# Patient Record
Sex: FEMALE | Race: WHITE | NOT HISPANIC OR LATINO
[De-identification: names, ages, dates, MRNs, and addresses within clinical notes are randomized per-mention and may not be internally consistent; named-entity substitution may affect disease eponyms.]

---

## 2017-03-12 ENCOUNTER — LABORATORY RESULT (OUTPATIENT)
Age: 42
End: 2017-03-12

## 2017-03-12 ENCOUNTER — TRANSCRIPTION ENCOUNTER (OUTPATIENT)
Age: 42
End: 2017-03-12

## 2017-03-13 ENCOUNTER — OTHER (OUTPATIENT)
Age: 42
End: 2017-03-13

## 2017-03-13 ENCOUNTER — APPOINTMENT (OUTPATIENT)
Dept: INTERNAL MEDICINE | Facility: CLINIC | Age: 42
End: 2017-03-13

## 2017-03-13 VITALS
WEIGHT: 128 LBS | BODY MASS INDEX: 23.55 KG/M2 | TEMPERATURE: 98.3 F | HEIGHT: 62 IN | DIASTOLIC BLOOD PRESSURE: 90 MMHG | SYSTOLIC BLOOD PRESSURE: 154 MMHG

## 2017-03-13 DIAGNOSIS — R82.90 UNSPECIFIED ABNORMAL FINDINGS IN URINE: ICD-10-CM

## 2017-03-15 ENCOUNTER — LABORATORY RESULT (OUTPATIENT)
Age: 42
End: 2017-03-15

## 2017-03-20 LAB
ALBUMIN SERPL ELPH-MCNC: 4.5 G/DL
ALP BLD-CCNC: 62 U/L
ALT SERPL-CCNC: 15 U/L
ANION GAP SERPL CALC-SCNC: 16 MMOL/L
APPEARANCE: ABNORMAL
AST SERPL-CCNC: 28 U/L
BAKER'S YEAST AB QL: 23.3 UNITS
BAKER'S YEAST IGA QL IA: 10.4 UNITS
BAKER'S YEAST IGA QN IA: NEGATIVE
BAKER'S YEAST IGG QN IA: ABNORMAL
BASOPHILS # BLD AUTO: 0.01 K/UL
BASOPHILS NFR BLD AUTO: 0.1 %
BILIRUB SERPL-MCNC: 0.4 MG/DL
BILIRUBIN URINE: NEGATIVE
BLOOD URINE: ABNORMAL
BUN SERPL-MCNC: 10 MG/DL
CALCIUM SERPL-MCNC: 9.8 MG/DL
CHLORIDE SERPL-SCNC: 101 MMOL/L
CHOLEST SERPL-MCNC: 235 MG/DL
CHOLEST/HDLC SERPL: 3.9 RATIO
CO2 SERPL-SCNC: 26 MMOL/L
COLOR: ABNORMAL
CREAT SERPL-MCNC: 0.86 MG/DL
ENDOMYSIUM IGA SER QL: NORMAL
ENDOMYSIUM IGA TITR SER: NORMAL
EOSINOPHIL # BLD AUTO: 0.13 K/UL
EOSINOPHIL NFR BLD AUTO: 1.9 %
GLIADIN IGA SER QL: <5 UNITS
GLIADIN IGG SER QL: <5 UNITS
GLIADIN PEPTIDE IGA SER-ACNC: NEGATIVE
GLIADIN PEPTIDE IGG SER-ACNC: NEGATIVE
GLUCOSE QUALITATIVE U: NORMAL MG/DL
GLUCOSE SERPL-MCNC: 97 MG/DL
HCT VFR BLD CALC: 42.1 %
HDLC SERPL-MCNC: 60 MG/DL
HGB BLD-MCNC: 13.8 G/DL
IGA SER QL IEP: 61 MG/DL
IMM GRANULOCYTES NFR BLD AUTO: 0.3 %
KETONES URINE: ABNORMAL
LDLC SERPL CALC-MCNC: 119 MG/DL
LDLC SERPL DIRECT ASSAY-MCNC: 133 MG/DL
LEUKOCYTE ESTERASE URINE: ABNORMAL
LYMPHOCYTES # BLD AUTO: 2.32 K/UL
LYMPHOCYTES NFR BLD AUTO: 33.4 %
MAN DIFF?: NORMAL
MCHC RBC-ENTMCNC: 29.4 PG
MCHC RBC-ENTMCNC: 32.8 GM/DL
MCV RBC AUTO: 89.6 FL
MONOCYTES # BLD AUTO: 0.41 K/UL
MONOCYTES NFR BLD AUTO: 5.9 %
MPO AB + PR3 PNL SER: NORMAL
NEUTROPHILS # BLD AUTO: 4.06 K/UL
NEUTROPHILS NFR BLD AUTO: 58.4 %
NITRITE URINE: NEGATIVE
PH URINE: 6
PLATELET # BLD AUTO: 69 K/UL
POTASSIUM SERPL-SCNC: 4.3 MMOL/L
PROT SERPL-MCNC: 7.6 G/DL
PROTEIN URINE: ABNORMAL MG/DL
RBC # BLD: 4.7 M/UL
RBC # FLD: 13.6 %
SODIUM SERPL-SCNC: 143 MMOL/L
SPECIFIC GRAVITY URINE: 1.03
T4 FREE SERPL-MCNC: 1.5 NG/DL
TRIGL SERPL-MCNC: 280 MG/DL
TSH SERPL-ACNC: 2.95 UIU/ML
TTG IGA SER IA-ACNC: <5 UNITS
TTG IGA SER-ACNC: NEGATIVE
TTG IGG SER IA-ACNC: <5 UNITS
TTG IGG SER IA-ACNC: NEGATIVE
UROBILINOGEN URINE: NORMAL MG/DL
WBC # FLD AUTO: 6.95 K/UL

## 2017-03-23 ENCOUNTER — APPOINTMENT (OUTPATIENT)
Dept: INTERNAL MEDICINE | Facility: CLINIC | Age: 42
End: 2017-03-23

## 2017-03-23 VITALS
WEIGHT: 127 LBS | BODY MASS INDEX: 23.23 KG/M2 | TEMPERATURE: 98 F | SYSTOLIC BLOOD PRESSURE: 130 MMHG | DIASTOLIC BLOOD PRESSURE: 90 MMHG

## 2017-03-27 ENCOUNTER — MESSAGE (OUTPATIENT)
Age: 42
End: 2017-03-27

## 2017-03-27 ENCOUNTER — OUTPATIENT (OUTPATIENT)
Dept: OUTPATIENT SERVICES | Facility: HOSPITAL | Age: 42
LOS: 1 days | End: 2017-03-27
Payer: COMMERCIAL

## 2017-03-27 ENCOUNTER — APPOINTMENT (OUTPATIENT)
Dept: MRI IMAGING | Facility: CLINIC | Age: 42
End: 2017-03-27

## 2017-03-27 DIAGNOSIS — R10.9 UNSPECIFIED ABDOMINAL PAIN: ICD-10-CM

## 2017-03-27 PROCEDURE — 74183 MRI ABD W/O CNTR FLWD CNTR: CPT

## 2017-03-27 PROCEDURE — 72197 MRI PELVIS W/O & W/DYE: CPT

## 2017-03-27 PROCEDURE — A9585: CPT

## 2017-03-31 ENCOUNTER — APPOINTMENT (OUTPATIENT)
Dept: INTERNAL MEDICINE | Facility: CLINIC | Age: 42
End: 2017-03-31

## 2017-04-03 ENCOUNTER — MESSAGE (OUTPATIENT)
Age: 42
End: 2017-04-03

## 2017-04-05 ENCOUNTER — APPOINTMENT (OUTPATIENT)
Dept: INTERNAL MEDICINE | Facility: CLINIC | Age: 42
End: 2017-04-05

## 2017-04-05 VITALS
WEIGHT: 127 LBS | HEART RATE: 86 BPM | SYSTOLIC BLOOD PRESSURE: 120 MMHG | BODY MASS INDEX: 23.23 KG/M2 | DIASTOLIC BLOOD PRESSURE: 82 MMHG

## 2017-04-21 ENCOUNTER — MESSAGE (OUTPATIENT)
Age: 42
End: 2017-04-21

## 2017-05-01 ENCOUNTER — NON-APPOINTMENT (OUTPATIENT)
Age: 42
End: 2017-05-01

## 2017-05-01 ENCOUNTER — APPOINTMENT (OUTPATIENT)
Dept: ELECTROPHYSIOLOGY | Facility: CLINIC | Age: 42
End: 2017-05-01

## 2017-05-01 VITALS
SYSTOLIC BLOOD PRESSURE: 136 MMHG | HEART RATE: 85 BPM | OXYGEN SATURATION: 100 % | WEIGHT: 122 LBS | HEIGHT: 62 IN | DIASTOLIC BLOOD PRESSURE: 94 MMHG | BODY MASS INDEX: 22.45 KG/M2

## 2017-05-04 ENCOUNTER — EMERGENCY (EMERGENCY)
Facility: HOSPITAL | Age: 42
LOS: 1 days | Discharge: HOME | End: 2017-05-04
Attending: EMERGENCY MEDICINE | Admitting: EMERGENCY MEDICINE
Payer: COMMERCIAL

## 2017-05-04 VITALS
HEART RATE: 90 BPM | DIASTOLIC BLOOD PRESSURE: 100 MMHG | OXYGEN SATURATION: 98 % | TEMPERATURE: 98 F | SYSTOLIC BLOOD PRESSURE: 138 MMHG | RESPIRATION RATE: 18 BRPM

## 2017-05-04 VITALS
HEART RATE: 80 BPM | DIASTOLIC BLOOD PRESSURE: 91 MMHG | OXYGEN SATURATION: 100 % | SYSTOLIC BLOOD PRESSURE: 148 MMHG | RESPIRATION RATE: 18 BRPM

## 2017-05-04 DIAGNOSIS — Z87.19 PERSONAL HISTORY OF OTHER DISEASES OF THE DIGESTIVE SYSTEM: ICD-10-CM

## 2017-05-04 DIAGNOSIS — R00.2 PALPITATIONS: ICD-10-CM

## 2017-05-04 DIAGNOSIS — I10 ESSENTIAL (PRIMARY) HYPERTENSION: ICD-10-CM

## 2017-05-04 DIAGNOSIS — R53.1 WEAKNESS: ICD-10-CM

## 2017-05-04 DIAGNOSIS — R19.7 DIARRHEA, UNSPECIFIED: ICD-10-CM

## 2017-05-04 DIAGNOSIS — F41.9 ANXIETY DISORDER, UNSPECIFIED: ICD-10-CM

## 2017-05-04 DIAGNOSIS — R63.4 ABNORMAL WEIGHT LOSS: ICD-10-CM

## 2017-05-04 DIAGNOSIS — R68.83 CHILLS (WITHOUT FEVER): ICD-10-CM

## 2017-05-04 LAB
ALBUMIN SERPL ELPH-MCNC: 4.6 G/DL — SIGNIFICANT CHANGE UP (ref 3.3–5)
ALP SERPL-CCNC: 59 U/L — SIGNIFICANT CHANGE UP (ref 40–120)
ALT FLD-CCNC: 14 U/L RC — SIGNIFICANT CHANGE UP (ref 10–45)
ANION GAP SERPL CALC-SCNC: 17 MMOL/L — SIGNIFICANT CHANGE UP (ref 5–17)
APPEARANCE UR: CLEAR — SIGNIFICANT CHANGE UP
APTT BLD: 22.4 SEC — LOW (ref 27.5–37.4)
AST SERPL-CCNC: 22 U/L — SIGNIFICANT CHANGE UP (ref 10–40)
BACTERIA # UR AUTO: ABNORMAL /HPF
BASOPHILS # BLD AUTO: 0 K/UL — SIGNIFICANT CHANGE UP (ref 0–0.2)
BASOPHILS NFR BLD AUTO: 0.2 % — SIGNIFICANT CHANGE UP (ref 0–2)
BILIRUB SERPL-MCNC: 0.4 MG/DL — SIGNIFICANT CHANGE UP (ref 0.2–1.2)
BILIRUB UR-MCNC: NEGATIVE — SIGNIFICANT CHANGE UP
BUN SERPL-MCNC: 4 MG/DL — LOW (ref 7–23)
CALCIUM SERPL-MCNC: 9.9 MG/DL — SIGNIFICANT CHANGE UP (ref 8.4–10.5)
CHLORIDE SERPL-SCNC: 99 MMOL/L — SIGNIFICANT CHANGE UP (ref 96–108)
CK SERPL-CCNC: 42 U/L — SIGNIFICANT CHANGE UP (ref 25–170)
CO2 SERPL-SCNC: 23 MMOL/L — SIGNIFICANT CHANGE UP (ref 22–31)
COLOR SPEC: SIGNIFICANT CHANGE UP
CREAT SERPL-MCNC: 0.83 MG/DL — SIGNIFICANT CHANGE UP (ref 0.5–1.3)
D DIMER BLD IA.RAPID-MCNC: <150 NG/ML DDU — SIGNIFICANT CHANGE UP
DIFF PNL FLD: ABNORMAL
EOSINOPHIL # BLD AUTO: 0 K/UL — SIGNIFICANT CHANGE UP (ref 0–0.5)
EOSINOPHIL NFR BLD AUTO: 0.3 % — SIGNIFICANT CHANGE UP (ref 0–6)
EPI CELLS # UR: SIGNIFICANT CHANGE UP /HPF
GAS PNL BLDV: SIGNIFICANT CHANGE UP
GLUCOSE SERPL-MCNC: 95 MG/DL — SIGNIFICANT CHANGE UP (ref 70–99)
GLUCOSE UR QL: NEGATIVE — SIGNIFICANT CHANGE UP
HCG SERPL-ACNC: <2 MIU/ML — SIGNIFICANT CHANGE UP
HCT VFR BLD CALC: 41.7 % — SIGNIFICANT CHANGE UP (ref 34.5–45)
HGB BLD-MCNC: 15.1 G/DL — SIGNIFICANT CHANGE UP (ref 11.5–15.5)
INR BLD: 0.99 RATIO — SIGNIFICANT CHANGE UP (ref 0.88–1.16)
KETONES UR-MCNC: NEGATIVE — SIGNIFICANT CHANGE UP
LEUKOCYTE ESTERASE UR-ACNC: NEGATIVE — SIGNIFICANT CHANGE UP
LYMPHOCYTES # BLD AUTO: 16.2 % — SIGNIFICANT CHANGE UP (ref 13–44)
LYMPHOCYTES # BLD AUTO: 2.2 K/UL — SIGNIFICANT CHANGE UP (ref 1–3.3)
MAGNESIUM SERPL-MCNC: 2.5 MG/DL — SIGNIFICANT CHANGE UP (ref 1.6–2.6)
MCHC RBC-ENTMCNC: 31 PG — SIGNIFICANT CHANGE UP (ref 27–34)
MCHC RBC-ENTMCNC: 36.3 GM/DL — HIGH (ref 32–36)
MCV RBC AUTO: 85.6 FL — SIGNIFICANT CHANGE UP (ref 80–100)
MONOCYTES # BLD AUTO: 1 K/UL — HIGH (ref 0–0.9)
MONOCYTES NFR BLD AUTO: 7.7 % — SIGNIFICANT CHANGE UP (ref 2–14)
NEUTROPHILS # BLD AUTO: 10.1 K/UL — HIGH (ref 1.8–7.4)
NEUTROPHILS NFR BLD AUTO: 75.6 % — SIGNIFICANT CHANGE UP (ref 43–77)
NITRITE UR-MCNC: NEGATIVE — SIGNIFICANT CHANGE UP
PH UR: 7 — SIGNIFICANT CHANGE UP (ref 5–8)
PHOSPHATE SERPL-MCNC: 4.1 MG/DL — SIGNIFICANT CHANGE UP (ref 2.5–4.5)
PLATELET # BLD AUTO: 68 K/UL — LOW (ref 150–400)
POTASSIUM SERPL-MCNC: 3.6 MMOL/L — SIGNIFICANT CHANGE UP (ref 3.5–5.3)
POTASSIUM SERPL-SCNC: 3.6 MMOL/L — SIGNIFICANT CHANGE UP (ref 3.5–5.3)
PROT SERPL-MCNC: 7.5 G/DL — SIGNIFICANT CHANGE UP (ref 6–8.3)
PROT UR-MCNC: NEGATIVE — SIGNIFICANT CHANGE UP
PROTHROM AB SERPL-ACNC: 10.8 SEC — SIGNIFICANT CHANGE UP (ref 9.8–12.7)
RBC # BLD: 4.87 M/UL — SIGNIFICANT CHANGE UP (ref 3.8–5.2)
RBC # FLD: 11.7 % — SIGNIFICANT CHANGE UP (ref 10.3–14.5)
RBC CASTS # UR COMP ASSIST: ABNORMAL /HPF (ref 0–2)
SODIUM SERPL-SCNC: 139 MMOL/L — SIGNIFICANT CHANGE UP (ref 135–145)
SP GR SPEC: <1.005 — LOW (ref 1.01–1.02)
TROPONIN T SERPL-MCNC: <0.01 NG/ML — SIGNIFICANT CHANGE UP (ref 0–0.06)
UROBILINOGEN FLD QL: NEGATIVE — SIGNIFICANT CHANGE UP
WBC # BLD: 13.3 K/UL — HIGH (ref 3.8–10.5)
WBC # FLD AUTO: 13.3 K/UL — HIGH (ref 3.8–10.5)
WBC UR QL: SIGNIFICANT CHANGE UP /HPF (ref 0–5)

## 2017-05-04 PROCEDURE — 83605 ASSAY OF LACTIC ACID: CPT

## 2017-05-04 PROCEDURE — 84100 ASSAY OF PHOSPHORUS: CPT

## 2017-05-04 PROCEDURE — 82803 BLOOD GASES ANY COMBINATION: CPT

## 2017-05-04 PROCEDURE — 82435 ASSAY OF BLOOD CHLORIDE: CPT

## 2017-05-04 PROCEDURE — 82947 ASSAY GLUCOSE BLOOD QUANT: CPT

## 2017-05-04 PROCEDURE — 82330 ASSAY OF CALCIUM: CPT

## 2017-05-04 PROCEDURE — 84295 ASSAY OF SERUM SODIUM: CPT

## 2017-05-04 PROCEDURE — 81001 URINALYSIS AUTO W/SCOPE: CPT

## 2017-05-04 PROCEDURE — 84132 ASSAY OF SERUM POTASSIUM: CPT

## 2017-05-04 PROCEDURE — 85027 COMPLETE CBC AUTOMATED: CPT

## 2017-05-04 PROCEDURE — 93005 ELECTROCARDIOGRAM TRACING: CPT

## 2017-05-04 PROCEDURE — 82550 ASSAY OF CK (CPK): CPT

## 2017-05-04 PROCEDURE — 85014 HEMATOCRIT: CPT

## 2017-05-04 PROCEDURE — 85610 PROTHROMBIN TIME: CPT

## 2017-05-04 PROCEDURE — 93010 ELECTROCARDIOGRAM REPORT: CPT

## 2017-05-04 PROCEDURE — 84702 CHORIONIC GONADOTROPIN TEST: CPT

## 2017-05-04 PROCEDURE — 80053 COMPREHEN METABOLIC PANEL: CPT

## 2017-05-04 PROCEDURE — 99285 EMERGENCY DEPT VISIT HI MDM: CPT | Mod: 25

## 2017-05-04 PROCEDURE — 83735 ASSAY OF MAGNESIUM: CPT

## 2017-05-04 PROCEDURE — 84443 ASSAY THYROID STIM HORMONE: CPT

## 2017-05-04 PROCEDURE — 99284 EMERGENCY DEPT VISIT MOD MDM: CPT | Mod: 25

## 2017-05-04 PROCEDURE — 84484 ASSAY OF TROPONIN QUANT: CPT

## 2017-05-04 PROCEDURE — 85379 FIBRIN DEGRADATION QUANT: CPT

## 2017-05-04 PROCEDURE — 85730 THROMBOPLASTIN TIME PARTIAL: CPT

## 2017-05-04 RX ORDER — SODIUM CHLORIDE 9 MG/ML
2000 INJECTION INTRAMUSCULAR; INTRAVENOUS; SUBCUTANEOUS ONCE
Qty: 0 | Refills: 0 | Status: COMPLETED | OUTPATIENT
Start: 2017-05-04 | End: 2017-05-04

## 2017-05-04 RX ORDER — SODIUM CHLORIDE 9 MG/ML
1000 INJECTION, SOLUTION INTRAVENOUS
Qty: 0 | Refills: 0 | Status: DISCONTINUED | OUTPATIENT
Start: 2017-05-04 | End: 2017-05-08

## 2017-05-04 RX ADMIN — SODIUM CHLORIDE 2000 MILLILITER(S): 9 INJECTION INTRAMUSCULAR; INTRAVENOUS; SUBCUTANEOUS at 20:30

## 2017-05-04 RX ADMIN — SODIUM CHLORIDE 500 MILLILITER(S): 9 INJECTION, SOLUTION INTRAVENOUS at 22:00

## 2017-05-04 NOTE — ED PROVIDER NOTE - PLAN OF CARE
1. return for worsening symptoms or anything concerning to you  2. take all home meds as prescribed  3. follow up with your pmd call to make an appointment  4. follow up with Dr. Arango call to make an appointment

## 2017-05-04 NOTE — ED PROVIDER NOTE - PHYSICAL EXAMINATION
Constitutional: mild distress + anxiety  Eyes: PERRLA EOMI  Head: Normocephalic atraumatic  Cardiac: tachycardic   Resp: Lungs CTAB  GI: Abd s/nt/nd  Neuro: CN2-12 intact normal strength sensation and coordination normal reflexes  Skin: scattered petichae

## 2017-05-04 NOTE — ED PROVIDER NOTE - NS ED ROS FT
Constitutional: No fever or chills + weight loss + weakness  Eyes: No visual changes  HEENT: No throat pain  CV: No chest pain + palpitations  Resp: No SOB no cough  GI: No abd pain, nausea or vomiting  : No dysuria  MSK: No musculoskeletal pain  Skin: No rash  Neuro: No headache  Psych: anxiety

## 2017-05-04 NOTE — ED ADULT NURSE NOTE - OBJECTIVE STATEMENT
41 y.o. Female presents to the ED c/o feeling tachycardic when urinating twice today. Hx IBS and anxiety. Denies CP, SOB, N/V/D, urinary/bowel complications, fever/chills. Pt is in no current distress. Comfort and safety provided. 41 y.o. Female presents to the ED c/o feeling tachycardic when urinating twice today. Hx IBS and anxiety. Pt is scheduled for a stress test tomorrow. Denies CP, SOB, N/V/D, urinary/bowel complications, fever/chills. Pt is in no current distress. Comfort and safety provided. 41 y.o. Female presents to the ED c/o feeling tachycardic when urinating twice today. Hx IBS and anxiety. Pt reports feeling dehydrated, "cotton mouth" and feels weak on legs b/l. Ambulatory. +ROM with equal strength in all four extremities. Neuro intact. Pt states she was scheduled for a stress test tomorrow. Denies CP, SOB, N/V/D, urinary/bowel complications, fever/chills. Normal speech. Pt is in no current distress. Comfort and safety provided.

## 2017-05-04 NOTE — ED PROVIDER NOTE - INTERPRETATION
normal sinus rhythm, Normal axis, Normal ME interval and QRS complex. There are no acute ischemic ST or T-wave changes.

## 2017-05-04 NOTE — ED PROVIDER NOTE - ATTENDING CONTRIBUTION TO CARE
intermittent episodes of weakness and palpitations, on my exam:  pleasant female in no active disease, clear lungs, normal heart exam Rolando Acevedo MD (attending)

## 2017-05-04 NOTE — ED PROVIDER NOTE - PROGRESS NOTE DETAILS
called Dr. Arango spoke with Dr. Ding from Dr. Arango service - said that if labs are normal and patient feeling better can follow up outpatient. Rolando Atkinson M.D. patient feeling better. Pts father Dr. Presley at bedside. All agree that best plan is to go home and follow up in office. Rolando Atkinson M.D.

## 2017-05-04 NOTE — ED PROVIDER NOTE - OBJECTIVE STATEMENT
41F hx htn anxiety ibs presents to the ED with palpitations weakness and jitteriness. Symptoms all started about 6 months ago after a significant life stressor. Pt has been having these feelings of palpitations for some time now but worse over the past few days. Pt feels weak worse in lower extremity, jittery, weight loss (7 lbs unintentional over the past week). + diarrhea. No f/c/cp/sob/ha/dizziness/abd pain/dysuria.

## 2017-05-04 NOTE — ED PROVIDER NOTE - CARE PLAN
Principal Discharge DX:	Weakness  Instructions for follow-up, activity and diet:	1. return for worsening symptoms or anything concerning to you  2. take all home meds as prescribed  3. follow up with your pmd call to make an appointment  4. follow up with Dr. Arango call to make an appointment

## 2017-05-04 NOTE — ED PROVIDER NOTE - MEDICAL DECISION MAKING DETAILS
41F hx htn anxiety ibs presents with palpitations anxiety weakness jittery and weight loss. Will check labs for electrolyte abnormalities, tsh, trop, d-dimer, ekg cardiac monitor and reassess

## 2017-05-05 ENCOUNTER — MESSAGE (OUTPATIENT)
Age: 42
End: 2017-05-05

## 2017-05-05 LAB — TSH SERPL-MCNC: 3.31 UIU/ML — SIGNIFICANT CHANGE UP (ref 0.27–4.2)

## 2017-05-06 ENCOUNTER — MESSAGE (OUTPATIENT)
Age: 42
End: 2017-05-06

## 2017-05-11 ENCOUNTER — LABORATORY RESULT (OUTPATIENT)
Age: 42
End: 2017-05-11

## 2017-05-11 ENCOUNTER — APPOINTMENT (OUTPATIENT)
Dept: INTERNAL MEDICINE | Facility: CLINIC | Age: 42
End: 2017-05-11

## 2017-05-11 VITALS
DIASTOLIC BLOOD PRESSURE: 70 MMHG | HEIGHT: 62 IN | WEIGHT: 118 LBS | BODY MASS INDEX: 21.71 KG/M2 | SYSTOLIC BLOOD PRESSURE: 110 MMHG | HEART RATE: 80 BPM | TEMPERATURE: 98.3 F

## 2017-05-11 LAB
ALBUMIN SERPL ELPH-MCNC: 4.6 G/DL
ALP BLD-CCNC: 69 U/L
ALT SERPL-CCNC: 12 U/L
ANION GAP SERPL CALC-SCNC: 20 MMOL/L
AST SERPL-CCNC: 25 U/L
BASOPHILS # BLD AUTO: 0.01 K/UL
BASOPHILS NFR BLD AUTO: 0.1 %
BILIRUB SERPL-MCNC: 0.2 MG/DL
BUN SERPL-MCNC: 5 MG/DL
CALCIUM SERPL-MCNC: 10.1 MG/DL
CHLORIDE SERPL-SCNC: 102 MMOL/L
CO2 SERPL-SCNC: 22 MMOL/L
CREAT SERPL-MCNC: 0.99 MG/DL
EOSINOPHIL # BLD AUTO: 0.08 K/UL
EOSINOPHIL NFR BLD AUTO: 0.9 %
ERYTHROCYTE [SEDIMENTATION RATE] IN BLOOD BY WESTERGREN METHOD: 16 MM/HR
GLUCOSE SERPL-MCNC: 69 MG/DL
HCT VFR BLD CALC: 41.1 %
HGB BLD-MCNC: 14 G/DL
IMM GRANULOCYTES NFR BLD AUTO: 0.2 %
LYMPHOCYTES # BLD AUTO: 1.73 K/UL
LYMPHOCYTES NFR BLD AUTO: 19.9 %
MAN DIFF?: NORMAL
MCHC RBC-ENTMCNC: 29.7 PG
MCHC RBC-ENTMCNC: 34.1 GM/DL
MCV RBC AUTO: 87.1 FL
MONOCYTES # BLD AUTO: 0.68 K/UL
MONOCYTES NFR BLD AUTO: 7.8 %
NEUTROPHILS # BLD AUTO: 6.19 K/UL
NEUTROPHILS NFR BLD AUTO: 71.1 %
PLATELET # BLD AUTO: 71 K/UL
POTASSIUM SERPL-SCNC: 3.8 MMOL/L
PROT SERPL-MCNC: 7.6 G/DL
RBC # BLD: 4.72 M/UL
RBC # FLD: 12.7 %
SAVE SPECIMEN: NORMAL
SODIUM SERPL-SCNC: 144 MMOL/L
WBC # FLD AUTO: 8.71 K/UL

## 2017-05-11 RX ORDER — CIPROFLOXACIN HYDROCHLORIDE 500 MG/1
500 TABLET, FILM COATED ORAL
Qty: 14 | Refills: 0 | Status: COMPLETED | COMMUNITY
Start: 2017-05-05

## 2017-05-11 RX ORDER — SULFAMETHOXAZOLE AND TRIMETHOPRIM 800; 160 MG/1; MG/1
800-160 TABLET ORAL
Qty: 28 | Refills: 0 | Status: COMPLETED | COMMUNITY
Start: 2017-03-19

## 2017-05-11 RX ORDER — ADAPALENE 1 MG/G
0.1 CREAM TOPICAL
Qty: 90 | Refills: 0 | Status: COMPLETED | COMMUNITY
Start: 2016-12-21

## 2017-05-11 RX ORDER — CLINDAMYCIN PHOSPHATE AND BENZOYL PEROXIDE 10; 50 MG/G; MG/G
1.2-5 GEL TOPICAL
Qty: 90 | Refills: 0 | Status: COMPLETED | COMMUNITY
Start: 2016-12-21

## 2017-05-17 ENCOUNTER — MEDICATION RENEWAL (OUTPATIENT)
Age: 42
End: 2017-05-17

## 2017-05-17 DIAGNOSIS — H83.09 LABYRINTHITIS, UNSPECIFIED EAR: ICD-10-CM

## 2017-05-19 ENCOUNTER — OUTPATIENT (OUTPATIENT)
Dept: OUTPATIENT SERVICES | Facility: HOSPITAL | Age: 42
LOS: 1 days | Discharge: HOME | End: 2017-05-19

## 2017-05-19 DIAGNOSIS — D47.3 ESSENTIAL (HEMORRHAGIC) THROMBOCYTHEMIA: ICD-10-CM

## 2017-05-22 ENCOUNTER — LABORATORY RESULT (OUTPATIENT)
Age: 42
End: 2017-05-22

## 2017-05-22 ENCOUNTER — APPOINTMENT (OUTPATIENT)
Dept: HEMATOLOGY ONCOLOGY | Facility: CLINIC | Age: 42
End: 2017-05-22

## 2017-05-22 ENCOUNTER — RESULT REVIEW (OUTPATIENT)
Age: 42
End: 2017-05-22

## 2017-05-22 ENCOUNTER — APPOINTMENT (OUTPATIENT)
Dept: CV DIAGNOSTICS | Facility: HOSPITAL | Age: 42
End: 2017-05-22

## 2017-05-22 DIAGNOSIS — Z80.9 FAMILY HISTORY OF MALIGNANT NEOPLASM, UNSPECIFIED: ICD-10-CM

## 2017-05-22 DIAGNOSIS — Z80.42 FAMILY HISTORY OF MALIGNANT NEOPLASM OF PROSTATE: ICD-10-CM

## 2017-05-22 DIAGNOSIS — Z80.0 FAMILY HISTORY OF MALIGNANT NEOPLASM OF DIGESTIVE ORGANS: ICD-10-CM

## 2017-05-22 DIAGNOSIS — Z80.1 FAMILY HISTORY OF MALIGNANT NEOPLASM OF TRACHEA, BRONCHUS AND LUNG: ICD-10-CM

## 2017-05-22 DIAGNOSIS — Z80.8 FAMILY HISTORY OF MALIGNANT NEOPLASM OF OTHER ORGANS OR SYSTEMS: ICD-10-CM

## 2017-05-23 ENCOUNTER — EMERGENCY (EMERGENCY)
Facility: HOSPITAL | Age: 42
LOS: 1 days | Discharge: HOME | End: 2017-05-23
Attending: INTERNAL MEDICINE | Admitting: INTERNAL MEDICINE
Payer: COMMERCIAL

## 2017-05-23 VITALS
TEMPERATURE: 100 F | OXYGEN SATURATION: 99 % | HEART RATE: 87 BPM | DIASTOLIC BLOOD PRESSURE: 103 MMHG | RESPIRATION RATE: 14 BRPM | HEIGHT: 62 IN | WEIGHT: 125 LBS | SYSTOLIC BLOOD PRESSURE: 137 MMHG

## 2017-05-23 VITALS
RESPIRATION RATE: 15 BRPM | HEART RATE: 93 BPM | SYSTOLIC BLOOD PRESSURE: 127 MMHG | OXYGEN SATURATION: 100 % | TEMPERATURE: 98 F | DIASTOLIC BLOOD PRESSURE: 96 MMHG

## 2017-05-23 LAB
ALBUMIN SERPL ELPH-MCNC: 4.2 G/DL — SIGNIFICANT CHANGE UP (ref 3.3–5)
ALP SERPL-CCNC: 75 U/L — SIGNIFICANT CHANGE UP (ref 30–120)
ALT FLD-CCNC: 26 U/L DA — SIGNIFICANT CHANGE UP (ref 10–60)
ANION GAP SERPL CALC-SCNC: 8 MMOL/L — SIGNIFICANT CHANGE UP (ref 5–17)
AST SERPL-CCNC: 20 U/L — SIGNIFICANT CHANGE UP (ref 10–40)
BILIRUB SERPL-MCNC: 0.3 MG/DL — SIGNIFICANT CHANGE UP (ref 0.2–1.2)
BUN SERPL-MCNC: 6 MG/DL — LOW (ref 7–23)
CALCIUM SERPL-MCNC: 9.2 MG/DL — SIGNIFICANT CHANGE UP (ref 8.4–10.5)
CHLORIDE SERPL-SCNC: 99 MMOL/L — SIGNIFICANT CHANGE UP (ref 96–108)
CO2 SERPL-SCNC: 32 MMOL/L — HIGH (ref 22–31)
CREAT SERPL-MCNC: 0.86 MG/DL — SIGNIFICANT CHANGE UP (ref 0.5–1.3)
GLUCOSE SERPL-MCNC: 84 MG/DL — SIGNIFICANT CHANGE UP (ref 70–99)
HCT VFR BLD CALC: 46.6 % — HIGH (ref 34.5–45)
HGB BLD-MCNC: 15.4 G/DL — SIGNIFICANT CHANGE UP (ref 11.5–15.5)
LYMPHOCYTES # BLD AUTO: 38 % — SIGNIFICANT CHANGE UP (ref 13–44)
MCHC RBC-ENTMCNC: 29.7 PG — SIGNIFICANT CHANGE UP (ref 27–34)
MCHC RBC-ENTMCNC: 33.1 GM/DL — SIGNIFICANT CHANGE UP (ref 32–36)
MCV RBC AUTO: 89.7 FL — SIGNIFICANT CHANGE UP (ref 80–100)
MONOCYTES NFR BLD AUTO: 14 % — SIGNIFICANT CHANGE UP (ref 2–14)
NEUTROPHILS NFR BLD AUTO: 44 % — SIGNIFICANT CHANGE UP (ref 43–77)
PLATELET # BLD AUTO: 132 K/UL — LOW (ref 150–400)
POTASSIUM SERPL-MCNC: 2.9 MMOL/L — CRITICAL LOW (ref 3.5–5.3)
POTASSIUM SERPL-SCNC: 2.9 MMOL/L — CRITICAL LOW (ref 3.5–5.3)
PROT SERPL-MCNC: 7.5 G/DL — SIGNIFICANT CHANGE UP (ref 6–8.3)
RBC # BLD: 5.19 M/UL — SIGNIFICANT CHANGE UP (ref 3.8–5.2)
RBC # FLD: 11.6 % — SIGNIFICANT CHANGE UP (ref 10.3–14.5)
SODIUM SERPL-SCNC: 139 MMOL/L — SIGNIFICANT CHANGE UP (ref 135–145)
WBC # BLD: 15.3 K/UL — HIGH (ref 3.8–10.5)
WBC # FLD AUTO: 15.3 K/UL — HIGH (ref 3.8–10.5)

## 2017-05-23 PROCEDURE — 80053 COMPREHEN METABOLIC PANEL: CPT

## 2017-05-23 PROCEDURE — 99284 EMERGENCY DEPT VISIT MOD MDM: CPT | Mod: 25

## 2017-05-23 PROCEDURE — 85027 COMPLETE CBC AUTOMATED: CPT

## 2017-05-23 PROCEDURE — 70450 CT HEAD/BRAIN W/O DYE: CPT | Mod: 26

## 2017-05-23 PROCEDURE — 70450 CT HEAD/BRAIN W/O DYE: CPT

## 2017-05-23 PROCEDURE — 99285 EMERGENCY DEPT VISIT HI MDM: CPT

## 2017-05-23 NOTE — ED ADULT NURSE NOTE - PMH
Anxiety    Asthma    Headache    HTN (hypertension)    IBS (irritable bowel syndrome)    Labyrinthitis

## 2017-05-23 NOTE — ED ADULT NURSE REASSESSMENT NOTE - NS ED NURSE REASSESS COMMENT FT1
PT alert oriented x 3 SHERIF calm Denies pain. CM RSR with no ectopics noted.Dizziness persists.  Pt declined to provide urine for UCG prior to CT scan. stating she is not pregnant. Advised to have ucg done but Pt declined PT alert oriented x 3 SHERIF calm Denies pain. CM RSR with no ectopics noted.Dizziness persists.  Pt declined to provide urine for UCG prior to CT scan. stating she is not pregnant. Advised to have ucg done but Pt declined. Refusal for ucg signed by Pt in xray

## 2017-05-23 NOTE — ED ADULT NURSE NOTE - OBJECTIVE STATEMENT
Pt presents via [facility] ambulance Pt states she felt a pop in head prior ro arrival and then felt dizzy, noted BP was high and felt heart palpitations for one minute.  Denies pain in head now but states some pressure. Continues to c/o dizziness. Alert Oriented x 3 SHERIF Moving all extremities well Positive  Pt presents via [facility] ambulance Pt states she felt a pop in head prior ro arrival and then felt dizzy, noted BP was high and felt heart palpitations for one minute.  Denies pain in head now but states some pressure. Continues to c/o dizziness. Alert Oriented x 3 SHERIF Moving all extremities well Positive  both hands CM RSR with no ectopics noted

## 2017-05-23 NOTE — ED PROVIDER NOTE - NS ED MD SCRIBE ATTENDING SCRIBE SECTIONS
PAST MEDICAL/SURGICAL/SOCIAL HISTORY/VITAL SIGNS( Pullset)/DISPOSITION/PHYSICAL EXAM/HISTORY OF PRESENT ILLNESS/REVIEW OF SYSTEMS/HIV

## 2017-05-23 NOTE — ED PROVIDER NOTE - OBJECTIVE STATEMENT
42 y/o F pt with history of anxiety presents to the ED c/o sitting in bed and she "felt a pop" in her left ear and began experiencing dizziness, noticed her blood pressure was high, and then started having palpitations. Pt states she was diagnosed with labyrinthitis 2 weeks ago. Pt has gone to a cardiologist and had a Holter test which was negative, and was supposed to get stress test yesterday, was not feeling well enough for it. Denies headache, weakness/numbness in extremities, ear pain. No further complaints at this time. 40 y/o F pt with history of anxiety presents to the ED c/o sitting in bed today and she "felt a pop" in her left ear and began experiencing dizziness, noticed her blood pressure was high, and then started having palpitations. Pt states she was diagnosed with labyrinthitis 2 weeks ago. Pt has gone to a cardiologist and had a Holter test which was negative, and was supposed to get stress test yesterday, but was not feeling well enough for it. Denies headache, weakness/numbness in extremities, ear pain. No further complaints at this time. 42 y/o F pt with history of anxiety presents to the ED c/o sitting in bed today and  "felt a pop" in her left ear/head and began experiencing dizziness again and noticed her blood pressure was very high, and then started having palpitations. Pt states she was diagnosed with labyrinthitis 2 weeks ago. Pt has gone to a cardiologist and had a Holter test which was negative, and was supposed to get stress test yesterday, but was not feeling well enough for it. Denies headache, weakness/numbness in extremities, ear pain. No further complaints at this time. 42 y/o F pt with history of anxiety presents to the ED c/o sitting in bed today and  "felt a pop" in her left ear/head and began experiencing dizziness again and noticed her blood pressure was very high, and then started having palpitations. Pt states she was diagnosed with labyrinthitis 2 weeks ago. Pt has gone to a cardiologist and had a Holter test which was negative, and was supposed to get stress test yesterday, but was not feeling well enough for it. Denies headache, weakness/numbness in extremities, ear pain. No further complaints at this time.pt oin prednisone for the labyrinthitis.

## 2017-05-23 NOTE — ED PROVIDER NOTE - CHPI ED SYMPTOMS NEG
ear pain, headache/no numbness/no weakness ear pain, headache/no numbness/no change in level of consciousness/no weakness/no loss of consciousness/no blurred vision/no vomiting/no confusion/no fever/no nausea

## 2017-05-23 NOTE — PROVIDER CONTACT NOTE (CRITICAL VALUE NOTIFICATION) - SITUATION
Potassium 2.9 reported by lab. Pt was discharged by Dr Lee prior to results.  Dr Lee stated he will call Pt at home and inform her

## 2017-05-23 NOTE — ED PROVIDER NOTE - DETAILS:
Thomas Lee MD - The scribe's documentation has been prepared under my direction and personally reviewed by me in its entirety. I confirm that the note above accurately reflects all work, treatment, procedures, and medical decision making performed by me.

## 2017-05-23 NOTE — ED ADULT NURSE NOTE - CHPI ED SYMPTOMS NEG
no weakness/no vomiting/no chills/no decreased eating/drinking/no pain/no numbness/no tingling/no nausea

## 2017-05-24 ENCOUNTER — APPOINTMENT (OUTPATIENT)
Dept: INTERNAL MEDICINE | Facility: CLINIC | Age: 42
End: 2017-05-24

## 2017-05-24 ENCOUNTER — RECORD ABSTRACTING (OUTPATIENT)
Age: 42
End: 2017-05-24

## 2017-05-24 ENCOUNTER — MEDICATION RENEWAL (OUTPATIENT)
Age: 42
End: 2017-05-24

## 2017-05-24 DIAGNOSIS — E87.6 HYPOKALEMIA: ICD-10-CM

## 2017-05-24 LAB
ALBUMIN SERPL ELPH-MCNC: 4.4 G/DL
ALP BLD-CCNC: 77 U/L
ALT SERPL-CCNC: 19 U/L
ANION GAP SERPL CALC-SCNC: 17 MMOL/L
AST SERPL-CCNC: 20 U/L
BILIRUB SERPL-MCNC: 0.2 MG/DL
BUN SERPL-MCNC: 6 MG/DL
CALCIUM SERPL-MCNC: 9.8 MG/DL
CHLORIDE SERPL-SCNC: 98 MMOL/L
CO2 SERPL-SCNC: 25 MMOL/L
CREAT SERPL-MCNC: 0.9 MG/DL
GLUCOSE SERPL-MCNC: 77 MG/DL
POTASSIUM SERPL-SCNC: 3.9 MMOL/L
PROT SERPL-MCNC: 7.1 G/DL
SAVE SPECIMEN: NORMAL
SODIUM SERPL-SCNC: 140 MMOL/L

## 2017-05-30 ENCOUNTER — LABORATORY RESULT (OUTPATIENT)
Age: 42
End: 2017-05-30

## 2017-05-30 LAB
BASOPHILS # BLD AUTO: 0 K/UL — SIGNIFICANT CHANGE UP (ref 0–0.2)
BASOPHILS NFR BLD AUTO: 0.3 % — SIGNIFICANT CHANGE UP (ref 0–2)
EOSINOPHIL # BLD AUTO: 0.1 K/UL — SIGNIFICANT CHANGE UP (ref 0–0.5)
EOSINOPHIL NFR BLD AUTO: 0.3 % — SIGNIFICANT CHANGE UP (ref 0–6)
HCT VFR BLD CALC: 44 % — SIGNIFICANT CHANGE UP (ref 34.5–45)
HGB BLD-MCNC: 15.2 G/DL — SIGNIFICANT CHANGE UP (ref 11.5–15.5)
LYMPHOCYTES # BLD AUTO: 22.8 % — SIGNIFICANT CHANGE UP (ref 13–44)
LYMPHOCYTES # BLD AUTO: 4.4 K/UL — HIGH (ref 1–3.3)
MCHC RBC-ENTMCNC: 30.3 PG — SIGNIFICANT CHANGE UP (ref 27–34)
MCHC RBC-ENTMCNC: 34.5 G/DL — SIGNIFICANT CHANGE UP (ref 32–36)
MCV RBC AUTO: 87.8 FL — SIGNIFICANT CHANGE UP (ref 80–100)
MONOCYTES # BLD AUTO: 1.1 K/UL — HIGH (ref 0–0.9)
MONOCYTES NFR BLD AUTO: 5.7 % — SIGNIFICANT CHANGE UP (ref 2–14)
NEUTROPHILS # BLD AUTO: 13.7 K/UL — HIGH (ref 1.8–7.4)
NEUTROPHILS NFR BLD AUTO: 70.9 % — SIGNIFICANT CHANGE UP (ref 43–77)
PLATELET # BLD AUTO: 115 K/UL — LOW (ref 150–400)
RBC # BLD: 5.01 M/UL — SIGNIFICANT CHANGE UP (ref 3.8–5.2)
RBC # FLD: 11.6 % — SIGNIFICANT CHANGE UP (ref 10.3–14.5)
WBC # BLD: 19.3 K/UL — HIGH (ref 3.8–10.5)
WBC # FLD AUTO: 19.3 K/UL — HIGH (ref 3.8–10.5)

## 2017-05-31 ENCOUNTER — APPOINTMENT (OUTPATIENT)
Dept: INTERNAL MEDICINE | Facility: CLINIC | Age: 42
End: 2017-05-31

## 2017-05-31 VITALS — DIASTOLIC BLOOD PRESSURE: 84 MMHG | SYSTOLIC BLOOD PRESSURE: 122 MMHG | OXYGEN SATURATION: 94 % | HEART RATE: 92 BPM

## 2017-05-31 VITALS
WEIGHT: 122 LBS | BODY MASS INDEX: 22.45 KG/M2 | SYSTOLIC BLOOD PRESSURE: 130 MMHG | TEMPERATURE: 97.7 F | DIASTOLIC BLOOD PRESSURE: 86 MMHG | HEIGHT: 62 IN

## 2017-05-31 VITALS
DIASTOLIC BLOOD PRESSURE: 84 MMHG | SYSTOLIC BLOOD PRESSURE: 136 MMHG | WEIGHT: 122 LBS | TEMPERATURE: 99 F | BODY MASS INDEX: 22.45 KG/M2 | HEIGHT: 62 IN

## 2017-05-31 DIAGNOSIS — Z78.9 OTHER SPECIFIED HEALTH STATUS: ICD-10-CM

## 2017-05-31 RX ORDER — POTASSIUM CHLORIDE 750 MG/1
10 TABLET, FILM COATED, EXTENDED RELEASE ORAL DAILY
Qty: 20 | Refills: 0 | Status: COMPLETED | COMMUNITY
Start: 2017-05-24 | End: 2017-05-31

## 2017-05-31 RX ORDER — PREDNISONE 10 MG/1
10 TABLET ORAL
Qty: 40 | Refills: 0 | Status: COMPLETED | COMMUNITY
Start: 2017-05-17 | End: 2017-05-31

## 2017-05-31 RX ORDER — CLOTRIMAZOLE 10 MG/1
10 LOZENGE ORAL
Qty: 90 | Refills: 0 | Status: COMPLETED | COMMUNITY
Start: 2017-05-25

## 2017-05-31 RX ORDER — AZITHROMYCIN 250 MG/1
250 TABLET, FILM COATED ORAL
Qty: 6 | Refills: 0 | Status: COMPLETED | COMMUNITY
Start: 2017-05-15

## 2017-06-01 LAB
METANEPHRINE, PL: 46 PG/ML
NORMETANEPHRINE, PL: 191 PG/ML
SAVE SPECIMEN: NORMAL

## 2017-06-04 ENCOUNTER — MOBILE ON CALL (OUTPATIENT)
Age: 42
End: 2017-06-04

## 2017-06-04 ENCOUNTER — EMERGENCY (EMERGENCY)
Facility: HOSPITAL | Age: 42
LOS: 1 days | Discharge: HOME | End: 2017-06-04
Attending: EMERGENCY MEDICINE | Admitting: EMERGENCY MEDICINE
Payer: COMMERCIAL

## 2017-06-04 ENCOUNTER — CLINICAL ADVICE (OUTPATIENT)
Age: 42
End: 2017-06-04

## 2017-06-04 VITALS
DIASTOLIC BLOOD PRESSURE: 104 MMHG | OXYGEN SATURATION: 98 % | TEMPERATURE: 98 F | RESPIRATION RATE: 16 BRPM | HEART RATE: 96 BPM | SYSTOLIC BLOOD PRESSURE: 156 MMHG

## 2017-06-04 VITALS — SYSTOLIC BLOOD PRESSURE: 149 MMHG | DIASTOLIC BLOOD PRESSURE: 106 MMHG

## 2017-06-04 DIAGNOSIS — R00.2 PALPITATIONS: ICD-10-CM

## 2017-06-04 LAB
ALBUMIN SERPL ELPH-MCNC: 4.8 G/DL — SIGNIFICANT CHANGE UP (ref 3.3–5)
ALP SERPL-CCNC: 80 U/L — SIGNIFICANT CHANGE UP (ref 40–120)
ALT FLD-CCNC: 22 U/L RC — SIGNIFICANT CHANGE UP (ref 10–45)
ANION GAP SERPL CALC-SCNC: 14 MMOL/L — SIGNIFICANT CHANGE UP (ref 5–17)
APPEARANCE UR: CLEAR — SIGNIFICANT CHANGE UP
AST SERPL-CCNC: 29 U/L — SIGNIFICANT CHANGE UP (ref 10–40)
BACTERIA # UR AUTO: ABNORMAL /HPF
BASOPHILS # BLD AUTO: 0 K/UL — SIGNIFICANT CHANGE UP (ref 0–0.2)
BASOPHILS NFR BLD AUTO: 0.3 % — SIGNIFICANT CHANGE UP (ref 0–2)
BILIRUB SERPL-MCNC: 0.4 MG/DL — SIGNIFICANT CHANGE UP (ref 0.2–1.2)
BILIRUB UR-MCNC: NEGATIVE — SIGNIFICANT CHANGE UP
BUN SERPL-MCNC: 3 MG/DL — LOW (ref 7–23)
CALCIUM SERPL-MCNC: 10 MG/DL — SIGNIFICANT CHANGE UP (ref 8.4–10.5)
CHLORIDE SERPL-SCNC: 104 MMOL/L — SIGNIFICANT CHANGE UP (ref 96–108)
CK MB BLD-MCNC: 2 % — SIGNIFICANT CHANGE UP (ref 0–3.5)
CK MB CFR SERPL CALC: 1 NG/ML — SIGNIFICANT CHANGE UP (ref 0–3.8)
CK SERPL-CCNC: 50 U/L — SIGNIFICANT CHANGE UP (ref 25–170)
CO2 SERPL-SCNC: 25 MMOL/L — SIGNIFICANT CHANGE UP (ref 22–31)
COLOR SPEC: SIGNIFICANT CHANGE UP
CREAT SERPL-MCNC: 0.81 MG/DL — SIGNIFICANT CHANGE UP (ref 0.5–1.3)
DIFF PNL FLD: ABNORMAL
EOSINOPHIL # BLD AUTO: 0 K/UL — SIGNIFICANT CHANGE UP (ref 0–0.5)
EOSINOPHIL NFR BLD AUTO: 0.3 % — SIGNIFICANT CHANGE UP (ref 0–6)
EPI CELLS # UR: SIGNIFICANT CHANGE UP /HPF
GAS PNL BLDV: SIGNIFICANT CHANGE UP
GLUCOSE SERPL-MCNC: 99 MG/DL — SIGNIFICANT CHANGE UP (ref 70–99)
GLUCOSE UR QL: NEGATIVE — SIGNIFICANT CHANGE UP
HCG SERPL-ACNC: <2 MIU/ML — SIGNIFICANT CHANGE UP
HCT VFR BLD CALC: 42.5 % — SIGNIFICANT CHANGE UP (ref 34.5–45)
HGB BLD-MCNC: 14.5 G/DL — SIGNIFICANT CHANGE UP (ref 11.5–15.5)
KETONES UR-MCNC: NEGATIVE — SIGNIFICANT CHANGE UP
LEUKOCYTE ESTERASE UR-ACNC: ABNORMAL
LYMPHOCYTES # BLD AUTO: 2 K/UL — SIGNIFICANT CHANGE UP (ref 1–3.3)
LYMPHOCYTES # BLD AUTO: 32 % — SIGNIFICANT CHANGE UP (ref 13–44)
MCHC RBC-ENTMCNC: 30.4 PG — SIGNIFICANT CHANGE UP (ref 27–34)
MCHC RBC-ENTMCNC: 34.1 GM/DL — SIGNIFICANT CHANGE UP (ref 32–36)
MCV RBC AUTO: 89 FL — SIGNIFICANT CHANGE UP (ref 80–100)
MONOCYTES # BLD AUTO: 0.5 K/UL — SIGNIFICANT CHANGE UP (ref 0–0.9)
MONOCYTES NFR BLD AUTO: 9 % — SIGNIFICANT CHANGE UP (ref 2–14)
NEUTROPHILS # BLD AUTO: 3.9 K/UL — SIGNIFICANT CHANGE UP (ref 1.8–7.4)
NEUTROPHILS NFR BLD AUTO: 59 % — SIGNIFICANT CHANGE UP (ref 43–77)
NITRITE UR-MCNC: NEGATIVE — SIGNIFICANT CHANGE UP
PH UR: 7 — SIGNIFICANT CHANGE UP (ref 5–8)
PLATELET # BLD AUTO: 63 K/UL — LOW (ref 150–400)
POTASSIUM SERPL-MCNC: 3.7 MMOL/L — SIGNIFICANT CHANGE UP (ref 3.5–5.3)
POTASSIUM SERPL-SCNC: 3.7 MMOL/L — SIGNIFICANT CHANGE UP (ref 3.5–5.3)
PROT SERPL-MCNC: 7.4 G/DL — SIGNIFICANT CHANGE UP (ref 6–8.3)
PROT UR-MCNC: NEGATIVE — SIGNIFICANT CHANGE UP
RBC # BLD: 4.78 M/UL — SIGNIFICANT CHANGE UP (ref 3.8–5.2)
RBC # FLD: 12.1 % — SIGNIFICANT CHANGE UP (ref 10.3–14.5)
SODIUM SERPL-SCNC: 143 MMOL/L — SIGNIFICANT CHANGE UP (ref 135–145)
SP GR SPEC: 1.01 — LOW (ref 1.01–1.02)
TROPONIN T SERPL-MCNC: <0.01 NG/ML — SIGNIFICANT CHANGE UP (ref 0–0.06)
UROBILINOGEN FLD QL: NEGATIVE — SIGNIFICANT CHANGE UP
WBC # BLD: 6.5 K/UL — SIGNIFICANT CHANGE UP (ref 3.8–10.5)
WBC # FLD AUTO: 6.5 K/UL — SIGNIFICANT CHANGE UP (ref 3.8–10.5)
WBC UR QL: SIGNIFICANT CHANGE UP /HPF (ref 0–5)

## 2017-06-04 PROCEDURE — 85014 HEMATOCRIT: CPT

## 2017-06-04 PROCEDURE — 82435 ASSAY OF BLOOD CHLORIDE: CPT

## 2017-06-04 PROCEDURE — 99285 EMERGENCY DEPT VISIT HI MDM: CPT | Mod: 25

## 2017-06-04 PROCEDURE — 82330 ASSAY OF CALCIUM: CPT

## 2017-06-04 PROCEDURE — 85027 COMPLETE CBC AUTOMATED: CPT

## 2017-06-04 PROCEDURE — 84132 ASSAY OF SERUM POTASSIUM: CPT

## 2017-06-04 PROCEDURE — 84295 ASSAY OF SERUM SODIUM: CPT

## 2017-06-04 PROCEDURE — 82550 ASSAY OF CK (CPK): CPT

## 2017-06-04 PROCEDURE — 82803 BLOOD GASES ANY COMBINATION: CPT

## 2017-06-04 PROCEDURE — 82553 CREATINE MB FRACTION: CPT

## 2017-06-04 PROCEDURE — 99283 EMERGENCY DEPT VISIT LOW MDM: CPT | Mod: 25

## 2017-06-04 PROCEDURE — 84702 CHORIONIC GONADOTROPIN TEST: CPT

## 2017-06-04 PROCEDURE — 71020: CPT | Mod: 26

## 2017-06-04 PROCEDURE — 82947 ASSAY GLUCOSE BLOOD QUANT: CPT

## 2017-06-04 PROCEDURE — 93005 ELECTROCARDIOGRAM TRACING: CPT

## 2017-06-04 PROCEDURE — 84484 ASSAY OF TROPONIN QUANT: CPT

## 2017-06-04 PROCEDURE — 81001 URINALYSIS AUTO W/SCOPE: CPT

## 2017-06-04 PROCEDURE — 83605 ASSAY OF LACTIC ACID: CPT

## 2017-06-04 PROCEDURE — 71046 X-RAY EXAM CHEST 2 VIEWS: CPT

## 2017-06-04 PROCEDURE — 80053 COMPREHEN METABOLIC PANEL: CPT

## 2017-06-04 RX ORDER — NITROFURANTOIN MACROCRYSTAL 50 MG
100 CAPSULE ORAL ONCE
Qty: 0 | Refills: 0 | Status: COMPLETED | OUTPATIENT
Start: 2017-06-04 | End: 2017-06-04

## 2017-06-04 RX ORDER — NITROFURANTOIN MACROCRYSTAL 50 MG
1 CAPSULE ORAL
Qty: 20 | Refills: 0 | OUTPATIENT
Start: 2017-06-04 | End: 2017-06-14

## 2017-06-04 RX ADMIN — Medication 100 MILLIGRAM(S): at 11:18

## 2017-06-04 NOTE — ED ADULT NURSE NOTE - OBJECTIVE STATEMENT
42yo f a&ox4 ambulated into ED c/o high bp at home, palpitations and restlessness. pt states she has h/o anxiety and has had uneasy feeling about diastolic bp reading high. pt denies numbness/tingling, denies dizziness. pt states she has had these symptoms over the last 6 months s/p anxiety worsening with stress, but these past few dyas symptoms have worsened. pt states she went to pmd and had regular blood work and urine results. pt denies cp, denies sob, denies pain, skin w/d/i, lungs clear b/l, denies burning with urination, denies any recent alcohol or drug use.

## 2017-06-04 NOTE — ED PROVIDER NOTE - CARE PLAN
Principal Discharge DX:	UTI (urinary tract infection)  Instructions for follow-up, activity and diet:	1. return for worsening symptoms or anything concerning to you  2. take all home meds as prescribed  3. follow up with your pmd call to make an appointment  4. take macrobid as directed  5. follow up with your cardiologist call to make an appointment

## 2017-06-04 NOTE — ED PROVIDER NOTE - PROGRESS NOTE DETAILS
The patient is low risk for a diagnosis of pulmonary embolism as demonstrated by being negative by the PERC rule: they are younger than 50, the pulse is <100, the oxygen saturation on room air is >95%, they have no history of prior venous thromboembolic disease, they have not had any recent surgery or significant trauma within the last 4 weeks, they deny hemoptysis, they do not take exogenous estrogen, and they do not have unilateral leg swelling. Rolando Atkinson M.D.

## 2017-06-04 NOTE — ED PROVIDER NOTE - MEDICAL DECISION MAKING DETAILS
41F hx htn anxiety ibs labrynthitis presents to the ED with weakness palpitations chest discomfort persistent over the past few weeks. Pt last cp was yesterday. Will obtain labs xray ekg CE and reassess 41F hx htn anxiety ibs labrynthitis presents to the ED with weakness palpitations chest discomfort persistent over the past few weeks. Pt last cp was yesterday. Will obtain labs xray ekg CE and reassess  Attending Timur: 42 y/o female presenting with palpitations and reports of elevated blood pressure. upon arrival pt well appearing. no focal neurologic deficit. ekg shows no evidence of acute ischemia. pt has been evaluated for similar by pcp with multiple testing without etiology. pt alsor erpots chestpain frm yesterday. cardiac enzyme negative. unlikely acs. pt well appearing. has o/p follow up. unlikely acute pe. no evidence of hypertensive urgency. plan to d/c

## 2017-06-04 NOTE — ED PROVIDER NOTE - NS ED ROS FT
Constitutional: No fever or chills + anxiety, jitteriness  Eyes: No visual changes  HEENT: No throat pain  CV: + chest pain no palpitations  Resp: No SOB no cough  GI: Mild abd discomfort, + nausea no vomiting  : No dysuria  MSK: No musculoskeletal pain, no leg swelling  Skin: No rash  Neuro: No headache

## 2017-06-04 NOTE — ED PROVIDER NOTE - PLAN OF CARE
1. return for worsening symptoms or anything concerning to you  2. take all home meds as prescribed  3. follow up with your pmd call to make an appointment  4. take macrobid as directed  5. follow up with your cardiologist call to make an appointment

## 2017-06-05 ENCOUNTER — INPATIENT (INPATIENT)
Facility: HOSPITAL | Age: 42
LOS: 8 days | Discharge: HOME | DRG: 315 | End: 2017-06-14
Attending: HOSPITALIST | Admitting: HOSPITALIST
Payer: COMMERCIAL

## 2017-06-05 VITALS
OXYGEN SATURATION: 98 % | SYSTOLIC BLOOD PRESSURE: 145 MMHG | TEMPERATURE: 98 F | RESPIRATION RATE: 16 BRPM | HEART RATE: 107 BPM | DIASTOLIC BLOOD PRESSURE: 101 MMHG

## 2017-06-05 DIAGNOSIS — R82.90 UNSPECIFIED ABNORMAL FINDINGS IN URINE: ICD-10-CM

## 2017-06-05 DIAGNOSIS — I10 ESSENTIAL (PRIMARY) HYPERTENSION: ICD-10-CM

## 2017-06-05 DIAGNOSIS — Z86.2 PERSONAL HISTORY OF DISEASES OF THE BLOOD AND BLOOD-FORMING ORGANS AND CERTAIN DISORDERS INVOLVING THE IMMUNE MECHANISM: ICD-10-CM

## 2017-06-05 DIAGNOSIS — F41.9 ANXIETY DISORDER, UNSPECIFIED: ICD-10-CM

## 2017-06-05 DIAGNOSIS — Z29.9 ENCOUNTER FOR PROPHYLACTIC MEASURES, UNSPECIFIED: ICD-10-CM

## 2017-06-05 DIAGNOSIS — J45.909 UNSPECIFIED ASTHMA, UNCOMPLICATED: ICD-10-CM

## 2017-06-05 DIAGNOSIS — R07.89 OTHER CHEST PAIN: ICD-10-CM

## 2017-06-05 LAB
ALBUMIN SERPL ELPH-MCNC: 4.1 G/DL — SIGNIFICANT CHANGE UP (ref 3.3–5)
ALP SERPL-CCNC: 76 U/L — SIGNIFICANT CHANGE UP (ref 40–120)
ALT FLD-CCNC: 22 U/L RC — SIGNIFICANT CHANGE UP (ref 10–45)
ANION GAP SERPL CALC-SCNC: 17 MMOL/L — SIGNIFICANT CHANGE UP (ref 5–17)
APPEARANCE UR: CLEAR — SIGNIFICANT CHANGE UP
AST SERPL-CCNC: 29 U/L — SIGNIFICANT CHANGE UP (ref 10–40)
BACTERIA # UR AUTO: ABNORMAL /HPF
BASOPHILS # BLD AUTO: 0 K/UL — SIGNIFICANT CHANGE UP (ref 0–0.2)
BASOPHILS NFR BLD AUTO: 0.2 % — SIGNIFICANT CHANGE UP (ref 0–2)
BILIRUB SERPL-MCNC: 0.3 MG/DL — SIGNIFICANT CHANGE UP (ref 0.2–1.2)
BILIRUB UR-MCNC: NEGATIVE — SIGNIFICANT CHANGE UP
BUN SERPL-MCNC: 3 MG/DL — LOW (ref 7–23)
CALCIUM SERPL-MCNC: 9.9 MG/DL — SIGNIFICANT CHANGE UP (ref 8.4–10.5)
CHLORIDE SERPL-SCNC: 104 MMOL/L — SIGNIFICANT CHANGE UP (ref 96–108)
CK MB CFR SERPL CALC: 1 NG/ML — SIGNIFICANT CHANGE UP (ref 0–3.8)
CO2 SERPL-SCNC: 24 MMOL/L — SIGNIFICANT CHANGE UP (ref 22–31)
COLOR SPEC: SIGNIFICANT CHANGE UP
CREAT SERPL-MCNC: 0.9 MG/DL — SIGNIFICANT CHANGE UP (ref 0.5–1.3)
DIFF PNL FLD: ABNORMAL
EOSINOPHIL # BLD AUTO: 0.1 K/UL — SIGNIFICANT CHANGE UP (ref 0–0.5)
EOSINOPHIL NFR BLD AUTO: 1.5 % — SIGNIFICANT CHANGE UP (ref 0–6)
EPI CELLS # UR: SIGNIFICANT CHANGE UP /HPF
GLUCOSE SERPL-MCNC: 106 MG/DL — HIGH (ref 70–99)
GLUCOSE UR QL: NEGATIVE — SIGNIFICANT CHANGE UP
HCG SERPL-ACNC: <2 MIU/ML — SIGNIFICANT CHANGE UP
HCT VFR BLD CALC: 41.1 % — SIGNIFICANT CHANGE UP (ref 34.5–45)
HGB BLD-MCNC: 13.9 G/DL — SIGNIFICANT CHANGE UP (ref 11.5–15.5)
KETONES UR-MCNC: NEGATIVE — SIGNIFICANT CHANGE UP
LEUKOCYTE ESTERASE UR-ACNC: NEGATIVE — SIGNIFICANT CHANGE UP
LYMPHOCYTES # BLD AUTO: 1.3 K/UL — SIGNIFICANT CHANGE UP (ref 1–3.3)
LYMPHOCYTES # BLD AUTO: 24 % — SIGNIFICANT CHANGE UP (ref 13–44)
MCHC RBC-ENTMCNC: 30.3 PG — SIGNIFICANT CHANGE UP (ref 27–34)
MCHC RBC-ENTMCNC: 33.9 GM/DL — SIGNIFICANT CHANGE UP (ref 32–36)
MCV RBC AUTO: 89.4 FL — SIGNIFICANT CHANGE UP (ref 80–100)
MONOCYTES # BLD AUTO: 0.6 K/UL — SIGNIFICANT CHANGE UP (ref 0–0.9)
MONOCYTES NFR BLD AUTO: 10.7 % — SIGNIFICANT CHANGE UP (ref 2–14)
NEUTROPHILS # BLD AUTO: 3.5 K/UL — SIGNIFICANT CHANGE UP (ref 1.8–7.4)
NEUTROPHILS NFR BLD AUTO: 63.5 % — SIGNIFICANT CHANGE UP (ref 43–77)
NITRITE UR-MCNC: NEGATIVE — SIGNIFICANT CHANGE UP
PH UR: 7.5 — SIGNIFICANT CHANGE UP (ref 5–8)
PLATELET # BLD AUTO: 62 K/UL — LOW (ref 150–400)
POTASSIUM SERPL-MCNC: 4.3 MMOL/L — SIGNIFICANT CHANGE UP (ref 3.5–5.3)
POTASSIUM SERPL-SCNC: 4.3 MMOL/L — SIGNIFICANT CHANGE UP (ref 3.5–5.3)
PROT SERPL-MCNC: 6.7 G/DL — SIGNIFICANT CHANGE UP (ref 6–8.3)
PROT UR-MCNC: NEGATIVE — SIGNIFICANT CHANGE UP
RBC # BLD: 4.6 M/UL — SIGNIFICANT CHANGE UP (ref 3.8–5.2)
RBC # FLD: 12.2 % — SIGNIFICANT CHANGE UP (ref 10.3–14.5)
RBC CASTS # UR COMP ASSIST: SIGNIFICANT CHANGE UP /HPF (ref 0–2)
SODIUM SERPL-SCNC: 145 MMOL/L — SIGNIFICANT CHANGE UP (ref 135–145)
SP GR SPEC: 1 — LOW (ref 1.01–1.02)
TROPONIN T SERPL-MCNC: <0.01 NG/ML — SIGNIFICANT CHANGE UP (ref 0–0.06)
UROBILINOGEN FLD QL: NEGATIVE — SIGNIFICANT CHANGE UP
WBC # BLD: 5.5 K/UL — SIGNIFICANT CHANGE UP (ref 3.8–10.5)
WBC # FLD AUTO: 5.5 K/UL — SIGNIFICANT CHANGE UP (ref 3.8–10.5)
WBC UR QL: SIGNIFICANT CHANGE UP /HPF (ref 0–5)

## 2017-06-05 PROCEDURE — 99285 EMERGENCY DEPT VISIT HI MDM: CPT | Mod: 25

## 2017-06-05 PROCEDURE — 99223 1ST HOSP IP/OBS HIGH 75: CPT | Mod: AI,GC

## 2017-06-05 PROCEDURE — 93010 ELECTROCARDIOGRAM REPORT: CPT

## 2017-06-05 PROCEDURE — 99223 1ST HOSP IP/OBS HIGH 75: CPT | Mod: GC

## 2017-06-05 RX ORDER — NITROFURANTOIN MACROCRYSTAL 50 MG
100 CAPSULE ORAL
Qty: 0 | Refills: 0 | Status: DISCONTINUED | OUTPATIENT
Start: 2017-06-05 | End: 2017-06-06

## 2017-06-05 RX ORDER — SODIUM CHLORIDE 9 MG/ML
1000 INJECTION, SOLUTION INTRAVENOUS
Qty: 0 | Refills: 0 | Status: DISCONTINUED | OUTPATIENT
Start: 2017-06-05 | End: 2017-06-06

## 2017-06-05 RX ORDER — MONTELUKAST 4 MG/1
10 TABLET, CHEWABLE ORAL DAILY
Qty: 0 | Refills: 0 | Status: DISCONTINUED | OUTPATIENT
Start: 2017-06-05 | End: 2017-06-14

## 2017-06-05 RX ORDER — ALPRAZOLAM 0.25 MG
0.25 TABLET ORAL THREE TIMES A DAY
Qty: 0 | Refills: 0 | Status: DISCONTINUED | OUTPATIENT
Start: 2017-06-05 | End: 2017-06-06

## 2017-06-05 RX ORDER — FLUTICASONE PROPIONATE 50 MCG
1 SPRAY, SUSPENSION NASAL
Qty: 0 | Refills: 0 | Status: DISCONTINUED | OUTPATIENT
Start: 2017-06-05 | End: 2017-06-14

## 2017-06-05 RX ORDER — SODIUM CHLORIDE 9 MG/ML
1000 INJECTION, SOLUTION INTRAVENOUS
Qty: 0 | Refills: 0 | Status: DISCONTINUED | OUTPATIENT
Start: 2017-06-05 | End: 2017-06-05

## 2017-06-05 RX ORDER — DILTIAZEM HCL 120 MG
120 CAPSULE, EXT RELEASE 24 HR ORAL DAILY
Qty: 0 | Refills: 0 | Status: DISCONTINUED | OUTPATIENT
Start: 2017-06-05 | End: 2017-06-09

## 2017-06-05 RX ADMIN — Medication 0.25 MILLIGRAM(S): at 19:19

## 2017-06-05 RX ADMIN — Medication 0.5 MILLIGRAM(S): at 21:56

## 2017-06-05 RX ADMIN — Medication 120 MILLIGRAM(S): at 15:27

## 2017-06-05 RX ADMIN — SODIUM CHLORIDE 75 MILLILITER(S): 9 INJECTION, SOLUTION INTRAVENOUS at 19:57

## 2017-06-05 NOTE — H&P ADULT. - ASSESSMENT
40 yo F w/PMH of asthma, anxiety, ITP (stable, not on treatment currently) who p/w labile blood pressures with HTN to 190s/120s at times, palpitations and non-exertional left-sided chest pain 42 yo F w/PMH of asthma, anxiety, ITP (stable, not on treatment currently) who p/w labile blood pressures with HTN to 190s/120s at times, palpitations and non-exertional left-sided chest pain a/w hypertension and chest pain

## 2017-06-05 NOTE — ED PROVIDER NOTE - OBJECTIVE STATEMENT
Patient is 41 y F with PMH htn, anxiety, IBS, asthma presenting with hypertension in the 150s/120s, vague nonexertional chest pain not worse with inspiration. Was at [facility] ED yesterday for same and dc home. Was started cardizem for htn and htn has been well controlled until 2 days ago, was told by on call internist to take an extra dose, also took a dose this morning. Has some bilateral frontal HA, and blurry vision coinciding with elevated pressures, no focal weakness. Had negative holter monitor and cardiac echo, was scheduled for nuc stress tomorrow. Fam hx of heart disease, no personal cardiac history.    PMD: Lorena Ford  Cardio: KSENIA Arango  ROS: Denies fever, palpitations, chills, recent sickness, vision changes, cough, SOB, abdominal pain, n/v/d/c, dysuria, hematuria, rash, new joint aches, sick contacts, and recent travel. Patient is 41 y F with PMH htn, anxiety, IBS, asthma presenting with hypertension in the 150s/120s, vague nonexertional chest pain not worse with inspiration. Was at [facility] ED yesterday for same and dc home. Was started cardizem for htn and htn has been well controlled until 2 days ago, was told by on call internist to take an extra dose, also took a dose this morning. Has some bilateral frontal HA, and blurry vision coinciding with elevated pressures, no focal weakness. Had negative holter monitor and cardiac echo, was scheduled for nuc stress tomorrow. Fam hx of heart disease, no personal cardiac history. dr. misti veronica (director obgyn [facility] [phone]/cell [phone].)    PMD: Lorena Ford  Cardio: KSENIA Arango  ROS: Denies fever, palpitations, chills, recent sickness, vision changes, cough, SOB, abdominal pain, n/v/d/c, dysuria, hematuria, rash, new joint aches, sick contacts, and recent travel. Patient is 41 y F with PMH htn, anxiety, IBS, asthma presenting with hypertension in the 150s/120s, vague nonexertional chest pain not worse with inspiration. Was at [facility] ED yesterday for same and dc home. Was started cardizem for htn and htn has been well controlled until 2 days ago, was told by on call internist to take an extra dose, also took a dose this morning. Has some bilateral frontal HA, and blurry vision coinciding with elevated pressures, no focal weakness. Had negative holter monitor and cardiac echo, was scheduled for nuc stress tomorrow. Fam hx of heart disease, no personal cardiac history. dr. misti gutierrez (director [facility] [phone]/cell [phone].)    PMD: Lorena Ford  Cardio: KSENIA Arango  ROS: Denies fever, palpitations, chills, recent sickness, vision changes, cough, SOB, abdominal pain, n/v/d/c, dysuria, hematuria, rash, new joint aches, sick contacts, and recent travel.

## 2017-06-05 NOTE — H&P ADULT. - LAB RESULTS AND INTERPRETATION
CBC showed stable thrombocytopenia of 62, otherwise wnl. CMP wnl. Aden negative. Serum HCG negative. UA from yesterday demonstrated few bacteria, moderate LE and trace blood. Repeat UA negative for LE, few bacteria.

## 2017-06-05 NOTE — H&P ADULT. - RS GEN PE MLT RESP DETAILS PC
respirations non-labored/clear to auscultation bilaterally/normal/airway patent/breath sounds equal/good air movement

## 2017-06-05 NOTE — H&P ADULT. - PROBLEM SELECTOR PLAN 3
- patient takes Xanax 0.25 TID for anxiety, will continue for now  - no signs/symptoms of withdrawal at this time

## 2017-06-05 NOTE — ED PROVIDER NOTE - ATTENDING CONTRIBUTION TO CARE
41f pmhx ITP, labyrinthitis, HTN, anxiety, IBS, asthma p/w palpitation and HTN. pt undergoing w/up for constellation of sx, was seen by GI in jan for abdo pain and diarrhea, w/up lead to PMD ordering plasma metanephrine, currently pt recently finished 24h metanephrines (has not been submitted to lab). per father (dr. gutierrez), pt was seen by multiple specialists incl. neuro, GI, cards without clear etiology for abdo pain, diarrhea, HAs, palpitations, and HTN prior to metaneprhine result. has recent exercise stress test, currently scheduled for nuc stress tomorrow. per father, pt had MRI a/p which was wnl 2-3 months ago. on PE, hypertensive, in mild distress, RRR, CTA b/l, abdo soft, non-TTP. will admit for endo w/up, will order basics, r/o pregnancy, contact dr. emerson funes

## 2017-06-05 NOTE — H&P ADULT. - PROBLEM SELECTOR PLAN 5
- patient with stable ITP for many years follow up by Dr. Nakul Zamora  - trend CBC - mild, intermittent asthma, well-controlled with Singulair at home, no history of prior intubations, no wheezes on exam  - will continue with Singular 10 daily

## 2017-06-05 NOTE — H&P ADULT. - FAMILY HISTORY
Father  Still living? Unknown  Family history of prostate cancer in father, Age at diagnosis: Age Unknown  Family history of atrial fibrillation, Age at diagnosis: Age Unknown  Family history of hypertension, Age at diagnosis: Age Unknown     Grandparent  Still living? Unknown  Family history of hypertension, Age at diagnosis: Age Unknown

## 2017-06-05 NOTE — H&P ADULT. - PROBLEM SELECTOR PLAN 1
- patient with left-sided chest pain, mild, non-radiating, non-TTP, not pleuritic in nature  - Aden negative x 2 (since yesterday)  - EKG NSR  - patient with history of only PVCs on holter monitor, however did not have significant episodes of tachycardia while on monitor  - will c/w telemetry monitoring  - cardiology consult

## 2017-06-05 NOTE — ED PROVIDER NOTE - PHYSICAL EXAMINATION
Gen: NAD, AOx3  Head: NCAT  HEENT: PERRL, oral mucosa moist, normal conjunctiva  Lung: CTAB, no respiratory distress  CV: rrr, no murmurs, Normal perfusion  Abd: soft, NTND, no CVA tenderness  MSK: No edema, no visible deformities  Neuro: No focal neurologic deficits, CN intact, motor and sensation intact, no cerebellar signs   Skin: No rash   Psych: normal affect

## 2017-06-05 NOTE — H&P ADULT. - HISTORY OF PRESENT ILLNESS
Patient is a 40 yo F w/PMH of asthma, anxiety, ITP (stable, not on treatment currently) who p/w labile blood pressures with HTN to 190s/120s at times, palpitations and non-exertional left-sided chest pain. Patient was seen at the [facility] ED yesterday with similar complaints, found to have positive UA (moderate LE, trace blood, few bacteria), given Macrobid and discharged home. EKG at that time NSR at 85 bpm. Patient then went home, noted elevated BP despite having taken her home dose of Cardizem 120, called the on-call doctor, who instructed her take an additional dose. At that time, patient noted her BP did not decrease, so she presented to the ED again. Of note, patient reports her symptoms began around March of this year at which time she began experiencing abdominal bloating, discomfort and diarrhea. She was seen by GI at that time. She did not undergo upper or lower endoscopy as part of her work up, which she reports is secondary to her heart problems/high blood pressure. Patient reports that shortly after the GI symptoms began, she started having intermittent palpitations, some episodes lasting a second, some lasting up to 3 minutes, also associated with episodes of HTN. For this, she saw multiple cardiologists. She was seen by Dr. Arango who placed her on a holter monitor for 1 week. She reports the holter showed only intermittent PVCs. She then underwent a stress test at [facility] with Dr. Lopez and reports that it was "abnormal" but does not know in what way. For this, she was scheduled for a nuclear stress test this week. Of note, she was in the process of a work up for pheochromocytoma by her PCP. Also recently diangosed with labyrinthitis which has caused her vertigo, somewhat relieved by Xanax.    Patient reports recent significant life stressors within the past year, including loss of her job which required her to move back in with her parents and loss of her pet cat of 16 years. Given concern for anxiety as a possible etiology for her symptoms, she was started on Xanax 0.25 PRN.    She denies smoking, drinks only socially and denies recreational drug use.    Patient reports significant fatigue for the past month as well as waxing and waning appetite since the onset of her symptoms. Denies fevers, chills, nausea or vomiting currently. Reports frequent soft stools, but denies watery diarrhea, melena or BRBPR. Reports 8 pound unintentional weight loss in past month. Denies dysuria, reports increased frequency in the setting of drinking a lot of fluid.    In the ED, VS: T 97.9    /101  RR 16  SpO2 98% on room air  Labs: CBC showed stable thrombocytopenia of 62, otherwise wnl. CMP wnl. Aden negative. Serum HCG negative. UA from yesterday demonstrated few bacteria, moderate LE and trace blood. Repeat UA negative for LE, few bacteria. EKG NSR at 87 bpm, no ST or T wave changes, normal p-wave morphology.

## 2017-06-05 NOTE — H&P ADULT. - PROBLEM SELECTOR PLAN 6
- SCDs given thrombocytopenia - patient with stable ITP for many years follow up by Dr. Nakul Zamora  - trend CBC

## 2017-06-05 NOTE — ED ADULT NURSE NOTE - OBJECTIVE STATEMENT
41 y. female presents to the ED ambulatory with steady gait. A&Ox3. PERRL. C/O "my bloodpressure is all over the place, I was here yesterday" +L sided chest pain that does not radiate, headache, and dizziness that started one weeks ago after readjusting her BP medication. Pt was seen yesterday for L sided chest pain and was d/c with UTI. +clear lung sounds, easy work of breathing. bowel sounds present, tender to touch, round, and soft. No back pain, neck pain. peripheral pulse present, cap refill<2 secs. +motor and sensory intact upper and lower extremities. Full range of motion. Pt denies SOB, cough, chills, fever, blurry vision, no blood in urine and stool,  N/V/D, numbness and tingling. Safety maintained. Will continue to monitor.

## 2017-06-05 NOTE — H&P ADULT. - PROBLEM SELECTOR PLAN 2
- patient with reported episodes of labile BPs at home as high as 190s/120s and as low as 110s/80s  - will c/w home Cardizem for now, f/u cardiology recs - patient with reported episodes of labile BPs at home as high as 190s/120s and as low as 110s/80s  - will c/w home Cardizem for now, f/u cardiology recs  - patient's father to bring 24 hour urine collection (was being done as an outpatient) to r/o pheochromocytoma.   - check renin, aldosterone levels  - doppler to r/o DYLAN

## 2017-06-05 NOTE — H&P ADULT. - NEGATIVE OPHTHALMOLOGIC SYMPTOMS
no discharge R/no diplopia/no photophobia/no lacrimation L/no discharge L/no lacrimation R/no blurred vision L/no blurred vision R

## 2017-06-05 NOTE — H&P ADULT. - PROBLEM SELECTOR PLAN 4
- mild, intermittent asthma, well-controlled with Singulair at home, no history of prior intubations, no wheezes on exam  - will continue with Singular 10 daily - initially positive UA with moderate LE, patient started on Macrobid  - repeat UA negative, however given vague symptomatology and in the setting of antibiotics, will continue with Macrobid

## 2017-06-05 NOTE — H&P ADULT. - PMH
Anxiety    Asthma    Headache    History of ITP    HTN (hypertension)    IBS (irritable bowel syndrome)    Labyrinthitis

## 2017-06-06 LAB
ALDOST SERPL-MCNC: 8.9 NG/DL — SIGNIFICANT CHANGE UP
ANION GAP SERPL CALC-SCNC: 15 MMOL/L — SIGNIFICANT CHANGE UP (ref 5–17)
BASOPHILS # BLD AUTO: 0 K/UL — SIGNIFICANT CHANGE UP (ref 0–0.2)
BASOPHILS NFR BLD AUTO: 0.1 % — SIGNIFICANT CHANGE UP (ref 0–2)
BUN SERPL-MCNC: 3 MG/DL — LOW (ref 7–23)
CALCIUM SERPL-MCNC: 9.8 MG/DL — SIGNIFICANT CHANGE UP (ref 8.4–10.5)
CHLORIDE SERPL-SCNC: 104 MMOL/L — SIGNIFICANT CHANGE UP (ref 96–108)
CO2 SERPL-SCNC: 25 MMOL/L — SIGNIFICANT CHANGE UP (ref 22–31)
CORTIS AM PEAK SERPL-MCNC: 25.8 UG/DL — HIGH (ref 6–18.4)
CREAT SERPL-MCNC: 0.85 MG/DL — SIGNIFICANT CHANGE UP (ref 0.5–1.3)
CULTURE RESULTS: NO GROWTH — SIGNIFICANT CHANGE UP
EOSINOPHIL # BLD AUTO: 0.1 K/UL — SIGNIFICANT CHANGE UP (ref 0–0.5)
EOSINOPHIL NFR BLD AUTO: 0.9 % — SIGNIFICANT CHANGE UP (ref 0–6)
GLUCOSE SERPL-MCNC: 110 MG/DL — HIGH (ref 70–99)
HCT VFR BLD CALC: 41.6 % — SIGNIFICANT CHANGE UP (ref 34.5–45)
HGB BLD-MCNC: 14.1 G/DL — SIGNIFICANT CHANGE UP (ref 11.5–15.5)
LYMPHOCYTES # BLD AUTO: 2.3 K/UL — SIGNIFICANT CHANGE UP (ref 1–3.3)
LYMPHOCYTES # BLD AUTO: 31.9 % — SIGNIFICANT CHANGE UP (ref 13–44)
MAGNESIUM SERPL-MCNC: 2 MG/DL — SIGNIFICANT CHANGE UP (ref 1.6–2.6)
MCHC RBC-ENTMCNC: 30.2 PG — SIGNIFICANT CHANGE UP (ref 27–34)
MCHC RBC-ENTMCNC: 34 GM/DL — SIGNIFICANT CHANGE UP (ref 32–36)
MCV RBC AUTO: 88.8 FL — SIGNIFICANT CHANGE UP (ref 80–100)
MONOCYTES # BLD AUTO: 0.8 K/UL — SIGNIFICANT CHANGE UP (ref 0–0.9)
MONOCYTES NFR BLD AUTO: 10.4 % — SIGNIFICANT CHANGE UP (ref 2–14)
NEUTROPHILS # BLD AUTO: 4.1 K/UL — SIGNIFICANT CHANGE UP (ref 1.8–7.4)
NEUTROPHILS NFR BLD AUTO: 56.7 % — SIGNIFICANT CHANGE UP (ref 43–77)
PHOSPHATE SERPL-MCNC: 4.2 MG/DL — SIGNIFICANT CHANGE UP (ref 2.5–4.5)
PLATELET # BLD AUTO: 73 K/UL — LOW (ref 150–400)
POTASSIUM SERPL-MCNC: 3.8 MMOL/L — SIGNIFICANT CHANGE UP (ref 3.5–5.3)
POTASSIUM SERPL-SCNC: 3.8 MMOL/L — SIGNIFICANT CHANGE UP (ref 3.5–5.3)
RBC # BLD: 4.68 M/UL — SIGNIFICANT CHANGE UP (ref 3.8–5.2)
RBC # FLD: 12.2 % — SIGNIFICANT CHANGE UP (ref 10.3–14.5)
RENIN DIRECT, PLASMA: 12.5 PG/ML — SIGNIFICANT CHANGE UP
SODIUM SERPL-SCNC: 144 MMOL/L — SIGNIFICANT CHANGE UP (ref 135–145)
SPECIMEN SOURCE: SIGNIFICANT CHANGE UP
WBC # BLD: 7.3 K/UL — SIGNIFICANT CHANGE UP (ref 3.8–10.5)
WBC # FLD AUTO: 7.3 K/UL — SIGNIFICANT CHANGE UP (ref 3.8–10.5)

## 2017-06-06 PROCEDURE — 93018 CV STRESS TEST I&R ONLY: CPT

## 2017-06-06 PROCEDURE — 99233 SBSQ HOSP IP/OBS HIGH 50: CPT | Mod: GC

## 2017-06-06 PROCEDURE — 78452 HT MUSCLE IMAGE SPECT MULT: CPT | Mod: 26

## 2017-06-06 PROCEDURE — 99223 1ST HOSP IP/OBS HIGH 75: CPT

## 2017-06-06 PROCEDURE — 99232 SBSQ HOSP IP/OBS MODERATE 35: CPT | Mod: 25

## 2017-06-06 PROCEDURE — 99232 SBSQ HOSP IP/OBS MODERATE 35: CPT | Mod: GC

## 2017-06-06 PROCEDURE — 99222 1ST HOSP IP/OBS MODERATE 55: CPT | Mod: GC

## 2017-06-06 PROCEDURE — 93016 CV STRESS TEST SUPVJ ONLY: CPT

## 2017-06-06 PROCEDURE — 93975 VASCULAR STUDY: CPT | Mod: 26

## 2017-06-06 RX ORDER — DOCUSATE SODIUM 100 MG
100 CAPSULE ORAL THREE TIMES A DAY
Qty: 0 | Refills: 0 | Status: DISCONTINUED | OUTPATIENT
Start: 2017-06-06 | End: 2017-06-08

## 2017-06-06 RX ORDER — LABETALOL HCL 100 MG
100 TABLET ORAL
Qty: 0 | Refills: 0 | Status: DISCONTINUED | OUTPATIENT
Start: 2017-06-06 | End: 2017-06-06

## 2017-06-06 RX ORDER — SODIUM CHLORIDE 9 MG/ML
1000 INJECTION, SOLUTION INTRAVENOUS
Qty: 0 | Refills: 0 | Status: DISCONTINUED | OUTPATIENT
Start: 2017-06-06 | End: 2017-06-12

## 2017-06-06 RX ORDER — LABETALOL HCL 100 MG
100 TABLET ORAL
Qty: 0 | Refills: 0 | Status: DISCONTINUED | OUTPATIENT
Start: 2017-06-06 | End: 2017-06-07

## 2017-06-06 RX ORDER — HYDROMORPHONE HYDROCHLORIDE 2 MG/ML
1 INJECTION INTRAMUSCULAR; INTRAVENOUS; SUBCUTANEOUS ONCE
Qty: 0 | Refills: 0 | Status: DISCONTINUED | OUTPATIENT
Start: 2017-06-06 | End: 2017-06-06

## 2017-06-06 RX ORDER — ESCITALOPRAM OXALATE 10 MG/1
5 TABLET, FILM COATED ORAL DAILY
Qty: 0 | Refills: 0 | Status: DISCONTINUED | OUTPATIENT
Start: 2017-06-06 | End: 2017-06-07

## 2017-06-06 RX ORDER — SIMETHICONE 80 MG/1
80 TABLET, CHEWABLE ORAL ONCE
Qty: 0 | Refills: 0 | Status: COMPLETED | OUTPATIENT
Start: 2017-06-06 | End: 2017-06-06

## 2017-06-06 RX ORDER — ONDANSETRON 8 MG/1
4 TABLET, FILM COATED ORAL ONCE
Qty: 0 | Refills: 0 | Status: COMPLETED | OUTPATIENT
Start: 2017-06-06 | End: 2017-06-06

## 2017-06-06 RX ADMIN — Medication 0.5 MILLIGRAM(S): at 07:33

## 2017-06-06 RX ADMIN — Medication 1 MILLIGRAM(S): at 18:00

## 2017-06-06 RX ADMIN — ONDANSETRON 4 MILLIGRAM(S): 8 TABLET, FILM COATED ORAL at 18:28

## 2017-06-06 RX ADMIN — Medication 120 MILLIGRAM(S): at 06:08

## 2017-06-06 RX ADMIN — MONTELUKAST 10 MILLIGRAM(S): 4 TABLET, CHEWABLE ORAL at 21:08

## 2017-06-06 RX ADMIN — Medication 0.25 MILLIGRAM(S): at 05:25

## 2017-06-06 RX ADMIN — Medication 1 SPRAY(S): at 21:09

## 2017-06-06 RX ADMIN — SIMETHICONE 80 MILLIGRAM(S): 80 TABLET, CHEWABLE ORAL at 18:01

## 2017-06-06 RX ADMIN — Medication 100 MILLIGRAM(S): at 18:00

## 2017-06-06 RX ADMIN — SODIUM CHLORIDE 75 MILLILITER(S): 9 INJECTION, SOLUTION INTRAVENOUS at 07:52

## 2017-06-07 ENCOUNTER — APPOINTMENT (OUTPATIENT)
Dept: SPEECH THERAPY | Facility: CLINIC | Age: 42
End: 2017-06-07

## 2017-06-07 DIAGNOSIS — R10.84 GENERALIZED ABDOMINAL PAIN: ICD-10-CM

## 2017-06-07 DIAGNOSIS — R00.0 TACHYCARDIA, UNSPECIFIED: ICD-10-CM

## 2017-06-07 DIAGNOSIS — R19.7 DIARRHEA, UNSPECIFIED: ICD-10-CM

## 2017-06-07 DIAGNOSIS — R09.89 OTHER SPECIFIED SYMPTOMS AND SIGNS INVOLVING THE CIRCULATORY AND RESPIRATORY SYSTEMS: ICD-10-CM

## 2017-06-07 DIAGNOSIS — R76.8 OTHER SPECIFIED ABNORMAL IMMUNOLOGICAL FINDINGS IN SERUM: ICD-10-CM

## 2017-06-07 LAB
ANTI-RIBONUCLEAR PROTEIN: 0.3 AI — SIGNIFICANT CHANGE UP
C3 SERPL-MCNC: 127 MG/DL — SIGNIFICANT CHANGE UP (ref 80–180)
C4 SERPL-MCNC: 33 MG/DL — SIGNIFICANT CHANGE UP (ref 10–45)
DAT POLY-SP REAG RBC QL: NEGATIVE — SIGNIFICANT CHANGE UP
DRVVT SCREEN TO CONFIRM RATIO: SIGNIFICANT CHANGE UP
DSDNA AB FLD-ACNC: <0.2 AI — SIGNIFICANT CHANGE UP
ENA SM AB FLD QL: <0.2 AI — SIGNIFICANT CHANGE UP
ENA SS-A AB FLD IA-ACNC: <0.2 AI — SIGNIFICANT CHANGE UP
HAPTOGLOB SERPL-MCNC: 147 MG/DL — SIGNIFICANT CHANGE UP (ref 34–200)
HCG UR QL: NEGATIVE — SIGNIFICANT CHANGE UP
HCT VFR BLD CALC: 39.3 % — SIGNIFICANT CHANGE UP (ref 34.5–45)
HGB BLD-MCNC: 13.4 G/DL — SIGNIFICANT CHANGE UP (ref 11.5–15.5)
LA NT DPL PPP QL: 30.2 SEC — SIGNIFICANT CHANGE UP
LDH SERPL L TO P-CCNC: 217 U/L — SIGNIFICANT CHANGE UP (ref 50–242)
MCHC RBC-ENTMCNC: 30.7 PG — SIGNIFICANT CHANGE UP (ref 27–34)
MCHC RBC-ENTMCNC: 34.1 GM/DL — SIGNIFICANT CHANGE UP (ref 32–36)
MCV RBC AUTO: 90 FL — SIGNIFICANT CHANGE UP (ref 80–100)
PLATELET # BLD AUTO: 76 K/UL — LOW (ref 150–400)
RBC # BLD: 4.36 M/UL — SIGNIFICANT CHANGE UP (ref 3.8–5.2)
RBC # BLD: 4.36 M/UL — SIGNIFICANT CHANGE UP (ref 3.8–5.2)
RBC # FLD: 12.4 % — SIGNIFICANT CHANGE UP (ref 10.3–14.5)
RETICS #: 88.9 K/UL — SIGNIFICANT CHANGE UP (ref 25–125)
RETICS/RBC NFR: 2 % — SIGNIFICANT CHANGE UP (ref 0.5–2.5)
WBC # BLD: 5.7 K/UL — SIGNIFICANT CHANGE UP (ref 3.8–10.5)
WBC # FLD AUTO: 5.7 K/UL — SIGNIFICANT CHANGE UP (ref 3.8–10.5)

## 2017-06-07 PROCEDURE — 93010 ELECTROCARDIOGRAM REPORT: CPT

## 2017-06-07 PROCEDURE — 99233 SBSQ HOSP IP/OBS HIGH 50: CPT | Mod: GC

## 2017-06-07 PROCEDURE — 99233 SBSQ HOSP IP/OBS HIGH 50: CPT

## 2017-06-07 RX ORDER — POLYETHYLENE GLYCOL 3350 17 G/17G
17 POWDER, FOR SOLUTION ORAL DAILY
Qty: 0 | Refills: 0 | Status: DISCONTINUED | OUTPATIENT
Start: 2017-06-07 | End: 2017-06-11

## 2017-06-07 RX ORDER — ONDANSETRON 8 MG/1
4 TABLET, FILM COATED ORAL ONCE
Qty: 0 | Refills: 0 | Status: COMPLETED | OUTPATIENT
Start: 2017-06-07 | End: 2017-06-07

## 2017-06-07 RX ORDER — DIPHENHYDRAMINE HCL 50 MG
25 CAPSULE ORAL EVERY 6 HOURS
Qty: 0 | Refills: 0 | Status: DISCONTINUED | OUTPATIENT
Start: 2017-06-07 | End: 2017-06-14

## 2017-06-07 RX ORDER — FLUTICASONE PROPIONATE 50 MCG
1 SPRAY, SUSPENSION NASAL
Qty: 0 | Refills: 0 | COMMUNITY
Start: 2017-06-07

## 2017-06-07 RX ORDER — ONDANSETRON 8 MG/1
4 TABLET, FILM COATED ORAL EVERY 8 HOURS
Qty: 0 | Refills: 0 | Status: DISCONTINUED | OUTPATIENT
Start: 2017-06-07 | End: 2017-06-14

## 2017-06-07 RX ORDER — SIMETHICONE 80 MG/1
80 TABLET, CHEWABLE ORAL THREE TIMES A DAY
Qty: 0 | Refills: 0 | Status: DISCONTINUED | OUTPATIENT
Start: 2017-06-07 | End: 2017-06-09

## 2017-06-07 RX ADMIN — Medication 100 MILLIGRAM(S): at 05:52

## 2017-06-07 RX ADMIN — ONDANSETRON 4 MILLIGRAM(S): 8 TABLET, FILM COATED ORAL at 06:32

## 2017-06-07 RX ADMIN — SODIUM CHLORIDE 75 MILLILITER(S): 9 INJECTION, SOLUTION INTRAVENOUS at 05:52

## 2017-06-07 RX ADMIN — SIMETHICONE 80 MILLIGRAM(S): 80 TABLET, CHEWABLE ORAL at 21:16

## 2017-06-07 RX ADMIN — SODIUM CHLORIDE 75 MILLILITER(S): 9 INJECTION, SOLUTION INTRAVENOUS at 09:30

## 2017-06-07 RX ADMIN — Medication 1 MILLIGRAM(S): at 16:23

## 2017-06-07 RX ADMIN — Medication 100 MILLIGRAM(S): at 16:23

## 2017-06-07 RX ADMIN — SIMETHICONE 80 MILLIGRAM(S): 80 TABLET, CHEWABLE ORAL at 09:30

## 2017-06-07 RX ADMIN — Medication 120 MILLIGRAM(S): at 05:52

## 2017-06-07 RX ADMIN — MONTELUKAST 10 MILLIGRAM(S): 4 TABLET, CHEWABLE ORAL at 21:16

## 2017-06-07 RX ADMIN — Medication 100 MILLIGRAM(S): at 09:30

## 2017-06-07 RX ADMIN — Medication 1 MILLIGRAM(S): at 05:52

## 2017-06-07 NOTE — PROGRESS NOTE ADULT - ASSESSMENT
40 yo F w/PMH of asthma, anxiety, ITP (stable, not on treatment currently) who p/w labile blood pressures, palpitations and non-exertional left-sided chest pain a/w hypertension and atypical chest pain

## 2017-06-07 NOTE — DISCHARGE NOTE ADULT - CARE PROVIDER_API CALL
Richard Gilbert), Gastroenterology  [address] [facility] [address]  [place], [place] [address]  Phone: [phone]  Fax: [phone]    Lorena Ford), Internal Medicine  [address] [place] [address]  Phone: [phone]  Fax: [phone]    Chris Barahona), Cardiac Electrophysiology; Cardiology  [address] [place] [address]  Phone: [phone]  Fax: [phone]

## 2017-06-07 NOTE — DISCHARGE NOTE ADULT - CARE PLAN
Principal Discharge DX:	Labile blood pressure  Goal:	Management  Instructions for follow-up, activity and diet:	You were admitted to the hospital because you were noted to have labile blood pressures and heart palpitations associated with sinus tachycardia.  Secondary Diagnosis:	Anxiety  Instructions for follow-up, activity and diet:	You were seen by the psychiatrist while you were in the hospital for your anxiety. For this, per their recommendations, you were started on LEXAPRO 5mg ONCE A DAY which you should continue to take after you leave. In addition, you were started on ATIVAN 1mg TWO TIMES PER DAY. You should follow up with the psychiatrist in the office after you are discharged.  Secondary Diagnosis:	MARY BETH positive  Instructions for follow-up, activity and diet:	You were seen by the rheumatologist while you were in the hospital given your symptoms and your history of an elevated MARY BETH blood test. For this, additional blood tests were sent which were _________. You should follow up with the rheumatologist in the office after you are discharged.  Secondary Diagnosis:	Abdominal bloating  Instructions for follow-up, activity and diet:	You were seen by the gastroenterologist while you were in the hospital because you were note to have abdominal bloating and gas. For this, you were recommended to undergo an EGD and colonoscopy, which you refused. You should follow up with the gastroenterologist in the office after you are discharged regarding these tests. Principal Discharge DX:	Labile blood pressure  Goal:	Management  Instructions for follow-up, activity and diet:	You were admitted to the hospital because you were noted to have labile blood pressures and heart palpitations associated with sinus tachycardia.  Secondary Diagnosis:	Anxiety  Instructions for follow-up, activity and diet:	You were seen by the psychiatrist while you were in the hospital for your anxiety. For this, per their recommendations, you were started on LEXAPRO 5mg ONCE A DAY which you should continue to take after you leave. In addition, you were started on ATIVAN 1mg TWO TIMES PER DAY. You should follow up with the psychiatrist in the office after you are discharged.  Secondary Diagnosis:	MARY BETH positive  Instructions for follow-up, activity and diet:	You were seen by the rheumatologist while you were in the hospital given your symptoms and your history of an elevated MARY BETH blood test.  You should follow up with the rheumatologist in the office after you are discharged.  Secondary Diagnosis:	Abdominal bloating  Instructions for follow-up, activity and diet:	You were seen by the gastroenterologist while you were in the hospital because you were note to have abdominal bloating and gas. For this, you were recommended to undergo an EGD and colonoscopy, which you refused. You should follow up with the gastroenterologist in the office after you are discharged regarding these tests. Principal Discharge DX:	Labile blood pressure  Goal:	Management  Instructions for follow-up, activity and diet:	You were admitted to the hospital because you were noted to have labile blood pressures and heart palpitations associated with sinus tachycardia.  Secondary Diagnosis:	Anxiety  Instructions for follow-up, activity and diet:	You were seen by the psychiatrist while you were in the hospital for your anxiety. For this, per their recommendations, you were started on LEXAPRO 5mg ONCE A DAY which you should continue to take after you leave. In addition, you were started on ATIVAN 1mg TWO TIMES PER DAY. You should follow up with the psychiatrist in the office after you are discharged.  Secondary Diagnosis:	MARY BETH positive  Instructions for follow-up, activity and diet:	You were seen by the rheumatologist while you were in the hospital given your symptoms and your history of an elevated MARY BETH blood test.  You should follow up with the rheumatologist in the office after you are discharged.  Secondary Diagnosis:	Abdominal bloating  Instructions for follow-up, activity and diet:	You were seen by the gastroenterologist while you were in the hospital because you were note to have abdominal bloating and gas. For this, you were recommended to undergo an EGD and colonoscopy, which you refused. You should follow up with the gastroenterologist in the office after you are discharged regarding these tests.  Instructions for follow-up, activity and diet:	Treatment of Anxiety- as discussed with Dr. Sparks and Social work:  Adult Partial Hospital Program at [facility] [phone]—phone number   Fax [phone] [address] [place] [address]  2 tracks---higher functioning and lower functioning     9-3pm Monday to Friday   Appointment for evaluation on: Tuesday 6/20 at 9am [facility] building –door 1 on 2nd floor, [address]   Will meet with Jamaica Sahni [phone] Principal Discharge DX:	Labile blood pressure  Goal:	Management  Instructions for follow-up, activity and diet:	You were admitted to the hospital because you were noted to have labile blood pressures and heart palpitations associated with sinus tachycardia.  Secondary Diagnosis:	Anxiety  Instructions for follow-up, activity and diet:	You were seen by the psychiatrist while you were in the hospital for your anxiety. For this, per their recommendations, you were started on LEXAPRO 5mg ONCE A DAY which you should continue to take after you leave. In addition, you were started on ATIVAN 1mg TWO TIMES PER DAY. You should follow up with the psychiatrist in the office after you are discharged.  Secondary Diagnosis:	MARY BETH positive  Instructions for follow-up, activity and diet:	You were seen by the rheumatologist while you were in the hospital given your symptoms and your history of an elevated MARY BETH blood test.  You should follow up with the rheumatologist in the office after you are discharged.  Secondary Diagnosis:	Abdominal bloating  Instructions for follow-up, activity and diet:	You were seen by the gastroenterologist while you were in the hospital because you were note to have abdominal bloating and gas. For this, you were recommended to undergo an EGD and colonoscopy, which you refused. You should follow up with the gastroenterologist in the office after you are discharged regarding these tests.  Instructions for follow-up, activity and diet:	Treatment of Anxiety- as discussed with Dr. Sparks and Social work:  Adult Partial Hospital Program at [facility] [phone]—phone number   Fax [phone] [address] [place] [address]  2 tracks---higher functioning and lower functioning     9-3pm Monday to Friday   Appointment for evaluation on: Tuesday 6/20 at 9am [facility] building –door 1 on 2nd floor, [address]   Will meet with Jamaica Sahni [phone] Principal Discharge DX:	Labile blood pressure  Goal:	Management  Instructions for follow-up, activity and diet:	You were admitted to the hospital because you were noted to have labile blood pressures and heart palpitations associated with sinus tachycardia.  Secondary Diagnosis:	Anxiety  Instructions for follow-up, activity and diet:	You were seen by the psychiatrist while you were in the hospital for your anxiety. For this, per their recommendations, you were started on LEXAPRO 5mg ONCE A DAY which you should continue to take after you leave. In addition, you were started on ATIVAN 1mg TWO TIMES PER DAY. You should follow up with the psychiatrist in the office after you are discharged.  Secondary Diagnosis:	MARY BETH positive  Instructions for follow-up, activity and diet:	You were seen by the rheumatologist while you were in the hospital given your symptoms and your history of an elevated MARY BETH blood test.  You should follow up with the rheumatologist in the office after you are discharged.  Secondary Diagnosis:	Abdominal bloating  Instructions for follow-up, activity and diet:	You were seen by the gastroenterologist while you were in the hospital because you were note to have abdominal bloating and gas. For this, you were recommended to undergo an EGD and colonoscopy, which you refused. You should follow up with the gastroenterologist in the office after you are discharged regarding these tests.  Instructions for follow-up, activity and diet:	Treatment of Anxiety- as discussed with Dr. Sparks and Social work:  Adult Partial Hospital Program at [facility] [phone]—phone number   Fax [phone] [address] [place] [address]  2 tracks---higher functioning and lower functioning     9-3pm Monday to Friday   Appointment for evaluation on: Tuesday 6/20 at 9am [facility] building –door 1 on 2nd floor, [address]   Will meet with Jamaica Sahni [phone] Principal Discharge DX:	Labile blood pressure  Goal:	Management  Instructions for follow-up, activity and diet:	You were admitted to the hospital because you were noted to have labile blood pressures and heart palpitations associated with sinus tachycardia.  Secondary Diagnosis:	Anxiety  Instructions for follow-up, activity and diet:	You were seen by the psychiatrist while you were in the hospital for your anxiety. For this, per their recommendations, you were started on LEXAPRO 5mg ONCE A DAY which you should continue to take after you leave. In addition, you were started on ATIVAN 1mg TWO TIMES PER DAY. You should follow up with the psychiatrist in the office after you are discharged.  Secondary Diagnosis:	MARY BETH positive  Instructions for follow-up, activity and diet:	You were seen by the rheumatologist while you were in the hospital given your symptoms and your history of an elevated MARY BETH blood test.  You should follow up with the rheumatologist in the office after you are discharged.  Secondary Diagnosis:	Abdominal bloating  Instructions for follow-up, activity and diet:	You were seen by the gastroenterologist while you were in the hospital because you were note to have abdominal bloating and gas. For this, you were recommended to undergo an EGD and colonoscopy, which you refused. You should follow up with the gastroenterologist in the office after you are discharged regarding these tests.  Instructions for follow-up, activity and diet:	Treatment of Anxiety- as discussed with Dr. Sparks and Social work:  Adult Partial Hospital Program at [facility] [phone]—phone number   Fax [phone] [address] [place] [address]  2 tracks---higher functioning and lower functioning     9-3pm Monday to Friday   Appointment for evaluation on: Tuesday 6/20 at 9am [facility] building –door 1 on 2nd floor, [address]   Will meet with Jamaica Sahni [phone] Principal Discharge DX:	Labile blood pressure  Goal:	Management  Instructions for follow-up, activity and diet:	You were admitted to the hospital because you were noted to have labile blood pressures and heart palpitations associated with sinus tachycardia.  Secondary Diagnosis:	Anxiety  Instructions for follow-up, activity and diet:	You were seen by the psychiatrist while you were in the hospital for your anxiety. For this, per their recommendations, you were started on LEXAPRO 5mg ONCE A DAY which you should continue to take after you leave. In addition, you were started on ATIVAN 1mg TWO TIMES PER DAY. You should follow up with the psychiatrist in the office after you are discharged.  Secondary Diagnosis:	MARY BETH positive  Instructions for follow-up, activity and diet:	You were seen by the rheumatologist while you were in the hospital given your symptoms and your history of an elevated MARY BETH blood test.  You should follow up with the rheumatologist in the office after you are discharged.  Secondary Diagnosis:	Abdominal bloating  Instructions for follow-up, activity and diet:	You were seen by the gastroenterologist while you were in the hospital because you were note to have abdominal bloating and gas. For this, you were recommended to undergo an EGD and colonoscopy, which you refused. You should follow up with the gastroenterologist in the office after you are discharged regarding these tests.  Instructions for follow-up, activity and diet:	Treatment of Anxiety- as discussed with Dr. Sparks and Social work:  Adult Partial Hospital Program at [facility] [phone]—phone number   Fax [phone] [address] [place] [address]  2 tracks---higher functioning and lower functioning     9-3pm Monday to Friday   Appointment for evaluation on: Tuesday 6/20 at 9am [facility] building –door 1 on 2nd floor, [address]   Will meet with Jamaica Sahni [phone] Principal Discharge DX:	Labile blood pressure  Goal:	Management  Instructions for follow-up, activity and diet:	You were admitted to the hospital because you were noted to have labile blood pressures and heart palpitations associated with sinus tachycardia.  Secondary Diagnosis:	Anxiety  Instructions for follow-up, activity and diet:	You were seen by the psychiatrist while you were in the hospital for your anxiety. For this, per their recommendations, you were started on LEXAPRO 5mg ONCE A DAY which you should continue to take after you leave. In addition, you were started on ATIVAN 1mg TWO TIMES PER DAY. You should follow up with the psychiatrist in the office after you are discharged.  Secondary Diagnosis:	MARY BETH positive  Instructions for follow-up, activity and diet:	You were seen by the rheumatologist while you were in the hospital given your symptoms and your history of an elevated MARY BETH blood test.  You should follow up with the rheumatologist in the office after you are discharged.  Secondary Diagnosis:	Abdominal bloating  Instructions for follow-up, activity and diet:	You were seen by the gastroenterologist while you were in the hospital because you were note to have abdominal bloating and gas. For this, you were recommended to undergo an EGD and colonoscopy, which you refused. You should follow up with the gastroenterologist in the office after you are discharged regarding these tests.  Instructions for follow-up, activity and diet:	Treatment of Anxiety- as discussed with Dr. Sparks and Social work:  Adult Partial Hospital Program at [facility] [phone]—phone number   Fax [phone] [address] [place] [address]  2 tracks---higher functioning and lower functioning     9-3pm Monday to Friday   Appointment for evaluation on: Tuesday 6/20 at 9am [facility] building –door 1 on 2nd floor, [address]   Will meet with [place] Sahni [phone]

## 2017-06-07 NOTE — DISCHARGE NOTE ADULT - MEDICATION SUMMARY - MEDICATIONS TO STOP TAKING
I will STOP taking the medications listed below when I get home from the hospital:    nitrofurantoin macrocrystals-monohydrate 100 mg oral capsule  -- 1 cap(s) by mouth 2 times a day  -- Finish all this medication unless otherwise directed by prescriber.  May discolor urine or feces.  Take with food or milk.

## 2017-06-07 NOTE — DISCHARGE NOTE ADULT - ADDITIONAL INSTRUCTIONS
Follow up with Doctor Gilbert, Dr. Ford and Dr. Barahona within 1 week after yuir discharge from hospital.  Call to schedule appointments. Treatment of Anxiety- as discussed with Dr. Sparks and Social work:  Adult [facility] Program at [facility] [phone]—phone number   Fax [phone] [address] [place] [address]  2 tracks---higher functioning and lower functioning     9-3pm Monday to Friday   Appointment for evaluation on: Tuesday 6/20 at 9am [facility] building –door 1 on 2nd floor, [address] [facility] [address]     - Follow up with Doctor Dr. Liliana Gilbert and Dr. Barahona within 1 week after your  discharge from hospital.    Call to schedule appointments.

## 2017-06-07 NOTE — PROGRESS NOTE ADULT - PROBLEM SELECTOR PLAN 2
- patient with reported episodes of labile BPs at home as high as 190s/120s and as low as 110s/80s  - BPs here with mildly elevated SBPs to 110s-150s and DBPs to 80s-110s  - will c/w home Cardizem, Labetalol 100 BID added per cardiology  - endocrine following - unlikely an endocrinologic process, renin and aldosterone wnl, AM cortisol mildly elevated in setting of stress (will need repeat as an outpatient)  - will f/u urine metanephrines, plasma metanephrines to r/o pheochromocytoma  - f/u cardiology/EP recs

## 2017-06-07 NOTE — PROGRESS NOTE ADULT - SUBJECTIVE AND OBJECTIVE BOX
BRIEF SUMMARY:  41 y.o. F - with anxiety/IBS/Asthma/hypertensive urgency and tachycardia with unrevealing systemic workup      CHIEF COMPLAINT/REASON FOR CONSULT: HTN/Tachycardia      24 HR EVENTS:  Labetalol caused pruritis and does not want to take another pill  Discussed that CTA to r/o Coarctation is not necessary at this time as it would not explain tachycardia      Allergies    No Known Allergies    Intolerances    	    MEDICATIONS:  diltiazem   CD 120milliGRAM(s) Oral daily      montelukast 10milliGRAM(s) Oral daily    LORazepam     Tablet 1milliGRAM(s) Oral two times a day  LORazepam     Tablet 1milliGRAM(s) Oral every 6 hours PRN  ondansetron Injectable 4milliGRAM(s) IV Push every 8 hours PRN  diphenhydrAMINE   Capsule 25milliGRAM(s) Oral every 6 hours PRN    docusate sodium 100milliGRAM(s) Oral three times a day  simethicone 80milliGRAM(s) Chew three times a day PRN  polyethylene glycol 3350 17Gram(s) Oral daily PRN      fluticasone propionate 50 MICROgram(s)/spray Nasal Spray 1Spray(s) Both Nostrils two times a day  lactated ringers. 1000milliLiter(s) IV Continuous <Continuous>      PAST MEDICAL & SURGICAL HISTORY:  History of ITP  Labyrinthitis  Asthma  Headache  HTN (hypertension)  Anxiety  IBS (irritable bowel syndrome)      FAMILY HISTORY:  Family history of hypertension (Father, Grandparent)  Family history of atrial fibrillation (Father)  Family history of prostate cancer in father (Father)      REVIEW OF SYSTEMS:  See HPI. Otherwise, 10 point ROS done and otherwise negative.    PHYSICAL EXAM:  T(C): 36.8, Max: 37 (06-07 @ 14:13)  HR: 72 (72 - 100)  BP: 126/81 (123/90 - 139/95)  RR: 16 (16 - 19)  SpO2: 97% (96% - 100%)  Wt(kg): --  I&O's Summary  I & Os for 24h ending 07 Jun 2017 07:00  =============================================  IN: 2520 ml / OUT: 0 ml / NET: 2520 ml    I & Os for current day (as of 07 Jun 2017 23:17)  =============================================  IN: 1590 ml / OUT: 2300 ml / NET: -710 ml      Appearance: Anxious/hyperverbal  HEENT:   Normal oral mucosa, PERRL, EOMI	  Lymphatic: No lymphadenopathy  Cardiovascular: Normal S1 S2, No JVD, No murmurs, No edema  Respiratory: Lungs clear to auscultation	  Psychiatry: anxious  Gastrointestinal:  Soft, Non-tender, + BS	  Skin: No rashes, No ecchymoses, No cyanosis	  Neurologic: Non-focal  Extremities: Normal range of motion, No clubbing, cyanosis or edema  Vascular: Peripheral pulses palpable 2+ bilaterally        LABS:	 	    CBC Full  -  ( 07 Jun 2017 06:12 )  WBC Count : 5.7 K/uL  Hemoglobin : 13.4 g/dL  Hematocrit : 39.3 %  Platelet Count - Automated : 76 K/uL  Mean Cell Volume : 90.0 fl  Mean Cell Hemoglobin : 30.7 pg  Mean Cell Hemoglobin Concentration : 34.1 gm/dL  Auto Neutrophil # : x  Auto Lymphocyte # : x  Auto Monocyte # : x  Auto Eosinophil # : x  Auto Basophil # : x  Auto Neutrophil % : x  Auto Lymphocyte % : x  Auto Monocyte % : x  Auto Eosinophil % : x  Auto Basophil % : x    06-06    144  |  104  |  3<L>  ----------------------------<  110<H>  3.8   |  25  |  0.85    Ca    9.8      06 Jun 2017 05:39  Phos  4.2     06-06  Mg     2.0     06-06                TELEMETRY: 	    Sinus Tachycardia

## 2017-06-07 NOTE — DISCHARGE NOTE ADULT - PLAN OF CARE
Management You were admitted to the hospital because you were noted to have labile blood pressures and heart palpitations associated with sinus tachycardia. You were seen by the psychiatrist while you were in the hospital for your anxiety. For this, per their recommendations, you were started on LEXAPRO 5mg ONCE A DAY which you should continue to take after you leave. In addition, you were started on ATIVAN 1mg TWO TIMES PER DAY. You should follow up with the psychiatrist in the office after you are discharged. You were seen by the rheumatologist while you were in the hospital given your symptoms and your history of an elevated MARY BETH blood test. For this, additional blood tests were sent which were _________. You should follow up with the rheumatologist in the office after you are discharged. You were seen by the gastroenterologist while you were in the hospital because you were note to have abdominal bloating and gas. For this, you were recommended to undergo an EGD and colonoscopy, which you refused. You should follow up with the gastroenterologist in the office after you are discharged regarding these tests. You were seen by the rheumatologist while you were in the hospital given your symptoms and your history of an elevated MARY BETH blood test.  You should follow up with the rheumatologist in the office after you are discharged. Treatment of Anxiety- as discussed with Dr. Sparks and Social work:  Adult [facility] Hospital Program at [facility] [phone]—phone number   Fax [phone] [address] [place] [address]  2 tracks---higher functioning and lower functioning     9-3pm Monday to Friday   Appointment for evaluation on: Tuesday 6/20 at 9am [facility] building –door 1 on 2nd floor, [address]   Will meet with [place] Sahni [phone]

## 2017-06-07 NOTE — DISCHARGE NOTE ADULT - MEDICATION SUMMARY - MEDICATIONS TO TAKE
I will START or STAY ON the medications listed below when I get home from the hospital:    Cardizem 120 mg oral tablet  -- 1 tab(s) by mouth once a day  -- Indication: For HTN (hypertension)    LORazepam 1 mg oral tablet  -- 1 tab(s) by mouth 2 times a day MDD:2  -- Indication: For Anxiety    Ativan 0.5 mg oral tablet  -- 1 tab(s) by mouth 3 times a day MDD:3  -- Caution federal law prohibits the transfer of this drug to any person other  than the person for whom it was prescribed.  Do not take this drug if you are pregnant.  May cause drowsiness.  Alcohol may intensify this effect.  Use care when operating dangerous machinery.    -- Indication: For Anxiety    escitalopram 5 mg oral tablet  -- 1 tab(s) by mouth once a day  -- Indication: For Mood    chlorhexidine 0.12% mucous membrane liquid  -- 15 milliliter(s) mucous membrane 2 times a day  -- Indication: For Mouthwash    famotidine 20 mg oral tablet  -- 1 tab(s) by mouth 2 times a day  -- Indication: For Stomach acid    Singulair 10 mg oral tablet  -- 1 tab(s) by mouth once a day  -- Indication: For Asthma    simethicone 80 mg oral tablet, chewable  -- 1 tab(s) by mouth every 8 hours, As Needed for gas.   -- Indication: For Gas    fluticasone 50 mcg/inh nasal spray  -- 1 spray(s) into nose 2 times a day  -- Indication: For Asthma I will START or STAY ON the medications listed below when I get home from the hospital:    dilTIAZem 180 mg/24 hours oral capsule, extended release  -- 1 cap(s) by mouth once a day  -- Indication: For Essential hypertension    LORazepam 1 mg oral tablet  -- 1 tab(s) by mouth 2 times a day MDD:2  -- Indication: For Anxiety    Ativan 0.5 mg oral tablet  -- 1 tab(s) by mouth 3 times a day MDD:3  -- Caution federal law prohibits the transfer of this drug to any person other  than the person for whom it was prescribed.  Do not take this drug if you are pregnant.  May cause drowsiness.  Alcohol may intensify this effect.  Use care when operating dangerous machinery.    -- Indication: For Anxiety    escitalopram 5 mg oral tablet  -- 1 tab(s) by mouth once a day  -- Indication: For Mood    Zofran ODT 4 mg oral tablet, disintegrating  -- 1 tab(s) by mouth 3 times a day, As Needed for nausea or vomiting  -- Indication: For Nausea or vomiting    chlorhexidine 0.12% mucous membrane liquid  -- 15 milliliter(s) mucous membrane 2 times a day  -- Indication: For Mouthwash    famotidine 20 mg oral tablet  -- 1 tab(s) by mouth 2 times a day  -- Indication: For Stomach acid    Singulair 10 mg oral tablet  -- 1 tab(s) by mouth once a day  -- Indication: For Asthma    simethicone 80 mg oral tablet, chewable  -- 1 tab(s) by mouth every 8 hours, As Needed for gas.   -- Indication: For Gas    fluticasone 50 mcg/inh nasal spray  -- 1 spray(s) into nose 2 times a day  -- Indication: For Asthma

## 2017-06-07 NOTE — PROGRESS NOTE ADULT - SUBJECTIVE AND OBJECTIVE BOX
Patient is a 41y old  Female who presents with a chief complaint of hypertension (2017 13:45)      SUBJECTIVE / OVERNIGHT EVENTS: No acute events overnight. Patient feels less anxious this morning. Reports having spoken with the cardiologist yesterday - stress test wnl, starting Labetalol. Patient nervous to take Labetalol without first speaking with the cardiologist again. In addition, was initially ordered for a CTA which was later cancelled and would like to speak with the cardiologist regarding the need or not for this test. Denies chest pain or SOB this am. Reports some PO intake, minimal. Denies vomiting, diarrhea, dysuria, fever or chills.    MEDICATIONS  (STANDING):  montelukast 10milliGRAM(s) Oral daily  diltiazem   CD 120milliGRAM(s) Oral daily  fluticasone propionate 50 MICROgram(s)/spray Nasal Spray 1Spray(s) Both Nostrils two times a day  lactated ringers. 1000milliLiter(s) IV Continuous <Continuous>  docusate sodium 100milliGRAM(s) Oral three times a day  escitalopram 5milliGRAM(s) Oral daily  LORazepam     Tablet 1milliGRAM(s) Oral two times a day  labetalol 100milliGRAM(s) Oral two times a day    MEDICATIONS  (PRN):  LORazepam     Tablet 1milliGRAM(s) Oral every 6 hours PRN Anxiety      Vital Signs Last 24 Hrs  T(C): 36.4, Max: 36.8 (06-06 @ 21:08)  HR: 88 (86 - 101)  BP: 136/88 (123/98 - 153/110)  RR: 18 (16 - 18)  SpO2: 98% (98% - 99%)  Wt(kg): --  CAPILLARY BLOOD GLUCOSE    I&O's Summary  I & Os for 24h ending 2017 07:00  =============================================  IN: 2520 ml / OUT: 0 ml / NET: 2520 ml    I & Os for current day (as of 2017 09:02)  =============================================  IN: 270 ml / OUT: 0 ml / NET: 270 ml      PHYSICAL EXAM:  GENERAL: NAD, well-developed  HEAD:  Atraumatic, Normocephalic  EYES: EOMI, PERRLA, conjunctiva and sclera clear  NECK: Supple, No JVD  CHEST/LUNG: Clear to auscultation bilaterally; No wheeze  HEART: Regular rate (80s-90s) and rhythm; No murmurs, rubs, or gallops  ABDOMEN: Soft, Nontender, Nondistended; Bowel sounds present  EXTREMITIES: no clubbing, cyanosis, or edema  PSYCH: AAOx3  NEUROLOGY: non-focal  SKIN: No rashes or lesions    LABS:                        13.4   5.7   )-----------( 76       ( 2017 06:12 )             39.3     06-06    144  |  104  |  3<L>  ----------------------------<  110<H>  3.8   |  25  |  0.85    Ca    9.8      2017 05:39  Phos  4.2     06-06  Mg     2.0     06-06    TPro  6.7  /  Alb  4.1  /  TBili  0.3  /  DBili  x   /  AST  29  /  ALT  22  /  AlkPhos  76  06-05      CARDIAC MARKERS ( 2017 09:22 )  x     / <0.01 ng/mL / x     / x     / 1.0 ng/mL      Urinalysis Basic - ( 2017 12:21 )    Color: PL Yellow / Appearance: Clear / S.005 / pH: x  Gluc: x / Ketone: Negative  / Bili: Negative / Urobili: Negative   Blood: x / Protein: Negative / Nitrite: Negative   Leuk Esterase: Negative / RBC: 3-5 /HPF / WBC 0-2 /HPF   Sq Epi: x / Non Sq Epi: OCC /HPF / Bacteria: Few /HPF        RADIOLOGY & ADDITIONAL TESTS:    Imaging Personally Reviewed:    Consultant(s) Notes Reviewed:      Care Discussed with Consultants/Other Providers: Patient is a 41y old  Female who presents with a chief complaint of hypertension (2017 13:45)      SUBJECTIVE / OVERNIGHT EVENTS: No acute events overnight. Patient feels less anxious this morning. Reports having spoken with the cardiologist yesterday - stress test wnl, starting Labetalol. Patient nervous to take Labetalol without first speaking with the cardiologist again. In addition, was initially ordered for a CTA which was later cancelled and would like to speak with the cardiologist regarding the need or not for this test. Denies chest pain or SOB this am. Reports some PO intake, minimal. Denies vomiting, diarrhea, dysuria, fever or chills.    MEDICATIONS  (STANDING):  montelukast 10milliGRAM(s) Oral daily  diltiazem   CD 120milliGRAM(s) Oral daily  fluticasone propionate 50 MICROgram(s)/spray Nasal Spray 1Spray(s) Both Nostrils two times a day  lactated ringers. 1000milliLiter(s) IV Continuous <Continuous>  docusate sodium 100milliGRAM(s) Oral three times a day  escitalopram 5milliGRAM(s) Oral daily  LORazepam     Tablet 1milliGRAM(s) Oral two times a day  labetalol 100milliGRAM(s) Oral two times a day    MEDICATIONS  (PRN):  LORazepam     Tablet 1milliGRAM(s) Oral every 6 hours PRN Anxiety      Vital Signs Last 24 Hrs  T(C): 36.4, Max: 36.8 (06-06 @ 21:08)  HR: 88 (86 - 101)  BP: 136/88 (123/98 - 153/110)  RR: 18 (16 - 18)  SpO2: 98% (98% - 99%)  Wt(kg): --  CAPILLARY BLOOD GLUCOSE    I&O's Summary  I & Os for 24h ending 2017 07:00  =============================================  IN: 2520 ml / OUT: 0 ml / NET: 2520 ml    I & Os for current day (as of 2017 09:02)  =============================================  IN: 270 ml / OUT: 0 ml / NET: 270 ml      PHYSICAL EXAM:  GENERAL: NAD, well-developed  HEAD:  Atraumatic, Normocephalic  EYES: EOMI, PERRLA, conjunctiva and sclera clear  NECK: Supple, No JVD  CHEST/LUNG: Clear to auscultation bilaterally; No wheeze  HEART: Regular rate (80s-90s) and rhythm; No murmurs, rubs, or gallops  ABDOMEN: Soft, Nontender, Nondistended; Bowel sounds present  EXTREMITIES: no clubbing, cyanosis, or edema  PSYCH: AAOx3  NEUROLOGY: non-focal  SKIN: No rashes or lesions    LABS:                        13.4   5.7   )-----------( 76       ( 2017 06:12 )             39.3     06-06    144  |  104  |  3<L>  ----------------------------<  110<H>  3.8   |  25  |  0.85    Ca    9.8      2017 05:39  Phos  4.2     06-06  Mg     2.0     06-06    TPro  6.7  /  Alb  4.1  /  TBili  0.3  /  DBili  x   /  AST  29  /  ALT  22  /  AlkPhos  76  06-05      CARDIAC MARKERS ( 2017 09:22 )  x     / <0.01 ng/mL / x     / x     / 1.0 ng/mL      Urinalysis Basic - ( 2017 12:21 )    Color: PL Yellow / Appearance: Clear / S.005 / pH: x  Gluc: x / Ketone: Negative  / Bili: Negative / Urobili: Negative   Blood: x / Protein: Negative / Nitrite: Negative   Leuk Esterase: Negative / RBC: 3-5 /HPF / WBC 0-2 /HPF   Sq Epi: x / Non Sq Epi: OCC /HPF / Bacteria: Few /HPF        RADIOLOGY & ADDITIONAL TESTS:    Imaging Personally Reviewed:    Consultant(s) Notes Reviewed:  cardiology, EP, endocrinology    Care Discussed with Consultants/Other Providers: Patient is a 41y old  Female who presents with a chief complaint of hypertension (2017 13:45)      SUBJECTIVE / OVERNIGHT EVENTS: No acute events overnight. Patient feels less anxious this morning. Reports having spoken with the cardiologist yesterday - stress test wnl, starting Labetalol. Patient nervous to take Labetalol without first speaking with the cardiologist again. In addition, was initially ordered for a CTA which was later cancelled and would like to speak with the cardiologist regarding the need or not for this test. Denies chest pain or SOB this am. Reports some PO intake, minimal. Denies vomiting, diarrhea, dysuria, fever or chills.    MEDICATIONS  (STANDING):  montelukast 10milliGRAM(s) Oral daily  diltiazem   CD 120milliGRAM(s) Oral daily  fluticasone propionate 50 MICROgram(s)/spray Nasal Spray 1Spray(s) Both Nostrils two times a day  lactated ringers. 1000milliLiter(s) IV Continuous <Continuous>  docusate sodium 100milliGRAM(s) Oral three times a day  escitalopram 5milliGRAM(s) Oral daily  LORazepam     Tablet 1milliGRAM(s) Oral two times a day  labetalol 100milliGRAM(s) Oral two times a day    MEDICATIONS  (PRN):  LORazepam     Tablet 1milliGRAM(s) Oral every 6 hours PRN Anxiety      Vital Signs Last 24 Hrs  T(C): 36.4, Max: 36.8 (06-06 @ 21:08)  HR: 88 (86 - 101)  BP: 136/88 (123/98 - 153/110)  RR: 18 (16 - 18)  SpO2: 98% (98% - 99%)  Wt(kg): --  CAPILLARY BLOOD GLUCOSE    I&O's Summary  I & Os for 24h ending 2017 07:00  =============================================  IN: 2520 ml / OUT: 0 ml / NET: 2520 ml    I & Os for current day (as of 2017 09:02)  =============================================  IN: 270 ml / OUT: 0 ml / NET: 270 ml      PHYSICAL EXAM:  GENERAL: NAD, well-developed  HEAD:  Atraumatic, Normocephalic  EYES: EOMI, PERRLA, conjunctiva and sclera clear  NECK: Supple, No JVD  CHEST/LUNG: Clear to auscultation bilaterally; No wheeze  HEART: Regular rate (80s-90s) and rhythm; No murmurs, rubs, or gallops  ABDOMEN: Soft, Nontender, Nondistended; Bowel sounds present  EXTREMITIES: no clubbing, cyanosis, or edema  PSYCH: AAOx3  NEUROLOGY: non-focal  SKIN: No rashes or lesions    LABS:                        13.4   5.7   )-----------( 76       ( 2017 06:12 )             39.3     06-06    144  |  104  |  3<L>  ----------------------------<  110<H>  3.8   |  25  |  0.85    Ca    9.8      2017 05:39  Phos  4.2     06-06  Mg     2.0     06-06    TPro  6.7  /  Alb  4.1  /  TBili  0.3  /  DBili  x   /  AST  29  /  ALT  22  /  AlkPhos  76  06-05      CARDIAC MARKERS ( 2017 09:22 )  x     / <0.01 ng/mL / x     / x     / 1.0 ng/mL      Urinalysis Basic - ( 2017 12:21 )    Color: PL Yellow / Appearance: Clear / S.005 / pH: x  Gluc: x / Ketone: Negative  / Bili: Negative / Urobili: Negative   Blood: x / Protein: Negative / Nitrite: Negative   Leuk Esterase: Negative / RBC: 3-5 /HPF / WBC 0-2 /HPF   Sq Epi: x / Non Sq Epi: OCC /HPF / Bacteria: Few /HPF        RADIOLOGY & ADDITIONAL TESTS:      PATIENT: DORIS MCBRIDE  : 1975   AGE: 41 (F)   MR#: [id number]  STUDY DATE: 2017  LOCATION: [facility]  REF. PHYSICIAN(S): Norman Crain MD  FELLOW: Hodan MERCADO, NP  ------------------------------------------------------------------------  TYPE OF TEST: Rest/Stress Pharmacologic  INDICATION: Abnormal electrocardiogram (ECG) (EKG)  (R94.31)  ------------------------------------------------------------------------  HISTORY:  CARDIAC HISTORY: 41 year old female with complaint of  palpitations, labile BP, atypical chest pain and reported  prior abnormal treadmill stress test.  Referred for  ischemic evaluation  OTHER HISTORY: ITP, asthma, anxiety, labyrinthitis,  vertigo  RISK FACTORS: Hypertension  MEDICATIONS: Macrobid, Colace, Lexapro, Ativan, Diltiazem,  Flonase  ------------------------------------------------------------------------  BASELINE ELECTROCARDIOGRAM:  Rhythm: Sinus Tachycardia - 104 BPM  ST: Nonspecific ST-T wave abnormality.  ------------------------------------------------------------------------  HEMODYNAMIC PARAMETERS:                                      HR      BP  Baseline  Pre-Injection            104 144/102  00:00     Inject Regadenoson         0  00:30     Post Injection             0  01:00     Post Injection           142 165/101  02:00     Post Injection           137 165/113  03:00     Post Injection           130 148/104  04:00     Post Injection           131  142/97  05:00     Recovery                 123  149/99  06:00 Recovery                 133  150/99  07:00     Recovery                 123  142/94  08:00     Recovery                 123  135/91  ------------------------------------------------------------------------  Agent: Regadenoson 0.4 mg/5 ml NS. injected over 10 sec.  Aminophylline: 75 mg  HR: Baseline HR: 104 bpm   Peak HR: 142 bpm (79% of MPHR)   MPHR: 180 bpm   85% of MPHR: 153 bpm  BP: Baseline BP: 144/102 mmHg   Peak BP: 165/101 mmHg  Peak RPP: 51201 (Rate Pressure Product)  Last Caffeine intake: 12 hrs  Terminated: Completion of protocol  ------------------------------------------------------------------------  SYMPTOMS/FINDINGS:  Symptoms: palpitations, chest discomfort  Chest pain: Patient developed 4/10 chest tightness at  15:00 min following regadenoson infusion which resolved by  30:00 min following infusion after administration of  aminophylline 75mg.  Treatment: palpitations, HA, chest tightness  ------------------------------------------------------------------------  ECG ABNORMALITIES DURING/AFTER STRESS:   ST Changes:No significant ischemic ST segment changes  beyond baseline abnormalities.  ------------------------------------------------------------------------  NEW ARRHYTHMIAS DEVELOPED DURING/AFTER STRESS:  None  ------------------------------------------------------------------------  STRESS TEST IMPRESSIONS:  Chest Pain: Patient developed 4/10 chest tightness at  15:00 min following regadenoson infusion which resolved by  30:00 min following infusion after administration of  aminophylline 75mg.  Symptom: palpitations, chest discomfort.  HR Response: Appropriate.  BP Response: Appropriate.  Heart Rhythm: Sinus Tachycardia - 104 BPM.  Baseline ECG: Nonspecific ST-T wave abnormality.  ECG Changes: No significant ischemic ST segment changes  beyond baseline abnormalities.  Arrhythmia: None.  ------------------------------------------------------------------------  PROCEDURE:  7.7 mCi of Tc99m Sestamibi were injected intravenously at  rest. Approximately 80 minutes later, tomographic images  were obtained in a 180 degree arc from right anterior  oblique to left anterior oblique with 64 stops. At a  separate time, 21 mCi of Tc99m Sestamibi were injected  intravenously during stress protocol. [id number]  minute(s) later, tomographic images were obtained in a 180  degree arc from right anterior oblique to left anterior  oblique with 64 stops. The tomographic slices were  reconstructed in 3 orthogonal planes (short axis,  horizontal long axis and vertical long axis).  Interpretation was performed both by visual and  quantitative analysis.  Rest and stress images were acquired using CZT-based  system with pinhole collimation ([employer] 530 c, [employer]), and reconstructed using MLEM algorithm.  Images were re-acquired with the patient in a prone  position.  ------------------------------------------------------------------------  NUCLEAR FINDINGS:  Review of raw data shows: Extensive splanchnic uptake  The left ventricle was small in size. There are mild to  moderate defects in the inferior, inferoapical, apical  lateral, and basal inferoseptal walls that are mostly  fixed, predominantly correct with prone imaging, and  demonstrate preserved wall motion suggestive of  attenuation artifacts.  As the inferoapical defect did not  completely correct with prone imaging, a small area of  scar in this territory cannot be definitively excluded.  While the left ventricular cavity appears to dilate  slightly with stress, the quantified volume ratio of 1.17  does not meet most accepted criteria for TID in a  regadenoson sestamibi study.  ------------------------------------------------------------------------  GATED ANALYSIS:  Post-stress gated wall motion analysis was performed (LVEF  > 70%;LVEDV = 38 ml.) revealing normal overall left  ventricular ejection fraction.  ------------------------------------------------------------------------  IMPRESSIONS:Probably Normal Study  * Technically difficult study due to extensive adjacent  extracardiac tracer uptake.  * Chest Pain: Patient developed 4/10 chest tightness at  15:00 min following regadenoson infusion which resolved by  30:00 min following infusion after administration of  aminophylline 75mg.  * Symptom: palpitations, chest discomfort.  * HR Response: Appropriate.  * BP Response: Appropriate.  * Heart Rhythm: Sinus Tachycardia - 104 BPM.  * Baseline ECG: Nonspecific ST-T wave abnormality.  * ECG Changes: No significant ischemic ST segment changes  beyond baseline abnormalities.  * Arrhythmia: None.  * Review of raw data shows: Extensive splanchnic uptake  * The left ventricle was small in size. There are mild to  moderate defects in the inferior, inferoapical, apical  lateral, and basal inferoseptal walls that are mostly  fixed, predominantly correct with prone imaging, and  demonstrate preserved wall motion suggestive of  attenuation artifacts.  As the inferoapical defect did not  completely correct with prone imaging, a small area of  scar in this territory cannot be definitively excluded.  * While the left ventricular cavity appears to dilate  slightly with stress, the quantified volume ratio of 1.17  does not meet most accepted criteria for TID in a  regadenoson sestamibi study.  * Post-stress gatedwall motion analysis was performed  (LVEF > 70%;LVEDV = 38 ml.) revealing normal overall left  ventricular ejection fraction.  *** No previous Nuclear/Stress exam.  ------------------------------------------------------------------------  Confirmed on  2017 - 16:50:49 by Kelvin Dailey M.D.  ------------------------------------------------------------------------      Imaging Personally Reviewed:    Consultant(s) Notes Reviewed:  cardiology, EP, endocrinology    Care Discussed with Consultants/Other Providers:

## 2017-06-07 NOTE — DISCHARGE NOTE ADULT - HOSPITAL COURSE
Hospital Admission  Patient is a 40 yo F w/PMH of asthma, anxiety, ITP (stable, not on treatment currently) who p/w labile blood pressures with HTN to 190s/120s at times, palpitations and non-exertional left-sided chest pain. Patient was seen at the [facility] ED yesterday with similar complaints, found to have positive UA (moderate LE, trace blood, few bacteria), given Macrobid and discharged home. EKG at that time NSR at 85 bpm. Patient then went home, noted elevated BP despite having taken her home dose of Cardizem 120, called the on-call doctor, who instructed her take an additional dose. At that time, patient noted her BP did not decrease, so she presented to the ED again. Of note, patient reports her symptoms began around March of this year at which time she began experiencing abdominal bloating, discomfort and diarrhea. She was seen by GI at that time. She did not undergo upper or lower endoscopy as part of her work up, which she reports is secondary to her heart problems/high blood pressure. Patient reports that shortly after the GI symptoms began, she started having intermittent palpitations, some episodes lasting a second, some lasting up to 3 minutes, also associated with episodes of HTN. For this, she saw multiple cardiologists. She was seen by Dr. Arango who placed her on a holter monitor for 1 week. She reports the holter showed only intermittent PVCs. She then underwent a stress test at [facility] with Dr. Lopez and reports that it was "abnormal" but does not know in what way. For this, she was scheduled for a nuclear stress test this week. Of note, she was in the process of a work up for pheochromocytoma by her PCP. Also recently diangosed with labyrinthitis which has caused her vertigo, somewhat relieved by Xanax.    Patient reports recent significant life stressors within the past year, including loss of her job which required her to move back in with her parents and loss of her pet cat of 16 years. Given concern for anxiety as a possible etiology for her symptoms, she was started on Xanax 0.25 PRN.    She denies smoking, drinks only socially and denies recreational drug use.    Patient reports significant fatigue for the past month as well as waxing and waning appetite since the onset of her symptoms. Denies fevers, chills, nausea or vomiting currently. Reports frequent soft stools, but denies watery diarrhea, melena or BRBPR. Reports 8 pound unintentional weight loss in past month. Denies dysuria, reports increased frequency in the setting of drinking a lot of fluid.    ED Course  In the ED, VS: T 97.9    /101  RR 16  SpO2 98% on room air  Labs: CBC showed stable thrombocytopenia of 62, otherwise wnl. CMP wnl. Aden negative. Serum HCG negative. UA from yesterday demonstrated few bacteria, moderate LE and trace blood. Repeat UA negative for LE, few bacteria. EKG NSR at 87 bpm, no ST or T wave changes, normal p-wave morphology.    Hospital Course  Patient admitted to the hospital on the medical service for further work up of multiple issues. Hospital Admission  Patient is a 40 yo F w/PMH of asthma, anxiety, ITP (stable, not on treatment currently) who p/w labile blood pressures with HTN to 190s/120s at times, palpitations and non-exertional left-sided chest pain. Patient was seen at the [facility] ED yesterday with similar complaints, found to have positive UA (moderate LE, trace blood, few bacteria), given Macrobid and discharged home. EKG at that time NSR at 85 bpm. Patient then went home, noted elevated BP despite having taken her home dose of Cardizem 120, called the on-call doctor, who instructed her take an additional dose. At that time, patient noted her BP did not decrease, so she presented to the ED again. Of note, patient reports her symptoms began around March of this year at which time she began experiencing abdominal bloating, discomfort and diarrhea. She was seen by GI at that time. She did not undergo upper or lower endoscopy as part of her work up, which she reports is secondary to her heart problems/high blood pressure. Patient reports that shortly after the GI symptoms began, she started having intermittent palpitations, some episodes lasting a second, some lasting up to 3 minutes, also associated with episodes of HTN. For this, she saw multiple cardiologists. She was seen by Dr. Arango who placed her on a holter monitor for 1 week. She reports the holter showed only intermittent PVCs. She then underwent a stress test at [facility] with Dr. Lopez and reports that it was "abnormal" but does not know in what way. For this, she was scheduled for a nuclear stress test this week. Of note, she was in the process of a work up for pheochromocytoma by her PCP. Also recently diangosed with labyrinthitis which has caused her vertigo, somewhat relieved by Xanax.    Patient reports recent significant life stressors within the past year, including loss of her job which required her to move back in with her parents and loss of her pet cat of 16 years. Given concern for anxiety as a possible etiology for her symptoms, she was started on Xanax 0.25 PRN.    She denies smoking, drinks only socially and denies recreational drug use.    Patient reports significant fatigue for the past month as well as waxing and waning appetite since the onset of her symptoms. Denies fevers, chills, nausea or vomiting currently. Reports frequent soft stools, but denies watery diarrhea, melena or BRBPR. Reports 8 pound unintentional weight loss in past month. Denies dysuria, reports increased frequency in the setting of drinking a lot of fluid.    ED Course  In the ED, VS: T 97.9    /101  RR 16  SpO2 98% on room air  Labs: CBC showed stable thrombocytopenia of 62, otherwise wnl. CMP wnl. Aden negative. Serum HCG negative. UA from yesterday demonstrated few bacteria, moderate LE and trace blood. Repeat UA negative for LE, few bacteria. EKG NSR at 87 bpm, no ST or T wave changes, normal p-wave morphology.    Hospital Course  Patient admitted to the hospital on the medical service for further work up of multiple issues.    1. Atypical chest pain  Patient presented with intermittent left-sided chest pain, mild, non-radiating, non-TTP, not pleuritic in nature. Initial evaluation for cardiac cause negative as Aden were negative x 2. Multiple EKGs demonstrated NSR.      2. Tachycardia  - patient with history of only PVCs on holter monitor, however did not have significant episodes of tachycardia while on monitor  - will c/w telemetry monitoring  - nuclear stress test (6/6) normal study (technically difficult study due to extensive adjacent extracardiac tracer uptake)  - patient reports no further chest pain at this time Hospital Admission  Patient is a 40 yo F w/PMH of asthma, anxiety, ITP (stable, not on treatment currently) who p/w labile blood pressures with HTN to 190s/120s at times, palpitations and non-exertional left-sided chest pain. Patient was seen at the [facility] ED yesterday with similar complaints, found to have positive UA (moderate LE, trace blood, few bacteria), given Macrobid and discharged home. EKG at that time NSR at 85 bpm. Patient then went home, noted elevated BP despite having taken her home dose of Cardizem 120, called the on-call doctor, who instructed her take an additional dose. At that time, patient noted her BP did not decrease, so she presented to the ED again. Of note, patient reports her symptoms began around March of this year at which time she began experiencing abdominal bloating, discomfort and diarrhea. She was seen by GI at that time. She did not undergo upper or lower endoscopy as part of her work up, which she reports is secondary to her heart problems/high blood pressure. Patient reports that shortly after the GI symptoms began, she started having intermittent palpitations, some episodes lasting a second, some lasting up to 3 minutes, also associated with episodes of HTN. For this, she saw multiple cardiologists. She was seen by Dr. Arango who placed her on a holter monitor for 1 week. She reports the holter showed only intermittent PVCs. She then underwent a stress test at [facility] with Dr. Lopez and reports that it was "abnormal" but does not know in what way. For this, she was scheduled for a nuclear stress test this week. Of note, she was in the process of a work up for pheochromocytoma by her PCP. Also recently diangosed with labyrinthitis which has caused her vertigo, somewhat relieved by Xanax.    Patient reports recent significant life stressors within the past year, including loss of her job which required her to move back in with her parents and loss of her pet cat of 16 years. Given concern for anxiety as a possible etiology for her symptoms, she was started on Xanax 0.25 PRN.    She denies smoking, drinks only socially and denies recreational drug use.    Patient reports significant fatigue for the past month as well as waxing and waning appetite since the onset of her symptoms. Denies fevers, chills, nausea or vomiting currently. Reports frequent soft stools, but denies watery diarrhea, melena or BRBPR. Reports 8 pound unintentional weight loss in past month. Denies dysuria, reports increased frequency in the setting of drinking a lot of fluid.    ED Course  In the ED, VS: T 97.9    /101  RR 16  SpO2 98% on room air  Labs: CBC showed stable thrombocytopenia of 62, otherwise wnl. CMP wnl. Aden negative. Serum HCG negative. UA from yesterday demonstrated few bacteria, moderate LE and trace blood. Repeat UA negative for LE, few bacteria. EKG NSR at 87 bpm, no ST or T wave changes, normal p-wave morphology.    Hospital Course  Patient admitted to the hospital on the medical service for further work up of multiple issues.    1. Atypical chest pain  Patient presented with intermittent left-sided chest pain, mild, non-radiating, non-TTP, not pleuritic in nature. Initial evaluation for cardiac cause negative as Aden were negative x 2. Multiple EKGs demonstrated NSR. Nuclear stress test performed on 6/6/2017 was within normal limits.    2. Tachycardia  Patient presented with intermittent tachycardia to the 140s at times. Patient monitored on telemetry, episodes of tachycardia all sinus tachycardia. Patient with history of only PVCs on holter monitor on previous work up. She was seen by both cardiology and electrophysiology during her stay. Initially recommended Labetalol. After initial dose, patient experienced scalp itching, but showed no signs of allergic reaction (no rash, shortness of breath, wheezing, swelling). Secondary to her concerns, Labetalol was not continued.    3. Labile BPs  On admission, patient reported episodes of labile BPs at home as high as 190s/120s and as low as 110s/80s. BPs were monitored while inpatient and ranged from SBPs to 110s-150s and DBPs to 80s-110s. She was continued on her home medication of Cardizem 120 QD. She was evaluated by both cardiology and endocrinology to work up secondary causes of hypertension. Renal US (6/6) demonstrated no evidence of a significant renal artery stenosis. Renin and aldosterone were within normal limits. AM cortisol mildly elevated at 25.8 in the setting of stress. Recommended repeat as an outpatient. Urine and plasma metanephrines sent to r/o pheochromocytoma, however recent MRI of the abdomen (3/27/2017) demonstrated normal adrenals.    4. Anxiety Hospital Admission  Patient is a 42 yo F w/PMH of asthma, anxiety, ITP (stable, not on treatment currently) who p/w labile blood pressures with HTN to 190s/120s at times, palpitations and non-exertional left-sided chest pain. Patient was seen at the [facility] ED yesterday with similar complaints, found to have positive UA (moderate LE, trace blood, few bacteria), given Macrobid and discharged home. EKG at that time NSR at 85 bpm. Patient then went home, noted elevated BP despite having taken her home dose of Cardizem 120, called the on-call doctor, who instructed her take an additional dose. At that time, patient noted her BP did not decrease, so she presented to the ED again. Of note, patient reports her symptoms began around March of this year at which time she began experiencing abdominal bloating, discomfort and diarrhea. She was seen by GI at that time. She did not undergo upper or lower endoscopy as part of her work up, which she reports is secondary to her heart problems/high blood pressure. Patient reports that shortly after the GI symptoms began, she started having intermittent palpitations, some episodes lasting a second, some lasting up to 3 minutes, also associated with episodes of HTN. For this, she saw multiple cardiologists. She was seen by Dr. Arango who placed her on a holter monitor for 1 week. She reports the holter showed only intermittent PVCs. She then underwent a stress test at [facility] with Dr. Lopez and reports that it was "abnormal" but does not know in what way. For this, she was scheduled for a nuclear stress test this week. Of note, she was in the process of a work up for pheochromocytoma by her PCP. Also recently diangosed with labyrinthitis which has caused her vertigo, somewhat relieved by Xanax.    Patient reports recent significant life stressors within the past year, including loss of her job which required her to move back in with her parents and loss of her pet cat of 16 years. Given concern for anxiety as a possible etiology for her symptoms, she was started on Xanax 0.25 PRN.    She denies smoking, drinks only socially and denies recreational drug use.    Patient reports significant fatigue for the past month as well as waxing and waning appetite since the onset of her symptoms. Denies fevers, chills, nausea or vomiting currently. Reports frequent soft stools, but denies watery diarrhea, melena or BRBPR. Reports 8 pound unintentional weight loss in past month. Denies dysuria, reports increased frequency in the setting of drinking a lot of fluid.    ED Course  In the ED, VS: T 97.9    /101  RR 16  SpO2 98% on room air  Labs: CBC showed stable thrombocytopenia of 62, otherwise wnl. CMP wnl. Aden negative. Serum HCG negative. UA from yesterday demonstrated few bacteria, moderate LE and trace blood. Repeat UA negative for LE, few bacteria. EKG NSR at 87 bpm, no ST or T wave changes, normal p-wave morphology.    Hospital Course  Patient admitted to the hospital on the medical service for further work up of multiple issues.    1. Atypical chest pain  Patient presented with intermittent left-sided chest pain, mild, non-radiating, non-TTP, not pleuritic in nature. Initial evaluation for cardiac cause negative as Aden were negative x 2. Multiple EKGs demonstrated NSR. Nuclear stress test performed on 6/6/2017 was within normal limits.    2. Tachycardia  Patient presented with intermittent tachycardia to the 140s at times. Patient monitored on telemetry, episodes of tachycardia all sinus tachycardia. Patient with history of only PVCs on holter monitor on previous work up. She was seen by both cardiology and electrophysiology during her stay. Initially recommended Labetalol. After initial dose, patient experienced scalp itching, but showed no signs of allergic reaction (no rash, shortness of breath, wheezing, swelling). Secondary to her concerns, Labetalol was not continued.    3. Labile BPs  On admission, patient reported episodes of labile BPs at home as high as 190s/120s and as low as 110s/80s. BPs were monitored while inpatient and ranged from SBPs to 110s-150s and DBPs to 80s-110s. She was continued on her home medication of Cardizem 120 QD. She was evaluated by both cardiology and endocrinology to work up secondary causes of hypertension. Renal US (6/6) demonstrated no evidence of a significant renal artery stenosis. Renin and aldosterone were within normal limits. AM cortisol mildly elevated at 25.8 in the setting of stress. Recommended repeat as an outpatient. Urine and plasma metanephrines sent to r/o pheochromocytoma, however recent MRI of the abdomen (3/27/2017) demonstrated normal adrenals.    4. Anxiety  Initially patient was continued on her home dose of Xanax 0.25 TID PRN for her anxiety. This was not adequate to control her anxiety, so additional IVP of Ativan had to be given. Patient requested psychiatry consult and was seen by psychiatry. She was recommended Ativan 1mg BID with additional 1mg Ativan Q6hr PRN. She was also recommended to initiate Lexapro 5mg. Despite multiple clinicians urging, the patient refused the medication secondary to concern it may increase her heart rate.    5. Elevated MARY BETH  Patient had an MARY BETH done outpatient which was elevated to 1:320. Given her concerns over autoimmune causes of her symptoms, she requested Rheumatology consult. Rheumatology consulted, MARY BETH elevated likely in the setting of her history of ITP. Rheumatologic work up including dsDNA, anti-SSA, anti-SSB, C3, C4, anti-smith ab, anti-ribonuclear protien, DRVVT were sent which all returned negative.    6. Abdominal bloating and intermittent diarrhea  Patient complaining of abdominal bloating and intermittent diarrhea/decreased PO intake for several months prior to admission for which she was seen as an outpatient by her gastroenterologist, Dr. Gilbert. MRE was done that was normal at that time. Per request, she was seen by Dr. Gilbert's colleague while admitted for further work up. She was recommended to undergo an EGD/colonoscopy which she refused.    7. ITP  Patient with longstanding history of ITP for 20 years for which she has not received treatment. CBC demonstrated stably low platelets around 60s-70s. She was seen by hematology with recommendation for outpatient follow up. Hospital Admission  Patient is a 42 yo F w/PMH of asthma, anxiety, ITP (stable, not on treatment currently) who p/w labile blood pressures with HTN to 190s/120s at times, palpitations and non-exertional left-sided chest pain. Patient was seen at the [facility] ED yesterday with similar complaints, found to have positive UA (moderate LE, trace blood, few bacteria), given Macrobid and discharged home. EKG at that time NSR at 85 bpm. Patient then went home, noted elevated BP despite having taken her home dose of Cardizem 120, called the on-call doctor, who instructed her take an additional dose. At that time, patient noted her BP did not decrease, so she presented to the ED again. Of note, patient reports her symptoms began around March of this year at which time she began experiencing abdominal bloating, discomfort and diarrhea. She was seen by GI at that time. She did not undergo upper or lower endoscopy as part of her work up, which she reports is secondary to her heart problems/high blood pressure. Patient reports that shortly after the GI symptoms began, she started having intermittent palpitations, some episodes lasting a second, some lasting up to 3 minutes, also associated with episodes of HTN. For this, she saw multiple cardiologists. She was seen by Dr. Arango who placed her on a holter monitor for 1 week. She reports the holter showed only intermittent PVCs. She then underwent a stress test at [facility] with Dr. Lopez and reports that it was "abnormal" but does not know in what way. For this, she was scheduled for a nuclear stress test this week. Of note, she was in the process of a work up for pheochromocytoma by her PCP. Also recently diangosed with labyrinthitis which has caused her vertigo, somewhat relieved by Xanax.    Patient reports recent significant life stressors within the past year, including loss of her job which required her to move back in with her parents and loss of her pet cat of 16 years. Given concern for anxiety as a possible etiology for her symptoms, she was started on Xanax 0.25 PRN.    She denies smoking, drinks only socially and denies recreational drug use.    Patient reports significant fatigue for the past month as well as waxing and waning appetite since the onset of her symptoms. Denies fevers, chills, nausea or vomiting currently. Reports frequent soft stools, but denies watery diarrhea, melena or BRBPR. Reports 8 pound unintentional weight loss in past month. Denies dysuria, reports increased frequency in the setting of drinking a lot of fluid.    ED Course  In the ED, VS: T 97.9    /101  RR 16  SpO2 98% on room air  Labs: CBC showed stable thrombocytopenia of 62, otherwise wnl. CMP wnl. Aden negative. Serum HCG negative. UA from yesterday demonstrated few bacteria, moderate LE and trace blood. Repeat UA negative for LE, few bacteria. EKG NSR at 87 bpm, no ST or T wave changes, normal p-wave morphology.    Hospital Course  Patient admitted to the hospital on the medical service for further work up of multiple issues.    1. Atypical chest pain  Patient presented with intermittent left-sided chest pain, mild, non-radiating, non-TTP, not pleuritic in nature. Initial evaluation for cardiac cause negative as Aden were negative x 2. Multiple EKGs demonstrated NSR. Nuclear stress test performed on 6/6/2017 was within normal limits.    2. Tachycardia  Patient presented with intermittent tachycardia to the 140s at times. Patient monitored on telemetry, episodes of tachycardia all sinus tachycardia. Patient with history of only PVCs on holter monitor on previous work up. She was seen by both cardiology and electrophysiology during her stay. Initially recommended Labetalol. After initial dose, patient experienced scalp itching, but showed no signs of allergic reaction (no rash, shortness of breath, wheezing, swelling). Secondary to her concerns, Labetalol was not continued.    3. Labile BPs  On admission, patient reported episodes of labile BPs at home as high as 190s/120s and as low as 110s/80s. BPs were monitored while inpatient and ranged from SBPs to 110s-150s and DBPs to 80s-110s. She was continued on her home medication of Cardizem 120 QD. She was evaluated by both cardiology and endocrinology to work up secondary causes of hypertension. Renal US (6/6) demonstrated no evidence of a significant renal artery stenosis. Renin and aldosterone were within normal limits. AM cortisol mildly elevated at 25.8 in the setting of stress. Recommended repeat as an outpatient. Urine and plasma metanephrines sent to r/o pheochromocytoma, however recent MRI of the abdomen (3/27/2017) demonstrated normal adrenals.    4. Anxiety  Initially patient was continued on her home dose of Xanax 0.25 TID PRN for her anxiety. This was not adequate to control her anxiety, so additional IVP of Ativan had to be given. Patient requested psychiatry consult and was seen by psychiatry. She was recommended Ativan 1mg BID with additional 1mg Ativan Q6hr PRN. She was also recommended to initiate Lexapro 5mg. Despite multiple clinicians urging, the patient refused the medication secondary to concern it may increase her heart rate.    5. Elevated MARY BETH  Patient had an MARY BETH done outpatient which was elevated to 1:320. Given her concerns over autoimmune causes of her symptoms, she requested Rheumatology consult. Rheumatology consulted, MARY BETH elevated likely in the setting of her history of ITP. Rheumatologic work up including dsDNA, anti-SSA, anti-SSB, C3, C4, anti-smith ab, anti-ribonuclear protien, DRVVT, beta-2 glycoprotein were sent which all returned negative.    6. Abdominal bloating and intermittent diarrhea  Patient complaining of abdominal bloating and intermittent diarrhea/decreased PO intake for several months prior to admission for which she was seen as an outpatient by her gastroenterologist, Dr. Gilbert. MRE was done that was normal at that time. Per request, she was seen by Dr. Gilbert's colleague while admitted for further work up. She was recommended to undergo an EGD/colonoscopy which she refused.    7. ITP  Patient with longstanding history of ITP for 20 years for which she has not received treatment. CBC demonstrated stably low platelets around 60s-70s. She was seen by hematology with recommendation for outpatient follow up. Hospital Admission  Patient is a 42 yo F w/PMH of asthma, anxiety, ITP (stable, not on treatment currently) who p/w labile blood pressures with HTN to 190s/120s at times, palpitations and non-exertional left-sided chest pain. Patient was seen at the [facility] ED yesterday with similar complaints, found to have positive UA (moderate LE, trace blood, few bacteria), given Macrobid and discharged home. EKG at that time NSR at 85 bpm. Patient then went home, noted elevated BP despite having taken her home dose of Cardizem 120, called the on-call doctor, who instructed her take an additional dose. At that time, patient noted her BP did not decrease, so she presented to the ED again. Of note, patient reports her symptoms began around March of this year at which time she began experiencing abdominal bloating, discomfort and diarrhea. She was seen by GI at that time. She did not undergo upper or lower endoscopy as part of her work up, which she reports is secondary to her heart problems/high blood pressure. Patient reports that shortly after the GI symptoms began, she started having intermittent palpitations, some episodes lasting a second, some lasting up to 3 minutes, also associated with episodes of HTN. For this, she saw multiple cardiologists. She was seen by Dr. Arango who placed her on a holter monitor for 1 week. She reports the holter showed only intermittent PVCs. She then underwent a stress test at [facility] with Dr. Lopez and reports that it was "abnormal" but does not know in what way. For this, she was scheduled for a nuclear stress test this week. Of note, she was in the process of a work up for pheochromocytoma by her PCP. Also recently diangosed with labyrinthitis which has caused her vertigo, somewhat relieved by Xanax.    Patient reports recent significant life stressors within the past year, including loss of her job which required her to move back in with her parents and loss of her pet cat of 16 years. Given concern for anxiety as a possible etiology for her symptoms, she was started on Xanax 0.25 PRN.    She denies smoking, drinks only socially and denies recreational drug use.    Patient reports significant fatigue for the past month as well as waxing and waning appetite since the onset of her symptoms. Denies fevers, chills, nausea or vomiting currently. Reports frequent soft stools, but denies watery diarrhea, melena or BRBPR. Reports 8 pound unintentional weight loss in past month. Denies dysuria, reports increased frequency in the setting of drinking a lot of fluid.    ED Course  In the ED, VS: T 97.9    /101  RR 16  SpO2 98% on room air  Labs: CBC showed stable thrombocytopenia of 62, otherwise wnl. CMP wnl. Aden negative. Serum HCG negative. UA from yesterday demonstrated few bacteria, moderate LE and trace blood. Repeat UA negative for LE, few bacteria. EKG NSR at 87 bpm, no ST or T wave changes, normal p-wave morphology.    Hospital Course  Patient admitted to the hospital on the medical service for further work up of multiple issues.    1. Atypical chest pain  Patient presented with intermittent left-sided chest pain, mild, non-radiating, non-TTP, not pleuritic in nature. Initial evaluation for cardiac cause negative as Aden were negative x 2. Multiple EKGs demonstrated NSR. Nuclear stress test performed on 6/6/2017 was within normal limits.    2. Tachycardia  Patient presented with intermittent tachycardia to the 140s at times. Patient monitored on telemetry, episodes of tachycardia all sinus tachycardia. Patient with history of only PVCs on holter monitor on previous work up. She was seen by both cardiology and electrophysiology during her stay. Initially recommended Labetalol. After initial dose, patient experienced scalp itching, but showed no signs of allergic reaction (no rash, shortness of breath, wheezing, swelling). Secondary to her concerns, Labetalol was not continued. Per Allergy and Immunology consultation, beta blocker reactions extremely rare, doubt allergic reaction.    3. Labile BPs  On admission, patient reported episodes of labile BPs at home as high as 190s/120s and as low as 110s/80s. BPs were monitored while inpatient and ranged from SBPs to 110s-150s and DBPs to 80s-110s. She was continued on her home medication of Cardizem 120 QD. She was evaluated by both cardiology and endocrinology to work up secondary causes of hypertension. Renal US (6/6) demonstrated no evidence of a significant renal artery stenosis. Renin and aldosterone were within normal limits. AM cortisol mildly elevated at 25.8 in the setting of stress. Recommended repeat as an outpatient. Urine and plasma metanephrines sent to r/o pheochromocytoma, however recent MRI of the abdomen (3/27/2017) demonstrated normal adrenals. Neurology consulted to r/o dysautonomia. MRI brain/C-spine performed on 6/9 which demonstrated ______. Allergy and immunology consulted to r/o mastocytosis.    4. Anxiety  Initially patient was continued on her home dose of Xanax 0.25 TID PRN for her anxiety. This was not adequate to control her anxiety, so additional IVP of Ativan had to be given. Patient requested psychiatry consult and was seen by psychiatry. She was recommended Ativan 1mg BID with additional 1mg Ativan Q6hr PRN. She was also recommended to initiate Lexapro 5mg. Despite multiple clinicians urging, the patient refused the medication secondary to concern it may increase her heart rate.    5. Elevated MARY BETH  Patient had an MARY BETH done outpatient which was elevated to 1:320. Given her concerns over autoimmune causes of her symptoms, she requested Rheumatology consult. Rheumatology consulted, MARY BETH elevated likely in the setting of her history of ITP. Rheumatologic work up including dsDNA, anti-SSA, anti-SSB, C3, C4, anti-smith ab, anti-ribonuclear protien, DRVVT, beta-2 glycoprotein were sent which all returned negative.    6. Abdominal bloating and intermittent diarrhea  Patient complaining of abdominal bloating and intermittent diarrhea/decreased PO intake for several months prior to admission for which she was seen as an outpatient by her gastroenterologist, Dr. Gilbert. MRE was done that was normal at that time. Per request, she was seen by Dr. Gilbert's colleague while admitted for further work up. She was recommended to undergo an EGD/colonoscopy which she refused.    7. ITP  Patient with longstanding history of ITP for 20 years for which she has not received treatment. CBC demonstrated stably low platelets around 60s-70s. She was seen by hematology with recommendation for outpatient follow up. Hospital Admission  Patient is a 42 yo F w/PMH of asthma, anxiety, ITP (stable, not on treatment currently) who p/w labile blood pressures with HTN to 190s/120s at times, palpitations and non-exertional left-sided chest pain. Patient was seen at the [facility] ED yesterday with similar complaints, found to have positive UA (moderate LE, trace blood, few bacteria), given Macrobid and discharged home. EKG at that time NSR at 85 bpm. Patient then went home, noted elevated BP despite having taken her home dose of Cardizem 120, called the on-call doctor, who instructed her take an additional dose. At that time, patient noted her BP did not decrease, so she presented to the ED again. Of note, patient reports her symptoms began around March of this year at which time she began experiencing abdominal bloating, discomfort and diarrhea. She was seen by GI at that time. She did not undergo upper or lower endoscopy as part of her work up, which she reports is secondary to her heart problems/high blood pressure. Patient reports that shortly after the GI symptoms began, she started having intermittent palpitations, some episodes lasting a second, some lasting up to 3 minutes, also associated with episodes of HTN. For this, she saw multiple cardiologists. She was seen by Dr. Arango who placed her on a holter monitor for 1 week. She reports the holter showed only intermittent PVCs. She then underwent a stress test at [facility] with Dr. Lopez and reports that it was "abnormal" but does not know in what way. For this, she was scheduled for a nuclear stress test this week. Of note, she was in the process of a work up for pheochromocytoma by her PCP. Also recently diagnosed with labyrinthitis which has caused her vertigo, somewhat relieved by Xanax.    Patient reports recent significant life stressors within the past year, including loss of her job which required her to move back in with her parents and loss of her pet cat of 16 years. Given concern for anxiety as a possible etiology for her symptoms, she was started on Xanax 0.25 PRN.    She denies smoking, drinks only socially and denies recreational drug use.    Patient reports significant fatigue for the past month as well as waxing and waning appetite since the onset of her symptoms. Denies fevers, chills, nausea or vomiting currently. Reports frequent soft stools, but denies watery diarrhea, melena or BRBPR. Reports 8 pound unintentional weight loss in past month. Denies dysuria, reports increased frequency in the setting of drinking a lot of fluid.    ED Course  In the ED, VS: T 97.9    /101  RR 16  SpO2 98% on room air  Labs: CBC showed stable thrombocytopenia of 62, otherwise wnl. CMP wnl. Aden negative. Serum HCG negative. UA from yesterday demonstrated few bacteria, moderate LE and trace blood. Repeat UA negative for LE, few bacteria. EKG NSR at 87 bpm, no ST or T wave changes, normal p-wave morphology.    Hospital Course  Patient admitted to the hospital on the medical service for further work up of multiple issues.    1. Atypical chest pain  Patient presented with intermittent left-sided chest pain, mild, non-radiating, non-TTP, not pleuritic in nature. Initial evaluation for cardiac cause negative as Aden were negative x 2. Multiple EKGs demonstrated NSR. Nuclear stress test performed on 6/6/2017 was within normal limits.    2. Tachycardia  Patient presented with intermittent tachycardia to the 140s at times. Patient monitored on telemetry, episodes of tachycardia all sinus tachycardia. Patient with history of only PVCs on holter monitor on previous work up. She was seen by both cardiology and electrophysiology during her stay. Initially recommended Labetalol. After initial dose, patient experienced scalp itching, but showed no signs of allergic reaction (no rash, shortness of breath, wheezing, swelling). Secondary to her concerns, Labetalol was not continued. Per Allergy and Immunology consultation, beta blocker reactions extremely rare, doubt allergic reaction.  Her heart rate was subsequently controlled on her diltiazem with no sustained tachycardia on telemetry.    3. Labile BPs  On admission, patient reported episodes of labile BPs at home as high as 190s/120s and as low as 110s/80s. BPs were monitored while inpatient and ranged from SBPs to 110s-150s and DBPs to 80s-110s. She was continued on her home medication of Cardizem 120 QD. She was evaluated by both cardiology and endocrinology to work up secondary causes of hypertension. Renal US (6/6) demonstrated no evidence of a significant renal artery stenosis. Renin and aldosterone were within normal limits. AM cortisol mildly elevated at 25.8 in the setting of stress. Recommended repeat as an outpatient. Recent MRI of the abdomen (3/27/2017) demonstrated normal adrenals. She was seen by endocrine and found that since her 24 hour urine metanephrines are negative, urine 5-HIAA test is negative so pheochromocytoma or carcinoid syndrome is extremely unlikely in the setting.  Neurology consulted to r/o dysautonomia. MRI brain/C-spine performed on 6/9 had no significant findings.     4. Anxiety  Initially patient was continued on her home dose of Xanax 0.25 TID PRN for her anxiety. This was not adequate to control her anxiety, so additional IVP of Ativan had to be given. Patient requested psychiatry consult and was seen by psychiatry. She was recommended Ativan 1mg BID with additional 1mg Ativan Q6hr PRN. She was also recommended to initiate Lexapro 5mg.     5. Elevated MAR YBETH  Patient had an MARY BETH done outpatient which was elevated to 1:320. Given her concerns over autoimmune causes of her symptoms, she requested Rheumatology consult. Rheumatology consulted, MARY BETH elevated likely in the setting of her history of ITP. Rheumatologic work up including dsDNA, anti-SSA, anti-SSB, C3, C4, anti-smith ab, anti-ribonuclear protien, DRVVT, beta-2 glycoprotein were sent which all returned negative.    6. Abdominal bloating and intermittent diarrhea  Patient complaining of abdominal bloating and intermittent diarrhea/decreased PO intake for several months prior to admission for which she was seen as an outpatient by her gastroenterologist, Dr. Gilbert. MRE was done that was normal at that time. Per request, she was seen by Dr. Gilbert's colleague while admitted for further work up. She was recommended to undergo an EGD/colonoscopy which she is deferring until outpatient follow up when she feels her tachycardia is improved. Per GI, her symptoms may have microscopic colitis, PUD/gastritis.    7. ITP  Patient with longstanding history of ITP for 20 years for which she has not received treatment. CBC demonstrated stably low platelets around 60s-70s. She was seen by hematology with recommendation for outpatient follow up.

## 2017-06-07 NOTE — PROGRESS NOTE ADULT - PROBLEM SELECTOR PLAN 4
- patient with MARY BETH done outpatient of 1:320  - was told concern for possible autoimmune dysautonomia as source of her symptoms  - f/u rehumatology recs

## 2017-06-07 NOTE — PROGRESS NOTE ADULT - PROBLEM SELECTOR PLAN 5
- patient with UA initially with moderate LE, now negative  - given asymptomatic and repeat UA negative, will monitor off antibiotics

## 2017-06-07 NOTE — DISCHARGE NOTE ADULT - PATIENT PORTAL LINK FT
“You can access the Follow[facility] Patient Portal, offered by [facility], by registering with the following website: [email or web address]”

## 2017-06-07 NOTE — DISCHARGE NOTE ADULT - SOCIAL WORKER'S NAME
Roz Dickson, [place] [facility] [place] [phone] [email or web address]

## 2017-06-07 NOTE — PROGRESS NOTE ADULT - PROBLEM SELECTOR PLAN 1
- As labetalol causes sxs, patient reluctant to take  - suspect autonomic dysfunction and anxiety is contributing significantly  - would re-start labetalol only if necessary   - otherwise monitor on Ca blocker and consider Psychiatry/SSRI  - continue systemic workup as necessary  - CTA to r/o coarctation would not explain tachycardia

## 2017-06-07 NOTE — DISCHARGE NOTE ADULT - CARE PROVIDERS DIRECT ADDRESSES
[email or web address]

## 2017-06-07 NOTE — PROGRESS NOTE ADULT - PROBLEM SELECTOR PLAN 1
- patient with left-sided chest pain, mild, non-radiating, non-TTP, not pleuritic in nature  - Aden negative x 2, EKG sinus tachycardia  - patient with history of only PVCs on holter monitor, however did not have significant episodes of tachycardia while on monitor  - will c/w telemetry monitoring  - nuclear stress test (6/6) probably normal study (technically difficult study due to extensive adjacent extracardiac tracer uptake)  - patient reports no further chest pain at this time - patient with left-sided chest pain, mild, non-radiating, non-TTP, not pleuritic in nature  - Aden negative x 2, EKG sinus tachycardia  - patient with history of only PVCs on holter monitor, however did not have significant episodes of tachycardia while on monitor  - will c/w telemetry monitoring  - nuclear stress test (6/6) normal study (technically difficult study due to extensive adjacent extracardiac tracer uptake)  - patient reports no further chest pain at this time

## 2017-06-07 NOTE — PROGRESS NOTE ADULT - PROBLEM SELECTOR PLAN 3
- patient with significant anxiety over recent symptoms with recent significant life stressors within the past year  - seen by psychiatry - recommended Lexapro 5 daily, Ativan 1mg BID and PRN Ativan 1mg Q6hrs  - anxiety much better controlled this morning

## 2017-06-08 DIAGNOSIS — F41.0 PANIC DISORDER [EPISODIC PAROXYSMAL ANXIETY]: ICD-10-CM

## 2017-06-08 DIAGNOSIS — R14.0 ABDOMINAL DISTENSION (GASEOUS): ICD-10-CM

## 2017-06-08 DIAGNOSIS — R00.0 TACHYCARDIA, UNSPECIFIED: ICD-10-CM

## 2017-06-08 DIAGNOSIS — D69.3 IMMUNE THROMBOCYTOPENIC PURPURA: ICD-10-CM

## 2017-06-08 DIAGNOSIS — R53.83 OTHER FATIGUE: ICD-10-CM

## 2017-06-08 LAB
ANION GAP SERPL CALC-SCNC: 14 MMOL/L — SIGNIFICANT CHANGE UP (ref 5–17)
B2 GLYCOPROT1 AB SER QL: NEGATIVE — SIGNIFICANT CHANGE UP
BUN SERPL-MCNC: 4 MG/DL — LOW (ref 7–23)
CALCIUM SERPL-MCNC: 8.8 MG/DL — SIGNIFICANT CHANGE UP (ref 8.4–10.5)
CARDIOLIPIN AB SER-ACNC: NEGATIVE — SIGNIFICANT CHANGE UP
CHLORIDE SERPL-SCNC: 106 MMOL/L — SIGNIFICANT CHANGE UP (ref 96–108)
CO2 SERPL-SCNC: 24 MMOL/L — SIGNIFICANT CHANGE UP (ref 22–31)
CREAT SERPL-MCNC: 0.89 MG/DL — SIGNIFICANT CHANGE UP (ref 0.5–1.3)
DSDNA AB SER-ACNC: <12 IU/ML — SIGNIFICANT CHANGE UP
GLUCOSE SERPL-MCNC: 103 MG/DL — HIGH (ref 70–99)
HCT VFR BLD CALC: 35.7 % — SIGNIFICANT CHANGE UP (ref 34.5–45)
HGB BLD-MCNC: 12.1 G/DL — SIGNIFICANT CHANGE UP (ref 11.5–15.5)
MCHC RBC-ENTMCNC: 30.7 PG — SIGNIFICANT CHANGE UP (ref 27–34)
MCHC RBC-ENTMCNC: 34 GM/DL — SIGNIFICANT CHANGE UP (ref 32–36)
MCV RBC AUTO: 90.3 FL — SIGNIFICANT CHANGE UP (ref 80–100)
PLATELET # BLD AUTO: 71 K/UL — LOW (ref 150–400)
POTASSIUM SERPL-MCNC: 3.7 MMOL/L — SIGNIFICANT CHANGE UP (ref 3.5–5.3)
POTASSIUM SERPL-SCNC: 3.7 MMOL/L — SIGNIFICANT CHANGE UP (ref 3.5–5.3)
RBC # BLD: 3.96 M/UL — SIGNIFICANT CHANGE UP (ref 3.8–5.2)
RBC # FLD: 12.2 % — SIGNIFICANT CHANGE UP (ref 10.3–14.5)
SODIUM SERPL-SCNC: 144 MMOL/L — SIGNIFICANT CHANGE UP (ref 135–145)
WBC # BLD: 4.6 K/UL — SIGNIFICANT CHANGE UP (ref 3.8–10.5)
WBC # FLD AUTO: 4.6 K/UL — SIGNIFICANT CHANGE UP (ref 3.8–10.5)

## 2017-06-08 PROCEDURE — 99233 SBSQ HOSP IP/OBS HIGH 50: CPT | Mod: GC

## 2017-06-08 PROCEDURE — 99231 SBSQ HOSP IP/OBS SF/LOW 25: CPT | Mod: GC

## 2017-06-08 PROCEDURE — 99233 SBSQ HOSP IP/OBS HIGH 50: CPT

## 2017-06-08 PROCEDURE — 93010 ELECTROCARDIOGRAM REPORT: CPT

## 2017-06-08 PROCEDURE — 99222 1ST HOSP IP/OBS MODERATE 55: CPT | Mod: GC

## 2017-06-08 PROCEDURE — 99232 SBSQ HOSP IP/OBS MODERATE 35: CPT

## 2017-06-08 RX ORDER — SENNA PLUS 8.6 MG/1
2 TABLET ORAL AT BEDTIME
Qty: 0 | Refills: 0 | Status: DISCONTINUED | OUTPATIENT
Start: 2017-06-08 | End: 2017-06-09

## 2017-06-08 RX ORDER — FAMOTIDINE 10 MG/ML
20 INJECTION INTRAVENOUS
Qty: 0 | Refills: 0 | Status: DISCONTINUED | OUTPATIENT
Start: 2017-06-08 | End: 2017-06-14

## 2017-06-08 RX ADMIN — MONTELUKAST 10 MILLIGRAM(S): 4 TABLET, CHEWABLE ORAL at 21:22

## 2017-06-08 RX ADMIN — Medication 120 MILLIGRAM(S): at 05:48

## 2017-06-08 RX ADMIN — SODIUM CHLORIDE 75 MILLILITER(S): 9 INJECTION, SOLUTION INTRAVENOUS at 20:10

## 2017-06-08 RX ADMIN — SIMETHICONE 80 MILLIGRAM(S): 80 TABLET, CHEWABLE ORAL at 17:13

## 2017-06-08 RX ADMIN — FAMOTIDINE 20 MILLIGRAM(S): 10 INJECTION INTRAVENOUS at 17:12

## 2017-06-08 RX ADMIN — Medication 1 MILLIGRAM(S): at 17:12

## 2017-06-08 RX ADMIN — SENNA PLUS 2 TABLET(S): 8.6 TABLET ORAL at 21:21

## 2017-06-08 RX ADMIN — SODIUM CHLORIDE 75 MILLILITER(S): 9 INJECTION, SOLUTION INTRAVENOUS at 05:48

## 2017-06-08 RX ADMIN — Medication 1 SPRAY(S): at 17:29

## 2017-06-08 RX ADMIN — Medication 1 MILLIGRAM(S): at 05:48

## 2017-06-08 NOTE — PROGRESS NOTE BEHAVIORAL HEALTH - NSBHCHARTREVIEWIMAGING_PSY_A_CORE FT
STRESS TEST IMPRESSIONS:  Chest Pain: Patient developed 4/10 chest tightness at  15:00 min following regadenoson infusion which resolved by  30:00 min following infusion after administration of  aminophylline 75mg.  Symptom: palpitations, chest discomfort.  HR Response: Appropriate.  BP Response: Appropriate.  Heart Rhythm: Sinus Tachycardia - 104 BPM.  Baseline ECG: Nonspecific ST-T wave abnormality.  ECG Changes: No significant ischemic ST segment changes  beyond baseline abnormalities.  Arrhythmia: None.

## 2017-06-08 NOTE — PROGRESS NOTE ADULT - PROBLEM SELECTOR PLAN 5
- patient with UA initially with moderate LE, now negative  - given asymptomatic and repeat UA negative, will monitor off antibiotics - patient c/o abdominal bloating and intermittent diarrhea/decreased PO intake for several months  - seen by GI as outpatient, Dr. Gilbert, MR enterography performed at that time which was negative  - GI consulted and recommended inpatient EGD/colonoscopy yesterday which patient refused - patient with MARY BETH done outpatient of 1:320  - was told concern for possible autoimmune dysautonomia as source of her symptoms  - f/u rheumatology recs  - patient requesting neurology consult

## 2017-06-08 NOTE — PROGRESS NOTE ADULT - PROBLEM SELECTOR PLAN 1
- patient with left-sided chest pain, mild, non-radiating, non-TTP, not pleuritic in nature  - Aden negative x 2, EKG sinus tachycardia  - patient with history of only PVCs on holter monitor, however did not have significant episodes of tachycardia while on monitor  - will c/w telemetry monitoring  - nuclear stress test (6/6) normal study (technically difficult study due to extensive adjacent extracardiac tracer uptake)  - patient reports no further chest pain at this time

## 2017-06-08 NOTE — PROGRESS NOTE ADULT - SUBJECTIVE AND OBJECTIVE BOX
DORIS MCBRIDE  41y  [id number]  Patient is a 41y old  Female who presents with a chief complaint of hypertension (07 Jun 2017 14:04)    HPI:  Patient is a 42 yo F with history of asthma, anxiety, ITP (stable, not on treatment currently) who p/w labile blood pressures with HTN to 190s/120s at times, palpitations and non-exertional left-sided chest pain. Patient also with complaints of excessive fatigue and diffuse paresthesias and recurrent diarrhea.  Etiology of her symptoms remain unclear and she continues to feel poorly.  She developed headache going to use restroom today.    Patient offers no other complaints.   Patient's father, Dr. Iniguez is by the bedside.     T(C): 36.9, Max: 36.9 (06-08 @ 16:48)  HR: 87 (72 - 98)  BP: 130/86 (126/81 - 143/97)  RR: 18 (16 - 19)  SpO2: 98% (97% - 100%)  Wt(kg): --    PHYSICAL EXAM:      Constitutional: NAD  Psychiatric: alert and oriented                            12.1   4.6   )-----------( 71       ( 08 Jun 2017 06:49 )             35.7     06-08    144  |  106  |  4<L>  ----------------------------<  103<H>  3.7   |  24  |  0.89    Ca    8.8      08 Jun 2017 06:48    MARY BETH/SSB/SSA/dsDNA/complement  levels WNL  beta 2 glycoprotein and anticardiolipin negative  lupus anticoagulant negative

## 2017-06-08 NOTE — PROGRESS NOTE ADULT - PROBLEM SELECTOR PLAN 2
- patient with reported episodes of labile BPs at home as high as 190s/120s and as low as 110s/80s  - BPs here with mildly elevated SBPs to 110s-150s and DBPs to 80s-110s  - will c/w home Cardizem, Labetalol 100 BID added per cardiology  - endocrine following - unlikely an endocrinologic process, renin and aldosterone wnl, AM cortisol mildly elevated in setting of stress (will need repeat as an outpatient)  - will f/u urine metanephrines, plasma metanephrines to r/o pheochromocytoma  - f/u cardiology/EP recs  - refused Labetalol yesterday secondary to "itchy scalp" after one dose, BP well controlled this morning

## 2017-06-08 NOTE — PROGRESS NOTE ADULT - SUBJECTIVE AND OBJECTIVE BOX
BRIEF SUMMARY:  41 y.o. F - with anxiety/IBS/Asthma/hypertensive urgency and tachycardia with unrevealing systemic workup      CHIEF COMPLAINT/REASON FOR CONSULT: HTN/Tachycardia      24 HR EVENTS:  Labetalol caused pruritis and does not want to take another pill  Discussed that CTA to r/o Coarctation is not necessary at this time as it would not explain tachycardia  Discussed that use of SSRI will not cause issues with HR      Allergies    No Known Allergies    Intolerances    	    MEDICATIONS:  diltiazem   CD 120milliGRAM(s) Oral daily      montelukast 10milliGRAM(s) Oral daily    LORazepam     Tablet 1milliGRAM(s) Oral two times a day  LORazepam     Tablet 1milliGRAM(s) Oral every 6 hours PRN  ondansetron Injectable 4milliGRAM(s) IV Push every 8 hours PRN  diphenhydrAMINE   Capsule 25milliGRAM(s) Oral every 6 hours PRN    docusate sodium 100milliGRAM(s) Oral three times a day  simethicone 80milliGRAM(s) Chew three times a day PRN  polyethylene glycol 3350 17Gram(s) Oral daily PRN      fluticasone propionate 50 MICROgram(s)/spray Nasal Spray 1Spray(s) Both Nostrils two times a day  lactated ringers. 1000milliLiter(s) IV Continuous <Continuous>      PAST MEDICAL & SURGICAL HISTORY:  History of ITP  Labyrinthitis  Asthma  Headache  HTN (hypertension)  Anxiety  IBS (irritable bowel syndrome)      FAMILY HISTORY:  Family history of hypertension (Father, Grandparent)  Family history of atrial fibrillation (Father)  Family history of prostate cancer in father (Father)      REVIEW OF SYSTEMS:  See HPI. Otherwise, 10 point ROS done and otherwise negative.    PHYSICAL EXAM:  T(C): 36.8, Max: 37 (06-07 @ 14:13)  HR: 72 (72 - 100)  BP: 126/81 (123/90 - 139/95)  RR: 16 (16 - 19)  SpO2: 97% (96% - 100%)  Wt(kg): --  I&O's Summary  I & Os for 24h ending 07 Jun 2017 07:00  =============================================  IN: 2520 ml / OUT: 0 ml / NET: 2520 ml    I & Os for current day (as of 07 Jun 2017 23:17)  =============================================  IN: 1590 ml / OUT: 2300 ml / NET: -710 ml      Appearance: Anxious/hyperverbal  HEENT:   Normal oral mucosa, PERRL, EOMI	  Lymphatic: No lymphadenopathy  Cardiovascular: Normal S1 S2, No JVD, No murmurs, No edema  Respiratory: Lungs clear to auscultation	  Psychiatry: anxious  Gastrointestinal:  Soft, Non-tender, + BS	  Skin: No rashes, No ecchymoses, No cyanosis	  Neurologic: Non-focal  Extremities: Normal range of motion, No clubbing, cyanosis or edema  Vascular: Peripheral pulses palpable 2+ bilaterally        LABS:	 	    CBC Full  -  ( 07 Jun 2017 06:12 )  WBC Count : 5.7 K/uL  Hemoglobin : 13.4 g/dL  Hematocrit : 39.3 %  Platelet Count - Automated : 76 K/uL  Mean Cell Volume : 90.0 fl  Mean Cell Hemoglobin : 30.7 pg  Mean Cell Hemoglobin Concentration : 34.1 gm/dL  Auto Neutrophil # : x  Auto Lymphocyte # : x  Auto Monocyte # : x  Auto Eosinophil # : x  Auto Basophil # : x  Auto Neutrophil % : x  Auto Lymphocyte % : x  Auto Monocyte % : x  Auto Eosinophil % : x  Auto Basophil % : x    06-06    144  |  104  |  3<L>  ----------------------------<  110<H>  3.8   |  25  |  0.85    Ca    9.8      06 Jun 2017 05:39  Phos  4.2     06-06  Mg     2.0     06-06                TELEMETRY: 	    Sinus Rhythm

## 2017-06-08 NOTE — PROGRESS NOTE ADULT - SUBJECTIVE AND OBJECTIVE BOX
Patient is a 41y old  Female who presents with a chief complaint of hypertension (07 Jun 2017 14:04)      SUBJECTIVE / OVERNIGHT EVENTS: Patient reports having slept last night, however woke up with anxiety and tachycardia. Requesting multiple consultants to return to go over additional options with her. In addition, requesting neurology consult as well as additional testing (unclear what testing she is seeking). Refused Labetalol yesterday secondary to "scalp itching" after one dose, refused Lexapro as she read a study that it can increase her heart rate. Denies fevers, chills, nausea, vomiting, abdominal pain, chest pain or SOB at this time. NSR on telemetry and multiple EKGs.    MEDICATIONS  (STANDING):  montelukast 10milliGRAM(s) Oral daily  diltiazem   CD 120milliGRAM(s) Oral daily  fluticasone propionate 50 MICROgram(s)/spray Nasal Spray 1Spray(s) Both Nostrils two times a day  lactated ringers. 1000milliLiter(s) IV Continuous <Continuous>  LORazepam     Tablet 1milliGRAM(s) Oral two times a day  senna 2Tablet(s) Oral at bedtime    MEDICATIONS  (PRN):  LORazepam     Tablet 1milliGRAM(s) Oral every 6 hours PRN Anxiety  simethicone 80milliGRAM(s) Chew three times a day PRN Gas  ondansetron Injectable 4milliGRAM(s) IV Push every 8 hours PRN Nausea and/or Vomiting  diphenhydrAMINE   Capsule 25milliGRAM(s) Oral every 6 hours PRN Rash and/or Itching  polyethylene glycol 3350 17Gram(s) Oral daily PRN Constipation      Vital Signs Last 24 Hrs  T(C): 36.8, Max: 37 (06-07 @ 14:13)  HR: 98 (72 - 100)  BP: 140/90 (123/90 - 140/90)  RR: 19 (16 - 19)  SpO2: 100% (96% - 100%)  Wt(kg): --  CAPILLARY BLOOD GLUCOSE    I&O's Summary  I & Os for 24h ending 08 Jun 2017 07:00  =============================================  IN: 2730 ml / OUT: 2300 ml / NET: 430 ml    I & Os for current day (as of 08 Jun 2017 08:27)  =============================================  IN: 120 ml / OUT: 800 ml / NET: -680 ml      PHYSICAL EXAM:  GENERAL: NAD, well-developed  HEAD:  Atraumatic, Normocephalic  EYES: EOMI, PERRLA, conjunctiva and sclera clear  NECK: Supple, No JVD  CHEST/LUNG: Clear to auscultation bilaterally; No wheeze  HEART: Regular rate and rhythm; No murmurs, rubs, or gallops  ABDOMEN: Soft, Nontender, Nondistended; Bowel sounds present  EXTREMITIES:  2+ Peripheral Pulses, No clubbing, cyanosis, or edema  PSYCH: AAOx3  NEUROLOGY: non-focal  SKIN: No rashes or lesions    LABS:                        12.1   4.6   )-----------( 71       ( 08 Jun 2017 06:49 )             35.7     06-08    144  |  106  |  4<L>  ----------------------------<  103<H>  3.7   |  24  |  0.89    Ca    8.8      08 Jun 2017 06:48                RADIOLOGY & ADDITIONAL TESTS:    Imaging Personally Reviewed:    Consultant(s) Notes Reviewed:      Care Discussed with Consultants/Other Providers: Patient is a 41y old  Female who presents with a chief complaint of hypertension (07 Jun 2017 14:04)      SUBJECTIVE / OVERNIGHT EVENTS: Patient reports having slept last night, however woke up with anxiety and tachycardia. Requesting multiple consultants to return to go over additional options with her. In addition, requesting neurology consult as well as additional testing (unclear what testing she is seeking). Refused Labetalol yesterday secondary to "scalp itching" after one dose, refused Lexapro as she read a study that it can increase her heart rate. Denies fevers, chills, nausea, vomiting, abdominal pain, chest pain or SOB at this time. NSR on telemetry and multiple EKGs.    MEDICATIONS  (STANDING):  montelukast 10milliGRAM(s) Oral daily  diltiazem   CD 120milliGRAM(s) Oral daily  fluticasone propionate 50 MICROgram(s)/spray Nasal Spray 1Spray(s) Both Nostrils two times a day  lactated ringers. 1000milliLiter(s) IV Continuous <Continuous>  LORazepam     Tablet 1milliGRAM(s) Oral two times a day  senna 2Tablet(s) Oral at bedtime    MEDICATIONS  (PRN):  LORazepam     Tablet 1milliGRAM(s) Oral every 6 hours PRN Anxiety  simethicone 80milliGRAM(s) Chew three times a day PRN Gas  ondansetron Injectable 4milliGRAM(s) IV Push every 8 hours PRN Nausea and/or Vomiting  diphenhydrAMINE   Capsule 25milliGRAM(s) Oral every 6 hours PRN Rash and/or Itching  polyethylene glycol 3350 17Gram(s) Oral daily PRN Constipation      Vital Signs Last 24 Hrs  T(C): 36.8, Max: 37 (06-07 @ 14:13)  HR: 98 (72 - 100)  BP: 140/90 (123/90 - 140/90)  RR: 19 (16 - 19)  SpO2: 100% (96% - 100%)  Wt(kg): --  CAPILLARY BLOOD GLUCOSE    I&O's Summary  I & Os for 24h ending 08 Jun 2017 07:00  =============================================  IN: 2730 ml / OUT: 2300 ml / NET: 430 ml    I & Os for current day (as of 08 Jun 2017 08:27)  =============================================  IN: 120 ml / OUT: 800 ml / NET: -680 ml      PHYSICAL EXAM:  GENERAL: NAD, well-developed  HEAD:  Atraumatic, Normocephalic  EYES: EOMI, PERRLA, conjunctiva and sclera clear  NECK: Supple, No JVD  CHEST/LUNG: Clear to auscultation bilaterally; No wheeze  HEART: Regular rate and rhythm; No murmurs, rubs, or gallops  ABDOMEN: Soft, Nontender, Nondistended; Bowel sounds present  EXTREMITIES:  2+ Peripheral Pulses, No clubbing, cyanosis, or edema  PSYCH: AAOx3  NEUROLOGY: non-focal  SKIN: No rashes or lesions    LABS:                        12.1   4.6   )-----------( 71       ( 08 Jun 2017 06:49 )             35.7     06-08    144  |  106  |  4<L>  ----------------------------<  103<H>  3.7   |  24  |  0.89    Ca    8.8      08 Jun 2017 06:48    Anticardiolipin Antibody Level, Total: Negative: Method: EIA (06.07.17 @ 07:24)    Anti SS-A Antibody: <0.2 AI (06.07.17 @ 07:24)    Anti SS-B Antibody: <0.2: Fluorescent Bead Immunoassay   Reference Ranges for SS-A AND SS-B:   <1.0 AI (negative)   > or =1.0 AI (positive)   Reference values apply  to all ages. AI (06.07.17 @ 07:24)    Double Stranded DNA Antibody: <12: Method: EIA            Reference Ranges            Interpretation            < 30      IU/mL     Negative            30 - 75  IU/mL     Borderline            > 75      IU/mL     Positive IU/mL (06.07.17 @ 07:24)      RADIOLOGY & ADDITIONAL TESTS:    Imaging Personally Reviewed:    Anti-Ribonuclear Protein: 0.3: Fluorescent Bead Immunoassy                      Reference Ranges for RNP and SM:                      <1.0 AI (negative)                      > or =1.0 AI (positive)                      Reference values apply to all ages AI (06.07.17 @ 07:24)    SM (Levi) Ab FBIA: <0.2 AI (06.07.17 @ 07:24)    Consultant(s) Notes Reviewed:  GI    Care Discussed with Consultants/Other Providers:

## 2017-06-08 NOTE — PROGRESS NOTE ADULT - ATTENDING COMMENTS
seen and examined, agree with above.  case d/w dad, who is OB/GYN  will hold off on EGD/COL until cleared, can be performed as outpt  she is undergoing extensive w/u, including r/o of autonomic dysfunction and atypical pheo

## 2017-06-08 NOTE — CONSULT NOTE ADULT - PROBLEM SELECTOR RECOMMENDATION 9
Will need to r/o PUD. Celiac disease.  Pt will need EGD/Colonoscopy.  Will need to arrange once patient is agreeable.
Patient with history of ITP, platelets ranging 62-70's. She hasn't received treatment for ITP. Denies any bleeding, hematuria or epistaxis.   -Monitor CBC daily, if platelets <30 we will reassess for the need of treatment.

## 2017-06-08 NOTE — PROGRESS NOTE ADULT - PROBLEM SELECTOR PLAN 3
- patient with significant anxiety over recent symptoms with recent significant life stressors within the past year  - seen by psychiatry - recommended Lexapro 5 daily, Ativan 1mg BID and PRN Ativan 1mg Q6hrs  - anxiety much better controlled this morning - patient with significant anxiety over recent symptoms with recent significant life stressors within the past year  - seen by psychiatry - recommended Lexapro 5 daily, Ativan 1mg BID and PRN Ativan 1mg Q6hrs  - patient refused Lexapro as she read a study that it can potentially increase heart rate, requesting to speak with an attending psychiatrist  - c/w BID Ativan + PRN - patient with episodes of sinus tachycardia, particularly with ambulation  - this is very concerning to the patient, encouraged PO intake, patient receiving IVF at 75cc/hr maintenance  - patient requesting further testing, however is unclear what that testing may entail  - patient requesting to speak with EP Dr. Barahona

## 2017-06-08 NOTE — PROGRESS NOTE ADULT - ASSESSMENT
42 yo F w/PMH of asthma, anxiety, ITP (stable, not on treatment currently) who p/w labile blood pressures, palpitations and non-exertional left-sided chest pain a/w hypertension and atypical chest pain

## 2017-06-08 NOTE — PROGRESS NOTE ADULT - PROBLEM SELECTOR PLAN 4
- patient with MARY BETH done outpatient of 1:320  - was told concern for possible autoimmune dysautonomia as source of her symptoms  - f/u rehumatology recs - patient with MARY BETH done outpatient of 1:320  - was told concern for possible autoimmune dysautonomia as source of her symptoms  - f/u rheumatology recs  - patient requesting neurology consult - patient with significant anxiety over recent symptoms with recent significant life stressors within the past year  - seen by psychiatry - recommended Lexapro 5 daily, Ativan 1mg BID and PRN Ativan 1mg Q6hrs  - patient refused Lexapro as she read a study that it can potentially increase heart rate, requesting to speak with an attending psychiatrist  - c/w BID Ativan + PRN

## 2017-06-08 NOTE — PROGRESS NOTE ADULT - ASSESSMENT
42 yo female with multiple medical problems noted to have labile BP issues that started after having abdominal pain, diarrhea and weight loss.  Noted to have borderline elevated IBD serologies.  Although MRE was negative, pt may have microscopic colitis, PUD/gastritis/ celiac disease.      She is currently refusing GI w/u but agreeable to pursuing as outpatient once BP more controlled. There is no further diarrhea.    Further care per primary team.

## 2017-06-08 NOTE — CONSULT NOTE ADULT - ATTENDING COMMENTS
42yo F here for HTN workup, with a long standing history of ITP here for HTN.   -platelets stable   -no lad on imaging or exam  -will follow platelet count while here

## 2017-06-08 NOTE — PROGRESS NOTE ADULT - ASSESSMENT
41 y.o. F - with anxiety/IBS/Asthma/hypertensive urgency and tachycardia with unrevealing, exhaustive systemic workup.  Believe this is likely 2/2 to autonomic dysfunction.     1. HTN  Plan: - As labetalol causes sxs, patient reluctant to take  - would re-start labetalol only if necessary   - otherwise monitor on Ca blocker and consider Psychiatry/SSRI; increase ca blocker as a bridge in the meantime  - continue systemic workup as necessary  - CTA to r/o coarctation would not explain tachycardia.     Problem/Plan - 2:  ·  Problem: Anxiety.  Plan: - as above.     Problem/Plan - 3:  ·  Problem: Tachycardia.  Plan: - as above  - monitor on tele.     Will be away Friday June 9th; Please call Dr. Barahona or [phone]

## 2017-06-08 NOTE — PROGRESS NOTE ADULT - PROBLEM SELECTOR PLAN 4
Etiology of patient's symptoms remains unclear  With the exception of positive MARY BETH all other second tier serology is negative. Have low suspicion for autoimmune disease at present  ?autonomic dysfunction, work up in progress  Please call with any questions

## 2017-06-08 NOTE — CONSULT NOTE ADULT - SUBJECTIVE AND OBJECTIVE BOX
Patient is a 41y old  Female who is consulted for abdominal pain and diarrhea, weight loss.  (05 Jun 2017 13:45)      HPI:  Patient is a 40 yo F w/PMH of asthma, anxiety, ITP (stable, not on treatment currently) who p/w labile blood pressures with HTN to 190s/120s at times, palpitations and non-exertional left-sided chest pain. Patient was seen at the [facility] ED yesterday with similar complaints, found to have positive UA (moderate LE, trace blood, few bacteria), given Macrobid and discharged home. EKG at that time NSR at 85 bpm. Patient then went home, noted elevated BP despite having taken her home dose of Cardizem 120, called the on-call doctor, who instructed her take an additional dose. At that time, patient noted her BP did not decrease, so she presented to the ED again. Of note, patient reports her symptoms began around March of this year at which time she began experiencing abdominal bloating, discomfort and diarrhea. She was seen by Dr Gilbert at that time. She did not undergo upper or lower endoscopy as part of her work up, which she reports is secondary to her heart problems/high blood pressure.  MRE was done that was normal.  Patient reports that shortly after the GI symptoms began, she started having intermittent palpitations, some episodes lasting a second, some lasting up to 3 minutes, also associated with episodes of HTN.     She saw multiple cardiologists.  Patient reports recent significant life stressors within the past year, including loss of her job which required her to move back in with her parents and loss of her pet cat of 16 years. Given concern for anxiety as a possible etiology for her symptoms, she was started on Xanax 0.25 PRN.    Patient reports significant fatigue for the past month as well as waxing and waning appetite since the onset of her symptoms. Denies fevers, chills, nausea or vomiting currently. Reports frequent soft stools, but denies watery diarrhea, melena or BRBPR. Reports 8 pound unintentional weight loss in past month. Denies dysuria, reports increased frequency in the setting of drinking a lot of fluid.      PAST MEDICAL & SURGICAL HISTORY:  History of ITP  Labyrinthitis  Asthma  Headache  HTN (hypertension)  Anxiety  IBS (irritable bowel syndrome)      Allergies    No Known Allergies    Intolerances        MEDICATIONS  (STANDING):  montelukast 10milliGRAM(s) Oral daily  diltiazem   CD 120milliGRAM(s) Oral daily  fluticasone propionate 50 MICROgram(s)/spray Nasal Spray 1Spray(s) Both Nostrils two times a day  lactated ringers. 1000milliLiter(s) IV Continuous <Continuous>  docusate sodium 100milliGRAM(s) Oral three times a day  escitalopram 5milliGRAM(s) Oral daily  LORazepam     Tablet 1milliGRAM(s) Oral two times a day  labetalol 100milliGRAM(s) Oral two times a day    MEDICATIONS  (PRN):  LORazepam     Tablet 1milliGRAM(s) Oral every 6 hours PRN Anxiety  simethicone 80milliGRAM(s) Chew three times a day PRN Gas  ondansetron Injectable 4milliGRAM(s) IV Push every 8 hours PRN Nausea and/or Vomiting  diphenhydrAMINE   Capsule 25milliGRAM(s) Oral every 6 hours PRN Rash and/or Itching      Social History:    Marital Status:  (   )    (   ) Single    (   )    (  )   Occupation:   Lives with: (  ) alone  (  ) children   (  ) spouse   (  ) parents  (  ) other    Substance Use (street drugs): (  ) never used  (  ) other:  Tobacco Usage:  (   ) never smoked   (   ) former smoker   (   ) current smoker  (     ) pack year  (        ) last cigarette date  Alcohol Usage:  Sexual History:     Family History   IBD (  ) Yes   (  ) No  GI Malignancy (  )  Yes    (  ) No    Health Management     Last Colonoscopy -      (     ) Advanced Directives: (     ) None    (      ) DNR    (     ) DNI    (     ) Health Care Proxy:     Review of Systems:    General:  No wt loss, fevers, chills, night sweats,fatigue,   CV:  No pain, palpitatioins, hypo/hypertension  Resp:  No dyspnea, cough, tachypnea, wheezing  GI:  No pain, No nausea, No vomiting, No diarrhea, No constipatiion, No weight loss, No fever, No pruritis, No rectal bleeding, No tarry stools, No dysphagia,  :  No pain, bleeding, incontinence, nocturia  Muscle:  No pain, weakness  Neuro:  No weakness, tingling, memory problems  Psych:  No fatigue, insomnia, mood problems, depression  Endocrine:  No polyuria, polydypsia, cold/heat intolerance  Heme:  No petechiae, ecchymosis, easy bruisability  Skin:  No rash, tattoos, scars, edema      Vital Signs Last 24 Hrs  T(C): 36.8, Max: 36.8 (06-06 @ 21:08)  T(F): 98.3, Max: 98.3 (06-07 @ 09:19)  HR: 88 (86 - 101)  BP: 132/86 (123/98 - 153/110)  BP(mean): --  RR: 19 (16 - 19)  SpO2: 98% (96% - 99%)    PHYSICAL EXAM:    Constitutional: NAD, well-developed  Neck: No LAD, supple  Respiratory: CTA and P  Cardiovascular: S1 and S2, RRR, no M  Gastrointestinal: BS+, soft, NT/ND, neg HSM,  Extremities: No peripheral edema, neg clubing, cyanosis  Vascular: 2+ peripheral pulses  Neurological: A/O x 3, no focal deficits  Psychiatric: Normal mood, normal affect  Skin: No rashes        LABS:                        13.4   5.7   )-----------( 76       ( 07 Jun 2017 06:12 )             39.3     06-06    144  |  104  |  3<L>  ----------------------------<  110<H>  3.8   |  25  |  0.85    Ca    9.8      06 Jun 2017 05:39  Phos  4.2     06-06  Mg     2.0     06-06          LIVER FUNCTIONS - ( 05 Jun 2017 09:22 )  Alb: 4.1 g/dL / Pro: 6.7 g/dL / ALK PHOS: 76 U/L / ALT: 22 U/L RC / AST: 29 U/L / GGT: x             RADIOLOGY & ADDITIONAL TESTS:
HPI:  This is a 41y year old Female with pmhx asthma, ITP, anxiety presenting with multiple complaints since March. She reports 'all my systems are shutting down.' Symptoms began in March initially with gastrointestinal changes, loose stools, abdominal bloating and epigastric discomfort. She was seen by GI at the time but did not pursue endoscopies. She then reports she began to experience palpitations and elevated blood pressures into the 190s and her HR was spike to 150s with activity in addition to left sided chest pain. She has reportedly seen several cardiologies, a holter demonstrated intermittent PVCs. She reports a stress test with Dr. Lopez was abnormal. She has been seen by EP. She was scheduled for a nuclear stress test this week. Labs for pheochromcytoma are pending. She was also seen by ENT for vertiginous dizziness, Dr. Kee and treated with prednisone for labyrinthitis recently.     Pt asked to see neurology to evaluate for an autonomic nervous system disorder. She complains of intermittent paresthesias in her hands and feet and sometimes posterior neck. She denies any focal weakness. There are no vision complaints. She does endorse dry mouth but not dry eyes and sjogren's serologies have been negative. She has lost 8 lbs in the past month. She denies any bladder dysfunction. There is no hyperhydrosis. She denies any rashes or tick bites. There is no family history of autoimmune disease. She has been under recent stress, losing her job, moving back in with her parents and the loss of her pet cat. She has been on xanax for anxiety.    PAST MEDICAL & SURGICAL HISTORY:  History of ITP  Labyrinthitis  Asthma  Headache  HTN (hypertension)  Anxiety  IBS (irritable bowel syndrome)    Social hx: No tobacco/Etoh.  FAMILY HISTORY:  Family history of hypertension (Father, Grandparent)  Family history of atrial fibrillation (Father)  Family history of prostate cancer in father (Father)    ROS: As per HPI.    Medications:  montelukast 10milliGRAM(s) Oral daily  diltiazem   CD 120milliGRAM(s) Oral daily  fluticasone propionate 50 MICROgram(s)/spray Nasal Spray 1Spray(s) Both Nostrils two times a day  lactated ringers. 1000milliLiter(s) IV Continuous <Continuous>  LORazepam     Tablet 1milliGRAM(s) Oral two times a day  LORazepam     Tablet 1milliGRAM(s) Oral every 6 hours PRN  simethicone 80milliGRAM(s) Chew three times a day PRN  ondansetron Injectable 4milliGRAM(s) IV Push every 8 hours PRN  diphenhydrAMINE   Capsule 25milliGRAM(s) Oral every 6 hours PRN  polyethylene glycol 3350 17Gram(s) Oral daily PRN  senna 2Tablet(s) Oral at bedtime  famotidine    Tablet 20milliGRAM(s) Oral two times a day      Labs:  CBC Full  -  ( 08 Jun 2017 06:49 )  WBC Count : 4.6 K/uL  Hemoglobin : 12.1 g/dL  Hematocrit : 35.7 %  Platelet Count - Automated : 71 K/uL  Mean Cell Volume : 90.3 fl  Mean Cell Hemoglobin : 30.7 pg  Mean Cell Hemoglobin Concentration : 34.0 gm/dL  Auto Neutrophil # : x  Auto Lymphocyte # : x  Auto Monocyte # : x  Auto Eosinophil # : x  Auto Basophil # : x  Auto Neutrophil % : x  Auto Lymphocyte % : x  Auto Monocyte % : x  Auto Eosinophil % : x  Auto Basophil % : x    06-08    144  |  106  |  4<L>  ----------------------------<  103<H>  3.7   |  24  |  0.89    Ca    8.8      08 Jun 2017 06:48      CAPILLARY BLOOD GLUCOSE    Vitals:  Vital Signs Last 24 Hrs  T(C): 36.8, Max: 36.8 (06-07 @ 22:08)  T(F): 98.3, Max: 98.3 (06-08 @ 13:37)  HR: 98 (72 - 98)  BP: 143/97 (126/81 - 143/97)  BP(mean): --  RR: 18 (16 - 19)  SpO2: 100% (97% - 100%)    EXAM:  CT BRAIN                                  PROCEDURE DATE:  05/23/2017        INTERPRETATION:  No prior studies present for comparison    Clinical information: Dizziness        Axial images obtained, coronal and sagittal images computer-generated.    Images demonstrate a normal appearance of the ventricles basal cisterns   and subarachnoid sulci. There is no sign of mass-effect midline shift   epidural or subdural collection. No unusual calcifications are noted.   There is no sign of acute or recent hemorrhage.    The calvarium appears intact. Mucosal thickening, sphenoid sinuses,   ethmoid sinuses, no fluid levels evident in the visualized paranasal   sinuses. Hypoplastic frontal sinuses.    IMPRESSION: No acute intracranial pathology.    CHELSEA KEITA M.D.,ATTENDING RADIOLOGIST  This document has been electronically signed. May 23 2017 12:56PM     Renal Sono: IMPRESSION:     No evidence of a significant renal artery stenosis.    EXAM:  MRI PELVIS WOW CONT      EXAM:  MRI ABDOMEN WOW CON        PROCEDURE DATE:  03/27/2017           INTERPRETATION:  CLINICAL INFORMATION: Abdominal pain, diarrhea,   distention. Concern for inflammatory bowel disease.    COMPARISON: None available.    PROCEDURE:   MR enterography was performed utilizing the following pulse sequences: T2   axial and coronal haste, axial true fist, axial and coronal vibe pre and   post IV contrast administration. 5.5 cc of Gadavist was administered   intravenously. 2 cc were discarded. The lumen was given orally. Dynamic   and delayed postcontrast imaging was performed. Subtraction images are   reviewed.    FINDINGS:    LIVER: Within normal limits.  BILE DUCTS: Normal caliber.  GALLBLADDER: Within normallimits.  SPLEEN: Within normal limits.  PANCREAS: Within normal limits.  ADRENALS: Within normal limits.  KIDNEYS/URETERS: Within normal limits.  Bowel: Small and large bowel are moderately well distended. There is no   bowel obstruction. No mural thickening, distended bowel loop, mass   lesion, or luminal stricture is evident. There is no mesenteric   hyperemia. No fistula or abscess is seen. The appendix appears normal.  BLADDER:Within normal limits.  REPRODUCTIVE ORGANS:Uterus and adnexa appear within normal limits.  LYMPH NODES: No lymphadenopathy.  ASCITES: None.  VESSELS: Within normal limits.    IMPRESSION: Normal MR enterography. No evidence of inflammatory bowel   disease.    NEUROLOGICAL EXAM:    Mental status: Awake, alert, and in no apparent distress. Oriented to person, place and time. Language function is normal. Recent memory, digit span and concentration were normal.     Cranial Nerves: Pupils were equal, round, reactive to light. Extraocular movements were intact. Visual field were full. Fundoscopic exam was deferred. Facial sensation was intact to light touch. There was no facial asymmetry. The palate was upgoing symmetrically and tongue was midline. Hearing acuity was intact to finger rub AU. Shoulder shrug was full bilaterally    Motor exam: Bulk and tone were normal. Strength was 5/5 in all four extremities. Fine finger movements were symmetric and normal. There was no pronator drift    Reflexes: 2 in the bilateral upper extremities. 2+ in the bilateral lower extremities. Toes were downgoing bilaterally.     Sensation: Intact to light touch, temperature.     Coordination: Finger-nose-finger without dysmetria.     Gait: Narrow base and steady.
Hematology Consult Note    HPI:  Patient is a 42 yo F w/PMH of asthma, anxiety, ITP dx 20 years ago (stable, not on treatment currently) when she had Mononucleosis infection.   Currently she presented with elevated high BP,190s/120s at times, palpitations and non-exertional left-sided chest pain. Patient was seen at the [facility] ED yesterday with similar complaints, found to have positive UA (moderate LE, trace blood, few bacteria), given Macrobid and discharged home. EKG at that time NSR at 85 bpm. Patient then went home, noted elevated BP despite having taken her home dose of Cardizem 120, called the on-call doctor, who instructed her take an additional dose. At that time, patient noted her BP did not decrease, so she presented to the ED again. Of note, patient reports her symptoms began around March of this year at which time she began experiencing abdominal bloating, discomfort and diarrhea. She was seen by GI at that time. She did not undergo upper or lower endoscopy as part of her work up, which she reports is secondary to her heart problems/high blood pressure. Patient reports that shortly after the GI symptoms began, she started having intermittent palpitations, some episodes lasting a second, some lasting up to 3 minutes, also associated with episodes of HTN. For this, she saw multiple cardiologists. She was seen by Dr. Arango who placed her on a holter monitor for 1 week. She reports the holter showed only intermittent PVCs. She then underwent a stress test at [facility] with Dr. Lopez and reports that it was "abnormal" but does not know in what way. For this, she was scheduled for a nuclear stress test this week. Of note, she was in the process of a work up for pheochromocytoma by her PCP. Also recently diangosed with labyrinthitis which has caused her vertigo, somewhat relieved by Xanax.    Patient reports recent significant life stressors within the past year, including loss of her job which required her to move back in with her parents and loss of her pet cat of 16 years. Given concern for anxiety as a possible etiology for her symptoms, she was started on Xanax 0.25 PRN.    She denies smoking, drinks only socially and denies recreational drug use.    Patient reports significant fatigue for the past month as well as waxing and waning appetite since the onset of her symptoms. Denies fevers, chills, nausea or vomiting currently. Reports frequent soft stools, but denies watery diarrhea, melena or BRBPR. Reports 8 pound unintentional weight loss in past month. Denies dysuria, reports increased frequency in the setting of drinking a lot of fluid.    In the ED, VS: T 97.9    /101  RR 16  SpO2 98% on room air  Labs: CBC showed stable thrombocytopenia of 62, otherwise wnl. CMP wnl. Aden negative. Serum HCG negative. UA from yesterday demonstrated few bacteria, moderate LE and trace blood. Repeat UA negative for LE, few bacteria. EKG NSR at 87 bpm, no ST or T wave changes, normal p-wave morphology. (05 Jun 2017 13:45)      Allergies    No Known Allergies    Intolerances        MEDICATIONS  (STANDING):  montelukast 10milliGRAM(s) Oral daily  diltiazem   CD 120milliGRAM(s) Oral daily  fluticasone propionate 50 MICROgram(s)/spray Nasal Spray 1Spray(s) Both Nostrils two times a day  lactated ringers. 1000milliLiter(s) IV Continuous <Continuous>  LORazepam     Tablet 1milliGRAM(s) Oral two times a day  senna 2Tablet(s) Oral at bedtime    MEDICATIONS  (PRN):  LORazepam     Tablet 1milliGRAM(s) Oral every 6 hours PRN Anxiety  simethicone 80milliGRAM(s) Chew three times a day PRN Gas  ondansetron Injectable 4milliGRAM(s) IV Push every 8 hours PRN Nausea and/or Vomiting  diphenhydrAMINE   Capsule 25milliGRAM(s) Oral every 6 hours PRN Rash and/or Itching  polyethylene glycol 3350 17Gram(s) Oral daily PRN Constipation      PAST MEDICAL & SURGICAL HISTORY:  History of ITP  Labyrinthitis  Asthma  Headache  HTN (hypertension)  Anxiety  IBS (irritable bowel syndrome)      FAMILY HISTORY:  Family history of hypertension (Father, Grandparent)  Family history of atrial fibrillation (Father)  Family history of prostate cancer in father (Father)      SOCIAL HISTORY: No EtOH, no tobacco    REVIEW OF SYSTEMS:    CONSTITUTIONAL: No weakness, fevers or chills  EYES/ENT: No visual changes;  No vertigo or throat pain   NECK: No pain or stiffness  RESPIRATORY: No cough, wheezing, hemoptysis; No shortness of breath  CARDIOVASCULAR: No chest pain or palpitations  GASTROINTESTINAL: No abdominal or epigastric pain. No nausea, vomiting, or hematemesis; No diarrhea or constipation. No melena or hematochezia.  GENITOURINARY: No dysuria, frequency or hematuria  NEUROLOGICAL: No numbness or weakness  SKIN: No itching, burning, rashes, or lesions   All other review of systems is negative unless indicated above.        T(F): 98.2, Max: 98.6 (06-07 @ 14:13)  HR: 95  BP: 134/90  RR: 18  SpO2: 100%  Wt(kg): --    GENERAL: NAD, well-developed  HEAD:  Atraumatic, Normocephalic  EYES: EOMI, PERRLA, conjunctiva and sclera clear  NECK: Supple, No JVD  CHEST/LUNG: Clear to auscultation bilaterally; No wheeze  HEART: Regular rate and rhythm; No murmurs, rubs, or gallops  ABDOMEN: Soft, Nontender, Nondistended; Bowel sounds present  EXTREMITIES:  2+ Peripheral Pulses, No clubbing, cyanosis, or edema  NEUROLOGY: non-focal  SKIN: No rashes or lesions                          12.1   4.6   )-----------( 71       ( 08 Jun 2017 06:49 )             35.7       06-08    144  |  106  |  4<L>  ---------------------------.-<  103<H>  3.7   |  24  |  0.89    Ca    8.8      08 Jun 2017 06:48    Chest X-ray:   Impression:    The heart is normal in size. The lungs are clear. Mild right thoracic   scoliosis.    US of renal: IMPRESSION:     No evidence of a significant renal artery stenosis

## 2017-06-08 NOTE — PROGRESS NOTE ADULT - SUBJECTIVE AND OBJECTIVE BOX
INTERVAL HPI/OVERNIGHT EVENTS:  no GI events  +BM - formed green/brown stool  no nausea or dyspepsia, but requesting to resume her Pepcid Rx (has not taken for 3 days)    MEDICATIONS  (STANDING):  montelukast 10milliGRAM(s) Oral daily  diltiazem   CD 120milliGRAM(s) Oral daily  fluticasone propionate 50 MICROgram(s)/spray Nasal Spray 1Spray(s) Both Nostrils two times a day  lactated ringers. 1000milliLiter(s) IV Continuous <Continuous>  LORazepam     Tablet 1milliGRAM(s) Oral two times a day  senna 2Tablet(s) Oral at bedtime    MEDICATIONS  (PRN):  LORazepam     Tablet 1milliGRAM(s) Oral every 6 hours PRN Anxiety  simethicone 80milliGRAM(s) Chew three times a day PRN Gas  ondansetron Injectable 4milliGRAM(s) IV Push every 8 hours PRN Nausea and/or Vomiting  diphenhydrAMINE   Capsule 25milliGRAM(s) Oral every 6 hours PRN Rash and/or Itching  polyethylene glycol 3350 17Gram(s) Oral daily PRN Constipation      Allergies    No Known Allergies    Review of Systems:    General:  No wt loss, fevers, chills, night sweats, fatigue   ENT:  No sore throat, pain, runny nose, dysphagia  CV:  No chest pain  Resp:  No dyspnea, cough, tachypnea, wheezing  Neuro:  No focal weakness, tingling, memory problems  Heme:  No petechiae, ecchymosis, easy bruisability        Vital Signs Last 24 Hrs  T(C): 36.8, Max: 37 (06-07 @ 14:13)  T(F): 98.3, Max: 98.6 (06-07 @ 14:13)  HR: 98 (72 - 98)  BP: 143/97 (123/90 - 143/97)  BP(mean): --  RR: 18 (16 - 19)  SpO2: 100% (97% - 100%)    PHYSICAL EXAM:    Constitutional: NAD, well-developed, non-toxic  Neck: No LAD, supple  Respiratory: clear to auscultation b/l no rales, rhonchi, wheezing  Cardiovascular: S1 and S2, tachy  Gastrointestinal: +BS x4, soft, NT/ND, neg HSM  Extremities: No peripheral edema, neg clubbing, cyanosis  Vascular: 2+ peripheral pulses  Neurological: A/O x 3, no focal deficits  Psychiatric: Normal mood, normal affect  Skin: No rashes      LABS:                        12.1   4.6   )-----------( 71       ( 08 Jun 2017 06:49 )             35.7     06-08    144  |  106  |  4<L>  ----------------------------<  103<H>  3.7   |  24  |  0.89    Ca    8.8      08 Jun 2017 06:48          LIVER FUNCTIONS - ( 05 Jun 2017 09:22 )  Alb: 4.1 g/dL / Pro: 6.7 g/dL / ALK PHOS: 76 U/L / ALT: 22 U/L RC / AST: 29 U/L / GGT: x             RADIOLOGY & ADDITIONAL TESTS:

## 2017-06-08 NOTE — PROGRESS NOTE ADULT - PROBLEM SELECTOR PLAN 6
- stable thrombocytopenia, not currently on treatment - patient c/o abdominal bloating and intermittent diarrhea/decreased PO intake for several months  - seen by GI as outpatient, Dr. Gilbert, MR enterography performed at that time which was negative  - GI consulted and recommended inpatient EGD/colonoscopy yesterday which patient refused

## 2017-06-09 ENCOUNTER — APPOINTMENT (OUTPATIENT)
Dept: RHEUMATOLOGY | Facility: CLINIC | Age: 42
End: 2017-06-09

## 2017-06-09 LAB
B BURGDOR C6 AB SER-ACNC: NEGATIVE — SIGNIFICANT CHANGE UP
B BURGDOR IGG+IGM SER-ACNC: 0.03 INDEX — SIGNIFICANT CHANGE UP (ref 0.01–0.89)
METANEPH 24H UR-MRATE: SIGNIFICANT CHANGE UP
METANEPH UR-MCNC: SIGNIFICANT CHANGE UP
VIT B12 SERPL-MCNC: 344 PG/ML — SIGNIFICANT CHANGE UP (ref 243–894)

## 2017-06-09 PROCEDURE — 70551 MRI BRAIN STEM W/O DYE: CPT | Mod: 26

## 2017-06-09 PROCEDURE — 99233 SBSQ HOSP IP/OBS HIGH 50: CPT | Mod: GC

## 2017-06-09 PROCEDURE — 93010 ELECTROCARDIOGRAM REPORT: CPT

## 2017-06-09 PROCEDURE — 72141 MRI NECK SPINE W/O DYE: CPT | Mod: 26

## 2017-06-09 RX ORDER — CHLORHEXIDINE GLUCONATE 213 G/1000ML
15 SOLUTION TOPICAL
Qty: 0 | Refills: 0 | Status: DISCONTINUED | OUTPATIENT
Start: 2017-06-09 | End: 2017-06-14

## 2017-06-09 RX ORDER — DOCUSATE SODIUM 100 MG
100 CAPSULE ORAL
Qty: 0 | Refills: 0 | Status: DISCONTINUED | OUTPATIENT
Start: 2017-06-09 | End: 2017-06-11

## 2017-06-09 RX ORDER — DILTIAZEM HCL 120 MG
180 CAPSULE, EXT RELEASE 24 HR ORAL DAILY
Qty: 0 | Refills: 0 | Status: DISCONTINUED | OUTPATIENT
Start: 2017-06-09 | End: 2017-06-14

## 2017-06-09 RX ORDER — ESCITALOPRAM OXALATE 10 MG/1
5 TABLET, FILM COATED ORAL DAILY
Qty: 0 | Refills: 0 | Status: DISCONTINUED | OUTPATIENT
Start: 2017-06-10 | End: 2017-06-14

## 2017-06-09 RX ORDER — SIMETHICONE 80 MG/1
80 TABLET, CHEWABLE ORAL EVERY 6 HOURS
Qty: 0 | Refills: 0 | Status: DISCONTINUED | OUTPATIENT
Start: 2017-06-09 | End: 2017-06-14

## 2017-06-09 RX ADMIN — SIMETHICONE 80 MILLIGRAM(S): 80 TABLET, CHEWABLE ORAL at 18:05

## 2017-06-09 RX ADMIN — FAMOTIDINE 20 MILLIGRAM(S): 10 INJECTION INTRAVENOUS at 18:05

## 2017-06-09 RX ADMIN — Medication 1 MILLIGRAM(S): at 20:20

## 2017-06-09 RX ADMIN — CHLORHEXIDINE GLUCONATE 15 MILLILITER(S): 213 SOLUTION TOPICAL at 21:17

## 2017-06-09 RX ADMIN — Medication 1 SPRAY(S): at 18:05

## 2017-06-09 RX ADMIN — ONDANSETRON 4 MILLIGRAM(S): 8 TABLET, FILM COATED ORAL at 06:45

## 2017-06-09 RX ADMIN — FAMOTIDINE 20 MILLIGRAM(S): 10 INJECTION INTRAVENOUS at 06:05

## 2017-06-09 RX ADMIN — Medication 1 MILLIGRAM(S): at 13:52

## 2017-06-09 RX ADMIN — SODIUM CHLORIDE 75 MILLILITER(S): 9 INJECTION, SOLUTION INTRAVENOUS at 21:19

## 2017-06-09 RX ADMIN — Medication 100 MILLIGRAM(S): at 21:16

## 2017-06-09 RX ADMIN — SIMETHICONE 80 MILLIGRAM(S): 80 TABLET, CHEWABLE ORAL at 23:20

## 2017-06-09 RX ADMIN — MONTELUKAST 10 MILLIGRAM(S): 4 TABLET, CHEWABLE ORAL at 21:17

## 2017-06-09 RX ADMIN — SODIUM CHLORIDE 75 MILLILITER(S): 9 INJECTION, SOLUTION INTRAVENOUS at 10:36

## 2017-06-09 RX ADMIN — Medication 1 MILLIGRAM(S): at 06:05

## 2017-06-09 RX ADMIN — Medication 120 MILLIGRAM(S): at 06:05

## 2017-06-09 NOTE — PROGRESS NOTE ADULT - PROBLEM SELECTOR PLAN 4
- patient with significant anxiety over recent symptoms with recent significant life stressors within the past year  - seen by psychiatry - recommended Lexapro 5 daily, Ativan 1mg BID and PRN Ativan 1mg Q6hrs  - patient now agreeable to starting Lexapro tomorrow morning  - c/w BID Ativan + PRN - patient with significant anxiety over recent symptoms with recent significant life stressors within the past year  - seen by psychiatry -  start Lexapro 5 daily, continue Ativan 1mg BID and PRN Ativan 1mg Q6hrs  - patient now agreeable to starting Lexapro tomorrow morning  - c/w BID Ativan + PRN

## 2017-06-09 NOTE — PROGRESS NOTE ADULT - PROBLEM SELECTOR PLAN 1
- patient with left-sided chest pain, mild, non-radiating, non-TTP, not pleuritic in nature  - Aden negative x 2, EKG sinus tachycardia  - patient with history of only PVCs on holter monitor, however did not have significant episodes of tachycardia while on monitor  - will c/w telemetry monitoring  - nuclear stress test (6/6) normal study (technically difficult study due to extensive adjacent extracardiac tracer uptake)  - continued intermittent 4/10 episodes of chest "tightness"

## 2017-06-09 NOTE — PROGRESS NOTE ADULT - PROBLEM SELECTOR PLAN 3
- patient with episodes of sinus tachycardia, particularly with ambulation  - this is very concerning to the patient, encouraged PO intake, patient receiving IVF at 75cc/hr maintenance  - patient requesting further testing, however is unclear what that testing may entail  - patient again requesting to speak with EP Dr. Barahona

## 2017-06-09 NOTE — PROGRESS NOTE ADULT - PROBLEM SELECTOR PLAN 2
- patient with reported episodes of labile BPs at home as high as 190s/120s and as low as 110s/80s  - BPs here with mildly elevated SBPs to 110s-150s and DBPs to 80s-110s  - will c/w home Cardizem, Labetalol 100 BID added per cardiology  - endocrine following - unlikely an endocrinologic process, renin and aldosterone wnl, AM cortisol mildly elevated in setting of stress (will need repeat as an outpatient)  - will f/u urine metanephrines, plasma metanephrines to r/o pheochromocytoma  - f/u cardiology/EP recs  - refused Labetalol yesterday secondary to "itchy scalp" after one dose, BP well controlled this morning  - AI consult - unlikely allergic reaction to beta blocker, work up for mastocytosis  - neurology consult - f/u MRI brain/C-spine, work up for dysautonomia - patient with reported episodes of labile BPs at home as high as 190s/120s and as low as 110s/80s  - BPs here with mildly elevated SBPs to 110s-150s and DBPs to 80s-110s  - increase Cardizem to 180 MG, No Labetalol given reaction  - endocrine following - unlikely an endocrinologic process, renin and aldosterone wnl, AM cortisol mildly elevated in setting of stress (will need repeat as an outpatient)  - will f/u urine metanephrines, plasma metanephrines to r/o pheochromocytoma  - f/u cardiology/EP recs  - refused Labetalol yesterday secondary to "itchy scalp" after one dose, BP well controlled this morning  - AI consult - unlikely allergic reaction to beta blocker, work up for mastocytosis  - neurology consult - f/u MRI brain/C-spine scheduled for 3 pm today, work up for dysautonomia

## 2017-06-09 NOTE — PROGRESS NOTE ADULT - SUBJECTIVE AND OBJECTIVE BOX
SUBJECTIVE: No new neurologic events overnight.  No new neurologic complaints.     MEDICATIONS  (STANDING):  montelukast 10milliGRAM(s) Oral daily  diltiazem   CD 120milliGRAM(s) Oral daily  fluticasone propionate 50 MICROgram(s)/spray Nasal Spray 1Spray(s) Both Nostrils two times a day  lactated ringers. 1000milliLiter(s) IV Continuous <Continuous>  LORazepam     Tablet 1milliGRAM(s) Oral two times a day  famotidine    Tablet 20milliGRAM(s) Oral two times a day    MEDICATIONS  (PRN):  LORazepam     Tablet 1milliGRAM(s) Oral every 6 hours PRN Anxiety  simethicone 80milliGRAM(s) Chew three times a day PRN Gas  ondansetron Injectable 4milliGRAM(s) IV Push every 8 hours PRN Nausea and/or Vomiting  diphenhydrAMINE   Capsule 25milliGRAM(s) Oral every 6 hours PRN Rash and/or Itching  polyethylene glycol 3350 17Gram(s) Oral daily PRN Constipation    Vital Signs Last 24 Hrs  T(C): 37.2, Max: 37.2 (06-09 @ 08:00)  T(F): 98.9, Max: 98.9 (06-09 @ 08:00)  HR: 80 (80 - 98)  BP: 125/89 (114/80 - 143/97)  BP(mean): --  RR: 18 (18 - 18)  SpO2: 99% (98% - 100%)       NEUROLOGICAL EXAM:    Mental status: Awake, alert, and in no apparent distress. Oriented to person, place and time. Language function is normal. Recent memory, digit span and concentration were normal.     Cranial Nerves: Pupils were equal, round, reactive to light. Extraocular movements were intact. Visual field were full. Fundoscopic exam was deferred. Facial sensation was intact to light touch. There was no facial asymmetry. The palate was upgoing symmetrically and tongue was midline. Hearing acuity was intact to finger rub AU. Shoulder shrug was full bilaterally    Motor exam: Bulk and tone were normal. Strength was 5/5 in all four extremities. Fine finger movements were symmetric and normal. There was no pronator drift    Reflexes: 2+ in the bilateral upper extremities. 2+ in the bilateral lower extremities. Toes were downgoing bilaterally.     Sensation: Intact to light touch, temperature.     Coordination: Finger-nose-finger without dysmetria.     Gait: deferred.

## 2017-06-09 NOTE — PROGRESS NOTE ADULT - SUBJECTIVE AND OBJECTIVE BOX
INTERVAL HPI / OVERNIGHT EVENTS:    No overnight GI events  1 BM 6/8 - formed    MEDICATIONS  (STANDING):  montelukast 10milliGRAM(s) Oral daily  diltiazem   CD 120milliGRAM(s) Oral daily  fluticasone propionate 50 MICROgram(s)/spray Nasal Spray 1Spray(s) Both Nostrils two times a day  lactated ringers. 1000milliLiter(s) IV Continuous <Continuous>  LORazepam     Tablet 1milliGRAM(s) Oral two times a day  famotidine    Tablet 20milliGRAM(s) Oral two times a day    MEDICATIONS  (PRN):  LORazepam     Tablet 1milliGRAM(s) Oral every 6 hours PRN Anxiety  simethicone 80milliGRAM(s) Chew three times a day PRN Gas  ondansetron Injectable 4milliGRAM(s) IV Push every 8 hours PRN Nausea and/or Vomiting  diphenhydrAMINE   Capsule 25milliGRAM(s) Oral every 6 hours PRN Rash and/or Itching  polyethylene glycol 3350 17Gram(s) Oral daily PRN Constipation      Allergies    No Known Allergies    Intolerances      Review of Systems:    General:  No wt loss, fevers, chills, night sweats, fatigue  ENT:  No sore throat, pain, runny nose, dysphagia  CV:  No chest pain, palpitations, ALCANTAR  Resp:  No dyspnea, cough, tachypnea, wheezing  Neuro:  No focal weakness, headache, vision changes  Heme:  No petechiae, ecchymosis, easy bruisability      Vital Signs Last 24 Hrs  T(C): 37.2, Max: 37.2 (06-09 @ 08:00)  T(F): 98.9, Max: 98.9 (06-09 @ 08:00)  HR: 80 (80 - 98)  BP: 125/89 (114/80 - 143/97)  BP(mean): --  RR: 18 (18 - 18)  SpO2: 99% (98% - 100%)    PHYSICAL EXAM:    Constitutional: NAD, well-developed  resting comfortably  Neck: No JVD  Respiratory: grossly clear  Cardiovascular: S1 and S2  Gastrointestinal: +BS x4, soft, NT/ND  Extremities: No peripheral edema, neg clubbing, cyanosis  Vascular: 2+ peripheral pulses  Neurological: no focal asymmetry  Skin: No rashes, anicteric      LABS:                        12.1   4.6   )-----------( 71       ( 08 Jun 2017 06:49 )             35.7     06-08    144  |  106  |  4<L>  ----------------------------<  103<H>  3.7   |  24  |  0.89    Ca    8.8      08 Jun 2017 06:48          LIVER FUNCTIONS - ( 05 Jun 2017 09:22 )  Alb: 4.1 g/dL / Pro: 6.7 g/dL / ALK PHOS: 76 U/L / ALT: 22 U/L RC / AST: 29 U/L / GGT: x             RADIOLOGY & ADDITIONAL TESTS:

## 2017-06-09 NOTE — PROGRESS NOTE ADULT - PROBLEM SELECTOR PLAN 7
- stable thrombocytopenia, not currently on treatment  - per Heme, nothing to do at this time, will f/u if <30

## 2017-06-09 NOTE — PROGRESS NOTE ADULT - SUBJECTIVE AND OBJECTIVE BOX
Patient is a 41y old  Female who presents with a chief complaint of hypertension (07 Jun 2017 14:04)      SUBJECTIVE / OVERNIGHT EVENTS: No acute events overnight. No events on telemetry, NSR in 60s overnight. Reports nausea this morning and one episode of loose stool. Requesting senna discontinued. Requesting again to speak with cardiology regarding starting SSRI. Explained to patient that Dr. Reed has been recommending SSRI. After some convincing, she was agreeable to start Lexapro 5 tomorrow morning. Denies SOB, fevers or chills.    MEDICATIONS  (STANDING):  montelukast 10milliGRAM(s) Oral daily  diltiazem   CD 120milliGRAM(s) Oral daily  fluticasone propionate 50 MICROgram(s)/spray Nasal Spray 1Spray(s) Both Nostrils two times a day  lactated ringers. 1000milliLiter(s) IV Continuous <Continuous>  LORazepam     Tablet 1milliGRAM(s) Oral two times a day  senna 2Tablet(s) Oral at bedtime  famotidine    Tablet 20milliGRAM(s) Oral two times a day    MEDICATIONS  (PRN):  LORazepam     Tablet 1milliGRAM(s) Oral every 6 hours PRN Anxiety  simethicone 80milliGRAM(s) Chew three times a day PRN Gas  ondansetron Injectable 4milliGRAM(s) IV Push every 8 hours PRN Nausea and/or Vomiting  diphenhydrAMINE   Capsule 25milliGRAM(s) Oral every 6 hours PRN Rash and/or Itching  polyethylene glycol 3350 17Gram(s) Oral daily PRN Constipation      Vital Signs Last 24 Hrs  T(C): 36.8, Max: 36.9 (06-08 @ 16:48)  HR: 90 (81 - 98)  BP: 131/87 (114/80 - 143/97)  RR: 18 (18 - 18)  SpO2: 99% (98% - 100%)  Wt(kg): --  CAPILLARY BLOOD GLUCOSE    I&O's Summary    I & Os for current day (as of 09 Jun 2017 07:57)  =============================================  IN: 1380 ml / OUT: 800 ml / NET: 580 ml      PHYSICAL EXAM:  GENERAL: NAD, well-developed  HEAD:  Atraumatic, Normocephalic  EYES: EOMI, PERRLA, conjunctiva and sclera clear  NECK: Supple, No JVD  CHEST/LUNG: Clear to auscultation bilaterally; No wheeze  HEART: Regular rate and rhythm; No murmurs, rubs, or gallops  ABDOMEN: Soft, Nontender, Nondistended; Bowel sounds present  EXTREMITIES: No clubbing, cyanosis, or edema  PSYCH: AAOx3  NEUROLOGY: non-focal  SKIN: No rashes or lesions    LABS:                        12.1   4.6   )-----------( 71       ( 08 Jun 2017 06:49 )             35.7     06-08    144  |  106  |  4<L>  ----------------------------<  103<H>  3.7   |  24  |  0.89    Ca    8.8      08 Jun 2017 06:48                RADIOLOGY & ADDITIONAL TESTS:    Imaging Personally Reviewed:    Consultant(s) Notes Reviewed: cardiology, rheumatology, allery & immunology, GI, neurology    Care Discussed with Consultants/Other Providers:

## 2017-06-09 NOTE — PROGRESS NOTE ADULT - ASSESSMENT
A/P: 41 year old woman with hx of asthma, ITP, anxiety presenting with 3 months of multiple complaints including gastrointestinal disarray, loose stools, bloating, discomfort, palpitations, hypertension and tachycardia, recent vertiginous dizziness treated for labyrinthitis as well as paresthesias in her limbs. Pt is concerned about a possible dysautonomia. Examination is normal, nonlateralizing.    Uncertain of the etiology of her multiple complaints, dysautonomia is in the differential but requires dedicated autonomic testing, not available as inpatient or even locally.    Limb paresthesias   Will obtain an MRI brain and cervical spine for completeness to evaluate for demyelinating disease but clinically the suspicion is low.    Autonomic dysfunction  - Referred to [place] for autonomic testing, gave several specialists at various institutions, [facility], [place], [place].  - Check B12, paraneoplastic panel for ganglionic AchR ab, Lyme Ab  Cardiology following- consider tilt table testing  Check orthostatics  Secondary causes of HTN evaluation underway- no DYLAN on doppler, pheo labs pending.  GI following, check celiac Ab  Consider outpatient EMG  Continue management of anxiety.    Plan reviewed with patient and mother at bedside.

## 2017-06-09 NOTE — PROGRESS NOTE ADULT - PROBLEM SELECTOR PLAN 6
- patient c/o abdominal bloating and intermittent diarrhea/decreased PO intake for several months  - seen by GI as outpatient, Dr. [place], MR enterography performed at that time which was negative  - GI consulted and recommended inpatient EGD/colonoscopy yesterday which patient refused - patient c/o abdominal bloating and intermittent diarrhea/decreased PO intake for several months  - seen by GI as outpatient, Dr. [place], MR enterography performed at that time which was negative  - GI consulted and recommended inpatient EGD/colonoscopy which patient refused

## 2017-06-09 NOTE — PROGRESS NOTE ADULT - PROBLEM SELECTOR PLAN 5
- patient with MARY BETH done outpatient of 1:320  - was told concern for possible autoimmune dysautonomia as source of her symptoms  - f/u rheumatology recs  - patient requesting neurology consult

## 2017-06-09 NOTE — PROGRESS NOTE ADULT - ASSESSMENT
42 yo female with multiple medical problems noted to have labile BP issues that started after having abdominal pain, diarrhea and weight loss.  Noted to have borderline elevated IBD serologies.  Although MRE was negative, pt may have microscopic colitis, PUD/gastritis/ celiac disease.      She is currently refusing GI w/u but agreeable to pursuing as outpatient once BP more controlled. There is no further diarrhea.    Continue Pepcid for dyspepsia and prn laxatives & simethicone    Further care per primary team.    Please call over weekend prn with GI concerns   Jermaine Beebe PA-C    [facility]  [phone] 40 yo female with multiple medical problems noted to have labile BP issues that started after having abdominal pain, diarrhea and weight loss.  Noted to have borderline elevated IBD serologies.  Although MRE was negative, pt may have microscopic colitis, PUD/gastritis/ celiac disease.      She is currently refusing GI w/u but agreeable to pursuing as outpatient once BP more controlled. There is no further diarrhea.    Continue Pepcid for dyspepsia and prn laxatives & simethicone    Further care per primary team.    Please call over weekend prn with GI concerns     Jermaine Beebe PA-C    [facility]  [phone]

## 2017-06-10 LAB — IGE SERPL-ACNC: 70 IU/ML — SIGNIFICANT CHANGE UP (ref 0–100)

## 2017-06-10 PROCEDURE — 99233 SBSQ HOSP IP/OBS HIGH 50: CPT

## 2017-06-10 RX ADMIN — Medication 180 MILLIGRAM(S): at 05:03

## 2017-06-10 RX ADMIN — FAMOTIDINE 20 MILLIGRAM(S): 10 INJECTION INTRAVENOUS at 17:11

## 2017-06-10 RX ADMIN — SODIUM CHLORIDE 75 MILLILITER(S): 9 INJECTION, SOLUTION INTRAVENOUS at 20:08

## 2017-06-10 RX ADMIN — SIMETHICONE 80 MILLIGRAM(S): 80 TABLET, CHEWABLE ORAL at 17:11

## 2017-06-10 RX ADMIN — Medication 100 MILLIGRAM(S): at 11:00

## 2017-06-10 RX ADMIN — Medication 1 MILLIGRAM(S): at 17:09

## 2017-06-10 RX ADMIN — Medication 100 MILLIGRAM(S): at 17:10

## 2017-06-10 RX ADMIN — SIMETHICONE 80 MILLIGRAM(S): 80 TABLET, CHEWABLE ORAL at 05:03

## 2017-06-10 RX ADMIN — SIMETHICONE 80 MILLIGRAM(S): 80 TABLET, CHEWABLE ORAL at 11:00

## 2017-06-10 RX ADMIN — Medication 1 MILLIGRAM(S): at 11:00

## 2017-06-10 RX ADMIN — CHLORHEXIDINE GLUCONATE 15 MILLILITER(S): 213 SOLUTION TOPICAL at 08:32

## 2017-06-10 RX ADMIN — ESCITALOPRAM OXALATE 5 MILLIGRAM(S): 10 TABLET, FILM COATED ORAL at 11:00

## 2017-06-10 RX ADMIN — SODIUM CHLORIDE 75 MILLILITER(S): 9 INJECTION, SOLUTION INTRAVENOUS at 01:36

## 2017-06-10 RX ADMIN — Medication 1 SPRAY(S): at 17:11

## 2017-06-10 RX ADMIN — FAMOTIDINE 20 MILLIGRAM(S): 10 INJECTION INTRAVENOUS at 05:03

## 2017-06-10 RX ADMIN — MONTELUKAST 10 MILLIGRAM(S): 4 TABLET, CHEWABLE ORAL at 22:17

## 2017-06-10 RX ADMIN — SIMETHICONE 80 MILLIGRAM(S): 80 TABLET, CHEWABLE ORAL at 22:17

## 2017-06-10 RX ADMIN — Medication 1 MILLIGRAM(S): at 05:03

## 2017-06-10 RX ADMIN — SODIUM CHLORIDE 75 MILLILITER(S): 9 INJECTION, SOLUTION INTRAVENOUS at 08:32

## 2017-06-10 RX ADMIN — ONDANSETRON 4 MILLIGRAM(S): 8 TABLET, FILM COATED ORAL at 11:14

## 2017-06-10 NOTE — PROGRESS NOTE ADULT - PROBLEM SELECTOR PLAN 3
monitor on tele  [facility]

## 2017-06-10 NOTE — PROGRESS NOTE ADULT - SUBJECTIVE AND OBJECTIVE BOX
Patient is a 41y old  Female who presents with a chief complaint of hypertension       INTERVAL HPI/OVERNIGHT EVENTS: Pt w/o new complains over night, states hr goes up when walks, no events on tele overnight sinus 70-80's    MEDICATIONS  (STANDING):  montelukast 10milliGRAM(s) Oral daily  fluticasone propionate 50 MICROgram(s)/spray Nasal Spray 1Spray(s) Both Nostrils two times a day  lactated ringers. 1000milliLiter(s) IV Continuous <Continuous>  LORazepam     Tablet 1milliGRAM(s) Oral two times a day  famotidine    Tablet 20milliGRAM(s) Oral two times a day  escitalopram 5milliGRAM(s) Oral daily  diltiazem   CD 180milliGRAM(s) Oral daily  chlorhexidine 0.12% Liquid 15milliLiter(s) Swish and Spit two times a day  simethicone 80milliGRAM(s) Chew every 6 hours    MEDICATIONS  (PRN):  LORazepam     Tablet 1milliGRAM(s) Oral every 6 hours PRN Anxiety  ondansetron Injectable 4milliGRAM(s) IV Push every 8 hours PRN Nausea and/or Vomiting  diphenhydrAMINE   Capsule 25milliGRAM(s) Oral every 6 hours PRN Rash and/or Itching  polyethylene glycol 3350 17Gram(s) Oral daily PRN Constipation  docusate sodium 100milliGRAM(s) Oral two times a day PRN Constipation      Allergies    No Known Allergies    Intolerances        ROS - no change    Vital Signs Last 24 Hrs  T(C): 36.7, Max: 36.8 (06-09 @ 11:45)  T(F): 98, Max: 98.3 (06-09 @ 20:42)  HR: 101 (70 - 101)  BP: 137/90 (128/93 - 137/90)  BP(mean): --  RR: 18 (18 - 19)  SpO2: 98% (95% - 98%)    PHYSICAL EXAM:  GENERAL: NAD, well-groomed, well-developed  HEAD:  Atraumatic, Normocephalic  EYES: EOMI, PERRLA, conjunctiva and sclera clear  NECK: Supple, No JVD, Normal thyroid  NERVOUS SYSTEM:  Alert & Oriented X3, Good concentration; Motor Strength 5/5 B/L upper and lower extremities;non-focal  CHEST/LUNG:  No rales, rhonchi, wheezing, or rubs good air movement  HEART: Regular rate and rhythm; rubs, or gallops, +S1,S2  ABDOMEN: Soft, Nontender, Nondistended; Bowel sounds present  EXTREMITIES:  2+ Peripheral Pulses, No clubbing, cyanosis, or edema  LYMPH: No lymphadenopathy noted  SKIN: No rashes or lesions    LABS:              CAPILLARY BLOOD GLUCOSE      RADIOLOGY & ADDITIONAL TESTS:    Imaging Personally Reviewed:  [X ] YES  [ ] NO    Consultant(s) Notes Reviewed:  [ X] YES  [ ] NO    Care Discussed with Consultants/Other Providers [ ] YES  [ ] NO

## 2017-06-10 NOTE — PROGRESS NOTE ADULT - ASSESSMENT
A/P: 41 year old woman with hx of asthma, ITP, anxiety presenting with 3 months of multiple complaints including gastrointestinal disarray, loose stools, bloating, discomfort, palpitations, hypertension and tachycardia, recent vertiginous dizziness treated for labyrinthitis as well as paresthesias in her limbs. Pt is concerned about a possible dysautonomia. Examination is normal, nonlateralizing.    Uncertain of the etiology of her multiple complaints, dysautonomia is in the differential but requires dedicated autonomic testing, not available as inpatient or even locally.    Limb paresthesias   MRI brain and cervical negative    Autonomic dysfunction  - Referred to [place] for autonomic testing, gave several specialists at various institutions, [facility], [place], [place].  - Check B12, paraneoplastic panel for ganglionic AchR ab, Lyme Ab  Cardiology following- consider tilt table testing  Check orthostatics  Secondary causes of HTN evaluation underway- no DYLAN on doppler, pheo labs pending.  GI following, check celiac Ab  Consider outpatient EMG  Continue management of anxiety.    Otherwise no further inpt neuro w/u  No neuro objection to d/c home    Plan reviewed with patient .

## 2017-06-10 NOTE — PROGRESS NOTE ADULT - SUBJECTIVE AND OBJECTIVE BOX
SUBJECTIVE: No new neurologic events overnight.  No new neurologic complaints. States BP goes high quickly when up    MEDICATIONS  (STANDING):  montelukast 10milliGRAM(s) Oral daily  diltiazem   CD 120milliGRAM(s) Oral daily  fluticasone propionate 50 MICROgram(s)/spray Nasal Spray 1Spray(s) Both Nostrils two times a day  lactated ringers. 1000milliLiter(s) IV Continuous <Continuous>  LORazepam     Tablet 1milliGRAM(s) Oral two times a day  famotidine    Tablet 20milliGRAM(s) Oral two times a day    MEDICATIONS  (PRN):  LORazepam     Tablet 1milliGRAM(s) Oral every 6 hours PRN Anxiety  simethicone 80milliGRAM(s) Chew three times a day PRN Gas  ondansetron Injectable 4milliGRAM(s) IV Push every 8 hours PRN Nausea and/or Vomiting  diphenhydrAMINE   Capsule 25milliGRAM(s) Oral every 6 hours PRN Rash and/or Itching  polyethylene glycol 3350 17Gram(s) Oral daily PRN Constipation    Vital Signs Last 24 Hrs  T(C): 37.2, Max: 37.2 (06-09 @ 08:00)  T(F): 98.9, Max: 98.9 (06-09 @ 08:00)  HR: 80 (80 - 98)  BP: 125/89 (114/80 - 143/97)  BP(mean): --  RR: 18 (18 - 18)  SpO2: 99% (98% - 100%)       NEUROLOGICAL EXAM:    Mental status: Awake, alert, and in no apparent distress. Oriented to person, place and time. Language function is normal. Recent memory, digit span and concentration were normal.     Cranial Nerves: Pupils were equal, round, reactive to light. Extraocular movements were intact. Visual field were full. Fundoscopic exam was deferred. Facial sensation was intact to light touch. There was no facial asymmetry. The palate was upgoing symmetrically and tongue was midline. Hearing acuity was intact to finger rub AU. Shoulder shrug was full bilaterally    Motor exam: Bulk and tone were normal. Strength was 5/5 in all four extremities. Fine finger movements were symmetric and normal. There was no pronator drift    Reflexes: 2+ in the bilateral upper extremities. 2+ in the bilateral lower extremities. Toes were downgoing bilaterally.     Sensation: Intact to light touch, temperature.     Coordination: Finger-nose-finger without dysmetria.     Gait: deferred.      Imaging:      EXAM:  BRAIN MRI                            PROCEDURE DATE:  06/09/2017            INTERPRETATION:  INDICATIONS:  limb paresthesias    TECHNIQUE:  Multiplanar imaging was performed using T1 weighted, T2   weighted and FLAIR sequences.  Diffusion weighted and SWI images were   obtained.        COMPARISON EXAMINATION:  Brain CT 5/23/2017    FINDINGS:    Ventricles and sulci:  Normal.  Intra-axial:  No mass, abnormal signal or evidence of acute cerebral   ischemia.  Extra-axial: Slightly conspicuous perivascular space in the subcutaneous   insular cortical region that follows CSF on all imaging sequences. .  Visualized sinuses: Ethmoid mucosal thickening  Visualized mastoids:  Normal.  Calvarium:  Normal.  Carotid flow voids:  Present.    Miscellaneous:  None.    IMPRESSION:  Normal non-contrast MRI of the brain.      EXAM:  C-SPINE MRI W O CON                            PROCEDURE DATE:  06/09/2017            INTERPRETATION:  INDICATION:  Limb numbness    TECHNIQUE:  Sagittal T1 weighted and T2 weighted images of the cervical   spine as well as axial T1 weightedand T2 weighted images were obtained.      COMPARISON EXAMINATION: None.    FINDINGS:  Vertebral bodies and Discs: Normal.  Alignment:  No subluxations.  C2-C3 level:  Normal.  C3-C4 level:  Normal.  C4-C5 level: Normal .  C5-C6 level: Broad-based bulge with mass impression on the ventral thecal   sac right slightly greater than left neural foraminal narrowing on the   basis of uncovertebral joint degenerative change at this level  C6-C7 level:  Normal.  C7-T1 level:  Normal.  Spinal cord:  Normal.  Spinal canal:  No other intradural or extradural defects are seen.  Miscellaneous:  None.      IMPRESSION: Mild degenerative changes C5-6. The remainder of the study is   unremarkable                    SARAH DAVIS M.D., ATTENDING RADIOLOGIST  This document has been electronically signed. Jun 9 2017  7:03PM                                  SARAH DAVIS M.D., ATTENDING RADIOLOGIST  This document has been electronically signed. Jun 9 2017  6:58PM

## 2017-06-11 DIAGNOSIS — D64.9 ANEMIA, UNSPECIFIED: ICD-10-CM

## 2017-06-11 LAB
ANION GAP SERPL CALC-SCNC: 16 MMOL/L — SIGNIFICANT CHANGE UP (ref 5–17)
BUN SERPL-MCNC: 4 MG/DL — LOW (ref 7–23)
CALCIUM SERPL-MCNC: 8.8 MG/DL — SIGNIFICANT CHANGE UP (ref 8.4–10.5)
CHLORIDE SERPL-SCNC: 104 MMOL/L — SIGNIFICANT CHANGE UP (ref 96–108)
CO2 SERPL-SCNC: 18 MMOL/L — LOW (ref 22–31)
CREAT SERPL-MCNC: 0.73 MG/DL — SIGNIFICANT CHANGE UP (ref 0.5–1.3)
GLUCOSE SERPL-MCNC: 63 MG/DL — LOW (ref 70–99)
HCT VFR BLD CALC: 27.6 % — LOW (ref 34.5–45)
HGB BLD-MCNC: 9.5 G/DL — LOW (ref 11.5–15.5)
MCHC RBC-ENTMCNC: 29.7 PG — SIGNIFICANT CHANGE UP (ref 27–34)
MCHC RBC-ENTMCNC: 34.4 GM/DL — SIGNIFICANT CHANGE UP (ref 32–36)
MCV RBC AUTO: 86.3 FL — SIGNIFICANT CHANGE UP (ref 80–100)
PLATELET # BLD AUTO: 82 K/UL — LOW (ref 150–400)
POTASSIUM SERPL-MCNC: 4 MMOL/L — SIGNIFICANT CHANGE UP (ref 3.5–5.3)
POTASSIUM SERPL-SCNC: 4 MMOL/L — SIGNIFICANT CHANGE UP (ref 3.5–5.3)
RBC # BLD: 3.2 M/UL — LOW (ref 3.8–5.2)
RBC # FLD: 13 % — SIGNIFICANT CHANGE UP (ref 10.3–14.5)
SODIUM SERPL-SCNC: 138 MMOL/L — SIGNIFICANT CHANGE UP (ref 135–145)
WBC # BLD: 4.18 K/UL — SIGNIFICANT CHANGE UP (ref 3.8–10.5)
WBC # FLD AUTO: 4.18 K/UL — SIGNIFICANT CHANGE UP (ref 3.8–10.5)

## 2017-06-11 PROCEDURE — 99221 1ST HOSP IP/OBS SF/LOW 40: CPT

## 2017-06-11 PROCEDURE — 99233 SBSQ HOSP IP/OBS HIGH 50: CPT | Mod: GC

## 2017-06-11 RX ORDER — ONDANSETRON 8 MG/1
4 TABLET, FILM COATED ORAL ONCE
Qty: 0 | Refills: 0 | Status: COMPLETED | OUTPATIENT
Start: 2017-06-11 | End: 2017-06-11

## 2017-06-11 RX ADMIN — SIMETHICONE 80 MILLIGRAM(S): 80 TABLET, CHEWABLE ORAL at 11:51

## 2017-06-11 RX ADMIN — SODIUM CHLORIDE 75 MILLILITER(S): 9 INJECTION, SOLUTION INTRAVENOUS at 16:50

## 2017-06-11 RX ADMIN — SIMETHICONE 80 MILLIGRAM(S): 80 TABLET, CHEWABLE ORAL at 06:38

## 2017-06-11 RX ADMIN — Medication 1 MILLIGRAM(S): at 20:09

## 2017-06-11 RX ADMIN — Medication 1 SPRAY(S): at 08:39

## 2017-06-11 RX ADMIN — MONTELUKAST 10 MILLIGRAM(S): 4 TABLET, CHEWABLE ORAL at 22:23

## 2017-06-11 RX ADMIN — SIMETHICONE 80 MILLIGRAM(S): 80 TABLET, CHEWABLE ORAL at 17:04

## 2017-06-11 RX ADMIN — ONDANSETRON 4 MILLIGRAM(S): 8 TABLET, FILM COATED ORAL at 06:54

## 2017-06-11 RX ADMIN — FAMOTIDINE 20 MILLIGRAM(S): 10 INJECTION INTRAVENOUS at 17:04

## 2017-06-11 RX ADMIN — Medication 1 SPRAY(S): at 17:04

## 2017-06-11 RX ADMIN — Medication 100 MILLIGRAM(S): at 06:38

## 2017-06-11 RX ADMIN — SIMETHICONE 80 MILLIGRAM(S): 80 TABLET, CHEWABLE ORAL at 22:24

## 2017-06-11 RX ADMIN — Medication 1 MILLIGRAM(S): at 06:38

## 2017-06-11 RX ADMIN — SODIUM CHLORIDE 75 MILLILITER(S): 9 INJECTION, SOLUTION INTRAVENOUS at 03:03

## 2017-06-11 RX ADMIN — SODIUM CHLORIDE 75 MILLILITER(S): 9 INJECTION, SOLUTION INTRAVENOUS at 20:09

## 2017-06-11 RX ADMIN — ONDANSETRON 4 MILLIGRAM(S): 8 TABLET, FILM COATED ORAL at 20:55

## 2017-06-11 RX ADMIN — CHLORHEXIDINE GLUCONATE 15 MILLILITER(S): 213 SOLUTION TOPICAL at 08:39

## 2017-06-11 RX ADMIN — Medication 180 MILLIGRAM(S): at 06:39

## 2017-06-11 RX ADMIN — FAMOTIDINE 20 MILLIGRAM(S): 10 INJECTION INTRAVENOUS at 06:38

## 2017-06-11 RX ADMIN — ONDANSETRON 4 MILLIGRAM(S): 8 TABLET, FILM COATED ORAL at 09:08

## 2017-06-11 RX ADMIN — ESCITALOPRAM OXALATE 5 MILLIGRAM(S): 10 TABLET, FILM COATED ORAL at 11:51

## 2017-06-11 RX ADMIN — Medication 1 MILLIGRAM(S): at 13:18

## 2017-06-11 NOTE — PROVIDER CONTACT NOTE (OTHER) - ACTION/TREATMENT ORDERED:
EKG done. Continue monitor
MD aware. Will attempt to reschedule test tomorrow and educate patient.
MD aware. Will come up to assess pt. No interventions at this time.
MD aware. Will order simethicone for gas and EKG. Will continue to monitor.
MD aware. Ativan for anxiety. EKG. Will continue to monitor.
No new orders noted at this time.  Will continue to closely monitor.
PRN orders for Colace and Miralax discontinued.  New order noted for x1 dose of 4mg IVP Zofran to be administered.  Will administer medication, provide education regarding MAR alteration, and monitor.

## 2017-06-11 NOTE — PROVIDER CONTACT NOTE (OTHER) - REASON
Concern: Depression
Pt c/o of chest pain 4/10
c/o Nausea & Diarrhea
Pt refused nuclear stress test
pt c/o chest tightness on left side only
Pt c/o itching behind head, face tingling, and right upper arm soreness
Pt c/o pressure under left breast

## 2017-06-11 NOTE — PROVIDER CONTACT NOTE (OTHER) - RECOMMENDATIONS
pt offered ativan PO  EKG
Notify NP
Simethicone  EKG
Maile Alvarez NP made aware.
Maria Eugenia Yousif NP made aware.

## 2017-06-11 NOTE — PROGRESS NOTE ADULT - PROBLEM SELECTOR PLAN 6
appreciated neuro consult, awaiting testing for dysautonomia  needs to f/u as outpt with specialists in the city

## 2017-06-11 NOTE — PROVIDER CONTACT NOTE (OTHER) - BACKGROUND
6/5 chest pain
Patient admitted for essential HTN.  PMH of ITP, Labyrinthitis, Asthma, Headache, HTN, Anxiety, IBS.
Patient admitted for essential HTN.  PMH of ITP, Labyrinthitis, asthma, headache, HTN, anxiety, IBS.
6/5 chest pain
Pt is admitted for esential hypertension, PMH of labyrinthitis, HTN, IBS, anxiety

## 2017-06-11 NOTE — PROVIDER CONTACT NOTE (OTHER) - SITUATION
Patient c/o nausea and "uncontrollable" diarrhea.
Patient observed crying, tearful, stating that she "does not know if [she] has the will to live."  Concern expressed for depression.
Pt went down for nuclear stress test. Guy PEGUERO called to report pt refused procedure and wants time to consider the test.
Pt.s c/o of chest pain 4/10 sharp. Non radiating
Pt c/o itching behind head, face tingling, and right upper arm soreness. Pt concerned it is a side effect of labatelol. Pt has no weakness,facial drooping, is axox4. neuro intact. Pt denies chestpain.
After ambulating with RN from bathroom to bed pt c/o left sided chest tightness. pt denies SOB. pt c/o dizziness after walking.
pt c/o pressure under left breast. pt states she feels the need to pass gas.

## 2017-06-11 NOTE — PROVIDER CONTACT NOTE (OTHER) - DATE AND TIME:
06-Jun-2017 12:34
06-Jun-2017 17:08
07-Jun-2017 10:30
07-Jun-2017 16:25
09-Jun-2017 08:15
10-Victorino-2017 10:00
11-Jun-2017 08:45

## 2017-06-11 NOTE — PROGRESS NOTE ADULT - SUBJECTIVE AND OBJECTIVE BOX
Patient is a 41y old  Female who presents with a chief complaint of hypertension (07 Jun 2017 14:04)      SUBJECTIVE / OVERNIGHT EVENTS: no aeon. pt feels ok, still nauseous, denies vomiting. had colace and then had very soft stools afterwards. still w/ hr spiking up w/ activity, no cp/sob    MEDICATIONS  (STANDING):  montelukast 10milliGRAM(s) Oral daily  fluticasone propionate 50 MICROgram(s)/spray Nasal Spray 1Spray(s) Both Nostrils two times a day  lactated ringers. 1000milliLiter(s) IV Continuous <Continuous>  LORazepam     Tablet 1milliGRAM(s) Oral two times a day  famotidine    Tablet 20milliGRAM(s) Oral two times a day  escitalopram 5milliGRAM(s) Oral daily  diltiazem   CD 180milliGRAM(s) Oral daily  chlorhexidine 0.12% Liquid 15milliLiter(s) Swish and Spit two times a day  simethicone 80milliGRAM(s) Chew every 6 hours    MEDICATIONS  (PRN):  LORazepam     Tablet 1milliGRAM(s) Oral every 6 hours PRN Anxiety  ondansetron Injectable 4milliGRAM(s) IV Push every 8 hours PRN Nausea and/or Vomiting  diphenhydrAMINE   Capsule 25milliGRAM(s) Oral every 6 hours PRN Rash and/or Itching      Vital Signs Last 24 Hrs  T(C): 37, Max: 37.1 (06-11 @ 08:33)  T(F): 98.6, Max: 98.7 (06-11 @ 08:33)  HR: 85 (79 - 85)  BP: 125/84 (125/84 - 134/84)  BP(mean): --  RR: 18 (18 - 18)  SpO2: 96% (96% - 99%)  CAPILLARY BLOOD GLUCOSE    I&O's Summary  I & Os for 24h ending 11 Jun 2017 07:00  =============================================  IN: 3000 ml / OUT: 0 ml / NET: 3000 ml    I & Os for current day (as of 11 Jun 2017 12:48)  =============================================  IN: 0 ml / OUT: 300 ml / NET: -300 ml      PHYSICAL EXAM:  CONSTIPATION: NAD, well-developed  HEAD:  Atraumatic, Normocephalic  EYES: EOMI, PERRLA, conjunctiva and sclera clear  ENMT: Supple, No JVD  RESPIRATORY: Clear to auscultation bilaterally; No wheeze  CARDIOVASCULAR: Regular rate and rhythm; No murmurs, rubs, or gallops  GI: Soft, non tender no rebound/guarding, Nondistended; Bowel sounds present  EXTREMITIES:  2+ Peripheral Pulses, No clubbing, cyanosis, or edema  PSYCH: AAOx3  NEUROLOGY: non-focal  SKIN: No rashes or lesions    LABS:                        9.5    4.18  )-----------( 82       ( 11 Jun 2017 08:26 )             27.6                     RADIOLOGY & ADDITIONAL TESTS:    Imaging Personally Reviewed:    Consultant(s) Notes Reviewed:      Care Discussed with Consultants/Other Providers:

## 2017-06-11 NOTE — PROVIDER CONTACT NOTE (OTHER) - ASSESSMENT
axox4. vss. diastolic elevated at this time, pt just came back from nuclear test. pt c/o pressure under left breast that "isn't painful but uncomfortable." pt states she feels the need to pass gas.
axox4. vss. see flowsheet. tele maintained NSR. pt just walked from bathroom to bed w/ RN. pt denies SOB. lungs clear b/l w/ no wheezing noted.
Pt A&O&3. c/o of chest pain 4/10 in scale 1-10. sharp, non radiating. Denies sob , headache. VSS at present
Pt c/o itching behind head, face tingling, and right upper arm soreness. Pt able to SILVA, hold out both arms without dropping. Pt has no weakness, no facial drooping, symmetrical expressions. Axox4. No rash or hives noted. Pt offered benadryl and refused. VSS.
Upon assessment, patient is resting in bed after having had an episode of diarrhea in the bathroom, currently c/o nausea, denies vomiting.  Upon MAR review, patient received 3 doses of Colace within the past 24hrs.
Upon assessment, patient was observed crying, tearful in bed, stating that she "does not know if [she] has the will to live" and that the "doctors do not know what is wrong with [her]".  Patient denies suicidal ideation at this time. When asked, patient amenable to speaking with psychiatry regarding her symptoms.  Patient noted to already have Escitalopram (SSRI) on her medication list.
axox4. pt refused stress test d/t anxiety and wants to "think more about it."

## 2017-06-12 DIAGNOSIS — R00.2 PALPITATIONS: ICD-10-CM

## 2017-06-12 LAB
5OH-INDOLEACETATE UR-MCNC: 1.3 G/24 H — SIGNIFICANT CHANGE UP (ref 0.63–2.5)
5OH-INDOLEACETATE UR-MCNC: 3.3 MG/24 H — SIGNIFICANT CHANGE UP
5OH-INDOLEACETATE UR-MCNC: 3700 ML — SIGNIFICANT CHANGE UP
ANION GAP SERPL CALC-SCNC: 17 MMOL/L — SIGNIFICANT CHANGE UP (ref 5–17)
BASOPHILS # BLD AUTO: 0.01 K/UL — SIGNIFICANT CHANGE UP (ref 0–0.2)
BASOPHILS NFR BLD AUTO: 0.2 % — SIGNIFICANT CHANGE UP (ref 0–2)
BUN SERPL-MCNC: 4 MG/DL — LOW (ref 7–23)
CALCIUM SERPL-MCNC: 9.7 MG/DL — SIGNIFICANT CHANGE UP (ref 8.4–10.5)
CHLORIDE SERPL-SCNC: 103 MMOL/L — SIGNIFICANT CHANGE UP (ref 96–108)
CO2 SERPL-SCNC: 21 MMOL/L — LOW (ref 22–31)
CREAT SERPL-MCNC: 1.06 MG/DL — SIGNIFICANT CHANGE UP (ref 0.5–1.3)
EOSINOPHIL # BLD AUTO: 0.09 K/UL — SIGNIFICANT CHANGE UP (ref 0–0.5)
EOSINOPHIL NFR BLD AUTO: 1.7 % — SIGNIFICANT CHANGE UP (ref 0–6)
FERRITIN SERPL-MCNC: 26 NG/ML — SIGNIFICANT CHANGE UP (ref 15–150)
GLUCOSE SERPL-MCNC: 96 MG/DL — SIGNIFICANT CHANGE UP (ref 70–99)
HCT VFR BLD CALC: 37.4 % — SIGNIFICANT CHANGE UP (ref 34.5–45)
HGB BLD-MCNC: 12.5 G/DL — SIGNIFICANT CHANGE UP (ref 11.5–15.5)
IMM GRANULOCYTES NFR BLD AUTO: 0.4 % — SIGNIFICANT CHANGE UP (ref 0–1.5)
IRON SATN MFR SERPL: 15 % — SIGNIFICANT CHANGE UP (ref 14–50)
IRON SATN MFR SERPL: 50 UG/DL — SIGNIFICANT CHANGE UP (ref 30–160)
LYMPHOCYTES # BLD AUTO: 1.91 K/UL — SIGNIFICANT CHANGE UP (ref 1–3.3)
LYMPHOCYTES # BLD AUTO: 35.6 % — SIGNIFICANT CHANGE UP (ref 13–44)
MCHC RBC-ENTMCNC: 29 PG — SIGNIFICANT CHANGE UP (ref 27–34)
MCHC RBC-ENTMCNC: 33.4 GM/DL — SIGNIFICANT CHANGE UP (ref 32–36)
MCV RBC AUTO: 86.8 FL — SIGNIFICANT CHANGE UP (ref 80–100)
METANEPHRINE, PL: 45 PG/ML — SIGNIFICANT CHANGE UP (ref 0–62)
MONOCYTES # BLD AUTO: 0.45 K/UL — SIGNIFICANT CHANGE UP (ref 0–0.9)
MONOCYTES NFR BLD AUTO: 8.4 % — SIGNIFICANT CHANGE UP (ref 2–14)
NEUTROPHILS # BLD AUTO: 2.88 K/UL — SIGNIFICANT CHANGE UP (ref 1.8–7.4)
NEUTROPHILS NFR BLD AUTO: 53.7 % — SIGNIFICANT CHANGE UP (ref 43–77)
NORMETANEPHRINE, PL: 78 PG/ML — SIGNIFICANT CHANGE UP (ref 0–145)
PLATELET # BLD AUTO: 104 K/UL — LOW (ref 150–400)
POTASSIUM SERPL-MCNC: 4.2 MMOL/L — SIGNIFICANT CHANGE UP (ref 3.5–5.3)
POTASSIUM SERPL-SCNC: 4.2 MMOL/L — SIGNIFICANT CHANGE UP (ref 3.5–5.3)
RBC # BLD: 4.31 M/UL — SIGNIFICANT CHANGE UP (ref 3.8–5.2)
RBC # FLD: 12.9 % — SIGNIFICANT CHANGE UP (ref 10.3–14.5)
SODIUM SERPL-SCNC: 141 MMOL/L — SIGNIFICANT CHANGE UP (ref 135–145)
TIBC SERPL-MCNC: 342 UG/DL — SIGNIFICANT CHANGE UP (ref 220–430)
TRYPTASE SERPL-MCNC: 4.3 NG/ML — SIGNIFICANT CHANGE UP
UIBC SERPL-MCNC: 292 UG/DL — SIGNIFICANT CHANGE UP (ref 110–370)
WBC # BLD: 5.36 K/UL — SIGNIFICANT CHANGE UP (ref 3.8–10.5)
WBC # FLD AUTO: 5.36 K/UL — SIGNIFICANT CHANGE UP (ref 3.8–10.5)

## 2017-06-12 PROCEDURE — 99232 SBSQ HOSP IP/OBS MODERATE 35: CPT

## 2017-06-12 PROCEDURE — 99232 SBSQ HOSP IP/OBS MODERATE 35: CPT | Mod: GC

## 2017-06-12 PROCEDURE — 99233 SBSQ HOSP IP/OBS HIGH 50: CPT

## 2017-06-12 RX ADMIN — Medication 1 MILLIGRAM(S): at 18:29

## 2017-06-12 RX ADMIN — FAMOTIDINE 20 MILLIGRAM(S): 10 INJECTION INTRAVENOUS at 17:27

## 2017-06-12 RX ADMIN — SIMETHICONE 80 MILLIGRAM(S): 80 TABLET, CHEWABLE ORAL at 12:04

## 2017-06-12 RX ADMIN — Medication 1 SPRAY(S): at 17:27

## 2017-06-12 RX ADMIN — FAMOTIDINE 20 MILLIGRAM(S): 10 INJECTION INTRAVENOUS at 06:30

## 2017-06-12 RX ADMIN — ESCITALOPRAM OXALATE 5 MILLIGRAM(S): 10 TABLET, FILM COATED ORAL at 12:04

## 2017-06-12 RX ADMIN — SIMETHICONE 80 MILLIGRAM(S): 80 TABLET, CHEWABLE ORAL at 17:27

## 2017-06-12 RX ADMIN — MONTELUKAST 10 MILLIGRAM(S): 4 TABLET, CHEWABLE ORAL at 21:40

## 2017-06-12 RX ADMIN — Medication 180 MILLIGRAM(S): at 06:30

## 2017-06-12 RX ADMIN — Medication 1 MILLIGRAM(S): at 12:31

## 2017-06-12 RX ADMIN — CHLORHEXIDINE GLUCONATE 15 MILLILITER(S): 213 SOLUTION TOPICAL at 08:19

## 2017-06-12 RX ADMIN — ONDANSETRON 4 MILLIGRAM(S): 8 TABLET, FILM COATED ORAL at 06:30

## 2017-06-12 RX ADMIN — Medication 1 MILLIGRAM(S): at 06:30

## 2017-06-12 RX ADMIN — SIMETHICONE 80 MILLIGRAM(S): 80 TABLET, CHEWABLE ORAL at 06:30

## 2017-06-12 RX ADMIN — SIMETHICONE 80 MILLIGRAM(S): 80 TABLET, CHEWABLE ORAL at 23:05

## 2017-06-12 RX ADMIN — SODIUM CHLORIDE 75 MILLILITER(S): 9 INJECTION, SOLUTION INTRAVENOUS at 06:47

## 2017-06-12 NOTE — PROGRESS NOTE ADULT - ASSESSMENT
40 y/o female with mildly abnormal outpatient plasma metanephrines in the setting of intermittent palpitations.

## 2017-06-12 NOTE — PROGRESS NOTE ADULT - ASSESSMENT
40 yo F w/PMH of asthma, anxiety, ITP (stable, not on treatment currently) who p/w labile blood pressures, palpitations and non-exertional left-sided chest pain a/w hypertension and atypical chest pain, undergoing autonomic dysfunction work up.

## 2017-06-12 NOTE — PROGRESS NOTE ADULT - SUBJECTIVE AND OBJECTIVE BOX
Patient is a 41y old  Female who presents with a chief complaint of hypertension (07 Jun 2017 14:04)      INTERVAL HPI/OVERNIGHT EVENTS:  no rectal bleeding or melena  +BMs (about 1 per day)    MEDICATIONS  (STANDING):  montelukast 10milliGRAM(s) Oral daily  fluticasone propionate 50 MICROgram(s)/spray Nasal Spray 1Spray(s) Both Nostrils two times a day  lactated ringers. 1000milliLiter(s) IV Continuous <Continuous>  LORazepam     Tablet 1milliGRAM(s) Oral two times a day  famotidine    Tablet 20milliGRAM(s) Oral two times a day  escitalopram 5milliGRAM(s) Oral daily  diltiazem   CD 180milliGRAM(s) Oral daily  chlorhexidine 0.12% Liquid 15milliLiter(s) Swish and Spit two times a day  simethicone 80milliGRAM(s) Chew every 6 hours    MEDICATIONS  (PRN):  LORazepam     Tablet 1milliGRAM(s) Oral every 6 hours PRN Anxiety  ondansetron Injectable 4milliGRAM(s) IV Push every 8 hours PRN Nausea and/or Vomiting  diphenhydrAMINE   Capsule 25milliGRAM(s) Oral every 6 hours PRN Rash and/or Itching      Allergies    No Known Allergies      Review of Systems:  General:  No wt loss, fevers, chills, night sweats, fatigue  ENT:  No sore throat, pain, runny nose, dysphagia  CV:  No chest pain, palpitations, ALCANTAR  Resp:  No dyspnea, cough, tachypnea, wheezing  Neuro:  No focal weakness, headache, vision changes  Heme:  No petechiae, ecchymosis, easy bruisability    Vital Signs Last 24 Hrs  T(C): 36.6, Max: 37.1 (06-11 @ 08:33)  T(F): 97.8, Max: 98.7 (06-11 @ 08:33)  HR: 84 (78 - 85)  BP: 132/86 (122/83 - 134/84)  BP(mean): --  RR: 18 (18 - 18)  SpO2: 97% (96% - 99%)    PHYSICAL EXAM:  Constitutional: NAD, well-developed  resting comfortably  Neck: No JVD  Respiratory: grossly clear  Cardiovascular: S1 and S2  Gastrointestinal: +BS x4, soft, NT/ND  Extremities: No peripheral edema, neg clubbing, cyanosis  Vascular: 2+ peripheral pulses  Neurological: no focal asymmetry  Skin: No rashes, anicteric    LABS:                        9.5    4.18  )-----------( 82       ( 11 Jun 2017 08:26 )             27.6     06-11    138  |  104  |  4<L>  ----------------------------<  63<L>  4.0   |  18<L>  |  0.73    Ca    8.8      11 Jun 2017 08:27              RADIOLOGY & ADDITIONAL TESTS:

## 2017-06-12 NOTE — PROGRESS NOTE ADULT - ATTENDING COMMENTS
Pt seen and examined.   Agree with note above with addendum: plasma metanephrines should be 2-3xULN in order for them to be worrisome for a pheo. Pt with normal 24hour urine. If repeat plasma done inpatient is 2-3xULN, please consult us. Outpatient labs did not show this. Mars sign off at this time. Please reconsult as needed.

## 2017-06-12 NOTE — PROGRESS NOTE ADULT - SUBJECTIVE AND OBJECTIVE BOX
Patient is a 41y old  Female who presents with a chief complaint of hypertension (07 Jun 2017 14:04)      SUBJECTIVE / OVERNIGHT EVENTS:  anxious, still with intermittent abdominal 'gas' pain. Not eating much.  No CP/sob.  Concerned about intermittent fast heart rate.  Not ambulating because of concern of her heart.  Tele reviewed and has been sinus .    MEDICATIONS  (STANDING):  montelukast 10milliGRAM(s) Oral daily  fluticasone propionate 50 MICROgram(s)/spray Nasal Spray 1Spray(s) Both Nostrils two times a day  LORazepam     Tablet 1milliGRAM(s) Oral two times a day  famotidine    Tablet 20milliGRAM(s) Oral two times a day  escitalopram 5milliGRAM(s) Oral daily  diltiazem   CD 180milliGRAM(s) Oral daily  chlorhexidine 0.12% Liquid 15milliLiter(s) Swish and Spit two times a day  simethicone 80milliGRAM(s) Chew every 6 hours    MEDICATIONS  (PRN):  LORazepam     Tablet 1milliGRAM(s) Oral every 6 hours PRN Anxiety  ondansetron Injectable 4milliGRAM(s) IV Push every 8 hours PRN Nausea and/or Vomiting  diphenhydrAMINE   Capsule 25milliGRAM(s) Oral every 6 hours PRN Rash and/or Itching      Vital Signs Last 24 Hrs  T(C): 36.8, Max: 36.9 (06-11 @ 20:15)  HR: 86 (78 - 86)  BP: 124/85 (122/83 - 132/86)  RR: 19 (18 - 19)  SpO2: 98% (97% - 98%)  Wt(kg): --  CAPILLARY BLOOD GLUCOSE  100 (12 Jun 2017 04:57)    I&O's Summary    I & Os for current day (as of 12 Jun 2017 14:14)  =============================================  IN: 1980 ml / OUT: 2260 ml / NET: -280 ml      PHYSICAL EXAM:  GENERAL: NAD, well-developed  HEAD:  Atraumatic, Normocephalic  EYES: EOMI, conjunctiva and sclera clear  NECK: Supple, No JVD  CHEST/LUNG: Clear to auscultation bilaterally; No wheeze  HEART: Regular rate and rhythm; No murmurs, rubs, or gallops  ABDOMEN: Soft, Nontender, Nondistended; Bowel sounds present  EXTREMITIES:  2+ Peripheral Pulses, No clubbing, cyanosis, or edema  PSYCH: AAOx3, anxious    LABS:                        12.5   5.36  )-----------( 104      ( 12 Jun 2017 08:36 )             37.4     06-12    141  |  103  |  4<L>  ----------------------------<  96  4.2   |  21<L>  |  1.06    Ca    9.7      12 Jun 2017 08:36                RADIOLOGY & ADDITIONAL TESTS:    Consultant(s) Notes Reviewed:  gastro, neurology, cardiology    Care Discussed with Consultants/Other Providers: Dr Arias (received signout on case)

## 2017-06-12 NOTE — PROGRESS NOTE ADULT - PROBLEM SELECTOR PLAN 1
- Aden negative x 2, EKG sinus tachycardia  - no events on telemetry overnight.    - nuclear stress test (6/6) normal study (technically difficult study due to extensive adjacent extracardiac tracer uptake)  -patient agrees to walk around unit over the next 24 hours.  Will assess telemetry with increased ambulation and if no sustained tachycardia plan for d/c in 24 hours.

## 2017-06-12 NOTE — PROGRESS NOTE ADULT - PROBLEM SELECTOR PLAN 1
Likely due to anxiety. Plasma metanephrines done outpatient were mildly abnormal in the setting of acute recent illness of labrynthitis. Repeat plasma metanephrines ordered but more accurate 24 hour urine metanephrines are negative, urine 5-HIAA test is negative so pheochromocytoma or carcinoid syndrome is extremely unlikely in the setting. 24 hour urine metanephrine test is more accurate than screening plasma metanephrines and this rules out pheochromocytoma.   Our service will sign off. Please call us with any questions.    D/w attending    Mila Rojas DO  Endocrine Fellow   Pager: [phone]. Likely due to anxiety. Plasma metanephrines done outpatient were mildly abnormal in the setting of acute recent illness of labrynthitis. Repeat plasma metanephrines ordered and pending. More accurate 24 hour urine metanephrines are negative, urine 5-HIAA test is negative so pheochromocytoma or carcinoid syndrome is extremely unlikely in the setting. 24 hour urine metanephrine test is more accurate than screening plasma metanephrines and this rules out pheochromocytoma.   Our service will sign off. Please call us with any questions or if plasma metanephrines results >300 in value.    D/w attending    Mila Rojas DO  Endocrine Fellow   Pager: [phone].

## 2017-06-12 NOTE — PROGRESS NOTE ADULT - PROBLEM SELECTOR PLAN 2
- stable BP in past 24 hours  - continue Cardizem to 180 MG,  - f/u cardiology/EP recs  - plan for outpatient autonomic dysfunction work up  - check MMA as B12 344.  - f/u paraneoplastic w/u and celiac ab  - lyme negative

## 2017-06-12 NOTE — PROGRESS NOTE ADULT - ASSESSMENT
42 yo female with multiple medical problems noted to have labile BP issues that started after having abdominal pain, diarrhea and weight loss.  Noted to have borderline elevated IBD serologies.  Although MRE was negative, pt may have microscopic colitis, PUD/gastritis/ celiac disease.      She is currently refusing GI w/u but agreeable to pursuing as outpatient once BP more controlled. There is no further diarrhea.    Continue Pepcid for dyspepsia and prn laxatives & simethicone    Further care per primary team.    Jermaine Beebe PA-C    [facility]  [phone]

## 2017-06-12 NOTE — PROGRESS NOTE ADULT - PROBLEM SELECTOR PLAN 4
- patient with significant anxiety over recent symptoms with recent significant life stressors within the past year  - seen by psychiatry -  continue lexapro, ativan

## 2017-06-12 NOTE — PROGRESS NOTE ADULT - SUBJECTIVE AND OBJECTIVE BOX
Chief Complaint: F/u abnormal outpatient plasma metanephrines    History: 40 y/o female with mildly abnormal plasma metanephrines in the outpatient setting here with c/o intermittent palpitations. HR persistently between  on telemetry. Patient had an extensive w/u involving many specialty care providers.     MEDICATIONS  (STANDING):  montelukast 10milliGRAM(s) Oral daily  fluticasone propionate 50 MICROgram(s)/spray Nasal Spray 1Spray(s) Both Nostrils two times a day  LORazepam     Tablet 1milliGRAM(s) Oral two times a day  famotidine    Tablet 20milliGRAM(s) Oral two times a day  escitalopram 5milliGRAM(s) Oral daily  diltiazem   CD 180milliGRAM(s) Oral daily  chlorhexidine 0.12% Liquid 15milliLiter(s) Swish and Spit two times a day  simethicone 80milliGRAM(s) Chew every 6 hours    MEDICATIONS  (PRN):  LORazepam     Tablet 1milliGRAM(s) Oral every 6 hours PRN Anxiety  ondansetron Injectable 4milliGRAM(s) IV Push every 8 hours PRN Nausea and/or Vomiting  diphenhydrAMINE   Capsule 25milliGRAM(s) Oral every 6 hours PRN Rash and/or Itching      Allergies    No Known Allergies      Review of Systems:  Constitutional: No fever  Eyes: No blurry vision  Neuro: No tremors  HEENT: No pain  Cardiovascular: C/o palpitations  Respiratory: No SOB, no cough  GI: No nausea, vomiting, abdominal pain  : No dysuria  Skin: no rash  Psych: no depression  Endocrine: no polyuria, polydipsia  Hem/lymph: no swelling  Osteoporosis: no fractures    ALL OTHER SYSTEMS REVIEWED AND NEGATIVE      PHYSICAL EXAM:  VITALS: T(C): 36.8  T(F): 98.3, Max: 98.5 (06-11 @ 20:15)  HR: 86 (78 - 86)  BP: 124/85 (122/83 - 132/86)  RR:  (18 - 19)  SpO2:  (97% - 98%)  GENERAL: NAD, well-groomed, well-developed  EYES: No proptosis, no lid lag, anicteric  HEENT:  Atraumatic, Normocephalic, moist mucous membranes  THYROID: Normal size, no palpable nodules  RESPIRATORY: Clear to auscultation bilaterally; No rales, rhonchi, wheezing, or rubs  CARDIOVASCULAR: Regular rate and rhythm; No murmurs; no peripheral edema  GI: Soft, nontender, non distended, normal bowel sounds  SKIN: Dry, intact, No rashes or lesions  MUSCULOSKELETAL: Full range of motion, normal strength  NEURO: sensation intact, extraocular movements intact, no tremor, normal reflexes  PSYCH: Alert and oriented x 3, normal affect, normal mood  CUSHING'S SIGNS: no striae    CAPILLARY BLOOD GLUCOSE  100 (06-12 @ 04:57)      06-12    141  |  103  |  4<L>  ----------------------------<  96  4.2   |  21<L>  |  1.06    EGFR if : 76  EGFR if non : 66    Ca    9.7      06-12    5-HIAA, 24-Hour Urine: 3.3: This test was developed and its analytical performance  characteristics have been determined by [facility] [place], [place]. It has  not been cleared or approved by the U.S. Food and Drug  Administration. This assay has been val3.3: idated pursuant  to the CLIA regulations and is used for clinical  purposes. mg/24 h (06.08.17 @ 19:41)    Metanephrine - Urine: See Note: Test                              Result      Flag  Unit      RefValue  ----------------------------------------------------------------------  Metanephrines, Fractionated, 24h, U    Metanephrine, U                 94                mcg/24 h  --------See Note: -----------REFERENCE VALUE--------------------------   (Normotensive)  <400 (Hypertensive)    Normetanephrine, U              231               mcg/24 h  -------------------REFERENCE VALUE--------------------------  119-451 (Normotensive)  <900See Note: (Hypertensive)    Total Metanephrines, U          325               mcg/24 h  -------------------REFERENCE VALUE--------------------------  156-561 (Normotensive)  <1300 (Hypertensive)    Collection Duration             24                h    Urine VoSee Note: lume                    2925              mL  -------------------ADDITIONAL INFORMATION-------------------  This test was developed and its performance characteristics  determined by [facility] in a manner consistent with CLIA  requirements. This tesSee Note: t has not been cleared or approved by  the U.S. Food and Drug Administration.  Test Performed by:  [facility] - [address] [address] (06.07.17 @ 01:10) Chief Complaint: F/u abnormal outpatient plasma metanephrines    History: 40 y/o female with mildly abnormal plasma metanephrines in the outpatient setting here with c/o intermittent palpitations. HR persistently between  on telemetry. Patient had an extensive w/u involving many specialty care providers.     MEDICATIONS  (STANDING):  montelukast 10milliGRAM(s) Oral daily  fluticasone propionate 50 MICROgram(s)/spray Nasal Spray 1Spray(s) Both Nostrils two times a day  LORazepam     Tablet 1milliGRAM(s) Oral two times a day  famotidine    Tablet 20milliGRAM(s) Oral two times a day  escitalopram 5milliGRAM(s) Oral daily  diltiazem   CD 180milliGRAM(s) Oral daily  chlorhexidine 0.12% Liquid 15milliLiter(s) Swish and Spit two times a day  simethicone 80milliGRAM(s) Chew every 6 hours    MEDICATIONS  (PRN):  LORazepam     Tablet 1milliGRAM(s) Oral every 6 hours PRN Anxiety  ondansetron Injectable 4milliGRAM(s) IV Push every 8 hours PRN Nausea and/or Vomiting  diphenhydrAMINE   Capsule 25milliGRAM(s) Oral every 6 hours PRN Rash and/or Itching      Allergies    No Known Allergies      Review of Systems:  Constitutional: No fever  Eyes: No blurry vision  Neuro: No tremors  HEENT: No pain  Cardiovascular: C/o palpitations  Respiratory: No SOB, no cough  GI: No nausea, vomiting, abdominal pain  : No dysuria  Skin: no rash  Psych: no depression  Endocrine: no polyuria, polydipsia  Hem/lymph: no swelling  Osteoporosis: no fractures    ALL OTHER SYSTEMS REVIEWED AND NEGATIVE      PHYSICAL EXAM:  VITALS: T(C): 36.8  T(F): 98.3, Max: 98.5 (06-11 @ 20:15)  HR: 86 (78 - 86)  BP: 124/85 (122/83 - 132/86)  RR:  (18 - 19)  SpO2:  (97% - 98%)  GENERAL: NAD, well-groomed, well-developed  EYES: No proptosis, no lid lag, anicteric  HEENT:  Atraumatic, Normocephalic, moist mucous membranes  THYROID: Normal size, no palpable nodules  RESPIRATORY: Clear to auscultation bilaterally; No rales, rhonchi, wheezing, or rubs  CARDIOVASCULAR: Regular rate and rhythm; No murmurs; no peripheral edema  GI: Soft, nontender, non distended, normal bowel sounds  SKIN: Dry, intact, No rashes or lesions  MUSCULOSKELETAL: Full range of motion, normal strength  NEURO: sensation intact, extraocular movements intact, no tremor, normal reflexes  PSYCH: Alert and oriented x 3, normal affect, normal mood  CUSHING'S SIGNS: no striae    CAPILLARY BLOOD GLUCOSE  100 (06-12 @ 04:57)      06-12    141  |  103  |  4<L>  ----------------------------<  96  4.2   |  21<L>  |  1.06    EGFR if : 76  EGFR if non : 66    Ca    9.7      06-12 05/25/17: Outpatient Labs:  Plasma Normetanephrines =191 (0-145)  Metanephrines 46 (0-62)        5-HIAA, 24-Hour Urine: 3.3: This test was developed and its analytical performance  characteristics have been determined by [place]. It has  not been cleared or approved by the U.S. Food and Drug  Administration. This assay has been val3.3: idated pursuant  to the CLIA regulations and is used for clinical  purposes. mg/24 h (06.08.17 @ 19:41)    Metanephrine - Urine: See Note: Test                              Result      Flag  Unit      RefValue  ----------------------------------------------------------------------  Metanephrines, Fractionated, 24h, U    Metanephrine, U                 94                mcg/24 h  --------See Note: -----------REFERENCE VALUE--------------------------   (Normotensive)  <400 (Hypertensive)    Normetanephrine, U              231               mcg/24 h  -------------------REFERENCE VALUE--------------------------  119-451 (Normotensive)  <900See Note: (Hypertensive)    Total Metanephrines, U          325               mcg/24 h  -------------------REFERENCE VALUE--------------------------  156-561 (Normotensive)  <1300 (Hypertensive)    Collection Duration             24                h    Urine VoSee Note: lume                    2925              mL  -------------------ADDITIONAL INFORMATION-------------------  This test was developed and its performance characteristics  determined by [facility] in a manner consistent with CLIA  requirements. This tesSee Note: t has not been cleared or approved by  the U.S. Food and Drug Administration.  Test Performed by:  [facility] [employer] [address] [address] (06.07.17 @ 01:10)

## 2017-06-13 LAB
ANION GAP SERPL CALC-SCNC: 13 MMOL/L — SIGNIFICANT CHANGE UP (ref 5–17)
BUN SERPL-MCNC: 3 MG/DL — LOW (ref 7–23)
CALCIUM SERPL-MCNC: 9.5 MG/DL — SIGNIFICANT CHANGE UP (ref 8.4–10.5)
CHLORIDE SERPL-SCNC: 101 MMOL/L — SIGNIFICANT CHANGE UP (ref 96–108)
CO2 SERPL-SCNC: 26 MMOL/L — SIGNIFICANT CHANGE UP (ref 22–31)
CREAT SERPL-MCNC: 1.03 MG/DL — SIGNIFICANT CHANGE UP (ref 0.5–1.3)
GLUCOSE SERPL-MCNC: 98 MG/DL — SIGNIFICANT CHANGE UP (ref 70–99)
HCT VFR BLD CALC: 38.1 % — SIGNIFICANT CHANGE UP (ref 34.5–45)
HGB BLD-MCNC: 13 G/DL — SIGNIFICANT CHANGE UP (ref 11.5–15.5)
HLA FOR CELIAC DISEASE PNL BLD/T: SIGNIFICANT CHANGE UP
MCHC RBC-ENTMCNC: 29.3 PG — SIGNIFICANT CHANGE UP (ref 27–34)
MCHC RBC-ENTMCNC: 34.1 GM/DL — SIGNIFICANT CHANGE UP (ref 32–36)
MCV RBC AUTO: 85.8 FL — SIGNIFICANT CHANGE UP (ref 80–100)
OB PNL STL: NEGATIVE — SIGNIFICANT CHANGE UP
PLATELET # BLD AUTO: 115 K/UL — LOW (ref 150–400)
POTASSIUM SERPL-MCNC: 4.4 MMOL/L — SIGNIFICANT CHANGE UP (ref 3.5–5.3)
POTASSIUM SERPL-SCNC: 4.4 MMOL/L — SIGNIFICANT CHANGE UP (ref 3.5–5.3)
RBC # BLD: 4.44 M/UL — SIGNIFICANT CHANGE UP (ref 3.8–5.2)
RBC # FLD: 12.8 % — SIGNIFICANT CHANGE UP (ref 10.3–14.5)
SODIUM SERPL-SCNC: 140 MMOL/L — SIGNIFICANT CHANGE UP (ref 135–145)
WBC # BLD: 5.7 K/UL — SIGNIFICANT CHANGE UP (ref 3.8–10.5)
WBC # FLD AUTO: 5.7 K/UL — SIGNIFICANT CHANGE UP (ref 3.8–10.5)

## 2017-06-13 PROCEDURE — 99233 SBSQ HOSP IP/OBS HIGH 50: CPT

## 2017-06-13 RX ADMIN — Medication 1 MILLIGRAM(S): at 21:08

## 2017-06-13 RX ADMIN — Medication 1 MILLIGRAM(S): at 05:43

## 2017-06-13 RX ADMIN — ONDANSETRON 4 MILLIGRAM(S): 8 TABLET, FILM COATED ORAL at 06:12

## 2017-06-13 RX ADMIN — SIMETHICONE 80 MILLIGRAM(S): 80 TABLET, CHEWABLE ORAL at 11:28

## 2017-06-13 RX ADMIN — MONTELUKAST 10 MILLIGRAM(S): 4 TABLET, CHEWABLE ORAL at 21:09

## 2017-06-13 RX ADMIN — Medication 1 SPRAY(S): at 17:41

## 2017-06-13 RX ADMIN — FAMOTIDINE 20 MILLIGRAM(S): 10 INJECTION INTRAVENOUS at 17:41

## 2017-06-13 RX ADMIN — Medication 1 MILLIGRAM(S): at 13:08

## 2017-06-13 RX ADMIN — ESCITALOPRAM OXALATE 5 MILLIGRAM(S): 10 TABLET, FILM COATED ORAL at 11:28

## 2017-06-13 RX ADMIN — SIMETHICONE 80 MILLIGRAM(S): 80 TABLET, CHEWABLE ORAL at 05:43

## 2017-06-13 RX ADMIN — SIMETHICONE 80 MILLIGRAM(S): 80 TABLET, CHEWABLE ORAL at 17:41

## 2017-06-13 RX ADMIN — Medication 180 MILLIGRAM(S): at 05:44

## 2017-06-13 RX ADMIN — FAMOTIDINE 20 MILLIGRAM(S): 10 INJECTION INTRAVENOUS at 05:44

## 2017-06-13 NOTE — PROGRESS NOTE ADULT - PROBLEM SELECTOR PLAN 1
- Aden negative x 2, EKG sinus tachycardia  - no events on telemetry overnight.    - nuclear stress test (6/6) normal study (technically difficult study due to extensive adjacent extracardiac tracer uptake)  -patient agrees to walk around unit over the next 24 hours.    -discussed with patient and patient's father at bedside.  I spoke with Dr Hussein.  He will speak with Dr Barahona.  Likely to continue current dilt and both now agree if telemetry no acute events will d/c home tomorrow as continues to not have sustained tachycardia.

## 2017-06-13 NOTE — PROGRESS NOTE ADULT - ASSESSMENT
41 y.o. F - with anxiety/IBS/Asthma/hypertensive urgency and tachycardia with unrevealing, exhaustive systemic workup.  Believe this is likely 2/2 to autonomic dysfunction.     1. HTN  Plan:   - Continue Diltiazem 180 XL; Ok to increase to 240XL if necessary  - continue lexapro  - OK to DC  - continue systemic workup as necessary        Imaging/Telemetry reviewed personally and case and plan discussed with patient, team and family member    Will be away Friday June 9th; Please call Dr. Barahona or [phone]

## 2017-06-13 NOTE — PROGRESS NOTE ADULT - ATTENDING COMMENTS
Beeper: [phone]    dispo: d/c home tomorrow after urine collection complete if telemetry remains stable Beeper: [phone]    dispo: d/c home tomorrow if telemetry remains stable

## 2017-06-13 NOTE — PROGRESS NOTE ADULT - PROBLEM SELECTOR PLAN 2
resolved. Monitor BMs      Jermaine Beebe PA-C    [facility]  [phone]

## 2017-06-13 NOTE — PROGRESS NOTE ADULT - SUBJECTIVE AND OBJECTIVE BOX
Patient is a 41y old  Female who presents with a chief complaint of hypertension (07 Jun 2017 14:04)      SUBJECTIVE / OVERNIGHT EVENTS:  very anxious, concerned about heart rate and being monitored at home    MEDICATIONS  (STANDING):  montelukast 10milliGRAM(s) Oral daily  fluticasone propionate 50 MICROgram(s)/spray Nasal Spray 1Spray(s) Both Nostrils two times a day  LORazepam     Tablet 1milliGRAM(s) Oral two times a day  famotidine    Tablet 20milliGRAM(s) Oral two times a day  escitalopram 5milliGRAM(s) Oral daily  diltiazem   CD 180milliGRAM(s) Oral daily  chlorhexidine 0.12% Liquid 15milliLiter(s) Swish and Spit two times a day  simethicone 80milliGRAM(s) Chew every 6 hours    MEDICATIONS  (PRN):  LORazepam     Tablet 1milliGRAM(s) Oral every 6 hours PRN Anxiety  ondansetron Injectable 4milliGRAM(s) IV Push every 8 hours PRN Nausea and/or Vomiting  diphenhydrAMINE   Capsule 25milliGRAM(s) Oral every 6 hours PRN Rash and/or Itching      Vital Signs Last 24 Hrs  T(C): 36.9, Max: 36.9 (06-12 @ 17:32)  HR: 96 (80 - 96)  BP: 124/91 (122/82 - 129/89)  RR: 18 (18 - 18)  SpO2: 97% (96% - 97%)  Wt(kg): --  CAPILLARY BLOOD GLUCOSE    I&O's Summary  I & Os for 24h ending 13 Jun 2017 07:00  =============================================  IN: 1130 ml / OUT: 0 ml / NET: 1130 ml    I & Os for current day (as of 13 Jun 2017 15:11)  =============================================  IN: 560 ml / OUT: 1700 ml / NET: -1140 ml      PHYSICAL EXAM:  GENERAL: NAD, well-developed  HEAD:  Atraumatic, Normocephalic  EYES: EOMI, conjunctiva and sclera clear  NECK: Supple, No JVD  CHEST/LUNG: Clear to auscultation bilaterally; No wheeze  HEART: Regular rate and rhythm; No murmurs, rubs, or gallops  ABDOMEN: Soft, Nontender, Nondistended; Bowel sounds present  EXTREMITIES:  2+ Peripheral Pulses, No clubbing, cyanosis, or edema  PSYCH: AAOx3, anxious    LABS:                        13.0   5.70  )-----------( 115      ( 13 Jun 2017 10:39 )             38.1     06-13    140  |  101  |  3<L>  ----------------------------<  98  4.4   |  26  |  1.03    Ca    9.5      13 Jun 2017 10:39                RADIOLOGY & ADDITIONAL TESTS:    Imaging Personally Reviewed:     Consultant(s) Notes Reviewed:  GI    Care Discussed with Consultants/Other Providers: Dr Hussein Patient is a 41y old  Female who presents with a chief complaint of hypertension (07 Jun 2017 14:04)      SUBJECTIVE / OVERNIGHT EVENTS:  very anxious, concerned about heart rate and being monitored at home    MEDICATIONS  (STANDING):  montelukast 10milliGRAM(s) Oral daily  fluticasone propionate 50 MICROgram(s)/spray Nasal Spray 1Spray(s) Both Nostrils two times a day  LORazepam     Tablet 1milliGRAM(s) Oral two times a day  famotidine    Tablet 20milliGRAM(s) Oral two times a day  escitalopram 5milliGRAM(s) Oral daily  diltiazem   CD 180milliGRAM(s) Oral daily  chlorhexidine 0.12% Liquid 15milliLiter(s) Swish and Spit two times a day  simethicone 80milliGRAM(s) Chew every 6 hours    MEDICATIONS  (PRN):  LORazepam     Tablet 1milliGRAM(s) Oral every 6 hours PRN Anxiety  ondansetron Injectable 4milliGRAM(s) IV Push every 8 hours PRN Nausea and/or Vomiting  diphenhydrAMINE   Capsule 25milliGRAM(s) Oral every 6 hours PRN Rash and/or Itching      Vital Signs Last 24 Hrs  T(C): 36.9, Max: 36.9 (06-12 @ 17:32)  HR: 96 (80 - 96)  BP: 124/91 (122/82 - 129/89)  RR: 18 (18 - 18)  SpO2: 97% (96% - 97%)  Wt(kg): --  CAPILLARY BLOOD GLUCOSE    I&O's Summary  I & Os for 24h ending 13 Jun 2017 07:00  =============================================  IN: 1130 ml / OUT: 0 ml / NET: 1130 ml    I & Os for current day (as of 13 Jun 2017 15:11)  =============================================  IN: 560 ml / OUT: 1700 ml / NET: -1140 ml      PHYSICAL EXAM:  GENERAL: NAD, well-developed  HEAD:  Atraumatic, Normocephalic  EYES: EOMI, conjunctiva and sclera clear  NECK: Supple, No JVD  CHEST/LUNG: Clear to auscultation bilaterally; No wheeze  HEART: Regular rate and rhythm; No murmurs, rubs, or gallops  ABDOMEN: Soft, Nontender, Nondistended; Bowel sounds present  EXTREMITIES:  2+ Peripheral Pulses, No clubbing, cyanosis, or edema  PSYCH: AAOx3, anxious    LABS:                        13.0   5.70  )-----------( 115      ( 13 Jun 2017 10:39 )             38.1     06-13    140  |  101  |  3<L>  ----------------------------<  98  4.4   |  26  |  1.03    Ca    9.5      13 Jun 2017 10:39                RADIOLOGY & ADDITIONAL TESTS:    Imaging Personally Reviewed:     Consultant(s) Notes Reviewed:  GI, endocrine    Care Discussed with Consultants/Other Providers: Dr Hussein

## 2017-06-13 NOTE — PROGRESS NOTE ADULT - PROBLEM SELECTOR PLAN 2
- stable BP in past 24 hours  - continue Cardizem to 180 MG,  - f/u cardiology/EP recs  - plan for outpatient autonomic dysfunction work up  - check MMA as B12 344.  - f/u paraneoplastic w/u and celiac ab  - lyme negative  - patient requesting lead level be checked

## 2017-06-13 NOTE — PROGRESS NOTE ADULT - SUBJECTIVE AND OBJECTIVE BOX
BRIEF SUMMARY:  41 y.o. F - with anxiety/IBS/Asthma/hypertensive urgency and tachycardia with unrevealing systemic workup      CHIEF COMPLAINT/REASON FOR CONSULT: HTN/Tachycardia      24 HR EVENTS:  Doing well on 180 Diltiazem XL despite brief elevation in HR   Tolerating SSRI     Allergies    No Known Allergies    Intolerances    	    MEDICATIONS:  MEDICATIONS  (STANDING):  montelukast 10milliGRAM(s) Oral daily  fluticasone propionate 50 MICROgram(s)/spray Nasal Spray 1Spray(s) Both Nostrils two times a day  LORazepam     Tablet 1milliGRAM(s) Oral two times a day  famotidine    Tablet 20milliGRAM(s) Oral two times a day  escitalopram 5milliGRAM(s) Oral daily  diltiazem   CD 180milliGRAM(s) Oral daily  chlorhexidine 0.12% Liquid 15milliLiter(s) Swish and Spit two times a day  simethicone 80milliGRAM(s) Chew every 6 hours        PAST MEDICAL & SURGICAL HISTORY:  History of ITP  Labyrinthitis  Asthma  Headache  HTN (hypertension)  Anxiety  IBS (irritable bowel syndrome)      FAMILY HISTORY:  Family history of hypertension (Father, Grandparent)  Family history of atrial fibrillation (Father)  Family history of prostate cancer in father (Father)      REVIEW OF SYSTEMS:  See HPI. Otherwise, 10 point ROS done and otherwise negative.    PHYSICAL EXAM:  T(C): 36.8, Max: 37 (06-07 @ 14:13)  HR: 72 (72 - 100)  BP: 126/81 (123/90 - 139/95)  RR: 16 (16 - 19)  SpO2: 97% (96% - 100%)  Wt(kg): --  I&O's Summary  I & Os for 24h ending 07 Jun 2017 07:00  =============================================  IN: 2520 ml / OUT: 0 ml / NET: 2520 ml    I & Os for current day (as of 07 Jun 2017 23:17)  =============================================  IN: 1590 ml / OUT: 2300 ml / NET: -710 ml      Appearance: Anxious/hyperverbal  HEENT:   Normal oral mucosa, PERRL, EOMI	  Lymphatic: No lymphadenopathy  Cardiovascular: Normal S1 S2, No JVD, No murmurs, No edema  Respiratory: Lungs clear to auscultation	  Psychiatry: anxious  Gastrointestinal:  Soft, Non-tender, + BS	  Skin: No rashes, No ecchymoses, No cyanosis	  Neurologic: Non-focal  Extremities: Normal range of motion, No clubbing, cyanosis or edema  Vascular: Peripheral pulses palpable 2+ bilaterally        LABS:	     06-13 @ 10:39  85.8  115  5.70  --  --  --  --  --  3  9.5  26    	    CBC Full  -  ( 07 Jun 2017 06:12 )  WBC Count : 5.7 K/uL  Hemoglobin : 13.4 g/dL  Hematocrit : 39.3 %  Platelet Count - Automated : 76 K/uL  Mean Cell Volume : 90.0 fl  Mean Cell Hemoglobin : 30.7 pg  Mean Cell Hemoglobin Concentration : 34.1 gm/dL  Auto Neutrophil # : x  Auto Lymphocyte # : x  Auto Monocyte # : x  Auto Eosinophil # : x  Auto Basophil # : x  Auto Neutrophil % : x  Auto Lymphocyte % : x  Auto Monocyte % : x  Auto Eosinophil % : x  Auto Basophil % : x    06-06    144  |  104  |  3<L>  ----------------------------<  110<H>  3.8   |  25  |  0.85    Ca    9.8      06 Jun 2017 05:39  Phos  4.2     06-06  Mg     2.0     06-06                TELEMETRY: 	    Sinus Rhythm/Sinus Tachycardia

## 2017-06-13 NOTE — PROGRESS NOTE ADULT - SUBJECTIVE AND OBJECTIVE BOX
Patient is a 41y old  Female who presents with a chief complaint of hypertension (07 Jun 2017 14:04)      INTERVAL HPI/OVERNIGHT EVENTS:  having BMs, 1 per day  No GI events per RN report    MEDICATIONS  (STANDING):  montelukast 10milliGRAM(s) Oral daily  fluticasone propionate 50 MICROgram(s)/spray Nasal Spray 1Spray(s) Both Nostrils two times a day  LORazepam     Tablet 1milliGRAM(s) Oral two times a day  famotidine    Tablet 20milliGRAM(s) Oral two times a day  escitalopram 5milliGRAM(s) Oral daily  diltiazem   CD 180milliGRAM(s) Oral daily  chlorhexidine 0.12% Liquid 15milliLiter(s) Swish and Spit two times a day  simethicone 80milliGRAM(s) Chew every 6 hours    MEDICATIONS  (PRN):  LORazepam     Tablet 1milliGRAM(s) Oral every 6 hours PRN Anxiety  ondansetron Injectable 4milliGRAM(s) IV Push every 8 hours PRN Nausea and/or Vomiting  diphenhydrAMINE   Capsule 25milliGRAM(s) Oral every 6 hours PRN Rash and/or Itching      Allergies    No Known Allergies        Review of Systems:  General:  No wt loss, fevers, chills, night sweats, fatigue  ENT:  No sore throat, pain, runny nose, dysphagia  CV:  No chest pain, palpitations, ALCANTAR  Resp:  No dyspnea, cough, tachypnea, wheezing  Neuro:  No focal weakness, headache, vision changes  Heme:  No petechiae, ecchymosis, easy bruisability    Vital Signs Last 24 Hrs  T(C): 36.7, Max: 36.9 (06-12 @ 17:32)  T(F): 98.1, Max: 98.5 (06-12 @ 17:32)  HR: 88 (80 - 88)  BP: 122/82 (122/82 - 129/89)  BP(mean): --  RR: 18 (18 - 19)  SpO2: 96% (96% - 98%)    PHYSICAL EXAM:    Constitutional: NAD, well-developed  resting comfortably  Neck: No JVD  Respiratory: grossly clear  Cardiovascular: S1 and S2  Gastrointestinal: +BS x4, soft, NT/ND  Extremities: No peripheral edema, neg clubbing, cyanosis  Vascular: 2+ peripheral pulses  Neurological: no focal asymmetry  Skin: No rashes, anicteric    LABS:                        12.5   5.36  )-----------( 104      ( 12 Jun 2017 08:36 )             37.4     06-12    141  |  103  |  4<L>  ----------------------------<  96  4.2   |  21<L>  |  1.06    Ca    9.7      12 Jun 2017 08:36              RADIOLOGY & ADDITIONAL TESTS:

## 2017-06-13 NOTE — PROGRESS NOTE ADULT - ASSESSMENT
42 yo female with multiple medical problems noted to have labile BP issues that started after having abdominal pain, diarrhea and weight loss.  Noted to have borderline elevated IBD serologies.  Although MRE was negative, pt may have microscopic colitis, PUD/gastritis    Case discussed with Primary GI Dr Gilbert - outpatient celiac serologies NEGATIVE (2016)    She is currently refusing GI w/u but agreeable to pursuing as outpatient once BP more controlled. There is no further diarrhea.    Continue Pepcid for dyspepsia and prn laxatives & simethicone    Further care per primary team.

## 2017-06-13 NOTE — PROGRESS NOTE ADULT - PROBLEM SELECTOR PLAN 3
-plan per problem 1.  -encourage po hydration  -undergoing 24 hour urine collection by endo -plan per problem 1.  -encourage po hydration

## 2017-06-14 VITALS
DIASTOLIC BLOOD PRESSURE: 91 MMHG | RESPIRATION RATE: 17 BRPM | OXYGEN SATURATION: 97 % | TEMPERATURE: 98 F | SYSTOLIC BLOOD PRESSURE: 131 MMHG | HEART RATE: 97 BPM

## 2017-06-14 PROCEDURE — 83090 ASSAY OF HOMOCYSTEINE: CPT

## 2017-06-14 PROCEDURE — 94640 AIRWAY INHALATION TREATMENT: CPT

## 2017-06-14 PROCEDURE — 84244 ASSAY OF RENIN: CPT

## 2017-06-14 PROCEDURE — 81025 URINE PREGNANCY TEST: CPT

## 2017-06-14 PROCEDURE — 81001 URINALYSIS AUTO W/SCOPE: CPT

## 2017-06-14 PROCEDURE — 86225 DNA ANTIBODY NATIVE: CPT

## 2017-06-14 PROCEDURE — 83835 ASSAY OF METANEPHRINES: CPT

## 2017-06-14 PROCEDURE — 83615 LACTATE (LD) (LDH) ENZYME: CPT

## 2017-06-14 PROCEDURE — 84484 ASSAY OF TROPONIN QUANT: CPT

## 2017-06-14 PROCEDURE — 85613 RUSSELL VIPER VENOM DILUTED: CPT

## 2017-06-14 PROCEDURE — 86364 TISS TRNSGLTMNASE EA IG CLAS: CPT

## 2017-06-14 PROCEDURE — 84100 ASSAY OF PHOSPHORUS: CPT

## 2017-06-14 PROCEDURE — 93017 CV STRESS TEST TRACING ONLY: CPT

## 2017-06-14 PROCEDURE — 86901 BLOOD TYPING SEROLOGIC RH(D): CPT

## 2017-06-14 PROCEDURE — 82607 VITAMIN B-12: CPT

## 2017-06-14 PROCEDURE — 93975 VASCULAR STUDY: CPT

## 2017-06-14 PROCEDURE — 72141 MRI NECK SPINE W/O DYE: CPT

## 2017-06-14 PROCEDURE — 86618 LYME DISEASE ANTIBODY: CPT

## 2017-06-14 PROCEDURE — 82553 CREATINE MB FRACTION: CPT

## 2017-06-14 PROCEDURE — 84702 CHORIONIC GONADOTROPIN TEST: CPT

## 2017-06-14 PROCEDURE — 83921 ORGANIC ACID SINGLE QUANT: CPT

## 2017-06-14 PROCEDURE — 70551 MRI BRAIN STEM W/O DYE: CPT

## 2017-06-14 PROCEDURE — 83550 IRON BINDING TEST: CPT

## 2017-06-14 PROCEDURE — 80053 COMPREHEN METABOLIC PANEL: CPT

## 2017-06-14 PROCEDURE — 86235 NUCLEAR ANTIGEN ANTIBODY: CPT

## 2017-06-14 PROCEDURE — 85027 COMPLETE CBC AUTOMATED: CPT

## 2017-06-14 PROCEDURE — 86146 BETA-2 GLYCOPROTEIN ANTIBODY: CPT

## 2017-06-14 PROCEDURE — 82785 ASSAY OF IGE: CPT

## 2017-06-14 PROCEDURE — 83520 IMMUNOASSAY QUANT NOS NONAB: CPT

## 2017-06-14 PROCEDURE — 86880 COOMBS TEST DIRECT: CPT

## 2017-06-14 PROCEDURE — 87086 URINE CULTURE/COLONY COUNT: CPT

## 2017-06-14 PROCEDURE — 83735 ASSAY OF MAGNESIUM: CPT

## 2017-06-14 PROCEDURE — 86147 CARDIOLIPIN ANTIBODY EA IG: CPT

## 2017-06-14 PROCEDURE — 83655 ASSAY OF LEAD: CPT

## 2017-06-14 PROCEDURE — 82272 OCCULT BLD FECES 1-3 TESTS: CPT

## 2017-06-14 PROCEDURE — 86255 FLUORESCENT ANTIBODY SCREEN: CPT

## 2017-06-14 PROCEDURE — 86900 BLOOD TYPING SEROLOGIC ABO: CPT

## 2017-06-14 PROCEDURE — 82728 ASSAY OF FERRITIN: CPT

## 2017-06-14 PROCEDURE — 85045 AUTOMATED RETICULOCYTE COUNT: CPT

## 2017-06-14 PROCEDURE — 93005 ELECTROCARDIOGRAM TRACING: CPT

## 2017-06-14 PROCEDURE — 99233 SBSQ HOSP IP/OBS HIGH 50: CPT

## 2017-06-14 PROCEDURE — 86850 RBC ANTIBODY SCREEN: CPT

## 2017-06-14 PROCEDURE — A9500: CPT

## 2017-06-14 PROCEDURE — 82533 TOTAL CORTISOL: CPT

## 2017-06-14 PROCEDURE — 83010 ASSAY OF HAPTOGLOBIN QUANT: CPT

## 2017-06-14 PROCEDURE — 82570 ASSAY OF URINE CREATININE: CPT

## 2017-06-14 PROCEDURE — 99285 EMERGENCY DEPT VISIT HI MDM: CPT | Mod: 25

## 2017-06-14 PROCEDURE — 80048 BASIC METABOLIC PNL TOTAL CA: CPT

## 2017-06-14 PROCEDURE — 82088 ASSAY OF ALDOSTERONE: CPT

## 2017-06-14 PROCEDURE — 86160 COMPLEMENT ANTIGEN: CPT

## 2017-06-14 PROCEDURE — 99239 HOSP IP/OBS DSCHRG MGMT >30: CPT

## 2017-06-14 PROCEDURE — 83497 ASSAY OF 5-HIAA: CPT

## 2017-06-14 PROCEDURE — 78452 HT MUSCLE IMAGE SPECT MULT: CPT

## 2017-06-14 RX ORDER — ONDANSETRON 8 MG/1
1 TABLET, FILM COATED ORAL
Qty: 45 | Refills: 0 | OUTPATIENT
Start: 2017-06-14 | End: 2017-06-29

## 2017-06-14 RX ORDER — DILTIAZEM HCL 120 MG
1 CAPSULE, EXT RELEASE 24 HR ORAL
Qty: 0 | Refills: 0 | COMMUNITY

## 2017-06-14 RX ORDER — ALPRAZOLAM 0.25 MG
0 TABLET ORAL
Qty: 0 | Refills: 0 | COMMUNITY

## 2017-06-14 RX ORDER — DILTIAZEM HCL 120 MG
1 CAPSULE, EXT RELEASE 24 HR ORAL
Qty: 30 | Refills: 0 | OUTPATIENT
Start: 2017-06-14 | End: 2017-07-14

## 2017-06-14 RX ORDER — FAMOTIDINE 10 MG/ML
1 INJECTION INTRAVENOUS
Qty: 60 | Refills: 0 | OUTPATIENT
Start: 2017-06-14 | End: 2017-07-14

## 2017-06-14 RX ORDER — ESCITALOPRAM OXALATE 10 MG/1
1 TABLET, FILM COATED ORAL
Qty: 15 | Refills: 0 | OUTPATIENT
Start: 2017-06-14 | End: 2017-06-29

## 2017-06-14 RX ORDER — CHLORHEXIDINE GLUCONATE 213 G/1000ML
15 SOLUTION TOPICAL
Qty: 1 | Refills: 0 | OUTPATIENT
Start: 2017-06-14 | End: 2017-07-14

## 2017-06-14 RX ORDER — SIMETHICONE 80 MG/1
1 TABLET, CHEWABLE ORAL
Qty: 30 | Refills: 0 | OUTPATIENT
Start: 2017-06-14 | End: 2017-06-24

## 2017-06-14 RX ADMIN — FAMOTIDINE 20 MILLIGRAM(S): 10 INJECTION INTRAVENOUS at 17:24

## 2017-06-14 RX ADMIN — CHLORHEXIDINE GLUCONATE 15 MILLILITER(S): 213 SOLUTION TOPICAL at 11:57

## 2017-06-14 RX ADMIN — ESCITALOPRAM OXALATE 5 MILLIGRAM(S): 10 TABLET, FILM COATED ORAL at 11:02

## 2017-06-14 RX ADMIN — SIMETHICONE 80 MILLIGRAM(S): 80 TABLET, CHEWABLE ORAL at 06:00

## 2017-06-14 RX ADMIN — Medication 180 MILLIGRAM(S): at 06:07

## 2017-06-14 RX ADMIN — SIMETHICONE 80 MILLIGRAM(S): 80 TABLET, CHEWABLE ORAL at 17:24

## 2017-06-14 RX ADMIN — Medication 0.5 MILLIGRAM(S): at 15:51

## 2017-06-14 RX ADMIN — FAMOTIDINE 20 MILLIGRAM(S): 10 INJECTION INTRAVENOUS at 06:07

## 2017-06-14 RX ADMIN — SIMETHICONE 80 MILLIGRAM(S): 80 TABLET, CHEWABLE ORAL at 00:18

## 2017-06-14 RX ADMIN — SIMETHICONE 80 MILLIGRAM(S): 80 TABLET, CHEWABLE ORAL at 11:02

## 2017-06-14 RX ADMIN — Medication 1 MILLIGRAM(S): at 06:07

## 2017-06-14 NOTE — PROGRESS NOTE BEHAVIORAL HEALTH - NSBHCHARTREVIEWLAB_PSY_A_CORE FT
12.1   4.6   )-----------( 71       ( 08 Jun 2017 06:49 )             35.7       06-08    144  |  106  |  4<L>  ----------------------------<  103<H>  3.7   |  24  |  0.89    Ca    8.8      08 Jun 2017 06:48
12.5   5.36  )-----------( 104      ( 12 Jun 2017 08:36 )             37.4       06-12    141  |  103  |  4<L>  ----------------------------<  96  4.2   |  21<L>  |  1.06    Ca    9.7      12 Jun 2017 08:36
13.0   5.70  )-----------( 115      ( 13 Jun 2017 10:39 )             38.1       06-13    140  |  101  |  3<L>  ----------------------------<  98  4.4   |  26  |  1.03    Ca    9.5      13 Jun 2017 10:39

## 2017-06-14 NOTE — PROGRESS NOTE BEHAVIORAL HEALTH - SUMMARY
41y.o Female with panic disorder/anxiety, in therapy with HTN, tachycardia, and resultant inc anxiety levels. pt was started on Lexapro and Ativan, tolerating it well. denies any si/hi/i/p, sleeping better, no a/v/h.spoke to pt in detail about her different options including psychiatric Day hospital and partial hospital program. Explained that there are no internal medicine doctors on staff there. Also discussed the possibility of VNS or psychiatric home care.   spoke with parents at length, who feel that pt is afraid of being home alone and she has anxiety, provided education about what psychiatric services are available and criteria for psychiatric admission.  pt does not meet criteria for psychiatric admission and can be discharged with OP follow up--will see her OP therapists and her PMD for medication management. would also benefit from family therapy
41y.o Female with panic disorder/anxiety, in therapy with HTN, tachycardia, and resultant inc anxiety levels. pt was started on Lexapro and Ativan, tolerating it well. denies any si/hi/i/p, sleeping better, no a/v/h.
41y.o F with anxiety and multiple medical problems with worsening anxiety and panic attacks in context of ongoing medical issues. Pt. denies si/hi/i/p, no a/v/h. pt is doing better on Ativan but reluctant to start Lexapro until cardiology approves of it.  would c/w Ativan 1mg BID, if ok with cardiology, initiate Lexapro 5mg daily   pt can f/u with therapist and psychoanalyst

## 2017-06-14 NOTE — PROGRESS NOTE ADULT - PROBLEM SELECTOR PLAN 4
- patient with significant anxiety over recent symptoms with recent significant life stressors within the past year  - seen by psychiatry -  continue lexapro, ativan  -I spoke with Dr Sparks.  Patient does not qualify for inpatient treatment as she requests.  Should continue outpatient psychiatry follow up for ongoing adjustment of medications.

## 2017-06-14 NOTE — PROGRESS NOTE BEHAVIORAL HEALTH - NSBHFUPINTERVALHXFT_PSY_A_CORE
pt was tearful at first due to just having had a fight with her mom on the phone. Pt. stated that Ativan definitely helped with anxiety but she is still hesitant about starting Lexapro and wanted writer to contact the Cardiologist. Pt. denies any si/h/i/p, sleeping better.
pt seen, chart reviewed. pt is reporting intermittent anxiety usually in context of her tachycardia. pt. denies any si/hi/i/p. has been getting an additional PRN of Ativan which she reports makes her loopy. pt was provided with psychoeducation about anxiety, SSRIs and Benzos (addictive potential, withdrawal).
pt seen, chart reviewed. pt is reporting intermittent anxiety usually in context of her tachycardia. pt. denies any si/hi/i/p. Pt. hasn't been getting the PRNs of Ativan, as it makes her loopy. pt was provided with psychoeducation about anxiety, SSRIs and Benzos (addictive potential, withdrawal). spoke to pt in detail about her different options including psychiatric Day hospital and partial hospital program. Explained that there are no internal medicine doctors on staff there. Also discussed the possibility of VNS or psychiatric home care.   spoke with parents at length, who feel that pt is afraid of being home alone and she has anxiety, provided education about what psychiatric services are available and criteria for psychiatric admission.

## 2017-06-14 NOTE — PROGRESS NOTE BEHAVIORAL HEALTH - NSBHCHARTREVIEWVS_PSY_A_CORE FT
T(C): 36.8, Max: 37 (06-07 @ 14:13)  HR: 95 (72 - 98)  BP: 134/90 (123/90 - 140/90)  RR: 18 (16 - 19)  SpO2: 100% (97% - 100%)  Wt(kg): --
T(C): 36.8, Max: 36.9 (06-11 @ 20:15)  HR: 86 (78 - 86)  BP: 124/85 (122/83 - 132/86)  RR: 19 (18 - 19)  SpO2: 98% (97% - 98%)  Wt(kg): --
T(C): 36.7, Max: 37 (06-13 @ 20:05)  HR: 92 (86 - 96)  BP: 122/82 (122/82 - 133/89)  RR: 18 (18 - 18)  SpO2: 96% (94% - 97%)  Wt(kg): --

## 2017-06-14 NOTE — PROGRESS NOTE BEHAVIORAL HEALTH - NSBHATTESTSEENBY_PSY_A_CORE
attending Psychiatrist without NP/Trainee

## 2017-06-14 NOTE — PROGRESS NOTE BEHAVIORAL HEALTH - NSBHCONSULTFOLLOWAFTERCARE_PSY_A_CORE FT
Pt. will f/u with her 2 therapists and will see her PMD for above meds
Pt. will f/u with her 2 therapists and will see her PMD for above meds
would c/w Ativan 1mg BID, if ok with cardiology, initiate Lexapro 5mg daily   pt can f/u with therapist and psychoanalyst

## 2017-06-14 NOTE — PROGRESS NOTE ADULT - PROBLEM SELECTOR PROBLEM 2
Diarrhea in adult patient
Labile blood pressure
History of ITP
Anxiety
Labile blood pressure

## 2017-06-14 NOTE — PROGRESS NOTE BEHAVIORAL HEALTH - NSBHCONSFOLLOWNEEDS_PSY_A_CORE
no psychiatric contraindications to discharge

## 2017-06-14 NOTE — PROGRESS NOTE ADULT - PROBLEM SELECTOR PROBLEM 9
Asthma
Need for prophylactic measure

## 2017-06-14 NOTE — PROGRESS NOTE ADULT - PROBLEM SELECTOR PLAN 2
- stable BP for several days  - continue Cardizem   - plan for outpatient autonomic dysfunction work up

## 2017-06-14 NOTE — PROGRESS NOTE BEHAVIORAL HEALTH - NSBHCONSULTMEDS_PSY_A_CORE FT
c/w Lexapro 5mg daily and titrate up as needed, c/w Ativan 1mg bid and PRN,  after taking SSRI for 1 month, pt advised to lower Ativan to 1mg/day, after another week to 0.5mg/day then d/c. Pt understood and agreed with plan.
c/w Lexapro 5mg daily and titrate up as needed, c/w Ativan 1mg bid and PRN, if pt is consistently using a PRN of Ativan, after taking SSRI for 1 month, pt advised to lower Ativan to 2mg/day, after another week, to 1mg/day, after another week to 0.5mg/day then d/c. Pt understood and agreed with plan.
would c/w Ativan 1mg BID, if ok with cardiology, initiate Lexapro 5mg daily   pt can f/u with therapist and psychoanalyst

## 2017-06-14 NOTE — PROGRESS NOTE ADULT - PROBLEM SELECTOR PROBLEM 1
Generalized abdominal pain
Atypical chest pain
Generalized abdominal pain
MARY BETH positive
Atypical chest pain
Labile blood pressure
Palpitations

## 2017-06-14 NOTE — PROGRESS NOTE BEHAVIORAL HEALTH - RISK ASSESSMENT
low risk, pt has no si/hi/i/p, no psychosis or etoh abuse.
low risk, pt has no si/hi/i/p, no psychosis or etoh abuse.
no prior suicide attempts, good insight into need for treatment. no psychosis or jessica, no drug abuse.

## 2017-06-14 NOTE — PROGRESS NOTE ADULT - SUBJECTIVE AND OBJECTIVE BOX
Patient is a 41y old  Female who presents with a chief complaint of hypertension (07 Jun 2017 14:04)      INTERVAL HPI/OVERNIGHT EVENTS:  appetite overall fair  +BMs, no diarrhea  no rectal bleed or melena  continued bloating  no vomiting    MEDICATIONS  (STANDING):  montelukast 10milliGRAM(s) Oral daily  fluticasone propionate 50 MICROgram(s)/spray Nasal Spray 1Spray(s) Both Nostrils two times a day  famotidine    Tablet 20milliGRAM(s) Oral two times a day  escitalopram 5milliGRAM(s) Oral daily  diltiazem   CD 180milliGRAM(s) Oral daily  chlorhexidine 0.12% Liquid 15milliLiter(s) Swish and Spit two times a day  simethicone 80milliGRAM(s) Chew every 6 hours  LORazepam     Tablet 1milliGRAM(s) Oral two times a day    MEDICATIONS  (PRN):  ondansetron Injectable 4milliGRAM(s) IV Push every 8 hours PRN Nausea and/or Vomiting  diphenhydrAMINE   Capsule 25milliGRAM(s) Oral every 6 hours PRN Rash and/or Itching  LORazepam     Tablet 1milliGRAM(s) Oral every 6 hours PRN Anxiety      Allergies    No Known Allergies      Review of Systems:  General:  No wt loss, fevers, chills, night sweats, fatigue  ENT:  No sore throat, pain, runny nose, dysphagia  CV:  No chest pain, palpitations, ALCANTAR  Resp:  No dyspnea, cough, tachypnea, wheezing  Neuro:  No focal weakness, headache, vision changes  Heme:  No petechiae, ecchymosis, easy bruisability    Vital Signs Last 24 Hrs  T(C): 36.7, Max: 37 (06-13 @ 20:05)  T(F): 98, Max: 98.6 (06-13 @ 20:05)  HR: 92 (86 - 96)  BP: 122/82 (122/82 - 133/89)  BP(mean): --  RR: 18 (18 - 18)  SpO2: 96% (94% - 97%)    PHYSICAL EXAM:  Constitutional: NAD, well-developed , appears comfortable  Neck: No JVD  Respiratory: grossly clear  Cardiovascular: S1 and S2  Gastrointestinal: +BS x4, soft, NT/ND  Extremities: No peripheral edema, neg clubbing, cyanosis  Vascular: 2+ peripheral pulses  Neurological: no focal asymmetry  Skin: No rashes, anicteric    LABS:                        13.0   5.70  )-----------( 115      ( 13 Jun 2017 10:39 )             38.1     06-13    140  |  101  |  3<L>  ----------------------------<  98  4.4   |  26  |  1.03    Ca    9.5      13 Jun 2017 10:39    Occult Blood, Feces (06.13.17 @ 07:43)    Occult Blood, Feces: Negative        RADIOLOGY & ADDITIONAL TESTS:

## 2017-06-14 NOTE — PROGRESS NOTE ADULT - PROBLEM SELECTOR PLAN 9
- c/w Singulair
- patient low risk, SCDs in setting of thrombocytopenia
low risk, no ac as thrombocytopenia
- patient low risk, SCDs in setting of thrombocytopenia

## 2017-06-14 NOTE — PROGRESS NOTE ADULT - SUBJECTIVE AND OBJECTIVE BOX
Patient is a 41y old  Female who presents with a chief complaint of hypertension (07 Jun 2017 14:04)      SUBJECTIVE / OVERNIGHT EVENTS:  feels less anxious today but concerned about not being monitored at home  no cp/sob  eating better    telemetry reviewed: .  Episodes of sinus tach to 130 are brief and self sustained to <1 min    MEDICATIONS  (STANDING):  montelukast 10milliGRAM(s) Oral daily  fluticasone propionate 50 MICROgram(s)/spray Nasal Spray 1Spray(s) Both Nostrils two times a day  famotidine    Tablet 20milliGRAM(s) Oral two times a day  escitalopram 5milliGRAM(s) Oral daily  diltiazem   CD 180milliGRAM(s) Oral daily  chlorhexidine 0.12% Liquid 15milliLiter(s) Swish and Spit two times a day  simethicone 80milliGRAM(s) Chew every 6 hours  LORazepam     Tablet 1milliGRAM(s) Oral two times a day    MEDICATIONS  (PRN):  ondansetron Injectable 4milliGRAM(s) IV Push every 8 hours PRN Nausea and/or Vomiting  diphenhydrAMINE   Capsule 25milliGRAM(s) Oral every 6 hours PRN Rash and/or Itching  LORazepam     Tablet 1milliGRAM(s) Oral every 6 hours PRN Anxiety      Vital Signs Last 24 Hrs  T(C): 36.7, Max: 37 (06-13 @ 20:05)  HR: 92 (86 - 96)  BP: 122/82 (122/82 - 133/89)  RR: 18 (18 - 18)  SpO2: 96% (94% - 97%)  Wt(kg): --  CAPILLARY BLOOD GLUCOSE    I&O's Summary  I & Os for 24h ending 14 Jun 2017 07:00  =============================================  IN: 800 ml / OUT: 2300 ml / NET: -1500 ml    I & Os for current day (as of 14 Jun 2017 11:44)  =============================================  IN: 0 ml / OUT: 600 ml / NET: -600 ml      PHYSICAL EXAM:  GENERAL: NAD, well-developed  HEAD:  Atraumatic, Normocephalic  EYES: EOMI, conjunctiva and sclera clear  NECK: Supple, No JVD  CHEST/LUNG: Clear to auscultation bilaterally; No wheeze  HEART: Regular rate and rhythm; No murmurs, rubs, or gallops  ABDOMEN: Soft, Nontender, Nondistended; Bowel sounds present  EXTREMITIES:  2+ Peripheral Pulses, No clubbing, cyanosis, or edema  PSYCH: AAOx3, anxious  NEUROLOGY: non-focal  SKIN: No rashes or lesions    LABS:                        13.0   5.70  )-----------( 115      ( 13 Jun 2017 10:39 )             38.1     06-13    140  |  101  |  3<L>  ----------------------------<  98  4.4   |  26  |  1.03    Ca    9.5      13 Jun 2017 10:39                RADIOLOGY & ADDITIONAL TESTS:    Imaging Personally Reviewed:    Consultant(s) Notes Reviewed:  cardiology, GI    Care Discussed with Consultants/Other Providers: Dr Sparks, psychiatry

## 2017-06-14 NOTE — PROGRESS NOTE ADULT - ASSESSMENT
40 yo F w/PMH of asthma, anxiety, ITP (stable, not on treatment currently) who p/w labile blood pressures, palpitations and non-exertional left-sided chest pain a/w hypertension and atypical chest pain, undergoing autonomic dysfunction work up for discharge today with outpatient follow up.

## 2017-06-14 NOTE — PROGRESS NOTE ADULT - ASSESSMENT
40 yo female with multiple medical problems noted to have labile BP issues that started after having abdominal pain, diarrhea and weight loss.  Noted to have borderline elevated IBD serologies.  Although MRE was negative, pt may have microscopic colitis, PUD/gastritis    Case discussed with Primary GI Dr Gilbert - outpatient celiac serologies NEGATIVE (2016)    She is currently refusing GI w/u but agreeable to pursuing as outpatient once BP more controlled. There is no further diarrhea.    Continue Pepcid for dyspepsia & Simethicone and prn laxatives     Further care per primary team.  No GI objection to discharge planning - follow-up with Dr. Gilbert in office within 2 weeks ([phone])    Jermaine Beebe PA-C    [facility]  [phone]

## 2017-06-14 NOTE — PROGRESS NOTE BEHAVIORAL HEALTH - NSBHCONSULTRECOMMENDOTHER_PSY_A_CORE FT
spoke to SW and primary team about different options for pt including Day hospital and PHP at [facility], as there are no internal medicine physicians on staff there, pt was reluctant to entertain those possibilities, also advised to had VNS or HHA come see pt at home.

## 2017-06-14 NOTE — PROGRESS NOTE BEHAVIORAL HEALTH - NSBHCHARTREVIEWINVESTIGATE_PSY_A_CORE FT
Ventricular Rate 80 BPM    Atrial Rate 80 BPM    P-R Interval 124 ms    QRS Duration 78 ms     ms    QTc 429 ms    P Axis 50 degrees    R Axis 64 degrees    T Axis 31 degrees    Diagnosis Line NORMAL SINUS RHYTHM  LOW VOLTAGE QRS  BORDERLINE ECG
Ventricular Rate 87 BPM    Atrial Rate 87 BPM    P-R Interval 120 ms    QRS Duration 88 ms     ms    QTc 421 ms    P Axis 48 degrees    R Axis 19 degrees    T Axis 35 degrees    Diagnosis Line NORMAL SINUS RHYTHM WITH SINUS ARRHYTHMIA  NORMALECG
Ventricular Rate 80 BPM    Atrial Rate 80 BPM    P-R Interval 124 ms    QRS Duration 78 ms     ms    QTc 429 ms    P Axis 50 degrees    R Axis 64 degrees    T Axis 31 degrees    Diagnosis Line NORMAL SINUS RHYTHM  LOW VOLTAGE QRS  BORDERLINE ECG

## 2017-06-14 NOTE — PROGRESS NOTE BEHAVIORAL HEALTH - NSBHFUPINTERVALCCFT_PSY_A_CORE
"I have been less anxious"
"I am still getting anxious"
"I don't know if you can tell my parents that I am alright but want someone to be checking in on me at home"

## 2017-06-14 NOTE — PROGRESS NOTE ADULT - PROVIDER SPECIALTY LIST ADULT
Anesthesia
Cardiology
Endocrinology
Gastroenterology
Hospitalist
Internal Medicine
Neurology
Neurology
Rheumatology
Gastroenterology

## 2017-06-14 NOTE — PROGRESS NOTE ADULT - PROBLEM SELECTOR PLAN 1
resolved, s/p normal stress test.  Likely anxiety driven.  Discussed with patient, father, and mother at bedside.    Continue current diltiazem.  Tachycardia is not sustained and does not require ongoing monitoring.  Cleared by cardiology for discharge home with outpatient f/u.

## 2017-06-15 LAB — LEAD BLD-MCNC: 1 UG/DL — SIGNIFICANT CHANGE UP (ref 0–19)

## 2017-06-16 LAB
METANEPH 24H UR-MRATE: SIGNIFICANT CHANGE UP
METANEPH UR-MCNC: SIGNIFICANT CHANGE UP

## 2017-06-19 LAB
HOMOCYSTEINE LEVEL: 10 UMOL/L — SIGNIFICANT CHANGE UP
METHYLMALONIC ACID LEVEL: 146 NMOL/L — SIGNIFICANT CHANGE UP (ref 87–318)

## 2017-06-20 LAB
MISCELLANEOUS TEST NAME: SIGNIFICANT CHANGE UP
MISCELLANEOUS, NORMALS: SIGNIFICANT CHANGE UP
MISCELLANEOUS, RESULT: SIGNIFICANT CHANGE UP

## 2017-06-21 RX ORDER — ALPRAZOLAM 0.5 MG/1
0.5 TABLET ORAL
Qty: 60 | Refills: 0 | Status: COMPLETED | COMMUNITY
Start: 2017-05-06 | End: 2017-06-21

## 2017-06-21 RX ORDER — DILTIAZEM HYDROCHLORIDE 120 MG/1
120 CAPSULE, EXTENDED RELEASE ORAL
Qty: 30 | Refills: 1 | Status: COMPLETED | COMMUNITY
Start: 2017-05-01 | End: 2017-06-21

## 2017-06-21 RX ORDER — CLONAZEPAM 0.5 MG/1
0.5 TABLET ORAL
Qty: 120 | Refills: 0 | Status: COMPLETED | COMMUNITY
Start: 2017-05-31 | End: 2017-06-21

## 2017-06-22 ENCOUNTER — OUTPATIENT (OUTPATIENT)
Dept: OUTPATIENT SERVICES | Facility: HOSPITAL | Age: 42
LOS: 1 days | Discharge: PSYCHIATRIC OR SUBSTANCE USE FACILITY | End: 2017-06-22

## 2017-06-22 LAB — PARANEOPLASTIC AB PNL SER: SIGNIFICANT CHANGE UP

## 2017-06-23 DIAGNOSIS — F41.9 ANXIETY DISORDER, UNSPECIFIED: ICD-10-CM

## 2017-06-27 LAB
ALBUMIN SERPL ELPH-MCNC: 4.8 G/DL — SIGNIFICANT CHANGE UP (ref 3.3–5)
ALP SERPL-CCNC: 72 U/L — SIGNIFICANT CHANGE UP (ref 40–120)
ALT FLD-CCNC: 19 U/L — SIGNIFICANT CHANGE UP (ref 4–33)
ANISOCYTOSIS BLD QL: SLIGHT — SIGNIFICANT CHANGE UP
APPEARANCE UR: CLEAR — SIGNIFICANT CHANGE UP
AST SERPL-CCNC: 24 U/L — SIGNIFICANT CHANGE UP (ref 4–32)
BACTERIA # UR AUTO: SIGNIFICANT CHANGE UP
BASOPHILS # BLD AUTO: 0.01 K/UL — SIGNIFICANT CHANGE UP (ref 0–0.2)
BASOPHILS NFR BLD AUTO: 0.1 % — SIGNIFICANT CHANGE UP (ref 0–2)
BILIRUB SERPL-MCNC: 0.3 MG/DL — SIGNIFICANT CHANGE UP (ref 0.2–1.2)
BILIRUB UR-MCNC: NEGATIVE — SIGNIFICANT CHANGE UP
BLOOD UR QL VISUAL: NEGATIVE — SIGNIFICANT CHANGE UP
BUN SERPL-MCNC: 7 MG/DL — SIGNIFICANT CHANGE UP (ref 7–23)
CALCIUM SERPL-MCNC: 9.5 MG/DL — SIGNIFICANT CHANGE UP (ref 8.4–10.5)
CHLORIDE SERPL-SCNC: 100 MMOL/L — SIGNIFICANT CHANGE UP (ref 98–107)
CO2 SERPL-SCNC: 24 MMOL/L — SIGNIFICANT CHANGE UP (ref 22–31)
COLOR SPEC: SIGNIFICANT CHANGE UP
CORTIS SERPL-MCNC: 16.7 UG/DL — SIGNIFICANT CHANGE UP (ref 2.7–18.4)
CREAT SERPL-MCNC: 0.92 MG/DL — SIGNIFICANT CHANGE UP (ref 0.5–1.3)
EOSINOPHIL # BLD AUTO: 0.06 K/UL — SIGNIFICANT CHANGE UP (ref 0–0.5)
EOSINOPHIL NFR BLD AUTO: 0.7 % — SIGNIFICANT CHANGE UP (ref 0–6)
GLUCOSE SERPL-MCNC: 114 MG/DL — HIGH (ref 70–99)
GLUCOSE UR-MCNC: NEGATIVE — SIGNIFICANT CHANGE UP
HCT VFR BLD CALC: 40.4 % — SIGNIFICANT CHANGE UP (ref 34.5–45)
HGB BLD-MCNC: 13.9 G/DL — SIGNIFICANT CHANGE UP (ref 11.5–15.5)
IMM GRANULOCYTES NFR BLD AUTO: 0.5 % — SIGNIFICANT CHANGE UP (ref 0–1.5)
INR BLD: 0.96 — SIGNIFICANT CHANGE UP (ref 0.88–1.17)
IRON SATN MFR SERPL: 47 UG/DL — SIGNIFICANT CHANGE UP (ref 30–160)
KETONES UR-MCNC: NEGATIVE — SIGNIFICANT CHANGE UP
LEUKOCYTE ESTERASE UR-ACNC: NEGATIVE — SIGNIFICANT CHANGE UP
LG PLATELETS BLD QL AUTO: SLIGHT — SIGNIFICANT CHANGE UP
LYMPHOCYTES # BLD AUTO: 2.48 K/UL — SIGNIFICANT CHANGE UP (ref 1–3.3)
LYMPHOCYTES # BLD AUTO: 28.5 % — SIGNIFICANT CHANGE UP (ref 13–44)
MANUAL SMEAR VERIFICATION: SIGNIFICANT CHANGE UP
MCHC RBC-ENTMCNC: 30 PG — SIGNIFICANT CHANGE UP (ref 27–34)
MCHC RBC-ENTMCNC: 34.4 % — SIGNIFICANT CHANGE UP (ref 32–36)
MCV RBC AUTO: 87.1 FL — SIGNIFICANT CHANGE UP (ref 80–100)
MONOCYTES # BLD AUTO: 0.67 K/UL — SIGNIFICANT CHANGE UP (ref 0–0.9)
MONOCYTES NFR BLD AUTO: 7.7 % — SIGNIFICANT CHANGE UP (ref 2–14)
NEUTROPHILS # BLD AUTO: 5.44 K/UL — SIGNIFICANT CHANGE UP (ref 1.8–7.4)
NEUTROPHILS NFR BLD AUTO: 62.5 % — SIGNIFICANT CHANGE UP (ref 43–77)
NITRITE UR-MCNC: NEGATIVE — SIGNIFICANT CHANGE UP
PH UR: 7 — SIGNIFICANT CHANGE UP (ref 4.6–8)
PLATELET # BLD AUTO: 64 K/UL — LOW (ref 150–400)
PLATELET COUNT - ESTIMATE: SIGNIFICANT CHANGE UP
PMV BLD: SIGNIFICANT CHANGE UP FL (ref 7–13)
POTASSIUM SERPL-MCNC: 3.6 MMOL/L — SIGNIFICANT CHANGE UP (ref 3.5–5.3)
POTASSIUM SERPL-SCNC: 3.6 MMOL/L — SIGNIFICANT CHANGE UP (ref 3.5–5.3)
PROT SERPL-MCNC: 7.5 G/DL — SIGNIFICANT CHANGE UP (ref 6–8.3)
PROT UR-MCNC: NEGATIVE — SIGNIFICANT CHANGE UP
PROTHROM AB SERPL-ACNC: 10.8 SEC — SIGNIFICANT CHANGE UP (ref 9.8–13.1)
RBC # BLD: 4.64 M/UL — SIGNIFICANT CHANGE UP (ref 3.8–5.2)
RBC # FLD: 12.9 % — SIGNIFICANT CHANGE UP (ref 10.3–14.5)
RBC CASTS # UR COMP ASSIST: SIGNIFICANT CHANGE UP (ref 0–?)
SODIUM SERPL-SCNC: 140 MMOL/L — SIGNIFICANT CHANGE UP (ref 135–145)
SP GR SPEC: 1 — LOW (ref 1–1.03)
SQUAMOUS # UR AUTO: SIGNIFICANT CHANGE UP
T4 FREE SERPL-MCNC: 1.03 NG/DL — SIGNIFICANT CHANGE UP (ref 0.9–1.8)
TSH SERPL-MCNC: 1.31 UIU/ML — SIGNIFICANT CHANGE UP (ref 0.27–4.2)
UROBILINOGEN FLD QL: NORMAL E.U. — SIGNIFICANT CHANGE UP (ref 0.1–0.2)
WBC # BLD: 8.7 K/UL — SIGNIFICANT CHANGE UP (ref 3.8–10.5)
WBC # FLD AUTO: 8.7 K/UL — SIGNIFICANT CHANGE UP (ref 3.8–10.5)

## 2017-06-28 LAB
MISCELLANEOUS TEST NAME: SIGNIFICANT CHANGE UP
MISCELLANEOUS TEST NAME: SIGNIFICANT CHANGE UP
MISCELLANEOUS, NORMALS: SIGNIFICANT CHANGE UP
MISCELLANEOUS, RESULT: SIGNIFICANT CHANGE UP

## 2017-06-29 ENCOUNTER — APPOINTMENT (OUTPATIENT)
Dept: INTERNAL MEDICINE | Facility: CLINIC | Age: 42
End: 2017-06-29

## 2017-06-29 VITALS
DIASTOLIC BLOOD PRESSURE: 80 MMHG | HEIGHT: 62 IN | HEART RATE: 70 BPM | BODY MASS INDEX: 21.71 KG/M2 | SYSTOLIC BLOOD PRESSURE: 118 MMHG | TEMPERATURE: 98.3 F | OXYGEN SATURATION: 98 % | WEIGHT: 118 LBS

## 2017-06-29 DIAGNOSIS — Z23 ENCOUNTER FOR IMMUNIZATION: ICD-10-CM

## 2017-06-29 RX ORDER — RIFAXIMIN 550 MG/1
550 TABLET ORAL
Refills: 0 | Status: DISCONTINUED | COMMUNITY
End: 2017-06-29

## 2017-06-29 RX ORDER — LORAZEPAM 1 MG/1
1 TABLET ORAL
Qty: 180 | Refills: 0 | Status: DISCONTINUED | COMMUNITY
Start: 2017-06-21 | End: 2017-06-29

## 2017-07-04 ENCOUNTER — RESULT CHARGE (OUTPATIENT)
Age: 42
End: 2017-07-04

## 2017-07-05 ENCOUNTER — NON-APPOINTMENT (OUTPATIENT)
Age: 42
End: 2017-07-05

## 2017-07-05 ENCOUNTER — APPOINTMENT (OUTPATIENT)
Dept: CARDIOLOGY | Facility: CLINIC | Age: 42
End: 2017-07-05

## 2017-07-05 VITALS
WEIGHT: 118 LBS | OXYGEN SATURATION: 100 % | HEIGHT: 62 IN | SYSTOLIC BLOOD PRESSURE: 123 MMHG | BODY MASS INDEX: 21.71 KG/M2 | DIASTOLIC BLOOD PRESSURE: 84 MMHG | HEART RATE: 73 BPM

## 2017-07-06 LAB
BASOPHILS # BLD AUTO: 0.01 K/UL
BASOPHILS # BLD AUTO: 0.02 K/UL — SIGNIFICANT CHANGE UP (ref 0–0.2)
BASOPHILS NFR BLD AUTO: 0.2 %
BASOPHILS NFR BLD AUTO: 0.3 % — SIGNIFICANT CHANGE UP (ref 0–2)
EOSINOPHIL # BLD AUTO: 0.09 K/UL
EOSINOPHIL # BLD AUTO: 0.09 K/UL — SIGNIFICANT CHANGE UP (ref 0–0.5)
EOSINOPHIL NFR BLD AUTO: 1.5 % — SIGNIFICANT CHANGE UP (ref 0–6)
EOSINOPHIL NFR BLD AUTO: 2 %
HCT VFR BLD CALC: 36.9 %
HCT VFR BLD CALC: 39.7 % — SIGNIFICANT CHANGE UP (ref 34.5–45)
HGB BLD-MCNC: 12.7 G/DL
HGB BLD-MCNC: 13.1 G/DL — SIGNIFICANT CHANGE UP (ref 11.5–15.5)
IMM GRANULOCYTES # BLD AUTO: 0.01 # — SIGNIFICANT CHANGE UP
IMM GRANULOCYTES NFR BLD AUTO: 0.2 %
IMM GRANULOCYTES NFR BLD AUTO: 0.2 % — SIGNIFICANT CHANGE UP (ref 0–1.5)
INR BLD: 0.98 — SIGNIFICANT CHANGE UP (ref 0.88–1.17)
LYMPHOCYTES # BLD AUTO: 1.59 K/UL
LYMPHOCYTES # BLD AUTO: 1.89 K/UL — SIGNIFICANT CHANGE UP (ref 1–3.3)
LYMPHOCYTES # BLD AUTO: 32 % — SIGNIFICANT CHANGE UP (ref 13–44)
LYMPHOCYTES NFR BLD AUTO: 35.7 %
MAN DIFF?: NORMAL
MCHC RBC-ENTMCNC: 29 PG — SIGNIFICANT CHANGE UP (ref 27–34)
MCHC RBC-ENTMCNC: 30 PG
MCHC RBC-ENTMCNC: 33 % — SIGNIFICANT CHANGE UP (ref 32–36)
MCHC RBC-ENTMCNC: 34.4 GM/DL
MCV RBC AUTO: 87 FL
MCV RBC AUTO: 87.8 FL — SIGNIFICANT CHANGE UP (ref 80–100)
MONOCYTES # BLD AUTO: 0.44 K/UL
MONOCYTES # BLD AUTO: 0.46 K/UL — SIGNIFICANT CHANGE UP (ref 0–0.9)
MONOCYTES NFR BLD AUTO: 7.8 % — SIGNIFICANT CHANGE UP (ref 2–14)
MONOCYTES NFR BLD AUTO: 9.9 %
NEUTROPHILS # BLD AUTO: 2.32 K/UL
NEUTROPHILS # BLD AUTO: 3.43 K/UL — SIGNIFICANT CHANGE UP (ref 1.8–7.4)
NEUTROPHILS NFR BLD AUTO: 52 %
NEUTROPHILS NFR BLD AUTO: 58.2 % — SIGNIFICANT CHANGE UP (ref 43–77)
NRBC # FLD: 0 — SIGNIFICANT CHANGE UP
PLATELET # BLD AUTO: 79 K/UL
PLATELET # BLD AUTO: 81 K/UL — LOW (ref 150–400)
PMV BLD: 15.8 FL — HIGH (ref 7–13)
PROTHROM AB SERPL-ACNC: 11 SEC — SIGNIFICANT CHANGE UP (ref 9.8–13.1)
RBC # BLD: 4.24 M/UL
RBC # BLD: 4.52 M/UL — SIGNIFICANT CHANGE UP (ref 3.8–5.2)
RBC # FLD: 13 % — SIGNIFICANT CHANGE UP (ref 10.3–14.5)
RBC # FLD: 13.1 %
WBC # BLD: 5.9 K/UL — SIGNIFICANT CHANGE UP (ref 3.8–10.5)
WBC # FLD AUTO: 4.46 K/UL
WBC # FLD AUTO: 5.9 K/UL — SIGNIFICANT CHANGE UP (ref 3.8–10.5)

## 2017-07-07 ENCOUNTER — OUTPATIENT (OUTPATIENT)
Dept: OUTPATIENT SERVICES | Facility: HOSPITAL | Age: 42
LOS: 1 days | Discharge: HOME | End: 2017-07-07

## 2017-07-07 DIAGNOSIS — D47.3 ESSENTIAL (HEMORRHAGIC) THROMBOCYTHEMIA: ICD-10-CM

## 2017-07-10 ENCOUNTER — APPOINTMENT (OUTPATIENT)
Dept: HEMATOLOGY ONCOLOGY | Facility: CLINIC | Age: 42
End: 2017-07-10

## 2017-07-10 ENCOUNTER — RESULT REVIEW (OUTPATIENT)
Age: 42
End: 2017-07-10

## 2017-07-10 VITALS
TEMPERATURE: 98 F | BODY MASS INDEX: 21.55 KG/M2 | HEIGHT: 62.01 IN | WEIGHT: 118.61 LBS | RESPIRATION RATE: 16 BRPM | SYSTOLIC BLOOD PRESSURE: 128 MMHG | OXYGEN SATURATION: 100 % | HEART RATE: 81 BPM | DIASTOLIC BLOOD PRESSURE: 90 MMHG

## 2017-07-10 LAB
BASOPHILS # BLD AUTO: 0 K/UL — SIGNIFICANT CHANGE UP (ref 0–0.2)
BASOPHILS NFR BLD AUTO: 0.3 % — SIGNIFICANT CHANGE UP (ref 0–2)
EOSINOPHIL # BLD AUTO: 0.1 K/UL — SIGNIFICANT CHANGE UP (ref 0–0.5)
EOSINOPHIL NFR BLD AUTO: 2.2 % — SIGNIFICANT CHANGE UP (ref 0–6)
HCT VFR BLD CALC: 37.7 % — SIGNIFICANT CHANGE UP (ref 34.5–45)
HGB BLD-MCNC: 13.5 G/DL — SIGNIFICANT CHANGE UP (ref 11.5–15.5)
LYMPHOCYTES # BLD AUTO: 1.8 K/UL — SIGNIFICANT CHANGE UP (ref 1–3.3)
LYMPHOCYTES # BLD AUTO: 39.8 % — SIGNIFICANT CHANGE UP (ref 13–44)
MCHC RBC-ENTMCNC: 31.2 PG — SIGNIFICANT CHANGE UP (ref 27–34)
MCHC RBC-ENTMCNC: 35.8 G/DL — SIGNIFICANT CHANGE UP (ref 32–36)
MCV RBC AUTO: 87.1 FL — SIGNIFICANT CHANGE UP (ref 80–100)
MONOCYTES # BLD AUTO: 0.4 K/UL — SIGNIFICANT CHANGE UP (ref 0–0.9)
MONOCYTES NFR BLD AUTO: 7.9 % — SIGNIFICANT CHANGE UP (ref 2–14)
NEUTROPHILS # BLD AUTO: 2.3 K/UL — SIGNIFICANT CHANGE UP (ref 1.8–7.4)
NEUTROPHILS NFR BLD AUTO: 49.9 % — SIGNIFICANT CHANGE UP (ref 43–77)
PLATELET # BLD AUTO: 53 K/UL — LOW (ref 150–400)
RBC # BLD: 4.33 M/UL — SIGNIFICANT CHANGE UP (ref 3.8–5.2)
RBC # FLD: 11.4 % — SIGNIFICANT CHANGE UP (ref 10.3–14.5)
WBC # BLD: 4.6 K/UL — SIGNIFICANT CHANGE UP (ref 3.8–10.5)
WBC # FLD AUTO: 4.6 K/UL — SIGNIFICANT CHANGE UP (ref 3.8–10.5)

## 2017-07-13 ENCOUNTER — APPOINTMENT (OUTPATIENT)
Dept: INTERNAL MEDICINE | Facility: CLINIC | Age: 42
End: 2017-07-13

## 2017-07-13 ENCOUNTER — LABORATORY RESULT (OUTPATIENT)
Age: 42
End: 2017-07-13

## 2017-07-13 VITALS
WEIGHT: 116 LBS | TEMPERATURE: 98.3 F | HEIGHT: 62 IN | SYSTOLIC BLOOD PRESSURE: 116 MMHG | BODY MASS INDEX: 21.35 KG/M2 | DIASTOLIC BLOOD PRESSURE: 72 MMHG

## 2017-07-13 RX ORDER — NITROFURANTOIN (MONOHYDRATE/MACROCRYSTALS) 25; 75 MG/1; MG/1
100 CAPSULE ORAL
Qty: 20 | Refills: 0 | Status: COMPLETED | COMMUNITY
Start: 2017-06-04 | End: 2017-07-13

## 2017-07-13 RX ORDER — CHLORHEXIDINE GLUCONATE, 0.12% ORAL RINSE 1.2 MG/ML
0.12 SOLUTION DENTAL
Qty: 473 | Refills: 0 | Status: COMPLETED | COMMUNITY
Start: 2017-06-14 | End: 2017-07-13

## 2017-07-13 RX ORDER — ONDANSETRON 4 MG/1
4 TABLET, ORALLY DISINTEGRATING ORAL
Qty: 9 | Refills: 0 | Status: COMPLETED | COMMUNITY
Start: 2017-06-14 | End: 2017-07-13

## 2017-07-13 RX ORDER — ESCITALOPRAM OXALATE 5 MG/1
5 TABLET ORAL
Qty: 30 | Refills: 6 | Status: COMPLETED | COMMUNITY
End: 2017-07-13

## 2017-07-14 ENCOUNTER — RESULT REVIEW (OUTPATIENT)
Age: 42
End: 2017-07-14

## 2017-07-14 ENCOUNTER — APPOINTMENT (OUTPATIENT)
Dept: HEMATOLOGY ONCOLOGY | Facility: CLINIC | Age: 42
End: 2017-07-14

## 2017-07-14 LAB
BASOPHILS # BLD AUTO: 0 K/UL — SIGNIFICANT CHANGE UP (ref 0–0.2)
BASOPHILS NFR BLD AUTO: 0.2 % — SIGNIFICANT CHANGE UP (ref 0–2)
EOSINOPHIL # BLD AUTO: 0.1 K/UL — SIGNIFICANT CHANGE UP (ref 0–0.5)
EOSINOPHIL NFR BLD AUTO: 1.6 % — SIGNIFICANT CHANGE UP (ref 0–6)
HCT VFR BLD CALC: 37.6 % — SIGNIFICANT CHANGE UP (ref 34.5–45)
HGB BLD-MCNC: 13.1 G/DL — SIGNIFICANT CHANGE UP (ref 11.5–15.5)
LYMPHOCYTES # BLD AUTO: 2 K/UL — SIGNIFICANT CHANGE UP (ref 1–3.3)
LYMPHOCYTES # BLD AUTO: 29.5 % — SIGNIFICANT CHANGE UP (ref 13–44)
MCHC RBC-ENTMCNC: 29.8 PG — SIGNIFICANT CHANGE UP (ref 27–34)
MCHC RBC-ENTMCNC: 34.8 G/DL — SIGNIFICANT CHANGE UP (ref 32–36)
MCV RBC AUTO: 85.7 FL — SIGNIFICANT CHANGE UP (ref 80–100)
MONOCYTES # BLD AUTO: 0.5 K/UL — SIGNIFICANT CHANGE UP (ref 0–0.9)
MONOCYTES NFR BLD AUTO: 7.3 % — SIGNIFICANT CHANGE UP (ref 2–14)
NEUTROPHILS # BLD AUTO: 4.1 K/UL — SIGNIFICANT CHANGE UP (ref 1.8–7.4)
NEUTROPHILS NFR BLD AUTO: 61.4 % — SIGNIFICANT CHANGE UP (ref 43–77)
PLATELET # BLD AUTO: 55 K/UL — LOW (ref 150–400)
RBC # BLD: 4.39 M/UL — SIGNIFICANT CHANGE UP (ref 3.8–5.2)
RBC # FLD: 13 % — SIGNIFICANT CHANGE UP (ref 10.3–14.5)
WBC # BLD: 6.7 K/UL — SIGNIFICANT CHANGE UP (ref 3.8–10.5)
WBC # FLD AUTO: 6.7 K/UL — SIGNIFICANT CHANGE UP (ref 3.8–10.5)

## 2017-07-17 LAB
ALBUMIN SERPL ELPH-MCNC: 4.9 G/DL
ALP BLD-CCNC: 78 U/L
ALT SERPL-CCNC: 24 U/L
ANION GAP SERPL CALC-SCNC: 16 MMOL/L
APTT BLD: 26.4 SEC
AST SERPL-CCNC: 30 U/L
BASOPHILS # BLD AUTO: 0.01 K/UL
BASOPHILS NFR BLD AUTO: 0.2 %
BILIRUB SERPL-MCNC: 0.2 MG/DL
BUN SERPL-MCNC: 7 MG/DL
CALCIUM SERPL-MCNC: 10 MG/DL
CHLORIDE SERPL-SCNC: 105 MMOL/L
CO2 SERPL-SCNC: 23 MMOL/L
CREAT SERPL-MCNC: 0.91 MG/DL
EOSINOPHIL # BLD AUTO: 0.08 K/UL
EOSINOPHIL NFR BLD AUTO: 1.2 %
GLUCOSE SERPL-MCNC: 90 MG/DL
HCT VFR BLD CALC: 39 %
HGB BLD-MCNC: 13.1 G/DL
IMM GRANULOCYTES NFR BLD AUTO: 0.2 %
INR PPP: 0.9 RATIO
LYMPHOCYTES # BLD AUTO: 2.22 K/UL
LYMPHOCYTES NFR BLD AUTO: 33.7 %
MAN DIFF?: NORMAL
MCHC RBC-ENTMCNC: 29 PG
MCHC RBC-ENTMCNC: 33.6 GM/DL
MCV RBC AUTO: 86.3 FL
MONOCYTES # BLD AUTO: 0.43 K/UL
MONOCYTES NFR BLD AUTO: 6.5 %
NEUTROPHILS # BLD AUTO: 3.83 K/UL
NEUTROPHILS NFR BLD AUTO: 58.2 %
PLATELET # BLD AUTO: 75 K/UL
POTASSIUM SERPL-SCNC: 4.1 MMOL/L
PROT SERPL-MCNC: 7.5 G/DL
PT BLD: 10.1 SEC
RBC # BLD: 4.52 M/UL
RBC # FLD: 12.9 %
SAVE SPECIMEN: NORMAL
SODIUM SERPL-SCNC: 144 MMOL/L
WBC # FLD AUTO: 6.58 K/UL

## 2017-07-26 ENCOUNTER — APPOINTMENT (OUTPATIENT)
Dept: CARDIOLOGY | Facility: CLINIC | Age: 42
End: 2017-07-26

## 2017-07-26 ENCOUNTER — NON-APPOINTMENT (OUTPATIENT)
Age: 42
End: 2017-07-26

## 2017-07-26 VITALS
HEART RATE: 78 BPM | OXYGEN SATURATION: 100 % | BODY MASS INDEX: 21.58 KG/M2 | WEIGHT: 118 LBS | DIASTOLIC BLOOD PRESSURE: 87 MMHG | SYSTOLIC BLOOD PRESSURE: 121 MMHG

## 2017-07-26 DIAGNOSIS — R79.89 OTHER SPECIFIED ABNORMAL FINDINGS OF BLOOD CHEMISTRY: ICD-10-CM

## 2017-07-26 RX ORDER — LORAZEPAM 0.5 MG/1
0.5 TABLET ORAL
Qty: 15 | Refills: 0 | Status: DISCONTINUED | COMMUNITY
Start: 2017-06-14 | End: 2017-07-26

## 2017-07-26 RX ORDER — BUSPIRONE HYDROCHLORIDE 7.5 MG/1
7.5 TABLET ORAL
Qty: 30 | Refills: 0 | Status: DISCONTINUED | COMMUNITY
Start: 2017-07-11 | End: 2017-07-26

## 2017-07-26 RX ORDER — BUSPIRONE HYDROCHLORIDE 5 MG/1
5 TABLET ORAL
Qty: 30 | Refills: 0 | Status: DISCONTINUED | COMMUNITY
Start: 2017-07-12 | End: 2017-07-26

## 2017-07-27 LAB
BASOPHILS # BLD AUTO: 0.02 K/UL — SIGNIFICANT CHANGE UP (ref 0–0.2)
BASOPHILS NFR BLD AUTO: 0.2 % — SIGNIFICANT CHANGE UP (ref 0–2)
EOSINOPHIL # BLD AUTO: 0.09 K/UL — SIGNIFICANT CHANGE UP (ref 0–0.5)
EOSINOPHIL NFR BLD AUTO: 1.1 % — SIGNIFICANT CHANGE UP (ref 0–6)
HCT VFR BLD CALC: 37.9 % — SIGNIFICANT CHANGE UP (ref 34.5–45)
HGB BLD-MCNC: 13.1 G/DL — SIGNIFICANT CHANGE UP (ref 11.5–15.5)
IMM GRANULOCYTES # BLD AUTO: 0.02 # — SIGNIFICANT CHANGE UP
IMM GRANULOCYTES NFR BLD AUTO: 0.2 % — SIGNIFICANT CHANGE UP (ref 0–1.5)
LYMPHOCYTES # BLD AUTO: 2.47 K/UL — SIGNIFICANT CHANGE UP (ref 1–3.3)
LYMPHOCYTES # BLD AUTO: 30 % — SIGNIFICANT CHANGE UP (ref 13–44)
MANUAL SMEAR VERIFICATION: SIGNIFICANT CHANGE UP
MCHC RBC-ENTMCNC: 30.3 PG — SIGNIFICANT CHANGE UP (ref 27–34)
MCHC RBC-ENTMCNC: 34.6 % — SIGNIFICANT CHANGE UP (ref 32–36)
MCV RBC AUTO: 87.5 FL — SIGNIFICANT CHANGE UP (ref 80–100)
MONOCYTES # BLD AUTO: 0.6 K/UL — SIGNIFICANT CHANGE UP (ref 0–0.9)
MONOCYTES NFR BLD AUTO: 7.3 % — SIGNIFICANT CHANGE UP (ref 2–14)
MORPHOLOGY BLD-IMP: SIGNIFICANT CHANGE UP
NEUTROPHILS # BLD AUTO: 5.04 K/UL — SIGNIFICANT CHANGE UP (ref 1.8–7.4)
NEUTROPHILS NFR BLD AUTO: 61.2 % — SIGNIFICANT CHANGE UP (ref 43–77)
NRBC # FLD: 0 — SIGNIFICANT CHANGE UP
PLATELET # BLD AUTO: 66 K/UL — LOW (ref 150–400)
PLATELET COUNT - ESTIMATE: SIGNIFICANT CHANGE UP
PMV BLD: SIGNIFICANT CHANGE UP FL (ref 7–13)
RBC # BLD: 4.33 M/UL — SIGNIFICANT CHANGE UP (ref 3.8–5.2)
RBC # FLD: 12.5 % — SIGNIFICANT CHANGE UP (ref 10.3–14.5)
WBC # BLD: 8.24 K/UL — SIGNIFICANT CHANGE UP (ref 3.8–10.5)
WBC # FLD AUTO: 8.24 K/UL — SIGNIFICANT CHANGE UP (ref 3.8–10.5)

## 2017-08-02 ENCOUNTER — OUTPATIENT (OUTPATIENT)
Dept: OUTPATIENT SERVICES | Facility: HOSPITAL | Age: 42
LOS: 1 days | Discharge: HOME | End: 2017-08-02

## 2017-08-03 DIAGNOSIS — F41.9 ANXIETY DISORDER, UNSPECIFIED: ICD-10-CM

## 2017-08-08 ENCOUNTER — NON-APPOINTMENT (OUTPATIENT)
Age: 42
End: 2017-08-08

## 2017-08-08 ENCOUNTER — EMERGENCY (EMERGENCY)
Facility: HOSPITAL | Age: 42
LOS: 1 days | Discharge: HOME | End: 2017-08-08
Attending: EMERGENCY MEDICINE | Admitting: EMERGENCY MEDICINE
Payer: COMMERCIAL

## 2017-08-08 VITALS
DIASTOLIC BLOOD PRESSURE: 99 MMHG | RESPIRATION RATE: 20 BRPM | TEMPERATURE: 99 F | HEART RATE: 77 BPM | SYSTOLIC BLOOD PRESSURE: 135 MMHG | OXYGEN SATURATION: 100 %

## 2017-08-08 LAB
ALBUMIN SERPL ELPH-MCNC: 4.4 G/DL — SIGNIFICANT CHANGE UP (ref 3.3–5)
ALP SERPL-CCNC: 76 U/L — SIGNIFICANT CHANGE UP (ref 40–120)
ALT FLD-CCNC: 20 U/L RC — SIGNIFICANT CHANGE UP (ref 10–45)
ANION GAP SERPL CALC-SCNC: 15 MMOL/L — SIGNIFICANT CHANGE UP (ref 5–17)
APTT BLD: 26.1 SEC — LOW (ref 27.5–37.4)
AST SERPL-CCNC: 28 U/L — SIGNIFICANT CHANGE UP (ref 10–40)
BASE EXCESS BLDV CALC-SCNC: 2.9 MMOL/L — HIGH (ref -2–2)
BASOPHILS # BLD AUTO: 0 K/UL — SIGNIFICANT CHANGE UP (ref 0–0.2)
BASOPHILS NFR BLD AUTO: 0.1 % — SIGNIFICANT CHANGE UP (ref 0–2)
BILIRUB SERPL-MCNC: 0.3 MG/DL — SIGNIFICANT CHANGE UP (ref 0.2–1.2)
BUN SERPL-MCNC: 5 MG/DL — LOW (ref 7–23)
CA-I SERPL-SCNC: 1.16 MMOL/L — SIGNIFICANT CHANGE UP (ref 1.12–1.3)
CALCIUM SERPL-MCNC: 9.5 MG/DL — SIGNIFICANT CHANGE UP (ref 8.4–10.5)
CHLORIDE BLDV-SCNC: 101 MMOL/L — SIGNIFICANT CHANGE UP (ref 96–108)
CHLORIDE SERPL-SCNC: 99 MMOL/L — SIGNIFICANT CHANGE UP (ref 96–108)
CO2 BLDV-SCNC: 28 MMOL/L — SIGNIFICANT CHANGE UP (ref 22–30)
CO2 SERPL-SCNC: 23 MMOL/L — SIGNIFICANT CHANGE UP (ref 22–31)
CREAT SERPL-MCNC: 0.8 MG/DL — SIGNIFICANT CHANGE UP (ref 0.5–1.3)
EOSINOPHIL # BLD AUTO: 0 K/UL — SIGNIFICANT CHANGE UP (ref 0–0.5)
EOSINOPHIL NFR BLD AUTO: 0.5 % — SIGNIFICANT CHANGE UP (ref 0–6)
GAS PNL BLDV: 135 MMOL/L — LOW (ref 136–145)
GAS PNL BLDV: SIGNIFICANT CHANGE UP
GAS PNL BLDV: SIGNIFICANT CHANGE UP
GLUCOSE BLDV-MCNC: 105 MG/DL — HIGH (ref 70–99)
GLUCOSE SERPL-MCNC: 110 MG/DL — HIGH (ref 70–99)
HCG SERPL-ACNC: <2 MIU/ML — SIGNIFICANT CHANGE UP
HCO3 BLDV-SCNC: 27 MMOL/L — SIGNIFICANT CHANGE UP (ref 21–29)
HCT VFR BLD CALC: 40.1 % — SIGNIFICANT CHANGE UP (ref 34.5–45)
HCT VFR BLDA CALC: 43 % — SIGNIFICANT CHANGE UP (ref 39–50)
HGB BLD CALC-MCNC: 14.1 G/DL — SIGNIFICANT CHANGE UP (ref 11.5–15.5)
HGB BLD-MCNC: 14 G/DL — SIGNIFICANT CHANGE UP (ref 11.5–15.5)
HOROWITZ INDEX BLDV+IHG-RTO: SIGNIFICANT CHANGE UP
INR BLD: 1 RATIO — SIGNIFICANT CHANGE UP (ref 0.88–1.16)
LACTATE BLDV-MCNC: 1.1 MMOL/L — SIGNIFICANT CHANGE UP (ref 0.7–2)
LYMPHOCYTES # BLD AUTO: 2 K/UL — SIGNIFICANT CHANGE UP (ref 1–3.3)
LYMPHOCYTES # BLD AUTO: 21.6 % — SIGNIFICANT CHANGE UP (ref 13–44)
MCHC RBC-ENTMCNC: 30.4 PG — SIGNIFICANT CHANGE UP (ref 27–34)
MCHC RBC-ENTMCNC: 34.9 GM/DL — SIGNIFICANT CHANGE UP (ref 32–36)
MCV RBC AUTO: 87.3 FL — SIGNIFICANT CHANGE UP (ref 80–100)
MONOCYTES # BLD AUTO: 0.6 K/UL — SIGNIFICANT CHANGE UP (ref 0–0.9)
MONOCYTES NFR BLD AUTO: 6.2 % — SIGNIFICANT CHANGE UP (ref 2–14)
NEUTROPHILS # BLD AUTO: 6.5 K/UL — SIGNIFICANT CHANGE UP (ref 1.8–7.4)
NEUTROPHILS NFR BLD AUTO: 71.6 % — SIGNIFICANT CHANGE UP (ref 43–77)
OTHER CELLS CSF MANUAL: 8 ML/DL — LOW (ref 18–22)
PCO2 BLDV: 40 MMHG — SIGNIFICANT CHANGE UP (ref 35–50)
PH BLDV: 7.44 — SIGNIFICANT CHANGE UP (ref 7.35–7.45)
PLATELET # BLD AUTO: 62 K/UL — LOW (ref 150–400)
PO2 BLDV: 26 MMHG — SIGNIFICANT CHANGE UP (ref 25–45)
POTASSIUM BLDV-SCNC: 3.2 MMOL/L — LOW (ref 3.5–5)
POTASSIUM SERPL-MCNC: 3.6 MMOL/L — SIGNIFICANT CHANGE UP (ref 3.5–5.3)
POTASSIUM SERPL-SCNC: 3.6 MMOL/L — SIGNIFICANT CHANGE UP (ref 3.5–5.3)
PROT SERPL-MCNC: 6.9 G/DL — SIGNIFICANT CHANGE UP (ref 6–8.3)
PROTHROM AB SERPL-ACNC: 10.9 SEC — SIGNIFICANT CHANGE UP (ref 9.8–12.7)
RBC # BLD: 4.6 M/UL — SIGNIFICANT CHANGE UP (ref 3.8–5.2)
RBC # FLD: 11.6 % — SIGNIFICANT CHANGE UP (ref 10.3–14.5)
SAO2 % BLDV: 39 % — LOW (ref 67–88)
SODIUM SERPL-SCNC: 137 MMOL/L — SIGNIFICANT CHANGE UP (ref 135–145)
WBC # BLD: 9 K/UL — SIGNIFICANT CHANGE UP (ref 3.8–10.5)
WBC # FLD AUTO: 9 K/UL — SIGNIFICANT CHANGE UP (ref 3.8–10.5)

## 2017-08-08 PROCEDURE — 70450 CT HEAD/BRAIN W/O DYE: CPT | Mod: 26

## 2017-08-08 PROCEDURE — 99285 EMERGENCY DEPT VISIT HI MDM: CPT

## 2017-08-08 RX ORDER — SODIUM CHLORIDE 9 MG/ML
1000 INJECTION INTRAMUSCULAR; INTRAVENOUS; SUBCUTANEOUS ONCE
Qty: 0 | Refills: 0 | Status: COMPLETED | OUTPATIENT
Start: 2017-08-08 | End: 2017-08-08

## 2017-08-08 RX ORDER — ONDANSETRON 8 MG/1
4 TABLET, FILM COATED ORAL ONCE
Qty: 0 | Refills: 0 | Status: COMPLETED | OUTPATIENT
Start: 2017-08-08 | End: 2017-08-08

## 2017-08-08 RX ORDER — ACETAMINOPHEN 500 MG
1000 TABLET ORAL ONCE
Qty: 0 | Refills: 0 | Status: COMPLETED | OUTPATIENT
Start: 2017-08-08 | End: 2017-08-08

## 2017-08-08 RX ADMIN — ONDANSETRON 4 MILLIGRAM(S): 8 TABLET, FILM COATED ORAL at 23:04

## 2017-08-08 RX ADMIN — SODIUM CHLORIDE 2000 MILLILITER(S): 9 INJECTION INTRAMUSCULAR; INTRAVENOUS; SUBCUTANEOUS at 22:58

## 2017-08-08 RX ADMIN — Medication 400 MILLIGRAM(S): at 23:04

## 2017-08-08 NOTE — ED ADULT NURSE NOTE - OBJECTIVE STATEMENT
42 y/o female hx autonomic dysfunctions cause elevated BP, HR. BIBEMS c/o dizziness & nausea, took zofran at home. Pt is alert & orientedx3, complaints of headache 4/10 on pain scale. Denies CP or SOB, no fever/chills. No numbness or tingling, no diarrhea reports constipation. Safety maintained & continue monitor.

## 2017-08-08 NOTE — ED PROVIDER NOTE - CARE PLAN
Principal Discharge DX:	Headache  Instructions for follow-up, activity and diet:	You had a headache that improved with tylenol, zofran, and IV hydration. It may have been tension headache. CT head did show any acute bleeding. Your vital signs remained stable. Please follow up with your primary care doctor within 1 week of discharge.

## 2017-08-08 NOTE — ED PROVIDER NOTE - ATTENDING CONTRIBUTION TO CARE
42yo F with dizziness, ha and nausea.  No focal deficits. 40yo F with lightheadedness, ha and nausea after a stress event.  No focal deficits.  NAD, completely nontoxic, no nuchal rigidity, afebrile, NCAT, PERRL, CN grossly intact, S1S2, CTAB, abd soft nontender with no rebound/guarding/masses, no LE edema.  Labs, ekg, CT head, pain/n control, reassess.

## 2017-08-08 NOTE — ED PROVIDER NOTE - MEDICAL DECISION MAKING DETAILS
42yo F with h/o labyrinthitis, HTN, IBS, anxiety, asthma, and "autonomic dysfunction" per pt p/w HA, lightheadedness, HTN, and nausea without vomiting that began at 18:00. R/O intracranial hemorrhage. CBC, CMP, VBG, bHCG, Coags, IVF, Ofirmev, IV Zofran, non contrast CT head.

## 2017-08-08 NOTE — ED PROVIDER NOTE - OBJECTIVE STATEMENT
42yo F with h/o labyrinthitis, HTN, IBS, anxiety, asthma, and "autonomic dysfunction" per pt. She p/w HA, lightheadedness, HTN, and nausea without vomiting that began at 18:00. FIGUEROA started between her eyes and spread globally. She states it is not the same as previous episodes of labyrinthitis. She took Zofran 4mg PO and Ativan 1mg without resolution of sxs. She denies previous episodes. She reports a home BP of 152/88, which she says is high for her. She denies LOC, syncope, vision changes, weakness, recent illness, f/c, dysuria, diarrhea.

## 2017-08-08 NOTE — ED PROVIDER NOTE - PLAN OF CARE
You had a headache that improved with tylenol, zofran, and IV hydration. It may have been tension headache. CT head did show any acute bleeding. Your vital signs remained stable. Please follow up with your primary care doctor within 1 week of discharge.

## 2017-08-09 ENCOUNTER — MEDICATION RENEWAL (OUTPATIENT)
Age: 42
End: 2017-08-09

## 2017-08-09 VITALS — OXYGEN SATURATION: 100 % | RESPIRATION RATE: 18 BRPM | HEART RATE: 87 BPM

## 2017-08-09 PROCEDURE — 96374 THER/PROPH/DIAG INJ IV PUSH: CPT

## 2017-08-09 PROCEDURE — 84295 ASSAY OF SERUM SODIUM: CPT

## 2017-08-09 PROCEDURE — 82330 ASSAY OF CALCIUM: CPT

## 2017-08-09 PROCEDURE — 85027 COMPLETE CBC AUTOMATED: CPT

## 2017-08-09 PROCEDURE — 85730 THROMBOPLASTIN TIME PARTIAL: CPT

## 2017-08-09 PROCEDURE — 84702 CHORIONIC GONADOTROPIN TEST: CPT

## 2017-08-09 PROCEDURE — 84132 ASSAY OF SERUM POTASSIUM: CPT

## 2017-08-09 PROCEDURE — 82803 BLOOD GASES ANY COMBINATION: CPT

## 2017-08-09 PROCEDURE — 85610 PROTHROMBIN TIME: CPT

## 2017-08-09 PROCEDURE — 99284 EMERGENCY DEPT VISIT MOD MDM: CPT | Mod: 25

## 2017-08-09 PROCEDURE — 83605 ASSAY OF LACTIC ACID: CPT

## 2017-08-09 PROCEDURE — 70450 CT HEAD/BRAIN W/O DYE: CPT

## 2017-08-09 PROCEDURE — 85014 HEMATOCRIT: CPT

## 2017-08-09 PROCEDURE — 82947 ASSAY GLUCOSE BLOOD QUANT: CPT

## 2017-08-09 PROCEDURE — 82435 ASSAY OF BLOOD CHLORIDE: CPT

## 2017-08-09 PROCEDURE — 96375 TX/PRO/DX INJ NEW DRUG ADDON: CPT

## 2017-08-09 PROCEDURE — 80053 COMPREHEN METABOLIC PANEL: CPT

## 2017-08-09 RX ADMIN — Medication 1000 MILLIGRAM(S): at 02:58

## 2017-08-09 RX ADMIN — Medication 1 MILLIGRAM(S): at 03:05

## 2017-08-16 ENCOUNTER — TRANSCRIPTION ENCOUNTER (OUTPATIENT)
Age: 42
End: 2017-08-16

## 2017-08-23 ENCOUNTER — NON-APPOINTMENT (OUTPATIENT)
Age: 42
End: 2017-08-23

## 2017-08-23 ENCOUNTER — APPOINTMENT (OUTPATIENT)
Dept: CARDIOLOGY | Facility: CLINIC | Age: 42
End: 2017-08-23
Payer: COMMERCIAL

## 2017-08-23 VITALS
WEIGHT: 117 LBS | HEIGHT: 62 IN | DIASTOLIC BLOOD PRESSURE: 82 MMHG | OXYGEN SATURATION: 100 % | BODY MASS INDEX: 21.53 KG/M2 | HEART RATE: 74 BPM | SYSTOLIC BLOOD PRESSURE: 124 MMHG

## 2017-08-23 DIAGNOSIS — Z82.49 FAMILY HISTORY OF ISCHEMIC HEART DISEASE AND OTHER DISEASES OF THE CIRCULATORY SYSTEM: ICD-10-CM

## 2017-08-23 DIAGNOSIS — I49.3 VENTRICULAR PREMATURE DEPOLARIZATION: ICD-10-CM

## 2017-08-23 DIAGNOSIS — Z86.39 PERSONAL HISTORY OF OTHER ENDOCRINE, NUTRITIONAL AND METABOLIC DISEASE: ICD-10-CM

## 2017-08-23 PROCEDURE — 99215 OFFICE O/P EST HI 40 MIN: CPT

## 2017-08-23 PROCEDURE — 93000 ELECTROCARDIOGRAM COMPLETE: CPT

## 2017-08-25 ENCOUNTER — OTHER (OUTPATIENT)
Age: 42
End: 2017-08-25

## 2017-08-25 ENCOUNTER — MEDICATION RENEWAL (OUTPATIENT)
Age: 42
End: 2017-08-25

## 2017-08-28 ENCOUNTER — EMERGENCY (EMERGENCY)
Facility: HOSPITAL | Age: 42
LOS: 1 days | Discharge: HOME | End: 2017-08-28
Attending: EMERGENCY MEDICINE | Admitting: EMERGENCY MEDICINE
Payer: COMMERCIAL

## 2017-08-28 VITALS
SYSTOLIC BLOOD PRESSURE: 138 MMHG | RESPIRATION RATE: 16 BRPM | OXYGEN SATURATION: 100 % | HEART RATE: 82 BPM | DIASTOLIC BLOOD PRESSURE: 78 MMHG

## 2017-08-28 VITALS
TEMPERATURE: 98 F | RESPIRATION RATE: 20 BRPM | HEART RATE: 101 BPM | DIASTOLIC BLOOD PRESSURE: 96 MMHG | OXYGEN SATURATION: 98 % | SYSTOLIC BLOOD PRESSURE: 132 MMHG

## 2017-08-28 LAB
ALBUMIN SERPL ELPH-MCNC: 4.3 G/DL — SIGNIFICANT CHANGE UP (ref 3.3–5)
ALP SERPL-CCNC: 97 U/L — SIGNIFICANT CHANGE UP (ref 40–120)
ALT FLD-CCNC: 20 U/L RC — SIGNIFICANT CHANGE UP (ref 10–45)
ANION GAP SERPL CALC-SCNC: 14 MMOL/L — SIGNIFICANT CHANGE UP (ref 5–17)
AST SERPL-CCNC: 27 U/L — SIGNIFICANT CHANGE UP (ref 10–40)
BILIRUB SERPL-MCNC: 0.3 MG/DL — SIGNIFICANT CHANGE UP (ref 0.2–1.2)
BUN SERPL-MCNC: 3 MG/DL — LOW (ref 7–23)
CALCIUM SERPL-MCNC: 9.3 MG/DL — SIGNIFICANT CHANGE UP (ref 8.4–10.5)
CHLORIDE SERPL-SCNC: 104 MMOL/L — SIGNIFICANT CHANGE UP (ref 96–108)
CO2 SERPL-SCNC: 23 MMOL/L — SIGNIFICANT CHANGE UP (ref 22–31)
CREAT SERPL-MCNC: 0.77 MG/DL — SIGNIFICANT CHANGE UP (ref 0.5–1.3)
GLUCOSE SERPL-MCNC: 102 MG/DL — HIGH (ref 70–99)
HCG SERPL QL: NEGATIVE — SIGNIFICANT CHANGE UP
HCT VFR BLD CALC: 40.8 % — SIGNIFICANT CHANGE UP (ref 34.5–45)
HGB BLD-MCNC: 14.1 G/DL — SIGNIFICANT CHANGE UP (ref 11.5–15.5)
MCHC RBC-ENTMCNC: 30.1 PG — SIGNIFICANT CHANGE UP (ref 27–34)
MCHC RBC-ENTMCNC: 34.6 GM/DL — SIGNIFICANT CHANGE UP (ref 32–36)
MCV RBC AUTO: 87.2 FL — SIGNIFICANT CHANGE UP (ref 80–100)
PLATELET # BLD AUTO: 62 K/UL — LOW (ref 150–400)
POTASSIUM SERPL-MCNC: 4.4 MMOL/L — SIGNIFICANT CHANGE UP (ref 3.5–5.3)
POTASSIUM SERPL-SCNC: 4.4 MMOL/L — SIGNIFICANT CHANGE UP (ref 3.5–5.3)
PROT SERPL-MCNC: 7.1 G/DL — SIGNIFICANT CHANGE UP (ref 6–8.3)
RBC # BLD: 4.67 M/UL — SIGNIFICANT CHANGE UP (ref 3.8–5.2)
RBC # FLD: 11.9 % — SIGNIFICANT CHANGE UP (ref 10.3–14.5)
SODIUM SERPL-SCNC: 141 MMOL/L — SIGNIFICANT CHANGE UP (ref 135–145)
WBC # BLD: 5.8 K/UL — SIGNIFICANT CHANGE UP (ref 3.8–10.5)
WBC # FLD AUTO: 5.8 K/UL — SIGNIFICANT CHANGE UP (ref 3.8–10.5)

## 2017-08-28 PROCEDURE — 80053 COMPREHEN METABOLIC PANEL: CPT

## 2017-08-28 PROCEDURE — 71020: CPT | Mod: 26

## 2017-08-28 PROCEDURE — 93010 ELECTROCARDIOGRAM REPORT: CPT

## 2017-08-28 PROCEDURE — 93005 ELECTROCARDIOGRAM TRACING: CPT

## 2017-08-28 PROCEDURE — 71046 X-RAY EXAM CHEST 2 VIEWS: CPT

## 2017-08-28 PROCEDURE — 85027 COMPLETE CBC AUTOMATED: CPT

## 2017-08-28 PROCEDURE — 84703 CHORIONIC GONADOTROPIN ASSAY: CPT

## 2017-08-28 PROCEDURE — 99284 EMERGENCY DEPT VISIT MOD MDM: CPT | Mod: 25

## 2017-08-28 RX ORDER — SODIUM CHLORIDE 9 MG/ML
1000 INJECTION, SOLUTION INTRAVENOUS ONCE
Qty: 0 | Refills: 0 | Status: COMPLETED | OUTPATIENT
Start: 2017-08-28 | End: 2017-08-28

## 2017-08-28 RX ADMIN — Medication 1 MILLIGRAM(S): at 12:56

## 2017-08-28 RX ADMIN — SODIUM CHLORIDE 2000 MILLILITER(S): 9 INJECTION, SOLUTION INTRAVENOUS at 12:38

## 2017-08-28 NOTE — ED PROVIDER NOTE - CARE PLAN
Principal Discharge DX:	Diarrhea  Instructions for follow-up, activity and diet:	Please take adequate fluid intake, complete the antibiotics as scheduled. please see your PMD in 2-3 days to ensure diarrhea is improved.  Secondary Diagnosis:	Cough

## 2017-08-28 NOTE — ED PROVIDER NOTE - PROGRESS NOTE DETAILS
Incidentally noted patient to have thrombocytopenia. Patient has history of ITP chronically, off steroids for 20 years. Currently asymptomatic.

## 2017-08-28 NOTE — ED PROVIDER NOTE - ATTENDING CONTRIBUTION TO CARE
Attending MD Herrera:  I personally have seen and examined this patient.  Resident note reviewed and agree on plan of care and except where noted.  See HPI, PE, and MDM for details.     Attending MD Herrera: A & O x 3, NAD, EOMI b/l, PERRL b/l; lungs CTAB, heart with reg rhythm without murmur; abdomen soft NTND; extremities with no edema; affect appropriate. neuro exam non focal with no motor or sensory deficits.       41F with ITP presenting with several days of cough and diarrhea, ddx includes URI, bronchitis, pneumonia, viral gastroenteritis. Plan for labs, CXR, IV fluids and reassessment

## 2017-08-28 NOTE — ED PROVIDER NOTE - MEDICAL DECISION MAKING DETAILS
42 yo F presenting for fevers/chills and diarrhea, concerning for URI vs pneumonia complicated by viral gastritis versus c diff. Plan to check CXR, fluids for dehydration and r/o c diff w PCR.

## 2017-08-28 NOTE — ED PROVIDER NOTE - PHYSICAL EXAMINATION
General: NAD, alert, communicative   HEENT: EOMI, nl oropharynx without erythema or exudates, no cervical lymphadenopathy  CVS: Regular pulse, tachycardic, no murmur/gallops/regurg  Chest: Slightly decreased breath sounds in R lung base, no crackles, wheezing  Abd: Nl bs, soft, NT, ND   Ext: 2+ peripheral pulses, no edema

## 2017-08-28 NOTE — ED ADULT NURSE NOTE - CHPI ED SYMPTOMS NEG
no cough/no back pain/no vomiting/no chills/no fever/no nausea/no dizziness/no shortness of breath/no syncope/no diaphoresis

## 2017-08-28 NOTE — ED PROVIDER NOTE - PLAN OF CARE
Please take adequate fluid intake, complete the antibiotics as scheduled. please see your PMD in 2-3 days to ensure diarrhea is improved.

## 2017-08-28 NOTE — ED PROVIDER NOTE - OBJECTIVE STATEMENT
42 yo F w hx of IBS, asthma, anxiety, HTN, autonomic dysfunction (suspected) presenting with complaint of four days of fevers and chills and one day of diarrhea. Per patient, she started having fevers as high as 101F at home 4 days prior to presentation. She also subsequently developed cough productive of green sputum. Patient suspected pneumonia and acquired an azithromycin pack which she started yesterday. She developed diarrhea  soon after that is yellow liquid without any blood, having had 8 bm in last 12 hours. Patient additionally reports having had " a cardiac event" four days prior to admission after the fevers started where she felt palpitations and measured her heart rate to be over 200 bpm. Patient reports taking ativan 3mg at the time and her heart rate improved subsequently. Patient endorses 3/10 chest pain at this time that is substernal, worsened with deep breathing, non radiating in nature.    Pt denies abx use in the past month other than the z pack, denies history of c diff, nausea, vomiting, abdominal pain, headaches, changes in vision, shortness of breath, dysuria.

## 2017-08-28 NOTE — ED PROVIDER NOTE - NS ED ROS FT
General: +fevers, chills, no dizziness    HEENT: No headaches, dysphagia, no sore throat, no changes in vision, hoarseness, nasal congestion  CVS: + chest pain, + palpitations   Resp: No shortness of breath, wheezing   GI: No abdominal pain, nausea, vomiting, +DIARRHEA  Renal: No dysuria  Ext: No edema, no claudication

## 2017-09-04 ENCOUNTER — EMERGENCY (EMERGENCY)
Facility: HOSPITAL | Age: 42
LOS: 1 days | End: 2017-09-04
Attending: EMERGENCY MEDICINE | Admitting: EMERGENCY MEDICINE
Payer: COMMERCIAL

## 2017-09-04 VITALS
RESPIRATION RATE: 16 BRPM | TEMPERATURE: 98 F | HEART RATE: 83 BPM | SYSTOLIC BLOOD PRESSURE: 119 MMHG | OXYGEN SATURATION: 97 % | DIASTOLIC BLOOD PRESSURE: 84 MMHG

## 2017-09-04 VITALS
DIASTOLIC BLOOD PRESSURE: 95 MMHG | OXYGEN SATURATION: 97 % | HEART RATE: 90 BPM | RESPIRATION RATE: 18 BRPM | SYSTOLIC BLOOD PRESSURE: 134 MMHG | TEMPERATURE: 99 F

## 2017-09-04 LAB
ALBUMIN SERPL ELPH-MCNC: 4.7 G/DL — SIGNIFICANT CHANGE UP (ref 3.3–5)
ALP SERPL-CCNC: 109 U/L — SIGNIFICANT CHANGE UP (ref 40–120)
ALT FLD-CCNC: 17 U/L RC — SIGNIFICANT CHANGE UP (ref 10–45)
ANION GAP SERPL CALC-SCNC: 14 MMOL/L — SIGNIFICANT CHANGE UP (ref 5–17)
APTT BLD: 22.1 SEC — LOW (ref 27.5–37.4)
AST SERPL-CCNC: 29 U/L — SIGNIFICANT CHANGE UP (ref 10–40)
BASOPHILS # BLD AUTO: 0.1 K/UL — SIGNIFICANT CHANGE UP (ref 0–0.2)
BASOPHILS NFR BLD AUTO: 0.8 % — SIGNIFICANT CHANGE UP (ref 0–2)
BILIRUB SERPL-MCNC: 0.2 MG/DL — SIGNIFICANT CHANGE UP (ref 0.2–1.2)
BUN SERPL-MCNC: 3 MG/DL — LOW (ref 7–23)
CALCIUM SERPL-MCNC: 9.5 MG/DL — SIGNIFICANT CHANGE UP (ref 8.4–10.5)
CHLORIDE SERPL-SCNC: 100 MMOL/L — SIGNIFICANT CHANGE UP (ref 96–108)
CO2 SERPL-SCNC: 28 MMOL/L — SIGNIFICANT CHANGE UP (ref 22–31)
CREAT SERPL-MCNC: 0.83 MG/DL — SIGNIFICANT CHANGE UP (ref 0.5–1.3)
EOSINOPHIL # BLD AUTO: 0.2 K/UL — SIGNIFICANT CHANGE UP (ref 0–0.5)
EOSINOPHIL NFR BLD AUTO: 2.2 % — SIGNIFICANT CHANGE UP (ref 0–6)
GLUCOSE SERPL-MCNC: 89 MG/DL — SIGNIFICANT CHANGE UP (ref 70–99)
HCT VFR BLD CALC: 42.2 % — SIGNIFICANT CHANGE UP (ref 34.5–45)
HGB BLD-MCNC: 14.5 G/DL — SIGNIFICANT CHANGE UP (ref 11.5–15.5)
INR BLD: 0.93 RATIO — SIGNIFICANT CHANGE UP (ref 0.88–1.16)
LYMPHOCYTES # BLD AUTO: 2.8 K/UL — SIGNIFICANT CHANGE UP (ref 1–3.3)
LYMPHOCYTES # BLD AUTO: 25.3 % — SIGNIFICANT CHANGE UP (ref 13–44)
MCHC RBC-ENTMCNC: 29.9 PG — SIGNIFICANT CHANGE UP (ref 27–34)
MCHC RBC-ENTMCNC: 34.3 GM/DL — SIGNIFICANT CHANGE UP (ref 32–36)
MCV RBC AUTO: 87.3 FL — SIGNIFICANT CHANGE UP (ref 80–100)
MONOCYTES # BLD AUTO: 0.7 K/UL — SIGNIFICANT CHANGE UP (ref 0–0.9)
MONOCYTES NFR BLD AUTO: 6.6 % — SIGNIFICANT CHANGE UP (ref 2–14)
NEUTROPHILS # BLD AUTO: 7.3 K/UL — SIGNIFICANT CHANGE UP (ref 1.8–7.4)
NEUTROPHILS NFR BLD AUTO: 65.1 % — SIGNIFICANT CHANGE UP (ref 43–77)
PLATELET # BLD AUTO: 192 K/UL — SIGNIFICANT CHANGE UP (ref 150–400)
POTASSIUM SERPL-MCNC: 3.9 MMOL/L — SIGNIFICANT CHANGE UP (ref 3.5–5.3)
POTASSIUM SERPL-SCNC: 3.9 MMOL/L — SIGNIFICANT CHANGE UP (ref 3.5–5.3)
PROT SERPL-MCNC: 7.6 G/DL — SIGNIFICANT CHANGE UP (ref 6–8.3)
PROTHROM AB SERPL-ACNC: 10.1 SEC — SIGNIFICANT CHANGE UP (ref 9.8–12.7)
RBC # BLD: 4.84 M/UL — SIGNIFICANT CHANGE UP (ref 3.8–5.2)
RBC # FLD: 12.1 % — SIGNIFICANT CHANGE UP (ref 10.3–14.5)
SODIUM SERPL-SCNC: 142 MMOL/L — SIGNIFICANT CHANGE UP (ref 135–145)
TROPONIN T SERPL-MCNC: <0.01 NG/ML — SIGNIFICANT CHANGE UP (ref 0–0.06)
TROPONIN T SERPL-MCNC: <0.01 NG/ML — SIGNIFICANT CHANGE UP (ref 0–0.06)
WBC # BLD: 11.3 K/UL — HIGH (ref 3.8–10.5)
WBC # FLD AUTO: 11.3 K/UL — HIGH (ref 3.8–10.5)

## 2017-09-04 PROCEDURE — 93005 ELECTROCARDIOGRAM TRACING: CPT | Mod: 76

## 2017-09-04 PROCEDURE — 99220: CPT | Mod: 25

## 2017-09-04 PROCEDURE — 85027 COMPLETE CBC AUTOMATED: CPT

## 2017-09-04 PROCEDURE — 71020: CPT | Mod: 26

## 2017-09-04 PROCEDURE — 99284 EMERGENCY DEPT VISIT MOD MDM: CPT | Mod: 25

## 2017-09-04 PROCEDURE — 84484 ASSAY OF TROPONIN QUANT: CPT

## 2017-09-04 PROCEDURE — 85730 THROMBOPLASTIN TIME PARTIAL: CPT

## 2017-09-04 PROCEDURE — G0378: CPT

## 2017-09-04 PROCEDURE — 85610 PROTHROMBIN TIME: CPT

## 2017-09-04 PROCEDURE — 71046 X-RAY EXAM CHEST 2 VIEWS: CPT

## 2017-09-04 PROCEDURE — 93010 ELECTROCARDIOGRAM REPORT: CPT

## 2017-09-04 PROCEDURE — 80053 COMPREHEN METABOLIC PANEL: CPT

## 2017-09-04 RX ORDER — SODIUM CHLORIDE 9 MG/ML
3 INJECTION INTRAMUSCULAR; INTRAVENOUS; SUBCUTANEOUS ONCE
Qty: 0 | Refills: 0 | Status: COMPLETED | OUTPATIENT
Start: 2017-09-04 | End: 2017-09-04

## 2017-09-04 RX ORDER — SODIUM CHLORIDE 9 MG/ML
500 INJECTION INTRAMUSCULAR; INTRAVENOUS; SUBCUTANEOUS ONCE
Qty: 0 | Refills: 0 | Status: COMPLETED | OUTPATIENT
Start: 2017-09-04 | End: 2017-09-04

## 2017-09-04 RX ORDER — SODIUM CHLORIDE 9 MG/ML
3 INJECTION INTRAMUSCULAR; INTRAVENOUS; SUBCUTANEOUS EVERY 8 HOURS
Qty: 0 | Refills: 0 | Status: DISCONTINUED | OUTPATIENT
Start: 2017-09-04 | End: 2017-09-08

## 2017-09-04 RX ADMIN — SODIUM CHLORIDE 500 MILLILITER(S): 9 INJECTION INTRAMUSCULAR; INTRAVENOUS; SUBCUTANEOUS at 03:10

## 2017-09-04 RX ADMIN — SODIUM CHLORIDE 3 MILLILITER(S): 9 INJECTION INTRAMUSCULAR; INTRAVENOUS; SUBCUTANEOUS at 06:26

## 2017-09-04 RX ADMIN — SODIUM CHLORIDE 3 MILLILITER(S): 9 INJECTION INTRAMUSCULAR; INTRAVENOUS; SUBCUTANEOUS at 03:10

## 2017-09-04 NOTE — ED CDU PROVIDER NOTE - NS ED ROS FT
Constitutional: No fever or chills  Eyes: No visual changes, eye pain or redness  HEENT: No throat pain, ear pain, nasal pain. No nose bleeding.  CV: +chest pain, tachycardia and hypertension. NO lower extremity edema  Resp: No SOB no cough  GI: No abd pain. No nausea or vomiting. No diarrhea. No constipation.   : No dysuria, hematuria.   MSK: No musculoskeletal pain  Skin: No rash  Neuro: No headache. No numbness or tingling. No weakness.

## 2017-09-04 NOTE — ED CDU PROVIDER NOTE - OBJECTIVE STATEMENT
42 y/o F pmhx HTN, anxiety, 'autonomic dysfunction' POTS, recent admission for HTN and tachycardia, with subsequent ED visits for same, presenting with episodes of tachycardia and hypertension waking her from sleep for the last week with HRs of 120s and BPs 160s/110s. Reporting she had 3 episodes of this in the last week. Had a follow-up from her admission with her cardiologist Dr. Hussein last week who stated that 'she could increase her cardizem but she hasn't done that.' Reporting episodes of chest pain that feels like pressure occurring at rest, intermittently associated with episodes and with nausea. Denies any fevers or chills. Denies shortness of breath, syncope, diarrhea, dysuria.

## 2017-09-04 NOTE — ED CDU PROVIDER NOTE - PLAN OF CARE
(1) You will need to follow-up with your cardiologist Dr. Reed in 2-3 days for your chest pain. A copy of your results were given with you to bring to your appt.  (2) Be sure to review attached discharge paperwork.  (3) Drink plenty of fluids to stay hydrated.  (4) Continue your home medications as directed.  (5) Return to ER for worsening chest pain, trouble breathing, palpitations, or any other concerns. (1) Start Taking Propranolol 5mgs at bedtime.  You will need to follow-up with your cardiologist Dr. Reed in 1 week. A copy of your results were given with you to bring to your appt.  (2) Be sure to review attached discharge paperwork.  (3) Drink plenty of fluids to stay hydrated.  (4) Continue your home medications as directed.  (5) Return to ER for worsening chest pain, trouble breathing, palpitations, or any other concerns.

## 2017-09-04 NOTE — ED PROVIDER NOTE - CARE PLAN
Principal Discharge DX:	Palpitations  Secondary Diagnosis:	POTS (postural orthostatic tachycardia syndrome)

## 2017-09-04 NOTE — ED ADULT NURSE NOTE - OBJECTIVE STATEMENT
41 y.o. Female presents to the ED c/o hypertension. Hx "POT syndrome." Pt reports sleeping and was "jolted up" from bed. Pt took blood pressure saw it was 160s systolic. Denies CP, SOB, N/V/D, urinary/bowel complications, fever/chills. A&Ox3. Ambulatory. Comfortable appearing. NSR on cardiac monitor. Pt is in no current distress. Comfort and safety provided. MD at bedside.

## 2017-09-04 NOTE — ED PROVIDER NOTE - MEDICAL DECISION MAKING DETAILS
45 y F c POTS and above hx pw sob/cp/palps that awoke her from sleep today.  Has been going on for several nights, takes BP and finds it to be elevated (180s) c tachycardia.  Symptoms not present now, not a/w exertion.  No back pain, no travel, no h/o acute coronary syndrome.  Had exhaustive w/u for dysrhythmias recently, found to be wnl.  Has cardiologist outpt who is discussing increasing her cardizem ER.  Requesting admission to cardiac short stay unit as she is worried about "what is going on with {her}"  review of systems -- palps, sob, cp, otherwise negative except as per hpi  PE -- GENERAL: AAOx4, GCS 15, NAD, WDWN; HEENT: MMM, no jugular venous distension, supple neck, PERRLA, EOMI, nonicteric sclera; PULM: CTA B, no crackles/rubs/rales; CV: RRR, S1S2, no MRG; ABD: Flat abdomen, NTND, no R/G/R, no CVAT.  MSK: SILVA, +2 pulses x4;  NEURO: No obvious focal deficits; PSYCH: AAOx3, clear thought and normal sensorium.    MDM -- Pt c above hx including anxiety, pots, dysautonomia c Hypertension and tachycardia at home a/w sob/palps/CP.  Unremarkable EKG and physical exam.  Reasonable to obs in CDU for serial enzymes and cards c/s in AM.  HEART - 1 assuming normal troponin, PERC - 0.  CDU.  --BMM

## 2017-09-04 NOTE — ED CDU PROVIDER NOTE - DETAILS
40 y/o F p/w intermittent htn and tachycardia  - continuous monitoring and frequent reevaluations   - cardiac enzymes and repeat ekgs  - telemetry monitoring   - evaluation by cardiology in AM   - d/w Dr. Sierra

## 2017-09-04 NOTE — ED ADULT NURSE REASSESSMENT NOTE - NS ED NURSE REASSESS COMMENT FT1
Pt received from [facility]. Pt oriented to CDU & plan of care was discussed. Pt denies any chest pain, SOB, dizziness or palpitations at the moment. Pt states when she sleeps she gets awaken by palpitations. Safety & comfort measures maintained. Call bell in reach. Will continue to monitor.

## 2017-09-04 NOTE — ED CDU PROVIDER NOTE - PROGRESS NOTE DETAILS
Patient seen at bedside in NAD.  VSS.  Patient resting comfortably without complaints. No cardiac events noted on tele.  Patient denies CP/SOB.  Repeat trop at 0900.  -Ru Schaffer PA-C I, Kerri Hernández MD have personally performed a face to face diagnostic evaluation on this patient.  I have reviewed the ACP note and agree with the history, exam, and plan of care, except as noted.   40yo F hx of anxiety, tachycardia, htn, pots, ibs p/w htn/tachy overnight. no complaints at this time. 1st trop neg. no cp palpitations or sob. no hx of dvt/pe.  Exam:heart rrr s1s2 lungs ctab le: no swelling no calf ttp  --->pending 2nd trop; Ru MAHAN spoke to Dr. Zelalem Hussein pts cardiologist notes neg recent stress test--states ok to dc  will see pt in the office this week---plan 2nd trop--dc CDU DISCHARGE NOTE - Dr. Hernández Attending : Patient is stable for discharge.  Labs and tests reviewed with the patient.  The patient is to follow up with their doctor as discussed. Trop negative x 2.  EKG unchanged.  Discussed case with Dr. Hussein.  No further inpatient cardiac work up warranted.  Recommending Propranolol 5mgs at bedtime and to follow up as outpatient.  case d/w Dr. Hernández

## 2017-09-05 ENCOUNTER — INPATIENT (INPATIENT)
Facility: HOSPITAL | Age: 42
LOS: 4 days | Discharge: HOME | DRG: 310 | End: 2017-09-10
Attending: INTERNAL MEDICINE | Admitting: INTERNAL MEDICINE
Payer: COMMERCIAL

## 2017-09-05 ENCOUNTER — RESULT CHARGE (OUTPATIENT)
Age: 42
End: 2017-09-05

## 2017-09-05 ENCOUNTER — EMERGENCY (EMERGENCY)
Facility: HOSPITAL | Age: 42
LOS: 1 days | End: 2017-09-05
Attending: EMERGENCY MEDICINE | Admitting: INTERNAL MEDICINE
Payer: COMMERCIAL

## 2017-09-05 VITALS
RESPIRATION RATE: 15 BRPM | SYSTOLIC BLOOD PRESSURE: 137 MMHG | OXYGEN SATURATION: 100 % | HEIGHT: 62 IN | TEMPERATURE: 99 F | WEIGHT: 117.07 LBS | HEART RATE: 92 BPM | DIASTOLIC BLOOD PRESSURE: 100 MMHG

## 2017-09-05 VITALS
TEMPERATURE: 98 F | SYSTOLIC BLOOD PRESSURE: 131 MMHG | HEART RATE: 94 BPM | RESPIRATION RATE: 17 BRPM | OXYGEN SATURATION: 99 % | DIASTOLIC BLOOD PRESSURE: 89 MMHG

## 2017-09-05 VITALS
OXYGEN SATURATION: 99 % | RESPIRATION RATE: 15 BRPM | SYSTOLIC BLOOD PRESSURE: 142 MMHG | HEART RATE: 95 BPM | DIASTOLIC BLOOD PRESSURE: 98 MMHG

## 2017-09-05 DIAGNOSIS — R00.0 TACHYCARDIA, UNSPECIFIED: ICD-10-CM

## 2017-09-05 LAB
ALBUMIN SERPL ELPH-MCNC: 4.3 G/DL — SIGNIFICANT CHANGE UP (ref 3.3–5)
ALP SERPL-CCNC: 97 U/L — SIGNIFICANT CHANGE UP (ref 30–120)
ALT FLD-CCNC: 26 U/L DA — SIGNIFICANT CHANGE UP (ref 10–60)
ANION GAP SERPL CALC-SCNC: 13 MMOL/L — SIGNIFICANT CHANGE UP (ref 5–17)
APTT BLD: 25.9 SEC — LOW (ref 27.5–37.4)
AST SERPL-CCNC: 26 U/L — SIGNIFICANT CHANGE UP (ref 10–40)
BASOPHILS # BLD AUTO: 0.1 K/UL — SIGNIFICANT CHANGE UP (ref 0–0.2)
BASOPHILS NFR BLD AUTO: 0.6 % — SIGNIFICANT CHANGE UP (ref 0–2)
BILIRUB SERPL-MCNC: 0.4 MG/DL — SIGNIFICANT CHANGE UP (ref 0.2–1.2)
BUN SERPL-MCNC: 4 MG/DL — LOW (ref 7–23)
CALCIUM SERPL-MCNC: 10.1 MG/DL — SIGNIFICANT CHANGE UP (ref 8.4–10.5)
CHLORIDE SERPL-SCNC: 105 MMOL/L — SIGNIFICANT CHANGE UP (ref 96–108)
CK MB BLD-MCNC: 0.9 % — SIGNIFICANT CHANGE UP (ref 0–3.5)
CK MB CFR SERPL CALC: 0.5 NG/ML — SIGNIFICANT CHANGE UP (ref 0–3.6)
CK SERPL-CCNC: 54 U/L — SIGNIFICANT CHANGE UP (ref 26–192)
CO2 SERPL-SCNC: 24 MMOL/L — SIGNIFICANT CHANGE UP (ref 22–31)
CREAT SERPL-MCNC: 0.84 MG/DL — SIGNIFICANT CHANGE UP (ref 0.5–1.3)
EOSINOPHIL # BLD AUTO: 0.1 K/UL — SIGNIFICANT CHANGE UP (ref 0–0.5)
EOSINOPHIL NFR BLD AUTO: 1.2 % — SIGNIFICANT CHANGE UP (ref 0–6)
GLUCOSE SERPL-MCNC: 94 MG/DL — SIGNIFICANT CHANGE UP (ref 70–99)
HCT VFR BLD CALC: 42.8 % — SIGNIFICANT CHANGE UP (ref 34.5–45)
HGB BLD-MCNC: 14.8 G/DL — SIGNIFICANT CHANGE UP (ref 11.5–15.5)
INR BLD: 1 RATIO — SIGNIFICANT CHANGE UP (ref 0.88–1.16)
LIDOCAIN IGE QN: 123 U/L — SIGNIFICANT CHANGE UP (ref 73–393)
LYMPHOCYTES # BLD AUTO: 3.5 K/UL — HIGH (ref 1–3.3)
LYMPHOCYTES # BLD AUTO: 31.9 % — SIGNIFICANT CHANGE UP (ref 13–44)
MCHC RBC-ENTMCNC: 29.8 PG — SIGNIFICANT CHANGE UP (ref 27–34)
MCHC RBC-ENTMCNC: 34.4 GM/DL — SIGNIFICANT CHANGE UP (ref 32–36)
MCV RBC AUTO: 86.5 FL — SIGNIFICANT CHANGE UP (ref 80–100)
MONOCYTES # BLD AUTO: 0.8 K/UL — SIGNIFICANT CHANGE UP (ref 0–0.9)
MONOCYTES NFR BLD AUTO: 7 % — SIGNIFICANT CHANGE UP (ref 2–14)
NEUTROPHILS # BLD AUTO: 6.6 K/UL — SIGNIFICANT CHANGE UP (ref 1.8–7.4)
NEUTROPHILS NFR BLD AUTO: 59.4 % — SIGNIFICANT CHANGE UP (ref 43–77)
PLATELET # BLD AUTO: 208 K/UL — SIGNIFICANT CHANGE UP (ref 150–400)
POTASSIUM SERPL-MCNC: 3.3 MMOL/L — LOW (ref 3.5–5.3)
POTASSIUM SERPL-SCNC: 3.3 MMOL/L — LOW (ref 3.5–5.3)
PROT SERPL-MCNC: 8.1 G/DL — SIGNIFICANT CHANGE UP (ref 6–8.3)
PROTHROM AB SERPL-ACNC: 10.9 SEC — SIGNIFICANT CHANGE UP (ref 9.8–12.7)
RBC # BLD: 4.95 M/UL — SIGNIFICANT CHANGE UP (ref 3.8–5.2)
RBC # FLD: 11.5 % — SIGNIFICANT CHANGE UP (ref 10.3–14.5)
SODIUM SERPL-SCNC: 142 MMOL/L — SIGNIFICANT CHANGE UP (ref 135–145)
TROPONIN I SERPL-MCNC: 0 NG/ML — LOW (ref 0.02–0.06)
WBC # BLD: 11.1 K/UL — HIGH (ref 3.8–10.5)
WBC # FLD AUTO: 11.1 K/UL — HIGH (ref 3.8–10.5)

## 2017-09-05 PROCEDURE — 80053 COMPREHEN METABOLIC PANEL: CPT

## 2017-09-05 PROCEDURE — 99285 EMERGENCY DEPT VISIT HI MDM: CPT

## 2017-09-05 PROCEDURE — 85610 PROTHROMBIN TIME: CPT

## 2017-09-05 PROCEDURE — 99284 EMERGENCY DEPT VISIT MOD MDM: CPT

## 2017-09-05 PROCEDURE — 85027 COMPLETE CBC AUTOMATED: CPT

## 2017-09-05 PROCEDURE — 85730 THROMBOPLASTIN TIME PARTIAL: CPT

## 2017-09-05 PROCEDURE — 99284 EMERGENCY DEPT VISIT MOD MDM: CPT | Mod: 25

## 2017-09-05 PROCEDURE — 84484 ASSAY OF TROPONIN QUANT: CPT

## 2017-09-05 PROCEDURE — 82553 CREATINE MB FRACTION: CPT

## 2017-09-05 PROCEDURE — 83690 ASSAY OF LIPASE: CPT

## 2017-09-05 PROCEDURE — 82550 ASSAY OF CK (CPK): CPT

## 2017-09-05 RX ORDER — SODIUM CHLORIDE 9 MG/ML
1000 INJECTION INTRAMUSCULAR; INTRAVENOUS; SUBCUTANEOUS
Qty: 0 | Refills: 0 | Status: DISCONTINUED | OUTPATIENT
Start: 2017-09-05 | End: 2017-09-10

## 2017-09-05 RX ORDER — AZITHROMYCIN 500 MG/1
0 TABLET, FILM COATED ORAL
Qty: 0 | Refills: 0 | COMMUNITY

## 2017-09-05 RX ORDER — SODIUM CHLORIDE 9 MG/ML
1000 INJECTION INTRAMUSCULAR; INTRAVENOUS; SUBCUTANEOUS ONCE
Qty: 0 | Refills: 0 | Status: COMPLETED | OUTPATIENT
Start: 2017-09-05 | End: 2017-09-05

## 2017-09-05 RX ADMIN — SODIUM CHLORIDE 125 MILLILITER(S): 9 INJECTION INTRAMUSCULAR; INTRAVENOUS; SUBCUTANEOUS at 21:56

## 2017-09-05 RX ADMIN — SODIUM CHLORIDE 125 MILLILITER(S): 9 INJECTION INTRAMUSCULAR; INTRAVENOUS; SUBCUTANEOUS at 22:26

## 2017-09-05 RX ADMIN — SODIUM CHLORIDE 1000 MILLILITER(S): 9 INJECTION INTRAMUSCULAR; INTRAVENOUS; SUBCUTANEOUS at 15:06

## 2017-09-05 RX ADMIN — SODIUM CHLORIDE 125 MILLILITER(S): 9 INJECTION INTRAMUSCULAR; INTRAVENOUS; SUBCUTANEOUS at 23:21

## 2017-09-05 NOTE — ED PROVIDER NOTE - OBJECTIVE STATEMENT
41 F w HTN, ITP, IBS  anxiety, autonomic dysfunction POTS, presented to [facility] ED for  tachycardia 1 for hour. She was at her therapist office, she stood up when her heart rate spiked and felt dizzy.  Cardiologist Dr. Philip Reed was contacted while she was in the ED and recommended that the patient come to [facility] for further Cardiology evaluation and IV Hydration. I spoke w Dr Reed and Dr Lowry who had a conversation about the patient, both in agreement that the patient would be admitted for Cardiology eval in AM

## 2017-09-05 NOTE — CONSULT NOTE ADULT - ASSESSMENT
The patient is a 41 year old female with a history of POTS, HTN, IBS, anxiety, autonomic dysfunction who presents with tachycardia and palpitations in the setting of POTS and anxiety.    Plan:  - Possible component of dehydration exacerbating symptoms (elevated WBC and platelets from baseline). Continue fluids.  - Continue diltiazem 180 mg daily  - No further cardiac testing indicated at this time and the patient can be discharged after hydration. Patient is attempting to be transferred to [facility]. I attempted to call the patient's outside cardiologist but he was not available.

## 2017-09-05 NOTE — ED PROVIDER NOTE - MEDICAL DECISION MAKING DETAILS
hx of POTS, bloodwork, ekg, IV fluids, cards consult hx of POTS, bloodwork, ekg, IV fluids, cards consult...seen by katelin and can be dc...pt spoke with  her cardio and she will be dc from here and go to [facility] ...

## 2017-09-05 NOTE — ED PROVIDER NOTE - MEDICAL DECISION MAKING DETAILS
Attending Note (Dian): patient advised to come to [facility] by her cardiologist for her dysrythmia. well appearing.  lungs cta.  will admit as per cardiology recs.

## 2017-09-05 NOTE — ED ADULT NURSE NOTE - OBJECTIVE STATEMENT
pt reports high bloodpressure secondary to medical diagnoses. pt took own Ativan while in ED as per patient.

## 2017-09-05 NOTE — ED ADULT NURSE NOTE - OBJECTIVE STATEMENT
patient transferred from [place] for hypertension and tachycardia (hyper adrenergic postural tachycardic syndrome patient transferred from [facility] for hypertension and tachycardia (hyper adrenergic postural tachycardic syndrome). patient denies any chest pain or difficulty breathing. no nausea no vomiting. patient.   patient went to [facility] for tachycardia and elevated blood pressure

## 2017-09-05 NOTE — ED ADULT NURSE REASSESSMENT NOTE - NS ED NURSE REASSESS COMMENT FT1
received report and assumed care of pt at change of shift. pt up and about. ambulatory to bathroom with steady gait with assistance. visitor at bedside. pt d/c to visitor.

## 2017-09-05 NOTE — ED PROVIDER NOTE - OBJECTIVE STATEMENT
40 y/o F pt with hx of HTN, ITP, IBS  anxiety, autonomic dysfunction POTS, presents to ED BIBA for  tachycardia 1 hour PTA.  She was at her therapist office, she stood up when her heart rate spiked and felt dizzy. Pt has been feeling a little off today. She was seen at [facility] 2 days ago for observation for high blood pressure. Dr. Hussein placed pt on new medication which she took yesterday. She called her hematologist this morning, waiting for a phone call back. No chest pain, SOB, headache, abdominal pain. No further complaints at this time.   cardiologist Dr. Hussein  PMD: Dr. Ford

## 2017-09-05 NOTE — CONSULT NOTE ADULT - SUBJECTIVE AND OBJECTIVE BOX
History of Present Illness: The patient is a 41 year old female with a history of POTS, HTN, IBS, anxiety, autonomic dysfunction who presents with tachycardia and palpitations. She noted when standing up her heart rate went up to 150s. Her BP went up as well. She felt palpitations and dizziness. She has intermittent episodes; has been tried on multiple beta-blockers, currently on dilitazem.    Past Medical/Surgical History:  POTS, HTN, IBS, anxiety, autonomic dysfunction    Medications:  Diltiazem  Buspar  Ativan    Family History: Non-contributory family history of premature cardiovascular atherosclerotic disease    Social History: No tobacco, alcohol or drug use    Review of Systems:  General: No fevers, chills, weight loss or gain  Skin: No rashes, color changes  Cardiovascular: No chest pain, orthopnea  Respiratory: No shortness of breath, cough  Gastrointestinal: No nausea, abdominal pain  Genitourinary: No incontinence, pain with urination  Musculoskeletal: No pain, swelling, decreased range of motion  Neurological: No headache, weakness  Psychiatric: No depression, anxiety  Endocrine: No weight loss or gain, increased thirst  All other systems are comprehensively negative.    Physical Exam:  Vitals:        Vital Signs Last 24 Hrs  T(C): 37.1 (05 Sep 2017 14:03), Max: 37.1 (05 Sep 2017 14:03)  T(F): 98.7 (05 Sep 2017 14:03), Max: 98.7 (05 Sep 2017 14:03)  HR: 77 (05 Sep 2017 15:30) (77 - 92)  BP: 139/94 (05 Sep 2017 15:30) (137/100 - 139/94)  BP(mean): --  RR: 14 (05 Sep 2017 15:30) (14 - 15)  SpO2: 100% (05 Sep 2017 15:30) (100% - 100%)  General: NAD  Neck: No JVD, no carotid bruit  Lungs: CTAB  CV: RRR, nl S1/S2, no M/R/G  Abdomen: S/NT/ND, +BS  Extremities: No LE edema, no cyanosis    Labs:                        14.8   11.1  )-----------( 208      ( 05 Sep 2017 15:09 )             42.8     09-05    142  |  105  |  4<L>  ----------------------------<  94  3.3<L>   |  24  |  0.84    Ca    10.1      05 Sep 2017 15:09    TPro  8.1  /  Alb  4.3  /  TBili  0.4  /  DBili  x   /  AST  26  /  ALT  26  /  AlkPhos  97  09-05    CARDIAC MARKERS ( 05 Sep 2017 15:09 )  .000 ng/mL / x     / 54 U/L / x     / 0.5 ng/mL  CARDIAC MARKERS ( 04 Sep 2017 09:18 )  x     / <0.01 ng/mL / x     / x     / x      CARDIAC MARKERS ( 04 Sep 2017 03:09 )  x     / <0.01 ng/mL / x     / x     / x          PT/INR - ( 05 Sep 2017 15:09 )   PT: 10.9 sec;   INR: 1.00 ratio         PTT - ( 05 Sep 2017 15:09 )  PTT:25.9 sec    ECG: Sinus rhythm with arrhythmia, normal axis, no ST abnormality

## 2017-09-06 DIAGNOSIS — R00.0 TACHYCARDIA, UNSPECIFIED: ICD-10-CM

## 2017-09-06 DIAGNOSIS — F41.9 ANXIETY DISORDER, UNSPECIFIED: ICD-10-CM

## 2017-09-06 DIAGNOSIS — E87.6 HYPOKALEMIA: ICD-10-CM

## 2017-09-06 DIAGNOSIS — Z86.2 PERSONAL HISTORY OF DISEASES OF THE BLOOD AND BLOOD-FORMING ORGANS AND CERTAIN DISORDERS INVOLVING THE IMMUNE MECHANISM: ICD-10-CM

## 2017-09-06 LAB
ANION GAP SERPL CALC-SCNC: 13 MMOL/L — SIGNIFICANT CHANGE UP (ref 5–17)
BUN SERPL-MCNC: 5 MG/DL — LOW (ref 7–23)
CALCIUM SERPL-MCNC: 9 MG/DL — SIGNIFICANT CHANGE UP (ref 8.4–10.5)
CHLORIDE SERPL-SCNC: 108 MMOL/L — SIGNIFICANT CHANGE UP (ref 96–108)
CK MB BLD-MCNC: 2 % — SIGNIFICANT CHANGE UP (ref 0–3.5)
CK MB CFR SERPL CALC: 1 NG/ML — SIGNIFICANT CHANGE UP (ref 0–3.8)
CK SERPL-CCNC: 51 U/L — SIGNIFICANT CHANGE UP (ref 25–170)
CO2 SERPL-SCNC: 23 MMOL/L — SIGNIFICANT CHANGE UP (ref 22–31)
CREAT SERPL-MCNC: 0.7 MG/DL — SIGNIFICANT CHANGE UP (ref 0.5–1.3)
GLUCOSE SERPL-MCNC: 103 MG/DL — HIGH (ref 70–99)
MAGNESIUM SERPL-MCNC: 2 MG/DL — SIGNIFICANT CHANGE UP (ref 1.6–2.6)
PHOSPHATE SERPL-MCNC: 3.5 MG/DL — SIGNIFICANT CHANGE UP (ref 2.5–4.5)
POTASSIUM SERPL-MCNC: 3.7 MMOL/L — SIGNIFICANT CHANGE UP (ref 3.5–5.3)
POTASSIUM SERPL-SCNC: 3.7 MMOL/L — SIGNIFICANT CHANGE UP (ref 3.5–5.3)
SODIUM SERPL-SCNC: 144 MMOL/L — SIGNIFICANT CHANGE UP (ref 135–145)
TROPONIN T SERPL-MCNC: <0.01 NG/ML — SIGNIFICANT CHANGE UP (ref 0–0.06)

## 2017-09-06 PROCEDURE — 99232 SBSQ HOSP IP/OBS MODERATE 35: CPT | Mod: GC

## 2017-09-06 PROCEDURE — 99221 1ST HOSP IP/OBS SF/LOW 40: CPT

## 2017-09-06 PROCEDURE — 99223 1ST HOSP IP/OBS HIGH 75: CPT

## 2017-09-06 PROCEDURE — 93306 TTE W/DOPPLER COMPLETE: CPT | Mod: 26

## 2017-09-06 RX ORDER — HEPARIN SODIUM 5000 [USP'U]/ML
5000 INJECTION INTRAVENOUS; SUBCUTANEOUS EVERY 8 HOURS
Qty: 0 | Refills: 0 | Status: DISCONTINUED | OUTPATIENT
Start: 2017-09-06 | End: 2017-09-10

## 2017-09-06 RX ORDER — POTASSIUM CHLORIDE 20 MEQ
10 PACKET (EA) ORAL
Qty: 0 | Refills: 0 | Status: COMPLETED | OUTPATIENT
Start: 2017-09-06 | End: 2017-09-06

## 2017-09-06 RX ORDER — LORATADINE 10 MG/1
10 TABLET ORAL DAILY
Qty: 0 | Refills: 0 | Status: DISCONTINUED | OUTPATIENT
Start: 2017-09-06 | End: 2017-09-10

## 2017-09-06 RX ORDER — SIMETHICONE 80 MG/1
160 TABLET, CHEWABLE ORAL EVERY 8 HOURS
Qty: 0 | Refills: 0 | Status: DISCONTINUED | OUTPATIENT
Start: 2017-09-06 | End: 2017-09-10

## 2017-09-06 RX ORDER — POTASSIUM CHLORIDE 20 MEQ
40 PACKET (EA) ORAL ONCE
Qty: 0 | Refills: 0 | Status: COMPLETED | OUTPATIENT
Start: 2017-09-06 | End: 2017-09-06

## 2017-09-06 RX ORDER — DILTIAZEM HCL 120 MG
180 CAPSULE, EXT RELEASE 24 HR ORAL DAILY
Qty: 0 | Refills: 0 | Status: DISCONTINUED | OUTPATIENT
Start: 2017-09-06 | End: 2017-09-06

## 2017-09-06 RX ORDER — FAMOTIDINE 10 MG/ML
20 INJECTION INTRAVENOUS
Qty: 0 | Refills: 0 | Status: DISCONTINUED | OUTPATIENT
Start: 2017-09-06 | End: 2017-09-10

## 2017-09-06 RX ORDER — MONTELUKAST 4 MG/1
10 TABLET, CHEWABLE ORAL DAILY
Qty: 0 | Refills: 0 | Status: DISCONTINUED | OUTPATIENT
Start: 2017-09-06 | End: 2017-09-10

## 2017-09-06 RX ADMIN — SIMETHICONE 160 MILLIGRAM(S): 80 TABLET, CHEWABLE ORAL at 06:38

## 2017-09-06 RX ADMIN — Medication 100 MILLIEQUIVALENT(S): at 02:42

## 2017-09-06 RX ADMIN — Medication 100 MILLIEQUIVALENT(S): at 05:20

## 2017-09-06 RX ADMIN — Medication 2.5 MILLIGRAM(S): at 20:39

## 2017-09-06 RX ADMIN — MONTELUKAST 10 MILLIGRAM(S): 4 TABLET, CHEWABLE ORAL at 22:11

## 2017-09-06 RX ADMIN — FAMOTIDINE 20 MILLIGRAM(S): 10 INJECTION INTRAVENOUS at 06:38

## 2017-09-06 RX ADMIN — Medication 180 MILLIGRAM(S): at 06:38

## 2017-09-06 RX ADMIN — Medication 100 MILLIEQUIVALENT(S): at 03:56

## 2017-09-06 RX ADMIN — SODIUM CHLORIDE 125 MILLILITER(S): 9 INJECTION INTRAMUSCULAR; INTRAVENOUS; SUBCUTANEOUS at 01:48

## 2017-09-06 RX ADMIN — Medication 1 MILLIGRAM(S): at 06:38

## 2017-09-06 RX ADMIN — FAMOTIDINE 20 MILLIGRAM(S): 10 INJECTION INTRAVENOUS at 22:11

## 2017-09-06 RX ADMIN — SIMETHICONE 160 MILLIGRAM(S): 80 TABLET, CHEWABLE ORAL at 22:11

## 2017-09-06 RX ADMIN — Medication 2.5 MILLIGRAM(S): at 09:01

## 2017-09-06 RX ADMIN — SIMETHICONE 160 MILLIGRAM(S): 80 TABLET, CHEWABLE ORAL at 14:13

## 2017-09-06 RX ADMIN — SODIUM CHLORIDE 125 MILLILITER(S): 9 INJECTION INTRAMUSCULAR; INTRAVENOUS; SUBCUTANEOUS at 22:13

## 2017-09-06 RX ADMIN — Medication 1 MILLIGRAM(S): at 19:04

## 2017-09-06 RX ADMIN — Medication 1 MILLIGRAM(S): at 14:09

## 2017-09-06 NOTE — BEHAVIORAL HEALTH ASSESSMENT NOTE - SUMMARY
41y.o CF pt. presenting with anxiety in the context of multiple medical problems that may lead to physiological anxiety. She has no SI/HI, no prominent mood sx. at this time and is satisfied with Buspar and Ativan regimen she is currently on.

## 2017-09-06 NOTE — BEHAVIORAL HEALTH ASSESSMENT NOTE - NSBHCHARTREVIEWINVESTIGATE_PSY_A_CORE FT
< from: 12 Lead ECG (09.04.17 @ 09:04) >      Ventricular Rate 71 BPM    Atrial Rate 71 BPM    P-R Interval 136 ms    QRS Duration 86 ms     ms    QTc 428 ms    P Axis 52 degrees    R Axis 16 degrees    T Axis 22 degrees    Diagnosis Line NORMAL SINUS RHYTHM  NORMAL ECG    < end of copied text >

## 2017-09-06 NOTE — BEHAVIORAL HEALTH ASSESSMENT NOTE - RISK ASSESSMENT
no prior suicide attempts, good insight into need for treatment. no psychosis or jessica, no drug abuse.

## 2017-09-06 NOTE — H&P ADULT - NSHPLABSRESULTS_GEN_ALL_CORE
Personally reviewed labs.   Personally reviewed imaging.   Personally reviewed EKG and tele.                          14.8   11.1  )-----------( 208      ( 05 Sep 2017 15:09 )             42.8       09-06    144  |  108  |  5<L>  ----------------------------<  103<H>  3.7   |  23  |  0.70    Ca    9.0      06 Sep 2017 01:42  Phos  3.5     09-06  Mg     2.0     09-06    TPro  8.1  /  Alb  4.3  /  TBili  0.4  /  DBili  x   /  AST  26  /  ALT  26  /  AlkPhos  97  09-05      CARDIAC MARKERS ( 06 Sep 2017 01:42 )  x     / <0.01 ng/mL / 51 U/L / x     / 1.0 ng/mL  CARDIAC MARKERS ( 05 Sep 2017 15:09 )  .000 ng/mL / x     / 54 U/L / x     / 0.5 ng/mL  CARDIAC MARKERS ( 04 Sep 2017 09:18 )  x     / <0.01 ng/mL / x     / x     / x            LIVER FUNCTIONS - ( 05 Sep 2017 15:09 )  Alb: 4.3 g/dL / Pro: 8.1 g/dL / ALK PHOS: 97 U/L / ALT: 26 U/L DA / AST: 26 U/L / GGT: x             PT/INR - ( 05 Sep 2017 15:09 )   PT: 10.9 sec;   INR: 1.00 ratio         PTT - ( 05 Sep 2017 15:09 )  PTT:25.9 sec

## 2017-09-06 NOTE — CONSULT NOTE ADULT - SUBJECTIVE AND OBJECTIVE BOX
HPI:  41F c hx ITP (not currently on treatment), anxiety disorder/panic attacks, depression, "hyperadrenergic POTS", HTN, frequent ED visits/hospitalizations for tachycardia and anxiety, presented to [place] with palpitations and tachycardia, transferred to [facility] for further management.    Used to be on steroids for her chronic ITP, not taking them now.     VS: Tm 98.4, P 94, /89, R 17, 98% RA (06 Sep 2017 03:23)      PAST MEDICAL & SURGICAL HISTORY:  History of ITP  Labyrinthitis  Asthma  Headache  HTN (hypertension)  Anxiety  IBS (irritable bowel syndrome)  No significant past surgical history      Allergies    No Known Allergies    Intolerances    metoprolol (Headache)      MEDICATIONS  (STANDING):  sodium chloride 0.9%. 1000 milliLiter(s) (125 mL/Hr) IV Continuous <Continuous>  diltiazem    milliGRAM(s) Oral daily  LORazepam     Tablet 1 milliGRAM(s) Oral every 8 hours  famotidine    Tablet 20 milliGRAM(s) Oral two times a day  busPIRone 2.5 milliGRAM(s) Oral two times a day  montelukast 10 milliGRAM(s) Oral daily  loratadine 10 milliGRAM(s) Oral daily  heparin  Injectable 5000 Unit(s) SubCutaneous every 8 hours    MEDICATIONS  (PRN):  simethicone 160 milliGRAM(s) Chew every 8 hours PRN Gas      FAMILY HISTORY:  Family history of hypertension (Grandparent)  Family history of atrial fibrillation  Family history of prostate cancer in father      SOCIAL HISTORY: No EtOH, no tobacco    REVIEW OF SYSTEMS:    CONSTITUTIONAL: No weakness, fevers or chills  EYES/ENT: No visual changes;  No vertigo or throat pain   NECK: No pain or stiffness  RESPIRATORY: No cough, wheezing, hemoptysis; No shortness of breath  CARDIOVASCULAR: No chest pain or palpitations  GASTROINTESTINAL: No abdominal or epigastric pain. No nausea, vomiting, or hematemesis; No diarrhea or constipation. No melena or hematochezia.  GENITOURINARY: No dysuria, frequency or hematuria  NEUROLOGICAL: No numbness or weakness  SKIN: No itching, burning, rashes, or lesions   All other review of systems is negative unless indicated above.    Height (cm): 157.48 (09-05 @ 21:26)  Weight (kg): 53.7 (09-06 @ 01:15)  BMI (kg/m2): 21.7 (09-06 @ 01:15)  BSA (m2): 1.53 (09-06 @ 01:15)    T(F): 98.3 (09-06-17 @ 12:17), Max: 98.7 (09-05-17 @ 18:57)  HR: 99 (09-06-17 @ 12:17)  BP: 128/87 (09-06-17 @ 12:17)  RR: 18 (09-06-17 @ 12:17)  SpO2: 97% (09-06-17 @ 12:17)  Wt(kg): --    GENERAL: NAD, well-developed  HEAD:  Atraumatic, Normocephalic  EYES: EOMI, PERRLA, conjunctiva and sclera clear  NECK: Supple, No JVD  CHEST/LUNG: Clear to auscultation bilaterally; No wheeze  HEART: Regular rate and rhythm; No murmurs, rubs, or gallops  ABDOMEN: Soft, Nontender, Nondistended; Bowel sounds present  EXTREMITIES:  2+ Peripheral Pulses, No clubbing, cyanosis, or edema  NEUROLOGY: non-focal  SKIN: No rashes or lesions                          14.8   11.1  )-----------( 208      ( 05 Sep 2017 15:09 )             42.8       09-06    144  |  108  |  5<L>  ----------------------------<  103<H>  3.7   |  23  |  0.70    Ca    9.0      06 Sep 2017 01:42  Phos  3.5     09-06  Mg     2.0     09-06    TPro  8.1  /  Alb  4.3  /  TBili  0.4  /  DBili  x   /  AST  26  /  ALT  26  /  AlkPhos  97  09-05      Magnesium, Serum: 2.0 mg/dL (09-06 @ 01:42)  Phosphorus Level, Serum: 3.5 mg/dL (09-06 @ 01:42)      PT/INR - ( 05 Sep 2017 15:09 )   PT: 10.9 sec;   INR: 1.00 ratio         PTT - ( 05 Sep 2017 15:09 )  PTT:25.9 sec HPI:  41F c hx ITP (not currently on treatment), anxiety disorder/panic attacks, depression, "hyperadrenergic POTS", HTN, frequent ED visits/hospitalizations for tachycardia and anxiety, presented to [place] with palpitations and tachycardia, transferred to [facility] for further management. She says she has had ITP for 20 years and her platelets have always been low, never greater than 100. 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 She says her BP went up to 170/133    of note, she has PRESSURED speech, she talks very fast, manic like.     Used to be on steroids for her chronic ITP, not taking them now.     VS: Tm 98.4, P 94, /89, R 17, 98% RA (06 Sep 2017 03:23)      PAST MEDICAL & SURGICAL HISTORY:  History of ITP  Labyrinthitis  Asthma  Headache  HTN (hypertension)  Anxiety  IBS (irritable bowel syndrome)  No significant past surgical history      Allergies    No Known Allergies    Intolerances    metoprolol (Headache)      MEDICATIONS  (STANDING):  sodium chloride 0.9%. 1000 milliLiter(s) (125 mL/Hr) IV Continuous <Continuous>  diltiazem    milliGRAM(s) Oral daily  LORazepam     Tablet 1 milliGRAM(s) Oral every 8 hours  famotidine    Tablet 20 milliGRAM(s) Oral two times a day  busPIRone 2.5 milliGRAM(s) Oral two times a day  montelukast 10 milliGRAM(s) Oral daily  loratadine 10 milliGRAM(s) Oral daily  heparin  Injectable 5000 Unit(s) SubCutaneous every 8 hours    MEDICATIONS  (PRN):  simethicone 160 milliGRAM(s) Chew every 8 hours PRN Gas      FAMILY HISTORY:  Family history of hypertension (Grandparent)  Family history of atrial fibrillation  Family history of prostate cancer in father      SOCIAL HISTORY: No EtOH, no tobacco    REVIEW OF SYSTEMS:    otherwise negative  Height (cm): 157.48 (09-05 @ 21:26)  Weight (kg): 53.7 (09-06 @ 01:15)  BMI (kg/m2): 21.7 (09-06 @ 01:15)  BSA (m2): 1.53 (09-06 @ 01:15)    T(F): 98.3 (09-06-17 @ 12:17), Max: 98.7 (09-05-17 @ 18:57)  HR: 99 (09-06-17 @ 12:17)  BP: 128/87 (09-06-17 @ 12:17)  RR: 18 (09-06-17 @ 12:17)  SpO2: 97% (09-06-17 @ 12:17)  Wt(kg): --    GENERAL: NAD, well-developed  HEAD:  Atraumatic, Normocephalic  EYES: EOMI, PERRLA, conjunctiva and sclera clear  NECK: Supple, No JVD  CHEST/LUNG: Clear to auscultation bilaterally; No wheeze  HEART: Regular rate and rhythm; No murmurs, rubs, or gallops  ABDOMEN: Soft, Nontender, Nondistended; Bowel sounds present  EXTREMITIES:  2+ Peripheral Pulses, No clubbing, cyanosis, or edema  NEUROLOGY: non-focal  SKIN: No rashes or lesions                          14.8   11.1  )-----------( 208      ( 05 Sep 2017 15:09 )             42.8       09-06    144  |  108  |  5<L>  ----------------------------<  103<H>  3.7   |  23  |  0.70    Ca    9.0      06 Sep 2017 01:42  Phos  3.5     09-06  Mg     2.0     09-06    TPro  8.1  /  Alb  4.3  /  TBili  0.4  /  DBili  x   /  AST  26  /  ALT  26  /  AlkPhos  97  09-05      Magnesium, Serum: 2.0 mg/dL (09-06 @ 01:42)  Phosphorus Level, Serum: 3.5 mg/dL (09-06 @ 01:42)      PT/INR - ( 05 Sep 2017 15:09 )   PT: 10.9 sec;   INR: 1.00 ratio         PTT - ( 05 Sep 2017 15:09 )  PTT:25.9 sec

## 2017-09-06 NOTE — CONSULT NOTE ADULT - PROBLEM SELECTOR RECOMMENDATION 9
plt on 9/5/17 were 208, used to be on prednisone  please obtain CBC tomorrow as well   in august her platelets were in the 60s , she is much improved  - follows with Dr. Zamora, please have her follow up as an outpatient. will sign off, please call with any questions plt on 9/5/17 were 208, used to be on prednisone  please obtain CBC tomorrow as well   in august her platelets were in the 60s , she is much improved  - follows with Dr. Zamora, please have her follow up as an outpatient. plt on 9/5/17 were 208, used to be on prednisone  please obtain CBC tomorrow as well . per Dr. Zamora her outpatient hematologist, please check JAK2 and BCR-ABL to check for ET   in august her platelets were in the 60s , she is much improved  - follows with Dr. Zamora, please have her follow up as an outpatient.

## 2017-09-06 NOTE — BEHAVIORAL HEALTH ASSESSMENT NOTE - NSBHCHARTREVIEWVS_PSY_A_CORE FT
Vital Signs Last 24 Hrs  T(C): 36.8 (06 Sep 2017 12:17), Max: 37.1 (05 Sep 2017 18:57)  T(F): 98.3 (06 Sep 2017 12:17), Max: 98.7 (05 Sep 2017 18:57)  HR: 99 (06 Sep 2017 12:17) (70 - 99)  BP: 128/87 (06 Sep 2017 12:17) (112/79 - 148/91)  BP(mean): --  RR: 18 (06 Sep 2017 12:17) (14 - 20)  SpO2: 97% (06 Sep 2017 12:17) (97% - 100%)

## 2017-09-06 NOTE — BEHAVIORAL HEALTH ASSESSMENT NOTE - HPI (INCLUDE ILLNESS QUALITY, SEVERITY, DURATION, TIMING, CONTEXT, MODIFYING FACTORS, ASSOCIATED SIGNS AND SYMPTOMS)
This is a 41-year-old CF patient with hx of asthma, ITP (not on treatment), "hyperadrenergic POTS", HTN, and psychiatric hx. of associated anxiety, possibly depression, with frequent ED visits/hospitalizations for tachycardia and anxiety, transferred from [place] for  management of palpitations and tachycardia.    Prior to admission, patient reported having home HR in the 150s, with dizziness and palpitations. She presented to [facility] and, after a discussion with her outpt cardiologist, she was transferred to [facility]. Patient states that her life used to be unremarkable up until about six months ago, when she started having BP and HR problems, accompanied by anxiety. She is adamant about hyperadrenergic PoTS being the cause of her anxiety, not the other way around. She explains that she had tried Lexapro and other forms of therapy, but developed hemorrhages with escitalopram. Other forms of therapy (counseling) she found helpful, but not very effective, as "there is nothing you can do when your blood pressure shoots up and down". She was seen by our service in earlier this year (when Ativan and Lexapro were recommended), now she is satisfied with counseling and medications she is currently on - Buspar and Ativan. despite the fact that she has been struggling with employment and recently lost her cat, she denies that depression is an issue. Denies SI/HI, denies other prominent mood sx.

## 2017-09-06 NOTE — PROVIDER CONTACT NOTE (OTHER) - ASSESSMENT
Pt A+Ox4. VSS. Pt educated regarding indication for heparin as preventative measure for blood clots. Pt verbalizes understanding and states she has uses mechanical compressions on her legs during her previous hospital stay as her prophylaxis.

## 2017-09-06 NOTE — H&P ADULT - NSHPREVIEWOFSYSTEMS_GEN_ALL_CORE
REVIEW OF SYSTEMS:  CONSTITUTIONAL: No weakness. No fevers. No chills. No weight loss. Good appetite.  EYES: No visual changes. No eye pain.  ENT: No hearing difficulty. No vertigo. No dysphagia. No Sinusitis/rhinorrhea.  NECK: No pain. No stiffness/rigidity.  CARDIAC: No chest pain. +palpitations.  RESPIRATORY: No cough. No SOB. No hemoptysis.  GASTROINTESTINAL: No abdominal pain. No nausea. No vomiting. No hematemesis. +diarrhea. No constipation. No melena. No hematochezia.  GENITOURINARY: No dysuria. No frequency. No hesitancy. No hematuria.  NEUROLOGICAL: No numbness/tingling. No focal weakness. No incontinence. No headache.  BACK: No back pain.  EXTREMITIES: No lower extremity edema. Full ROM.  SKIN: No rashes. No itching. No other lesions.  PSYCHIATRIC: +depression. +anxiety. No SI/HI.  ALLERGIC: No lip swelling. No hives.  All other review of systems is negative unless indicated above.  Unless indicated above, unable to assess ROS because

## 2017-09-06 NOTE — CONSULT NOTE ADULT - ASSESSMENT
41F c hx ITP (not currently on treatment), anxiety disorder/panic attacks, depression, "hyperadrenergic POTS", HTN, frequent ED visits/hospitalizations for tachycardia and anxiety, presented to [place] with palpitations and tachycardia, transferred to [facility] for further management.

## 2017-09-06 NOTE — PROVIDER CONTACT NOTE (OTHER) - ASSESSMENT
Pt A+Ox4. VSS. K supplement ordered for K3.3; repeat K with no intervention is 3.7. Give supplement ordered per MD Flaco Rodriguez. Pt agreeable to take IV doses ordered; tolerating KCL 10meq x3 IV. Would rather hold off on PO dose at this time since K is within range.

## 2017-09-06 NOTE — BEHAVIORAL HEALTH ASSESSMENT NOTE - NSBHSUICPROTECTFACT_PSY_A_CORE
Responsibility to family and others/Supportive social network or family/Identifies reasons for living/Future oriented

## 2017-09-06 NOTE — H&P ADULT - ATTENDING COMMENTS
Pt signed out to NP service. Time spent 70 minutes on total encounter. Dr. Lowry to assume care in AM.

## 2017-09-06 NOTE — H&P ADULT - HISTORY OF PRESENT ILLNESS
41F c hx asthma, anxiety, ITP (not on treatment), anxiety disorder/panic attacks, depression, "hyperadrenergic POTS", HTN, frequent ED visits/hospitalizations for tachycardia and anxiety, presented to [place] with palpitations and tachycardia, transferred to [facility] for further management.    Pt states she has orthostatic tachycardia, and uses a home finger pulse ox to monitor herself. Earlier yesterday, she stood up and reported having home HR in the 150s, with dizziness and palpitations. She presented to [facility], where she was seen by cards. After discussion with her outpt cardiologist, pt was transferred to [facility]. Pt states all of her symptoms started 6 months ago, including her anxiety attacks and tachycardia symptoms. She has had extensive workup inpt and outpt. She had a holter monitor for 1 week several months ago, and again had a 24 hour ambulatory monitor several weeks ago, which did not elicit the above tachycardia. Pt states she was recently treated for ?pna recently as outpt. Otherwise, denies fevers, chills, N/V, chest pain, SOB.     VS: Tm 98.4, P 94, /89, R 17, 98% RA 41F c hx asthma, ITP (not on treatment), anxiety disorder/panic attacks, depression, "hyperadrenergic POTS", HTN, frequent ED visits/hospitalizations for tachycardia and anxiety, presented to [place] with palpitations and tachycardia, transferred to [facility] for further management.    Pt states she has orthostatic tachycardia, and uses a home finger pulse ox to monitor herself. Earlier yesterday, she stood up and reported having home HR in the 150s, with dizziness and palpitations. She presented to [facility], where she was seen by cards. After discussion with her outpt cardiologist, pt was transferred to [facility]. Pt states all of her symptoms started 6 months ago, including her anxiety attacks and tachycardia symptoms. She has had extensive workup inpt and outpt. She had a holter monitor for 1 week several months ago, and again had a 24 hour ambulatory monitor several weeks ago, which did not elicit the above tachycardia. Pt states she was recently treated for ?pna recently as outpt. Otherwise, denies fevers, chills, N/V, chest pain, SOB.     VS: Tm 98.4, P 94, /89, R 17, 98% RA

## 2017-09-06 NOTE — H&P ADULT - ASSESSMENT
41F c hx asthma, anxiety, ITP (not on treatment), anxiety disorder/panic attacks, depression, "hyperadrenergic POTS", HTN, frequent ED visits/hospitalizations for tachycardia and anxiety, presented to [place] with palpitations and tachycardia, transferred to [facility] for further management. 41F c hx asthma, ITP (not on treatment), anxiety disorder/panic attacks, depression, "hyperadrenergic POTS", HTN, frequent ED visits/hospitalizations for tachycardia and anxiety, presented to [place] with palpitations and tachycardia, transferred to [facility] for further management.

## 2017-09-06 NOTE — H&P ADULT - NSHPSOCIALHISTORY_GEN_ALL_CORE
Social History:    Marital Status: (  ) , ( x ) Single, (  ) , (  ) , (  )   # of Children:   Lives with: ( x ) alone, (  ) children, (  ) spouse, (  ) parents, (  ) other:   Occupation:     Substance Use/Illicit Drugs: (  ) never used, (  ) other:   Tobacco Usage: (  ) never smoked, ( x ) former smoker, (  ) current smoker, and Total Pack-Years:   Last Alcohol Usage/Frequency/Amount:

## 2017-09-06 NOTE — BEHAVIORAL HEALTH ASSESSMENT NOTE - NSBHCHARTREVIEWLAB_PSY_A_CORE FT
14.8   11.1  )-----------( 208      ( 05 Sep 2017 15:09 )             42.8     09-06    144  |  108  |  5<L>  ----------------------------<  103<H>  3.7   |  23  |  0.70    Ca    9.0      06 Sep 2017 01:42  Phos  3.5     09-06  Mg     2.0     09-06    TPro  8.1  /  Alb  4.3  /  TBili  0.4  /  DBili  x   /  AST  26  /  ALT  26  /  AlkPhos  97  09-05

## 2017-09-06 NOTE — CONSULT NOTE ADULT - SUBJECTIVE AND OBJECTIVE BOX
**********              CARDIOLOGY CONSULT NOTE              ************  ============================================================  CHIEF COMPLAINT/REASON FOR CONSULT:  Patient is a 41y old  Female who presents with a chief complaint of hypertension (06 Sep 2017 03:23)      HISTORY OF PRESENT ILLNESS:  [id number] 41 y.o. F ([employer]) - with ITP/anxiety/IBS/Asthma/Hypertensive urgency and Tachycardia with unrevealing, exhaustive systemic workup presumed 2/2 to autonomic dysfunction - presenting for f/u evaluation  --------------  We have tried her on Propanolol, Dilitiazem, Metoprolol. She seems to have episodes of sinus tachycardia that self resolve. She has sinus tachycardia 12% time on Holter but is otherwise unrevealing.  She also has episodes of hypertension, which she checks very frequently.  No CP/SOB. +Palps.          Allergies    No Known Allergies    Intolerances    metoprolol (Headache)  	    MEDICATIONS:  diltiazem    milliGRAM(s) Oral daily  heparin  Injectable 5000 Unit(s) SubCutaneous every 8 hours      montelukast 10 milliGRAM(s) Oral daily  loratadine 10 milliGRAM(s) Oral daily    LORazepam     Tablet 1 milliGRAM(s) Oral every 8 hours  busPIRone 2.5 milliGRAM(s) Oral two times a day    famotidine    Tablet 20 milliGRAM(s) Oral two times a day  simethicone 160 milliGRAM(s) Chew every 8 hours PRN      sodium chloride 0.9%. 1000 milliLiter(s) IV Continuous <Continuous>      PAST MEDICAL & SURGICAL HISTORY:  History of ITP  Labyrinthitis  Asthma  Headache  HTN (hypertension)  Anxiety  IBS (irritable bowel syndrome)  No significant past surgical history      FAMILY HISTORY:  Family history of hypertension (Grandparent)  Family history of atrial fibrillation  Family history of prostate cancer in father      SOCIAL HISTORY:    [ x] Non-smoker  [x] No Illicit Drug Use  [ x] No Excess EtOH Use      REVIEW OF SYSTEMS:  Constitutional: No Fever, Fatigue, Weight Changes  Eyes: No Recent Vision Changes, Eye Pain  ENT: No Congestion, Sore Throat  Endocrine: No Excess Sweating, Temperature Intolerance  Cardiovascular: No Chest Pain,+ Palpitations, No Shortness of Breath, Pre-syncope, Syncope, LE Edema  Respiratory: No Cough, Congestion, Wheezing  Gastrointestinal: No Abdominal Pain, Nausea, Vomiting  Genitourinary: No dysuria, hematuria  Musculoskeletal: No Joint Pain, Swelling  Neurologic: No headaches, Imbalance, Weakness  Skin: No rashes, hematoma, purprura    PHYSICAL EXAM:  T(C): 36.8 (09-06-17 @ 12:17), Max: 37.1 (09-05-17 @ 18:57)  HR: 99 (09-06-17 @ 12:17) (70 - 99)  BP: 128/87 (09-06-17 @ 12:17) (112/79 - 148/91)  RR: 18 (09-06-17 @ 12:17) (14 - 20)  SpO2: 97% (09-06-17 @ 12:17) (97% - 100%)  Wt(kg): --  I&O's Summary    05 Sep 2017 07:01  -  06 Sep 2017 07:00  --------------------------------------------------------  IN: 1300 mL / OUT: 0 mL / NET: 1300 mL    06 Sep 2017 07:01  -  06 Sep 2017 16:54  --------------------------------------------------------  IN: 240 mL / OUT: 0 mL / NET: 240 mL      Appearance: Normal	 + anxious  HEENT:   Normal oral mucosa, EOMI	  Lymphatic: No lymphadenopathy  Cardiovascular: Normal S1 S2, No JVD, No murmurs, No edema  Respiratory: Lungs clear to auscultation, no use of accessory muscles	  Psychiatry: A & O x 3, Mood & affect appropriate  Gastrointestinal:  Soft, Non-tender	  Skin: No rashes, No ecchymoses, No cyanosis	  Neurologic: Non-focal, No Focal Deficits  Extremities: Normal range of motion, No clubbing, cyanosis or edema  Vascular: Peripheral pulses palpable 2+ bilaterally, no prominent varicosities        LABS:	   Labs [id number] @ 16:54	    CBC Full  -  ( 05 Sep 2017 15:09 )  WBC Count : 11.1 K/uL  Hemoglobin : 14.8 g/dL  Hematocrit : 42.8 %  Platelet Count - Automated : 208 K/uL  Mean Cell Volume : 86.5 fl  Mean Cell Hemoglobin : 29.8 pg  Mean Cell Hemoglobin Concentration : 34.4 gm/dL  Auto Neutrophil # : 6.6 K/uL  Auto Lymphocyte # : 3.5 K/uL  Auto Monocyte # : 0.8 K/uL  Auto Eosinophil # : 0.1 K/uL  Auto Basophil # : 0.1 K/uL  Auto Neutrophil % : 59.4 %  Auto Lymphocyte % : 31.9 %  Auto Monocyte % : 07.0 %  Auto Eosinophil % : 1.2 %  Auto Basophil % : 0.6 %    09-06    144  |  108  |  5<L>  ----------------------------<  103<H>  3.7   |  23  |  0.70  09-05    142  |  105  |  4<L>  ----------------------------<  94  3.3<L>   |  24  |  0.84    Ca    9.0      06 Sep 2017 01:42  Ca    10.1      05 Sep 2017 15:09  Phos  3.5     09-06  Mg     2.0     09-06    TPro  8.1  /  Alb  4.3  /  TBili  0.4  /  DBili  x   /  AST  26  /  ALT  26  /  AlkPhos  97  09-05      proBNP:   HgA1c:   TSH:       CARDIAC MARKERS:  Troponin I, Serum: .000 ng/mL (09-05 @ 15:09)            TELEMETRY: 	  SR, episodes of sinus tachycardia   ECG:  	NSR no ischemic changes no q waves   RADIOLOGY:	    PREVIOUS DIAGNOSTIC TESTING:    [ ] Echocardiogram:  Reviewed normal       =========================================================================  ASSESSMENT:  HISTORY OF PRESENT ILLNESS:  [id number] 41 y.o. F ([employer]) - with ITP/anxiety/IBS/Asthma/Hypertensive urgency and Tachycardia with unrevealing, exhaustive systemic workup presumed 2/2 to autonomic dysfunction - presenting for f/u evaluation  --------------    Problems  1. Tachycardia  2. Labile Hypertension  3. Repeated visits    Recommendations  - At this point, we have exhausted all workup  - Recommend continue current meds  - Psychiatry involvement        Please call with questions.     Philip Reed MD, Located within [facility]  [phone]

## 2017-09-06 NOTE — H&P ADULT - NSHPPHYSICALEXAM_GEN_ALL_CORE
ICU Vital Signs Last 24 Hrs  T(C): 36.5 (06 Sep 2017 01:15), Max: 37.1 (05 Sep 2017 14:03)  T(F): 97.7 (06 Sep 2017 01:15), Max: 98.7 (05 Sep 2017 14:03)  HR: 83 (06 Sep 2017 01:15) (70 - 95)  BP: 138/91 (06 Sep 2017 01:15) (131/89 - 148/91)  BP(mean): --  ABP: --  ABP(mean): --  RR: 18 (06 Sep 2017 01:15) (14 - 20)  SpO2: 100% (06 Sep 2017 01:15) (97% - 100%)      I&O's Summary    05 Sep 2017 07:01  -  06 Sep 2017 03:37  --------------------------------------------------------  IN: 1000 mL / OUT: 0 mL / NET: 1000 mL        PHYSICAL EXAM:   GENERAL: Alert. No acute distress. Not obese. Well-developed. Not cachectic.  HEAD:  Atraumatic. Normocephalic.  EYES: EOMI. PERRLA. Normal conjunctiva/sclera.  ENT: Neck supple. No JVD. Moist oral mucosa. Not edentulous. No thrush.  LYMPH: Normal supraclavicular/cervical lymph nodes.   CARDIAC: RRR. S1. S2. No murmur. No rub. No distant heart sounds.  LUNG/CHEST: CTAB. BS equal bilaterally. No wheezes. No rales. No rhonchi.  ABDOMEN: Soft. No tenderness. No distension. No fluid wave. Normal bowel sounds.  BACK: No spinal tenderness. No flank tenderness.  VASCULAR: +2 b/l radial pulses. Palpable DP pulses.  EXTREMITIES:  No clubbing. No cyanosis. No edema. Moving all 4.  NEUROLOGY: A&Ox3. Non-focal exam. Cranial nerves intact.  PSYCH: Normal behavior. Anxious affect.  SKIN: Normal color. No rashes. No lesions.  Vascular Access:

## 2017-09-06 NOTE — H&P ADULT - PROBLEM SELECTOR PLAN 1
- consult Dr. Reed in AM  - orthostats are negative. tele shows no sign of tachycardia while hospitalized  - cont home meds  - cont IVF  - all workup including am cortisol, ur metanephrines, tsh, 5hiaa, lyme, stress test, holter monitor, mri head are documented or reportedly negative  - f/u c neuro and psych as outpt  - will check TTE for any structural abnormalities  - replete K as below  - avoid caffeine  - no obvious source of infection - consult Dr. Reed in AM  - orthostats are negative for hypotension or tachycardia. tele showing NSR in 60s to 100s.  - cont home meds  - cont IVF  - all workup including am cortisol, ur metanephrines, tsh, 5hiaa, lyme, stress test, holter monitor, mri head are documented or reportedly negative  - f/u c neuro and psych as outpt  - will check TTE for any structural abnormalities  - replete K as below  - avoid caffeine  - no obvious source of infection

## 2017-09-07 PROCEDURE — 99233 SBSQ HOSP IP/OBS HIGH 50: CPT

## 2017-09-07 RX ORDER — DILTIAZEM HCL 120 MG
240 CAPSULE, EXT RELEASE 24 HR ORAL DAILY
Qty: 0 | Refills: 0 | Status: DISCONTINUED | OUTPATIENT
Start: 2017-09-07 | End: 2017-09-10

## 2017-09-07 RX ORDER — ACETAMINOPHEN 500 MG
650 TABLET ORAL ONCE
Qty: 0 | Refills: 0 | Status: COMPLETED | OUTPATIENT
Start: 2017-09-07 | End: 2017-09-07

## 2017-09-07 RX ORDER — METOCLOPRAMIDE HCL 10 MG
10 TABLET ORAL ONCE
Qty: 0 | Refills: 0 | Status: COMPLETED | OUTPATIENT
Start: 2017-09-07 | End: 2017-09-07

## 2017-09-07 RX ADMIN — SODIUM CHLORIDE 125 MILLILITER(S): 9 INJECTION INTRAMUSCULAR; INTRAVENOUS; SUBCUTANEOUS at 23:17

## 2017-09-07 RX ADMIN — Medication 1 MILLIGRAM(S): at 06:33

## 2017-09-07 RX ADMIN — Medication 1 MILLIGRAM(S): at 07:01

## 2017-09-07 RX ADMIN — SIMETHICONE 160 MILLIGRAM(S): 80 TABLET, CHEWABLE ORAL at 06:32

## 2017-09-07 RX ADMIN — Medication 240 MILLIGRAM(S): at 16:51

## 2017-09-07 RX ADMIN — Medication 1 MILLIGRAM(S): at 00:07

## 2017-09-07 RX ADMIN — FAMOTIDINE 20 MILLIGRAM(S): 10 INJECTION INTRAVENOUS at 06:32

## 2017-09-07 RX ADMIN — Medication 10 MILLIGRAM(S): at 19:53

## 2017-09-07 RX ADMIN — Medication 1 MILLIGRAM(S): at 16:21

## 2017-09-07 RX ADMIN — SODIUM CHLORIDE 125 MILLILITER(S): 9 INJECTION INTRAMUSCULAR; INTRAVENOUS; SUBCUTANEOUS at 07:00

## 2017-09-07 RX ADMIN — Medication 1 MILLIGRAM(S): at 14:58

## 2017-09-07 RX ADMIN — Medication 2.5 MILLIGRAM(S): at 10:45

## 2017-09-07 NOTE — CHART NOTE - NSCHARTNOTEFT_GEN_A_CORE
Patient's diltiazem changed to diltiazem CD 180mg (first dose now) per discussion with Dr. Lowry.  Latest vitals vitals 140/100, HR 75, RR 16, 98% on RA. Patient's diltiazem changed to diltiazem CD 180mg (first dose now) per discussion with Dr. Lowry.  Latest vitals 140/100, HR 75, RR 16, 98% on RA.  Will request repeat vitals in 1 hour. Patient's diltiazem changed to diltiazem CD 180mg (first dose now) per discussion with Dr. Lowry.  Patient informed and agreeable.  Latest vitals 140/100, HR 75, RR 16, 98% on RA.  Will request repeat vitals in 1 hour. Patient's diltiazem changed to diltiazem CD 240mg (first dose now) per discussion with Dr. Lowry.  Patient informed and agreeable.  Latest vitals 140/100, HR 75, RR 16, 98% on RA.  Will request repeat vitals in 1 hour.

## 2017-09-07 NOTE — PROVIDER CONTACT NOTE (OTHER) - ASSESSMENT
Pt A+Ox4. VSS. Pt concerned regarding cardizem dose this AM; states MD Reed ordered "extended release" dose for her. No blood work ordered this AM; per pt heme MD wants her blood checked. Ativan 1mg dose given as ordered at 0630. Pt upset and anxious regarding cardizem. Pt requesting to take extra dose of 1mg Ativan.

## 2017-09-07 NOTE — PROGRESS NOTE ADULT - ASSESSMENT
41 y.o. F - with ITP/anxiety/IBS/Asthma/Hypertensive urgency and Tachycardia with unrevealing, exhaustive systemic workup presumed 2/2 to autonomic dysfunction a/w intermittent tachycardia  - Cardizem changed to bid dose    Anxiety -  ativan/ buspar for anxiety - [facility] eval reviewed     PLatelets- pt states her platelets were always less than 100 concerned about rise in platelets from baseline, heme evaluated pt - as per heme , no w/u for platelets at present

## 2017-09-07 NOTE — PROVIDER CONTACT NOTE (OTHER) - ASSESSMENT
Pt A+Ox4. VSS. Pt states that MD Reed told her that he would order cardizem 120mg as extended release. Cardizem 120mg BID ordered, no extended release indicated.

## 2017-09-08 LAB
ANION GAP SERPL CALC-SCNC: 11 MMOL/L — SIGNIFICANT CHANGE UP (ref 5–17)
BUN SERPL-MCNC: 3 MG/DL — LOW (ref 7–23)
CALCIUM SERPL-MCNC: 9.4 MG/DL — SIGNIFICANT CHANGE UP (ref 8.4–10.5)
CHLORIDE SERPL-SCNC: 107 MMOL/L — SIGNIFICANT CHANGE UP (ref 96–108)
CO2 SERPL-SCNC: 26 MMOL/L — SIGNIFICANT CHANGE UP (ref 22–31)
CREAT SERPL-MCNC: 0.84 MG/DL — SIGNIFICANT CHANGE UP (ref 0.5–1.3)
GLUCOSE SERPL-MCNC: 85 MG/DL — SIGNIFICANT CHANGE UP (ref 70–99)
HCT VFR BLD CALC: 36.5 % — SIGNIFICANT CHANGE UP (ref 34.5–45)
HGB BLD-MCNC: 12.7 G/DL — SIGNIFICANT CHANGE UP (ref 11.5–15.5)
MCHC RBC-ENTMCNC: 30.7 PG — SIGNIFICANT CHANGE UP (ref 27–34)
MCHC RBC-ENTMCNC: 34.9 GM/DL — SIGNIFICANT CHANGE UP (ref 32–36)
MCV RBC AUTO: 87.8 FL — SIGNIFICANT CHANGE UP (ref 80–100)
PLATELET # BLD AUTO: 105 K/UL — LOW (ref 150–400)
POTASSIUM SERPL-MCNC: 4.1 MMOL/L — SIGNIFICANT CHANGE UP (ref 3.5–5.3)
POTASSIUM SERPL-SCNC: 4.1 MMOL/L — SIGNIFICANT CHANGE UP (ref 3.5–5.3)
RBC # BLD: 4.15 M/UL — SIGNIFICANT CHANGE UP (ref 3.8–5.2)
RBC # FLD: 12 % — SIGNIFICANT CHANGE UP (ref 10.3–14.5)
SODIUM SERPL-SCNC: 144 MMOL/L — SIGNIFICANT CHANGE UP (ref 135–145)
WBC # BLD: 6.6 K/UL — SIGNIFICANT CHANGE UP (ref 3.8–10.5)
WBC # FLD AUTO: 6.6 K/UL — SIGNIFICANT CHANGE UP (ref 3.8–10.5)

## 2017-09-08 PROCEDURE — 99231 SBSQ HOSP IP/OBS SF/LOW 25: CPT | Mod: GC

## 2017-09-08 PROCEDURE — 99233 SBSQ HOSP IP/OBS HIGH 50: CPT

## 2017-09-08 PROCEDURE — G0452: CPT | Mod: 26

## 2017-09-08 RX ORDER — TIOTROPIUM BROMIDE 18 UG/1
1 CAPSULE ORAL; RESPIRATORY (INHALATION) DAILY
Qty: 0 | Refills: 0 | Status: DISCONTINUED | OUTPATIENT
Start: 2017-09-08 | End: 2017-09-10

## 2017-09-08 RX ORDER — IPRATROPIUM/ALBUTEROL SULFATE 18-103MCG
3 AEROSOL WITH ADAPTER (GRAM) INHALATION EVERY 6 HOURS
Qty: 0 | Refills: 0 | Status: DISCONTINUED | OUTPATIENT
Start: 2017-09-08 | End: 2017-09-08

## 2017-09-08 RX ORDER — ALBUTEROL 90 UG/1
1 AEROSOL, METERED ORAL EVERY 4 HOURS
Qty: 0 | Refills: 0 | Status: DISCONTINUED | OUTPATIENT
Start: 2017-09-08 | End: 2017-09-08

## 2017-09-08 RX ORDER — ACETAMINOPHEN 500 MG
650 TABLET ORAL EVERY 6 HOURS
Qty: 0 | Refills: 0 | Status: DISCONTINUED | OUTPATIENT
Start: 2017-09-08 | End: 2017-09-10

## 2017-09-08 RX ADMIN — Medication 650 MILLIGRAM(S): at 16:20

## 2017-09-08 RX ADMIN — FAMOTIDINE 20 MILLIGRAM(S): 10 INJECTION INTRAVENOUS at 21:30

## 2017-09-08 RX ADMIN — Medication 650 MILLIGRAM(S): at 00:09

## 2017-09-08 RX ADMIN — SIMETHICONE 160 MILLIGRAM(S): 80 TABLET, CHEWABLE ORAL at 14:34

## 2017-09-08 RX ADMIN — Medication 650 MILLIGRAM(S): at 01:00

## 2017-09-08 RX ADMIN — Medication 1 MILLIGRAM(S): at 05:55

## 2017-09-08 RX ADMIN — MONTELUKAST 10 MILLIGRAM(S): 4 TABLET, CHEWABLE ORAL at 21:30

## 2017-09-08 RX ADMIN — Medication 2.5 MILLIGRAM(S): at 19:48

## 2017-09-08 RX ADMIN — SIMETHICONE 160 MILLIGRAM(S): 80 TABLET, CHEWABLE ORAL at 05:56

## 2017-09-08 RX ADMIN — Medication 650 MILLIGRAM(S): at 09:30

## 2017-09-08 RX ADMIN — Medication 650 MILLIGRAM(S): at 14:34

## 2017-09-08 RX ADMIN — Medication 2.5 MILLIGRAM(S): at 09:35

## 2017-09-08 RX ADMIN — Medication 240 MILLIGRAM(S): at 16:21

## 2017-09-08 RX ADMIN — Medication 1 MILLIGRAM(S): at 18:29

## 2017-09-08 RX ADMIN — Medication 2.5 MILLIGRAM(S): at 00:19

## 2017-09-08 RX ADMIN — SODIUM CHLORIDE 125 MILLILITER(S): 9 INJECTION INTRAMUSCULAR; INTRAVENOUS; SUBCUTANEOUS at 14:40

## 2017-09-08 RX ADMIN — Medication 650 MILLIGRAM(S): at 08:24

## 2017-09-08 RX ADMIN — FAMOTIDINE 20 MILLIGRAM(S): 10 INJECTION INTRAVENOUS at 05:55

## 2017-09-08 RX ADMIN — Medication 1 MILLIGRAM(S): at 12:06

## 2017-09-08 NOTE — PROGRESS NOTE ADULT - PROBLEM SELECTOR PLAN 1
plt on 9/5/17 were 208, used to be on prednisone. plt downtrending, today 100s. might need to be place on steroids.     per Dr. Zamora her outpatient hematologist, please check JAK2 and BCR-ABL to check for ET .  in august 2017,  her platelets were in the 60s , so the platelets being high for her now, this could be ET , but unlikely.  - follows with Dr. aZmora, please have her follow up as an outpatient.  continue to monitor platelet count

## 2017-09-08 NOTE — PROVIDER CONTACT NOTE (OTHER) - ASSESSMENT
Patient A/O X 4 denies CP, SOB , denies abdominal discomforts , abdomen soft non tender +BS x4 B/P 140/90 HR 96 RR 17 POX 99% RA , Temp 98

## 2017-09-08 NOTE — PROGRESS NOTE ADULT - ASSESSMENT
41F c hx ITP (not currently on treatment), "hyperadrenergic POTS", HTN, presented to [place] with palpitations and tachycardia, transferred to [facility] for further management.

## 2017-09-08 NOTE — PROGRESS NOTE ADULT - ASSESSMENT
41 y.o. F - with ITP/anxiety/IBS/Asthma/Hypertensive urgency and Tachycardia with unrevealing, exhaustive systemic workup presumed 2/2 to autonomic dysfunction a/w intermittent tachycardia  - Cardizem dose increased     Anxiety -  ativan/ buspar for anxiety - [facility] eval reviewed     PLatelets- pt states her platelets were always less than 100 concerned about rise in platelets from baseline, heme evaluated pt , platelets found to be around 100  today , heme ordered serum markers

## 2017-09-08 NOTE — PROVIDER CONTACT NOTE (OTHER) - ACTION/TREATMENT ORDERED:
PA aware. No adjustment to dose to be made at this time; pt can f/u with MD in AM regarding order.
PA aware. Awaiting call back from MD Reed. No AM labs to be ordered. Okay to give PRN dose of ativan as ordered.
PA aware. Okay to hold PO dose at this time. Continue IV doses as ordered.
PA aware. Will order for b/l PAS. Continue to monitor.
Reglan 10 mg IVP x 1 dose ordered, Tylenol 650 po for headche
no new orders at this time

## 2017-09-09 LAB
ANION GAP SERPL CALC-SCNC: 11 MMOL/L — SIGNIFICANT CHANGE UP (ref 5–17)
BUN SERPL-MCNC: 2 MG/DL — LOW (ref 7–23)
CALCIUM SERPL-MCNC: 9.5 MG/DL — SIGNIFICANT CHANGE UP (ref 8.4–10.5)
CHLORIDE SERPL-SCNC: 107 MMOL/L — SIGNIFICANT CHANGE UP (ref 96–108)
CO2 SERPL-SCNC: 25 MMOL/L — SIGNIFICANT CHANGE UP (ref 22–31)
CREAT SERPL-MCNC: 0.78 MG/DL — SIGNIFICANT CHANGE UP (ref 0.5–1.3)
GLUCOSE SERPL-MCNC: 89 MG/DL — SIGNIFICANT CHANGE UP (ref 70–99)
HCT VFR BLD CALC: 36.9 % — SIGNIFICANT CHANGE UP (ref 34.5–45)
HGB BLD-MCNC: 12.9 G/DL — SIGNIFICANT CHANGE UP (ref 11.5–15.5)
MCHC RBC-ENTMCNC: 30.7 PG — SIGNIFICANT CHANGE UP (ref 27–34)
MCHC RBC-ENTMCNC: 34.9 GM/DL — SIGNIFICANT CHANGE UP (ref 32–36)
MCV RBC AUTO: 88 FL — SIGNIFICANT CHANGE UP (ref 80–100)
PLATELET # BLD AUTO: 81 K/UL — LOW (ref 150–400)
POTASSIUM SERPL-MCNC: 4 MMOL/L — SIGNIFICANT CHANGE UP (ref 3.5–5.3)
POTASSIUM SERPL-SCNC: 4 MMOL/L — SIGNIFICANT CHANGE UP (ref 3.5–5.3)
RBC # BLD: 4.19 M/UL — SIGNIFICANT CHANGE UP (ref 3.8–5.2)
RBC # FLD: 12 % — SIGNIFICANT CHANGE UP (ref 10.3–14.5)
SODIUM SERPL-SCNC: 143 MMOL/L — SIGNIFICANT CHANGE UP (ref 135–145)
WBC # BLD: 8 K/UL — SIGNIFICANT CHANGE UP (ref 3.8–10.5)
WBC # FLD AUTO: 8 K/UL — SIGNIFICANT CHANGE UP (ref 3.8–10.5)

## 2017-09-09 RX ADMIN — SIMETHICONE 160 MILLIGRAM(S): 80 TABLET, CHEWABLE ORAL at 07:00

## 2017-09-09 RX ADMIN — FAMOTIDINE 20 MILLIGRAM(S): 10 INJECTION INTRAVENOUS at 21:25

## 2017-09-09 RX ADMIN — Medication 1 MILLIGRAM(S): at 18:13

## 2017-09-09 RX ADMIN — Medication 2.5 MILLIGRAM(S): at 09:00

## 2017-09-09 RX ADMIN — SODIUM CHLORIDE 125 MILLILITER(S): 9 INJECTION INTRAMUSCULAR; INTRAVENOUS; SUBCUTANEOUS at 06:00

## 2017-09-09 RX ADMIN — Medication 1 MILLIGRAM(S): at 12:45

## 2017-09-09 RX ADMIN — Medication 1 MILLIGRAM(S): at 01:00

## 2017-09-09 RX ADMIN — LORATADINE 10 MILLIGRAM(S): 10 TABLET ORAL at 12:00

## 2017-09-09 RX ADMIN — FAMOTIDINE 20 MILLIGRAM(S): 10 INJECTION INTRAVENOUS at 09:00

## 2017-09-09 RX ADMIN — Medication 2.5 MILLIGRAM(S): at 19:50

## 2017-09-09 RX ADMIN — Medication 1 MILLIGRAM(S): at 06:15

## 2017-09-09 RX ADMIN — MONTELUKAST 10 MILLIGRAM(S): 4 TABLET, CHEWABLE ORAL at 21:25

## 2017-09-09 RX ADMIN — SIMETHICONE 160 MILLIGRAM(S): 80 TABLET, CHEWABLE ORAL at 14:07

## 2017-09-09 RX ADMIN — Medication 240 MILLIGRAM(S): at 16:39

## 2017-09-09 RX ADMIN — SIMETHICONE 160 MILLIGRAM(S): 80 TABLET, CHEWABLE ORAL at 21:25

## 2017-09-09 NOTE — PROGRESS NOTE ADULT - PROVIDER SPECIALTY LIST ADULT
Cardiology
Cardiology
Heme/Onc
Internal Medicine

## 2017-09-09 NOTE — PROGRESS NOTE ADULT - SUBJECTIVE AND OBJECTIVE BOX
pt was evaluated by hospitalist earlier during the day, labs, vitals, consults reviewed, discussed management plan in detail in length with pt
==============================================================  *****************CARDIOLOGY PROGRESS NOTE********************  ==============================================================    CHIEF COMPLAINT/REASON FOR CONSULT:   Patient is a 41y old  Female who presents with a chief complaint of hypertension (06 Sep 2017 03:23)      BRIEF SUMMARY:  [id number] GovsSandQmgbd4Rtmol 41 y.o. F ([employer]) - with ITP/anxiety/IBS/Asthma/Hypertensive urgency and Tachycardia with unrevealing, exhaustive systemic workup presumed 2/2 to autonomic dysfunction - presenting for f/u evaluation  --------------  We have tried her on Propanolol, Dilitiazem, Metoprolol. She seems to have episodes of sinus tachycardia that self resolve. She has sinus tachycardia 12% time on Holter but is otherwise unrevealing.  She also has episodes of hypertension, which she checks very frequently.  No CP/SOB. +Palps.  ================================================  24 HR EVENTS:  SR, brief sinus tach  Discussion over Diltiazem dosing   Normotensive  ================================================  Allergies    No Known Allergies    Intolerances    metoprolol (Headache)  	    MEDICATIONS:  MEDICATIONS  (STANDING):  sodium chloride 0.9%. 1000 milliLiter(s) (125 mL/Hr) IV Continuous <Continuous>  LORazepam     Tablet 1 milliGRAM(s) Oral every 8 hours  famotidine    Tablet 20 milliGRAM(s) Oral two times a day  busPIRone 2.5 milliGRAM(s) Oral two times a day  montelukast 10 milliGRAM(s) Oral daily  loratadine 10 milliGRAM(s) Oral daily  heparin  Injectable 5000 Unit(s) SubCutaneous every 8 hours  diltiazem    Tablet 120 milliGRAM(s) Oral two times a day        PAST MEDICAL & SURGICAL HISTORY:  History of ITP  Labyrinthitis  Asthma  Headache  HTN (hypertension)  Anxiety  IBS (irritable bowel syndrome)  No significant past surgical history      FAMILY HISTORY:  Family history of hypertension (Grandparent)  Family history of atrial fibrillation  Family history of prostate cancer in father      REVIEW OF SYSTEMS:  Constitutional: No Fever, +Fatigue, No Weight Changes, + anxiety  Eyes: No recent vision changes, eye pain  ENT: No Congestion, Sore Throat  Endocrine: No Excess Sweating, Temperature Intolerance  Cardiovascular: No Chest Pain, Palpitations, SOB, Pre-syncope, Syncope  Respiratory: No Cough, Congestion, Wheezing  GI: No dysuria, hematuria  MS: No Joint Pain, Swelling  Neurologic: No Headaches, Imbalance, Focal Deficits  Skin: No Rashes, Hematoma, Prurpura    PHYSICAL EXAM:  Vital Signs Last 24 Hrs  T(C): 37.1 (07 Sep 2017 12:32), Max: 37.1 (07 Sep 2017 12:32)  T(F): 98.8 (07 Sep 2017 12:32), Max: 98.8 (07 Sep 2017 12:32)  HR: 79 (07 Sep 2017 12:32) (74 - 93)  BP: 127/84 (07 Sep 2017 12:32) (127/84 - 146/96)  BP(mean): --  RR: 18 (07 Sep 2017 12:32) (18 - 18)  SpO2: 99% (07 Sep 2017 12:32) (99% - 100%)        Appearance: Normal	  HEENT:   NCAT, EOMI	+anxious  Cardiovascular: Normal S1 S2, No JVD, No murmurs, No edema  Respiratory: Lungs clear to auscultation, no use of accessory muscles	  Psychiatry: A & O x 3, Mood & affect appropriate  Gastrointestinal:  Soft, Non-tender	  Skin: No rashes, No ecchymoses, No cyanosis	  Neurologic: Non-focal, no speech abnormalities  Extremities: Normal range of motion, No clubbing, cyanosis or edema  Vascular: Peripheral pulses palpable 2+ bilaterally, no prominent varicosities      LABS:	  Labs Reviewed 	09-07-17 @ 12:33    CBC Full  -  ( 05 Sep 2017 15:09 )  WBC Count : 11.1 K/uL  Hemoglobin : 14.8 g/dL  Hematocrit : 42.8 %  Platelet Count - Automated : 208 K/uL  Mean Cell Volume : 86.5 fl  Mean Cell Hemoglobin : 29.8 pg  Mean Cell Hemoglobin Concentration : 34.4 gm/dL  Auto Neutrophil # : 6.6 K/uL  Auto Lymphocyte # : 3.5 K/uL  Auto Monocyte # : 0.8 K/uL  Auto Eosinophil # : 0.1 K/uL  Auto Basophil # : 0.1 K/uL  Auto Neutrophil % : 59.4 %  Auto Lymphocyte % : 31.9 %  Auto Monocyte % : 07.0 %  Auto Eosinophil % : 1.2 %  Auto Basophil % : 0.6 %    09-06    144  |  108  |  5<L>  ----------------------------<  103<H>  3.7   |  23  |  0.70  09-05    142  |  105  |  4<L>  ----------------------------<  94  3.3<L>   |  24  |  0.84    Ca    9.0      06 Sep 2017 01:42  Ca    10.1      05 Sep 2017 15:09  Phos  3.5     09-06  Mg     2.0     09-06    TPro  8.1  /  Alb  4.3  /  TBili  0.4  /  DBili  x   /  AST  26  /  ALT  26  /  AlkPhos  97  09-05        CARDIAC MARKERS:  Troponin I, Serum: .000 ng/mL (09-05 @ 15:09)            TELEMETRY: 	    	SR,ST  =======================================================================================  =======================================================================================  Assessment:  [id number] JtorDwykCrqrf0Aitlr 41 y.o. F ([employer]) - with ITP/anxiety/IBS/Asthma/Hypertensive urgency and Tachycardia with unrevealing, exhaustive systemic workup presumed 2/2 to autonomic dysfunction - presenting for f/u evaluation  --------------    Problems  1. Tachycardia  2. Labile Hypertension  3. Repeated visits    Recommendations  - At this point, we have exhausted all workup  - Try for Dilt 120 CD (12 hr dosing BID) vrs Dilt 180XL  - Recommend continue current meds  - Psychiatry involvement        Please call with questions.    Philip Reed MD [facility]  [phone]
==============================================================  *****************CARDIOLOGY PROGRESS NOTE********************  ==============================================================    CHIEF COMPLAINT/REASON FOR CONSULT:   Patient is a 41y old  Female who presents with a chief complaint of hypertension (06 Sep 2017 03:23)      BRIEF SUMMARY:  [id number] MgstBegqVfucl1Xrmst 41 y.o. F ([employer]es [employer]) - with ITP/anxiety/IBS/Asthma/Hypertensive urgency and Tachycardia with unrevealing, exhaustive systemic workup presumed 2/2 to autonomic dysfunction - presenting for f/u evaluation  --------------  We have tried her on Propanolol, Dilitiazem, Metoprolol. She seems to have episodes of sinus tachycardia that self resolve. She has sinus tachycardia 12% time on Holter but is otherwise unrevealing.  She also has episodes of hypertension, which she checks very frequently.  No CP/SOB. +Palps.  ================================================  24 HR EVENTS:  SR, brief sinus tach, PACs  Discussion over Diltiazem dosing   Normotensive  ================================================  Allergies    No Known Allergies    Intolerances    metoprolol (Headache)  	    MEDICATIONS:  MEDICATIONS  (STANDING):  sodium chloride 0.9%. 1000 milliLiter(s) (125 mL/Hr) IV Continuous <Continuous>  LORazepam     Tablet 1 milliGRAM(s) Oral every 8 hours  famotidine    Tablet 20 milliGRAM(s) Oral two times a day  busPIRone 2.5 milliGRAM(s) Oral two times a day  montelukast 10 milliGRAM(s) Oral daily  loratadine 10 milliGRAM(s) Oral daily  heparin  Injectable 5000 Unit(s) SubCutaneous every 8 hours  diltiazem    milliGRAM(s) Oral daily  tiotropium 18 MICROgram(s) Capsule 1 Capsule(s) Inhalation daily        PAST MEDICAL & SURGICAL HISTORY:  History of ITP  Labyrinthitis  Asthma  Headache  HTN (hypertension)  Anxiety  IBS (irritable bowel syndrome)  No significant past surgical history      FAMILY HISTORY:  Family history of hypertension (Grandparent)  Family history of atrial fibrillation  Family history of prostate cancer in father      REVIEW OF SYSTEMS:  Constitutional: No Fever, +Fatigue, No Weight Changes, + anxiety  Eyes: No recent vision changes, eye pain  ENT: No Congestion, Sore Throat  Endocrine: No Excess Sweating, Temperature Intolerance  Cardiovascular: No Chest Pain, Palpitations, SOB, Pre-syncope, Syncope  Respiratory: No Cough, Congestion, Wheezing  GI: No dysuria, hematuria  MS: No Joint Pain, Swelling  Neurologic: No Headaches, Imbalance, Focal Deficits  Skin: No Rashes, Hematoma, Prurpura    PHYSICAL EXAM:  Vital Signs Last 24 Hrs  T(C): 37.1 (07 Sep 2017 12:32), Max: 37.1 (07 Sep 2017 12:32)  T(F): 98.8 (07 Sep 2017 12:32), Max: 98.8 (07 Sep 2017 12:32)  HR: 79 (07 Sep 2017 12:32) (74 - 93)  BP: 127/84 (07 Sep 2017 12:32) (127/84 - 146/96)  BP(mean): --  RR: 18 (07 Sep 2017 12:32) (18 - 18)  SpO2: 99% (07 Sep 2017 12:32) (99% - 100%)        Appearance: Normal	  HEENT:   NCAT, EOMI	+anxious  Cardiovascular: Normal S1 S2, No JVD, No murmurs, No edema  Respiratory: Lungs clear to auscultation, no use of accessory muscles	  Psychiatry: A & O x 3, Mood & affect notable for anxiety  Gastrointestinal:  Soft, Non-tender	  Skin: No rashes, No ecchymoses, No cyanosis	  Neurologic: Non-focal, no speech abnormalities  Extremities: Normal range of motion, No clubbing, cyanosis or edema  Vascular: Peripheral pulses palpable 2+ bilaterally, no prominent varicosities      LABS:	  Labs Reviewed 	09-07-17 @ 12:33    CBC Full  -  ( 05 Sep 2017 15:09 )  WBC Count : 11.1 K/uL  Hemoglobin : 14.8 g/dL  Hematocrit : 42.8 %  Platelet Count - Automated : 208 K/uL  Mean Cell Volume : 86.5 fl  Mean Cell Hemoglobin : 29.8 pg  Mean Cell Hemoglobin Concentration : 34.4 gm/dL  Auto Neutrophil # : 6.6 K/uL  Auto Lymphocyte # : 3.5 K/uL  Auto Monocyte # : 0.8 K/uL  Auto Eosinophil # : 0.1 K/uL  Auto Basophil # : 0.1 K/uL  Auto Neutrophil % : 59.4 %  Auto Lymphocyte % : 31.9 %  Auto Monocyte % : 07.0 %  Auto Eosinophil % : 1.2 %  Auto Basophil % : 0.6 %    09-06    144  |  108  |  5<L>  ----------------------------<  103<H>  3.7   |  23  |  0.70  09-05    142  |  105  |  4<L>  ----------------------------<  94  3.3<L>   |  24  |  0.84    Ca    9.0      06 Sep 2017 01:42  Ca    10.1      05 Sep 2017 15:09  Phos  3.5     09-06  Mg     2.0     09-06    TPro  8.1  /  Alb  4.3  /  TBili  0.4  /  DBili  x   /  AST  26  /  ALT  26  /  AlkPhos  97  09-05        CARDIAC MARKERS:  Troponin I, Serum: .000 ng/mL (09-05 @ 15:09)            TELEMETRY: 	    	SR,ST  =======================================================================================  =======================================================================================  Assessment:  [id number] WatsNqceCcdkt8Jkyhd 41 y.o. F ([employer]) - with ITP/anxiety/IBS/Asthma/Hypertensive urgency and Tachycardia with unrevealing, exhaustive systemic workup presumed 2/2 to autonomic dysfunction - presenting for f/u evaluation  --------------    Problems  1. Tachycardia  2. Labile Hypertension  3. Repeated visits    Recommendations  - At this point, we have exhausted all workup  - Try for Dilt 120 CD (12 hr dosing BID) vrs Dilt 180XL as outpatient  - Recommend continue current meds  - Psychiatry involvement  - Outpatient CR       Please call with questions.    Philip Reed MD [facility]  [phone]
INTERVAL HPI/OVERNIGHT EVENTS:  Patient S&E at bedside. No o/n events, patient seen at bedside this morning, still has palpitations every now and then.    VITAL SIGNS:  T(F): 98.1 (09-08-17 @ 05:25)  HR: 79 (09-08-17 @ 05:25)  BP: 135/77 (09-08-17 @ 05:25)  RR: 18 (09-08-17 @ 05:25)  SpO2: 97% (09-08-17 @ 05:25)  Wt(kg): --    PHYSICAL EXAM:    Constitutional: NAD  Eyes: EOMI, sclera non-icteric  Neck: supple, no masses, no JVD  Respiratory: CTA b/l, good air entry b/l  Cardiovascular: RRR, no M/R/G  Gastrointestinal: soft, NTND, no masses palpable, + BS, no hepatosplenomegaly  Extremities: no c/c/e  Neurological: AAOx3      MEDICATIONS  (STANDING):  sodium chloride 0.9%. 1000 milliLiter(s) (125 mL/Hr) IV Continuous <Continuous>  LORazepam     Tablet 1 milliGRAM(s) Oral every 8 hours  famotidine    Tablet 20 milliGRAM(s) Oral two times a day  busPIRone 2.5 milliGRAM(s) Oral two times a day  montelukast 10 milliGRAM(s) Oral daily  loratadine 10 milliGRAM(s) Oral daily  heparin  Injectable 5000 Unit(s) SubCutaneous every 8 hours  diltiazem    milliGRAM(s) Oral daily  tiotropium 18 MICROgram(s) Capsule 1 Capsule(s) Inhalation daily    MEDICATIONS  (PRN):  simethicone 160 milliGRAM(s) Chew every 8 hours PRN Gas  LORazepam     Tablet 1 milliGRAM(s) Oral every 8 hours PRN Anxiety  acetaminophen   Tablet. 650 milliGRAM(s) Oral every 6 hours PRN Mild (1 - 3)  to Moderate (4 - 6) Pain      Allergies    No Known Allergies    Intolerances    metoprolol (Headache)      LABS:                        12.7   6.6   )-----------( 105      ( 08 Sep 2017 07:02 )             36.5     09-08    144  |  107  |  3<L>  ----------------------------<  85  4.1   |  26  |  0.84    Ca    9.4      08 Sep 2017 06:59            RADIOLOGY & ADDITIONAL TESTS:  Studies reviewed.
chief complaint : tachycardia    SUBJECTIVE / OVERNIGHT EVENTS: pt appears anxious , concerned about her  platelet count     MEDICATIONS  (STANDING):  sodium chloride 0.9%. 1000 milliLiter(s) (125 mL/Hr) IV Continuous <Continuous>  LORazepam     Tablet 1 milliGRAM(s) Oral every 8 hours  famotidine    Tablet 20 milliGRAM(s) Oral two times a day  busPIRone 2.5 milliGRAM(s) Oral two times a day  montelukast 10 milliGRAM(s) Oral daily  loratadine 10 milliGRAM(s) Oral daily  heparin  Injectable 5000 Unit(s) SubCutaneous every 8 hours  diltiazem    milliGRAM(s) Oral daily  tiotropium 18 MICROgram(s) Capsule 1 Capsule(s) Inhalation daily    MEDICATIONS  (PRN):  simethicone 160 milliGRAM(s) Chew every 8 hours PRN Gas  LORazepam     Tablet 1 milliGRAM(s) Oral every 8 hours PRN Anxiety  acetaminophen   Tablet. 650 milliGRAM(s) Oral every 6 hours PRN Mild (1 - 3)  to Moderate (4 - 6) Pain      Vital Signs Last 24 Hrs  T(C): 36.7 (09 Sep 2017 12:48), Max: 36.7 (09 Sep 2017 12:48)  T(F): 98.1 (09 Sep 2017 12:48), Max: 98.1 (09 Sep 2017 12:48)  HR: 82 (09 Sep 2017 16:30) (79 - 83)  BP: 144/99 (09 Sep 2017 16:30) (135/91 - 145/91)  BP(mean): --  RR: 18 (09 Sep 2017 16:30) (16 - 18)  SpO2: 100% (09 Sep 2017 16:30) (99% - 100%)    Constitutional: No fever, fatigue  Skin: No rash.  Eyes: No recent vision problems or eye pain.  ENT: No congestion, ear pain, or sore throat.  Cardiovascular: No chest pain or palpation.  Respiratory: No cough, shortness of breath, congestion, or wheezing.  Gastrointestinal: No abdominal pain, nausea, vomiting, or diarrhea.  Genitourinary: No dysuria.  Musculoskeletal: No joint swelling.  Neurologic: No headache.    PHYSICAL EXAM:  GENERAL: NAD  EYES: EOMI, PERRLA  NECK: Supple, No JVD  CHEST/LUNG: cta klever   HEART:  S1 , S2 +  ABDOMEN: soft, bs+  EXTREMITIES:  no edema  NEUROLOGY: aler awake calm cooperative          LABS:  09-09    143  |  107  |  2<L>  ----------------------------<  89  4.0   |  25  |  0.78    Ca    9.5      09 Sep 2017 12:16      Creatinine Trend: 0.78 <--, 0.84 <--, 0.70 <--, 0.84 <--, 0.83 <--                        12.9   8.0   )-----------( 81       ( 09 Sep 2017 12:15 )             36.9     Urine Studies:
chief complaint : tachycardia    SUBJECTIVE / OVERNIGHT EVENTS: pt appears anxious , concerned about her  platelet count     MEDICATIONS  (STANDING):  sodium chloride 0.9%. 1000 milliLiter(s) (125 mL/Hr) IV Continuous <Continuous>  LORazepam     Tablet 1 milliGRAM(s) Oral every 8 hours  famotidine    Tablet 20 milliGRAM(s) Oral two times a day  busPIRone 2.5 milliGRAM(s) Oral two times a day  montelukast 10 milliGRAM(s) Oral daily  loratadine 10 milliGRAM(s) Oral daily  heparin  Injectable 5000 Unit(s) SubCutaneous every 8 hours  diltiazem    milliGRAM(s) Oral daily  tiotropium 18 MICROgram(s) Capsule 1 Capsule(s) Inhalation daily    MEDICATIONS  (PRN):  simethicone 160 milliGRAM(s) Chew every 8 hours PRN Gas  LORazepam     Tablet 1 milliGRAM(s) Oral every 8 hours PRN Anxiety  acetaminophen   Tablet. 650 milliGRAM(s) Oral every 6 hours PRN Mild (1 - 3)  to Moderate (4 - 6) Pain    Vital Signs Last 24 Hrs  T(C): 36.6 (08 Sep 2017 20:35), Max: 37.1 (08 Sep 2017 03:25)  T(F): 97.8 (08 Sep 2017 20:35), Max: 98.8 (08 Sep 2017 03:25)  HR: 83 (08 Sep 2017 20:35) (71 - 83)  BP: 135/91 (08 Sep 2017 20:35) (121/80 - 135/91)  BP(mean): --  RR: 18 (08 Sep 2017 20:35) (17 - 18)  SpO2: 99% (08 Sep 2017 20:35) (97% - 99%)      Constitutional: No fever, fatigue  Skin: No rash.  Eyes: No recent vision problems or eye pain.  ENT: No congestion, ear pain, or sore throat.  Cardiovascular: No chest pain or palpation.  Respiratory: No cough, shortness of breath, congestion, or wheezing.  Gastrointestinal: No abdominal pain, nausea, vomiting, or diarrhea.  Genitourinary: No dysuria.  Musculoskeletal: No joint swelling.  Neurologic: No headache.    PHYSICAL EXAM:  GENERAL: NAD  EYES: EOMI, PERRLA  NECK: Supple, No JVD  CHEST/LUNG: cta klever   HEART:  S1 , S2 +  ABDOMEN: soft, bs+  EXTREMITIES:  no edema  NEUROLOGY: aler awake calm cooperative    LABS:  09-08    144  |  107  |  3<L>  ----------------------------<  85  4.1   |  26  |  0.84    Ca    9.4      08 Sep 2017 06:59      Creatinine Trend: 0.84 <--, 0.70 <--, 0.84 <--, 0.83 <--                        12.7   6.6   )-----------( 105      ( 08 Sep 2017 07:02 )             36.5     Urine Studies:
chief complaint : tachycardia    SUBJECTIVE / OVERNIGHT EVENTS: pt appears anxious , concerned about platelet count       MEDICATIONS  (STANDING):  sodium chloride 0.9%. 1000 milliLiter(s) (125 mL/Hr) IV Continuous <Continuous>  LORazepam     Tablet 1 milliGRAM(s) Oral every 8 hours  famotidine    Tablet 20 milliGRAM(s) Oral two times a day  busPIRone 2.5 milliGRAM(s) Oral two times a day  montelukast 10 milliGRAM(s) Oral daily  loratadine 10 milliGRAM(s) Oral daily  heparin  Injectable 5000 Unit(s) SubCutaneous every 8 hours  diltiazem    Tablet 120 milliGRAM(s) Oral two times a day    MEDICATIONS  (PRN):  simethicone 160 milliGRAM(s) Chew every 8 hours PRN Gas  LORazepam     Tablet 1 milliGRAM(s) Oral every 8 hours PRN Anxiety        CAPILLARY BLOOD GLUCOSE        I&O's Summary    06 Sep 2017 07:01  -  07 Sep 2017 07:00  --------------------------------------------------------  IN: 3980 mL / OUT: 0 mL / NET: 3980 mL        Constitutional: No fever, fatigue  Skin: No rash.  Eyes: No recent vision problems or eye pain.  ENT: No congestion, ear pain, or sore throat.  Cardiovascular: No chest pain or palpation.  Respiratory: No cough, shortness of breath, congestion, or wheezing.  Gastrointestinal: No abdominal pain, nausea, vomiting, or diarrhea.  Genitourinary: No dysuria.  Musculoskeletal: No joint swelling.  Neurologic: No headache.    PHYSICAL EXAM:  GENERAL: NAD  EYES: EOMI, PERRLA  NECK: Supple, No JVD  CHEST/LUNG: cta klever   HEART:  S1 , S2 +  ABDOMEN: soft, bs+  EXTREMITIES:  no edema  NEUROLOGY: aler awake calm cooperative      LABS:                        14.8   11.1  )-----------( 208      ( 05 Sep 2017 15:09 )             42.8     09-06    144  |  108  |  5<L>  ----------------------------<  103<H>  3.7   |  23  |  0.70    Ca    9.0      06 Sep 2017 01:42  Phos  3.5     09-06  Mg     2.0     09-06    TPro  8.1  /  Alb  4.3  /  TBili  0.4  /  DBili  x   /  AST  26  /  ALT  26  /  AlkPhos  97  09-05    PT/INR - ( 05 Sep 2017 15:09 )   PT: 10.9 sec;   INR: 1.00 ratio         PTT - ( 05 Sep 2017 15:09 )  PTT:25.9 sec  CARDIAC MARKERS ( 06 Sep 2017 01:42 )  x     / <0.01 ng/mL / 51 U/L / x     / 1.0 ng/mL  CARDIAC MARKERS ( 05 Sep 2017 15:09 )  .000 ng/mL / x     / 54 U/L / x     / 0.5 ng/mL          RADIOLOGY & ADDITIONAL TESTS:    Imaging Personally Reviewed:    Consultant(s) Notes Reviewed:      Care Discussed with Consultants/Other Providers:

## 2017-09-10 ENCOUNTER — TRANSCRIPTION ENCOUNTER (OUTPATIENT)
Age: 42
End: 2017-09-10

## 2017-09-10 VITALS
OXYGEN SATURATION: 98 % | TEMPERATURE: 98 F | DIASTOLIC BLOOD PRESSURE: 92 MMHG | WEIGHT: 125.66 LBS | RESPIRATION RATE: 18 BRPM | SYSTOLIC BLOOD PRESSURE: 127 MMHG | HEART RATE: 77 BPM

## 2017-09-10 PROCEDURE — 93306 TTE W/DOPPLER COMPLETE: CPT

## 2017-09-10 PROCEDURE — 83735 ASSAY OF MAGNESIUM: CPT

## 2017-09-10 PROCEDURE — 84484 ASSAY OF TROPONIN QUANT: CPT

## 2017-09-10 PROCEDURE — 81207 BCR/ABL1 GENE MINOR BP: CPT

## 2017-09-10 PROCEDURE — 99285 EMERGENCY DEPT VISIT HI MDM: CPT

## 2017-09-10 PROCEDURE — 80048 BASIC METABOLIC PNL TOTAL CA: CPT

## 2017-09-10 PROCEDURE — 85027 COMPLETE CBC AUTOMATED: CPT

## 2017-09-10 PROCEDURE — 84100 ASSAY OF PHOSPHORUS: CPT

## 2017-09-10 PROCEDURE — 82550 ASSAY OF CK (CPK): CPT

## 2017-09-10 PROCEDURE — 82553 CREATINE MB FRACTION: CPT

## 2017-09-10 PROCEDURE — 81270 JAK2 GENE: CPT

## 2017-09-10 PROCEDURE — 93005 ELECTROCARDIOGRAM TRACING: CPT

## 2017-09-10 PROCEDURE — 81206 BCR/ABL1 GENE MAJOR BP: CPT

## 2017-09-10 RX ORDER — ACETAMINOPHEN 500 MG
2 TABLET ORAL
Qty: 0 | Refills: 0 | COMMUNITY
Start: 2017-09-10

## 2017-09-10 RX ORDER — FAMOTIDINE 10 MG/ML
1 INJECTION INTRAVENOUS
Qty: 0 | Refills: 0 | COMMUNITY
Start: 2017-09-10

## 2017-09-10 RX ORDER — TIOTROPIUM BROMIDE 18 UG/1
1 CAPSULE ORAL; RESPIRATORY (INHALATION)
Qty: 0 | Refills: 0 | COMMUNITY
Start: 2017-09-10

## 2017-09-10 RX ORDER — DILTIAZEM HCL 120 MG
1 CAPSULE, EXT RELEASE 24 HR ORAL
Qty: 30 | Refills: 0 | OUTPATIENT
Start: 2017-09-10

## 2017-09-10 RX ADMIN — Medication 1 MILLIGRAM(S): at 11:42

## 2017-09-10 RX ADMIN — Medication 1 MILLIGRAM(S): at 00:02

## 2017-09-10 RX ADMIN — Medication 1 MILLIGRAM(S): at 06:39

## 2017-09-10 RX ADMIN — FAMOTIDINE 20 MILLIGRAM(S): 10 INJECTION INTRAVENOUS at 06:15

## 2017-09-10 RX ADMIN — Medication 2.5 MILLIGRAM(S): at 08:51

## 2017-09-10 RX ADMIN — SIMETHICONE 160 MILLIGRAM(S): 80 TABLET, CHEWABLE ORAL at 06:15

## 2017-09-10 NOTE — DISCHARGE NOTE ADULT - PATIENT PORTAL LINK FT
“You can access the [employer] Patient Portal, offered by [facility], by registering with the following website: [email or web address]”

## 2017-09-10 NOTE — DISCHARGE NOTE ADULT - CARE PLAN
Principal Discharge DX:	POTS (postural orthostatic tachycardia syndrome)  Goal:	Symptoms will be controlled with medication(s)  Instructions for follow-up, activity and diet:	It helps if you avoid alcohol, cut down on caffeine, get treatment for your thyroid if it is overactive, and perform moderate exercise in consultation with your Primary Care Provider.  Call your doctor if you experience chest tightness/pain, lightheadedness, loss of consciousness, shortness of breath (especially with exercise), or feel your heart racing or beating unusually.  It is important to take all your heart medications as prescribed.  Secondary Diagnosis:	Labile hypertension  Instructions for follow-up, activity and diet:	Take all medications as prescribed.  Follow up with your medical doctor for routine blood pressure monitoring at your next visit.  Notify your doctor if you have any of the following symptoms:  Dizziness, lightheadedness, blurry vision, headache, chest pain, or shortness of breath.  Secondary Diagnosis:	Anxiety  Instructions for follow-up, activity and diet:	Take all medications as prescribed.  Secondary Diagnosis:	Idiopathic thrombocytopenia  Instructions for follow-up, activity and diet:	Seek immediate medical attention if you develop unexplained bruising or bleeding or notice blood in your sputum, urine, or stool.

## 2017-09-10 NOTE — DISCHARGE NOTE ADULT - CARE PROVIDERS DIRECT ADDRESSES
[email or web address]

## 2017-09-10 NOTE — DISCHARGE NOTE ADULT - HOSPITAL COURSE
To be completed by attending 41F c hx asthma, ITP (not on treatment), anxiety disorder/panic attacks, depression, "hyperadrenergic POTS", HTN, frequent ED visits/hospitalizations for tachycardia and anxiety, presented to [place] with palpitations and tachycardia, transferred to [facility] for further management. Pt states she has orthostatic tachycardia, and uses a home finger pulse ox to monitor herself. Earlier yesterday, she stood up and reported having home HR in the 150s, with dizziness and palpitations. She presented to [facility], where she was seen by cards. After discussion with her outpt cardiologist, pt was transferred to [facility]. Pt states all of her symptoms started 6 months ago, including her anxiety attacks and tachycardia symptoms. She has had extensive workup inpt and outpt. She had a holter monitor for 1 wk several mths ago, and again had a 24 hour ambulatory monitor several wks ago, which did not elicit the above tachycardia. Pt states she was recently treated for ?pna recently as outpt. Otherwise, denies fevers, chills, N/V, chest pain, SOB.   ,  as per cards , exhaustive systemic workup presumed 2/2 to autonomic dysfunction a/w intermittent tachycardia  - Cardizem dose increased     Anxiety -  ativan/ buspar for anxiety - pscyh eval reviewed     PLatelets- pt states her platelets were always less than 100 concerned about rise in platelets from baseline, heme evaluated pt , platelets found to be around 100  today , heme ordered serum markers

## 2017-09-10 NOTE — DISCHARGE NOTE ADULT - PLAN OF CARE
Symptoms will be controlled with medication(s) It helps if you avoid alcohol, cut down on caffeine, get treatment for your thyroid if it is overactive, and perform moderate exercise in consultation with your Primary Care Provider.  Call your doctor if you experience chest tightness/pain, lightheadedness, loss of consciousness, shortness of breath (especially with exercise), or feel your heart racing or beating unusually.  It is important to take all your heart medications as prescribed. Take all medications as prescribed.  Follow up with your medical doctor for routine blood pressure monitoring at your next visit.  Notify your doctor if you have any of the following symptoms:  Dizziness, lightheadedness, blurry vision, headache, chest pain, or shortness of breath. Take all medications as prescribed. Seek immediate medical attention if you develop unexplained bruising or bleeding or notice blood in your sputum, urine, or stool.

## 2017-09-10 NOTE — DISCHARGE NOTE ADULT - ADDITIONAL INSTRUCTIONS
Follow up with your cardiologist within 1 week.  Follow up with your primary care doctor within 1 week.  Follow up with your hematologist within 1-2 weeks.

## 2017-09-10 NOTE — DISCHARGE NOTE ADULT - MEDICATION SUMMARY - MEDICATIONS TO CHANGE
I will SWITCH the dose or number of times a day I take the medications listed below when I get home from the hospital:    dilTIAZem 180 mg/24 hours oral capsule, extended release  -- 1 cap(s) by mouth once a day

## 2017-09-10 NOTE — DISCHARGE NOTE ADULT - CARE PROVIDER_API CALL
Philip Reed), Cardiovascular Disease; Internal Medicine  [address]  [address] [place] [address]  Phone: [phone]  Fax: [phone]    Lorena Ford), Internal Medicine  [address] [place] [id number]  Phone: [phone]  Fax: [phone]    Attila Zamora), Hematology; Internal Medicine; Medical Oncology  [address] [place] [address]  Phone: [phone]  Fax: [phone]

## 2017-09-10 NOTE — DISCHARGE NOTE ADULT - MEDICATION SUMMARY - MEDICATIONS TO TAKE
I will START or STAY ON the medications listed below when I get home from the hospital:    acetaminophen 325 mg oral tablet  -- 2 tab(s) by mouth every 6 hours, As needed, Mild (1 - 3)  to Moderate (4 - 6) Pain  -- Indication: For Pain    dilTIAZem 240 mg/24 hours oral capsule, extended release  -- 1 cap(s) by mouth once a day  -- Indication: For POTS (postural orthostatic tachycardia syndrome)    LORazepam 1 mg oral tablet  -- 1 tab(s) by mouth every 6 hours, As Needed for Anxiety  -- Indication: For Anxiety    Allegra 180 mg oral tablet  -- 1 tab(s) by mouth once a day  -- Indication: For Allergies    BuSpar  -- 2.5 milligram(s) by mouth 2 times a day  -- Indication: For Anxiety    tiotropium 18 mcg inhalation capsule  -- 1 cap(s) inhaled once a day  -- Indication: For Inhaler    famotidine 20 mg oral tablet  -- 1 tab(s) by mouth 2 times a day  -- Indication: For For stomach    Singulair 10 mg oral tablet  -- 1 tab(s) by mouth once a day  -- Indication: For Allergies    simethicone 80 mg oral tablet, chewable  -- 1 tab(s) by mouth every 8 hours, As Needed for Gas  -- Indication: For Gas I will START or STAY ON the medications listed below when I get home from the hospital:    acetaminophen 325 mg oral tablet  -- 2 tab(s) by mouth every 6 hours, As needed, Mild (1 - 3)  to Moderate (4 - 6) Pain  -- Indication: For Pain    dilTIAZem 240 mg/24 hours oral capsule, extended release  -- 1 cap(s) by mouth once a day  -- Indication: For POTS (postural orthostatic tachycardia syndrome)    LORazepam 1 mg oral tablet  -- 1 tab(s) by mouth every 6 hours, As Needed for Anxiety  -- Indication: For Anxiety    Allegra 180 mg oral tablet  -- 1 tab(s) by mouth once a day  -- Indication: For Allergies    BuSpar  -- 2.5 milligram(s) by mouth 2 times a day  -- Indication: For Anxiety    famotidine 20 mg oral tablet  -- 1 tab(s) by mouth 2 times a day  -- Indication: For For stomach    Singulair 10 mg oral tablet  -- 1 tab(s) by mouth once a day  -- Indication: For Allergies    simethicone 80 mg oral tablet, chewable  -- 1 tab(s) by mouth every 8 hours, As Needed for Gas  -- Indication: For Gas

## 2017-09-12 ENCOUNTER — EMERGENCY (EMERGENCY)
Facility: HOSPITAL | Age: 42
LOS: 1 days | Discharge: HOME | End: 2017-09-12
Attending: EMERGENCY MEDICINE | Admitting: EMERGENCY MEDICINE
Payer: COMMERCIAL

## 2017-09-12 ENCOUNTER — OTHER (OUTPATIENT)
Age: 42
End: 2017-09-12

## 2017-09-12 VITALS
RESPIRATION RATE: 18 BRPM | HEART RATE: 112 BPM | DIASTOLIC BLOOD PRESSURE: 99 MMHG | TEMPERATURE: 99 F | SYSTOLIC BLOOD PRESSURE: 139 MMHG | OXYGEN SATURATION: 100 %

## 2017-09-12 LAB
JAK2 P.V617F BLD/T QL: SIGNIFICANT CHANGE UP
TROPONIN T SERPL-MCNC: <0.01 NG/ML — SIGNIFICANT CHANGE UP (ref 0–0.06)

## 2017-09-12 PROCEDURE — 96375 TX/PRO/DX INJ NEW DRUG ADDON: CPT

## 2017-09-12 PROCEDURE — 84484 ASSAY OF TROPONIN QUANT: CPT

## 2017-09-12 PROCEDURE — 93005 ELECTROCARDIOGRAM TRACING: CPT

## 2017-09-12 PROCEDURE — 96374 THER/PROPH/DIAG INJ IV PUSH: CPT

## 2017-09-12 PROCEDURE — 85730 THROMBOPLASTIN TIME PARTIAL: CPT

## 2017-09-12 PROCEDURE — 82550 ASSAY OF CK (CPK): CPT

## 2017-09-12 PROCEDURE — 85027 COMPLETE CBC AUTOMATED: CPT

## 2017-09-12 PROCEDURE — 84443 ASSAY THYROID STIM HORMONE: CPT

## 2017-09-12 PROCEDURE — 99285 EMERGENCY DEPT VISIT HI MDM: CPT

## 2017-09-12 PROCEDURE — 85610 PROTHROMBIN TIME: CPT

## 2017-09-12 PROCEDURE — 82553 CREATINE MB FRACTION: CPT

## 2017-09-12 PROCEDURE — 80053 COMPREHEN METABOLIC PANEL: CPT

## 2017-09-12 PROCEDURE — 71046 X-RAY EXAM CHEST 2 VIEWS: CPT

## 2017-09-12 PROCEDURE — 99284 EMERGENCY DEPT VISIT MOD MDM: CPT | Mod: 25

## 2017-09-12 PROCEDURE — 85379 FIBRIN DEGRADATION QUANT: CPT

## 2017-09-12 PROCEDURE — 71020: CPT | Mod: 26

## 2017-09-12 NOTE — ED PROVIDER NOTE - PROGRESS NOTE DETAILS
Randolph Henderson MD (resident): discussed with Dr. Hussein on the phone, who is aware of presentation and has seen the patient multiple times and well aware of them. He does not believe the pt requires stress testing in the ED, and will f/u w/ the pt Patient refusing to provide urine for urine pregnancy. Patient 120mg diltiazem BID, requesting home dose now, also planning to start 240mg long acting. Dosing confirmed with father Dr. Presley.  - Rod Day MD

## 2017-09-13 LAB — BCR/ABL BY RT - PCR QUANTITATIVE: SIGNIFICANT CHANGE UP

## 2017-09-14 ENCOUNTER — APPOINTMENT (OUTPATIENT)
Dept: INTERNAL MEDICINE | Facility: CLINIC | Age: 42
End: 2017-09-14

## 2017-09-19 ENCOUNTER — APPOINTMENT (OUTPATIENT)
Dept: PULMONOLOGY | Facility: CLINIC | Age: 42
End: 2017-09-19
Payer: COMMERCIAL

## 2017-09-19 ENCOUNTER — LABORATORY RESULT (OUTPATIENT)
Age: 42
End: 2017-09-19

## 2017-09-19 VITALS
BODY MASS INDEX: 21.53 KG/M2 | OXYGEN SATURATION: 98 % | WEIGHT: 117 LBS | SYSTOLIC BLOOD PRESSURE: 115 MMHG | RESPIRATION RATE: 16 BRPM | HEART RATE: 77 BPM | DIASTOLIC BLOOD PRESSURE: 70 MMHG | HEIGHT: 62 IN

## 2017-09-19 DIAGNOSIS — Z72.820 SLEEP DEPRIVATION: ICD-10-CM

## 2017-09-19 DIAGNOSIS — Z87.01 PERSONAL HISTORY OF PNEUMONIA (RECURRENT): ICD-10-CM

## 2017-09-19 DIAGNOSIS — Z83.79 FAMILY HISTORY OF OTHER DISEASES OF THE DIGESTIVE SYSTEM: ICD-10-CM

## 2017-09-19 DIAGNOSIS — S82.899K OTHER FRACTURE OF UNSPECIFIED LOWER LEG, SUBSEQUENT ENCOUNTER FOR CLOSED FRACTURE WITH NONUNION: ICD-10-CM

## 2017-09-19 DIAGNOSIS — S92.909K OTHER FRACTURE OF UNSPECIFIED LOWER LEG, SUBSEQUENT ENCOUNTER FOR CLOSED FRACTURE WITH NONUNION: ICD-10-CM

## 2017-09-19 LAB
BASOPHILS # BLD AUTO: 0.01 K/UL
BASOPHILS NFR BLD AUTO: 0.1 %
DEPRECATED D DIMER PPP IA-ACNC: <150 NG/ML DDU
EOSINOPHIL # BLD AUTO: 0.2 K/UL
EOSINOPHIL NFR BLD AUTO: 2.2 %
ERYTHROCYTE [SEDIMENTATION RATE] IN BLOOD BY WESTERGREN METHOD: 5 MM/HR
HCT VFR BLD CALC: 38.9 %
HGB BLD-MCNC: 13.2 G/DL
IGE SER-MCNC: 51 IU/ML
IMM GRANULOCYTES NFR BLD AUTO: 0.1 %
LYMPHOCYTES # BLD AUTO: 2.33 K/UL
LYMPHOCYTES NFR BLD AUTO: 25.3 %
MAN DIFF?: NORMAL
MCHC RBC-ENTMCNC: 28.7 PG
MCHC RBC-ENTMCNC: 33.9 GM/DL
MCV RBC AUTO: 84.6 FL
MONOCYTES # BLD AUTO: 0.87 K/UL
MONOCYTES NFR BLD AUTO: 9.5 %
NEUTROPHILS # BLD AUTO: 5.78 K/UL
NEUTROPHILS NFR BLD AUTO: 62.8 %
PLATELET # BLD AUTO: 80 K/UL
RBC # BLD: 4.6 M/UL
RBC # FLD: 13.4 %
WBC # FLD AUTO: 9.2 K/UL

## 2017-09-19 PROCEDURE — 71020: CPT

## 2017-09-19 PROCEDURE — 94729 DIFFUSING CAPACITY: CPT

## 2017-09-19 PROCEDURE — 99205 OFFICE O/P NEW HI 60 MIN: CPT | Mod: 25

## 2017-09-19 PROCEDURE — 94620 PULMONARY STRESS TESTING SIMPLE: CPT

## 2017-09-19 PROCEDURE — 94727 GAS DIL/WSHOT DETER LNG VOL: CPT

## 2017-09-19 PROCEDURE — 94010 BREATHING CAPACITY TEST: CPT | Mod: 59

## 2017-09-19 RX ORDER — HYDROXYZINE HYDROCHLORIDE 25 MG/1
25 TABLET ORAL
Qty: 90 | Refills: 0 | Status: DISCONTINUED | COMMUNITY
Start: 2017-07-25 | End: 2017-09-19

## 2017-09-19 RX ORDER — DILTIAZEM HYDROCHLORIDE 120 MG/1
120 CAPSULE, EXTENDED RELEASE ORAL
Qty: 30 | Refills: 5 | Status: DISCONTINUED | COMMUNITY
Start: 2017-09-05 | End: 2017-09-19

## 2017-09-19 RX ORDER — PROPRANOLOL HYDROCHLORIDE 10 MG/1
10 TABLET ORAL TWICE DAILY
Qty: 10 | Refills: 0 | Status: DISCONTINUED | COMMUNITY
Start: 2017-08-25 | End: 2017-09-19

## 2017-09-19 RX ORDER — DILTIAZEM HYDROCHLORIDE 180 MG/1
180 CAPSULE, EXTENDED RELEASE ORAL
Qty: 30 | Refills: 0 | Status: DISCONTINUED | COMMUNITY
Start: 2017-06-14 | End: 2017-09-19

## 2017-09-19 RX ORDER — DILTIAZEM HYDROCHLORIDE 120 MG/1
120 CAPSULE, EXTENDED RELEASE ORAL TWICE DAILY
Refills: 0 | Status: DISCONTINUED | COMMUNITY
End: 2017-09-19

## 2017-09-26 ENCOUNTER — TRANSCRIPTION ENCOUNTER (OUTPATIENT)
Age: 42
End: 2017-09-26

## 2017-09-29 ENCOUNTER — EMERGENCY (EMERGENCY)
Facility: HOSPITAL | Age: 42
LOS: 1 days | Discharge: HOME | End: 2017-09-29
Attending: EMERGENCY MEDICINE | Admitting: EMERGENCY MEDICINE
Payer: COMMERCIAL

## 2017-09-29 VITALS
SYSTOLIC BLOOD PRESSURE: 136 MMHG | OXYGEN SATURATION: 100 % | HEART RATE: 75 BPM | RESPIRATION RATE: 18 BRPM | DIASTOLIC BLOOD PRESSURE: 91 MMHG | TEMPERATURE: 99 F

## 2017-09-29 VITALS
SYSTOLIC BLOOD PRESSURE: 137 MMHG | DIASTOLIC BLOOD PRESSURE: 100 MMHG | HEART RATE: 90 BPM | RESPIRATION RATE: 18 BRPM | OXYGEN SATURATION: 100 % | TEMPERATURE: 99 F

## 2017-09-29 LAB
ALBUMIN SERPL ELPH-MCNC: 4.7 G/DL — SIGNIFICANT CHANGE UP (ref 3.3–5)
ALP SERPL-CCNC: 86 U/L — SIGNIFICANT CHANGE UP (ref 40–120)
ALT FLD-CCNC: 14 U/L RC — SIGNIFICANT CHANGE UP (ref 10–45)
ANION GAP SERPL CALC-SCNC: 13 MMOL/L — SIGNIFICANT CHANGE UP (ref 5–17)
APPEARANCE UR: CLEAR — SIGNIFICANT CHANGE UP
AST SERPL-CCNC: 17 U/L — SIGNIFICANT CHANGE UP (ref 10–40)
BACTERIA # UR AUTO: ABNORMAL /HPF
BASOPHILS # BLD AUTO: 0 K/UL — SIGNIFICANT CHANGE UP (ref 0–0.2)
BASOPHILS NFR BLD AUTO: 0.3 % — SIGNIFICANT CHANGE UP (ref 0–2)
BILIRUB SERPL-MCNC: 0.3 MG/DL — SIGNIFICANT CHANGE UP (ref 0.2–1.2)
BILIRUB UR-MCNC: NEGATIVE — SIGNIFICANT CHANGE UP
BUN SERPL-MCNC: 3 MG/DL — LOW (ref 7–23)
CALCIUM SERPL-MCNC: 9.8 MG/DL — SIGNIFICANT CHANGE UP (ref 8.4–10.5)
CHLORIDE SERPL-SCNC: 105 MMOL/L — SIGNIFICANT CHANGE UP (ref 96–108)
CO2 SERPL-SCNC: 24 MMOL/L — SIGNIFICANT CHANGE UP (ref 22–31)
COLOR SPEC: COLORLESS — SIGNIFICANT CHANGE UP
CREAT SERPL-MCNC: 0.9 MG/DL — SIGNIFICANT CHANGE UP (ref 0.5–1.3)
DIFF PNL FLD: NEGATIVE — SIGNIFICANT CHANGE UP
EOSINOPHIL # BLD AUTO: 0.1 K/UL — SIGNIFICANT CHANGE UP (ref 0–0.5)
EOSINOPHIL NFR BLD AUTO: 1.1 % — SIGNIFICANT CHANGE UP (ref 0–6)
EPI CELLS # UR: SIGNIFICANT CHANGE UP /HPF
GLUCOSE SERPL-MCNC: 108 MG/DL — HIGH (ref 70–99)
GLUCOSE UR QL: NEGATIVE — SIGNIFICANT CHANGE UP
HCT VFR BLD CALC: 39.6 % — SIGNIFICANT CHANGE UP (ref 34.5–45)
HGB BLD-MCNC: 14.1 G/DL — SIGNIFICANT CHANGE UP (ref 11.5–15.5)
KETONES UR-MCNC: NEGATIVE — SIGNIFICANT CHANGE UP
LEUKOCYTE ESTERASE UR-ACNC: NEGATIVE — SIGNIFICANT CHANGE UP
LYMPHOCYTES # BLD AUTO: 2.3 K/UL — SIGNIFICANT CHANGE UP (ref 1–3.3)
LYMPHOCYTES # BLD AUTO: 28.4 % — SIGNIFICANT CHANGE UP (ref 13–44)
MAGNESIUM SERPL-MCNC: 2.1 MG/DL — SIGNIFICANT CHANGE UP (ref 1.6–2.6)
MCHC RBC-ENTMCNC: 30.9 PG — SIGNIFICANT CHANGE UP (ref 27–34)
MCHC RBC-ENTMCNC: 35.5 GM/DL — SIGNIFICANT CHANGE UP (ref 32–36)
MCV RBC AUTO: 86.9 FL — SIGNIFICANT CHANGE UP (ref 80–100)
MONOCYTES # BLD AUTO: 0.6 K/UL — SIGNIFICANT CHANGE UP (ref 0–0.9)
MONOCYTES NFR BLD AUTO: 7.5 % — SIGNIFICANT CHANGE UP (ref 2–14)
NEUTROPHILS # BLD AUTO: 5.1 K/UL — SIGNIFICANT CHANGE UP (ref 1.8–7.4)
NEUTROPHILS NFR BLD AUTO: 62.8 % — SIGNIFICANT CHANGE UP (ref 43–77)
NITRITE UR-MCNC: NEGATIVE — SIGNIFICANT CHANGE UP
PH UR: 7 — SIGNIFICANT CHANGE UP (ref 5–8)
PHOSPHATE SERPL-MCNC: 3.7 MG/DL — SIGNIFICANT CHANGE UP (ref 2.5–4.5)
PLATELET # BLD AUTO: 63 K/UL — LOW (ref 150–400)
POTASSIUM SERPL-MCNC: 4.1 MMOL/L — SIGNIFICANT CHANGE UP (ref 3.5–5.3)
POTASSIUM SERPL-SCNC: 4.1 MMOL/L — SIGNIFICANT CHANGE UP (ref 3.5–5.3)
PROT SERPL-MCNC: 7.4 G/DL — SIGNIFICANT CHANGE UP (ref 6–8.3)
PROT UR-MCNC: NEGATIVE — SIGNIFICANT CHANGE UP
RBC # BLD: 4.56 M/UL — SIGNIFICANT CHANGE UP (ref 3.8–5.2)
RBC # FLD: 12.2 % — SIGNIFICANT CHANGE UP (ref 10.3–14.5)
RBC CASTS # UR COMP ASSIST: SIGNIFICANT CHANGE UP /HPF (ref 0–2)
SODIUM SERPL-SCNC: 142 MMOL/L — SIGNIFICANT CHANGE UP (ref 135–145)
SP GR SPEC: <1.005 — LOW (ref 1.01–1.02)
UROBILINOGEN FLD QL: NEGATIVE — SIGNIFICANT CHANGE UP
WBC # BLD: 8.1 K/UL — SIGNIFICANT CHANGE UP (ref 3.8–10.5)
WBC # FLD AUTO: 8.1 K/UL — SIGNIFICANT CHANGE UP (ref 3.8–10.5)
WBC UR QL: SIGNIFICANT CHANGE UP /HPF (ref 0–5)

## 2017-09-29 PROCEDURE — 84100 ASSAY OF PHOSPHORUS: CPT

## 2017-09-29 PROCEDURE — 81001 URINALYSIS AUTO W/SCOPE: CPT

## 2017-09-29 PROCEDURE — 80053 COMPREHEN METABOLIC PANEL: CPT

## 2017-09-29 PROCEDURE — 99284 EMERGENCY DEPT VISIT MOD MDM: CPT | Mod: 25

## 2017-09-29 PROCEDURE — 93005 ELECTROCARDIOGRAM TRACING: CPT

## 2017-09-29 PROCEDURE — 85027 COMPLETE CBC AUTOMATED: CPT

## 2017-09-29 PROCEDURE — 93010 ELECTROCARDIOGRAM REPORT: CPT

## 2017-09-29 PROCEDURE — 83735 ASSAY OF MAGNESIUM: CPT

## 2017-09-29 RX ORDER — SODIUM CHLORIDE 9 MG/ML
1000 INJECTION INTRAMUSCULAR; INTRAVENOUS; SUBCUTANEOUS ONCE
Qty: 0 | Refills: 0 | Status: COMPLETED | OUTPATIENT
Start: 2017-09-29 | End: 2017-09-29

## 2017-09-29 RX ADMIN — SODIUM CHLORIDE 2000 MILLILITER(S): 9 INJECTION INTRAMUSCULAR; INTRAVENOUS; SUBCUTANEOUS at 10:56

## 2017-09-29 NOTE — ED PROVIDER NOTE - MEDICAL DECISION MAKING DETAILS
Dr. Rose Note: pt with already a workup with likely psychiatric-induced tachycardia and BP elevations.  Needs further outpatient psychiatric care.  Does not warrant inpatient psych or medical at this time.

## 2017-09-29 NOTE — ED ADULT NURSE NOTE - OBJECTIVE STATEMENT
Patient  is  alert  and oriented x3.  Color  is  good and  skin  warm  to  touch  .  She  is  c/o  palpitation  and  elevated B/P  CM  is  reading  NSR.  She  denies  chest  pain.

## 2017-09-29 NOTE — ED ADULT NURSE REASSESSMENT NOTE - COMFORT CARE
assisted to bathroom/plan of care explained/treatment delay explained/wait time explained/warm blanket provided

## 2017-09-29 NOTE — ED PROVIDER NOTE - OBJECTIVE STATEMENT
41 year old, history of dysautonomia (POTS disease) and she gets tachycardic on physical sit up/movements. Patient presenting with tachycardia persistent since chiropractic appointment a few days ago. patient states her pulse has ranged from  frequently and 130s this morning. Temperature has been 99.2 at home. On ros, new urine smell changes (normal on UA at urgent care a few days ago) with clear color. patient states she normally responds well to fluid bolus    Cardiac meds: cardizem 240 1x/day, 1mg ativan every 4 hours, busbar  PMD:  Lorena Ford  Cards: Philip Reed 41 year old, history of dysautonomia (POTS disease) and she gets tachycardic on physical sit up/movements. Patient presenting with tachycardia persistent since chiropractic appointment a few days ago. patient states her pulse has ranged from  frequently and 130s this morning. Temperature has been 99.2 at home. On ros, new urine smell changes (normal on UA at urgent care a few days ago) with clear color. patient states she normally responds well to fluid bolus    Cardiac meds: cardizem 240 1x/day, 1mg ativan every 4 hours, marialuisa Rose Note: pt with frequent visits to ER for similar complaints coming with labile tachycardia and high blood pressure readings.  Pt has had an "exhaustive workup" by cardiology and seen by cards and psych in past.  Pt claims to have "adrenergic GUY."  Pt has no new symptoms today, complaining of elevated BPs and HRs at time that self-resolve.  Please refer to previous cards and psych note from earlier this month. Pt states that Ativan seems to work for her symptoms and has increased its use recently to help with her symptoms.  Also considered about a "foul odor" from urine with negative urinalysis at urgent care.  Feels she has a "systemic" problem causing her symptoms.   PMD:  Lorena Ford  Cards: Philip Reed

## 2017-09-29 NOTE — ED PROVIDER NOTE - NS ED ROS FT
CONST: no fevers, no chills  EYES: no pain  ENT: no sore throat   CV: no chest pain  RESP: some shortness of breath  ABD: mild abdominal pain   : no dysuria, pos clear urine and foul smell  MSK: no back pain  NEURO: no headache or additional neurologic complaints  HEME: ITP,   SKIN:  no rash

## 2017-09-29 NOTE — ED PROVIDER NOTE - CARE PLAN
Principal Discharge DX:	Anxiety reaction Principal Discharge DX:	Anxiety reaction  Instructions for follow-up, activity and diet:	Follow up with your primary care doctor within 48-72 hours.   You must return for new, worsening or concerning symptoms; specifically including those listed on the attached sheet.

## 2017-09-29 NOTE — ED PROVIDER NOTE - PHYSICAL EXAMINATION
Alberto: A & O x 3, NAD, HEENT WNL and no facial asymmetry; lungs CTAB, heart with reg rhythm; abdomen soft NTND; extremities pale with trace edema; skin with no rashes, neuro exam non focal with no motor or sensory deficits

## 2017-09-29 NOTE — ED PROVIDER NOTE - PROGRESS NOTE DETAILS
Alberto PGY3: feeling better, explained lab findings and need for follow up. will be discharged when fluids finish

## 2017-09-30 ENCOUNTER — EMERGENCY (EMERGENCY)
Facility: HOSPITAL | Age: 42
LOS: 1 days | Discharge: HOME | End: 2017-09-30
Attending: EMERGENCY MEDICINE | Admitting: EMERGENCY MEDICINE
Payer: COMMERCIAL

## 2017-09-30 VITALS
OXYGEN SATURATION: 100 % | RESPIRATION RATE: 13 BRPM | HEART RATE: 85 BPM | SYSTOLIC BLOOD PRESSURE: 135 MMHG | DIASTOLIC BLOOD PRESSURE: 86 MMHG | TEMPERATURE: 98 F

## 2017-09-30 VITALS
OXYGEN SATURATION: 100 % | RESPIRATION RATE: 28 BRPM | HEART RATE: 108 BPM | WEIGHT: 115.96 LBS | TEMPERATURE: 98 F | DIASTOLIC BLOOD PRESSURE: 96 MMHG | HEIGHT: 62 IN | SYSTOLIC BLOOD PRESSURE: 156 MMHG

## 2017-09-30 PROCEDURE — 96374 THER/PROPH/DIAG INJ IV PUSH: CPT

## 2017-09-30 PROCEDURE — 99284 EMERGENCY DEPT VISIT MOD MDM: CPT | Mod: 25

## 2017-09-30 PROCEDURE — 93005 ELECTROCARDIOGRAM TRACING: CPT

## 2017-09-30 PROCEDURE — 99284 EMERGENCY DEPT VISIT MOD MDM: CPT

## 2017-09-30 PROCEDURE — 93010 ELECTROCARDIOGRAM REPORT: CPT

## 2017-09-30 RX ORDER — SODIUM CHLORIDE 9 MG/ML
1000 INJECTION INTRAMUSCULAR; INTRAVENOUS; SUBCUTANEOUS ONCE
Qty: 0 | Refills: 0 | Status: COMPLETED | OUTPATIENT
Start: 2017-09-30 | End: 2017-09-30

## 2017-09-30 RX ADMIN — SODIUM CHLORIDE 500 MILLILITER(S): 9 INJECTION INTRAMUSCULAR; INTRAVENOUS; SUBCUTANEOUS at 11:43

## 2017-09-30 RX ADMIN — Medication 1 MILLIGRAM(S): at 11:44

## 2017-09-30 NOTE — ED ADULT NURSE NOTE - CHPI ED SYMPTOMS NEG
no nausea/no pain/no decreased eating/drinking/no dizziness/no fever/no weakness/no chills/no vomiting

## 2017-09-30 NOTE — ED ADULT NURSE NOTE - OBJECTIVE STATEMENT
Pt to ED after being seen at [facility] yesterday for same symptoms of palpitations and mild numbness and tingling of upper extremities.  Pt breathing heavy and stating she needs oxygen; O2 100% on room air.  Pt educated regarding hyperventilation and its' effects. Pt to ED after being seen at [facility] yesterday for same symptoms of palpitations and mild numbness and tingling of upper extremities.  All testing done at [facility] were negative.  States hx of POTS Pt breathing heavy and stating she needs oxygen; O2 100% on room air.  Pt educated regarding hyperventilation and its' effects.  Pt states she took Ativan this morning at 5:30 and again 1 hour pta.  Pt educated regarding use of meditation and distraction techniques to control her breathing and HR.  Pt verbalized understanding.  Mother at bedside. Pt to ED after being seen at [facility] yesterday for same symptoms of palpitations and mild numbness and tingling of upper extremities.  All testing done at [facility] were negative.  States hx of POTS Pt breathing heavy and stating she needs oxygen; O2 100% on room air.  Pt educated regarding hyperventilation and its' effects.  Pt states she took Ativan this morning at 5:30 and again 1 hour pta.  Pt educated regarding use of meditation and distraction techniques to control her breathing and HR.  Pt verbalized understanding.  As per MD, pt may have O2 per nasal cannula.   O2 applied.  Mother at bedside.

## 2017-09-30 NOTE — ED PROVIDER NOTE - PMH
Anxiety    Headache    History of ITP    HTN (hypertension)    IBS (irritable bowel syndrome)    Labyrinthitis

## 2017-09-30 NOTE — ED ADULT NURSE REASSESSMENT NOTE - NS ED NURSE REASSESS COMMENT FT1
Pt asking when she can get the other 0.5mg of Ativan; offered at this time and pt states she will let me know when she wants it.  MD aware.
1415: Pt requesting balance of Ativan at this time; MD aware and approves.  VSS, pt appears comfortable.
Pt asking "when is the next dose of Ativan?"  Informed pt she is discharged by the MD and will not be getting any more Ativan as per MD.  Pt verbalized understanding and states she will take it when she gets home.  Pt educated on POTS, shown d/c instructions with information, and educated on therapy modalities for techniques to help her to control her heart rate, as well as following up with cardiology.
Pt told by MD that she is d/c'd and can go when she is ready.  Pt feels she needs to stay longer.  IV fluids continue to infuse, as pt has requested they infuse very slowly.  VS remain stable, no cardiac abnormalities noted.  Received call from pts mother who states she has been fasting and is dizzy so she does not feel safe to come get pt; to have her take a taxi home.  Pt verbalized understanding.
Pt states she is ready to go; mother at bedside to take pt home. VSS.

## 2017-09-30 NOTE — ED PROVIDER NOTE - MEDICAL DECISION MAKING DETAILS
evaluate the history of palpitation corollate with past medical history treat accordingly, Patient state she has POT's Disease.

## 2017-09-30 NOTE — ED PROVIDER NOTE - CONSTITUTIONAL, MLM
normal... anxious appearing female, awake, alert, oriented to person, place, time/situation and in no apparent distress.

## 2017-09-30 NOTE — ED PROVIDER NOTE - OBJECTIVE STATEMENT
40 y/o F presents to the ED c/o palpitations for two days. Pt states that she has a hx of Pot's Diseases. Hx of similar episodes but states this is the worst and longest episode she has experienced. Went to [facility] ED yesterday and had lab work performed. Took 1 mg of Ativan at 0530 this morning. No other complaints at this time.

## 2017-10-04 ENCOUNTER — NON-APPOINTMENT (OUTPATIENT)
Age: 42
End: 2017-10-04

## 2017-10-04 ENCOUNTER — APPOINTMENT (OUTPATIENT)
Dept: CARDIOLOGY | Facility: CLINIC | Age: 42
End: 2017-10-04
Payer: COMMERCIAL

## 2017-10-04 VITALS — HEART RATE: 86 BPM | OXYGEN SATURATION: 100 % | SYSTOLIC BLOOD PRESSURE: 127 MMHG | DIASTOLIC BLOOD PRESSURE: 86 MMHG

## 2017-10-04 PROCEDURE — 99215 OFFICE O/P EST HI 40 MIN: CPT

## 2017-10-04 PROCEDURE — 93000 ELECTROCARDIOGRAM COMPLETE: CPT

## 2017-10-05 ENCOUNTER — MEDICATION RENEWAL (OUTPATIENT)
Age: 42
End: 2017-10-05

## 2017-10-05 RX ORDER — FAMOTIDINE 10 MG/1
10 TABLET, FILM COATED ORAL
Refills: 0 | Status: DISCONTINUED | COMMUNITY
End: 2017-10-05

## 2017-10-15 ENCOUNTER — TRANSCRIPTION ENCOUNTER (OUTPATIENT)
Age: 42
End: 2017-10-15

## 2017-10-15 ENCOUNTER — EMERGENCY (EMERGENCY)
Facility: HOSPITAL | Age: 42
LOS: 1 days | Discharge: HOME | End: 2017-10-15
Attending: EMERGENCY MEDICINE | Admitting: EMERGENCY MEDICINE
Payer: COMMERCIAL

## 2017-10-15 VITALS
HEART RATE: 78 BPM | RESPIRATION RATE: 16 BRPM | HEIGHT: 62 IN | WEIGHT: 115.08 LBS | TEMPERATURE: 98 F | DIASTOLIC BLOOD PRESSURE: 94 MMHG | SYSTOLIC BLOOD PRESSURE: 142 MMHG | OXYGEN SATURATION: 99 %

## 2017-10-15 PROCEDURE — 93010 ELECTROCARDIOGRAM REPORT: CPT

## 2017-10-15 PROCEDURE — 99282 EMERGENCY DEPT VISIT SF MDM: CPT

## 2017-10-15 PROCEDURE — 93005 ELECTROCARDIOGRAM TRACING: CPT

## 2017-10-15 PROCEDURE — 99284 EMERGENCY DEPT VISIT MOD MDM: CPT

## 2017-10-15 RX ORDER — FEXOFENADINE HCL 30 MG
1 TABLET ORAL
Qty: 0 | Refills: 0 | COMMUNITY

## 2017-10-15 NOTE — ED PROVIDER NOTE - CONSTITUTIONAL, MLM
normal... Well appearing, well nourished, awake, alert, oriented to person, place, time/situation and in no apparent distress. anxious appearing.

## 2017-10-15 NOTE — ED PROVIDER NOTE - NS_ ATTENDINGSCRIBEDETAILS _ED_A_ED_FT
Rodríguez Leahy MD - The scribe's documentation has been prepared under my direction and personally reviewed by me in its entirety. I confirm that the note above accurately reflects all work, treatment, procedures, and medical decision making performed by me.

## 2017-10-15 NOTE — ED PROVIDER NOTE - OBJECTIVE STATEMENT
40 y/o F w/ POTS disease presents to the ED for episode of tachycardia today. States that she felt a pop in her chest and started feeling heart rate fluctuate as it often does. Developed numbness in fingers, feet, and behind ears along with wheezing. She went to [facility] and started to feel better but they advised her to come to ED. sx resolved. States that she had all these sx before w/ past episodes. Pt states that she feels ready to go home.

## 2017-10-15 NOTE — ED PROVIDER NOTE - MEDICAL DECISION MAKING DETAILS
Patient is asymptomatic with normal exam. Wants to go home. Has had these symptoms many times in the past

## 2017-10-15 NOTE — ED PROVIDER NOTE - PROGRESS NOTE DETAILS
Patient is asymptomatic and states she wants to be discharged. Expressed concern over lack of cardiac monitor while waiting (was on pulse monitor). Had no issue with discharge

## 2017-10-16 RX ORDER — LORAZEPAM 1 MG/1
1 TABLET ORAL DAILY
Refills: 0 | Status: COMPLETED | COMMUNITY
End: 2017-10-16

## 2017-10-16 RX ORDER — LORAZEPAM 1 MG/1
1 TABLET ORAL EVERY 8 HOURS
Qty: 180 | Refills: 0 | Status: COMPLETED | COMMUNITY
Start: 2017-08-25 | End: 2017-10-16

## 2017-10-16 NOTE — ED PROVIDER NOTE - RESPIRATORY, MLM
Breath sounds clear and equal bilaterally. “You can access the Follow[facility] Patient Portal, offered by [facility], by registering with the following website: [email or web address]”

## 2017-10-20 ENCOUNTER — EMERGENCY (EMERGENCY)
Facility: HOSPITAL | Age: 42
LOS: 1 days | Discharge: HOME | End: 2017-10-20
Attending: EMERGENCY MEDICINE | Admitting: EMERGENCY MEDICINE
Payer: COMMERCIAL

## 2017-10-20 VITALS
HEIGHT: 62 IN | HEART RATE: 87 BPM | RESPIRATION RATE: 18 BRPM | TEMPERATURE: 99 F | WEIGHT: 115.96 LBS | DIASTOLIC BLOOD PRESSURE: 108 MMHG | OXYGEN SATURATION: 99 % | SYSTOLIC BLOOD PRESSURE: 144 MMHG

## 2017-10-20 LAB
ANION GAP SERPL CALC-SCNC: 14 MMOL/L — SIGNIFICANT CHANGE UP (ref 5–17)
BASOPHILS # BLD AUTO: 0.1 K/UL — SIGNIFICANT CHANGE UP (ref 0–0.2)
BASOPHILS NFR BLD AUTO: 0.7 % — SIGNIFICANT CHANGE UP (ref 0–2)
BUN SERPL-MCNC: 4 MG/DL — LOW (ref 7–23)
CALCIUM SERPL-MCNC: 9.3 MG/DL — SIGNIFICANT CHANGE UP (ref 8.4–10.5)
CHLORIDE SERPL-SCNC: 105 MMOL/L — SIGNIFICANT CHANGE UP (ref 96–108)
CO2 SERPL-SCNC: 22 MMOL/L — SIGNIFICANT CHANGE UP (ref 22–31)
CREAT SERPL-MCNC: 0.78 MG/DL — SIGNIFICANT CHANGE UP (ref 0.5–1.3)
EOSINOPHIL # BLD AUTO: 0.1 K/UL — SIGNIFICANT CHANGE UP (ref 0–0.5)
EOSINOPHIL NFR BLD AUTO: 0.7 % — SIGNIFICANT CHANGE UP (ref 0–6)
GLUCOSE SERPL-MCNC: 98 MG/DL — SIGNIFICANT CHANGE UP (ref 70–99)
HCT VFR BLD CALC: 37.5 % — SIGNIFICANT CHANGE UP (ref 34.5–45)
HGB BLD-MCNC: 12.8 G/DL — SIGNIFICANT CHANGE UP (ref 11.5–15.5)
LYMPHOCYTES # BLD AUTO: 28.6 % — SIGNIFICANT CHANGE UP (ref 13–44)
LYMPHOCYTES # BLD AUTO: 3 K/UL — SIGNIFICANT CHANGE UP (ref 1–3.3)
MAGNESIUM SERPL-MCNC: 1.7 MG/DL — SIGNIFICANT CHANGE UP (ref 1.6–2.6)
MCHC RBC-ENTMCNC: 29 PG — SIGNIFICANT CHANGE UP (ref 27–34)
MCHC RBC-ENTMCNC: 34 GM/DL — SIGNIFICANT CHANGE UP (ref 32–36)
MCV RBC AUTO: 85.4 FL — SIGNIFICANT CHANGE UP (ref 80–100)
MONOCYTES # BLD AUTO: 0.8 K/UL — SIGNIFICANT CHANGE UP (ref 0–0.9)
MONOCYTES NFR BLD AUTO: 7.7 % — SIGNIFICANT CHANGE UP (ref 2–14)
NEUTROPHILS # BLD AUTO: 6.6 K/UL — SIGNIFICANT CHANGE UP (ref 1.8–7.4)
NEUTROPHILS NFR BLD AUTO: 62.3 % — SIGNIFICANT CHANGE UP (ref 43–77)
PLATELET # BLD AUTO: 60 K/UL — LOW (ref 150–400)
POTASSIUM SERPL-MCNC: 3.2 MMOL/L — LOW (ref 3.5–5.3)
POTASSIUM SERPL-SCNC: 3.2 MMOL/L — LOW (ref 3.5–5.3)
RBC # BLD: 4.4 M/UL — SIGNIFICANT CHANGE UP (ref 3.8–5.2)
RBC # FLD: 11.8 % — SIGNIFICANT CHANGE UP (ref 10.3–14.5)
SODIUM SERPL-SCNC: 141 MMOL/L — SIGNIFICANT CHANGE UP (ref 135–145)
WBC # BLD: 10.5 K/UL — SIGNIFICANT CHANGE UP (ref 3.8–10.5)
WBC # FLD AUTO: 10.5 K/UL — SIGNIFICANT CHANGE UP (ref 3.8–10.5)

## 2017-10-20 PROCEDURE — 99284 EMERGENCY DEPT VISIT MOD MDM: CPT | Mod: 25

## 2017-10-20 PROCEDURE — 83735 ASSAY OF MAGNESIUM: CPT

## 2017-10-20 PROCEDURE — 93010 ELECTROCARDIOGRAM REPORT: CPT

## 2017-10-20 PROCEDURE — 85027 COMPLETE CBC AUTOMATED: CPT

## 2017-10-20 PROCEDURE — 93005 ELECTROCARDIOGRAM TRACING: CPT

## 2017-10-20 PROCEDURE — 80048 BASIC METABOLIC PNL TOTAL CA: CPT

## 2017-10-20 PROCEDURE — 96374 THER/PROPH/DIAG INJ IV PUSH: CPT

## 2017-10-20 PROCEDURE — 99284 EMERGENCY DEPT VISIT MOD MDM: CPT

## 2017-10-20 RX ORDER — SODIUM CHLORIDE 9 MG/ML
1000 INJECTION INTRAMUSCULAR; INTRAVENOUS; SUBCUTANEOUS ONCE
Qty: 0 | Refills: 0 | Status: COMPLETED | OUTPATIENT
Start: 2017-10-20 | End: 2017-10-20

## 2017-10-20 RX ORDER — POTASSIUM CHLORIDE 20 MEQ
40 PACKET (EA) ORAL ONCE
Qty: 0 | Refills: 0 | Status: DISCONTINUED | OUTPATIENT
Start: 2017-10-20 | End: 2017-10-20

## 2017-10-20 RX ORDER — POTASSIUM CHLORIDE 20 MEQ
40 PACKET (EA) ORAL ONCE
Qty: 0 | Refills: 0 | Status: COMPLETED | OUTPATIENT
Start: 2017-10-20 | End: 2017-10-20

## 2017-10-20 RX ADMIN — SODIUM CHLORIDE 500 MILLILITER(S): 9 INJECTION INTRAMUSCULAR; INTRAVENOUS; SUBCUTANEOUS at 20:16

## 2017-10-20 RX ADMIN — SODIUM CHLORIDE 500 MILLILITER(S): 9 INJECTION INTRAMUSCULAR; INTRAVENOUS; SUBCUTANEOUS at 22:30

## 2017-10-20 RX ADMIN — Medication 0.5 MILLIGRAM(S): at 20:20

## 2017-10-20 RX ADMIN — Medication 40 MILLIEQUIVALENT(S): at 22:09

## 2017-10-20 NOTE — ED PROVIDER NOTE - MEDICAL DECISION MAKING DETAILS
41 y.o. F with POTS presenting with palpitations and anxiety - labs, ivf, antianxiety med, replete electrolytes, likely discharge home

## 2017-10-20 NOTE — ED ADULT NURSE NOTE - PMH
Anxiety    Headache    History of ITP    HTN (hypertension)    IBS (irritable bowel syndrome)    Labyrinthitis    Pott disease

## 2017-10-20 NOTE — ED ADULT NURSE NOTE - OBJECTIVE STATEMENT
Pt reports feeling tremulous, having tightness in her chest and irregular heartbeat. Pt states her heart rate has been fluctuating between 120's and 80's. Pt also reports dry mouth. Pt states this is not anxiety and has to do with her disease. Pt appears anxious, is talking fast and repeating herself. Pt reports she has shortness of breath, respirations are even and unlabored. Pt has regular heartrate 80's by bedside cardiac monitor.

## 2017-10-21 VITALS
DIASTOLIC BLOOD PRESSURE: 63 MMHG | OXYGEN SATURATION: 100 % | RESPIRATION RATE: 14 BRPM | TEMPERATURE: 98 F | HEART RATE: 79 BPM | SYSTOLIC BLOOD PRESSURE: 118 MMHG

## 2017-10-21 NOTE — ED ADULT NURSE REASSESSMENT NOTE - NS ED NURSE REASSESS COMMENT FT1
Pt reports feeling better, no palpitations or fluctuations in heartrate since in the ED on CM. Pt ambulatory with steady gait at discharge.

## 2017-10-22 ENCOUNTER — EMERGENCY (EMERGENCY)
Facility: HOSPITAL | Age: 42
LOS: 1 days | Discharge: HOME | End: 2017-10-22
Attending: EMERGENCY MEDICINE | Admitting: EMERGENCY MEDICINE
Payer: COMMERCIAL

## 2017-10-22 VITALS
HEIGHT: 62 IN | DIASTOLIC BLOOD PRESSURE: 90 MMHG | TEMPERATURE: 99 F | WEIGHT: 115.08 LBS | RESPIRATION RATE: 16 BRPM | SYSTOLIC BLOOD PRESSURE: 136 MMHG | OXYGEN SATURATION: 99 % | HEART RATE: 120 BPM

## 2017-10-22 VITALS
OXYGEN SATURATION: 100 % | SYSTOLIC BLOOD PRESSURE: 142 MMHG | TEMPERATURE: 99 F | DIASTOLIC BLOOD PRESSURE: 94 MMHG | HEART RATE: 89 BPM | RESPIRATION RATE: 16 BRPM

## 2017-10-22 LAB
ALBUMIN SERPL ELPH-MCNC: 5 G/DL — SIGNIFICANT CHANGE UP (ref 3.3–5)
ALP SERPL-CCNC: 101 U/L — SIGNIFICANT CHANGE UP (ref 40–120)
ALT FLD-CCNC: 16 U/L RC — SIGNIFICANT CHANGE UP (ref 10–45)
ANION GAP SERPL CALC-SCNC: 17 MMOL/L — SIGNIFICANT CHANGE UP (ref 5–17)
APPEARANCE UR: CLEAR — SIGNIFICANT CHANGE UP
AST SERPL-CCNC: 23 U/L — SIGNIFICANT CHANGE UP (ref 10–40)
BACTERIA # UR AUTO: ABNORMAL /HPF
BASOPHILS # BLD AUTO: 0 K/UL — SIGNIFICANT CHANGE UP (ref 0–0.2)
BASOPHILS NFR BLD AUTO: 0.1 % — SIGNIFICANT CHANGE UP (ref 0–2)
BILIRUB SERPL-MCNC: 0.3 MG/DL — SIGNIFICANT CHANGE UP (ref 0.2–1.2)
BILIRUB UR-MCNC: NEGATIVE — SIGNIFICANT CHANGE UP
BUN SERPL-MCNC: 4 MG/DL — LOW (ref 7–23)
CALCIUM SERPL-MCNC: 9.9 MG/DL — SIGNIFICANT CHANGE UP (ref 8.4–10.5)
CHLORIDE SERPL-SCNC: 102 MMOL/L — SIGNIFICANT CHANGE UP (ref 96–108)
CO2 SERPL-SCNC: 25 MMOL/L — SIGNIFICANT CHANGE UP (ref 22–31)
COLOR SPEC: COLORLESS — SIGNIFICANT CHANGE UP
CREAT SERPL-MCNC: 0.81 MG/DL — SIGNIFICANT CHANGE UP (ref 0.5–1.3)
DIFF PNL FLD: NEGATIVE — SIGNIFICANT CHANGE UP
EOSINOPHIL # BLD AUTO: 0 K/UL — SIGNIFICANT CHANGE UP (ref 0–0.5)
EOSINOPHIL NFR BLD AUTO: 0.1 % — SIGNIFICANT CHANGE UP (ref 0–6)
EPI CELLS # UR: SIGNIFICANT CHANGE UP /HPF
GLUCOSE SERPL-MCNC: 90 MG/DL — SIGNIFICANT CHANGE UP (ref 70–99)
GLUCOSE UR QL: NEGATIVE — SIGNIFICANT CHANGE UP
HCG UR QL: NEGATIVE — SIGNIFICANT CHANGE UP
HCT VFR BLD CALC: 41.3 % — SIGNIFICANT CHANGE UP (ref 34.5–45)
HGB BLD-MCNC: 14.5 G/DL — SIGNIFICANT CHANGE UP (ref 11.5–15.5)
KETONES UR-MCNC: NEGATIVE — SIGNIFICANT CHANGE UP
LEUKOCYTE ESTERASE UR-ACNC: NEGATIVE — SIGNIFICANT CHANGE UP
LYMPHOCYTES # BLD AUTO: 1.6 K/UL — SIGNIFICANT CHANGE UP (ref 1–3.3)
LYMPHOCYTES # BLD AUTO: 9.3 % — LOW (ref 13–44)
MCHC RBC-ENTMCNC: 31 PG — SIGNIFICANT CHANGE UP (ref 27–34)
MCHC RBC-ENTMCNC: 35.2 GM/DL — SIGNIFICANT CHANGE UP (ref 32–36)
MCV RBC AUTO: 88 FL — SIGNIFICANT CHANGE UP (ref 80–100)
MONOCYTES # BLD AUTO: 1.2 K/UL — HIGH (ref 0–0.9)
MONOCYTES NFR BLD AUTO: 6.7 % — SIGNIFICANT CHANGE UP (ref 2–14)
NEUTROPHILS # BLD AUTO: 14.5 K/UL — HIGH (ref 1.8–7.4)
NEUTROPHILS NFR BLD AUTO: 83.7 % — HIGH (ref 43–77)
NITRITE UR-MCNC: NEGATIVE — SIGNIFICANT CHANGE UP
PH UR: 8 — SIGNIFICANT CHANGE UP (ref 5–8)
PLATELET # BLD AUTO: 69 K/UL — LOW (ref 150–400)
POTASSIUM SERPL-MCNC: 3.6 MMOL/L — SIGNIFICANT CHANGE UP (ref 3.5–5.3)
POTASSIUM SERPL-SCNC: 3.6 MMOL/L — SIGNIFICANT CHANGE UP (ref 3.5–5.3)
PROT SERPL-MCNC: 7.7 G/DL — SIGNIFICANT CHANGE UP (ref 6–8.3)
PROT UR-MCNC: NEGATIVE — SIGNIFICANT CHANGE UP
RAPID RVP RESULT: SIGNIFICANT CHANGE UP
RBC # BLD: 4.69 M/UL — SIGNIFICANT CHANGE UP (ref 3.8–5.2)
RBC # FLD: 12 % — SIGNIFICANT CHANGE UP (ref 10.3–14.5)
RBC CASTS # UR COMP ASSIST: SIGNIFICANT CHANGE UP /HPF (ref 0–2)
S PYO AG SPEC QL IA: NEGATIVE — SIGNIFICANT CHANGE UP
SODIUM SERPL-SCNC: 144 MMOL/L — SIGNIFICANT CHANGE UP (ref 135–145)
SP GR SPEC: <1.005 — LOW (ref 1.01–1.02)
UROBILINOGEN FLD QL: NEGATIVE — SIGNIFICANT CHANGE UP
WBC # BLD: 17.4 K/UL — HIGH (ref 3.8–10.5)
WBC # FLD AUTO: 17.4 K/UL — HIGH (ref 3.8–10.5)
WBC UR QL: SIGNIFICANT CHANGE UP /HPF (ref 0–5)

## 2017-10-22 PROCEDURE — 87880 STREP A ASSAY W/OPTIC: CPT

## 2017-10-22 PROCEDURE — 93975 VASCULAR STUDY: CPT

## 2017-10-22 PROCEDURE — 81001 URINALYSIS AUTO W/SCOPE: CPT

## 2017-10-22 PROCEDURE — 87581 M.PNEUMON DNA AMP PROBE: CPT

## 2017-10-22 PROCEDURE — 96374 THER/PROPH/DIAG INJ IV PUSH: CPT | Mod: XU

## 2017-10-22 PROCEDURE — 74177 CT ABD & PELVIS W/CONTRAST: CPT | Mod: 26

## 2017-10-22 PROCEDURE — 76830 TRANSVAGINAL US NON-OB: CPT | Mod: 26

## 2017-10-22 PROCEDURE — 74177 CT ABD & PELVIS W/CONTRAST: CPT

## 2017-10-22 PROCEDURE — 99284 EMERGENCY DEPT VISIT MOD MDM: CPT | Mod: 25

## 2017-10-22 PROCEDURE — 87633 RESP VIRUS 12-25 TARGETS: CPT

## 2017-10-22 PROCEDURE — 87081 CULTURE SCREEN ONLY: CPT

## 2017-10-22 PROCEDURE — 85027 COMPLETE CBC AUTOMATED: CPT

## 2017-10-22 PROCEDURE — 93975 VASCULAR STUDY: CPT | Mod: 26

## 2017-10-22 PROCEDURE — 76830 TRANSVAGINAL US NON-OB: CPT

## 2017-10-22 PROCEDURE — 93010 ELECTROCARDIOGRAM REPORT: CPT

## 2017-10-22 PROCEDURE — 76856 US EXAM PELVIC COMPLETE: CPT | Mod: 26,59

## 2017-10-22 PROCEDURE — 81025 URINE PREGNANCY TEST: CPT

## 2017-10-22 PROCEDURE — 80053 COMPREHEN METABOLIC PANEL: CPT

## 2017-10-22 PROCEDURE — 96376 TX/PRO/DX INJ SAME DRUG ADON: CPT

## 2017-10-22 PROCEDURE — 87486 CHLMYD PNEUM DNA AMP PROBE: CPT

## 2017-10-22 PROCEDURE — 87798 DETECT AGENT NOS DNA AMP: CPT

## 2017-10-22 PROCEDURE — 93005 ELECTROCARDIOGRAM TRACING: CPT

## 2017-10-22 PROCEDURE — 99285 EMERGENCY DEPT VISIT HI MDM: CPT | Mod: 25

## 2017-10-22 PROCEDURE — 76856 US EXAM PELVIC COMPLETE: CPT

## 2017-10-22 RX ORDER — ACETAMINOPHEN 500 MG
650 TABLET ORAL ONCE
Qty: 0 | Refills: 0 | Status: COMPLETED | OUTPATIENT
Start: 2017-10-22 | End: 2017-10-22

## 2017-10-22 RX ORDER — FAMOTIDINE 10 MG/ML
20 INJECTION INTRAVENOUS ONCE
Qty: 0 | Refills: 0 | Status: DISCONTINUED | OUTPATIENT
Start: 2017-10-22 | End: 2017-10-22

## 2017-10-22 RX ORDER — DILTIAZEM HCL 120 MG
240 CAPSULE, EXT RELEASE 24 HR ORAL ONCE
Qty: 0 | Refills: 0 | Status: COMPLETED | OUTPATIENT
Start: 2017-10-22 | End: 2017-10-22

## 2017-10-22 RX ORDER — SODIUM CHLORIDE 9 MG/ML
2000 INJECTION INTRAMUSCULAR; INTRAVENOUS; SUBCUTANEOUS ONCE
Qty: 0 | Refills: 0 | Status: COMPLETED | OUTPATIENT
Start: 2017-10-22 | End: 2017-10-22

## 2017-10-22 RX ORDER — LIDOCAINE 4 G/100G
10 CREAM TOPICAL ONCE
Qty: 0 | Refills: 0 | Status: DISCONTINUED | OUTPATIENT
Start: 2017-10-22 | End: 2017-10-22

## 2017-10-22 RX ADMIN — Medication 0.5 MILLIGRAM(S): at 16:09

## 2017-10-22 RX ADMIN — SODIUM CHLORIDE 1000 MILLILITER(S): 9 INJECTION INTRAMUSCULAR; INTRAVENOUS; SUBCUTANEOUS at 13:20

## 2017-10-22 RX ADMIN — Medication 240 MILLIGRAM(S): at 18:52

## 2017-10-22 RX ADMIN — Medication 650 MILLIGRAM(S): at 17:53

## 2017-10-22 RX ADMIN — Medication 0.5 MILLIGRAM(S): at 13:19

## 2017-10-22 RX ADMIN — Medication 650 MILLIGRAM(S): at 16:09

## 2017-10-22 NOTE — ED PROVIDER NOTE - ATTENDING CONTRIBUTION TO CARE
Dr. RIA Ding:  I have independently evaluated the patient and have documented in the appropriate sections above.  I agree with the exam and plan as noted above.

## 2017-10-22 NOTE — ED ADULT NURSE REASSESSMENT NOTE - NS ED NURSE REASSESS COMMENT FT1
Report received from SUDHIR Carrillo, Patient is with family member, awaiting disposition.  Patient states that she feels better after the CT scan.

## 2017-10-22 NOTE — ED ADULT NURSE REASSESSMENT NOTE - NS ED NURSE REASSESS COMMENT FT1
Patient requesting home medication, Cardizem at this time because patient states "I take Cardizem at this time"; ED MD Jordyn BENSON made aware

## 2017-10-22 NOTE — ED PROVIDER NOTE - PROGRESS NOTE DETAILS
Jordyn Euceda MD - Attending Physician: Pt wants to leave. Will not wait for CT results. On my review no acute findings noted. Will call if any abnormal results. Return precautions discussed

## 2017-10-22 NOTE — ED ADULT NURSE REASSESSMENT NOTE - NS ED NURSE REASSESS COMMENT FT1
While patient being medicated with ativan patient states "I need more than 0.5 mg of ativan I cannot be shaking for 30 minutes"; Patient shaking arms in room at this time; ED MD Meng Ding notified; Patient father in room at this time; ED MD Meng Ding reviewing results of lab work and US with patient's father and patient; safety and comfort measures provided; a&ox3

## 2017-10-22 NOTE — ED ADULT NURSE REASSESSMENT NOTE - NS ED NURSE REASSESS COMMENT FT1
Patient appears to be resting comfortably in stretcher; Patient walked to bathroom; Steady gait noted; Patient on phone at this time; Patient denies palpitations, chest pain, dizziness, n/v/d, numbness, tingling; a&ox3; safety and comfort measures provided

## 2017-10-22 NOTE — ED ADULT TRIAGE NOTE - CHIEF COMPLAINT QUOTE
Throat/neck pain/ nausea/anxiety/lower abd pain since yesterday  Pt states she has a hx of POTS.   Pt denies dysuria

## 2017-10-22 NOTE — ED PROVIDER NOTE - MEDICAL DECISION MAKING DETAILS
pt abdominal symptoms now asymptomatic, will do pelvic sono but unlikely to be appy, no ct for now, pt getting chiropractor in neck but unlikely dissection given symptoms. Symptomatic treatment. labs. re-assess.

## 2017-10-22 NOTE — ED ADULT NURSE NOTE - CHIEF COMPLAINT QUOTE
Throat/neck pain/ nausea/anxiety/lower abd pain since yesterday  Chest pain started at 4 am  Pt states she has a hx of POTS.   Pt denies dysuria

## 2017-10-22 NOTE — ED ADULT NURSE NOTE - OBJECTIVE STATEMENT
42 y/o female presenting to the ED via walking in complaining of rapid heart rate, neck pain, and chest pain; Patient states has hx of POTS; Per patient "I started feeling throat pain 2 days ago and felt like a have a virus brewing"; Patient states "I have a pulse ox at home and it said my heart rate is 135"; Patient complaining of throat pain and left sided neck pain; Patient complaining of left sided chest pain with no radiation; Patient denies runny nose, cough, dizziness, fevers, vomiting, urinary s/s, diarrhea,; Patient complaining of nausea but no vomiting; safety and comfort measures provided; a&ox3; Patient placed on cardiac monitor; Patient handed call bell and directions for use of bell explained; bed placed in lowest position

## 2017-10-22 NOTE — ED PROVIDER NOTE - NS ED ROS FT
Constitutional: no fever  Eyes: no conjunctivitis  Ears: no ear pain   Nose: + nasal congestion, Mouth/Throat: + throat pain, Neck: no stiffness  Cardiovascular: no chest pain  Chest: no cough  Gastrointestinal: + abdominal pain, no vomiting and diarrhea  MSK: no joint pain  : no dysuria  Skin: no rash  Neuro: no LOC

## 2017-10-22 NOTE — ED PROVIDER NOTE - OBJECTIVE STATEMENT
40 yo female with headache HTN IBS Pott disease presenting with neck pain, located over right left side and the back associated with sore throat and pain with swallowing.  symptoms started yesterday afternoon.  achy burning, 3/10 in severity with no radiation.  took ativan with no relief of her symptoms.  has nausea but been taking zofran.

## 2017-10-22 NOTE — ED PROVIDER NOTE - CARE PLAN
Principal Discharge DX:	Generalized abdominal pain  Secondary Diagnosis:	Nonintractable headache, unspecified chronicity pattern, unspecified headache type

## 2017-10-22 NOTE — ED ADULT NURSE REASSESSMENT NOTE - NS ED NURSE REASSESS COMMENT FT1
1500 Patient appears to be resting comfortably in stretcher; Patient complaining of headache; ED MD Samy Albright notified; Will medicate as order; Patient denies current chest pain or palpitations; Patient denies numbness, tingling, SOB, dizziness; safety and comfort measures provided; a&ox3

## 2017-10-22 NOTE — ED PROVIDER NOTE - PHYSICAL EXAMINATION
gen: well appearing  Mentation: AAO x 3  psych: mood appropriate  ENT: airway patent  Eyes: conjunctivae clear bilaterally  Cardio: RRR, no m/r/g  Resp: normal BS b/l  GI: s/nt/nd   Neuro: AAO x 3, sensation and motor function intact, CN 2-12 intact  Skin: No evidence of rash  MSK: normal movement of all extremities gen: well appearing  Mentation: AAO x 3  psych: mood appropriate, rapid speech, +anxiety  ENT: airway patent, L sided LAD  Eyes: conjunctivae clear bilaterally  Cardio: tachy, reg, no m/r/g  Resp: normal BS b/l  GI: s/nt/nd, minimal R pelvic ttp  Neuro: AAO x 3, sensation and motor function intact, CN 2-12 intact  Skin: No evidence of rash  MSK: normal movement of all extremities

## 2017-10-23 RX ORDER — DILTIAZEM HYDROCHLORIDE 120 MG/1
120 TABLET ORAL
Qty: 30 | Refills: 0 | Status: COMPLETED | COMMUNITY
Start: 2017-09-14 | End: 2017-10-23

## 2017-10-23 RX ORDER — DILTIAZEM HYDROCHLORIDE 240 MG/1
240 CAPSULE, EXTENDED RELEASE ORAL
Qty: 30 | Refills: 0 | Status: COMPLETED | COMMUNITY
Start: 2017-09-10 | End: 2017-10-23

## 2017-10-23 RX ORDER — CLONIDINE HYDROCHLORIDE 0.1 MG/1
0.1 TABLET ORAL
Qty: 12 | Refills: 0 | Status: COMPLETED | COMMUNITY
Start: 2017-08-23 | End: 2017-10-23

## 2017-10-24 ENCOUNTER — EMERGENCY (EMERGENCY)
Facility: HOSPITAL | Age: 42
LOS: 1 days | End: 2017-10-24
Admitting: EMERGENCY MEDICINE

## 2017-10-24 VITALS
HEART RATE: 91 BPM | SYSTOLIC BLOOD PRESSURE: 147 MMHG | OXYGEN SATURATION: 99 % | RESPIRATION RATE: 18 BRPM | DIASTOLIC BLOOD PRESSURE: 88 MMHG | TEMPERATURE: 98 F

## 2017-10-27 ENCOUNTER — EMERGENCY (EMERGENCY)
Facility: HOSPITAL | Age: 42
LOS: 1 days | Discharge: HOME | End: 2017-10-27
Attending: EMERGENCY MEDICINE | Admitting: EMERGENCY MEDICINE
Payer: COMMERCIAL

## 2017-10-27 VITALS
RESPIRATION RATE: 15 BRPM | TEMPERATURE: 98 F | HEIGHT: 62 IN | SYSTOLIC BLOOD PRESSURE: 143 MMHG | DIASTOLIC BLOOD PRESSURE: 97 MMHG | HEART RATE: 84 BPM | OXYGEN SATURATION: 98 % | WEIGHT: 115.08 LBS

## 2017-10-27 LAB
ALBUMIN SERPL ELPH-MCNC: 4.1 G/DL — SIGNIFICANT CHANGE UP (ref 3.3–5)
ALP SERPL-CCNC: 86 U/L — SIGNIFICANT CHANGE UP (ref 30–120)
ALT FLD-CCNC: 20 U/L DA — SIGNIFICANT CHANGE UP (ref 10–60)
ANION GAP SERPL CALC-SCNC: 11 MMOL/L — SIGNIFICANT CHANGE UP (ref 5–17)
AST SERPL-CCNC: 18 U/L — SIGNIFICANT CHANGE UP (ref 10–40)
BASOPHILS # BLD AUTO: 0.1 K/UL — SIGNIFICANT CHANGE UP (ref 0–0.2)
BASOPHILS NFR BLD AUTO: 0.7 % — SIGNIFICANT CHANGE UP (ref 0–2)
BILIRUB SERPL-MCNC: 0.4 MG/DL — SIGNIFICANT CHANGE UP (ref 0.2–1.2)
BUN SERPL-MCNC: 5 MG/DL — LOW (ref 7–23)
CALCIUM SERPL-MCNC: 9.2 MG/DL — SIGNIFICANT CHANGE UP (ref 8.4–10.5)
CHLORIDE SERPL-SCNC: 105 MMOL/L — SIGNIFICANT CHANGE UP (ref 96–108)
CK SERPL-CCNC: 51 U/L — SIGNIFICANT CHANGE UP (ref 26–192)
CO2 SERPL-SCNC: 25 MMOL/L — SIGNIFICANT CHANGE UP (ref 22–31)
CREAT SERPL-MCNC: 0.82 MG/DL — SIGNIFICANT CHANGE UP (ref 0.5–1.3)
D DIMER BLD IA.RAPID-MCNC: <150 NG/ML DDU — SIGNIFICANT CHANGE UP
EOSINOPHIL # BLD AUTO: 0.1 K/UL — SIGNIFICANT CHANGE UP (ref 0–0.5)
EOSINOPHIL NFR BLD AUTO: 1.6 % — SIGNIFICANT CHANGE UP (ref 0–6)
ERYTHROCYTE [SEDIMENTATION RATE] IN BLOOD: 9 MM/HR — SIGNIFICANT CHANGE UP (ref 0–20)
GLUCOSE SERPL-MCNC: 84 MG/DL — SIGNIFICANT CHANGE UP (ref 70–99)
HCG SERPL-ACNC: <2 MIU/ML — SIGNIFICANT CHANGE UP
HCT VFR BLD CALC: 37.8 % — SIGNIFICANT CHANGE UP (ref 34.5–45)
HGB BLD-MCNC: 12.7 G/DL — SIGNIFICANT CHANGE UP (ref 11.5–15.5)
LYMPHOCYTES # BLD AUTO: 3 K/UL — SIGNIFICANT CHANGE UP (ref 1–3.3)
LYMPHOCYTES # BLD AUTO: 32.4 % — SIGNIFICANT CHANGE UP (ref 13–44)
MCHC RBC-ENTMCNC: 28.6 PG — SIGNIFICANT CHANGE UP (ref 27–34)
MCHC RBC-ENTMCNC: 33.6 GM/DL — SIGNIFICANT CHANGE UP (ref 32–36)
MCV RBC AUTO: 85.1 FL — SIGNIFICANT CHANGE UP (ref 80–100)
MONOCYTES # BLD AUTO: 0.8 K/UL — SIGNIFICANT CHANGE UP (ref 0–0.9)
MONOCYTES NFR BLD AUTO: 8.5 % — SIGNIFICANT CHANGE UP (ref 2–14)
NEUTROPHILS # BLD AUTO: 5.2 K/UL — SIGNIFICANT CHANGE UP (ref 1.8–7.4)
NEUTROPHILS NFR BLD AUTO: 56.8 % — SIGNIFICANT CHANGE UP (ref 43–77)
NT-PROBNP SERPL-SCNC: 30 PG/ML — SIGNIFICANT CHANGE UP (ref 0–125)
PLATELET # BLD AUTO: 86 K/UL — LOW (ref 150–400)
POTASSIUM SERPL-MCNC: 3.1 MMOL/L — LOW (ref 3.5–5.3)
POTASSIUM SERPL-SCNC: 3.1 MMOL/L — LOW (ref 3.5–5.3)
PROT SERPL-MCNC: 7.3 G/DL — SIGNIFICANT CHANGE UP (ref 6–8.3)
RBC # BLD: 4.44 M/UL — SIGNIFICANT CHANGE UP (ref 3.8–5.2)
RBC # FLD: 11.8 % — SIGNIFICANT CHANGE UP (ref 10.3–14.5)
SODIUM SERPL-SCNC: 141 MMOL/L — SIGNIFICANT CHANGE UP (ref 135–145)
TROPONIN I SERPL-MCNC: 0 NG/ML — LOW (ref 0.02–0.06)
WBC # BLD: 9.2 K/UL — SIGNIFICANT CHANGE UP (ref 3.8–10.5)
WBC # FLD AUTO: 9.2 K/UL — SIGNIFICANT CHANGE UP (ref 3.8–10.5)

## 2017-10-27 PROCEDURE — 96361 HYDRATE IV INFUSION ADD-ON: CPT

## 2017-10-27 PROCEDURE — 85652 RBC SED RATE AUTOMATED: CPT

## 2017-10-27 PROCEDURE — 84702 CHORIONIC GONADOTROPIN TEST: CPT

## 2017-10-27 PROCEDURE — 85027 COMPLETE CBC AUTOMATED: CPT

## 2017-10-27 PROCEDURE — 82550 ASSAY OF CK (CPK): CPT

## 2017-10-27 PROCEDURE — 93010 ELECTROCARDIOGRAM REPORT: CPT

## 2017-10-27 PROCEDURE — 83880 ASSAY OF NATRIURETIC PEPTIDE: CPT

## 2017-10-27 PROCEDURE — 85379 FIBRIN DEGRADATION QUANT: CPT

## 2017-10-27 PROCEDURE — 93005 ELECTROCARDIOGRAM TRACING: CPT

## 2017-10-27 PROCEDURE — 96360 HYDRATION IV INFUSION INIT: CPT

## 2017-10-27 PROCEDURE — 36415 COLL VENOUS BLD VENIPUNCTURE: CPT

## 2017-10-27 PROCEDURE — 99285 EMERGENCY DEPT VISIT HI MDM: CPT | Mod: 25

## 2017-10-27 PROCEDURE — 99284 EMERGENCY DEPT VISIT MOD MDM: CPT | Mod: 25

## 2017-10-27 PROCEDURE — 84484 ASSAY OF TROPONIN QUANT: CPT

## 2017-10-27 PROCEDURE — 71010: CPT | Mod: 26

## 2017-10-27 PROCEDURE — 71045 X-RAY EXAM CHEST 1 VIEW: CPT

## 2017-10-27 PROCEDURE — 80053 COMPREHEN METABOLIC PANEL: CPT

## 2017-10-27 RX ORDER — POTASSIUM CHLORIDE 20 MEQ
40 PACKET (EA) ORAL ONCE
Qty: 0 | Refills: 0 | Status: DISCONTINUED | OUTPATIENT
Start: 2017-10-27 | End: 2017-10-27

## 2017-10-27 RX ORDER — SODIUM CHLORIDE 9 MG/ML
1000 INJECTION INTRAMUSCULAR; INTRAVENOUS; SUBCUTANEOUS
Qty: 0 | Refills: 0 | Status: COMPLETED | OUTPATIENT
Start: 2017-10-27 | End: 2017-10-27

## 2017-10-27 RX ORDER — POTASSIUM CHLORIDE 20 MEQ
40 PACKET (EA) ORAL ONCE
Qty: 0 | Refills: 0 | Status: COMPLETED | OUTPATIENT
Start: 2017-10-27 | End: 2017-10-27

## 2017-10-27 RX ADMIN — SODIUM CHLORIDE 500 MILLILITER(S): 9 INJECTION INTRAMUSCULAR; INTRAVENOUS; SUBCUTANEOUS at 20:31

## 2017-10-27 RX ADMIN — Medication 40 MILLIEQUIVALENT(S): at 22:37

## 2017-10-27 RX ADMIN — SODIUM CHLORIDE 500 MILLILITER(S): 9 INJECTION INTRAMUSCULAR; INTRAVENOUS; SUBCUTANEOUS at 22:36

## 2017-10-27 NOTE — ED ADULT NURSE NOTE - CHPI ED SYMPTOMS NEG
no shortness of breath/no back pain/no dizziness/no fever/no syncope/no vomiting/no diaphoresis/no chills/no nausea/no cough

## 2017-10-27 NOTE — ED ADULT NURSE NOTE - OBJECTIVE STATEMENT
pain under left breast radiating to left shoulder x few hours. came on spontaneously. denies injury.

## 2017-10-27 NOTE — ED PROVIDER NOTE - NS_ ATTENDINGSCRIBEDETAILS _ED_A_ED_FT
I have personally performed a face to face diagnostic evaluation on this patient.  I have reviewed the PA note and agree with the history, exam, and plan of care, except as noted.  History and Exam by me shows

## 2017-10-27 NOTE — ED ADULT NURSE REASSESSMENT NOTE - NS ED NURSE REASSESS COMMENT FT1
20:15 - Pt c/o left side chest pain under left breast radiating to left shoulder x "hours". states started spontaneously. denies trauma or injury. Pain described as dull, "but sharp at times" when takes deep breath. rate 3-4/10 on pain scale. Bilat lungs clear, o2 sat 100% on room air. skin warm, dry. RR 16. CM NSR 70'80's. Pt anxious, nervous. reassurance given. Explained plan of care. Pt verbalized understanding. Father at bedside.

## 2017-10-27 NOTE — ED PROVIDER NOTE - OBJECTIVE STATEMENT
42 y/o F pt w/ PMHx POTS disease, anxiety, ITP, HTN, IBS disease presents to the ED c/o CP x 4 hours. Pt states that she began experiencing sharp, shooting left sided chest pain radiating to her left arm starting about 4 hours ago and is still experiencing the pain... states the pain is more of a dull ache now. Pt states that her chest pain occurred before she started having a typical POTS episode. Pt states she has had this pain before, but it has not lasted this long. Pt states she was at [place] 4 days ago for a POTS episode, denies CP at that time. Pt states that PTA she took Cardizem at 18:00 and 1 mg of Ativan at 18:30. Pt also states she has a sore throat. Pt denies fever, n/v or any other complaints at this time.     Cardiologist: Dr. Zelalem Hussein

## 2017-10-28 VITALS
SYSTOLIC BLOOD PRESSURE: 125 MMHG | HEART RATE: 75 BPM | RESPIRATION RATE: 16 BRPM | OXYGEN SATURATION: 100 % | TEMPERATURE: 98 F | DIASTOLIC BLOOD PRESSURE: 84 MMHG

## 2017-10-28 LAB
CK SERPL-CCNC: 53 U/L — SIGNIFICANT CHANGE UP (ref 26–192)
TROPONIN I SERPL-MCNC: 0 NG/ML — LOW (ref 0.02–0.06)

## 2017-10-30 ENCOUNTER — EMERGENCY (EMERGENCY)
Facility: HOSPITAL | Age: 42
LOS: 1 days | Discharge: HOME | End: 2017-10-30
Attending: EMERGENCY MEDICINE | Admitting: EMERGENCY MEDICINE
Payer: COMMERCIAL

## 2017-10-30 VITALS
OXYGEN SATURATION: 100 % | TEMPERATURE: 98 F | DIASTOLIC BLOOD PRESSURE: 83 MMHG | RESPIRATION RATE: 16 BRPM | HEART RATE: 87 BPM | SYSTOLIC BLOOD PRESSURE: 123 MMHG

## 2017-10-30 VITALS
HEIGHT: 62 IN | HEART RATE: 104 BPM | DIASTOLIC BLOOD PRESSURE: 94 MMHG | WEIGHT: 115.08 LBS | OXYGEN SATURATION: 99 % | SYSTOLIC BLOOD PRESSURE: 141 MMHG | TEMPERATURE: 99 F | RESPIRATION RATE: 20 BRPM

## 2017-10-30 LAB
ANION GAP SERPL CALC-SCNC: 14 MMOL/L — SIGNIFICANT CHANGE UP (ref 5–17)
BASOPHILS # BLD AUTO: 0 K/UL — SIGNIFICANT CHANGE UP (ref 0–0.2)
BASOPHILS NFR BLD AUTO: 0.1 % — SIGNIFICANT CHANGE UP (ref 0–2)
BUN SERPL-MCNC: 5 MG/DL — LOW (ref 7–23)
CALCIUM SERPL-MCNC: 9.5 MG/DL — SIGNIFICANT CHANGE UP (ref 8.4–10.5)
CHLORIDE SERPL-SCNC: 106 MMOL/L — SIGNIFICANT CHANGE UP (ref 96–108)
CO2 SERPL-SCNC: 24 MMOL/L — SIGNIFICANT CHANGE UP (ref 22–31)
CREAT SERPL-MCNC: 0.82 MG/DL — SIGNIFICANT CHANGE UP (ref 0.5–1.3)
EOSINOPHIL # BLD AUTO: 0.1 K/UL — SIGNIFICANT CHANGE UP (ref 0–0.5)
EOSINOPHIL NFR BLD AUTO: 1.1 % — SIGNIFICANT CHANGE UP (ref 0–6)
GLUCOSE SERPL-MCNC: 95 MG/DL — SIGNIFICANT CHANGE UP (ref 70–99)
HCT VFR BLD CALC: 38.2 % — SIGNIFICANT CHANGE UP (ref 34.5–45)
HGB BLD-MCNC: 13 G/DL — SIGNIFICANT CHANGE UP (ref 11.5–15.5)
LYMPHOCYTES # BLD AUTO: 2.2 K/UL — SIGNIFICANT CHANGE UP (ref 1–3.3)
LYMPHOCYTES # BLD AUTO: 23.4 % — SIGNIFICANT CHANGE UP (ref 13–44)
MCHC RBC-ENTMCNC: 29.7 PG — SIGNIFICANT CHANGE UP (ref 27–34)
MCHC RBC-ENTMCNC: 34 GM/DL — SIGNIFICANT CHANGE UP (ref 32–36)
MCV RBC AUTO: 87.2 FL — SIGNIFICANT CHANGE UP (ref 80–100)
MONOCYTES # BLD AUTO: 0.6 K/UL — SIGNIFICANT CHANGE UP (ref 0–0.9)
MONOCYTES NFR BLD AUTO: 6.9 % — SIGNIFICANT CHANGE UP (ref 2–14)
NEUTROPHILS # BLD AUTO: 6.4 K/UL — SIGNIFICANT CHANGE UP (ref 1.8–7.4)
NEUTROPHILS NFR BLD AUTO: 68.5 % — SIGNIFICANT CHANGE UP (ref 43–77)
PLATELET # BLD AUTO: 79 K/UL — LOW (ref 150–400)
POTASSIUM SERPL-MCNC: 3.9 MMOL/L — SIGNIFICANT CHANGE UP (ref 3.5–5.3)
POTASSIUM SERPL-SCNC: 3.9 MMOL/L — SIGNIFICANT CHANGE UP (ref 3.5–5.3)
RBC # BLD: 4.38 M/UL — SIGNIFICANT CHANGE UP (ref 3.8–5.2)
RBC # FLD: 12 % — SIGNIFICANT CHANGE UP (ref 10.3–14.5)
SODIUM SERPL-SCNC: 144 MMOL/L — SIGNIFICANT CHANGE UP (ref 135–145)
TROPONIN T SERPL-MCNC: <0.01 NG/ML — SIGNIFICANT CHANGE UP (ref 0–0.06)
WBC # BLD: 9.4 K/UL — SIGNIFICANT CHANGE UP (ref 3.8–10.5)
WBC # FLD AUTO: 9.4 K/UL — SIGNIFICANT CHANGE UP (ref 3.8–10.5)

## 2017-10-30 PROCEDURE — 99285 EMERGENCY DEPT VISIT HI MDM: CPT | Mod: 25

## 2017-10-30 PROCEDURE — 99283 EMERGENCY DEPT VISIT LOW MDM: CPT | Mod: 25

## 2017-10-30 PROCEDURE — 93005 ELECTROCARDIOGRAM TRACING: CPT

## 2017-10-30 PROCEDURE — 93010 ELECTROCARDIOGRAM REPORT: CPT

## 2017-10-30 PROCEDURE — 85027 COMPLETE CBC AUTOMATED: CPT

## 2017-10-30 PROCEDURE — 84484 ASSAY OF TROPONIN QUANT: CPT

## 2017-10-30 PROCEDURE — 80048 BASIC METABOLIC PNL TOTAL CA: CPT

## 2017-10-30 RX ORDER — ASCORBIC ACID 60 MG
0 TABLET,CHEWABLE ORAL
Qty: 0 | Refills: 0 | COMMUNITY

## 2017-10-30 RX ORDER — SODIUM CHLORIDE 9 MG/ML
1000 INJECTION INTRAMUSCULAR; INTRAVENOUS; SUBCUTANEOUS ONCE
Qty: 0 | Refills: 0 | Status: COMPLETED | OUTPATIENT
Start: 2017-10-30 | End: 2017-10-30

## 2017-10-30 RX ORDER — AZITHROMYCIN 500 MG/1
0 TABLET, FILM COATED ORAL
Qty: 0 | Refills: 0 | COMMUNITY

## 2017-10-30 RX ADMIN — SODIUM CHLORIDE 500 MILLILITER(S): 9 INJECTION INTRAMUSCULAR; INTRAVENOUS; SUBCUTANEOUS at 17:35

## 2017-10-30 NOTE — ED PROVIDER NOTE - MEDICAL DECISION MAKING DETAILS
Nikky ARRIOLA: 40 yo F with hx of POTT disease here for concern of tachycardia. + previous episodes. Patient states she often has chest discomfort when this happens. Patient had an episode yesterday and then had palpitations today. Exam: no focal findings. a/p: IV hydration Nikky ARRIOLA: 40 yo F with hx of POTT disease here for concern of tachycardia. + previous episodes. Patient states she often has chest discomfort when this happens. Patient had an episode yesterday and then had palpitations today. Exam: no focal findings. a/p: palpitations - similar to prior episodes. Per patient,  IV hydration helps symptoms. Will give IVF and reassess.

## 2017-10-30 NOTE — ED ADULT NURSE NOTE - OBJECTIVE STATEMENT
42 y/o F ambulatory to ED with steady gait c/o fluctuating heart rate x3 days, worse today.  A&Ox4.  No dizziness.  No SOB.  Easy work of breathing.  Lungs clear and equal to auscultation.  Denies chest pain.  Pt states "feels like an accordion", pt states "no palpitations".  Med lock 20 L AC placed, labs drawn. Skin warm dry and intact.  No rashes.  Moves all extremities strong.  Abd soft round nontender.  No n/v/d. Pt took Ativan prior to arrival states that "helped everything but the heart rate".  VSS.  NAD.  Pt on CM: sinus rhythm.

## 2017-10-30 NOTE — ED PROVIDER NOTE - CARE PLAN
Principal Discharge DX:	Palpitations  Instructions for follow-up, activity and diet:	You were seen in the ER for tachycardia. You must follow up with your primary physician in 24 to 48 hours. Return to the ER for any new or worsening signs/symptoms.

## 2017-10-30 NOTE — ED PROVIDER NOTE - PROGRESS NOTE DETAILS
Nikky ARRIOLA: Patient reassessed: she reports feeling better. HR 93 on monitor. Patient feels ready to be dsicharged. Labs wnl.

## 2017-10-30 NOTE — ED ADULT NURSE NOTE - ED STAT RN HANDOFF DETAILS
Report given to change of shift RN.  Easy work of breathing.  Med lock intact.  Pt resting comfortably. NAD. Family at bedside.  Pt eating, tolerating PO.

## 2017-10-30 NOTE — ED PROVIDER NOTE - PLAN OF CARE
You were seen in the ER for tachycardia. You must follow up with your primary physician in 24 to 48 hours. Return to the ER for any new or worsening signs/symptoms.

## 2017-10-30 NOTE — ED PROVIDER NOTE - OBJECTIVE STATEMENT
40 yo F h/o of Christy, who reports palpitations since this morning and has recorded pulses into 140's that are transient. She often gets palpitations with very labile pulses ranging from normal bpm to tachycardia, although always sinus. She often gets mild level of chest discomfort for this but when she comes into the ED for treatment her cardiac enzymes are negative. She otherwise denies shortness of breath, fevers, or chills.

## 2017-10-31 ENCOUNTER — RESULT REVIEW (OUTPATIENT)
Age: 42
End: 2017-10-31

## 2017-11-02 ENCOUNTER — EMERGENCY (EMERGENCY)
Facility: HOSPITAL | Age: 42
LOS: 1 days | Discharge: HOME | End: 2017-11-02
Attending: EMERGENCY MEDICINE
Payer: COMMERCIAL

## 2017-11-02 VITALS
HEART RATE: 85 BPM | TEMPERATURE: 99 F | DIASTOLIC BLOOD PRESSURE: 83 MMHG | SYSTOLIC BLOOD PRESSURE: 129 MMHG | RESPIRATION RATE: 17 BRPM | OXYGEN SATURATION: 100 %

## 2017-11-02 VITALS
TEMPERATURE: 99 F | HEART RATE: 95 BPM | DIASTOLIC BLOOD PRESSURE: 83 MMHG | OXYGEN SATURATION: 100 % | RESPIRATION RATE: 16 BRPM | SYSTOLIC BLOOD PRESSURE: 132 MMHG

## 2017-11-02 PROBLEM — Z78.9 CONSUMES A VEGAN DIET: Status: ACTIVE | Noted: 2017-11-02

## 2017-11-02 LAB
ALBUMIN SERPL ELPH-MCNC: 4.8 G/DL — SIGNIFICANT CHANGE UP (ref 3.3–5)
ALP SERPL-CCNC: 79 U/L — SIGNIFICANT CHANGE UP (ref 40–120)
ALT FLD-CCNC: 13 U/L RC — SIGNIFICANT CHANGE UP (ref 10–45)
ANION GAP SERPL CALC-SCNC: 16 MMOL/L — SIGNIFICANT CHANGE UP (ref 5–17)
AST SERPL-CCNC: 20 U/L — SIGNIFICANT CHANGE UP (ref 10–40)
BASOPHILS # BLD AUTO: 0 K/UL — SIGNIFICANT CHANGE UP (ref 0–0.2)
BASOPHILS NFR BLD AUTO: 0.3 % — SIGNIFICANT CHANGE UP (ref 0–2)
BILIRUB SERPL-MCNC: 0.4 MG/DL — SIGNIFICANT CHANGE UP (ref 0.2–1.2)
BUN SERPL-MCNC: 5 MG/DL — LOW (ref 7–23)
CALCIUM SERPL-MCNC: 9.6 MG/DL — SIGNIFICANT CHANGE UP (ref 8.4–10.5)
CHLORIDE SERPL-SCNC: 101 MMOL/L — SIGNIFICANT CHANGE UP (ref 96–108)
CO2 SERPL-SCNC: 23 MMOL/L — SIGNIFICANT CHANGE UP (ref 22–31)
CREAT SERPL-MCNC: 0.86 MG/DL — SIGNIFICANT CHANGE UP (ref 0.5–1.3)
EOSINOPHIL # BLD AUTO: 0.1 K/UL — SIGNIFICANT CHANGE UP (ref 0–0.5)
EOSINOPHIL NFR BLD AUTO: 0.8 % — SIGNIFICANT CHANGE UP (ref 0–6)
GLUCOSE SERPL-MCNC: 86 MG/DL — SIGNIFICANT CHANGE UP (ref 70–99)
HCT VFR BLD CALC: 37.3 % — SIGNIFICANT CHANGE UP (ref 34.5–45)
HGB BLD-MCNC: 13.2 G/DL — SIGNIFICANT CHANGE UP (ref 11.5–15.5)
LYMPHOCYTES # BLD AUTO: 2.6 K/UL — SIGNIFICANT CHANGE UP (ref 1–3.3)
LYMPHOCYTES # BLD AUTO: 25 % — SIGNIFICANT CHANGE UP (ref 13–44)
MCHC RBC-ENTMCNC: 30.7 PG — SIGNIFICANT CHANGE UP (ref 27–34)
MCHC RBC-ENTMCNC: 35.5 GM/DL — SIGNIFICANT CHANGE UP (ref 32–36)
MCV RBC AUTO: 86.7 FL — SIGNIFICANT CHANGE UP (ref 80–100)
MONOCYTES # BLD AUTO: 0.7 K/UL — SIGNIFICANT CHANGE UP (ref 0–0.9)
MONOCYTES NFR BLD AUTO: 6.8 % — SIGNIFICANT CHANGE UP (ref 2–14)
NEUTROPHILS # BLD AUTO: 7 K/UL — SIGNIFICANT CHANGE UP (ref 1.8–7.4)
NEUTROPHILS NFR BLD AUTO: 67.2 % — SIGNIFICANT CHANGE UP (ref 43–77)
PLATELET # BLD AUTO: 81 K/UL — LOW (ref 150–400)
POTASSIUM SERPL-MCNC: 3.5 MMOL/L — SIGNIFICANT CHANGE UP (ref 3.5–5.3)
POTASSIUM SERPL-SCNC: 3.5 MMOL/L — SIGNIFICANT CHANGE UP (ref 3.5–5.3)
PROT SERPL-MCNC: 7.2 G/DL — SIGNIFICANT CHANGE UP (ref 6–8.3)
RBC # BLD: 4.3 M/UL — SIGNIFICANT CHANGE UP (ref 3.8–5.2)
RBC # FLD: 12.2 % — SIGNIFICANT CHANGE UP (ref 10.3–14.5)
SODIUM SERPL-SCNC: 140 MMOL/L — SIGNIFICANT CHANGE UP (ref 135–145)
TROPONIN T SERPL-MCNC: <0.01 NG/ML — SIGNIFICANT CHANGE UP (ref 0–0.06)
TROPONIN T SERPL-MCNC: <0.01 NG/ML — SIGNIFICANT CHANGE UP (ref 0–0.06)
WBC # BLD: 10.3 K/UL — SIGNIFICANT CHANGE UP (ref 3.8–10.5)
WBC # FLD AUTO: 10.3 K/UL — SIGNIFICANT CHANGE UP (ref 3.8–10.5)

## 2017-11-02 PROCEDURE — 80053 COMPREHEN METABOLIC PANEL: CPT

## 2017-11-02 PROCEDURE — 71046 X-RAY EXAM CHEST 2 VIEWS: CPT

## 2017-11-02 PROCEDURE — 93005 ELECTROCARDIOGRAM TRACING: CPT

## 2017-11-02 PROCEDURE — 85027 COMPLETE CBC AUTOMATED: CPT

## 2017-11-02 PROCEDURE — 99284 EMERGENCY DEPT VISIT MOD MDM: CPT | Mod: 25

## 2017-11-02 PROCEDURE — 99215 OFFICE O/P EST HI 40 MIN: CPT

## 2017-11-02 PROCEDURE — 71020: CPT | Mod: 26

## 2017-11-02 PROCEDURE — 93010 ELECTROCARDIOGRAM REPORT: CPT

## 2017-11-02 PROCEDURE — 99285 EMERGENCY DEPT VISIT HI MDM: CPT | Mod: 25

## 2017-11-02 PROCEDURE — 84484 ASSAY OF TROPONIN QUANT: CPT

## 2017-11-02 RX ORDER — ACETAMINOPHEN 500 MG
1000 TABLET ORAL ONCE
Qty: 0 | Refills: 0 | Status: COMPLETED | OUTPATIENT
Start: 2017-11-02 | End: 2017-11-02

## 2017-11-02 RX ORDER — SODIUM CHLORIDE 9 MG/ML
2000 INJECTION INTRAMUSCULAR; INTRAVENOUS; SUBCUTANEOUS ONCE
Qty: 0 | Refills: 0 | Status: COMPLETED | OUTPATIENT
Start: 2017-11-02 | End: 2017-11-02

## 2017-11-02 RX ORDER — ASPIRIN/CALCIUM CARB/MAGNESIUM 324 MG
325 TABLET ORAL DAILY
Qty: 0 | Refills: 0 | Status: DISCONTINUED | OUTPATIENT
Start: 2017-11-02 | End: 2017-11-11

## 2017-11-02 RX ADMIN — SODIUM CHLORIDE 1333.33 MILLILITER(S): 9 INJECTION INTRAMUSCULAR; INTRAVENOUS; SUBCUTANEOUS at 17:14

## 2017-11-02 NOTE — ED ADULT NURSE NOTE - OBJECTIVE STATEMENT
42 yo presents to the ED from home. A&Ox4 c/o CP. pt reports pain has been intermittent for 1.5 weeks. pt reports "burning sensation" to the chest, denies sob or radiation of pain. pt reports that she took Pepcid with minimal relief. 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 pt reports fluctuating HR today for one hour between . pt reports dizziness but states that she is always dizzy, denies acute change. pt reports she has CP regular but todays burning consistency is abnormal for pt. plan of care discussed. safety and comfort measures maintained.

## 2017-11-02 NOTE — ED PROVIDER NOTE - OBJECTIVE STATEMENT
41F hx POTS/ITP/asthma p/w chest pain. Describes 1.5 weeks of intermittent sharp left sided chest pain. Today experienced sudden onset burning substernal pain, moderately improved w/ pepcid, associated w/ palpitations and lightheadedness. No dyspnea/n/v/diaphoresis/calf pain/calf swelling. She frequently gets chest pain w/ similar episodes of palpitations, but the burning chest pain is atypical.

## 2017-11-02 NOTE — ED ADULT NURSE REASSESSMENT NOTE - NS ED NURSE REASSESS COMMENT FT1
Md spoke with patient. patient left the ED. MD awaiting on trop results to contact patient with results
patient reassessed. resting in bed. patient is complaining of abdominal discomfort but refused medication ordered initially. patient wants to leave the hospital agaist medical advice. 2nd troponin drawn and sent to the lab. iv access discontinued. MD attending justus made aware of patient request to leave [facility] and to be contacted if there is anything abnormal with her 2nd troponin.
report given to Chris PEGUERO. plan of care discussed. safety and comfort measures maintained.

## 2017-11-02 NOTE — ED ADULT NURSE NOTE - CHIEF COMPLAINT QUOTE
HX of Harrison disease; c/o "burning sensation" to the chest; denies sob; recently seen the the ED told everything was normal; reports fluctuating HR between

## 2017-11-02 NOTE — ED PROVIDER NOTE - MEDICAL DECISION MAKING DETAILS
Atypical chest pain for her GUY exacerbations, but low risk overall for CAD. PERC score = 0. Will check [place], contact cardiologist for more info about her history and management recs.

## 2017-11-02 NOTE — ED PROVIDER NOTE - PROGRESS NOTE DETAILS
Jonathan Weil, PGY1 - spoke w/ cardiologist (Dr. Philip Reed) who states the patient is very low risk for an ischemic event and does not require delta-trops to rule out ischemia. Recommends IV fluids and re-assessment. attending Oxana: 2nd troponin sent but patient does not wish to wait for result. Will dc AMA. Copy of results given to patient. Will call back if 2nd trop abnl attending Oxana: 2nd troponin sent but patient does not wish to wait for result. Will dc AMA. Copy of results given to patient. Will call back if 2nd trop abnl. Jonathan Weil, PGY1 - spoke w/ cardiologist (Dr. Philip Reed) who states the patient is very low risk for an ischemic event. Recommends IV fluids and re-assessment. attending Oxana: Lengthy discussion with patient regarding lab results. Pt given copy of labs. Pt frustrated we cannot "find out what's wrong". Private cardiologist recommends outpatient workup. Scheduled to see pulmonologist tomorrow. PERC negative. Will keep on tele and obtain 2nd troponin at 4 hour lg. Likely to be d/c'ed

## 2017-11-02 NOTE — ED ADULT TRIAGE NOTE - RESPIRATORY RATE (BREATHS/MIN)
Coumadin daily for home   Continue to monitor INR as outpt   H/H stable for d/c   s&s of bleeding reviewed with pt 16

## 2017-11-02 NOTE — CONSULT NOTE ADULT - SUBJECTIVE AND OBJECTIVE BOX
**********              CARDIOLOGY CONSULT NOTE              ************  ============================================================  CHIEF COMPLAINT/REASON FOR CONSULT:  Patient is a 41y old  Female who presents with a chief complaint of     HISTORY OF PRESENT ILLNESS:  41yFemale with a history of POTS ITP Anxiety multiple admissions for latter p/w burning chest pain following meal. Patient had chest pain onset after meal, felt was a new pain. Non-exertional, no sob, no syncope, no diaphoresis.  Vitals stable. eCG Stable Trops neg    Allergies    No Known Allergies    Intolerances    metoprolol (Headache)  	    MEDICATIONS:  aspirin enteric coated 325 milliGRAM(s) Oral daily                  PAST MEDICAL & SURGICAL HISTORY:  Pott disease  History of ITP  Labyrinthitis  Headache  HTN (hypertension)  Anxiety  IBS (irritable bowel syndrome)  No significant past surgical history      FAMILY HISTORY:  Family history of hypertension (Grandparent)  Family history of atrial fibrillation  Family history of prostate cancer in father      SOCIAL HISTORY:    [ x] Non-smoker  [x] No Illicit Drug Use  [ x] No Excess EtOH Use      REVIEW OF SYSTEMS: (Unless + Before Symptom, it is negative)  Constitutional: No Fever, Fatigue, Weight Changes +anxiety  Eyes: No Recent Vision Changes, Eye Pain  ENT: No Congestion, Sore Throat  Endocrine: No Excess Sweating, Temperature Intolerance  Cardiovascular: No Chest Pain, +Palpitations, Shortness of Breath, Pre-syncope, Syncope, LE Edema  Respiratory: No Cough, Congestion, Wheezing  Gastrointestinal: No Abdominal Pain, Nausea, Vomiting  Genitourinary: No dysuria, hematuria  Musculoskeletal: No Joint Pain, Swelling  Neurologic: No headaches, Imbalance, Weakness  Skin: No rashes, hematoma, purprura    PHYSICAL EXAM:  T(C): 37.3 (11-02-17 @ 18:31), Max: 37.3 (11-02-17 @ 18:31)  HR: 85 (11-02-17 @ 18:31) (85 - 95)  BP: 129/83 (11-02-17 @ 18:31) (129/83 - 132/83)  RR: 17 (11-02-17 @ 18:31) (16 - 17)  SpO2: 100% (11-02-17 @ 18:31) (100% - 100%)  Wt(kg): --  I&O's Summary    Appearance: Normal; NAD	+anxiety  HEENT:   Normal oral mucosa, EOMI	  Lymphatic: No lymphadenopathy  Cardiovascular: Normal S1 S2, No JVD, No murmurs, No edema  Respiratory: Lungs clear to auscultation, no use of accessory muscles	  Psychiatry: A & O x 3, Mood & affect appropriate  Gastrointestinal:  Soft, Non-tender	  Skin: No rashes, No ecchymoses, No cyanosis	  Neurologic: Non-focal, No Focal Deficits  Extremities: Normal range of motion, No clubbing, cyanosis or edema  Vascular: Peripheral pulses palpable 2+ bilaterally, no prominent varicosities    LABS:	   Labs [id number] @ 21:40	    CBC Full  -  ( 02 Nov 2017 17:07 )  WBC Count : 10.3 K/uL  Hemoglobin : 13.2 g/dL  Hematocrit : 37.3 %  Platelet Count - Automated : 81 K/uL  Mean Cell Volume : 86.7 fl  Mean Cell Hemoglobin : 30.7 pg  Mean Cell Hemoglobin Concentration : 35.5 gm/dL  Auto Neutrophil # : 7.0 K/uL  Auto Lymphocyte # : 2.6 K/uL  Auto Monocyte # : 0.7 K/uL  Auto Eosinophil # : 0.1 K/uL  Auto Basophil # : 0.0 K/uL  Auto Neutrophil % : 67.2 %  Auto Lymphocyte % : 25.0 %  Auto Monocyte % : 6.8 %  Auto Eosinophil % : 0.8 %  Auto Basophil % : 0.3 %    11-02    140  |  101  |  5<L>  ----------------------------<  86  3.5   |  23  |  0.86    Ca    9.6      02 Nov 2017 17:07    TPro  7.2  /  Alb  4.8  /  TBili  0.4  /  DBili  x   /  AST  20  /  ALT  13  /  AlkPhos  79  11-02      TELEMETRY: 	    ECG:  	  NSR no ischemic changes    =========================================================================  ASSESSMENT:  Atypical CP        Recommendations  - D/C home   - F/U [email or web address]    -       Please call with questions.     Philip Reed MD, [facility]  [phone]

## 2017-11-02 NOTE — ED PROVIDER NOTE - ATTENDING CONTRIBUTION TO CARE
attending Oxana: 41yF hx POTS/ITP/asthma with frequent visits to the ED for chest pain p/w chest pain x1.5 weeks, intermittent, L sided with associated palpitations, and chest "burning". Moderate improvement w/ Pepcid. Pt frustrated and wants ED to "figure out what's wrong with me". On exam, well-appearing, VSS, S1S2 normal, lungs clear, abdomen soft/NT, MMM, pink conjunctiva. Will obtain labs, ekg, place on tele, discuss with private cardiologist and reassess. Scheduled to see pulmonologist tomorrow outpatient.

## 2017-11-03 ENCOUNTER — APPOINTMENT (OUTPATIENT)
Dept: PULMONOLOGY | Facility: CLINIC | Age: 42
End: 2017-11-03

## 2017-11-03 ENCOUNTER — EMERGENCY (EMERGENCY)
Facility: HOSPITAL | Age: 42
LOS: 1 days | Discharge: HOME | End: 2017-11-03
Attending: EMERGENCY MEDICINE | Admitting: EMERGENCY MEDICINE
Payer: COMMERCIAL

## 2017-11-03 VITALS
RESPIRATION RATE: 17 BRPM | TEMPERATURE: 98 F | OXYGEN SATURATION: 99 % | DIASTOLIC BLOOD PRESSURE: 78 MMHG | SYSTOLIC BLOOD PRESSURE: 127 MMHG | HEART RATE: 100 BPM

## 2017-11-03 VITALS
WEIGHT: 117.07 LBS | OXYGEN SATURATION: 100 % | TEMPERATURE: 99 F | DIASTOLIC BLOOD PRESSURE: 81 MMHG | RESPIRATION RATE: 17 BRPM | SYSTOLIC BLOOD PRESSURE: 136 MMHG | HEIGHT: 62 IN | HEART RATE: 84 BPM

## 2017-11-03 PROCEDURE — 99283 EMERGENCY DEPT VISIT LOW MDM: CPT | Mod: 25

## 2017-11-03 PROCEDURE — 74018 RADEX ABDOMEN 1 VIEW: CPT

## 2017-11-03 PROCEDURE — 74000: CPT | Mod: 26

## 2017-11-03 PROCEDURE — 99284 EMERGENCY DEPT VISIT MOD MDM: CPT

## 2017-11-03 PROCEDURE — 99215 OFFICE O/P EST HI 40 MIN: CPT

## 2017-11-03 RX ORDER — ONDANSETRON 8 MG/1
4 TABLET, FILM COATED ORAL ONCE
Qty: 0 | Refills: 0 | Status: COMPLETED | OUTPATIENT
Start: 2017-11-03 | End: 2017-11-03

## 2017-11-03 RX ORDER — MULTIVIT WITH MIN/MFOLATE/K2 340-15/3 G
300 POWDER (GRAM) ORAL ONCE
Qty: 0 | Refills: 0 | Status: COMPLETED | OUTPATIENT
Start: 2017-11-03 | End: 2017-11-03

## 2017-11-03 RX ADMIN — Medication 1 ENEMA: at 12:36

## 2017-11-03 RX ADMIN — Medication 1 MILLIGRAM(S): at 12:35

## 2017-11-03 RX ADMIN — Medication 1 MILLIGRAM(S): at 13:20

## 2017-11-03 RX ADMIN — Medication 300 MILLILITER(S): at 12:35

## 2017-11-03 RX ADMIN — ONDANSETRON 4 MILLIGRAM(S): 8 TABLET, FILM COATED ORAL at 13:56

## 2017-11-03 NOTE — CONSULT NOTE ADULT - SUBJECTIVE AND OBJECTIVE BOX
nitial Consult:  · Requested by Name:	Philip Lowe	  · Date/Time:	03-Nov-2017 	  · Reason for Referral/Consultation:	Dizziness/Constipation/Tachycardia	      · Subjective and Objective: 	  **********              CARDIOLOGY CONSULT NOTE              ************  ============================================================  CHIEF COMPLAINT/REASON FOR CONSULT:  Patient is a 41y old  Female who presents with a chief complaint of dizziness/constipation/tachycardia    HISTORY OF PRESENT ILLNESS:  41yFemale with a history of POTS ITP Anxiety multiple admissions for latter p/w dizziness following 3d constipation. Orthostatic. No CP/Palps/SOB.     No Known Allergies    Intolerances    metoprolol (Headache)  	    MEDICATIONS:  Ativan PRN               PAST MEDICAL & SURGICAL HISTORY:  Pott disease  History of ITP  Labyrinthitis  Headache  HTN (hypertension)  Anxiety  IBS (irritable bowel syndrome)  No significant past surgical history      FAMILY HISTORY:  Family history of hypertension (Grandparent)  Family history of atrial fibrillation  Family history of prostate cancer in father      SOCIAL HISTORY:    [ x] Non-smoker  [x] No Illicit Drug Use  [ x] No Excess EtOH Use      REVIEW OF SYSTEMS: (Unless + Before Symptom, it is negative)  Constitutional: No Fever, Fatigue, Weight Changes +anxiety  Eyes: No Recent Vision Changes, Eye Pain  ENT: No Congestion, Sore Throat  Endocrine: No Excess Sweating, Temperature Intolerance  Cardiovascular: No Chest Pain, +Palpitations, Shortness of Breath, Pre-syncope, Syncope, LE Edema  Respiratory: No Cough, Congestion, Wheezing  Gastrointestinal: No Abdominal Pain, Nausea, Vomiting  Genitourinary: No dysuria, hematuria  Musculoskeletal: No Joint Pain, Swelling  Neurologic: No headaches, Imbalance, Weakness  Skin: No rashes, hematoma, purprura    PHYSICAL EXAM:  T(C): 37.3 (11-02-17 @ 18:31), Max: 37.3 (11-02-17 @ 18:31)  HR: 85 (11-02-17 @ 18:31) (85 - 95)  BP: 129/83 (11-02-17 @ 18:31) (129/83 - 132/83)  RR: 17 (11-02-17 @ 18:31) (16 - 17)  SpO2: 100% (11-02-17 @ 18:31) (100% - 100%)  Wt(kg): --  I&O's Summary    Appearance: Normal; NAD	+anxiety +orthostatic  HEENT:   Normal oral mucosa, EOMI	  Lymphatic: No lymphadenopathy  Cardiovascular: Normal S1 S2, No JVD, No murmurs, No edema  Respiratory: Lungs clear to auscultation, no use of accessory muscles	  Psychiatry: A & O x 3, Mood & affect appropriate  Gastrointestinal:  Soft, Non-tender	  Skin: No rashes, No ecchymoses, No cyanosis	  Neurologic: Non-focal, No Focal Deficits  Extremities: Normal range of motion, No clubbing, cyanosis or edema  Vascular: Peripheral pulses palpable 2+ bilaterally, no prominent varicosities    LABS:	   Labs [id number] Reviewed     CBC Full  -  ( 02 Nov 2017 17:07 )  WBC Count : 10.3 K/uL  Hemoglobin : 13.2 g/dL  Hematocrit : 37.3 %  Platelet Count - Automated : 81 K/uL  Mean Cell Volume : 86.7 fl  Mean Cell Hemoglobin : 30.7 pg  Mean Cell Hemoglobin Concentration : 35.5 gm/dL  Auto Neutrophil # : 7.0 K/uL  Auto Lymphocyte # : 2.6 K/uL  Auto Monocyte # : 0.7 K/uL  Auto Eosinophil # : 0.1 K/uL  Auto Basophil # : 0.0 K/uL  Auto Neutrophil % : 67.2 %  Auto Lymphocyte % : 25.0 %  Auto Monocyte % : 6.8 %  Auto Eosinophil % : 0.8 %  Auto Basophil % : 0.3 %    11-02    140  |  101  |  5<L>  ----------------------------<  86  3.5   |  23  |  0.86    Ca    9.6      02 Nov 2017 17:07    TPro  7.2  /  Alb  4.8  /  TBili  0.4  /  DBili  x   /  AST  20  /  ALT  13  /  AlkPhos  79  11-02  	    ECG:  	  NSR no ischemic changes    =========================================================================  ASSESSMENT:  Dizziness  Orthostasis     Recommendations  - D/C home   - Salt tabs  - F/U Outpatient      Please call with questions.

## 2017-11-03 NOTE — ED PROVIDER NOTE - ATTENDING CONTRIBUTION TO CARE
Patient presenting complaining of constipation.  Tried colace at home without relief, reporting significant straining at home which is "making me very tachycardic".  History of POTS/anxiety, multiple ED visits for tachycardia/chest pains, seen in ED yesterday for CP/tachycardia, seen by her cardiologist in ED and cleared for discharge.    On exam patient non toxic appearing, vital signs within normal limits, RRR S1/S2, lungs clear to ascultation bilaterally, abdomen soft, non tender, non distended.    Will obtain plain film to evaluate for degree of tachycardia, treat constipation, discuss with her cardiologist.

## 2017-11-03 NOTE — ED PROVIDER NOTE - OBJECTIVE STATEMENT
40yo female pt with PMHx POTS disease, anxiety, ITP, HTN, Asthma c/o constipation for 3days. Pt stated she had a small amount of stool daily, but felt constipated with LLQ pain after increasing Ativan dosage 2days ago. Denies N/V/D. Denies CP/SOB/ABD pain. Denies fever, chills or cough. Denies sensory changes or weakness to extremities. Denies any bleeding from anywhere.

## 2017-11-03 NOTE — ED ADULT NURSE NOTE - OBJECTIVE STATEMENT
41 yr old female came in with 3 days of constipation and saying she feels her heart race though on monitor her rate is 86. on assessment a and o x 3 lungs clear abd soft non tender no swelling in extremities no n/v/d/ no fevers no other complaints or issues.

## 2017-11-06 ENCOUNTER — OUTPATIENT (OUTPATIENT)
Dept: OUTPATIENT SERVICES | Facility: HOSPITAL | Age: 42
LOS: 1 days | Discharge: HOME | End: 2017-11-06

## 2017-11-06 NOTE — ED ADULT NURSE NOTE - MUSCULOSKELETAL WDL
Writer spoke with patient's caregiver who states that the patient is also due to see Dr. Aguayo for a follow up and would like to go ahead and set his appointment up for the next available since he doesn't have a fever. He's scheduled for Monday at 2:00 pm. Harriet states that she will take him to urgent care if he gets worse before then.   Full range of motion of upper and lower extremities, no joint tenderness/swelling.

## 2017-11-07 ENCOUNTER — EMERGENCY (EMERGENCY)
Facility: HOSPITAL | Age: 42
LOS: 1 days | Discharge: HOME | End: 2017-11-07
Attending: EMERGENCY MEDICINE | Admitting: EMERGENCY MEDICINE
Payer: COMMERCIAL

## 2017-11-07 VITALS
SYSTOLIC BLOOD PRESSURE: 158 MMHG | DIASTOLIC BLOOD PRESSURE: 98 MMHG | RESPIRATION RATE: 16 BRPM | OXYGEN SATURATION: 100 % | HEART RATE: 103 BPM

## 2017-11-07 VITALS
DIASTOLIC BLOOD PRESSURE: 85 MMHG | SYSTOLIC BLOOD PRESSURE: 124 MMHG | TEMPERATURE: 99 F | OXYGEN SATURATION: 100 % | HEART RATE: 80 BPM | RESPIRATION RATE: 18 BRPM

## 2017-11-07 LAB
ANION GAP SERPL CALC-SCNC: 16 MMOL/L — SIGNIFICANT CHANGE UP (ref 5–17)
BASOPHILS # BLD AUTO: 0 K/UL — SIGNIFICANT CHANGE UP (ref 0–0.2)
BASOPHILS NFR BLD AUTO: 0.2 % — SIGNIFICANT CHANGE UP (ref 0–2)
BUN SERPL-MCNC: 5 MG/DL — LOW (ref 7–23)
CALCIUM SERPL-MCNC: 9.6 MG/DL — SIGNIFICANT CHANGE UP (ref 8.4–10.5)
CHLORIDE SERPL-SCNC: 106 MMOL/L — SIGNIFICANT CHANGE UP (ref 96–108)
CO2 SERPL-SCNC: 22 MMOL/L — SIGNIFICANT CHANGE UP (ref 22–31)
CREAT SERPL-MCNC: 0.83 MG/DL — SIGNIFICANT CHANGE UP (ref 0.5–1.3)
EOSINOPHIL # BLD AUTO: 0 K/UL — SIGNIFICANT CHANGE UP (ref 0–0.5)
EOSINOPHIL NFR BLD AUTO: 0.7 % — SIGNIFICANT CHANGE UP (ref 0–6)
GLUCOSE SERPL-MCNC: 93 MG/DL — SIGNIFICANT CHANGE UP (ref 70–99)
HCT VFR BLD CALC: 35.4 % — SIGNIFICANT CHANGE UP (ref 34.5–45)
HGB BLD-MCNC: 12.7 G/DL — SIGNIFICANT CHANGE UP (ref 11.5–15.5)
LYMPHOCYTES # BLD AUTO: 2.4 K/UL — SIGNIFICANT CHANGE UP (ref 1–3.3)
LYMPHOCYTES # BLD AUTO: 34.5 % — SIGNIFICANT CHANGE UP (ref 13–44)
MCHC RBC-ENTMCNC: 31.6 PG — SIGNIFICANT CHANGE UP (ref 27–34)
MCHC RBC-ENTMCNC: 35.8 GM/DL — SIGNIFICANT CHANGE UP (ref 32–36)
MCV RBC AUTO: 88.3 FL — SIGNIFICANT CHANGE UP (ref 80–100)
MONOCYTES # BLD AUTO: 0.6 K/UL — SIGNIFICANT CHANGE UP (ref 0–0.9)
MONOCYTES NFR BLD AUTO: 8.9 % — SIGNIFICANT CHANGE UP (ref 2–14)
NEUTROPHILS # BLD AUTO: 3.8 K/UL — SIGNIFICANT CHANGE UP (ref 1.8–7.4)
NEUTROPHILS NFR BLD AUTO: 55.6 % — SIGNIFICANT CHANGE UP (ref 43–77)
PLAT MORPH BLD: NORMAL — SIGNIFICANT CHANGE UP
PLATELET # BLD AUTO: 71 K/UL — LOW (ref 150–400)
POTASSIUM SERPL-MCNC: 3.5 MMOL/L — SIGNIFICANT CHANGE UP (ref 3.5–5.3)
POTASSIUM SERPL-SCNC: 3.5 MMOL/L — SIGNIFICANT CHANGE UP (ref 3.5–5.3)
RBC # BLD: 4.01 M/UL — SIGNIFICANT CHANGE UP (ref 3.8–5.2)
RBC # FLD: 12.3 % — SIGNIFICANT CHANGE UP (ref 10.3–14.5)
RBC BLD AUTO: SIGNIFICANT CHANGE UP
SODIUM SERPL-SCNC: 144 MMOL/L — SIGNIFICANT CHANGE UP (ref 135–145)
TROPONIN T SERPL-MCNC: <0.01 NG/ML — SIGNIFICANT CHANGE UP (ref 0–0.06)
WBC # BLD: 6.8 K/UL — SIGNIFICANT CHANGE UP (ref 3.8–10.5)
WBC # FLD AUTO: 6.8 K/UL — SIGNIFICANT CHANGE UP (ref 3.8–10.5)

## 2017-11-07 PROCEDURE — 85027 COMPLETE CBC AUTOMATED: CPT

## 2017-11-07 PROCEDURE — 93005 ELECTROCARDIOGRAM TRACING: CPT

## 2017-11-07 PROCEDURE — 99283 EMERGENCY DEPT VISIT LOW MDM: CPT | Mod: 25

## 2017-11-07 PROCEDURE — 84484 ASSAY OF TROPONIN QUANT: CPT

## 2017-11-07 PROCEDURE — 80048 BASIC METABOLIC PNL TOTAL CA: CPT

## 2017-11-07 PROCEDURE — 99285 EMERGENCY DEPT VISIT HI MDM: CPT

## 2017-11-07 RX ORDER — SODIUM CHLORIDE 9 MG/ML
1000 INJECTION INTRAMUSCULAR; INTRAVENOUS; SUBCUTANEOUS ONCE
Qty: 0 | Refills: 0 | Status: DISCONTINUED | OUTPATIENT
Start: 2017-11-07 | End: 2017-11-07

## 2017-11-07 RX ORDER — SODIUM CHLORIDE 9 MG/ML
1000 INJECTION INTRAMUSCULAR; INTRAVENOUS; SUBCUTANEOUS ONCE
Qty: 0 | Refills: 0 | Status: COMPLETED | OUTPATIENT
Start: 2017-11-07 | End: 2017-11-07

## 2017-11-07 RX ADMIN — SODIUM CHLORIDE 1000 MILLILITER(S): 9 INJECTION INTRAMUSCULAR; INTRAVENOUS; SUBCUTANEOUS at 19:23

## 2017-11-07 NOTE — ED PROVIDER NOTE - CARE PLAN
Principal Discharge DX:	Anxiety reaction Principal Discharge DX:	Anxiety reaction  Instructions for follow-up, activity and diet:	Call your primary care doctor today or tomorrow to schedule follow up appointment for within the next 3-5 days.  Continue taking your medications as prescribed.  Return to this Emergency Department if you experience worsening condition or for any other emergency.

## 2017-11-07 NOTE — ED ADULT NURSE REASSESSMENT NOTE - NS ED NURSE REASSESS COMMENT FT1
As per MD sawyer, pt can be discharged. Paperwork printed out. Pt. can be discharged after 1st fluid is done. 2nd IV not required. Pt. given water. Pt. is using cellphone. NAD. will continue to monitor.

## 2017-11-07 NOTE — ED ADULT NURSE REASSESSMENT NOTE - NS ED NURSE REASSESS COMMENT FT1
Report received from SUDHIR Pratt from [facility]. Pt. is resting, easy work of breathing. IV fluids running. MD sawyer said give patient 1L NS for now. Hold on second 1L NS. VSS. Safety maintained.

## 2017-11-07 NOTE — ED PROVIDER NOTE - MEDICAL DECISION MAKING DETAILS
Dr. Rose Note: frequent ED patient for same complaints with unchanged chief complaints with recent negative cardiac workup, screening ekg and reassurance.

## 2017-11-07 NOTE — ED ADULT NURSE NOTE - OBJECTIVE STATEMENT
42 yo female with presents to the ED from home via EMS c/o right sided chest pain, non-radiating, intermittent x1 hours. patient states she was watvhing TV when chest pain started and lasted 10 minutes but has resolved prior coming to ED. patient is unable to describe chest pain. patient is AAOx3. lung sounds clear bilaterally. cap refill <3 sec. patient c/o SOB and dizziness but denies HA, cough, fevers, chills, N/V/D, abdominal pain. NSR on the monitor. EMS states patient was sinus tach on monitor with HR= 63=089. MD aware.

## 2017-11-07 NOTE — ED PROVIDER NOTE - PROGRESS NOTE DETAILS
Dr. Rose Note: pt very adamant about having POTS despite evidence to the contrary, unable to persuade patient otherwise, has now increased her Ativan usage to 2mg every 4-6 hours and is scheduled to see a POTS specialist at the end of November.  I would be unable to convince patient otherwise, and patient has follow up with cards and psychiatrist already, will perform medical screening exam as the ED physician and refer for outpatient therapy. Resident, Blinder: Patient improved with NS bolus. Would like to finish bolus and feels ok to go home with f/u.

## 2017-11-07 NOTE — ED PROVIDER NOTE - ATTENDING CONTRIBUTION TO CARE
Frequent ED patient with same complaints of feeling tachycardic although HR stays in 80s, s/p usual 2mg Ativan.  No new complaints.

## 2017-11-07 NOTE — ED PROVIDER NOTE - OBJECTIVE STATEMENT
41F h/o POTS disease, anxiety, ITP, HTN, Asthma presents with new right sided, nonexertional, nonradiating chest discomfort that lasts 10 min. She also noted bilateral leg tremors that usually subside after ativan, but she took 2mg and it didn't relieve the tremors. She also feels palpitations, and explains it as her POTS disease. Also noted some blurry vision that lasted 30 minutes, not associated with the chest pain. Denies headache, dizziness, recent travel, fevers, chills.     She is very anxious about her heart rate increasing in the ED, though it remains WNL. She also has been to ED several times in the past for similar symptoms. D-Dimer and cardiac enzymes have been negative several times in the past.

## 2017-11-08 ENCOUNTER — INPATIENT (INPATIENT)
Facility: HOSPITAL | Age: 42
LOS: 6 days | Discharge: HOME HEALTH | DRG: 309 | End: 2017-11-15
Attending: INTERNAL MEDICINE | Admitting: INTERNAL MEDICINE
Payer: COMMERCIAL

## 2017-11-08 VITALS
RESPIRATION RATE: 16 BRPM | WEIGHT: 115.08 LBS | DIASTOLIC BLOOD PRESSURE: 110 MMHG | OXYGEN SATURATION: 100 % | SYSTOLIC BLOOD PRESSURE: 150 MMHG | TEMPERATURE: 99 F | HEART RATE: 92 BPM

## 2017-11-08 DIAGNOSIS — T42.4X5A ADVERSE EFFECT OF BENZODIAZEPINES, INITIAL ENCOUNTER: ICD-10-CM

## 2017-11-08 LAB
ANION GAP SERPL CALC-SCNC: 18 MMOL/L — HIGH (ref 5–17)
APAP SERPL-MCNC: <15 UG/ML — SIGNIFICANT CHANGE UP (ref 10–30)
BASOPHILS # BLD AUTO: 0 K/UL — SIGNIFICANT CHANGE UP (ref 0–0.2)
BASOPHILS NFR BLD AUTO: 0.4 % — SIGNIFICANT CHANGE UP (ref 0–2)
BUN SERPL-MCNC: 7 MG/DL — SIGNIFICANT CHANGE UP (ref 7–23)
CALCIUM SERPL-MCNC: 10.1 MG/DL — SIGNIFICANT CHANGE UP (ref 8.4–10.5)
CHLORIDE SERPL-SCNC: 105 MMOL/L — SIGNIFICANT CHANGE UP (ref 96–108)
CO2 SERPL-SCNC: 22 MMOL/L — SIGNIFICANT CHANGE UP (ref 22–31)
CREAT SERPL-MCNC: 0.81 MG/DL — SIGNIFICANT CHANGE UP (ref 0.5–1.3)
EOSINOPHIL # BLD AUTO: 0.1 K/UL — SIGNIFICANT CHANGE UP (ref 0–0.5)
EOSINOPHIL NFR BLD AUTO: 1 % — SIGNIFICANT CHANGE UP (ref 0–6)
ETHANOL SERPL-MCNC: SIGNIFICANT CHANGE UP MG/DL (ref 0–10)
GLUCOSE SERPL-MCNC: 80 MG/DL — SIGNIFICANT CHANGE UP (ref 70–99)
HCG SERPL-ACNC: <2 MIU/ML — SIGNIFICANT CHANGE UP
HCT VFR BLD CALC: 33.4 % — LOW (ref 34.5–45)
HGB BLD-MCNC: 11.9 G/DL — SIGNIFICANT CHANGE UP (ref 11.5–15.5)
LYMPHOCYTES # BLD AUTO: 2.5 K/UL — SIGNIFICANT CHANGE UP (ref 1–3.3)
LYMPHOCYTES # BLD AUTO: 42.2 % — SIGNIFICANT CHANGE UP (ref 13–44)
MAGNESIUM SERPL-MCNC: 2.3 MG/DL — SIGNIFICANT CHANGE UP (ref 1.6–2.6)
MCHC RBC-ENTMCNC: 31.4 PG — SIGNIFICANT CHANGE UP (ref 27–34)
MCHC RBC-ENTMCNC: 35.7 GM/DL — SIGNIFICANT CHANGE UP (ref 32–36)
MCV RBC AUTO: 88.2 FL — SIGNIFICANT CHANGE UP (ref 80–100)
MONOCYTES # BLD AUTO: 0.5 K/UL — SIGNIFICANT CHANGE UP (ref 0–0.9)
MONOCYTES NFR BLD AUTO: 8.1 % — SIGNIFICANT CHANGE UP (ref 2–14)
NEUTROPHILS # BLD AUTO: 2.8 K/UL — SIGNIFICANT CHANGE UP (ref 1.8–7.4)
NEUTROPHILS NFR BLD AUTO: 48.3 % — SIGNIFICANT CHANGE UP (ref 43–77)
PHOSPHATE SERPL-MCNC: 4.4 MG/DL — SIGNIFICANT CHANGE UP (ref 2.5–4.5)
PLATELET # BLD AUTO: 55 K/UL — LOW (ref 150–400)
POTASSIUM SERPL-MCNC: 3.7 MMOL/L — SIGNIFICANT CHANGE UP (ref 3.5–5.3)
POTASSIUM SERPL-SCNC: 3.7 MMOL/L — SIGNIFICANT CHANGE UP (ref 3.5–5.3)
RBC # BLD: 3.79 M/UL — LOW (ref 3.8–5.2)
RBC # FLD: 12 % — SIGNIFICANT CHANGE UP (ref 10.3–14.5)
SALICYLATES SERPL-MCNC: <2 MG/DL — LOW (ref 15–30)
SODIUM SERPL-SCNC: 145 MMOL/L — SIGNIFICANT CHANGE UP (ref 135–145)
WBC # BLD: 5.9 K/UL — SIGNIFICANT CHANGE UP (ref 3.8–10.5)
WBC # FLD AUTO: 5.9 K/UL — SIGNIFICANT CHANGE UP (ref 3.8–10.5)

## 2017-11-08 PROCEDURE — 93010 ELECTROCARDIOGRAM REPORT: CPT

## 2017-11-08 PROCEDURE — 99285 EMERGENCY DEPT VISIT HI MDM: CPT | Mod: 25

## 2017-11-08 RX ORDER — SODIUM CHLORIDE 9 MG/ML
1000 INJECTION INTRAMUSCULAR; INTRAVENOUS; SUBCUTANEOUS
Qty: 0 | Refills: 0 | Status: DISCONTINUED | OUTPATIENT
Start: 2017-11-08 | End: 2017-11-15

## 2017-11-08 RX ORDER — SIMETHICONE 80 MG/1
1 TABLET, CHEWABLE ORAL
Qty: 0 | Refills: 0 | COMMUNITY

## 2017-11-08 RX ORDER — INFLUENZA VIRUS VACCINE 15; 15; 15; 15 UG/.5ML; UG/.5ML; UG/.5ML; UG/.5ML
0.5 SUSPENSION INTRAMUSCULAR ONCE
Qty: 0 | Refills: 0 | Status: COMPLETED | OUTPATIENT
Start: 2017-11-08 | End: 2017-11-08

## 2017-11-08 RX ORDER — SODIUM CHLORIDE 9 MG/ML
1000 INJECTION INTRAMUSCULAR; INTRAVENOUS; SUBCUTANEOUS ONCE
Qty: 0 | Refills: 0 | Status: COMPLETED | OUTPATIENT
Start: 2017-11-08 | End: 2017-11-08

## 2017-11-08 RX ORDER — SIMETHICONE 80 MG/1
2 TABLET, CHEWABLE ORAL
Qty: 0 | Refills: 0 | COMMUNITY

## 2017-11-08 RX ADMIN — SODIUM CHLORIDE 125 MILLILITER(S): 9 INJECTION INTRAMUSCULAR; INTRAVENOUS; SUBCUTANEOUS at 21:39

## 2017-11-08 RX ADMIN — SODIUM CHLORIDE 1000 MILLILITER(S): 9 INJECTION INTRAMUSCULAR; INTRAVENOUS; SUBCUTANEOUS at 18:02

## 2017-11-08 NOTE — ED PROVIDER NOTE - MEDICAL DECISION MAKING DETAILS
Dr. Rose Note: chronic but worsening symptoms, increasing ER visits and now concern for BZD dependency and withdrawal symptoms...may require admission for BZD use/abuse and concern for withdrawal. Dr. Rose Note: chronic but worsening symptoms, increasing ER visits and now concern for BZD dependency and withdrawal symptoms...may require admission for BZD use/abuse and concern for withdrawal.    [place]: 41F w/PMH ?POTS dz, frequent ativan use, anxiety, ITP, htn, asthma p/w tremors and tachycardia. As the toxicology fellow, I have assessed this patient to have benzodiazepine dependency and these episodes certainly could be exacerbated by withdrawal. However, the patient has been self-treating any withdrawal symptoms with home ativan. Additionally, these symptoms preceded ativan use. There is likely a large anxiety component and would also r/o POTS sequellae and other CV/endocrinologic issues (patient's TSH was wnl in Sept 2017). Patient currently does not require ativan so will hold off on providing at this time but if patient is willing, would recommend an ativan taper with symptomatic treatment in consultation with psychiatry and cardiology to avoid benzodiazepines. R/o infx, anemia, arrhythmia.

## 2017-11-08 NOTE — ED ADULT NURSE NOTE - OBJECTIVE STATEMENT
41 y.o F arrived to ED c/o tremors, states she wants to be admitted to hospital. A&Ox3. pt states for past 3-4 weeks she has been in and out of ED r/t tremors attributed to postural orthostatic tachycardia syndrome as per pt. was seen and d/c yesterday. 41 y.o F arrived to ED c/o tremors, states she wants to be admitted to hospital. A&Ox3. pt states for past 3-4 weeks she has been in and out of ED r/t leg/arm tremors attributed to postural orthostatic tachycardia syndrome as per pt. was seen and d/c yesterday. has taken total of 5mg ativan today for tremors.  also c/o dry mouth, states she had brief episode R sided chest discomfort below breast PTA which resolved. states HR fluctuates between , HR in ED 80s-90s, respirations nonlabored, O2 sat 100% RA. VSS. no tremor noted upon assessment. Denies SOB, N/V/D, fevers, chills, HA, dizziness. Safety and comfort provided/maintained. MD @ bedside.

## 2017-11-08 NOTE — ED ADULT TRIAGE NOTE - CHIEF COMPLAINT QUOTE
tremors, seen here yesterday tremors, hx of GUY syndrome took ativan without effect tremors, hx of GUY syndrome took ativan without effect-seen here in ED yesterday for same   noted elevated BP in triage, per patient BP is elevated during episodes.

## 2017-11-08 NOTE — H&P ADULT - NEGATIVE CARDIOVASCULAR SYMPTOMS
no chest pain/no paroxysmal nocturnal dyspnea/no dyspnea on exertion/no peripheral edema/no orthopnea

## 2017-11-08 NOTE — ED PROVIDER NOTE - CARE PLAN
Principal Discharge DX:	Benzodiazepine causing adverse effect in therapeutic use, initial encounter  Secondary Diagnosis:	Tremulousness Principal Discharge DX:	Benzodiazepine causing adverse effect in therapeutic use, initial encounter  Secondary Diagnosis:	Tremulousness  Secondary Diagnosis:	Tachycardia

## 2017-11-08 NOTE — H&P ADULT - HISTORY OF PRESENT ILLNESS
41F w/PMH possible POTS dz (has been on BB but d/c-ed 2/2 allergy, currently on cardizem CD 240mg daily, last dose today), frequent ativan use (initially 4mg/day now up to 10mg/day) as a way to "treat POTS episodes", anxiety, ITP, htn, asthma returns with history of palp and tremors today. Has been having episodes of palp with HR measured at home to the 130s since last Spring, but they have been becoming more frequent in the last 3 weeks now occurring 2-3x/day, resolving 30min after taking ativan. She takes ativan as needed and had taken 2mg BID but in last weeks has taken up to 2mg 5x/day. Last night took ativan 2mg at 10PM. This AM awoke at 630AM with tremors and tachycardia to 130s. Took 2mg ativan, went to sleep. At 8AM woke with same, took 1mg ativan. 3rd episode at 1115AM, took 1mg ativan and 4th episode at 230PM, took 1mg ativan. Denies ETOH use or other benzos. Denies prior benzo abuse, other drug use. Denies f/c, cp/sob/cough, diaphoresis, syncope, seizures, weakness/numbness, SI/HI, urinary changes, abd pain, n/v/d/c, melena/BRBPR. Patient adamantly wants to be admitted to determine the cause of her symptoms. She has an appointment with a POTS specialist Nov 30. She follows with cardiologist Dr. Hussein.

## 2017-11-08 NOTE — ED PROVIDER NOTE - NS ED ROS FT
No fever, no chills, no change in vision, no throat pain, no chest pain, no abdominal pain, no joint pain, no rashes,  all ROS otherwise as per HPI or negative.

## 2017-11-08 NOTE — ED PROVIDER NOTE - PROGRESS NOTE DETAILS
Dr. Rose Note: pt wants to be admitted, will likely need cards and psych to evaluate and treat patient's delusions and BZD dependency.  Patient being enabled to continue this inappropriate medical course of treatment and will need all involved to improve the situation.  Consulted Cards.  Spoke to Dr. Lowry who previously admitted patient 2mo ago, declined re-admission, will admit to unattached. Patient feeling anxious and has been on q4h ativan so will allow patient to take 1mg ativan. Patient is not currently displaying signs of withdrawal. Dr. Rose Note: pt feeling more anxious, more tremors, BP elevated, repeat EKG normal, will give additional ativan.  Pt already admitted.

## 2017-11-08 NOTE — ED PROVIDER NOTE - ATTENDING CONTRIBUTION TO CARE
Pt, seen by me yesterday for tachycardia (but was not during ED stay) re-presents to ER today with worsening tremors, usually some LE but now whole body is shaking with reported increased usage of Ativan from 4mg daily to total of 10mg daily, took 4mg this morning total with partial relief of sxs, but still with symptoms and concerned she's getting worse.  Pt appears anxious, HR 80s, BP elevated, ambulatory.

## 2017-11-08 NOTE — H&P ADULT - FAMILY HISTORY
Family history of prostate cancer in father     Family history of atrial fibrillation     Grandparent  Still living? Unknown  Family history of hypertension, Age at diagnosis: Age Unknown

## 2017-11-08 NOTE — H&P ADULT - NSHPLABSRESULTS_GEN_ALL_CORE
LABS:                        11.9   5.9   )-----------( 55       ( 08 Nov 2017 19:48 )             33.4     11-08    145  |  105  |  7   ----------------------------<  80  3.7   |  22  |  0.81    Ca    10.1      08 Nov 2017 18:21  Phos  4.4     11-08  Mg     2.3     11-08                RADIOLOGY & ADDITIONAL TESTS:

## 2017-11-08 NOTE — ED PROVIDER NOTE - OBJECTIVE STATEMENT
41F w/PMH possible POTS dz (has been on BB but d/c-ed 2/2 allergy, currently on cardizem CD 240mg daily, last dose today), frequent ativan use (initially 4mg/day now up to 10mg/day) as a way to "treat POTS episodes", anxiety, ITP, htn, asthma returns with history of palp and tremors today. Has been having episodes of palp with HR measured at home to the 130s since last Spring, but they have been becoming more frequent in the last 3 weeks now occurring 2-3x/day, resolving 30min after taking ativan. She takes ativan as needed and had taken 2mg BID but in last weeks has taken up to 2mg 5x/day. Last night took ativan 2mg at 10PM. This AM awoke at 630AM with tremors and tachycardia to 130s. Took 2mg ativan, went to sleep. At 8AM woke with same, took 1mg ativan. 3rd episode at 1115AM, took 1mg ativan and 4th episode at 230PM, took 1mg ativan. Denies ETOH use or other benzos. Denies prior benzo abuse, other drug use. Denies f/c, cp/sob/cough, diaphoresis, syncope, seizures, weakness/numbness, SI/HI, urinary changes, abd pain, n/v/d/c, melena/BRBPR. Patient adamantly wants to be admitted to determine the cause of her symptoms. She has an appointment with a POTS specialist Nov 30. She follows with cardiologist Dr. Hussein.    · HPI Objective Statement: 41F h/o POTS disease, anxiety, ITP, HTN, Asthma presents with new right sided, nonexertional, nonradiating chest discomfort that lasts 10 min. She also noted bilateral leg tremors that usually subside after ativan, but she took 2mg and it didn't relieve the tremors. She also feels palpitations, and explains it as her POTS disease. Also noted some blurry vision that lasted 30 minutes, not associated with the chest pain. Denies headache, dizziness, recent travel, fevers, chills.     	She is very anxious about her heart rate increasing in the ED, though it remains WNL. She also has been to ED several times in the past for similar symptoms. D-Dimer and cardiac enzymes have been negative several times in the past.

## 2017-11-08 NOTE — ED ADULT NURSE REASSESSMENT NOTE - NS ED NURSE REASSESS COMMENT FT1
Received report from previous shift RN. Patient resting comfortably in bed. Denies chest pain, SOB, abd pain, n/v/d. Patient appears anxious and is c/o slight HA. Pt reports tremors, no tremors visible. VSS. Pt took own medications (240mg cardizem and 1mg ativan, okay by MD Rose). Awaiting bed assignment.

## 2017-11-08 NOTE — H&P ADULT - ASSESSMENT
41F w/PMH possible POTS dz admitted with tachycardia and tremors which pt takes ativan for up to 10 mg per day without improvement.    tachycardia- admit to tele, cardio and ep consults, cont cardizem, cont ativan prn.  pt has had extensive workup in the past.  will consider neuro eval.    ITP- pt's plts is stable from 50 k to 80 k.    asthma - cw albuterol    dvt px - heparin

## 2017-11-09 ENCOUNTER — APPOINTMENT (OUTPATIENT)
Dept: ELECTROPHYSIOLOGY | Facility: CLINIC | Age: 42
End: 2017-11-09

## 2017-11-09 DIAGNOSIS — F45.1 UNDIFFERENTIATED SOMATOFORM DISORDER: ICD-10-CM

## 2017-11-09 DIAGNOSIS — F41.1 GENERALIZED ANXIETY DISORDER: ICD-10-CM

## 2017-11-09 LAB
ANION GAP SERPL CALC-SCNC: 12 MMOL/L — SIGNIFICANT CHANGE UP (ref 5–17)
BUN SERPL-MCNC: 3 MG/DL — LOW (ref 7–23)
CALCIUM SERPL-MCNC: 9.7 MG/DL — SIGNIFICANT CHANGE UP (ref 8.4–10.5)
CHLORIDE SERPL-SCNC: 105 MMOL/L — SIGNIFICANT CHANGE UP (ref 96–108)
CK MB BLD-MCNC: 1.8 % — SIGNIFICANT CHANGE UP (ref 0–3.5)
CK MB BLD-MCNC: <2.6 % — SIGNIFICANT CHANGE UP (ref 0–3.5)
CK MB CFR SERPL CALC: 1 NG/ML — SIGNIFICANT CHANGE UP (ref 0–3.8)
CK MB CFR SERPL CALC: <1 NG/ML — SIGNIFICANT CHANGE UP (ref 0–3.8)
CK SERPL-CCNC: 39 U/L — SIGNIFICANT CHANGE UP (ref 25–170)
CK SERPL-CCNC: 57 U/L — SIGNIFICANT CHANGE UP (ref 25–170)
CO2 SERPL-SCNC: 26 MMOL/L — SIGNIFICANT CHANGE UP (ref 22–31)
CREAT SERPL-MCNC: 0.82 MG/DL — SIGNIFICANT CHANGE UP (ref 0.5–1.3)
GLUCOSE SERPL-MCNC: 82 MG/DL — SIGNIFICANT CHANGE UP (ref 70–99)
MAGNESIUM SERPL-MCNC: 2 MG/DL — SIGNIFICANT CHANGE UP (ref 1.6–2.6)
PHOSPHATE SERPL-MCNC: 3.8 MG/DL — SIGNIFICANT CHANGE UP (ref 2.5–4.5)
POTASSIUM SERPL-MCNC: 3.7 MMOL/L — SIGNIFICANT CHANGE UP (ref 3.5–5.3)
POTASSIUM SERPL-SCNC: 3.7 MMOL/L — SIGNIFICANT CHANGE UP (ref 3.5–5.3)
SODIUM SERPL-SCNC: 143 MMOL/L — SIGNIFICANT CHANGE UP (ref 135–145)
TROPONIN T SERPL-MCNC: <0.01 NG/ML — SIGNIFICANT CHANGE UP (ref 0–0.06)
TROPONIN T SERPL-MCNC: <0.01 NG/ML — SIGNIFICANT CHANGE UP (ref 0–0.06)

## 2017-11-09 PROCEDURE — 99233 SBSQ HOSP IP/OBS HIGH 50: CPT

## 2017-11-09 PROCEDURE — 93010 ELECTROCARDIOGRAM REPORT: CPT

## 2017-11-09 PROCEDURE — 90792 PSYCH DIAG EVAL W/MED SRVCS: CPT | Mod: GC

## 2017-11-09 PROCEDURE — 99223 1ST HOSP IP/OBS HIGH 75: CPT

## 2017-11-09 RX ORDER — MONTELUKAST 4 MG/1
10 TABLET, CHEWABLE ORAL DAILY
Qty: 0 | Refills: 0 | Status: DISCONTINUED | OUTPATIENT
Start: 2017-11-09 | End: 2017-11-15

## 2017-11-09 RX ORDER — SIMETHICONE 80 MG/1
80 TABLET, CHEWABLE ORAL EVERY 8 HOURS
Qty: 0 | Refills: 0 | Status: DISCONTINUED | OUTPATIENT
Start: 2017-11-09 | End: 2017-11-15

## 2017-11-09 RX ORDER — LOPERAMIDE HCL 2 MG
2 TABLET ORAL
Qty: 0 | Refills: 0 | Status: DISCONTINUED | OUTPATIENT
Start: 2017-11-09 | End: 2017-11-15

## 2017-11-09 RX ORDER — ALBUTEROL 90 UG/1
1 AEROSOL, METERED ORAL EVERY 6 HOURS
Qty: 0 | Refills: 0 | Status: DISCONTINUED | OUTPATIENT
Start: 2017-11-09 | End: 2017-11-15

## 2017-11-09 RX ORDER — ACETAMINOPHEN 500 MG
650 TABLET ORAL EVERY 6 HOURS
Qty: 0 | Refills: 0 | Status: DISCONTINUED | OUTPATIENT
Start: 2017-11-09 | End: 2017-11-15

## 2017-11-09 RX ORDER — DILTIAZEM HCL 120 MG
240 CAPSULE, EXT RELEASE 24 HR ORAL DAILY
Qty: 0 | Refills: 0 | Status: DISCONTINUED | OUTPATIENT
Start: 2017-11-09 | End: 2017-11-15

## 2017-11-09 RX ORDER — FAMOTIDINE 10 MG/ML
20 INJECTION INTRAVENOUS
Qty: 0 | Refills: 0 | Status: DISCONTINUED | OUTPATIENT
Start: 2017-11-09 | End: 2017-11-15

## 2017-11-09 RX ORDER — DOCUSATE SODIUM 100 MG
100 CAPSULE ORAL THREE TIMES A DAY
Qty: 0 | Refills: 0 | Status: DISCONTINUED | OUTPATIENT
Start: 2017-11-09 | End: 2017-11-15

## 2017-11-09 RX ADMIN — MONTELUKAST 10 MILLIGRAM(S): 4 TABLET, CHEWABLE ORAL at 19:40

## 2017-11-09 RX ADMIN — SODIUM CHLORIDE 125 MILLILITER(S): 9 INJECTION INTRAMUSCULAR; INTRAVENOUS; SUBCUTANEOUS at 06:47

## 2017-11-09 RX ADMIN — Medication 100 MILLIGRAM(S): at 14:53

## 2017-11-09 RX ADMIN — Medication 2.5 MILLIGRAM(S): at 09:42

## 2017-11-09 RX ADMIN — SODIUM CHLORIDE 125 MILLILITER(S): 9 INJECTION INTRAMUSCULAR; INTRAVENOUS; SUBCUTANEOUS at 19:44

## 2017-11-09 RX ADMIN — SIMETHICONE 80 MILLIGRAM(S): 80 TABLET, CHEWABLE ORAL at 14:53

## 2017-11-09 RX ADMIN — Medication 100 MILLIGRAM(S): at 09:42

## 2017-11-09 RX ADMIN — Medication 240 MILLIGRAM(S): at 19:42

## 2017-11-09 RX ADMIN — Medication 2.5 MILLIGRAM(S): at 22:48

## 2017-11-09 RX ADMIN — Medication 650 MILLIGRAM(S): at 17:02

## 2017-11-09 RX ADMIN — Medication 650 MILLIGRAM(S): at 18:05

## 2017-11-09 RX ADMIN — FAMOTIDINE 20 MILLIGRAM(S): 10 INJECTION INTRAVENOUS at 06:47

## 2017-11-09 RX ADMIN — Medication 2 MILLIGRAM(S): at 18:35

## 2017-11-09 RX ADMIN — FAMOTIDINE 20 MILLIGRAM(S): 10 INJECTION INTRAVENOUS at 18:34

## 2017-11-09 RX ADMIN — Medication 2 MILLIGRAM(S): at 06:47

## 2017-11-09 RX ADMIN — Medication 2 MILLIGRAM(S): at 13:06

## 2017-11-09 RX ADMIN — Medication 100 MILLIGRAM(S): at 22:47

## 2017-11-09 NOTE — BEHAVIORAL HEALTH ASSESSMENT NOTE - NSBHCHARTREVIEWINVESTIGATE_PSY_A_CORE FT
Ventricular Rate 70 BPM    Atrial Rate 70 BPM    P-R Interval 130 ms    QRS Duration 82 ms     ms    QTc 414 ms    P Axis 38 degrees    R Axis 43 degrees    T Axis 41 degrees    Diagnosis Line NORMAL SINUS RHYTHM WITH SINUS ARRHYTHMIA  LOW VOLTAGE QRS  BORDERLINE ECG

## 2017-11-09 NOTE — CHART NOTE - NSCHARTNOTEFT_GEN_A_CORE
Bradycardia rate of 45 briefly less than 2 seconds while asleep. patient asymptomatic in NAD.    Vital Signs Last 24 Hrs  T(C): 36.7 (09 Nov 2017 03:10), Max: 37.3 (08 Nov 2017 14:25)  T(F): 98.1 (09 Nov 2017 03:10), Max: 99.1 (08 Nov 2017 14:25)  HR: 71 (09 Nov 2017 03:10) (71 - 92)  BP: 110/77 (09 Nov 2017 03:10) (110/77 - 155/105)  BP(mean): --  RR: 18 (09 Nov 2017 03:10) (16 - 18)  SpO2: 100% (09 Nov 2017 03:10) (98% - 100%)    Will continue to monitor, Attending to follow.    Roz Vieira NP  [id number]

## 2017-11-09 NOTE — BEHAVIORAL HEALTH ASSESSMENT NOTE - HPI (INCLUDE ILLNESS QUALITY, SEVERITY, DURATION, TIMING, CONTEXT, MODIFYING FACTORS, ASSOCIATED SIGNS AND SYMPTOMS)
Patient is a 42 y/o woman, single, employed, with PPHx of generalized anxiety disorder and panic disorder, followed as outpatient by [facility] Dr. Snu and social work, currently taking Ativan 4-10mg per day for anxiety (pt states she only takes for "POTS symptoms"), with PMHx of possible POTS disease followed by cardiology, HTN, history of ITP, IBS, admitted on 11/8/2017 for management of recurrent anxiety and tachycardia episodes. Psychiatry consulted for management of medication regimen, and possible psychiatric etiology of symptoms.    Patient notes that prior to 1/2017 she was in good health, however developed episodes of tachycardia and tremulousness. She has been followed by cardiologist Dr. Hussein since then for episodes, and has appointment with specialist on 11/30. Patient attributes these episodes to POTS and other “autonomic dysregulation”, however has not formally received these diagnoses (as per pt's PMD Dr Shira Ford). Per patient, the episodes are relieved by Ativan; she takes between 4-10mg Ativan per day as needed to control these episodes. She also notes that episodes are relieved by IV fluids. Patient notes onset of anxiety due to fear of these episodes occurring, and states that she feels depressed on occasion because she is under the impression that her POTS will prevent her from having a job or living a normal life in the future. 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 She also notes anxiety about being left alone. She currently takes Buspar 5 mg for anxiety, as well as Ativan 1 mg prescribed by her PMD Dr. Ford. Patient notes passive SI, and states that she “doesn’t want to live like this,” however she has a fear of death and no suicidal intent. Her sleep is interrupted by these episodes at night; she also notes decreased appetite on occasion due to these episodes which she states can cause GI upset. Patient notes that Ativan is the only drug she’s tried that helps with the episodes (has tried Lexapro in the past but has discontinued this due to ITP), and does not express a desire to cut down on her Ativan usage due to fear of increased episode severity. Denies alcohol, smoking, or other drug use, denies prior substance use treatment. [employer] [place] database accessed. Patient was most recently (10/16) prescribed Ativan 1 mg 180 tabs for 30 days by her PMD Dr. Ford, and was once prescribed Ativan 1 mg 40 tabs for five days by her father (a physician). She is currently followed by [facility] outpatient clinic for anxiety.    Pt's PMD Dr Ford reported that pt has not yet officially been diagnosed with POTS. Pt is very anxious. She has moved back into parents' home after losing her  job, and is "miserable" there. She often comes to ED because she is afraid she will pass out at home, despite reassurance from doctors. Pt presents as highly anxious to her doctors and they cannot rule out anxiety as a cause of her symptoms.

## 2017-11-09 NOTE — PROGRESS NOTE ADULT - SUBJECTIVE AND OBJECTIVE BOX
CHIEF COMPLAINT:  palpitations  HISTORY OF PRESENT ILLNESS:  40yo female with a h/o ITP, a long history of palpitations, hypertension tachycardia presents with palpitations and anxiety. She has had an extensive workup for her symptoms including halter monitor and echocardiogram. The workup has, thus far, has been negative. She is currently being evaluated for POTS syndrome. She takes up to 10mg of ativan per day. She describes her symptoms as "going from zero to 60 in 2 seconds". She describes an adrenergic surge followed by palpitations with no obvious trigger. On telemetry, she has intermitant sinus tachycardia to 120, as well as NSR to 80's. She has no history of syncope      Allergies    No Known Allergies    Intolerances    metoprolol (Headache)  	    MEDICATIONS:  diltiazem    milliGRAM(s) Oral daily      ALBUTerol    90 MICROgram(s) HFA Inhaler 1 Puff(s) Inhalation every 6 hours PRN  montelukast 10 milliGRAM(s) Oral daily    busPIRone 2.5 milliGRAM(s) Oral two times a day  LORazepam     Tablet 2 milliGRAM(s) Oral every 4 hours PRN    docusate sodium 100 milliGRAM(s) Oral three times a day  famotidine    Tablet 20 milliGRAM(s) Oral two times a day  loperamide 2 milliGRAM(s) Oral four times a day PRN  simethicone 80 milliGRAM(s) Chew every 8 hours PRN      influenza   Vaccine 0.5 milliLiter(s) IntraMuscular once  sodium chloride 0.9%. 1000 milliLiter(s) IV Continuous <Continuous>      PAST MEDICAL & SURGICAL HISTORY:  Pott disease  History of ITP  Labyrinthitis  Headache  HTN (hypertension)  Anxiety  IBS (irritable bowel syndrome)  No significant past surgical history      FAMILY HISTORY:  Family history of hypertension (Grandparent)  Family history of atrial fibrillation  Family history of prostate cancer in father      SOCIAL HISTORY:    [ ] Non-smoker  [ ] Smoker  [ ] Alcohol      REVIEW OF SYSTEMS:  See HPI. Otherwise, 10 point ROS done and otherwise negative.    PHYSICAL EXAM:  T(C): 36.9 (11-09-17 @ 11:16), Max: 37.3 (11-08-17 @ 14:25)  HR: 75 (11-09-17 @ 11:16) (70 - 92)  BP: 121/87 (11-09-17 @ 11:16) (107/69 - 155/105)  RR: 18 (11-09-17 @ 11:16) (16 - 18)  SpO2: 99% (11-09-17 @ 11:16) (98% - 100%)  Wt(kg): --  I&O's Summary    08 Nov 2017 07:01  -  09 Nov 2017 07:00  --------------------------------------------------------  IN: 1245 mL / OUT: 0 mL / NET: 1245 mL    09 Nov 2017 07:01  -  09 Nov 2017 11:51  --------------------------------------------------------  IN: 240 mL / OUT: 0 mL / NET: 240 mL        Appearance: Normal	  HEENT:   Normal oral mucosa, PERRL, EOMI	  Lymphatic: No lymphadenopathy  Cardiovascular: Normal S1 S2, No JVD, No murmurs, No edema  Respiratory: Lungs clear to auscultation	  Psychiatry: A & O x 3, Mood & affect appropriate  Gastrointestinal:  Soft, Non-tender, + BS	  Skin: No rashes, No ecchymoses, No cyanosis	  Neurologic: Non-focal  Extremities: Normal range of motion, No clubbing, cyanosis or edema  Vascular: Peripheral pulses palpable 2+ bilaterally        LABS:	 	    CBC Full  -  ( 08 Nov 2017 19:48 )  WBC Count : 5.9 K/uL  Hemoglobin : 11.9 g/dL  Hematocrit : 33.4 %  Platelet Count - Automated : 55 K/uL  Mean Cell Volume : 88.2 fl  Mean Cell Hemoglobin : 31.4 pg  Mean Cell Hemoglobin Concentration : 35.7 gm/dL  Auto Neutrophil # : 2.8 K/uL  Auto Lymphocyte # : 2.5 K/uL  Auto Monocyte # : 0.5 K/uL  Auto Eosinophil # : 0.1 K/uL  Auto Basophil # : 0.0 K/uL  Auto Neutrophil % : 48.3 %  Auto Lymphocyte % : 42.2 %  Auto Monocyte % : 8.1 %  Auto Eosinophil % : 1.0 %  Auto Basophil % : 0.4 %    11-08    145  |  105  |  7   ----------------------------<  80  3.7   |  22  |  0.81  11-07    144  |  106  |  5<L>  ----------------------------<  93  3.5   |  22  |  0.83    Ca    10.1      08 Nov 2017 18:21  Ca    9.6      07 Nov 2017 19:40  Phos  4.4     11-08  Mg     2.3     11-08    TELEMETRY: 	NSR     ECG:  	NSR  RADIOLOGY:  OTHER: 	    PREVIOUS DIAGNOSTIC TESTING:    [ ] Echocardiogram:  < from: Transthoracic Echocardiogram (09.06.17 @ 08:48) >  Conclusions:  1. Normal left ventricular internal dimensions and wall  thicknesses.  2. Normal left ventricular systolic function with an  estimated ejection fraction of 65%. No segmental wall  motion abnormalities.  3. Normal right ventricular size and systolic function.    < end of copied text >    	  ASSESSMENT/PLAN: 	  40yo female with sinus tachycardia, palpitations    1. Palpitations, sinus tachycardia  -I do not believe that ms. Preslye has inappropriate sinus tachycardia. Her tachycardia is intermittent and is proceeded by symptoms of sympathetic surge. There is no role for ivabridine or further AV heather blockade  -While it is unclear whether her symptoms are caused by benzodiazepines, it is likely that intermittent withdrawal of very high doses of benzodiazepines are making diagnosis and treatment difficult. I recommend a psychiatric consult for benzodiazepine tapering.    Luis Whiting [id number]

## 2017-11-09 NOTE — PROGRESS NOTE ADULT - ASSESSMENT
41F w/PMH possible POTS dz admitted with tachycardia and tremors which pt takes ativan for up to 10 mg per day without improvement.    tachycardia- admit to tele, cardio and ep consults, cont cardizem, cont ativan prn.  pt has had extensive workup in the past.  will call neuro eval.    ITP- pt's plts is stable from 50 k to 80 k.    asthma - cw albuterol and singulair    dvt px - heparin

## 2017-11-09 NOTE — BEHAVIORAL HEALTH ASSESSMENT NOTE - NSBHCONSULTRECOMMENDOTHER_PSY_A_CORE FT
Pt would benefit greatly from more intensive psychiatric care, but does not wish to engage further with psychiatry at this time.

## 2017-11-09 NOTE — BEHAVIORAL HEALTH ASSESSMENT NOTE - DETAILS
States that she "Doesn't want to live like this," however notes fear of death tachycardia GI upset ITP after treatment with lexapro. Prior sedation with buspirone.

## 2017-11-09 NOTE — BEHAVIORAL HEALTH ASSESSMENT NOTE - NSBHSUICRISKFACTOR_PSY_A_CORE
Access to means (pills, firearms, etc.)/Substance abuse/dependence/Hopelessness/Chronic pain or acute medical issue

## 2017-11-09 NOTE — BEHAVIORAL HEALTH ASSESSMENT NOTE - RISK ASSESSMENT
Patient is at intermediate risk. Risk factors include history of benzodiazepine use and poor support from family, plus passive suicidal ideation. Protective factors include fear of death and future oriented.

## 2017-11-09 NOTE — BEHAVIORAL HEALTH ASSESSMENT NOTE - NSBHCHARTREVIEWLAB_PSY_A_CORE FT
CBC Full  -  ( 08 Nov 2017 19:48 )  WBC Count : 5.9 K/uL  Hemoglobin : 11.9 g/dL  Hematocrit : 33.4 %  Platelet Count - Automated : 55 K/uL  Mean Cell Volume : 88.2 fl  Mean Cell Hemoglobin : 31.4 pg  Mean Cell Hemoglobin Concentration : 35.7 gm/dL  Auto Neutrophil # : 2.8 K/uL  Auto Lymphocyte # : 2.5 K/uL  Auto Monocyte # : 0.5 K/uL  Auto Eosinophil # : 0.1 K/uL  Auto Basophil # : 0.0 K/uL  Auto Neutrophil % : 48.3 %  Auto Lymphocyte % : 42.2 %  Auto Monocyte % : 8.1 %  Auto Eosinophil % : 1.0 %  Auto Basophil % : 0.4 %    11-09    143  |  105  |  3<L>  ----------------------------<  82  3.7   |  26  |  0.82    Ca    9.7      09 Nov 2017 13:02  Phos  3.8     11-09  Mg     2.0     11-09        CAPILLARY BLOOD GLUCOSE

## 2017-11-09 NOTE — BEHAVIORAL HEALTH ASSESSMENT NOTE - NSBHCHARTREVIEWVS_PSY_A_CORE FT
Vital Signs Last 24 Hrs  T(C): 36.9 (09 Nov 2017 11:16), Max: 36.9 (08 Nov 2017 19:38)  T(F): 98.4 (09 Nov 2017 11:16), Max: 98.4 (08 Nov 2017 19:38)  HR: 75 (09 Nov 2017 11:16) (70 - 90)  BP: 121/87 (09 Nov 2017 11:16) (107/69 - 155/105)  BP(mean): --  RR: 18 (09 Nov 2017 11:16) (16 - 18)  SpO2: 99% (09 Nov 2017 11:16) (98% - 100%)

## 2017-11-09 NOTE — BEHAVIORAL HEALTH ASSESSMENT NOTE - OTHER PAST PSYCHIATRIC HISTORY (INCLUDE DETAILS REGARDING ONSET, COURSE OF ILLNESS, INPATIENT/OUTPATIENT TREATMENT)
Generalized anxiety disorder, panic disorder. Followed by [facility] outpatient clinic with Dr. Sun for anxiety. Prior to 1/2017 no known psychiatric issues. No history of inpatient psychiatric hospitalization. One partial hospitalization 6/23/17 to 7/28/17. DC note from that partial hospitalization stated "During the course of treatment, patient was resistant to behavioral interventions as well as medication changes without consulting her medical doctors. In general, she would present a skewed version of what she as being told by doctors. When a new medication was introduced, she would report odd side effects which were not witnessed by staff."

## 2017-11-09 NOTE — PROGRESS NOTE ADULT - SUBJECTIVE AND OBJECTIVE BOX
Patient is a 41y old  Female who presents with a chief complaint of tachycardia (08 Nov 2017 23:58)      SUBJECTIVE / OVERNIGHT EVENTS: no new complaints    MEDICATIONS  (STANDING):  busPIRone 2.5 milliGRAM(s) Oral two times a day  diltiazem    milliGRAM(s) Oral daily  docusate sodium 100 milliGRAM(s) Oral three times a day  famotidine    Tablet 20 milliGRAM(s) Oral two times a day  influenza   Vaccine 0.5 milliLiter(s) IntraMuscular once  montelukast 10 milliGRAM(s) Oral daily  sodium chloride 0.9%. 1000 milliLiter(s) (125 mL/Hr) IV Continuous <Continuous>    MEDICATIONS  (PRN):  ALBUTerol    90 MICROgram(s) HFA Inhaler 1 Puff(s) Inhalation every 6 hours PRN Shortness of Breath and/or Wheezing  loperamide 2 milliGRAM(s) Oral four times a day PRN Diarrhea  LORazepam     Tablet 2 milliGRAM(s) Oral every 4 hours PRN Anxiety  simethicone 80 milliGRAM(s) Chew every 8 hours PRN Gas      Vital Signs Last 24 Hrs  T(C): 36.9 (09 Nov 2017 11:16), Max: 37.3 (08 Nov 2017 14:25)  T(F): 98.4 (09 Nov 2017 11:16), Max: 99.1 (08 Nov 2017 14:25)  HR: 75 (09 Nov 2017 11:16) (70 - 92)  BP: 121/87 (09 Nov 2017 11:16) (107/69 - 155/105)  BP(mean): --  RR: 18 (09 Nov 2017 11:16) (16 - 18)  SpO2: 99% (09 Nov 2017 11:16) (98% - 100%)  CAPILLARY BLOOD GLUCOSE        I&O's Summary    08 Nov 2017 07:01  -  09 Nov 2017 07:00  --------------------------------------------------------  IN: 1245 mL / OUT: 0 mL / NET: 1245 mL    09 Nov 2017 07:01  -  09 Nov 2017 11:56  --------------------------------------------------------  IN: 240 mL / OUT: 0 mL / NET: 240 mL        PHYSICAL EXAM:  GENERAL: NAD, well-developed  HEAD:  Atraumatic, Normocephalic  EYES: EOMI, PERRLA, conjunctiva and sclera clear  NECK: Supple, No JVD  CHEST/LUNG: Clear to auscultation bilaterally; No wheeze  HEART: Regular rate and rhythm; No murmurs, rubs, or gallops  ABDOMEN: Soft, Nontender, Nondistended; Bowel sounds present  EXTREMITIES:  2+ Peripheral Pulses, No clubbing, cyanosis, or edema  PSYCH: AAOx3  NEUROLOGY: non-focal  SKIN: No rashes or lesions    LABS:                        11.9   5.9   )-----------( 55       ( 08 Nov 2017 19:48 )             33.4     11-08    145  |  105  |  7   ----------------------------<  80  3.7   |  22  |  0.81    Ca    10.1      08 Nov 2017 18:21  Phos  4.4     11-08  Mg     2.3     11-08        CARDIAC MARKERS ( 07 Nov 2017 19:40 )  x     / <0.01 ng/mL / x     / x     / x              RADIOLOGY & ADDITIONAL TESTS:    Imaging Personally Reviewed:    Consultant(s) Notes Reviewed:      Care Discussed with Consultants/Other Providers:

## 2017-11-09 NOTE — BEHAVIORAL HEALTH ASSESSMENT NOTE - NSBHCONSULTFOLLOWAFTERCARE_PSY_A_CORE FT
Continue to follow up with [facility] outpatient clinic as directed by outpatient psychiatrist

## 2017-11-09 NOTE — CONSULT NOTE ADULT - SUBJECTIVE AND OBJECTIVE BOX
Consult Note:   · Provider Specialty	Cardiology	      	  Initial Consult:  · Requested by Name:	Emi Stephenson	  · Date/Time:	09-Nov-2017 	  · Reason for Referral/Consultation:	Dizziness/Constipation/Tachycardia	      · Subjective and Objective: 	  **********              CARDIOLOGY CONSULT NOTE              ************  ============================================================  CHIEF COMPLAINT/REASON FOR CONSULT:  Patient is a 41y old  Female who presents with a chief complaint of dizziness/constipation/tachycardia    HISTORY OF PRESENT ILLNESS:  41yFemale with a history of POTS ITP Anxiety multiple admissions for latter p/w dizziness & palpitations. No CP/SOB. No syncope.  Multiple admissions for latter.   On high doses Ativan.  Multiple admissions.   Extensive w/u.   Concern for POTS    No Known Allergies    Intolerances    metoprolol (Headache)  	    MEDICATIONS:  Ativan PRN               PAST MEDICAL & SURGICAL HISTORY:  Pott disease  History of ITP  Labyrinthitis  Headache  HTN (hypertension)  Anxiety  IBS (irritable bowel syndrome)  No significant past surgical history      FAMILY HISTORY:  Family history of hypertension (Grandparent)  Family history of atrial fibrillation  Family history of prostate cancer in father      SOCIAL HISTORY:    [ x] Non-smoker  [x] No Illicit Drug Use  [ x] No Excess EtOH Use      REVIEW OF SYSTEMS: (Unless + Before Symptom, it is negative)  Constitutional: No Fever, Fatigue, Weight Changes +anxiety  Eyes: No Recent Vision Changes, Eye Pain  ENT: No Congestion, Sore Throat  Endocrine: No Excess Sweating, Temperature Intolerance  Cardiovascular: No Chest Pain, +Palpitations, Shortness of Breath, Pre-syncope, Syncope, LE Edema  Respiratory: No Cough, Congestion, Wheezing  Gastrointestinal: No Abdominal Pain, Nausea, Vomiting +constipation  Genitourinary: No dysuria, hematuria  Musculoskeletal: No Joint Pain, Swelling  Neurologic: No headaches, Imbalance, Weakness  Skin: No rashes, hematoma, purprura    PHYSICAL EXAM:  T(C): 37.3 (11-02-17 @ 18:31), Max: 37.3 (11-02-17 @ 18:31)  HR: 85 (11-02-17 @ 18:31) (85 - 95)  BP: 129/83 (11-02-17 @ 18:31) (129/83 - 132/83)  RR: 17 (11-02-17 @ 18:31) (16 - 17)  SpO2: 100% (11-02-17 @ 18:31) (100% - 100%)  Wt(kg): --  I&O's Summary    Appearance: Normal; NAD	+anxiety   HEENT:   Normal oral mucosa, EOMI	  Lymphatic: No lymphadenopathy  Cardiovascular: Normal S1 S2, No JVD, No murmurs, No edema  Respiratory: Lungs clear to auscultation, no use of accessory muscles	  Psychiatry: A & O x 3; +Anxious  Gastrointestinal:  Soft, Non-tender	  Skin: No rashes, No ecchymoses, No cyanosis	  Neurologic: Non-focal, No Focal Deficits  Extremities: Normal range of motion, No clubbing, cyanosis or edema  Vascular: Peripheral pulses palpable 2+ bilaterally, no prominent varicosities    LABS:	   Labs [id number] Reviewed     CBC Full  -  ( 02 Nov 2017 17:07 )  WBC Count : 10.3 K/uL  Hemoglobin : 13.2 g/dL  Hematocrit : 37.3 %  Platelet Count - Automated : 81 K/uL  Mean Cell Volume : 86.7 fl  Mean Cell Hemoglobin : 30.7 pg  Mean Cell Hemoglobin Concentration : 35.5 gm/dL  Auto Neutrophil # : 7.0 K/uL  Auto Lymphocyte # : 2.6 K/uL  Auto Monocyte # : 0.7 K/uL  Auto Eosinophil # : 0.1 K/uL  Auto Basophil # : 0.0 K/uL  Auto Neutrophil % : 67.2 %  Auto Lymphocyte % : 25.0 %  Auto Monocyte % : 6.8 %  Auto Eosinophil % : 0.8 %  Auto Basophil % : 0.3 %    11-02    140  |  101  |  5<L>  ----------------------------<  86  3.5   |  23  |  0.86    Ca    9.6      02 Nov 2017 17:07    TPro  7.2  /  Alb  4.8  /  TBili  0.4  /  DBili  x   /  AST  20  /  ALT  13  /  AlkPhos  79  11-02  	    ECG:  	  NSR no ischemic changes    =========================================================================  ASSESSMENT:  Dizziness  Orthostasis   Tachycardia  ?IST  ?POTS  High Benzo requirement    Recommendations  - Tilt table testing  - Management of autonomic dysfunction/BB in conjunction with EP  - IV NS  - This is a good time to precisely correlate timing of tachycardia PRIOR to Request for BENZO       Please call with questions.

## 2017-11-09 NOTE — CHART NOTE - NSCHARTNOTEFT_GEN_A_CORE
Patient earlier reported R sided chest tightness, subsided at the time of exam.  Cardiac enzymes x 1 done, negative (this is the second set).  EKG repeated - no ischemic changes noted.  Vital signs stable.  Will repeat CE tonight. Patient earlier reported R sided chest tightness, subsided at the time of exam.  Cardiac enzymes x 1 done, negative (this is the second set).  EKG repeated - no ischemic changes noted.  Vital signs stable.  Will repeat CE tonight and in am.

## 2017-11-09 NOTE — PROGRESS NOTE ADULT - ATTENDING COMMENTS
seen and examined with fellow. I agree with H & P, A & P.  Some features of POTS, but also concern for IST (exaggerated HR with minimal exertion).  I do believe that the observed phenomena are related to autonomic dysfunction.  We will better assess with HUT.  I also suggested LEAH beta blocker (eg acebutolol).  Will start after the tilt.

## 2017-11-09 NOTE — BEHAVIORAL HEALTH ASSESSMENT NOTE - SUMMARY
Patient is a 42 y/o woman with PPHx of generalized anxiety disorder and panic disorder, followed as outpatient by [facility], currently taking Ativan 4-10mg per day for anxiety, with PMHx of possible POTS disease followed by cardiology, admitted on 11/8/2017 for management of recurrent anxiety and tachycardia episodes. Patient is comfortable with current medication regimen of Buspar and Ativan for anxiety, and does not express a desire to cut down on Ativan dosing due to risk of resumed tachycardia episodes despite understanding risks of Benzodiazepine usage. She is under the impression that her symptoms are purely medical, and shows poor insight into possible psychiatric etiology of symptoms. She notes some passive suicidality, however denies active suicidality due to fear of death, and does not require inpatient hospitalization or enhanced observation. Pt felt invalidated by psychiatry's involvement and requested us not to return.

## 2017-11-10 LAB
ANION GAP SERPL CALC-SCNC: 10 MMOL/L — SIGNIFICANT CHANGE UP (ref 5–17)
BUN SERPL-MCNC: 4 MG/DL — LOW (ref 7–23)
CALCIUM SERPL-MCNC: 8.9 MG/DL — SIGNIFICANT CHANGE UP (ref 8.4–10.5)
CHLORIDE SERPL-SCNC: 110 MMOL/L — HIGH (ref 96–108)
CK MB BLD-MCNC: 2.3 % — SIGNIFICANT CHANGE UP (ref 0–3.5)
CK MB CFR SERPL CALC: 1 NG/ML — SIGNIFICANT CHANGE UP (ref 0–3.8)
CK MB CFR SERPL CALC: 1 NG/ML — SIGNIFICANT CHANGE UP (ref 0–3.8)
CK SERPL-CCNC: 44 U/L — SIGNIFICANT CHANGE UP (ref 25–170)
CK SERPL-CCNC: 44 U/L — SIGNIFICANT CHANGE UP (ref 25–170)
CO2 SERPL-SCNC: 24 MMOL/L — SIGNIFICANT CHANGE UP (ref 22–31)
CREAT SERPL-MCNC: 0.82 MG/DL — SIGNIFICANT CHANGE UP (ref 0.5–1.3)
GLUCOSE SERPL-MCNC: 88 MG/DL — SIGNIFICANT CHANGE UP (ref 70–99)
HCT VFR BLD CALC: 32.9 % — LOW (ref 34.5–45)
HGB BLD-MCNC: 12.1 G/DL — SIGNIFICANT CHANGE UP (ref 11.5–15.5)
MCHC RBC-ENTMCNC: 32.2 PG — SIGNIFICANT CHANGE UP (ref 27–34)
MCHC RBC-ENTMCNC: 36.7 GM/DL — HIGH (ref 32–36)
MCV RBC AUTO: 87.9 FL — SIGNIFICANT CHANGE UP (ref 80–100)
PLATELET # BLD AUTO: 62 K/UL — LOW (ref 150–400)
POTASSIUM SERPL-MCNC: 4 MMOL/L — SIGNIFICANT CHANGE UP (ref 3.5–5.3)
POTASSIUM SERPL-SCNC: 4 MMOL/L — SIGNIFICANT CHANGE UP (ref 3.5–5.3)
RBC # BLD: 3.74 M/UL — LOW (ref 3.8–5.2)
RBC # FLD: 11.9 % — SIGNIFICANT CHANGE UP (ref 10.3–14.5)
SODIUM SERPL-SCNC: 144 MMOL/L — SIGNIFICANT CHANGE UP (ref 135–145)
TROPONIN T SERPL-MCNC: <0.01 NG/ML — SIGNIFICANT CHANGE UP (ref 0–0.06)
TROPONIN T SERPL-MCNC: <0.01 NG/ML — SIGNIFICANT CHANGE UP (ref 0–0.06)
WBC # BLD: 4.3 K/UL — SIGNIFICANT CHANGE UP (ref 3.8–10.5)
WBC # FLD AUTO: 4.3 K/UL — SIGNIFICANT CHANGE UP (ref 3.8–10.5)

## 2017-11-10 PROCEDURE — 99233 SBSQ HOSP IP/OBS HIGH 50: CPT

## 2017-11-10 PROCEDURE — 99232 SBSQ HOSP IP/OBS MODERATE 35: CPT | Mod: 25

## 2017-11-10 PROCEDURE — 93660 TILT TABLE EVALUATION: CPT | Mod: 26

## 2017-11-10 PROCEDURE — 93010 ELECTROCARDIOGRAM REPORT: CPT

## 2017-11-10 RX ORDER — ACEBUTOLOL HCL 200 MG
200 CAPSULE ORAL ONCE
Qty: 0 | Refills: 0 | Status: COMPLETED | OUTPATIENT
Start: 2017-11-10 | End: 2017-11-10

## 2017-11-10 RX ORDER — ONDANSETRON 8 MG/1
4 TABLET, FILM COATED ORAL ONCE
Qty: 0 | Refills: 0 | Status: COMPLETED | OUTPATIENT
Start: 2017-11-10 | End: 2017-11-10

## 2017-11-10 RX ORDER — ACEBUTOLOL HCL 200 MG
200 CAPSULE ORAL
Qty: 0 | Refills: 0 | Status: DISCONTINUED | OUTPATIENT
Start: 2017-11-10 | End: 2017-11-15

## 2017-11-10 RX ADMIN — SIMETHICONE 80 MILLIGRAM(S): 80 TABLET, CHEWABLE ORAL at 05:44

## 2017-11-10 RX ADMIN — Medication 2 MILLIGRAM(S): at 05:45

## 2017-11-10 RX ADMIN — Medication 2 MILLIGRAM(S): at 11:55

## 2017-11-10 RX ADMIN — Medication 2.5 MILLIGRAM(S): at 09:00

## 2017-11-10 RX ADMIN — Medication 2 MILLIGRAM(S): at 20:21

## 2017-11-10 RX ADMIN — MONTELUKAST 10 MILLIGRAM(S): 4 TABLET, CHEWABLE ORAL at 22:37

## 2017-11-10 RX ADMIN — ONDANSETRON 4 MILLIGRAM(S): 8 TABLET, FILM COATED ORAL at 11:44

## 2017-11-10 RX ADMIN — FAMOTIDINE 20 MILLIGRAM(S): 10 INJECTION INTRAVENOUS at 05:44

## 2017-11-10 RX ADMIN — Medication 100 MILLIGRAM(S): at 09:01

## 2017-11-10 RX ADMIN — Medication 2.5 MILLIGRAM(S): at 22:37

## 2017-11-10 RX ADMIN — FAMOTIDINE 20 MILLIGRAM(S): 10 INJECTION INTRAVENOUS at 17:54

## 2017-11-10 RX ADMIN — Medication 200 MILLIGRAM(S): at 15:45

## 2017-11-10 NOTE — PROGRESS NOTE ADULT - ASSESSMENT
41F w/PMH possible POTS dz admitted with tachycardia and tremors which pt takes ativan for up to 10 mg per day without improvement.    tachycardia- on tele, cardio and ep consults, cont cardizem, cont ativan prn.  pt has had extensive workup in the past.  neuro eval appreciated.  also was seen by psych,  endo eval called.  for TTT and mri of spine and brain.  start acebutalol    ITP- pt's plts is stable from 50 k to 80 k.    asthma - cw albuterol and singulair    dvt px - heparin

## 2017-11-10 NOTE — PROGRESS NOTE ADULT - SUBJECTIVE AND OBJECTIVE BOX
24H hour events:   Patient continued to complain of palpitations, dizziness primarily while standing. Telemetry revealed tachycardia to 120 while patient was standing.   MEDICATIONS:  diltiazem    milliGRAM(s) Oral daily      ALBUTerol    90 MICROgram(s) HFA Inhaler 1 Puff(s) Inhalation every 6 hours PRN  montelukast 10 milliGRAM(s) Oral daily    acetaminophen   Tablet. 650 milliGRAM(s) Oral every 6 hours PRN  busPIRone 2.5 milliGRAM(s) Oral two times a day  LORazepam     Tablet 2 milliGRAM(s) Oral every 4 hours PRN    docusate sodium 100 milliGRAM(s) Oral three times a day  famotidine    Tablet 20 milliGRAM(s) Oral two times a day  loperamide 2 milliGRAM(s) Oral four times a day PRN  simethicone 80 milliGRAM(s) Chew every 8 hours PRN      influenza   Vaccine 0.5 milliLiter(s) IntraMuscular once  sodium chloride 0.9%. 1000 milliLiter(s) IV Continuous <Continuous>      REVIEW OF SYSTEMS:  Complete 10point ROS negative.    PHYSICAL EXAM:  T(C): 36.7 (11-10-17 @ 08:39), Max: 36.9 (11-09-17 @ 11:16)  HR: 73 (11-10-17 @ 08:39) (66 - 75)  BP: 130/87 (11-10-17 @ 08:39) (101/64 - 134/90)  RR: 18 (11-10-17 @ 08:39) (18 - 18)  SpO2: 98% (11-10-17 @ 08:39) (98% - 99%)  Wt(kg): --  I&O's Summary    09 Nov 2017 07:01  -  10 Nov 2017 07:00  --------------------------------------------------------  IN: 1980 mL / OUT: 0 mL / NET: 1980 mL    10 Nov 2017 07:01  -  10 Nov 2017 10:06  --------------------------------------------------------  IN: 40 mL / OUT: 200 mL / NET: -160 mL        Appearance: Normal	  HEENT:   Normal oral mucosa, PERRL, EOMI	  Lymphatic: No lymphadenopathy  Cardiovascular: Normal S1 S2, No JVD, No murmurs, No edema  Respiratory: Lungs clear to auscultation	  Psychiatry: A & O x 3, Mood & affect appropriate  Gastrointestinal:  Soft, Non-tender, + BS	  Skin: No rashes, No ecchymoses, No cyanosis	  Neurologic: Non-focal  Extremities: Normal range of motion, No clubbing, cyanosis or edema  Vascular: Peripheral pulses palpable 2+ bilaterally        LABS:	 	    CBC Full  -  ( 10 Nov 2017 06:22 )  WBC Count : 4.3 K/uL  Hemoglobin : 12.1 g/dL  Hematocrit : 32.9 %  Platelet Count - Automated : 62 K/uL  Mean Cell Volume : 87.9 fl  Mean Cell Hemoglobin : 32.2 pg  Mean Cell Hemoglobin Concentration : 36.7 gm/dL  Auto Neutrophil # : x  Auto Lymphocyte # : x  Auto Monocyte # : x  Auto Eosinophil # : x  Auto Basophil # : x  Auto Neutrophil % : x  Auto Lymphocyte % : x  Auto Monocyte % : x  Auto Eosinophil % : x  Auto Basophil % : x    11-10    144  |  110<H>  |  4<L>  ----------------------------<  88  4.0   |  24  |  0.82  11-09    143  |  105  |  3<L>  ----------------------------<  82  3.7   |  26  |  0.82    Ca    8.9      10 Nov 2017 06:22  Ca    9.7      09 Nov 2017 13:02  Phos  3.8     11-09  Phos  4.4     11-08  Mg     2.0     11-09  Mg     2.3     11-08            CARDIAC MARKERS:  Troponin T, Serum: <0.01 ng/mL (11-10-17 @ 06:22)  Troponin T, Serum: <0.01 ng/mL (11-09-17 @ 21:36)  Troponin T, Serum: <0.01 ng/mL (11-09-17 @ 13:02)      Creatine Kinase, Serum: 44 U/L (11-10-17 @ 06:22)  Creatine Kinase, Serum: 39 U/L (11-09-17 @ 21:36)  Creatine Kinase, Serum: 57 U/L (11-09-17 @ 13:02)      TELEMETRY: 	  NSR   ECG:  	NSR  RADIOLOGY:  OTHER: 	    PREVIOUS DIAGNOSTIC TESTING:    [ ] Echocardiogram:  < from: Transthoracic Echocardiogram (09.06.17 @ 08:48) >  Conclusions:  1. Normal left ventricular internal dimensions and wall  thicknesses.  2. Normal left ventricular systolic function with an  estimated ejection fraction of 65%. No segmental wall  motion abnormalities.  3. Normal right ventricular size and systolic function.    < end of copied text >    	  ASSESSMENT/PLAN: 	  42yo female with sinus tachycardia, palpitations    1. Palpitations, sinus tachycardia  -tilt table test today  -will start acebutalol 200mg after tilt table test and closely monitor for histaminergic reaction     Luis Whiting [id number]

## 2017-11-10 NOTE — CHART NOTE - NSCHARTNOTEFT_GEN_A_CORE
MEDICINE NP    DORIS MCBRIDE  41y Female  Patient is a 41y old  Female who presents with a chief complaint of tachycardia (08 Nov 2017 23:58)       Event Summary:  Notified by RN, Patient with c/o chest pressure and seen at bedside.  Patient sitting up in bed, AAOX3, reports chest "squeezing all over" 8/10, non-radiating.  States likely anxiety but unclear if also 2/2 POT Syndrome.  Also w/ own portable pulse oximetry and reports noting her HR fluctuating from 47 briefly to 80's (not sustaining) and concern because she was started Acebutolol today (of which she vomited after medication administration) and unclear how much was absorbed.  Due for due Cardizem CD tonight, and worried about taking medications and lowering heart rate further.  Wishes to speak to a Cardiology.  +SOB, recurrent tremors.  Denies palpitations, headache, abdominal pain, nausea.  Received Ativan 2mg prn ~45 minutes prior.      ECG:  SR w/ marked sinus arrythmia, 61bpm.  No acute TEN.            Vital Signs Last 24 Hrs  T(C): 36.8 (10 Nov 2017 21:10), Max: 37.2 (10 Nov 2017 15:15)  T(F): 98.2 (10 Nov 2017 21:10), Max: 98.9 (10 Nov 2017 15:15)  HR: 77 (10 Nov 2017 21:10) (66 - 87)  BP: 150/91 (10 Nov 2017 21:10) (101/64 - 150/91)  BP(mean): --  RR: 18 (10 Nov 2017 21:10) (18 - 18)  SpO2: 98% (10 Nov 2017 21:10) (98% - 100%)    Physical Assessment:  General: Awake, Appears anxious.    Neurology: A&Ox3, nonfocal  CV: +S1S2, RRR.  No peripheral edema.  Tele w/ Sinus arrythmia HR 47-90's  Respiratory: Even, unlabored.  CTA B/L.  No wheezing or rhonchi.    Abdomen:  +BS.  Soft, NT, ND.  No palpable mass  MSK: SILVA x4.   Skin: Warm and Dry         A/P:    41F w/PMH possible POTS dz (has been on BB but d/c-ed 2/2 allergy, currently on Cardizem CD 240mg daily), frequent Ativan use (initially 4mg/day now up to 10mg/day) as a way to "treat POTS episodes", Anxiety, ITP, HTN, Asthma.  Returns with history of palp and tremors today. Has been having episodes of palp with HR measured at home to the 130s since last Spring.  Admitted with Tachycardia unclear (started on Acebutolol 11/10),  and Dizziness status post Tilt Table Test today.  Now with chest pressure  1. Chest Pressure w/ SOB: Likely Anxiety vs ACS  -f/u JADA  -Cardizem held   -d/w EPS/ house Cardiology on call, and will see patient  -c/w Tele monitoring   -c/w Buspar  -Lungs cta and patient refused nebs treatment, states likely not asthma  -Attending to follow      GISELA Kuo-BC  Medicine Department  #[id number] MEDICINE NP    DORIS MCBRIDE  41y Female  Patient is a 41y old  Female who presents with a chief complaint of tachycardia (08 Nov 2017 23:58)       Event Summary:  Notified by RN, Patient with c/o chest pressure and seen at bedside.  Patient sitting up in bed, AAOX3, reports chest "squeezing all over" 8/10, non-radiating.  States likely anxiety but unclear if also 2/2 POT Syndrome.  Also w/ own portable pulse oximetry and reports noting her HR fluctuating from 47 briefly to 80's (not sustaining) and concern because she was started Acebutolol today (of which she vomited after medication administration) and unclear how much was absorbed.  Due for due Cardizem CD tonight, and worried about taking medications and lowering heart rate further.  Wishes to speak to a Cardiology.  +SOB, recurrent tremors.  Denies palpitations, headache, abdominal pain, nausea.  Received Ativan 2mg prn ~45 minutes prior.      ECG:  SR w/ marked sinus arrythmia, 61bpm.  No acute TEN.            Vital Signs Last 24 Hrs  T(C): 36.8 (10 Nov 2017 21:10), Max: 37.2 (10 Nov 2017 15:15)  T(F): 98.2 (10 Nov 2017 21:10), Max: 98.9 (10 Nov 2017 15:15)  HR: 77 (10 Nov 2017 21:10) (66 - 87)  BP: 150/91 (10 Nov 2017 21:10) (101/64 - 150/91)  BP(mean): --  RR: 18 (10 Nov 2017 21:10) (18 - 18)  SpO2: 98% (10 Nov 2017 21:10) (98% - 100%)    Physical Assessment:  General: Awake, Appears anxious.    Neurology: A&Ox3, nonfocal  CV: +S1S2, RRR.  No peripheral edema.  Tele w/ Sinus arrythmia HR 47-90's  Respiratory: Even, unlabored.  CTA B/L.  No wheezing or rhonchi.    Abdomen:  +BS.  Soft, NT, ND.  No palpable mass  MSK: SILVA x4.   Skin: Warm and Dry         A/P:    41F w/PMH possible POTS dz (has been on BB but d/c-ed 2/2 allergy, currently on Cardizem CD 240mg daily), frequent Ativan use (initially 4mg/day now up to 10mg/day) as a way to "treat POTS episodes", Anxiety, ITP, HTN, Asthma.  Returns with history of palp and tremors today. Has been having episodes of palp with HR measured at home to the 130s since last Spring.  Admitted with Tachycardia unclear (started on Acebutolol 11/10),  and Dizziness status post Tilt Table Test today.  Now with chest pressure  1. Chest Pressure w/ SOB: Likely Anxiety vs ACS  -f/u CE  -Cardizem held   -d/w EPS/ house Cardiology on call, and will see patient  -c/w Tele monitoring   -c/w Buspar  -Neurology and Psych on board, f/u with teams in AM  -Lungs cta and patient refused nebs treatment, states likely not asthma  -Attending to follow      GISELA Kuo-BC  Medicine Department  #[id number] MEDICINE NP    DORIS MCBRIDE  41y Female  Patient is a 41y old  Female who presents with a chief complaint of tachycardia (08 Nov 2017 23:58)       Event Summary:  Notified by RN, Patient with c/o chest pressure and seen at bedside.  Patient sitting up in bed, AAOX3, reports chest "squeezing all over" 8/10, non-radiating.  States likely anxiety but unclear if also 2/2 POT Syndrome.  Also w/ own portable pulse oximetry and reports noting her HR fluctuating from 47 briefly to 80's (not sustaining) and concern because she was started Acebutolol today (of which she vomited after medication administration) and unclear how much was absorbed.  Due for due Cardizem CD tonight, and worried about taking medications and lowering heart rate further.  Wishes to speak to a Cardiology.  +SOB, recurrent tremors.  Denies palpitations, headache, abdominal pain, nausea.  Received Ativan 2mg prn ~45 minutes prior.      ECG:  SR w/ marked sinus arrythmia, 61bpm.  No acute TEN.            Vital Signs Last 24 Hrs  T(C): 36.8 (10 Nov 2017 21:10), Max: 37.2 (10 Nov 2017 15:15)  T(F): 98.2 (10 Nov 2017 21:10), Max: 98.9 (10 Nov 2017 15:15)  HR: 77 (10 Nov 2017 21:10) (66 - 87)  BP: 150/91 (10 Nov 2017 21:10) (101/64 - 150/91)  BP(mean): --  RR: 18 (10 Nov 2017 21:10) (18 - 18)  SpO2: 98% (10 Nov 2017 21:10) (98% - 100%)    Physical Assessment:  General: Awake, Appears anxious.    Neurology: A&Ox3, nonfocal  CV: +S1S2, RRR.  No peripheral edema.  Tele w/ Sinus arrythmia HR 47-90's  Respiratory: Even, unlabored.  CTA B/L.  No wheezing or rhonchi.    Abdomen:  +BS.  Soft, NT, ND.  No palpable mass  MSK: SILVA x4.   Skin: Warm and Dry         A/P:    41F w/PMH possible POTS dz (has been on BB but d/c-ed 2/2 allergy, currently on Cardizem CD 240mg daily), frequent Ativan use (initially 4mg/day now up to 10mg/day) as a way to "treat POTS episodes", Anxiety, ITP, HTN, Asthma.  Returns with history of palp and tremors today. Has been having episodes of palp with HR measured at home to the 130s since last Spring.  Admitted with Tachycardia unclear (started on Acebutolol 11/10),  and Dizziness status post Tilt Table Test today.  Now with chest pressure  1. Chest Pressure w/ SOB: Likely Anxiety vs ACS  -f/u CE  -Cardizem held 2/2 Bradycardia and Pt. refusing  -d/w EPS/ house Cardiology on call, and will see patient  -c/w Tele monitoring   -c/w Buspar  -Neurology and Psych on board, f/u with teams in AM  -Lungs cta and patient refused nebs treatment, states likely not asthma  -Attending to follow      GISELA Kuo-BC  Medicine Department  #[id number] MEDICINE NP    DORIS MCBRIDE  41y Female  Patient is a 41y old  Female who presents with a chief complaint of tachycardia (08 Nov 2017 23:58)       Event Summary:  Notified by RN, Patient with c/o chest pressure and seen at bedside.  Patient sitting up in bed, AAOX3, reports chest "squeezing all over" 8/10, non-radiating.  States likely anxiety but unclear if also 2/2 POT Syndrome.  Also w/ own portable pulse oximetry and reports noting her HR fluctuating from 47 briefly to 80's (not sustaining) and concern because she was started Acebutolol today (of which she vomited after medication administration) and unclear how much was absorbed.  Due for due Cardizem CD tonight, and worried about taking medications and lowering heart rate further.  Wishes to speak to a Cardiology.  +SOB, recurrent tremors.  Denies palpitations, headache, abdominal pain, nausea.  Received Ativan 2mg prn ~45 minutes prior.      ECG:  SR w/ marked sinus arrythmia, 61bpm.  No acute TEN.            Vital Signs Last 24 Hrs  T(C): 36.8 (10 Nov 2017 21:10), Max: 37.2 (10 Nov 2017 15:15)  T(F): 98.2 (10 Nov 2017 21:10), Max: 98.9 (10 Nov 2017 15:15)  HR: 77 (10 Nov 2017 21:10) (66 - 87)  BP: 150/91 (10 Nov 2017 21:10) (101/64 - 150/91)  BP(mean): --  RR: 18 (10 Nov 2017 21:10) (18 - 18)  SpO2: 98% (10 Nov 2017 21:10) (98% - 100%)    Physical Assessment:  General: Awake, Appears anxious.    Neurology: A&Ox3, nonfocal  CV: +S1S2, RRR.  No peripheral edema.  Tele w/ Sinus arrythmia HR 47-90's  Respiratory: Even, unlabored.  CTA B/L.  No wheezing or rhonchi.    Abdomen:  +BS.  Soft, NT, ND.  No palpable mass  MSK: SILVA x4.   Skin: Warm and Dry         A/P:    41F w/PMH possible POTS dz (has been on BB but d/c-ed 2/2 allergy, currently on Cardizem CD 240mg daily), frequent Ativan use (initially 4mg/day now up to 10mg/day) as a way to "treat POTS episodes", Anxiety, ITP, HTN, Asthma.  Returns with history of palp and tremors today. Has been having episodes of palp with HR measured at home to the 130s since last Spring.  Admitted with Tachycardia unclear (started on Acebutolol 11/10),  and Dizziness status post Tilt Table Test today.  Now with chest pressure  1. Chest Pressure w/ SOB: Likely Anxiety vs ACS  -f/u CE  -c/w Buspar & Ativan PRN  -Lungs cta and patient refused nebs treatment, states likely not asthma  -Psych on board, f/u with team in AM    2. Sinus Arrythmia : Concern for Acebutolol reaction vs POTS  -Cardizem held 2/2 Bradycardia and Pt. refusing  -Due for Acebutolol @ 3AM will have EPS re-evaluate  -d/w EPS/ house Cardiology on call, and will see patient  -c/w Tele monitoring   -c/w IVF    Attending to follow      GISELA Kuo-BC  Medicine Department  #[id number]

## 2017-11-10 NOTE — PROGRESS NOTE ADULT - SUBJECTIVE AND OBJECTIVE BOX
Patient is a 41y old  Female who presents with a chief complaint of tachycardia (08 Nov 2017 23:58)      SUBJECTIVE / OVERNIGHT EVENTS:    MEDICATIONS  (STANDING):  busPIRone 2.5 milliGRAM(s) Oral two times a day  diltiazem    milliGRAM(s) Oral daily  docusate sodium 100 milliGRAM(s) Oral three times a day  famotidine    Tablet 20 milliGRAM(s) Oral two times a day  influenza   Vaccine 0.5 milliLiter(s) IntraMuscular once  montelukast 10 milliGRAM(s) Oral daily  sodium chloride 0.9%. 1000 milliLiter(s) (125 mL/Hr) IV Continuous <Continuous>    MEDICATIONS  (PRN):  acetaminophen   Tablet. 650 milliGRAM(s) Oral every 6 hours PRN Moderate Pain (4 - 6)  ALBUTerol    90 MICROgram(s) HFA Inhaler 1 Puff(s) Inhalation every 6 hours PRN Shortness of Breath and/or Wheezing  loperamide 2 milliGRAM(s) Oral four times a day PRN Diarrhea  LORazepam     Tablet 2 milliGRAM(s) Oral every 4 hours PRN Anxiety  simethicone 80 milliGRAM(s) Chew every 8 hours PRN Gas      Vital Signs Last 24 Hrs  T(C): 36.7 (10 Nov 2017 08:39), Max: 36.7 (10 Nov 2017 08:39)  T(F): 98 (10 Nov 2017 08:39), Max: 98 (10 Nov 2017 08:39)  HR: 73 (10 Nov 2017 08:39) (66 - 73)  BP: 130/87 (10 Nov 2017 08:39) (101/64 - 134/90)  BP(mean): --  RR: 18 (10 Nov 2017 08:39) (18 - 18)  SpO2: 98% (10 Nov 2017 08:39) (98% - 98%)  CAPILLARY BLOOD GLUCOSE        I&O's Summary    09 Nov 2017 07:01  -  10 Nov 2017 07:00  --------------------------------------------------------  IN: 1980 mL / OUT: 0 mL / NET: 1980 mL    10 Nov 2017 07:01  -  10 Nov 2017 13:36  --------------------------------------------------------  IN: 80 mL / OUT: 200 mL / NET: -120 mL        PHYSICAL EXAM:  GENERAL: NAD, well-developed  HEAD:  Atraumatic, Normocephalic  EYES: EOMI, PERRLA, conjunctiva and sclera clear  NECK: Supple, No JVD  CHEST/LUNG: Clear to auscultation bilaterally; No wheeze  HEART: Regular rate and rhythm; No murmurs, rubs, or gallops  ABDOMEN: Soft, Nontender, Nondistended; Bowel sounds present  EXTREMITIES:  2+ Peripheral Pulses, No clubbing, cyanosis, or edema  PSYCH: AAOx3  NEUROLOGY: non-focal  SKIN: No rashes or lesions    LABS:                        12.1   4.3   )-----------( 62       ( 10 Nov 2017 06:22 )             32.9     11-10    144  |  110<H>  |  4<L>  ----------------------------<  88  4.0   |  24  |  0.82    Ca    8.9      10 Nov 2017 06:22  Phos  3.8     11-09  Mg     2.0     11-09        CARDIAC MARKERS ( 10 Nov 2017 06:22 )  x     / <0.01 ng/mL / 44 U/L / x     / 1.0 ng/mL  CARDIAC MARKERS ( 09 Nov 2017 21:36 )  x     / <0.01 ng/mL / 39 U/L / x     / <1.0 ng/mL  CARDIAC MARKERS ( 09 Nov 2017 13:02 )  x     / <0.01 ng/mL / 57 U/L / x     / 1.0 ng/mL          RADIOLOGY & ADDITIONAL TESTS:    Imaging Personally Reviewed:    Consultant(s) Notes Reviewed:      Care Discussed with Consultants/Other Providers:

## 2017-11-10 NOTE — PROGRESS NOTE ADULT - SUBJECTIVE AND OBJECTIVE BOX
nitial Consult:  · Requested by Name:	Emi Stephenson	  · Date/Time:	11-Nov-2017 	  · Reason for Referral/Consultation:	Dizziness/Tachycardia	      · Subjective and Objective: 	  **********              CARDIOLOGY PROGRESS NOTE              ************  ============================================================  CHIEF COMPLAINT/REASON FOR CONSULT:  Patient is a 41y old  Female who presents with a chief complaint of dizzinesstachycardia    HISTORY OF PRESENT ILLNESS:  41yFemale with a history of POTS ITP Anxiety multiple admissions for latter p/w dizziness & palpitations. No CP/SOB. No syncope.  Multiple admissions for latter.   On high doses Ativan.  Multiple admissions.   Extensive w/u.   Concern for POTS  ==================  Overnight Events  - Went for TILT today  - To be started on Acebutalol   =================    No Known Allergies    Intolerances    metoprolol (Headache)  	    MEDICATIONS:  MEDICATIONS  (STANDING):  busPIRone 2.5 milliGRAM(s) Oral two times a day  diltiazem    milliGRAM(s) Oral daily  docusate sodium 100 milliGRAM(s) Oral three times a day  famotidine    Tablet 20 milliGRAM(s) Oral two times a day  influenza   Vaccine 0.5 milliLiter(s) IntraMuscular once  montelukast 10 milliGRAM(s) Oral daily  sodium chloride 0.9%. 1000 milliLiter(s) (125 mL/Hr) IV Continuous <Continuous>      PAST MEDICAL & SURGICAL HISTORY:  Pott disease  History of ITP  Labyrinthitis  Headache  HTN (hypertension)  Anxiety  IBS (irritable bowel syndrome)  No significant past surgical history      FAMILY HISTORY:  Family history of hypertension (Grandparent)  Family history of atrial fibrillation  Family history of prostate cancer in father      SOCIAL HISTORY:    [ x] Non-smoker  [x] No Illicit Drug Use  [ x] No Excess EtOH Use      REVIEW OF SYSTEMS: (Unless + Before Symptom, it is negative)  Constitutional: No Fever, Fatigue, Weight Changes +anxiety  Eyes: No Recent Vision Changes, Eye Pain  ENT: No Congestion, Sore Throat  Endocrine: No Excess Sweating, Temperature Intolerance  Cardiovascular: No Chest Pain, +Palpitations, Shortness of Breath, Pre-syncope, Syncope, LE Edema  Respiratory: No Cough, Congestion, Wheezing  Gastrointestinal: No Abdominal Pain, Nausea, Vomiting +constipation  Genitourinary: No dysuria, hematuria  Musculoskeletal: No Joint Pain, Swelling  Neurologic: No headaches, Imbalance, Weakness  Skin: No rashes, hematoma, purprura    PHYSICAL EXAM:  Vital Signs Last 24 Hrs  T(C): 37.2 (10 Nov 2017 15:15), Max: 37.2 (10 Nov 2017 15:15)  T(F): 98.9 (10 Nov 2017 15:15), Max: 98.9 (10 Nov 2017 15:15)  HR: 87 (10 Nov 2017 15:15) (66 - 87)  BP: 141/104 (10 Nov 2017 15:15) (101/64 - 141/104)  BP(mean): --  RR: 18 (10 Nov 2017 15:15) (18 - 18)  SpO2: 100% (10 Nov 2017 15:15) (98% - 100%)    Appearance: Normal; NAD	+anxiety   HEENT:   Normal oral mucosa, EOMI	  Lymphatic: No lymphadenopathy  Cardiovascular: Normal S1 S2, No JVD, No murmurs, No edema  Respiratory: Lungs clear to auscultation, no use of accessory muscles	  Psychiatry: A & O x 3; +Anxious  Gastrointestinal:  Soft, Non-tender	  Skin: No rashes, No ecchymoses, No cyanosis	  Neurologic: Non-focal, No Focal Deficits  Extremities: Normal range of motion, No clubbing, cyanosis or edema  Vascular: Peripheral pulses palpable 2+ bilaterally, no prominent varicosities    LABS:	   Labs [id number] Reviewed     CBC Full  -  ( 02 Nov 2017 17:07 )  WBC Count : 10.3 K/uL  Hemoglobin : 13.2 g/dL  Hematocrit : 37.3 %  Platelet Count - Automated : 81 K/uL  Mean Cell Volume : 86.7 fl  Mean Cell Hemoglobin : 30.7 pg  Mean Cell Hemoglobin Concentration : 35.5 gm/dL  Auto Neutrophil # : 7.0 K/uL  Auto Lymphocyte # : 2.6 K/uL  Auto Monocyte # : 0.7 K/uL  Auto Eosinophil # : 0.1 K/uL  Auto Basophil # : 0.0 K/uL  Auto Neutrophil % : 67.2 %  Auto Lymphocyte % : 25.0 %  Auto Monocyte % : 6.8 %  Auto Eosinophil % : 0.8 %  Auto Basophil % : 0.3 %    11-02    140  |  101  |  5<L>  ----------------------------<  86  3.5   |  23  |  0.86    Ca    9.6      02 Nov 2017 17:07    TPro  7.2  /  Alb  4.8  /  TBili  0.4  /  DBili  x   /  AST  20  /  ALT  13  /  AlkPhos  79  11-02  	    ECG:  	  NSR no ischemic changes    =========================================================================  ASSESSMENT:  Dizziness  Orthostasis   Tachycardia  ?IST  ?POTS  High Benzo requirement    Recommendations  - Tilt table testing -> starting acebutalol  - OK to D/C when OK by EP  - Asked RN to correlate timing of BP/RH prior to request for ativan     Please call with questions.

## 2017-11-10 NOTE — PROGRESS NOTE ADULT - SUBJECTIVE AND OBJECTIVE BOX
consult to be dictated  pt with ? POTS admitted with inc tremor, tachycardia  Plan  1. MRI brain and spinal axis with contrast  2. outpt EMG and autonomic testing  3 pt

## 2017-11-11 LAB
ANION GAP SERPL CALC-SCNC: 11 MMOL/L — SIGNIFICANT CHANGE UP (ref 5–17)
ANION GAP SERPL CALC-SCNC: 14 MMOL/L — SIGNIFICANT CHANGE UP (ref 5–17)
BUN SERPL-MCNC: 2 MG/DL — LOW (ref 7–23)
BUN SERPL-MCNC: 3 MG/DL — LOW (ref 7–23)
CA-I BLD-SCNC: 1.21 MMOL/L — SIGNIFICANT CHANGE UP (ref 1.12–1.3)
CALCIUM SERPL-MCNC: 7.5 MG/DL — LOW (ref 8.4–10.5)
CALCIUM SERPL-MCNC: 9 MG/DL — SIGNIFICANT CHANGE UP (ref 8.4–10.5)
CHLORIDE SERPL-SCNC: 108 MMOL/L — SIGNIFICANT CHANGE UP (ref 96–108)
CHLORIDE SERPL-SCNC: 114 MMOL/L — HIGH (ref 96–108)
CK MB CFR SERPL CALC: 1 NG/ML — SIGNIFICANT CHANGE UP (ref 0–3.8)
CK SERPL-CCNC: 38 U/L — SIGNIFICANT CHANGE UP (ref 25–170)
CO2 SERPL-SCNC: 19 MMOL/L — LOW (ref 22–31)
CO2 SERPL-SCNC: 25 MMOL/L — SIGNIFICANT CHANGE UP (ref 22–31)
CREAT SERPL-MCNC: 0.64 MG/DL — SIGNIFICANT CHANGE UP (ref 0.5–1.3)
CREAT SERPL-MCNC: 0.82 MG/DL — SIGNIFICANT CHANGE UP (ref 0.5–1.3)
GLUCOSE SERPL-MCNC: 110 MG/DL — HIGH (ref 70–99)
GLUCOSE SERPL-MCNC: 91 MG/DL — SIGNIFICANT CHANGE UP (ref 70–99)
HCT VFR BLD CALC: 32.7 % — LOW (ref 34.5–45)
HGB BLD-MCNC: 11.9 G/DL — SIGNIFICANT CHANGE UP (ref 11.5–15.5)
MAGNESIUM SERPL-MCNC: 1.5 MG/DL — LOW (ref 1.6–2.6)
MAGNESIUM SERPL-MCNC: 1.9 MG/DL — SIGNIFICANT CHANGE UP (ref 1.6–2.6)
MCHC RBC-ENTMCNC: 31.6 PG — SIGNIFICANT CHANGE UP (ref 27–34)
MCHC RBC-ENTMCNC: 36.3 GM/DL — HIGH (ref 32–36)
MCV RBC AUTO: 87 FL — SIGNIFICANT CHANGE UP (ref 80–100)
PHOSPHATE SERPL-MCNC: 3.8 MG/DL — SIGNIFICANT CHANGE UP (ref 2.5–4.5)
PLATELET # BLD AUTO: 64 K/UL — LOW (ref 150–400)
POTASSIUM SERPL-MCNC: 2.8 MMOL/L — CRITICAL LOW (ref 3.5–5.3)
POTASSIUM SERPL-MCNC: 3.5 MMOL/L — SIGNIFICANT CHANGE UP (ref 3.5–5.3)
POTASSIUM SERPL-SCNC: 2.8 MMOL/L — CRITICAL LOW (ref 3.5–5.3)
POTASSIUM SERPL-SCNC: 3.5 MMOL/L — SIGNIFICANT CHANGE UP (ref 3.5–5.3)
RBC # BLD: 3.75 M/UL — LOW (ref 3.8–5.2)
RBC # FLD: 11.8 % — SIGNIFICANT CHANGE UP (ref 10.3–14.5)
SODIUM SERPL-SCNC: 144 MMOL/L — SIGNIFICANT CHANGE UP (ref 135–145)
SODIUM SERPL-SCNC: 147 MMOL/L — HIGH (ref 135–145)
T4 FREE SERPL-MCNC: 1.4 NG/DL — SIGNIFICANT CHANGE UP (ref 0.9–1.8)
TROPONIN T SERPL-MCNC: <0.01 NG/ML — SIGNIFICANT CHANGE UP (ref 0–0.06)
TSH SERPL-MCNC: 1.51 UIU/ML — SIGNIFICANT CHANGE UP (ref 0.27–4.2)
WBC # BLD: 5.2 K/UL — SIGNIFICANT CHANGE UP (ref 3.8–10.5)
WBC # FLD AUTO: 5.2 K/UL — SIGNIFICANT CHANGE UP (ref 3.8–10.5)

## 2017-11-11 RX ORDER — ONDANSETRON 8 MG/1
4 TABLET, FILM COATED ORAL EVERY 4 HOURS
Qty: 0 | Refills: 0 | Status: DISCONTINUED | OUTPATIENT
Start: 2017-11-11 | End: 2017-11-15

## 2017-11-11 RX ORDER — HYDROCORTISONE 1 %
1 OINTMENT (GRAM) TOPICAL THREE TIMES A DAY
Qty: 0 | Refills: 0 | Status: DISCONTINUED | OUTPATIENT
Start: 2017-11-11 | End: 2017-11-15

## 2017-11-11 RX ORDER — ONDANSETRON 8 MG/1
4 TABLET, FILM COATED ORAL ONCE
Qty: 0 | Refills: 0 | Status: COMPLETED | OUTPATIENT
Start: 2017-11-11 | End: 2017-11-11

## 2017-11-11 RX ORDER — ONDANSETRON 8 MG/1
4 TABLET, FILM COATED ORAL EVERY 8 HOURS
Qty: 0 | Refills: 0 | Status: COMPLETED | OUTPATIENT
Start: 2017-11-11 | End: 2017-11-11

## 2017-11-11 RX ADMIN — ONDANSETRON 4 MILLIGRAM(S): 8 TABLET, FILM COATED ORAL at 17:39

## 2017-11-11 RX ADMIN — Medication 2 MILLIGRAM(S): at 12:49

## 2017-11-11 RX ADMIN — ONDANSETRON 4 MILLIGRAM(S): 8 TABLET, FILM COATED ORAL at 08:38

## 2017-11-11 RX ADMIN — Medication 2.5 MILLIGRAM(S): at 21:36

## 2017-11-11 RX ADMIN — Medication 100 MILLIGRAM(S): at 13:08

## 2017-11-11 RX ADMIN — Medication 100 MILLIGRAM(S): at 20:43

## 2017-11-11 RX ADMIN — Medication 2.5 MILLIGRAM(S): at 11:00

## 2017-11-11 RX ADMIN — MONTELUKAST 10 MILLIGRAM(S): 4 TABLET, CHEWABLE ORAL at 20:43

## 2017-11-11 RX ADMIN — Medication 1 MILLIGRAM(S): at 03:45

## 2017-11-11 RX ADMIN — Medication 240 MILLIGRAM(S): at 19:38

## 2017-11-11 RX ADMIN — ONDANSETRON 4 MILLIGRAM(S): 8 TABLET, FILM COATED ORAL at 12:49

## 2017-11-11 RX ADMIN — Medication 2 MILLIGRAM(S): at 01:30

## 2017-11-11 RX ADMIN — SIMETHICONE 80 MILLIGRAM(S): 80 TABLET, CHEWABLE ORAL at 13:08

## 2017-11-11 RX ADMIN — Medication 2 MILLIGRAM(S): at 17:38

## 2017-11-11 RX ADMIN — ONDANSETRON 4 MILLIGRAM(S): 8 TABLET, FILM COATED ORAL at 01:12

## 2017-11-11 RX ADMIN — FAMOTIDINE 20 MILLIGRAM(S): 10 INJECTION INTRAVENOUS at 20:43

## 2017-11-11 RX ADMIN — FAMOTIDINE 20 MILLIGRAM(S): 10 INJECTION INTRAVENOUS at 06:57

## 2017-11-11 RX ADMIN — ONDANSETRON 4 MILLIGRAM(S): 8 TABLET, FILM COATED ORAL at 22:52

## 2017-11-11 RX ADMIN — Medication 2 MILLIGRAM(S): at 08:12

## 2017-11-11 RX ADMIN — Medication 2 MILLIGRAM(S): at 22:52

## 2017-11-11 RX ADMIN — SODIUM CHLORIDE 125 MILLILITER(S): 9 INJECTION INTRAMUSCULAR; INTRAVENOUS; SUBCUTANEOUS at 20:43

## 2017-11-11 RX ADMIN — Medication 1 APPLICATION(S): at 18:40

## 2017-11-11 NOTE — PROGRESS NOTE ADULT - ASSESSMENT
40yo female with sinus tachycardia, palpitations  Palpitations, sinus tachycardia  Now normal sinus to slightly javier overnight. Her other symptoms seem to be chronic, and unclear ifa ny connection with acebutalol. She is refusing any of this medication today, which she has the right to do.   -will watch on telemetry overnight  -refusing acebutanol, will monitor patient's HR overnight off medication

## 2017-11-11 NOTE — PROGRESS NOTE ADULT - SUBJECTIVE AND OBJECTIVE BOX
Patient is a 41y old  Female who presents with a chief complaint of tachycardia (08 Nov 2017 23:58)      SUBJECTIVE / OVERNIGHT EVENTS:  decling to take acebutalol due to bradycardia.  c/o nausea and vomiting.    MEDICATIONS  (STANDING):  acebutolol 200 milliGRAM(s) Oral two times a day  busPIRone 2.5 milliGRAM(s) Oral two times a day  diltiazem    milliGRAM(s) Oral daily  docusate sodium 100 milliGRAM(s) Oral three times a day  famotidine    Tablet 20 milliGRAM(s) Oral two times a day  influenza   Vaccine 0.5 milliLiter(s) IntraMuscular once  montelukast 10 milliGRAM(s) Oral daily  sodium chloride 0.9%. 1000 milliLiter(s) (125 mL/Hr) IV Continuous <Continuous>    MEDICATIONS  (PRN):  acetaminophen   Tablet. 650 milliGRAM(s) Oral every 6 hours PRN Moderate Pain (4 - 6)  ALBUTerol    90 MICROgram(s) HFA Inhaler 1 Puff(s) Inhalation every 6 hours PRN Shortness of Breath and/or Wheezing  loperamide 2 milliGRAM(s) Oral four times a day PRN Diarrhea  LORazepam     Tablet 2 milliGRAM(s) Oral every 4 hours PRN Anxiety  ondansetron Injectable 4 milliGRAM(s) IV Push every 4 hours PRN Nausea and/or Vomiting  simethicone 80 milliGRAM(s) Chew every 8 hours PRN Gas      Vital Signs Last 24 Hrs  T(C): 37.2 (11 Nov 2017 13:35), Max: 37.2 (11 Nov 2017 13:35)  T(F): 99 (11 Nov 2017 13:35), Max: 99 (11 Nov 2017 13:35)  HR: 67 (11 Nov 2017 13:35) (62 - 77)  BP: 123/84 (11 Nov 2017 13:35) (121/88 - 150/91)  BP(mean): --  RR: 19 (11 Nov 2017 13:35) (16 - 20)  SpO2: 100% (11 Nov 2017 13:35) (98% - 100%)  CAPILLARY BLOOD GLUCOSE        I&O's Summary    10 Nov 2017 07:01  -  11 Nov 2017 07:00  --------------------------------------------------------  IN: 1820 mL / OUT: 400 mL / NET: 1420 mL    11 Nov 2017 07:01  -  11 Nov 2017 16:45  --------------------------------------------------------  IN: 660 mL / OUT: 0 mL / NET: 660 mL        PHYSICAL EXAM:  GENERAL: NAD, well-developed  HEAD:  Atraumatic, Normocephalic  EYES: EOMI, PERRLA, conjunctiva and sclera clear  NECK: Supple, No JVD  CHEST/LUNG: Clear to auscultation bilaterally; No wheeze  HEART: Regular rate and rhythm; No murmurs, rubs, or gallops  ABDOMEN: Soft, Nontender, Nondistended; Bowel sounds present  EXTREMITIES:  2+ Peripheral Pulses, No clubbing, cyanosis, or edema  PSYCH: AAOx3  NEUROLOGY: non-focal  SKIN: No rashes or lesions    LABS:                        11.9   5.2   )-----------( 64       ( 11 Nov 2017 09:36 )             32.7     11-11    144  |  108  |  2<L>  ----------------------------<  110<H>  3.5   |  25  |  0.82    Ca    9.0      11 Nov 2017 12:07  Phos  3.8     11-11  Mg     1.9     11-11        CARDIAC MARKERS ( 11 Nov 2017 09:36 )  x     / <0.01 ng/mL / 38 U/L / x     / 1.0 ng/mL  CARDIAC MARKERS ( 10 Nov 2017 22:55 )  x     / <0.01 ng/mL / 44 U/L / x     / 1.0 ng/mL  CARDIAC MARKERS ( 10 Nov 2017 06:22 )  x     / <0.01 ng/mL / 44 U/L / x     / 1.0 ng/mL  CARDIAC MARKERS ( 09 Nov 2017 21:36 )  x     / <0.01 ng/mL / 39 U/L / x     / <1.0 ng/mL          RADIOLOGY & ADDITIONAL TESTS:    Imaging Personally Reviewed:    Consultant(s) Notes Reviewed:      Care Discussed with Consultants/Other Providers:

## 2017-11-11 NOTE — CHART NOTE - NSCHARTNOTEFT_GEN_A_CORE
Called by  nurse with reports for chest pain. pt seen and examined at bedside. Pt c/o pain to right  breast pain similar to the past. Pt also c/o feeling   anxious because she's afraid to sleep and requesting a1:1aide to the bedside tonight. Pt states she's worried that if she falls asleep her heart rate will drop to the 40's and she wont know.  At present denies any headache, SOB, chest pain, dizziness, palpitations, abd pain, n/v or other discomfort.    Vital Signs Last 24 Hrs  T(C): 37.2 (11 Nov 2017 13:35), Max: 37.2 (11 Nov 2017 13:35)  T(F): 99 (11 Nov 2017 13:35), Max: 99 (11 Nov 2017 13:35)  HR: 67 (11 Nov 2017 13:35) (62 - 77)  BP: 123/84 (11 Nov 2017 13:35) (121/88 - 150/91)  BP(mean): --  RR: 19 (11 Nov 2017 13:35) (16 - 20)  SpO2: 100% (11 Nov 2017 13:35) (98% - 100%)                        11.9   5.2   )-----------( 64       ( 11 Nov 2017 09:36 )             32.7     11-11    144  |  108  |  2<L>  ----------------------------<  110<H>  3.5   |  25  |  0.82    Ca    9.0      11 Nov 2017 12:07  Phos  3.8     11-11  Mg     1.9     11-11      General: WN/WD NAD  Neurology: A&Ox3, nonfocal, SILVA x 4  Head:  Normocephalic, atraumatic  Respiratory: CTA B/L  CV: RRR, S1S2, no murmur  Abdominal: Soft, NT, ND no palpable mass  MSK: No edema, + peripheral pulses, FROM all 4 extremity    A/P  HPI:  41F w/PMH possible POTS dz (has been on BB but d/c-ed 2/2 allergy, frequent ativan use (initially 4mg/day now up to 10mg/day) as a way to "treat POTS episodes", anxiety, ITP, htn, asthma with anxiety  > Called psych for anxiety and medication eval-pt refused to psych f/u  > CONT tele  > cont ativan Q4hr PRN

## 2017-11-11 NOTE — PROGRESS NOTE ADULT - SUBJECTIVE AND OBJECTIVE BOX
HPI:  41F w/PMH possible POTS dz (has been on BB but d/c-ed 2/2 allergy, currently on cardizem CD 240mg daily, last dose today), frequent ativan use (initially 4mg/day now up to 10mg/day) as a way to "treat POTS episodes", anxiety, ITP, htn, asthma returns with history of palp and tremors today. Has been having episodes of palp with HR measured at home to the 130s since last Spring, but they have been becoming more frequent in the last 3 weeks now occurring 2-3x/day, resolving 30min after taking ativan. She takes ativan as needed and had taken 2mg BID but in last weeks has taken up to 2mg 5x/day. Last night took ativan 2mg at 10PM. This AM awoke at 630AM with tremors and tachycardia to 130s. Took 2mg ativan, went to sleep. At 8AM woke with same, took 1mg ativan. 3rd episode at 1115AM, took 1mg ativan and 4th episode at 230PM, took 1mg ativan. Denies ETOH use or other benzos. Denies prior benzo abuse, other drug use. Denies f/c, cp/sob/cough, diaphoresis, syncope, seizures, weakness/numbness, SI/HI, urinary changes, abd pain, n/v/d/c, melena/BRBPR. Patient adamantly wants to be admitted to determine the cause of her symptoms. She has an appointment with a POTS specialist Nov 30. She follows with cardiologist Dr. Hussein. (08 Nov 2017 23:58)  Patient has history no history of thyroid disease no pheo or carcinoid, has tremors and palpitations, has seen [facility] doctors without much help.    PAST MEDICAL & SURGICAL HISTORY:  Pott disease  History of ITP  Labyrinthitis  Headache  HTN (hypertension)  Anxiety  IBS (irritable bowel syndrome)  No significant past surgical history      FAMILY HISTORY:  Family history of hypertension (Grandparent)  Family history of atrial fibrillation  Family history of prostate cancer in father      Social History:    Outpatient Medications:    MEDICATIONS  (STANDING):  acebutolol 200 milliGRAM(s) Oral two times a day  busPIRone 2.5 milliGRAM(s) Oral two times a day  diltiazem    milliGRAM(s) Oral daily  docusate sodium 100 milliGRAM(s) Oral three times a day  famotidine    Tablet 20 milliGRAM(s) Oral two times a day  influenza   Vaccine 0.5 milliLiter(s) IntraMuscular once  montelukast 10 milliGRAM(s) Oral daily  sodium chloride 0.9%. 1000 milliLiter(s) (125 mL/Hr) IV Continuous <Continuous>    MEDICATIONS  (PRN):  acetaminophen   Tablet. 650 milliGRAM(s) Oral every 6 hours PRN Moderate Pain (4 - 6)  ALBUTerol    90 MICROgram(s) HFA Inhaler 1 Puff(s) Inhalation every 6 hours PRN Shortness of Breath and/or Wheezing  loperamide 2 milliGRAM(s) Oral four times a day PRN Diarrhea  LORazepam     Tablet 2 milliGRAM(s) Oral every 4 hours PRN Anxiety  ondansetron Injectable 4 milliGRAM(s) IV Push every 4 hours PRN Nausea and/or Vomiting  simethicone 80 milliGRAM(s) Chew every 8 hours PRN Gas      Allergies    No Known Allergies    Intolerances    metoprolol (Headache)    Review of Systems:  Constitutional: No fever, no chills  Eyes: No blurry vision  Neuro: No tremors  HEENT: No pain, no neck swelling  Cardiovascular: No chest pain, no palpitations  Respiratory: Has SOB, no cough  GI: No nausea, vomiting, abdominal pain  : No dysuria  Skin: no rash  MSK: Has leg swelling, no foot ulcers.  Psych: no depression  Endocrine: no polyuria, polydipsia    ALL OTHER SYSTEMS REVIEWED AND NEGATIVE    UNABLE TO OBTAIN    PHYSICAL EXAM:  VITALS: T(C): 37.2 (11-11-17 @ 13:35)  T(F): 99 (11-11-17 @ 13:35), Max: 99 (11-11-17 @ 13:35)  HR: 67 (11-11-17 @ 13:35) (62 - 77)  BP: 123/84 (11-11-17 @ 13:35) (121/88 - 150/91)  RR:  (16 - 20)  SpO2:  (98% - 100%)  Wt(kg): --  GENERAL: NAD, well-groomed, well-developed  EYES: No proptosis, no lid lag  HEENT:  Atraumatic, Normocephalic  THYROID: Normal size, no palpable nodules  RESPIRATORY: Clear to auscultation bilaterally; No rales, rhonchi, wheezing  CARDIOVASCULAR: Si S2, No murmurs;  GI: Soft, non distended, normal bowel sounds  SKIN: Dry, intact, No rashes or lesions  MUSCULOSKELETAL: Has BL lower extremity edema.  NEURO:  no tremor, sensation decreased in feet BL,                              11.9   5.2   )-----------( 64       ( 11 Nov 2017 09:36 )             32.7       11-11    144  |  108  |  2<L>  ----------------------------<  110<H>  3.5   |  25  |  0.82    EGFR if : 104  EGFR if non : 90    Ca    9.0      11-11  Mg     1.9     11-11  Phos  3.8     11-11        Thyroid Function Tests:  11-11 @ 10:36 TSH 1.51 FreeT4 1.4 T3 -- Anti TPO -- Anti Thyroglobulin Ab -- TSI --              Radiology:

## 2017-11-11 NOTE — PROGRESS NOTE ADULT - ASSESSMENT
Assessment  Anxiety: 41y Female with tremors and non specific arrhythmias, being evaluated by cardiolody/EP team, no pathology detected so far, may have anxiety disorder.   Although low probability, but too complete workup will r/o endocrine disorders including thyroid disease, pheo, carcinoid. Will FU with results

## 2017-11-11 NOTE — PROGRESS NOTE ADULT - SUBJECTIVE AND OBJECTIVE BOX
24H hour events: HR 45-70 overnight but patient states she was awake, and that she had aches, nausea, headaches, sob, most of which she had had before. However, she is refusing to further take aceabutanol. Currently, all her symptoms have resolved.     MEDICATIONS:  acebutolol 200 milliGRAM(s) Oral two times a day  diltiazem    milliGRAM(s) Oral daily  ALBUTerol    90 MICROgram(s) HFA Inhaler 1 Puff(s) Inhalation every 6 hours PRN  montelukast 10 milliGRAM(s) Oral daily  acetaminophen   Tablet. 650 milliGRAM(s) Oral every 6 hours PRN  busPIRone 2.5 milliGRAM(s) Oral two times a day  LORazepam     Tablet 2 milliGRAM(s) Oral every 4 hours PRN  ondansetron Injectable 4 milliGRAM(s) IV Push every 4 hours PRN  docusate sodium 100 milliGRAM(s) Oral three times a day  famotidine    Tablet 20 milliGRAM(s) Oral two times a day  loperamide 2 milliGRAM(s) Oral four times a day PRN  simethicone 80 milliGRAM(s) Chew every 8 hours PRN  influenza   Vaccine 0.5 milliLiter(s) IntraMuscular once  sodium chloride 0.9%. 1000 milliLiter(s) IV Continuous <Continuous>    REVIEW OF SYSTEMS:  Complete 10point ROS negative except as documented above.    PHYSICAL EXAM:  T(C): 37.2 (11-11-17 @ 13:35), Max: 37.2 (11-10-17 @ 15:15)  HR: 67 (11-11-17 @ 13:35) (62 - 87)  BP: 123/84 (11-11-17 @ 13:35) (121/88 - 150/91)  RR: 19 (11-11-17 @ 13:35) (16 - 20)  SpO2: 100% (11-11-17 @ 13:35) (98% - 100%)  Wt(kg): --  I&O's Summary    10 Nov 2017 07:01  -  11 Nov 2017 07:00  --------------------------------------------------------  IN: 1820 mL / OUT: 400 mL / NET: 1420 mL    11 Nov 2017 07:01  -  11 Nov 2017 14:55  --------------------------------------------------------  IN: 660 mL / OUT: 0 mL / NET: 660 mL    Appearance: Normal	  HEENT:   Normal oral mucosa, PERRL, EOMI	  Lymphatic: No lymphadenopathy  Cardiovascular: Normal S1 S2, No JVD, No murmurs, No edema  Respiratory: Lungs clear to auscultation	  Psychiatry: A & O x 3, Mood & affect appropriate  Gastrointestinal:  Soft, Non-tender, + BS	  Skin: No rashes, No ecchymoses, No cyanosis	  Neurologic: Non-focal  Extremities: Normal range of motion, No clubbing, cyanosis or edema  Vascular: Peripheral pulses palpable 2+ bilaterally    LABS:	 	    CBC Full  -  ( 11 Nov 2017 09:36 )  WBC Count : 5.2 K/uL  Hemoglobin : 11.9 g/dL  Hematocrit : 32.7 %  Platelet Count - Automated : 64 K/uL  Mean Cell Volume : 87.0 fl  Mean Cell Hemoglobin : 31.6 pg  Mean Cell Hemoglobin Concentration : 36.3 gm/dL  Auto Neutrophil # : x  Auto Lymphocyte # : x  Auto Monocyte # : x  Auto Eosinophil # : x  Auto Basophil # : x  Auto Neutrophil % : x  Auto Lymphocyte % : x  Auto Monocyte % : x  Auto Eosinophil % : x  Auto Basophil % : x    11-11    144  |  108  |  2<L>  ----------------------------<  110<H>  3.5   |  25  |  0.82  11-11    147<H>  |  114<H>  |  3<L>  ----------------------------<  91  2.8<LL>   |  19<L>  |  0.64    Ca    9.0      11 Nov 2017 12:07  Ca    7.5<L>      11 Nov 2017 09:36  Phos  3.8     11-11  Mg     1.9     11-11  Mg     1.5     11-11      proBNP:   Lipid Profile:   HgA1c:   TSH: Thyroid Stimulating Hormone, Serum: 1.51 uIU/mL (11-11 @ 10:36)    TELEMETRY: sinus at 60	    ECG:  	  RADIOLOGY:  OTHER:

## 2017-11-11 NOTE — PROGRESS NOTE ADULT - ASSESSMENT
41F w/PMH possible POTS dz admitted with tachycardia and tremors which pt takes ativan for up to 10 mg per day without improvement.    tachycardia- on tele, cardio and ep consults, cont cardizem, cont ativan prn.  .  pt has had extensive workup in the past.  neuro eval appreciated.  also was seen by psych,  endo eval appreciated.  for  mri of spine and brain.  refusing Acebutolol and all b blockers follow up workup by endocrine.    ITP- pt's plts is stable from 50 k to 80 k.    asthma - cw albuterol and singulair    dvt px - heparin

## 2017-11-11 NOTE — PROVIDER CONTACT NOTE (CRITICAL VALUE NOTIFICATION) - ACTION/TREATMENT ORDERED:
Provider will order repeat BNP, Mg, Phos on patient prior to beginning treatment. Will continue to monitor.

## 2017-11-11 NOTE — CHART NOTE - NSCHARTNOTEFT_GEN_A_CORE
called by medicine service regarding anxiety that "her HR was <50 constantly during the night" after taking acebutalol 200 mg.  Pt extremely anxious about this issue, endorsing that she's extremely concerned that "her HR is going to drop<30 and would like to start a medication that's going to help w/ that".  Reviewed telemetry monitor and noted that pt did not have any bradycardic episodes.  Sx's likely related to extreme anxiety.      Plan  - pt refusing to take acebutalol  - no further interventions at the moment      Isauro

## 2017-11-12 LAB
ANION GAP SERPL CALC-SCNC: 12 MMOL/L — SIGNIFICANT CHANGE UP (ref 5–17)
ANION GAP SERPL CALC-SCNC: 14 MMOL/L — SIGNIFICANT CHANGE UP (ref 5–17)
BASOPHILS # BLD AUTO: 0 K/UL — SIGNIFICANT CHANGE UP (ref 0–0.2)
BASOPHILS NFR BLD AUTO: 0.1 % — SIGNIFICANT CHANGE UP (ref 0–2)
BUN SERPL-MCNC: 3 MG/DL — LOW (ref 7–23)
BUN SERPL-MCNC: 4 MG/DL — LOW (ref 7–23)
CALCIUM SERPL-MCNC: 8.5 MG/DL — SIGNIFICANT CHANGE UP (ref 8.4–10.5)
CALCIUM SERPL-MCNC: 9.3 MG/DL — SIGNIFICANT CHANGE UP (ref 8.4–10.5)
CHLORIDE SERPL-SCNC: 109 MMOL/L — HIGH (ref 96–108)
CHLORIDE SERPL-SCNC: 111 MMOL/L — HIGH (ref 96–108)
CO2 SERPL-SCNC: 22 MMOL/L — SIGNIFICANT CHANGE UP (ref 22–31)
CO2 SERPL-SCNC: 24 MMOL/L — SIGNIFICANT CHANGE UP (ref 22–31)
CREAT SERPL-MCNC: 0.86 MG/DL — SIGNIFICANT CHANGE UP (ref 0.5–1.3)
CREAT SERPL-MCNC: 0.93 MG/DL — SIGNIFICANT CHANGE UP (ref 0.5–1.3)
EOSINOPHIL # BLD AUTO: 0.1 K/UL — SIGNIFICANT CHANGE UP (ref 0–0.5)
EOSINOPHIL NFR BLD AUTO: 2.3 % — SIGNIFICANT CHANGE UP (ref 0–6)
GLUCOSE SERPL-MCNC: 84 MG/DL — SIGNIFICANT CHANGE UP (ref 70–99)
GLUCOSE SERPL-MCNC: 90 MG/DL — SIGNIFICANT CHANGE UP (ref 70–99)
HCT VFR BLD CALC: 30.8 % — LOW (ref 34.5–45)
HGB BLD-MCNC: 10.9 G/DL — LOW (ref 11.5–15.5)
LYMPHOCYTES # BLD AUTO: 1.7 K/UL — SIGNIFICANT CHANGE UP (ref 1–3.3)
LYMPHOCYTES # BLD AUTO: 39.9 % — SIGNIFICANT CHANGE UP (ref 13–44)
MCHC RBC-ENTMCNC: 31.3 PG — SIGNIFICANT CHANGE UP (ref 27–34)
MCHC RBC-ENTMCNC: 35.4 GM/DL — SIGNIFICANT CHANGE UP (ref 32–36)
MCV RBC AUTO: 88.6 FL — SIGNIFICANT CHANGE UP (ref 80–100)
MONOCYTES # BLD AUTO: 0.4 K/UL — SIGNIFICANT CHANGE UP (ref 0–0.9)
MONOCYTES NFR BLD AUTO: 10.3 % — SIGNIFICANT CHANGE UP (ref 2–14)
NEUTROPHILS # BLD AUTO: 2 K/UL — SIGNIFICANT CHANGE UP (ref 1.8–7.4)
NEUTROPHILS NFR BLD AUTO: 47.4 % — SIGNIFICANT CHANGE UP (ref 43–77)
PLATELET # BLD AUTO: 57 K/UL — LOW (ref 150–400)
POTASSIUM SERPL-MCNC: 3.3 MMOL/L — LOW (ref 3.5–5.3)
POTASSIUM SERPL-MCNC: 3.5 MMOL/L — SIGNIFICANT CHANGE UP (ref 3.5–5.3)
POTASSIUM SERPL-SCNC: 3.3 MMOL/L — LOW (ref 3.5–5.3)
POTASSIUM SERPL-SCNC: 3.5 MMOL/L — SIGNIFICANT CHANGE UP (ref 3.5–5.3)
RBC # BLD: 3.48 M/UL — LOW (ref 3.8–5.2)
RBC # FLD: 12 % — SIGNIFICANT CHANGE UP (ref 10.3–14.5)
SODIUM SERPL-SCNC: 145 MMOL/L — SIGNIFICANT CHANGE UP (ref 135–145)
SODIUM SERPL-SCNC: 147 MMOL/L — HIGH (ref 135–145)
WBC # BLD: 4.2 K/UL — SIGNIFICANT CHANGE UP (ref 3.8–10.5)
WBC # FLD AUTO: 4.2 K/UL — SIGNIFICANT CHANGE UP (ref 3.8–10.5)

## 2017-11-12 PROCEDURE — 93010 ELECTROCARDIOGRAM REPORT: CPT | Mod: 76

## 2017-11-12 PROCEDURE — 99232 SBSQ HOSP IP/OBS MODERATE 35: CPT | Mod: GC

## 2017-11-12 PROCEDURE — 72141 MRI NECK SPINE W/O DYE: CPT | Mod: 26

## 2017-11-12 PROCEDURE — 72148 MRI LUMBAR SPINE W/O DYE: CPT | Mod: 26

## 2017-11-12 RX ADMIN — Medication 2 MILLIGRAM(S): at 06:01

## 2017-11-12 RX ADMIN — Medication 2 MILLIGRAM(S): at 15:34

## 2017-11-12 RX ADMIN — MONTELUKAST 10 MILLIGRAM(S): 4 TABLET, CHEWABLE ORAL at 23:01

## 2017-11-12 RX ADMIN — ONDANSETRON 4 MILLIGRAM(S): 8 TABLET, FILM COATED ORAL at 06:00

## 2017-11-12 RX ADMIN — Medication 100 MILLIGRAM(S): at 22:03

## 2017-11-12 RX ADMIN — FAMOTIDINE 20 MILLIGRAM(S): 10 INJECTION INTRAVENOUS at 08:21

## 2017-11-12 RX ADMIN — Medication 100 MILLIGRAM(S): at 15:34

## 2017-11-12 RX ADMIN — Medication 2.5 MILLIGRAM(S): at 22:03

## 2017-11-12 RX ADMIN — ONDANSETRON 4 MILLIGRAM(S): 8 TABLET, FILM COATED ORAL at 12:08

## 2017-11-12 RX ADMIN — Medication 240 MILLIGRAM(S): at 17:54

## 2017-11-12 RX ADMIN — SIMETHICONE 80 MILLIGRAM(S): 80 TABLET, CHEWABLE ORAL at 06:07

## 2017-11-12 RX ADMIN — ONDANSETRON 4 MILLIGRAM(S): 8 TABLET, FILM COATED ORAL at 20:13

## 2017-11-12 RX ADMIN — FAMOTIDINE 20 MILLIGRAM(S): 10 INJECTION INTRAVENOUS at 23:01

## 2017-11-12 RX ADMIN — Medication 2 MILLIGRAM(S): at 12:08

## 2017-11-12 RX ADMIN — Medication 2.5 MILLIGRAM(S): at 08:21

## 2017-11-12 RX ADMIN — Medication 2 MILLIGRAM(S): at 20:13

## 2017-11-12 NOTE — CHART NOTE - NSCHARTNOTEFT_GEN_A_CORE
called by primary RN with report of pt with elevated bp SP in 160-170 with left temporal numbness and pt is requesting to get her cardizem early. pt bp usually run -120s. Pt seen and examined at bedside, no neurological deficits and facial numbness resolved. cardizem given and reassess bp in 1 hour.

## 2017-11-12 NOTE — PROGRESS NOTE ADULT - SUBJECTIVE AND OBJECTIVE BOX
Patient is a 41y old  Female who presents with a chief complaint of tachycardia (08 Nov 2017 23:58)      HPI:  pt seen, no events    Vital Signs Last 24 Hrs  T(C): 36.6 (12 Nov 2017 04:31), Max: 37.2 (11 Nov 2017 13:35)  T(F): 97.8 (12 Nov 2017 04:31), Max: 99 (11 Nov 2017 13:35)  HR: 73 (12 Nov 2017 06:00) (62 - 77)  BP: 112/76 (12 Nov 2017 06:00) (108/74 - 148/86)  BP(mean): --  RR: 18 (12 Nov 2017 04:31) (16 - 20)  SpO2: 96% (12 Nov 2017 04:31) (96% - 100%)    Physical Exam    Mental Status- AAO x 3, speech fluent without dysarthria, memory and fund of knowledge intact  CN- 2-12 intact  Motor- 5/5 x 4 ext, nl bulk and tone  Sensory- intact Lt/PP/propriception  Coordination- No dysmetria UE/LE  Gait- deferred

## 2017-11-12 NOTE — PROGRESS NOTE ADULT - SUBJECTIVE AND OBJECTIVE BOX
Chief complaint  Patient is a 41y old  Female who presents with a chief complaint of tachycardia (08 Nov 2017 23:58)   Review of systems  Patient in bed, looks comfortable, no fever, no hypoglycemia.    Labs and Fingersticks    CAPILLARY BLOOD GLUCOSE    Anion Gap, Serum: 14 (11-12 @ 10:35)  Anion Gap, Serum: 12 (11-12 @ 06:38)  Anion Gap, Serum: 11 (11-11 @ 12:07)  Anion Gap, Serum: 14 (11-11 @ 09:36)      Calcium, Total Serum: 9.3 (11-12 @ 10:35)  Calcium, Total Serum: 8.5 (11-12 @ 06:38)  Calcium, Total Serum: 9.0 (11-11 @ 12:07)  Calcium, Total Serum: 7.5 <L> (11-11 @ 09:36)          11-12    147<H>  |  109<H>  |  3<L>  ----------------------------<  90  3.5   |  24  |  0.93    Ca    9.3      12 Nov 2017 10:35  Phos  3.8     11-11  Mg     1.9     11-11                          10.9   4.2   )-----------( 57       ( 12 Nov 2017 06:38 )             30.8     Medications  MEDICATIONS  (STANDING):  acebutolol 200 milliGRAM(s) Oral two times a day  busPIRone 2.5 milliGRAM(s) Oral two times a day  diltiazem    milliGRAM(s) Oral daily  docusate sodium 100 milliGRAM(s) Oral three times a day  famotidine    Tablet 20 milliGRAM(s) Oral two times a day  influenza   Vaccine 0.5 milliLiter(s) IntraMuscular once  montelukast 10 milliGRAM(s) Oral daily  sodium chloride 0.9%. 1000 milliLiter(s) (125 mL/Hr) IV Continuous <Continuous>      Physical Exam  General: Patient comfortable in bed  Vital Signs Last 12 Hrs  T(F): 97.8 (11-12-17 @ 04:31), Max: 97.8 (11-12-17 @ 04:31)  HR: 73 (11-12-17 @ 06:00) (70 - 73)  BP: 112/76 (11-12-17 @ 06:00) (108/74 - 112/76)  BP(mean): --  RR: 18 (11-12-17 @ 04:31) (18 - 18)  SpO2: 96% (11-12-17 @ 04:31) (96% - 96%)  Neck: No palpable thyroid nodules.  CVS: S1S2, No murmurs  Respiratory: No wheezing, no crepitations  GI: Abdomen soft, bowel sounds positive  Musculoskeletal: Positive edema lower extremities.   Skin: No skin rashes, no ecchymosis    Diagnostics    Thyroid Stimulating Hormone, Serum: AM Sched. Collection: 11-Nov-2017 06:00 (11-10 @ 16:23)  Free Thyroxine, Serum: AM Sched. Collection: 11-Nov-2017 06:00 (11-10 @ 16:23)  5HIAA w/Creatinine 24 hr UR: Routine (11-11 @ 16:07)  Metanephrine, Plasma: AM Sched. Collection: 13-Nov-2017 06:00 (11-12 @ 11:58)  Cortisol AM, Serum: AM Sched. Collection: 13-Nov-2017 06:00 (11-12 @ 11:58)

## 2017-11-12 NOTE — PROGRESS NOTE ADULT - SUBJECTIVE AND OBJECTIVE BOX
24H hour events: No acute events overnight. Symptoms improved after stopping beta blocker.     MEDICATIONS:  acebutolol 200 milliGRAM(s) Oral two times a day  diltiazem    milliGRAM(s) Oral daily  ALBUTerol    90 MICROgram(s) HFA Inhaler 1 Puff(s) Inhalation every 6 hours PRN  montelukast 10 milliGRAM(s) Oral daily  acetaminophen   Tablet. 650 milliGRAM(s) Oral every 6 hours PRN  busPIRone 2.5 milliGRAM(s) Oral two times a day  LORazepam     Tablet 2 milliGRAM(s) Oral every 4 hours PRN  ondansetron Injectable 4 milliGRAM(s) IV Push every 4 hours PRN  docusate sodium 100 milliGRAM(s) Oral three times a day  famotidine    Tablet 20 milliGRAM(s) Oral two times a day  loperamide 2 milliGRAM(s) Oral four times a day PRN  simethicone 80 milliGRAM(s) Chew every 8 hours PRN  hydrocortisone 0.5% Cream 1 Application(s) Topical three times a day PRN  influenza   Vaccine 0.5 milliLiter(s) IntraMuscular once  sodium chloride 0.9%. 1000 milliLiter(s) IV Continuous <Continuous>    REVIEW OF SYSTEMS:  Complete 10point ROS negative except as documented above.    PHYSICAL EXAM:  T(C): 36.6 (11-12-17 @ 04:31), Max: 37.2 (11-11-17 @ 13:35)  HR: 73 (11-12-17 @ 06:00) (67 - 77)  BP: 112/76 (11-12-17 @ 06:00) (108/74 - 130/86)  RR: 18 (11-12-17 @ 04:31) (18 - 19)  SpO2: 96% (11-12-17 @ 04:31) (96% - 100%)  Wt(kg): --  I&O's Summary    11 Nov 2017 07:01  -  12 Nov 2017 07:00  --------------------------------------------------------  IN: 3535 mL / OUT: 0 mL / NET: 3535 mL    Appearance: Normal	  HEENT:   Normal oral mucosa, PERRL, EOMI	  Lymphatic: No lymphadenopathy  Cardiovascular: Normal S1 S2, No JVD, No murmurs, No edema  Respiratory: Lungs clear to auscultation	  Psychiatry: A & O x 3, Mood & affect appropriate  Gastrointestinal:  Soft, Non-tender, + BS	  Skin: No rashes, No ecchymoses, No cyanosis	  Neurologic: Non-focal  Extremities: Normal range of motion, No clubbing, cyanosis or edema  Vascular: Peripheral pulses palpable 2+ bilaterally    LABS:	 	    CBC Full  -  ( 12 Nov 2017 06:38 )  WBC Count : 4.2 K/uL  Hemoglobin : 10.9 g/dL  Hematocrit : 30.8 %  Platelet Count - Automated : 57 K/uL  Mean Cell Volume : 88.6 fl  Mean Cell Hemoglobin : 31.3 pg  Mean Cell Hemoglobin Concentration : 35.4 gm/dL  Auto Neutrophil # : 2.0 K/uL  Auto Lymphocyte # : 1.7 K/uL  Auto Monocyte # : 0.4 K/uL  Auto Eosinophil # : 0.1 K/uL  Auto Basophil # : 0.0 K/uL  Auto Neutrophil % : 47.4 %  Auto Lymphocyte % : 39.9 %  Auto Monocyte % : 10.3 %  Auto Eosinophil % : 2.3 %  Auto Basophil % : 0.1 %    11-12    147<H>  |  109<H>  |  3<L>  ----------------------------<  90  3.5   |  24  |  0.93  11-12    145  |  111<H>  |  4<L>  ----------------------------<  84  3.3<L>   |  22  |  0.86    Ca    9.3      12 Nov 2017 10:35  Ca    8.5      12 Nov 2017 06:38  Phos  3.8     11-11  Mg     1.9     11-11  Mg     1.5     11-11      TELEMETRY: sinus 50-70	    ECG:  	  RADIOLOGY:  OTHER: 	    PREVIOUS DIAGNOSTIC TESTING:    [ ] Echocardiogram:  [ ]  Catheterization:  [ ] Stress Test:  	  	  ASSESSMENT/PLAN:

## 2017-11-12 NOTE — PROGRESS NOTE ADULT - SUBJECTIVE AND OBJECTIVE BOX
Patient is a 41y old  Female who presents with a chief complaint of tachycardia (08 Nov 2017 23:58)      SUBJECTIVE / OVERNIGHT EVENTS: no new complaints.  concerned about her HR which is in the 40s and 50s.  she is assymptomatic.    MEDICATIONS  (STANDING):  acebutolol 200 milliGRAM(s) Oral two times a day  busPIRone 2.5 milliGRAM(s) Oral two times a day  diltiazem    milliGRAM(s) Oral daily  docusate sodium 100 milliGRAM(s) Oral three times a day  famotidine    Tablet 20 milliGRAM(s) Oral two times a day  influenza   Vaccine 0.5 milliLiter(s) IntraMuscular once  montelukast 10 milliGRAM(s) Oral daily  sodium chloride 0.9%. 1000 milliLiter(s) (125 mL/Hr) IV Continuous <Continuous>    MEDICATIONS  (PRN):  acetaminophen   Tablet. 650 milliGRAM(s) Oral every 6 hours PRN Moderate Pain (4 - 6)  ALBUTerol    90 MICROgram(s) HFA Inhaler 1 Puff(s) Inhalation every 6 hours PRN Shortness of Breath and/or Wheezing  hydrocortisone 0.5% Cream 1 Application(s) Topical three times a day PRN Rash and/or Itching  loperamide 2 milliGRAM(s) Oral four times a day PRN Diarrhea  LORazepam     Tablet 2 milliGRAM(s) Oral every 4 hours PRN Anxiety  ondansetron Injectable 4 milliGRAM(s) IV Push every 4 hours PRN Nausea and/or Vomiting  simethicone 80 milliGRAM(s) Chew every 8 hours PRN Gas      Vital Signs Last 24 Hrs  T(C): 37.1 (12 Nov 2017 13:06), Max: 37.1 (12 Nov 2017 13:06)  T(F): 98.8 (12 Nov 2017 13:06), Max: 98.8 (12 Nov 2017 13:06)  HR: 67 (12 Nov 2017 18:02) (60 - 77)  BP: 173/93 (12 Nov 2017 18:02) (108/74 - 173/93)  BP(mean): --  RR: 18 (12 Nov 2017 13:06) (18 - 18)  SpO2: 100% (12 Nov 2017 13:06) (96% - 100%)  CAPILLARY BLOOD GLUCOSE        I&O's Summary    11 Nov 2017 07:01  -  12 Nov 2017 07:00  --------------------------------------------------------  IN: 3535 mL / OUT: 0 mL / NET: 3535 mL    12 Nov 2017 07:01  -  12 Nov 2017 18:44  --------------------------------------------------------  IN: 120 mL / OUT: 200 mL / NET: -80 mL        PHYSICAL EXAM:  GENERAL: NAD, well-developed  HEAD:  Atraumatic, Normocephalic  EYES: EOMI, PERRLA, conjunctiva and sclera clear  NECK: Supple, No JVD  CHEST/LUNG: Clear to auscultation bilaterally; No wheeze  HEART: Regular rate and rhythm; No murmurs, rubs, or gallops  ABDOMEN: Soft, Nontender, Nondistended; Bowel sounds present  EXTREMITIES:  2+ Peripheral Pulses, No clubbing, cyanosis, or edema  PSYCH: AAOx3  NEUROLOGY: non-focal  SKIN: No rashes or lesions    LABS:                        10.9   4.2   )-----------( 57       ( 12 Nov 2017 06:38 )             30.8     11-12    147<H>  |  109<H>  |  3<L>  ----------------------------<  90  3.5   |  24  |  0.93    Ca    9.3      12 Nov 2017 10:35  Phos  3.8     11-11  Mg     1.9     11-11        CARDIAC MARKERS ( 11 Nov 2017 09:36 )  x     / <0.01 ng/mL / 38 U/L / x     / 1.0 ng/mL  CARDIAC MARKERS ( 10 Nov 2017 22:55 )  x     / <0.01 ng/mL / 44 U/L / x     / 1.0 ng/mL          RADIOLOGY & ADDITIONAL TESTS:    Imaging Personally Reviewed:    Consultant(s) Notes Reviewed:      Care Discussed with Consultants/Other Providers:

## 2017-11-12 NOTE — PROGRESS NOTE ADULT - ASSESSMENT
Assessment  Anxiety: 41y Female with tremors and non specific arrhythmias, being evaluated by cardiolody/EP team, no pathology detected so far, may have anxiety disorder.   Although low probability, but will complete workup r/o endocrine disorders including thyroid disease, pheo, carcinoid. Will FU with results

## 2017-11-12 NOTE — PROGRESS NOTE ADULT - ASSESSMENT
42yo female with sinus tachycardia, palpitations  Palpitations, sinus tachycardia  Improved symptoms now with holding beta blocker since yesterday. Her rate was sinus 50-70 and even with that, she is very sensitive to very subtle changes in her HR.  She is refusing any av heather blocking agents today which is fair given how sensitive she is to them.   -continue to monitor on telemetry   -more education and reinforcement that her current HR on telemetry is within normal limits  -if in this range, may be no role for any av heather agents, but will monitor for further tachycardia  -EP to follow

## 2017-11-12 NOTE — PROGRESS NOTE ADULT - ASSESSMENT
42yo F with tremor, ?POTS  1. awaiting mri brain, spine  2. if neg, may pursue autonomic testing as outpt

## 2017-11-13 LAB
ANION GAP SERPL CALC-SCNC: 13 MMOL/L — SIGNIFICANT CHANGE UP (ref 5–17)
BUN SERPL-MCNC: 2 MG/DL — LOW (ref 7–23)
CALCIUM SERPL-MCNC: 8.7 MG/DL — SIGNIFICANT CHANGE UP (ref 8.4–10.5)
CHLORIDE SERPL-SCNC: 109 MMOL/L — HIGH (ref 96–108)
CO2 SERPL-SCNC: 23 MMOL/L — SIGNIFICANT CHANGE UP (ref 22–31)
CORTIS AM PEAK SERPL-MCNC: 12.1 UG/DL — SIGNIFICANT CHANGE UP (ref 6–18.4)
CREAT SERPL-MCNC: 0.79 MG/DL — SIGNIFICANT CHANGE UP (ref 0.5–1.3)
GLUCOSE SERPL-MCNC: 90 MG/DL — SIGNIFICANT CHANGE UP (ref 70–99)
HCT VFR BLD CALC: 33.9 % — LOW (ref 34.5–45)
HGB BLD-MCNC: 11.8 G/DL — SIGNIFICANT CHANGE UP (ref 11.5–15.5)
MCHC RBC-ENTMCNC: 30.5 PG — SIGNIFICANT CHANGE UP (ref 27–34)
MCHC RBC-ENTMCNC: 34.9 GM/DL — SIGNIFICANT CHANGE UP (ref 32–36)
MCV RBC AUTO: 87.5 FL — SIGNIFICANT CHANGE UP (ref 80–100)
PLATELET # BLD AUTO: 64 K/UL — LOW (ref 150–400)
POTASSIUM SERPL-MCNC: 3.2 MMOL/L — LOW (ref 3.5–5.3)
POTASSIUM SERPL-SCNC: 3.2 MMOL/L — LOW (ref 3.5–5.3)
RBC # BLD: 3.87 M/UL — SIGNIFICANT CHANGE UP (ref 3.8–5.2)
RBC # FLD: 12.1 % — SIGNIFICANT CHANGE UP (ref 10.3–14.5)
SODIUM SERPL-SCNC: 145 MMOL/L — SIGNIFICANT CHANGE UP (ref 135–145)
WBC # BLD: 5.1 K/UL — SIGNIFICANT CHANGE UP (ref 3.8–10.5)
WBC # FLD AUTO: 5.1 K/UL — SIGNIFICANT CHANGE UP (ref 3.8–10.5)

## 2017-11-13 PROCEDURE — 99232 SBSQ HOSP IP/OBS MODERATE 35: CPT

## 2017-11-13 PROCEDURE — 99233 SBSQ HOSP IP/OBS HIGH 50: CPT

## 2017-11-13 PROCEDURE — 70553 MRI BRAIN STEM W/O & W/DYE: CPT | Mod: 26

## 2017-11-13 RX ORDER — POTASSIUM CHLORIDE 20 MEQ
20 PACKET (EA) ORAL ONCE
Qty: 0 | Refills: 0 | Status: COMPLETED | OUTPATIENT
Start: 2017-11-13 | End: 2017-11-13

## 2017-11-13 RX ADMIN — Medication 650 MILLIGRAM(S): at 08:07

## 2017-11-13 RX ADMIN — Medication 2.5 MILLIGRAM(S): at 11:38

## 2017-11-13 RX ADMIN — Medication 2 MILLIGRAM(S): at 23:53

## 2017-11-13 RX ADMIN — ONDANSETRON 4 MILLIGRAM(S): 8 TABLET, FILM COATED ORAL at 06:30

## 2017-11-13 RX ADMIN — Medication 2 MILLIGRAM(S): at 06:30

## 2017-11-13 RX ADMIN — Medication 240 MILLIGRAM(S): at 20:13

## 2017-11-13 RX ADMIN — FAMOTIDINE 20 MILLIGRAM(S): 10 INJECTION INTRAVENOUS at 22:25

## 2017-11-13 RX ADMIN — Medication 1 MILLIGRAM(S): at 17:26

## 2017-11-13 RX ADMIN — MONTELUKAST 10 MILLIGRAM(S): 4 TABLET, CHEWABLE ORAL at 22:25

## 2017-11-13 RX ADMIN — FAMOTIDINE 20 MILLIGRAM(S): 10 INJECTION INTRAVENOUS at 08:11

## 2017-11-13 RX ADMIN — Medication 20 MILLIEQUIVALENT(S): at 14:35

## 2017-11-13 RX ADMIN — ONDANSETRON 4 MILLIGRAM(S): 8 TABLET, FILM COATED ORAL at 14:35

## 2017-11-13 RX ADMIN — ONDANSETRON 4 MILLIGRAM(S): 8 TABLET, FILM COATED ORAL at 10:14

## 2017-11-13 RX ADMIN — Medication 2 MILLIGRAM(S): at 14:35

## 2017-11-13 RX ADMIN — Medication 100 MILLIGRAM(S): at 10:15

## 2017-11-13 RX ADMIN — Medication 100 MILLIGRAM(S): at 22:25

## 2017-11-13 RX ADMIN — Medication 2 MILLIGRAM(S): at 10:13

## 2017-11-13 RX ADMIN — Medication 2.5 MILLIGRAM(S): at 21:55

## 2017-11-13 RX ADMIN — Medication 650 MILLIGRAM(S): at 10:12

## 2017-11-13 NOTE — PHYSICAL THERAPY INITIAL EVALUATION ADULT - GENERAL OBSERVATIONS, REHAB EVAL
pt received in bed pt reports has L temporal headache took pain meds and was 7/10 now 3/10 ; pt initially defer PT as pt exhausted up most of night due to room mate complaints but pt needed to use BR and agree to PT assist to /from BR; pt on 2 L nc o2

## 2017-11-13 NOTE — PROGRESS NOTE ADULT - SUBJECTIVE AND OBJECTIVE BOX
nitial Consult:  · Requested by Name:	Emi Stephenson	  · Date/Time:	13-Nov-2017 	  · Reason for Referral/Consultation:	Dizziness/Tachycardia	      · Subjective and Objective: 	  **********              CARDIOLOGY PROGRESS NOTE              ************  ============================================================  CHIEF COMPLAINT/REASON FOR CONSULT:  Patient is a 41y old  Female who presents with a chief complaint of dizzinesstachycardia    HISTORY OF PRESENT ILLNESS:  41yFemale with a history of POTS ITP Anxiety multiple admissions for latter p/w dizziness & palpitations. No CP/SOB. No syncope.  Multiple admissions for latter.   On high doses Ativan.  Multiple admissions.   Extensive w/u.   Concern for POTS  ==================  Overnight Events  - discontinued acebutalol due to side effects  - persistent low K  - considering ivrabidine  =================    No Known Allergies    Intolerances    metoprolol (Headache)  	    MEDICATIONS:  MEDICATIONS  (STANDING):  acebutolol 200 milliGRAM(s) Oral two times a day  busPIRone 2.5 milliGRAM(s) Oral two times a day  diltiazem    milliGRAM(s) Oral daily  docusate sodium 100 milliGRAM(s) Oral three times a day  famotidine    Tablet 20 milliGRAM(s) Oral two times a day  influenza   Vaccine 0.5 milliLiter(s) IntraMuscular once  LORazepam   Injectable 1 milliGRAM(s) IV Push once  montelukast 10 milliGRAM(s) Oral daily  sodium chloride 0.9%. 1000 milliLiter(s) (125 mL/Hr) IV Continuous <Continuous>        PAST MEDICAL & SURGICAL HISTORY:  Pott disease  History of ITP  Labyrinthitis  Headache  HTN (hypertension)  Anxiety  IBS (irritable bowel syndrome)  No significant past surgical history      FAMILY HISTORY:  Family history of hypertension (Grandparent)  Family history of atrial fibrillation  Family history of prostate cancer in father      SOCIAL HISTORY:    [ x] Non-smoker  [x] No Illicit Drug Use  [ x] No Excess EtOH Use      REVIEW OF SYSTEMS: (Unless + Before Symptom, it is negative)  Constitutional: No Fever, Fatigue, Weight Changes +anxiety  Eyes: No Recent Vision Changes, Eye Pain  ENT: No Congestion, Sore Throat  Endocrine: No Excess Sweating, Temperature Intolerance  Cardiovascular: No Chest Pain, +Palpitations, Shortness of Breath, Pre-syncope, Syncope, LE Edema  Respiratory: No Cough, Congestion, Wheezing  Gastrointestinal: No Abdominal Pain, + Nausea, Vomiting +constipation  Genitourinary: No dysuria, hematuria  Musculoskeletal: No Joint Pain, Swelling  Neurologic: No headaches, Imbalance, Weakness  Skin: No rashes, hematoma, purprura    PHYSICAL EXAM:  Vital Signs Last 24 Hrs  T(C): 37 (13 Nov 2017 13:20), Max: 37 (13 Nov 2017 13:20)  T(F): 98.6 (13 Nov 2017 13:20), Max: 98.6 (13 Nov 2017 13:20)  HR: 66 (13 Nov 2017 13:20) (60 - 84)  BP: 122/86 (13 Nov 2017 13:20) (95/62 - 173/93)  BP(mean): --  RR: 18 (13 Nov 2017 13:20) (18 - 18)  SpO2: 100% (13 Nov 2017 13:20) (98% - 100%)    Appearance: Normal; NAD	+anxiety   HEENT:   Normal oral mucosa, EOMI	  Lymphatic: No lymphadenopathy  Cardiovascular: Normal S1 S2, No JVD, No murmurs, No edema  Respiratory: Lungs clear to auscultation, no use of accessory muscles	  Psychiatry: A & O x 3; +Anxious  Gastrointestinal:  Soft, Non-tender	  Skin: No rashes, No ecchymoses, No cyanosis	  Neurologic: Non-focal, No Focal Deficits  Extremities: Normal range of motion, No clubbing, cyanosis or edema  Vascular: Peripheral pulses palpable 2+ bilaterally, no prominent varicosities    LABS:	   Labs [id number] Reviewed     CBC Full  -  ( 02 Nov 2017 17:07 )  WBC Count : 10.3 K/uL  Hemoglobin : 13.2 g/dL  Hematocrit : 37.3 %  Platelet Count - Automated : 81 K/uL  Mean Cell Volume : 86.7 fl  Mean Cell Hemoglobin : 30.7 pg  Mean Cell Hemoglobin Concentration : 35.5 gm/dL  Auto Neutrophil # : 7.0 K/uL  Auto Lymphocyte # : 2.6 K/uL  Auto Monocyte # : 0.7 K/uL  Auto Eosinophil # : 0.1 K/uL  Auto Basophil # : 0.0 K/uL  Auto Neutrophil % : 67.2 %  Auto Lymphocyte % : 25.0 %  Auto Monocyte % : 6.8 %  Auto Eosinophil % : 0.8 %  Auto Basophil % : 0.3 %    11-02    140  |  101  |  5<L>  ----------------------------<  86  3.5   |  23  |  0.86    Ca    9.6      02 Nov 2017 17:07    TPro  7.2  /  Alb  4.8  /  TBili  0.4  /  DBili  x   /  AST  20  /  ALT  13  /  AlkPhos  79  11-02  	    ECG:  	  NSR no ischemic changes    =========================================================================  ASSESSMENT:  Dizziness  Orthostasis   Tachycardia  ?IST  ?POTS  High Benzo requirement  Likely component of ativan withdrawal    Recommendations  - Ivrabidine  - Sent off serum brendan/renin for persistently low   - OK to D/C when OK by EP  - Asked RN to correlate timing of BP/RH prior to request for ativan     Please call with questions.

## 2017-11-13 NOTE — PROGRESS NOTE ADULT - ASSESSMENT
42yo F with tremor, ?POTS  1. awaiting mri brain, and lumbar   2.  C-spine reviewed   3. if imaging study negative may pursue autonomic testing as outpt 42yo F with tremor, ?POTS  1. awaiting mri brain, and lumbar   2.  C-spine reviewed   3. if imaging study negative may pursue autonomic testing as outpt    Left temporal region Headache   recommend checking ESR and CRP given

## 2017-11-13 NOTE — PROGRESS NOTE ADULT - SUBJECTIVE AND OBJECTIVE BOX
Chief complaint  Patient is a 41y old  Female who presents with a chief complaint of tachycardia (08 Nov 2017 23:58)   Review of systems  Patient in bed, looks comfortable, no fever,  c/o leg tremors, no chest pain, no hypoglycemia.    Labs and Fingersticks    CAPILLARY BLOOD GLUCOSE    Anion Gap, Serum: 13 (11-13 @ 06:44)  Anion Gap, Serum: 14 (11-12 @ 10:35)  Anion Gap, Serum: 12 (11-12 @ 06:38)      Calcium, Total Serum: 8.7 (11-13 @ 06:44)  Calcium, Total Serum: 9.3 (11-12 @ 10:35)  Calcium, Total Serum: 8.5 (11-12 @ 06:38)          11-13    145  |  109<H>  |  2<L>  ----------------------------<  90  3.2<L>   |  23  |  0.79    Ca    8.7      13 Nov 2017 06:44                          11.8   5.1   )-----------( 64       ( 13 Nov 2017 06:44 )             33.9     Medications  MEDICATIONS  (STANDING):  acebutolol 200 milliGRAM(s) Oral two times a day  busPIRone 2.5 milliGRAM(s) Oral two times a day  diltiazem    milliGRAM(s) Oral daily  docusate sodium 100 milliGRAM(s) Oral three times a day  famotidine    Tablet 20 milliGRAM(s) Oral two times a day  influenza   Vaccine 0.5 milliLiter(s) IntraMuscular once  montelukast 10 milliGRAM(s) Oral daily  sodium chloride 0.9%. 1000 milliLiter(s) (125 mL/Hr) IV Continuous <Continuous>      Physical Exam  General: Patient comfortable in bed  Vital Signs Last 12 Hrs  T(F): 98.6 (11-13-17 @ 13:20), Max: 98.6 (11-13-17 @ 13:20)  HR: 66 (11-13-17 @ 13:20) (66 - 77)  BP: 122/86 (11-13-17 @ 13:20) (118/69 - 122/86)  BP(mean): --  RR: 18 (11-13-17 @ 13:20) (18 - 18)  SpO2: 100% (11-13-17 @ 13:20) (100% - 100%)  Neck: No palpable thyroid nodules.  CVS: S1S2, No murmurs  Respiratory: No wheezing, no crepitations  GI: Abdomen soft, bowel sounds positive  Musculoskeletal: Positive edema lower extremities.   Skin: No skin rashes, no ecchymosis    Diagnostics    Thyroid Stimulating Hormone, Serum: AM Sched. Collection: 11-Nov-2017 06:00 (11-10 @ 16:23)  Free Thyroxine, Serum: AM Sched. Collection: 11-Nov-2017 06:00 (11-10 @ 16:23)  5HIAA w/Creatinine 24 hr UR: Routine (11-11 @ 16:07)  Metanephrine, Plasma: AM Sched. Collection: 13-Nov-2017 06:00 (11-12 @ 11:58)  Cortisol AM, Serum: AM Sched. Collection: 13-Nov-2017 06:00 (11-12 @ 11:58)

## 2017-11-13 NOTE — PROGRESS NOTE ADULT - SUBJECTIVE AND OBJECTIVE BOX
24H hour events:   Over the weekend, patient complained of focal left sided headache and nausea for 2-3 days that she attributes to the acebutolol. She continues to be symptomatic. Her symptoms include headache, palpitations, chest pain, lethargy, tremors  MEDICATIONS:  acebutolol 200 milliGRAM(s) Oral two times a day  diltiazem    milliGRAM(s) Oral daily      ALBUTerol    90 MICROgram(s) HFA Inhaler 1 Puff(s) Inhalation every 6 hours PRN  montelukast 10 milliGRAM(s) Oral daily    acetaminophen   Tablet. 650 milliGRAM(s) Oral every 6 hours PRN  busPIRone 2.5 milliGRAM(s) Oral two times a day  LORazepam     Tablet 2 milliGRAM(s) Oral every 4 hours PRN  ondansetron Injectable 4 milliGRAM(s) IV Push every 4 hours PRN    docusate sodium 100 milliGRAM(s) Oral three times a day  famotidine    Tablet 20 milliGRAM(s) Oral two times a day  loperamide 2 milliGRAM(s) Oral four times a day PRN  simethicone 80 milliGRAM(s) Chew every 8 hours PRN      hydrocortisone 0.5% Cream 1 Application(s) Topical three times a day PRN  influenza   Vaccine 0.5 milliLiter(s) IntraMuscular once  sodium chloride 0.9%. 1000 milliLiter(s) IV Continuous <Continuous>      REVIEW OF SYSTEMS:  Complete 10point ROS negative.    PHYSICAL EXAM:  T(C): 36.9 (11-13-17 @ 04:26), Max: 37.1 (11-12-17 @ 13:06)  HR: 84 (11-13-17 @ 06:30) (60 - 84)  BP: 124/78 (11-13-17 @ 06:30) (95/62 - 173/93)  RR: 18 (11-13-17 @ 04:26) (18 - 18)  SpO2: 99% (11-13-17 @ 04:26) (98% - 100%)  Wt(kg): --  I&O's Summary    12 Nov 2017 07:01  -  13 Nov 2017 07:00  --------------------------------------------------------  IN: 3457 mL / OUT: 200 mL / NET: 3257 mL        Appearance: Normal	  HEENT:   Normal oral mucosa, PERRL, EOMI	  Lymphatic: No lymphadenopathy  Cardiovascular: Normal S1 S2, No JVD, No murmurs, No edema  Respiratory: Lungs clear to auscultation	  Psychiatry: A & O x 3, Mood & affect appropriate  Gastrointestinal:  Soft, Non-tender, + BS	  Skin: No rashes, No ecchymoses, No cyanosis	  Neurologic: Non-focal  Extremities: Normal range of motion, No clubbing, cyanosis or edema  Vascular: Peripheral pulses palpable 2+ bilaterally        LABS:	 	    CBC Full  -  ( 13 Nov 2017 06:44 )  WBC Count : 5.1 K/uL  Hemoglobin : 11.8 g/dL  Hematocrit : 33.9 %  Platelet Count - Automated : 64 K/uL  Mean Cell Volume : 87.5 fl  Mean Cell Hemoglobin : 30.5 pg  Mean Cell Hemoglobin Concentration : 34.9 gm/dL  Auto Neutrophil # : x  Auto Lymphocyte # : x  Auto Monocyte # : x  Auto Eosinophil # : x  Auto Basophil # : x  Auto Neutrophil % : x  Auto Lymphocyte % : x  Auto Monocyte % : x  Auto Eosinophil % : x  Auto Basophil % : x    11-13    145  |  109<H>  |  2<L>  ----------------------------<  90  3.2<L>   |  23  |  0.79  11-12    147<H>  |  109<H>  |  3<L>  ----------------------------<  90  3.5   |  24  |  0.93    Ca    8.7      13 Nov 2017 06:44  Ca    9.3      12 Nov 2017 10:35  Phos  3.8     11-11  Mg     1.9     11-11      TELEMETRY: 	  NSR     ASSESSMENT/PLAN: 	  42yo female with sinus tachycardia, palpitations    1. POTS  - Acebutolol appears to have treated the tachycardia, tremors and dizziness but patient could not tolerate medication. The only option available to us is ivabridine. I have broached the topic with her and she states that she wants to do internet research on the drug prior to taking it. Will continue to monitor.    Luis Whiting [id number]

## 2017-11-13 NOTE — PROGRESS NOTE ADULT - SUBJECTIVE AND OBJECTIVE BOX
Patient is a 41y old  Female who presents with a chief complaint of tachycardia (08 Nov 2017 23:58)    Seen and examined.  Could not complete MRI study yesterday because patient became too anxious.  Thinks she will do better with a higher ativan dose.      PHYSICAL EXAM:  AAOx3  CN: II-XII intact   Motor: 5/5 IAE   Sensatio: intact to light touch   Gait: deferred       Vital Signs Last 24 Hrs  T(C): 36.9 (13 Nov 2017 04:26), Max: 37.1 (12 Nov 2017 13:06)  T(F): 98.4 (13 Nov 2017 04:26), Max: 98.8 (12 Nov 2017 13:06)  HR: 84 (13 Nov 2017 06:30) (60 - 84)  BP: 124/78 (13 Nov 2017 06:30) (95/62 - 173/93)  BP(mean): --  RR: 18 (13 Nov 2017 04:26) (18 - 18)  SpO2: 99% (13 Nov 2017 04:26) (98% - 100%)    MEDICATIONS  (STANDING):  acebutolol 200 milliGRAM(s) Oral two times a day  busPIRone 2.5 milliGRAM(s) Oral two times a day  diltiazem    milliGRAM(s) Oral daily  docusate sodium 100 milliGRAM(s) Oral three times a day  famotidine    Tablet 20 milliGRAM(s) Oral two times a day  influenza   Vaccine 0.5 milliLiter(s) IntraMuscular once  montelukast 10 milliGRAM(s) Oral daily  sodium chloride 0.9%. 1000 milliLiter(s) (125 mL/Hr) IV Continuous <Continuous>    MEDICATIONS  (PRN):  acetaminophen   Tablet. 650 milliGRAM(s) Oral every 6 hours PRN Moderate Pain (4 - 6)  ALBUTerol    90 MICROgram(s) HFA Inhaler 1 Puff(s) Inhalation every 6 hours PRN Shortness of Breath and/or Wheezing  hydrocortisone 0.5% Cream 1 Application(s) Topical three times a day PRN Rash and/or Itching  loperamide 2 milliGRAM(s) Oral four times a day PRN Diarrhea  LORazepam     Tablet 2 milliGRAM(s) Oral every 4 hours PRN Anxiety  ondansetron Injectable 4 milliGRAM(s) IV Push every 4 hours PRN Nausea and/or Vomiting  simethicone 80 milliGRAM(s) Chew every 8 hours PRN Gas      < from: MR Cervical Spine No Cont (11.12.17 @ 17:54) >    IMPRESSION:    Suboptimal exam due to artifact.    No significant interval change from the prior exam.    Mild spondylitic changes at the C4-C5and C5-C6 levels    < end of copied text > Patient is a 41y old  Female who presents with a chief complaint of tachycardia (08 Nov 2017 23:58)    Seen and examined.  Could not complete MRI study yesterday because patient became too anxious.  Thinks she will do better with a higher ativan dose.    c/o of intermittent headaches associated with high blood pressure with numbness around her "temporal artery"      PHYSICAL EXAM:  AAOx3  CN: II-XII intact   Motor: 5/5 IAE   Sensatio: intact to light touch   Gait: deferred       Vital Signs Last 24 Hrs  T(C): 36.9 (13 Nov 2017 04:26), Max: 37.1 (12 Nov 2017 13:06)  T(F): 98.4 (13 Nov 2017 04:26), Max: 98.8 (12 Nov 2017 13:06)  HR: 84 (13 Nov 2017 06:30) (60 - 84)  BP: 124/78 (13 Nov 2017 06:30) (95/62 - 173/93)  BP(mean): --  RR: 18 (13 Nov 2017 04:26) (18 - 18)  SpO2: 99% (13 Nov 2017 04:26) (98% - 100%)    MEDICATIONS  (STANDING):  acebutolol 200 milliGRAM(s) Oral two times a day  busPIRone 2.5 milliGRAM(s) Oral two times a day  diltiazem    milliGRAM(s) Oral daily  docusate sodium 100 milliGRAM(s) Oral three times a day  famotidine    Tablet 20 milliGRAM(s) Oral two times a day  influenza   Vaccine 0.5 milliLiter(s) IntraMuscular once  montelukast 10 milliGRAM(s) Oral daily  sodium chloride 0.9%. 1000 milliLiter(s) (125 mL/Hr) IV Continuous <Continuous>    MEDICATIONS  (PRN):  acetaminophen   Tablet. 650 milliGRAM(s) Oral every 6 hours PRN Moderate Pain (4 - 6)  ALBUTerol    90 MICROgram(s) HFA Inhaler 1 Puff(s) Inhalation every 6 hours PRN Shortness of Breath and/or Wheezing  hydrocortisone 0.5% Cream 1 Application(s) Topical three times a day PRN Rash and/or Itching  loperamide 2 milliGRAM(s) Oral four times a day PRN Diarrhea  LORazepam     Tablet 2 milliGRAM(s) Oral every 4 hours PRN Anxiety  ondansetron Injectable 4 milliGRAM(s) IV Push every 4 hours PRN Nausea and/or Vomiting  simethicone 80 milliGRAM(s) Chew every 8 hours PRN Gas      < from: MR Cervical Spine No Cont (11.12.17 @ 17:54) >    IMPRESSION:    Suboptimal exam due to artifact.    No significant interval change from the prior exam.    Mild spondylitic changes at the C4-C5and C5-C6 levels    < end of copied text >

## 2017-11-13 NOTE — PROGRESS NOTE ADULT - SUBJECTIVE AND OBJECTIVE BOX
Patient is a 41y old  Female who presents with a chief complaint of tachycardia (08 Nov 2017 23:58)      SUBJECTIVE / OVERNIGHT EVENTS: could not complete mri due to elevated bp likely due to anxiety.  wants IV ativan prior to MRI.    MEDICATIONS  (STANDING):  acebutolol 200 milliGRAM(s) Oral two times a day  busPIRone 2.5 milliGRAM(s) Oral two times a day  diltiazem    milliGRAM(s) Oral daily  docusate sodium 100 milliGRAM(s) Oral three times a day  famotidine    Tablet 20 milliGRAM(s) Oral two times a day  influenza   Vaccine 0.5 milliLiter(s) IntraMuscular once  montelukast 10 milliGRAM(s) Oral daily  potassium chloride   Solution 20 milliEquivalent(s) Oral once  sodium chloride 0.9%. 1000 milliLiter(s) (125 mL/Hr) IV Continuous <Continuous>    MEDICATIONS  (PRN):  acetaminophen   Tablet. 650 milliGRAM(s) Oral every 6 hours PRN Moderate Pain (4 - 6)  ALBUTerol    90 MICROgram(s) HFA Inhaler 1 Puff(s) Inhalation every 6 hours PRN Shortness of Breath and/or Wheezing  hydrocortisone 0.5% Cream 1 Application(s) Topical three times a day PRN Rash and/or Itching  loperamide 2 milliGRAM(s) Oral four times a day PRN Diarrhea  LORazepam     Tablet 2 milliGRAM(s) Oral every 4 hours PRN Anxiety  ondansetron Injectable 4 milliGRAM(s) IV Push every 4 hours PRN Nausea and/or Vomiting  simethicone 80 milliGRAM(s) Chew every 8 hours PRN Gas      Vital Signs Last 24 Hrs  T(C): 37 (13 Nov 2017 13:20), Max: 37 (13 Nov 2017 13:20)  T(F): 98.6 (13 Nov 2017 13:20), Max: 98.6 (13 Nov 2017 13:20)  HR: 66 (13 Nov 2017 13:20) (60 - 84)  BP: 122/86 (13 Nov 2017 13:20) (95/62 - 173/93)  BP(mean): --  RR: 18 (13 Nov 2017 13:20) (18 - 18)  SpO2: 100% (13 Nov 2017 13:20) (98% - 100%)  CAPILLARY BLOOD GLUCOSE        I&O's Summary    12 Nov 2017 07:01  -  13 Nov 2017 07:00  --------------------------------------------------------  IN: 3457 mL / OUT: 200 mL / NET: 3257 mL    13 Nov 2017 07:01  -  13 Nov 2017 13:28  --------------------------------------------------------  IN: 240 mL / OUT: 300 mL / NET: -60 mL        PHYSICAL EXAM:  GENERAL: NAD, well-developed  HEAD:  Atraumatic, Normocephalic  EYES: EOMI, PERRLA, conjunctiva and sclera clear  NECK: Supple, No JVD  CHEST/LUNG: Clear to auscultation bilaterally; No wheeze  HEART: Regular rate and rhythm; No murmurs, rubs, or gallops  ABDOMEN: Soft, Nontender, Nondistended; Bowel sounds present  EXTREMITIES:  2+ Peripheral Pulses, No clubbing, cyanosis, or edema  PSYCH: AAOx3  NEUROLOGY: non-focal  SKIN: No rashes or lesions    LABS:                        11.8   5.1   )-----------( 64       ( 13 Nov 2017 06:44 )             33.9     11-13    145  |  109<H>  |  2<L>  ----------------------------<  90  3.2<L>   |  23  |  0.79    Ca    8.7      13 Nov 2017 06:44                RADIOLOGY & ADDITIONAL TESTS:    Imaging Personally Reviewed:    Consultant(s) Notes Reviewed:      Care Discussed with Consultants/Other Providers:

## 2017-11-13 NOTE — PROGRESS NOTE ADULT - ASSESSMENT
Assessment  Anxiety: 41y Female with tremors and non specific arrhythmias, being evaluated by cardiolody/EP team, no pathology detected so far, may have anxiety disorder.   Although low probability, but will complete workup r/o endocrine disorders including thyroid disease, pheo, carcinoid. Labs pending, Will FU with results

## 2017-11-13 NOTE — PHYSICAL THERAPY INITIAL EVALUATION ADULT - GROSSLY INTACT, SENSORY
Grossly Intact/pt report intermittent numbess /tingling feet /toes currently w/o as not having episode at this time per pt

## 2017-11-13 NOTE — PHYSICAL THERAPY INITIAL EVALUATION ADULT - ADDITIONAL COMMENTS
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

## 2017-11-13 NOTE — PROGRESS NOTE ADULT - ASSESSMENT
41F w/PMH possible POTS dz admitted with tachycardia and tremors which pt takes ativan for up to 10 mg per day without improvement.    tachycardia- on tele, cardio and ep consults, cont cardizem, cont ativan prn.  .  pt has had extensive workup in the past.  neuro eval appreciated.  also was seen by psych,  endo eval appreciated.  for  mri of spine and brain.  refusing Acebutolol and all b blockers.   follow up workup by endocrine.  will try mri again premedicated with iv  ativan.  hr mgt as per ep.    ITP- pt's plts is stable from 50 k to 80 k.    asthma - cw albuterol and singulair    dvt px - heparin

## 2017-11-13 NOTE — PHYSICAL THERAPY INITIAL EVALUATION ADULT - PRECAUTIONS/LIMITATIONS, REHAB EVAL
41F w/PMH possible POTS dz (has been on BB but d/c-ed 2/2 allergy, currently on cardizem CD 240mg daily, last dose today), frequent ativan use (initially 4mg/day now up to 10mg/day) as a way to "treat POTS episodes", anxiety, ITP, htn, asthma returns with history of palp and tremors today. Has been having episodes of palp with HR measured at home to the 130s since last Spring, but they have been becoming more frequent in the last 3 weeks now occurring 2-3x/day, resolving 30min after taking ativan. She takes ativan as needed and had taken 2mg BID but in last weeks has taken up to 2mg 5x/day. Last night took ativan 2mg at 10PM. This AM awoke at 630AM with tremors and tachycardia to 130s. Took 2mg ativan, went to sleep. At 8AM woke with same, took 1mg ativan. 3rd episode at 1115AM, took 1mg ativan and 4th episode at 230PM, took 1mg ativan 41F w/PMH possible POTS dz (has been on BB but d/c-ed 2/2 allergy, currently on cardizem CD 240mg daily, last dose today), frequent ativan use (initially 4mg/day now up to 10mg/day) as a way to "treat POTS episodes", anxiety, ITP, htn, asthma returns with history of palp and tremors today. Has been having episodes of palp with HR measured at home to the 130s since last Spring, but they have been becoming more frequent in the last 3 weeks now occurring 2-3x/day, resolving 30min after taking ativan. She takes ativan as needed and had taken 2mg BID but in last weeks has taken up to 2mg 5x/day. Last night took ativan 2mg at 10PM. This AM awoke at 630AM with tremors and tachycardia to 130s. Took 2mg ativan, went to sleep. At 8AM woke with same, took 1mg ativan. 3rd episode at 1115AM, took 1mg ativan and 4th episode at 230PM, took 1mg ativan; pt with specialist appt for Scherer dz/fall precautions

## 2017-11-14 LAB
ALDOST SERPL-MCNC: <3 NG/DL — SIGNIFICANT CHANGE UP
ANION GAP SERPL CALC-SCNC: 12 MMOL/L — SIGNIFICANT CHANGE UP (ref 5–17)
BUN SERPL-MCNC: 2 MG/DL — LOW (ref 7–23)
CALCIUM SERPL-MCNC: 8.9 MG/DL — SIGNIFICANT CHANGE UP (ref 8.4–10.5)
CHLORIDE SERPL-SCNC: 108 MMOL/L — SIGNIFICANT CHANGE UP (ref 96–108)
CK MB BLD-MCNC: <1.9 % — SIGNIFICANT CHANGE UP (ref 0–3.5)
CK MB CFR SERPL CALC: <1 NG/ML — SIGNIFICANT CHANGE UP (ref 0–3.8)
CK SERPL-CCNC: 54 U/L — SIGNIFICANT CHANGE UP (ref 25–170)
CO2 SERPL-SCNC: 23 MMOL/L — SIGNIFICANT CHANGE UP (ref 22–31)
CREAT SERPL-MCNC: 0.88 MG/DL — SIGNIFICANT CHANGE UP (ref 0.5–1.3)
ERYTHROCYTE [SEDIMENTATION RATE] IN BLOOD: 5 MM/HR — SIGNIFICANT CHANGE UP (ref 0–15)
GLUCOSE SERPL-MCNC: 87 MG/DL — SIGNIFICANT CHANGE UP (ref 70–99)
HCT VFR BLD CALC: 34.3 % — LOW (ref 34.5–45)
HGB BLD-MCNC: 12 G/DL — SIGNIFICANT CHANGE UP (ref 11.5–15.5)
MCHC RBC-ENTMCNC: 31 PG — SIGNIFICANT CHANGE UP (ref 27–34)
MCHC RBC-ENTMCNC: 35 GM/DL — SIGNIFICANT CHANGE UP (ref 32–36)
MCV RBC AUTO: 88.5 FL — SIGNIFICANT CHANGE UP (ref 80–100)
PLATELET # BLD AUTO: 61 K/UL — LOW (ref 150–400)
POTASSIUM SERPL-MCNC: 3.6 MMOL/L — SIGNIFICANT CHANGE UP (ref 3.5–5.3)
POTASSIUM SERPL-SCNC: 3.6 MMOL/L — SIGNIFICANT CHANGE UP (ref 3.5–5.3)
RBC # BLD: 3.88 M/UL — SIGNIFICANT CHANGE UP (ref 3.8–5.2)
RBC # FLD: 12.2 % — SIGNIFICANT CHANGE UP (ref 10.3–14.5)
RENIN DIRECT, PLASMA: <2.1 PG/ML — SIGNIFICANT CHANGE UP
SODIUM SERPL-SCNC: 143 MMOL/L — SIGNIFICANT CHANGE UP (ref 135–145)
TROPONIN T SERPL-MCNC: <0.01 NG/ML — SIGNIFICANT CHANGE UP (ref 0–0.06)
WBC # BLD: 4.9 K/UL — SIGNIFICANT CHANGE UP (ref 3.8–10.5)
WBC # FLD AUTO: 4.9 K/UL — SIGNIFICANT CHANGE UP (ref 3.8–10.5)

## 2017-11-14 PROCEDURE — 76831 ECHO EXAM UTERUS: CPT | Mod: 26

## 2017-11-14 PROCEDURE — 93010 ELECTROCARDIOGRAM REPORT: CPT

## 2017-11-14 PROCEDURE — 99233 SBSQ HOSP IP/OBS HIGH 50: CPT

## 2017-11-14 PROCEDURE — 70450 CT HEAD/BRAIN W/O DYE: CPT | Mod: 26

## 2017-11-14 PROCEDURE — 99231 SBSQ HOSP IP/OBS SF/LOW 25: CPT

## 2017-11-14 RX ADMIN — Medication 0.5 MILLIGRAM(S): at 21:38

## 2017-11-14 RX ADMIN — Medication 2.5 MILLIGRAM(S): at 09:03

## 2017-11-14 RX ADMIN — Medication 2 MILLIGRAM(S): at 06:22

## 2017-11-14 RX ADMIN — ONDANSETRON 4 MILLIGRAM(S): 8 TABLET, FILM COATED ORAL at 10:37

## 2017-11-14 RX ADMIN — MONTELUKAST 10 MILLIGRAM(S): 4 TABLET, CHEWABLE ORAL at 21:37

## 2017-11-14 RX ADMIN — SODIUM CHLORIDE 125 MILLILITER(S): 9 INJECTION INTRAMUSCULAR; INTRAVENOUS; SUBCUTANEOUS at 09:03

## 2017-11-14 RX ADMIN — Medication 100 MILLIGRAM(S): at 21:37

## 2017-11-14 RX ADMIN — Medication 2 MILLIGRAM(S): at 18:31

## 2017-11-14 RX ADMIN — FAMOTIDINE 20 MILLIGRAM(S): 10 INJECTION INTRAVENOUS at 21:37

## 2017-11-14 RX ADMIN — FAMOTIDINE 20 MILLIGRAM(S): 10 INJECTION INTRAVENOUS at 09:03

## 2017-11-14 RX ADMIN — SODIUM CHLORIDE 125 MILLILITER(S): 9 INJECTION INTRAMUSCULAR; INTRAVENOUS; SUBCUTANEOUS at 20:23

## 2017-11-14 RX ADMIN — Medication 2 MILLIGRAM(S): at 14:27

## 2017-11-14 RX ADMIN — ONDANSETRON 4 MILLIGRAM(S): 8 TABLET, FILM COATED ORAL at 18:31

## 2017-11-14 RX ADMIN — Medication 100 MILLIGRAM(S): at 09:03

## 2017-11-14 RX ADMIN — Medication 240 MILLIGRAM(S): at 17:40

## 2017-11-14 RX ADMIN — Medication 2 MILLIGRAM(S): at 10:37

## 2017-11-14 RX ADMIN — SIMETHICONE 80 MILLIGRAM(S): 80 TABLET, CHEWABLE ORAL at 20:43

## 2017-11-14 RX ADMIN — Medication 2.5 MILLIGRAM(S): at 20:23

## 2017-11-14 RX ADMIN — ONDANSETRON 4 MILLIGRAM(S): 8 TABLET, FILM COATED ORAL at 14:27

## 2017-11-14 RX ADMIN — ONDANSETRON 4 MILLIGRAM(S): 8 TABLET, FILM COATED ORAL at 06:21

## 2017-11-14 NOTE — PROGRESS NOTE ADULT - ASSESSMENT
41F w/PMH possible POTS dz admitted with tachycardia and tremors which pt takes ativan for up to 10 mg per day without improvement.    tachycardia- on tele, cardio and ep consults, cont cardizem, cont ativan prn.  .  pt has had extensive workup in the past.  neuro eval appreciated.  also was seen by psych,  endo eval appreciated.   mri of spine and brain done.  refusing Acebutolol and all b blockers.   follow up workup by endocrine.    rest of workup can be done as out pt.  d/c home in am.    ITP- pt's plts is stable from 50 k to 80 k.    asthma - cw albuterol and singulair    dvt px - heparin

## 2017-11-14 NOTE — PROGRESS NOTE ADULT - SUBJECTIVE AND OBJECTIVE BOX
24H hour events:   No events overnight. No tachycardic events. Patient complains of nausea  MEDICATIONS:  acebutolol 200 milliGRAM(s) Oral two times a day  diltiazem    milliGRAM(s) Oral daily      ALBUTerol    90 MICROgram(s) HFA Inhaler 1 Puff(s) Inhalation every 6 hours PRN  montelukast 10 milliGRAM(s) Oral daily    acetaminophen   Tablet. 650 milliGRAM(s) Oral every 6 hours PRN  busPIRone 2.5 milliGRAM(s) Oral two times a day  LORazepam     Tablet 2 milliGRAM(s) Oral every 4 hours PRN  ondansetron Injectable 4 milliGRAM(s) IV Push every 4 hours PRN    docusate sodium 100 milliGRAM(s) Oral three times a day  famotidine    Tablet 20 milliGRAM(s) Oral two times a day  loperamide 2 milliGRAM(s) Oral four times a day PRN  simethicone 80 milliGRAM(s) Chew every 8 hours PRN      hydrocortisone 0.5% Cream 1 Application(s) Topical three times a day PRN  influenza   Vaccine 0.5 milliLiter(s) IntraMuscular once  sodium chloride 0.9%. 1000 milliLiter(s) IV Continuous <Continuous>      REVIEW OF SYSTEMS:  Complete 10point ROS negative.    PHYSICAL EXAM:  T(C): 36.4 (11-14-17 @ 05:49), Max: 37 (11-13-17 @ 13:20)  HR: 63 (11-14-17 @ 05:49) (63 - 76)  BP: 104/68 (11-14-17 @ 05:49) (104/68 - 134/85)  RR: 18 (11-14-17 @ 05:49) (18 - 18)  SpO2: 100% (11-14-17 @ 05:49) (100% - 100%)  Wt(kg): --  I&O's Summary    13 Nov 2017 07:01  -  14 Nov 2017 07:00  --------------------------------------------------------  IN: 2700 mL / OUT: 700 mL / NET: 2000 mL        Appearance: Normal	  HEENT:   Normal oral mucosa, PERRL, EOMI	  Lymphatic: No lymphadenopathy  Cardiovascular: Normal S1 S2, No JVD, No murmurs, No edema  Respiratory: Lungs clear to auscultation	  Psychiatry: A & O x 3, Mood & affect appropriate  Gastrointestinal:  Soft, Non-tender, + BS	  Skin: No rashes, No ecchymoses, No cyanosis	  Neurologic: Non-focal  Extremities: Normal range of motion, No clubbing, cyanosis or edema  Vascular: Peripheral pulses palpable 2+ bilaterally        LABS:	 	    CBC Full  -  ( 14 Nov 2017 06:50 )  WBC Count : 4.9 K/uL  Hemoglobin : 12.0 g/dL  Hematocrit : 34.3 %  Platelet Count - Automated : 61 K/uL  Mean Cell Volume : 88.5 fl  Mean Cell Hemoglobin : 31.0 pg  Mean Cell Hemoglobin Concentration : 35.0 gm/dL  Auto Neutrophil # : x  Auto Lymphocyte # : x  Auto Monocyte # : x  Auto Eosinophil # : x  Auto Basophil # : x  Auto Neutrophil % : x  Auto Lymphocyte % : x  Auto Monocyte % : x  Auto Eosinophil % : x  Auto Basophil % : x    11-14    143  |  108  |  2<L>  ----------------------------<  87  3.6   |  23  |  0.88  11-13    145  |  109<H>  |  2<L>  ----------------------------<  90  3.2<L>   |  23  |  0.79    Ca    8.9      14 Nov 2017 06:50  Ca    8.7      13 Nov 2017 06:44    TELEMETRY: 	  NSR 50-70      PREVIOUS DIAGNOSTIC TESTING:      ASSESSMENT/PLAN: 	  1. POTS  - Acebutolol appears to have treated the tachycardia, tremors and dizziness but patient could not tolerate medication. The only option available to us is ivabridine. She does not wish to start this medication as an inpatient. She will try aggressive fluid intake    Luis Whiting [id number]

## 2017-11-14 NOTE — PROGRESS NOTE ADULT - SUBJECTIVE AND OBJECTIVE BOX
Chief complaint  Patient is a 41y old  Female who presents with a chief complaint of tachycardia (08 Nov 2017 23:58)   Review of systems  Patient in bed, looks comfortable, no fever, no hypoglycemia.    Labs and Fingersticks    CAPILLARY BLOOD GLUCOSE    Anion Gap, Serum: 12 (11-14 @ 06:50)  Anion Gap, Serum: 13 (11-13 @ 06:44)      Calcium, Total Serum: 8.9 (11-14 @ 06:50)  Calcium, Total Serum: 8.7 (11-13 @ 06:44)          11-14    143  |  108  |  2<L>  ----------------------------<  87  3.6   |  23  |  0.88    Ca    8.9      14 Nov 2017 06:50                          12.0   4.9   )-----------( 61       ( 14 Nov 2017 06:50 )             34.3     Medications  MEDICATIONS  (STANDING):  acebutolol 200 milliGRAM(s) Oral two times a day  busPIRone 2.5 milliGRAM(s) Oral two times a day  diltiazem    milliGRAM(s) Oral daily  docusate sodium 100 milliGRAM(s) Oral three times a day  famotidine    Tablet 20 milliGRAM(s) Oral two times a day  influenza   Vaccine 0.5 milliLiter(s) IntraMuscular once  montelukast 10 milliGRAM(s) Oral daily  sodium chloride 0.9%. 1000 milliLiter(s) (125 mL/Hr) IV Continuous <Continuous>      Physical Exam  General: Patient comfortable in bed  Vital Signs Last 12 Hrs  T(F): --  HR: 60 (11-14-17 @ 17:38) (54 - 66)  BP: 158/98 (11-14-17 @ 18:50) (155/90 - 165/100)  BP(mean): --  RR: 18 (11-14-17 @ 14:38) (18 - 18)  SpO2: 99% (11-14-17 @ 14:38) (99% - 99%)  Neck: No palpable thyroid nodules.  CVS: S1S2, No murmurs  Respiratory: No wheezing, no crepitations  GI: Abdomen soft, bowel sounds positive  Musculoskeletal: Positive edema lower extremities.   Skin: No skin rashes, no ecchymosis    Diagnostics    Thyroid Stimulating Hormone, Serum: AM Sched. Collection: 11-Nov-2017 06:00 (11-10 @ 16:23)  Free Thyroxine, Serum: AM Sched. Collection: 11-Nov-2017 06:00 (11-10 @ 16:23)  5HIAA w/Creatinine 24 hr UR: Routine (11-11 @ 16:07)  Metanephrine, Plasma: AM Sched. Collection: 13-Nov-2017 06:00 (11-12 @ 11:58)  Cortisol AM, Serum: AM Sched. Collection: 13-Nov-2017 06:00 (11-12 @ 11:58)

## 2017-11-14 NOTE — CHART NOTE - NSCHARTNOTEFT_GEN_A_CORE
MEDICINE PA     Patient is a 41y old  Female who presents with a chief complaint of tachycardia (08 Nov 2017 23:58)     Event Summary: Notified by RN that patient complaining of L/R sided chest pressure with occasional sharp pain. Denies radiation to arm or neck          Vital Signs Last 24 Hrs  T(C): 36.4 (14 Nov 2017 22:06), Max: 36.4 (14 Nov 2017 05:49)  T(F): 97.5 (14 Nov 2017 22:06), Max: 97.6 (14 Nov 2017 05:49)  HR: 74 (14 Nov 2017 22:06) (54 - 100)  BP: 133/88 (14 Nov 2017 22:06) (104/68 - 165/100)  BP(mean): --  RR: 20 (14 Nov 2017 22:06) (18 - 20)  SpO2: 100% (14 Nov 2017 22:06) (99% - 100%)    Physical Assessment:  General: Awake, No acute distress.   Neurology: A&Ox3, nonfocal  CV: +S1S2, RRR.  No peripheral edema  Respiratory: Even, unlabored.  CTA B/L.    Abdomen:  +BS.  Soft, NT, ND.  No palpable mass  MSK: SILVA x4.   Skin: Warm and Dry         A/P:                Maryann Sam PA-C   [facility]     Patient is a 41y old  Female who presents with a chief complaint of tachycardia (08 Nov 2017 23:58)     Event Summary: Notified by RN that patient complaining of L/R sided chest pressure with occasional sharp pain. Denies radiation to arm, neck, or jaw. +R sided  pain over the trapezius muscle. States that she noticed her heart rate go up to 110s-120s on finger pulse ox.  No identifiable alleviating/aggravating factors. Also endorses b/l LE tremors which she has been getting intermittently throughout admission. Unsure of etiology of tremors.  Patient is extremely anxious and tearful on exam. Denies pleuritic pain, hemoptysis, LE swelling, diaphoresis, dizziness, HA, changes in vision, SOB.     Vital Signs Last 24 Hrs  T(C): 36.4 (14 Nov 2017 22:06), Max: 36.4 (14 Nov 2017 05:49)  T(F): 97.5 (14 Nov 2017 22:06), Max: 97.6 (14 Nov 2017 05:49)  HR: 74 (14 Nov 2017 22:06) (54 - 100)  BP: 133/88 (14 Nov 2017 22:06) (104/68 - 165/100)  RR: 20 (14 Nov 2017 22:06) (18 - 20)  SpO2: 100% (14 Nov 2017 22:06) (99% - 100%)    Physical Assessment:  General: Awake, in emotional distress  Neurology: A&Ox3. EOMI bilaterally. PERRL. No facial droop. Tongue midline/no fasciculations.   CV: +S1S2, RRR.  No peripheral edema  Respiratory: Even, unlabored.  CTA B/L.    Abdomen:  Soft, NT, No palpable mass  MSK: No C-spine tenderness. SILVA x4.   Skin: Warm and Dry     A/P:  HPI: 41F w/PMH possible POTS dz (has been on BB but d/c-ed 2/2 allergy, currently on cardizem CD 240mg daily, last dose today), frequent ativan use (initially 4mg/day now up to 10mg/day) as a way to "treat POTS episodes", anxiety, ITP, htn, asthma returns with history of palp and tremors today. Has been having episodes of palp with HR measured at home to the 130s since last Spring, but they have been becoming more frequent in the last 3 weeks now occurring 2-3x/day, resolving 30min after taking ativan. She takes ativan as needed and had taken 2mg BID but in last weeks has taken up to 2mg 5x/day. Last night took ativan 2mg at 10PM. This AM awoke at 630AM with tremors and tachycardia to 130s. Took 2mg ativan, went to sleep. At 8AM woke with same, took 1mg ativan. 3rd episode at 1115AM, took 1mg ativan and 4th episode at 230PM, took 1mg ativan. Denies ETOH use or other benzos. Denies prior benzo abuse, other drug use. Denies f/c, cp/sob/cough, diaphoresis, syncope, seizures, weakness/numbness, SI/HI, urinary changes, abd pain, n/v/d/c, melena/BRBPR. Patient adamantly wants to be admitted to determine the cause of her symptoms. She has an appointment with a POTS specialist Nov 30. She follows with cardiologist Dr. Hussein. (08 Nov 2017 23:58)                Maryann Sam PA-C   [facility]     Patient is a 41y old  Female who presents with a chief complaint of tachycardia (08 Nov 2017 23:58)     Event Summary: Notified by RN that patient complaining of L/R sided chest pressure with occasional sharp pain. Denies radiation to arm, neck, or jaw. +R sided  pain over the trapezius muscle. States that she noticed her heart rate go up to 110s-120s on finger pulse ox.  No identifiable alleviating/aggravating factors. Also endorses b/l LE tremors which she has been getting intermittently throughout admission. Unsure of etiology of tremors.  Patient is extremely anxious and tearful on exam. Denies pleuritic pain, hemoptysis, LE swelling, diaphoresis, dizziness, HA, changes in vision, numbness, tingling, SOB.     Vital Signs Last 24 Hrs  T(C): 36.4 (14 Nov 2017 22:06), Max: 36.4 (14 Nov 2017 05:49)  T(F): 97.5 (14 Nov 2017 22:06), Max: 97.6 (14 Nov 2017 05:49)  HR: 74 (14 Nov 2017 22:06) (54 - 100)  BP: 133/88 (14 Nov 2017 22:06) (104/68 - 165/100)  RR: 20 (14 Nov 2017 22:06) (18 - 20)  SpO2: 100% (14 Nov 2017 22:06) (99% - 100%)    Physical Assessment:  General: Awake, in emotional distress  Neurology: A&Ox3. EOMI bilaterally. PERRL. No facial droop. Tongue midline/no fasciculations. Sensation/strength/pulses intact b/l UE and b/l LE.   CV: +S1S2, RRR.  No peripheral edema  Respiratory: Even, unlabored.  CTA B/L.    Abdomen:  Soft, NT, No palpable mass  MSK: No C-spine tenderness. SILVA x4.   Skin: Warm and Dry     A/P:  HPI: 41F w/PMH possible POTS dz (has been on BB but d/c-ed 2/2 allergy, currently on cardizem CD 240mg daily, last dose today), frequent ativan use (initially 4mg/day now up to 10mg/day) as a way to "treat POTS episodes", anxiety, ITP, htn, asthma returns with history of palp and tremors today. Has been having episodes of palp with HR measured at home to the 130s since last Spring, but they have been becoming more frequent in the last 3 weeks now occurring 2-3x/day, resolving 30min after taking ativan. Acutely presenting with non specific chest pressure and sinus tachycardia.   1. Chest pressure of unknown etiology   - Rule out MI: EKG grossly unchanged. T wave inversion in V1 (present in previous EKGs), V2. Sinus rhythm @ 74 bpm                       CE - x1. Follow up second set of cardiac enzymes   - Cardiology follow up   - Supplemental O2 as needed   - 0.5 mg Ativan IV x1                 Maryann Sam PA-C   [facility]     Patient is a 41y old  Female who presents with a chief complaint of tachycardia (08 Nov 2017 23:58)     Event Summary: Notified by RN that patient complaining of L/R sided chest pressure with occasional sharp pain. Denies radiation to arm, neck, or jaw. +R sided  pain over the trapezius muscle. States that she noticed her heart rate go up to 110s-120s on finger pulse ox.  No identifiable alleviating/aggravating factors. Also endorses b/l LE tremors which she has been getting intermittently throughout admission. Unsure of etiology of tremors.  Patient is extremely anxious and tearful on exam. Denies pleuritic pain, hemoptysis, LE swelling, diaphoresis, dizziness, HA, changes in vision, numbness, tingling, SOB.     Vital Signs Last 24 Hrs  T(C): 36.4 (14 Nov 2017 22:06), Max: 36.4 (14 Nov 2017 05:49)  T(F): 97.5 (14 Nov 2017 22:06), Max: 97.6 (14 Nov 2017 05:49)  HR: 74 (14 Nov 2017 22:06) (54 - 100)  BP: 133/88 (14 Nov 2017 22:06) (104/68 - 165/100)  RR: 20 (14 Nov 2017 22:06) (18 - 20)  SpO2: 100% (14 Nov 2017 22:06) (99% - 100%)    Physical Assessment:  General: Awake, in emotional distress  Neurology: A&Ox3. EOMI bilaterally. PERRL. No facial droop. Tongue midline/no fasciculations. Sensation/strength/pulses intact b/l UE and b/l LE.   CV: +S1S2, RRR.  No peripheral edema  Respiratory: Even, unlabored.  CTA B/L.    Abdomen:  Soft, NT, No palpable mass  MSK: No C-spine tenderness. SILVA x4.   Skin: Warm and Dry     A/P:  HPI: 41F w/PMH possible POTS dz (has been on BB but d/c-ed 2/2 allergy, currently on cardizem CD 240mg daily, last dose today), frequent ativan use (initially 4mg/day now up to 10mg/day) as a way to "treat POTS episodes", anxiety, ITP, htn, asthma returns with history of palp and tremors today. Has been having episodes of palp with HR measured at home to the 130s since last Spring, but they have been becoming more frequent in the last 3 weeks now occurring 2-3x/day, resolving 30min after taking ativan. Acutely presenting with non specific chest pressure and sinus tachycardia.   1. Chest pressure of unknown etiology   - Rule out MI: EKG grossly unchanged. T wave inversion in V1 (present in previous EKGs), V2. Sinus rhythm @ 74 bpm                       CE - x1. Follow up second set of cardiac enzymes   - Cardiology follow up   - Supplemental O2 as needed   - 0.5 mg Ativan IV x1   -   Maryann Sam PA-C   [facility]     Patient is a 41y old  Female who presents with a chief complaint of tachycardia (08 Nov 2017 23:58)     Event Summary: Notified by RN that patient complaining of L/R sided chest pressure with occasional sharp pain. Denies radiation to arm, neck, or jaw. +R sided  pain over the trapezius muscle. States that she noticed her heart rate go up to 110s-120s on finger pulse ox.  No identifiable alleviating/aggravating factors. Also endorses b/l LE tremors which she has been getting intermittently throughout admission. Unsure of etiology of tremors.  Patient is extremely anxious and tearful on exam. Denies pleuritic pain, hemoptysis, LE swelling, diaphoresis, dizziness, HA, changes in vision, numbness, tingling, SOB.     Vital Signs Last 24 Hrs  T(C): 36.4 (14 Nov 2017 22:06), Max: 36.4 (14 Nov 2017 05:49)  T(F): 97.5 (14 Nov 2017 22:06), Max: 97.6 (14 Nov 2017 05:49)  HR: 74 (14 Nov 2017 22:06) (54 - 100)  BP: 133/88 (14 Nov 2017 22:06) (104/68 - 165/100)  RR: 20 (14 Nov 2017 22:06) (18 - 20)  SpO2: 100% (14 Nov 2017 22:06) (99% - 100%)    Physical Assessment:  General: Awake, in emotional distress  Neurology: A&Ox3. EOMI bilaterally. PERRL. No facial droop. Tongue midline/no fasciculations. Sensation/strength/pulses intact b/l UE and b/l LE.   CV: +S1S2, RRR.  No peripheral edema  Respiratory: Even, unlabored.  CTA B/L.    Abdomen:  Soft, NT, No palpable mass  MSK: No C-spine tenderness. SILVA x4.   Skin: Warm and Dry      CARDIAC MARKERS ( 14 Nov 2017 21:41 )  x     / <0.01 ng/mL / 54 U/L / x     / <1.0 ng/mL    A/P:  HPI: 41F w/PMH possible POTS dz (has been on BB but d/c-ed 2/2 allergy, currently on cardizem CD 240mg daily, last dose today), frequent ativan use (initially 4mg/day now up to 10mg/day) as a way to "treat POTS episodes", anxiety, ITP, htn, asthma returns with history of palp and tremors today. Has been having episodes of palp with HR measured at home to the 130s since last Spring, but they have been becoming more frequent in the last 3 weeks now occurring 2-3x/day, resolving 30min after taking ativan. Acutely presenting with non specific chest pressure and sinus tachycardia.   1. Chest pressure of unknown etiology   - Rule out MI: EKG grossly unchanged. T wave inversion in V1 (present in previous EKGs), V2. Sinus rhythm @ 74 bpm                       CE - x1. Follow up second set of cardiac enzymes   - Cardiology follow up   - Supplemental O2 as needed   - 0.5 mg Ativan IV x1     Maryann Sam PA-C   [facility] [id number]

## 2017-11-14 NOTE — PROGRESS NOTE ADULT - SUBJECTIVE AND OBJECTIVE BOX
Patient is a 41y old  Female who presents with a chief complaint of tachycardia (08 Nov 2017 23:58)    Pt seen and examined.  C/o nausea       NEUROLOGIC EXAM  Mental Status:     Oriented to time/place/person; Good eye contact ; follow simple commands ;  Age appropriate language  and fund of  knowledge.  Cranial Nerves:   PERRL, EOMI, no facial asymmetry , V1-V3 intact , symmetric palate, tongue midline.   Muscle Strength:	5/5 in all extremities  Muscle Tone:	Normal tone  Deep Tendon Reflexes:         2+/4  : Biceps, Brachioradialis, Triceps Bilateral;  2+/4 : Pattelar, Ankle bilateral. No clonus.  Plantar Response:	Plantar reflexes flexion bilaterally  Sensation:		Intact to pain, light touch, temperature          5      Vital Signs Last 24 Hrs  T(C): 36.4 (14 Nov 2017 05:49), Max: 37 (13 Nov 2017 13:20)  T(F): 97.6 (14 Nov 2017 05:49), Max: 98.6 (13 Nov 2017 13:20)  HR: 63 (14 Nov 2017 05:49) (63 - 76)  BP: 104/68 (14 Nov 2017 05:49) (104/68 - 134/85)  BP(mean): --  RR: 18 (14 Nov 2017 05:49) (18 - 18)  SpO2: 100% (14 Nov 2017 05:49) (100% - 100%)    MEDICATIONS  (STANDING):  acebutolol 200 milliGRAM(s) Oral two times a day  busPIRone 2.5 milliGRAM(s) Oral two times a day  diltiazem    milliGRAM(s) Oral daily  docusate sodium 100 milliGRAM(s) Oral three times a day  famotidine    Tablet 20 milliGRAM(s) Oral two times a day  influenza   Vaccine 0.5 milliLiter(s) IntraMuscular once  montelukast 10 milliGRAM(s) Oral daily  sodium chloride 0.9%. 1000 milliLiter(s) (125 mL/Hr) IV Continuous <Continuous>    MEDICATIONS  (PRN):  acetaminophen   Tablet. 650 milliGRAM(s) Oral every 6 hours PRN Moderate Pain (4 - 6)  ALBUTerol    90 MICROgram(s) HFA Inhaler 1 Puff(s) Inhalation every 6 hours PRN Shortness of Breath and/or Wheezing  hydrocortisone 0.5% Cream 1 Application(s) Topical three times a day PRN Rash and/or Itching  loperamide 2 milliGRAM(s) Oral four times a day PRN Diarrhea  LORazepam     Tablet 2 milliGRAM(s) Oral every 4 hours PRN Anxiety  ondansetron Injectable 4 milliGRAM(s) IV Push every 4 hours PRN Nausea and/or Vomiting  simethicone 80 milliGRAM(s) Chew every 8 hours PRN Gas      < from: MR Head w/wo IV Cont (11.13.17 @ 20:30) >  FINDINGS:  VENTRICLES AND SULCI: Normal.  INTRA-AXIAL: No mass, abnormal signal or evidence of acute cerebral  ischemia. No abnormal enhancement.  EXTRA-AXIAL: No mass or collection.  VISUALIZED SINUSES: Ethmoid mucosal thickening.  VISUALIZED MASTOIDS: Clear.  CALVARIUM: Normal.  CAROTID FLOW VOIDS: There is slight asymmetry to the flow voids at the level  of the carotids with the left being narrowed relative to the right. Consider  MRA of the [place] for better evaluation. This may simply be  physiologic asymmetry however given history, MRA of the [place] is  recommended    MISCELLANEOUS: None.    IMPRESSION: As mentioned above slight asymmetry to the cavernous carotid  artery flow voids . The left appears slightly narrowed. Recommend MRA for  more complete evaluation of the intracranial vasculature. This may simply be  physiologic asymmetry however, given the history, further workup is suggested    < end of copied text >  < from: MR Lumbar Spine No Cont (11.12.17 @ 17:55) >    IMPRESSION:    Mild degenerative changes involve the lumbar spine with distribution as   described.    < end of copied text >    < from: MR Cervical Spine No Cont (11.12.17 @ 17:54) >  IMPRESSION:    Suboptimal exam due to artifact.    No significant interval change from the prior exam.    Mild spondylitic changes at the C4-C5and C5-C6 levels    < end of copied text >

## 2017-11-14 NOTE — PROGRESS NOTE ADULT - ASSESSMENT
40yo F with tremor, ?POTS  1. MRI brain without significant abnormality, asymmetric vasculature, recommend MRA of head and neck for completion   2.  lumbar and C-spine reviewed   3. if imaging study negative may pursue autonomic testing as outpt

## 2017-11-14 NOTE — PROGRESS NOTE ADULT - SUBJECTIVE AND OBJECTIVE BOX
nitial Consult:  · Requested by Name:	Emi Stephenson	  · Date/Time:	14-Nov-2017 	  · Reason for Referral/Consultation:	Dizziness/Tachycardia	      · Subjective and Objective: 	  **********              CARDIOLOGY PROGRESS NOTE              ************  ============================================================  CHIEF COMPLAINT/REASON FOR CONSULT:  Patient is a 41y old  Female who presents with a chief complaint of dizzinesstachycardia    HISTORY OF PRESENT ILLNESS:  41yFemale with a history of POTS ITP Anxiety multiple admissions for latter p/w dizziness & palpitations. No CP/SOB. No syncope.  Multiple admissions for latter.   On high doses Ativan.  Multiple admissions.   Extensive w/u.   Concern for POTS  ==================  Overnight Events  - planning to go home  - renin/brendan negative  =================    No Known Allergies    Intolerances    metoprolol (Headache)  	    MEDICATIONS:  MEDICATIONS  (STANDING):  acebutolol 200 milliGRAM(s) Oral two times a day  busPIRone 2.5 milliGRAM(s) Oral two times a day  diltiazem    milliGRAM(s) Oral daily  docusate sodium 100 milliGRAM(s) Oral three times a day  famotidine    Tablet 20 milliGRAM(s) Oral two times a day  influenza   Vaccine 0.5 milliLiter(s) IntraMuscular once  montelukast 10 milliGRAM(s) Oral daily  sodium chloride 0.9%. 1000 milliLiter(s) (125 mL/Hr) IV Continuous <Continuous>          PAST MEDICAL & SURGICAL HISTORY:  Pott disease  History of ITP  Labyrinthitis  Headache  HTN (hypertension)  Anxiety  IBS (irritable bowel syndrome)  No significant past surgical history      FAMILY HISTORY:  Family history of hypertension (Grandparent)  Family history of atrial fibrillation  Family history of prostate cancer in father      SOCIAL HISTORY:    [ x] Non-smoker  [x] No Illicit Drug Use  [ x] No Excess EtOH Use      REVIEW OF SYSTEMS: (Unless + Before Symptom, it is negative)  Constitutional: No Fever, Fatigue, Weight Changes +anxiety  Eyes: No Recent Vision Changes, Eye Pain  ENT: No Congestion, Sore Throat  Endocrine: No Excess Sweating, Temperature Intolerance  Cardiovascular: No Chest Pain, +Palpitations, Shortness of Breath, Pre-syncope, Syncope, LE Edema  Respiratory: No Cough, Congestion, Wheezing  Gastrointestinal: No Abdominal Pain, + Nausea, Vomiting +constipation  Genitourinary: No dysuria, hematuria  Musculoskeletal: No Joint Pain, Swelling  Neurologic: No headaches, Imbalance, Weakness  Skin: No rashes, hematoma, purprura    PHYSICAL EXAM:  Vital Signs Last 24 Hrs  T(C): 36.4 (14 Nov 2017 22:06), Max: 36.4 (14 Nov 2017 05:49)  T(F): 97.5 (14 Nov 2017 22:06), Max: 97.6 (14 Nov 2017 05:49)  HR: 74 (14 Nov 2017 22:06) (54 - 100)  BP: 133/88 (14 Nov 2017 22:06) (104/68 - 165/100)  BP(mean): --  RR: 20 (14 Nov 2017 22:06) (18 - 20)  SpO2: 100% (14 Nov 2017 22:06) (99% - 100%)    Appearance: Normal; NAD	+anxiety   HEENT:   Normal oral mucosa, EOMI	  Lymphatic: No lymphadenopathy  Cardiovascular: Normal S1 S2, No JVD, No murmurs, No edema  Respiratory: Lungs clear to auscultation, no use of accessory muscles	  Psychiatry: A & O x 3; +Anxious  Gastrointestinal:  Soft, Non-tender	  Skin: No rashes, No ecchymoses, No cyanosis	  Neurologic: Non-focal, No Focal Deficits  Extremities: Normal range of motion, No clubbing, cyanosis or edema  Vascular: Peripheral pulses palpable 2+ bilaterally, no prominent varicosities    LABS:	   Labs [id number] Reviewed     CBC Full  -  ( 02 Nov 2017 17:07 )  WBC Count : 10.3 K/uL  Hemoglobin : 13.2 g/dL  Hematocrit : 37.3 %  Platelet Count - Automated : 81 K/uL  Mean Cell Volume : 86.7 fl  Mean Cell Hemoglobin : 30.7 pg  Mean Cell Hemoglobin Concentration : 35.5 gm/dL  Auto Neutrophil # : 7.0 K/uL  Auto Lymphocyte # : 2.6 K/uL  Auto Monocyte # : 0.7 K/uL  Auto Eosinophil # : 0.1 K/uL  Auto Basophil # : 0.0 K/uL  Auto Neutrophil % : 67.2 %  Auto Lymphocyte % : 25.0 %  Auto Monocyte % : 6.8 %  Auto Eosinophil % : 0.8 %  Auto Basophil % : 0.3 %    11-02    140  |  101  |  5<L>  ----------------------------<  86  3.5   |  23  |  0.86    Ca    9.6      02 Nov 2017 17:07    TPro  7.2  /  Alb  4.8  /  TBili  0.4  /  DBili  x   /  AST  20  /  ALT  13  /  AlkPhos  79  11-02  	    ECG:  	  NSR no ischemic changes    =========================================================================  ASSESSMENT:  Dizziness  Orthostasis   Tachycardia  ?IST  ?POTS  High Benzo requirement  Likely component of ativan withdrawal    Recommendations  - Discussed need for behavioral modifications like NOT checking BP/HR at home despite symptoms  - NEed to slowly come off ativan  - [place]  - OK to D/C when OK by EP      Please call with questions.

## 2017-11-15 ENCOUNTER — TRANSCRIPTION ENCOUNTER (OUTPATIENT)
Age: 42
End: 2017-11-15

## 2017-11-15 VITALS
DIASTOLIC BLOOD PRESSURE: 97 MMHG | SYSTOLIC BLOOD PRESSURE: 157 MMHG | HEART RATE: 68 BPM | RESPIRATION RATE: 18 BRPM | OXYGEN SATURATION: 94 %

## 2017-11-15 LAB
ALDOST SERPL-MCNC: <3 NG/DL — SIGNIFICANT CHANGE UP
ANION GAP SERPL CALC-SCNC: 10 MMOL/L — SIGNIFICANT CHANGE UP (ref 5–17)
BUN SERPL-MCNC: 3 MG/DL — LOW (ref 7–23)
CALCIUM SERPL-MCNC: 9 MG/DL — SIGNIFICANT CHANGE UP (ref 8.4–10.5)
CHLORIDE SERPL-SCNC: 108 MMOL/L — SIGNIFICANT CHANGE UP (ref 96–108)
CK MB CFR SERPL CALC: 1 NG/ML — SIGNIFICANT CHANGE UP (ref 0–3.8)
CK SERPL-CCNC: 39 U/L — SIGNIFICANT CHANGE UP (ref 25–170)
CO2 SERPL-SCNC: 26 MMOL/L — SIGNIFICANT CHANGE UP (ref 22–31)
CREAT SERPL-MCNC: 0.91 MG/DL — SIGNIFICANT CHANGE UP (ref 0.5–1.3)
CRP SERPL-MCNC: <0.2 MG/DL — SIGNIFICANT CHANGE UP (ref 0–0.4)
GLUCOSE SERPL-MCNC: 101 MG/DL — HIGH (ref 70–99)
HCT VFR BLD CALC: 31.5 % — LOW (ref 34.5–45)
HGB BLD-MCNC: 11.1 G/DL — LOW (ref 11.5–15.5)
MCHC RBC-ENTMCNC: 31.3 PG — SIGNIFICANT CHANGE UP (ref 27–34)
MCHC RBC-ENTMCNC: 35.3 GM/DL — SIGNIFICANT CHANGE UP (ref 32–36)
MCV RBC AUTO: 88.5 FL — SIGNIFICANT CHANGE UP (ref 80–100)
PLATELET # BLD AUTO: 65 K/UL — LOW (ref 150–400)
POTASSIUM SERPL-MCNC: 3.4 MMOL/L — LOW (ref 3.5–5.3)
POTASSIUM SERPL-SCNC: 3.4 MMOL/L — LOW (ref 3.5–5.3)
RBC # BLD: 3.56 M/UL — LOW (ref 3.8–5.2)
RBC # FLD: 12.3 % — SIGNIFICANT CHANGE UP (ref 10.3–14.5)
SODIUM SERPL-SCNC: 144 MMOL/L — SIGNIFICANT CHANGE UP (ref 135–145)
TROPONIN T SERPL-MCNC: <0.01 NG/ML — SIGNIFICANT CHANGE UP (ref 0–0.06)
WBC # BLD: 4.9 K/UL — SIGNIFICANT CHANGE UP (ref 3.8–10.5)
WBC # FLD AUTO: 4.9 K/UL — SIGNIFICANT CHANGE UP (ref 3.8–10.5)

## 2017-11-15 PROCEDURE — 83497 ASSAY OF 5-HIAA: CPT

## 2017-11-15 PROCEDURE — 97116 GAIT TRAINING THERAPY: CPT

## 2017-11-15 PROCEDURE — 85027 COMPLETE CBC AUTOMATED: CPT

## 2017-11-15 PROCEDURE — 82088 ASSAY OF ALDOSTERONE: CPT

## 2017-11-15 PROCEDURE — 72141 MRI NECK SPINE W/O DYE: CPT

## 2017-11-15 PROCEDURE — 97162 PT EVAL MOD COMPLEX 30 MIN: CPT

## 2017-11-15 PROCEDURE — 82553 CREATINE MB FRACTION: CPT

## 2017-11-15 PROCEDURE — 84443 ASSAY THYROID STIM HORMONE: CPT

## 2017-11-15 PROCEDURE — 82550 ASSAY OF CK (CPK): CPT

## 2017-11-15 PROCEDURE — 84702 CHORIONIC GONADOTROPIN TEST: CPT

## 2017-11-15 PROCEDURE — 80307 DRUG TEST PRSMV CHEM ANLYZR: CPT

## 2017-11-15 PROCEDURE — 93005 ELECTROCARDIOGRAM TRACING: CPT

## 2017-11-15 PROCEDURE — 72148 MRI LUMBAR SPINE W/O DYE: CPT

## 2017-11-15 PROCEDURE — 82330 ASSAY OF CALCIUM: CPT

## 2017-11-15 PROCEDURE — 80048 BASIC METABOLIC PNL TOTAL CA: CPT

## 2017-11-15 PROCEDURE — 86140 C-REACTIVE PROTEIN: CPT

## 2017-11-15 PROCEDURE — 84439 ASSAY OF FREE THYROXINE: CPT

## 2017-11-15 PROCEDURE — 70450 CT HEAD/BRAIN W/O DYE: CPT

## 2017-11-15 PROCEDURE — 99285 EMERGENCY DEPT VISIT HI MDM: CPT | Mod: 25

## 2017-11-15 PROCEDURE — 84244 ASSAY OF RENIN: CPT

## 2017-11-15 PROCEDURE — 84484 ASSAY OF TROPONIN QUANT: CPT

## 2017-11-15 PROCEDURE — 83835 ASSAY OF METANEPHRINES: CPT

## 2017-11-15 PROCEDURE — 85652 RBC SED RATE AUTOMATED: CPT

## 2017-11-15 PROCEDURE — 97530 THERAPEUTIC ACTIVITIES: CPT

## 2017-11-15 PROCEDURE — 99232 SBSQ HOSP IP/OBS MODERATE 35: CPT

## 2017-11-15 PROCEDURE — A9585: CPT

## 2017-11-15 PROCEDURE — 84100 ASSAY OF PHOSPHORUS: CPT

## 2017-11-15 PROCEDURE — 76831 ECHO EXAM UTERUS: CPT

## 2017-11-15 PROCEDURE — 82533 TOTAL CORTISOL: CPT

## 2017-11-15 PROCEDURE — 70553 MRI BRAIN STEM W/O & W/DYE: CPT

## 2017-11-15 PROCEDURE — 83735 ASSAY OF MAGNESIUM: CPT

## 2017-11-15 PROCEDURE — 82570 ASSAY OF URINE CREATININE: CPT

## 2017-11-15 RX ORDER — DILTIAZEM HCL 120 MG
1 CAPSULE, EXT RELEASE 24 HR ORAL
Qty: 30 | Refills: 0 | OUTPATIENT
Start: 2017-11-15 | End: 2017-12-15

## 2017-11-15 RX ORDER — ONDANSETRON 8 MG/1
1 TABLET, FILM COATED ORAL
Qty: 0 | Refills: 0 | COMMUNITY
Start: 2017-11-15 | End: 2017-12-15

## 2017-11-15 RX ORDER — DILTIAZEM HCL 120 MG
1 CAPSULE, EXT RELEASE 24 HR ORAL
Qty: 0 | Refills: 0 | COMMUNITY

## 2017-11-15 RX ORDER — ONDANSETRON 8 MG/1
2 TABLET, FILM COATED ORAL
Qty: 0 | Refills: 0 | COMMUNITY
Start: 2017-11-15 | End: 2017-12-15

## 2017-11-15 RX ORDER — ONDANSETRON 8 MG/1
1 TABLET, FILM COATED ORAL
Qty: 90 | Refills: 0 | OUTPATIENT
Start: 2017-11-15 | End: 2017-12-15

## 2017-11-15 RX ORDER — ACEBUTOLOL HCL 200 MG
1 CAPSULE ORAL
Qty: 60 | Refills: 0 | OUTPATIENT
Start: 2017-11-15 | End: 2017-12-15

## 2017-11-15 RX ORDER — ONDANSETRON 8 MG/1
1 TABLET, FILM COATED ORAL
Qty: 0 | Refills: 0 | COMMUNITY

## 2017-11-15 RX ADMIN — ONDANSETRON 4 MILLIGRAM(S): 8 TABLET, FILM COATED ORAL at 15:24

## 2017-11-15 RX ADMIN — SIMETHICONE 80 MILLIGRAM(S): 80 TABLET, CHEWABLE ORAL at 13:31

## 2017-11-15 RX ADMIN — ONDANSETRON 4 MILLIGRAM(S): 8 TABLET, FILM COATED ORAL at 06:48

## 2017-11-15 RX ADMIN — FAMOTIDINE 20 MILLIGRAM(S): 10 INJECTION INTRAVENOUS at 09:18

## 2017-11-15 RX ADMIN — Medication 100 MILLIGRAM(S): at 15:24

## 2017-11-15 RX ADMIN — Medication 2 MILLIGRAM(S): at 15:24

## 2017-11-15 RX ADMIN — ONDANSETRON 4 MILLIGRAM(S): 8 TABLET, FILM COATED ORAL at 11:03

## 2017-11-15 RX ADMIN — Medication 2 MILLIGRAM(S): at 11:02

## 2017-11-15 RX ADMIN — Medication 100 MILLIGRAM(S): at 09:18

## 2017-11-15 RX ADMIN — Medication 2.5 MILLIGRAM(S): at 09:18

## 2017-11-15 RX ADMIN — Medication 2 MILLIGRAM(S): at 06:48

## 2017-11-15 NOTE — DISCHARGE NOTE ADULT - CARE PLAN
Principal Discharge DX:	Benzodiazepine causing adverse effect in therapeutic use, initial encounter  Goal:	Decrease symptoms  Instructions for follow-up, activity and diet:	Continue Buspar  Secondary Diagnosis:	Generalized anxiety disorder  Goal:	stable  Instructions for follow-up, activity and diet:	Continue ativan  Secondary Diagnosis:	HTN (hypertension)  Goal:	stable goal rate  Instructions for follow-up, activity and diet:	Continue Cardizem  Secondary Diagnosis:	IBS (irritable bowel syndrome)  Goal:	stable  Instructions for follow-up, activity and diet:	Continue digestive enzymes, Zofran and imodium  Secondary Diagnosis:	Somatic symptom disorder  Goal:	stable  Instructions for follow-up, activity and diet:	Continue Buspar

## 2017-11-15 NOTE — DISCHARGE NOTE ADULT - HOSPITAL COURSE
41F w/PMH possible POTS dis41F w/PMH possible POTS dz admitted with tachycardia and tremors which pt takes ativan for up to 10 mg per day without improvement.    tachycardia- on tele, cardio and ep consults, cont cardizem, cont ativan prn.  .  pt has had extensive workup in the past.  neuro eval appreciated.  also was seen by psych,  endo eval appreciated.   mri of spine and brain done.  refusing Acebutolol and all b blockers.   follow up workup by endocrine.    rest of workup can be done as out pt.  d/c home today.    ITP- pt's platelets is stable from 50 k to 80 k.    asthma - continue albuterol and Singulair    d/w father at length.   41F w/PMH possible POTS dz admitted with tachycardia and tremors which pt takes ativan for up to 10 mg per day without improvement.    tachycardia- on tele, cardio and ep consults, cont cardizem, cont ativan prn.  .  pt has had extensive workup in the past.  neuro eval appreciated.  also was seen by psych,  endo eval appreciated.   mri of spine and brain done.  refusing Acebutolol and all b blockers.   follow up workup by endocrine.    rest of workup can be done as out pt.  d/c home today.    ITP- pt's plts is stable from 50 k to 80 k.    asthma - cw albuterol and singulair    dvt px - heparin           ease admitted with tachycardia and tremors which pt takes ativan for up to 10 mg per day without improvement.    tachycardia- on tele, cardio and ep consults, cont Cardizem cont ativan prn.  .  pt has had extensive workup in the past.  neuro eval appreciated.  also was seen by psych,  endo eval appreciated.   mri of spine and brain done.  refusing Acebutolol and all b blockers.   follow up workup by endocrine.    rest of workup can be done as out pt.  Discharge home today.    ITP- pt's plts is stable from 50 k to 80 k.    asthma - cw albuterol and singulair    dvt px - heparin    d/w father at length.   41F w/PMH possible POTS dz admitted with tachycardia and tremors which pt takes ativan for up to 10 mg per day without improvement.    tachycardia- on tele, cardio and ep consults, cont cardizem, cont ativan prn.  .  pt has had extensive workup in the past.  neuro eval appreciated.  also was seen by psych,  endo eval appreciated.   mri of spine and brain done.  refusing Acebutolol and all b blockers.   follow up workup by endocrine.    rest of workup can be done as out pt.  d/c home today.    ITP- pt's plts is stable from 50 k to 80 k.    asthma - cw albuterol and singulair    dvt px - heparin

## 2017-11-15 NOTE — CHART NOTE - NSCHARTNOTEFT_GEN_A_CORE
MEDICINE DORSI KENNEDY  41y Female  Patient is a 41y old  Female who presents with a chief complaint of tachycardia (08 Nov 2017 23:58)       Event Summary: Notified by RN that patient complaining of HA. Pt evaluated at bedside by me. States that she feels lethargic after receiving 0.5 mg IV, but she has never felt tired after ativan before. States that FIGUEROA feels like non specific pressure/pain over the frontal region.  Alert to self, time and location. States that the president is Carrington Bass, but quickly corrects herself. Denies confusion, double vision, numbness/tingling, trouble speaking, CP, SOB, nausea, vomiting, abdominal pain.     Vital Signs Last 24 Hrs  T(C): 36.4 (14 Nov 2017 22:06), Max: 36.4 (14 Nov 2017 05:49)  T(F): 97.5 (14 Nov 2017 22:06), Max: 97.6 (14 Nov 2017 05:49)  HR: 74 (14 Nov 2017 22:06) (54 - 100)  BP: 133/88 (14 Nov 2017 22:06) (104/68 - 165/100)  RR: 20 (14 Nov 2017 22:06) (18 - 20)  SpO2: 100% (14 Nov 2017 22:06) (99% - 100%)    Physical Assessment:  General: Awake, anxious.   Neurology: A&O.   CV: +S1S2, RRR.  No peripheral edema  Respiratory: Even, unlabored.  CTA B/L.    Abdomen:  +BS.  Soft, NT, ND.  No palpable mass  MSK: SILVA x4.   Skin: Warm and Dry     A/P:                Maryann Sam PA-C   [facility]     Patient is a 41y old  Female who presents with a chief complaint of tachycardia (08 Nov 2017 23:58)     Event Summary: Notified by RN that patient complaining of HA. Pt evaluated at bedside by me. States that she feels lethargic after receiving 0.5 mg IV, but she has never felt tired after ativan before. States that FIGUEROA feels like non specific pressure/pain over the frontal region.  Alert to self, time and location. States that the president is Carrington Bass, but quickly corrects herself. Denies confusion, double vision, numbness/tingling, weakness, trouble speaking, CP, SOB, nausea, vomiting, diaphoresis, abdominal pain.     Vital Signs Last 24 Hrs  T(C): 36.4 (14 Nov 2017 22:06), Max: 36.4 (14 Nov 2017 05:49)  T(F): 97.5 (14 Nov 2017 22:06), Max: 97.6 (14 Nov 2017 05:49)  HR: 74 (14 Nov 2017 22:06) (54 - 100)  BP: 133/88 (14 Nov 2017 22:06) (104/68 - 165/100)  RR: 20 (14 Nov 2017 22:06) (18 - 20)  SpO2: 100% (14 Nov 2017 22:06) (99% - 100%)    Physical Assessment:  General: Awake, anxious.   Neurology: A&O. PERRL, EOMI intact b/l. No facial droop. Tongue/uvula midline, no fasciculations.  Symmetric eyebrow raise, smile symmetric.   CV: +S1S2, RRR.  No peripheral edema  Respiratory: Even, unlabored.  CTA B/L.    Abdomen:  +BS.  Soft, NT, ND.  No palpable mass  MSK: SILVA x4. Strength/sensation intact bilateral upper extremities and b/l lower extremities.   Skin: Warm and Dry     Radiology:   11/13: MR Head: IMPRESSION: As mentioned above slight asymmetry to the cavernous carotid  artery flow voids . The left appears slightly narrowed. Recommend MRA for more complete evaluation of the intracranial vasculature. This may simply be physiologic asymmetry however, given the history, further workup is suggested.    A/P:  41F w/PMH possible POTS dz, anxiety, ITP, htn, asthma acutely presenting with new onset headache and .   1. HA secondary to unknown etiology  - CT Head r/o acute neurological processes: Preliminary read:  No acute intracranial hemorrhage, extra-axial fluid collection or displaced skull fracture per    Dr. Matthews. Follow up official read.   - Neuro checks  - Neuro follow up. ?L carotid artery narrowing. Discussed with Neurology Dr. Rush. No further recommendations at this time.   - Primary team to follow in AM     Maryann Sam PA-C   [facility] [id number] MEDICINE PA     Patient is a 41y old  Female who presents with a chief complaint of tachycardia (08 Nov 2017 23:58)     Event Summary: Notified by RN that patient complaining of HA. Pt evaluated at bedside by me. States that she feels lethargic after receiving 0.5 mg IV, but she has never felt tired after ativan before. States that FIGUEROA feels like non specific pressure/pain over the frontal region.  Alert to self, time and location. States that the president is Carrington Bass, but quickly corrects herself. Denies confusion, double vision, numbness/tingling, weakness, trouble speaking, CP, SOB, nausea, vomiting, diaphoresis, abdominal pain.     Vital Signs Last 24 Hrs  T(C): 36.4 (14 Nov 2017 22:06), Max: 36.4 (14 Nov 2017 05:49)  T(F): 97.5 (14 Nov 2017 22:06), Max: 97.6 (14 Nov 2017 05:49)  HR: 74 (14 Nov 2017 22:06) (54 - 100)  BP: 133/88 (14 Nov 2017 22:06) (104/68 - 165/100)  RR: 20 (14 Nov 2017 22:06) (18 - 20)  SpO2: 100% (14 Nov 2017 22:06) (99% - 100%)    Physical Assessment:  General: Awake, anxious.   Neurology: A&O. PERRL, EOMI intact b/l. No facial droop. Tongue/uvula midline, no fasciculations.  Symmetric eyebrow raise, smile symmetric.   CV: +S1S2, RRR.  No peripheral edema  Respiratory: Even, unlabored.  CTA B/L.    Abdomen:  +BS.  Soft, NT, ND.  No palpable mass  MSK: SILVA x4. Strength/sensation intact bilateral upper extremities and b/l lower extremities.   Skin: Warm and Dry     Radiology:   11/13: MR Head: IMPRESSION: As mentioned above slight asymmetry to the cavernous carotid  artery flow voids . The left appears slightly narrowed. Recommend MRA for more complete evaluation of the intracranial vasculature. This may simply be physiologic asymmetry however, given the history, further workup is suggested.    A/P:  41F w/PMH possible POTS dz, anxiety, ITP, htn, asthma acutely presenting with new onset headache and .   1. HA secondary to unknown etiology  - CT Head r/o acute neurological processes: Preliminary read:  No acute intracranial hemorrhage, extra-axial fluid collection or displaced skull fracture per    Dr. Matthews. Follow up official read.   - Discussed with Neurology Dr. Rush. No further recommendations at this time.   - Neuro checks  - Neuro follow up. ?L carotid artery narrowing.   - Primary team to follow in AM     Maryann Sam PA-C   Spectra [id number]

## 2017-11-15 NOTE — PROVIDER CONTACT NOTE (OTHER) - ASSESSMENT
Pt A+Ox4. /88 HR70s. Pt c/o "L temporal headache" and on forehead. Pt states "I'm afraid to close my eyes" for fear of not waking up. Pt s/p IV ativan.
/105 HR 77. Patient denies headache or blurred vision.
/86 HR 62 RR 20 spO2 100%. Patient denies SOB
/93 HR 63 RR 18 spO2 100%. patient c/o head numbness/tingling that is "typical when her pressure is high" as per patient.
erythema to abdomen and left shoulder area surrounding current lead placement. pt c/o of minor itchiness in stated areas
HR 50s-70s, pt. states "had reaction to that med last time"
Pt A+Ox4, anxious. While pt ambulated in room, c/o L sided pain below breast area and heartburn. Simethicone administered as ordered per pt request. Pt c/o pain "at the back of my head." No neuro deficits noted. C/o tremors in extremities and increased HR. /99 . Pt now very anxious in bed. States symptoms related to her POTS condition.
Pt AOx4. pt was sleeping during the time of the event. Pt asymptomatic. Temp 98.1 F, /77, HR 71, RR 18, O2 100% on RA.
Pt denies SOB.  /87 HR 75 T 98.4 SPO2 - 99% RA
VSS. BP = 100/69, asymptomatic.
pt. with anxiety

## 2017-11-15 NOTE — DISCHARGE NOTE ADULT - CARE PROVIDER_API CALL
Beltran Ruiz), EndocrinologyMetabDiabetes; Internal Medicine  [address] [place] [address]  Phone: [phone]  Fax: [phone]    Philip Reed), Cardiovascular Disease; Internal Medicine  [address]  [address] [place] [address]  Phone: [phone]  Fax: [phone]    Param Rush), Clinical Neurophysiology  [address] [place] [address]  Phone: [phone]

## 2017-11-15 NOTE — PROVIDER CONTACT NOTE (OTHER) - BACKGROUND
Dx of R sided CP, BL leg tremors, palpitations and Tachycardia.
Pt admitted for c/o tremor. Hx of POTS disease, anxiety, HTN, and asthma.
Pt admitted for tachycardia, palpitations, tremors
admitted with tachycardia
admitted with tachycardia and tremors
admitted with tachycardia and tremulousness
Pt admitted for tachycardia, tremors, hx of POTS

## 2017-11-15 NOTE — DISCHARGE NOTE ADULT - MEDICATION SUMMARY - MEDICATIONS TO TAKE
I will START or STAY ON the medications listed below when I get home from the hospital:    digestive enzyme  -- 1 to 2 caps  by mouth per meal  -- Indication: For aide digestion    Tylenol 500 mg oral tablet  -- 1 tab(s) by mouth , As Needed  -- Indication: For pain    dilTIAZem 240 mg/24 hours oral capsule, extended release  -- 1 cap(s) by mouth once a day  -- Indication: For Tachycardia    LORazepam 1 mg oral tablet  -- 2 tab(s) by mouth every 4 hours  -- Indication: For Generalized anxiety disorder    Imodium A-D 2 mg oral tablet  -- 1 tab(s) by mouth , As Needed  -- Indication: For Diarrhea    ondansetron 4 mg oral tablet, disintegrating  -- 1 tab(s) by mouth 3 times a day, As Needed  -- Indication: For nausea    busPIRone 5 mg oral tablet  -- 0.5 tab(s) by mouth 2 times a day  -- Indication: For Generalized anxiety disorder    Ventolin HFA 90 mcg/inh inhalation aerosol  -- 1 puff(s) inhaled , As Needed  -- Indication: For dyspnea    clindamycin-benzoyl peroxide 1%-5% topical gel  -- Apply on skin to affected area once a day (in the evening)  -- Indication: For rash    famotidine 20 mg oral tablet  -- 1 tab(s) by mouth 2 times a day  -- Indication: For Gastric protection    docusate sodium 100 mg oral capsule  -- 1 cap(s) by mouth 3 times a day  -- Indication: For constipation    Singulair 10 mg oral tablet  -- 1 tab(s) by mouth once a day  -- Indication: For respiratory disorder    simethicone 80 mg oral tablet, chewable  -- 2 tab(s) by mouth 3 times a day, As Needed  -- Indication: For Gas I will START or STAY ON the medications listed below when I get home from the hospital:    digestive enzyme  -- 1 to 2 caps  by mouth per meal  -- Indication: For aide digestion    Tylenol 500 mg oral tablet  -- 1 tab(s) by mouth , As Needed  -- Indication: For pain    diltiazem 240 mg/24 hours oral capsule, extended release  -- 1 cap(s) by mouth once a day  -- Indication: For Tachycardia    acebutolol 200 mg oral capsule  -- 1 cap(s) by mouth 2 times a day  -- Indication: For tachycardia    LORazepam 1 mg oral tablet  -- 2 tab(s) by mouth every 4 hours  -- Indication: For Generalized anxiety disorder    Imodium A-D 2 mg oral tablet  -- 1 tab(s) by mouth , As Needed  -- Indication: For Diarrhea    ondansetron 4 mg oral tablet, disintegrating  -- 1 tab(s) by mouth 3 times a day, As Needed  -- Indication: For nausea    busPIRone 5 mg oral tablet  -- 0.5 tab(s) by mouth 2 times a day  -- Indication: For Generalized anxiety disorder    Ventolin HFA 90 mcg/inh inhalation aerosol  -- 1 puff(s) inhaled , As Needed  -- Indication: For dyspnea    clindamycin-benzoyl peroxide 1%-5% topical gel  -- Apply on skin to affected area once a day (in the evening)  -- Indication: For rash    famotidine 20 mg oral tablet  -- 1 tab(s) by mouth 2 times a day  -- Indication: For Gastric protection    docusate sodium 100 mg oral capsule  -- 1 cap(s) by mouth 3 times a day  -- Indication: For constipation    Singulair 10 mg oral tablet  -- 1 tab(s) by mouth once a day  -- Indication: For respiratory disorder    simethicone 80 mg oral tablet, chewable  -- 2 tab(s) by mouth 3 times a day, As Needed  -- Indication: For Gas I will START or STAY ON the medications listed below when I get home from the hospital:    digestive enzyme  -- 1 to 2 caps  by mouth per meal  -- Indication: For aide digestion    Tylenol 500 mg oral tablet  -- 1 tab(s) by mouth , As Needed  -- Indication: For pain    dilTIAZem 240 mg/24 hours oral capsule, extended release  -- 1 cap(s) by mouth once a day  -- Indication: For Tachycardia    acebutolol 200 mg oral capsule  -- 1 cap(s) by mouth 2 times a day  -- Indication: For tachycardia    LORazepam 1 mg oral tablet  -- 2 tab(s) by mouth every 4 hours  -- Indication: For Generalized anxiety disorder    Imodium A-D 2 mg oral tablet  -- 1 tab(s) by mouth , As Needed  -- Indication: For Diarrhea    ondansetron 4 mg oral tablet, disintegrating  -- 1 tab(s) by mouth 3 times a day, As Needed  -- Indication: For nausea    busPIRone 5 mg oral tablet  -- 0.5 tab(s) by mouth 2 times a day  -- Indication: For Generalized anxiety disorder    Ventolin HFA 90 mcg/inh inhalation aerosol  -- 1 puff(s) inhaled , As Needed  -- Indication: For asthma    clindamycin-benzoyl peroxide 1%-5% topical gel  -- Apply on skin to affected area once a day (in the evening)  -- Indication: For rash    famotidine 20 mg oral tablet  -- 1 tab(s) by mouth 2 times a day  -- Indication: For Gastric protection    docusate sodium 100 mg oral capsule  -- 1 cap(s) by mouth 3 times a day  -- Indication: For constipation    Singulair 10 mg oral tablet  -- 1 tab(s) by mouth once a day  -- Indication: For asthma    simethicone 80 mg oral tablet, chewable  -- 2 tab(s) by mouth 3 times a day, As Needed  -- Indication: For Gas

## 2017-11-15 NOTE — DISCHARGE NOTE ADULT - CARE PROVIDERS DIRECT ADDRESSES
,DirectAddress_Unknown,[email or web address],DirectAddress_Unknown

## 2017-11-15 NOTE — PROGRESS NOTE ADULT - SUBJECTIVE AND OBJECTIVE BOX
nitial Consult:  · Requested by Name:	Emi Stephenson	  · Date/Time:	15-Nov-2017 	  · Reason for Referral/Consultation:	Dizziness/Tachycardia	      · Subjective and Objective: 	  **********              CARDIOLOGY PROGRESS NOTE              ************  ============================================================  CHIEF COMPLAINT/REASON FOR CONSULT:  Patient is a 41y old  Female who presents with a chief complaint of dizzinesstachycardia    HISTORY OF PRESENT ILLNESS:  41yFemale with a history of POTS ITP Anxiety multiple admissions for latter p/w dizziness & palpitations. No CP/SOB. No syncope.  Multiple admissions for latter.   On high doses Ativan.  Multiple admissions.   Extensive w/u.   Concern for POTS  ==================  Overnight Events  - planning to go home  - renin/brendan negative  =================    No Known Allergies    Intolerances    metoprolol (Headache)  	    MEDICATIONS:  MEDICATIONS  (STANDING):  acebutolol 200 milliGRAM(s) Oral two times a day  busPIRone 2.5 milliGRAM(s) Oral two times a day  diltiazem    milliGRAM(s) Oral daily  docusate sodium 100 milliGRAM(s) Oral three times a day  famotidine    Tablet 20 milliGRAM(s) Oral two times a day  influenza   Vaccine 0.5 milliLiter(s) IntraMuscular once  montelukast 10 milliGRAM(s) Oral daily  sodium chloride 0.9%. 1000 milliLiter(s) (125 mL/Hr) IV Continuous <Continuous>      PAST MEDICAL & SURGICAL HISTORY:  Pott disease  History of ITP  Labyrinthitis  Headache  HTN (hypertension)  Anxiety  IBS (irritable bowel syndrome)  No significant past surgical history      FAMILY HISTORY:  Family history of hypertension (Grandparent)  Family history of atrial fibrillation  Family history of prostate cancer in father      SOCIAL HISTORY:    [ x] Non-smoker  [x] No Illicit Drug Use  [ x] No Excess EtOH Use      REVIEW OF SYSTEMS: (Unless + Before Symptom, it is negative)  Constitutional: No Fever, Fatigue, Weight Changes +anxiety  Eyes: No Recent Vision Changes, Eye Pain  ENT: No Congestion, Sore Throat  Endocrine: No Excess Sweating, Temperature Intolerance  Cardiovascular: No Chest Pain, +Palpitations, Shortness of Breath, Pre-syncope, Syncope, LE Edema  Respiratory: No Cough, Congestion, Wheezing  Gastrointestinal: No Abdominal Pain, + Nausea, Vomiting +constipation  Genitourinary: No dysuria, hematuria  Musculoskeletal: No Joint Pain, Swelling  Neurologic: No headaches, Imbalance, Weakness  Skin: No rashes, hematoma, purprura    PHYSICAL EXAM:  Vital Signs Last 24 Hrs  T(C): 37.1 (15 Nov 2017 12:22), Max: 37.1 (15 Nov 2017 12:22)  T(F): 98.7 (15 Nov 2017 12:22), Max: 98.7 (15 Nov 2017 12:22)  HR: 63 (15 Nov 2017 12:22) (54 - 100)  BP: 148/91 (15 Nov 2017 12:22) (106/72 - 165/100)  BP(mean): --  RR: 17 (15 Nov 2017 15:35) (16 - 20)  SpO2: 92% (15 Nov 2017 15:35) (92% - 100%)  NO Physical Exam Today 11/15  Appearance: Normal; NAD	+anxiety   HEENT:   Normal oral mucosa, EOMI	  Lymphatic: No lymphadenopathy  Cardiovascular: Normal S1 S2, No JVD, No murmurs, No edema  Respiratory: Lungs clear to auscultation, no use of accessory muscles	  Psychiatry: A & O x 3; +Anxious  Gastrointestinal:  Soft, Non-tender	  Skin: No rashes, No ecchymoses, No cyanosis	  Neurologic: Non-focal, No Focal Deficits  Extremities: Normal range of motion, No clubbing, cyanosis or edema  Vascular: Peripheral pulses palpable 2+ bilaterally, no prominent varicosities    LABS:	   Labs Reviewed11-15 Reviewed     CBC Full  -  ( 02 Nov 2017 17:07 )  WBC Count : 10.3 K/uL  Hemoglobin : 13.2 g/dL  Hematocrit : 37.3 %  Platelet Count - Automated : 81 K/uL  Mean Cell Volume : 86.7 fl  Mean Cell Hemoglobin : 30.7 pg  Mean Cell Hemoglobin Concentration : 35.5 gm/dL  Auto Neutrophil # : 7.0 K/uL  Auto Lymphocyte # : 2.6 K/uL  Auto Monocyte # : 0.7 K/uL  Auto Eosinophil # : 0.1 K/uL  Auto Basophil # : 0.0 K/uL  Auto Neutrophil % : 67.2 %  Auto Lymphocyte % : 25.0 %  Auto Monocyte % : 6.8 %  Auto Eosinophil % : 0.8 %  Auto Basophil % : 0.3 %    11-02    140  |  101  |  5<L>  ----------------------------<  86  3.5   |  23  |  0.86    Ca    9.6      02 Nov 2017 17:07    TPro  7.2  /  Alb  4.8  /  TBili  0.4  /  DBili  x   /  AST  20  /  ALT  13  /  AlkPhos  79  11-02  	    ECG:  	  NSR no ischemic changes    =========================================================================  ASSESSMENT:  Dizziness  Orthostasis   Tachycardia  ?IST  ?POTS  High Benzo requirement  Likely component of ativan withdrawal    Recommendations  - Discussed need for behavioral modifications like NOT checking BP/HR at home despite symptoms  - NEed to slowly come off ativan  - [place]  - OK to D/C when OK by EP      Please call with questions.

## 2017-11-15 NOTE — PROVIDER CONTACT NOTE (OTHER) - ACTION/TREATMENT ORDERED:
PA aware and assessed pt at bedside. Order for urgent CT head. Hold PO dose of ativan at this time. Continue to monitor.
No new orders at this time. EP fellow to follow up with patient. Will continue to monitor
give scheduled cardizem early. Will continue to monitor blood pressure. No new orders at this time
hydrocortisone ordered. will attempt to replace with hypoallergenic option if available on another unit and continue to monitor.
NP notified. No new orders at this time. Will continue to monitor pt.
PA aware and assessed pt at bedside, pt c/o CP. STAT EKG and CE ordered. Ativan 0.5mg IVP ordered. Continue to monitor.    No EKG changes noted. CE negative.
continue to monitor
no new orders made
no new orders made, ativan and zofran give as ordered

## 2017-11-15 NOTE — DISCHARGE NOTE ADULT - SECONDARY DIAGNOSIS.
Generalized anxiety disorder HTN (hypertension) IBS (irritable bowel syndrome) Somatic symptom disorder

## 2017-11-15 NOTE — PROGRESS NOTE ADULT - PROVIDER SPECIALTY LIST ADULT
Cardiology
Electrophysiology
Endocrinology
Internal Medicine
Neurology
Cardiology
Electrophysiology
Electrophysiology
Neurology
Internal Medicine

## 2017-11-15 NOTE — PROVIDER CONTACT NOTE (OTHER) - SITUATION
BP right arm =162/100, BP left arm = 168/114, requesting repeat EKG
CP, tremors, HA
HR 45 on tele
HR javier down to 44, back to 50s, pt. sleeping, woke pt. up, HR in 70s
Pt complaining of chest pain 3/10. denies dull or sharp pain.  Pt came in with this pain.
patient bp elevated
patient c/o of palpitations and consistent right sided chest pain 9/10 which has relieved to chest tightness 4/10. it is non radiating as per patient
patient developed rash from tele electrode stickers
pt bp remains elevated
refuses Acebutolol
HA

## 2017-11-15 NOTE — PROVIDER CONTACT NOTE (OTHER) - RECOMMENDATIONS
PA reassessment
No new orders at this time. Will send patient for scheduled MRI and monitor once patient arrives
Come see patient.
PA assessment
continue to monitor
continue to monitor
meds?

## 2017-11-15 NOTE — DIETITIAN INITIAL EVALUATION ADULT. - OTHER INFO
seen for length of stay. consuming <25% intake of meals. pt is Vegan and avoids whey protein, therefore no supplements are compliant. she states that she uses a shake at home. when asked if someone could bring those in for her, she stated that her mother/father are not available nor friends. tolerating ginger ale and saltines. discussed alternate options with NP to address nausea. pt states that she needs Low Sodium secondary to some of her meds cause her to be hypertensive. encouraged pt to consider probiotics to strengthen her gut.  last BM this morning. NKFA seen for length of stay. consuming <25% intake of meals. pt is Vegan and avoids whey protein, therefore no supplements are compliant. she states that she uses a shake at home. when asked if someone could bring those in for her, she stated that her mother/father are not available nor friends. tolerating ginger ale and saltines. discussed alternate options with NP to address nausea. pt states that she needs Low Sodium secondary to some of her meds cause her to be hypertensive. encouraged pt to consider probiotics to strengthen her gut. pharmacy could not confirm that MVI is vegan, therefore MVI not available for pt to receive.  last BM this morning. NKFA

## 2017-11-15 NOTE — DISCHARGE NOTE ADULT - PATIENT PORTAL LINK FT
“You can access the Follow[employer] Patient Portal, offered by [facility], by registering with the following website: [email or web address]”

## 2017-11-15 NOTE — PROGRESS NOTE ADULT - SUBJECTIVE AND OBJECTIVE BOX
Patient is a 41y old  Female who presents with a chief complaint of tachycardia (08 Nov 2017 23:58)      SUBJECTIVE / OVERNIGHT EVENTS: events noted.  No chest pain.     MEDICATIONS  (STANDING):  acebutolol 200 milliGRAM(s) Oral two times a day  busPIRone 2.5 milliGRAM(s) Oral two times a day  diltiazem    milliGRAM(s) Oral daily  docusate sodium 100 milliGRAM(s) Oral three times a day  famotidine    Tablet 20 milliGRAM(s) Oral two times a day  influenza   Vaccine 0.5 milliLiter(s) IntraMuscular once  montelukast 10 milliGRAM(s) Oral daily  sodium chloride 0.9%. 1000 milliLiter(s) (125 mL/Hr) IV Continuous <Continuous>    MEDICATIONS  (PRN):  acetaminophen   Tablet. 650 milliGRAM(s) Oral every 6 hours PRN Moderate Pain (4 - 6)  ALBUTerol    90 MICROgram(s) HFA Inhaler 1 Puff(s) Inhalation every 6 hours PRN Shortness of Breath and/or Wheezing  hydrocortisone 0.5% Cream 1 Application(s) Topical three times a day PRN Rash and/or Itching  loperamide 2 milliGRAM(s) Oral four times a day PRN Diarrhea  LORazepam     Tablet 2 milliGRAM(s) Oral every 4 hours PRN Anxiety  ondansetron Injectable 4 milliGRAM(s) IV Push every 4 hours PRN Nausea and/or Vomiting  simethicone 80 milliGRAM(s) Chew every 8 hours PRN Gas      Vital Signs Last 24 Hrs  T(C): 36.5 (15 Nov 2017 04:51), Max: 36.5 (15 Nov 2017 04:51)  T(F): 97.7 (15 Nov 2017 04:51), Max: 97.7 (15 Nov 2017 04:51)  HR: 92 (15 Nov 2017 10:15) (54 - 100)  BP: 124/80 (15 Nov 2017 10:15) (106/72 - 165/100)  BP(mean): --  RR: 18 (15 Nov 2017 10:15) (18 - 20)  SpO2: 99% (15 Nov 2017 10:15) (99% - 100%)  CAPILLARY BLOOD GLUCOSE        I&O's Summary    14 Nov 2017 07:01  -  15 Nov 2017 07:00  --------------------------------------------------------  IN: 3540 mL / OUT: 0 mL / NET: 3540 mL        PHYSICAL EXAM:  GENERAL: NAD, well-developed  HEAD:  Atraumatic, Normocephalic  EYES: EOMI, PERRLA, conjunctiva and sclera clear  NECK: Supple, No JVD  CHEST/LUNG: Clear to auscultation bilaterally; No wheeze  HEART: Regular rate and rhythm; No murmurs, rubs, or gallops  ABDOMEN: Soft, Nontender, Nondistended; Bowel sounds present  EXTREMITIES:  2+ Peripheral Pulses, No clubbing, cyanosis, or edema  PSYCH: AAOx3  NEUROLOGY: non-focal  SKIN: No rashes or lesions    LABS:                        11.1   4.9   )-----------( 65       ( 15 Nov 2017 07:05 )             31.5     11-15    144  |  108  |  3<L>  ----------------------------<  101<H>  3.4<L>   |  26  |  0.91    Ca    9.0      15 Nov 2017 07:05        CARDIAC MARKERS ( 15 Nov 2017 07:05 )  x     / <0.01 ng/mL / 39 U/L / x     / 1.0 ng/mL  CARDIAC MARKERS ( 14 Nov 2017 21:41 )  x     / <0.01 ng/mL / 54 U/L / x     / <1.0 ng/mL          RADIOLOGY & ADDITIONAL TESTS:    Imaging Personally Reviewed:    Consultant(s) Notes Reviewed:      Care Discussed with Consultants/Other Providers:

## 2017-11-15 NOTE — PROGRESS NOTE ADULT - ASSESSMENT
41F w/PMH possible POTS dz admitted with tachycardia and tremors which pt takes ativan for up to 10 mg per day without improvement.    tachycardia- on tele, cardio and ep consults, cont cardizem, cont ativan prn.  .  pt has had extensive workup in the past.  neuro eval appreciated.  also was seen by psych,  endo eval appreciated.   mri of spine and brain done.  refusing Acebutolol and all b blockers.   follow up workup by endocrine.    rest of workup can be done as out pt.  d/c home today.    ITP- pt's plts is stable from 50 k to 80 k.    asthma - cw albuterol and singulair    dvt px - heparin 41F w/PMH possible POTS dz admitted with tachycardia and tremors which pt takes ativan for up to 10 mg per day without improvement.    tachycardia- on tele, cardio and ep consults, cont cardizem, cont ativan prn.  .  pt has had extensive workup in the past.  neuro eval appreciated.  also was seen by psych,  endo eval appreciated.   mri of spine and brain done.  refusing Acebutolol and all b blockers.   follow up workup by endocrine.    rest of workup can be done as out pt.  d/c home today.    ITP- pt's plts is stable from 50 k to 80 k.    asthma - cw albuterol and singulair    dvt px - heparin    d/w father at length.

## 2017-11-15 NOTE — PROGRESS NOTE ADULT - ASSESSMENT
42yo F with tremor, ?POTS  1. MRI brain without significant abnormality, asymmetric vasculature, recommend MRA of head and neck for completion   2.  lumbar and C-spine reviewed   3. if imaging study negative may pursue autonomic testing as outpt   4.  pt encouraged to follow up with us in the office for further work up as needed

## 2017-11-15 NOTE — DISCHARGE NOTE ADULT - MEDICATION SUMMARY - MEDICATIONS TO STOP TAKING
I will STOP taking the medications listed below when I get home from the hospital:    dilTIAZem 120 mg oral tablet  -- 1 tab(s) by mouth once a day, As Needed

## 2017-11-15 NOTE — PROGRESS NOTE ADULT - SUBJECTIVE AND OBJECTIVE BOX
Patient is a 41y old  Female who presents with a chief complaint of tachycardia (08 Nov 2017 23:58)    Pt seen and examined.      NEUROLOGIC EXAM  Mental Status:     Oriented to time/place/person; Good eye contact ; follow simple commands ;  Age appropriate language  and fund of  knowledge.  Cranial Nerves:   PERRL, EOMI, no facial asymmetry , V1-V3 intact , symmetric palate, tongue midline.   Muscle Strength:	5/5 in all extremities  Muscle Tone:	Normal tone  Deep Tendon Reflexes:         2+/4  : Biceps, Brachioradialis, Triceps Bilateral;  2+/4 : Pattelar, Ankle bilateral. No clonus.  Plantar Response:	Plantar reflexes flexion bilaterally  Sensation:		Intact to pain, light touch, temperature      Vital Signs Last 24 Hrs  T(C): 36.5 (15 Nov 2017 04:51), Max: 36.5 (15 Nov 2017 04:51)  T(F): 97.7 (15 Nov 2017 04:51), Max: 97.7 (15 Nov 2017 04:51)  HR: 73 (15 Nov 2017 04:51) (54 - 100)  BP: 106/72 (15 Nov 2017 04:51) (106/72 - 165/100)  BP(mean): --  RR: 20 (15 Nov 2017 04:51) (18 - 20)  SpO2: 99% (15 Nov 2017 04:51) (99% - 100%)  MEDICATIONS  (STANDING):  acebutolol 200 milliGRAM(s) Oral two times a day  busPIRone 2.5 milliGRAM(s) Oral two times a day  diltiazem    milliGRAM(s) Oral daily  docusate sodium 100 milliGRAM(s) Oral three times a day  famotidine    Tablet 20 milliGRAM(s) Oral two times a day  influenza   Vaccine 0.5 milliLiter(s) IntraMuscular once  montelukast 10 milliGRAM(s) Oral daily  sodium chloride 0.9%. 1000 milliLiter(s) (125 mL/Hr) IV Continuous <Continuous>    MEDICATIONS  (PRN):  acetaminophen   Tablet. 650 milliGRAM(s) Oral every 6 hours PRN Moderate Pain (4 - 6)  ALBUTerol    90 MICROgram(s) HFA Inhaler 1 Puff(s) Inhalation every 6 hours PRN Shortness of Breath and/or Wheezing  hydrocortisone 0.5% Cream 1 Application(s) Topical three times a day PRN Rash and/or Itching  loperamide 2 milliGRAM(s) Oral four times a day PRN Diarrhea  LORazepam     Tablet 2 milliGRAM(s) Oral every 4 hours PRN Anxiety  ondansetron Injectable 4 milliGRAM(s) IV Push every 4 hours PRN Nausea and/or Vomiting  simethicone 80 milliGRAM(s) Chew every 8 hours PRN Gas

## 2017-11-15 NOTE — PROVIDER CONTACT NOTE (OTHER) - DATE AND TIME:
09-Nov-2017 03:11
09-Nov-2017 11:20
11-Nov-2017 09:25
11-Nov-2017 17:30
12-Nov-2017 02:49
12-Nov-2017 15:40
12-Nov-2017 15:45
12-Nov-2017 19:50
13-Nov-2017 03:35
14-Nov-2017 21:00
14-Nov-2017 22:30

## 2017-11-15 NOTE — DISCHARGE NOTE ADULT - PLAN OF CARE
Decrease symptoms Continue Buspar stable Continue ativan stable goal rate Continue Cardizem Continue digestive enzymes, Zofran and imodium

## 2017-11-16 ENCOUNTER — EMERGENCY (EMERGENCY)
Facility: HOSPITAL | Age: 42
LOS: 1 days | Discharge: HOME | End: 2017-11-16
Attending: EMERGENCY MEDICINE | Admitting: EMERGENCY MEDICINE
Payer: COMMERCIAL

## 2017-11-16 VITALS
TEMPERATURE: 98 F | RESPIRATION RATE: 19 BRPM | DIASTOLIC BLOOD PRESSURE: 92 MMHG | SYSTOLIC BLOOD PRESSURE: 159 MMHG | OXYGEN SATURATION: 99 % | HEART RATE: 61 BPM

## 2017-11-16 VITALS
SYSTOLIC BLOOD PRESSURE: 118 MMHG | DIASTOLIC BLOOD PRESSURE: 85 MMHG | RESPIRATION RATE: 16 BRPM | OXYGEN SATURATION: 99 % | HEIGHT: 62 IN | TEMPERATURE: 98 F | HEART RATE: 80 BPM | WEIGHT: 113.98 LBS

## 2017-11-16 DIAGNOSIS — K58.9 IRRITABLE BOWEL SYNDROME WITHOUT DIARRHEA: ICD-10-CM

## 2017-11-16 DIAGNOSIS — G90.9 DISORDER OF THE AUTONOMIC NERVOUS SYSTEM, UNSPECIFIED: ICD-10-CM

## 2017-11-16 DIAGNOSIS — D69.3 IMMUNE THROMBOCYTOPENIC PURPURA: ICD-10-CM

## 2017-11-16 DIAGNOSIS — F41.9 ANXIETY DISORDER, UNSPECIFIED: ICD-10-CM

## 2017-11-16 DIAGNOSIS — F41.0 PANIC DISORDER [EPISODIC PAROXYSMAL ANXIETY]: ICD-10-CM

## 2017-11-16 LAB
5OH-INDOLEACETATE UR-MCNC: 0.65 G/24 H — SIGNIFICANT CHANGE UP (ref 0.63–2.5)
5OH-INDOLEACETATE UR-MCNC: 0.9 MG/24 H — SIGNIFICANT CHANGE UP
5OH-INDOLEACETATE UR-MCNC: 2875 ML — SIGNIFICANT CHANGE UP
ALBUMIN SERPL ELPH-MCNC: 4.3 G/DL — SIGNIFICANT CHANGE UP (ref 3.3–5)
ALP SERPL-CCNC: 80 U/L — SIGNIFICANT CHANGE UP (ref 30–120)
ALT FLD-CCNC: 24 U/L DA — SIGNIFICANT CHANGE UP (ref 10–60)
ANION GAP SERPL CALC-SCNC: 11 MMOL/L — SIGNIFICANT CHANGE UP (ref 5–17)
APPEARANCE UR: CLEAR — SIGNIFICANT CHANGE UP
APTT BLD: 26.5 SEC — LOW (ref 27.5–37.4)
AST SERPL-CCNC: 19 U/L — SIGNIFICANT CHANGE UP (ref 10–40)
BASOPHILS # BLD AUTO: 0 K/UL — SIGNIFICANT CHANGE UP (ref 0–0.2)
BASOPHILS NFR BLD AUTO: 0.7 % — SIGNIFICANT CHANGE UP (ref 0–2)
BILIRUB SERPL-MCNC: 0.4 MG/DL — SIGNIFICANT CHANGE UP (ref 0.2–1.2)
BILIRUB UR-MCNC: NEGATIVE — SIGNIFICANT CHANGE UP
BUN SERPL-MCNC: 5 MG/DL — LOW (ref 7–23)
CALCIUM SERPL-MCNC: 9.3 MG/DL — SIGNIFICANT CHANGE UP (ref 8.4–10.5)
CHLORIDE SERPL-SCNC: 105 MMOL/L — SIGNIFICANT CHANGE UP (ref 96–108)
CK MB BLD-MCNC: 0.5 % — SIGNIFICANT CHANGE UP (ref 0–3.5)
CK MB CFR SERPL CALC: 0.3 NG/ML — SIGNIFICANT CHANGE UP (ref 0–3.6)
CK SERPL-CCNC: 55 U/L — SIGNIFICANT CHANGE UP (ref 26–192)
CO2 SERPL-SCNC: 26 MMOL/L — SIGNIFICANT CHANGE UP (ref 22–31)
COLOR SPEC: YELLOW — SIGNIFICANT CHANGE UP
CREAT SERPL-MCNC: 0.93 MG/DL — SIGNIFICANT CHANGE UP (ref 0.5–1.3)
D DIMER BLD IA.RAPID-MCNC: <150 NG/ML DDU — SIGNIFICANT CHANGE UP
DIFF PNL FLD: NEGATIVE — SIGNIFICANT CHANGE UP
EOSINOPHIL # BLD AUTO: 0.1 K/UL — SIGNIFICANT CHANGE UP (ref 0–0.5)
EOSINOPHIL NFR BLD AUTO: 2.3 % — SIGNIFICANT CHANGE UP (ref 0–6)
GLUCOSE SERPL-MCNC: 107 MG/DL — HIGH (ref 70–99)
GLUCOSE UR QL: NEGATIVE MG/DL — SIGNIFICANT CHANGE UP
HCT VFR BLD CALC: 36.8 % — SIGNIFICANT CHANGE UP (ref 34.5–45)
HGB BLD-MCNC: 12.7 G/DL — SIGNIFICANT CHANGE UP (ref 11.5–15.5)
INR BLD: 1.04 RATIO — SIGNIFICANT CHANGE UP (ref 0.88–1.16)
KETONES UR-MCNC: NEGATIVE — SIGNIFICANT CHANGE UP
LEUKOCYTE ESTERASE UR-ACNC: NEGATIVE — SIGNIFICANT CHANGE UP
LYMPHOCYTES # BLD AUTO: 1.7 K/UL — SIGNIFICANT CHANGE UP (ref 1–3.3)
LYMPHOCYTES # BLD AUTO: 32.8 % — SIGNIFICANT CHANGE UP (ref 13–44)
MCHC RBC-ENTMCNC: 29.7 PG — SIGNIFICANT CHANGE UP (ref 27–34)
MCHC RBC-ENTMCNC: 34.5 GM/DL — SIGNIFICANT CHANGE UP (ref 32–36)
MCV RBC AUTO: 86.1 FL — SIGNIFICANT CHANGE UP (ref 80–100)
MONOCYTES # BLD AUTO: 0.5 K/UL — SIGNIFICANT CHANGE UP (ref 0–0.9)
MONOCYTES NFR BLD AUTO: 10.1 % — SIGNIFICANT CHANGE UP (ref 2–14)
NEUTROPHILS # BLD AUTO: 2.9 K/UL — SIGNIFICANT CHANGE UP (ref 1.8–7.4)
NEUTROPHILS NFR BLD AUTO: 54.1 % — SIGNIFICANT CHANGE UP (ref 43–77)
NITRITE UR-MCNC: NEGATIVE — SIGNIFICANT CHANGE UP
PH UR: 7 — SIGNIFICANT CHANGE UP (ref 5–8)
PLATELET # BLD AUTO: 83 K/UL — LOW (ref 150–400)
POTASSIUM SERPL-MCNC: 3.1 MMOL/L — LOW (ref 3.5–5.3)
POTASSIUM SERPL-SCNC: 3.1 MMOL/L — LOW (ref 3.5–5.3)
PROT SERPL-MCNC: 7.3 G/DL — SIGNIFICANT CHANGE UP (ref 6–8.3)
PROT UR-MCNC: NEGATIVE MG/DL — SIGNIFICANT CHANGE UP
PROTHROM AB SERPL-ACNC: 11.3 SEC — SIGNIFICANT CHANGE UP (ref 9.8–12.7)
RBC # BLD: 4.27 M/UL — SIGNIFICANT CHANGE UP (ref 3.8–5.2)
RBC # FLD: 11.6 % — SIGNIFICANT CHANGE UP (ref 10.3–14.5)
SODIUM SERPL-SCNC: 142 MMOL/L — SIGNIFICANT CHANGE UP (ref 135–145)
SP GR SPEC: 1 — LOW (ref 1.01–1.02)
TROPONIN I SERPL-MCNC: 0 NG/ML — LOW (ref 0.02–0.06)
UROBILINOGEN FLD QL: NEGATIVE MG/DL — SIGNIFICANT CHANGE UP
WBC # BLD: 5.3 K/UL — SIGNIFICANT CHANGE UP (ref 3.8–10.5)
WBC # FLD AUTO: 5.3 K/UL — SIGNIFICANT CHANGE UP (ref 3.8–10.5)

## 2017-11-16 PROCEDURE — 84484 ASSAY OF TROPONIN QUANT: CPT

## 2017-11-16 PROCEDURE — 71046 X-RAY EXAM CHEST 2 VIEWS: CPT

## 2017-11-16 PROCEDURE — 82550 ASSAY OF CK (CPK): CPT

## 2017-11-16 PROCEDURE — 36415 COLL VENOUS BLD VENIPUNCTURE: CPT

## 2017-11-16 PROCEDURE — 85610 PROTHROMBIN TIME: CPT

## 2017-11-16 PROCEDURE — 85730 THROMBOPLASTIN TIME PARTIAL: CPT

## 2017-11-16 PROCEDURE — 99285 EMERGENCY DEPT VISIT HI MDM: CPT

## 2017-11-16 PROCEDURE — 81003 URINALYSIS AUTO W/O SCOPE: CPT

## 2017-11-16 PROCEDURE — 82553 CREATINE MB FRACTION: CPT

## 2017-11-16 PROCEDURE — 71020: CPT | Mod: 26

## 2017-11-16 PROCEDURE — 99284 EMERGENCY DEPT VISIT MOD MDM: CPT | Mod: 25

## 2017-11-16 PROCEDURE — 71275 CT ANGIOGRAPHY CHEST: CPT

## 2017-11-16 PROCEDURE — 85379 FIBRIN DEGRADATION QUANT: CPT

## 2017-11-16 PROCEDURE — 85027 COMPLETE CBC AUTOMATED: CPT

## 2017-11-16 PROCEDURE — 71275 CT ANGIOGRAPHY CHEST: CPT | Mod: 26

## 2017-11-16 PROCEDURE — 80053 COMPREHEN METABOLIC PANEL: CPT

## 2017-11-16 RX ORDER — POTASSIUM CHLORIDE 20 MEQ
40 PACKET (EA) ORAL ONCE
Qty: 0 | Refills: 0 | Status: COMPLETED | OUTPATIENT
Start: 2017-11-16 | End: 2017-11-16

## 2017-11-16 RX ORDER — POTASSIUM CHLORIDE 20 MEQ
40 PACKET (EA) ORAL ONCE
Qty: 0 | Refills: 0 | Status: DISCONTINUED | OUTPATIENT
Start: 2017-11-16 | End: 2017-11-16

## 2017-11-16 RX ORDER — SODIUM CHLORIDE 9 MG/ML
1000 INJECTION INTRAMUSCULAR; INTRAVENOUS; SUBCUTANEOUS ONCE
Qty: 0 | Refills: 0 | Status: COMPLETED | OUTPATIENT
Start: 2017-11-16 | End: 2017-11-16

## 2017-11-16 RX ADMIN — SODIUM CHLORIDE 1000 MILLILITER(S): 9 INJECTION INTRAMUSCULAR; INTRAVENOUS; SUBCUTANEOUS at 14:11

## 2017-11-16 RX ADMIN — Medication 40 MILLIEQUIVALENT(S): at 16:33

## 2017-11-16 NOTE — ED PROVIDER NOTE - OBJECTIVE STATEMENT
40 y/o F with hx of ITP, anxiety, depression, POTS presents with c/o chest pain since this morning and shortness of breath x 1 week. 40 y/o F with hx of ITP, anxiety, depression, POTS presents with c/o chest pain since this morning and shortness of breath x 1 week. Pt states that she was recently admitted at another hospital for a week and just got discharged. States that she spoke to her pulmonologist this morning and was asked to come in to ED to r/o PE. Denies recent travel, fever, diaphoresis, n/v/d, abd pain, syncope or other symptoms.

## 2017-11-16 NOTE — ED PROVIDER NOTE - ATTENDING CONTRIBUTION TO CARE
I, Dr Aranda, performed the initial face to face bedside interview with this patient regarding history of present illness, review of symptoms and relevant past medical, social and family history.  I completed an independent physical examination and confirmed and agree PA/NP Physical exam and finding.  I was the initial provider who evaluated this patient. I have signed out the follow up of any pending tests (i.e. labs, radiological studies) to the ACP.  I have communicated the patient’s plan of care and disposition with the ACP.

## 2017-11-16 NOTE — ED PROVIDER NOTE - MEDICAL DECISION MAKING DETAILS
40 yo f with chest pain x 1 day, SOB x 2 weeks, will get labs, cards, ct chest to r/o pe, ekg, re-assess; labs wnl, will d/c home and f/u card and pulm

## 2017-11-16 NOTE — ED ADULT NURSE REASSESSMENT NOTE - NS ED NURSE REASSESS COMMENT FT1
patient is awaiting for ct scan, also c/o headache (6/10) and KALLI Muhammad aware. continue to monitor. patient is awaiting for ct scan, also c/o headache (6/10) and KALLI Muhammad aware. continue to monitor.    addon- patient is getting NS \1L  bolus and tolerating ok, denies chest pain or headache at this time. patient is awaiting for ct scan, also c/o headache (6/10) and KALLI Muhammad aware. continue to monitor.    addon- patient is getting NS \1L  bolus and tolerating ok, denies chest pain or headache at this time.    replaced K+ with medication. discharged with dc instruction. Pt is in no acute distress. iv removed. ambulates to dc.

## 2017-11-16 NOTE — ED ADULT NURSE NOTE - OBJECTIVE STATEMENT
Pt is aaox3, c/o chest pain every time patient breathes since this morning, patient was discharged from [facility] yesterday and her doctor sent her to r/o PE as per patient, cardiac monitor placed and SR on the monitor, EKG done, patient appears anxious about the test and her condition, continue to monitor and reassure.

## 2017-11-16 NOTE — ED ADULT TRIAGE NOTE - CHIEF COMPLAINT QUOTE
" My doctor sent me here, I need a CT Scan and D dimer, r/o blood clot, I've been short of breath x 1 week, my chest hurts x 2 days "

## 2017-11-16 NOTE — ED PROVIDER NOTE - PROGRESS NOTE DETAILS
Pt examined by ED attending, Dr. Aranda who agreed with disposition and plan.   Labs, ekg, cards, and ct chest wnl, will d/c home and f/u cardiologist and pulmonologist.

## 2017-11-17 ENCOUNTER — APPOINTMENT (OUTPATIENT)
Dept: PULMONOLOGY | Facility: CLINIC | Age: 42
End: 2017-11-17
Payer: COMMERCIAL

## 2017-11-17 VITALS
HEIGHT: 62 IN | OXYGEN SATURATION: 99 % | RESPIRATION RATE: 17 BRPM | BODY MASS INDEX: 21.53 KG/M2 | HEART RATE: 90 BPM | DIASTOLIC BLOOD PRESSURE: 70 MMHG | SYSTOLIC BLOOD PRESSURE: 106 MMHG | WEIGHT: 117 LBS

## 2017-11-17 DIAGNOSIS — L50.1 IDIOPATHIC URTICARIA: ICD-10-CM

## 2017-11-17 PROCEDURE — 99214 OFFICE O/P EST MOD 30 MIN: CPT | Mod: 25

## 2017-11-17 PROCEDURE — 94620 PULMONARY STRESS TESTING SIMPLE: CPT

## 2017-11-17 RX ORDER — AZITHROMYCIN 250 MG/1
250 TABLET, FILM COATED ORAL
Qty: 1 | Refills: 0 | Status: DISCONTINUED | COMMUNITY
Start: 2017-10-23 | End: 2017-11-17

## 2017-11-18 ENCOUNTER — EMERGENCY (EMERGENCY)
Facility: HOSPITAL | Age: 42
LOS: 1 days | Discharge: HOME | End: 2017-11-18
Attending: EMERGENCY MEDICINE
Payer: COMMERCIAL

## 2017-11-18 VITALS
SYSTOLIC BLOOD PRESSURE: 128 MMHG | TEMPERATURE: 99 F | HEART RATE: 95 BPM | DIASTOLIC BLOOD PRESSURE: 91 MMHG | WEIGHT: 111.99 LBS | RESPIRATION RATE: 16 BRPM | HEIGHT: 62 IN

## 2017-11-18 VITALS
OXYGEN SATURATION: 99 % | HEART RATE: 85 BPM | DIASTOLIC BLOOD PRESSURE: 96 MMHG | RESPIRATION RATE: 16 BRPM | SYSTOLIC BLOOD PRESSURE: 154 MMHG

## 2017-11-18 LAB
ALBUMIN SERPL ELPH-MCNC: 4.3 G/DL — SIGNIFICANT CHANGE UP (ref 3.3–5)
ALP SERPL-CCNC: 80 U/L — SIGNIFICANT CHANGE UP (ref 30–120)
ALT FLD-CCNC: 29 U/L DA — SIGNIFICANT CHANGE UP (ref 10–60)
ANION GAP SERPL CALC-SCNC: 10 MMOL/L — SIGNIFICANT CHANGE UP (ref 5–17)
AST SERPL-CCNC: 24 U/L — SIGNIFICANT CHANGE UP (ref 10–40)
BASOPHILS # BLD AUTO: 0 K/UL — SIGNIFICANT CHANGE UP (ref 0–0.2)
BASOPHILS NFR BLD AUTO: 0.4 % — SIGNIFICANT CHANGE UP (ref 0–2)
BILIRUB SERPL-MCNC: 0.4 MG/DL — SIGNIFICANT CHANGE UP (ref 0.2–1.2)
BUN SERPL-MCNC: 4 MG/DL — LOW (ref 7–23)
CALCIUM SERPL-MCNC: 9.1 MG/DL — SIGNIFICANT CHANGE UP (ref 8.4–10.5)
CHLORIDE SERPL-SCNC: 105 MMOL/L — SIGNIFICANT CHANGE UP (ref 96–108)
CO2 SERPL-SCNC: 26 MMOL/L — SIGNIFICANT CHANGE UP (ref 22–31)
CREAT SERPL-MCNC: 0.8 MG/DL — SIGNIFICANT CHANGE UP (ref 0.5–1.3)
EOSINOPHIL # BLD AUTO: 0.2 K/UL — SIGNIFICANT CHANGE UP (ref 0–0.5)
EOSINOPHIL NFR BLD AUTO: 3 % — SIGNIFICANT CHANGE UP (ref 0–6)
GLUCOSE SERPL-MCNC: 89 MG/DL — SIGNIFICANT CHANGE UP (ref 70–99)
HCT VFR BLD CALC: 37.4 % — SIGNIFICANT CHANGE UP (ref 34.5–45)
HGB BLD-MCNC: 12.4 G/DL — SIGNIFICANT CHANGE UP (ref 11.5–15.5)
LYMPHOCYTES # BLD AUTO: 1.7 K/UL — SIGNIFICANT CHANGE UP (ref 1–3.3)
LYMPHOCYTES # BLD AUTO: 27.9 % — SIGNIFICANT CHANGE UP (ref 13–44)
MCHC RBC-ENTMCNC: 28.8 PG — SIGNIFICANT CHANGE UP (ref 27–34)
MCHC RBC-ENTMCNC: 33.2 GM/DL — SIGNIFICANT CHANGE UP (ref 32–36)
MCV RBC AUTO: 86.5 FL — SIGNIFICANT CHANGE UP (ref 80–100)
METANEPHRINE, PL: 33 PG/ML — SIGNIFICANT CHANGE UP (ref 0–62)
MONOCYTES # BLD AUTO: 0.5 K/UL — SIGNIFICANT CHANGE UP (ref 0–0.9)
MONOCYTES NFR BLD AUTO: 8.4 % — SIGNIFICANT CHANGE UP (ref 2–14)
NEUTROPHILS # BLD AUTO: 3.8 K/UL — SIGNIFICANT CHANGE UP (ref 1.8–7.4)
NEUTROPHILS NFR BLD AUTO: 60.3 % — SIGNIFICANT CHANGE UP (ref 43–77)
NORMETANEPHRINE, PL: 29 PG/ML — SIGNIFICANT CHANGE UP (ref 0–145)
PLATELET # BLD AUTO: 85 K/UL — LOW (ref 150–400)
POTASSIUM SERPL-MCNC: 3.3 MMOL/L — LOW (ref 3.5–5.3)
POTASSIUM SERPL-SCNC: 3.3 MMOL/L — LOW (ref 3.5–5.3)
PROT SERPL-MCNC: 7.7 G/DL — SIGNIFICANT CHANGE UP (ref 6–8.3)
RBC # BLD: 4.32 M/UL — SIGNIFICANT CHANGE UP (ref 3.8–5.2)
RBC # FLD: 12.2 % — SIGNIFICANT CHANGE UP (ref 10.3–14.5)
SODIUM SERPL-SCNC: 141 MMOL/L — SIGNIFICANT CHANGE UP (ref 135–145)
WBC # BLD: 6.2 K/UL — SIGNIFICANT CHANGE UP (ref 3.8–10.5)
WBC # FLD AUTO: 6.2 K/UL — SIGNIFICANT CHANGE UP (ref 3.8–10.5)

## 2017-11-18 PROCEDURE — 85027 COMPLETE CBC AUTOMATED: CPT

## 2017-11-18 PROCEDURE — 93005 ELECTROCARDIOGRAM TRACING: CPT

## 2017-11-18 PROCEDURE — 99283 EMERGENCY DEPT VISIT LOW MDM: CPT

## 2017-11-18 PROCEDURE — 93010 ELECTROCARDIOGRAM REPORT: CPT

## 2017-11-18 PROCEDURE — 99283 EMERGENCY DEPT VISIT LOW MDM: CPT | Mod: 25

## 2017-11-18 PROCEDURE — 96360 HYDRATION IV INFUSION INIT: CPT

## 2017-11-18 PROCEDURE — 80053 COMPREHEN METABOLIC PANEL: CPT

## 2017-11-18 RX ORDER — SODIUM CHLORIDE 9 MG/ML
1000 INJECTION INTRAMUSCULAR; INTRAVENOUS; SUBCUTANEOUS
Qty: 0 | Refills: 0 | Status: DISCONTINUED | OUTPATIENT
Start: 2017-11-18 | End: 2017-11-22

## 2017-11-18 RX ORDER — ACETAMINOPHEN 500 MG
1 TABLET ORAL
Qty: 0 | Refills: 0 | COMMUNITY

## 2017-11-18 RX ORDER — POTASSIUM CHLORIDE 20 MEQ
20 PACKET (EA) ORAL ONCE
Qty: 0 | Refills: 0 | Status: DISCONTINUED | OUTPATIENT
Start: 2017-11-18 | End: 2017-11-22

## 2017-11-18 RX ORDER — LOPERAMIDE HCL 2 MG
1 TABLET ORAL
Qty: 0 | Refills: 0 | COMMUNITY

## 2017-11-18 RX ADMIN — SODIUM CHLORIDE 1000 MILLILITER(S): 9 INJECTION INTRAMUSCULAR; INTRAVENOUS; SUBCUTANEOUS at 16:23

## 2017-11-18 NOTE — ED ADULT NURSE REASSESSMENT NOTE - NS ED NURSE REASSESS COMMENT FT1
discussed EKG and lab results with patient at 1700.   Potassium replacement given as ordered. As per  pt may be dishcarged . she requested to finish IV fluids  Pts request granted   Over the last hour pt continued to turn on cardiac monitor and change the rate on her IV fluids.  Pt mildly agitated and now her BP is elevated. She is insisting that she be monitored for atleast another 3 hours  went in to speak with patient and enforce the idea of discharge. this RN discontinued her IV site. at this time she requested she speak with the Nursing supervisor.. Nursing supervisor in attendence at 1900 and speaking to patient.

## 2017-11-18 NOTE — ED PROVIDER NOTE - CHPI ED SYMPTOMS NEG
no fever/no chest pain/no chills/no diaphoresis/no dizziness/no nausea/no cough/no vomiting/no shortness of breath/no back pain/no syncope

## 2017-11-18 NOTE — ED PROVIDER NOTE - PROGRESS NOTE DETAILS
Heart rate 70s. Pt feels better. Wants to go home. Will FU with her physician on Monday as discussed. Copies of all lab tests given to pt.

## 2017-11-18 NOTE — ED PROVIDER NOTE - OBJECTIVE STATEMENT
42 yo female with hx of autonomic dysfunction, anxiety, frequent ER and hospital visits for palpitations, co usual palpitations today. Denies fever, chest pain, SOB, N, V, or other symptom. Requesting lab tests and IV fluids which she says makes her better. Review of records reveals multiple psych admissions for anxiety as well. Denies suicidal thoughts.

## 2017-11-18 NOTE — ED PROVIDER NOTE - ATTESTATION, MLM
Patient to xray.   I have reviewed and confirmed nurses' notes for patient's medications, allergies, medical history, and surgical history.

## 2017-11-18 NOTE — ED PROVIDER NOTE - MEDICAL DECISION MAKING DETAILS
42 yo with autonomic dysfunction and frequent bouts of anxiety and palpitations co palpitations today. Requesting labs and IV fluid. EKG normal. PE unrevealing. Plan labs/IV fluids. Reassurance.

## 2017-11-18 NOTE — ED ADULT NURSE NOTE - OBJECTIVE STATEMENT
41 year old female presents to ED with cc of heart racing.. Pt has had multiple ER visits for various reasons. Upon exam HR 85  BP stable  pt anxious  staring  up at monitor pt reassured that  her HR is WNL   EKG done and reviewed by Dr. Hughes

## 2017-11-20 LAB — RENIN PLAS-CCNC: <0.167 NG/ML/HR — LOW (ref 0.17–5.38)

## 2017-11-22 ENCOUNTER — MEDICATION RENEWAL (OUTPATIENT)
Age: 42
End: 2017-11-22

## 2017-11-25 PROCEDURE — 70450 CT HEAD/BRAIN W/O DYE: CPT | Mod: 26

## 2017-11-25 PROCEDURE — 99285 EMERGENCY DEPT VISIT HI MDM: CPT

## 2017-11-25 PROCEDURE — 71010: CPT | Mod: 26

## 2017-11-25 PROCEDURE — 76705 ECHO EXAM OF ABDOMEN: CPT | Mod: 26

## 2017-11-26 ENCOUNTER — OUTPATIENT (OUTPATIENT)
Dept: OUTPATIENT SERVICES | Facility: HOSPITAL | Age: 42
LOS: 1 days | End: 2017-11-26

## 2017-11-26 ENCOUNTER — INPATIENT (INPATIENT)
Facility: HOSPITAL | Age: 42
LOS: 0 days | Discharge: HOME | End: 2017-11-26
Payer: MEDICARE

## 2017-11-27 ENCOUNTER — MEDICATION RENEWAL (OUTPATIENT)
Age: 42
End: 2017-11-27

## 2017-11-27 ENCOUNTER — EMERGENCY (EMERGENCY)
Facility: HOSPITAL | Age: 42
LOS: 1 days | Discharge: HOME | End: 2017-11-27
Attending: EMERGENCY MEDICINE | Admitting: EMERGENCY MEDICINE
Payer: COMMERCIAL

## 2017-11-27 VITALS
OXYGEN SATURATION: 100 % | HEIGHT: 62 IN | RESPIRATION RATE: 18 BRPM | WEIGHT: 110.89 LBS | TEMPERATURE: 99 F | SYSTOLIC BLOOD PRESSURE: 139 MMHG | HEART RATE: 107 BPM | DIASTOLIC BLOOD PRESSURE: 96 MMHG

## 2017-11-27 LAB
ALBUMIN SERPL ELPH-MCNC: 4.9 G/DL — SIGNIFICANT CHANGE UP (ref 3.3–5)
ALP SERPL-CCNC: 68 U/L — SIGNIFICANT CHANGE UP (ref 40–120)
ALT FLD-CCNC: 15 U/L RC — SIGNIFICANT CHANGE UP (ref 10–45)
ANION GAP SERPL CALC-SCNC: 15 MMOL/L — SIGNIFICANT CHANGE UP (ref 5–17)
APPEARANCE UR: CLEAR — SIGNIFICANT CHANGE UP
AST SERPL-CCNC: 21 U/L — SIGNIFICANT CHANGE UP (ref 10–40)
BACTERIA # UR AUTO: ABNORMAL /HPF
BASOPHILS # BLD AUTO: 0 K/UL — SIGNIFICANT CHANGE UP (ref 0–0.2)
BASOPHILS NFR BLD AUTO: 0.5 % — SIGNIFICANT CHANGE UP (ref 0–2)
BILIRUB SERPL-MCNC: 0.3 MG/DL — SIGNIFICANT CHANGE UP (ref 0.2–1.2)
BILIRUB UR-MCNC: NEGATIVE — SIGNIFICANT CHANGE UP
BUN SERPL-MCNC: 3 MG/DL — LOW (ref 7–23)
CALCIUM SERPL-MCNC: 9.7 MG/DL — SIGNIFICANT CHANGE UP (ref 8.4–10.5)
CHLORIDE SERPL-SCNC: 103 MMOL/L — SIGNIFICANT CHANGE UP (ref 96–108)
CO2 SERPL-SCNC: 23 MMOL/L — SIGNIFICANT CHANGE UP (ref 22–31)
COLOR SPEC: COLORLESS — SIGNIFICANT CHANGE UP
CREAT SERPL-MCNC: 0.92 MG/DL — SIGNIFICANT CHANGE UP (ref 0.5–1.3)
DIFF PNL FLD: NEGATIVE — SIGNIFICANT CHANGE UP
EOSINOPHIL # BLD AUTO: 0.1 K/UL — SIGNIFICANT CHANGE UP (ref 0–0.5)
EOSINOPHIL NFR BLD AUTO: 1.1 % — SIGNIFICANT CHANGE UP (ref 0–6)
EPI CELLS # UR: SIGNIFICANT CHANGE UP /HPF
GLUCOSE SERPL-MCNC: 77 MG/DL — SIGNIFICANT CHANGE UP (ref 70–99)
GLUCOSE UR QL: NEGATIVE — SIGNIFICANT CHANGE UP
HCG UR QL: NEGATIVE — SIGNIFICANT CHANGE UP
HCT VFR BLD CALC: 37.6 % — SIGNIFICANT CHANGE UP (ref 34.5–45)
HGB BLD-MCNC: 13.2 G/DL — SIGNIFICANT CHANGE UP (ref 11.5–15.5)
KETONES UR-MCNC: NEGATIVE — SIGNIFICANT CHANGE UP
LEUKOCYTE ESTERASE UR-ACNC: ABNORMAL
LYMPHOCYTES # BLD AUTO: 2.5 K/UL — SIGNIFICANT CHANGE UP (ref 1–3.3)
LYMPHOCYTES # BLD AUTO: 38.2 % — SIGNIFICANT CHANGE UP (ref 13–44)
MCHC RBC-ENTMCNC: 30.7 PG — SIGNIFICANT CHANGE UP (ref 27–34)
MCHC RBC-ENTMCNC: 35.2 GM/DL — SIGNIFICANT CHANGE UP (ref 32–36)
MCV RBC AUTO: 87.1 FL — SIGNIFICANT CHANGE UP (ref 80–100)
MONOCYTES # BLD AUTO: 0.5 K/UL — SIGNIFICANT CHANGE UP (ref 0–0.9)
MONOCYTES NFR BLD AUTO: 6.9 % — SIGNIFICANT CHANGE UP (ref 2–14)
NEUTROPHILS # BLD AUTO: 3.5 K/UL — SIGNIFICANT CHANGE UP (ref 1.8–7.4)
NEUTROPHILS NFR BLD AUTO: 53.3 % — SIGNIFICANT CHANGE UP (ref 43–77)
NITRITE UR-MCNC: NEGATIVE — SIGNIFICANT CHANGE UP
PH UR: 7 — SIGNIFICANT CHANGE UP (ref 5–8)
PLATELET # BLD AUTO: 70 K/UL — LOW (ref 150–400)
POTASSIUM SERPL-MCNC: 3.3 MMOL/L — LOW (ref 3.5–5.3)
POTASSIUM SERPL-SCNC: 3.3 MMOL/L — LOW (ref 3.5–5.3)
PROT SERPL-MCNC: 7.4 G/DL — SIGNIFICANT CHANGE UP (ref 6–8.3)
PROT UR-MCNC: NEGATIVE — SIGNIFICANT CHANGE UP
RBC # BLD: 4.31 M/UL — SIGNIFICANT CHANGE UP (ref 3.8–5.2)
RBC # FLD: 11.5 % — SIGNIFICANT CHANGE UP (ref 10.3–14.5)
SODIUM SERPL-SCNC: 141 MMOL/L — SIGNIFICANT CHANGE UP (ref 135–145)
SP GR SPEC: 1 — LOW (ref 1.01–1.02)
UROBILINOGEN FLD QL: NEGATIVE — SIGNIFICANT CHANGE UP
WBC # BLD: 6.6 K/UL — SIGNIFICANT CHANGE UP (ref 3.8–10.5)
WBC # FLD AUTO: 6.6 K/UL — SIGNIFICANT CHANGE UP (ref 3.8–10.5)
WBC UR QL: SIGNIFICANT CHANGE UP /HPF (ref 0–5)

## 2017-11-27 PROCEDURE — 85027 COMPLETE CBC AUTOMATED: CPT

## 2017-11-27 PROCEDURE — 80053 COMPREHEN METABOLIC PANEL: CPT

## 2017-11-27 PROCEDURE — 96374 THER/PROPH/DIAG INJ IV PUSH: CPT

## 2017-11-27 PROCEDURE — 96376 TX/PRO/DX INJ SAME DRUG ADON: CPT

## 2017-11-27 PROCEDURE — 99284 EMERGENCY DEPT VISIT MOD MDM: CPT

## 2017-11-27 PROCEDURE — 99284 EMERGENCY DEPT VISIT MOD MDM: CPT | Mod: 25

## 2017-11-27 PROCEDURE — 81001 URINALYSIS AUTO W/SCOPE: CPT

## 2017-11-27 PROCEDURE — 96375 TX/PRO/DX INJ NEW DRUG ADDON: CPT

## 2017-11-27 PROCEDURE — 81025 URINE PREGNANCY TEST: CPT

## 2017-11-27 RX ORDER — SODIUM CHLORIDE 9 MG/ML
1000 INJECTION INTRAMUSCULAR; INTRAVENOUS; SUBCUTANEOUS ONCE
Qty: 0 | Refills: 0 | Status: COMPLETED | OUTPATIENT
Start: 2017-11-27 | End: 2017-11-27

## 2017-11-27 RX ORDER — ONDANSETRON 8 MG/1
4 TABLET, FILM COATED ORAL ONCE
Qty: 0 | Refills: 0 | Status: COMPLETED | OUTPATIENT
Start: 2017-11-27 | End: 2017-11-27

## 2017-11-27 RX ORDER — SODIUM CHLORIDE 9 MG/ML
100 INJECTION INTRAMUSCULAR; INTRAVENOUS; SUBCUTANEOUS ONCE
Qty: 0 | Refills: 0 | Status: COMPLETED | OUTPATIENT
Start: 2017-11-27 | End: 2017-11-27

## 2017-11-27 RX ORDER — POTASSIUM CHLORIDE 20 MEQ
40 PACKET (EA) ORAL ONCE
Qty: 0 | Refills: 0 | Status: COMPLETED | OUTPATIENT
Start: 2017-11-27 | End: 2017-11-27

## 2017-11-27 RX ADMIN — ONDANSETRON 4 MILLIGRAM(S): 8 TABLET, FILM COATED ORAL at 22:10

## 2017-11-27 RX ADMIN — Medication 1 MILLIGRAM(S): at 18:37

## 2017-11-27 RX ADMIN — ONDANSETRON 4 MILLIGRAM(S): 8 TABLET, FILM COATED ORAL at 18:37

## 2017-11-27 RX ADMIN — Medication 1 MILLIGRAM(S): at 22:11

## 2017-11-27 RX ADMIN — Medication 40 MILLIEQUIVALENT(S): at 21:29

## 2017-11-27 RX ADMIN — SODIUM CHLORIDE 1000 MILLILITER(S): 9 INJECTION INTRAMUSCULAR; INTRAVENOUS; SUBCUTANEOUS at 18:36

## 2017-11-27 RX ADMIN — SODIUM CHLORIDE 600 MILLILITER(S): 9 INJECTION INTRAMUSCULAR; INTRAVENOUS; SUBCUTANEOUS at 23:57

## 2017-11-27 NOTE — ED ADULT NURSE NOTE - OBJECTIVE STATEMENT
42 yo F w/PMH anxiety, ITP, HTN, Pott's disease, IBS, presenting for nausea and vomiting. Patient reports that intractable nausea and vomiting began on Saturday, two days ago, patient was admitted to [facility] but requested to be DC on Sunday when her ativan dose was too low. Patient reports persistent n/v today. Pt reports that patient woke up tachycardic, took 6mg of ativan and started feeling nauseous. Associated decreased po intake and patient has not eaten today. Scheduled to see Pott disease specialist in 3 days. patient placed on cardiac monitor on arrival at her request, Dr Max requests that patient be taken off monitor, patient refuses to allow RN to take off cardiac monitor, Dr Max aware.

## 2017-11-27 NOTE — ED PROVIDER NOTE - NS_ ATTENDINGSCRIBEDETAILS _ED_A_ED_FT
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 pt has been in ED mult times over last week for same and is requesting iv benzos and iv zofran. pt states "I am due for my benzos" and feels she cannot tolerate POs though she has not been vomiting. states her nausea is worse than usual. mult prior imaging studies without cause for pt's nausea found. pt states she monitors her hr at home frequently and is requesting to be placed on cardiac monitor in ED. denies any cp or sob. states has appt w rock dz specialist later this week. has seen mult specialists w complete w/u in past. states took zofran odt w/o relief. is declining to try other medications (compazine ie) for nausea stating she is sensitive to medications and that only iv zofran and benzos will work. denies abd pain.    ros and pe as above. no abd tenderness to suggest significant intraabd pathology at this time. pt is not tachycardic.     pt w mult recent visits for same, perseverating on her HR and refusing to be taken off the monitor. demanding iv meds thought she is able to take her po cardizem (home med) in ED without difficulty. likely large psychiatric/anxiety component. endorsed to dr bar pending labs, reassessment. CYN Max MD

## 2017-11-27 NOTE — ED PROVIDER NOTE - OBJECTIVE STATEMENT
42 yo F w/PMH anxiety, ITP, HTN, Pott disease, IBS, presenting for nausea and vomiting. Recent admission last week with two subsequent ED visits prior to this. 42 yo F w/PMH anxiety, ITP, HTN, Pott's disease, IBS, presenting for nausea and vomiting. Recent admission last week with two subsequent ED visits prior to this. 2 days ago felt nauseous w/vomiting and with tachycardia. Went to ED and given IV Zofran and discharged yesterday. Today patient woke up tachycardic, took 6mg of ativan and started feeling nauseous. Associated decreased po intake and patient has not eaten today. Scheduled to see Pott disease specialist in 3 days.  Dr. [facility]

## 2017-11-27 NOTE — ED PROVIDER NOTE - PROGRESS NOTE DETAILS
Nikky ARRIOLA: Patient signed out to me pending IVF, labs and reassessment. Patient reassessed. She is complaining of nausea and requesting repeat zofran/ ativan/ IV hydration. K found to be slightly low - will replenish. Will give her these and reassess. Nikky ARRIOLA: Patient requesting to be discharged. Will d/c home.

## 2017-11-28 ENCOUNTER — EMERGENCY (EMERGENCY)
Facility: HOSPITAL | Age: 42
LOS: 1 days | Discharge: HOME | End: 2017-11-28
Attending: EMERGENCY MEDICINE | Admitting: EMERGENCY MEDICINE
Payer: COMMERCIAL

## 2017-11-28 VITALS
HEART RATE: 97 BPM | RESPIRATION RATE: 18 BRPM | DIASTOLIC BLOOD PRESSURE: 87 MMHG | SYSTOLIC BLOOD PRESSURE: 128 MMHG | TEMPERATURE: 99 F | OXYGEN SATURATION: 100 %

## 2017-11-28 VITALS
TEMPERATURE: 98 F | HEART RATE: 76 BPM | OXYGEN SATURATION: 99 % | RESPIRATION RATE: 17 BRPM | SYSTOLIC BLOOD PRESSURE: 121 MMHG | DIASTOLIC BLOOD PRESSURE: 84 MMHG

## 2017-11-28 VITALS
WEIGHT: 111.99 LBS | RESPIRATION RATE: 16 BRPM | HEIGHT: 62 IN | DIASTOLIC BLOOD PRESSURE: 103 MMHG | OXYGEN SATURATION: 99 % | HEART RATE: 90 BPM | TEMPERATURE: 99 F | SYSTOLIC BLOOD PRESSURE: 152 MMHG

## 2017-11-28 LAB
ALBUMIN SERPL ELPH-MCNC: 4.2 G/DL — SIGNIFICANT CHANGE UP (ref 3.3–5)
ALP SERPL-CCNC: 66 U/L — SIGNIFICANT CHANGE UP (ref 30–120)
ALT FLD-CCNC: 22 U/L DA — SIGNIFICANT CHANGE UP (ref 10–60)
ANION GAP SERPL CALC-SCNC: 9 MMOL/L — SIGNIFICANT CHANGE UP (ref 5–17)
AST SERPL-CCNC: 19 U/L — SIGNIFICANT CHANGE UP (ref 10–40)
BASOPHILS # BLD AUTO: 0 K/UL — SIGNIFICANT CHANGE UP (ref 0–0.2)
BASOPHILS NFR BLD AUTO: 0.5 % — SIGNIFICANT CHANGE UP (ref 0–2)
BILIRUB SERPL-MCNC: 0.3 MG/DL — SIGNIFICANT CHANGE UP (ref 0.2–1.2)
BUN SERPL-MCNC: 4 MG/DL — LOW (ref 7–23)
CALCIUM SERPL-MCNC: 9 MG/DL — SIGNIFICANT CHANGE UP (ref 8.4–10.5)
CHLORIDE SERPL-SCNC: 108 MMOL/L — SIGNIFICANT CHANGE UP (ref 96–108)
CK MB BLD-MCNC: 0.5 % — SIGNIFICANT CHANGE UP (ref 0–3.5)
CK MB CFR SERPL CALC: 0.3 NG/ML — SIGNIFICANT CHANGE UP (ref 0–3.6)
CK SERPL-CCNC: 55 U/L — SIGNIFICANT CHANGE UP (ref 26–192)
CO2 SERPL-SCNC: 26 MMOL/L — SIGNIFICANT CHANGE UP (ref 22–31)
CREAT SERPL-MCNC: 0.94 MG/DL — SIGNIFICANT CHANGE UP (ref 0.5–1.3)
EOSINOPHIL # BLD AUTO: 0.1 K/UL — SIGNIFICANT CHANGE UP (ref 0–0.5)
EOSINOPHIL NFR BLD AUTO: 1.7 % — SIGNIFICANT CHANGE UP (ref 0–6)
GLUCOSE SERPL-MCNC: 93 MG/DL — SIGNIFICANT CHANGE UP (ref 70–99)
HCT VFR BLD CALC: 35.1 % — SIGNIFICANT CHANGE UP (ref 34.5–45)
HGB BLD-MCNC: 12.4 G/DL — SIGNIFICANT CHANGE UP (ref 11.5–15.5)
LYMPHOCYTES # BLD AUTO: 1.7 K/UL — SIGNIFICANT CHANGE UP (ref 1–3.3)
LYMPHOCYTES # BLD AUTO: 23.1 % — SIGNIFICANT CHANGE UP (ref 13–44)
MCHC RBC-ENTMCNC: 29.6 PG — SIGNIFICANT CHANGE UP (ref 27–34)
MCHC RBC-ENTMCNC: 35.3 GM/DL — SIGNIFICANT CHANGE UP (ref 32–36)
MCV RBC AUTO: 83.9 FL — SIGNIFICANT CHANGE UP (ref 80–100)
MONOCYTES # BLD AUTO: 0.8 K/UL — SIGNIFICANT CHANGE UP (ref 0–0.9)
MONOCYTES NFR BLD AUTO: 11.4 % — SIGNIFICANT CHANGE UP (ref 2–14)
NEUTROPHILS # BLD AUTO: 4.6 K/UL — SIGNIFICANT CHANGE UP (ref 1.8–7.4)
NEUTROPHILS NFR BLD AUTO: 63.3 % — SIGNIFICANT CHANGE UP (ref 43–77)
PLATELET # BLD AUTO: 70 K/UL — LOW (ref 150–400)
POTASSIUM SERPL-MCNC: 3.3 MMOL/L — LOW (ref 3.5–5.3)
POTASSIUM SERPL-SCNC: 3.3 MMOL/L — LOW (ref 3.5–5.3)
PROT SERPL-MCNC: 7.3 G/DL — SIGNIFICANT CHANGE UP (ref 6–8.3)
RBC # BLD: 4.19 M/UL — SIGNIFICANT CHANGE UP (ref 3.8–5.2)
RBC # FLD: 11.4 % — SIGNIFICANT CHANGE UP (ref 10.3–14.5)
SODIUM SERPL-SCNC: 143 MMOL/L — SIGNIFICANT CHANGE UP (ref 135–145)
TROPONIN I SERPL-MCNC: 0 NG/ML — LOW (ref 0.02–0.06)
WBC # BLD: 7.2 K/UL — SIGNIFICANT CHANGE UP (ref 3.8–10.5)
WBC # FLD AUTO: 7.2 K/UL — SIGNIFICANT CHANGE UP (ref 3.8–10.5)

## 2017-11-28 PROCEDURE — 82553 CREATINE MB FRACTION: CPT

## 2017-11-28 PROCEDURE — 93005 ELECTROCARDIOGRAM TRACING: CPT

## 2017-11-28 PROCEDURE — 80053 COMPREHEN METABOLIC PANEL: CPT

## 2017-11-28 PROCEDURE — 96361 HYDRATE IV INFUSION ADD-ON: CPT

## 2017-11-28 PROCEDURE — 96374 THER/PROPH/DIAG INJ IV PUSH: CPT

## 2017-11-28 PROCEDURE — 96375 TX/PRO/DX INJ NEW DRUG ADDON: CPT

## 2017-11-28 PROCEDURE — 85027 COMPLETE CBC AUTOMATED: CPT

## 2017-11-28 PROCEDURE — 99284 EMERGENCY DEPT VISIT MOD MDM: CPT

## 2017-11-28 PROCEDURE — 82550 ASSAY OF CK (CPK): CPT

## 2017-11-28 PROCEDURE — 93010 ELECTROCARDIOGRAM REPORT: CPT

## 2017-11-28 PROCEDURE — 99284 EMERGENCY DEPT VISIT MOD MDM: CPT | Mod: 25

## 2017-11-28 PROCEDURE — 84484 ASSAY OF TROPONIN QUANT: CPT

## 2017-11-28 RX ORDER — CLINDAMYCIN PHOSPHATE AND BENZOYL PEROXIDE 10; 50 MG/G; MG/G
1 GEL TOPICAL
Qty: 0 | Refills: 0 | COMMUNITY

## 2017-11-28 RX ORDER — SODIUM CHLORIDE 9 MG/ML
1000 INJECTION INTRAMUSCULAR; INTRAVENOUS; SUBCUTANEOUS ONCE
Qty: 0 | Refills: 0 | Status: COMPLETED | OUTPATIENT
Start: 2017-11-28 | End: 2017-11-28

## 2017-11-28 RX ORDER — SODIUM CHLORIDE 9 MG/ML
3 INJECTION INTRAMUSCULAR; INTRAVENOUS; SUBCUTANEOUS ONCE
Qty: 0 | Refills: 0 | Status: COMPLETED | OUTPATIENT
Start: 2017-11-28 | End: 2017-11-28

## 2017-11-28 RX ORDER — ONDANSETRON 8 MG/1
4 TABLET, FILM COATED ORAL ONCE
Qty: 0 | Refills: 0 | Status: COMPLETED | OUTPATIENT
Start: 2017-11-28 | End: 2017-11-28

## 2017-11-28 RX ADMIN — Medication 1 MILLIGRAM(S): at 20:43

## 2017-11-28 RX ADMIN — ONDANSETRON 4 MILLIGRAM(S): 8 TABLET, FILM COATED ORAL at 20:44

## 2017-11-28 RX ADMIN — SODIUM CHLORIDE 3 MILLILITER(S): 9 INJECTION INTRAMUSCULAR; INTRAVENOUS; SUBCUTANEOUS at 18:19

## 2017-11-28 RX ADMIN — SODIUM CHLORIDE 1000 MILLILITER(S): 9 INJECTION INTRAMUSCULAR; INTRAVENOUS; SUBCUTANEOUS at 18:18

## 2017-11-28 NOTE — ED PROVIDER NOTE - OBJECTIVE STATEMENT
42 y/o F pt dysautonomia, Pott Disease, ITP, HTN, anxiety presents to ED c/o tachycardia and PVC's starting today. Pt states she was tachycardic at home because she was stressed. Pt took Ativan at home prior to arrival. She was seen at [facility] on Saturday for nausea, vomiting. Pt is on Ativan, Zofran. No further complaints at this time.

## 2017-11-28 NOTE — ED ADULT TRIAGE NOTE - CHIEF COMPLAINT QUOTE
"I'm here all the time, I have Harrison triggered by a stressful event and I'm feeling tachycardia and PVCs, and massive anxiety."

## 2017-11-28 NOTE — ED ADULT NURSE REASSESSMENT NOTE - NS ED NURSE REASSESS COMMENT FT1
received report and assumed care of pt at change of shift. pt seen and examined by dr. bell. pt appears to be anxious about her heart rate. emotional support rendered. pt asked for then declined to take medications ("for an hour"). normal saline infused to #20 saline lock to left A/C. will continue to observe.
pt medicated as requested earlier. reports feeling well enough to go home (by [employer]). vs as charted. d/c to self. verbalized understanding of d/c instructions. left unit in NAD

## 2017-11-30 ENCOUNTER — APPOINTMENT (OUTPATIENT)
Dept: INTERNAL MEDICINE | Facility: CLINIC | Age: 42
End: 2017-11-30
Payer: COMMERCIAL

## 2017-11-30 ENCOUNTER — EMERGENCY (EMERGENCY)
Facility: HOSPITAL | Age: 42
LOS: 1 days | Discharge: HOME | End: 2017-11-30
Attending: EMERGENCY MEDICINE | Admitting: EMERGENCY MEDICINE
Payer: COMMERCIAL

## 2017-11-30 VITALS
TEMPERATURE: 97.9 F | WEIGHT: 107 LBS | SYSTOLIC BLOOD PRESSURE: 130 MMHG | DIASTOLIC BLOOD PRESSURE: 80 MMHG | HEIGHT: 62 IN | BODY MASS INDEX: 19.69 KG/M2

## 2017-11-30 VITALS
HEART RATE: 72 BPM | SYSTOLIC BLOOD PRESSURE: 116 MMHG | RESPIRATION RATE: 18 BRPM | OXYGEN SATURATION: 100 % | TEMPERATURE: 98 F | DIASTOLIC BLOOD PRESSURE: 86 MMHG

## 2017-11-30 VITALS
SYSTOLIC BLOOD PRESSURE: 130 MMHG | DIASTOLIC BLOOD PRESSURE: 80 MMHG | HEIGHT: 62 IN | BODY MASS INDEX: 19.57 KG/M2 | TEMPERATURE: 97.9 F

## 2017-11-30 VITALS
WEIGHT: 108.03 LBS | HEIGHT: 62 IN | RESPIRATION RATE: 16 BRPM | TEMPERATURE: 99 F | HEART RATE: 91 BPM | DIASTOLIC BLOOD PRESSURE: 104 MMHG | SYSTOLIC BLOOD PRESSURE: 149 MMHG | OXYGEN SATURATION: 100 %

## 2017-11-30 DIAGNOSIS — R25.1 TREMOR, UNSPECIFIED: ICD-10-CM

## 2017-11-30 DIAGNOSIS — R35.0 FREQUENCY OF MICTURITION: ICD-10-CM

## 2017-11-30 DIAGNOSIS — Z00.00 ENCOUNTER FOR GENERAL ADULT MEDICAL EXAMINATION W/OUT ABNORMAL FINDINGS: ICD-10-CM

## 2017-11-30 DIAGNOSIS — F41.0 PANIC DISORDER [EPISODIC PAROXYSMAL ANXIETY]: ICD-10-CM

## 2017-11-30 DIAGNOSIS — E55.9 VITAMIN D DEFICIENCY, UNSPECIFIED: ICD-10-CM

## 2017-11-30 LAB
ANION GAP SERPL CALC-SCNC: 12 MMOL/L — SIGNIFICANT CHANGE UP (ref 5–17)
BASOPHILS # BLD AUTO: 0 K/UL — SIGNIFICANT CHANGE UP (ref 0–0.2)
BASOPHILS NFR BLD AUTO: 0.7 % — SIGNIFICANT CHANGE UP (ref 0–2)
BILIRUB UR QL STRIP: NEGATIVE
BUN SERPL-MCNC: 3 MG/DL — LOW (ref 7–23)
CALCIUM SERPL-MCNC: 9.4 MG/DL — SIGNIFICANT CHANGE UP (ref 8.4–10.5)
CHLORIDE SERPL-SCNC: 105 MMOL/L — SIGNIFICANT CHANGE UP (ref 96–108)
CO2 SERPL-SCNC: 23 MMOL/L — SIGNIFICANT CHANGE UP (ref 22–31)
CREAT SERPL-MCNC: 0.92 MG/DL — SIGNIFICANT CHANGE UP (ref 0.5–1.3)
EOSINOPHIL # BLD AUTO: 0.1 K/UL — SIGNIFICANT CHANGE UP (ref 0–0.5)
EOSINOPHIL NFR BLD AUTO: 1.3 % — SIGNIFICANT CHANGE UP (ref 0–6)
GLUCOSE SERPL-MCNC: 88 MG/DL — SIGNIFICANT CHANGE UP (ref 70–99)
GLUCOSE UR-MCNC: NEGATIVE
HCG UR QL: 0.2 EU/DL
HCT VFR BLD CALC: 34.5 % — SIGNIFICANT CHANGE UP (ref 34.5–45)
HGB BLD-MCNC: 12.1 G/DL — SIGNIFICANT CHANGE UP (ref 11.5–15.5)
HGB UR QL STRIP.AUTO: ABNORMAL
KETONES UR-MCNC: NEGATIVE
LEUKOCYTE ESTERASE UR QL STRIP: ABNORMAL
LYMPHOCYTES # BLD AUTO: 2.5 K/UL — SIGNIFICANT CHANGE UP (ref 1–3.3)
LYMPHOCYTES # BLD AUTO: 36.8 % — SIGNIFICANT CHANGE UP (ref 13–44)
MCHC RBC-ENTMCNC: 29.2 PG — SIGNIFICANT CHANGE UP (ref 27–34)
MCHC RBC-ENTMCNC: 35 GM/DL — SIGNIFICANT CHANGE UP (ref 32–36)
MCV RBC AUTO: 83.4 FL — SIGNIFICANT CHANGE UP (ref 80–100)
MONOCYTES # BLD AUTO: 0.7 K/UL — SIGNIFICANT CHANGE UP (ref 0–0.9)
MONOCYTES NFR BLD AUTO: 9.6 % — SIGNIFICANT CHANGE UP (ref 2–14)
NEUTROPHILS # BLD AUTO: 3.6 K/UL — SIGNIFICANT CHANGE UP (ref 1.8–7.4)
NEUTROPHILS NFR BLD AUTO: 51.6 % — SIGNIFICANT CHANGE UP (ref 43–77)
NITRITE UR QL STRIP: NEGATIVE
PH UR STRIP: 7
PLATELET # BLD AUTO: 70 K/UL — LOW (ref 150–400)
POTASSIUM SERPL-MCNC: 3.4 MMOL/L — LOW (ref 3.5–5.3)
POTASSIUM SERPL-SCNC: 3.4 MMOL/L — LOW (ref 3.5–5.3)
PROT UR STRIP-MCNC: NEGATIVE
RBC # BLD: 4.13 M/UL — SIGNIFICANT CHANGE UP (ref 3.8–5.2)
RBC # FLD: 11.6 % — SIGNIFICANT CHANGE UP (ref 10.3–14.5)
SODIUM SERPL-SCNC: 140 MMOL/L — SIGNIFICANT CHANGE UP (ref 135–145)
SP GR UR STRIP: 1.01
TROPONIN I SERPL-MCNC: 0 NG/ML — LOW (ref 0.02–0.06)
TROPONIN I SERPL-MCNC: 0.02 NG/ML
WBC # BLD: 6.9 K/UL — SIGNIFICANT CHANGE UP (ref 3.8–10.5)
WBC # FLD AUTO: 6.9 K/UL — SIGNIFICANT CHANGE UP (ref 3.8–10.5)

## 2017-11-30 PROCEDURE — 99214 OFFICE O/P EST MOD 30 MIN: CPT

## 2017-11-30 PROCEDURE — 99284 EMERGENCY DEPT VISIT MOD MDM: CPT

## 2017-11-30 PROCEDURE — 80047 BASIC METABLC PNL IONIZED CA: CPT | Mod: QW

## 2017-11-30 PROCEDURE — 36415 COLL VENOUS BLD VENIPUNCTURE: CPT

## 2017-11-30 PROCEDURE — 93005 ELECTROCARDIOGRAM TRACING: CPT

## 2017-11-30 PROCEDURE — 99283 EMERGENCY DEPT VISIT LOW MDM: CPT | Mod: 25

## 2017-11-30 PROCEDURE — 84484 ASSAY OF TROPONIN QUANT: CPT

## 2017-11-30 PROCEDURE — 99396 PREV VISIT EST AGE 40-64: CPT | Mod: 25

## 2017-11-30 PROCEDURE — 85027 COMPLETE CBC AUTOMATED: CPT

## 2017-11-30 PROCEDURE — 93010 ELECTROCARDIOGRAM REPORT: CPT

## 2017-11-30 PROCEDURE — 81002 URINALYSIS NONAUTO W/O SCOPE: CPT

## 2017-11-30 PROCEDURE — 93000 ELECTROCARDIOGRAM COMPLETE: CPT

## 2017-11-30 PROCEDURE — 80048 BASIC METABOLIC PNL TOTAL CA: CPT

## 2017-11-30 RX ORDER — DIPHENOXYLATE HYDROCHLORIDE AND ATROPINE SULFATE 2.5; .025 MG/1; MG/1
2.5-0.025 TABLET ORAL
Qty: 240 | Refills: 0 | Status: DISCONTINUED | COMMUNITY
Start: 2017-08-27 | End: 2017-11-30

## 2017-11-30 RX ORDER — ACEBUTOLOL HYDROCHLORIDE 200 MG/1
200 CAPSULE ORAL
Qty: 60 | Refills: 0 | Status: COMPLETED | COMMUNITY
Start: 2017-11-15 | End: 2017-11-30

## 2017-11-30 RX ORDER — ACEBUTOLOL HYDROCHLORIDE 200 MG/1
200 CAPSULE ORAL TWICE DAILY
Qty: 60 | Refills: 0 | Status: DISCONTINUED | COMMUNITY
Start: 2017-11-27 | End: 2017-11-30

## 2017-11-30 RX ORDER — FEXOFENADINE HCL 60 MG
TABLET ORAL
Refills: 0 | Status: DISCONTINUED | COMMUNITY
End: 2017-11-30

## 2017-11-30 NOTE — ED PROVIDER NOTE - OBJECTIVE STATEMENT
42 y/o F pt who frequently comes to the ED with history of anxiety, HTN, Pott disease presents to the ED today c/o high troponin level. Pt states she was called after having blood-work done today and was alerted that her troponin was higher than normal (level= 0.02) and was told to come to the ED to be monitored. Pt states she was having sharp shooting chest pains during her physical today, prompting her to get blood-work. Denies chest pain, shortness of breath. No further complaints at this time.   PCP: Dr. Lorena Ford 42 y/o F pt who frequently comes to the ED with history of anxiety, HTN, POTS disease presents to the ED today c/o high troponin level. Pt states she was called after having blood-work done today and was alerted that her troponin was higher than normal for her (level= 0.02, within normal range) and was told to come to the ED to be monitored. Pt states she was having sharp shooting chest pains during her physical today, prompting her to get blood-work. Denies chest pain, shortness of breath. No further complaints at this time.   PCP: Dr. Lorena Ford

## 2017-12-01 LAB
BASOPHILS # BLD AUTO: 0.01 K/UL
BASOPHILS NFR BLD AUTO: 0.1 %
CHOLEST SERPL-MCNC: 172 MG/DL
CHOLEST/HDLC SERPL: 3.3 RATIO
EOSINOPHIL # BLD AUTO: 0.07 K/UL
EOSINOPHIL NFR BLD AUTO: 0.7 %
ERYTHROCYTE [SEDIMENTATION RATE] IN BLOOD BY WESTERGREN METHOD: 4 MM/HR
HBA1C MFR BLD HPLC: 5 %
HCT VFR BLD CALC: 40.6 %
HDLC SERPL-MCNC: 52 MG/DL
HGB BLD-MCNC: 13.1 G/DL
IMM GRANULOCYTES NFR BLD AUTO: 0.1 %
LDLC SERPL CALC-MCNC: 93 MG/DL
LYMPHOCYTES # BLD AUTO: 1.71 K/UL
LYMPHOCYTES NFR BLD AUTO: 17.6 %
MAN DIFF?: NORMAL
MCHC RBC-ENTMCNC: 28.8 PG
MCHC RBC-ENTMCNC: 32.3 GM/DL
MCV RBC AUTO: 89.2 FL
MONOCYTES # BLD AUTO: 0.66 K/UL
MONOCYTES NFR BLD AUTO: 6.8 %
NEUTROPHILS # BLD AUTO: 7.28 K/UL
NEUTROPHILS NFR BLD AUTO: 74.7 %
PLATELET # BLD AUTO: 85 K/UL
RBC # BLD: 4.55 M/UL
RBC # FLD: 13.8 %
SAVE SPECIMEN: NORMAL
T3FREE SERPL-MCNC: 3.66 PG/ML
T4 FREE SERPL-MCNC: 1.3 NG/DL
TRIGL SERPL-MCNC: 135 MG/DL
TROPONIN T SERPL-MCNC: <0.01 NG/ML
TSH SERPL-ACNC: 1.69 UIU/ML
VIT B12 SERPL-MCNC: 570 PG/ML
WBC # FLD AUTO: 9.74 K/UL

## 2017-12-02 ENCOUNTER — NON-APPOINTMENT (OUTPATIENT)
Age: 42
End: 2017-12-02

## 2017-12-02 LAB — BACTERIA UR CULT: NORMAL

## 2017-12-04 ENCOUNTER — INPATIENT (INPATIENT)
Facility: HOSPITAL | Age: 42
LOS: 0 days | Discharge: HOME | DRG: 313 | End: 2017-12-05
Attending: INTERNAL MEDICINE | Admitting: INTERNAL MEDICINE
Payer: COMMERCIAL

## 2017-12-04 ENCOUNTER — RESULT CHARGE (OUTPATIENT)
Age: 42
End: 2017-12-04

## 2017-12-04 ENCOUNTER — APPOINTMENT (OUTPATIENT)
Dept: INTERNAL MEDICINE | Facility: CLINIC | Age: 42
End: 2017-12-04
Payer: COMMERCIAL

## 2017-12-04 VITALS
TEMPERATURE: 98 F | DIASTOLIC BLOOD PRESSURE: 80 MMHG | BODY MASS INDEX: 19.88 KG/M2 | HEIGHT: 62 IN | SYSTOLIC BLOOD PRESSURE: 120 MMHG | WEIGHT: 108 LBS

## 2017-12-04 VITALS
TEMPERATURE: 99 F | HEIGHT: 62 IN | OXYGEN SATURATION: 98 % | WEIGHT: 108.03 LBS | SYSTOLIC BLOOD PRESSURE: 139 MMHG | DIASTOLIC BLOOD PRESSURE: 97 MMHG | RESPIRATION RATE: 18 BRPM | HEART RATE: 94 BPM

## 2017-12-04 DIAGNOSIS — Z11.1 ENCOUNTER FOR SCREENING FOR RESPIRATORY TUBERCULOSIS: ICD-10-CM

## 2017-12-04 DIAGNOSIS — R07.9 CHEST PAIN, UNSPECIFIED: ICD-10-CM

## 2017-12-04 LAB
25(OH)D3 SERPL-MCNC: 26.8 NG/ML
ALBUMIN SERPL ELPH-MCNC: 5 G/DL
ALBUMIN SERPL ELPH-MCNC: 5.2 G/DL — HIGH (ref 3.3–5)
ALP BLD-CCNC: 68 U/L
ALP SERPL-CCNC: 73 U/L — SIGNIFICANT CHANGE UP (ref 40–120)
ALT FLD-CCNC: 15 U/L RC — SIGNIFICANT CHANGE UP (ref 10–45)
ALT SERPL-CCNC: 13 U/L
ANION GAP SERPL CALC-SCNC: 16 MMOL/L — SIGNIFICANT CHANGE UP (ref 5–17)
ANION GAP SERPL CALC-SCNC: 19 MMOL/L
APTT BLD: 26.1 SEC — LOW (ref 27.5–37.4)
AST SERPL-CCNC: 21 U/L — SIGNIFICANT CHANGE UP (ref 10–40)
AST SERPL-CCNC: 23 U/L
BASOPHILS # BLD AUTO: 0 K/UL — SIGNIFICANT CHANGE UP (ref 0–0.2)
BASOPHILS NFR BLD AUTO: 0.1 % — SIGNIFICANT CHANGE UP (ref 0–2)
BILIRUB SERPL-MCNC: 0.3 MG/DL
BILIRUB SERPL-MCNC: 0.4 MG/DL — SIGNIFICANT CHANGE UP (ref 0.2–1.2)
BUN SERPL-MCNC: 3 MG/DL
BUN SERPL-MCNC: 3 MG/DL — LOW (ref 7–23)
BUN SERPL-MCNC: <3
CA-I SERPL-SCNC: 1.24
CALCIUM SERPL-MCNC: 9.9 MG/DL
CALCIUM SERPL-MCNC: 9.9 MG/DL — SIGNIFICANT CHANGE UP (ref 8.4–10.5)
CHLORIDE SERPL-SCNC: 103 MMOL/L
CHLORIDE SERPL-SCNC: 104 MMOL/L — SIGNIFICANT CHANGE UP (ref 96–108)
CHLORIDE SERPL-SCNC: 106
CK SERPL-CCNC: 47 U/L — SIGNIFICANT CHANGE UP (ref 25–170)
CO2 BLDA-SCNC: 23
CO2 SERPL-SCNC: 21 MMOL/L
CO2 SERPL-SCNC: 23 MMOL/L — SIGNIFICANT CHANGE UP (ref 22–31)
CREAT SERPL-MCNC: 1
CREAT SERPL-MCNC: 1.01 MG/DL — SIGNIFICANT CHANGE UP (ref 0.5–1.3)
CREAT SERPL-MCNC: 1.09 MG/DL
D DIMER BLD IA.RAPID-MCNC: <150 NG/ML DDU — SIGNIFICANT CHANGE UP
EOSINOPHIL # BLD AUTO: 0 K/UL — SIGNIFICANT CHANGE UP (ref 0–0.5)
EOSINOPHIL NFR BLD AUTO: 0.4 % — SIGNIFICANT CHANGE UP (ref 0–6)
GAS PNL BLDV: SIGNIFICANT CHANGE UP
GLUCOSE SERPL-MCNC: 107
GLUCOSE SERPL-MCNC: 107 MG/DL
GLUCOSE SERPL-MCNC: 91 MG/DL — SIGNIFICANT CHANGE UP (ref 70–99)
HCG SERPL-ACNC: <2 MIU/ML — SIGNIFICANT CHANGE UP
HCT VFR BLD CALC: 38.4 % — SIGNIFICANT CHANGE UP (ref 34.5–45)
HEMOGLOBIN: 12.6
HGB BLD-MCNC: 13.3 G/DL — SIGNIFICANT CHANGE UP (ref 11.5–15.5)
INR BLD: 1.02 RATIO — SIGNIFICANT CHANGE UP (ref 0.88–1.16)
LIDOCAIN IGE QN: 30 U/L — SIGNIFICANT CHANGE UP (ref 7–60)
LYMPHOCYTES # BLD AUTO: 1.8 K/UL — SIGNIFICANT CHANGE UP (ref 1–3.3)
LYMPHOCYTES # BLD AUTO: 26.1 % — SIGNIFICANT CHANGE UP (ref 13–44)
MCHC RBC-ENTMCNC: 30.1 PG — SIGNIFICANT CHANGE UP (ref 27–34)
MCHC RBC-ENTMCNC: 34.5 GM/DL — SIGNIFICANT CHANGE UP (ref 32–36)
MCV RBC AUTO: 87.3 FL — SIGNIFICANT CHANGE UP (ref 80–100)
MONOCYTES # BLD AUTO: 0.4 K/UL — SIGNIFICANT CHANGE UP (ref 0–0.9)
MONOCYTES NFR BLD AUTO: 5.9 % — SIGNIFICANT CHANGE UP (ref 2–14)
NEUTROPHILS # BLD AUTO: 4.8 K/UL — SIGNIFICANT CHANGE UP (ref 1.8–7.4)
NEUTROPHILS NFR BLD AUTO: 67.5 % — SIGNIFICANT CHANGE UP (ref 43–77)
NT-PROBNP SERPL-SCNC: 105 PG/ML — SIGNIFICANT CHANGE UP (ref 0–300)
PLATELET # BLD AUTO: 78 K/UL — LOW (ref 150–400)
POTASSIUM SERPL-MCNC: 3.7 MMOL/L — SIGNIFICANT CHANGE UP (ref 3.5–5.3)
POTASSIUM SERPL-SCNC: 3.7 MMOL/L — SIGNIFICANT CHANGE UP (ref 3.5–5.3)
POTASSIUM SERPL-SCNC: 4
POTASSIUM SERPL-SCNC: 4.4 MMOL/L
PROT SERPL-MCNC: 7.8 G/DL
PROT SERPL-MCNC: 7.9 G/DL — SIGNIFICANT CHANGE UP (ref 6–8.3)
PROTHROM AB SERPL-ACNC: 11.1 SEC — SIGNIFICANT CHANGE UP (ref 9.8–12.7)
RBC # BLD: 4.4 M/UL — SIGNIFICANT CHANGE UP (ref 3.8–5.2)
RBC # FLD: 12.1 % — SIGNIFICANT CHANGE UP (ref 10.3–14.5)
SODIUM SERPL-SCNC: 142
SODIUM SERPL-SCNC: 143 MMOL/L
SODIUM SERPL-SCNC: 143 MMOL/L — SIGNIFICANT CHANGE UP (ref 135–145)
TROPONIN T SERPL-MCNC: <0.01 NG/ML — SIGNIFICANT CHANGE UP (ref 0–0.06)
WBC # BLD: 7.1 K/UL — SIGNIFICANT CHANGE UP (ref 3.8–10.5)
WBC # FLD AUTO: 7.1 K/UL — SIGNIFICANT CHANGE UP (ref 3.8–10.5)

## 2017-12-04 PROCEDURE — 71020: CPT | Mod: 26

## 2017-12-04 PROCEDURE — 99213 OFFICE O/P EST LOW 20 MIN: CPT | Mod: 25

## 2017-12-04 PROCEDURE — 86580 TB INTRADERMAL TEST: CPT

## 2017-12-04 PROCEDURE — 99285 EMERGENCY DEPT VISIT HI MDM: CPT

## 2017-12-04 RX ORDER — DILTIAZEM HCL 120 MG
240 CAPSULE, EXT RELEASE 24 HR ORAL DAILY
Qty: 0 | Refills: 0 | Status: DISCONTINUED | OUTPATIENT
Start: 2017-12-04 | End: 2017-12-05

## 2017-12-04 RX ORDER — ONDANSETRON 8 MG/1
4 TABLET, FILM COATED ORAL ONCE
Qty: 0 | Refills: 0 | Status: COMPLETED | OUTPATIENT
Start: 2017-12-04 | End: 2017-12-04

## 2017-12-04 RX ORDER — PROGESTERONE 200 MG/1
100 CAPSULE, LIQUID FILLED ORAL DAILY
Qty: 0 | Refills: 0 | Status: DISCONTINUED | OUTPATIENT
Start: 2017-12-04 | End: 2017-12-05

## 2017-12-04 RX ORDER — DOCUSATE SODIUM 100 MG
100 CAPSULE ORAL THREE TIMES A DAY
Qty: 0 | Refills: 0 | Status: DISCONTINUED | OUTPATIENT
Start: 2017-12-04 | End: 2017-12-05

## 2017-12-04 RX ORDER — SIMETHICONE 80 MG/1
80 TABLET, CHEWABLE ORAL THREE TIMES A DAY
Qty: 0 | Refills: 0 | Status: DISCONTINUED | OUTPATIENT
Start: 2017-12-04 | End: 2017-12-05

## 2017-12-04 RX ORDER — ONDANSETRON 8 MG/1
4 TABLET, FILM COATED ORAL EVERY 4 HOURS
Qty: 0 | Refills: 0 | Status: DISCONTINUED | OUTPATIENT
Start: 2017-12-04 | End: 2017-12-05

## 2017-12-04 RX ORDER — MONTELUKAST 4 MG/1
10 TABLET, CHEWABLE ORAL DAILY
Qty: 0 | Refills: 0 | Status: DISCONTINUED | OUTPATIENT
Start: 2017-12-04 | End: 2017-12-05

## 2017-12-04 RX ORDER — ALBUTEROL 90 UG/1
1 AEROSOL, METERED ORAL
Qty: 0 | Refills: 0 | COMMUNITY

## 2017-12-04 RX ORDER — SODIUM CHLORIDE 9 MG/ML
1000 INJECTION INTRAMUSCULAR; INTRAVENOUS; SUBCUTANEOUS ONCE
Qty: 0 | Refills: 0 | Status: COMPLETED | OUTPATIENT
Start: 2017-12-04 | End: 2017-12-04

## 2017-12-04 RX ORDER — HEPARIN SODIUM 5000 [USP'U]/ML
5000 INJECTION INTRAVENOUS; SUBCUTANEOUS EVERY 12 HOURS
Qty: 0 | Refills: 0 | Status: DISCONTINUED | OUTPATIENT
Start: 2017-12-04 | End: 2017-12-04

## 2017-12-04 RX ORDER — FAMOTIDINE 10 MG/ML
20 INJECTION INTRAVENOUS
Qty: 0 | Refills: 0 | Status: DISCONTINUED | OUTPATIENT
Start: 2017-12-04 | End: 2017-12-05

## 2017-12-04 RX ADMIN — ONDANSETRON 4 MILLIGRAM(S): 8 TABLET, FILM COATED ORAL at 21:28

## 2017-12-04 RX ADMIN — Medication 2 MILLIGRAM(S): at 18:07

## 2017-12-04 RX ADMIN — SODIUM CHLORIDE 2000 MILLILITER(S): 9 INJECTION INTRAMUSCULAR; INTRAVENOUS; SUBCUTANEOUS at 15:53

## 2017-12-04 RX ADMIN — Medication 240 MILLIGRAM(S): at 19:50

## 2017-12-04 RX ADMIN — ONDANSETRON 4 MILLIGRAM(S): 8 TABLET, FILM COATED ORAL at 15:54

## 2017-12-04 NOTE — H&P ADULT - ASSESSMENT
pt w/ hx rock/ anxiety c/o sscp / palps    r/o acs/ arrythmia  trops  tele  cards eval / dr werner aware  dvt proph  pulm eval  check d dimer  cont outpt meds pt w/ hx rock/ anxiety c/o sscp / palps    r/o acs/ arrythmia  trops  tele  cards zane / dr werner aware  dvt proph  pulm zane/ dr janett degroot /dr rowland aware  check d dimer  cont outpt meds

## 2017-12-04 NOTE — ED PROVIDER NOTE - PROGRESS NOTE DETAILS
Discussed with Dr Middleton, Pt had recent admission with neg card workup. Believes condition primarily anxiety.  Jair Stout MD, Facep Dr. Stout spoke with Dr. Hussein who stated patient should be admitted for further w/u.

## 2017-12-04 NOTE — ED PROVIDER NOTE - MEDICAL DECISION MAKING DETAILS
Herbert: 41F w/POTS, ITP, anxiety, IBS, fibroids p/w cp, nausea, sob. R/o ACS, arrhythmia, PNA, pancreatitis. +large component anxiety. Labs, CXR, EKG, tele, zofran. ABd benign and no abd pain--doubt surgical etiology.

## 2017-12-04 NOTE — ED ADULT TRIAGE NOTE - TEMPERATURE IN FAHRENHEIT (DEGREES F)
Face to face report given with opportunity to observe patient.    Report given to Fe ANDREWS   11/6/2017  7:54 PM       99

## 2017-12-04 NOTE — ED PROVIDER NOTE - OBJECTIVE STATEMENT
41F w/PMH POTS, anxiety, ITP, IBS, ativan use p/w cp since 10AM, anorexia and nausea x1w. 41F w/PMH POTS, anxiety, ITP, IBS, ativan use, fibroids on progesterone vaginal suppository p/w cp since 10AM, anorexia and nausea x1w. +sob and intermittent change in HR measured at home despite encouragement by Dr. Hussein cardiologist to avoid measuring HR at home. Denies f/c, cough, abd pain, vomiting, d/c, melena/BRBPR, tremor, syncope, LE edema, estrogen use, ETOH/smoking/drug use. Denies medication noncompliance. Has had significant work-up for non-specific complaints with prior notes and discussions with treating physicians concerning for anxiety contribution and no findings of additional pathology. 4d ago was doing laundry and had 1h of midsternal cp. This cp is same location, sharp, similar in character, non-exertional. No FH early MI.

## 2017-12-04 NOTE — H&P ADULT - HISTORY OF PRESENT ILLNESS
: The patient is a 41y Female complaining of chest discomfort.   41 yr old female  w/PMH POTS, anxiety, ITP, IBS, ativan use, fibroids on progesterone vaginal suppository p/w chest pain since this am /, anorexia and nausea x1week/. +sob and intermittent change in HR   . Denies f/c, cough, abd pain, vomiting, d/c, melena/BRBPR, tremor, syncope, LE edema, e   . 4d ago was doing laundry and had 1hour  of midsternal cp. This cp is same location, sharp, similar in character, non-exertional.

## 2017-12-04 NOTE — H&P ADULT - NSHPLABSRESULTS_GEN_ALL_CORE
13.3   7.1   )-----------( 78       ( 04 Dec 2017 15:47 )             38.4       12-04    143  |  104  |  3<L>  ----------------------------<  91  3.7   |  23  |  1.01    Ca    9.9      04 Dec 2017 15:47    TPro  7.9  /  Alb  5.2<H>  /  TBili  0.4  /  DBili  x   /  AST  21  /  ALT  15  /  AlkPhos  73  12-04                  PT/INR - ( 04 Dec 2017 15:47 )   PT: 11.1 sec;   INR: 1.02 ratio         PTT - ( 04 Dec 2017 15:47 )  PTT:26.1 sec    Lactate Trend      CARDIAC MARKERS ( 04 Dec 2017 15:47 )  x     / <0.01 ng/mL / x     / x     / x            CAPILLARY BLOOD GLUCOSE

## 2017-12-04 NOTE — ED ADULT TRIAGE NOTE - CHIEF COMPLAINT QUOTE
"continuing nausea, decreased PO intake, HR all over the place" chest discomfort that started 2 hours ago

## 2017-12-04 NOTE — ED PROVIDER NOTE - CARE PLAN
Principal Discharge DX:	Chest pain, unspecified type  Secondary Diagnosis:	Anxiety  Secondary Diagnosis:	POTS (postural orthostatic tachycardia syndrome)

## 2017-12-04 NOTE — ED ADULT NURSE NOTE - OBJECTIVE STATEMENT
40 yo F presents to ED A+Ox3 c/o  palpitations for the past few days. States the palpitations became worse today, states she has a pulse oximeter at home, states "my heart rate ranges from 60s-130s." Reports epigastric discomfort that began this morning. Pt. on CM. Breathing unlabored on RA. Skin warm pink and dry. Denies SOB, abdominal pain, vomiting, back pain. Pt. reports several weeks of nausea, states she takes ODT Zofran at home but has decreased appetite. Home aide at bedside. Comfort and safety measures in place.

## 2017-12-04 NOTE — ED ADULT NURSE REASSESSMENT NOTE - NS ED NURSE REASSESS COMMENT FT1
Patient on phone and saying she is agitated because she is having issues with her family at home.  Patient calm and cooperative and in no acute distress.  Patient wearing CM and in SR. n Plan of care explained and safety ensured.

## 2017-12-04 NOTE — ED PROVIDER NOTE - ATTENDING CONTRIBUTION TO CARE
Private Physician Lorena Ford PCP, [address], [facility].  41F w/PMH possible POTS dz Asthma, ITP,Anxiety, Pt recently admitted to now comes to ed complains of feeling unwell with palps and burning chest pain with nausea for past several weeks. Seen by pmd and referred to ed. No fever chills. Pt also complains of long hx of anorexia nausea and weight lost. PE WDWN female awake alert nad  NCAT chest clear anterior & posterior abd soft +bs neruo no focal defects. moves all extrr gcs 15 speech fluent.  Jair Stout MD, [facility]

## 2017-12-05 ENCOUNTER — TRANSCRIPTION ENCOUNTER (OUTPATIENT)
Age: 42
End: 2017-12-05

## 2017-12-05 VITALS
DIASTOLIC BLOOD PRESSURE: 74 MMHG | RESPIRATION RATE: 18 BRPM | TEMPERATURE: 98 F | OXYGEN SATURATION: 99 % | HEART RATE: 67 BPM | SYSTOLIC BLOOD PRESSURE: 109 MMHG

## 2017-12-05 LAB
CK MB BLD-MCNC: 2.1 % — SIGNIFICANT CHANGE UP (ref 0–3.5)
CK MB BLD-MCNC: <2.2 % — SIGNIFICANT CHANGE UP (ref 0–3.5)
CK MB CFR SERPL CALC: 1 NG/ML — SIGNIFICANT CHANGE UP (ref 0–3.8)
CK MB CFR SERPL CALC: <1 NG/ML — SIGNIFICANT CHANGE UP (ref 0–3.8)
CK SERPL-CCNC: 46 U/L — SIGNIFICANT CHANGE UP (ref 25–170)
TROPONIN T SERPL-MCNC: <0.01 NG/ML — SIGNIFICANT CHANGE UP (ref 0–0.06)
TROPONIN T SERPL-MCNC: <0.01 NG/ML — SIGNIFICANT CHANGE UP (ref 0–0.06)
TSH SERPL-MCNC: 2.65 UIU/ML — SIGNIFICANT CHANGE UP (ref 0.27–4.2)

## 2017-12-05 PROCEDURE — 84295 ASSAY OF SERUM SODIUM: CPT

## 2017-12-05 PROCEDURE — 84443 ASSAY THYROID STIM HORMONE: CPT

## 2017-12-05 PROCEDURE — 99223 1ST HOSP IP/OBS HIGH 75: CPT

## 2017-12-05 PROCEDURE — 82330 ASSAY OF CALCIUM: CPT

## 2017-12-05 PROCEDURE — 83605 ASSAY OF LACTIC ACID: CPT

## 2017-12-05 PROCEDURE — 85379 FIBRIN DEGRADATION QUANT: CPT

## 2017-12-05 PROCEDURE — 82435 ASSAY OF BLOOD CHLORIDE: CPT

## 2017-12-05 PROCEDURE — 84702 CHORIONIC GONADOTROPIN TEST: CPT

## 2017-12-05 PROCEDURE — 85027 COMPLETE CBC AUTOMATED: CPT

## 2017-12-05 PROCEDURE — 82803 BLOOD GASES ANY COMBINATION: CPT

## 2017-12-05 PROCEDURE — 82947 ASSAY GLUCOSE BLOOD QUANT: CPT

## 2017-12-05 PROCEDURE — 96374 THER/PROPH/DIAG INJ IV PUSH: CPT

## 2017-12-05 PROCEDURE — 84484 ASSAY OF TROPONIN QUANT: CPT

## 2017-12-05 PROCEDURE — 83880 ASSAY OF NATRIURETIC PEPTIDE: CPT

## 2017-12-05 PROCEDURE — 85014 HEMATOCRIT: CPT

## 2017-12-05 PROCEDURE — 80053 COMPREHEN METABOLIC PANEL: CPT

## 2017-12-05 PROCEDURE — 82550 ASSAY OF CK (CPK): CPT

## 2017-12-05 PROCEDURE — 71046 X-RAY EXAM CHEST 2 VIEWS: CPT

## 2017-12-05 PROCEDURE — 83690 ASSAY OF LIPASE: CPT

## 2017-12-05 PROCEDURE — 99285 EMERGENCY DEPT VISIT HI MDM: CPT | Mod: 25

## 2017-12-05 PROCEDURE — 84132 ASSAY OF SERUM POTASSIUM: CPT

## 2017-12-05 PROCEDURE — 85730 THROMBOPLASTIN TIME PARTIAL: CPT

## 2017-12-05 PROCEDURE — 85610 PROTHROMBIN TIME: CPT

## 2017-12-05 PROCEDURE — 82553 CREATINE MB FRACTION: CPT

## 2017-12-05 RX ORDER — SODIUM CHLORIDE 9 MG/ML
1000 INJECTION INTRAMUSCULAR; INTRAVENOUS; SUBCUTANEOUS
Qty: 0 | Refills: 0 | Status: DISCONTINUED | OUTPATIENT
Start: 2017-12-05 | End: 2017-12-05

## 2017-12-05 RX ADMIN — Medication 2 MILLIGRAM(S): at 05:30

## 2017-12-05 RX ADMIN — SIMETHICONE 80 MILLIGRAM(S): 80 TABLET, CHEWABLE ORAL at 07:05

## 2017-12-05 RX ADMIN — FAMOTIDINE 20 MILLIGRAM(S): 10 INJECTION INTRAVENOUS at 07:04

## 2017-12-05 RX ADMIN — SIMETHICONE 80 MILLIGRAM(S): 80 TABLET, CHEWABLE ORAL at 01:18

## 2017-12-05 RX ADMIN — Medication 100 MILLIGRAM(S): at 07:46

## 2017-12-05 RX ADMIN — ONDANSETRON 4 MILLIGRAM(S): 8 TABLET, FILM COATED ORAL at 10:02

## 2017-12-05 RX ADMIN — Medication 2.5 MILLIGRAM(S): at 00:33

## 2017-12-05 RX ADMIN — MONTELUKAST 10 MILLIGRAM(S): 4 TABLET, CHEWABLE ORAL at 00:33

## 2017-12-05 RX ADMIN — ONDANSETRON 4 MILLIGRAM(S): 8 TABLET, FILM COATED ORAL at 05:30

## 2017-12-05 RX ADMIN — Medication 2 MILLIGRAM(S): at 10:02

## 2017-12-05 RX ADMIN — SODIUM CHLORIDE 50 MILLILITER(S): 9 INJECTION INTRAMUSCULAR; INTRAVENOUS; SUBCUTANEOUS at 04:37

## 2017-12-05 NOTE — PROGRESS NOTE ADULT - SUBJECTIVE AND OBJECTIVE BOX
CHIEF COMPLAINT:Patient  very anxious / emotional   	        PAST MEDICAL & SURGICAL HISTORY:  Pott disease  History of ITP  Labyrinthitis  Headache  HTN (hypertension)  Anxiety  IBS (irritable bowel syndrome)  No significant past surgical history          REVIEW OF SYSTEMS:  CONSTITUTIONAL: No fever, weight loss, or fatigue  EYES: No eye pain, visual disturbances, or discharge  NECK: No pain or stiffness  RESPIRATORY: No cough, wheezing, chills or hemoptysis; No Shortness of Breath  CARDIOVASCULAR: No chest pain this am ,no  palpitations, passing out, dizziness, or leg swelling  GASTROINTESTINAL: No abdominal or epigastric pain. No nausea, vomiting, or hematemesis; No diarrhea or constipation. No melena or hematochezia.  GENITOURINARY: No dysuria, frequency, hematuria, or incontinence  NEUROLOGICAL: No headaches, memory loss, loss of strength, numbness, or tremors  LYMPH Nodes: No enlarged glands  MUSCULOSKELETAL: No joint pain or swelling; No muscle, back, or extremity pain    Medications:  MEDICATIONS  (STANDING):  busPIRone 2.5 milliGRAM(s) Oral two times a day  diltiazem    milliGRAM(s) Oral daily  docusate sodium 100 milliGRAM(s) Oral three times a day  famotidine    Tablet 20 milliGRAM(s) Oral two times a day  LORazepam     Tablet 2 milliGRAM(s) Oral every 4 hours  montelukast 10 milliGRAM(s) Oral daily  ondansetron Injectable 4 milliGRAM(s) IV Push every 4 hours  progesterone Vaginal Insert 100 milliGRAM(s) Vaginal daily  sodium chloride 0.9%. 1000 milliLiter(s) (50 mL/Hr) IV Continuous <Continuous>    MEDICATIONS  (PRN):  simethicone 80 milliGRAM(s) Chew three times a day PRN Gas    	    PHYSICAL EXAM:  T(C): 36.7 (12-05-17 @ 07:10), Max: 37.2 (12-04-17 @ 14:23)  HR: 67 (12-05-17 @ 07:10) (67 - 94)  BP: 109/74 (12-05-17 @ 07:10) (109/74 - 144/88)  RR: 18 (12-05-17 @ 07:10) (18 - 20)  SpO2: 99% (12-05-17 @ 07:10) (98% - 100%)  Wt(kg): --  I&O's Summary      Appearance: Normal	  HEENT:   Normal oral mucosa, PERRL, EOMI	  Lymphatic: No lymphadenopathy  Cardiovascular: Normal S1 S2, No JVD, No murmurs, No edema  Respiratory: Lungs clear to auscultation	  Psychiatry: A & O x 3,anxiety / emotional;  Gastrointestinal:  Soft, Non-tender, + BS	  Skin: No rashes, No ecchymoses, No cyanosis	  Neurologic: Non-focal  Extremities: Normal range of motion, No clubbing, cyanosis or edema  Vascular: Peripheral pulses palpable 2+ bilaterally    TELEMETRY: 	    ECG:  	  RADIOLOGY:  OTHER: 	  	  LABS:	 	    CARDIAC MARKERS:  CARDIAC MARKERS ( 05 Dec 2017 06:16 )  x     / <0.01 ng/mL / 46 U/L / x     / <1.0 ng/mL  CARDIAC MARKERS ( 04 Dec 2017 23:17 )  x     / <0.01 ng/mL / 47 U/L / x     / 1.0 ng/mL  CARDIAC MARKERS ( 04 Dec 2017 15:47 )  x     / <0.01 ng/mL / x     / x     / x                                    13.3   7.1   )-----------( 78       ( 04 Dec 2017 15:47 )             38.4     12-04    143  |  104  |  3<L>  ----------------------------<  91  3.7   |  23  |  1.01    Ca    9.9      04 Dec 2017 15:47    TPro  7.9  /  Alb  5.2<H>  /  TBili  0.4  /  DBili  x   /  AST  21  /  ALT  15  /  AlkPhos  73  12-04    proBNP: Serum Pro-Brain Natriuretic Peptide: 105 pg/mL (12-04 @ 15:47)    Lipid Profile:   HgA1c:   TSH: Thyroid Stimulating Hormone, Serum: 2.65 uIU/mL (12-05 @ 05:17)

## 2017-12-05 NOTE — PROGRESS NOTE ADULT - ASSESSMENT
pt w/ hx rock/ anxiety c/o sscp / palps    r/o acs/ arrythmia  trops neg  tele  cards zane / dr werner aware  dvt proph  pulm zane/ dr janett degroot /dr rowland aware /seeby dr murrieta for dr rowland and cleared  d dimer neg  echo inpt vs outpt   cont outpt meds  pt wants to leave if no room in next few hours  psych eval /called   gi eval called dr sylvester / seen by niecy wing and doesnt want to see him

## 2017-12-05 NOTE — CONSULT NOTE ADULT - SUBJECTIVE AND OBJECTIVE BOX
**********              CARDIOLOGY CONSULT NOTE              ************  ============================================================  CHIEF COMPLAINT/REASON FOR CONSULT:  Patient is a 41y old  Female who presents with a chief complaint of CP/Palpitatioons    HISTORY OF PRESENT ILLNESS:  41yFemale with a history of ?POTS/ITP/Anxiety  re-presenting a 7th time for chest pain and vague abdominal pain.   Patient well-known to me. Have done exhaustive workup regarding constellation of sxs.   CP is transitory, right sided, not assoc with exertion 7/10, no diaphoresis.   Reports nausea causing inability to eat.     ============================================================    llergies    No Known Allergies    Intolerances    metoprolol (Headache)  	    MEDICATIONS:  diltiazem    milliGRAM(s) Oral daily      montelukast 10 milliGRAM(s) Oral daily    busPIRone 2.5 milliGRAM(s) Oral two times a day  LORazepam     Tablet 2 milliGRAM(s) Oral every 4 hours  ondansetron Injectable 4 milliGRAM(s) IV Push every 4 hours    docusate sodium 100 milliGRAM(s) Oral three times a day  famotidine    Tablet 20 milliGRAM(s) Oral two times a day  simethicone 80 milliGRAM(s) Chew three times a day PRN    progesterone Vaginal Insert 100 milliGRAM(s) Vaginal daily    sodium chloride 0.9%. 1000 milliLiter(s) IV Continuous <Continuous>      PAST MEDICAL & SURGICAL HISTORY:  Pott disease  History of ITP  Labyrinthitis  Headache  HTN (hypertension)  Anxiety  IBS (irritable bowel syndrome)  No significant past surgical history      FAMILY HISTORY:  Family history of hypertension (Grandparent)  Family history of atrial fibrillation  Family history of prostate cancer in father    NC - Mother/Father    SOCIAL HISTORY:    [ x] Non-smoker  [x] No Illicit Drug Use  [ x] No Excess EtOH Use      REVIEW OF SYSTEMS: (Unless + Before Symptom, it is negative)  Constitutional: No Fever, Fatigue, Weight Changes  Eyes: No Recent Vision Changes, Eye Pain  ENT: No Congestion, Sore Throat  Endocrine: +Excess Sweating, Temperature Intolerance  Cardiovascular: +Chest Pain, +Palpitations, Shortness of Breath, Pre-syncope, Syncope, LE Edema  Respiratory: No Cough, Congestion, Wheezing  Gastrointestinal: No Abdominal Pain, +Nausea, Vomiting  Genitourinary: No dysuria, hematuria  Musculoskeletal: No Joint Pain, Swelling  Neurologic: No headaches, Imbalance, Weakness  Skin: No rashes, hematoma, purprura    ================================    PHYSICAL EXAM:  T(C): 36.7 (12-05-17 @ 07:10), Max: 37.2 (12-04-17 @ 14:23)  HR: 67 (12-05-17 @ 07:10) (67 - 94)  BP: 109/74 (12-05-17 @ 07:10) (109/74 - 144/88)  RR: 18 (12-05-17 @ 07:10) (18 - 20)  SpO2: 99% (12-05-17 @ 07:10) (98% - 100%)  Wt(kg): --  I&O's Summary    Appearance: Normal; NAD; +anxious, perseverates	  HEENT:   Normal oral mucosa, EOMI	  Lymphatic: No lymphadenopathy  Cardiovascular: Normal S1 S2, No JVD, No murmurs, No edema  Respiratory: Lungs clear to auscultation, no use of accessory muscles	  Psychiatry: A & O x 3, Mood & affect appropriate  Gastrointestinal:  Soft, Non-tender	  Skin: No rashes, No ecchymoses, No cyanosis	  Neurologic: Non-focal, No Focal Deficits  Extremities: Normal range of motion, No clubbing, cyanosis or edema  Vascular: Peripheral pulses palpable 2+ bilaterally, no prominent varicosities    ============================    LABS:	   Labs [id number] @ 10:26	    CBC Full  -  ( 04 Dec 2017 15:47 )  WBC Count : 7.1 K/uL  Hemoglobin : 13.3 g/dL  Hematocrit : 38.4 %  Platelet Count - Automated : 78 K/uL  Mean Cell Volume : 87.3 fl  Mean Cell Hemoglobin : 30.1 pg  Mean Cell Hemoglobin Concentration : 34.5 gm/dL  Auto Neutrophil # : 4.8 K/uL  Auto Lymphocyte # : 1.8 K/uL  Auto Monocyte # : 0.4 K/uL  Auto Eosinophil # : 0.0 K/uL  Auto Basophil # : 0.0 K/uL  Auto Neutrophil % : 67.5 %  Auto Lymphocyte % : 26.1 %  Auto Monocyte % : 5.9 %  Auto Eosinophil % : 0.4 %  Auto Basophil % : 0.1 %    12-04    143  |  104  |  3<L>  ----------------------------<  91  3.7   |  23  |  1.01    Ca    9.9      04 Dec 2017 15:47    TPro  7.9  /  Alb  5.2<H>  /  TBili  0.4  /  DBili  x   /  AST  21  /  ALT  15  /  AlkPhos  73  12-04        =========================================================================  CXR:  Clear  ECG:  	  NSR no ischemic changes    =========================================================================  ASSESSMENT:  41yFemale with a history of ?POTS/ITP/Anxiety  re-presenting a 7th time for chest pain and vague abdominal pain.   Atypical CP. Unfortunate decline in quality of life due to hypervigilance of vague symptoms. At this point, psychiatric evaluation is vital.   Have referred her to [facility] for auntomic dysfunction.   In addition, heavy ativan use.     Recommendations  - D/C Home  - Counseled on importance of psychiatry and exercising  - F/U in 2 wks  - Re-assured re: CP   - Thank you for this consult.        Please call with questions.     Philip Reed MD, [facility]  [phone]

## 2017-12-05 NOTE — DISCHARGE NOTE ADULT - CARE PROVIDERS DIRECT ADDRESSES
[email or web address]'s [email or web address]'s [email or web address]'s [email or web address]

## 2017-12-05 NOTE — DISCHARGE NOTE ADULT - HOSPITAL COURSE
: The patient is a 41y Female complaining of chest discomfort.   41 yr old female  w/PMH POTS, anxiety, ITP, IBS, ativan use, fibroids on progesterone vaginal suppository p/w chest pain since this am /, anorexia and nausea x1week/. +sob and intermittent change in HR   . Denies f/c, cough, abd pain, vomiting, d/c, melena/BRBPR, tremor, syncope, LE edema, e   . 4d ago was doing laundry and had 1hour  of midsternal cp. This cp is same location, sharp, similar in character, non-exertional.     She was seen by card, pulm, and MI/ACS was ruled out. Psych consult was called but pt refused to be seen and she has an appointment with her own Psych tomorrow. She is hemodynamically stable and will be discharged home today. Spoke to Dr Goodrich, Dr Reed. There were no new meds and she will cont her current home meds. : The patient is a 41y Female complaining of chest discomfort.   41 yr old female  w/PMH POTS, anxiety, ITP, IBS, ativan use, fibroids on progesterone vaginal suppository p/w chest pain since this am /, anorexia and nausea x1week/. +sob and intermittent change in HR   . Denies f/c, cough, abd pain, vomiting, d/c, melena/BRBPR, tremor, syncope, LE edema, e   . 4d ago was doing laundry and had 1hour  of midsternal cp. This cp is same location, sharp, similar in character, non-exertional.     She was seen by card, pulm, and MI/ACS was ruled out. Psych consult was called but pt refused to be seen and she has an appointment with her own Psych tomorrow. Spoke to house psych and cancelled the consult. She is hemodynamically stable and will be discharged home today. Spoke to Dr Goodrich, Dr Reed. There were no new meds and she will cont her current home meds. : The patient is a 41y Female complaining of chest discomfort.   41 yr old female  w/PMH POTS, anxiety, ITP, IBS, ativan use, fibroids on progesterone vaginal suppository p/w chest pain since this am /, anorexia and nausea x1week/. +sob and intermittent change in HR   . Denies f/c, cough, abd pain, vomiting, d/c, melena/BRBPR, tremor, syncope, LE edema, e   . 4d ago was doing laundry and had 1hour  of midsternal cp. This cp is same location, sharp, similar in character, non-exertional.     She was seen by card, pulm, and MI/ACS was ruled out. She will have an echo as out patient. Psych consult was called but pt refused to be seen and she has an appointment with her own Psych tomorrow. Spoke to house psych and cancelled the consult. She is hemodynamically stable and will be discharged home today. Spoke to Dr Goodrich, Dr Reed. There were no new meds and she will cont her current home meds.

## 2017-12-05 NOTE — DISCHARGE NOTE ADULT - CARE PROVIDER_API CALL
Lorena Ford), Internal Medicine  [address] [place] [address]  Phone: [phone]  Fax: [phone] Lorena Ford), Internal Medicine  [address] [facility]  [address]  [place] [address]  Phone: [phone]  Fax: [phone]    Philip Reed (MD), Cardiovascular Disease; Internal Medicine  [address]  [address] [place] [address]  Phone: [phone]  Fax: [phone]    Christian Sunshine), Internal Medicine; Pulmonary Disease  [address] [facility] [address]  [place] [address]  Phone: [phone]  Fax: [phone]

## 2017-12-05 NOTE — CONSULT NOTE ADULT - ASSESSMENT
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 Recent CTPA 11/16/17 - no PE, normal lung parenchyma and pleura  Presented on 12/4 with increased chest pain, dyspnea, palpitations. Cardiac enzymes and EKG WNL.  Currently states chest pain has resolved. Very anxious, crying, stating she can no longer wait in the ED for a bed in the hospital. Currently with social issues - unable to live with her parents at home anymore and states she needs to go to  to figure out a housing situation. Denies suicidal or homicidal ideations. Denies fevers, chills, wheezing, cough. Taking Ativan every few hours and Buspar twice a day.    A/P: Uncontrolled anxiety, palpitations, atypical chest pain  Cardiac eval to date has been negative. +Tachycardiac Dr. Hussein to see as well  Asthma is currently controlled-Singulair, Albuterol PRN- consider Xopenex as needed  Nocturnal hypoxemia on recent overnight pulse oximetry, unclear etiology - supplemental O2, goal sats >92%. Is scheduled for outpatient sleep study.  Incentive spirometer  Consider Psychiatry eval- patient was scheduled to be seen as an outpatient tomorrow but she canceled the apointment once admitted. Uncontrolled anxiety with social issues. SW eval as well.  DVT prophylaxis    Thank you for the consult. Dr. Sunshine to follow up 12/6

## 2017-12-05 NOTE — DISCHARGE NOTE ADULT - MEDICATION SUMMARY - MEDICATIONS TO TAKE
I will START or STAY ON the medications listed below when I get home from the hospital:    digestive enzyme  -- 1 to 4 tab(s) depending on size of the meal  -- Indication: For supplement    dilTIAZem 240 mg/24 hours oral capsule, extended release  -- 1 cap(s) by mouth once a day  -- Indication: For POTS (postural orthostatic tachycardia syndrome)    LORazepam 2 mg oral tablet  -- 1 tab(s) by mouth every 5 hours  -- Indication: For Anxiety    ondansetron 4 mg oral tablet, disintegrating  -- 2 tab(s) by mouth every 5 hours, As Needed  -- Indication: For Nausea    busPIRone 5 mg oral tablet  -- 0.5 tab(s) by mouth 2 times a day  -- Indication: For depression    famotidine 20 mg oral tablet  -- 1 tab(s) by mouth 2 times a day  -- Indication: For GERD    docusate sodium 100 mg oral capsule  -- 1 cap(s) by mouth 3 times a day  -- Indication: For Constipatiop    Singulair 10 mg oral tablet  -- 1 tab(s) by mouth once a day  -- Indication: For Possible  allergy    simethicone 80 mg oral tablet, chewable  -- 2 tab(s) by mouth 3 times a day, As Needed  -- Indication: For indigestion/excessive gas    progesterone 100 mg vaginal suppository  -- 1 suppository(ies) vaginally once a day  -- Indication: For hormonal supplement

## 2017-12-05 NOTE — CONSULT NOTE ADULT - SUBJECTIVE AND OBJECTIVE BOX
[facility] - Division of Pulmonary, Critical Care and Sleep Medicine   Please call [phone] between 8am-pm weekdays, [phone] after hours and weekends      CHIEF COMPLAINT:  Shortness of breath and chest pain    HPI: 42 yo F with a h/o Harrison disease, ITP, anxiety, mild intermittent asthma, IBS, autonomic dysfunction with tachycardia, significant anxiety with high dose chronic benzo use, multiple presentations to ED and admissions for chest discomfort, SOB and palpitations - cardiac work up has been negative in the past. Recent CTPA 11/16/17 - no PE, normal lung parenchyma and pleura  Presented on 12/4 with     PAST MEDICAL & SURGICAL HISTORY:  Pott disease  History of ITP  Labyrinthitis  Headache  HTN (hypertension)  Anxiety  IBS (irritable bowel syndrome)  No significant past surgical history      FAMILY HISTORY:  Family history of hypertension (Grandparent)  Family history of atrial fibrillation  Family history of prostate cancer in father      SOCIAL HISTORY:  Smoking: [ ] Never Smoked [ ] Former Smoker (__ packs x ___ years) [ ] Current Smoker  (__ packs x ___ years)  Substance Use: [ ] Never Used [ ] Used ____  EtOH Use:  Marital Status: [ ] Single [ ]  [ ]  [ ]   Occupation:  Recent Travel:  Country of Birth:  Advance Directives:    Allergies    No Known Allergies    Intolerances    metoprolol (Headache)      HOME MEDICATIONS:    REVIEW OF SYSTEMS:  Constitutional: [ ] fevers [ ] chills [ ] weight loss [ ] weight gain  HEENT: [ ] dry eyes [ ] eye irritation [ ] postnasal drip [ ] nasal congestion  CV: [ ] chest pain [ ] orthopnea [ ] palpitations [ ] murmur  Resp: [ ] cough [ ] shortness of breath [ ] wheezing [ ] sputum [ ] hemoptysis  GI: [ ] nausea [ ] vomiting [ ] diarrhea [ ] constipation [ ] abd pain [ ] dysphagia   : [ ] dysuria [ ] nocturia [ ] hematuria [ ] increased urinary frequency  Musculoskeletal: [ ] myalgias [ ] arthralgias   Skin: [ ] rash [ ] itch  Neurological: [ ] headache [ ] dizziness [ ] syncope [ ] weakness [ ] numbness  Psychiatric: [ ] anxiety [ ] depression  Endocrine: [ ] diabetes [ ] thyroid problem  Hematologic/Lymphatic: [ ] anemia [ ] bleeding problem  Allergic/Immunologic: [ ] itchy eyes [ ] nasal discharge [ ] hives [ ] angioedema  [ ] All other systems negative  [ ] Unable to assess ROS because ________    OBJECTIVE:  ICU Vital Signs Last 24 Hrs  T(C): 36.7 (05 Dec 2017 07:10), Max: 37.2 (04 Dec 2017 14:23)  T(F): 98.1 (05 Dec 2017 07:10), Max: 99 (04 Dec 2017 14:23)  HR: 67 (05 Dec 2017 07:10) (67 - 94)  BP: 109/74 (05 Dec 2017 07:10) (109/74 - 144/88)  BP(mean): --  ABP: --  ABP(mean): --  RR: 18 (05 Dec 2017 07:10) (18 - 20)  SpO2: 99% (05 Dec 2017 07:10) (98% - 100%)        CAPILLARY BLOOD GLUCOSE          PHYSICAL EXAM:  General:  NAD  HEENT:  NC/AT  Lymph Nodes: No cervical or supraclavicular lymphadenopathy   Neck: Supple  Respiratory:  CTA B/L, no wheezes, crackles or rhonchi  Cardiovascular:  RRR, no m/r/g  Abdomen: soft, NT/ND, +BS  Extremities: no clubbing, cyanosis or edema, warm  Skin: no rash  Neurological: AAOx3, no focal deficits  Psychiatry: not anxious, normal affect    HOSPITAL MEDICATIONS:  MEDICATIONS  (STANDING):  busPIRone 2.5 milliGRAM(s) Oral two times a day  diltiazem    milliGRAM(s) Oral daily  docusate sodium 100 milliGRAM(s) Oral three times a day  famotidine    Tablet 20 milliGRAM(s) Oral two times a day  LORazepam     Tablet 2 milliGRAM(s) Oral every 4 hours  montelukast 10 milliGRAM(s) Oral daily  ondansetron Injectable 4 milliGRAM(s) IV Push every 4 hours  progesterone Vaginal Insert 100 milliGRAM(s) Vaginal daily  sodium chloride 0.9%. 1000 milliLiter(s) (50 mL/Hr) IV Continuous <Continuous>    MEDICATIONS  (PRN):  simethicone 80 milliGRAM(s) Chew three times a day PRN Gas      LABS:                        13.3   7.1   )-----------( 78       ( 04 Dec 2017 15:47 )             38.4     Hgb Trend: 13.3<--, 12.1<--, 12.4<--  12-04    143  |  104  |  3<L>  ----------------------------<  91  3.7   |  23  |  1.01    Ca    9.9      04 Dec 2017 15:47    TPro  7.9  /  Alb  5.2<H>  /  TBili  0.4  /  DBili  x   /  AST  21  /  ALT  15  /  AlkPhos  73  12-04    Creatinine Trend: 1.01<--, 0.92<--, 0.94<--, 0.92<--, 0.80<--, 0.93<--  PT/INR - ( 04 Dec 2017 15:47 )   PT: 11.1 sec;   INR: 1.02 ratio         PTT - ( 04 Dec 2017 15:47 )  PTT:26.1 sec      Venous Blood Gas:  12-04 @ 15:47  7.40/40/23/24/32  VBG Lactate: 1.1      MICROBIOLOGY:     RADIOLOGY:  [x ] Reviewed and interpreted by me  < from: Xray Chest 2 Views PA/Lat (12.04.17 @ 15:07) >    EXAM:  CHEST PA & LAT                            PROCEDURE DATE:  12/04/2017            INTERPRETATION:  PA and lateral projection the chest was obtained on   December 4, 2017.    Indication: Chest pain.    Impression:    The heart is normal in size. The lungs are clear. The visualized bones   are normal.    < end of copied text >  < from: CT Angio Chest w/ IV Cont (11.16.17 @ 13:26) >  EXAM:  CT ANGIO CHEST (W)AW IC                                  PROCEDURE DATE:  11/16/2017          INTERPRETATION:  CLINICAL STATEMENT: Evaluate for pulmonary embolus.   Chest pain.    TECHNIQUE: CTA of the chest was performed with IV contrast.  3-D/MIP was   obtained.  Approximately 95 cc of Omnipaque 350 administered.    COMPARISON: None.    FINDINGS:  There is no filling defect within the main, right interlobar, left   descending and visualized proximal segmental pulmonary arteries.    There is no thoracic aortic aneurysm.    There is no axillary, mediastinal or hilar lymphadenopathy by size   criteria.    There is no pericardial effusion.  There is no pleural effusion.     No pulmonary consolidation or infiltrate.            IMPRESSION:  No evidence of pulmonary embolus as described above.     < end of copied text >    PULMONARY FUNCTION TESTS: Spirometry 9/17/17- normal flow volume    EKG: NSR at 74. Isolated t-wave inversion in lead III    < from: Transthoracic Echocardiogram (09.06.17 @ 08:48) >  PROCEDURE: Transthoracic echocardiogram with 2-D, M-Mode  and complete spectral and color flow Doppler.  INDICATION: Abnormal electrocardiogram (ECG) (EKG) (R94.31)  ------------------------------------------------------------------------  Dimensions:    Normal Values:  LA:     2.9    2.0 - 4.0 cm  Ao:     2.8    2.0 - 3.8 cm  SEPTUM: 0.8    0.6 - 1.2 cm  PWT: 0.8    0.6 - 1.1 cm  LVIDd:  4.1    3.0 - 5.6 cm  LVIDs:  2.6    1.8 - 4.0 cm  Derived variables:  LVMI: 64 g/m2  RWT: 0.39  EF (Visual Estimate): 65 %  Doppler Peak Velocity (m/sec): AoV=1.0  ------------------------------------------------------------------------  Observations:  Mitral Valve: Normal mitral valve structure. Minimal mitral  regurgitation.  Aortic Valve/Aorta: Tricuspid aortic valve with normal cusp  excursion. No aortic valve regurgitation seen.  Aortic Root (SoV): 2.8 cm.  LeftAtrium: Normal left atrial size.  LA volume index = 14  cc/m2.  Left Ventricle: Normal left ventricular systolic function  with an estimated ejection fraction of 65%. No segmental  wall motion abnormalities. Normal left ventricular internal  dimensions and wall thicknesses.  Right Heart: Normal right atrial size. Normal right  ventricular size and systolic function. Tricuspid valve not  well visualized. Minimal tricuspid regurgitation. No  pulmonic stenosis. No signficant pulmonic regurgitation.  Pericardium/Pleura: No pericardial effusion seen.  ------------------------------------------------------------------------  Conclusions:  1. Normal left ventricular internal dimensions and wall  thicknesses.  2. Normal left ventricular systolic function with an  estimated ejection fraction of 65%. No segmental wall  motion abnormalities.  3. Normal right ventricular size and systolic function.  ------------------------------------------------------------------------    < end of copied text > [facility] - Division of Pulmonary, Critical Care and Sleep Medicine   Please call [phone] between 8am-pm weekdays, [phone] after hours and weekends      CHIEF COMPLAINT:  Shortness of breath and chest pain    HPI: 42 yo F with a h/o Harrison disease, ITP, anxiety, mild intermittent asthma, IBS, autonomic dysfunction with tachycardia, significant anxiety with high dose chronic benzo use, multiple presentations to ED and admissions for chest discomfort, SOB and palpitations - cardiac work up has been negative in the past. Recent CTPA 11/16/17 - no PE, normal lung parenchyma and pleura  Presented on 12/4 with increased chest pain, dyspnea, palpitations. Cardiac enzymes and EKG WNL.  Currently states chest pain has resolved. Very anxious, crying, stating she can no longer wait in the ED for a bed in the hospital. Currently with social issues - unable to live with her parents at home anymore and states she needs to go to  to figure out a housing situation. Denies suicidal or homicidal ideations. Denies fevers, chills, wheezing, cough. Taking Ativan every few hours and Buspar twice a day.    PAST MEDICAL & SURGICAL HISTORY:  Pott disease  History of ITP  Labyrinthitis  Headache  HTN (hypertension)  Anxiety  IBS (irritable bowel syndrome)  No significant past surgical history      FAMILY HISTORY:  Family history of hypertension (Grandparent)  Family history of atrial fibrillation  Family history of prostate cancer in father      SOCIAL HISTORY:  Smoking: [x ] Never Smoked [ ] Former Smoker (__ packs x ___ years) [ ] Current Smoker  (__ packs x ___ years)  Substance Use: [x ] Never Used [ ] Used ____  EtOH Use: denies  Marital Status: [ x] Single [ ]  [ ]  [ ]   Occupation:  Recent Travel:  Country of Birth:  Advance Directives:    Allergies    No Known Allergies    Intolerances    metoprolol (Headache)      HOME MEDICATIONS:    REVIEW OF SYSTEMS:  Constitutional: [- ] fevers [- ] chills [ ] weight loss [ ] weight gain  HEENT: [ ] dry eyes [ ] eye irritation [- ] postnasal drip [- ] nasal congestion  CV: [+ ] chest pain [ -] orthopnea [+ ] palpitations [ ] murmur  Resp: [- ] cough [+ ] shortness of breath [- ] wheezing [- ] sputum [ ] hemoptysis  GI: [- ] nausea [ -] vomiting [ ] diarrhea [ ] constipation [- ] abd pain [ ] dysphagia   : [- ] dysuria [ ] nocturia [ ] hematuria [ ] increased urinary frequency  Musculoskeletal: [- ] myalgias [ ] arthralgias   Skin: [- ] rash [ ] itch  Neurological: [- ] headache [ ] dizziness [ ] syncope [ ] weakness [ ] numbness  Psychiatric: [+ ] anxiety [ ] depression  Endocrine: [ -] diabetes [ ] thyroid problem  Hematologic/Lymphatic: [ -] anemia [ ] bleeding problem  Allergic/Immunologic: [- ] itchy eyes [- ] nasal discharge [ ] hives [ ] angioedema  [x ] All other systems negative  [ ] Unable to assess ROS because ________    OBJECTIVE:  ICU Vital Signs Last 24 Hrs  T(C): 36.7 (05 Dec 2017 07:10), Max: 37.2 (04 Dec 2017 14:23)  T(F): 98.1 (05 Dec 2017 07:10), Max: 99 (04 Dec 2017 14:23)  HR: 67 (05 Dec 2017 07:10) (67 - 94)  BP: 109/74 (05 Dec 2017 07:10) (109/74 - 144/88)  BP(mean): --  ABP: --  ABP(mean): --  RR: 18 (05 Dec 2017 07:10) (18 - 20)  SpO2: 99% (05 Dec 2017 07:10) (98% - 100%)        CAPILLARY BLOOD GLUCOSE          PHYSICAL EXAM:  General:  NAD  HEENT:  NC/AT  Lymph Nodes: No cervical or supraclavicular lymphadenopathy   Neck: Supple  Respiratory:  CTA B/L, no wheezes, crackles or rhonchi  Cardiovascular:  RRR, no m/r/g  Abdomen: soft, NT/ND, +BS  Extremities: no clubbing, cyanosis or edema, warm  Skin: no rash  Neurological: AAOx3, no focal deficits  Psychiatry: not anxious, normal affect    HOSPITAL MEDICATIONS:  MEDICATIONS  (STANDING):  busPIRone 2.5 milliGRAM(s) Oral two times a day  diltiazem    milliGRAM(s) Oral daily  docusate sodium 100 milliGRAM(s) Oral three times a day  famotidine    Tablet 20 milliGRAM(s) Oral two times a day  LORazepam     Tablet 2 milliGRAM(s) Oral every 4 hours  montelukast 10 milliGRAM(s) Oral daily  ondansetron Injectable 4 milliGRAM(s) IV Push every 4 hours  progesterone Vaginal Insert 100 milliGRAM(s) Vaginal daily  sodium chloride 0.9%. 1000 milliLiter(s) (50 mL/Hr) IV Continuous <Continuous>    MEDICATIONS  (PRN):  simethicone 80 milliGRAM(s) Chew three times a day PRN Gas      LABS:                        13.3   7.1   )-----------( 78       ( 04 Dec 2017 15:47 )             38.4     Hgb Trend: 13.3<--, 12.1<--, 12.4<--  12-04    143  |  104  |  3<L>  ----------------------------<  91  3.7   |  23  |  1.01    Ca    9.9      04 Dec 2017 15:47    TPro  7.9  /  Alb  5.2<H>  /  TBili  0.4  /  DBili  x   /  AST  21  /  ALT  15  /  AlkPhos  73  12-04    Creatinine Trend: 1.01<--, 0.92<--, 0.94<--, 0.92<--, 0.80<--, 0.93<--  PT/INR - ( 04 Dec 2017 15:47 )   PT: 11.1 sec;   INR: 1.02 ratio         PTT - ( 04 Dec 2017 15:47 )  PTT:26.1 sec      Venous Blood Gas:  12-04 @ 15:47  7.40/40/23/24/32  VBG Lactate: 1.1      MICROBIOLOGY:     RADIOLOGY:  [x ] Reviewed and interpreted by me  < from: Xray Chest 2 Views PA/Lat (12.04.17 @ 15:07) >    EXAM:  CHEST PA & LAT                            PROCEDURE DATE:  12/04/2017            INTERPRETATION:  PA and lateral projection the chest was obtained on   December 4, 2017.    Indication: Chest pain.    Impression:    The heart is normal in size. The lungs are clear. The visualized bones   are normal.    < end of copied text >  < from: CT Angio Chest w/ IV Cont (11.16.17 @ 13:26) >  EXAM:  CT ANGIO CHEST (W)AW IC                                  PROCEDURE DATE:  11/16/2017          INTERPRETATION:  CLINICAL STATEMENT: Evaluate for pulmonary embolus.   Chest pain.    TECHNIQUE: CTA of the chest was performed with IV contrast.  3-D/MIP was   obtained.  Approximately 95 cc of Omnipaque 350 administered.    COMPARISON: None.    FINDINGS:  There is no filling defect within the main, right interlobar, left   descending and visualized proximal segmental pulmonary arteries.    There is no thoracic aortic aneurysm.    There is no axillary, mediastinal or hilar lymphadenopathy by size   criteria.    There is no pericardial effusion.  There is no pleural effusion.     No pulmonary consolidation or infiltrate.            IMPRESSION:  No evidence of pulmonary embolus as described above.     < end of copied text >    PULMONARY FUNCTION TESTS: Spirometry 9/17/17- normal flow volume    EKG: NSR at 74. Isolated t-wave inversion in lead III    < from: Transthoracic Echocardiogram (09.06.17 @ 08:48) >  PROCEDURE: Transthoracic echocardiogram with 2-D, M-Mode  and complete spectral and color flow Doppler.  INDICATION: Abnormal electrocardiogram (ECG) (EKG) (R94.31)  ------------------------------------------------------------------------  Dimensions:    Normal Values:  LA:     2.9    2.0 - 4.0 cm  Ao:     2.8    2.0 - 3.8 cm  SEPTUM: 0.8    0.6 - 1.2 cm  PWT: 0.8    0.6 - 1.1 cm  LVIDd:  4.1    3.0 - 5.6 cm  LVIDs:  2.6    1.8 - 4.0 cm  Derived variables:  LVMI: 64 g/m2  RWT: 0.39  EF (Visual Estimate): 65 %  Doppler Peak Velocity (m/sec): AoV=1.0  ------------------------------------------------------------------------  Observations:  Mitral Valve: Normal mitral valve structure. Minimal mitral  regurgitation.  Aortic Valve/Aorta: Tricuspid aortic valve with normal cusp  excursion. No aortic valve regurgitation seen.  Aortic Root (SoV): 2.8 cm.  LeftAtrium: Normal left atrial size.  LA volume index = 14  cc/m2.  Left Ventricle: Normal left ventricular systolic function  with an estimated ejection fraction of 65%. No segmental  wall motion abnormalities. Normal left ventricular internal  dimensions and wall thicknesses.  Right Heart: Normal right atrial size. Normal right  ventricular size and systolic function. Tricuspid valve not  well visualized. Minimal tricuspid regurgitation. No  pulmonic stenosis. No signficant pulmonic regurgitation.  Pericardium/Pleura: No pericardial effusion seen.  ------------------------------------------------------------------------  Conclusions:  1. Normal left ventricular internal dimensions and wall  thicknesses.  2. Normal left ventricular systolic function with an  estimated ejection fraction of 65%. No segmental wall  motion abnormalities.  3. Normal right ventricular size and systolic function.  ------------------------------------------------------------------------    < end of copied text >

## 2017-12-05 NOTE — DISCHARGE NOTE ADULT - CARE PLAN
Principal Discharge DX:	Chest pain, unspecified type  Goal:	resolved  Instructions for follow-up, activity and diet:	cont current home meds, follow up with card in 2 weeks  Secondary Diagnosis:	HTN (hypertension)  Goal:	controlled  Instructions for follow-up, activity and diet:	cont current home med  Secondary Diagnosis:	Anxiety  Goal:	stable  Instructions for follow-up, activity and diet:	cont current home meds, follow up with your Psychiatrist tomorrow ( you told us you have an appointment tomorrow)  Secondary Diagnosis:	Depression  Goal:	stable  Instructions for follow-up, activity and diet:	cont current home med, out patient psych follow up tomorrow  Secondary Diagnosis:	History of ITP  Goal:	stable  Instructions for follow-up, activity and diet:	not on any med  Secondary Diagnosis:	POTS (postural orthostatic tachycardia syndrome)  Goal:	stable  Instructions for follow-up, activity and diet:	cont current med

## 2017-12-05 NOTE — DISCHARGE NOTE ADULT - PLAN OF CARE
cont current home med, out patient psych follow up tomorrow stable not on any med cont current med resolved cont current home meds, follow up with card in 2 weeks controlled cont current home med cont current home meds, follow up with your Psychiatrist tomorrow ( you told us you have an appointment tomorrow)

## 2017-12-05 NOTE — DISCHARGE NOTE ADULT - SECONDARY DIAGNOSIS.
History of ITP POTS (postural orthostatic tachycardia syndrome) HTN (hypertension) Anxiety Depression

## 2017-12-06 ENCOUNTER — APPOINTMENT (OUTPATIENT)
Dept: NEUROLOGY | Facility: CLINIC | Age: 42
End: 2017-12-06
Payer: COMMERCIAL

## 2017-12-06 VITALS
WEIGHT: 108 LBS | HEIGHT: 62 IN | DIASTOLIC BLOOD PRESSURE: 74 MMHG | BODY MASS INDEX: 19.88 KG/M2 | HEART RATE: 84 BPM | SYSTOLIC BLOOD PRESSURE: 108 MMHG

## 2017-12-06 PROCEDURE — 99214 OFFICE O/P EST MOD 30 MIN: CPT

## 2017-12-06 PROCEDURE — 99204 OFFICE O/P NEW MOD 45 MIN: CPT

## 2017-12-06 NOTE — ED ADULT NURSE NOTE - NS ED PATIENT SAFETY CONCERN
ANTICOAGULATION FOLLOW-UP CLINIC VISIT    Patient Name:  Mary Alice Cameron  Date:  12/6/2017  Contact Type:  Face to Face    SUBJECTIVE:     Patient Findings     Positives Change in medications    Comments COPD exacerbation - States has an open order to start a sliding scale of Prednisone.  Prednisone started on 12/4/17-60mg daily x3d; 40mg daily x3d; 20mg daily x3d; 10mg daily x3d; then D/C.  States these orders are via Dr Farrell.  Discussed altering Coumadin and rechecking in 1 week.  Pt agreed to the plan.           OBJECTIVE    INR Protime   Date Value Ref Range Status   12/06/2017 2.9 (A) 0.86 - 1.14 Final       ASSESSMENT / PLAN  INR assessment THER    Recheck INR In: 1 WEEK    INR Location Clinic      Anticoagulation Summary as of 12/6/2017     INR goal 2.0-3.0   Today's INR 2.9   Maintenance plan 2 mg (2 mg x 1) on Mon, Wed, Fri; 3 mg (2 mg x 1.5) all other days   Full instructions 12/6: 1 mg; 12/7: 2 mg; 12/9: 2 mg; 12/10: 2 mg; 12/12: 2 mg; Otherwise 2 mg on Mon, Wed, Fri; 3 mg all other days   Weekly total 18 mg   Plan last modified Kenna Storm RN (12/6/2017)   Next INR check 12/13/2017   Priority INR   Target end date Indefinite    Indications   Atrial fibrillation  persistent (H) [I48.1]  Long-term (current) use of anticoagulants [Z79.01] [Z79.01]         Anticoagulation Episode Summary     INR check location     Preferred lab     Send INR reminders to  ANTICOAG POOL    Comments       Anticoagulation Care Providers     Provider Role Specialty Phone number    Braydon Yen MD [facility] [phone]            See the Encounter Report to view Anticoagulation Flowsheet and Dosing Calendar (Go to Encounters tab in chart review, and find the Anticoagulation Therapy Visit)        Kenna Storm RN                No

## 2017-12-08 ENCOUNTER — EMERGENCY (EMERGENCY)
Facility: HOSPITAL | Age: 42
LOS: 1 days | Discharge: HOME | End: 2017-12-08
Attending: EMERGENCY MEDICINE | Admitting: EMERGENCY MEDICINE
Payer: COMMERCIAL

## 2017-12-08 VITALS
DIASTOLIC BLOOD PRESSURE: 89 MMHG | HEART RATE: 93 BPM | SYSTOLIC BLOOD PRESSURE: 136 MMHG | WEIGHT: 111.77 LBS | HEIGHT: 62 IN | OXYGEN SATURATION: 100 % | RESPIRATION RATE: 18 BRPM | TEMPERATURE: 98 F

## 2017-12-08 DIAGNOSIS — R69 ILLNESS, UNSPECIFIED: ICD-10-CM

## 2017-12-08 DIAGNOSIS — R07.89 OTHER CHEST PAIN: ICD-10-CM

## 2017-12-08 DIAGNOSIS — F41.9 ANXIETY DISORDER, UNSPECIFIED: ICD-10-CM

## 2017-12-08 DIAGNOSIS — F43.23 ADJUSTMENT DISORDER WITH MIXED ANXIETY AND DEPRESSED MOOD: ICD-10-CM

## 2017-12-08 DIAGNOSIS — Z88.8 ALLERGY STATUS TO OTHER DRUGS, MEDICAMENTS AND BIOLOGICAL SUBSTANCES STATUS: ICD-10-CM

## 2017-12-08 LAB
ALBUMIN SERPL ELPH-MCNC: 4.5 G/DL — SIGNIFICANT CHANGE UP (ref 3.3–5)
ALP SERPL-CCNC: 62 U/L — SIGNIFICANT CHANGE UP (ref 40–120)
ALT FLD-CCNC: 10 U/L — SIGNIFICANT CHANGE UP (ref 10–45)
ANION GAP SERPL CALC-SCNC: 11 MMOL/L — SIGNIFICANT CHANGE UP (ref 5–17)
AST SERPL-CCNC: 18 U/L — SIGNIFICANT CHANGE UP (ref 10–40)
BASOPHILS NFR BLD AUTO: 0.2 % — SIGNIFICANT CHANGE UP (ref 0–2)
BILIRUB SERPL-MCNC: 0.2 MG/DL — SIGNIFICANT CHANGE UP (ref 0.2–1.2)
BUN SERPL-MCNC: 2 MG/DL — LOW (ref 7–23)
CALCIUM SERPL-MCNC: 9.2 MG/DL — SIGNIFICANT CHANGE UP (ref 8.4–10.5)
CHLORIDE SERPL-SCNC: 104 MMOL/L — SIGNIFICANT CHANGE UP (ref 96–108)
CK MB CFR SERPL CALC: <1 NG/ML — SIGNIFICANT CHANGE UP (ref 0–6.7)
CK SERPL-CCNC: 54 U/L — SIGNIFICANT CHANGE UP (ref 25–170)
CO2 SERPL-SCNC: 25 MMOL/L — SIGNIFICANT CHANGE UP (ref 22–31)
CREAT SERPL-MCNC: 0.97 MG/DL — SIGNIFICANT CHANGE UP (ref 0.5–1.3)
EOSINOPHIL NFR BLD AUTO: 1.8 % — SIGNIFICANT CHANGE UP (ref 0–6)
GLUCOSE SERPL-MCNC: 94 MG/DL — SIGNIFICANT CHANGE UP (ref 70–99)
HCT VFR BLD CALC: 33.7 % — LOW (ref 34.5–45)
HGB BLD-MCNC: 11.6 G/DL — SIGNIFICANT CHANGE UP (ref 11.5–15.5)
LYMPHOCYTES # BLD AUTO: 29.8 % — SIGNIFICANT CHANGE UP (ref 13–44)
MCHC RBC-ENTMCNC: 29 PG — SIGNIFICANT CHANGE UP (ref 27–34)
MCHC RBC-ENTMCNC: 34.4 G/DL — SIGNIFICANT CHANGE UP (ref 32–36)
MCV RBC AUTO: 84.3 FL — SIGNIFICANT CHANGE UP (ref 80–100)
MONOCYTES NFR BLD AUTO: 10 % — SIGNIFICANT CHANGE UP (ref 2–14)
NEUTROPHILS NFR BLD AUTO: 58.2 % — SIGNIFICANT CHANGE UP (ref 43–77)
PLATELET # BLD AUTO: 81 K/UL — LOW (ref 150–400)
POTASSIUM SERPL-MCNC: 3.9 MMOL/L — SIGNIFICANT CHANGE UP (ref 3.5–5.3)
POTASSIUM SERPL-SCNC: 3.9 MMOL/L — SIGNIFICANT CHANGE UP (ref 3.5–5.3)
PROT SERPL-MCNC: 6.9 G/DL — SIGNIFICANT CHANGE UP (ref 6–8.3)
RBC # BLD: 4 M/UL — SIGNIFICANT CHANGE UP (ref 3.8–5.2)
RBC # FLD: 12.4 % — SIGNIFICANT CHANGE UP (ref 10.3–16.9)
SODIUM SERPL-SCNC: 140 MMOL/L — SIGNIFICANT CHANGE UP (ref 135–145)
TROPONIN T SERPL-MCNC: <0.01 NG/ML — SIGNIFICANT CHANGE UP (ref 0–0.01)
WBC # BLD: 4.9 K/UL — SIGNIFICANT CHANGE UP (ref 3.8–10.5)
WBC # FLD AUTO: 4.9 K/UL — SIGNIFICANT CHANGE UP (ref 3.8–10.5)

## 2017-12-08 PROCEDURE — 99285 EMERGENCY DEPT VISIT HI MDM: CPT | Mod: 25

## 2017-12-08 PROCEDURE — 80053 COMPREHEN METABOLIC PANEL: CPT

## 2017-12-08 PROCEDURE — 90792 PSYCH DIAG EVAL W/MED SRVCS: CPT

## 2017-12-08 PROCEDURE — 82553 CREATINE MB FRACTION: CPT

## 2017-12-08 PROCEDURE — 84484 ASSAY OF TROPONIN QUANT: CPT

## 2017-12-08 PROCEDURE — 99284 EMERGENCY DEPT VISIT MOD MDM: CPT | Mod: 25

## 2017-12-08 PROCEDURE — 93010 ELECTROCARDIOGRAM REPORT: CPT

## 2017-12-08 PROCEDURE — 36415 COLL VENOUS BLD VENIPUNCTURE: CPT

## 2017-12-08 PROCEDURE — 93005 ELECTROCARDIOGRAM TRACING: CPT

## 2017-12-08 PROCEDURE — 82550 ASSAY OF CK (CPK): CPT

## 2017-12-08 PROCEDURE — 85025 COMPLETE CBC W/AUTO DIFF WBC: CPT

## 2017-12-08 NOTE — ED BEHAVIORAL HEALTH ASSESSMENT NOTE - DIFFERENTIAL
Adjustment Disorder with Depression and Anxiety  Major Depressive Disorder, moderate  Generalized Anxiety Disorder  Personality Disorder--Cluster B traits

## 2017-12-08 NOTE — ED BEHAVIORAL HEALTH ASSESSMENT NOTE - DESCRIPTION
Calm. ITP, POTS As above. 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 Used to live alone. States that combo of losing her job, medical illness and the loss of her beloved 17-year-old cat ("My best friend") necessitated her moving home. She reports she is trying to get disability insurance.

## 2017-12-08 NOTE — ED BEHAVIORAL HEALTH ASSESSMENT NOTE - SUMMARY
41-year-old single female, unemployed, domiciled with family (including her father who is an M.D.) with hx of POTS and ITP, depression and anxiety, now coming to [facility] ED requesting to be admitted. Pt reports she called before coming to the ED, was told she could be admitted to telemetry, be started on medication for depression and anxiety, also be seen daily by clinicians from other services, including psychology.  If pt has medical issues requiring medical admit,  psychiatry will be available to provide support, including monitoring pt's response to a trial of lowantidepressant 41-year-old single female, unemployed, domiciled with family (including her father who is an M.D.) with hx of POTS and ITP, depression and anxiety, now coming to [facility] ED requesting to be admitted. Pt reports she called before coming to the ED, was told she could be admitted to telemetry, be started on medication for depression and anxiety, also be seen daily by clinicians from other services, including psychology. Pt informed by this writer that this plan is not practical. However, if pt has medical issues requiring medical admit,  psychiatry will be available to provide support, including monitoring pt's response to a trial of low-dose antidepressant venlafaxine. (Pt states this rx is recommended by hematologist, which would need to be confirmed.)    Pt with passive suicidal ideation but no intent or plan.  Pt offered inpt psychiatric admission, but she declined.  Pt does not meet criteria for involuntary inpt admit.

## 2017-12-08 NOTE — ED PROVIDER NOTE - MEDICAL DECISION MAKING DETAILS
40 yo female with severe anxiety, possible somatic dysfunction, possible POTS, who is here for worsening anxiety, chronic chest pain and other vague complaints. Recent cardiac work up neg at [facility], no indication for telemetry at this point, patient would need further outpatient cardiology follow up, will get psychiatry to evaluate her anxiety. 42 yo female with severe anxiety, possible somatic dysfunction, possible POTS, who is here for worsening anxiety, chronic chest pain and other vague complaints. Recent cardiac work up neg at [facility], no indication for telemetry at this point doubt chest pain is organic in nature, patient would need further outpatient cardiology follow up, will get psychiatry to evaluate her anxiety. 40 yo female with severe anxiety, possible somatic dysfunction, possible POTS, who is here for worsening anxiety, chronic chest pain and other vague complaints. Recent cardiac work up neg at [facility], no indication for telemetry at this point doubt chest pain is organic in nature, patient would need further outpatient cardiology follow up. Psychiatry evaluated the patient, offered impatient admission however she refused without cardiac monitoring, patient has outpatient appointment next Monday. She was advised to increase her ativan from Q5H to Q4H. She is stable to be discharged.
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
## 2017-12-08 NOTE — ED BEHAVIORAL HEALTH ASSESSMENT NOTE - AXIS IV
Occupational problems/Problems with access to healthcare services/Other psychosocial and environmental problems

## 2017-12-08 NOTE — ED BEHAVIORAL HEALTH ASSESSMENT NOTE - OTHER PAST PSYCHIATRIC HISTORY (INCLUDE DETAILS REGARDING ONSET, COURSE OF ILLNESS, INPATIENT/OUTPATIENT TREATMENT)
As above.  No prior inpt admits.  Was in the [facility] Partial Hospitalization program for anxiety (June, 2017). Per father, interviewed via the phone by Dr. Pritchard, pt did well in the program.   Pt then entered the [facility] program, followed by outpt program  Family is trying to get pt re-admitted to the partical hospitalization program.  Pt also reports that she has an initial appt with a new outpt psychiatrist on [place], Dr. Finkelstein, next week (December 14).

## 2017-12-08 NOTE — ED PROVIDER NOTE - ATTENDING CONTRIBUTION TO CARE
41 F co anxiety- w chronic chest pain for months and months- increased anxiety  no si/hi, taking ativan 2mg prn  vss  s1s2 lungs cta bl  abd soft nt nd +bs  ext no c/c/e  IMP- Anxiety 41 F co anxiety- w chronic chest pain for months and months- increased anxiety  no si/hi, taking ativan 2mg prn  vss  s1s2 lungs cta bl  abd soft nt nd +bs  ext no c/c/e  IMP- Anxiety  re cp- has extensive euceda that has been negative  hx of heavy ativan use 41 F co anxiety- w chronic chest pain for months and months- increased anxiety  no si/hi, taking ativan 2mg prn  vss  s1s2 lungs cta bl  abd soft nt nd +bs  ext no c/c/e  IMP- Anxiety  re cp- has extensive euceda that has been negative  hx of heavy ativan use  d/w father- MD- [phone]- px has PTS syndrome- but a very severe anxiety component  she herself has a masters in psychology  but is now unable to work, extremely nonfunctional due to her anxiety

## 2017-12-08 NOTE — ED BEHAVIORAL HEALTH ASSESSMENT NOTE - RISK ASSESSMENT
Pt lives with her family. Though pt endorses passive suicidal ideation, she emphasizes that she has no intent or plan.
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
## 2017-12-08 NOTE — ED PROVIDER NOTE - PROGRESS NOTE DETAILS
seen by Psych attg- was offered admission- does not want to stay- was appt On tuesday w outpx Psychiatrist. No si/hi- stable for d/c home w FU

## 2017-12-08 NOTE — ED ADULT NURSE NOTE - OTHER COMPLAINTS
reports recent admission to [facility]. hx posteral orthastatic tachycardic syndrome .Denies SI

## 2017-12-08 NOTE — ED BEHAVIORAL HEALTH ASSESSMENT NOTE - HPI (INCLUDE ILLNESS QUALITY, SEVERITY, DURATION, TIMING, CONTEXT, MODIFYING FACTORS, ASSOCIATED SIGNS AND SYMPTOMS)
41-year-old single female, unemployed, domiciled with family (including her father who is an M.D.) with hx of POTS, depression and anxiety, now coming to [facility] ED requesting to be admitted. Pt reports she was told she could be admitted to telemetry, be started on medication for depression and anxiety, also be seen daily by clinicians from other services, including psychology.  Pt endorses depressed mood, severe anxiety, especially when her HR/BP go up, states her appetite is "nonexistent" with 20 pound wt loss, reports she sleep okay except when "my heart wakes me up."  Pt with intermittent passive suicidal ideation but has no intent or plan. 41-year-old single female, unemployed, domiciled with family (including her father who is an M.D.) with hx of POTS and ITP, depression and anxiety, now coming to [facility] ED requesting to be admitted. Pt reports she called before coming to the ED, was told she could be admitted to telemetry, be started on medication for depression and anxiety, also be seen daily by clinicians from other services, including psychology.  Pt endorses depressed mood, severe anxiety, especially when her HR/BP go up, states her appetite is "nonexistent" with 20 pound wt loss, reports she sleep okay except when "my heart wakes me up."  Pt with intermittent passive suicidal ideation but has no intent or plan.

## 2017-12-08 NOTE — ED BEHAVIORAL HEALTH ASSESSMENT NOTE - OTHER
POTS dx POTS Tearful when informed that she probably will not be admitted to in medicine. reports conflicts with her mother

## 2017-12-08 NOTE — ED ADULT TRIAGE NOTE - OTHER COMPLAINTS
reports recent admission to [facility]. hx posteral orthastatic tachycardic syndrome .Denies SI

## 2017-12-08 NOTE — ED BEHAVIORAL HEALTH ASSESSMENT NOTE - MEDICATIONS (PRESCRIPTIONS, DIRECTIONS)
Pt advised she could increase her lorazepam to 2 mg po q 4 hours (from q 5 hours). Pt advised to consider low-dose venlafaxine (starting at 12.5 mg po qdaily) as well as low-dose atypical antipsychotic, i.e. seroquel (could start with 12.5 to 25 mg po qhs).

## 2017-12-08 NOTE — ED PROVIDER NOTE - PSYCHIATRIC, MLM
Alert and oriented to person, place, time/situation. normal mood and affect. no apparent risk to self or others. ++ anxiety, but nonpressured speech, no sx psychosis

## 2017-12-11 ENCOUNTER — APPOINTMENT (OUTPATIENT)
Dept: INTERNAL MEDICINE | Facility: CLINIC | Age: 42
End: 2017-12-11
Payer: COMMERCIAL

## 2017-12-11 VITALS
SYSTOLIC BLOOD PRESSURE: 100 MMHG | TEMPERATURE: 97.3 F | DIASTOLIC BLOOD PRESSURE: 70 MMHG | WEIGHT: 112 LBS | HEIGHT: 62 IN | BODY MASS INDEX: 20.61 KG/M2

## 2017-12-11 PROCEDURE — 99496 TRANSJ CARE MGMT HIGH F2F 7D: CPT

## 2017-12-13 ENCOUNTER — APPOINTMENT (OUTPATIENT)
Dept: MRI IMAGING | Facility: CLINIC | Age: 42
End: 2017-12-13

## 2017-12-13 ENCOUNTER — EMERGENCY (EMERGENCY)
Facility: HOSPITAL | Age: 42
LOS: 1 days | Discharge: HOME | End: 2017-12-13
Attending: EMERGENCY MEDICINE | Admitting: EMERGENCY MEDICINE
Payer: COMMERCIAL

## 2017-12-13 VITALS
DIASTOLIC BLOOD PRESSURE: 99 MMHG | RESPIRATION RATE: 13 BRPM | TEMPERATURE: 99 F | WEIGHT: 108.91 LBS | OXYGEN SATURATION: 100 % | HEIGHT: 62 IN | HEART RATE: 85 BPM | SYSTOLIC BLOOD PRESSURE: 132 MMHG

## 2017-12-13 VITALS
DIASTOLIC BLOOD PRESSURE: 83 MMHG | TEMPERATURE: 98 F | HEART RATE: 76 BPM | OXYGEN SATURATION: 98 % | RESPIRATION RATE: 15 BRPM | SYSTOLIC BLOOD PRESSURE: 121 MMHG

## 2017-12-13 LAB
ALBUMIN SERPL ELPH-MCNC: 4.7 G/DL — SIGNIFICANT CHANGE UP (ref 3.3–5)
ALP SERPL-CCNC: 78 U/L — SIGNIFICANT CHANGE UP (ref 30–120)
ALT FLD-CCNC: 22 U/L DA — SIGNIFICANT CHANGE UP (ref 10–60)
ANION GAP SERPL CALC-SCNC: 14 MMOL/L — SIGNIFICANT CHANGE UP (ref 5–17)
AST SERPL-CCNC: 18 U/L — SIGNIFICANT CHANGE UP (ref 10–40)
BILIRUB SERPL-MCNC: 0.4 MG/DL — SIGNIFICANT CHANGE UP (ref 0.2–1.2)
BUN SERPL-MCNC: 7 MG/DL — SIGNIFICANT CHANGE UP (ref 7–23)
CALCIUM SERPL-MCNC: 9.6 MG/DL — SIGNIFICANT CHANGE UP (ref 8.4–10.5)
CHLORIDE SERPL-SCNC: 105 MMOL/L — SIGNIFICANT CHANGE UP (ref 96–108)
CK MB CFR SERPL CALC: 0.3 NG/ML — SIGNIFICANT CHANGE UP (ref 0–3.6)
CK SERPL-CCNC: 53 U/L — SIGNIFICANT CHANGE UP (ref 26–192)
CO2 SERPL-SCNC: 21 MMOL/L — LOW (ref 22–31)
CREAT SERPL-MCNC: 0.91 MG/DL — SIGNIFICANT CHANGE UP (ref 0.5–1.3)
GLUCOSE SERPL-MCNC: 94 MG/DL — SIGNIFICANT CHANGE UP (ref 70–99)
HCG SERPL-ACNC: <1 MIU/ML — SIGNIFICANT CHANGE UP
HCT VFR BLD CALC: 39.8 % — SIGNIFICANT CHANGE UP (ref 34.5–45)
HGB BLD-MCNC: 13.4 G/DL — SIGNIFICANT CHANGE UP (ref 11.5–15.5)
MCHC RBC-ENTMCNC: 28.9 PG — SIGNIFICANT CHANGE UP (ref 27–34)
MCHC RBC-ENTMCNC: 33.7 GM/DL — SIGNIFICANT CHANGE UP (ref 32–36)
MCV RBC AUTO: 85.8 FL — SIGNIFICANT CHANGE UP (ref 80–100)
PLATELET # BLD AUTO: 90 K/UL — LOW (ref 150–400)
POTASSIUM SERPL-MCNC: 3.4 MMOL/L — LOW (ref 3.5–5.3)
POTASSIUM SERPL-SCNC: 3.4 MMOL/L — LOW (ref 3.5–5.3)
PROT SERPL-MCNC: 7.5 G/DL — SIGNIFICANT CHANGE UP (ref 6–8.3)
RBC # BLD: 4.64 M/UL — SIGNIFICANT CHANGE UP (ref 3.8–5.2)
RBC # FLD: 11.2 % — SIGNIFICANT CHANGE UP (ref 10.3–14.5)
SODIUM SERPL-SCNC: 140 MMOL/L — SIGNIFICANT CHANGE UP (ref 135–145)
TROPONIN I SERPL-MCNC: 0.01 NG/ML — LOW (ref 0.02–0.06)
WBC # BLD: 7.3 K/UL — SIGNIFICANT CHANGE UP (ref 3.8–10.5)
WBC # FLD AUTO: 7.3 K/UL — SIGNIFICANT CHANGE UP (ref 3.8–10.5)

## 2017-12-13 PROCEDURE — 93010 ELECTROCARDIOGRAM REPORT: CPT

## 2017-12-13 PROCEDURE — 99284 EMERGENCY DEPT VISIT MOD MDM: CPT

## 2017-12-13 RX ORDER — DOCUSATE SODIUM 100 MG
0 CAPSULE ORAL
Qty: 0 | Refills: 0 | COMMUNITY

## 2017-12-13 RX ORDER — MONTELUKAST 4 MG/1
0 TABLET, CHEWABLE ORAL
Qty: 0 | Refills: 0 | COMMUNITY

## 2017-12-13 RX ORDER — DILTIAZEM HCL 120 MG
240 CAPSULE, EXT RELEASE 24 HR ORAL DAILY
Qty: 0 | Refills: 0 | Status: DISCONTINUED | OUTPATIENT
Start: 2017-12-13 | End: 2017-12-17

## 2017-12-13 RX ORDER — ONDANSETRON 8 MG/1
1 TABLET, FILM COATED ORAL
Qty: 0 | Refills: 0 | COMMUNITY

## 2017-12-13 RX ORDER — PROGESTERONE 200 MG/1
0 CAPSULE, LIQUID FILLED ORAL
Qty: 0 | Refills: 0 | COMMUNITY

## 2017-12-13 RX ORDER — SODIUM CHLORIDE 9 MG/ML
1000 INJECTION INTRAMUSCULAR; INTRAVENOUS; SUBCUTANEOUS ONCE
Qty: 0 | Refills: 0 | Status: COMPLETED | OUTPATIENT
Start: 2017-12-13 | End: 2017-12-13

## 2017-12-13 RX ORDER — SIMETHICONE 80 MG/1
80 TABLET, CHEWABLE ORAL ONCE
Qty: 0 | Refills: 0 | Status: COMPLETED | OUTPATIENT
Start: 2017-12-13 | End: 2017-12-13

## 2017-12-13 RX ORDER — FAMOTIDINE 10 MG/ML
0 INJECTION INTRAVENOUS
Qty: 0 | Refills: 0 | COMMUNITY

## 2017-12-13 RX ORDER — ONDANSETRON 8 MG/1
0 TABLET, FILM COATED ORAL
Qty: 0 | Refills: 0 | COMMUNITY

## 2017-12-13 RX ORDER — SIMETHICONE 80 MG/1
0 TABLET, CHEWABLE ORAL
Qty: 0 | Refills: 0 | COMMUNITY

## 2017-12-13 RX ADMIN — SODIUM CHLORIDE 500 MILLILITER(S): 9 INJECTION INTRAMUSCULAR; INTRAVENOUS; SUBCUTANEOUS at 20:22

## 2017-12-13 RX ADMIN — Medication 2 MILLIGRAM(S): at 21:04

## 2017-12-13 RX ADMIN — Medication 240 MILLIGRAM(S): at 20:46

## 2017-12-13 NOTE — ED PROVIDER NOTE - CPE EDP EYES NORM
Neck Sprain or Strain  A sudden force that causes turning or bending of the neck can cause sprain or strain. An example would be the force from a car accident. This can stretch or tear muscles called a strain. It can also stretch or tear ligaments called a sprain. Either of these can cause neck pain. Sometimes neck pain occurs after a simple awkward movement. In either case, muscle spasm is commonly present and contributes to the pain.    Unless you had a forceful physical injury (for example, a car accident or fall), X-rays are usually not ordered for the initial evaluation of neck pain. If pain continues and dose not respond to medical treatment, X-rays and other tests may be performed at a later time.  Home care  · You may feel more soreness and spasm the first few days after the injury. Rest until symptoms begin to improve.  · When lying down, use a comfortable pillow or a rolled towel that supports the head and keeps the spine in a neutral position. The position of the head should not be tilted forward or backward.  · Apply an ice pack over the injured area for 15 to 20 minutes every 3 to 6 hours. You should do this for the first 24 to 48 hours. You can make an ice pack by filling a plastic bag that seals at the top with ice cubes and then wrapping it with a thin towel. After 48 hours, apply heat (warm shower or warm bath) for 15 to 20 minutes several times a day, or alternate ice and heat.  · You may use over-the-counter pain medicine to control pain, unless another pain medicine was prescribed. If you have chronic liver or kidney disease or ever had a stomach ulcer or GI bleeding, talk with your healthcare provider before using these medicines.  · If a soft cervical collar was prescribed, it should be worn only for periods of increased pain. It should not be worn for more than 3 hours a day, or for a period longer than 1 to 2 weeks.  Follow-up care  Follow up with your healthcare provider as directed.  Physical therapy may be needed.  Sometimes fractures don’t show up on the first X-ray. Bruises and sprains can sometimes hurt as much as a fracture. These injuries can take time to heal completely. If your symptoms don’t improve or they get worse, talk with your healthcare provider. You may need a repeat X-ray or other tests. If X-rays were taken, you will be told of any new findings that may affect your care.  Call 911  Call 911 if you have:   · Neck swelling, difficulty or painful swallowing  · Difficulty breathing  · Chest pain  When to seek medical advice  Call your healthcare provider right away if any of these occur:  · Pain becomes worse or spreads into your arms  · Weakness or numbness in one or both arms  © 0231-9602 [employer]. [address] [place] [address], [place] [address]. All rights reserved. This information is not intended as a substitute for professional medical care. Always follow your healthcare professional's instructions.         normal...

## 2017-12-13 NOTE — ED PROVIDER NOTE - EYES NEGATIVE STATEMENT, MLM
Spoke with pt after 2 identifiers being noted and pt advised that he has been having sweating episodes, he is unsure why. His blood sugars have all been ranging from . Pt just wants to see MD for reassurance. Appt made for pt on Friday, July 28, 2017 01:00 PM  With Dr. Chanelle Shaw.  Pt had no further questions at this time no discharge, no irritation, no pain, no redness, and no visual changes.

## 2017-12-13 NOTE — ED PROVIDER NOTE - PMH
Anxiety    Anxiety    Headache    History of ITP    HTN (hypertension)    IBS (irritable bowel syndrome)    Idiopathic thrombocytopenia purpura    Labyrinthitis    POTS (postural orthostatic tachycardia syndrome)    Pott disease

## 2017-12-13 NOTE — ED PROVIDER NOTE - PROGRESS NOTE DETAILS
pt is stable.  Discussed results with the patient.  PT has an appt with GI dr tomorrow.  Will give copies of lab work. Fluids and rest

## 2017-12-13 NOTE — ED ADULT NURSE NOTE - OBJECTIVE STATEMENT
Pt presents to the ED from her psychologists office with reports of a fluctuating heart rate 70's-110. Pt also states she was nauseous and took zofran and began flushing. Pt is now awake, alert and conversant in full sentences with clear speech. Pt denies any chest pain but reports abdominal discomfort and gas pains. Pt appears anxious and is tearful.

## 2017-12-13 NOTE — ED PROVIDER NOTE - FAMILY HISTORY
Family history of prostate cancer in father     Family history of atrial fibrillation     No pertinent family history in first degree relatives     Grandparent  Still living? Unknown  Family history of hypertension, Age at diagnosis: Age Unknown

## 2017-12-13 NOTE — ED PROVIDER NOTE - OBJECTIVE STATEMENT
41 year old female c/o rapid heart beat 1 hour ago.  Pt states she has been taking Zofran every 4 hours for the past several weeks due to nausea.  Pt was at her Psychologist when she took Zofran and felt like she developed palpitations.  She was flushed, disoriented and states she feels this intermittent sharp chest pain.  PT was recently seen at [facility] where cardiac enzymes were done and was negative.  She had a stress test in June 2017 that was normal.  Pt states EMT told her she had episodes of SVT which she states she has never had before.  Denies URI symptoms, dizziness, vomiting, diarrhea, or SOB.

## 2017-12-13 NOTE — ED PROVIDER NOTE - ATTENDING CONTRIBUTION TO CARE
Rodríguez Leahy MD: I have personally performed a face to face diagnostic evaluation on this patient.  I have reviewed the PA note and agree with the history, exam, and plan of care, except as noted.  History and Exam by me shows same findings as documented    Patient well known to ED with frequent visits for palpitations, nausea and other symptoms. Exam now normal, patient in NSR. Agree with plan

## 2017-12-14 PROCEDURE — 93005 ELECTROCARDIOGRAM TRACING: CPT

## 2017-12-14 PROCEDURE — 84484 ASSAY OF TROPONIN QUANT: CPT

## 2017-12-14 PROCEDURE — 82553 CREATINE MB FRACTION: CPT

## 2017-12-14 PROCEDURE — 80053 COMPREHEN METABOLIC PANEL: CPT

## 2017-12-14 PROCEDURE — 84702 CHORIONIC GONADOTROPIN TEST: CPT

## 2017-12-14 PROCEDURE — 85027 COMPLETE CBC AUTOMATED: CPT

## 2017-12-14 PROCEDURE — 82550 ASSAY OF CK (CPK): CPT

## 2017-12-14 PROCEDURE — 99284 EMERGENCY DEPT VISIT MOD MDM: CPT | Mod: 25

## 2017-12-14 RX ADMIN — SIMETHICONE 80 MILLIGRAM(S): 80 TABLET, CHEWABLE ORAL at 00:14

## 2017-12-15 ENCOUNTER — EMERGENCY (EMERGENCY)
Facility: HOSPITAL | Age: 42
LOS: 1 days | Discharge: HOME | End: 2017-12-15
Admitting: EMERGENCY MEDICINE

## 2017-12-15 ENCOUNTER — EMERGENCY (EMERGENCY)
Facility: HOSPITAL | Age: 42
LOS: 1 days | Discharge: HOME | End: 2017-12-15
Attending: EMERGENCY MEDICINE | Admitting: EMERGENCY MEDICINE
Payer: COMMERCIAL

## 2017-12-15 ENCOUNTER — CHART COPY (OUTPATIENT)
Age: 42
End: 2017-12-15

## 2017-12-15 VITALS
HEIGHT: 62 IN | DIASTOLIC BLOOD PRESSURE: 87 MMHG | HEART RATE: 75 BPM | RESPIRATION RATE: 14 BRPM | TEMPERATURE: 99 F | WEIGHT: 106.04 LBS | SYSTOLIC BLOOD PRESSURE: 133 MMHG | OXYGEN SATURATION: 100 %

## 2017-12-15 VITALS
HEART RATE: 84 BPM | DIASTOLIC BLOOD PRESSURE: 106 MMHG | SYSTOLIC BLOOD PRESSURE: 139 MMHG | RESPIRATION RATE: 20 BRPM | OXYGEN SATURATION: 100 %

## 2017-12-15 VITALS
DIASTOLIC BLOOD PRESSURE: 101 MMHG | HEART RATE: 84 BPM | OXYGEN SATURATION: 100 % | WEIGHT: 104.94 LBS | TEMPERATURE: 98 F | RESPIRATION RATE: 18 BRPM | SYSTOLIC BLOOD PRESSURE: 153 MMHG

## 2017-12-15 LAB — HCG UR QL: NEGATIVE — SIGNIFICANT CHANGE UP

## 2017-12-15 PROCEDURE — 71010: CPT | Mod: 26

## 2017-12-15 PROCEDURE — 71045 X-RAY EXAM CHEST 1 VIEW: CPT

## 2017-12-15 PROCEDURE — 99284 EMERGENCY DEPT VISIT MOD MDM: CPT | Mod: 25

## 2017-12-15 PROCEDURE — 81025 URINE PREGNANCY TEST: CPT

## 2017-12-15 PROCEDURE — 93005 ELECTROCARDIOGRAM TRACING: CPT

## 2017-12-15 PROCEDURE — 99283 EMERGENCY DEPT VISIT LOW MDM: CPT | Mod: 25

## 2017-12-15 PROCEDURE — 93010 ELECTROCARDIOGRAM REPORT: CPT

## 2017-12-15 NOTE — ED PROVIDER NOTE - PHYSICAL EXAMINATION
*GEN:   uncomfortable, very anxious, pressured speech, emotionally labile, AOx3    ///    *EYES:   pupils equally round and reactive to light, extra-occular movements intact    ///    *HEENT:   airway patent, moist mucosal membranes    ///    *CV:   regular rate and rhythm, normal S1/S2    ///    *RESP:   clear to auscultation bilaterally, non-labored    ///    *ABD:   soft, minimally tender LLQ    ///    *:   no pelvic/cva/flank tenderness    ///    *SKIN:   dry, intact    ///    *NEURO:   AOx3

## 2017-12-15 NOTE — ED ADULT NURSE NOTE - EXPLANATION OF PATIENT'S REASON FOR LEAVING
[place] ED is unable to provide the medical care that she requires because of a rare medical condition that she has and also that all her medical doctors including her cardiologist is at [facility]

## 2017-12-15 NOTE — ED PROVIDER NOTE - CARE PLAN
Principal Discharge DX:	Chronic chest pain  Secondary Diagnosis:	Chronic abdominal pain  Secondary Diagnosis:	Anxiety about health

## 2017-12-15 NOTE — ED PROVIDER NOTE - OBJECTIVE STATEMENT
42 yo F w/ pmh HTN, ITP, IBS, anxiety (ativan), fibroids multiple admissions for anxiety/tachy at [facility] p/w     Last admitted 12/4-5/17 for chest pain 40 yo F w/ pmh HTN, ITP, IBS, anxiety (ativan), fibroids (progesterone) multiple admissions for anxiety/tachy at [facility] p/w     Last admitted 12/4-5/17 for chest pain. Was last seen at [facility] 2 days ago for tachycardia, nausea; was scheduled for outpt GI the next day, outpt zofran. 40 yo F w/ pmh HTN, ITP, IBS, anxiety (ativan), fibroids (progesterone) multiple admissions for anxiety/tachy at [facility] p/w multiple complaints, most concerned about intermittent pressure-like chest pain x 1 wk, assc w/ 'rabies-like foaming' of mouth, chronic unchanged SOB and nausea, and new HA: bilat forehead pressure that began yesterday, worsening, has not had before. Pt reports cp is same as from when she was seen in [facility] 12/13. Pt also reports intermittent LLQ abd pain that has been associated w/ nausea x several wks, saw a gastroenterologist yesterday who referred her to a motility clinic for her nausea.    Last admitted 12/4-5/17 for chest pain. Was last seen at [facility] 2 days ago for tachycardia, nausea; was scheduled for outpt GI the next day, outpt zofran.    PCP: Lorena Ford [phone]  Cards: Philip Hussein 42 yo F w/ pmh HTN, ITP, IBS, anxiety (ativan), fibroids (progesterone) multiple admissions for anxiety/tachy at [facility] p/w multiple complaints, most concerned about intermittent pressure-like chest pain x 1 wk, assc w/ 'rabies-like foaming' of mouth, chronic unchanged SOB and nausea, and new HA: bilat forehead pressure that began yesterday, worsening, has not had before. Pt reports cp is same as from when she was seen in [facility] 12/13. Pt also reports intermittent LLQ abd pain that has been associated w/ nausea x several wks, saw a gastroenterologist yesterday who referred her to a motility clinic for her nausea.    Last admitted 12/4-5/17 for chest pain. Was last seen at [facility] 2 days ago for tachycardia, nausea; was scheduled for outpt GI the next day, outpt zofran.    PCP: Lorena Ford [phone]  Cards: Philip Reed

## 2017-12-15 NOTE — ED PROVIDER NOTE - ATTENDING CONTRIBUTION TO CARE
------------ATTENDING NOTE------------   42 yo F c/o continued daily chronic vague chest pains (mild central aches, no modify factors), intermittent vague diffuse abdominal discomfort, associated nausea and small amts nbnb vomiting at times, feels she is "foaming at mouth like rabies" when describing nausea feeling, daily palpitations (described as heart rate slowing and speeding up gradually), constantly wears her own pulse ox and feels her saturations drop to mid 90's% on RA, HD stable on arrival, nml VS, EKG wnl, multiple ED visits for same complaints in past month, very low suspicion for more serious/underlying acute process at this time, complicated by anxiety, d/w pt's outpt team and father, in depth dw all about ddx, tx, shala euceda.  - Rolando Willson MD   --------------------------------------------------------------------------

## 2017-12-15 NOTE — ED ADULT NURSE NOTE - OBJECTIVE STATEMENT
Pt presents with multiple medical complaints including "white foaming at the mouth which causes my heart rate to shoot up to 120," sharp chest pain in the center of her chest, headache, nausea without vomiting, L sided abdominal pain.

## 2017-12-15 NOTE — ED ADULT TRIAGE NOTE - CHIEF COMPLAINT QUOTE
EMT's states" an episode of fast heart rate" EMT's states" an episode of fast heart rate" Pt denies chest pain, SOB.

## 2017-12-15 NOTE — ED PROVIDER NOTE - MEDICAL DECISION MAKING DETAILS
42 yo F w/ multiple complaints, no new changes, most concerned about chest pain, CXR/EKG d/w pcp and pt's cards

## 2017-12-15 NOTE — ED PROVIDER NOTE - PROGRESS NOTE DETAILS
Dr. Rod Wright PGY1: d/w covering doc for pt's pcp, reported pt has had multiple complaints about chest pain in the past, workup has been negative; stress test 11/17/2017 by pulmonologist was negative; has good follow-up, admits to hospitalist Dr. Rod Wright PGY1: paged pt's cardiologist Dr. Reed Dr. Rod Wright PGY1: called pt's cardiologist Dr. Reed, no callback service reached; left a message Dr. Rod Wright PGY1: called cardiologist again, no reply Dr. Rod Wright PGY1: pt stable, cxr results normal, ok for dc w/ close outpatient follow-up, discussed w/ patient f/u and return instructions

## 2017-12-15 NOTE — ED ADULT NURSE NOTE - OBJECTIVE STATEMENT
Pt BIBA with the c/o rapid heart today. Attempted to do detailed assessment on pt. Pt decline and stated that she didn't want to come to [facility] because [facility] is unable to provide the medical care that she needs. Pt states that her cardiologist and all her other medical doctors are in [facility] and that's where she needed to be. Patient educated and verbalized understanding that leaving without been seen and evaluated by a ER MD can result in death.  Pt verbalized understanding and declined stating that her mother is on her way to pick her up and she is going to [facility].  no sign of distress noted.

## 2017-12-16 ENCOUNTER — INPATIENT (INPATIENT)
Facility: HOSPITAL | Age: 42
LOS: 9 days | Discharge: HOME | DRG: 391 | End: 2017-12-26
Attending: INTERNAL MEDICINE | Admitting: INTERNAL MEDICINE
Payer: COMMERCIAL

## 2017-12-16 VITALS
TEMPERATURE: 98 F | OXYGEN SATURATION: 97 % | HEART RATE: 107 BPM | SYSTOLIC BLOOD PRESSURE: 146 MMHG | RESPIRATION RATE: 20 BRPM | DIASTOLIC BLOOD PRESSURE: 98 MMHG

## 2017-12-16 LAB
ALBUMIN SERPL ELPH-MCNC: 4.9 G/DL — SIGNIFICANT CHANGE UP (ref 3.3–5)
ALP SERPL-CCNC: 87 U/L — SIGNIFICANT CHANGE UP (ref 40–120)
ALT FLD-CCNC: 12 U/L RC — SIGNIFICANT CHANGE UP (ref 10–45)
ANION GAP SERPL CALC-SCNC: 15 MMOL/L — SIGNIFICANT CHANGE UP (ref 5–17)
APPEARANCE UR: CLEAR — SIGNIFICANT CHANGE UP
AST SERPL-CCNC: 19 U/L — SIGNIFICANT CHANGE UP (ref 10–40)
BACTERIA # UR AUTO: ABNORMAL /HPF
BASOPHILS # BLD AUTO: 0 K/UL — SIGNIFICANT CHANGE UP (ref 0–0.2)
BASOPHILS NFR BLD AUTO: 0.3 % — SIGNIFICANT CHANGE UP (ref 0–2)
BILIRUB SERPL-MCNC: 0.3 MG/DL — SIGNIFICANT CHANGE UP (ref 0.2–1.2)
BILIRUB UR-MCNC: NEGATIVE — SIGNIFICANT CHANGE UP
BUN SERPL-MCNC: <4 MG/DL — LOW (ref 7–23)
CALCIUM SERPL-MCNC: 9.9 MG/DL — SIGNIFICANT CHANGE UP (ref 8.4–10.5)
CHLORIDE SERPL-SCNC: 102 MMOL/L — SIGNIFICANT CHANGE UP (ref 96–108)
CO2 SERPL-SCNC: 24 MMOL/L — SIGNIFICANT CHANGE UP (ref 22–31)
COLOR SPEC: COLORLESS — SIGNIFICANT CHANGE UP
CREAT SERPL-MCNC: 0.86 MG/DL — SIGNIFICANT CHANGE UP (ref 0.5–1.3)
DIFF PNL FLD: ABNORMAL
EOSINOPHIL # BLD AUTO: 0.1 K/UL — SIGNIFICANT CHANGE UP (ref 0–0.5)
EOSINOPHIL NFR BLD AUTO: 1.2 % — SIGNIFICANT CHANGE UP (ref 0–6)
GLUCOSE SERPL-MCNC: 104 MG/DL — HIGH (ref 70–99)
GLUCOSE UR QL: NEGATIVE — SIGNIFICANT CHANGE UP
HCT VFR BLD CALC: 41.2 % — SIGNIFICANT CHANGE UP (ref 34.5–45)
HGB BLD-MCNC: 14.4 G/DL — SIGNIFICANT CHANGE UP (ref 11.5–15.5)
KETONES UR-MCNC: NEGATIVE — SIGNIFICANT CHANGE UP
LEUKOCYTE ESTERASE UR-ACNC: NEGATIVE — SIGNIFICANT CHANGE UP
LYMPHOCYTES # BLD AUTO: 2.2 K/UL — SIGNIFICANT CHANGE UP (ref 1–3.3)
LYMPHOCYTES # BLD AUTO: 32.6 % — SIGNIFICANT CHANGE UP (ref 13–44)
MAGNESIUM SERPL-MCNC: 2.1 MG/DL — SIGNIFICANT CHANGE UP (ref 1.6–2.6)
MCHC RBC-ENTMCNC: 30.4 PG — SIGNIFICANT CHANGE UP (ref 27–34)
MCHC RBC-ENTMCNC: 34.9 GM/DL — SIGNIFICANT CHANGE UP (ref 32–36)
MCV RBC AUTO: 87.2 FL — SIGNIFICANT CHANGE UP (ref 80–100)
MONOCYTES # BLD AUTO: 0.5 K/UL — SIGNIFICANT CHANGE UP (ref 0–0.9)
MONOCYTES NFR BLD AUTO: 7.5 % — SIGNIFICANT CHANGE UP (ref 2–14)
NEUTROPHILS # BLD AUTO: 4 K/UL — SIGNIFICANT CHANGE UP (ref 1.8–7.4)
NEUTROPHILS NFR BLD AUTO: 58.4 % — SIGNIFICANT CHANGE UP (ref 43–77)
NITRITE UR-MCNC: NEGATIVE — SIGNIFICANT CHANGE UP
PH UR: 7 — SIGNIFICANT CHANGE UP (ref 5–8)
PHOSPHATE SERPL-MCNC: 3.8 MG/DL — SIGNIFICANT CHANGE UP (ref 2.5–4.5)
PLATELET # BLD AUTO: 77 K/UL — LOW (ref 150–400)
POTASSIUM SERPL-MCNC: 3.3 MMOL/L — LOW (ref 3.5–5.3)
POTASSIUM SERPL-SCNC: 3.3 MMOL/L — LOW (ref 3.5–5.3)
PROT SERPL-MCNC: 8 G/DL — SIGNIFICANT CHANGE UP (ref 6–8.3)
PROT UR-MCNC: NEGATIVE — SIGNIFICANT CHANGE UP
RBC # BLD: 4.73 M/UL — SIGNIFICANT CHANGE UP (ref 3.8–5.2)
RBC # FLD: 11.5 % — SIGNIFICANT CHANGE UP (ref 10.3–14.5)
RBC CASTS # UR COMP ASSIST: SIGNIFICANT CHANGE UP /HPF (ref 0–2)
SODIUM SERPL-SCNC: 141 MMOL/L — SIGNIFICANT CHANGE UP (ref 135–145)
SP GR SPEC: <1.005 — LOW (ref 1.01–1.02)
UROBILINOGEN FLD QL: NEGATIVE — SIGNIFICANT CHANGE UP
WBC # BLD: 6.9 K/UL — SIGNIFICANT CHANGE UP (ref 3.8–10.5)
WBC # FLD AUTO: 6.9 K/UL — SIGNIFICANT CHANGE UP (ref 3.8–10.5)
WBC UR QL: SIGNIFICANT CHANGE UP /HPF (ref 0–5)

## 2017-12-16 PROCEDURE — 99285 EMERGENCY DEPT VISIT HI MDM: CPT

## 2017-12-16 RX ORDER — ACETAMINOPHEN 500 MG
1000 TABLET ORAL ONCE
Qty: 0 | Refills: 0 | Status: COMPLETED | OUTPATIENT
Start: 2017-12-16 | End: 2017-12-17

## 2017-12-16 RX ORDER — POTASSIUM CHLORIDE 20 MEQ
40 PACKET (EA) ORAL EVERY 4 HOURS
Qty: 0 | Refills: 0 | Status: COMPLETED | OUTPATIENT
Start: 2017-12-16 | End: 2017-12-17

## 2017-12-16 RX ORDER — SODIUM CHLORIDE 9 MG/ML
2000 INJECTION INTRAMUSCULAR; INTRAVENOUS; SUBCUTANEOUS ONCE
Qty: 0 | Refills: 0 | Status: COMPLETED | OUTPATIENT
Start: 2017-12-16 | End: 2017-12-16

## 2017-12-16 NOTE — ED ADULT NURSE NOTE - OBJECTIVE STATEMENT
40 yo female A&OX3 presents to the ED with the c/o vaginal bleed. Pt states that she has been bleeding since 6pm, + abd pain, no n/v/d.  Abd round, soft + tenderness to touch. Pt states that she has abd cramping, no fevers or chills. Pt states that last period was last week. According to patient she has hx of POTS, "unknown cardiac" issues. MAEX4

## 2017-12-16 NOTE — ED PROVIDER NOTE - PROGRESS NOTE DETAILS
Skip:  Received sign out on patient.  Pt states she has severe lower abdominal pain and was told she would get a CT scan if US did not show cause for pain.  Abdomen is soft and mild tenderness in the lower abdomen.  I do not think CT is indicated as pt clinically does not have an inflammatory process and I told her this.  She insists on CT scan.  Will order CT.  Pt also extremely anxious and asking for cardiac monitor.  Again, I think pt is extremely anxious and I have low suspicion for arrhythmia.  Will get CT abd/pelvis and likely dc if and when study is negative. Samuel: Pt's informed of the CT result. Surgery is called. Attn - d/w patient and pt father Dr. Jb Presley - pt evaluated by Chief Surgery resident - considering admission for serial exams.  He will contact Dr. Holden and make decision - pt preference to be admitted rather than d/c.   Pt endorsed to Dr. Herrera. Attending MD Herrera: admitted to Dr. Holden for serial abdominal exams

## 2017-12-16 NOTE — ED PROVIDER NOTE - NS ED ROS FT
ROS  Gen:  no fevers, no chills, + weight loss, + lightheadedness  HEENT: no vision changes, no hearing loss, no pharyngitis, no oral lesions  Pulm: no cough, + SOB, no sputum production, no wheezing  Cardiac: no chest pain, no SOB, no orthopnea  GI: + abdominal pain, + N, no V, no diarrhea, no constipation  : no dysuria  Skin: no skin lesions  Neuro:  + headache, occasional episodes of numbness of L foot and face  Gyn: vaginal bleeding x few hours

## 2017-12-16 NOTE — ED PROVIDER NOTE - OBJECTIVE STATEMENT
Pt is a 41yoF with PMH of ITP, htn, IBS, POTS disease ,who presents to ED with complaint of vaginal bleeding and abdominal pain. Pt describes abdominal pain as 8/10 in severity, sharp, waxing and waning and diffuse, associated with persistent nausea. She denied any acute trauma to the abdomen. She also endorses constipation and increased abdominal sounds. She complains of new onset vaginal bleeding today. Last menstrual period was 2 weeks ago. Pt has regular monthly periods. She has not been sexually active. She also note tachycardia which has been worsened recently, and lightheadedness.

## 2017-12-16 NOTE — ED PROVIDER NOTE - ATTENDING CONTRIBUTION TO CARE
41F hx rock disease itp htn anxiety presents to the ED with vaginal bleeding. Bleeding started today. heavy. lmp last week. not sexually active. assocaited with suprapubic pain mild constant non-radiating. Pt complains of palpitations as well. no fevers. no vaginal discharge. Exam with anxious patient tachycardic clear lungs abd soft mild suprapubic ttp no rebound or guarding. Will obtain labs ua hcg tvus and reassess.     Constitutional: No fever or chills  Eyes: No visual changes  HEENT: No throat pain  CV: no chest pain + palpitations  Resp: No SOB no cough  GI: lower abd pain no nausea or vomiting  : No dysuria + vaginal bleeding  MSK: No musculoskeletal pain  Skin: No rash  Neuro: No headache     Constitutional: mild distress AAOx3  Eyes: PERRLA EOMI  Head: Normocephalic atraumatic  Mouth: MMM  Cardiac: tachycardic  Resp: Lungs CTAB  GI: Abd soft mild ttp in suprapubic region no rebound or guarding no cvat no ttp in ruq or rlq  Neuro: CN2-12 intact  Skin: No rashes   pych: anxious  Rolando Atkinson M.D., Attending Physician

## 2017-12-16 NOTE — ED PROVIDER NOTE - PHYSICAL EXAMINATION
Gen: NAD, A&O x 3, lying in bed conversant  HEENT: PERRL, EOMI, mucous membranes moist, no oropharyngeal exudates  Neck:  no cervical lymphadenopathy  Pulmonary: CTAB, no rhonci, wheezes or rales  Cardiac: regular rate and rhythm, normal S1S2, no r/m/g  GI:  soft, diffusely tender to palpation, nondistended, no palpable masses  Extremities: warm, well perfused, no peripheral edema  Skin: skin intact, no skin tears or rashes or other lesions  Neuro: A&O x3, CN II-XII intact, 5/5 BUE and BLE strength, full sensation to light touch  Gyn: pelvic exam performed- normal external genitalia without ulcerations, lesions or erythema, cervical os closed, no pus, blood collected at vagina

## 2017-12-17 DIAGNOSIS — R10.9 UNSPECIFIED ABDOMINAL PAIN: ICD-10-CM

## 2017-12-17 DIAGNOSIS — R10.31 RIGHT LOWER QUADRANT PAIN: ICD-10-CM

## 2017-12-17 PROCEDURE — 76830 TRANSVAGINAL US NON-OB: CPT | Mod: 26

## 2017-12-17 PROCEDURE — 93975 VASCULAR STUDY: CPT | Mod: 26

## 2017-12-17 PROCEDURE — 99223 1ST HOSP IP/OBS HIGH 75: CPT

## 2017-12-17 PROCEDURE — 74177 CT ABD & PELVIS W/CONTRAST: CPT | Mod: 26

## 2017-12-17 RX ORDER — DILTIAZEM HCL 120 MG
240 CAPSULE, EXT RELEASE 24 HR ORAL
Qty: 0 | Refills: 0 | Status: DISCONTINUED | OUTPATIENT
Start: 2017-12-17 | End: 2017-12-22

## 2017-12-17 RX ORDER — POTASSIUM CHLORIDE 20 MEQ
20 PACKET (EA) ORAL ONCE
Qty: 0 | Refills: 0 | Status: COMPLETED | OUTPATIENT
Start: 2017-12-17 | End: 2017-12-17

## 2017-12-17 RX ORDER — DOCUSATE SODIUM 100 MG
100 CAPSULE ORAL THREE TIMES A DAY
Qty: 0 | Refills: 0 | Status: DISCONTINUED | OUTPATIENT
Start: 2017-12-17 | End: 2017-12-26

## 2017-12-17 RX ORDER — ONDANSETRON 8 MG/1
4 TABLET, FILM COATED ORAL EVERY 6 HOURS
Qty: 0 | Refills: 0 | Status: DISCONTINUED | OUTPATIENT
Start: 2017-12-17 | End: 2017-12-19

## 2017-12-17 RX ORDER — SODIUM CHLORIDE 9 MG/ML
1000 INJECTION, SOLUTION INTRAVENOUS
Qty: 0 | Refills: 0 | Status: DISCONTINUED | OUTPATIENT
Start: 2017-12-17 | End: 2017-12-18

## 2017-12-17 RX ORDER — HEPARIN SODIUM 5000 [USP'U]/ML
5000 INJECTION INTRAVENOUS; SUBCUTANEOUS EVERY 8 HOURS
Qty: 0 | Refills: 0 | Status: DISCONTINUED | OUTPATIENT
Start: 2017-12-17 | End: 2017-12-17

## 2017-12-17 RX ORDER — SODIUM CHLORIDE 9 MG/ML
1000 INJECTION INTRAMUSCULAR; INTRAVENOUS; SUBCUTANEOUS
Qty: 0 | Refills: 0 | Status: DISCONTINUED | OUTPATIENT
Start: 2017-12-17 | End: 2017-12-24

## 2017-12-17 RX ORDER — ACETAMINOPHEN 500 MG
650 TABLET ORAL EVERY 6 HOURS
Qty: 0 | Refills: 0 | Status: DISCONTINUED | OUTPATIENT
Start: 2017-12-17 | End: 2017-12-26

## 2017-12-17 RX ORDER — FAMOTIDINE 10 MG/ML
20 INJECTION INTRAVENOUS
Qty: 0 | Refills: 0 | Status: DISCONTINUED | OUTPATIENT
Start: 2017-12-17 | End: 2017-12-26

## 2017-12-17 RX ORDER — MONTELUKAST 4 MG/1
10 TABLET, CHEWABLE ORAL AT BEDTIME
Qty: 0 | Refills: 0 | Status: DISCONTINUED | OUTPATIENT
Start: 2017-12-17 | End: 2017-12-26

## 2017-12-17 RX ORDER — SIMETHICONE 80 MG/1
160 TABLET, CHEWABLE ORAL THREE TIMES A DAY
Qty: 0 | Refills: 0 | Status: DISCONTINUED | OUTPATIENT
Start: 2017-12-17 | End: 2017-12-26

## 2017-12-17 RX ORDER — FAMOTIDINE 10 MG/ML
20 INJECTION INTRAVENOUS ONCE
Qty: 0 | Refills: 0 | Status: COMPLETED | OUTPATIENT
Start: 2017-12-17 | End: 2017-12-17

## 2017-12-17 RX ORDER — ONDANSETRON 8 MG/1
4 TABLET, FILM COATED ORAL ONCE
Qty: 0 | Refills: 0 | Status: COMPLETED | OUTPATIENT
Start: 2017-12-17 | End: 2017-12-17

## 2017-12-17 RX ADMIN — Medication 2.5 MILLIGRAM(S): at 23:32

## 2017-12-17 RX ADMIN — ONDANSETRON 4 MILLIGRAM(S): 8 TABLET, FILM COATED ORAL at 06:04

## 2017-12-17 RX ADMIN — Medication 1000 MILLIGRAM(S): at 02:21

## 2017-12-17 RX ADMIN — SODIUM CHLORIDE 125 MILLILITER(S): 9 INJECTION INTRAMUSCULAR; INTRAVENOUS; SUBCUTANEOUS at 19:02

## 2017-12-17 RX ADMIN — Medication 400 MILLIGRAM(S): at 00:22

## 2017-12-17 RX ADMIN — FAMOTIDINE 20 MILLIGRAM(S): 10 INJECTION INTRAVENOUS at 14:39

## 2017-12-17 RX ADMIN — SODIUM CHLORIDE 2000 MILLILITER(S): 9 INJECTION INTRAMUSCULAR; INTRAVENOUS; SUBCUTANEOUS at 00:22

## 2017-12-17 RX ADMIN — SODIUM CHLORIDE 125 MILLILITER(S): 9 INJECTION INTRAMUSCULAR; INTRAVENOUS; SUBCUTANEOUS at 16:43

## 2017-12-17 NOTE — H&P ADULT - NSHPPHYSICALEXAM_GEN_ALL_CORE
VITALS & I/Os:  Vital Signs Last 24 Hrs  T(C): 36.7 (17 Dec 2017 11:29), Max: 36.7 (17 Dec 2017 02:25)  T(F): 98.1 (17 Dec 2017 11:29), Max: 98.1 (17 Dec 2017 11:29)  HR: 72 (17 Dec 2017 11:29) (60 - 107)  BP: 134/81 (17 Dec 2017 11:29) (116/79 - 146/98)  BP(mean): --  RR: 18 (17 Dec 2017 11:29) (17 - 20)  SpO2: 100% (17 Dec 2017 11:29) (97% - 100%)  CAPILLARY BLOOD GLUCOSE        GEN: NAD, alert and oriented x 3  HEENT: WNL  CHEST: Symmetrical chest rise, breath sounds CTAB  HEART: RRR, non-muffled heart sounds  ABD: Soft, non-distended. Patient initially reacted with palpation of the RLQ, but with distracting conversation no tenderness elicited by deep palpation - although patient reported that it was uncomfortable. No rebound, no guarding.  EXT/VASC: No edema/erythema or deformity. Warm, cap refill < 2 sec. Motor and sensory function intact.

## 2017-12-17 NOTE — H&P ADULT - ASSESSMENT
ASSESSMENT & PLAN  41y female with abdominal pain, clinical history and findings largely inconsistent with acute appendicitis, with questionable appendiceal inflammation on CT.

## 2017-12-17 NOTE — H&P ADULT - PMH
Anxiety    Anxiety    Headache    History of ITP    HTN (hypertension)    IBS (irritable bowel syndrome)    Idiopathic thrombocytopenia purpura    Labyrinthitis    POTS (postural orthostatic tachycardia syndrome)    Pott disease Anxiety    Anxiety    Headache    History of ITP    HTN (hypertension)    IBS (irritable bowel syndrome)    Idiopathic thrombocytopenia purpura    Labyrinthitis    POTS (postural orthostatic tachycardia syndrome)

## 2017-12-17 NOTE — ED ADULT NURSE REASSESSMENT NOTE - NS ED NURSE REASSESS COMMENT FT1
At 0535, spoke with pt. concerning her disposition to speak with a doctor about medications that she needs. Pt informed that I will let Dr. Valdivia, ED attending, know. Dr. Valdivia aware that pt wants to discuss her disposition.

## 2017-12-17 NOTE — H&P ADULT - HISTORY OF PRESENT ILLNESS
HPI:  Patient is a 41y Female, h/o fibroid uterus/Pott's disease/POTS - autonomic dysregulation/anxiety/IBS, complaining of acute onset abdominal pain which woke her from sleep 2 nights prior (11/15). She describes the pain as 7/10 in intensity, constant in periodicity, alleviated somewhat with fetal positioning, not exacerbated by anything, with no radiation. Pain accompanied by nausea (which has been occurring for 1 month) and bleeding per vagina x 24 hours. She is passing stool and gas, reports no fever/chills. The pain abated spontaneously, but she states that when providers examine her abdomen she still has pain. This pain is not consistent with her low anterior cramping pain that she felt prior to her diagnosis of uterine fibroids, or with her intermittent crampy abdominal pain that she frequently has. HPI:  Patient is a 41y Female, h/o fibroid uterus/POTS - autonomic dysregulation/anxiety/IBS, complaining of acute onset abdominal pain which woke her from sleep 2 nights prior (11/15). She describes the pain as 7/10 in intensity, constant in periodicity, alleviated somewhat with fetal positioning, not exacerbated by anything, with no radiation. Pain accompanied by nausea (which has been occurring for 1 month) and bleeding per vagina x 24 hours. She is passing stool and gas, reports no fever/chills. The pain abated spontaneously, but she states that when providers examine her abdomen she still has pain. This pain is not consistent with her low anterior cramping pain that she felt prior to her diagnosis of uterine fibroids, or with her intermittent crampy abdominal pain that she frequently has.

## 2017-12-17 NOTE — ED ADULT NURSE REASSESSMENT NOTE - NS ED NURSE REASSESS COMMENT FT1
12:30 Waiting for surgical consult. Denies abdominal pain but still with very slight vaginal spotting. Assisted to the bathroom. Pt stated " I always need somebody to go with me in the bathroom because my HR is 112 and goes up to 120. Pt has oxisensor on her finger all the time. Safety maintained. Will monitor.

## 2017-12-17 NOTE — ED ADULT NURSE REASSESSMENT NOTE - NS ED NURSE REASSESS COMMENT FT1
Patient sitting up in bed. She states she has no changes in her symptoms. She is c/o slight epigastric pain. She is admitted pending bed assignment. Vitals are stable. Will continue to monitor.

## 2017-12-17 NOTE — CONSULT NOTE ADULT - SUBJECTIVE AND OBJECTIVE BOX
ACUTE CARE SURGERY CONSULT    Consulting surgical team: [facility] - Acute Care Surgery (pager [phone])  Consulting attending: Adina  Patient seen and examined: 12-17-17 @ 12:05    HPI:  Patient is a 41y Female, h/o fibroid uterus/Pott's disease/POTS - autonomic dysregulation/anxiety/IBS, complaining of acute onset abdominal pain which woke her from sleep 2 nights prior (11/15). She describes the pain as 7/10 in intensity, constant in periodicity, alleviated somewhat with fetal positioning, not exacerbated by anything, with no radiation. Pain accompanied by nausea (which has been occurring for 1 month) and bleeding per vagina x 24 hours. She is passing stool and gas, reports no fever/chills. The pain abated spontaneously, but she states that when providers examine her abdomen she still has pain. This pain is not consistent with her low anterior cramping pain that she felt prior to her diagnosis of uterine fibroids, or with her intermittent crampy abdominal pain that she frequently has.        PAST MEDICAL HISTORY:  Idiopathic thrombocytopenia purpura  Anxiety  POTS (postural orthostatic tachycardia syndrome)  Pott disease  History of ITP  Labyrinthitis  Asthma  Headache  HTN (hypertension)  Anxiety  IBS (irritable bowel syndrome)      PAST SURGICAL HISTORY:  No significant past surgical history  No significant past surgical history      ALLERGIES:  beta blockers (Unknown)  Lexapro (Unknown)  metoprolol (Headache)      MEDICATIONS  (STANDING):    MEDICATIONS  (PRN):      VITALS & I/Os:  Vital Signs Last 24 Hrs  T(C): 36.7 (17 Dec 2017 11:29), Max: 36.7 (17 Dec 2017 02:25)  T(F): 98.1 (17 Dec 2017 11:29), Max: 98.1 (17 Dec 2017 11:29)  HR: 72 (17 Dec 2017 11:29) (60 - 107)  BP: 134/81 (17 Dec 2017 11:29) (116/79 - 146/98)  BP(mean): --  RR: 18 (17 Dec 2017 11:29) (17 - 20)  SpO2: 100% (17 Dec 2017 11:29) (97% - 100%)  CAPILLARY BLOOD GLUCOSE            GEN: NAD, alert and oriented x 3  HEENT: WNL  CHEST: Symmetrical chest rise, breath sounds CTAB  HEART: RRR, non-muffled heart sounds  ABD: Soft, non-distended. Patient initially reacted with palpation of the RLQ, but with distracting conversation no tenderness elicited by deep palpation - although patient reported that it was uncomfortable. No rebound, no guarding.  EXT/VASC: No edema/erythema or deformity. Warm, cap refill < 2 sec. Motor and sensory function intact.         LABS:                        14.4   6.9   )-----------( 77       ( 16 Dec 2017 22:43 )             41.2     12-16    141  |  102  |  <4<L>  ----------------------------<  104<H>  3.3<L>   |  24  |  0.86    Ca    9.9      16 Dec 2017 22:43  Phos  3.8     12-16  Mg     2.1     12-16    TPro  8.0  /  Alb  4.9  /  TBili  0.3  /  DBili  x   /  AST  19  /  ALT  12  /  AlkPhos  87  12-16          Urinalysis Basic - ( 16 Dec 2017 22:43 )    Color: Colorless / Appearance: Clear / SG: <1.005 / pH: x  Gluc: x / Ketone: Negative  / Bili: Negative / Urobili: Negative   Blood: x / Protein: Negative / Nitrite: Negative   Leuk Esterase: Negative / RBC: 3-5 /HPF / WBC 0-2 /HPF   Sq Epi: x / Non Sq Epi: x / Bacteria: Few /HPF        IMAGING:  < from: CT Abdomen and Pelvis w/ Oral Cont and w/ IV Cont (12.17.17 @ 09:17) >  FINDINGS:    LOWER CHEST: Left lower lobe atelectasis.     LIVER: Within normal limits.  BILE DUCTS: Normal caliber.  GALLBLADDER: Within normal limits.  SPLEEN: Within normal limits.  PANCREAS: Within normal limits.  ADRENALS: Within normal limits.  KIDNEYS/URETERS: Within normal limits.    BLADDER: Distended.  REPRODUCTIVE ORGANS: Fibroid uterus. No adnexal mass.    BOWEL: No bowel obstruction. Underdistention versus bowel thickening of   the transverse colon. The appendix is mildly thickened, measuring 8 mm in   diameter, slightly increased compared to prior study. No associated   collection or significant periappendiceal inflammation.  PERITONEUM: No ascites.  VESSELS:  Within normal limits.  RETROPERITONEUM: No lymphadenopathy.    ABDOMINAL WALL: Within normal limits.  BONES: Within normal limits.    IMPRESSION:  Mild thickening of the appendix, measuring 8 mm in diameter, slightly   increased to prior study. There is no associated collection or   significant periappendiceal inflammation. These findings may represent   early or mild appendicitis.    Under distention versus bowel wall thickening of the transverse colon,   favoring underdistention due to the lack of surrounding inflammatory   changes.    Fibroid uterus.    < end of copied text >      ASSESSMENT & PLAN  41y female with abdominal pain, clinical history and findings inconsistent with acute appendicitis, with questionable appendiceal inflammation on CT. Chief resident to see and re-evaluate. Full consult pending..      Discussed with Dr. Holden

## 2017-12-17 NOTE — H&P ADULT - ATTENDING COMMENTS
The patients history is not reflective of appendicitis  The patients exam shows non focal tenderness only to deep palpation  The CT scan is non conclusive  plan is to continue to observe the patient  Admitted to the Acute Care Surgery service

## 2017-12-17 NOTE — H&P ADULT - NSHPLABSRESULTS_GEN_ALL_CORE
LABS:                        14.4   6.9   )-----------( 77       ( 16 Dec 2017 22:43 )             41.2     12-16    141  |  102  |  <4<L>  ----------------------------<  104<H>  3.3<L>   |  24  |  0.86    Ca    9.9      16 Dec 2017 22:43  Phos  3.8     12-16  Mg     2.1     12-16    TPro  8.0  /  Alb  4.9  /  TBili  0.3  /  DBili  x   /  AST  19  /  ALT  12  /  AlkPhos  87  12-16          Urinalysis Basic - ( 16 Dec 2017 22:43 )    Color: Colorless / Appearance: Clear / SG: <1.005 / pH: x  Gluc: x / Ketone: Negative  / Bili: Negative / Urobili: Negative   Blood: x / Protein: Negative / Nitrite: Negative   Leuk Esterase: Negative / RBC: 3-5 /HPF / WBC 0-2 /HPF   Sq Epi: x / Non Sq Epi: x / Bacteria: Few /HPF        IMAGING:  < from: CT Abdomen and Pelvis w/ Oral Cont and w/ IV Cont (12.17.17 @ 09:17) >  FINDINGS:    LOWER CHEST: Left lower lobe atelectasis.     LIVER: Within normal limits.  BILE DUCTS: Normal caliber.  GALLBLADDER: Within normal limits.  SPLEEN: Within normal limits.  PANCREAS: Within normal limits.  ADRENALS: Within normal limits.  KIDNEYS/URETERS: Within normal limits.    BLADDER: Distended.  REPRODUCTIVE ORGANS: Fibroid uterus. No adnexal mass.    BOWEL: No bowel obstruction. Underdistention versus bowel thickening of   the transverse colon. The appendix is mildly thickened, measuring 8 mm in   diameter, slightly increased compared to prior study. No associated   collection or significant periappendiceal inflammation.  PERITONEUM: No ascites.  VESSELS:  Within normal limits.  RETROPERITONEUM: No lymphadenopathy.    ABDOMINAL WALL: Within normal limits.  BONES: Within normal limits.    IMPRESSION:  Mild thickening of the appendix, measuring 8 mm in diameter, slightly   increased to prior study. There is no associated collection or   significant periappendiceal inflammation. These findings may represent   early or mild appendicitis.    Under distention versus bowel wall thickening of the transverse colon,   favoring underdistention due to the lack of surrounding inflammatory   changes.    Fibroid uterus.    < end of copied text >

## 2017-12-18 LAB
ANION GAP SERPL CALC-SCNC: 11 MMOL/L — SIGNIFICANT CHANGE UP (ref 5–17)
BUN SERPL-MCNC: 2 MG/DL — LOW (ref 7–23)
CA-I BLD-SCNC: 1.25 MMOL/L — SIGNIFICANT CHANGE UP (ref 1.12–1.3)
CALCIUM SERPL-MCNC: 8.8 MG/DL — SIGNIFICANT CHANGE UP (ref 8.4–10.5)
CHLORIDE SERPL-SCNC: 109 MMOL/L — HIGH (ref 96–108)
CO2 SERPL-SCNC: 21 MMOL/L — LOW (ref 22–31)
CREAT SERPL-MCNC: 0.84 MG/DL — SIGNIFICANT CHANGE UP (ref 0.5–1.3)
CULTURE RESULTS: NO GROWTH — SIGNIFICANT CHANGE UP
GLUCOSE SERPL-MCNC: 80 MG/DL — SIGNIFICANT CHANGE UP (ref 70–99)
HCT VFR BLD CALC: 33.2 % — LOW (ref 34.5–45)
HGB BLD-MCNC: 11 G/DL — LOW (ref 11.5–15.5)
MAGNESIUM SERPL-MCNC: 1.8 MG/DL — SIGNIFICANT CHANGE UP (ref 1.6–2.6)
MCHC RBC-ENTMCNC: 28.3 PG — SIGNIFICANT CHANGE UP (ref 27–34)
MCHC RBC-ENTMCNC: 33.1 GM/DL — SIGNIFICANT CHANGE UP (ref 32–36)
MCV RBC AUTO: 85.3 FL — SIGNIFICANT CHANGE UP (ref 80–100)
PHOSPHATE SERPL-MCNC: 4.2 MG/DL — SIGNIFICANT CHANGE UP (ref 2.5–4.5)
PLATELET # BLD AUTO: 68 K/UL — LOW (ref 150–400)
POTASSIUM SERPL-MCNC: 3.6 MMOL/L — SIGNIFICANT CHANGE UP (ref 3.5–5.3)
POTASSIUM SERPL-SCNC: 3.6 MMOL/L — SIGNIFICANT CHANGE UP (ref 3.5–5.3)
RBC # BLD: 3.89 M/UL — SIGNIFICANT CHANGE UP (ref 3.8–5.2)
RBC # FLD: 12.7 % — SIGNIFICANT CHANGE UP (ref 10.3–14.5)
SODIUM SERPL-SCNC: 141 MMOL/L — SIGNIFICANT CHANGE UP (ref 135–145)
SPECIMEN SOURCE: SIGNIFICANT CHANGE UP
WBC # BLD: 4.6 K/UL — SIGNIFICANT CHANGE UP (ref 3.8–10.5)
WBC # FLD AUTO: 4.6 K/UL — SIGNIFICANT CHANGE UP (ref 3.8–10.5)

## 2017-12-18 PROCEDURE — 76705 ECHO EXAM OF ABDOMEN: CPT | Mod: 26,RT

## 2017-12-18 PROCEDURE — 99222 1ST HOSP IP/OBS MODERATE 55: CPT | Mod: GC

## 2017-12-18 RX ORDER — POTASSIUM CHLORIDE 20 MEQ
40 PACKET (EA) ORAL ONCE
Qty: 0 | Refills: 0 | Status: DISCONTINUED | OUTPATIENT
Start: 2017-12-18 | End: 2017-12-26

## 2017-12-18 RX ADMIN — Medication 100 MILLIGRAM(S): at 08:34

## 2017-12-18 RX ADMIN — SIMETHICONE 160 MILLIGRAM(S): 80 TABLET, CHEWABLE ORAL at 16:06

## 2017-12-18 RX ADMIN — Medication 2 MILLIGRAM(S): at 00:44

## 2017-12-18 RX ADMIN — MONTELUKAST 10 MILLIGRAM(S): 4 TABLET, CHEWABLE ORAL at 18:05

## 2017-12-18 RX ADMIN — FAMOTIDINE 20 MILLIGRAM(S): 10 INJECTION INTRAVENOUS at 08:34

## 2017-12-18 RX ADMIN — SIMETHICONE 160 MILLIGRAM(S): 80 TABLET, CHEWABLE ORAL at 23:27

## 2017-12-18 RX ADMIN — Medication 2 MILLIGRAM(S): at 11:32

## 2017-12-18 RX ADMIN — Medication 100 MILLIGRAM(S): at 00:11

## 2017-12-18 RX ADMIN — Medication 100 MILLIGRAM(S): at 16:07

## 2017-12-18 RX ADMIN — Medication 2.5 MILLIGRAM(S): at 22:05

## 2017-12-18 RX ADMIN — Medication 2 MILLIGRAM(S): at 20:06

## 2017-12-18 RX ADMIN — ONDANSETRON 4 MILLIGRAM(S): 8 TABLET, FILM COATED ORAL at 05:30

## 2017-12-18 RX ADMIN — SIMETHICONE 160 MILLIGRAM(S): 80 TABLET, CHEWABLE ORAL at 00:11

## 2017-12-18 RX ADMIN — Medication 240 MILLIGRAM(S): at 18:04

## 2017-12-18 RX ADMIN — ONDANSETRON 4 MILLIGRAM(S): 8 TABLET, FILM COATED ORAL at 17:00

## 2017-12-18 RX ADMIN — Medication 2 MILLIGRAM(S): at 16:07

## 2017-12-18 RX ADMIN — FAMOTIDINE 20 MILLIGRAM(S): 10 INJECTION INTRAVENOUS at 18:04

## 2017-12-18 RX ADMIN — Medication 100 MILLIGRAM(S): at 23:27

## 2017-12-18 RX ADMIN — SIMETHICONE 160 MILLIGRAM(S): 80 TABLET, CHEWABLE ORAL at 08:33

## 2017-12-18 RX ADMIN — Medication 2.5 MILLIGRAM(S): at 09:30

## 2017-12-18 RX ADMIN — MONTELUKAST 10 MILLIGRAM(S): 4 TABLET, CHEWABLE ORAL at 00:11

## 2017-12-18 RX ADMIN — Medication 2 MILLIGRAM(S): at 06:02

## 2017-12-18 NOTE — CONSULT NOTE ADULT - ASSESSMENT
41y G0 LMP one week ago 41y G0 LMP one week ago with known cervical fibroid, with episode of heavy vaginal bleeding, now resolved.    - no acute intervention at this time. Patient to follow up with Dr. Baer for further management of AUB, cervical fibroid.  - GYN team signed off    D/w Dr. Buffy Zamudio, PGY2

## 2017-12-18 NOTE — CONSULT NOTE ADULT - SUBJECTIVE AND OBJECTIVE BOX
R2 GYN Consult Note    41y G0 LMP one week ago with autonomic dysfunction, admitted for generalized abd pain      OBHx:  GynHx:   PMH:  PSH:  All:  Meds:   SocialHx:     Vital Signs Last 24 Hrs  T(C): 36.7 (18 Dec 2017 16:02), Max: 37.2 (18 Dec 2017 13:57)  T(F): 98.1 (18 Dec 2017 16:02), Max: 98.9 (18 Dec 2017 13:57)  HR: 67 (18 Dec 2017 16:02) (64 - 85)  BP: 127/80 (18 Dec 2017 16:02) (100/66 - 127/85)  BP(mean): --  RR: 18 (18 Dec 2017 16:02) (18 - 18)  SpO2: 100% (18 Dec 2017 16:02) (95% - 100%)    PE:  Gen: Comfortable, NAD  CV: RRR  Pulm: CTAB  Abd: Soft, NT  Ext: No edema or tenderness bilaterally  Spec Exam:    LABS:                        11.0   4.60  )-----------( 68       ( 18 Dec 2017 08:39 )             33.2                         14.4   6.9   )-----------( 77       ( 16 Dec 2017 22:43 )             41.2     12-18    141  |  109<H>  |  2<L>  ----------------------------<  80  3.6   |  21<L>  |  0.84    Ca    8.8      18 Dec 2017 09:41  Phos  4.2     12-18  Mg     1.8     12-18    TPro  8.0  /  Alb  4.9  /  TBili  0.3  /  DBili  x   /  AST  19  /  ALT  12  /  AlkPhos  87  12-16      Urinalysis Basic - ( 16 Dec 2017 22:43 )    Color: Colorless / Appearance: Clear / SG: <1.005 / pH: x  Gluc: x / Ketone: Negative  / Bili: Negative / Urobili: Negative   Blood: x / Protein: Negative / Nitrite: Negative   Leuk Esterase: Negative / RBC: 3-5 /HPF / WBC 0-2 /HPF   Sq Epi: x / Non Sq Epi: x / Bacteria: Few /HPF        RADIOLOGY & ADDITIONAL STUDIES:      A/P: 41y G P   who present with  -  -      Malou Mota PGY4 R2 GYN Consult Note    41y G0 LMP one week ago with autonomic dysfunction, admitted for generalized abd pain with suspicion for appendicitis. Pain has since improved without pain medication or antibiotics. GYN consulted for cervical fibroid.  Patient reports ongoing evaluation of fibroid vs polyp with her GYN (Dr. Baer). She had an attempted sonohysterogram one month ago that was unsuccessful due to cervical stenosis. Patient describes stabbing abdominal pain 3 nights ago, that self resolved after some time. She woke up the next day and continued to feel intermittent crampy "spasms" throughout the day. Later that night, she had vaginal bleeding, soaking toilet paper (She did not use pads). The bleeding was more than her normal periods, and she thought it might have been due to the progesterone suppositories she took prior to that. Last night, the pain returned and it was intolerable, so she came to the ED.   Patient currently has on a pad since last night (>12hrs) and there is slight dark staining. She reports that the bleeding has significantly slowed down; however, she is concerned because this is not the appropriate time for her period.       OBHx: G0  GynHx: +fibroid, reg menses, h/o HPV, recently normal Paps     PAST MEDICAL & SURGICAL HISTORY:  Idiopathic thrombocytopenia purpura  Anxiety  POTS (postural orthostatic tachycardia syndrome)  History of ITP  Labyrinthitis  Headache  HTN (hypertension)  Anxiety  IBS (irritable bowel syndrome)  No significant past surgical history  No significant past surgical history      Vital Signs Last 24 Hrs  T(C): 36.7 (18 Dec 2017 16:02), Max: 37.2 (18 Dec 2017 13:57)  T(F): 98.1 (18 Dec 2017 16:02), Max: 98.9 (18 Dec 2017 13:57)  HR: 67 (18 Dec 2017 16:02) (64 - 85)  BP: 127/80 (18 Dec 2017 16:02) (100/66 - 127/85)  RR: 18 (18 Dec 2017 16:02) (18 - 18)  SpO2: 100% (18 Dec 2017 16:02) (95% - 100%)    PE:  Gen: Comfortable, NAD  Abd: Soft, nontender, nondistended, no rebound or guarding  Ext: No edema or tenderness bilaterally  Spec Exam: normal appearing cervix with minimal bloody mucus, no active bleeding  Bimanual: small uterus nontender, no CMT, no adnexal tenderness or palpable masses bilaterally    LABS:                        11.0   4.60  )-----------( 68       ( 18 Dec 2017 08:39 )             33.2                         14.4   6.9   )-----------( 77       ( 16 Dec 2017 22:43 )             41.2     12-18    141  |  109<H>  |  2<L>  ----------------------------<  80  3.6   |  21<L>  |  0.84    Ca    8.8      18 Dec 2017 09:41  Phos  4.2     12-18  Mg     1.8     12-18    TPro  8.0  /  Alb  4.9  /  TBili  0.3  /  DBili  x   /  AST  19  /  ALT  12  /  AlkPhos  87  12-16      Urinalysis Basic - ( 16 Dec 2017 22:43 )    Color: Colorless / Appearance: Clear / SG: <1.005 / pH: x  Gluc: x / Ketone: Negative  / Bili: Negative / Urobili: Negative   Blood: x / Protein: Negative / Nitrite: Negative   Leuk Esterase: Negative / RBC: 3-5 /HPF / WBC 0-2 /HPF   Sq Epi: x / Non Sq Epi: x / Bacteria: Few /HPF        RADIOLOGY & ADDITIONAL STUDIES:      A/P: 41y G P   who present with  -  -      Malou Tonick PGY4 R2 GYN Consult Note    41y G0 LMP one week ago with autonomic dysfunction, admitted for generalized abd pain with suspicion for appendicitis. Pain has since improved without pain medication or antibiotics. GYN consulted for cervical fibroid.  Patient reports ongoing evaluation of fibroid vs polyp with her GYN (Dr. Baer). She had an attempted sonohysterogram one month ago that was unsuccessful due to cervical stenosis. Patient describes stabbing abdominal pain 3 nights ago, that self resolved after some time. She woke up the next day and continued to feel intermittent crampy "spasms" throughout the day. Later that night, she had vaginal bleeding, soaking toilet paper (She did not use pads). The bleeding was more than her normal periods, and she thought it might have been due to the progesterone suppositories she took prior to that. Last night, the pain returned and it was intolerable, so she came to the ED.   Patient currently has on a pad since last night (>12hrs) and there is slight dark staining. She reports that the bleeding has significantly slowed down; however, she is concerned because this is not the appropriate time for her period.       OBHx: G0  GynHx: +fibroid, reg menses, h/o HPV, recently normal Paps     PAST MEDICAL & SURGICAL HISTORY:  Idiopathic thrombocytopenia purpura  Anxiety  POTS (postural orthostatic tachycardia syndrome)  History of ITP  Labyrinthitis  Headache  HTN (hypertension)  Anxiety  IBS (irritable bowel syndrome)  No significant past surgical history  No significant past surgical history      Vital Signs Last 24 Hrs  T(C): 36.7 (18 Dec 2017 16:02), Max: 37.2 (18 Dec 2017 13:57)  T(F): 98.1 (18 Dec 2017 16:02), Max: 98.9 (18 Dec 2017 13:57)  HR: 67 (18 Dec 2017 16:02) (64 - 85)  BP: 127/80 (18 Dec 2017 16:02) (100/66 - 127/85)  RR: 18 (18 Dec 2017 16:02) (18 - 18)  SpO2: 100% (18 Dec 2017 16:02) (95% - 100%)    PE:  Gen: Comfortable, NAD  Abd: Soft, nontender, nondistended, no rebound or guarding  Ext: No edema or tenderness bilaterally  Spec Exam: normal appearing cervix with minimal bloody mucus, no active bleeding  Bimanual: small uterus nontender, no CMT, no adnexal tenderness or palpable masses bilaterally    LABS:                        11.0   4.60  )-----------( 68       ( 18 Dec 2017 08:39 )             33.2                         14.4   6.9   )-----------( 77       ( 16 Dec 2017 22:43 )             41.2     12-18    141  |  109<H>  |  2<L>  ----------------------------<  80  3.6   |  21<L>  |  0.84    Ca    8.8      18 Dec 2017 09:41  Phos  4.2     12-18  Mg     1.8     12-18    TPro  8.0  /  Alb  4.9  /  TBili  0.3  /  DBili  x   /  AST  19  /  ALT  12  /  AlkPhos  87  12-16      Urinalysis Basic - ( 16 Dec 2017 22:43 )    Color: Colorless / Appearance: Clear / SG: <1.005 / pH: x  Gluc: x / Ketone: Negative  / Bili: Negative / Urobili: Negative   Blood: x / Protein: Negative / Nitrite: Negative   Leuk Esterase: Negative / RBC: 3-5 /HPF / WBC 0-2 /HPF   Sq Epi: x / Non Sq Epi: x / Bacteria: Few /HPF        RADIOLOGY & ADDITIONAL STUDIES:  < from: US Transvaginal (12.17.17 @ 04:41) >  Grossly stable appearance of approximately 1.1 cm hypoechoic lesion   extending into the cervical canal, probable cervical myoma. Fibroid   uterus.

## 2017-12-18 NOTE — PROGRESS NOTE ADULT - ATTENDING COMMENTS
I have personally interviewed and examined this patient, reviewed pertinent labs and imaging, and discussed the case with colleagues, residents, and physician assistants on [facility] Team rounds.    The active care issues are:  1. POTS  2. abdominal pain   3. at risk for malnutrition  4. cervical myoma       NAD, ate some breakfast  abdomen soft, nondistended - mild tenderness right upper and right lower quadrants and epigastrium. Per patient, pain originally began suddenly, Saturday around 2am.  It has since improved without any recent pain medication and no antibiotics. History does not seem consistent with acute appendicitis    Plan  gyn consult  follow up GI consult   obtain right upper quadrant sonogram to evaluate for gallstones given sudden onset of pain and near resolution without antibiotics could consider biliary etiology  advance diet as tolerated  no role for antibiotics at this point  encourage ambulation  continue home POTS regimen

## 2017-12-18 NOTE — PROGRESS NOTE ADULT - SUBJECTIVE AND OBJECTIVE BOX
ACS Progress Note     Subjective: Pain somewhat improved since admission, wants to try oral diet, no n/v     PE: NAD   abd soft mild tenderness throughout abdomen, no rebound or guarding     Vital Signs Last 24 Hrs  T(C): 36.8 (18 Dec 2017 08:49), Max: 36.9 (17 Dec 2017 16:40)  T(F): 98.3 (18 Dec 2017 08:49), Max: 98.4 (17 Dec 2017 16:40)  HR: 76 (18 Dec 2017 08:49) (64 - 85)  BP: 122/85 (18 Dec 2017 08:49) (100/66 - 127/85)  BP(mean): --  RR: 18 (18 Dec 2017 08:49) (18 - 18)  SpO2: 100% (18 Dec 2017 08:49) (95% - 100%)    I&O's Detail    17 Dec 2017 07:01  -  18 Dec 2017 07:00  --------------------------------------------------------  IN:    lactated ringers.: 1000 mL  Total IN: 1000 mL    OUT:  Total OUT: 0 mL    Total NET: 1000 mL      18 Dec 2017 07:01  -  18 Dec 2017 12:25  --------------------------------------------------------  IN:    Oral Fluid: 300 mL  Total IN: 300 mL    OUT:    Voided: 800 mL  Total OUT: 800 mL    Total NET: -500 mL          Daily     Daily     MEDICATIONS  (STANDING):  busPIRone 2.5 milliGRAM(s) Oral two times a day  diltiazem    milliGRAM(s) Oral <User Schedule>  docusate sodium 100 milliGRAM(s) Oral three times a day  famotidine    Tablet 20 milliGRAM(s) Oral two times a day  lactated ringers. 1000 milliLiter(s) (75 mL/Hr) IV Continuous <Continuous>  LORazepam     Tablet 2 milliGRAM(s) Oral every 4 hours  montelukast 10 milliGRAM(s) Oral at bedtime  ondansetron   Disintegrating Tablet 4 milliGRAM(s) Oral every 6 hours  simethicone 160 milliGRAM(s) Chew three times a day  sodium chloride 0.9%. 1000 milliLiter(s) (125 mL/Hr) IV Continuous <Continuous>    MEDICATIONS  (PRN):  acetaminophen   Tablet. 650 milliGRAM(s) Oral every 6 hours PRN Mild Pain (1 - 3)      LABS:                        11.0   4.60  )-----------( 68       ( 18 Dec 2017 08:39 )             33.2     12-18    141  |  109<H>  |  2<L>  ----------------------------<  80  3.6   |  21<L>  |  0.84    Ca    8.8      18 Dec 2017 09:41  Phos  4.2     12-18  Mg     1.8     12-18    TPro  8.0  /  Alb  4.9  /  TBili  0.3  /  DBili  x   /  AST  19  /  ALT  12  /  AlkPhos  87  12-16      Urinalysis Basic - ( 16 Dec 2017 22:43 )    Color: Colorless / Appearance: Clear / SG: <1.005 / pH: x  Gluc: x / Ketone: Negative  / Bili: Negative / Urobili: Negative   Blood: x / Protein: Negative / Nitrite: Negative   Leuk Esterase: Negative / RBC: 3-5 /HPF / WBC 0-2 /HPF   Sq Epi: x / Non Sq Epi: x / Bacteria: Few /HPF        RADIOLOGY & ADDITIONAL STUDIES:

## 2017-12-18 NOTE — CONSULT NOTE ADULT - ATTENDING COMMENTS
As above    Impression:    #1.  Acute on chronic abdominal pain and nausea since March 2017, with postprandial dyspepsia and nausea.  Endorses heartburn/regurgitation without dysphagia.  Has altered bowel habits and gas/bloating.  Previous workup included a MR enterography.  Never had endoscopy/colonoscopy due to problem #2.  Never had gastric emptying study.  Some response to Xifaxan for empiric treatment of small intestinal bowel overgrowth.  Patient was recommended to have outpatient motility consultation by his primary gastroenterologist.    #2.  Autonomic dysregulation, a clinical diagnosis made by multiple other physicians, including a specialist, according to the patient.      #3.  Mild thickening of the appendix without associated stranding on CT scan, a nonspecific finding.  No localized RLQ tenderness.  Natural history does not suggest appendicitis.    Recommendations    #1.  Repeat MR enterography    #2.  Gastric emptying study (check with nuclear medicine about medications which she needs to hold for the test)    #3.  Workup/management of abnormal appendix imaging by primary surgical team      #4.  Outpatient GI followup with Dr. Leandro Perez / GI motility specialist. As above    Impression:    #1.  Acute on chronic abdominal pain and nausea since March 2017, with postprandial dyspepsia and nausea.  Endorses heartburn/regurgitation without dysphagia.  Has altered bowel habits and gas/bloating.  Previous workup included a MR enterography.  Never had endoscopy/colonoscopy due to problem #2.  Never had gastric emptying study.  Some response to Xifaxan for empiric treatment of small intestinal bowel overgrowth.  Patient was recommended to have outpatient motility consultation by his primary gastroenterologist.    #2.  Autonomic dysregulation, a clinical diagnosis made by multiple other physicians, including a specialist, according to the patient.      #3.  Mild thickening of the appendix without associated stranding on CT scan, a nonspecific finding.  No localized RLQ tenderness.  Natural history does not suggest appendicitis.    Recommendations    #1.  Repeat MR enterography    #2.  Gastric emptying study (check with nuclear medicine about medications which she needs to hold for the test)    #3.  Workup/management of abnormal appendix imaging by primary surgical team      #4.  Xifaxan as above for empiric treatment of small intestinal bacterial overgrowth (SIBO), which is associated with GI motility problems that may be a component of autonomic dysregulation.    #5.  Outpatient GI followup with Dr. Leandro Perez / GI motility specialist.

## 2017-12-18 NOTE — CONSULT NOTE ADULT - ASSESSMENT
42 y/o F with hx of ITP, Uterine Fibroids here for acute on chronic abdominal pain.    Impression:  1) Abdominal Pain: initially in the RLQ, with ?? appendiceal enlargement suggestive of acute appendicitis but patient does not have peritoneal signs. May consider other etiologies of pain including endometriosis and inflammatory bowel disease.  2) ITP    Plan:  - continue supportive care  - advance diet as tolerated  - Check ESR, CRP  - Check Stool calprotectin  - May consider repeat CT imaging or MRI imaging for followup of findings  - Pt will need continued followup with GI as outpatient 40 y/o F with hx of ITP, Uterine Fibroids here for acute on chronic abdominal pain.    Impression:  1) Abdominal Pain: initially in the RLQ, with ?? appendiceal enlargement suggestive of acute appendicitis but patient does not have peritoneal signs. May consider other etiologies of pain including endometriosis and inflammatory bowel disease. Pt does also have POTS which has been associated with GI symptoms including nausea, abdominal cramps, bloating, early satiety and diarrhea.   2) ITP    Plan:  - continue supportive care  - advance diet as tolerated  - Check ESR, CRP  - Check Stool calprotectin  - May consider repeat CT imaging or MRI imaging for followup of findings  - Pt will need continued followup with GI as outpatient 42 y/o F with hx of ITP, Uterine Fibroids here for acute on chronic abdominal pain.    Impression:  1) Nausea & Abdominal Pain: initially in the RLQ, with ?? appendiceal enlargement suggestive of acute appendicitis but patient does not have peritoneal signs. May consider other etiologies of pain including endometriosis and inflammatory bowel disease. Pt does also have POTS which has been associated with GI symptoms including nausea, abdominal cramps, bloating, early satiety and diarrhea. Consider gastroparesis or SIBO as causing nausea and abdominal pain.  2) ITP    Plan:  - Recommend MR Enterography to evaluate the GI tract  - Recommend Gastric Emptying Study to evaluate for gastroparesis (will need to discuss with MRI Dept about medications that need to be held prior to test)  - Begin Rifaximin 550mg TID for empiric treatment of SIBO.  - continue supportive care  - advance diet as tolerated  - Check ESR, CRP  - Check Stool calprotectin  - Pt will need continued followup with GI as outpatient 42 y/o F with hx of ITP, Uterine Fibroids here for acute on chronic abdominal pain.    Impression:  1) Nausea & Abdominal Pain: initially in the RLQ, with ?? appendiceal enlargement suggestive of acute appendicitis but patient does not have peritoneal signs. May consider other etiologies of pain including endometriosis and inflammatory bowel disease. Pt does also have POTS which has been associated with GI symptoms including nausea, abdominal cramps, bloating, early satiety and diarrhea. Consider gastroparesis or SIBO as causing nausea and abdominal pain.  2) ITP    Plan:  - Recommend MR Enterography to evaluate the GI tract  - Recommend Gastric Emptying Study to evaluate for gastroparesis (will need to discuss with Nuclear Medicine Dept about medications that need to be held prior to test)  - Begin Rifaximin 550mg TID for empiric treatment of SIBO.  - continue supportive care  - advance diet as tolerated  - Check ESR, CRP  - Check Stool calprotectin  - Pt will need continued followup with GI as outpatient

## 2017-12-18 NOTE — PROGRESS NOTE ADULT - ASSESSMENT
42 yo with abdominal pain, cervical myoma, mild thickening of appendix - pain improved on ABx     -GI  consult   -GYN consult   -RUQ sono     ACS [id number]

## 2017-12-18 NOTE — CONSULT NOTE ADULT - SUBJECTIVE AND OBJECTIVE BOX
HPI:  This is a 40 y/o F with hx of Uterine Fibroids, ITP, Autonomic Dysregulation/POTS. She has a history of abdominal pain that began in March 2017. She has been seen by 2 GI providers (one of them was Dr Morfin), with GI evaluation initiated as an outpatient. She reports dyspepsia with intermittent nausea that is mildly controlled with Zofran. She underwent an MRI Abdomen in 3/2017 that was reportedly unremarkable. She mentions that she is not able to obtain an EGD or Colonoscopy due to autonomic dysregulation. Her GI symptoms have worsened over the past month, and she reports a few episodes of clear emesis. She continues to have regular bowel movements without diarrhea or hematochezia. On 12/15, she had acute abdominal pain which changed locations, and often localized to the RLQ. This pain was constant, 7/10 in severity, and mildly alleviated by laying in the fetal position. In the ED, she had a CT that showed     PAST MEDICAL & SURGICAL HISTORY:  Idiopathic thrombocytopenia purpura  Anxiety  POTS (postural orthostatic tachycardia syndrome)  History of ITP  Labyrinthitis  Headache  HTN (hypertension)  Anxiety  IBS (irritable bowel syndrome)    REVIEW OF SYSTEMS: negative except as per the HPI.    MEDICATIONS  (STANDING):  busPIRone 2.5 milliGRAM(s) Oral two times a day  diltiazem    milliGRAM(s) Oral <User Schedule>  docusate sodium 100 milliGRAM(s) Oral three times a day  famotidine    Tablet 20 milliGRAM(s) Oral two times a day  lactated ringers. 1000 milliLiter(s) (75 mL/Hr) IV Continuous <Continuous>  LORazepam     Tablet 2 milliGRAM(s) Oral every 4 hours  montelukast 10 milliGRAM(s) Oral at bedtime  ondansetron   Disintegrating Tablet 4 milliGRAM(s) Oral every 6 hours  simethicone 160 milliGRAM(s) Chew three times a day  sodium chloride 0.9%. 1000 milliLiter(s) (125 mL/Hr) IV Continuous <Continuous>    MEDICATIONS  (PRN):  acetaminophen   Tablet. 650 milliGRAM(s) Oral every 6 hours PRN Mild Pain (1 - 3)    ALLERGIES:  metoprolol (Headache)    SOCIAL HISTORY:  - Alcohol: denies  - Recreational drug use: denies    FAMILY HISTORY:  No pertinent family history in first degree relatives  Family history of hypertension (Grandparent)  Family history of atrial fibrillation  Family history of prostate cancer in father    Vital Signs Last 24 Hrs  T(C): 36.8 (18 Dec 2017 08:49), Max: 36.9 (17 Dec 2017 16:40)  T(F): 98.3 (18 Dec 2017 08:49), Max: 98.4 (17 Dec 2017 16:40)  HR: 76 (18 Dec 2017 08:49) (64 - 85)  BP: 122/85 (18 Dec 2017 08:49) (100/66 - 127/85)  BP(mean): --  RR: 18 (18 Dec 2017 08:49) (18 - 18)  SpO2: 100% (18 Dec 2017 08:49) (95% - 100%)    PHYSICAL EXAM:  Constitutional: no acute distress  Eyes: no icterus  Neck: no masses, no LAD  Respiratory: normal inspiratory effort; no wheezing or crackles  Cardiovascular: RRR, normal S1/S2, no murmurs/rubs/gallops  Gastrointestinal: soft, nondistended, nontender, +BS  Extremities: no LE edema  Neurological: AAOx3, no asterixis  Skin: no rashes, bruises, petechiae    LABS:                        11.0   4.60  )-----------( 68       ( 18 Dec 2017 08:39 )             33.2     141  |  109<H>  |  2<L>  ----------------------------<  80  3.6   |  21<L>  |  0.84    Ca    8.8      18 Dec 2017 09:41  Phos  4.2     12-18  Mg     1.8     12-18    TPro  8.0  /  Alb  4.9  /  TBili  0.3  /  DBili  x   /  AST  19  /  ALT  12  /  AlkPhos  87  12-16  LIVER FUNCTIONS - ( 16 Dec 2017 22:43 )  Alb: 4.9 g/dL / Pro: 8.0 g/dL / ALK PHOS: 87 U/L / ALT: 12 U/L RC / AST: 19 U/L / GGT: x HPI:  This is a 40 y/o F with hx of Uterine Fibroids, ITP, Autonomic Dysregulation/POTS. She has a history of abdominal pain that began in March 2017. She has been seen by 2 GI providers (one of them was Dr Morfin), with GI evaluation initiated as an outpatient. She reports dyspepsia with intermittent nausea that is mildly controlled with Zofran. She underwent an MRI Abdomen in 3/2017 that was reportedly unremarkable. She mentions that she is not able to obtain an EGD or Colonoscopy due to autonomic dysregulation. Her GI symptoms have worsened over the past month, and she reports a few episodes of clear emesis. She continues to have regular bowel movements without diarrhea or hematochezia. On 12/15, she had acute abdominal pain which changed locations, and often localized to the RLQ. This pain was constant, 7/10 in severity, and mildly alleviated by laying in the fetal position. In the ED, she had a CT that showed mildly enlarged appendix to 8mm, and ?? underdistension vs thickening of the transverse colon.     PAST MEDICAL & SURGICAL HISTORY:  Idiopathic thrombocytopenia purpura  Anxiety  POTS (postural orthostatic tachycardia syndrome)  History of ITP  Labyrinthitis  Headache  HTN (hypertension)  Anxiety  IBS (irritable bowel syndrome)    REVIEW OF SYSTEMS: negative except as per the HPI.    MEDICATIONS  (STANDING):  busPIRone 2.5 milliGRAM(s) Oral two times a day  diltiazem    milliGRAM(s) Oral <User Schedule>  docusate sodium 100 milliGRAM(s) Oral three times a day  famotidine    Tablet 20 milliGRAM(s) Oral two times a day  lactated ringers. 1000 milliLiter(s) (75 mL/Hr) IV Continuous <Continuous>  LORazepam     Tablet 2 milliGRAM(s) Oral every 4 hours  montelukast 10 milliGRAM(s) Oral at bedtime  ondansetron   Disintegrating Tablet 4 milliGRAM(s) Oral every 6 hours  simethicone 160 milliGRAM(s) Chew three times a day  sodium chloride 0.9%. 1000 milliLiter(s) (125 mL/Hr) IV Continuous <Continuous>    MEDICATIONS  (PRN):  acetaminophen   Tablet. 650 milliGRAM(s) Oral every 6 hours PRN Mild Pain (1 - 3)    ALLERGIES:  metoprolol (Headache)    SOCIAL HISTORY:  - Alcohol: denies  - Recreational drug use: denies    FAMILY HISTORY:  No pertinent family history in first degree relatives  Family history of hypertension (Grandparent)  Family history of atrial fibrillation  Family history of prostate cancer in father    Vital Signs Last 24 Hrs  T(C): 36.8 (18 Dec 2017 08:49), Max: 36.9 (17 Dec 2017 16:40)  T(F): 98.3 (18 Dec 2017 08:49), Max: 98.4 (17 Dec 2017 16:40)  HR: 76 (18 Dec 2017 08:49) (64 - 85)  BP: 122/85 (18 Dec 2017 08:49) (100/66 - 127/85)  BP(mean): --  RR: 18 (18 Dec 2017 08:49) (18 - 18)  SpO2: 100% (18 Dec 2017 08:49) (95% - 100%)    PHYSICAL EXAM:  Constitutional: no acute distress  Eyes: no icterus  Neck: no masses, no LAD  Respiratory: normal inspiratory effort; no wheezing or crackles  Cardiovascular: RRR, normal S1/S2, no murmurs/rubs/gallops  Gastrointestinal: soft, nondistended, nontender, +BS  Extremities: no LE edema  Neurological: AAOx3, no asterixis  Skin: no rashes, bruises, petechiae    LABS:                        11.0   4.60  )-----------( 68       ( 18 Dec 2017 08:39 )             33.2     141  |  109<H>  |  2<L>  ----------------------------<  80  3.6   |  21<L>  |  0.84    Ca    8.8      18 Dec 2017 09:41  Phos  4.2     12-18  Mg     1.8     12-18    TPro  8.0  /  Alb  4.9  /  TBili  0.3  /  DBili  x   /  AST  19  /  ALT  12  /  AlkPhos  87  12-16  LIVER FUNCTIONS - ( 16 Dec 2017 22:43 )  Alb: 4.9 g/dL / Pro: 8.0 g/dL / ALK PHOS: 87 U/L / ALT: 12 U/L RC / AST: 19 U/L / GGT: x HPI:  This is a 42 y/o F with hx of Uterine Fibroids, ITP, Autonomic Dysregulation/POTS. She has a history of abdominal pain that began in March 2017. She has been seen by 2 GI providers (one of them was Dr Morfin), with GI evaluation initiated as an outpatient. She reports dyspepsia with intermittent nausea that is mildly controlled with Zofran. She was treated with Rifaximin for SIBO, and also underwent an MRI Abdomen in 3/2017 that was reportedly unremarkable. She mentions that she is not able to obtain an EGD or Colonoscopy due to autonomic dysregulation. Her GI symptoms have worsened over the past month, and she reports a few episodes of clear emesis. She continues to have regular bowel movements without diarrhea or hematochezia. On 12/15, she had acute abdominal pain which changed locations, and often localized to the RLQ. This pain was constant, 7/10 in severity, and mildly alleviated by laying in the fetal position. In the ED, she had a CT that showed mildly enlarged appendix to 8mm, and ?? underdistension vs thickening of the transverse colon.     PAST MEDICAL & SURGICAL HISTORY:  Idiopathic thrombocytopenia purpura  Anxiety  POTS (postural orthostatic tachycardia syndrome)  History of ITP  Labyrinthitis  Headache  HTN (hypertension)  Anxiety  IBS (irritable bowel syndrome)    REVIEW OF SYSTEMS: negative except as per the HPI.    MEDICATIONS  (STANDING):  busPIRone 2.5 milliGRAM(s) Oral two times a day  diltiazem    milliGRAM(s) Oral <User Schedule>  docusate sodium 100 milliGRAM(s) Oral three times a day  famotidine    Tablet 20 milliGRAM(s) Oral two times a day  lactated ringers. 1000 milliLiter(s) (75 mL/Hr) IV Continuous <Continuous>  LORazepam     Tablet 2 milliGRAM(s) Oral every 4 hours  montelukast 10 milliGRAM(s) Oral at bedtime  ondansetron   Disintegrating Tablet 4 milliGRAM(s) Oral every 6 hours  simethicone 160 milliGRAM(s) Chew three times a day  sodium chloride 0.9%. 1000 milliLiter(s) (125 mL/Hr) IV Continuous <Continuous>    MEDICATIONS  (PRN):  acetaminophen   Tablet. 650 milliGRAM(s) Oral every 6 hours PRN Mild Pain (1 - 3)    ALLERGIES:  metoprolol (Headache)    SOCIAL HISTORY:  - Alcohol: denies  - Recreational drug use: denies    FAMILY HISTORY:  No pertinent family history in first degree relatives  Family history of hypertension (Grandparent)  Family history of atrial fibrillation  Family history of prostate cancer in father    Vital Signs Last 24 Hrs  T(C): 36.8 (18 Dec 2017 08:49), Max: 36.9 (17 Dec 2017 16:40)  T(F): 98.3 (18 Dec 2017 08:49), Max: 98.4 (17 Dec 2017 16:40)  HR: 76 (18 Dec 2017 08:49) (64 - 85)  BP: 122/85 (18 Dec 2017 08:49) (100/66 - 127/85)  BP(mean): --  RR: 18 (18 Dec 2017 08:49) (18 - 18)  SpO2: 100% (18 Dec 2017 08:49) (95% - 100%)    PHYSICAL EXAM:  Constitutional: no acute distress  Eyes: no icterus  Neck: no masses, no LAD  Respiratory: normal inspiratory effort; no wheezing or crackles  Cardiovascular: RRR, normal S1/S2, no murmurs/rubs/gallops  Gastrointestinal: soft, nondistended, nontender, +BS  Extremities: no LE edema  Neurological: AAOx3, no asterixis  Skin: no rashes, bruises, petechiae    LABS:                        11.0   4.60  )-----------( 68       ( 18 Dec 2017 08:39 )             33.2     141  |  109<H>  |  2<L>  ----------------------------<  80  3.6   |  21<L>  |  0.84    Ca    8.8      18 Dec 2017 09:41  Phos  4.2     12-18  Mg     1.8     12-18    TPro  8.0  /  Alb  4.9  /  TBili  0.3  /  DBili  x   /  AST  19  /  ALT  12  /  AlkPhos  87  12-16  LIVER FUNCTIONS - ( 16 Dec 2017 22:43 )  Alb: 4.9 g/dL / Pro: 8.0 g/dL / ALK PHOS: 87 U/L / ALT: 12 U/L RC / AST: 19 U/L / GGT: x

## 2017-12-19 LAB
ANION GAP SERPL CALC-SCNC: 12 MMOL/L — SIGNIFICANT CHANGE UP (ref 5–17)
BUN SERPL-MCNC: 3 MG/DL — LOW (ref 7–23)
CALCIUM SERPL-MCNC: 9 MG/DL — SIGNIFICANT CHANGE UP (ref 8.4–10.5)
CHLORIDE SERPL-SCNC: 108 MMOL/L — SIGNIFICANT CHANGE UP (ref 96–108)
CO2 SERPL-SCNC: 24 MMOL/L — SIGNIFICANT CHANGE UP (ref 22–31)
CREAT SERPL-MCNC: 0.86 MG/DL — SIGNIFICANT CHANGE UP (ref 0.5–1.3)
GLUCOSE BLDC GLUCOMTR-MCNC: 110 MG/DL — HIGH (ref 70–99)
GLUCOSE BLDC GLUCOMTR-MCNC: 98 MG/DL — SIGNIFICANT CHANGE UP (ref 70–99)
GLUCOSE SERPL-MCNC: 85 MG/DL — SIGNIFICANT CHANGE UP (ref 70–99)
HCT VFR BLD CALC: 32.4 % — LOW (ref 34.5–45)
HGB BLD-MCNC: 10.9 G/DL — LOW (ref 11.5–15.5)
MAGNESIUM SERPL-MCNC: 1.9 MG/DL — SIGNIFICANT CHANGE UP (ref 1.6–2.6)
MCHC RBC-ENTMCNC: 28.7 PG — SIGNIFICANT CHANGE UP (ref 27–34)
MCHC RBC-ENTMCNC: 33.6 GM/DL — SIGNIFICANT CHANGE UP (ref 32–36)
MCV RBC AUTO: 85.3 FL — SIGNIFICANT CHANGE UP (ref 80–100)
PHOSPHATE SERPL-MCNC: 4.7 MG/DL — HIGH (ref 2.5–4.5)
PLATELET # BLD AUTO: 58 K/UL — LOW (ref 150–400)
POTASSIUM SERPL-MCNC: 4.4 MMOL/L — SIGNIFICANT CHANGE UP (ref 3.5–5.3)
POTASSIUM SERPL-SCNC: 4.4 MMOL/L — SIGNIFICANT CHANGE UP (ref 3.5–5.3)
RBC # BLD: 3.8 M/UL — SIGNIFICANT CHANGE UP (ref 3.8–5.2)
RBC # FLD: 12.6 % — SIGNIFICANT CHANGE UP (ref 10.3–14.5)
SODIUM SERPL-SCNC: 144 MMOL/L — SIGNIFICANT CHANGE UP (ref 135–145)
WBC # BLD: 4.24 K/UL — SIGNIFICANT CHANGE UP (ref 3.8–10.5)
WBC # FLD AUTO: 4.24 K/UL — SIGNIFICANT CHANGE UP (ref 3.8–10.5)

## 2017-12-19 PROCEDURE — 99232 SBSQ HOSP IP/OBS MODERATE 35: CPT | Mod: GC

## 2017-12-19 RX ORDER — ONDANSETRON 8 MG/1
4 TABLET, FILM COATED ORAL EVERY 4 HOURS
Qty: 0 | Refills: 0 | Status: DISCONTINUED | OUTPATIENT
Start: 2017-12-19 | End: 2017-12-26

## 2017-12-19 RX ADMIN — Medication 2 MILLIGRAM(S): at 00:30

## 2017-12-19 RX ADMIN — Medication 100 MILLIGRAM(S): at 14:30

## 2017-12-19 RX ADMIN — ONDANSETRON 4 MILLIGRAM(S): 8 TABLET, FILM COATED ORAL at 05:04

## 2017-12-19 RX ADMIN — Medication 2 MILLIGRAM(S): at 14:30

## 2017-12-19 RX ADMIN — Medication 100 MILLIGRAM(S): at 23:39

## 2017-12-19 RX ADMIN — SIMETHICONE 160 MILLIGRAM(S): 80 TABLET, CHEWABLE ORAL at 07:06

## 2017-12-19 RX ADMIN — Medication 2 MILLIGRAM(S): at 18:24

## 2017-12-19 RX ADMIN — SIMETHICONE 160 MILLIGRAM(S): 80 TABLET, CHEWABLE ORAL at 14:30

## 2017-12-19 RX ADMIN — Medication 2 MILLIGRAM(S): at 22:26

## 2017-12-19 RX ADMIN — FAMOTIDINE 20 MILLIGRAM(S): 10 INJECTION INTRAVENOUS at 07:06

## 2017-12-19 RX ADMIN — Medication 240 MILLIGRAM(S): at 18:07

## 2017-12-19 RX ADMIN — ONDANSETRON 4 MILLIGRAM(S): 8 TABLET, FILM COATED ORAL at 00:30

## 2017-12-19 RX ADMIN — ONDANSETRON 4 MILLIGRAM(S): 8 TABLET, FILM COATED ORAL at 17:31

## 2017-12-19 RX ADMIN — SIMETHICONE 160 MILLIGRAM(S): 80 TABLET, CHEWABLE ORAL at 23:39

## 2017-12-19 RX ADMIN — Medication 2.5 MILLIGRAM(S): at 12:12

## 2017-12-19 RX ADMIN — Medication 2 MILLIGRAM(S): at 05:20

## 2017-12-19 RX ADMIN — Medication 100 MILLIGRAM(S): at 07:06

## 2017-12-19 RX ADMIN — MONTELUKAST 10 MILLIGRAM(S): 4 TABLET, CHEWABLE ORAL at 20:13

## 2017-12-19 RX ADMIN — ONDANSETRON 4 MILLIGRAM(S): 8 TABLET, FILM COATED ORAL at 10:01

## 2017-12-19 RX ADMIN — Medication 2 MILLIGRAM(S): at 10:20

## 2017-12-19 RX ADMIN — FAMOTIDINE 20 MILLIGRAM(S): 10 INJECTION INTRAVENOUS at 20:13

## 2017-12-19 NOTE — PROGRESS NOTE ADULT - SUBJECTIVE AND OBJECTIVE BOX
DX: RLQ ABD pain    Vital Signs Last 24 Hrs  T(C): 36.9 (19 Dec 2017 09:17), Max: 37.2 (18 Dec 2017 13:57)  T(F): 98.5 (19 Dec 2017 09:17), Max: 98.9 (18 Dec 2017 13:57)  HR: 81 (19 Dec 2017 09:17) (62 - 81)  BP: 123/85 (19 Dec 2017 09:17) (103/70 - 127/80)  BP(mean): --  RR: 18 (19 Dec 2017 09:17) (18 - 18)  SpO2: 98% (19 Dec 2017 09:17) (98% - 100%)    I&O's Summary    18 Dec 2017 07:01  -  19 Dec 2017 07:00  --------------------------------------------------------  IN: 3640 mL / OUT: 3800 mL / NET: -160 mL    19 Dec 2017 07:01  -  19 Dec 2017 12:13  --------------------------------------------------------  IN: 0 mL / OUT: 625 mL / NET: -625 mL        SUBJECTIVE: Pt seen and examined. Tolerating diet. Ambulating well. Pain much improved. Passing flatus.       Physical Exam:  General Appearance: Appears well, NAD  Abdomen: Soft, ND, mild RLQ tenderness  Extremities: Grossly symmetric, SCD's in place     LABS:                        10.9   4.24  )-----------( 58       ( 19 Dec 2017 08:54 )             32.4     12-19    144  |  108  |  3<L>  ----------------------------<  85  4.4   |  24  |  0.86    Ca    9.0      19 Dec 2017 08:54  Phos  4.7     12-19  Mg     1.9     12-19            Sylvia Edward PA-C  p#[id number]

## 2017-12-19 NOTE — PROVIDER CONTACT NOTE (MEDICATION) - SITUATION
Pt refused Rifaximin. Pt stated it made her stomach upset and didn't want to make it worse by taking more antibiotics

## 2017-12-19 NOTE — PROGRESS NOTE ADULT - ATTENDING COMMENTS
As above.  Patient notes she is allergic to eggs, which is part of the standard gastric emptying study.  There is the option of a liquid gastric emptying study.  Will need to discuss with nuclear medicine physician.

## 2017-12-19 NOTE — PROGRESS NOTE ADULT - ASSESSMENT
40 y/o F with hx of ITP, Uterine Fibroids here for acute on chronic abdominal pain.    Impression:  1) Nausea & Abdominal Pain: initially in the RLQ, with ?? appendiceal enlargement suggestive of acute appendicitis but patient does not have peritoneal signs. May consider other etiologies of pain including endometriosis and inflammatory bowel disease. Pt does also have POTS which has been associated with GI symptoms including nausea, abdominal cramps, bloating, early satiety and diarrhea. Consider gastroparesis or SIBO as causing nausea and abdominal pain.  2) ITP    Plan:  - Recommend MR Enterography to evaluate the GI tract  - Recommend Gastric Emptying Study to evaluate for gastroparesis (will need to discuss with Nuclear Medicine Dept about medications that need to be held prior to test such as Ativan)  - Begin Rifaximin 550mg TID for empiric treatment of SIBO.  - continue supportive care  - Pt will need continued followup with GI as outpatient  - Outpatient GI followup with Dr. Leandro Perez / GI motility specialist

## 2017-12-19 NOTE — CONSULT NOTE ADULT - SUBJECTIVE AND OBJECTIVE BOX
This is a 40 y/o F with hx of Uterine Fibroids, ITP, Autonomic Dysregulation/POTS. She has a history of abdominal pain that began in March 2017. She has been seen by 2 GI providers ), with GI evaluation initiated as an outpatient. She reports dyspepsia with intermittent nausea that is mildly controlled with Zofran. She was treated with Rifaximin for SIBO, and also underwent an MRI Abdomen in 3/2017 that was reportedly ok  She mentions that she is not able to obtain an EGD or Colonoscopy due to autonomic dysregulation. Her GI symptoms have worsened over the past month, and she reports a few episodes of clear emesis. She continues to have regular bowel movements without diarrhea or hematochezia. On 12/15, she had acute abdominal pain , and often localized to the RLQ. This pain was constant, 7/10 in severity, and mildly alleviated in different position. In the ED, she had a CT that showed mildly enlarged appendix to 8mm, and ?? underdistension vs thickening of the transverse colon. seen by sx and stated no signs of acute appendicitis             INTERVAL HPI/OVERNIGHT EVENTS:    Medications:MEDICATIONS  (STANDING):  busPIRone 2.5 milliGRAM(s) Oral two times a day  diltiazem    milliGRAM(s) Oral <User Schedule>  docusate sodium 100 milliGRAM(s) Oral three times a day  famotidine    Tablet 20 milliGRAM(s) Oral two times a day  LORazepam     Tablet 2 milliGRAM(s) Oral every 4 hours  montelukast 10 milliGRAM(s) Oral at bedtime  ondansetron   Disintegrating Tablet 4 milliGRAM(s) Oral every 4 hours  potassium chloride    Tablet ER 40 milliEquivalent(s) Oral once  rifaximin 550 milliGRAM(s) Oral three times a day  simethicone 160 milliGRAM(s) Chew three times a day  sodium chloride 0.9%. 1000 milliLiter(s) (125 mL/Hr) IV Continuous <Continuous>    MEDICATIONS  (PRN):  acetaminophen   Tablet. 650 milliGRAM(s) Oral every 6 hours PRN Mild Pain (1 - 3)      Allergies: Allergies    beta blockers (Unknown)  Lexapro (Unknown)    Intolerances    metoprolol (Headache)        FAMILY HISTORY:  No pertinent family history in first degree relatives  Family history of hypertension (Grandparent)  Family history of atrial fibrillation  Family history of prostate cancer in father        PAST MEDICAL & SURGICAL HISTORY:  Idiopathic thrombocytopenia purpura  Anxiety  POTS (postural orthostatic tachycardia syndrome)  History of ITP  Labyrinthitis  Headache  HTN (hypertension)  Anxiety  IBS (irritable bowel syndrome)  No significant past surgical history  No significant past surgical history      REVIEW OF SYSTEMS:  CONSTITUTIONAL: No fever, weight loss, or fatigue  EYES: No eye pain, visual disturbances, or discharge  ENMT:  No difficulty hearing, tinnitus, vertigo; No sinus or throat pain  NECK: No pain or stiffness  BREASTS: No pain, masses, or nipple discharge  RESPIRATORY: No cough, wheezing, chills or hemoptysis; No shortness of breath  CARDIOVASCULAR: No chest pain, palpitations, dizziness, or leg swelling  GASTROINTESTINAL: No abdominal or epigastric pain. No nausea, vomiting, or hematemesis; No diarrhea or constipation. No melena or hematochezia.  GENITOURINARY: No dysuria, frequency, hematuria, or incontinence  NEUROLOGICAL: No headaches, memory loss, loss of strength, numbness, or tremors  SKIN: No itching, burning, rashes, or lesions   LYMPH NODES: No enlarged glands  ENDOCRINE: No heat or cold intolerance; No hair loss  MUSCULOSKELETAL: No joint pain or swelling; No muscle, back, or extremity pain  PSYCHIATRIC: No depression, anxiety, mood swings, or difficulty sleeping  HEME/LYMPH: No easy bruising, or bleeding gums  ALLERY AND IMMUNOLOGIC: No hives or eczema    T(C): 37.2 (12-19-17 @ 14:05), Max: 37.2 (12-19-17 @ 14:05)  HR: 77 (12-19-17 @ 14:05) (62 - 81)  BP: 120/81 (12-19-17 @ 14:05) (103/70 - 123/85)  RR: 18 (12-19-17 @ 14:05) (18 - 18)  SpO2: 100% (12-19-17 @ 14:05) (98% - 100%)  Wt(kg): --Vital Signs Last 24 Hrs  T(C): 37.2 (19 Dec 2017 14:05), Max: 37.2 (19 Dec 2017 14:05)  T(F): 98.9 (19 Dec 2017 14:05), Max: 98.9 (19 Dec 2017 14:05)  HR: 77 (19 Dec 2017 14:05) (62 - 81)  BP: 120/81 (19 Dec 2017 14:05) (103/70 - 123/85)  BP(mean): --  RR: 18 (19 Dec 2017 14:05) (18 - 18)  SpO2: 100% (19 Dec 2017 14:05) (98% - 100%)  I&O's Summary    18 Dec 2017 07:01  -  19 Dec 2017 07:00  --------------------------------------------------------  IN: 3640 mL / OUT: 3800 mL / NET: -160 mL    19 Dec 2017 07:01  -  19 Dec 2017 17:50  --------------------------------------------------------  IN: 280 mL / OUT: 625 mL / NET: -345 mL        PHYSICAL EXAM:  GENERAL: NAD, well-groomed, well-developed  HEAD:  Atraumatic, Normocephalic  EYES: EOMI, PERRLA, conjunctiva and sclera clear  ENMT: No tonsillar erythema, exudates, or enlargement; Moist mucous membranes, Good dentition, No lesions  NECK: Supple, No JVD, Normal thyroid  NERVOUS SYSTEM:  Alert & Oriented X3, Good concentration; Motor Strength 5/5 B/L upper and lower extremities; DTRs 2+ intact and symmetric  CHEST/LUNG: Clear to percussion bilaterally; No rales, rhonchi, wheezing, or rubs  HEART: Regular rate and rhythm; No murmurs, rubs, or gallops  ABDOMEN: Soft, mild tenderness diffuse  Nondistended; Bowel sounds dec   EXTREMITIES:  2+ Peripheral Pulses, No clubbing, cyanosis, or edema  LYMPH: No lymphadenopathy noted  SKIN: No rashes or lesions    Consultant(s) Notes Reviewed:  [x ] YES  [ ] NO  Care Discussed with Consultants/Other Providerscpk [ x] YES  [ ] NO    LABS:                    CBC Full  -  ( 19 Dec 2017 08:54 )  WBC Count : 4.24 K/uL  Hemoglobin : 10.9 g/dL  Hematocrit : 32.4 %  Platelet Count - Automated : 58 K/uL  Mean Cell Volume : 85.3 fl  Mean Cell Hemoglobin : 28.7 pg  Mean Cell Hemoglobin Concentration : 33.6 gm/dL  Auto Neutrophil # : x  Auto Lymphocyte # : x  Auto Monocyte # : x  Auto Eosinophil # : x  Auto Basophil # : x  Auto Neutrophil % : x  Auto Lymphocyte % : x  Auto Monocyte % : x  Auto Eosinophil % : x  Auto Basophil % : x      12-19    144  |  108  |  3<L>  ----------------------------<  85  4.4   |  24  |  0.86    Ca    9.0      19 Dec 2017 08:54  Phos  4.7     12-19  Mg     1.9     12-19              RADIOLOGY & ADDITIONAL TESTS:    Imaging Personally Reviewed:  [ ] YES  [ ] NO

## 2017-12-19 NOTE — CONSULT NOTE ADULT - ASSESSMENT
pt w/ mult medical problems with abd pain   seen by gi    mri enterography / gastric emptying study ordered  cont diet as per gi   sx f/u noted  transfer to medicine  heme eval   oob  iv fluids  cont current meds  rifaximin  started by gi

## 2017-12-19 NOTE — PROGRESS NOTE ADULT - SUBJECTIVE AND OBJECTIVE BOX
SUBJECTIVE:  - Pt reports intermittent nausea  - Her abdominal pain is much improved  - No fever or chills    OBJECTIVE:    Vital Signs Last 24 Hrs  T(C): 36.9 (19 Dec 2017 09:17), Max: 37.2 (18 Dec 2017 13:57)  T(F): 98.5 (19 Dec 2017 09:17), Max: 98.9 (18 Dec 2017 13:57)  HR: 81 (19 Dec 2017 09:17) (62 - 81)  BP: 123/85 (19 Dec 2017 09:17) (103/70 - 127/80)  BP(mean): --  RR: 18 (19 Dec 2017 09:17) (18 - 18)  SpO2: 98% (19 Dec 2017 09:17) (98% - 100%)    PHYSICAL EXAM:  Constitutional: no acute distress  Eyes: no icterus  Neck: no masses, no LAD  Respiratory: normal inspiratory effort; no wheezing or crackles  Cardiovascular: RRR, normal S1/S2, no murmurs/rubs/gallops  Gastrointestinal: soft, nondistended, nontender, +BS  Extremities: no LE edema  Neurological: AAOx3, no asterixis  Skin: no rashes, bruises, petechiae    LABS:                        10.9   4.24  )-----------( 58       ( 19 Dec 2017 08:54 )             32.4     144  |  108  |  3<L>  ----------------------------<  85  4.4   |  24  |  0.86    Ca    9.0      19 Dec 2017 08:54  Phos  4.7     12-19  Mg     1.9     12-19

## 2017-12-19 NOTE — PROVIDER CONTACT NOTE (OTHER) - SITUATION
Pt requesting telemetry monitoring "I became tachycardic after having a bowel movement and because of my POTS"

## 2017-12-20 ENCOUNTER — APPOINTMENT (OUTPATIENT)
Dept: CARDIOLOGY | Facility: CLINIC | Age: 42
End: 2017-12-20

## 2017-12-20 LAB
ALBUMIN SERPL ELPH-MCNC: 4.6 G/DL — SIGNIFICANT CHANGE UP (ref 3.3–5)
ALP SERPL-CCNC: 72 U/L — SIGNIFICANT CHANGE UP (ref 40–120)
ALT FLD-CCNC: 10 U/L RC — SIGNIFICANT CHANGE UP (ref 10–45)
ANION GAP SERPL CALC-SCNC: 13 MMOL/L — SIGNIFICANT CHANGE UP (ref 5–17)
AST SERPL-CCNC: 18 U/L — SIGNIFICANT CHANGE UP (ref 10–40)
BILIRUB SERPL-MCNC: 0.2 MG/DL — SIGNIFICANT CHANGE UP (ref 0.2–1.2)
BUN SERPL-MCNC: <4 MG/DL — LOW (ref 7–23)
CALCIUM SERPL-MCNC: 9.4 MG/DL — SIGNIFICANT CHANGE UP (ref 8.4–10.5)
CHLORIDE SERPL-SCNC: 105 MMOL/L — SIGNIFICANT CHANGE UP (ref 96–108)
CO2 SERPL-SCNC: 25 MMOL/L — SIGNIFICANT CHANGE UP (ref 22–31)
CREAT SERPL-MCNC: 0.79 MG/DL — SIGNIFICANT CHANGE UP (ref 0.5–1.3)
CRP SERPL-MCNC: <0.2 MG/DL — SIGNIFICANT CHANGE UP (ref 0–0.4)
ERYTHROCYTE [SEDIMENTATION RATE] IN BLOOD: 6 MM/HR — SIGNIFICANT CHANGE UP (ref 0–15)
GLUCOSE BLDC GLUCOMTR-MCNC: 108 MG/DL — HIGH (ref 70–99)
GLUCOSE BLDC GLUCOMTR-MCNC: 124 MG/DL — HIGH (ref 70–99)
GLUCOSE SERPL-MCNC: 116 MG/DL — HIGH (ref 70–99)
HCT VFR BLD CALC: 36.8 % — SIGNIFICANT CHANGE UP (ref 34.5–45)
HGB BLD-MCNC: 13 G/DL — SIGNIFICANT CHANGE UP (ref 11.5–15.5)
MAGNESIUM SERPL-MCNC: 1.8 MG/DL — SIGNIFICANT CHANGE UP (ref 1.6–2.6)
MCHC RBC-ENTMCNC: 30.9 PG — SIGNIFICANT CHANGE UP (ref 27–34)
MCHC RBC-ENTMCNC: 35.2 GM/DL — SIGNIFICANT CHANGE UP (ref 32–36)
MCV RBC AUTO: 87.6 FL — SIGNIFICANT CHANGE UP (ref 80–100)
PHOSPHATE SERPL-MCNC: 3.2 MG/DL — SIGNIFICANT CHANGE UP (ref 2.5–4.5)
PLATELET # BLD AUTO: 55 K/UL — LOW (ref 150–400)
POTASSIUM SERPL-MCNC: 3.8 MMOL/L — SIGNIFICANT CHANGE UP (ref 3.5–5.3)
POTASSIUM SERPL-SCNC: 3.8 MMOL/L — SIGNIFICANT CHANGE UP (ref 3.5–5.3)
PROT SERPL-MCNC: 6.9 G/DL — SIGNIFICANT CHANGE UP (ref 6–8.3)
RBC # BLD: 4.2 M/UL — SIGNIFICANT CHANGE UP (ref 3.8–5.2)
RBC # FLD: 11.6 % — SIGNIFICANT CHANGE UP (ref 10.3–14.5)
SODIUM SERPL-SCNC: 143 MMOL/L — SIGNIFICANT CHANGE UP (ref 135–145)
WBC # BLD: 5 K/UL — SIGNIFICANT CHANGE UP (ref 3.8–10.5)
WBC # FLD AUTO: 5 K/UL — SIGNIFICANT CHANGE UP (ref 3.8–10.5)

## 2017-12-20 PROCEDURE — 74185 MRA ABD W OR W/O CNTRST: CPT | Mod: 26

## 2017-12-20 PROCEDURE — 99232 SBSQ HOSP IP/OBS MODERATE 35: CPT | Mod: GC

## 2017-12-20 PROCEDURE — 93010 ELECTROCARDIOGRAM REPORT: CPT

## 2017-12-20 PROCEDURE — 78264 GASTRIC EMPTYING IMG STUDY: CPT | Mod: 26

## 2017-12-20 RX ORDER — ACETAMINOPHEN 500 MG
650 TABLET ORAL ONCE
Qty: 0 | Refills: 0 | Status: COMPLETED | OUTPATIENT
Start: 2017-12-20 | End: 2017-12-20

## 2017-12-20 RX ADMIN — SIMETHICONE 160 MILLIGRAM(S): 80 TABLET, CHEWABLE ORAL at 14:04

## 2017-12-20 RX ADMIN — Medication 2.5 MILLIGRAM(S): at 11:26

## 2017-12-20 RX ADMIN — Medication 2 MILLIGRAM(S): at 09:52

## 2017-12-20 RX ADMIN — Medication 100 MILLIGRAM(S): at 06:28

## 2017-12-20 RX ADMIN — Medication 100 MILLIGRAM(S): at 14:04

## 2017-12-20 RX ADMIN — Medication 2.5 MILLIGRAM(S): at 00:15

## 2017-12-20 RX ADMIN — Medication 2 MILLIGRAM(S): at 18:40

## 2017-12-20 RX ADMIN — ONDANSETRON 4 MILLIGRAM(S): 8 TABLET, FILM COATED ORAL at 18:20

## 2017-12-20 RX ADMIN — Medication 2 MILLIGRAM(S): at 14:03

## 2017-12-20 RX ADMIN — FAMOTIDINE 20 MILLIGRAM(S): 10 INJECTION INTRAVENOUS at 10:10

## 2017-12-20 RX ADMIN — Medication 2 MILLIGRAM(S): at 05:43

## 2017-12-20 RX ADMIN — SODIUM CHLORIDE 125 MILLILITER(S): 9 INJECTION INTRAMUSCULAR; INTRAVENOUS; SUBCUTANEOUS at 05:44

## 2017-12-20 RX ADMIN — SIMETHICONE 160 MILLIGRAM(S): 80 TABLET, CHEWABLE ORAL at 06:28

## 2017-12-20 NOTE — PROGRESS NOTE ADULT - ASSESSMENT
pt w/ mult medical problems with abd pain   seen by gi    mri enterography / gastric emptying study ordered  cont diet as per gi   sx f/u noted  transfer to medicine  heme eval   oob  iv fluids  cont current meds  rifaximin  started by gi but not well tolerated by pt, refusing to take at current dose  awaiting MR enterography

## 2017-12-20 NOTE — PROGRESS NOTE ADULT - SUBJECTIVE AND OBJECTIVE BOX
SUBJECTIVE:  - Pt declined Rifaximin last night  - No fever or chills  - Reports to be tachycardic during bowel movement last night    OBJECTIVE:    Vital Signs Last 24 Hrs  T(C): 36.5 (20 Dec 2017 05:57), Max: 37.7 (20 Dec 2017 01:00)  T(F): 97.7 (20 Dec 2017 05:57), Max: 99.9 (20 Dec 2017 01:00)  HR: 72 (20 Dec 2017 05:57) (61 - 82)  BP: 123/82 (20 Dec 2017 05:57) (106/74 - 142/96)  BP(mean): --  RR: 18 (20 Dec 2017 05:57) (16 - 18)  SpO2: 100% (20 Dec 2017 05:57) (97% - 100%)    PHYSICAL EXAM:  Constitutional: no acute distress  Eyes: no icterus  Neck: no masses, no LAD  Respiratory: normal inspiratory effort; no wheezing or crackles  Cardiovascular: RRR, normal S1/S2, no murmurs/rubs/gallops  Gastrointestinal: soft, nondistended, nontender, +BS  Extremities: no LE edema  Neurological: AAOx3, no asterixis  Skin: no rashes, bruises, petechiae    LABS:                        10.9   4.24  )-----------( 58       ( 19 Dec 2017 08:54 )             32.4     144  |  108  |  3<L>  ----------------------------<  85  4.4   |  24  |  0.86    Ca    9.0      19 Dec 2017 08:54  Phos  4.7     12-19  Mg     1.9     12-19

## 2017-12-20 NOTE — PROGRESS NOTE ADULT - ASSESSMENT
40 y/o F with hx of ITP, Uterine Fibroids here for acute on chronic abdominal pain.    Impression:  1) Nausea & Abdominal Pain: initially in the RLQ, with ?? appendiceal enlargement suggestive of acute appendicitis but patient does not have peritoneal signs. May consider other etiologies of pain including endometriosis and inflammatory bowel disease. Pt does also have POTS which has been associated with GI symptoms including nausea, abdominal cramps, bloating, early satiety and diarrhea. Consider gastroparesis or SIBO as causing nausea and abdominal pain.  2) ITP    Plan:  - F/u MR Enterography to evaluate the GI tract  - F/u Liquid Gastric Emptying Study to evaluate for gastroparesis (will need to discuss with Nuclear Medicine Dept about medications that need to be held prior to test such as Ativan)  - If patient is amenable, may continue Rifaximin 550mg TID for empiric treatment of SIBO.  - continue supportive care  - Pt will need continued followup with GI as outpatient  - Outpatient GI followup with Dr. Leandro Perez / GI motility specialist

## 2017-12-20 NOTE — PROGRESS NOTE ADULT - SUBJECTIVE AND OBJECTIVE BOX
Chief complaint: RLQ abdominal pain    Interval hx: pain about same, no events    INTERVAL HPI/OVERNIGHT EVENTS:    Allergies: Allergies    beta blockers (Unknown)  Lexapro (Unknown)    Intolerances    metoprolol (Headache)    FAMILY HISTORY:  No pertinent family history in first degree relatives  Family history of hypertension (Grandparent)  Family history of atrial fibrillation  Family history of prostate cancer in father      PAST MEDICAL & SURGICAL HISTORY:  Idiopathic thrombocytopenia purpura  Anxiety  POTS (postural orthostatic tachycardia syndrome)  History of ITP  Labyrinthitis  Headache  HTN (hypertension)  Anxiety  IBS (irritable bowel syndrome)  No significant past surgical history  No significant past surgical history      REVIEW OF SYSTEMS:  CONSTITUTIONAL: No fever, weight loss, or fatigue  EYES: No eye pain, visual disturbances, or discharge  ENMT:  No difficulty hearing, tinnitus, vertigo; No sinus or throat pain  NECK: No pain or stiffness  BREASTS: No pain, masses, or nipple discharge  RESPIRATORY: No cough, wheezing, chills or hemoptysis; No shortness of breath  CARDIOVASCULAR: No chest pain, palpitations, dizziness, or leg swelling  GASTROINTESTINAL: + RLQ abdominal pain. No nausea, vomiting, or hematemesis; No diarrhea or constipation. No melena or hematochezia.  GENITOURINARY: No dysuria, frequency, hematuria, or incontinence  NEUROLOGICAL: No headaches, memory loss, loss of strength, numbness, or tremors  SKIN: No itching, burning, rashes, or lesions   LYMPH NODES: No enlarged glands  ENDOCRINE: No heat or cold intolerance; No hair loss  MUSCULOSKELETAL: No joint pain or swelling; No muscle, back, or extremity pain  PSYCHIATRIC: No depression, anxiety, mood swings, or difficulty sleeping  HEME/LYMPH: No easy bruising, or bleeding gums  ALLERY AND IMMUNOLOGIC: No hives or eczema      Medications:  MEDICATIONS  (STANDING):  busPIRone 2.5 milliGRAM(s) Oral two times a day  diltiazem    milliGRAM(s) Oral <User Schedule>  docusate sodium 100 milliGRAM(s) Oral three times a day  famotidine    Tablet 20 milliGRAM(s) Oral two times a day  LORazepam     Tablet 2 milliGRAM(s) Oral every 4 hours  montelukast 10 milliGRAM(s) Oral at bedtime  ondansetron   Disintegrating Tablet 4 milliGRAM(s) Oral every 4 hours  potassium chloride    Tablet ER 40 milliEquivalent(s) Oral once  rifaximin 550 milliGRAM(s) Oral three times a day  simethicone 160 milliGRAM(s) Chew three times a day  sodium chloride 0.9%. 1000 milliLiter(s) (125 mL/Hr) IV Continuous <Continuous>    MEDICATIONS  (PRN):  acetaminophen   Tablet. 650 milliGRAM(s) Oral every 6 hours PRN Mild Pain (1 - 3)    	    PHYSICAL EXAM:  T(C): 37.2 (12-20-17 @ 18:19), Max: 37.7 (12-20-17 @ 01:00)  HR: 82 (12-20-17 @ 18:19) (61 - 82)  BP: 138/93 (12-20-17 @ 18:19) (123/82 - 141/82)  RR: 16 (12-20-17 @ 18:19) (16 - 18)  SpO2: 99% (12-20-17 @ 18:19) (97% - 100%)  Wt(kg): --  I&O's Summary    19 Dec 2017 07:01  -  20 Dec 2017 07:00  --------------------------------------------------------  IN: 3920 mL / OUT: 1926 mL / NET: 1994 mL    20 Dec 2017 07:01  -  20 Dec 2017 22:38  --------------------------------------------------------  IN: 1720 mL / OUT: 800 mL / NET: 920 mL        PHYSICAL EXAM:  GENERAL: NAD, well-groomed, well-developed  HEAD:  Atraumatic, Normocephalic  EYES: EOMI, PERRLA, conjunctiva and sclera clear  ENMT: No tonsillar erythema, exudates, or enlargement; Moist mucous membranes, Good dentition, No lesions  NECK: Supple, No JVD, Normal thyroid  NERVOUS SYSTEM:  Alert & Oriented X3, Good concentration; Motor Strength 5/5 B/L upper and lower extremities; DTRs 2+ intact and symmetric  CHEST/LUNG: Clear to percussion bilaterally; No rales, rhonchi, wheezing, or rubs  HEART: Regular rate and rhythm; No murmurs, rubs, or gallops  ABDOMEN: Soft, mild tenderness diffuse, mostly RLQ,  Nondistended; Bowel sounds dec   EXTREMITIES:  2+ Peripheral Pulses, No clubbing, cyanosis, or edema  LYMPH: No lymphadenopathy noted  SKIN: No rashes or lesions    LABS:	 	    CARDIAC MARKERS:                                13.0   5.0   )-----------( 55       ( 20 Dec 2017 11:28 )             36.8     12-20    143  |  105  |  <4<L>  ----------------------------<  116<H>  3.8   |  25  |  0.79    Ca    9.4      20 Dec 2017 11:28  Phos  3.2     12-20  Mg     1.8     12-20    TPro  6.9  /  Alb  4.6  /  TBili  0.2  /  DBili  x   /  AST  18  /  ALT  10  /  AlkPhos  72  12-20    proBNP:   Lipid Profile:   HgA1c:   TSH:

## 2017-12-20 NOTE — PROGRESS NOTE ADULT - ATTENDING COMMENTS
As above.  Abnormal liquid gastric emptying study consistent with gastroparesis.  Awaiting MRenterography.

## 2017-12-20 NOTE — CHART NOTE - NSCHARTNOTEFT_GEN_A_CORE
RRT called in MRI for tachycardia s/p contrast administration      This is a 41y old female with PMHx of  fibroid uterus, POTS, autonomic dysregulation, and anxiety presented with acute onset abdominal pain. CT showed mildly enlarged appendix, under-distension vs thickening of the transverse colon, surgery consulted and stated no signs of acute appendicitis. Probability of gastroparesis or inflammatory bowel disease. As per MRI team pt  became tachycardic s/p contrast administration during MR enterography     On the arrival of RRT team, pt placed on cardiac monitor with HR of 80-90s and initial BP in 95/40 and 2nd BP in 151/110 without any intervention. Pt very anxious, c/o head pressure and mild itchiness, gave benadryl 25mg IV x 1 and started NS IV and transferred to 2 [facility]. Recommended EKG, BMP and cardiac enzymes. Pt's VS on the floor is HR = 74, BP = 145/93, Temp = 97.7 and 02sat 99% on RA. Pt hemodynamically stable, gave report to medicine NP.

## 2017-12-20 NOTE — CHART NOTE - NSCHARTNOTEFT_GEN_A_CORE
Pt s/p RRT at MRI after MR enterography.   Pt was RRT'ed for ? tachycardia. But upon placed on telemetry, pt in SR in 80's. Initally BP was 95/40, but repeat /70 without any interventions. Pt also noted to have rash in the chest, Benadryl given during RRT.   Pt now complaining of frontal headache and reporting that pain is travelling to her teeth. Pt states that she started having pain, after she received Barium.  Pt very anxious.       Vital Signs Last 24 Hrs  T(C): 36.4 (20 Dec 2017 22:49), Max: 37.7 (20 Dec 2017 01:00)  T(F): 97.6 (20 Dec 2017 22:49), Max: 99.9 (20 Dec 2017 01:00)  HR: 70 (20 Dec 2017 22:49) (61 - 82)  BP: 145/93 (20 Dec 2017 22:49) (123/82 - 145/93)  BP(mean): --  RR: 16 (20 Dec 2017 22:49) (16 - 18)  SpO2: 100% (20 Dec 2017 22:49) (97% - 100%)      Labs:                          13.0   5.0   )-----------( 55       ( 20 Dec 2017 11:28 )             36.8     12-20    143  |  105  |  <4<L>  ----------------------------<  116<H>  3.8   |  25  |  0.79    Ca    9.4      20 Dec 2017 11:28  Phos  3.2     12-20  Mg     1.8     12-20    TPro  6.9  /  Alb  4.6  /  TBili  0.2  /  DBili  x   /  AST  18  /  ALT  10  /  AlkPhos  72  12-20          Physical Exam:  General: WN/WD NAD  Neurology: A&Ox3, nonfocal, SILVA x 4  Respiratory: CTA B/L  CV: RRR, S1S2, no murmur  Abdominal: Soft, NT, ND, + BS x 4   MSK: No edema, + peripheral pulses, FROM all 4 extremity    Assessment & Plan:    Patient is a 41y Female, h/o fibroid uterus/POTS - autonomic dysregulation/anxiety/IBS, admitted on 12/17 with abdominal pain, s/p MR enterography. Now S/P RRT.   EKG with Normal sinus rhythm, questionable tachycardia at MRI, not documented. being in SR in 70's -80's. EKG with Sinus rhythm. now being transferred to telemetry for monitoring.   Pt very anxious, will continue with Ativan 2 mg IV Q4hrs.  Will check stat labs: CBC, BMP, and cardiac enzymes   Will give Tylenol 650 po x 1 for frontal headache  Will monitor on telemetry for now   D/W Dr. Herrera Warren NP  [id number]

## 2017-12-21 DIAGNOSIS — F43.23 ADJUSTMENT DISORDER WITH MIXED ANXIETY AND DEPRESSED MOOD: ICD-10-CM

## 2017-12-21 LAB
ANION GAP SERPL CALC-SCNC: 14 MMOL/L — SIGNIFICANT CHANGE UP (ref 5–17)
BUN SERPL-MCNC: <4 MG/DL — LOW (ref 7–23)
CALCIUM SERPL-MCNC: 9 MG/DL — SIGNIFICANT CHANGE UP (ref 8.4–10.5)
CHLORIDE SERPL-SCNC: 108 MMOL/L — SIGNIFICANT CHANGE UP (ref 96–108)
CK MB CFR SERPL CALC: 1 NG/ML — SIGNIFICANT CHANGE UP (ref 0–3.8)
CK SERPL-CCNC: 40 U/L — SIGNIFICANT CHANGE UP (ref 25–170)
CO2 SERPL-SCNC: 22 MMOL/L — SIGNIFICANT CHANGE UP (ref 22–31)
CREAT SERPL-MCNC: 0.75 MG/DL — SIGNIFICANT CHANGE UP (ref 0.5–1.3)
GLUCOSE SERPL-MCNC: 95 MG/DL — SIGNIFICANT CHANGE UP (ref 70–99)
HCT VFR BLD CALC: 33.9 % — LOW (ref 34.5–45)
HGB BLD-MCNC: 12.1 G/DL — SIGNIFICANT CHANGE UP (ref 11.5–15.5)
MCHC RBC-ENTMCNC: 30.8 PG — SIGNIFICANT CHANGE UP (ref 27–34)
MCHC RBC-ENTMCNC: 35.7 GM/DL — SIGNIFICANT CHANGE UP (ref 32–36)
MCV RBC AUTO: 86.2 FL — SIGNIFICANT CHANGE UP (ref 80–100)
PLATELET # BLD AUTO: 62 K/UL — LOW (ref 150–400)
POTASSIUM SERPL-MCNC: 3.5 MMOL/L — SIGNIFICANT CHANGE UP (ref 3.5–5.3)
POTASSIUM SERPL-SCNC: 3.5 MMOL/L — SIGNIFICANT CHANGE UP (ref 3.5–5.3)
RBC # BLD: 3.94 M/UL — SIGNIFICANT CHANGE UP (ref 3.8–5.2)
RBC # FLD: 11.5 % — SIGNIFICANT CHANGE UP (ref 10.3–14.5)
SODIUM SERPL-SCNC: 144 MMOL/L — SIGNIFICANT CHANGE UP (ref 135–145)
TROPONIN T SERPL-MCNC: <0.01 NG/ML — SIGNIFICANT CHANGE UP (ref 0–0.06)
WBC # BLD: 7.4 K/UL — SIGNIFICANT CHANGE UP (ref 3.8–10.5)
WBC # FLD AUTO: 7.4 K/UL — SIGNIFICANT CHANGE UP (ref 3.8–10.5)

## 2017-12-21 PROCEDURE — 93010 ELECTROCARDIOGRAM REPORT: CPT

## 2017-12-21 PROCEDURE — 99222 1ST HOSP IP/OBS MODERATE 55: CPT

## 2017-12-21 PROCEDURE — 99232 SBSQ HOSP IP/OBS MODERATE 35: CPT | Mod: GC

## 2017-12-21 RX ADMIN — ONDANSETRON 4 MILLIGRAM(S): 8 TABLET, FILM COATED ORAL at 22:12

## 2017-12-21 RX ADMIN — Medication 2.5 MILLIGRAM(S): at 11:31

## 2017-12-21 RX ADMIN — ONDANSETRON 4 MILLIGRAM(S): 8 TABLET, FILM COATED ORAL at 08:23

## 2017-12-21 RX ADMIN — Medication 2 MILLIGRAM(S): at 22:12

## 2017-12-21 RX ADMIN — ONDANSETRON 4 MILLIGRAM(S): 8 TABLET, FILM COATED ORAL at 16:20

## 2017-12-21 RX ADMIN — SIMETHICONE 160 MILLIGRAM(S): 80 TABLET, CHEWABLE ORAL at 21:25

## 2017-12-21 RX ADMIN — SIMETHICONE 160 MILLIGRAM(S): 80 TABLET, CHEWABLE ORAL at 05:22

## 2017-12-21 RX ADMIN — Medication 2 MILLIGRAM(S): at 18:24

## 2017-12-21 RX ADMIN — ONDANSETRON 4 MILLIGRAM(S): 8 TABLET, FILM COATED ORAL at 11:30

## 2017-12-21 RX ADMIN — MONTELUKAST 10 MILLIGRAM(S): 4 TABLET, CHEWABLE ORAL at 23:03

## 2017-12-21 RX ADMIN — Medication 2 MILLIGRAM(S): at 05:22

## 2017-12-21 RX ADMIN — ONDANSETRON 4 MILLIGRAM(S): 8 TABLET, FILM COATED ORAL at 02:30

## 2017-12-21 RX ADMIN — Medication 2 MILLIGRAM(S): at 01:02

## 2017-12-21 RX ADMIN — FAMOTIDINE 20 MILLIGRAM(S): 10 INJECTION INTRAVENOUS at 21:24

## 2017-12-21 RX ADMIN — Medication 2.5 MILLIGRAM(S): at 20:09

## 2017-12-21 RX ADMIN — Medication 2 MILLIGRAM(S): at 14:05

## 2017-12-21 RX ADMIN — SODIUM CHLORIDE 125 MILLILITER(S): 9 INJECTION INTRAMUSCULAR; INTRAVENOUS; SUBCUTANEOUS at 11:31

## 2017-12-21 RX ADMIN — SIMETHICONE 160 MILLIGRAM(S): 80 TABLET, CHEWABLE ORAL at 14:09

## 2017-12-21 NOTE — BEHAVIORAL HEALTH ASSESSMENT NOTE - NSBHCHARTREVIEWVS_PSY_A_CORE FT
T(C): 36.4 (12-21-17 @ 12:02), Max: 37.2 (12-20-17 @ 18:19)  HR: 61 (12-21-17 @ 14:34) (55 - 82)  BP: 142/88 (12-21-17 @ 14:34) (105/69 - 154/82)  RR: 18 (12-21-17 @ 12:02) (16 - 18)  SpO2: 100% (12-21-17 @ 12:02) (98% - 100%)  Wt(kg): --

## 2017-12-21 NOTE — BEHAVIORAL HEALTH ASSESSMENT NOTE - DETAILS
States that she intermittently "Doesn't want to live like this," however notes fear of death ITP after treatment with lexapro. Prior sedation with buspirone. tachycardia GI upset

## 2017-12-21 NOTE — BEHAVIORAL HEALTH ASSESSMENT NOTE - NSBHCONSULTMEDS_PSY_A_CORE FT
c/w home dose Buspirone 2.5 mg PO bid    c/w home dose Ativan, but would discuss lowering dose with pt to avoid tolerance/dependence with escalating doses

## 2017-12-21 NOTE — PROGRESS NOTE ADULT - ATTENDING COMMENTS
As above.  Abnormal liquid gastric emptying study suggestive of gastroparesis.  Normal small bowel and appendix on MR enterography.  Agree with small, soft, frequent meals.  Of note, patient is a vegan, which limits options of low fiber.  Suggest nutrition consult.  Outpatient GI followup with Dr. Leandro Perez / GI motility.

## 2017-12-21 NOTE — PROGRESS NOTE ADULT - SUBJECTIVE AND OBJECTIVE BOX
SUBJECTIVE:  - Pt underwent NM Gastric Emptying Study and MRI yesterday  - Following MRI completion, pt had an RRT for tachycardia, hypotension and headache  - She reports watery diarrhea ~ 7 times since yesterday afternoon  - Complains of moderate to severe nausea    OBJECTIVE:    Vital Signs Last 24 Hrs  T(C): 36.7 (21 Dec 2017 04:02), Max: 37.2 (20 Dec 2017 18:19)  T(F): 98.1 (21 Dec 2017 04:02), Max: 98.9 (20 Dec 2017 18:19)  HR: 59 (21 Dec 2017 04:02) (59 - 82)  BP: 105/69 (21 Dec 2017 04:02) (105/69 - 145/93)  BP(mean): --  RR: 17 (21 Dec 2017 04:02) (16 - 17)  SpO2: 98% (21 Dec 2017 04:02) (98% - 100%)    PHYSICAL EXAM:  Constitutional: no acute distress  Eyes: no icterus  Neck: no masses, no LAD  Respiratory: normal inspiratory effort; no wheezing or crackles  Cardiovascular: RRR, normal S1/S2, no murmurs/rubs/gallops  Gastrointestinal: soft, nondistended, nontender, +BS  Extremities: no LE edema  Neurological: AAOx3, no asterixis  Skin: no rashes, bruises, petechiae    LABS:                        12.1   7.4   )-----------( 62       ( 20 Dec 2017 23:56 )             33.9     144  |  108  |  <4<L>  ----------------------------<  95  3.5   |  22  |  0.75    Ca    9.0      20 Dec 2017 23:56  Phos  3.2     12-20  Mg     1.8     12-20    TPro  6.9  /  Alb  4.6  /  TBili  0.2  /  DBili  x   /  AST  18  /  ALT  10  /  AlkPhos  72  12-20  LIVER FUNCTIONS - ( 20 Dec 2017 11:28 )  Alb: 4.6 g/dL / Pro: 6.9 g/dL / ALK PHOS: 72 U/L / ALT: 10 U/L RC / AST: 18 U/L / GGT: x

## 2017-12-21 NOTE — PROGRESS NOTE ADULT - SUBJECTIVE AND OBJECTIVE BOX
Chief complaint: RLQ abdominal pain    Interval hx: pain about same, also c/o chest pain, bradycardia    Allergies: Allergies    beta blockers (Unknown)  Lexapro (Unknown)    Intolerances    metoprolol (Headache)    FAMILY HISTORY:  No pertinent family history in first degree relatives  Family history of hypertension (Grandparent)  Family history of atrial fibrillation  Family history of prostate cancer in father      PAST MEDICAL & SURGICAL HISTORY:  Idiopathic thrombocytopenia purpura  Anxiety  POTS (postural orthostatic tachycardia syndrome)  History of ITP  Labyrinthitis  Headache  HTN (hypertension)  Anxiety  IBS (irritable bowel syndrome)  No significant past surgical history  No significant past surgical history      REVIEW OF SYSTEMS:  CONSTITUTIONAL: No fever, weight loss, or fatigue  EYES: No eye pain, visual disturbances, or discharge  ENMT:  No difficulty hearing, tinnitus, vertigo; No sinus or throat pain  NECK: No pain or stiffness  BREASTS: No pain, masses, or nipple discharge  RESPIRATORY: No cough, wheezing, chills or hemoptysis; No shortness of breath  CARDIOVASCULAR: + chest pain, no palpitations, dizziness, or leg swelling  GASTROINTESTINAL: + RLQ abdominal pain. No nausea, vomiting, or hematemesis; No diarrhea or constipation. No melena or hematochezia.  GENITOURINARY: No dysuria, frequency, hematuria, or incontinence  NEUROLOGICAL: No headaches, memory loss, loss of strength, numbness, or tremors  SKIN: No itching, burning, rashes, or lesions   LYMPH NODES: No enlarged glands  ENDOCRINE: No heat or cold intolerance; No hair loss  MUSCULOSKELETAL: No joint pain or swelling; No muscle, back, or extremity pain  PSYCHIATRIC: No depression, anxiety, mood swings, or difficulty sleeping  HEME/LYMPH: No easy bruising, or bleeding gums  ALLERY AND IMMUNOLOGIC: No hives or eczema      Medications:  MEDICATIONS  (STANDING):  busPIRone 2.5 milliGRAM(s) Oral two times a day  diltiazem    milliGRAM(s) Oral <User Schedule>  docusate sodium 100 milliGRAM(s) Oral three times a day  famotidine    Tablet 20 milliGRAM(s) Oral two times a day  LORazepam     Tablet 2 milliGRAM(s) Oral every 4 hours  montelukast 10 milliGRAM(s) Oral at bedtime  ondansetron   Disintegrating Tablet 4 milliGRAM(s) Oral every 4 hours  potassium chloride    Tablet ER 40 milliEquivalent(s) Oral once  simethicone 160 milliGRAM(s) Chew three times a day  sodium chloride 0.9%. 1000 milliLiter(s) (125 mL/Hr) IV Continuous <Continuous>    MEDICATIONS  (PRN):  acetaminophen   Tablet. 650 milliGRAM(s) Oral every 6 hours PRN Mild Pain (1 - 3)    	    PHYSICAL EXAM:  T(C): 36.4 (12-21-17 @ 12:02), Max: 37.2 (12-20-17 @ 18:19)  HR: 55 (12-21-17 @ 12:02) (55 - 82)  BP: 154/82 (12-21-17 @ 12:02) (105/69 - 154/82)  RR: 18 (12-21-17 @ 12:02) (16 - 18)  SpO2: 100% (12-21-17 @ 12:02) (98% - 100%)  Wt(kg): --  I&O's Summary    20 Dec 2017 07:01  -  21 Dec 2017 07:00  --------------------------------------------------------  IN: 1720 mL / OUT: 800 mL / NET: 920 mL    21 Dec 2017 07:01  -  21 Dec 2017 14:26  --------------------------------------------------------  IN: 240 mL / OUT: 0 mL / NET: 240 mL        PHYSICAL EXAM:  GENERAL: NAD, well-groomed, well-developed  HEAD:  Atraumatic, Normocephalic  EYES: EOMI, PERRLA, conjunctiva and sclera clear  ENMT: No tonsillar erythema, exudates, or enlargement; Moist mucous membranes, Good dentition, No lesions  NECK: Supple, No JVD, Normal thyroid  NERVOUS SYSTEM:  Alert & Oriented X3, Good concentration; Motor Strength 5/5 B/L upper and lower extremities; DTRs 2+ intact and symmetric  CHEST/LUNG: Clear to percussion bilaterally; No rales, rhonchi, wheezing, or rubs  HEART: Regular rate and rhythm; No murmurs, rubs, or gallops  ABDOMEN: Soft, mild tenderness diffuse, mostly RLQ,  Nondistended; Bowel sounds dec   EXTREMITIES:  2+ Peripheral Pulses, No clubbing, cyanosis, or edema  LYMPH: No lymphadenopathy noted  SKIN: No rashes or lesions    LABS:	 	    CARDIAC MARKERS:  CARDIAC MARKERS ( 20 Dec 2017 23:56 )  x     / <0.01 ng/mL / 40 U/L / x     / 1.0 ng/mL                                12.1   7.4   )-----------( 62       ( 20 Dec 2017 23:56 )             33.9     12-20    144  |  108  |  <4<L>  ----------------------------<  95  3.5   |  22  |  0.75    Ca    9.0      20 Dec 2017 23:56  Phos  3.2     12-20  Mg     1.8     12-20    TPro  6.9  /  Alb  4.6  /  TBili  0.2  /  DBili  x   /  AST  18  /  ALT  10  /  AlkPhos  72  12-20    proBNP:   Lipid Profile:   HgA1c:   TSH:

## 2017-12-21 NOTE — PROGRESS NOTE ADULT - ASSESSMENT
pt w/ mult medical problems with abd pain   seen by gi    mri enterography / gastric emptying study ordered  cont diet as per gi   sx f/u noted  transfer to medicine  heme eval   oob  iv fluids  cont current meds  rifaximin  started by gi but not well tolerated by pt, refusing to take at current dose  MR enterography normal  RRT during MRI noted  chest pain now resolved, normal EKG while in pain, CE (-), bradycardia to low 50's, pt reports some dizziness with it, will call pt's cardiologist to eval  Psych eval for anxiety

## 2017-12-21 NOTE — BEHAVIORAL HEALTH ASSESSMENT NOTE - RISK ASSESSMENT
Risk factors: acute medical issue, recent loss (cat), intermittent SI    Protective factors: no current SIIP/HIIP, no h/o SA/SIB, no h/o psych admissions, no access to weapons, no active substance abuse, no psychosis, domiciled, social supports, engaged in treatment, compliant with treatment, help-seeking behaviors    Overall, pt is a low risk of harm and does not require psychiatric admission.

## 2017-12-21 NOTE — BEHAVIORAL HEALTH ASSESSMENT NOTE - OTHER PAST PSYCHIATRIC HISTORY (INCLUDE DETAILS REGARDING ONSET, COURSE OF ILLNESS, INPATIENT/OUTPATIENT TREATMENT)
Generalized anxiety disorder, panic disorder. Followed as outpatient. Prior to 1/2017 no known psychiatric issues. No history of inpatient psychiatric hospitalization. One partial hospitalization 6/23/17 to 7/28/17.

## 2017-12-21 NOTE — BEHAVIORAL HEALTH ASSESSMENT NOTE - NSBHCHARTREVIEWINVESTIGATE_PSY_A_CORE FT
< from: 12 Lead ECG (12.17.17 @ 01:58) >    Ventricular Rate 58 BPM    Atrial Rate 58 BPM    P-R Interval 136 ms    QRS Duration 80 ms     ms    QTc 408 ms    P Axis 49 degrees    R Axis 42 degrees    T Axis 30 degrees    Diagnosis Line SINUS BRADYCARDIA WITH SINUS ARRHYTHMIA    < end of copied text >

## 2017-12-21 NOTE — BEHAVIORAL HEALTH ASSESSMENT NOTE - NSBHCHARTREVIEWIMAGING_PSY_A_CORE FT
< from: CT Head No Cont (11.14.17 @ 23:29) >      EXAM:  CT BRAIN                            PROCEDURE DATE:  11/14/2017            INTERPRETATION:  CLINICAL INFORMATION: Left-sided headache. Bilateral   lower extremity tremors. Thrombocytopenia.    COMPARISON: CT brain 8/7/2017.    TECHNIQUE:   Axial CT images of the head were obtained without intravenous contrast.   Coronal and sagittal reformats were obtained.     FINDINGS:    There is no acute intracranial hemorrhage, extra-axial fluid collection,   large cortical infarct, mass effect or midline shift. Cortical sulci and   ventricles are appropriate for the patient's stated age. There is no   depressed skull fracture.     There is minimal mucosal thickening of ethmoid air cells. The mastoid air   cells are clear.    IMPRESSION:     No acute intracranial hemorrhage, extra-axial fluid collection or   displaced skull fracture.           < end of copied text >

## 2017-12-21 NOTE — BEHAVIORAL HEALTH ASSESSMENT NOTE - SUMMARY
42 y/o woman, single, unemployed, with PPHx of generalized anxiety disorder and panic disorder, followed as outpatient with psychiatrist and therapist, currently taking Ativan 10-12mg per day for anxiety and POTS syndrome, with PMHx of POTS disease followed by cardiology, HTN, history of ITP, IBS, admitted for management of abd pain/gastroparesis, psychiatry consulted for management of anxiety.  Pt with increased anxiety and depression over the past year in the setting of medical illness and resultant functional decline; has stopped working, moved back home, no longer engages with her friends.  Sx appeared soon after the death of her beloved cat last year.  No SI/HI currently, but has intermittent passive SI, no intent 2/2 fears of death.  Has recently started therapy and outpt care by a psychiatrist, has also researched starting a psychiatric day program 3d/week.

## 2017-12-21 NOTE — BEHAVIORAL HEALTH ASSESSMENT NOTE - NSBHCHARTREVIEWLAB_PSY_A_CORE FT
12.1   7.4   )-----------( 62       ( 20 Dec 2017 23:56 )             33.9     12-20    144  |  108  |  <4<L>  ----------------------------<  95  3.5   |  22  |  0.75    Ca    9.0      20 Dec 2017 23:56  Phos  3.2     12-20  Mg     1.8     12-20    TPro  6.9  /  Alb  4.6  /  TBili  0.2  /  DBili  x   /  AST  18  /  ALT  10  /  AlkPhos  72  12-20

## 2017-12-21 NOTE — PROGRESS NOTE ADULT - ASSESSMENT
42 y/o F with hx of ITP, Uterine Fibroids here for acute on chronic abdominal pain.    Impression:  1) Nausea & Abdominal Pain: Pt had an abnormal liquid NM Gastric Emptying Study suggestive of Gastroparesis. Pt does also have POTS which has been associated with GI symptoms including nausea, abdominal cramps, bloating, early satiety and diarrhea. Consider Gastroparesis and POTS as the etiology of her GI symptoms. Initially with RLQ pain and ?? appendiceal enlargement suggestive of acute appendicitis but patient does not have peritoneal signs, and acute appendicitis unlikely.  2) ITP    Plan:  - F/u MR Enterography report  - Will need to have small frequent meals (gastroparesis diet)  - Consider Nutrition evaluation  - May discontinue Rifaximin as pt refused and reports worsening of symptoms during previous course   - Pt will need continued followup with GI as outpatient  - Outpatient GI followup with Dr. Leandro Perez / GI motility specialist

## 2017-12-21 NOTE — BEHAVIORAL HEALTH ASSESSMENT NOTE - HPI (INCLUDE ILLNESS QUALITY, SEVERITY, DURATION, TIMING, CONTEXT, MODIFYING FACTORS, ASSOCIATED SIGNS AND SYMPTOMS)
40 y/o woman, single, unemployed, with PPHx of generalized anxiety disorder and panic disorder, followed as outpatient with psychiatrist and therapist, currently taking Ativan 10-12mg per day for anxiety and POTS syndrome, with PMHx of POTS disease followed by cardiology, HTN, history of ITP, IBS, admitted for management of abd pain/gastroparesis, psychiatry consulted for management of anxiety.    Patient notes that prior to 1/2017 she was in good health, however developed episodes of tachycardia and tremulousness soon after the death of her pet cat last year from cancer.  Pt becomes tearful when discussing her cat; states she is still mourning the loss, but parents won't allow her to get a new cat.  Patient attributes these episodes of shaking to POTS and other autonomic dysregulation. Has joined Facebook support groups for POTS.  She tales between 10-12mg Ativan per day as needed to control these episodes. Patient notes onset of anxiety due to fear of these episodes occurring, and states that she feels depressed on occasion because she is under the impression that her POTS will prevent her from having a job or living a normal life in the future. 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 She also notes anxiety about being left alone; feels her family and friends are frustrated with her and have not been sufficiently empathic to her stuffering. She currently takes Buspar 2.5 mg PO bid for anxiety. Patient notes intermittent passive SI, and states that she “doesn’t want to live like this,” however she has a fear of death and no suicidal intent. Her sleep is interrupted by these episodes at night; she also notes decreased appetite on occasion due to these episodes which she states can cause GI upset. Patient notes that Ativan is the only drug she’s tried that helps with the episodes (has tried Lexapro in the past but has discontinued this due to ITP). Denies alcohol, smoking, or other drug use, denies prior substance use treatment.  States she is interested in starting a 3 days a week partial hospitalization at [facility] for mental health treatment.  She says her mother called her a "hypochondriac" which was very hurtful.

## 2017-12-22 DIAGNOSIS — G47.34 IDIOPATHIC SLEEP RELATED NONOBSTRUCTIVE ALVEOLAR HYPOVENTILATION: ICD-10-CM

## 2017-12-22 DIAGNOSIS — K31.84 GASTROPARESIS: ICD-10-CM

## 2017-12-22 DIAGNOSIS — J30.9 ALLERGIC RHINITIS, UNSPECIFIED: ICD-10-CM

## 2017-12-22 DIAGNOSIS — Z29.9 ENCOUNTER FOR PROPHYLACTIC MEASURES, UNSPECIFIED: ICD-10-CM

## 2017-12-22 DIAGNOSIS — G90.9 DISORDER OF THE AUTONOMIC NERVOUS SYSTEM, UNSPECIFIED: ICD-10-CM

## 2017-12-22 DIAGNOSIS — J45.20 MILD INTERMITTENT ASTHMA, UNCOMPLICATED: ICD-10-CM

## 2017-12-22 DIAGNOSIS — F41.9 ANXIETY DISORDER, UNSPECIFIED: ICD-10-CM

## 2017-12-22 LAB
ANION GAP SERPL CALC-SCNC: 14 MMOL/L — SIGNIFICANT CHANGE UP (ref 5–17)
BUN SERPL-MCNC: 3 MG/DL — LOW (ref 7–23)
CALCIUM SERPL-MCNC: 9.3 MG/DL — SIGNIFICANT CHANGE UP (ref 8.4–10.5)
CHLORIDE SERPL-SCNC: 108 MMOL/L — SIGNIFICANT CHANGE UP (ref 96–108)
CO2 SERPL-SCNC: 22 MMOL/L — SIGNIFICANT CHANGE UP (ref 22–31)
CREAT SERPL-MCNC: 0.93 MG/DL — SIGNIFICANT CHANGE UP (ref 0.5–1.3)
GLUCOSE SERPL-MCNC: 128 MG/DL — HIGH (ref 70–99)
HCT VFR BLD CALC: 33.7 % — LOW (ref 34.5–45)
HGB BLD-MCNC: 11.2 G/DL — LOW (ref 11.5–15.5)
MCHC RBC-ENTMCNC: 28.1 PG — SIGNIFICANT CHANGE UP (ref 27–34)
MCHC RBC-ENTMCNC: 33.2 GM/DL — SIGNIFICANT CHANGE UP (ref 32–36)
MCV RBC AUTO: 84.7 FL — SIGNIFICANT CHANGE UP (ref 80–100)
PLATELET # BLD AUTO: 63 K/UL — LOW (ref 150–400)
POTASSIUM SERPL-MCNC: 4 MMOL/L — SIGNIFICANT CHANGE UP (ref 3.5–5.3)
POTASSIUM SERPL-SCNC: 4 MMOL/L — SIGNIFICANT CHANGE UP (ref 3.5–5.3)
RBC # BLD: 3.98 M/UL — SIGNIFICANT CHANGE UP (ref 3.8–5.2)
RBC # FLD: 12.7 % — SIGNIFICANT CHANGE UP (ref 10.3–14.5)
SODIUM SERPL-SCNC: 144 MMOL/L — SIGNIFICANT CHANGE UP (ref 135–145)
WBC # BLD: 7.34 K/UL — SIGNIFICANT CHANGE UP (ref 3.8–10.5)
WBC # FLD AUTO: 7.34 K/UL — SIGNIFICANT CHANGE UP (ref 3.8–10.5)

## 2017-12-22 PROCEDURE — 99223 1ST HOSP IP/OBS HIGH 75: CPT

## 2017-12-22 PROCEDURE — 93306 TTE W/DOPPLER COMPLETE: CPT | Mod: 26

## 2017-12-22 PROCEDURE — 93010 ELECTROCARDIOGRAM REPORT: CPT

## 2017-12-22 RX ORDER — ONDANSETRON 8 MG/1
4 TABLET, FILM COATED ORAL ONCE
Qty: 0 | Refills: 0 | Status: COMPLETED | OUTPATIENT
Start: 2017-12-22 | End: 2017-12-22

## 2017-12-22 RX ADMIN — ONDANSETRON 4 MILLIGRAM(S): 8 TABLET, FILM COATED ORAL at 18:39

## 2017-12-22 RX ADMIN — FAMOTIDINE 20 MILLIGRAM(S): 10 INJECTION INTRAVENOUS at 21:03

## 2017-12-22 RX ADMIN — Medication 2 MILLIGRAM(S): at 06:00

## 2017-12-22 RX ADMIN — SIMETHICONE 160 MILLIGRAM(S): 80 TABLET, CHEWABLE ORAL at 09:32

## 2017-12-22 RX ADMIN — Medication 100 MILLIGRAM(S): at 09:32

## 2017-12-22 RX ADMIN — FAMOTIDINE 20 MILLIGRAM(S): 10 INJECTION INTRAVENOUS at 09:32

## 2017-12-22 RX ADMIN — Medication 100 MILLIGRAM(S): at 16:46

## 2017-12-22 RX ADMIN — SIMETHICONE 160 MILLIGRAM(S): 80 TABLET, CHEWABLE ORAL at 16:46

## 2017-12-22 RX ADMIN — ONDANSETRON 4 MILLIGRAM(S): 8 TABLET, FILM COATED ORAL at 09:45

## 2017-12-22 RX ADMIN — Medication 2 MILLIGRAM(S): at 02:44

## 2017-12-22 RX ADMIN — ONDANSETRON 4 MILLIGRAM(S): 8 TABLET, FILM COATED ORAL at 14:53

## 2017-12-22 RX ADMIN — Medication 2.5 MILLIGRAM(S): at 08:38

## 2017-12-22 RX ADMIN — Medication 100 MILLIGRAM(S): at 21:03

## 2017-12-22 RX ADMIN — Medication 2 MILLIGRAM(S): at 18:39

## 2017-12-22 RX ADMIN — ONDANSETRON 4 MILLIGRAM(S): 8 TABLET, FILM COATED ORAL at 22:03

## 2017-12-22 RX ADMIN — ONDANSETRON 4 MILLIGRAM(S): 8 TABLET, FILM COATED ORAL at 06:00

## 2017-12-22 RX ADMIN — SODIUM CHLORIDE 75 MILLILITER(S): 9 INJECTION INTRAMUSCULAR; INTRAVENOUS; SUBCUTANEOUS at 18:57

## 2017-12-22 RX ADMIN — MONTELUKAST 10 MILLIGRAM(S): 4 TABLET, CHEWABLE ORAL at 23:13

## 2017-12-22 RX ADMIN — Medication 2 MILLIGRAM(S): at 11:04

## 2017-12-22 RX ADMIN — SIMETHICONE 160 MILLIGRAM(S): 80 TABLET, CHEWABLE ORAL at 21:03

## 2017-12-22 RX ADMIN — Medication 2 MILLIGRAM(S): at 22:20

## 2017-12-22 RX ADMIN — Medication 2 MILLIGRAM(S): at 14:53

## 2017-12-22 RX ADMIN — Medication 2.5 MILLIGRAM(S): at 20:17

## 2017-12-22 NOTE — DIETITIAN INITIAL EVALUATION ADULT. - ENERGY NEEDS
ht = 62 inches, wt = 105 pounds (12/17/17), BMI = 19.2kg/m2, IBW = 110 pounds, 95%IBW    Other Pertinent Information: 42 Y/O female with PMH of ITP, Uterine Fibroids here for acute on chronic abdominal pain likely gastroparesis per GI.

## 2017-12-22 NOTE — CONSULT NOTE ADULT - ATTENDING COMMENTS
Patient seen this morning at bedside and personally examined.  Patient with normal examination today - no tremor noted.    At this point the patient will f/u as outpatient with Dr. Julio Torres.  No further inpatient workup necessary.

## 2017-12-22 NOTE — ED ADULT NURSE NOTE - PERIPHERAL VASCULAR WDL
See 12/22 wet prep result note and mychart message below.    Please advise, thanks.   Pulses equal bilaterally, no edema present.

## 2017-12-22 NOTE — CONSULT NOTE ADULT - SUBJECTIVE AND OBJECTIVE BOX
[facility] DIVISION OF PULMONARY, CRITICAL CARE AND SLEEP MEDICINE  PULMONARY CONSULTATION NOTE - Evaluation on behalf of DR. ELHAM SUNSHINE  PHONE NUMBER [phone] MONDAY-FRIDAY 8A-5P or [phone] on NIGHTS/WEEKENDS/HOLIDAYS    NAME: LASHONDA MCBRIDE  MEDICAL RECORD NUMBER: [id number]    CHIEF COMPLAINT: Patient is a 41y old  Female who presents with a chief complaint of Abdominal pain (17 Dec 2017 14:05)      HISTORY OF PRESENT ILLNESS: Lashonda is a 41F with multiple medical problems including mild asthma, allergic rhinitis, nocturnal hypoxemia which has been documented as an outpatient and for which home O2 was ordered for QHS at 3LPM on 17 by Dr. Sunshine's office via [employer], who also has autonomic dysfunction and POTS.     She has been hospitalized for almost 1 week with abdominal pain of unclear etiology. She states she has been getting reactions to various medications and testing. Most recently she feels that her left inner ear is more red and warm than her right inner ear after receiving Zofran.    She denies fevers, chills, or chest pains at this time. She does intermittently have palpitations and she does note they were at worst when she had a "reaction" to the MR enterography and then the barium.    She denies cough or sputum production. She is on ventolin and Singulair as an outpatient. She is awaiting an outpatient methacholine challenge test to better characterize her asthma.    She does describe nausea for which she has requested zofran and loose BM since yesterday. Her gastric emptying study is suggestive of gastroparesis    ====================BACKGROUND INFORMATION============================    FAMILY HISTORY: FAMILY HISTORY:  No pertinent family history in first degree relatives  Family history of hypertension (Grandparent)  Family history of atrial fibrillation  Family history of prostate cancer in father      PAST MEDICAL AND SURGICAL HISTORY: PAST MEDICAL & SURGICAL HISTORY:  Idiopathic thrombocytopenia purpura  Anxiety  POTS (postural orthostatic tachycardia syndrome)  History of ITP  Labyrinthitis  Headache  HTN (hypertension)  Anxiety  IBS (irritable bowel syndrome)  No significant past surgical history  No significant past surgical history      ALLERGIES:Allergies    beta blockers (Unknown)  Lexapro (Unknown)    Intolerances    metoprolol (Headache)      HOME MEDICATIONS: reviewed    OUTPATIENT PULMONARY DOCTOR:  Dr. Sunshine    SOCIAL HISTORY  TOBACCO USE: denied  ALCOHOL: social  DRUGS: denied  EMPLOYMENT:  EXPOSURES: none  RECENT TRAVELS: denied  PETS: dog  CODE STATUS: full code    ====================REVIEW OF SYSTEMS=====================================  CONSTITUTIONAL: feels "off"  CARDIOVASCULAR: denies chest pains or palpitations at present time  PULMONARY: denies cough or shortness of breath  - does have intermittent tachypnea  GASTROINTESTINAL: abdominal pain still present  [x] ALL OTHER REVIEW OF SYSTEMS ARE NEGATIVE   [] UNABLE TO OBTAIN REVIEW OF SYSTEMS DUE TO ______________      ====================PHYSICAL EXAM=========================================    VITALS: ICU Vital Signs Last 24 Hrs  T(C): 36.8 (22 Dec 2017 11:29), Max: 37.7 (22 Dec 2017 03:46)  T(F): 98.2 (22 Dec 2017 11:29), Max: 99.9 (22 Dec 2017 03:46)  HR: 70 (22 Dec 2017 11:29) (64 - 100)  BP: 134/91 (22 Dec 2017 11:29) (127/83 - 148/99)  BP(mean): --  ABP: --  ABP(mean): --  RR: 18 (22 Dec 2017 11:29) (17 - 18)  SpO2: 100% (22 Dec 2017 11:29) (99% - 100%)      INTAKE and OUTPUT: I&O's Summary    21 Dec 2017 07:01  -  22 Dec 2017 07:00  --------------------------------------------------------  IN: 240 mL / OUT: 0 mL / NET: 240 mL    22 Dec 2017 07:01  -  22 Dec 2017 15:59  --------------------------------------------------------  IN: 480 mL / OUT: 0 mL / NET: 480 mL        WEIGHT: Daily     Daily Weight in k.6 (22 Dec 2017 15:21)    GLUCOSE: CAPILLARY BLOOD GLUCOSE    VENTILATOR SETTINGS: N/A    GENERAL: looks sad, AAOx3, no distress  HEENT: normocephalic, atraumatic, MMM, nares clear, pupils equal, anicteric  NECK: supple, no JVD  LYMPH NODES: no palpable supraclavicular LAD  CARDIOVASCULAR: RRR, S1S2, no murmur  PULMONARY: CTA bilaterally, no wheeze or rhonchi  ABDOMEN: soft, NT, ND, +BS  SKIN: warm and dry, tattoo over upper back  EXTREMITIES: no clubbing or cyanosis, no LE edema  NEUROLOGIC: moves all extremities, intermittent headaches  PSYCHIATRIC: calm but very anxious about her medical condition    ====================MEDICATIONS===========================================  MEDICATIONS  (STANDING):  busPIRone 2.5 milliGRAM(s) Oral two times a day  docusate sodium 100 milliGRAM(s) Oral three times a day  famotidine    Tablet 20 milliGRAM(s) Oral two times a day  LORazepam     Tablet 2 milliGRAM(s) Oral every 4 hours  montelukast 10 milliGRAM(s) Oral at bedtime  ondansetron   Disintegrating Tablet 4 milliGRAM(s) Oral every 4 hours  potassium chloride    Tablet ER 40 milliEquivalent(s) Oral once  simethicone 160 milliGRAM(s) Chew three times a day  sodium chloride 0.9%. 1000 milliLiter(s) (75 mL/Hr) IV Continuous <Continuous>      MEDICATIONS  (PRN):  acetaminophen   Tablet. 650 milliGRAM(s) Oral every 6 hours PRN Mild Pain (1 - 3)      ====================LABORATORY RESULTS====================================  CBC Full  -  ( 20 Dec 2017 23:56 )  WBC Count : 7.4 K/uL  Hemoglobin : 12.1 g/dL  Hematocrit : 33.9 %  Platelet Count - Automated : 62 K/uL  Mean Cell Volume : 86.2 fl  Mean Cell Hemoglobin : 30.8 pg  Mean Cell Hemoglobin Concentration : 35.7 gm/dL  Auto Neutrophil # : x  Auto Lymphocyte # : x  Auto Monocyte # : x  Auto Eosinophil # : x  Auto Basophil # : x  Auto Neutrophil % : x  Auto Lymphocyte % : x  Auto Monocyte % : x  Auto Eosinophil % : x  Auto Basophil % : x                                    12    144  |  108  |  3<L>  ----------------------------<  128<H>  4.0   |  22  |  0.93    Ca    9.3      22 Dec 2017 13:36       Creatinine Trend: 0.93<--, 0.75<--, 0.79<--, 0.86<--, 0.84<--, 0.86<--      ====================RADIOLOGY and ECHOCARDIOGRAPHY=======================    CXR: < from: Xray Chest 1 View AP/PA (12.15.17 @ 15:39) >  The heart is normal insize. The lungs are clear. Degenerative bones are   normal.    < end of copied text >      CT CHEST:    ECHOCARDIOGRAPHY:

## 2017-12-22 NOTE — CONSULT NOTE ADULT - PROBLEM SELECTOR RECOMMENDATION 3
-normal spirometry 9/2017 at Dr. Sunshine's office  -will need methacholine challenge testing as outpatient  -Singulair   -ventolin as needed  -incentive spirometer  -maintain O2 sat > 90%

## 2017-12-22 NOTE — DIETITIAN INITIAL EVALUATION ADULT. - NS AS NUTRI INTERV MEALS SNACK
Recommend low fiber, low fat, vegan diet as medically feasible. Consider adding low sodium restriction if po intake >75%. Discussed with NP.

## 2017-12-22 NOTE — CHART NOTE - NSCHARTNOTEFT_GEN_A_CORE
Medicine NP([id number])    Notified by Pulm.  1) pt. c/o flushed/red ear after Zofran. Pt. stated Zofran IV "given too quickly."  Pt examined; no erythema B/L  earlobes or external canals.   No urticaria, oral angioedema, or throat tightness, VSS  2) Pt requested 1:1 observation; she "does not feel safe". Pt denied SI/HI ideation/attempts   Offered to re- call Psych; pt.  affect extremely anxious. Pt declined repeat consult  Offered to notify charge nurse; possible consult with pt. Liaison; pt. agreed  RN notified to have charge nurse speak with pt.

## 2017-12-22 NOTE — PROGRESS NOTE ADULT - SUBJECTIVE AND OBJECTIVE BOX
Chief complaint: RLQ abdominal pain    Interval hx: pt with multiple seemingly unrelated symptoms    Allergies: Allergies    beta blockers (Unknown)  Lexapro (Unknown)    Intolerances    metoprolol (Headache)    FAMILY HISTORY:  No pertinent family history in first degree relatives  Family history of hypertension (Grandparent)  Family history of atrial fibrillation  Family history of prostate cancer in father      PAST MEDICAL & SURGICAL HISTORY:  Idiopathic thrombocytopenia purpura  Anxiety  POTS (postural orthostatic tachycardia syndrome)  History of ITP  Labyrinthitis  Headache  HTN (hypertension)  Anxiety  IBS (irritable bowel syndrome)  No significant past surgical history  No significant past surgical history      REVIEW OF SYSTEMS:  CONSTITUTIONAL: No fever, weight loss, or fatigue  EYES: No eye pain, visual disturbances, or discharge  ENMT:  No difficulty hearing, tinnitus, vertigo; No sinus or throat pain  NECK: No pain or stiffness  BREASTS: No pain, masses, or nipple discharge  RESPIRATORY: No cough, wheezing, chills or hemoptysis; No shortness of breath  CARDIOVASCULAR: + chest pain, no palpitations, dizziness, or leg swelling  GASTROINTESTINAL: + RLQ abdominal pain. No nausea, vomiting, or hematemesis; No diarrhea or constipation. No melena or hematochezia.  GENITOURINARY: No dysuria, frequency, hematuria, or incontinence  NEUROLOGICAL: + headaches, memory loss, loss of strength, or tremors, RUE numbness  SKIN: No itching, burning, rashes, or lesions   LYMPH NODES: No enlarged glands  ENDOCRINE: No heat or cold intolerance; No hair loss  MUSCULOSKELETAL: No joint pain or swelling; No muscle, back, or extremity pain  PSYCHIATRIC: No depression, anxiety, mood swings, or difficulty sleeping  HEME/LYMPH: No easy bruising, or bleeding gums  ALLERY AND IMMUNOLOGIC: No hives or eczema      Medications:  MEDICATIONS  (STANDING):  busPIRone 2.5 milliGRAM(s) Oral two times a day  diltiazem    milliGRAM(s) Oral <User Schedule>  docusate sodium 100 milliGRAM(s) Oral three times a day  famotidine    Tablet 20 milliGRAM(s) Oral two times a day  LORazepam     Tablet 2 milliGRAM(s) Oral every 4 hours  montelukast 10 milliGRAM(s) Oral at bedtime  ondansetron   Disintegrating Tablet 4 milliGRAM(s) Oral every 4 hours  potassium chloride    Tablet ER 40 milliEquivalent(s) Oral once  simethicone 160 milliGRAM(s) Chew three times a day  sodium chloride 0.9%. 1000 milliLiter(s) (125 mL/Hr) IV Continuous <Continuous>    MEDICATIONS  (PRN):  acetaminophen   Tablet. 650 milliGRAM(s) Oral every 6 hours PRN Mild Pain (1 - 3)  ondansetron Injectable 4 milliGRAM(s) IV Push once PRN Nausea and/or Vomiting    	    PHYSICAL EXAM:  T(C): 36.8 (12-22-17 @ 11:29), Max: 37.7 (12-22-17 @ 03:46)  HR: 70 (12-22-17 @ 11:29) (61 - 100)  BP: 134/91 (12-22-17 @ 11:29) (127/83 - 148/99)  RR: 18 (12-22-17 @ 11:29) (17 - 18)  SpO2: 100% (12-22-17 @ 11:29) (99% - 100%)  Wt(kg): --  I&O's Summary    21 Dec 2017 07:01  -  22 Dec 2017 07:00  --------------------------------------------------------  IN: 240 mL / OUT: 0 mL / NET: 240 mL      PHYSICAL EXAM:  GENERAL: NAD, well-groomed, well-developed  HEAD:  Atraumatic, Normocephalic  EYES: EOMI, PERRLA, conjunctiva and sclera clear  ENMT: No tonsillar erythema, exudates, or enlargement; Moist mucous membranes, Good dentition, No lesions  NECK: Supple, No JVD, Normal thyroid  NERVOUS SYSTEM:  Alert & Oriented X3, Good concentration; Motor Strength 5/5 B/L upper and lower extremities; DTRs 2+ intact and symmetric  CHEST/LUNG: Clear to percussion bilaterally; No rales, rhonchi, wheezing, or rubs  HEART: Regular rate and rhythm; No murmurs, rubs, or gallops  ABDOMEN: Soft, mild tenderness diffuse, mostly RLQ,  Nondistended; Bowel sounds dec   EXTREMITIES:  2+ Peripheral Pulses, No clubbing, cyanosis, or edema  LYMPH: No lymphadenopathy noted  SKIN: No rashes or lesions    LABS:	 	    CARDIAC MARKERS:  CARDIAC MARKERS ( 20 Dec 2017 23:56 )  x     / <0.01 ng/mL / 40 U/L / x     / 1.0 ng/mL                                12.1   7.4   )-----------( 62       ( 20 Dec 2017 23:56 )             33.9     12-20    144  |  108  |  <4<L>  ----------------------------<  95  3.5   |  22  |  0.75    Ca    9.0      20 Dec 2017 23:56      proBNP:   Lipid Profile:   HgA1c:   TSH:

## 2017-12-22 NOTE — CONSULT NOTE ADULT - SUBJECTIVE AND OBJECTIVE BOX
HPI:  Patient is a 41y Female, h/o fibroid uterus/POTS - autonomic dysregulation/anxiety/IBS, complaining of acute onset abdominal pain which woke her from sleep 2 nights prior (11/15). She describes the pain as 7/10 in intensity, constant in periodicity, alleviated somewhat with fetal positioning, not exacerbated by anything, with no radiation. Pain accompanied by nausea (which has been occurring for 1 month) and bleeding per vagina x 24 hours. She is passing stool and gas, reports no fever/chills. The pain abated spontaneously, but she states that when providers examine her abdomen she still has pain. This pain is not consistent with her low anterior cramping pain that she felt prior to her diagnosis of uterine fibroids, or with her intermittent crampy abdominal pain that she frequently has. (17 Dec 2017 14:05)    Had abdominal MRI couple of days ago and feels she reacted negatively to the oral barium or the gadolinium and has had bradycardia and tachycardia with a period of blood pressure in the 90's then up to 150 and each of these things makes her feel awful with chest pain and other symptoms.    PMH:   Idiopathic thrombocytopenia purpura  Anxiety  POTS (postural orthostatic tachycardia syndrome)  Pott disease  History of ITP  Labyrinthitis  Asthma  Headache  HTN (hypertension)  Anxiety  IBS (irritable bowel syndrome)    PSH:   No significant past surgical history  No significant past surgical history    Medications:   acetaminophen   Tablet. 650 milliGRAM(s) Oral every 6 hours PRN  busPIRone 2.5 milliGRAM(s) Oral two times a day  diltiazem    milliGRAM(s) Oral <User Schedule>  docusate sodium 100 milliGRAM(s) Oral three times a day  famotidine    Tablet 20 milliGRAM(s) Oral two times a day  LORazepam     Tablet 2 milliGRAM(s) Oral every 4 hours  montelukast 10 milliGRAM(s) Oral at bedtime  ondansetron   Disintegrating Tablet 4 milliGRAM(s) Oral every 4 hours  ondansetron Injectable 4 milliGRAM(s) IV Push once PRN  potassium chloride    Tablet ER 40 milliEquivalent(s) Oral once  simethicone 160 milliGRAM(s) Chew three times a day  sodium chloride 0.9%. 1000 milliLiter(s) IV Continuous <Continuous>    Allergies:  beta blockers (Unknown)  Lexapro (Unknown)  metoprolol (Headache)    FAMILY HISTORY:  No pertinent family history in first degree relatives  Family history of hypertension (Grandparent)  Family history of atrial fibrillation  Family history of prostate cancer in father    Social History:  Smoking: never  Alcohol: denies  Drugs: negative    ROS:  CONSTITUTIONAL: No fever, weight loss, + fatigue  EYES: No eye pain, visual disturbances, or discharge  ENMT:  No difficulty hearing, tinnitus, vertigo; No sinus or throat pain  NECK: No pain or stiffness  RESPIRATORY: No cough, wheezing, chills or hemoptysis; No shortness of breath  CARDIOVASCULAR: + chest pain, + palpitations,  +dizziness, no leg swelling  GASTROINTESTINAL: + RLQ abdominal pain. No nausea, vomiting, or hematemesis; No diarrhea or constipation. No melena or hematochezia.  GENITOURINARY: No dysuria, frequency, hematuria, or incontinence  NEUROLOGICAL: + headaches, memory loss, loss of strength, or tremors, RUE numbness  SKIN: No itching, burning, rashes, or lesions   LYMPH NODES: No enlarged glands  ENDOCRINE: No heat or cold intolerance; No hair loss  MUSCULOSKELETAL: No joint pain or swelling; No muscle, back, or extremity pain  PSYCHIATRIC: No depression, anxiety, mood swings, or difficulty sleeping  HEME/LYMPH: No easy bruising, or bleeding gums  ALLERY AND IMMUNOLOGIC: No hives or eczema    Physical Exam:  Appearance: Normal; NAD, anxiety   HEENT:   Normal oral mucosa, EOMI	  Lymphatic: No lymphadenopathy  Cardiovascular: Normal S1 S2, No JVD, No murmurs, No edema  Respiratory: Lungs clear to auscultation, no use of accessory muscles	  Psychiatry: A & O x 3; +Anxious  Gastrointestinal:  Soft, Non-tender	  Skin: No rashes, No ecchymoses, No cyanosis	  Neurologic: Non-focal, No Focal Deficits  Extremities: Normal range of motion, No clubbing, cyanosis or edema  Vascular: Peripheral pulses palpable 2+ symmetric to PT's, no prominent varicosities      Cardiovascular Diagnostic Testing:  ECG: < from: 12 Lead ECG (12.20.17 @ 23:03) >   NORMAL SINUS RHYTHM  NORMAL ECG    < end of copied text >      Echo: < from: Transthoracic Echocardiogram (09.06.17 @ 08:48) >  Conclusions:  1. Normal left ventricular internal dimensions and wall  thicknesses.  2. Normal left ventricular systolic function with an  estimated ejection fraction of 65%. No segmental wall  motion abnormalities.  3. Normal right ventricular size and systolic function.    < end of copied text >      Stress Testing:    Interpretation of Telemetry: sinus rhythm, period of sinus tachycardia overnight    Imaging:    Labs:                        12.1   7.4   )-----------( 62       ( 20 Dec 2017 23:56 )             33.9     12-20    144  |  108  |  <4<L>  ----------------------------<  95  3.5   |  22  |  0.75    Ca    9.0      20 Dec 2017 23:56        CARDIAC MARKERS ( 20 Dec 2017 23:56 )  x     / <0.01 ng/mL / 40 U/L / x     / 1.0 ng/mL

## 2017-12-22 NOTE — DIETITIAN INITIAL EVALUATION ADULT. - NUTRITION INTERVENTION
Vitamin/Meals and Snack/Nutrition Education Vitamin/Meals and Snack/Nutrition Education/Collaboration and Referral of Nutrition Care

## 2017-12-22 NOTE — CONSULT NOTE ADULT - ASSESSMENT
DORIS MCBRIDE is a 41y old woman, w/PMH of POTS-autonomic dysregulation (sees Dr. Torres at outpatient), anxiety/IBS, presenting with acute onset abdominal pain, neurology consulted for headaches. Patient's neurological examination unremarkable. Patient currently without tremor or headache and chronic paraesthesia. No evidence of cluster headache or need for O2 from a neurological perspective.    Case discussed with patient's outpatient neurologist Dr. Torres who said that patient can be followed as outpatient.     Plan  1. Headache not consistent with cluster headache, therefore no need for O2 for headache treatment from neurological perspective.  2. Patient should follow up with Dr. Torres as outpatient for further management.

## 2017-12-22 NOTE — DIETITIAN INITIAL EVALUATION ADULT. - ORAL INTAKE PTA
Pt reports poor po intake x 3 months. Diet Recall: pt states she would consume foods such as bananas, chipolte, cucumber avocado sushi, potato chips, [employer], and water. Pt reports she went for allergy testing and the MD told her she has mild intolerances to apples, eggs, milk, beef, lamb, and shellfish- pt states these are not food allergies and she technically does not have any food allergies- note pt reports she never consumed these foods anyway because she either doesn't like them or she follows a vegan diet. Pt takes digestive enzymes and colace at home to help her digestion and relieve constipation.

## 2017-12-22 NOTE — CONSULT NOTE ADULT - ASSESSMENT
41F multiple medical problems including mild asthma, allergic rhinitis, autonomic dysfunction and anxiety presenting for abdominal pain and testing suggestive of gastroparesis

## 2017-12-22 NOTE — CHART NOTE - NSCHARTNOTEFT_GEN_A_CORE
Upon Nutritional Assessment by the Registered Dietitian your patient was determined to meet criteria / has evidence of the following diagnosis/diagnoses:          [ ]  Mild Protein Calorie Malnutrition        [ ]  Moderate Protein Calorie Malnutrition        [X] Severe Protein Calorie Malnutrition        [ ] Unspecified Protein Calorie Malnutrition        [ ] Underweight / BMI <19        [ ] Morbid Obesity / BMI > 40      Findings as based on:  [X] Comprehensive nutrition assessment   [X] Nutrition Focused Physical Exam: moderate fat loss, severe muscle mass loss   [X] Other: 8.69% wt loss x 1 month, poor po intake, BMI = 19.2kg/m2      Nutrition Plan/Recommendations: Pt follows a Vegan diet and refuses all oral nutrition supplements. Recommend MVI with minerals. Provided pt with gastroparesis and low sodium medical nutrition education. Pt with no food preferences at this time. RD remains available as needed and per follow-up protocol.         PROVIDER Section:     By signing this assessment you are acknowledging and agree with the diagnosis/diagnoses assigned by the Registered Dietitian    Comments:

## 2017-12-22 NOTE — CONSULT NOTE ADULT - SUBJECTIVE AND OBJECTIVE BOX
Neurology consult for headache     DORIS MCBRIDE is a 41y old  Female, w/PMH of POTS-autonomic dysregulation (sees Dr. Torres at outpatient), anxiety/IBS, presenting with acute onset abdominal pain, had workup for abdominal pain during this hospitalization, gastric emptying study revealed gastroparesis. Neurology consulted for headaches. Pt states that she developed left sided headache 2 days ago, which started immediately after drinking barium.  Her headache was accompanied by  pain across her face, shortness of breath, and hypotension. Pt says pain is relieved by oxygen (she is currently on nasal cannula - says she uses home oxygen because she was told she desats overnight). The pain has not recurred since initial incident, did not require pain medication. Pt denies rhinorrhea nasal congestion, lacrimation and conjunctival injection, swelling of the face/eye and facial swelling. She never had pain like this before. Pt also complains of tremor of right arm x 1 day. Says she has other tremors, but this is new. Happens at rest. Denies pain. Also notes that she has "paraesthesia" of the bottom of her right foot. Has chronic paraesthesia b/l. Denies changes in bowel/bladder. Denies back pain. Seen by outpatient neurologist Dr. Torres on 12/6 where she presented with lightheadedness, SOB and peripheral paresthesias who recommended outpatient workup.     REVIEW OF SYSTEMS: Per HPI       MEDICATIONS  acetaminophen   Tablet. 650 milliGRAM(s) Oral every 6 hours PRN  busPIRone 2.5 milliGRAM(s) Oral two times a day  diltiazem    milliGRAM(s) Oral <User Schedule>  docusate sodium 100 milliGRAM(s) Oral three times a day  famotidine    Tablet 20 milliGRAM(s) Oral two times a day  LORazepam     Tablet 2 milliGRAM(s) Oral every 4 hours  montelukast 10 milliGRAM(s) Oral at bedtime  ondansetron   Disintegrating Tablet 4 milliGRAM(s) Oral every 4 hours  ondansetron Injectable 4 milliGRAM(s) IV Push once PRN  potassium chloride    Tablet ER 40 milliEquivalent(s) Oral once  simethicone 160 milliGRAM(s) Chew three times a day  sodium chloride 0.9%. 1000 milliLiter(s) IV Continuous <Continuous>      PMH: Idiopathic thrombocytopenia purpura  Anxiety  POTS (postural orthostatic tachycardia syndrome)  Pott disease  History of ITP  Labyrinthitis  Asthma  Headache  HTN (hypertension)  Anxiety  IBS (irritable bowel syndrome)       PSH: No significant past surgical history  No significant past surgical history      Family history:   Family history of hypertension (Grandparent)  Family history of atrial fibrillation  Family history of prostate cancer in father      SOCIAL HISTORY:  No history of tobacco or alcohol use     Allergies:  beta blockers (Unknown)  Lexapro (Unknown)    Intolerances:   metoprolol (Headache)      Vital Signs Last 24 Hrs  T(C): 36.8 (22 Dec 2017 11:29), Max: 37.7 (22 Dec 2017 03:46)  T(F): 98.2 (22 Dec 2017 11:29), Max: 99.9 (22 Dec 2017 03:46)  HR: 70 (22 Dec 2017 11:29) (61 - 100)  BP: 134/91 (22 Dec 2017 11:29) (127/83 - 148/99)  BP(mean): --  RR: 18 (22 Dec 2017 11:29) (17 - 18)  SpO2: 100% (22 Dec 2017 11:29) (99% - 100%)      On Neurological Examination:    Mental Status - Patient is alert, awake, oriented X3. fluent, no dysarthria. Follows commands well and able to answer questions appropriately. Anxious affect.     Cranial Nerves - PERRL, EOMI, VFF, V1-V3 intact, no gross facial asymmetry, tongue/uvula midline, no tenderness to palpation in face    Motor Exam - No evidence of tremor in all extremities, no rigidity noted  Right upper: 5/5  Left upper: 5/5  Right lower: 5/5   Left lower : 5/5    Sensory: Intact to light touch and pinprick bilaterally, though subjectively reports decreased sensation to light touch on plantar surface of left foot and left toes compared to right, and decreased sensation to light touch on right calf.     Coord: FTN intact bilaterally     Gait: gait is cautious and slow, but normal, able to stand on toes and heels, negative Romberg       Reflexes: 1+ throughout, plantar mute                                    LABS:  CBC Full  -  ( 20 Dec 2017 23:56 )  WBC Count : 7.4 K/uL  Hemoglobin : 12.1 g/dL  Hematocrit : 33.9 %  Platelet Count - Automated : 62 K/uL  Mean Cell Volume : 86.2 fl  Mean Cell Hemoglobin : 30.8 pg  Mean Cell Hemoglobin Concentration : 35.7 gm/dL  Auto Neutrophil # : x  Auto Lymphocyte # : x  Auto Monocyte # : x  Auto Eosinophil # : x  Auto Basophil # : x  Auto Neutrophil % : x  Auto Lymphocyte % : x  Auto Monocyte % : x  Auto Eosinophil % : x  Auto Basophil % : x      12-20    144  |  108  |  <4<L>  ----------------------------<  95  3.5   |  22  |  0.75    Ca    9.0      20 Dec 2017 23:56

## 2017-12-22 NOTE — DIETITIAN INITIAL EVALUATION ADULT. - OTHER INFO
Pt seen for nutrition education consult on 4MON. Pt S/P NM gastric emptying study and MRI on 12/20- results suggesting gastroparesis- pt amenable to nutrition education. Pt reports poor appetite with 25% po intake of meals. Pt denies oral nutrition supplements due to vegan food preferences. Pt has no food preferences at this time as well. Pt reports nausea without emesis. Pt states her nausea started 1 month PTA and has not subsided. Pt reports 2 episodes of emesis during 1 month period. Last BM 12/21 of which was diarrhea.

## 2017-12-22 NOTE — PROGRESS NOTE ADULT - ASSESSMENT
pt w/ mult medical problems with abd pain   seen by gi    mri enterography / gastric emptying study reviewed - gastropatesis, MR enterography normal  cont diet as per gi, nutritionist eval  oob  iv fluids  cont current meds  no rifaximin as not tolerating  chest pain on and off, normal EKG while in pain, CE (-), bradycardia to low 50's, call placed to pt's cardiologist, awaiting recs  Cardizem not given 2/2 javier episodes  Psych eval for anxiety appreciated, continue same regimen  Neurology in room to eval headache (especially when ambulating w/o O2) and RUE numbness  will also contact pt's Pulmonologist reg need for O2, pt reports has O2 concentrator at home, reason unclear, O2 sat high 90's off NC

## 2017-12-22 NOTE — DIETITIAN INITIAL EVALUATION ADULT. - NS AS NUTRI INTERV ED CONTENT
Provided pt with gastroparesis nutrition education handout. Discussed the importance of small frequent healthy balanced meals that are low in fiber and low in fat. Provided pt with low sodium nutrition education handout and discussed foods low in sodium. Pt verbalized understanding of nutrition education. RD remains available as needed and per follow-up protocol. Shonda Martinez MS, RDN, CDN Pager # [phone]

## 2017-12-22 NOTE — DIETITIAN INITIAL EVALUATION ADULT. - PHYSICAL APPEARANCE
Pt appears thin. BMI = 19.2kg/m2. No edema, no pressure injuries. NPFE performed. Findings include moderate orbital fat loss, severe temple and clavicle muscle mass loss.

## 2017-12-22 NOTE — CONSULT NOTE ADULT - ASSESSMENT
Dizziness  Orthostasis   Tachycardia  ?IST  ?POTS  High Benzo requirement  Likely component of ativan withdrawal    All above are chronic and she believes exacerbated by recent testing and agents involved to facilitate imaging. Barium ingestion is usually inert except for possible constipation and gadolinium not associated with blood pressure or heart rate changes. All cardiac enzymes negative. Repeat echocardiogram in progress and appears normal and unchanged from prior.    Recommendations:  Resume Diltiazem, but at reduced dose of 120 mg CD daily starting tomorrow morning.

## 2017-12-23 DIAGNOSIS — D69.6 THROMBOCYTOPENIA, UNSPECIFIED: ICD-10-CM

## 2017-12-23 DIAGNOSIS — D64.9 ANEMIA, UNSPECIFIED: ICD-10-CM

## 2017-12-23 LAB
ANION GAP SERPL CALC-SCNC: 14 MMOL/L — SIGNIFICANT CHANGE UP (ref 5–17)
BUN SERPL-MCNC: 3 MG/DL — LOW (ref 7–23)
CALCIUM SERPL-MCNC: 9.6 MG/DL — SIGNIFICANT CHANGE UP (ref 8.4–10.5)
CHLORIDE SERPL-SCNC: 108 MMOL/L — SIGNIFICANT CHANGE UP (ref 96–108)
CO2 SERPL-SCNC: 22 MMOL/L — SIGNIFICANT CHANGE UP (ref 22–31)
CREAT SERPL-MCNC: 0.87 MG/DL — SIGNIFICANT CHANGE UP (ref 0.5–1.3)
GLUCOSE SERPL-MCNC: 88 MG/DL — SIGNIFICANT CHANGE UP (ref 70–99)
HCT VFR BLD CALC: 35 % — SIGNIFICANT CHANGE UP (ref 34.5–45)
HGB BLD-MCNC: 11.8 G/DL — SIGNIFICANT CHANGE UP (ref 11.5–15.5)
MCHC RBC-ENTMCNC: 28.2 PG — SIGNIFICANT CHANGE UP (ref 27–34)
MCHC RBC-ENTMCNC: 33.7 GM/DL — SIGNIFICANT CHANGE UP (ref 32–36)
MCV RBC AUTO: 83.7 FL — SIGNIFICANT CHANGE UP (ref 80–100)
PLATELET # BLD AUTO: 64 K/UL — LOW (ref 150–400)
POTASSIUM SERPL-MCNC: 4.1 MMOL/L — SIGNIFICANT CHANGE UP (ref 3.5–5.3)
POTASSIUM SERPL-SCNC: 4.1 MMOL/L — SIGNIFICANT CHANGE UP (ref 3.5–5.3)
RBC # BLD: 4.18 M/UL — SIGNIFICANT CHANGE UP (ref 3.8–5.2)
RBC # FLD: 12.5 % — SIGNIFICANT CHANGE UP (ref 10.3–14.5)
SODIUM SERPL-SCNC: 144 MMOL/L — SIGNIFICANT CHANGE UP (ref 135–145)
WBC # BLD: 4.5 K/UL — SIGNIFICANT CHANGE UP (ref 3.8–10.5)
WBC # FLD AUTO: 4.5 K/UL — SIGNIFICANT CHANGE UP (ref 3.8–10.5)

## 2017-12-23 PROCEDURE — 93010 ELECTROCARDIOGRAM REPORT: CPT

## 2017-12-23 PROCEDURE — 99222 1ST HOSP IP/OBS MODERATE 55: CPT | Mod: GC

## 2017-12-23 PROCEDURE — 99222 1ST HOSP IP/OBS MODERATE 55: CPT

## 2017-12-23 RX ADMIN — FAMOTIDINE 20 MILLIGRAM(S): 10 INJECTION INTRAVENOUS at 21:12

## 2017-12-23 RX ADMIN — SIMETHICONE 160 MILLIGRAM(S): 80 TABLET, CHEWABLE ORAL at 21:12

## 2017-12-23 RX ADMIN — ONDANSETRON 4 MILLIGRAM(S): 8 TABLET, FILM COATED ORAL at 06:01

## 2017-12-23 RX ADMIN — Medication 2 MILLIGRAM(S): at 11:01

## 2017-12-23 RX ADMIN — Medication 2 MILLIGRAM(S): at 06:28

## 2017-12-23 RX ADMIN — SODIUM CHLORIDE 75 MILLILITER(S): 9 INJECTION INTRAMUSCULAR; INTRAVENOUS; SUBCUTANEOUS at 08:52

## 2017-12-23 RX ADMIN — Medication 2 MILLIGRAM(S): at 22:43

## 2017-12-23 RX ADMIN — Medication 2.5 MILLIGRAM(S): at 08:49

## 2017-12-23 RX ADMIN — Medication 100 MILLIGRAM(S): at 16:34

## 2017-12-23 RX ADMIN — Medication 2 MILLIGRAM(S): at 02:15

## 2017-12-23 RX ADMIN — Medication 2.5 MILLIGRAM(S): at 20:10

## 2017-12-23 RX ADMIN — Medication 100 MILLIGRAM(S): at 21:12

## 2017-12-23 RX ADMIN — ONDANSETRON 4 MILLIGRAM(S): 8 TABLET, FILM COATED ORAL at 17:54

## 2017-12-23 RX ADMIN — ONDANSETRON 4 MILLIGRAM(S): 8 TABLET, FILM COATED ORAL at 14:15

## 2017-12-23 RX ADMIN — FAMOTIDINE 20 MILLIGRAM(S): 10 INJECTION INTRAVENOUS at 09:37

## 2017-12-23 RX ADMIN — Medication 100 MILLIGRAM(S): at 09:37

## 2017-12-23 RX ADMIN — ONDANSETRON 4 MILLIGRAM(S): 8 TABLET, FILM COATED ORAL at 02:03

## 2017-12-23 RX ADMIN — ONDANSETRON 4 MILLIGRAM(S): 8 TABLET, FILM COATED ORAL at 10:35

## 2017-12-23 RX ADMIN — SIMETHICONE 160 MILLIGRAM(S): 80 TABLET, CHEWABLE ORAL at 16:38

## 2017-12-23 RX ADMIN — Medication 2 MILLIGRAM(S): at 14:34

## 2017-12-23 RX ADMIN — SIMETHICONE 160 MILLIGRAM(S): 80 TABLET, CHEWABLE ORAL at 09:38

## 2017-12-23 RX ADMIN — Medication 2 MILLIGRAM(S): at 18:14

## 2017-12-23 RX ADMIN — ONDANSETRON 4 MILLIGRAM(S): 8 TABLET, FILM COATED ORAL at 22:13

## 2017-12-23 NOTE — PROGRESS NOTE ADULT - ASSESSMENT
pt w/ mult medical problems with abd pain   seen by gi    mri enterography / gastric emptying study reviewed - gastroparesis, MR enterography normal  called and spoke w/ gi on call to come back and review and f/u on pts symptoms as pt requests gi to f/u  spoke w/ gi on call and will see today   cont diet as per gi,   oob  iv fluid prn   cont current meds  no rifaximin as not tolerating  chest pain on and off, normal EKG while in pain, CE (-), bradycardia to low 50's, cards f/u noted  Cardizem not given 2/2 javier episodes may need to change med  cards called to reeval today as well  Psych f/u   pulm eval appreciated

## 2017-12-23 NOTE — PROGRESS NOTE ADULT - SUBJECTIVE AND OBJECTIVE BOX
CHIEF COMPLAINT:Patient c/o nausea  dec po intake  states hr in 40s  on tele 50s to 70s   	        PAST MEDICAL & SURGICAL HISTORY:  Idiopathic thrombocytopenia purpura  Anxiety  POTS (postural orthostatic tachycardia syndrome)  History of ITP  Labyrinthitis  Headache  HTN (hypertension)  Anxiety  IBS (irritable bowel syndrome)  No significant past surgical history  No significant past surgical history          REVIEW OF SYSTEMS:  CONSTITUTIONAL:weak  EYES: No eye pain, visual disturbances, or discharge  NECK: No pain or stiffness  RESPIRATORY: No cough, wheezing, chills or hemoptysis; No Shortness of Breath  CARDIOVASCULAR:nonspecific chest pain,  GASTROINTESTINAL abd disconfort at times. +  nausea, no vomiting, or hematemesis; No diarrhea or constipation. No melena or hematochezia.  GENITOURINARY: No dysuria, frequency, hematuria, or incontinence  NEUROLOGICAL: No headaches, memory loss, loss of strength, numbness, or tremors  MUSCULOSKELETAL: No joint pain or swelling; No muscle, back, or extremity pain    Medications:  MEDICATIONS  (STANDING):  busPIRone 2.5 milliGRAM(s) Oral two times a day  diltiazem    milliGRAM(s) Oral daily  docusate sodium 100 milliGRAM(s) Oral three times a day  famotidine    Tablet 20 milliGRAM(s) Oral two times a day  LORazepam     Tablet 2 milliGRAM(s) Oral every 4 hours  montelukast 10 milliGRAM(s) Oral at bedtime  ondansetron   Disintegrating Tablet 4 milliGRAM(s) Oral every 4 hours  potassium chloride    Tablet ER 40 milliEquivalent(s) Oral once  simethicone 160 milliGRAM(s) Chew three times a day  sodium chloride 0.9%. 1000 milliLiter(s) (75 mL/Hr) IV Continuous <Continuous>    MEDICATIONS  (PRN):  acetaminophen   Tablet. 650 milliGRAM(s) Oral every 6 hours PRN Mild Pain (1 - 3)    	    PHYSICAL EXAM:  T(C): 36.6 (12-23-17 @ 11:28), Max: 36.7 (12-22-17 @ 21:32)  HR: 62 (12-23-17 @ 11:28) (62 - 82)  BP: 126/84 (12-23-17 @ 11:28) (103/66 - 141/87)  RR: 18 (12-23-17 @ 11:28) (17 - 18)  SpO2: 100% (12-23-17 @ 11:28) (100% - 100%)  Wt(kg): --  I&O's Summary    22 Dec 2017 07:01  -  23 Dec 2017 07:00  --------------------------------------------------------  IN: 1860 mL / OUT: 0 mL / NET: 1860 mL        Appearance: Normal	  HEENT:   Normal oral mucosa, PERRL, EOMI	  Lymphatic: No lymphadenopathy  Cardiovascular: Normal S1 S2, No JVD, No murmurs, No edema  Respiratory: Lungs clear to auscultation	  Psychiatry: A & O x 3, Mood & affect appropriate  Gastrointestinal:  Soft, Non-tender, + BS	  Skin: No rashes, No ecchymoses, No cyanosis	  Neurologic: Non-focal from  motor equal   Extremities: Normal range of motion, No clubbing, cyanosis or edema  Vascular: Peripheral pulses palpable 2+ bilaterally    TELEMETRY: 	    ECG:  	  RADIOLOGY:  OTHER: 	  	  LABS:	 	    CARDIAC MARKERS:                                11.2   7.34  )-----------( 63       ( 22 Dec 2017 13:36 )             33.7     12-22    144  |  108  |  3<L>  ----------------------------<  128<H>  4.0   |  22  |  0.93    Ca    9.3      22 Dec 2017 13:36      proBNP:   Lipid Profile:   HgA1c:   TSH:

## 2017-12-23 NOTE — CONSULT NOTE ADULT - ATTENDING COMMENTS
Agree with above.   Plts stable. Can check iron studies per pt's request. f/u with out pt hematologist.

## 2017-12-23 NOTE — CONSULT NOTE ADULT - ASSESSMENT
41y Female, h/o chronic ITP (baseline plt 60's), fibroid uterus, POTS - autonomic dysregulation/anxiety/IBS, complaining of abd pain, nausea, abd bloating, early satiety being treated for gastroparesis.  Also had an episode of vaginal bleeding with h/o fibroid uterus, evaluated by gyn onc with outpt fu.  Oncology consulted for thrombocytopenia.

## 2017-12-23 NOTE — CONSULT NOTE ADULT - SUBJECTIVE AND OBJECTIVE BOX
HPI:  Patient is a 41y Female, h/o chronic ITP, fibroid uterus/POTS - autonomic dysregulation/anxiety/IBS, complaining of acute onset abdominal pain which woke her from sleep 2 nights prior (11/15). She describes the pain as 7/10 in intensity, constant in periodicity, alleviated somewhat with fetal positioning, not exacerbated by anything, with no radiation. Pain accompanied by nausea (which has been occurring for 1 month) and bleeding per vagina x 24 hours. She is passing stool and gas, reports no fever/chills. The pain abated spontaneously, but she states that when providers examine her abdomen she still has pain. This pain is not consistent with her low anterior cramping pain that she felt prior to her diagnosis of uterine fibroids, or with her intermittent crampy abdominal pain that she frequently has.  Gastric emptying study consistent with gastroparesis, evaluated by GI.      Hematology consulted for thrombocytopenia.  Patient has a history of ITP previously on steroids, follows with Dr. Zamora at [facility].  She saw Dr. Zamora in 7/2017 without further follow up. Her plts have been stable ~60's since 9/2017.  Aside from the one episode of vaginal bleeding prior to admission, Hgb stable and no further bleeding reported. Evaluated by gynecology, transvaginal US with cervical myoma and fibroid uterus.      Allergies  beta blockers (Unknown)  Lexapro (Unknown)    Intolerances  metoprolol (Headache)    MEDICATIONS  (STANDING):  busPIRone 2.5 milliGRAM(s) Oral two times a day  diltiazem    milliGRAM(s) Oral daily  docusate sodium 100 milliGRAM(s) Oral three times a day  famotidine    Tablet 20 milliGRAM(s) Oral two times a day  LORazepam     Tablet 2 milliGRAM(s) Oral every 4 hours  montelukast 10 milliGRAM(s) Oral at bedtime  ondansetron   Disintegrating Tablet 4 milliGRAM(s) Oral every 4 hours  potassium chloride    Tablet ER 40 milliEquivalent(s) Oral once  simethicone 160 milliGRAM(s) Chew three times a day  sodium chloride 0.9%. 1000 milliLiter(s) (75 mL/Hr) IV Continuous <Continuous>    MEDICATIONS  (PRN):  acetaminophen   Tablet. 650 milliGRAM(s) Oral every 6 hours PRN Mild Pain (1 - 3)    PAST MEDICAL & SURGICAL HISTORY:  Idiopathic thrombocytopenia purpura  Anxiety  POTS (postural orthostatic tachycardia syndrome)  History of ITP  Labyrinthitis  Headache  HTN (hypertension)  Anxiety  IBS (irritable bowel syndrome)  No significant past surgical history  No significant past surgical history    FAMILY HISTORY:  No pertinent family history in first degree relatives  Family history of hypertension (Grandparent)  Family history of atrial fibrillation  Family history of prostate cancer in father    SOCIAL HISTORY: No EtOH, no tobacco    REVIEW OF SYSTEMS:  All other review of systems is negative unless indicated above.    VITALS-  T(F): 98.3 (12-23-17 @ 16:45), Max: 98.3 (12-23-17 @ 16:45)  HR: 68 (12-23-17 @ 16:45)  BP: 132/86 (12-23-17 @ 16:45)  RR: 18 (12-23-17 @ 16:45)  SpO2: 100% (12-23-17 @ 16:45)  Wt(kg): --    PHYSICAL EXAM-  GENERAL: NAD, well-developed  HEAD:  Atraumatic, Normocephalic  EYES: EOMI, PERRLA, conjunctiva and sclera clear  NECK: Supple, No JVD  CHEST/LUNG: Clear to auscultation bilaterally; No wheeze  HEART: Regular rate and rhythm; No murmurs, rubs, or gallops  ABDOMEN: Soft, Nontender, Nondistended; Bowel sounds present  EXTREMITIES:  2+ Peripheral Pulses, No clubbing, cyanosis, or edema  NEUROLOGY: non-focal  SKIN: No rashes or lesions    LABS:                        11.8   4.50  )-----------( 64       ( 23 Dec 2017 12:32 )             35.0       12-23    144  |  108  |  3<L>  ----------------------------<  88  4.1   |  22  |  0.87    Ca    9.6      23 Dec 2017 12:32        EXAM:  CT ABDOMEN AND PELVIS OC IC                          PROCEDURE DATE:  12/17/2017      INTERPRETATION:  CLINICAL INFORMATION: Right lower quadrant abdominal   pain, evaluate for appendicitis    COMPARISON: Comparison study dated 10/22/2017    PROCEDURE:   CT of the Abdomen and Pelvis was performed with intravenous contrast.   Intravenous contrast: 90 ml Omnipaque 350. 10 ml discarded.  Oral contrast: positive contrast was administered.  Sagittal and coronal reformats were performed.    FINDINGS:    LOWER CHEST: Left lower lobe atelectasis.     LIVER: Within normal limits.  BILE DUCTS: Normal caliber.  GALLBLADDER: Within normal limits.  SPLEEN: Within normal limits.  PANCREAS: Within normal limits.  ADRENALS: Within normal limits.  KIDNEYS/URETERS: Within normal limits.    BLADDER: Distended.  REPRODUCTIVE ORGANS: Fibroid uterus. No adnexal mass.    BOWEL: No bowel obstruction. Underdistention versus bowel thickening of   the transverse colon. The appendix is mildly thickened, measuring 8 mm in   diameter, slightly increased compared to prior study. No associated   collection or significant periappendiceal inflammation.  PERITONEUM: No ascites.  VESSELS:  Within normal limits.  RETROPERITONEUM: No lymphadenopathy.    ABDOMINAL WALL: Within normal limits.  BONES: Within normal limits.    IMPRESSION:  Mild thickening of the appendix, measuring 8 mm in diameter, slightly   increased to prior study. There is no associated collection or   significant periappendiceal inflammation. These findings may represent   early or mild appendicitis.    Under distention versus bowel wall thickening of the transverse colon,   favoring underdistention due to the lack of surrounding inflammatory   changes.    Fibroid uterus.      EDSON PATEL M.D., Radiology Resident  This document has been electronically signed.  RUTH VYAS M.D., ATTENDING RADIOLOGIST  This document has been electronically signed. Dec 17 2017 10:38AM

## 2017-12-24 ENCOUNTER — TRANSCRIPTION ENCOUNTER (OUTPATIENT)
Age: 42
End: 2017-12-24

## 2017-12-24 DIAGNOSIS — K58.8 OTHER IRRITABLE BOWEL SYNDROME: ICD-10-CM

## 2017-12-24 LAB
ANION GAP SERPL CALC-SCNC: 14 MMOL/L — SIGNIFICANT CHANGE UP (ref 5–17)
BUN SERPL-MCNC: 4 MG/DL — LOW (ref 7–23)
CALCIUM SERPL-MCNC: 9.9 MG/DL — SIGNIFICANT CHANGE UP (ref 8.4–10.5)
CHLORIDE SERPL-SCNC: 106 MMOL/L — SIGNIFICANT CHANGE UP (ref 96–108)
CO2 SERPL-SCNC: 23 MMOL/L — SIGNIFICANT CHANGE UP (ref 22–31)
CREAT SERPL-MCNC: 0.94 MG/DL — SIGNIFICANT CHANGE UP (ref 0.5–1.3)
GLUCOSE SERPL-MCNC: 119 MG/DL — HIGH (ref 70–99)
HCT VFR BLD CALC: 33.7 % — LOW (ref 34.5–45)
HGB BLD-MCNC: 11.5 G/DL — SIGNIFICANT CHANGE UP (ref 11.5–15.5)
MCHC RBC-ENTMCNC: 28.3 PG — SIGNIFICANT CHANGE UP (ref 27–34)
MCHC RBC-ENTMCNC: 34.1 GM/DL — SIGNIFICANT CHANGE UP (ref 32–36)
MCV RBC AUTO: 83 FL — SIGNIFICANT CHANGE UP (ref 80–100)
PLATELET # BLD AUTO: 61 K/UL — LOW (ref 150–400)
POTASSIUM SERPL-MCNC: 4 MMOL/L — SIGNIFICANT CHANGE UP (ref 3.5–5.3)
POTASSIUM SERPL-SCNC: 4 MMOL/L — SIGNIFICANT CHANGE UP (ref 3.5–5.3)
RBC # BLD: 4.06 M/UL — SIGNIFICANT CHANGE UP (ref 3.8–5.2)
RBC # FLD: 12.7 % — SIGNIFICANT CHANGE UP (ref 10.3–14.5)
SODIUM SERPL-SCNC: 143 MMOL/L — SIGNIFICANT CHANGE UP (ref 135–145)
TROPONIN T SERPL-MCNC: <0.01 NG/ML — SIGNIFICANT CHANGE UP (ref 0–0.06)
WBC # BLD: 4.69 K/UL — SIGNIFICANT CHANGE UP (ref 3.8–10.5)
WBC # FLD AUTO: 4.69 K/UL — SIGNIFICANT CHANGE UP (ref 3.8–10.5)

## 2017-12-24 PROCEDURE — 99232 SBSQ HOSP IP/OBS MODERATE 35: CPT | Mod: GC

## 2017-12-24 RX ORDER — SODIUM CHLORIDE 9 MG/ML
1000 INJECTION INTRAMUSCULAR; INTRAVENOUS; SUBCUTANEOUS
Qty: 0 | Refills: 0 | Status: DISCONTINUED | OUTPATIENT
Start: 2017-12-24 | End: 2017-12-25

## 2017-12-24 RX ORDER — ACETAMINOPHEN 500 MG
2 TABLET ORAL
Qty: 240 | Refills: 0 | OUTPATIENT
Start: 2017-12-24 | End: 2018-01-22

## 2017-12-24 RX ADMIN — ONDANSETRON 4 MILLIGRAM(S): 8 TABLET, FILM COATED ORAL at 19:18

## 2017-12-24 RX ADMIN — Medication 2 MILLIGRAM(S): at 01:26

## 2017-12-24 RX ADMIN — FAMOTIDINE 20 MILLIGRAM(S): 10 INJECTION INTRAVENOUS at 10:09

## 2017-12-24 RX ADMIN — Medication 2 MILLIGRAM(S): at 07:03

## 2017-12-24 RX ADMIN — SIMETHICONE 160 MILLIGRAM(S): 80 TABLET, CHEWABLE ORAL at 16:29

## 2017-12-24 RX ADMIN — Medication 2 MILLIGRAM(S): at 15:36

## 2017-12-24 RX ADMIN — ONDANSETRON 4 MILLIGRAM(S): 8 TABLET, FILM COATED ORAL at 14:56

## 2017-12-24 RX ADMIN — SIMETHICONE 160 MILLIGRAM(S): 80 TABLET, CHEWABLE ORAL at 10:10

## 2017-12-24 RX ADMIN — Medication 2 MILLIGRAM(S): at 11:08

## 2017-12-24 RX ADMIN — ONDANSETRON 4 MILLIGRAM(S): 8 TABLET, FILM COATED ORAL at 01:01

## 2017-12-24 RX ADMIN — ONDANSETRON 4 MILLIGRAM(S): 8 TABLET, FILM COATED ORAL at 06:46

## 2017-12-24 RX ADMIN — Medication 2 MILLIGRAM(S): at 19:19

## 2017-12-24 RX ADMIN — ONDANSETRON 4 MILLIGRAM(S): 8 TABLET, FILM COATED ORAL at 10:45

## 2017-12-24 RX ADMIN — Medication 2 MILLIGRAM(S): at 23:20

## 2017-12-24 RX ADMIN — ONDANSETRON 4 MILLIGRAM(S): 8 TABLET, FILM COATED ORAL at 22:51

## 2017-12-24 RX ADMIN — Medication 100 MILLIGRAM(S): at 10:09

## 2017-12-24 RX ADMIN — Medication 2.5 MILLIGRAM(S): at 21:09

## 2017-12-24 RX ADMIN — SIMETHICONE 160 MILLIGRAM(S): 80 TABLET, CHEWABLE ORAL at 22:04

## 2017-12-24 RX ADMIN — Medication 100 MILLIGRAM(S): at 22:05

## 2017-12-24 RX ADMIN — Medication 2.5 MILLIGRAM(S): at 09:00

## 2017-12-24 RX ADMIN — SODIUM CHLORIDE 50 MILLILITER(S): 9 INJECTION INTRAMUSCULAR; INTRAVENOUS; SUBCUTANEOUS at 17:50

## 2017-12-24 RX ADMIN — FAMOTIDINE 20 MILLIGRAM(S): 10 INJECTION INTRAVENOUS at 22:04

## 2017-12-24 RX ADMIN — Medication 100 MILLIGRAM(S): at 16:29

## 2017-12-24 NOTE — DISCHARGE NOTE ADULT - MEDICATION SUMMARY - MEDICATIONS TO TAKE
I will START or STAY ON the medications listed below when I get home from the hospital:    digestive enzyme  -- 1 to 4 tab(s) depending on size of the meal  -- Indication: For Gastroparesis    dilTIAZem 240 mg/24 hours oral capsule, extended release  -- 1 cap(s) by mouth once a day  -- Indication: For htn    LORazepam 2 mg oral tablet  -- 1 tab(s) by mouth every 4 hours  -- Indication: For Anxiety    ondansetron 4 mg oral tablet, disintegrating  -- 1 tab(s) by mouth every 4 hours, As Needed  -- Indication: For Nausea    busPIRone 5 mg oral tablet  -- 0.5 tab(s) by mouth 2 times a day  -- Indication: For Anxiety    famotidine 20 mg oral tablet  -- 1 tab(s) by mouth 2 times a day  -- Indication: For Gerd    docusate sodium 100 mg oral capsule  -- 1 cap(s) by mouth 3 times a day  -- Indication: For stool softner    Singulair 10 mg oral tablet  -- 1 tab(s) by mouth once a day  -- Indication: For Allergic rhinitis, unspecified chronicity, unspecified seasonality, unspecified trigger    simethicone 80 mg oral tablet, chewable  -- 2 tab(s) by mouth 3 times a day, As Needed  -- Indication: For Gas    progesterone 100 mg vaginal suppository  -- 1 suppository(ies) vaginally once a day  -- Indication: For hormone I will START or STAY ON the medications listed below when I get home from the hospital:    digestive enzyme  -- 1 to 4 tab(s) depending on size of the meal  -- Indication: For Gastroparesis    LORazepam 2 mg oral tablet  -- 1 tab(s) by mouth every 4 hours  -- Indication: For Anxiety    ondansetron 4 mg oral tablet, disintegrating  -- 1 tab(s) by mouth every 4 hours, As Needed  -- Indication: For Nausea    busPIRone 5 mg oral tablet  -- 0.5 tab(s) by mouth 2 times a day  -- Indication: For Anxiety    famotidine 20 mg oral tablet  -- 1 tab(s) by mouth 2 times a day  -- Indication: For Gerd    docusate sodium 100 mg oral capsule  -- 1 cap(s) by mouth 3 times a day  -- Indication: For stool softner    Singulair 10 mg oral tablet  -- 1 tab(s) by mouth once a day  -- Indication: For Allergic rhinitis, unspecified chronicity, unspecified seasonality, unspecified trigger    simethicone 80 mg oral tablet, chewable  -- 2 tab(s) by mouth 3 times a day, As Needed  -- Indication: For Gas    progesterone 100 mg vaginal suppository  -- 1 suppository(ies) vaginally once a day  -- Indication: For hormone I will START or STAY ON the medications listed below when I get home from the hospital:    LORazepam 2 mg oral tablet  -- 1 tab(s) by mouth every 4 hours  -- Indication: For Anxiety    ondansetron 4 mg oral tablet, disintegrating  -- 1 tab(s) by mouth every 4 hours, As Needed  -- Indication: For Nausea    busPIRone 5 mg oral tablet  -- 0.5 tab(s) by mouth 2 times a day  -- Indication: For Anxiety    famotidine 20 mg oral tablet  -- 1 tab(s) by mouth 2 times a day  -- Indication: For Gerd    docusate sodium 100 mg oral capsule  -- 1 cap(s) by mouth 3 times a day  -- Indication: For stool softner    Singulair 10 mg oral tablet  -- 1 tab(s) by mouth once a day  -- Indication: For Allergic rhinitis, unspecified chronicity, unspecified seasonality, unspecified trigger    simethicone 80 mg oral tablet, chewable  -- 2 tab(s) by mouth 3 times a day, As Needed  -- Indication: For Gas    progesterone 100 mg vaginal suppository  -- 1 suppository(ies) vaginally once a day  -- Indication: For hormone

## 2017-12-24 NOTE — DISCHARGE NOTE ADULT - PLAN OF CARE
Low salt diet  Activity as tolerated.  Take all medication as prescribed.  Follow up with your medical doctor for routine blood pressure monitoring at your next visit.  Notify your doctor if you have any of the following symptoms:   Dizziness, Lightheadedness, Blurry vision, Headache, Chest pain, Shortness of breath resolution f/u with Dr. Perez as outpatient  Continue medication as directed Continue to take medication as directed by your psychiatrist  f/u with your psychiatrist in 2-3 days

## 2017-12-24 NOTE — CONSULT NOTE ADULT - CONSULT REQUESTED DATE/TIME
18-Dec-2017 12:16
18-Dec-2017 16:45
19-Dec-2017 17:47
22-Dec-2017 13:24
22-Dec-2017 14:15
22-Dec-2017 15:45
23-Dec-2017 14:00
24-Dec-2017 15:02

## 2017-12-24 NOTE — DISCHARGE NOTE ADULT - HOSPITAL COURSE
41y Female, h/o fibroid uterus/POTS - autonomic dysregulation/anxiety/IBS, complaining of acute onset abdominal pain which woke her from sleep. Patient was admitted to surgery for possible Ap which was ruled out. pt now transferred to medicine service Dr Goodrich for GI work up      seen by gi and pt is refusing to return for follow up   mri enterography / gastric emptying study reviewed - gastroparesis, MR enterography normal  cont diet, PPI, pt refusing erythromycin   oob  iv fluid prn   no rifaximin as not tolerating  chest pain on and off, normal EKG while in pain, CE (-), bradycardia to low 50's,   hold diltiazem   Psych f/u as outpatient for anxiety     f/u with PMD as outpatient  f/u with GI  f/u with cardiology

## 2017-12-24 NOTE — CONSULT NOTE ADULT - NSHPATTENDINGPLANDISCUSS_GEN_ALL_CORE
team
Medicine NP. To reach Cardiology Attending call during weekdays [employer] [phone].
patient, medicine NP on [facility]

## 2017-12-24 NOTE — CONSULT NOTE ADULT - CONSULT REASON
Thrombocytopenia
Unclear Hypoxemia    Evaluation on behalf of Dr. Sunshine
abd pain
abdominal pain
chest pain, tachycardia, bradycardia
headaches
med
abd pain

## 2017-12-24 NOTE — DISCHARGE NOTE ADULT - PATIENT PORTAL LINK FT
“You can access the Follow[facility] Patient Portal, offered by [facility], by registering with the following website: [email or web address]”

## 2017-12-24 NOTE — DISCHARGE NOTE ADULT - CARE PLAN
Principal Discharge DX:	Abdominal pain Principal Discharge DX:	Gastroparesis  Goal:	resolution  Instructions for follow-up, activity and diet:	f/u with Dr. Perez as outpatient  Continue medication as directed  Secondary Diagnosis:	Adjustment disorder with mixed anxiety and depressed mood  Instructions for follow-up, activity and diet:	Continue to take medication as directed by your psychiatrist  f/u with your psychiatrist in 2-3 days  Secondary Diagnosis:	HTN (hypertension)  Instructions for follow-up, activity and diet:	Low salt diet  Activity as tolerated.  Take all medication as prescribed.  Follow up with your medical doctor for routine blood pressure monitoring at your next visit.  Notify your doctor if you have any of the following symptoms:   Dizziness, Lightheadedness, Blurry vision, Headache, Chest pain, Shortness of breath

## 2017-12-24 NOTE — PROGRESS NOTE ADULT - ATTENDING COMMENTS
Lengthy discussion with patient regarding her ongoing complaints, recent testing, recommendations for further outpatient evaluation.  Again, MRI re-reviewed with radiology.  No abnormalities.  Specifically, appendix normal appearing.   CT reviewed, vasculature normal.  Mild abnormality of liquid phase gastric emptying.  Uncertain clinical significance  Discussed with patient possible further outpatient workup with smart pill.  No further inpatient testing recommended at this time.

## 2017-12-24 NOTE — PROGRESS NOTE ADULT - ASSESSMENT
41 year old female with hx of ITP, Uterine Fibroids here for acute on chronic abdominal pain.    Impression:  1) Nausea & Abdominal Pain: Pt had an abnormal liquid NM Gastric Emptying Study suggestive of Gastroparesis. Pt does also have POTS which has been associated with GI symptoms including nausea, abdominal cramps, bloating, early satiety and diarrhea. Consider Gastroparesis and POTS as the etiology of her GI symptoms. Initially with RLQ pain and ?? appendiceal enlargement suggestive of acute appendicitis but patient does not have peritoneal signs, and acute appendicitis unlikely.  2) ITP    Plan:  - Will need to have small frequent meals (gastroparesis diet)  - Consider Nutrition evaluation  - Pt will need continued followup with GI as outpatient ( Dr. Leandro Perez / GI motility specialist)    NOTE IS INCOMPLETE 41 year old female with hx of ITP, Uterine Fibroids here for acute on chronic abdominal pain.    Impression:  1) Nausea & Abdominal Pain: Pt had an abnormal liquid NM Gastric Emptying Study suggestive of Gastroparesis. Pt does also have POTS which has been associated with GI symptoms including nausea, abdominal cramps, bloating, early satiety and diarrhea. Consider Gastroparesis and POTS as the etiology of her GI symptoms. Initially with RLQ pain and ?? appendiceal enlargement suggestive of acute appendicitis but patient does not have peritoneal signs, and acute appendicitis unlikely.  2) ITP    Plan:  - Will need to have small frequent meals (gastroparesis diet)  - Consider Nutrition evaluation  - Pt will need continued followup with GI as outpatient ( Dr. Leandro Perez / GI motility specialist)

## 2017-12-24 NOTE — DISCHARGE NOTE ADULT - CARE PROVIDER_API CALL
Leandro Perez), Internal Medicine  [address] [place] [address]  Phone: [phone]  Fax: [phone]

## 2017-12-24 NOTE — PROGRESS NOTE ADULT - ASSESSMENT
pt w/ mult medical problems with abd pain   seen by gi  and refusing to return for follow up   will call second opinion   mri enterography / gastric emptying study reviewed - gastroparesis, MR enterography normal  cont diet as per gi,   oob  iv fluid prn   cont current meds  no rifaximin as not tolerating  chest pain on and off, normal EKG while in pain, CE (-), bradycardia to low 50's, cards f/u   cards called to reeval yesterday /still waiting   will call second opinion  hold diltiazem for now  Psych f/u   l

## 2017-12-24 NOTE — PROGRESS NOTE ADULT - SUBJECTIVE AND OBJECTIVE BOX
Chief Complaint:  Patient is a 41y old  Female who presents with a chief complaint of Abdominal pain (17 Dec 2017 14:05)      Interval Events:     Allergies:  beta blockers (Unknown)  Lexapro (Unknown)  metoprolol (Headache)      Hospital Medications:  acetaminophen   Tablet. 650 milliGRAM(s) Oral every 6 hours PRN  busPIRone 2.5 milliGRAM(s) Oral two times a day  docusate sodium 100 milliGRAM(s) Oral three times a day  famotidine    Tablet 20 milliGRAM(s) Oral two times a day  LORazepam     Tablet 2 milliGRAM(s) Oral every 4 hours  montelukast 10 milliGRAM(s) Oral at bedtime  ondansetron   Disintegrating Tablet 4 milliGRAM(s) Oral every 4 hours  potassium chloride    Tablet ER 40 milliEquivalent(s) Oral once  simethicone 160 milliGRAM(s) Chew three times a day      PMHX/PSHX:  Idiopathic thrombocytopenia purpura  Anxiety  POTS (postural orthostatic tachycardia syndrome)  Pott disease  History of ITP  Labyrinthitis  Asthma  Headache  HTN (hypertension)  Anxiety  IBS (irritable bowel syndrome)  No significant past surgical history  No significant past surgical history      Family history:  No pertinent family history in first degree relatives  Family history of hypertension (Grandparent)  Family history of atrial fibrillation  Family history of prostate cancer in father      ROS:     General:  No wt loss, fevers, chills, night sweats, fatigue,   Eyes:  Good vision, no reported pain  ENT:  No sore throat, pain, runny nose, dysphagia  CV:  No pain, palpitations, hypo/hypertension  Resp:  No dyspnea, cough, tachypnea, wheezing  GI:  See HPI  :  No pain, bleeding, incontinence, nocturia  Muscle:  No pain, weakness  Neuro:  No weakness, tingling, memory problems  Psych:  No fatigue, insomnia, mood problems, depression  Endocrine:  No polyuria, polydipsia, cold/heat intolerance  Heme:  No petechiae, ecchymosis, easy bruisability  Skin:  No rash, edema      PHYSICAL EXAM:     GENERAL:  Appears stated age, well-groomed, well-nourished, no distress  HEENT:  NC/AT,  conjunctivae clear, sclera -anicteric  CHEST:  Full & symmetric excursion, no increased effort, breath sounds clear  HEART:  Regular rhythm, S1, S2, no murmur/rub/S3/S4,  no edema  ABDOMEN:  Soft, non-tender, non-distended, normoactive bowel sounds,  no masses ,no hepato-splenomegaly,   EXTREMITIES:  no cyanosis,clubbing or edema  SKIN:  No rash/erythema/ecchymoses/petechiae/wounds/abscess/warm/dry  NEURO:  Alert, oriented    Vital Signs:  Vital Signs Last 24 Hrs  T(C): 36.4 (24 Dec 2017 06:06), Max: 36.8 (23 Dec 2017 16:45)  T(F): 97.5 (24 Dec 2017 06:06), Max: 98.3 (23 Dec 2017 16:45)  HR: 56 (24 Dec 2017 06:06) (51 - 68)  BP: 104/67 (24 Dec 2017 06:06) (104/67 - 154/94)  BP(mean): --  RR: 118 (24 Dec 2017 06:06) (17 - 118)  SpO2: 100% (24 Dec 2017 06:06) (100% - 100%)  Daily     Daily Weight in k.8 (23 Dec 2017 11:28)    LABS:                        11.8   4.50  )-----------( 64       ( 23 Dec 2017 12:32 )             35.0     12-    144  |  108  |  3<L>  ----------------------------<  88  4.1   |  22  |  0.87    Ca    9.6      23 Dec 2017 12:32                Imaging:      < from: NM Gastric Emptying Liquid (17 @ 10:05) >    IMPRESSION:  Abnormal radionuclide liquid gastric emptying scintigraphy.     Findings compatible with gastroparesis.         < end of copied text >      < from: MR Angio Abdomen w/wo IV Cont (17 @ 22:43) >  FINDINGS:    LOWER CHEST: Within normal limits.    LIVER: Within normal limits.  BILE DUCTS: Normal caliber.  GALLBLADDER: Within normal limits.  SPLEEN: Within normal limits.  PANCREAS: Within normal limits.  ADRENALS: Within normal limits.  KIDNEYS/URETERS: Within normal limits.    BLADDER: Within normal limits.  REPRODUCTIVE ORGANS: Fibroid uterus.    BOWEL: Normal-appearing small bowel without mucosal hyperenhancement. No   bowel obstruction. Normal colonic wall thickness. The appendix is normal.  PERITONEUM: No ascites.  VESSELS:  Within normal limits.  RETROPERITONEUM: No lymphadenopathy.    ABDOMINAL WALL: Within normal limits.  BONES: Mild lumbar levoscoliosis.    IMPRESSION:   No small bowel abnormalities.    Normal colonic wall thickness with normal-appearing appendix.    Please note the patient's reaction to intravenous gadolinium despite   having undergone prior contrast-enhanced MRI examinations.    < end of copied text >

## 2017-12-24 NOTE — DISCHARGE NOTE ADULT - ADDITIONAL INSTRUCTIONS
Follow-up with Dr. Leandro Perez in one week  Follow-up with your PMD in one week  Follow-up with Psychiatrist in one week  Follow-up with your cardiologist in one week

## 2017-12-24 NOTE — PROGRESS NOTE ADULT - SUBJECTIVE AND OBJECTIVE BOX
CHIEF COMPLAINT:Patient with same complaints    	        PAST MEDICAL & SURGICAL HISTORY:  Idiopathic thrombocytopenia purpura  Anxiety  POTS (postural orthostatic tachycardia syndrome)  History of ITP  Labyrinthitis  Headache  HTN (hypertension)  Anxiety  IBS (irritable bowel syndrome)  No significant past surgical history  No significant past surgical history          REVIEW OF SYSTEMS:  CONSTITUTIONAL: weak  EYES: No eye pain, visual disturbances, or discharge  NECK: No pain or stiffness  RESPIRATORY: No cough, wheezing, chills or hemoptysis; No Shortness of Breath  CARDIOVASCULAR: No chest pain, palpitations, passing out,  GASTROINTESTINAL: abd discomfort at times   GENITOURINARY: No dysuria, frequency, hematuria, or incontinence  NEUROLOGICAL: No headaches, memory loss, loss of strength, numbness, or tremors  MUSCULOSKELETAL: No joint pain or swelling; No muscle, back, or extremity pain    Medications:  MEDICATIONS  (STANDING):  busPIRone 2.5 milliGRAM(s) Oral two times a day  docusate sodium 100 milliGRAM(s) Oral three times a day  famotidine    Tablet 20 milliGRAM(s) Oral two times a day  LORazepam     Tablet 2 milliGRAM(s) Oral every 4 hours  montelukast 10 milliGRAM(s) Oral at bedtime  ondansetron   Disintegrating Tablet 4 milliGRAM(s) Oral every 4 hours  potassium chloride    Tablet ER 40 milliEquivalent(s) Oral once  simethicone 160 milliGRAM(s) Chew three times a day    MEDICATIONS  (PRN):  acetaminophen   Tablet. 650 milliGRAM(s) Oral every 6 hours PRN Mild Pain (1 - 3)    	    PHYSICAL EXAM:  T(C): 36.4 (12-24-17 @ 06:06), Max: 36.8 (12-23-17 @ 16:45)  HR: 56 (12-24-17 @ 06:06) (51 - 68)  BP: 104/67 (12-24-17 @ 06:06) (104/67 - 154/94)  RR: 118 (12-24-17 @ 06:06) (17 - 118)  SpO2: 100% (12-24-17 @ 06:06) (100% - 100%)  Wt(kg): --  I&O's Summary    23 Dec 2017 07:01  -  24 Dec 2017 07:00  --------------------------------------------------------  IN: 870 mL / OUT: 0 mL / NET: 870 mL        Appearance: Normal	  HEENT:   Normal oral mucosa, PERRL, EOMI	  Lymphatic: No lymphadenopathy  Cardiovascular: Normal S1 S2, No JVD, No murmurs, No edema  Respiratory: Lungs clear to auscultation	  Psychiatry: A & O x 3,   Gastrointestinal:  Soft, Non-tender, + BS	  Skin: No rashes, No ecchymoses, No cyanosis	  Neurologic: Non-focal from motor equal b/l  Extremities: Normal range of motion, No clubbing, cyanosis or edema  Vascular: Peripheral pulses palpable 2+ bilaterally    TELEMETRY: 	    ECG:  	  RADIOLOGY:  OTHER: 	  	  LABS:	 	    CARDIAC MARKERS:  CARDIAC MARKERS ( 23 Dec 2017 23:59 )  x     / <0.01 ng/mL / x     / x     / x                                    11.8   4.50  )-----------( 64       ( 23 Dec 2017 12:32 )             35.0     12-23    144  |  108  |  3<L>  ----------------------------<  88  4.1   |  22  |  0.87    Ca    9.6      23 Dec 2017 12:32      proBNP:   Lipid Profile:   HgA1c:   TSH:

## 2017-12-24 NOTE — CONSULT NOTE ADULT - PROBLEM SELECTOR RECOMMENDATION 9
good glycemic control  ppi once a day   reglan and zofran prn   agree with first consultant gi team  to follow prn
-patient did have nocturnal hypoxemia as per Dr. Sunshine's outpatient record and she was ordered for supplemental O2 at 3LPM at night and as needed  -maintain O2 sat > 90%  -will need outpatient sleep study - to be followed up with at Dr. Sunshine's office
Patient has a history of chronic ITP has had low plts since 2013. She was seen at the [facility] on 7/2017.  Her plts have been stable at her baseline ~60's.  No treatment indicated at this time.  Please have her fu with Dr. Attila Zamora her outpt hematologist upon discharge ([phone]).  We will sign off at this time.  Please call us with any questions.    Lizbeth Ford  Hematology Fellow  [phone]

## 2017-12-24 NOTE — CONSULT NOTE ADULT - PROBLEM SELECTOR RECOMMENDATION 2
gerd precautions  in and out  ppi once a day  may need  upper gastrointestinal endoscopy if symptroms persist  advance diet
-on ativan ATC  -she seems to have a lot of anxiety/nervousness regarding her medical condition and does not seem to be coping appropriately  -?benzo dependence and/or withdrawal

## 2017-12-24 NOTE — CONSULT NOTE ADULT - PROVIDER SPECIALTY LIST ADULT
Cardiology
GYN
Gastroenterology
Heme/Onc
Internal Medicine
Neurology
Pulmonology
Surgery
Gastroenterology

## 2017-12-24 NOTE — PROGRESS NOTE ADULT - SUBJECTIVE AND OBJECTIVE BOX
Cardiology Progress Note  Consult Spectra [id number]    Interval Events:  Noted to have bradycardia down to 50s with associated dizziness but never LOC.  Severe anxiety reported by patient.  Diltiazem either refused or being held due to hold parameters.    MEDICATIONS:  montelukast 10 milliGRAM(s) Oral at bedtime  acetaminophen   Tablet. 650 milliGRAM(s) Oral every 6 hours PRN  busPIRone 2.5 milliGRAM(s) Oral two times a day  LORazepam     Tablet 2 milliGRAM(s) Oral every 4 hours  ondansetron   Disintegrating Tablet 4 milliGRAM(s) Oral every 4 hours  docusate sodium 100 milliGRAM(s) Oral three times a day  famotidine    Tablet 20 milliGRAM(s) Oral two times a day  simethicone 160 milliGRAM(s) Chew three times a day  potassium chloride    Tablet ER 40 milliEquivalent(s) Oral once    PHYSICAL EXAM:  T(C): 36.4 (12-24-17 @ 12:23), Max: 36.8 (12-23-17 @ 16:45)  HR: 70 (12-24-17 @ 12:23) (51 - 70)  BP: 131/71 (12-24-17 @ 12:23) (104/67 - 154/94)  RR: 18 (12-24-17 @ 12:23) (17 - 118)  SpO2: 100% (12-24-17 @ 12:23) (100% - 100%)  Wt(kg): --  I&O's Summary    23 Dec 2017 07:01  -  24 Dec 2017 07:00  --------------------------------------------------------  IN: 870 mL / OUT: 0 mL / NET: 870 mL    Daily     Appearance: No acute distress	  HEENT:   mmm	  Cardiovascular: Normal S1 S2, No JVD, No murmurs, No edema  Respiratory: Lungs clear to auscultation	  Psychiatry: anxious  Gastrointestinal:  soft nd  Skin: No cyanosis	  Neurologic: moves all four extremities  Extremities: Normal range of motion, No clubbing, cyanosis    LABS:	 	  CBC Full  -  ( 23 Dec 2017 12:32 )  WBC Count : 4.50 K/uL  Hemoglobin : 11.8 g/dL  Hematocrit : 35.0 %  Platelet Count - Automated : 64 K/uL    12-24    143  |  106  |  4<L>  ----------------------------<  119<H>  4.0   |  23  |  0.94    12-23  144  |  108  |  3<L>  ----------------------------<  88  4.1   |  22  |  0.87    Ca    9.9      24 Dec 2017 12:12  Ca    9.6      23 Dec 2017 12:32    CARDIAC MARKERS:  Troponin T, Serum: <0.01 ng/mL (12-23 @ 23:59)    TELEMETRY: 	   45-70 Cardiology Progress Note  Consult Spectra [id number]    Interval Events:  Noted to have bradycardia down to 50s with associated dizziness but never LOC.  Severe anxiety reported by patient, and reports she is extremely anxious when her BP is up to 150s and her HR can be noted to be as low as 40s.  Diltiazem either refused or being held due to hold parameters. Very anxious about her diltiazem dosing.    MEDICATIONS:  montelukast 10 milliGRAM(s) Oral at bedtime  acetaminophen   Tablet. 650 milliGRAM(s) Oral every 6 hours PRN  busPIRone 2.5 milliGRAM(s) Oral two times a day  LORazepam     Tablet 2 milliGRAM(s) Oral every 4 hours  ondansetron   Disintegrating Tablet 4 milliGRAM(s) Oral every 4 hours  docusate sodium 100 milliGRAM(s) Oral three times a day  famotidine    Tablet 20 milliGRAM(s) Oral two times a day  simethicone 160 milliGRAM(s) Chew three times a day  potassium chloride    Tablet ER 40 milliEquivalent(s) Oral once    PHYSICAL EXAM:  T(C): 36.4 (12-24-17 @ 12:23), Max: 36.8 (12-23-17 @ 16:45)  HR: 70 (12-24-17 @ 12:23) (51 - 70)  BP: 131/71 (12-24-17 @ 12:23) (104/67 - 154/94)  RR: 18 (12-24-17 @ 12:23) (17 - 118)  SpO2: 100% (12-24-17 @ 12:23) (100% - 100%)  Wt(kg): --  I&O's Summary    23 Dec 2017 07:01  -  24 Dec 2017 07:00  --------------------------------------------------------  IN: 870 mL / OUT: 0 mL / NET: 870 mL    Daily     Appearance: No acute distress	  HEENT:   mmm. NC present  Cardiovascular: Normal S1 S2, No JVD, No murmurs, No edema  Respiratory: Lungs clear to auscultation	  Psychiatry: anxious  Gastrointestinal:  soft nd  Skin: No cyanosis	  Neurologic: moves all four extremities  Extremities: Normal range of motion, No clubbing, cyanosis    LABS:	 	  CBC Full  -  ( 23 Dec 2017 12:32 )  WBC Count : 4.50 K/uL  Hemoglobin : 11.8 g/dL  Hematocrit : 35.0 %  Platelet Count - Automated : 64 K/uL    12-24    143  |  106  |  4<L>  ----------------------------<  119<H>  4.0   |  23  |  0.94    12-23  144  |  108  |  3<L>  ----------------------------<  88  4.1   |  22  |  0.87    Ca    9.9      24 Dec 2017 12:12  Ca    9.6      23 Dec 2017 12:32    CARDIAC MARKERS:  Troponin T, Serum: <0.01 ng/mL (12-23 @ 23:59)    TELEMETRY: 	   45-70

## 2017-12-24 NOTE — PROGRESS NOTE ADULT - ASSESSMENT
Dizziness  Orthostasis   Tachycardia  ?IST  ?POTS  High Benzo requirement  Likely component of ativan withdrawal    All above are chronic and she believes exacerbated by recent testing and agents involved to facilitate imaging. Barium ingestion is usually inert except for possible constipation and gadolinium not associated with blood pressure or heart rate changes. All cardiac enzymes negative. Repeat echocardiogram in progress and appears normal and unchanged from prior.    Recommendations:  Resume Diltiazem, but at reduced dose of 120 mg CD daily starting tomorrow morning. 41F hx of uterine fibroids, POTS, autonomic dysregulation, anxiety, IBS who presented with abd pain with poor PO, c/o "severe depression" leading to poor appetite. Her bradycardia is sinus and her HR is currently normal. She has been noted to have sinus tachycardia. Etiology of her HR is unclear, though suspect there is a component of her autonomic dysregulation in addition to her anxiety and high benzodiazepine requirement.    -okay to hold her diltiazem given HR is 70s and documented BP's are fairly wnl though at times labile.  -BUN low, ?decreased PO/protein intake. tsh wnl  -possible anxiety playing a role in the lability of her HR/BP. eval further 41F hx of uterine fibroids, POTS, autonomic dysregulation, anxiety, IBS, chronic ITP who presented with abd pain with poor PO, c/o "severe depression" leading to poor appetite. Her bradycardia is sinus and her HR is currently normal. She has been noted to have sinus tachycardia. Etiology of her HR is unclear, though suspect there is a component of her autonomic dysregulation in addition to her anxiety and high benzodiazepine requirement.    -okay to hold her diltiazem given HR is 70s and documented BP's are fairly wnl though at times labile.  -BUN low, ?decreased PO/protein intake/absorption. tsh wnl  -possible anxiety playing a role in the lability of her HR/BP. eval further 41F hx of uterine fibroids, POTS, autonomic dysregulation, anxiety, IBS, chronic ITP who presented with abd pain with poor PO, c/o "severe depression" leading to poor appetite. Her bradycardia is sinus and her HR is currently normal. She has been noted to have sinus tachycardia.     -okay to hold her diltiazem given HR is 70s and documented BP's are fairly wnl though at times labile.  -BUN low, ?decreased PO/protein intake/absorption. tsh wnl

## 2017-12-24 NOTE — CONSULT NOTE ADULT - SUBJECTIVE AND OBJECTIVE BOX
2nd opinion consultation    Chief Complaint:  Patient is a 41y old  Female who presents with a chief complaint of Abdominal pain Patient is a 41y Female, h/o fibroid uterus/POTS - autonomic dysregulation/anxiety/IBS, complaining of acute onset abdominal pain which woke her from sleep 2 nights prior (11/15). She describes the pain as 7/10 in intensity, constant in periodicity, alleviated somewhat with fetal positioning, not exacerbated by anything, with no radiation. Pain accompanied by nausea (which has been occurring for 1 month) and bleeding per vagina x 24 hours. She is passing stool and gas, reports no fever/chills. The pain abated spontaneously, but she states that when providers examine her abdomen she still has pain. This pain is not consistent with her low anterior cramping pain that she felt prior to her diagnosis of uterine fibroids, or with her intermittent crampy abdominal pain that she frequently has. MRI enterography and GES was done and is here for follow up,  he had an egd/colonoscopy ion the past now abdominal pain is better.    Allergies:  beta blockers (Unknown)  Lexapro (Unknown)  metoprolol (Headache)    Medications:  acetaminophen   Tablet. 650 milliGRAM(s) Oral every 6 hours PRN  busPIRone 2.5 milliGRAM(s) Oral two times a day  docusate sodium 100 milliGRAM(s) Oral three times a day  famotidine    Tablet 20 milliGRAM(s) Oral two times a day  LORazepam     Tablet 2 milliGRAM(s) Oral every 4 hours  montelukast 10 milliGRAM(s) Oral at bedtime  ondansetron   Disintegrating Tablet 4 milliGRAM(s) Oral every 4 hours  potassium chloride    Tablet ER 40 milliEquivalent(s) Oral once  simethicone 160 milliGRAM(s) Chew three times a day    PMHX/PSHX:  Idiopathic thrombocytopenia purpura  Anxiety  POTS (postural orthostatic tachycardia syndrome)  Pott disease  History of ITP  Labyrinthitis  Asthma  Headache  HTN (hypertension)  Anxiety  IBS (irritable bowel syndrome)  No significant past surgical history  No significant past surgical history      Family history:  No pertinent family history in first degree relatives  Family history of hypertension (Grandparent)  Family history of atrial fibrillation  Family history of prostate cancer in father      Social History: lives at home no ivda no prbc no tatoos  no cigs no etoh    ROS:     General:  No wt loss, fevers, chills, night sweats, fatigue,   Eyes:  Good vision, no reported pain  ENT:  No sore throat, pain, runny nose, dysphagia  CV:  No pain, palpitations, hypo/hypertension  Resp:  No dyspnea, cough, tachypnea, wheezing  GI:  No pain, No nausea, No vomiting, No diarrhea, No constipation, No weight loss, No fever, No pruritis, No rectal bleeding, No tarry stools, No dysphagia,  :  No pain, bleeding, incontinence, nocturia  Muscle:  No pain, weakness  Neuro:  No weakness, tingling, memory problems  Psych:  No fatigue, insomnia, mood problems, depression  Endocrine:  No polyuria, polydipsia, cold/heat intolerance  Heme:  No petechiae, ecchymosis, easy bruisability  Skin:  No rash, tattoos, scars, edema      PHYSICAL EXAM:   Vital Signs:  Vital Signs Last 24 Hrs  T(C): 36.4 (24 Dec 2017 12:23), Max: 36.8 (23 Dec 2017 16:45)  T(F): 97.5 (24 Dec 2017 12:23), Max: 98.3 (23 Dec 2017 16:45)  HR: 70 (24 Dec 2017 12:23) (51 - 70)  BP: 132/70 (24 Dec 2017 14:22) (104/67 - 154/94)  BP(mean): --  RR: 18 (24 Dec 2017 12:23) (17 - 118)  SpO2: 100% (24 Dec 2017 12:23) (100% - 100%)  Daily     Daily     GENERAL:  Appears stated age, well-groomed, well-nourished, no distress  HEENT:  NC/AT,  conjunctivae clear and pink, no thyromegaly, nodules, adenopathy, no JVD, sclera -anicteric  CHEST:  Full & symmetric excursion, no increased effort, breath sounds clear  HEART:  Regular rhythm, S1, S2, no murmur/rub/S3/S4, no abdominal bruit, no edema  ABDOMEN:  Soft, non-tender, non-distended, normoactive bowel sounds,  no masses ,no hepato-splenomegaly, no signs of chronic liver disease  EXTEREMITIES:  no cyanosis,clubbing or edema  SKIN:  No rash/erythema/ecchymoses/petechiae/wounds/abscess/warm/dry  NEURO:  Alert, oriented, no asterixis, no tremor, no encephalopathy    LABS:                        11.5   4.69  )-----------( 61       ( 24 Dec 2017 12:12 )             33.7     12-24    143  |  106  |  4<L>  ----------------------------<  119<H>  4.0   |  23  |  0.94    Ca    9.9      24 Dec 2017 12:12                Imaging:

## 2017-12-24 NOTE — DISCHARGE NOTE ADULT - MEDICATION SUMMARY - MEDICATIONS TO STOP TAKING
I will STOP taking the medications listed below when I get home from the hospital:  None I will STOP taking the medications listed below when I get home from the hospital:    dilTIAZem 240 mg/24 hours oral capsule, extended release  -- 1 cap(s) by mouth once a day I will STOP taking the medications listed below when I get home from the hospital:    digestive enzyme  -- 1 to 4 tab(s) depending on size of the meal    dilTIAZem 240 mg/24 hours oral capsule, extended release  -- 1 cap(s) by mouth once a day

## 2017-12-24 NOTE — CHART NOTE - NSCHARTNOTEFT_GEN_A_CORE
23:00   12/23/17    HISTORY OF PRESENT ILLNESS: DORIS MCBRIDE is a 41y old female Notified by RN patient C/O chest pain. Pt. seen and evaluated at bedside. The pain began 10 minutes ago. Patient reporting pain is epigastric, LUQ. Patient states pain occurs with inhalation, denies radiation. Pt. describes pain as sharp. Pain is described as 4/10 in severity. The chest pain is not associated with palpitations, shortness of breath, diaphoresis, lightheadedness, or nausea.  Patient appears very anxious about not being able to take her cardizem secondary to low HR, patient states she is worried about her high blood pressure as well. Upon chart review pt. BP noted to be 140-150s systolic. Pt. with c/o left frontal headache, mild, non-radiating in nature.     Vital Signs Last 24 Hrs  T(C): 36.6 (23 Dec 2017 22:41), Max: 36.8 (23 Dec 2017 16:45)  T(F): 97.9 (23 Dec 2017 22:41), Max: 98.3 (23 Dec 2017 16:45)  HR: 67 (23 Dec 2017 23:55) (51 - 70)  BP: 154/94 (23 Dec 2017 23:55) (103/66 - 154/94)  RR: 17 (23 Dec 2017 22:41) (17 - 18)  SpO2: 100% (23 Dec 2017 22:41) (100% - 100%)                        11.8   4.50  )-----------( 64       ( 23 Dec 2017 12:32 )             35.0     12-23    144  |  108  |  3<L>  ----------------------------<  88  4.1   |  22  |  0.87    Ca    9.6      23 Dec 2017 12:32      Physical exam:  General: A&0x3, anxious  Lungs: CTA B/L, no wheezing noted  Cardiac: RRR, S1/S2, no murmurs noted  Abd: soft, NZT/ND, + BS  Ext: no lower ext edema noted b/l       ASSESSMENT/PLAN: DORIS MCBRIDE is a 41y old female with chest pain. The history, physical exam, EKG, and echocardiogram are reassuring. Patient with cardiac workup this admission, noted with chest pain multiple times, workup with no significant findings.    1. Chest pain  - Trop STAT x 1 - negative  - EKG with no acute changes  - Tele reviewed with bradycardia noted, no other acute ectopies   - Will give ativan as ordered x 1   - repeat BP in 1 hour manually   - offered tylenol for pain, pt. refused  - will cardiology if symptoms persist, elevated BPs  - cards following, to address cardizem dose in AM as pt. not receiving it secondary to low HR    Will continue to monitor and endorse to primary day team in AM.    KALLI Morales-C  #[id number] 23:00   12/23/17    HISTORY OF PRESENT ILLNESS: DORIS MCBRIDE is a 41y old female Notified by RN patient C/O chest pain. Pt. seen and evaluated at bedside. The pain began 10 minutes ago. Patient reporting pain is epigastric, LUQ. Patient states pain occurs with inhalation, denies radiation. Pt. describes pain as sharp. Pain is described as 4/10 in severity. The chest pain is not associated with palpitations, shortness of breath, diaphoresis, lightheadedness, or nausea.  Patient appears very anxious about not being able to take her cardizem secondary to low HR, patient states she is worried about her high blood pressure as well. Upon chart review pt. BP noted to be 140-150s systolic. Pt. with c/o left frontal headache, mild, non-radiating in nature.     Vital Signs Last 24 Hrs  T(C): 36.6 (23 Dec 2017 22:41), Max: 36.8 (23 Dec 2017 16:45)  T(F): 97.9 (23 Dec 2017 22:41), Max: 98.3 (23 Dec 2017 16:45)  HR: 67 (23 Dec 2017 23:55) (51 - 70)  BP: 154/94 (23 Dec 2017 23:55) (103/66 - 154/94)  RR: 17 (23 Dec 2017 22:41) (17 - 18)  SpO2: 100% (23 Dec 2017 22:41) (100% - 100%)                        11.8   4.50  )-----------( 64       ( 23 Dec 2017 12:32 )             35.0     12-23    144  |  108  |  3<L>  ----------------------------<  88  4.1   |  22  |  0.87    Ca    9.6      23 Dec 2017 12:32      Physical exam:  General: A&0x3, anxious  Lungs: CTA B/L, no wheezing noted  Cardiac: RRR, S1/S2, no murmurs noted  Abd: soft, NZT/ND, + BS  Ext: no lower ext edema noted b/l       ASSESSMENT/PLAN: DORIS MCBRIDE is a 41y old female with chest pain. The history, physical exam, EKG, and echocardiogram are reassuring. Patient with cardiac workup this admission, noted with chest pain multiple times, workup with no significant findings. Pt. noted actively taking her BP with electronic cuff she brought from home and checking her EKG rhythm on her cell phone. Anxiousness increasingly growing as electronic BP not accurate, BP done multiple times manually by myself at bedside all with reassuring systolic BPs < 150 and diastolic < 100.     1. Chest pain likely related to anxiousness   - Trop STAT x 1 - negative  - EKG with no acute changes  - Tele reviewed with bradycardia noted, no other acute ectopies   - Will give ativan as ordered x 1   - repeat BP in 1 hour manually   - offered tylenol for pain, pt. refused  - will cardiology if symptoms persist, elevated BPs  - cards following, to address cardizem dose in AM as pt. not receiving it secondary to low HR    Will continue to monitor and endorse to primary day team in AM.    Frances Pickering PA-C  #[id number]

## 2017-12-25 LAB
ANION GAP SERPL CALC-SCNC: 14 MMOL/L — SIGNIFICANT CHANGE UP (ref 5–17)
BUN SERPL-MCNC: 3 MG/DL — LOW (ref 7–23)
CALCIUM SERPL-MCNC: 9.4 MG/DL — SIGNIFICANT CHANGE UP (ref 8.4–10.5)
CHLORIDE SERPL-SCNC: 107 MMOL/L — SIGNIFICANT CHANGE UP (ref 96–108)
CO2 SERPL-SCNC: 23 MMOL/L — SIGNIFICANT CHANGE UP (ref 22–31)
CREAT SERPL-MCNC: 0.87 MG/DL — SIGNIFICANT CHANGE UP (ref 0.5–1.3)
GLUCOSE SERPL-MCNC: 123 MG/DL — HIGH (ref 70–99)
POTASSIUM SERPL-MCNC: 3.8 MMOL/L — SIGNIFICANT CHANGE UP (ref 3.5–5.3)
POTASSIUM SERPL-SCNC: 3.8 MMOL/L — SIGNIFICANT CHANGE UP (ref 3.5–5.3)
SODIUM SERPL-SCNC: 144 MMOL/L — SIGNIFICANT CHANGE UP (ref 135–145)

## 2017-12-25 PROCEDURE — 99232 SBSQ HOSP IP/OBS MODERATE 35: CPT | Mod: GC

## 2017-12-25 RX ORDER — ERYTHROMYCIN ETHYLSUCCINATE 400 MG
250 TABLET ORAL THREE TIMES A DAY
Qty: 0 | Refills: 0 | Status: DISCONTINUED | OUTPATIENT
Start: 2017-12-25 | End: 2017-12-26

## 2017-12-25 RX ADMIN — Medication 2 MILLIGRAM(S): at 03:21

## 2017-12-25 RX ADMIN — ONDANSETRON 4 MILLIGRAM(S): 8 TABLET, FILM COATED ORAL at 03:04

## 2017-12-25 RX ADMIN — ONDANSETRON 4 MILLIGRAM(S): 8 TABLET, FILM COATED ORAL at 18:51

## 2017-12-25 RX ADMIN — FAMOTIDINE 20 MILLIGRAM(S): 10 INJECTION INTRAVENOUS at 21:05

## 2017-12-25 RX ADMIN — Medication 2 MILLIGRAM(S): at 07:23

## 2017-12-25 RX ADMIN — ONDANSETRON 4 MILLIGRAM(S): 8 TABLET, FILM COATED ORAL at 06:56

## 2017-12-25 RX ADMIN — ONDANSETRON 4 MILLIGRAM(S): 8 TABLET, FILM COATED ORAL at 11:12

## 2017-12-25 RX ADMIN — Medication 2 MILLIGRAM(S): at 18:55

## 2017-12-25 RX ADMIN — Medication 2.5 MILLIGRAM(S): at 09:08

## 2017-12-25 RX ADMIN — ONDANSETRON 4 MILLIGRAM(S): 8 TABLET, FILM COATED ORAL at 22:59

## 2017-12-25 RX ADMIN — ONDANSETRON 4 MILLIGRAM(S): 8 TABLET, FILM COATED ORAL at 15:01

## 2017-12-25 RX ADMIN — MONTELUKAST 10 MILLIGRAM(S): 4 TABLET, CHEWABLE ORAL at 00:02

## 2017-12-25 RX ADMIN — SIMETHICONE 160 MILLIGRAM(S): 80 TABLET, CHEWABLE ORAL at 16:29

## 2017-12-25 RX ADMIN — Medication 2.5 MILLIGRAM(S): at 21:48

## 2017-12-25 RX ADMIN — Medication 2 MILLIGRAM(S): at 11:29

## 2017-12-25 RX ADMIN — Medication 2 MILLIGRAM(S): at 15:19

## 2017-12-25 RX ADMIN — Medication 100 MILLIGRAM(S): at 10:11

## 2017-12-25 RX ADMIN — SIMETHICONE 160 MILLIGRAM(S): 80 TABLET, CHEWABLE ORAL at 21:05

## 2017-12-25 RX ADMIN — Medication 2 MILLIGRAM(S): at 23:19

## 2017-12-25 RX ADMIN — FAMOTIDINE 20 MILLIGRAM(S): 10 INJECTION INTRAVENOUS at 10:11

## 2017-12-25 RX ADMIN — Medication 100 MILLIGRAM(S): at 21:06

## 2017-12-25 RX ADMIN — SIMETHICONE 160 MILLIGRAM(S): 80 TABLET, CHEWABLE ORAL at 10:12

## 2017-12-25 RX ADMIN — Medication 100 MILLIGRAM(S): at 16:29

## 2017-12-25 NOTE — PROGRESS NOTE ADULT - SUBJECTIVE AND OBJECTIVE BOX
24H hour events:   no acute events overnight, pt states she was too anxious to sleep last night  tele: sinus 50-70s    MEDICATIONS:      montelukast 10 milliGRAM(s) Oral at bedtime    acetaminophen   Tablet. 650 milliGRAM(s) Oral every 6 hours PRN  busPIRone 2.5 milliGRAM(s) Oral two times a day  LORazepam     Tablet 2 milliGRAM(s) Oral every 4 hours  ondansetron   Disintegrating Tablet 4 milliGRAM(s) Oral every 4 hours    docusate sodium 100 milliGRAM(s) Oral three times a day  famotidine    Tablet 20 milliGRAM(s) Oral two times a day  simethicone 160 milliGRAM(s) Chew three times a day      potassium chloride    Tablet ER 40 milliEquivalent(s) Oral once  sodium chloride 0.9%. 1000 milliLiter(s) IV Continuous <Continuous>          PHYSICAL EXAM:  T(C): 36.4 (12-25-17 @ 03:46), Max: 36.5 (12-24-17 @ 10:13)  HR: 84 (12-25-17 @ 08:50) (53 - 84)  BP: 137/92 (12-25-17 @ 08:50) (118/82 - 144/99)  RR: 17 (12-25-17 @ 08:50) (17 - 18)  SpO2: 100% (12-25-17 @ 08:50) (100% - 100%)  Wt(kg): --  I&O's Summary    24 Dec 2017 07:01  -  25 Dec 2017 07:00  --------------------------------------------------------  IN: 980 mL / OUT: 0 mL / NET: 980 mL        Appearance: Normal	  HEENT:   Normal oral mucosa, PERRL, EOMI	  Lymphatic: No lymphadenopathy  Cardiovascular: Normal S1 S2, No JVD, No murmurs, No edema  Respiratory: Lungs clear to auscultation	  Psychiatry: A & O x 3, anxious  Gastrointestinal:  Soft, Non-tender, + BS	  Skin: No rashes, No ecchymoses, No cyanosis	  Neurologic: Non-focal  Extremities: Normal range of motion, No clubbing, cyanosis or edema  Vascular: Peripheral pulses palpable 2+ bilaterally        LABS:	 	    CBC Full  -  ( 24 Dec 2017 12:12 )  WBC Count : 4.69 K/uL  Hemoglobin : 11.5 g/dL  Hematocrit : 33.7 %  Platelet Count - Automated : 61 K/uL  Mean Cell Volume : 83.0 fl  Mean Cell Hemoglobin : 28.3 pg  Mean Cell Hemoglobin Concentration : 34.1 gm/dL  Auto Neutrophil # : x  Auto Lymphocyte # : x  Auto Monocyte # : x  Auto Eosinophil # : x  Auto Basophil # : x  Auto Neutrophil % : x  Auto Lymphocyte % : x  Auto Monocyte % : x  Auto Eosinophil % : x  Auto Basophil % : x    12-24    143  |  106  |  4<L>  ----------------------------<  119<H>  4.0   |  23  |  0.94  12-23    144  |  108  |  3<L>  ----------------------------<  88  4.1   |  22  |  0.87    Ca    9.9      24 Dec 2017 12:12  Ca    9.6      23 Dec 2017 12:32        proBNP:   Lipid Profile:   HgA1c:   TSH:       CARDIAC MARKERS:            TELEMETRY: 	    ECG:  	  RADIOLOGY:  OTHER: 	    PREVIOUS DIAGNOSTIC TESTING:    [ ] Echocardiogram:  [ ]  Catheterization:  [ ] Stress Test:  	  	  ASSESSMENT/PLAN:

## 2017-12-25 NOTE — PROGRESS NOTE ADULT - SUBJECTIVE AND OBJECTIVE BOX
CHIEF COMPLAINT:Patient with same complaints        PAST MEDICAL & SURGICAL HISTORY:  Idiopathic thrombocytopenia purpura  Anxiety  POTS (postural orthostatic tachycardia syndrome)  History of ITP  Labyrinthitis  Headache  HTN (hypertension)  Anxiety  IBS (irritable bowel syndrome)  No significant past surgical history  No significant past surgical history          REVIEW OF SYSTEMS:  CONSTITUTIONAL: weak  EYES: No eye pain, visual disturbances, or discharge  NECK: No pain or stiffness  RESPIRATORY: No cough, wheezing, chills or hemoptysis; No Shortness of Breath  CARDIOVASCULAR: No chest pain, palpitations, passing out,  GASTROINTESTINAL: abd discomfort at times   GENITOURINARY: No dysuria, frequency, hematuria, or incontinence  NEUROLOGICAL: No headaches, memory loss, loss of strength, numbness, or tremors  MUSCULOSKELETAL: No joint pain or swelling; No muscle, back, or extremity pain      Medications:  MEDICATIONS  (STANDING):  busPIRone 2.5 milliGRAM(s) Oral two times a day  docusate sodium 100 milliGRAM(s) Oral three times a day  erythromycin     base Tablet 250 milliGRAM(s) Oral three times a day  famotidine    Tablet 20 milliGRAM(s) Oral two times a day  LORazepam     Tablet 2 milliGRAM(s) Oral every 4 hours  montelukast 10 milliGRAM(s) Oral at bedtime  ondansetron   Disintegrating Tablet 4 milliGRAM(s) Oral every 4 hours  potassium chloride    Tablet ER 40 milliEquivalent(s) Oral once  simethicone 160 milliGRAM(s) Chew three times a day    MEDICATIONS  (PRN):  acetaminophen   Tablet. 650 milliGRAM(s) Oral every 6 hours PRN Mild Pain (1 - 3)    	    PHYSICAL EXAM:  T(C): 36.4 (12-25-17 @ 03:46), Max: 36.4 (12-24-17 @ 12:23)  HR: 84 (12-25-17 @ 08:50) (53 - 84)  BP: 137/92 (12-25-17 @ 08:50) (118/82 - 144/99)  RR: 17 (12-25-17 @ 08:50) (17 - 18)  SpO2: 100% (12-25-17 @ 08:50) (100% - 100%)  Wt(kg): --  I&O's Summary    24 Dec 2017 07:01  -  25 Dec 2017 07:00  --------------------------------------------------------  IN: 980 mL / OUT: 0 mL / NET: 980 mL      Appearance: Normal	  HEENT:   Normal oral mucosa, PERRL, EOMI	  Lymphatic: No lymphadenopathy  Cardiovascular: Normal S1 S2, No JVD, No murmurs, No edema  Respiratory: Lungs clear to auscultation	  Psychiatry: A & O x 3,   Gastrointestinal:  Soft, Non-tender, + BS	  Skin: No rashes, No ecchymoses, No cyanosis	  Neurologic: Non-focal from motor equal b/l  Extremities: Normal range of motion, No clubbing, cyanosis or edema  Vascular: Peripheral pulses palpable 2+ bilaterally    LABS:	 	    CARDIAC MARKERS:  CARDIAC MARKERS ( 23 Dec 2017 23:59 )  x     / <0.01 ng/mL / x     / x     / x                                    11.5   4.69  )-----------( 61       ( 24 Dec 2017 12:12 )             33.7     12-24    143  |  106  |  4<L>  ----------------------------<  119<H>  4.0   |  23  |  0.94    Ca    9.9      24 Dec 2017 12:12      proBNP:   Lipid Profile:   HgA1c:   TSH:

## 2017-12-25 NOTE — PROGRESS NOTE ADULT - ASSESSMENT
41F hx of uterine fibroids, POTS, autonomic dysregulation, anxiety, IBS, chronic ITP who presented with abd pain with poor PO, c/o "severe depression" leading to poor appetite. Her bradycardia is sinus and her HR is currently normal. She has been noted to have sinus tachycardia.     off cardizem as pt states she is too nervous to take it  tele revealing NSR 50-70s, BP stable  tte reviewed  labs pending, replete lytes as necessary       Jair Doherty MD [id number]

## 2017-12-25 NOTE — PROGRESS NOTE ADULT - ASSESSMENT
pt w/ mult medical problems with abd pain   seen by gi  and refusing to return for follow up   will call second opinion   mri enterography / gastric emptying study reviewed - gastroparesis, MR enterography normal  cont diet as per gi,   oob  iv fluid prn   cont current meds  no rifaximin as not tolerating  chest pain on and off, normal EKG while in pain, CE (-), bradycardia to low 50's, cards f/u   cards re-eval appreciated, cv stable, no need for inpt EP  GI 2nd opinion appreciated  will d/c IVF, monitor with PO hydration  if BP elevated give 1 dose of PO Hydralazine 10mg  will also start Erythromycin TID with meals to improve motility and help with nausea/loss of appetite  OOB/PT eval

## 2017-12-26 VITALS
DIASTOLIC BLOOD PRESSURE: 74 MMHG | SYSTOLIC BLOOD PRESSURE: 127 MMHG | OXYGEN SATURATION: 96 % | TEMPERATURE: 98 F | HEART RATE: 74 BPM | RESPIRATION RATE: 17 BRPM

## 2017-12-26 PROCEDURE — 82553 CREATINE MB FRACTION: CPT

## 2017-12-26 PROCEDURE — 84484 ASSAY OF TROPONIN QUANT: CPT

## 2017-12-26 PROCEDURE — 96375 TX/PRO/DX INJ NEW DRUG ADDON: CPT

## 2017-12-26 PROCEDURE — 96374 THER/PROPH/DIAG INJ IV PUSH: CPT | Mod: XU

## 2017-12-26 PROCEDURE — 74181 MRI ABDOMEN W/O CONTRAST: CPT

## 2017-12-26 PROCEDURE — 80053 COMPREHEN METABOLIC PANEL: CPT

## 2017-12-26 PROCEDURE — 80048 BASIC METABOLIC PNL TOTAL CA: CPT

## 2017-12-26 PROCEDURE — 84100 ASSAY OF PHOSPHORUS: CPT

## 2017-12-26 PROCEDURE — 85027 COMPLETE CBC AUTOMATED: CPT

## 2017-12-26 PROCEDURE — 93306 TTE W/DOPPLER COMPLETE: CPT

## 2017-12-26 PROCEDURE — 82550 ASSAY OF CK (CPK): CPT

## 2017-12-26 PROCEDURE — 87086 URINE CULTURE/COLONY COUNT: CPT

## 2017-12-26 PROCEDURE — 99285 EMERGENCY DEPT VISIT HI MDM: CPT | Mod: 25

## 2017-12-26 PROCEDURE — 82962 GLUCOSE BLOOD TEST: CPT

## 2017-12-26 PROCEDURE — 86140 C-REACTIVE PROTEIN: CPT

## 2017-12-26 PROCEDURE — 99232 SBSQ HOSP IP/OBS MODERATE 35: CPT

## 2017-12-26 PROCEDURE — 93005 ELECTROCARDIOGRAM TRACING: CPT

## 2017-12-26 PROCEDURE — 82330 ASSAY OF CALCIUM: CPT

## 2017-12-26 PROCEDURE — 85652 RBC SED RATE AUTOMATED: CPT

## 2017-12-26 PROCEDURE — 93975 VASCULAR STUDY: CPT

## 2017-12-26 PROCEDURE — C8902: CPT

## 2017-12-26 PROCEDURE — 81001 URINALYSIS AUTO W/SCOPE: CPT

## 2017-12-26 PROCEDURE — 83993 ASSAY FOR CALPROTECTIN FECAL: CPT

## 2017-12-26 PROCEDURE — 74177 CT ABD & PELVIS W/CONTRAST: CPT

## 2017-12-26 PROCEDURE — 76830 TRANSVAGINAL US NON-OB: CPT

## 2017-12-26 PROCEDURE — 76705 ECHO EXAM OF ABDOMEN: CPT

## 2017-12-26 PROCEDURE — A9541: CPT

## 2017-12-26 PROCEDURE — 78264 GASTRIC EMPTYING IMG STUDY: CPT

## 2017-12-26 PROCEDURE — 83735 ASSAY OF MAGNESIUM: CPT

## 2017-12-26 PROCEDURE — 99233 SBSQ HOSP IP/OBS HIGH 50: CPT

## 2017-12-26 RX ORDER — ONDANSETRON 8 MG/1
1 TABLET, FILM COATED ORAL
Qty: 42 | Refills: 0 | OUTPATIENT
Start: 2017-12-26 | End: 2018-01-01

## 2017-12-26 RX ADMIN — ONDANSETRON 4 MILLIGRAM(S): 8 TABLET, FILM COATED ORAL at 06:52

## 2017-12-26 RX ADMIN — ONDANSETRON 4 MILLIGRAM(S): 8 TABLET, FILM COATED ORAL at 11:13

## 2017-12-26 RX ADMIN — ONDANSETRON 4 MILLIGRAM(S): 8 TABLET, FILM COATED ORAL at 02:56

## 2017-12-26 RX ADMIN — SIMETHICONE 160 MILLIGRAM(S): 80 TABLET, CHEWABLE ORAL at 08:20

## 2017-12-26 RX ADMIN — Medication 100 MILLIGRAM(S): at 08:20

## 2017-12-26 RX ADMIN — ONDANSETRON 4 MILLIGRAM(S): 8 TABLET, FILM COATED ORAL at 15:24

## 2017-12-26 RX ADMIN — MONTELUKAST 10 MILLIGRAM(S): 4 TABLET, CHEWABLE ORAL at 00:00

## 2017-12-26 RX ADMIN — Medication 2 MILLIGRAM(S): at 07:08

## 2017-12-26 RX ADMIN — Medication 2 MILLIGRAM(S): at 11:12

## 2017-12-26 RX ADMIN — Medication 2 MILLIGRAM(S): at 03:13

## 2017-12-26 RX ADMIN — FAMOTIDINE 20 MILLIGRAM(S): 10 INJECTION INTRAVENOUS at 08:20

## 2017-12-26 RX ADMIN — Medication 2 MILLIGRAM(S): at 15:24

## 2017-12-26 RX ADMIN — Medication 2.5 MILLIGRAM(S): at 09:19

## 2017-12-26 NOTE — PROGRESS NOTE ADULT - ASSESSMENT
41F multiple medical problems including mild asthma, allergic rhinitis, autonomic dysfunction and anxiety presenting for abdominal pain and testing suggestive of gastroparesis--pulm. stable

## 2017-12-26 NOTE — PROGRESS NOTE ADULT - SUBJECTIVE AND OBJECTIVE BOX
CHIEF COMPLAINT: some intermittent cp over night--slow HR; some sob rest and exertion; using o2 at night    Interval Events:ambulating but mildly dizzy; constipation    REVIEW OF SYSTEMS:  Constitutional: No fevers or chills. No weight loss. No fatigue or generalized malaise.  Eyes: No itching or discharge from the eyes  ENT: No ear pain. No ear discharge. No nasal congestion. No post nasal drip. No epistaxis. No throat pain. No sore throat. No difficulty swallowing.   CV: + chest pain. + palpitations. No lightheadedness or dizziness.   Resp: No dyspnea at rest. No dyspnea on exertion. No orthopnea. No wheezing. No cough. No stridor. No sputum production. No chest pain with respiration.  GI: No nausea. No vomiting. No diarrhea.  MSK: No joint pain or pain in any extremities  Integumentary: No skin lesions. No pedal edema.  Neurological: No gross motor weakness. No sensory changes.  [+ ] All other systems negative  [ ] Unable to assess ROS because ________    OBJECTIVE:  ICU Vital Signs Last 24 Hrs  T(C): 36.7 (25 Dec 2017 21:42), Max: 36.7 (25 Dec 2017 21:42)  T(F): 98 (25 Dec 2017 21:42), Max: 98 (25 Dec 2017 21:42)  HR: 68 (26 Dec 2017 00:12) (68 - 89)  BP: 125/89 (26 Dec 2017 00:12) (125/89 - 149/95)  BP(mean): --  ABP: --  ABP(mean): --  RR: 18 (25 Dec 2017 21:42) (17 - 18)  SpO2: 98% (25 Dec 2017 21:42) (98% - 100%)        12-24 @ 07:01  -  12-25 @ 07:00  --------------------------------------------------------  IN: 980 mL / OUT: 0 mL / NET: 980 mL    12-25 @ 07:01  -  12-26 @ 05:58  --------------------------------------------------------  IN: 720 mL / OUT: 0 mL / NET: 720 mL      CAPILLARY BLOOD GLUCOSE          PHYSICAL EXAM: NAD in bed  General: Awake, alert, oriented X 3.   HEENT: Atraumatic, normocephalic.                 Mallampatti Grade 2                No nasal congestion.                No tonsillar or pharyngeal exudates.  Lymph Nodes: No palpable lymphadenopathy  Neck: No JVD. No carotid bruit.   Respiratory: Normal chest expansion                         Normal percussion                         Normal and equal air entry                         No wheeze, rhonchi or rales.  Cardiovascular: S1 S2 normal. No murmurs, rubs or gallops.   Abdomen: Soft, non-tender, non-distended. No organomegaly.  Extremities: Warm to touch. Peripheral pulse palpable. No pedal edema.   Skin: No rashes or skin lesions  Neurological: Motor and sensory examination equal and normal in all four extremities.  Psychiatry: Appropriate mood and affect.    HOSPITAL MEDICATIONS:  MEDICATIONS  (STANDING):  busPIRone 2.5 milliGRAM(s) Oral two times a day  docusate sodium 100 milliGRAM(s) Oral three times a day  erythromycin     base Tablet 250 milliGRAM(s) Oral three times a day  famotidine    Tablet 20 milliGRAM(s) Oral two times a day  LORazepam     Tablet 2 milliGRAM(s) Oral every 4 hours  montelukast 10 milliGRAM(s) Oral at bedtime  ondansetron   Disintegrating Tablet 4 milliGRAM(s) Oral every 4 hours  potassium chloride    Tablet ER 40 milliEquivalent(s) Oral once  simethicone 160 milliGRAM(s) Chew three times a day    MEDICATIONS  (PRN):  acetaminophen   Tablet. 650 milliGRAM(s) Oral every 6 hours PRN Mild Pain (1 - 3)      LABS:                        11.5   4.69  )-----------( 61       ( 24 Dec 2017 12:12 )             33.7     12-25    144  |  107  |  3<L>  ----------------------------<  123<H>  3.8   |  23  |  0.87    Ca    9.4      25 Dec 2017 12:08                MICROBIOLOGY:     RADIOLOGY:  [ ] Reviewed and interpreted by me    Point of Care Ultrasound Findings:    PFT:    EKG:

## 2017-12-26 NOTE — PROGRESS NOTE ADULT - ASSESSMENT
pt w/ mult medical problems with abd pain   seen by gi  and refusing to return for follow up   will call second opinion   mri enterography / gastric emptying study reviewed - gastroparesis, MR enterography normal  cont diet as per gi,   oob  iv fluid prn   cont current meds  no rifaximin as not tolerating  chest pain on and off, normal EKG while in pain, CE (-), bradycardia to low 50's, cards f/u   cards re-eval appreciated, cv stable, no need for inpt EP  GI 2nd opinion appreciated  now off IVF, walking independently, taking PO, refusing Erythromycin and Cardizem  d/c home, outpt f/u GI, Cardio, PMD

## 2017-12-26 NOTE — PROGRESS NOTE ADULT - SUBJECTIVE AND OBJECTIVE BOX
Overnight Events:   NSR on tele as low at 48 overnight. With intermittent CP overnight    Review of Systems:  REVIEW OF SYSTEMS:    CONSTITUTIONAL: No weakness, fevers or chills  EYES/ENT: No visual changes;  No dysphagia  NECK: No pain or stiffness  RESPIRATORY: No cough, wheezing, hemoptysis; No shortness of breath  CARDIOVASCULAR: +chest pain or palpitations; No lower extremity edema  GASTROINTESTINAL: No abdominal or epigastric pain. No nausea, vomiting, or hematemesis; No diarrhea or constipation. No melena or hematochezia.  BACK: No back pain  GENITOURINARY: No dysuria, frequency or hematuria  NEUROLOGICAL: No numbness or weakness  SKIN: No itching, burning, rashes, or lesions   All other review of systems is negative unless indicated above.            Medications:  acetaminophen   Tablet. 650 milliGRAM(s) Oral every 6 hours PRN  busPIRone 2.5 milliGRAM(s) Oral two times a day  docusate sodium 100 milliGRAM(s) Oral three times a day  erythromycin     base Tablet 250 milliGRAM(s) Oral three times a day  famotidine    Tablet 20 milliGRAM(s) Oral two times a day  LORazepam     Tablet 2 milliGRAM(s) Oral every 4 hours  montelukast 10 milliGRAM(s) Oral at bedtime  ondansetron   Disintegrating Tablet 4 milliGRAM(s) Oral every 4 hours  potassium chloride    Tablet ER 40 milliEquivalent(s) Oral once  simethicone 160 milliGRAM(s) Chew three times a day      PAST MEDICAL & SURGICAL HISTORY:  Idiopathic thrombocytopenia purpura  Anxiety  POTS (postural orthostatic tachycardia syndrome)  History of ITP  Labyrinthitis  Headache  HTN (hypertension)  Anxiety  IBS (irritable bowel syndrome)  No significant past surgical history  No significant past surgical history      Vitals:  T(F): 98 (12-26), Max: 98 (12-25)  HR: 74 (12-26) (65 - 89)  BP: 127/74 (12-26) (110/73 - 149/95)  RR: 17 (12-26)  SpO2: 96% (12-26)  I&O's Summary    25 Dec 2017 07:01  -  26 Dec 2017 07:00  --------------------------------------------------------  IN: 1240 mL / OUT: 0 mL / NET: 1240 mL        Physical Exam:  Appearance: Normal	  HEENT:   Normal oral mucosa, PERRL, EOMI	  Lymphatic: No lymphadenopathy  Cardiovascular: Normal S1 S2, No JVD, No murmurs, No edema  Respiratory: Lungs clear to auscultation	  Psychiatry: A & O x 3, anxious  Gastrointestinal:  Soft, Non-tender, + BS	  Skin: No rashes, No ecchymoses, No cyanosis	  Neurologic: Non-focal  Extremities: Normal range of motion, No clubbing, cyanosis or edema  Vascular: Peripheral pulses palpable 2+ bilaterally                              11.5   4.69  )-----------( 61       ( 24 Dec 2017 12:12 )             33.7     12-25    144  |  107  |  3<L>  ----------------------------<  123<H>  3.8   |  23  |  0.87    Ca    9.4      25 Dec 2017 12:08

## 2017-12-26 NOTE — PROGRESS NOTE ADULT - SUBJECTIVE AND OBJECTIVE BOX
CHIEF COMPLAINT: Patient walking and taking PO, feeling somewhat better    PAST MEDICAL & SURGICAL HISTORY:  Idiopathic thrombocytopenia purpura  Anxiety  POTS (postural orthostatic tachycardia syndrome)  History of ITP  Labyrinthitis  Headache  HTN (hypertension)  Anxiety  IBS (irritable bowel syndrome)  No significant past surgical history  No significant past surgical history          REVIEW OF SYSTEMS:  CONSTITUTIONAL: weak  EYES: No eye pain, visual disturbances, or discharge  NECK: No pain or stiffness  RESPIRATORY: No cough, wheezing, chills or hemoptysis; No Shortness of Breath  CARDIOVASCULAR: No chest pain, palpitations, passing out,  GASTROINTESTINAL: abd discomfort at times   GENITOURINARY: No dysuria, frequency, hematuria, or incontinence  NEUROLOGICAL: No headaches, memory loss, loss of strength, numbness, or tremors  MUSCULOSKELETAL: No joint pain or swelling; No muscle, back, or extremity pain        Medications:  MEDICATIONS  (STANDING):  busPIRone 2.5 milliGRAM(s) Oral two times a day  docusate sodium 100 milliGRAM(s) Oral three times a day  erythromycin     base Tablet 250 milliGRAM(s) Oral three times a day  famotidine    Tablet 20 milliGRAM(s) Oral two times a day  LORazepam     Tablet 2 milliGRAM(s) Oral every 4 hours  montelukast 10 milliGRAM(s) Oral at bedtime  ondansetron   Disintegrating Tablet 4 milliGRAM(s) Oral every 4 hours  potassium chloride    Tablet ER 40 milliEquivalent(s) Oral once  simethicone 160 milliGRAM(s) Chew three times a day    MEDICATIONS  (PRN):  acetaminophen   Tablet. 650 milliGRAM(s) Oral every 6 hours PRN Mild Pain (1 - 3)    	    PHYSICAL EXAM:  T(C): 36.7 (12-26-17 @ 11:19), Max: 36.7 (12-25-17 @ 21:42)  HR: 74 (12-26-17 @ 11:19) (65 - 89)  BP: 127/74 (12-26-17 @ 11:19) (110/73 - 149/95)  RR: 17 (12-26-17 @ 11:19) (17 - 18)  SpO2: 96% (12-26-17 @ 11:19) (96% - 100%)  Wt(kg): --  I&O's Summary    25 Dec 2017 07:01  -  26 Dec 2017 07:00  --------------------------------------------------------  IN: 1240 mL / OUT: 0 mL / NET: 1240 mL      Appearance: Normal	  HEENT:   Normal oral mucosa, PERRL, EOMI	  Lymphatic: No lymphadenopathy  Cardiovascular: Normal S1 S2, No JVD, No murmurs, No edema  Respiratory: Lungs clear to auscultation	  Psychiatry: A & O x 3,   Gastrointestinal:  Soft, Non-tender, + BS	  Skin: No rashes, No ecchymoses, No cyanosis	  Neurologic: Non-focal from motor equal b/l  Extremities: Normal range of motion, No clubbing, cyanosis or edema  Vascular: Peripheral pulses palpable 2+ bilaterally    LABS:	 	    CARDIAC MARKERS:            12-25    144  |  107  |  3<L>  ----------------------------<  123<H>  3.8   |  23  |  0.87    Ca    9.4      25 Dec 2017 12:08      proBNP:   Lipid Profile:   HgA1c:   TSH:

## 2017-12-26 NOTE — PROGRESS NOTE ADULT - ASSESSMENT
41F hx of uterine fibroids, POTS, autonomic dysregulation, anxiety, IBS, chronic ITP who presented with abd pain with poor PO, c/o "severe depression" leading to poor appetite. Her bradycardia is sinus and her HR is currently normal, was bradycardic overnight.     off cardizem as pt states she is too nervous to take it  tele revealing NSR 50-80s, BP stable  tte reviewed, no abnormalities  replete lytes as necessary

## 2017-12-26 NOTE — PROGRESS NOTE ADULT - PROVIDER SPECIALTY LIST ADULT
Cardiology
Cardiology
Gastroenterology
Internal Medicine
Pulmonology
Surgery
Surgery
Cardiology
Internal Medicine

## 2017-12-26 NOTE — PROVIDER CONTACT NOTE (OTHER) - ASSESSMENT
VSS. Pt resting heartrate 80. pt denies chest pain or palpitations.
Patient a/ox4. vss, denies any c/o or discomforts. Patient is refusing to take erythromycin until GI comes to speak to her regarding the new order
Pt A+Ox3, anxious. VSS. Hr 's. Pt has skipped PM dose of Cardizem x2. Requesting a dose of Cardizem this am.
Pt c/o 4/10 chest pain, pointing to LUQ area. VSS, please see flowsheet. Pt worrisome regarding bradycardia and not taking Cardizem. Pt observed to be anxious and currently on 2mg Ativan q4hr PO standing. Pt has high concern regarding her -150 and requesting hourly BP checks.
Pt in bed, crying, insisting on someone monitoring her, very anxious.

## 2017-12-26 NOTE — PROGRESS NOTE ADULT - PROBLEM SELECTOR PLAN 7
DVT prophlaxis:  subcutaneous lovenox or heparin as per primary team  sequential teds stockings  early ambulation

## 2017-12-26 NOTE — PROVIDER CONTACT NOTE (OTHER) - ACTION/TREATMENT ORDERED:
CRESENCIO Harding notified and aware of patients request. NP will come talk to patient.
CRESENCIO Kim notified. Will reschedule Cardizem 12/22 PM dose to 12/22 AM. Safety maintained. Will continue to monitor.
KALLI Gorman aware, continue to monitor patient, F/U with GI
CRESENCIO Nieves notified and made aware. Awaiting NP to assess pt at bedside. Will continue to monitor.
EKG done, tropinin negative, PA aware. Ativan doses given as scheduled. Will continue to monitor patient.

## 2017-12-27 ENCOUNTER — APPOINTMENT (OUTPATIENT)
Dept: PULMONOLOGY | Facility: CLINIC | Age: 42
End: 2017-12-27

## 2017-12-27 ENCOUNTER — MESSAGE (OUTPATIENT)
Age: 42
End: 2017-12-27

## 2017-12-28 ENCOUNTER — EMERGENCY (EMERGENCY)
Facility: HOSPITAL | Age: 42
LOS: 1 days | Discharge: HOME | End: 2017-12-28
Attending: EMERGENCY MEDICINE | Admitting: EMERGENCY MEDICINE
Payer: COMMERCIAL

## 2017-12-28 VITALS
OXYGEN SATURATION: 100 % | DIASTOLIC BLOOD PRESSURE: 97 MMHG | HEART RATE: 96 BPM | TEMPERATURE: 98 F | SYSTOLIC BLOOD PRESSURE: 126 MMHG | RESPIRATION RATE: 18 BRPM

## 2017-12-28 VITALS
DIASTOLIC BLOOD PRESSURE: 94 MMHG | TEMPERATURE: 98 F | SYSTOLIC BLOOD PRESSURE: 135 MMHG | RESPIRATION RATE: 17 BRPM | HEART RATE: 84 BPM | OXYGEN SATURATION: 99 %

## 2017-12-28 DIAGNOSIS — R00.0 TACHYCARDIA, UNSPECIFIED: ICD-10-CM

## 2017-12-28 LAB
ALBUMIN SERPL ELPH-MCNC: 4.2 G/DL — SIGNIFICANT CHANGE UP (ref 3.3–5)
ALP SERPL-CCNC: 61 U/L — SIGNIFICANT CHANGE UP (ref 40–120)
ALT FLD-CCNC: 16 U/L RC — SIGNIFICANT CHANGE UP (ref 10–45)
ANION GAP SERPL CALC-SCNC: 13 MMOL/L — SIGNIFICANT CHANGE UP (ref 5–17)
AST SERPL-CCNC: 34 U/L — SIGNIFICANT CHANGE UP (ref 10–40)
BASOPHILS # BLD AUTO: 0 K/UL — SIGNIFICANT CHANGE UP (ref 0–0.2)
BASOPHILS NFR BLD AUTO: 0.5 % — SIGNIFICANT CHANGE UP (ref 0–2)
BILIRUB SERPL-MCNC: 0.3 MG/DL — SIGNIFICANT CHANGE UP (ref 0.2–1.2)
BUN SERPL-MCNC: <4 MG/DL — LOW (ref 7–23)
CALCIUM SERPL-MCNC: 9.4 MG/DL — SIGNIFICANT CHANGE UP (ref 8.4–10.5)
CHLORIDE SERPL-SCNC: 104 MMOL/L — SIGNIFICANT CHANGE UP (ref 96–108)
CO2 SERPL-SCNC: 23 MMOL/L — SIGNIFICANT CHANGE UP (ref 22–31)
CREAT SERPL-MCNC: 0.84 MG/DL — SIGNIFICANT CHANGE UP (ref 0.5–1.3)
EOSINOPHIL # BLD AUTO: 0.1 K/UL — SIGNIFICANT CHANGE UP (ref 0–0.5)
EOSINOPHIL NFR BLD AUTO: 1.6 % — SIGNIFICANT CHANGE UP (ref 0–6)
GLUCOSE SERPL-MCNC: 94 MG/DL — SIGNIFICANT CHANGE UP (ref 70–99)
HCT VFR BLD CALC: 36.9 % — SIGNIFICANT CHANGE UP (ref 34.5–45)
HGB BLD-MCNC: 12.9 G/DL — SIGNIFICANT CHANGE UP (ref 11.5–15.5)
LYMPHOCYTES # BLD AUTO: 1.9 K/UL — SIGNIFICANT CHANGE UP (ref 1–3.3)
LYMPHOCYTES # BLD AUTO: 30.7 % — SIGNIFICANT CHANGE UP (ref 13–44)
MCHC RBC-ENTMCNC: 30.2 PG — SIGNIFICANT CHANGE UP (ref 27–34)
MCHC RBC-ENTMCNC: 34.8 GM/DL — SIGNIFICANT CHANGE UP (ref 32–36)
MCV RBC AUTO: 86.7 FL — SIGNIFICANT CHANGE UP (ref 80–100)
MONOCYTES # BLD AUTO: 0.5 K/UL — SIGNIFICANT CHANGE UP (ref 0–0.9)
MONOCYTES NFR BLD AUTO: 8.4 % — SIGNIFICANT CHANGE UP (ref 2–14)
NEUTROPHILS # BLD AUTO: 3.6 K/UL — SIGNIFICANT CHANGE UP (ref 1.8–7.4)
NEUTROPHILS NFR BLD AUTO: 58.9 % — SIGNIFICANT CHANGE UP (ref 43–77)
PLAT MORPH BLD: NORMAL — SIGNIFICANT CHANGE UP
PLATELET # BLD AUTO: 61 K/UL — LOW (ref 150–400)
POTASSIUM SERPL-MCNC: 4.6 MMOL/L — SIGNIFICANT CHANGE UP (ref 3.5–5.3)
POTASSIUM SERPL-SCNC: 4.6 MMOL/L — SIGNIFICANT CHANGE UP (ref 3.5–5.3)
PROT SERPL-MCNC: 6.9 G/DL — SIGNIFICANT CHANGE UP (ref 6–8.3)
RBC # BLD: 4.26 M/UL — SIGNIFICANT CHANGE UP (ref 3.8–5.2)
RBC # FLD: 11.5 % — SIGNIFICANT CHANGE UP (ref 10.3–14.5)
RBC BLD AUTO: SIGNIFICANT CHANGE UP
SODIUM SERPL-SCNC: 140 MMOL/L — SIGNIFICANT CHANGE UP (ref 135–145)
TSH SERPL-MCNC: 2.14 UIU/ML — SIGNIFICANT CHANGE UP (ref 0.27–4.2)
WBC # BLD: 6.1 K/UL — SIGNIFICANT CHANGE UP (ref 3.8–10.5)
WBC # FLD AUTO: 6.1 K/UL — SIGNIFICANT CHANGE UP (ref 3.8–10.5)

## 2017-12-28 PROCEDURE — 84443 ASSAY THYROID STIM HORMONE: CPT

## 2017-12-28 PROCEDURE — 71046 X-RAY EXAM CHEST 2 VIEWS: CPT

## 2017-12-28 PROCEDURE — 99284 EMERGENCY DEPT VISIT MOD MDM: CPT | Mod: 25

## 2017-12-28 PROCEDURE — 93010 ELECTROCARDIOGRAM REPORT: CPT

## 2017-12-28 PROCEDURE — 71020: CPT | Mod: 26

## 2017-12-28 PROCEDURE — 80053 COMPREHEN METABOLIC PANEL: CPT

## 2017-12-28 PROCEDURE — 85027 COMPLETE CBC AUTOMATED: CPT

## 2017-12-28 PROCEDURE — 99283 EMERGENCY DEPT VISIT LOW MDM: CPT | Mod: 25

## 2017-12-28 PROCEDURE — 93005 ELECTROCARDIOGRAM TRACING: CPT

## 2017-12-28 RX ORDER — SODIUM CHLORIDE 9 MG/ML
1000 INJECTION INTRAMUSCULAR; INTRAVENOUS; SUBCUTANEOUS
Qty: 0 | Refills: 0 | Status: DISCONTINUED | OUTPATIENT
Start: 2017-12-28 | End: 2018-01-01

## 2017-12-28 RX ORDER — SODIUM CHLORIDE 9 MG/ML
1000 INJECTION INTRAMUSCULAR; INTRAVENOUS; SUBCUTANEOUS ONCE
Qty: 0 | Refills: 0 | Status: COMPLETED | OUTPATIENT
Start: 2017-12-28 | End: 2017-12-28

## 2017-12-28 RX ORDER — ONDANSETRON 8 MG/1
4 TABLET, FILM COATED ORAL ONCE
Qty: 0 | Refills: 0 | Status: COMPLETED | OUTPATIENT
Start: 2017-12-28 | End: 2017-12-28

## 2017-12-28 RX ADMIN — SODIUM CHLORIDE 1000 MILLILITER(S): 9 INJECTION INTRAMUSCULAR; INTRAVENOUS; SUBCUTANEOUS at 10:23

## 2017-12-28 RX ADMIN — ONDANSETRON 4 MILLIGRAM(S): 8 TABLET, FILM COATED ORAL at 10:21

## 2017-12-28 NOTE — ED PROVIDER NOTE - PLAN OF CARE
1) Continue medications as prescribed   2) Please follow up with your primary medical doctor in 1-2 days for reevaluation. If you do not have pmd please call the general medicine clinic for an appointment at [phone].   3) Follow up with Dr. Barahona in 2-3 days for reevaluation, call for appointment   4) You were given a copy of your results, please show them to your doctor for review.   5) Return to the ED for worsening palpitations, shortness of breath, chest pain, nausea, vomiting, fever greater than 100.4, or if you have any other new, worsening, or concerning symptoms.

## 2017-12-28 NOTE — ED PROVIDER NOTE - PHYSICAL EXAMINATION
Thapa:  General: No distress.  Mentation at baseline.   HEENT: WNL  Chest/Lungs: CTAB, No wheeze, No retractions, No increased work of breathing, Normal rate  Heart: S1S2 RRR, No M/R/G, Pules equal Bilaterally in upper and lower extremities distally  Abd: soft, NT/ND, No guarding, No rebound.  No hernias, no palpable masses.  Extrem: FROM in all joints, no significant edema noted, No ulcers.  Cap refil < 2sec.  Skin: No rash noted, warm dry.  Neuro:  Grossly normal.  No difficulty ambulating. No focal deficits.  Psychiatric: No evidence of delusions. No SI/HI. Anxious

## 2017-12-28 NOTE — ED PROVIDER NOTE - ATTENDING CONTRIBUTION TO CARE
Alanis:  I have independently evaluated the patient and have documented in the appropriate sections above.  I agree with the exam and plan as noted above.

## 2017-12-28 NOTE — CONSULT NOTE ADULT - PROBLEM SELECTOR RECOMMENDATION 9
- Patient to continue with Cardizem 30mg po Q6 hrs prn which will hopefully be switched to 120mg CD daily upon follow up within 1 week  - Encouraged to increase fluid and sodium intake  - Cleared for discharge from EP perspective; d/w ED team   - Plan discussed with Dr Barahona at bedside

## 2017-12-28 NOTE — ED ADULT NURSE REASSESSMENT NOTE - NS ED NURSE REASSESS COMMENT FT1
called to pt's bedside with elevated BP, rechecked BP using hospital monitor, +took home dose of cardizem, d/c home pending

## 2017-12-28 NOTE — ED PROVIDER NOTE - PROGRESS NOTE DETAILS
Alanis:  Seen by me.  Agree with status of pt. Brooklyn aware pt in ED MD Clay: Patient has been in sinus rhythm, occasional pvc, seen by Dr. Brooklyn farah for d/c, patient able to ambulate, no hypoxia, spo2 100-98 on room air, discussed labwork, rads, strict return precautions.

## 2017-12-28 NOTE — ED PROVIDER NOTE - CARE PLAN
Principal Discharge DX:	Anxiety  Secondary Diagnosis:	Palpitations Principal Discharge DX:	Anxiety  Instructions for follow-up, activity and diet:	1) Continue medications as prescribed   2) Please follow up with your primary medical doctor in 1-2 days for reevaluation. If you do not have pmd please call the general medicine clinic for an appointment at [phone].   3) Follow up with Dr. Barahona in 2-3 days for reevaluation, call for appointment   4) You were given a copy of your results, please show them to your doctor for review.   5) Return to the ED for worsening palpitations, shortness of breath, chest pain, nausea, vomiting, fever greater than 100.4, or if you have any other new, worsening, or concerning symptoms.  Secondary Diagnosis:	Palpitations

## 2017-12-28 NOTE — CONSULT NOTE ADULT - ASSESSMENT
42 year old past medical history POTS, HTN, anxiety, ITP, gastroparesis. Patient recently discharged after workup for gastroparesis during which time she states her Cardizem CD 240mg was discontinued due to hypotension. Patient states  she has been experiencing "rapid heart rates" ,via her heart rate monitor, for the past 2 days  and was started outpatient on a lower dose of cardizem immediate release, 30mg q 6hrs to be taken prn based on tolerability x1 week after which she will transition to Cardizem CD 120mg daily. Patient  now presents to the ED for palpitations for 1 hour. States after getting up from bed this am HR increased to 132bpm  on standing and up to 156 bpm while walking. She additionally states she  Patient denies current chest pain/sob/dizziness.  +intermittent nausea for which she takes zofran. 42 year old past medical history POTS, HTN, anxiety, ITP, gastroparesis. Patient recently discharged after workup for gastroparesis during which time she states her Cardizem CD 240mg was discontinued due to hypotension. Patient states  she has been experiencing "rapid heart rates" of 120's to 130's ,via her heart rate monitor, for the past 2 days  and was started outpatient on a lower dose of cardizem immediate release, 30mg q 6hrs to be taken prn based on tolerability x1 week after which she will transition to Cardizem CD 120mg daily. Patient  now presents to the ED for palpitations for 1 hour. States after getting up from bed this am HR increased to 132bpm  on standing and up to 156 bpm while walking. She additionally states she  Patient denies current chest pain/sob/dizziness.  +intermittent nausea for which she takes zofran.

## 2017-12-28 NOTE — ED ADULT NURSE NOTE - PMH
Anxiety    Anxiety    Headache    History of ITP    HTN (hypertension)    IBS (irritable bowel syndrome)    Idiopathic thrombocytopenia purpura    Labyrinthitis    POTS (postural orthostatic tachycardia syndrome)

## 2017-12-28 NOTE — ED PROVIDER NOTE - OBJECTIVE STATEMENT
42 year old past medical history POTS, HTN, anxiety, ITP, gastroparesis, who presents to the ED for palpitations for 1 hour. Highest .Woke up this morning with the palpitations. "Feels like my heart is beating in my ear not in chest." No chest pain. Reports shortness of breath. uses home pulse ox which she reports it was down to 85. 42 year old past medical history POTS, HTN, anxiety, ITP, gastroparesis, who presents to the ED for palpitations for 1 hour. Highest .Woke up this morning with the palpitations. "Feels like my heart is beating in my ear not in chest." No chest pain. Reports shortness of breath. uses home pulse ox which she reports it was down to 85. Also associated nausea without vomiting.     [place]: Dr. Barahona 42 year old past medical history POTS, HTN, anxiety, ITP, gastroparesis, who presents to the ED for palpitations for 1 hour. Highest .Woke up this morning with the palpitations. "Feels like my heart is beating in my ear not in chest." No chest pain. Reports shortness of breath. uses home pulse ox which she reports it was down to 85. Also associated nausea without vomiting.     [place]: Dr. Brooklyn Thapa:  Here with palpitaions and other complaints

## 2017-12-28 NOTE — ED PROVIDER NOTE - MEDICAL DECISION MAKING DETAILS
42 year old past medical history POTS, HTN, anxiety, ITP, gastroparesis, who presents to the ED for palpitations for 1 hour. Likely 2/2 known POTS and/or dehydration. will obtain labwork, ekg, cxr, discuss with Dr. Barahona.

## 2017-12-28 NOTE — CONSULT NOTE ADULT - SUBJECTIVE AND OBJECTIVE BOX
HPI: 42 year old past medical history POTS, HTN, anxiety, ITP, gastroparesis. Patient recently discharged after workup for gastroparesis during which time she states her Cardizem CD 240mg was discontinued due to hypotension. Patient states  she has been experiencing "rapid heart rates" ,via her heart rate monitor, for the past 2 days  and was started outpatient on a lower dose of cardizem immediate release, 30mg q 6hrs to be taken prn based on tolerability x1 week after which she will transition to Cardizem CD 120mg daily. Patient  now presents to the ED for palpitations for 1 hour. States after getting up from bed this am HR increased to 132bpm  on standing and up to 156 bpm while walking. She additionally states she  Patient denies current chest pain/sob/dizziness.  +intermittent nausea for which she takes zofran.     PAST MEDICAL & SURGICAL HISTORY:  Idiopathic thrombocytopenia purpura  Anxiety  POTS (postural orthostatic tachycardia syndrome)  History of ITP  Labyrinthitis  Headache  HTN (hypertension)  Anxiety  IBS (irritable bowel syndrome)  No significant past surgical history  No significant past surgical history      SOCIAL HISTORY: Single                            Denies smoking hx                            Denies ETOH/ illicit drug use       FAMILY HISTORY:  Family history of hypertension (Grandparent)  Family history of atrial fibrillation  Family history of prostate cancer in father      MEDICATIONS  sodium chloride 0.9%. 1000 milliLiter(s) (125 mL/Hr) IV Continuous <Continuous>  Ativan 2mg Q4 hrs   Zofran 4mg q4 hrs prn  buspirone 2.5 mg po tid  Cardizem 30mg po q6hrs prn x 1 week then 120 mg CD daily if tolerates     Allergies    beta blockers (Unknown)  Lexapro (Unknown)    Intolerances    metoprolol (Headache)    REVIEW OF SYSTEM:    Constitutional: denies fevers, chills, fatigue  Neuro: denies headache, numbness, weakness  Resp: denies cough, wheezing, shortness of breath  CVS: denies chest pain, leg swelling. + palpitations this am ( see ROS)   GI: denies abdominal pain, nausea, vomiting, diarrhea. + Gastroparesis; followed by GI   : denies dysuria, frequency, incontinence  Skin: + dry skin with scattered pimples to face ; outpatient dermatology follow up   Msk: denies joint pain or swelling  Psych: + anxiety takes ativan, + insomnia + depression takes buspirone and sees a psychiatrist       Vital Signs Last 24 Hrs  T(C): 36.8 (28 Dec 2017 13:15), Max: 36.8 (28 Dec 2017 11:18)  T(F): 98.2 (28 Dec 2017 13:15), Max: 98.3 (28 Dec 2017 11:18)  HR: 81 (28 Dec 2017 13:15) (80 - 96)  BP: 152/95 (28 Dec 2017 13:15) (126/97 - 152/95)  BP(mean): --  RR: 17 (28 Dec 2017 13:15) (17 - 18)  SpO2: 100% (28 Dec 2017 13:15) (100% - 100%)    Physical Exam:  General : well developed,  and in  no acute distress  Neuro : Alert and oriented x 3, no focal deficits, strength 5/5 in all four extremities   HEENT : Sclera clear, no JVD,neck supple, pink, moist oral mucosa  Lungs: breathing nonlabored; Clear to auscultation  Cardiovascular : + 1 +2, RRR, no murmurs, no rubs  GI : abdomen soft, NT, ND, + BS   : no suprapubic tenderness  Extremities : No edema, + 2 bilateral radial and DP pulses.   Skin : clean,dry and intact.      TELE: SR 70's to 90's  EKG reviewed with Attending: Sinus rhythm with Sinus Arrythmia  with short [place] 84 bpm    LABS:                        12.9   6.1   )-----------( 61       ( 28 Dec 2017 10:04 )             36.9     12-28    140  |  104  |  <4<L>  ----------------------------<  94  4.6   |  23  |  0.84    Ca    9.4      28 Dec 2017 10:04    TPro  6.9  /  Alb  4.2  /  TBili  0.3  /  DBili  x   /  AST  34  /  ALT  16  /  AlkPhos  61  12-28 HPI: 42 year old past medical history POTS, HTN, anxiety, ITP, gastroparesis. Patient recently discharged after workup for gastroparesis during which time she states her Cardizem CD 240mg was discontinued due to hypotension. Patient states  she has been experiencing "rapid heart rates"  of 120's to 130's via her heart rate monitor, for the past 2 days  and was started outpatient on a lower dose of cardizem immediate release, 30mg q 6hrs to be taken prn based on tolerability x1 week after which she will transition to Cardizem CD 120mg daily. Patient  now presents to the ED for palpitations for 1 hour. States after getting up from bed this am HR increased to 132bpm  on standing and up to 156 bpm while walking. She additionally states she  Patient denies current chest pain/sob/dizziness.  +intermittent nausea for which she takes zofran.     PAST MEDICAL & SURGICAL HISTORY:  Idiopathic thrombocytopenia purpura  Anxiety  POTS (postural orthostatic tachycardia syndrome)  History of ITP  Labyrinthitis  Headache  HTN (hypertension)  Anxiety  IBS (irritable bowel syndrome)  No significant past surgical history  No significant past surgical history      SOCIAL HISTORY: Single                            Denies smoking hx                            Denies ETOH/ illicit drug use       FAMILY HISTORY:  Family history of hypertension (Grandparent)  Family history of atrial fibrillation  Family history of prostate cancer in father      MEDICATIONS  sodium chloride 0.9%. 1000 milliLiter(s) (125 mL/Hr) IV Continuous <Continuous>  Ativan 2mg Q4 hrs   Zofran 4mg q4 hrs prn  buspirone 2.5 mg po tid  Cardizem 30mg po q6hrs prn x 1 week then 120 mg CD daily if tolerates     Allergies    beta blockers (Unknown)  Lexapro (Unknown)    Intolerances    metoprolol (Headache)    REVIEW OF SYSTEM:    Constitutional: denies fevers, chills, fatigue  Neuro: denies headache, numbness, weakness  Resp: denies cough, wheezing, shortness of breath  CVS: denies chest pain, leg swelling. + palpitations this am ( see ROS)   GI: denies abdominal pain, nausea, vomiting, diarrhea. + Gastroparesis; followed by GI   : denies dysuria, frequency, incontinence  Skin: + dry skin with scattered pimples to face ; outpatient dermatology follow up   Msk: denies joint pain or swelling  Psych: + anxiety takes ativan, + insomnia + depression takes buspirone and sees a psychiatrist       Vital Signs Last 24 Hrs  T(C): 36.8 (28 Dec 2017 13:15), Max: 36.8 (28 Dec 2017 11:18)  T(F): 98.2 (28 Dec 2017 13:15), Max: 98.3 (28 Dec 2017 11:18)  HR: 81 (28 Dec 2017 13:15) (80 - 96)  BP: 152/95 (28 Dec 2017 13:15) (126/97 - 152/95)  BP(mean): --  RR: 17 (28 Dec 2017 13:15) (17 - 18)  SpO2: 100% (28 Dec 2017 13:15) (100% - 100%)    Physical Exam:  General : well developed,  and in  no acute distress  Neuro : Alert and oriented x 3, no focal deficits, strength 5/5 in all four extremities   HEENT : Sclera clear, no JVD,neck supple, pink, moist oral mucosa  Lungs: breathing nonlabored; Clear to auscultation  Cardiovascular : + 1 +2, RRR, no murmurs, no rubs  GI : abdomen soft, NT, ND, + BS   : no suprapubic tenderness  Extremities : No edema, + 2 bilateral radial and DP pulses.   Skin : clean,dry and intact.      TELE: SR 70's to 90's  EKG reviewed with Attending: Sinus rhythm with Sinus Arrythmia  with short [place] 84 bpm    LABS:                        12.9   6.1   )-----------( 61       ( 28 Dec 2017 10:04 )             36.9     12-28    140  |  104  |  <4<L>  ----------------------------<  94  4.6   |  23  |  0.84    Ca    9.4      28 Dec 2017 10:04    TPro  6.9  /  Alb  4.2  /  TBili  0.3  /  DBili  x   /  AST  34  /  ALT  16  /  AlkPhos  61  12-28

## 2017-12-28 NOTE — ED ADULT NURSE NOTE - OBJECTIVE STATEMENT
42 y.o F PMH POTS, anxiety, gastroparesis; arrived to ED c/o palpitations and tachycardia this morning lasting about 1 hour. A&Ox3, ambulatory. pt states this morning "I could hear my heart beat in my ear". Highest HR to 156 PTA to ED; not tachy in ED. Denies CP, endorses SOB with episode of tachycardia, O2 sat 100% RA; uses pulse oximetry and HR monitor @ home. c/o intermittent nausea no vomiting; was in the hospital recently d/c for gastroparesis. upon asking screening question pt states she does not feel she is physically unsafe @ home but feels that her no one understands what she is going through with POTS. denies SI/HI. MD Williamson aware and will contact social work for further consultation. Pt sees psychiatrist regularly. Denies chest pain, diarrhea, fevers, chills, Ha, dizziness, pain/burning upon urination. Safety and comfort provided/maintained. EKG performed. 42 y.o F PMH POTS, anxiety, gastroparesis; arrived to ED c/o palpitations and tachycardia this morning lasting about 1 hour. A&Ox3, ambulatory. pt states this morning "I could hear my heart beat in my ear". Highest HR to 156 PTA to ED; not tachy in ED. Denies CP, endorses SOB with episode of tachycardia, O2 sat 100% RA; uses pulse oximetry and HR monitor @ home. c/o intermittent nausea no vomiting; was in the hospital recently d/c for gastroparesis. upon asking screening question pt states she does not feel she is physically unsafe @ home but feels that no one she lives with understands what she is going through with POTS. denies SI/HI. MD Williamson aware and will contact social work for further consultation. Pt sees psychiatrist regularly. Denies chest pain, diarrhea, fevers, chills, Ha, dizziness, pain/burning upon urination. Safety and comfort provided/maintained. EKG performed. 42 y.o F PMH POTS, anxiety, gastroparesis; arrived to ED c/o palpitations and tachycardia this morning lasting about 1 hour. A&Ox3, ambulatory. pt states this morning "I could hear my heart beat in my ear". Highest HR to 156 PTA to ED; not tachy in ED. Denies CP, endorses SOB with episode of tachycardia, O2 sat 100% RA; uses pulse oximetry and HR monitor @ home. c/o intermittent nausea no vomiting; was in the hospital recently d/c for gastroparesis. upon asking screening question pt states she does not feel she is physically unsafe @ home but feels that no one she lives with understands what she is going through with POTS. denies SI/HI. MD Williamson aware and will contact social work for further consultation. Pt sees psychiatrist regularly. Denies chest pain, diarrhea, fevers, chills, Ha, dizziness, pain/burning upon urination. Safety and comfort provided/maintained. EKG performed. pt placed on cardiac monitoring.

## 2017-12-28 NOTE — ED ADULT TRIAGE NOTE - CHIEF COMPLAINT QUOTE
Palpitations Palpitations  Pt states " I woke up this morning and felt my heart racing."  Pt states she did not take her Cardizem this morning.

## 2017-12-28 NOTE — PATIENT PROFILE ADULT. - TOBACCO USE
12/28/2017    Usman Bloom  [address]  [place] [address]           Return to work    This is to certify that Usman Bloom was seen in the clinic today 12/28/2017 and  can return to light work on 12/28/2017     Restrictions:  Light duty.  Minimal use of right hand/arm, with maximum lifting 5 pounds with the right occasionally only.    Remarks:  Surgery recommended.    Thank you,      _______________________________________________________   Reji Gallego MD        [facility]  Orthopedic Department  [address] [place]  [address]  Phone: [phone]  Fax: [phone]      
Never smoker

## 2017-12-29 ENCOUNTER — APPOINTMENT (OUTPATIENT)
Dept: GASTROENTEROLOGY | Facility: CLINIC | Age: 42
End: 2017-12-29
Payer: COMMERCIAL

## 2017-12-29 ENCOUNTER — APPOINTMENT (OUTPATIENT)
Dept: PULMONOLOGY | Facility: CLINIC | Age: 42
End: 2017-12-29

## 2017-12-29 VITALS
SYSTOLIC BLOOD PRESSURE: 130 MMHG | TEMPERATURE: 97.9 F | HEART RATE: 83 BPM | BODY MASS INDEX: 19.51 KG/M2 | WEIGHT: 106 LBS | DIASTOLIC BLOOD PRESSURE: 87 MMHG | HEIGHT: 62 IN | RESPIRATION RATE: 16 BRPM | OXYGEN SATURATION: 99 %

## 2017-12-29 DIAGNOSIS — Z87.898 PERSONAL HISTORY OF OTHER SPECIFIED CONDITIONS: ICD-10-CM

## 2017-12-29 DIAGNOSIS — Z87.19 PERSONAL HISTORY OF OTHER DISEASES OF THE DIGESTIVE SYSTEM: ICD-10-CM

## 2017-12-29 DIAGNOSIS — K30 FUNCTIONAL DYSPEPSIA: ICD-10-CM

## 2017-12-29 LAB — CALPROTECTIN STL-MCNT: 16 UG/G — SIGNIFICANT CHANGE UP (ref 0–120)

## 2017-12-29 PROCEDURE — 99205 OFFICE O/P NEW HI 60 MIN: CPT

## 2017-12-29 PROCEDURE — 99215 OFFICE O/P EST HI 40 MIN: CPT

## 2017-12-29 RX ORDER — FLUOXETINE HYDROCHLORIDE 10 MG/1
10 CAPSULE ORAL
Qty: 30 | Refills: 3 | Status: DISCONTINUED | COMMUNITY
Start: 2017-11-30 | End: 2017-12-29

## 2017-12-29 RX ORDER — PROGESTERONE 100 MG/1
100 CAPSULE ORAL
Qty: 30 | Refills: 0 | Status: DISCONTINUED | COMMUNITY
Start: 2017-11-21 | End: 2017-12-29

## 2017-12-29 RX ORDER — DILTIAZEM HYDROCHLORIDE 240 MG/1
240 CAPSULE, EXTENDED RELEASE ORAL
Qty: 30 | Refills: 3 | Status: DISCONTINUED | COMMUNITY
Start: 2017-09-12 | End: 2017-12-29

## 2017-12-31 NOTE — CONSULT NOTE ADULT - PROVIDER SPECIALTY LIST ADULT
Cardiology Problem: Impaired Activities of Daily Living  Goal: Achieve highest/safest level of independence in self care  Interventions:  - Assess ability and encourage patient to participate in ADLs to maximize function  - Promote sitting position while performing A

## 2018-01-01 ENCOUNTER — OUTPATIENT (OUTPATIENT)
Dept: OUTPATIENT SERVICES | Facility: HOSPITAL | Age: 43
LOS: 1 days | End: 2018-01-01
Payer: MEDICAID

## 2018-01-02 ENCOUNTER — OTHER (OUTPATIENT)
Age: 43
End: 2018-01-02

## 2018-01-02 ENCOUNTER — EMERGENCY (EMERGENCY)
Facility: HOSPITAL | Age: 43
LOS: 1 days | Discharge: LEFT BEFORE DISCHARGE | End: 2018-01-02
Attending: EMERGENCY MEDICINE | Admitting: EMERGENCY MEDICINE
Payer: MEDICAID

## 2018-01-02 VITALS
OXYGEN SATURATION: 100 % | TEMPERATURE: 99 F | DIASTOLIC BLOOD PRESSURE: 100 MMHG | HEART RATE: 99 BPM | RESPIRATION RATE: 18 BRPM | WEIGHT: 104.06 LBS | SYSTOLIC BLOOD PRESSURE: 146 MMHG | HEIGHT: 62 IN

## 2018-01-02 PROCEDURE — 93005 ELECTROCARDIOGRAM TRACING: CPT

## 2018-01-02 PROCEDURE — 93010 ELECTROCARDIOGRAM REPORT: CPT

## 2018-01-02 PROCEDURE — 99283 EMERGENCY DEPT VISIT LOW MDM: CPT

## 2018-01-02 PROCEDURE — 99283 EMERGENCY DEPT VISIT LOW MDM: CPT | Mod: 25

## 2018-01-02 NOTE — ED PROVIDER NOTE - MEDICAL DECISION MAKING DETAILS
43 yo F with chronic palpitations c/o same today. Well known patient with chronic anxiety and POTS. C/o usual palpitations. EKG normal. Plan- reassurance, discourage extensive work up, and refer back to PMD for further guidance.

## 2018-01-02 NOTE — ED PROVIDER NOTE - OBJECTIVE STATEMENT
Pt is a 41 y/o F, with PMHx of POTS, anxiety, and chronic palpitations with many ED visits and hospitalization for the same, well known to ED staff, demanding blood work and IV fluids, complicated w/u for various sxs, hospitalized 1 week ago at [facility] for the same with exhaustive work up including CT, MRI, Cardiology/GI/neurology eval that was all negative, presenting to the ED with usual c/o palpitations and BP instability while at mental health group session. Pt is belligerent and does not allow physician to speak. Demanding IV. Pt is a 43 y/o F, with PMHx of POTS, anxiety, and chronic palpitations with many ED visits and hospitalizations for the same, well known to ED staff, demanding blood work and IV fluids, complicated w/u for various sxs, hospitalized 1 week ago at [facility] for the same with exhaustive work up including CT, MRI, Cardiology/GI/neurology eval that was all negative, presenting to the ED with usual c/o palpitations and BP instability while at mental health group session today. Pt is belligerent and does not allow physician to speak. Demanding IV.

## 2018-01-02 NOTE — ED ADULT NURSE NOTE - NSSISCREENINGQ1_ED_A_ED
Anesthesia Evaluation     . Pt has had prior anesthetic.     History of anesthetic complications   - PONV        ROS/MED HX    ENT/Pulmonary:       Neurologic:       Cardiovascular:     (+) ----. : . . . :. Irregular Heartbeat/Palpitations, .       METS/Exercise Tolerance:     Hematologic:         Musculoskeletal:         GI/Hepatic:     (+) GERD       Renal/Genitourinary:         Endo:         Psychiatric:         Infectious Disease:         Malignancy:         Other:                     Physical Exam  Normal systems: cardiovascular and pulmonary    Airway   Mallampati: II  TM distance: >3 FB  Neck ROM: full    Dental     Cardiovascular       Pulmonary                     Anesthesia Plan      History & Physical Review  History and physical reviewed and following examination; no interval change.    ASA Status:  2 .    NPO Status:  > 6 hours    Plan for MAC          Postoperative Care  Postoperative pain management:  IV analgesics.      Consents  Anesthetic plan, risks, benefits and alternatives discussed with:  Patient.  Use of blood products discussed: Yes.   Use of blood products discussed with Patient.  Consented to blood products.  .                          .  
No

## 2018-01-03 ENCOUNTER — APPOINTMENT (OUTPATIENT)
Dept: HEART AND VASCULAR | Facility: CLINIC | Age: 43
End: 2018-01-03
Payer: MEDICAID

## 2018-01-03 ENCOUNTER — OTHER (OUTPATIENT)
Age: 43
End: 2018-01-03

## 2018-01-03 ENCOUNTER — EMERGENCY (EMERGENCY)
Facility: HOSPITAL | Age: 43
LOS: 1 days | Discharge: HOME | End: 2018-01-03
Attending: EMERGENCY MEDICINE | Admitting: EMERGENCY MEDICINE
Payer: MEDICAID

## 2018-01-03 ENCOUNTER — MEDICATION RENEWAL (OUTPATIENT)
Age: 43
End: 2018-01-03

## 2018-01-03 VITALS
HEART RATE: 87 BPM | DIASTOLIC BLOOD PRESSURE: 70 MMHG | RESPIRATION RATE: 17 BRPM | OXYGEN SATURATION: 99 % | SYSTOLIC BLOOD PRESSURE: 138 MMHG

## 2018-01-03 VITALS
OXYGEN SATURATION: 100 % | SYSTOLIC BLOOD PRESSURE: 152 MMHG | DIASTOLIC BLOOD PRESSURE: 93 MMHG | HEART RATE: 85 BPM | RESPIRATION RATE: 16 BRPM

## 2018-01-03 VITALS
OXYGEN SATURATION: 100 % | DIASTOLIC BLOOD PRESSURE: 101 MMHG | RESPIRATION RATE: 18 BRPM | HEART RATE: 96 BPM | TEMPERATURE: 98 F | SYSTOLIC BLOOD PRESSURE: 134 MMHG | WEIGHT: 104.06 LBS

## 2018-01-03 DIAGNOSIS — R00.2 PALPITATIONS: ICD-10-CM

## 2018-01-03 DIAGNOSIS — Z88.8 ALLERGY STATUS TO OTHER DRUGS, MEDICAMENTS AND BIOLOGICAL SUBSTANCES STATUS: ICD-10-CM

## 2018-01-03 DIAGNOSIS — Z79.899 OTHER LONG TERM (CURRENT) DRUG THERAPY: ICD-10-CM

## 2018-01-03 DIAGNOSIS — I10 ESSENTIAL (PRIMARY) HYPERTENSION: ICD-10-CM

## 2018-01-03 LAB
ANION GAP SERPL CALC-SCNC: 14 MMOL/L — SIGNIFICANT CHANGE UP (ref 5–17)
BASOPHILS NFR BLD AUTO: 0.1 % — SIGNIFICANT CHANGE UP (ref 0–2)
BUN SERPL-MCNC: 3 MG/DL — LOW (ref 7–23)
CALCIUM SERPL-MCNC: 9.8 MG/DL — SIGNIFICANT CHANGE UP (ref 8.4–10.5)
CHLORIDE SERPL-SCNC: 104 MMOL/L — SIGNIFICANT CHANGE UP (ref 96–108)
CO2 SERPL-SCNC: 22 MMOL/L — SIGNIFICANT CHANGE UP (ref 22–31)
CREAT SERPL-MCNC: 0.75 MG/DL — SIGNIFICANT CHANGE UP (ref 0.5–1.3)
EOSINOPHIL NFR BLD AUTO: 0.7 % — SIGNIFICANT CHANGE UP (ref 0–6)
GLUCOSE SERPL-MCNC: 90 MG/DL — SIGNIFICANT CHANGE UP (ref 70–99)
HCT VFR BLD CALC: 38.3 % — SIGNIFICANT CHANGE UP (ref 34.5–45)
HGB BLD-MCNC: 13.3 G/DL — SIGNIFICANT CHANGE UP (ref 11.5–15.5)
LYMPHOCYTES # BLD AUTO: 32.1 % — SIGNIFICANT CHANGE UP (ref 13–44)
MCHC RBC-ENTMCNC: 28.1 PG — SIGNIFICANT CHANGE UP (ref 27–34)
MCHC RBC-ENTMCNC: 34.7 G/DL — SIGNIFICANT CHANGE UP (ref 32–36)
MCV RBC AUTO: 81 FL — SIGNIFICANT CHANGE UP (ref 80–100)
MONOCYTES NFR BLD AUTO: 8.5 % — SIGNIFICANT CHANGE UP (ref 2–14)
NEUTROPHILS NFR BLD AUTO: 58.6 % — SIGNIFICANT CHANGE UP (ref 43–77)
PLATELET # BLD AUTO: 79 K/UL — LOW (ref 150–400)
POTASSIUM SERPL-MCNC: 4.2 MMOL/L — SIGNIFICANT CHANGE UP (ref 3.5–5.3)
POTASSIUM SERPL-SCNC: 4.2 MMOL/L — SIGNIFICANT CHANGE UP (ref 3.5–5.3)
RBC # BLD: 4.73 M/UL — SIGNIFICANT CHANGE UP (ref 3.8–5.2)
RBC # FLD: 12.6 % — SIGNIFICANT CHANGE UP (ref 10.3–16.9)
SODIUM SERPL-SCNC: 140 MMOL/L — SIGNIFICANT CHANGE UP (ref 135–145)
WBC # BLD: 8.2 K/UL — SIGNIFICANT CHANGE UP (ref 3.8–10.5)
WBC # FLD AUTO: 8.2 K/UL — SIGNIFICANT CHANGE UP (ref 3.8–10.5)

## 2018-01-03 PROCEDURE — 99283 EMERGENCY DEPT VISIT LOW MDM: CPT | Mod: 25

## 2018-01-03 PROCEDURE — 99284 EMERGENCY DEPT VISIT MOD MDM: CPT | Mod: 25

## 2018-01-03 PROCEDURE — 85025 COMPLETE CBC W/AUTO DIFF WBC: CPT

## 2018-01-03 PROCEDURE — 99245 OFF/OP CONSLTJ NEW/EST HI 55: CPT | Mod: 25

## 2018-01-03 PROCEDURE — 80048 BASIC METABOLIC PNL TOTAL CA: CPT

## 2018-01-03 PROCEDURE — 93000 ELECTROCARDIOGRAM COMPLETE: CPT

## 2018-01-03 PROCEDURE — 93005 ELECTROCARDIOGRAM TRACING: CPT

## 2018-01-03 PROCEDURE — 36415 COLL VENOUS BLD VENIPUNCTURE: CPT

## 2018-01-03 PROCEDURE — 93010 ELECTROCARDIOGRAM REPORT: CPT

## 2018-01-03 RX ORDER — SODIUM CHLORIDE 9 MG/ML
1000 INJECTION INTRAMUSCULAR; INTRAVENOUS; SUBCUTANEOUS ONCE
Qty: 0 | Refills: 0 | Status: COMPLETED | OUTPATIENT
Start: 2018-01-03 | End: 2018-01-03

## 2018-01-03 RX ADMIN — SODIUM CHLORIDE 1000 MILLILITER(S): 9 INJECTION INTRAMUSCULAR; INTRAVENOUS; SUBCUTANEOUS at 17:35

## 2018-01-03 NOTE — ED PROVIDER NOTE - MEDICAL DECISION MAKING DETAILS
41 y/o f  hx POTS presents c/o elevated HR today along with elevated BP.  Asymptomatic in ED, /90, HR in 90s, EKG NSR at 90.  Pt adamant regarding getting IV hydration, discussed with Dr. Garcia pt's cardiologist who doesn't feel pt would benefit from IV fluid, although would not be against it if ED ordered.  Father called concerned that pt has been hypokalemic in the past, will check basic labs, give 1L IV NS and reassess.

## 2018-01-03 NOTE — ED PROVIDER NOTE - ATTENDING CONTRIBUTION TO CARE
42 F w palpitations- hx of POTS- co of palpitations today  no cp no sob  vss  s1s2 lungs cta bl  abd soft nt nd +bs  ext no c/c/e  IMP- Chronic Int Palpitations  check EKG

## 2018-01-03 NOTE — ED ADULT NURSE NOTE - OBJECTIVE STATEMENT
Patient is a 43 yo female with multiple medical complaints. Patient states that today, she is having one of her "POTS spells" from Postural Orthostatic Tachycardia Syndrome. Patient describes that her heart rate has been fluctuating from  as per her own pulse oximeter with accompanied shortness of breath, dizziness, cold extremities, nausea, disorientation, and chest pain. Patient was discharged from [facility] on 12/26 after being hospitalized for a "bleed." EKG completed, MD at bedside, patient in no apparent distress on monitor.

## 2018-01-03 NOTE — ED PROVIDER NOTE - PROGRESS NOTE DETAILS
labs wnl, pt given IV hydration as requested although kept slowing down rate on her own, citing that her blood pressure was getting too high.  after 3.5 hours in ED, it was discussed pt can be discharged and f/u as an outpatient.

## 2018-01-03 NOTE — ED PROVIDER NOTE - OBJECTIVE STATEMENT
41 y/o f hx POTS (postural orthostatic tachycardia syndrome) on cardizem presents stating heart rate elevated into the 130's today and blood pressure increased to 140/100.  Pt stating when this occurs she needs a normal saline drip, reporting drinking fluids doesn't improve her symptoms like IV fluid does.  Pt denies CP, SOB, fever, all other ROS negative.

## 2018-01-05 ENCOUNTER — EMERGENCY (EMERGENCY)
Facility: HOSPITAL | Age: 43
LOS: 1 days | Discharge: HOME | End: 2018-01-05
Attending: EMERGENCY MEDICINE | Admitting: EMERGENCY MEDICINE
Payer: MEDICAID

## 2018-01-05 VITALS
RESPIRATION RATE: 20 BRPM | OXYGEN SATURATION: 100 % | DIASTOLIC BLOOD PRESSURE: 81 MMHG | SYSTOLIC BLOOD PRESSURE: 139 MMHG | HEART RATE: 100 BPM

## 2018-01-05 LAB
ALBUMIN SERPL ELPH-MCNC: 4.6 G/DL — SIGNIFICANT CHANGE UP (ref 3.3–5)
ALP SERPL-CCNC: 70 U/L — SIGNIFICANT CHANGE UP (ref 40–120)
ALT FLD-CCNC: 9 U/L RC — LOW (ref 10–45)
ANION GAP SERPL CALC-SCNC: 14 MMOL/L — SIGNIFICANT CHANGE UP (ref 5–17)
AST SERPL-CCNC: 20 U/L — SIGNIFICANT CHANGE UP (ref 10–40)
BASOPHILS # BLD AUTO: 0 K/UL — SIGNIFICANT CHANGE UP (ref 0–0.2)
BASOPHILS NFR BLD AUTO: 0.1 % — SIGNIFICANT CHANGE UP (ref 0–2)
BILIRUB SERPL-MCNC: 0.3 MG/DL — SIGNIFICANT CHANGE UP (ref 0.2–1.2)
BUN SERPL-MCNC: <4 MG/DL — LOW (ref 7–23)
CALCIUM SERPL-MCNC: 9 MG/DL — SIGNIFICANT CHANGE UP (ref 8.4–10.5)
CHLORIDE SERPL-SCNC: 107 MMOL/L — SIGNIFICANT CHANGE UP (ref 96–108)
CO2 SERPL-SCNC: 21 MMOL/L — LOW (ref 22–31)
CREAT SERPL-MCNC: 0.77 MG/DL — SIGNIFICANT CHANGE UP (ref 0.5–1.3)
EOSINOPHIL # BLD AUTO: 0.1 K/UL — SIGNIFICANT CHANGE UP (ref 0–0.5)
EOSINOPHIL NFR BLD AUTO: 0.8 % — SIGNIFICANT CHANGE UP (ref 0–6)
GIANT PLATELETS BLD QL SMEAR: PRESENT — SIGNIFICANT CHANGE UP
GLUCOSE SERPL-MCNC: 105 MG/DL — HIGH (ref 70–99)
HCT VFR BLD CALC: 36.1 % — SIGNIFICANT CHANGE UP (ref 34.5–45)
HGB BLD-MCNC: 12.8 G/DL — SIGNIFICANT CHANGE UP (ref 11.5–15.5)
LYMPHOCYTES # BLD AUTO: 2.2 K/UL — SIGNIFICANT CHANGE UP (ref 1–3.3)
LYMPHOCYTES # BLD AUTO: 25.6 % — SIGNIFICANT CHANGE UP (ref 13–44)
MCHC RBC-ENTMCNC: 30.3 PG — SIGNIFICANT CHANGE UP (ref 27–34)
MCHC RBC-ENTMCNC: 35.5 GM/DL — SIGNIFICANT CHANGE UP (ref 32–36)
MCV RBC AUTO: 85.3 FL — SIGNIFICANT CHANGE UP (ref 80–100)
MONOCYTES # BLD AUTO: 0.8 K/UL — SIGNIFICANT CHANGE UP (ref 0–0.9)
MONOCYTES NFR BLD AUTO: 9.1 % — SIGNIFICANT CHANGE UP (ref 2–14)
NEUTROPHILS # BLD AUTO: 5.4 K/UL — SIGNIFICANT CHANGE UP (ref 1.8–7.4)
NEUTROPHILS NFR BLD AUTO: 64.5 % — SIGNIFICANT CHANGE UP (ref 43–77)
PLAT MORPH BLD: ABNORMAL
PLATELET # BLD AUTO: 71 K/UL — LOW (ref 150–400)
POTASSIUM SERPL-MCNC: 3.9 MMOL/L — SIGNIFICANT CHANGE UP (ref 3.5–5.3)
POTASSIUM SERPL-SCNC: 3.9 MMOL/L — SIGNIFICANT CHANGE UP (ref 3.5–5.3)
PROT SERPL-MCNC: 6.9 G/DL — SIGNIFICANT CHANGE UP (ref 6–8.3)
RBC # BLD: 4.23 M/UL — SIGNIFICANT CHANGE UP (ref 3.8–5.2)
RBC # FLD: 11.6 % — SIGNIFICANT CHANGE UP (ref 10.3–14.5)
RBC BLD AUTO: SIGNIFICANT CHANGE UP
SODIUM SERPL-SCNC: 142 MMOL/L — SIGNIFICANT CHANGE UP (ref 135–145)
WBC # BLD: 8.4 K/UL — SIGNIFICANT CHANGE UP (ref 3.8–10.5)
WBC # FLD AUTO: 8.4 K/UL — SIGNIFICANT CHANGE UP (ref 3.8–10.5)

## 2018-01-05 PROCEDURE — 99284 EMERGENCY DEPT VISIT MOD MDM: CPT

## 2018-01-05 PROCEDURE — 99283 EMERGENCY DEPT VISIT LOW MDM: CPT | Mod: 25

## 2018-01-05 PROCEDURE — 85027 COMPLETE CBC AUTOMATED: CPT

## 2018-01-05 PROCEDURE — 80053 COMPREHEN METABOLIC PANEL: CPT

## 2018-01-05 RX ORDER — ONDANSETRON 8 MG/1
4 TABLET, FILM COATED ORAL ONCE
Qty: 0 | Refills: 0 | Status: COMPLETED | OUTPATIENT
Start: 2018-01-05 | End: 2018-01-05

## 2018-01-05 RX ORDER — DILTIAZEM HCL 120 MG
120 CAPSULE, EXT RELEASE 24 HR ORAL ONCE
Qty: 0 | Refills: 0 | Status: COMPLETED | OUTPATIENT
Start: 2018-01-05 | End: 2018-01-05

## 2018-01-05 RX ORDER — SODIUM CHLORIDE 9 MG/ML
1000 INJECTION INTRAMUSCULAR; INTRAVENOUS; SUBCUTANEOUS
Qty: 0 | Refills: 0 | Status: DISCONTINUED | OUTPATIENT
Start: 2018-01-05 | End: 2018-01-09

## 2018-01-05 RX ORDER — ONDANSETRON 8 MG/1
4 TABLET, FILM COATED ORAL ONCE
Qty: 0 | Refills: 0 | Status: DISCONTINUED | OUTPATIENT
Start: 2018-01-05 | End: 2018-01-05

## 2018-01-05 RX ADMIN — ONDANSETRON 4 MILLIGRAM(S): 8 TABLET, FILM COATED ORAL at 23:01

## 2018-01-05 RX ADMIN — SODIUM CHLORIDE 100 MILLILITER(S): 9 INJECTION INTRAMUSCULAR; INTRAVENOUS; SUBCUTANEOUS at 17:32

## 2018-01-05 RX ADMIN — Medication 2 MILLIGRAM(S): at 20:52

## 2018-01-05 RX ADMIN — Medication 120 MILLIGRAM(S): at 18:46

## 2018-01-05 RX ADMIN — ONDANSETRON 4 MILLIGRAM(S): 8 TABLET, FILM COATED ORAL at 17:32

## 2018-01-05 RX ADMIN — Medication 2.5 MILLIGRAM(S): at 23:18

## 2018-01-05 RX ADMIN — SODIUM CHLORIDE 250 MILLILITER(S): 9 INJECTION INTRAMUSCULAR; INTRAVENOUS; SUBCUTANEOUS at 20:21

## 2018-01-05 NOTE — ED ADULT NURSE REASSESSMENT NOTE - NS ED NURSE REASSESS COMMENT FT1
pt laying in bed, pt appears anxious. ED assistant nurse manager at bedside. md easton at bedside. pt talked down, pt appears calmer states "I need my busperidone at 11pm." md easton aware. fluids infusing as per md easton order. safety maintained. will continue to monitor. safety maintained.

## 2018-01-05 NOTE — ED PROVIDER NOTE - PLAN OF CARE
You were seen in the Emergency Department for palpitations. Your examination and lab tests were reassuring. Please follow up with your regular physician this week for reevaluation. Please follow up with your cardiologist as well. Please return to the Emergency Department if you have any new concerning symptoms such as severe pain, weakness, or any other concerning symptoms.

## 2018-01-05 NOTE — ED PROVIDER NOTE - ATTENDING CONTRIBUTION TO CARE
Attending MD Haskins:  I personally have seen and examined this patient.  Resident note reviewed and agree on plan of care and except where noted.  See MDM for details.

## 2018-01-05 NOTE — CONSULT NOTE ADULT - ASSESSMENT
41 y/o F with noted pmhx significant for autonomic dysfunction/POTS presenting with tachycardia and anxiety which appears to be related to her underlying syndrome    #POTS - very difficult to manage, however, there is no we can attempt a moderate dose of extended release cardizem which may have less fluctuations for her heart rate and monitor her for a few hours to see how she tolerates the medication. Her EKG appears normal and there is no indication at this time to admit her. Discussed with ER attending. We will give her 120mg ER cardizem and monitor. If no issues will DC home w/ outpatient follow w/ Dr. Barahona (EP) and Dr. Hussein (Gen Cards).    D/W Dr. Brooklyn Muniz MD [id number]

## 2018-01-05 NOTE — ED PROVIDER NOTE - CARE PLAN
Principal Discharge DX:	Palpitations  Instructions for follow-up, activity and diet:	You were seen in the Emergency Department for palpitations. Your examination and lab tests were reassuring. Please follow up with your regular physician this week for reevaluation. Please follow up with your cardiologist as well. Please return to the Emergency Department if you have any new concerning symptoms such as severe pain, weakness, or any other concerning symptoms.

## 2018-01-05 NOTE — ED PROVIDER NOTE - MEDICAL DECISION MAKING DETAILS
Attending MD Haskins:  Patient is a 41y Female, h/o fibroid uterus/POTS - autonomic dysregulation/anxiety/IBS, presents with compliant of palpitations, chest pain, nausea and abd pain.   Patient has had negative cardiac and GI workup recently.  Spoke patient's cardiologist, Dr. MARCO ANTONIO Hussein, who feels her symptoms are not cause by cardiovascular reason.  EP, Dr. Barahona, contacted and he also feels unlikey a cardiac cause of her symptoms, but he will have fellow come and evaluate patient.   Attending MD Haskins: A & O x 3, NAD, HEENT WNL and no facial asymmetry; lungs CTAB, heart with reg rhythm without murmur; abdomen soft with mild diffuse TTP, no rebound or guarding; extremities with no edema; skin with no rashes, neuro exam non focal with no motor or sensory deficits.   DDX:  IBS vs gastroparesis.  Plan:  labs, check electrolytes, IVF, zofran ODT and re-evaluate.

## 2018-01-05 NOTE — ED ADULT NURSE NOTE - OBJECTIVE STATEMENT
42 y.o F presents to the ED via EMS c/o palpitations. Patient states that when she was at home in the bathroom she started to feel nausesous and feel her heart pound and became concerned. Additionally patient reports that she takes cardizem, and yesterday after she took it she felt SOB and states that the SOB resolved after she took ativan. Patient presents to the ED A&Ox3 and afebrile; denies C/P and SOB at this time (RR 18 and Spo2 100% on RA), abdomen soft, nontender and nondistended; states she still feels nauseous but denies vomiting and diarrhea; states that she frequently feels constipated and repirts that her last bowel movement was this morning but it was a small amount. Patient has a PMHx of ITP  HTN, IBS, POTS (postural orthostatic tachycardia syndrome) and Idiopathic thrombocytopenic purpura. Cardiac monitoring initiated- sinus rhythm noted on the monitor.

## 2018-01-05 NOTE — CONSULT NOTE ADULT - SUBJECTIVE AND OBJECTIVE BOX
Patient seen and evaluated @ [facility] [place]  Chief Complaint: Tachycardia    HPI: 43 y/o F with pmhx noted below presenting with tachycardia. Pt has been amidst a new regimen of cardizem to help manage her difficult to POTS - specifically taking 30mg TID. She has been on a variety of doses in the past. For the first few days she seemed to tolerate the medication well, however, yesterday AM she felt SOB for about an hour. This was followed by tachycardia (she felt her pulse but denies overt palpitations). She took ativan, which is a regular part of her daily medication regimen), for her anxiety and the SOB improved.  She interpreted this as a combination of her autonomic dysfunction perpetuating her anxiety. The remaining day went well, however, she remained intermittently tachycardic. Today she had a similar effect in the morning. She tried relaxing but needed to go to the bathroom after which she felt tachycardic, anxious and felt weakness in her legs prompting her to call 911 and coming the ER.    In the ER she remains stable. She was given zofran for nausea.     PMH:   Idiopathic thrombocytopenia purpura  Anxiety  POTS (postural orthostatic tachycardia syndrome)  Pott disease  History of ITP  Labyrinthitis  Asthma  Headache  HTN (hypertension)  Anxiety  IBS (irritable bowel syndrome)    PSH:   No significant past surgical history  No significant past surgical history    Medications:   diltiazem    milliGRAM(s) Oral once  sodium chloride 0.9%. 1000 milliLiter(s) IV Continuous <Continuous>    Allergies:  barium sulfate (Other)  beta blockers (Unknown)  Lexapro (Unknown)  metoprolol (Headache)  Reglan (Other)    FAMILY HISTORY:  No pertinent family history in first degree relatives  Family history of hypertension (Grandparent)  Family history of atrial fibrillation  Family history of prostate cancer in father    Social History:  Smoking: no  Alcohol: no  Drugs: no    Review of Systems:  REVIEW OF SYSTEMS:    CONSTITUTIONAL: No weakness, fevers or chills  EYES/ENT: No visual changes;  No dysphagia  NECK: No pain or stiffness  RESPIRATORY: No cough, wheezing, hemoptysis; No shortness of breath  CARDIOVASCULAR: No chest pain or palpitations; No lower extremity edema  GASTROINTESTINAL: No abdominal or epigastric pain. No nausea, vomiting, or hematemesis; No diarrhea or constipation. No melena or hematochezia.  BACK: No back pain  GENITOURINARY: No dysuria, frequency or hematuria  NEUROLOGICAL: No numbness or weakness  SKIN: No itching, burning, rashes, or lesions   All other review of systems is negative unless indicated above.  [ x] 10 point review of systems is otherwise negative except as mentioned above            [ ]Unable to obtain    Physical Exam:  T(C): 37.1 (01-05-18 @ 17:29), Max: 37.1 (01-05-18 @ 17:29)  HR: 91 (01-05-18 @ 17:29) (91 - 102)  BP: 139/97 (01-05-18 @ 17:29) (139/81 - 144/102)  RR: 18 (01-05-18 @ 17:29) (18 - 20)  SpO2: 100% (01-05-18 @ 17:29) (100% - 100%)  Wt(kg): --  GENERAL: No acute distress, well-developed  HEAD:  Atraumatic, Normocephalic  ENT: EOMI, PERRLA, conjunctiva and sclera clear, Neck supple, No JVD, moist mucosa  CHEST/LUNG: Clear to auscultation bilaterally; No wheeze, equal breath sounds bilaterally   BACK: No spinal tenderness  HEART: Regular rate and rhythm; No murmurs, rubs, or gallops  ABDOMEN: Soft, Nontender, Nondistended; Bowel sounds present  EXTREMITIES:  No clubbing, cyanosis, or edema  PSYCH: Nl behavior, nl affect  NEUROLOGY: AAOx3, non-focal, cranial nerves intact  SKIN: Normal color, No rashes or lesions      Daily     Daily     Cardiovascular Diagnostic Testing:  ECG: NSR    Labs:                        12.8   8.4   )-----------( 71       ( 05 Jan 2018 17:22 )             36.1     01-05    142  |  107  |  <4<L>  ----------------------------<  105<H>  3.9   |  21<L>  |  0.77    Ca    9.0      05 Jan 2018 17:22    TPro  6.9  /  Alb  4.6  /  TBili  0.3  /  DBili  x   /  AST  20  /  ALT  9<L>  /  AlkPhos  70  01-05

## 2018-01-05 NOTE — ED PROVIDER NOTE - OBJECTIVE STATEMENT
41yo female p/w palpitations and episode of weakness. Pt. reports similar symptoms in past and has diagnosis of POTS, but reports the "palpitations felt stronger." Pt. has had extensive workup for these symptoms in past, followed by cardiology and EP. Pt. takes ativan for anxiety , but reports that her tachycardia exacerbates her anxiety.

## 2018-01-06 VITALS
OXYGEN SATURATION: 99 % | RESPIRATION RATE: 16 BRPM | SYSTOLIC BLOOD PRESSURE: 137 MMHG | TEMPERATURE: 98 F | DIASTOLIC BLOOD PRESSURE: 90 MMHG | HEART RATE: 78 BPM

## 2018-01-06 NOTE — ED ADULT NURSE REASSESSMENT NOTE - NS ED NURSE REASSESS COMMENT FT1
pt given water and crackers. pt tolerating po intake. pt denies n/v/sob/chest pain/dizziness/weakness/fever/chills/diaphoresis at this time. safety maintained. will continue to monitor.

## 2018-01-06 NOTE — ED ADULT NURSE REASSESSMENT NOTE - NS ED NURSE REASSESS COMMENT FT1
pt denies chest pain/n/v/sob/numbness/tingling/abd pain/diarrhea/constipation/dizziness/weakness. pt tolerating po intake. safety maintained. vital signs stable, md buchanan aware of vitals. will continue to monitor.

## 2018-01-07 ENCOUNTER — EMERGENCY (EMERGENCY)
Facility: HOSPITAL | Age: 43
LOS: 0 days | Discharge: HOME | End: 2018-01-07
Attending: EMERGENCY MEDICINE | Admitting: EMERGENCY MEDICINE
Payer: MEDICAID

## 2018-01-07 VITALS
OXYGEN SATURATION: 100 % | HEART RATE: 85 BPM | SYSTOLIC BLOOD PRESSURE: 150 MMHG | DIASTOLIC BLOOD PRESSURE: 89 MMHG | TEMPERATURE: 99 F | RESPIRATION RATE: 21 BRPM

## 2018-01-07 VITALS — HEART RATE: 122 BPM | WEIGHT: 102.96 LBS | OXYGEN SATURATION: 98 % | RESPIRATION RATE: 20 BRPM | HEIGHT: 62 IN

## 2018-01-07 DIAGNOSIS — R07.89 OTHER CHEST PAIN: ICD-10-CM

## 2018-01-07 LAB
ALBUMIN SERPL ELPH-MCNC: 4.1 G/DL — SIGNIFICANT CHANGE UP (ref 3.3–5)
ALP SERPL-CCNC: 87 U/L — SIGNIFICANT CHANGE UP (ref 40–120)
ALT FLD-CCNC: 16 U/L — SIGNIFICANT CHANGE UP (ref 12–78)
ANION GAP SERPL CALC-SCNC: 8 MMOL/L — SIGNIFICANT CHANGE UP (ref 5–17)
APTT BLD: 26.5 SEC — LOW (ref 27.5–37.4)
AST SERPL-CCNC: 12 U/L — LOW (ref 15–37)
BASOPHILS # BLD AUTO: 0 K/UL — SIGNIFICANT CHANGE UP (ref 0–0.2)
BASOPHILS NFR BLD AUTO: 0.4 % — SIGNIFICANT CHANGE UP (ref 0–2)
BILIRUB SERPL-MCNC: 0.4 MG/DL — SIGNIFICANT CHANGE UP (ref 0.2–1.2)
BUN SERPL-MCNC: 2 MG/DL — LOW (ref 7–23)
CALCIUM SERPL-MCNC: 8.8 MG/DL — SIGNIFICANT CHANGE UP (ref 8.5–10.1)
CHLORIDE SERPL-SCNC: 110 MMOL/L — HIGH (ref 96–108)
CO2 SERPL-SCNC: 24 MMOL/L — SIGNIFICANT CHANGE UP (ref 22–31)
CREAT SERPL-MCNC: 0.85 MG/DL — SIGNIFICANT CHANGE UP (ref 0.5–1.3)
D DIMER BLD IA.RAPID-MCNC: <150 NG/ML DDU — SIGNIFICANT CHANGE UP
EOSINOPHIL # BLD AUTO: 0.1 K/UL — SIGNIFICANT CHANGE UP (ref 0–0.5)
EOSINOPHIL NFR BLD AUTO: 1.1 % — SIGNIFICANT CHANGE UP (ref 0–6)
GLUCOSE SERPL-MCNC: 93 MG/DL — SIGNIFICANT CHANGE UP (ref 70–99)
HCT VFR BLD CALC: 37.1 % — SIGNIFICANT CHANGE UP (ref 34.5–45)
HGB BLD-MCNC: 13.1 G/DL — SIGNIFICANT CHANGE UP (ref 11.5–15.5)
INR BLD: 1.05 RATIO — SIGNIFICANT CHANGE UP (ref 0.88–1.16)
LYMPHOCYTES # BLD AUTO: 2 K/UL — SIGNIFICANT CHANGE UP (ref 1–3.3)
LYMPHOCYTES # BLD AUTO: 25.7 % — SIGNIFICANT CHANGE UP (ref 13–44)
MCHC RBC-ENTMCNC: 28.8 PG — SIGNIFICANT CHANGE UP (ref 27–34)
MCHC RBC-ENTMCNC: 35.3 GM/DL — SIGNIFICANT CHANGE UP (ref 32–36)
MCV RBC AUTO: 81.4 FL — SIGNIFICANT CHANGE UP (ref 80–100)
MONOCYTES # BLD AUTO: 0.7 K/UL — SIGNIFICANT CHANGE UP (ref 0–0.9)
MONOCYTES NFR BLD AUTO: 9.1 % — SIGNIFICANT CHANGE UP (ref 2–14)
NEUTROPHILS # BLD AUTO: 5 K/UL — SIGNIFICANT CHANGE UP (ref 1.8–7.4)
NEUTROPHILS NFR BLD AUTO: 63.7 % — SIGNIFICANT CHANGE UP (ref 43–77)
PLATELET # BLD AUTO: 92 K/UL — LOW (ref 150–400)
POTASSIUM SERPL-MCNC: 3.4 MMOL/L — LOW (ref 3.5–5.3)
POTASSIUM SERPL-SCNC: 3.4 MMOL/L — LOW (ref 3.5–5.3)
PROT SERPL-MCNC: 7.5 GM/DL — SIGNIFICANT CHANGE UP (ref 6–8.3)
PROTHROM AB SERPL-ACNC: 11.4 SEC — SIGNIFICANT CHANGE UP (ref 9.8–12.7)
RBC # BLD: 4.56 M/UL — SIGNIFICANT CHANGE UP (ref 3.8–5.2)
RBC # FLD: 11.2 % — SIGNIFICANT CHANGE UP (ref 10.3–14.5)
SODIUM SERPL-SCNC: 142 MMOL/L — SIGNIFICANT CHANGE UP (ref 135–145)
TROPONIN I SERPL-MCNC: <0.015 NG/ML — SIGNIFICANT CHANGE UP (ref 0.01–0.04)
WBC # BLD: 7.8 K/UL — SIGNIFICANT CHANGE UP (ref 3.8–10.5)
WBC # FLD AUTO: 7.8 K/UL — SIGNIFICANT CHANGE UP (ref 3.8–10.5)

## 2018-01-07 PROCEDURE — 71046 X-RAY EXAM CHEST 2 VIEWS: CPT | Mod: 26

## 2018-01-07 PROCEDURE — 99283 EMERGENCY DEPT VISIT LOW MDM: CPT

## 2018-01-07 PROCEDURE — 93010 ELECTROCARDIOGRAM REPORT: CPT

## 2018-01-07 NOTE — ED PROVIDER NOTE - OBJECTIVE STATEMENT
41 y/o with a PMHx of dysautonomia, Idiopathic thrombocytopenic purpura, depression, panic attacks presents to the ED c/o intermittent episodes of chest pain since yesterday. The pain is described as sharp, shooting, and in one particular area of her heart. Pt has had episodes of CP before but the pain was never this sharp. Pt reports tachycardia the past 2 days. Pt's on Cardizem and her dose was lowered recently due to GI problems including nausea and vomiting. +recent weight loss of 20-25 pounds due to depression. No recent surgery, no history of a blood clot, not on birth control pills, no hormone therapy, no recent travel. Family cardiac history of pt's uncle with a PE. Pt was tested for a PE multiple times in the past and it was always negative. Pt had a MRI done recently and had an negative reaction to the barium sulfate. A rapid response was called and pt's blood pressure went to 90/40 and was bradycardic for 5 days. Did not receive the flu shot this  On Ativan, Cardizem, Buspar. PMD: Dr. Philip Hussein.

## 2018-01-07 NOTE — ED ADULT TRIAGE NOTE - CHIEF COMPLAINT QUOTE
Pt reports hx of autonomic disease with tachycardia and chest pain. Pt also reports difficulty with anxiety and increased depression due to her multiple medical conditions. Pt denies SI, but is tearful in triage.

## 2018-01-07 NOTE — ED ADULT NURSE NOTE - CHPI ED SYMPTOMS NEG
no dizziness/no nausea/no shortness of breath/no chills/no back pain/no diaphoresis/no vomiting/no fever/no cough/no syncope

## 2018-01-07 NOTE — ED ADULT NURSE NOTE - OBJECTIVE STATEMENT
Pt with intermittant cp, hx itp, disautonomia, "inappropriate sinus tachycardia reports she were instructed by psychologist Dr. Stephanie Mcfarland to "go to  ER so that treatment for depression/anxiety and autoimmune disorders can be coordinated between different specialists".

## 2018-01-07 NOTE — ED PROVIDER NOTE - MEDICAL DECISION MAKING DETAILS
patient with symptoms greater than one day, atypical and intermittent with negative troponin, not likely cardiac in nature. d-dimer negative, and CXR without acute abnormalities. telemonitoring without arrhythmia. appropriate for discharge home with PMD f/u.

## 2018-01-08 ENCOUNTER — MEDICATION RENEWAL (OUTPATIENT)
Age: 43
End: 2018-01-08

## 2018-01-09 ENCOUNTER — INPATIENT (INPATIENT)
Facility: HOSPITAL | Age: 43
LOS: 2 days | Discharge: PSYCHIATRIC OR SUBSTANCE USE FACILITY | End: 2018-01-12
Attending: HOSPITALIST | Admitting: HOSPITALIST
Payer: MEDICAID

## 2018-01-09 VITALS
WEIGHT: 104.06 LBS | HEIGHT: 62 IN | SYSTOLIC BLOOD PRESSURE: 131 MMHG | OXYGEN SATURATION: 100 % | TEMPERATURE: 99 F | RESPIRATION RATE: 18 BRPM | DIASTOLIC BLOOD PRESSURE: 96 MMHG | HEART RATE: 110 BPM

## 2018-01-09 DIAGNOSIS — K58.9 IRRITABLE BOWEL SYNDROME WITHOUT DIARRHEA: ICD-10-CM

## 2018-01-09 DIAGNOSIS — F41.1 GENERALIZED ANXIETY DISORDER: ICD-10-CM

## 2018-01-09 DIAGNOSIS — R55 SYNCOPE AND COLLAPSE: ICD-10-CM

## 2018-01-09 DIAGNOSIS — F41.9 ANXIETY DISORDER, UNSPECIFIED: ICD-10-CM

## 2018-01-09 DIAGNOSIS — Z29.9 ENCOUNTER FOR PROPHYLACTIC MEASURES, UNSPECIFIED: ICD-10-CM

## 2018-01-09 DIAGNOSIS — R07.89 OTHER CHEST PAIN: ICD-10-CM

## 2018-01-09 DIAGNOSIS — R00.0 TACHYCARDIA, UNSPECIFIED: ICD-10-CM

## 2018-01-09 DIAGNOSIS — D69.3 IMMUNE THROMBOCYTOPENIC PURPURA: ICD-10-CM

## 2018-01-09 DIAGNOSIS — R06.02 SHORTNESS OF BREATH: ICD-10-CM

## 2018-01-09 LAB
ALBUMIN SERPL ELPH-MCNC: 4.7 G/DL — SIGNIFICANT CHANGE UP (ref 3.3–5)
ALP SERPL-CCNC: 76 U/L — SIGNIFICANT CHANGE UP (ref 40–120)
ALT FLD-CCNC: 9 U/L — SIGNIFICANT CHANGE UP (ref 4–33)
APPEARANCE UR: CLEAR — SIGNIFICANT CHANGE UP
APTT BLD: 25.5 SEC — LOW (ref 27.5–37.4)
AST SERPL-CCNC: 19 U/L — SIGNIFICANT CHANGE UP (ref 4–32)
BACTERIA # UR AUTO: SIGNIFICANT CHANGE UP
BASE EXCESS BLDV CALC-SCNC: -2.2 MMOL/L — SIGNIFICANT CHANGE UP
BASOPHILS # BLD AUTO: 0.01 K/UL — SIGNIFICANT CHANGE UP (ref 0–0.2)
BASOPHILS NFR BLD AUTO: 0.1 % — SIGNIFICANT CHANGE UP (ref 0–2)
BILIRUB SERPL-MCNC: 0.5 MG/DL — SIGNIFICANT CHANGE UP (ref 0.2–1.2)
BILIRUB UR-MCNC: NEGATIVE — SIGNIFICANT CHANGE UP
BLOOD GAS VENOUS - CREATININE: 0.73 MG/DL — SIGNIFICANT CHANGE UP (ref 0.5–1.3)
BLOOD UR QL VISUAL: NEGATIVE — SIGNIFICANT CHANGE UP
BUN SERPL-MCNC: 3 MG/DL — LOW (ref 7–23)
CALCIUM SERPL-MCNC: 9.4 MG/DL — SIGNIFICANT CHANGE UP (ref 8.4–10.5)
CHLORIDE BLDV-SCNC: 112 MMOL/L — HIGH (ref 96–108)
CHLORIDE SERPL-SCNC: 107 MMOL/L — SIGNIFICANT CHANGE UP (ref 98–107)
CK MB BLD-MCNC: 1 NG/ML — SIGNIFICANT CHANGE UP (ref 1–4.7)
CK MB BLD-MCNC: SIGNIFICANT CHANGE UP (ref 0–2.5)
CK SERPL-CCNC: 43 U/L — SIGNIFICANT CHANGE UP (ref 25–170)
CO2 SERPL-SCNC: 22 MMOL/L — SIGNIFICANT CHANGE UP (ref 22–31)
COLOR SPEC: SIGNIFICANT CHANGE UP
CREAT SERPL-MCNC: 0.87 MG/DL — SIGNIFICANT CHANGE UP (ref 0.5–1.3)
EOSINOPHIL # BLD AUTO: 0.03 K/UL — SIGNIFICANT CHANGE UP (ref 0–0.5)
EOSINOPHIL NFR BLD AUTO: 0.4 % — SIGNIFICANT CHANGE UP (ref 0–6)
GAS PNL BLDV: 137 MMOL/L — SIGNIFICANT CHANGE UP (ref 136–146)
GLUCOSE BLDV-MCNC: 105 — HIGH (ref 70–99)
GLUCOSE SERPL-MCNC: 107 MG/DL — HIGH (ref 70–99)
GLUCOSE UR-MCNC: NEGATIVE — SIGNIFICANT CHANGE UP
HCG UR-SCNC: NEGATIVE — SIGNIFICANT CHANGE UP
HCO3 BLDV-SCNC: 23 MMOL/L — SIGNIFICANT CHANGE UP (ref 20–27)
HCT VFR BLD CALC: 37.6 % — SIGNIFICANT CHANGE UP (ref 34.5–45)
HCT VFR BLDV CALC: 43.5 % — SIGNIFICANT CHANGE UP (ref 34.5–45)
HGB BLD-MCNC: 13.1 G/DL — SIGNIFICANT CHANGE UP (ref 11.5–15.5)
HGB BLDV-MCNC: 14.2 G/DL — SIGNIFICANT CHANGE UP (ref 11.5–15.5)
IMM GRANULOCYTES # BLD AUTO: 0.03 # — SIGNIFICANT CHANGE UP
IMM GRANULOCYTES NFR BLD AUTO: 0.4 % — SIGNIFICANT CHANGE UP (ref 0–1.5)
INR BLD: 1.02 — SIGNIFICANT CHANGE UP (ref 0.88–1.17)
KETONES UR-MCNC: NEGATIVE — SIGNIFICANT CHANGE UP
LACTATE BLDV-MCNC: 1.4 MMOL/L — SIGNIFICANT CHANGE UP (ref 0.5–2)
LEUKOCYTE ESTERASE UR-ACNC: HIGH
LYMPHOCYTES # BLD AUTO: 1.41 K/UL — SIGNIFICANT CHANGE UP (ref 1–3.3)
LYMPHOCYTES # BLD AUTO: 17.6 % — SIGNIFICANT CHANGE UP (ref 13–44)
MAGNESIUM SERPL-MCNC: 2.1 MG/DL — SIGNIFICANT CHANGE UP (ref 1.6–2.6)
MCHC RBC-ENTMCNC: 28.9 PG — SIGNIFICANT CHANGE UP (ref 27–34)
MCHC RBC-ENTMCNC: 34.8 % — SIGNIFICANT CHANGE UP (ref 32–36)
MCV RBC AUTO: 83 FL — SIGNIFICANT CHANGE UP (ref 80–100)
MONOCYTES # BLD AUTO: 0.6 K/UL — SIGNIFICANT CHANGE UP (ref 0–0.9)
MONOCYTES NFR BLD AUTO: 7.5 % — SIGNIFICANT CHANGE UP (ref 2–14)
NEUTROPHILS # BLD AUTO: 5.92 K/UL — SIGNIFICANT CHANGE UP (ref 1.8–7.4)
NEUTROPHILS NFR BLD AUTO: 74 % — SIGNIFICANT CHANGE UP (ref 43–77)
NITRITE UR-MCNC: NEGATIVE — SIGNIFICANT CHANGE UP
NRBC # FLD: 0 — SIGNIFICANT CHANGE UP
PCO2 BLDV: 30 MMHG — LOW (ref 41–51)
PH BLDV: 7.46 PH — HIGH (ref 7.32–7.43)
PH UR: 8 — SIGNIFICANT CHANGE UP (ref 4.6–8)
PHOSPHATE SERPL-MCNC: 3.1 MG/DL — SIGNIFICANT CHANGE UP (ref 2.5–4.5)
PLATELET # BLD AUTO: 86 K/UL — LOW (ref 150–400)
PMV BLD: 14.5 FL — HIGH (ref 7–13)
PO2 BLDV: 36 MMHG — SIGNIFICANT CHANGE UP (ref 35–40)
POTASSIUM BLDV-SCNC: 3.9 MMOL/L — SIGNIFICANT CHANGE UP (ref 3.4–4.5)
POTASSIUM SERPL-MCNC: 4.3 MMOL/L — SIGNIFICANT CHANGE UP (ref 3.5–5.3)
POTASSIUM SERPL-SCNC: 4.3 MMOL/L — SIGNIFICANT CHANGE UP (ref 3.5–5.3)
PROT SERPL-MCNC: 7.4 G/DL — SIGNIFICANT CHANGE UP (ref 6–8.3)
PROT UR-MCNC: NEGATIVE MG/DL — SIGNIFICANT CHANGE UP
PROTHROM AB SERPL-ACNC: 11.3 SEC — SIGNIFICANT CHANGE UP (ref 9.8–13.1)
RBC # BLD: 4.53 M/UL — SIGNIFICANT CHANGE UP (ref 3.8–5.2)
RBC # FLD: 12.5 % — SIGNIFICANT CHANGE UP (ref 10.3–14.5)
RBC CASTS # UR COMP ASSIST: SIGNIFICANT CHANGE UP (ref 0–?)
SAO2 % BLDV: 64.6 % — SIGNIFICANT CHANGE UP (ref 60–85)
SODIUM SERPL-SCNC: 143 MMOL/L — SIGNIFICANT CHANGE UP (ref 135–145)
SP GR SPEC: 1.01 — SIGNIFICANT CHANGE UP (ref 1–1.04)
SQUAMOUS # UR AUTO: SIGNIFICANT CHANGE UP
TROPONIN T SERPL-MCNC: < 0.06 NG/ML — SIGNIFICANT CHANGE UP (ref 0–0.06)
UROBILINOGEN FLD QL: NORMAL MG/DL — SIGNIFICANT CHANGE UP
WBC # BLD: 8 K/UL — SIGNIFICANT CHANGE UP (ref 3.8–10.5)
WBC # FLD AUTO: 8 K/UL — SIGNIFICANT CHANGE UP (ref 3.8–10.5)
WBC UR QL: SIGNIFICANT CHANGE UP (ref 0–?)

## 2018-01-09 PROCEDURE — 71046 X-RAY EXAM CHEST 2 VIEWS: CPT | Mod: 26

## 2018-01-09 PROCEDURE — 99223 1ST HOSP IP/OBS HIGH 75: CPT | Mod: GC

## 2018-01-09 RX ORDER — MONTELUKAST 4 MG/1
10 TABLET, CHEWABLE ORAL AT BEDTIME
Qty: 0 | Refills: 0 | Status: DISCONTINUED | OUTPATIENT
Start: 2018-01-09 | End: 2018-01-12

## 2018-01-09 RX ORDER — SODIUM CHLORIDE 9 MG/ML
1000 INJECTION, SOLUTION INTRAVENOUS
Qty: 0 | Refills: 0 | Status: DISCONTINUED | OUTPATIENT
Start: 2018-01-09 | End: 2018-01-09

## 2018-01-09 RX ORDER — QUETIAPINE FUMARATE 200 MG/1
25 TABLET, FILM COATED ORAL EVERY 6 HOURS
Qty: 0 | Refills: 0 | Status: DISCONTINUED | OUTPATIENT
Start: 2018-01-09 | End: 2018-01-12

## 2018-01-09 RX ORDER — FAMOTIDINE 10 MG/ML
20 INJECTION INTRAVENOUS
Qty: 0 | Refills: 0 | Status: DISCONTINUED | OUTPATIENT
Start: 2018-01-09 | End: 2018-01-12

## 2018-01-09 RX ORDER — SIMETHICONE 80 MG/1
160 TABLET, CHEWABLE ORAL THREE TIMES A DAY
Qty: 0 | Refills: 0 | Status: DISCONTINUED | OUTPATIENT
Start: 2018-01-09 | End: 2018-01-12

## 2018-01-09 RX ORDER — HEPARIN SODIUM 5000 [USP'U]/ML
5000 INJECTION INTRAVENOUS; SUBCUTANEOUS EVERY 8 HOURS
Qty: 0 | Refills: 0 | Status: DISCONTINUED | OUTPATIENT
Start: 2018-01-09 | End: 2018-01-09

## 2018-01-09 RX ORDER — PROGESTERONE 200 MG/1
1 CAPSULE, LIQUID FILLED ORAL
Qty: 0 | Refills: 0 | COMMUNITY

## 2018-01-09 RX ORDER — ONDANSETRON 8 MG/1
4 TABLET, FILM COATED ORAL ONCE
Qty: 0 | Refills: 0 | Status: COMPLETED | OUTPATIENT
Start: 2018-01-09 | End: 2018-01-09

## 2018-01-09 RX ORDER — DOCUSATE SODIUM 100 MG
100 CAPSULE ORAL THREE TIMES A DAY
Qty: 0 | Refills: 0 | Status: DISCONTINUED | OUTPATIENT
Start: 2018-01-09 | End: 2018-01-12

## 2018-01-09 RX ORDER — SODIUM CHLORIDE 9 MG/ML
1000 INJECTION INTRAMUSCULAR; INTRAVENOUS; SUBCUTANEOUS
Qty: 0 | Refills: 0 | Status: DISCONTINUED | OUTPATIENT
Start: 2018-01-09 | End: 2018-01-12

## 2018-01-09 RX ORDER — DILTIAZEM HCL 120 MG
120 CAPSULE, EXT RELEASE 24 HR ORAL
Qty: 0 | Refills: 0 | Status: DISCONTINUED | OUTPATIENT
Start: 2018-01-09 | End: 2018-01-12

## 2018-01-09 RX ORDER — ONDANSETRON 8 MG/1
4 TABLET, FILM COATED ORAL EVERY 4 HOURS
Qty: 0 | Refills: 0 | Status: DISCONTINUED | OUTPATIENT
Start: 2018-01-09 | End: 2018-01-12

## 2018-01-09 RX ADMIN — FAMOTIDINE 20 MILLIGRAM(S): 10 INJECTION INTRAVENOUS at 17:26

## 2018-01-09 RX ADMIN — Medication 2.5 MILLIGRAM(S): at 21:03

## 2018-01-09 RX ADMIN — Medication 2 MILLIGRAM(S): at 22:48

## 2018-01-09 RX ADMIN — SIMETHICONE 160 MILLIGRAM(S): 80 TABLET, CHEWABLE ORAL at 23:19

## 2018-01-09 RX ADMIN — Medication 100 MILLIGRAM(S): at 23:19

## 2018-01-09 RX ADMIN — SODIUM CHLORIDE 50 MILLILITER(S): 9 INJECTION INTRAMUSCULAR; INTRAVENOUS; SUBCUTANEOUS at 19:01

## 2018-01-09 RX ADMIN — ONDANSETRON 4 MILLIGRAM(S): 8 TABLET, FILM COATED ORAL at 18:35

## 2018-01-09 RX ADMIN — MONTELUKAST 10 MILLIGRAM(S): 4 TABLET, CHEWABLE ORAL at 23:17

## 2018-01-09 RX ADMIN — Medication 2 MILLIGRAM(S): at 19:01

## 2018-01-09 RX ADMIN — Medication 120 MILLIGRAM(S): at 17:26

## 2018-01-09 NOTE — BEHAVIORAL HEALTH ASSESSMENT NOTE - NSBHCONSULTMEDSEVERE_PSY_A_CORE FT
Haldol 1mg PO/IV/IM q6h PRN severe agitation.  Hold for QTC > 500.  For severe agitation in which pt presents risk of harm to self or others. Haldol 1mg PO/IV/IM q6h PRN severe agitation.  Hold for QTC > 500.  Only for severe agitation in which pt presents risk of harm to self or others.

## 2018-01-09 NOTE — BEHAVIORAL HEALTH ASSESSMENT NOTE - OTHER
unable to assess, laying in bed increased limited increased, hyperverbal focused and preoccupied about her medical sxs.

## 2018-01-09 NOTE — H&P ADULT - PROBLEM SELECTOR PLAN 1
Patient presenting with chest pain, atypical in nature, both left and right sided, not associated with exertion, not pleuritic, intermittent  - Aden negative x 1, EKG no acute changes  - will trend Aden x once more, although ACS unlikely

## 2018-01-09 NOTE — BEHAVIORAL HEALTH ASSESSMENT NOTE - NSBHCONSULTMEDS_PSY_A_CORE FT
Ativan 2mg fives times per day (0100, 0600, 1100, 1600, 2100)  Buspar 2.5mg BID Ativan 2mg fives times per day (0100, 0600, 1100, 1600, 2100) (Istop checked and dose confirmed)  Buspar 2.5mg BID

## 2018-01-09 NOTE — BEHAVIORAL HEALTH ASSESSMENT NOTE - HPI (INCLUDE ILLNESS QUALITY, SEVERITY, DURATION, TIMING, CONTEXT, MODIFYING FACTORS, ASSOCIATED SIGNS AND SYMPTOMS)
Pt is a 43 yo female, single, unemployed, domiciled with parents, with ZAK, no past psychiatric hospitalizations, no past suicide attempts, no substance use/abuse, PMH POTS disease, HTN, history of ITP, IBS,  presented with c/o of shortness of breath.  Pt presents with severe anxiety, passive SI per mother.  Psych consulted for evaluation.    Seen and examined at bedside.  Pt AAOx4, extremely anxious and labile with inappropriate affect, shifting quickly from laughing and crying.  Pt is tangential and preoccupied with her medical condition.  She reports suffering from POTS attacks 3 times per day and is unable to find resolution for her condition.  Also states she is lonely because her friends do not visit her on [place] and her parents "just don't understand".  States she find some relief in that she has finally gotten an appointment at the [facility] for evaluation.  States she is prescribed Ativan 2mg q4h (five times per day per ) for POTS.  States she suffers from depression because of her medical conditions.  Denies psychiatric symptoms prior to 1 year ago when her cat  .  Reports poor sleep as she awakens with racing heart beat.  Also reports poor appetite due to nausea x2 months, resulting in 24lb weight loss.  She endorses passive thoughts of not wanting to do this anymore. Denies active SI/I/P.  States she wants to live and is afraid of dying but is struggling with her condition.  She denies AH/VH or psychotic symptoms.  Pt reports attending [facility] IOP x6 weeks after first medical hospitalization.  States she began another IOP at [facility] recently.  Reports seeing psychologist 2 times per week for last month (Dr. Mcfarland [phone]).  States she has trialled Lexapro in the past and developed ITP.  Reports currently taking Buspar 2.5mg BID which she finds to be somewhat helpful.  States she was told by specialist that she cannot take SSRIs, SNRIs, or Remeron.  States she needs to confer with multiple specialists before starting any medication.  States she wants assistance with depression but does not want the medication to interfere with current Ativan prescription.  Denies past psychiatric hospitalizations, suicide attempts, substance use.    Spoke with pt's mother.  She reports that pt has been "quirky" all her life.  Reports compulsive behaviors since children, which persistent to today.  These include excessive flossing and stirring salad ten times before taking a bite.  She reports pt has "black and white" thinking.  States pt has been excessively needy since her medical condition.  States she cannot be alone due to fear that she is going to die.  States pt has been putting her affairs in line.  Denies concerns of active suicidal ideation but is concerned that pt's actions may lead to death.  She states that pt may benefit from inpatient psychiatric hospitalization for further stabilization. Pt is a 41 yo female, single, unemployed, domiciled with parents, with ZAK and depression, no past psychiatric hospitalizations, no past suicide attempts, no substance use/abuse, PMH POTS disease, HTN, history of ITP, IBS,  presented with c/o of shortness of breath.  Pt presents with severe anxiety, passive SI per mother.  Psych consulted for evaluation.    Seen and examined at bedside.  Pt AAOx4, extremely anxious and labile with inappropriate affect, shifting quickly from laughing and crying.  Pt is tangential and preoccupied with her medical condition.  She reports suffering from POTS attacks 3 times per day and is unable to find resolution for her condition.  Also states she is lonely because her friends do not visit her on [place] and her parents "just don't understand".  States she find some relief in that she has finally gotten an appointment at the [facility] for evaluation.  States she is prescribed Ativan 2mg q4h (five times per day per ) for POTS.  States she suffers from depression because of her medical conditions.  Denies psychiatric symptoms prior to 1 year ago when her cat  .  Reports poor sleep as she awakens with racing heart beat.  Also reports poor appetite due to nausea x2 months, resulting in 24lb weight loss.  She endorses passive thoughts of not wanting to do this anymore. Denies active SI/I/P.  States she wants to live and is afraid of dying but is struggling with her condition.  She denies AH/VH or psychotic symptoms.  Pt reports attending [facility] IOP x6 weeks after first medical hospitalization.  States she began another IOP at [facility] recently.  Reports seeing psychologist 2 times per week for last month (Dr. Mcfarland [phone]).  States she has trialled Lexapro in the past and developed ITP.  Reports currently taking Buspar 2.5mg BID which she finds to be somewhat helpful.  States she was told by specialist that she cannot take SSRIs, SNRIs, or Remeron.  States she needs to confer with multiple specialists before starting any medication.  States she wants assistance with depression but does not want the medication to interfere with current Ativan prescription.  Denies past psychiatric hospitalizations, suicide attempts, substance use.    Spoke with pt's mother.  She reports that pt has been "quirky" all her life.  Reports compulsive behaviors since children, which persistent to today.  These include excessive flossing and stirring salad ten times before taking a bite.  She reports pt has "black and white" thinking.  States pt has been excessively needy since her medical condition.  States she cannot be alone due to fear that she is going to die.  States pt has been putting her affairs in line.  Denies concerns of active suicidal ideation but is concerned that pt's actions may lead to death.  She states that pt may benefit from inpatient psychiatric hospitalization for further stabilization. Discussed about possibly starting mood stabilizer. Pt is a 43 yo female, single, unemployed, domiciled with parents, with ZAK and depression, no past psychiatric hospitalizations, no past suicide attempts, no substance use/abuse, PMH POTS disease, HTN, history of ITP, IBS,  presented with c/o of shortness of breath.  Pt presents with severe anxiety, passive SI per mother.  Psych consulted for evaluation.    Seen and examined at bedside.  Pt AAOx4, extremely anxious and labile with inappropriate affect, shifting quickly from laughing and crying.  Pt is tangential and preoccupied with her medical condition.  She reports suffering from POTS attacks 3 times per day and is unable to find resolution for her condition.  Also states she is lonely because her friends do not visit her on [place] and her parents "just don't understand".  States she find some relief in that she has finally gotten an appointment at the [facility] for evaluation.  States she is prescribed Ativan 2mg q4h (five times per day per ) for POTS.  States she suffers from depression because of her medical conditions.  Denies psychiatric symptoms prior to 1 year ago when her cat  .  Reports poor sleep as she awakens with racing heart beat.  Also reports poor appetite due to nausea x2 months, resulting in 24lb weight loss.  She endorses passive thoughts of not wanting to do this anymore. Denies active SI/I/P.  States she wants to live and is afraid of dying but is struggling with her condition.  She denies AH/VH or psychotic symptoms.  Pt reports attending [facility] IOP x6 weeks after first medical hospitalization.  States she began another IOP at [facility] recently.  Reports seeing psychologist 2 times per week for last month (Dr. Mcfarland [phone]).  States she has trialled Lexapro in the past and developed ITP.  Reports currently taking Buspar 2.5mg BID which she finds to be somewhat helpful.  States she was told by specialist that she cannot take SSRIs, SNRIs, or Remeron.  States she needs to confer with multiple specialists before starting any medication.  States she wants assistance with depression but does not want the medication to interfere with current Ativan prescription.  Denies past psychiatric hospitalizations, suicide attempts, substance use.    Spoke with pt's mother.  She reports that pt has been "quirky" all her life.  Reports compulsive behaviors since children, which persistent to today.  These include excessive flossing and stirring salad ten times before taking a bite.  She reports pt has "black and white" thinking.  States pt has been excessively needy since her medical condition.  States she cannot be alone due to fear that she is going to die.  States pt has been putting her affairs in line.  Denies concerns of active suicidal ideation but is concerned that pt's actions may lead to death.  She states that pt may benefit from inpatient psychiatric hospitalization for further stabilization. she does not think that pt. will actively do something to hurt herself at the hospital. Discussed about possibly starting mood stabilizer.

## 2018-01-09 NOTE — BEHAVIORAL HEALTH ASSESSMENT NOTE - NSBHCHARTREVIEWLAB_PSY_A_CORE FT
13.1   8.00  )-----------( 86       ( 09 Jan 2018 11:45 )             37.6     01-09    143  |  107  |  3<L>  ----------------------------<  107<H>  4.3   |  22  |  0.87    Ca    9.4      09 Jan 2018 11:45  Phos  3.1     01-09  Mg     2.1     01-09    TPro  7.4  /  Alb  4.7  /  TBili  0.5  /  DBili  x   /  AST  19  /  ALT  9   /  AlkPhos  76  01-09

## 2018-01-09 NOTE — H&P ADULT - PROBLEM SELECTOR PLAN 2
Patient with shortness of breath, stating she feels better with oxygen on, however noted to be saturating 100% on room air  - denies pleuritic chest pain  - no recent travel or immobilization, no active malignancy  - Wells score is 1 for tachycardia (although per chart review this is a chronic issue secondary to her autonomic dysautonomia)  - D-dimer negative 2 days ago  - unclear etiology, will continue to monitor  - patient refusing imaging requiring IV contrast at this time (had a "bad" reaction in the past)

## 2018-01-09 NOTE — BEHAVIORAL HEALTH ASSESSMENT NOTE - CASE SUMMARY
Pt is a 43 yo female, single, unemployed, domiciled with parents, with ZAK, no past psychiatric hospitalizations, no past suicide attempts, no substance use/abuse, PMH POTS disease, HTN, history of ITP, IBS,  presented with c/o of shortness of breath.  Pt presents with severe anxiety, passive SI per mother.  Psych consulted for evaluation. Pt. seen and evaluated, AAOX3, extremely anxious, labile, tearful with hyperverbal speech and circumstantial thought process. Pt. reported feeling depressed, anxious with passive suicidal ideations in  the context of ongoing medical issues. She denies active SI, denies AH and VH, denies HI. Pt. stated that of she feels physically well her mood sxs will go away. Pt. stated that she does not want to be on SSRIs, SNRIs and Mirtazapine due to  low platelets. Discussed about staring mood stabilizer (Olanzapine), she declined. Met. with pt.'s mother at bedside, collateral as above. She stated that overall pt. is not functioning, not eating, extremely anxious and depressed, crying all the time, and she cannot stay alone due to the fear that something will happen to her. Pt is a 41 yo female, single, unemployed, domiciled with parents, with ZAK, no past psychiatric hospitalizations, no past suicide attempts, no substance use/abuse, PMH POTS disease, HTN, history of ITP, IBS,  presented with c/o of shortness of breath.  Pt presents with severe anxiety, passive SI per mother.  Psych consulted for evaluation. Pt. seen and evaluated, AAOX3, extremely anxious, labile, tearful with hyperverbal speech and circumstantial thought process. Pt. reported feeling depressed, anxious with passive suicidal ideations in  the context of ongoing medical issues. She denies active SI, denies AH and VH, denies HI. Pt. stated that if she feels physically well her mood sxs will go away. Pt. stated that she does not want to be on SSRIs, SNRIs and Mirtazapine due to  low platelets. Discussed about starting mood stabilizer (Olanzapine) for current mood sxs including mood lability, she declined. Presentation is c/w ZAK vs OCD, r/o personality disorder, r/o MDD. Recommend meds. as written above, will follow.    Met. with pt.'s mother at bedside, collateral as above. She stated that overall pt. is not functioning, not eating, extremely anxious and depressed, crying all the time, and she cannot stay alone even for few minutes due to the fear that something will happen to her. She also reports compulsive behaviors since childhood, these include excessive flossing and stirring salad ten times before taking a bite.

## 2018-01-09 NOTE — H&P ADULT - HISTORY OF PRESENT ILLNESS
Patient is a 41 yo F w/PMH of dysautonomia, ITP, depression and panic attacks who presented with c/o of shortness of breath.    Of note, recent TTE in 12/17 demonstrated EF of 65-70%, normal LV systolic function, normal diastolic function, grossly normal RV systolic function. Minimal MR, minimal TR.    In the ED, VS on presentation: T 98.9, , /96, RR 18, SpO2 100% on RA  CBC demonstrated baseline thrombocytopenia to platelet count of 86, otherwise unremarkable. CMP unremarkable. Aden negative x 1. VBG pH 7.46, lactate 1.4. CXR clear lungs. EKG NSR at 88bpm. Patient is a 41 yo F w/PMH of dysautonomia, ITP, depression and panic attacks who presented with c/o of shortness of breath.    Of note, recent TTE in 12/17 demonstrated EF of 65-70%, normal LV systolic function, normal diastolic function, grossly normal RV systolic function. Minimal MR, minimal TR.    In the ED, VS on presentation: T 98.9, , /96, RR 18, SpO2 100% on RA  CBC demonstrated baseline thrombocytopenia to platelet count of 86, otherwise unremarkable. CMP unremarkable. Aden negative x 1. VBG pH 7.46, lactate 1.4. CXR clear lungs. EKG NSR at 88bpm. She was given Zofran x 1 and admitted for further evaluation. Patient is a 43 yo F w/PMH of dysautonomia, ITP, depression, IBS and panic attacks who presented with c/o of shortness of breath and chest pain. The patient is very anxious on my interview and difficult to redirect as she has multiple complaints. She states that she started to feel short of breath last night, and reports wheezing, but only from her left lung, and intermittently. She reports that she was prescribed home O2 by her pulmonologist because she was told her saturations drop at night. She states she never used it until last night when she started to feel short of breath. She reports that she felt much better with the oxygen on and that the wheezing has since stopped. Denies having taken any inhalers at that time. She reports also some associated left-sided chest pain which has been sharp, she describes as feeling like she is being stabbed in the chest. She reports this has been going on intermittently for the past 5 days or so. She reports some recent associated hand and foot sweating but denies sweating on other parts of her body. Also reports poor eating habits contributing to weight loss.    Reports long history of GI issues for which she has been extensively worked up and currently takes standing Zofran, colace, Pepcid and simethicone. She reports anxiety and depression for which she takes Ativan and Buspirone. She notes that she was diagnosed with dysautonomia approximately 6 months ago for which she states the Ativan is also indicated. In addition, she takes Cardizem for her heart rate fluctuations associated with her dysautonomia.     Of note, recent TTE in 12/17 demonstrated EF of 65-70%, normal LV systolic function, normal diastolic function, grossly normal RV systolic function. Minimal MR, minimal TR. Of note, she also had a negative D-dimer 2 days ago when she was seen in the ER for similar complaints. At that time, she was monitored for a few hours and discharged home with negative cardiac work up including negative Aden. She was recently seen in the ER a day prior to that complaining of tachycardia, evaluated by EP with recommendation to start Cardizem CD instead of short acting cardizem. Telemetry was wnl at that time as well and she was discharged home.    In the ED, VS on presentation: T 98.9, , /96, RR 18, SpO2 100% on RA  CBC demonstrated baseline thrombocytopenia to platelet count of 86, otherwise unremarkable. CMP unremarkable. Aden negative x 1. VBG pH 7.46, lactate 1.4. CXR clear lungs. EKG NSR at 88bpm. She was given Zofran x 1 and admitted for further evaluation.

## 2018-01-09 NOTE — ED ADULT NURSE NOTE - OBJECTIVE STATEMENT
patient alert ox3 came in c/o chest pain, palpitations and unable to breath with rash on forehead for as very long time. was seen in different hospitals and was discharged . patient is using home oxygen as prescribed. not in any distress. connected to CM shows NSR to sinus tach. h/o anxiety. awaiting re eval.

## 2018-01-09 NOTE — ED ADULT TRIAGE NOTE - CHIEF COMPLAINT QUOTE
c/o SOB and weakness x 5 days. On home O2 Prn at home. Pt on Cardizem for increased heart rate for sinus arrythmia. Appears anxious in triage. Hx of POTS

## 2018-01-09 NOTE — H&P ADULT - PROBLEM SELECTOR PLAN 5
Patient with severe anxiety, on Ativan and Buspar at home  - seen and evaluated by psychiatry  - c/w home Ativan and Buspar, added PRN Quetiapine per psych (although patient reported to me that she will refuse)

## 2018-01-09 NOTE — BEHAVIORAL HEALTH ASSESSMENT NOTE - RISK ASSESSMENT
Risk factors: acute medical issue, recent loss (cat), intermittent passive SI    Protective factors: no current SIIP/HIIP, no h/o SA/SIB, no h/o psych admissions, no access to weapons, no active substance abuse, domiciled, social supports, engaged in treatment, compliant with treatment, help-seeking behaviors    Overall, pt is a low risk of harm and does not require psychiatric admission.

## 2018-01-09 NOTE — H&P ADULT - ASSESSMENT
43 yo F w/PMH of dysautonomia, ITP, depression, IBS and panic attacks who presented with c/o of shortness of breath x 1 day and chest pain x 5 days, atypical in nature, saturating well on room air, admitted for r/o ACS

## 2018-01-09 NOTE — BEHAVIORAL HEALTH ASSESSMENT NOTE - DIFFERENTIAL
ZAK, OCD, r/o adjustment disorder, r/o BPD R/O OCD, MDD,  BPD, R/O OCD, MDD,  Borderline personal disorder

## 2018-01-09 NOTE — H&P ADULT - PROBLEM SELECTOR PLAN 6
SCDs in the setting of thrombocytopenia  DASH/TLC regular diet - c/w home Zofran, Simethicone, Pepcid and Colace

## 2018-01-09 NOTE — ED PROVIDER NOTE - ATTENDING CONTRIBUTION TO CARE
Pertinent PMH/PSH/FHx/SHx and Review of Systems contained within:  43yo F pmh inclusive of dysautonomia, ITP, depression, anxiety on ativan, pots on cardizem followed by cardiology and EP, seen at multiple EDs over 5 times in last 2 wks, presents c/o acute on chronic sob X 2 days, general weakness and a near syncopal event this morning. Per mother, pt is so weak that she cannot walk at home without assistance. No fever/chills, No photophobia/eye pain/changes in vision, No ear pain/sore throat/dysphagia, No chest pain, no cough/wheeze/stridor, No abdominal pain, No N/V/D, no dysuria/frequency/discharge, No neck/back pain, no rash, no changes in neurological status/function.   Gen: Alert, in emotional distress, crying  Head: NC, AT, PERRL, EOMI, normal lids/conjunctiva  ENT:  normal hearing, patent oropharynx without erythema/exudate, uvula midline  Neck: +supple, no tenderness/meningismus/JVD, +Trachea midline  Chest: no chest wall tenderness, equal chest rise  Pulm: Bilateral BS, normal resp effort, no wheeze/stridor/retractions  CV: tachy, no M/R/G, +dist pulses  Abd: soft, NT/ND, +BS, no hepatosplenomegaly  Rectal: deferred  Mskel: no edema/erythema/cyanosis  Skin: no rash  Neuro: AAOx3, no sensory/motor deficits, CN 2-12 intact, normal finger to nose proprioception, no pronator drift  MDM:  43yo F extensive pmh as above here for sob, general weakness, and near syncopal event this morning. Recent negative d-dimer, no suspicion for PE at this time, will not pursue this further. Will admit pt for failure to thrive (not eating and 20lb weight loss over last few months), subjective sob with inability vs refusal to walk on her own, and near syncopal event today. States her last echo was approx 6months ago. Pt should get tele monitoring and echo to r/o malignant dysrhythmia. No indication for head imaging at this time given no headache or abnormal findings on neuro exam. Possibility exist that there is some component of somatization. Given this as well as pt's passive SI (stating a few days ago that she wishes she would "just die already"), and odd affect (alternating from sadness and crying to happy), as well as having pressured speech - needs psych eval (already consulted).

## 2018-01-09 NOTE — H&P ADULT - NSHPPHYSICALEXAM_GEN_ALL_CORE
PHYSICAL EXAM:  Vital Signs Last 24 Hrs  T(C): 37.2 (09 Jan 2018 09:25), Max: 37.2 (09 Jan 2018 09:25)  T(F): 98.9 (09 Jan 2018 09:25), Max: 98.9 (09 Jan 2018 09:25)  HR: 98 (09 Jan 2018 10:34) (98 - 110)  BP: 137/102 (09 Jan 2018 10:34) (131/96 - 137/102)  RR: 20 (09 Jan 2018 10:34) (18 - 20)  SpO2: 100% (09 Jan 2018 10:34) (100% - 100%)  GENERAL: NAD, well-developed  HEAD: Atraumatic, Normocephalic  EYES: EOMI, PERRLA, conjunctiva and sclera clear  NECK: Supple, No JVD  CHEST/LUNG: Clear to auscultation bilaterally; No wheezes/rales/rhonchi  HEART: Regular rate and rhythm; No murmurs, rubs, or gallops  ABDOMEN: Soft, Nontender, Nondistended; Bowel sounds present  EXTREMITIES:  2+ dP pulses b/l, No clubbing, cyanosis, or edema  PSYCH: reactive affect  NEUROLOGY: AAOx3, non-focal  SKIN: No rashes or lesions PHYSICAL EXAM:  Vital Signs Last 24 Hrs  T(C): 37.2 (09 Jan 2018 09:25), Max: 37.2 (09 Jan 2018 09:25)  T(F): 98.9 (09 Jan 2018 09:25), Max: 98.9 (09 Jan 2018 09:25)  HR: 98 (09 Jan 2018 10:34) (98 - 110)  BP: 137/102 (09 Jan 2018 10:34) (131/96 - 137/102)  RR: 20 (09 Jan 2018 10:34) (18 - 20)  SpO2: 100% (09 Jan 2018 10:34) (100% - 100%)  GENERAL: NAD, well-developed, thin  HEAD: Atraumatic, Normocephalic  EYES: EOMI, PERRLA, conjunctiva and sclera clear  NECK: Supple, No JVD  CHEST/LUNG: Clear to auscultation bilaterally; No wheezes/rales/rhonchi  HEART: Regular rate and rhythm; No murmurs, rubs, or gallops  ABDOMEN: Soft, Nontender, Nondistended; Bowel sounds present  EXTREMITIES: 2+ dP pulses b/l, No clubbing, cyanosis, or edema  PSYCH: anxious, speaking very quickly  NEUROLOGY: AAOx3, non-focal  SKIN: small circular excoriations surrounding scalp line, no purulent drainage or associated erythema

## 2018-01-09 NOTE — BEHAVIORAL HEALTH ASSESSMENT NOTE - NSBHSUICRISKFACTOR_PSY_A_CORE
Hopelessness/Agitation/severe anxiety/Chronic pain or acute medical issue Hopelessness/Chronic pain or acute medical issue/Mood episode/Agitation/severe anxiety

## 2018-01-09 NOTE — BEHAVIORAL HEALTH ASSESSMENT NOTE - SUMMARY
Pt is a 41 yo female, single, unemployed, domiciled with parents, with ZAK, no past psychiatric hospitalizations, no past suicide attempts, no substance use/abuse, PMH POTS disease, HTN, history of ITP, IBS,  presented with c/o of shortness of breath.  Pt presents with severe anxiety, passive SI per mother.  Psych consulted for evaluation.    On exam, pt AAOx4, extremely anxious, labile, and tangential.  Pt reports depressed mood in the context of medical issues.  Endorses passive thoughts of not wanting to live like this anymore.  Denies active SI/I/P.  Pt's mother also reports compulsive behaviors.  Presentation c/w ZAK, OCD.  Recommend starting Zyprexa for mood stabilization.  Discussed risks, benefits, and potential side effects with pt who refuses to start medication at this time and requests that team consult her hematologist, cardiologist, and GI specialist.  C/w Ativan 2mg fives times per day, Buspar 2.5mg BID.  PRNs as below.  Monitor EKG for QTC.  Will follow. Pt is a 41 yo female, single, unemployed, domiciled with parents, with ZAK, no past psychiatric hospitalizations, no past suicide attempts, no substance use/abuse, PMH POTS disease, HTN, history of ITP, IBS,  presented with c/o of shortness of breath.  Pt presents with severe anxiety, passive SI per mother.  Psych consulted for evaluation.    On exam, pt AAOx4, extremely anxious, labile, tearful, circumstantial.  Pt reports depressed mood in the context of medical issues.  Endorses passive thoughts of not wanting to live like this anymore.  Denies active SI/I/P.  Pt's mother also reports compulsive behaviors.  Presentation c/w ZAK, OCD.  Recommend starting Zyprexa for mood stabilization.  Discussed risks, benefits, and potential side effects with pt who refuses to start medication at this time and requests that team consult her hematologist, cardiologist, and GI specialist.  C/w Ativan 2mg fives times per day, Buspar 2.5mg BID.  PRNs as below.  Monitor EKG for QTC.  Will follow.

## 2018-01-09 NOTE — H&P ADULT - NSHPLABSRESULTS_GEN_ALL_CORE
Personally reviewed labs.   Personally reviewed imaging. CXR clear lungs.  Personally reviewed EKG. NSR at 88bpm. No ST-T wave changes.                          13.1   8.00  )-----------( 86       ( 09 Jan 2018 11:45 )             37.6       01-09    143  |  107  |  3<L>  ----------------------------<  107<H>  4.3   |  22  |  0.87    Ca    9.4      09 Jan 2018 11:45  Phos  3.1     01-09  Mg     2.1     01-09    TPro  7.4  /  Alb  4.7  /  TBili  0.5  /  DBili  x   /  AST  19  /  ALT  9   /  AlkPhos  76  01-09    CARDIAC MARKERS ( 09 Jan 2018 11:45 )  x     / < 0.06 ng/mL / 43 u/L / 1.00 ng/mL / x      CARDIAC MARKERS ( 07 Jan 2018 17:35 )  <0.015 ng/mL / x     / x     / x     / x        LIVER FUNCTIONS - ( 09 Jan 2018 11:45 )  Alb: 4.7 g/dL / Pro: 7.4 g/dL / ALK PHOS: 76 u/L / ALT: 9 u/L / AST: 19 u/L / GGT: x           PT/INR - ( 09 Jan 2018 11:45 )   PT: 11.3 SEC;   INR: 1.02     PTT - ( 09 Jan 2018 11:45 )  PTT:25.5 SEC

## 2018-01-09 NOTE — ED PROVIDER NOTE - OBJECTIVE STATEMENT
42F with a PMHx of dysautonomia, Idiopathic thrombocytopenic purpura, depression, panic attacks presents to the ED c/o shortness of breath, weakness x 5 days and 20 pound weight loss over last few months 2/2 depression.  On home O2 Prn at home, no increased requirement, states when off O2, tachycardia worsens. Seen recently for chest pain with negative cardiac euceda and negative D-Dimer.   Pt's on Cardizem and her dose was lowered recently due to GI problems including nausea and vomiting.  No recent surgery, no history of a blood clot, not on birth control pills, no hormone therapy, no recent travel. Family cardiac history of pt's uncle with a PE. Pt was tested for a PE multiple times in the past and it was always negative. Pt had a MRI done recently and had an negative reaction to the barium sulfate. A rapid response was called and pt's blood pressure went to 90/40 and was bradycardic for 5 days. Did not receive the flu shot this  On Ativan, Cardizem, Buspar. PMD: Dr. Philip Hussein.

## 2018-01-09 NOTE — BEHAVIORAL HEALTH ASSESSMENT NOTE - OTHER PAST PSYCHIATRIC HISTORY (INCLUDE DETAILS REGARDING ONSET, COURSE OF ILLNESS, INPATIENT/OUTPATIENT TREATMENT)
Prior to 1/2017 no known psychiatric issues. No history of inpatient psychiatric hospitalization. One partial hospitalization 6/23/17 to 7/28/17.

## 2018-01-09 NOTE — BEHAVIORAL HEALTH ASSESSMENT NOTE - NSBHSUICPROTECTFACT_PSY_A_CORE
Future oriented/Fear of death or dying due to pain/suffering Supportive social network or family/Identifies reasons for living/Future oriented/Fear of death or dying due to pain/suffering

## 2018-01-09 NOTE — H&P ADULT - NSHPREVIEWOFSYSTEMS_GEN_ALL_CORE
Constitutional: denies fevers, chills, night sweats, weight loss  HEENT: denies visual changes, hearing changes, rhinitis, odynophagia, or dysphagia  Cardiovascular: denies palpitations, chest pain, edema  Respiratory: denies SOB, wheezing  Gastrointestinal: denies N/V/D, abdominal pain, hematochezia, melena  : denies dysuria, hematuria  MSK: denies weakness, joint pain  Neuro: no numbness or tingling  Psych: no depression or anxiety  Skin: denies new rashes or masses Constitutional: denies fevers, chills, night sweats +weight loss  HEENT: denies visual changes, hearing changes, rhinitis, odynophagia, or dysphagia  Cardiovascular: +chest pain, palpitations  Respiratory: +SOB, +wheezing  Gastrointestinal: denies V/D, abdominal pain, hematochezia, melena +nausea  : denies dysuria, hematuria +strains to urinate  MSK: denies weakness, joint pain  Neuro: no numbness or tingling  Psych: +anxiety

## 2018-01-09 NOTE — BEHAVIORAL HEALTH ASSESSMENT NOTE - NSBHCHARTREVIEWINVESTIGATE_PSY_A_CORE FT
< from: 12 Lead ECG (01.09.18 @ 09:37) >    QTC Calculation(Bezet) 433 ms    P Axis 55 degrees    R Axis 55 degrees    T Axis 42 degrees    Diagnosis Line Normal sinus rhythm with sinus arrhythmia  Normal ECG    < end of copied text >

## 2018-01-09 NOTE — ED ADULT NURSE NOTE - PMH
Anxiety    Anxiety    Depression    Dysautonomia    Headache    History of ITP    HTN (hypertension)    IBS (irritable bowel syndrome)    Idiopathic thrombocytopenia purpura    Idiopathic thrombocytopenic purpura    Labyrinthitis    POTS (postural orthostatic tachycardia syndrome)

## 2018-01-09 NOTE — BEHAVIORAL HEALTH ASSESSMENT NOTE - NSBHCHARTREVIEWVS_PSY_A_CORE FT
Vital Signs Last 24 Hrs  T(C): 37.2 (09 Jan 2018 09:25), Max: 37.2 (09 Jan 2018 09:25)  T(F): 98.9 (09 Jan 2018 09:25), Max: 98.9 (09 Jan 2018 09:25)  HR: 86 (09 Jan 2018 15:41) (86 - 110)  BP: 131/96 (09 Jan 2018 15:41) (131/96 - 137/102)  BP(mean): --  RR: 20 (09 Jan 2018 15:41) (18 - 20)  SpO2: 100% (09 Jan 2018 15:41) (100% - 100%)

## 2018-01-09 NOTE — H&P ADULT - ATTENDING COMMENTS
42F with ITP, POTS, anxiety, depression p/w pain across chest not related to activity or respiration.  No cough or fever. Very anxious and mildly tachycardic on exam, without other abnormalities.    Low suspicion for coronary disease.  CP may be 2/2 anxiety or musculoskeletal cause.  Will f/u CE  Pt follows with Dr Hussein (cardiology) and Dr Barahona (EP.)  Will call in am  Behavioral health assessment reviewed.  Seroquel recommended, however, pt is declining to take it.  Will f/u any further recs.  Monitor in med/psych unit.

## 2018-01-10 DIAGNOSIS — F32.9 MAJOR DEPRESSIVE DISORDER, SINGLE EPISODE, UNSPECIFIED: ICD-10-CM

## 2018-01-10 LAB
BASOPHILS # BLD AUTO: 0.02 K/UL — SIGNIFICANT CHANGE UP (ref 0–0.2)
BASOPHILS NFR BLD AUTO: 0.3 % — SIGNIFICANT CHANGE UP (ref 0–2)
BUN SERPL-MCNC: 4 MG/DL — LOW (ref 7–23)
CALCIUM SERPL-MCNC: 9.3 MG/DL — SIGNIFICANT CHANGE UP (ref 8.4–10.5)
CHLORIDE SERPL-SCNC: 105 MMOL/L — SIGNIFICANT CHANGE UP (ref 98–107)
CK MB BLD-MCNC: 1 NG/ML — SIGNIFICANT CHANGE UP (ref 1–4.7)
CK SERPL-CCNC: 37 U/L — SIGNIFICANT CHANGE UP (ref 25–170)
CO2 SERPL-SCNC: 23 MMOL/L — SIGNIFICANT CHANGE UP (ref 22–31)
CREAT SERPL-MCNC: 0.92 MG/DL — SIGNIFICANT CHANGE UP (ref 0.5–1.3)
EOSINOPHIL # BLD AUTO: 0.07 K/UL — SIGNIFICANT CHANGE UP (ref 0–0.5)
EOSINOPHIL NFR BLD AUTO: 1.1 % — SIGNIFICANT CHANGE UP (ref 0–6)
GLUCOSE SERPL-MCNC: 149 MG/DL — HIGH (ref 70–99)
HCT VFR BLD CALC: 36.4 % — SIGNIFICANT CHANGE UP (ref 34.5–45)
HGB BLD-MCNC: 12.1 G/DL — SIGNIFICANT CHANGE UP (ref 11.5–15.5)
IMM GRANULOCYTES # BLD AUTO: 0.02 # — SIGNIFICANT CHANGE UP
IMM GRANULOCYTES NFR BLD AUTO: 0.3 % — SIGNIFICANT CHANGE UP (ref 0–1.5)
LYMPHOCYTES # BLD AUTO: 1.33 K/UL — SIGNIFICANT CHANGE UP (ref 1–3.3)
LYMPHOCYTES # BLD AUTO: 20.7 % — SIGNIFICANT CHANGE UP (ref 13–44)
MAGNESIUM SERPL-MCNC: 2.1 MG/DL — SIGNIFICANT CHANGE UP (ref 1.6–2.6)
MCHC RBC-ENTMCNC: 27.9 PG — SIGNIFICANT CHANGE UP (ref 27–34)
MCHC RBC-ENTMCNC: 33.2 % — SIGNIFICANT CHANGE UP (ref 32–36)
MCV RBC AUTO: 83.9 FL — SIGNIFICANT CHANGE UP (ref 80–100)
MONOCYTES # BLD AUTO: 0.37 K/UL — SIGNIFICANT CHANGE UP (ref 0–0.9)
MONOCYTES NFR BLD AUTO: 5.8 % — SIGNIFICANT CHANGE UP (ref 2–14)
NEUTROPHILS # BLD AUTO: 4.62 K/UL — SIGNIFICANT CHANGE UP (ref 1.8–7.4)
NEUTROPHILS NFR BLD AUTO: 71.8 % — SIGNIFICANT CHANGE UP (ref 43–77)
NRBC # FLD: 0 — SIGNIFICANT CHANGE UP
PHOSPHATE SERPL-MCNC: 2.5 MG/DL — SIGNIFICANT CHANGE UP (ref 2.5–4.5)
PLATELET # BLD AUTO: 80 K/UL — LOW (ref 150–400)
PMV BLD: SIGNIFICANT CHANGE UP FL (ref 7–13)
POTASSIUM SERPL-MCNC: 3.8 MMOL/L — SIGNIFICANT CHANGE UP (ref 3.5–5.3)
POTASSIUM SERPL-SCNC: 3.8 MMOL/L — SIGNIFICANT CHANGE UP (ref 3.5–5.3)
RBC # BLD: 4.34 M/UL — SIGNIFICANT CHANGE UP (ref 3.8–5.2)
RBC # FLD: 12.6 % — SIGNIFICANT CHANGE UP (ref 10.3–14.5)
SODIUM SERPL-SCNC: 141 MMOL/L — SIGNIFICANT CHANGE UP (ref 135–145)
TROPONIN T SERPL-MCNC: < 0.06 NG/ML — SIGNIFICANT CHANGE UP (ref 0–0.06)
WBC # BLD: 6.43 K/UL — SIGNIFICANT CHANGE UP (ref 3.8–10.5)
WBC # FLD AUTO: 6.43 K/UL — SIGNIFICANT CHANGE UP (ref 3.8–10.5)

## 2018-01-10 PROCEDURE — 71250 CT THORAX DX C-: CPT | Mod: 26

## 2018-01-10 PROCEDURE — 93010 ELECTROCARDIOGRAM REPORT: CPT

## 2018-01-10 PROCEDURE — 99233 SBSQ HOSP IP/OBS HIGH 50: CPT

## 2018-01-10 PROCEDURE — 99223 1ST HOSP IP/OBS HIGH 75: CPT

## 2018-01-10 PROCEDURE — 99222 1ST HOSP IP/OBS MODERATE 55: CPT

## 2018-01-10 RX ORDER — CLINDAMYCIN PHOSPHATE GEL USP, 1% 10 MG/G
1 GEL TOPICAL DAILY
Qty: 0 | Refills: 0 | Status: DISCONTINUED | OUTPATIENT
Start: 2018-01-10 | End: 2018-01-12

## 2018-01-10 RX ORDER — OLANZAPINE 15 MG/1
1.25 TABLET, FILM COATED ORAL AT BEDTIME
Qty: 0 | Refills: 0 | Status: DISCONTINUED | OUTPATIENT
Start: 2018-01-10 | End: 2018-01-12

## 2018-01-10 RX ORDER — ACETAMINOPHEN 500 MG
650 TABLET ORAL EVERY 6 HOURS
Qty: 0 | Refills: 0 | Status: DISCONTINUED | OUTPATIENT
Start: 2018-01-10 | End: 2018-01-12

## 2018-01-10 RX ORDER — ACETAMINOPHEN WITH CODEINE 300MG-30MG
1 TABLET ORAL EVERY 6 HOURS
Qty: 0 | Refills: 0 | Status: DISCONTINUED | OUTPATIENT
Start: 2018-01-10 | End: 2018-01-11

## 2018-01-10 RX ORDER — ACETAMINOPHEN WITH CODEINE 300MG-30MG
2 TABLET ORAL EVERY 6 HOURS
Qty: 0 | Refills: 0 | Status: DISCONTINUED | OUTPATIENT
Start: 2018-01-10 | End: 2018-01-11

## 2018-01-10 RX ADMIN — Medication 2.5 MILLIGRAM(S): at 22:42

## 2018-01-10 RX ADMIN — FAMOTIDINE 20 MILLIGRAM(S): 10 INJECTION INTRAVENOUS at 09:27

## 2018-01-10 RX ADMIN — SIMETHICONE 160 MILLIGRAM(S): 80 TABLET, CHEWABLE ORAL at 16:00

## 2018-01-10 RX ADMIN — ONDANSETRON 4 MILLIGRAM(S): 8 TABLET, FILM COATED ORAL at 14:52

## 2018-01-10 RX ADMIN — Medication 100 MILLIGRAM(S): at 23:45

## 2018-01-10 RX ADMIN — SIMETHICONE 160 MILLIGRAM(S): 80 TABLET, CHEWABLE ORAL at 23:46

## 2018-01-10 RX ADMIN — Medication 2 MILLIGRAM(S): at 20:38

## 2018-01-10 RX ADMIN — Medication 2 MILLIGRAM(S): at 10:32

## 2018-01-10 RX ADMIN — ONDANSETRON 4 MILLIGRAM(S): 8 TABLET, FILM COATED ORAL at 05:43

## 2018-01-10 RX ADMIN — Medication 100 MILLIGRAM(S): at 09:28

## 2018-01-10 RX ADMIN — Medication 100 MILLIGRAM(S): at 16:01

## 2018-01-10 RX ADMIN — Medication 120 MILLIGRAM(S): at 18:39

## 2018-01-10 RX ADMIN — FAMOTIDINE 20 MILLIGRAM(S): 10 INJECTION INTRAVENOUS at 23:45

## 2018-01-10 RX ADMIN — ONDANSETRON 4 MILLIGRAM(S): 8 TABLET, FILM COATED ORAL at 10:32

## 2018-01-10 RX ADMIN — Medication 650 MILLIGRAM(S): at 17:00

## 2018-01-10 RX ADMIN — ONDANSETRON 4 MILLIGRAM(S): 8 TABLET, FILM COATED ORAL at 20:15

## 2018-01-10 RX ADMIN — Medication 650 MILLIGRAM(S): at 16:04

## 2018-01-10 RX ADMIN — Medication 2 MILLIGRAM(S): at 06:07

## 2018-01-10 RX ADMIN — Medication 2.5 MILLIGRAM(S): at 08:30

## 2018-01-10 RX ADMIN — ONDANSETRON 4 MILLIGRAM(S): 8 TABLET, FILM COATED ORAL at 23:47

## 2018-01-10 RX ADMIN — SIMETHICONE 160 MILLIGRAM(S): 80 TABLET, CHEWABLE ORAL at 09:28

## 2018-01-10 RX ADMIN — MONTELUKAST 10 MILLIGRAM(S): 4 TABLET, CHEWABLE ORAL at 23:45

## 2018-01-10 RX ADMIN — Medication 2 MILLIGRAM(S): at 14:53

## 2018-01-10 NOTE — PROGRESS NOTE ADULT - SUBJECTIVE AND OBJECTIVE BOX
CHIEF COMPLAINT: Patient is a 42y old  female who presents with a chief complaint of     SUBJECTIVE / OVERNIGHT EVENTS: Patient seen and examined. No acute events overnight. Pain well controlled and patient without any complaints.    MEDICATIONS  (STANDING):  busPIRone 2.5 milliGRAM(s) Oral <User Schedule>  diltiazem    milliGRAM(s) Oral <User Schedule>  docusate sodium 100 milliGRAM(s) Oral three times a day  famotidine    Tablet 20 milliGRAM(s) Oral two times a day  LORazepam     Tablet 2 milliGRAM(s) Oral <User Schedule>  montelukast 10 milliGRAM(s) Oral at bedtime  ondansetron   Disintegrating Tablet 4 milliGRAM(s) Oral every 4 hours  simethicone 160 milliGRAM(s) Chew three times a day  sodium chloride 0.9%. 1000 milliLiter(s) (50 mL/Hr) IV Continuous <Continuous>    MEDICATIONS  (PRN):  acetaminophen   Tablet. 650 milliGRAM(s) Oral every 6 hours PRN Mild Pain (1 - 3)  acetaminophen 300 mG/codeine 30 mG 1 Tablet(s) Oral every 6 hours PRN Moderate Pain (4 - 6)  acetaminophen 300 mG/codeine 30 mG 2 Tablet(s) Oral every 6 hours PRN Severe Pain (7 - 10)  QUEtiapine 25 milliGRAM(s) Oral every 6 hours PRN Anxiety or agitation      VITALS:  T(F): 98.5 (01-10-18 @ 05:41), Max: 98.5 (01-10-18 @ 05:41)  HR: 71 (01-10-18 @ 05:41) (71 - 101)  BP: 116/78 (01-10-18 @ 05:41) (116/78 - 132/91)  RR: 18 (01-10-18 @ 06:32) (16 - 20)  SpO2: 100% (01-10-18 @ 06:32)      PHYSICAL EXAM:  GENERAL: NAD, well-developed  HEAD:  Atraumatic, Normocephalic, +prurigo nodularis appearing lesions on forehead  CHEST/LUNG: Clear to auscultation bilaterally; No wheeze; reproducible pain on pressure point to chest/ribs  HEART: Regular rate and rhythm; No murmurs, rubs, or gallops  ABDOMEN: Soft, Nontender, Nondistended; Bowel sounds present  EXTREMITIES:  2+ Peripheral Pulses, No clubbing, cyanosis, or edema  PSYCH: anxious, speaking very quickly  SKIN: small circular excoriations surrounding scalp line, no purulent drainage or associated erythema appearing to be likely prurigo nodularis    LABS:              12.1                 141  | 23   | 4            6.43  >-----------< 80      ------------------------< 149                   36.4                 3.8  | 105  | 0.92                                         Ca 9.3   Mg 2.1   Ph 2.5         TPro  7.4  /  Alb  4.7      TBili  0.5  /  DBili  x         AST  19  /  ALT  9             AlkPhos  76      INR: 1.02 ;    PT: 11.3 SEC;    PTT: 25.5 SEC<L>    CARDIAC MARKERS  < 0.06 ng/mL / 37 u/L / 1.00 ng/mL  CARDIAC MARKERS  < 0.06 ng/mL / 43 u/L / 1.00 ng/mL    Urinalysis Basic - ( 2018 11:24 )    Color: PLYEL / Appearance: CLEAR / S.008 / pH: 8.0  Gluc: NEGATIVE / Ketone: NEGATIVE  / Bili: NEGATIVE / Urobili: NORMAL mg/dL   Blood: NEGATIVE / Protein: NEGATIVE mg/dL / Nitrite: NEGATIVE   Leuk Esterase: SMALL / RBC: 0-2 / WBC 2-5   Sq Epi: FEW / Non Sq Epi: x / Bacteria: FEW      VB-09 @ 11:45  7.46/30/36/23/64.6/-2.2    RADIOLOGY & ADDITIONAL TESTS:  Imaging Personally Reviewed:  < from: Xray Chest 2 Views PA/Lat (18 @ 12:15) >  IMPRESSION: Normal chest    < end of copied text >      [x]Consultant(s) Notes Reviewed: cardiology  [x Care Discussed with Consultants/Other Providers: NP - discussed management chest discomfort CHIEF COMPLAINT: Patient is a 42y old  female who presents with a chief complaint of     SUBJECTIVE / OVERNIGHT EVENTS: Patient seen and examined. No acute events overnight. Chest pain still present at times and comes about intermittently. She can pinpoint exactly where on her chest the pain is. Pain is reproducible on exam.  Patient c/o of itchy scalp and lesions on forhead and is requesting a dermatology consult. She is anxious about not knowing what is causing all her pain. She feels a lot better when the oxygen is turned on.    MEDICATIONS  (STANDING):  busPIRone 2.5 milliGRAM(s) Oral <User Schedule>  diltiazem    milliGRAM(s) Oral <User Schedule>  docusate sodium 100 milliGRAM(s) Oral three times a day  famotidine    Tablet 20 milliGRAM(s) Oral two times a day  LORazepam     Tablet 2 milliGRAM(s) Oral <User Schedule>  montelukast 10 milliGRAM(s) Oral at bedtime  ondansetron   Disintegrating Tablet 4 milliGRAM(s) Oral every 4 hours  simethicone 160 milliGRAM(s) Chew three times a day  sodium chloride 0.9%. 1000 milliLiter(s) (50 mL/Hr) IV Continuous <Continuous>    MEDICATIONS  (PRN):  acetaminophen   Tablet. 650 milliGRAM(s) Oral every 6 hours PRN Mild Pain (1 - 3)  acetaminophen 300 mG/codeine 30 mG 1 Tablet(s) Oral every 6 hours PRN Moderate Pain (4 - 6)  acetaminophen 300 mG/codeine 30 mG 2 Tablet(s) Oral every 6 hours PRN Severe Pain (7 - 10)  QUEtiapine 25 milliGRAM(s) Oral every 6 hours PRN Anxiety or agitation      VITALS:  T(F): 98.5 (01-10-18 @ 05:41), Max: 98.5 (01-10-18 @ 05:41)  HR: 71 (01-10-18 @ 05:41) (71 - 101)  BP: 116/78 (01-10-18 @ 05:41) (116/78 - 132/91)  RR: 18 (01-10-18 @ 06:32) (16 - 20)  SpO2: 100% (01-10-18 @ 06:32)      PHYSICAL EXAM:  GENERAL: NAD, well-developed  HEAD:  Atraumatic, Normocephalic, +prurigo nodularis appearing lesions on forehead  CHEST/LUNG: Clear to auscultation bilaterally; No wheeze; reproducible pain on pressure point to chest/ribs  HEART: Regular rate and rhythm; No murmurs, rubs, or gallops  ABDOMEN: Soft, Nontender, Nondistended; Bowel sounds present  EXTREMITIES:  2+ Peripheral Pulses, No clubbing, cyanosis, or edema  PSYCH: anxious, speaking very quickly  SKIN: small circular excoriations surrounding scalp line, no purulent drainage or associated erythema appearing to be likely prurigo nodularis    LABS:              12.1                 141  | 23   | 4            6.43  >-----------< 80      ------------------------< 149                   36.4                 3.8  | 105  | 0.92                                         Ca 9.3   Mg 2.1   Ph 2.5         TPro  7.4  /  Alb  4.7      TBili  0.5  /  DBili  x         AST  19  /  ALT  9             AlkPhos  76      INR: 1.02 ;    PT: 11.3 SEC;    PTT: 25.5 SEC<L>    CARDIAC MARKERS  < 0.06 ng/mL / 37 u/L / 1.00 ng/mL  CARDIAC MARKERS  < 0.06 ng/mL / 43 u/L / 1.00 ng/mL    Urinalysis Basic - ( 2018 11:24 )    Color: PLYEL / Appearance: CLEAR / S.008 / pH: 8.0  Gluc: NEGATIVE / Ketone: NEGATIVE  / Bili: NEGATIVE / Urobili: NORMAL mg/dL   Blood: NEGATIVE / Protein: NEGATIVE mg/dL / Nitrite: NEGATIVE   Leuk Esterase: SMALL / RBC: 0-2 / WBC 2-5   Sq Epi: FEW / Non Sq Epi: x / Bacteria: FEW      VB-09 @ 11:45  7.46/30/36/23/64.6/-2.2    RADIOLOGY & ADDITIONAL TESTS:  Imaging Personally Reviewed:  < from: Xray Chest 2 Views PA/Lat (18 @ 12:15) >  IMPRESSION: Normal chest    < end of copied text >      [x]Consultant(s) Notes Reviewed: cardiology  [x Care Discussed with Consultants/Other Providers: NP - discussed management chest discomfort

## 2018-01-10 NOTE — PROGRESS NOTE BEHAVIORAL HEALTH - NSBHCHARTREVIEWLAB_PSY_A_CORE FT
LABS:                        12.1   6.43  )-----------( 80       ( 10 [place] 2018 10:46 )             36.4     10 [place] 2018 10:46    141    |  105    |  4      ----------------------------<  149    3.8     |  23     |  0.92     Ca    9.3        10 [place] 2018 10:46  Phos  2.5       10 [place] 2018 10:46  Mg     2.1       10 [place] 2018 10:46      PT/INR - ( 09 Jan 2018 11:45 )   PT: 11.3 SEC;   INR: 1.02          PTT - ( 09 Jan 2018 11:45 )  PTT:25.5 SEC

## 2018-01-10 NOTE — PROGRESS NOTE ADULT - ASSESSMENT
42F h/o dysautonomia, ITP, depression, IBS and panic attacks a/w SOB + CP r/o ACS vs. anxiety vs. costochondritis vs. pericarditis.

## 2018-01-10 NOTE — CONSULT NOTE ADULT - ASSESSMENT
1) Acne vulgaris, face  -Would start clindamycin lotion qAM  -Would start benzoyl peroxide 3-5% wash TIW, please advise pt this may bleach her towels if not fully washed off    2) Scalp pruritus  -Would start fluocinonide soln 3-5x/week    Discussed with primary team.

## 2018-01-10 NOTE — PROGRESS NOTE BEHAVIORAL HEALTH - NSBHCHARTREVIEWINVESTIGATE_PSY_A_CORE FT
< from: 12 Lead ECG (01.09.18 @ 09:37) >    QTC Calculation(Bezet) 433 ms    P Axis 55 degrees    R Axis 55 degrees    T Axis 42 degrees    Diagnosis Line Normal sinus rhythm with sinus arrhythmia  Normal ECG    < end of copied text > WT 1/10 430

## 2018-01-10 NOTE — CONSULT NOTE ADULT - ASSESSMENT
42F with hx of dysautonomia, ITP, depression, IBS, POTS (dx March) and panic attacks who presented with c/o of shortness of breath and chest pain with negative cardiac enzymes, normal ECG, recent TTE normal and nuclear stress test within the past year likely normal. Unlikely to be ACS, however possibility of pericarditis.    -- will discuss with attending prior to final recommendations    Jude Gary MD 42F with hx of dysautonomia, ITP, depression, IBS, POTS (dx March) and panic attacks who presented with c/o of shortness of breath and chest pain with negative cardiac enzymes, normal ECG, recent TTE normal and nuclear stress test within the past year likely normal. Unlikely to be ACS, however possibility of pericarditis.    -- check RVP  -- recent ESR/CRP, D-Dimer wnl  -- unlikely to be due to diltiazem  -- will discuss with attending prior to final recommendations    Jude Gary MD

## 2018-01-10 NOTE — PROGRESS NOTE BEHAVIORAL HEALTH - RISK ASSESSMENT
Risk factors: acute medical issue, recent loss (cat), intermittent passive SI    Protective factors: no current SIIP/HIIP, no h/o SA/SIB, no h/o psych admissions, no access to weapons, no active substance abuse, domiciled, social supports, engaged in treatment, compliant with treatment, help-seeking behaviors    Overall, pt is a low risk of harm and does not require psychiatric admission. Risk factors: acute medical issue, recent loss (cat), intermittent  SI, not eating, loosing weight, not sleeping, not functioning, labile, tearful with severe anxiety,     Protective factors: no current HIIP, no h/o SA/SIB, no h/o psych admissions, no access to weapons, no active substance abuse, domiciled, social supports, engaged in treatment, compliant with treatment, help-seeking behaviors

## 2018-01-10 NOTE — CHART NOTE - NSCHARTNOTEFT_GEN_A_CORE
ADS    43 yo F w/PMH of dysautonomia, ITP, depression, IBS and panic attacks who presented to ED yesterday with c/o of shortness of breath x 1 day and chest pain x 5 days, atypical in nature, saturating well on room air, admitted for r/o ACS.  This morning called to evaluate for complaints of chest pain.  CE #1 and CE # 2 negative. STAT EKG done - NSR.  When asked, pt stated that her chest pain feels like "someone is taking a knife and stabbing her right in the heart".  She stated that when she took a deep breath this morning the pain lands in the middle of the chest.  Denies radiating down either arm.  She stated that in the ED the chest pain was on the left but last night it was on the right.  Pt stated that she is experiencing SOB as well.  02 saturation on room air was 100%.  Pt home cardiologist is Dr. Hussein - wants to be seen by someone new as she stated "she would like a fresh set of eyes".  Pt resting comfortably in no distress.  Cardiology notified CRESENCIO Melchor.     Vital Signs Last 24 Hrs  T(C): 36.9 (10 [place] 2018 05:41), Max: 37.2 (09 Jan 2018 09:25)  T(F): 98.5 (10 [place] 2018 05:41), Max: 98.9 (09 Jan 2018 09:25)  HR: 71 (10 [place] 2018 05:41) (71 - 110)  BP: 116/78 (10 [place] 2018 05:41) (116/78 - 137/102)  BP(mean): --  RR: 18 (10 [place] 2018 06:32) (16 - 20)  SpO2: 100% (10 [place] 2018 06:32) (100% - 100%)    42 year old complaining of chest pain    STAT EKG - NSR  Called cardiology  f/u CE #3 - 9am_________ (CE#1 and #2 negative)    Sarina Alas ADS NP

## 2018-01-10 NOTE — PROGRESS NOTE ADULT - PROBLEM SELECTOR PLAN 4
-pt been c/o an itchy scalp and forhead for a couple months now  -will consult derm to evaluate skin lesions on patient's forehead

## 2018-01-10 NOTE — CONSULT NOTE ADULT - SUBJECTIVE AND OBJECTIVE BOX
HPI:  Patient is a 43 yo F w/PMH of dysautonomia, ITP, depression, IBS and panic attacks admitted for r/o ACS, w/u negative to date (trop neg x 3). Dermatology c/s for bumps on forehead. Per pt they are present x several months. Sometimes itchy. She has h/o acne for which she was previously treated with clindamycin and benzoyl peroxide. Also w/ scalp itch worse in the winter. No other skin complaints.    PAST MEDICAL & SURGICAL HISTORY:  Depression  Idiopathic thrombocytopenic purpura  Dysautonomia  Idiopathic thrombocytopenia purpura  Anxiety  POTS (postural orthostatic tachycardia syndrome)  History of ITP  Labyrinthitis  Headache  HTN (hypertension)  Anxiety  IBS (irritable bowel syndrome)  No significant past surgical history  No significant past surgical history  No significant past surgical history    REVIEW OF SYSTEMS:  General: no fever/chills, no lethargy  Skin/breast: see HPI  Ophthalmologic: no eye pain or change in vision  ENT: no dysphagia or change in hearing  Respiratory: No SOB or cough  Cardiovascular: +chest pain  Gastrointestinal: no abdominal pain or blood in stool  Genitourinary: no dysuria or frequency  Musculoskeletal: no joint pains or weakness  Neurological: no weakness or tingling    MEDICATIONS  (STANDING):  busPIRone 2.5 milliGRAM(s) Oral <User Schedule>  clindamycin 1% Gel 1 Application(s) Topical daily  diltiazem    milliGRAM(s) Oral <User Schedule>  docusate sodium 100 milliGRAM(s) Oral three times a day  famotidine    Tablet 20 milliGRAM(s) Oral two times a day  LORazepam     Tablet 2 milliGRAM(s) Oral <User Schedule>  montelukast 10 milliGRAM(s) Oral at bedtime  OLANZapine 1.25 milliGRAM(s) Oral at bedtime  ondansetron   Disintegrating Tablet 4 milliGRAM(s) Oral every 4 hours  simethicone 160 milliGRAM(s) Chew three times a day  sodium chloride 0.9%. 1000 milliLiter(s) (50 mL/Hr) IV Continuous <Continuous>    MEDICATIONS  (PRN):  acetaminophen   Tablet. 650 milliGRAM(s) Oral every 6 hours PRN Mild Pain (1 - 3)  acetaminophen 300 mG/codeine 30 mG 1 Tablet(s) Oral every 6 hours PRN Moderate Pain (4 - 6)  acetaminophen 300 mG/codeine 30 mG 2 Tablet(s) Oral every 6 hours PRN Severe Pain (7 - 10)  DIGESTIVE ENZYMES (fROM VITAMIN SHOPPE) 3 Capsule(s) 3 Capsule(s) Oral three times a day with meals PRN WHEN NEEDED FOR DIGESTION  QUEtiapine 25 milliGRAM(s) Oral every 6 hours PRN Anxiety or agitation    Allergies:  barium sulfate (Other)  beta blockers (Unknown)  Lexapro (Unknown)  Reglan (Other)    Intolerances:  metoprolol (Headache)    SOCIAL HISTORY: never smoker    FAMILY HISTORY:  No pertinent family history in first degree relatives  Family history of hypertension (Grandparent)  Family history of atrial fibrillation  Family history of prostate cancer in father    Vital Signs Last 24 Hrs  T(C): 37 (10 [place] 2018 19:43), Max: 37 (10 [place] 2018 19:43)  T(F): 98.6 (10 [place] 2018 19:43), Max: 98.6 (10 [place] 2018 19:43)  HR: 87 (10 [place] 2018 19:43) (71 - 95)  BP: 137/92 (10 [place] 2018 19:43) (116/78 - 137/92)  BP(mean): --  RR: 20 (10 [place] 2018 19:43) (18 - 20)  SpO2: 100% (10 [place] 2018 19:43) (100% - 100%)    PHYSICAL EXAM:     The patient was alert and oriented X 3, well nourished, and in no  apparent distress.  OP showed no ulcerations  There was no visible lymphadenopathy.  Conjunctiva were non injected  There was no clubbing or edema of extremities.  The scalp, hair, face, eyebrows, lips, OP, neck, chest, back,   extremities X 4, nails were examined.  There was no hyperhidrosis or bromhidrosis.    Of note on skin exam:   pink erythematous papules on forehead, several excoriated  scalp w/o erythema or scale    LABS:                        12.1   6.43  )-----------( 80       ( 10 [place] 2018 10:46 )             36.4     01-10    141  |  105  |  4<L>  ----------------------------<  149<H>  3.8   |  23  |  0.92    Ca    9.3      10 [place] 2018 10:46  Phos  2.5     01-10  Mg     2.1     -10    TPro  7.4  /  Alb  4.7  /  TBili  0.5  /  DBili  x   /  AST  19  /  ALT  9   /  AlkPhos  76      PT/INR - ( 2018 11:45 )   PT: 11.3 SEC;   INR: 1.02          PTT - ( 2018 11:45 )  PTT:25.5 SEC  Urinalysis Basic - ( 2018 11:24 )    Color: PLYEL / Appearance: CLEAR / S.008 / pH: 8.0  Gluc: NEGATIVE / Ketone: NEGATIVE  / Bili: NEGATIVE / Urobili: NORMAL mg/dL   Blood: NEGATIVE / Protein: NEGATIVE mg/dL / Nitrite: NEGATIVE   Leuk Esterase: SMALL / RBC: 0-2 / WBC 2-5   Sq Epi: FEW / Non Sq Epi: x / Bacteria: FEW
Patient seen and evaluated @ 9:15 AM  Chief Complaint: SOB and CP    HPI:  42F with hx of dysautonomia, ITP, depression, IBS, POTS (dx March) and panic attacks who presented with c/o of shortness of breath and chest pain. She states that she started to feel short of breath last night, and reports wheezing, but only from her left lung, and intermittently. She reports that she was prescribed home O2 by her pulmonologist because she was told her saturations drop at night. She states she never used it until last night when she started to feel short of breath. She reports that she felt much better with the oxygen on and that the wheezing has since stopped. Denies having taken any inhalers at that time. She reports also some associated left-sided chest pain which has been sharp, she describes as feeling like she is being stabbed in the chest. She reports this has been going on intermittently for the past 5 days or so. She reports some recent associated hand and foot sweating but denies sweating on other parts of her body. Also reports poor eating habits contributing to weight loss.    Reports long history of GI issues for which she has been extensively worked up and currently takes standing Zofran, colace, Pepcid and simethicone. She reports anxiety and depression for which she takes Ativan and Buspirone. She notes that she was diagnosed with dysautonomia approximately 6 months ago for which she states the Ativan is also indicated. In addition, she takes Cardizem for her heart rate fluctuations associated with her dysautonomia.     Of note, recent TTE in  demonstrated EF of 65-70%, normal LV systolic function, normal diastolic function, grossly normal RV systolic function. Minimal MR, minimal TR. Of note, she also had a negative D-dimer 2 days ago when she was seen in the ER for similar complaints. At that time, she was monitored for a few hours and discharged home with negative cardiac work up including negative Aden. She was recently seen in the ER a day prior to that complaining of tachycardia, evaluated by EP with recommendation to start Cardizem CD instead of short acting cardizem. Telemetry was wnl at that time as well and she was discharged home.    In the ED, VS on presentation: T 98.9, , /96, RR 18, SpO2 100% on RA  CBC demonstrated baseline thrombocytopenia to platelet count of 86, otherwise unremarkable. CMP unremarkable. Aden negative x 1. VBG pH 7.46, lactate 1.4. CXR clear lungs. EKG NSR at 88bpm. She was given Zofran x 1 and admitted for further evaluation.    Upon my evaluation, patient with chest pain on left side of chest and centrally. No radiation. Worsened with deep inspiration and the patient thinks its worse sitting up as opposed to lying down. She is particularly concerned of an association with diltiazem. Patient also reports left side headache with throbbing. No pain with exertion, but reports because she increases her respiratory rate at that time she notices the pain more.    PMH:   Depression  Idiopathic thrombocytopenic purpura  Dysautonomia  Idiopathic thrombocytopenia purpura  Anxiety  POTS (postural orthostatic tachycardia syndrome)  Pott disease  History of ITP  Labyrinthitis  Asthma  Headache  HTN (hypertension)  Anxiety  IBS (irritable bowel syndrome)    PSH:   No significant past surgical history  No significant past surgical history  No significant past surgical history    Home meds:  Lorazepam  Zofran  Buspirone  Famotidine  Singulair  Simethicone    Medications:   busPIRone 2.5 milliGRAM(s) Oral <User Schedule>  diltiazem    milliGRAM(s) Oral <User Schedule>  docusate sodium 100 milliGRAM(s) Oral three times a day  famotidine    Tablet 20 milliGRAM(s) Oral two times a day  LORazepam     Tablet 2 milliGRAM(s) Oral <User Schedule>  montelukast 10 milliGRAM(s) Oral at bedtime  ondansetron   Disintegrating Tablet 4 milliGRAM(s) Oral every 4 hours  QUEtiapine 25 milliGRAM(s) Oral every 6 hours PRN  simethicone 160 milliGRAM(s) Chew three times a day  sodium chloride 0.9%. 1000 milliLiter(s) IV Continuous <Continuous>    Allergies:  barium sulfate (Other)  beta blockers (Unknown)  Lexapro (Unknown)  metoprolol (Headache)  Reglan (Other)    FAMILY HISTORY:  No pertinent family history in first degree relatives  Family history of hypertension (Grandparent)  Family history of atrial fibrillation  Family history of prostate cancer in father    Social History:  Never smoker, no EtOH, no illicits    Review of Systems:  Constitutional: [ ] Fever [ ] Chills [ ] Fatigue [ ] Weight change   HEENT: [ ] Blurred vision [ ] Eye Pain [ ] Headache [ ] Runny nose [ ] Sore Throat   Respiratory: [ ] Cough [ ] Wheezing [ ] Shortness of breath  Cardiovascular: [ ] Chest Pain [ ] Palpitations [ ] ALCANTAR [ ] PND [ ] Orthopnea  Gastrointestinal: [ ] Abdominal Pain [ ] Diarrhea [ ] Constipation [ ] Hemorrhoids [ ] Nausea [ ] Vomiting  Genitourinary: [ ] Nocturia [ ] Dysuria [ ] Incontinence  Extremities: [ ] Swelling [ ] Joint Pain  Neurologic: [ ] Focal deficit [ ] Paresthesias [ ] Syncope  Lymphatic: [ ] Swelling [ ] Lymphadenopathy   Skin: [ ] Rash [ ] Ecchymoses [ ] Wounds [ ] Lesions  Psychiatry: [ ] Depression [ ] Suicidal/Homicidal Ideation [ ] Anxiety [ ] Sleep Disturbances  [ ] 10 point review of systems is otherwise negative except as mentioned above            [ ]Unable to obtain    Physical Exam:  T(C): 36.9 (01-10-18 @ 05:41), Max: 36.9 (01-10-18 @ 05:41)  HR: 71 (01-10-18 @ 05:41) (71 - 101)  BP: 116/78 (01-10-18 @ 05:41) (116/78 - 137/102)  RR: 18 (01-10-18 @ 06:32) (16 - 20)  SpO2: 100% (01-10-18 @ 06:32) (100% - 100%)  Wt(kg): --    Daily     Daily Weight in k.3 (2018 22:32)    Appearance: NAD  Eyes: PERRL, EOMI  HENT: Normal oral [place], NC/AT  Cardiovascular: normal S1 and S2, RRR, no m/r/g, no edema, normal JVP  Respiratory: Clear to auscultation bilaterally  Gastrointestinal: Soft, non-tender, non-distended, BS+  Musculoskeletal: No clubbing, no joint deformity   Neurologic: Non-focal  Lymphatic: No lymphadenopathy  Psychiatry: AAOx3, mood & affect appropriate  Skin: No rashes, no ecchymoses, no cyanosis    Cardiovascular Diagnostic Testing:  ECG: sinus 88 bpm    Echo:  2017  EF 65-70%  Conclusions:  1. Normal left ventricular systolic function. No segmental  wall motion abnormalities.  2. Normal diastolic function  3. The right ventricle is not well visualized; grossly  normal right ventricular systolic function.  4. No pericardial effusion seen.    Stress Testin2017  IMPRESSIONS:Probably Normal Study  * Technically difficult study due to extensive adjacent  extracardiac tracer uptake.  * Chest Pain: Patient developed 4/10 chest tightness at  15:00 min following regadenoson infusion which resolved by  30:00 min following infusion after administration of  aminophylline 75mg.  * Symptom: palpitations, chest discomfort.  * HR Response: Appropriate.  * BP Response: Appropriate.  * Heart Rhythm: Sinus Tachycardia - 104 BPM.  * Baseline ECG: Nonspecific ST-T wave abnormality.  * ECG Changes: No significant ischemic ST segment changes  beyond baseline abnormalities.  * Arrhythmia: None.  * Review of raw data shows: Extensive splanchnic uptake  * The left ventricle was small in size. There are mild to  moderate defects in the inferior, inferoapical, apical  lateral, and basal inferoseptal walls that are mostly  fixed, predominantly correct with prone imaging, and  demonstrate preserved wall motion suggestive of  attenuation artifacts.  As the inferoapical defect did not  completely correct with prone imaging, a small area of  scar in this territory cannot be definitively excluded.  * While the left ventricular cavity appears to dilate  slightly with stress, the quantified volume ratio of 1.17  does not meet most accepted criteria for TID in a  regadenoson sestamibi study.  * Post-stress gatedwall motion analysis was performed  (LVEF > 70%;LVEDV = 38 ml.) revealing normal overall left  ventricular ejection fraction.  *** No previous Nuclear/Stress exam.    Cath:  none    Imaging:  CXR  IMPRESSION: Normal chest    Labs:                        13.1   8.00  )-----------( 86       ( 2018 11:45 )             37.6         143  |  107  |  3<L>  ----------------------------<  107<H>  4.3   |  22  |  0.87    Ca    9.4      2018 11:45  Phos  3.1       Mg     2.1         TPro  7.4  /  Alb  4.7  /  TBili  0.5  /  DBili  x   /  AST  19  /  ALT  9   /  AlkPhos  76      PT/INR - ( 2018 11:45 )   PT: 11.3 SEC;   INR: 1.02          PTT - ( 2018 11:45 )  PTT:25.5 SEC  CARDIAC MARKERS ( [facility] [place] 2018 00:45 )  x     / < 0.06 ng/mL / 37 u/L / 1.00 ng/mL / x      CARDIAC MARKERS ( 2018 11:45 )  x     / < 0.06 ng/mL / 43 u/L / 1.00 ng/mL / x

## 2018-01-10 NOTE — PROGRESS NOTE BEHAVIORAL HEALTH - OTHER
unable to assess, laying in bed increased, hyperverbal increased focused and preoccupied about her medical sxs. limited focused and preoccupied about her medical sxs. Reporting passive suicidal thoughts

## 2018-01-10 NOTE — PROGRESS NOTE ADULT - PROBLEM SELECTOR PLAN 1
Patient presenting with chest pain, atypical in nature, both left and right sided, not associated with exertion, not pleuritic, intermittent  - Aden negative x 3, EKG with no acute changes  - for completeness will check CT chest without and echo but I believe this could be 2/2 to costochondritis -- especially since pain was reproducible  - pt cannot take NSAIDS given ITP and low platelet; will order tylenol and tylenol with codeine for severe pain  - f/u cardiology recs

## 2018-01-10 NOTE — PROGRESS NOTE BEHAVIORAL HEALTH - NSBHCHARTREVIEWVS_PSY_A_CORE FT
Vital Signs Last 24 Hrs  T(C): 36.9 (10 [place] 2018 05:41), Max: 36.9 (10 [place] 2018 05:41)  T(F): 98.5 (10 [place] 2018 05:41), Max: 98.5 (10 [place] 2018 05:41)  HR: 71 (10 [place] 2018 05:41) (71 - 101)  BP: 116/78 (10 [place] 2018 05:41) (116/78 - 132/91)  BP(mean): --  RR: 18 (10 [place] 2018 06:32) (16 - 20)  SpO2: 100% (10 [place] 2018 06:32) (100% - 100%)

## 2018-01-10 NOTE — CHART NOTE - NSCHARTNOTEFT_GEN_A_CORE
Dermatology consult called. Verbal recommendation for clindamycin gel daily for acne. Will order and continue to monitor.

## 2018-01-10 NOTE — PROGRESS NOTE BEHAVIORAL HEALTH - NSBHCONSULTMEDS_PSY_A_CORE FT
Ativan 2mg fives times per day (0100, 0600, 1100, 1600, 2100) (Istop checked and dose confirmed)  Buspar 2.5mg BID Ativan 2mg fives times per day (0100, 0600, 1100, 1600, 2100) (Istop checked and dose confirmed)  Buspar 2.5mg BID  Olanzapine 1.25mg hs

## 2018-01-10 NOTE — PROGRESS NOTE BEHAVIORAL HEALTH - NSBHFUPINTERVALHXFT_PSY_A_CORE
Pt did not receive any PRNs overnight. No acute events. Pt states that she was very upset this morning after a discussion with her family. She speaks at length about her medical conditions and the effect they have on her mental health. She states she had difficulty sleeping due to chest pain, and was only able to sleep for a few hours. She ate some potatoes this morning and states that she is trying to eat but often finds that her anxiety and gastroparesis prevent her from doing so. When asked about suicidal ideation, she states that she is "petrified to die" and does not have any plans to end her life. She continues to endorse depressed mood and high levels of anxiety. Denies AVH. Pt did not receive any PRNs overnight. No acute events. Pt states that she was very upset this morning after a discussion with her family. She speaks at length about her medical conditions and the effect they have on her mental health. She states she had difficulty sleeping due to chest pain, and was only able to sleep for a few hours. She ate some potatoes this morning and states that she is trying to eat but often finds that her anxiety and gastroparesis prevent her from doing so. When asked about suicidal ideation, she states that she is "petrified to die". She continues to endorse depressed mood, hopelessness, anhedonia, low energy,  and high levels of anxiety. Denies AVH.

## 2018-01-10 NOTE — PROGRESS NOTE ADULT - PROBLEM SELECTOR PLAN 7
- c/w home Zofran, Simethicone, Pepcid and Colace  - ok for patient to take home med "vegetable enzymes" with her meals

## 2018-01-10 NOTE — PROGRESS NOTE BEHAVIORAL HEALTH - SUMMARY
Pt is a 41 yo female, single, unemployed, domiciled with parents, with ZAK, no past psychiatric hospitalizations, no past suicide attempts, no substance use/abuse, PMH POTS disease, HTN, history of ITP, IBS,  presented with c/o of shortness of breath.  Pt presents with severe anxiety, passive SI per mother.  Psych consulted for evaluation.    On exam, pt AAOx4, extremely anxious, labile, tearful, circumstantial.  Pt reports depressed mood in the context of medical issues.  Endorses passive thoughts of not wanting to live like this anymore.  Denies active SI/I/P.  Pt's mother also reports compulsive behaviors.  Presentation c/w ZAK, OCD.  Recommend starting Zyprexa for mood stabilization.  Discussed risks, benefits, and potential side effects with pt who refuses to start medication at this time and requests that team consult her hematologist, cardiologist, and GI specialist.  C/w Ativan 2mg fives times per day, Buspar 2.5mg BID.     Today, pt continues to be AAOx4, very labile, anxious, tearful. States that she is "petrified to die" and denies suicidal ideation at this time. Have left callback numbers for patient's internist, hematologist, and cardiologist to discuss psychotropics. Awaiting callback. Internist Lorena gomez (), Cardiologist Zelalem Reed (), Hematologist Attila Zamora (). Pt is a 41 yo female, single, unemployed, domiciled with parents, with ZAK, no past psychiatric hospitalizations, no past suicide attempts, no substance use/abuse, PMH POTS disease, HTN, history of ITP, IBS,  presented with c/o of shortness of breath.  Pt presents with severe anxiety, passive SI per mother.  Psych consulted for evaluation.    On exam, pt AAOx4, extremely anxious, labile, tearful, circumstantial.  Pt reports depressed mood in the context of medical issues.  Endorses passive thoughts of not wanting to live like this anymore.  Denies active SI/I/P.  Pt's mother also reports compulsive behaviors.  Presentation c/w ZAK, OCD.  Recommend starting Zyprexa for mood stabilization.  Discussed risks, benefits, and potential side effects with pt who refuses to start medication at this time and requests that team consult her hematologist, cardiologist, and GI specialist.  C/w Ativan 2mg fives times per day, Buspar 2.5mg BID.     Today, pt continues to be AAOx4, very labile, anxious, tearful. States that she is "petrified to die" and denies suicidal ideation at this time. Have left callback numbers for patient's internist, hematologist, and cardiologist to discuss psychotropics. Awaiting callback. Internist Lorena gomez (), Cardiologist Zelalem Reed (), Hematologist Attila Zamora (). Pt continues to decline SSRI, SNRI, and Mirtazapine due to possible side effects. Due to this, will offer off label Zyprexa given extreme anxiety and mood lability. Pt is a 41 yo female, single, unemployed, domiciled with parents, with ZAK, no past psychiatric hospitalizations, no past suicide attempts, no substance use/abuse, PMH POTS disease, HTN, history of ITP, IBS,  presented with c/o of shortness of breath.  Pt presents with severe anxiety, passive SI per mother.  Psych consulted for evaluation.    On exam, pt AAOx4, extremely anxious, labile, tearful, circumstantial.  Pt reports depressed mood in the context of medical issues.  Endorses passive thoughts of not wanting to live like this anymore.  Denies active SI/I/P.  Pt's mother also reports compulsive behaviors.  Presentation c/w ZAK, OCD.  Recommend starting Zyprexa for mood stabilization.  Discussed risks, benefits, and potential side effects with pt who refuses to start medication at this time and requests that team consult her hematologist, cardiologist, and GI specialist.  C/w Ativan 2mg fives times per day, Buspar 2.5mg BID.     Today, pt continues to be AAOx4, very labile, anxious, tearful. States that she is "petrified to die" and denies suicidal ideation at this time. Have left callback numbers for patient's internist, hematologist, and cardiologist to discuss psychotropics. Awaiting callback. Internist Lorena gomez (), Cardiologist Zelalem Reed (), Hematologist Attila Zamora (). Pt continues to decline SSRI, SNRI, and Mirtazapine due to possible side effects. Due to this, will offer off label Zyprexa given extreme anxiety and mood lability.    Pt's mom reports that pt was not functioning at home; was not eating or ambulating and making suicidal comments and plans for death (such as writing her will). Mother states that pt's anxiety is so debilitating that she is not able to care for her at home. Given that patient is unable to function due to high anxiety, pt will need inpatient psychiatric admission. Pt is a 41 yo female, single, unemployed, domiciled with parents, with ZAK, no past psychiatric hospitalizations, no past suicide attempts, no substance use/abuse, PMH POTS disease, HTN, history of ITP, IBS,  presented with c/o of shortness of breath.  Pt presents with severe anxiety, passive SI per mother.  Psych consulted for evaluation.    On exam yesterday, pt AAOx4, extremely anxious, labile, tearful, circumstantial.  Pt reports depressed mood in the context of medical issues.  Endorses passive thoughts of not wanting to live like this anymore.  Denies active SI/I/P.  Pt's mother also reports compulsive behaviors.  Presentation c/w ZAK and MDD, R/O OCD. Recommend starting Zyprexa for mood stabilization.  Discussed risks, benefits, and potential side effects with pt who refuses to start medication at this time and requests that team consult her hematologist, cardiologist, and GI specialist.  C/w Ativan 2mg fives times per day, Buspar 2.5mg BID.     Today, pt continues to be AAOx4, very labile, anxious, tearful. States that she is "petrified to die".  Have left callback numbers for patient's internist, hematologist, and cardiologist to discuss psychotropics. Awaiting callback. Internist Lorena gomez (), Cardiologist Zelalem Reed (), Hematologist Attila Zamora (). Pt continues to decline SSRI, SNRI, and Mirtazapine due to possible side effects. Due to limited options, will offer off label Zyprexa given extreme anxiety, insomnia and mood lability. Benefits of using mood stabilizer outweigh the risks at this time.    Pt's mom reports that pt was not functioning at home; was not eating or ambulating and making suicidal comments and plans for death. Mother states that pt's anxiety and depression is so debilitating that she is not able to care for her at home. Given that patient is unable to function due to high anxiety and mood sxs, pt will likely  need inpatient psychiatric admission.

## 2018-01-11 ENCOUNTER — APPOINTMENT (OUTPATIENT)
Dept: SLEEP CENTER | Facility: CLINIC | Age: 43
End: 2018-01-11

## 2018-01-11 DIAGNOSIS — L70.0 ACNE VULGARIS: ICD-10-CM

## 2018-01-11 LAB

## 2018-01-11 PROCEDURE — 99233 SBSQ HOSP IP/OBS HIGH 50: CPT

## 2018-01-11 RX ORDER — ACETAMINOPHEN WITH CODEINE 300MG-30MG
1 TABLET ORAL EVERY 6 HOURS
Qty: 0 | Refills: 0 | Status: DISCONTINUED | OUTPATIENT
Start: 2018-01-11 | End: 2018-01-12

## 2018-01-11 RX ORDER — ACETAMINOPHEN WITH CODEINE 300MG-30MG
2 TABLET ORAL EVERY 6 HOURS
Qty: 0 | Refills: 0 | Status: DISCONTINUED | OUTPATIENT
Start: 2018-01-11 | End: 2018-01-12

## 2018-01-11 RX ORDER — CITALOPRAM 10 MG/1
10 TABLET, FILM COATED ORAL ONCE
Qty: 0 | Refills: 0 | Status: COMPLETED | OUTPATIENT
Start: 2018-01-11 | End: 2018-01-11

## 2018-01-11 RX ADMIN — SODIUM CHLORIDE 50 MILLILITER(S): 9 INJECTION INTRAMUSCULAR; INTRAVENOUS; SUBCUTANEOUS at 20:10

## 2018-01-11 RX ADMIN — SIMETHICONE 160 MILLIGRAM(S): 80 TABLET, CHEWABLE ORAL at 14:07

## 2018-01-11 RX ADMIN — Medication 2 MILLIGRAM(S): at 06:28

## 2018-01-11 RX ADMIN — Medication 100 MILLIGRAM(S): at 22:55

## 2018-01-11 RX ADMIN — ONDANSETRON 4 MILLIGRAM(S): 8 TABLET, FILM COATED ORAL at 11:22

## 2018-01-11 RX ADMIN — ONDANSETRON 4 MILLIGRAM(S): 8 TABLET, FILM COATED ORAL at 05:57

## 2018-01-11 RX ADMIN — Medication 2.5 MILLIGRAM(S): at 08:54

## 2018-01-11 RX ADMIN — SIMETHICONE 160 MILLIGRAM(S): 80 TABLET, CHEWABLE ORAL at 10:12

## 2018-01-11 RX ADMIN — SIMETHICONE 160 MILLIGRAM(S): 80 TABLET, CHEWABLE ORAL at 22:54

## 2018-01-11 RX ADMIN — Medication 100 MILLIGRAM(S): at 14:06

## 2018-01-11 RX ADMIN — Medication 2 MILLIGRAM(S): at 11:22

## 2018-01-11 RX ADMIN — Medication 120 MILLIGRAM(S): at 17:56

## 2018-01-11 RX ADMIN — Medication 2.5 MILLIGRAM(S): at 20:07

## 2018-01-11 RX ADMIN — Medication 2 MILLIGRAM(S): at 16:04

## 2018-01-11 RX ADMIN — FAMOTIDINE 20 MILLIGRAM(S): 10 INJECTION INTRAVENOUS at 10:12

## 2018-01-11 RX ADMIN — Medication 2 MILLIGRAM(S): at 01:19

## 2018-01-11 RX ADMIN — CLINDAMYCIN PHOSPHATE GEL USP, 1% 1 APPLICATION(S): 10 GEL TOPICAL at 10:13

## 2018-01-11 RX ADMIN — Medication 650 MILLIGRAM(S): at 10:12

## 2018-01-11 RX ADMIN — ONDANSETRON 4 MILLIGRAM(S): 8 TABLET, FILM COATED ORAL at 16:04

## 2018-01-11 RX ADMIN — SODIUM CHLORIDE 50 MILLILITER(S): 9 INJECTION INTRAMUSCULAR; INTRAVENOUS; SUBCUTANEOUS at 08:54

## 2018-01-11 RX ADMIN — Medication 100 MILLIGRAM(S): at 10:12

## 2018-01-11 RX ADMIN — Medication 650 MILLIGRAM(S): at 10:45

## 2018-01-11 NOTE — PROGRESS NOTE BEHAVIORAL HEALTH - OTHER
focused and preoccupied about her medical sxs. Reporting passive suicidal thoughts limited unable to assess, laying in bed increased, hyperverbal increased

## 2018-01-11 NOTE — PROGRESS NOTE BEHAVIORAL HEALTH - NSBHCONSULTMEDS_PSY_A_CORE FT
Ativan 2mg fives times per day (0100, 0600, 1100, 1600, 2100) (Istop checked and dose confirmed)  Buspar 2.5mg BID  Olanzapine 1.25mg hs

## 2018-01-11 NOTE — PROGRESS NOTE ADULT - PROBLEM SELECTOR PLAN 6
Patient with severe anxiety, on Ativan and Buspar at home  - seen and evaluated by psychiatry who recommend involuntary [facility] admission once medically cleared  - c/w home Ativan and Buspar, added PRN Quetiapine per psych (although patient reported to me that she will refuse)

## 2018-01-11 NOTE — PROGRESS NOTE BEHAVIORAL HEALTH - NSBHCHARTREVIEWVS_PSY_A_CORE FT
Vital Signs Last 24 Hrs  T(C): 36.9 (11 Jan 2018 05:53), Max: 37 (10 Rock 2018 19:43)  T(F): 98.4 (11 Jan 2018 05:53), Max: 98.6 (10 Rock 2018 19:43)  HR: 73 (11 Jan 2018 05:53) (73 - 95)  BP: 128/81 (11 Jan 2018 05:53) (127/87 - 137/92)  BP(mean): --  RR: 17 (11 Jan 2018 05:53) (17 - 20)  SpO2: 100% (11 Jan 2018 05:53) (100% - 100%)

## 2018-01-11 NOTE — PROGRESS NOTE ADULT - ASSESSMENT
42F h/o dysautonomia, ITP, depression, IBS and panic attacks a/w SOB + CP likely 2/2 anxiety +/- costochondritis - ACS was ruled out.

## 2018-01-11 NOTE — PROGRESS NOTE BEHAVIORAL HEALTH - RISK ASSESSMENT
Risk factors: acute medical issue, recent loss (cat), intermittent  SI, not eating, loosing weight, not sleeping, not functioning, labile, tearful with severe anxiety,     Protective factors: no current HIIP, no h/o SA/SIB, no h/o psych admissions, no access to weapons, no active substance abuse, domiciled, social supports, engaged in treatment, compliant with treatment, help-seeking behaviors

## 2018-01-11 NOTE — PROGRESS NOTE ADULT - PROBLEM SELECTOR PLAN 4
-c/w clindamycin lotion qAM  -c/w benzoyl peroxide 3-5% wash TIW, please advise pt this may bleach her towels if not fully washed off  -for scalp pruritus c/w fluocinonide soln 3-5x/week

## 2018-01-11 NOTE — PROGRESS NOTE BEHAVIORAL HEALTH - SUMMARY
Pt is a 41 yo female, single, unemployed, domiciled with parents, with ZAK, no past psychiatric hospitalizations, no past suicide attempts, no substance use/abuse, PMH POTS disease, HTN, history of ITP, IBS,  presented with c/o of shortness of breath.  Pt presents with severe anxiety, passive SI per mother.  Psych consulted for evaluation.    On exam yesterday, pt AAOx4, extremely anxious, labile, tearful, circumstantial.  Pt reports depressed mood in the context of medical issues.  Endorses passive thoughts of not wanting to live like this anymore.  Denies active SI/I/P.  Pt's mother also reports compulsive behaviors.  Presentation c/w ZAK and MDD, R/O OCD. Recommend starting Zyprexa for mood stabilization.  Discussed risks, benefits, and potential side effects with pt who refuses to start medication at this time and requests that team consult her hematologist, cardiologist, and GI specialist.  C/w Ativan 2mg fives times per day, Buspar 2.5mg BID.     Today, pt continues to be AAOx4, very labile, anxious, tearful. States that she is "petrified to die".  Have left callback numbers for patient's internist, hematologist, and cardiologist to discuss psychotropics. Awaiting callback. Internist Lorena gomez (), Cardiologist Zelalem Reed (), Hematologist Attila Zamora (). Pt continues to decline SSRI, SNRI, and Mirtazapine due to possible side effects. Due to limited options, will offer off label Zyprexa given extreme anxiety, insomnia and mood lability. Benefits of using mood stabilizer outweigh the risks at this time.    Pt's mom reports that pt was not functioning at home; was not eating or ambulating and making suicidal comments and plans for death. Mother states that pt's anxiety and depression is so debilitating that she is not able to care for her at home. Given that patient is unable to function due to high anxiety and mood sxs, pt will likely  need inpatient psychiatric admission. Pt is refusing Zyprexa 1.25mg qhs due to fear of side effects. Risks and benefits discussed at length with patient. Pt is a 43 yo female, single, unemployed, domiciled with parents, with ZAK, no past psychiatric hospitalizations, no past suicide attempts, no substance use/abuse, PMH POTS disease, HTN, history of ITP, IBS,  presented with c/o of shortness of breath.  Pt presents with severe anxiety, passive SI per mother.  Psych consulted for evaluation.    On exam yesterday, pt AAOx4, extremely anxious, labile, tearful, circumstantial.  Pt reports depressed mood in the context of medical issues.  Endorses passive thoughts of not wanting to live like this anymore.  Denies active SI/I/P.  Pt's mother also reports compulsive behaviors.  Presentation c/w ZAK and MDD, R/O OCD. Recommend starting Zyprexa for mood stabilization.  Discussed risks, benefits, and potential side effects with pt who refuses to start medication at this time and requests that team consult her hematologist, cardiologist, and GI specialist.  C/w Ativan 2mg fives times per day, Buspar 2.5mg BID.     Today, pt continues to be AAOx4, very labile, anxious, tearful. States that she is "petrified to die".  Have left callback numbers for patient's internist, hematologist, and cardiologist to discuss psychotropics. Awaiting callback. Internist Lorena gomez (), Cardiologist Zelalem Reed (), Hematologist Attila Zamora (). Pt continues to decline SSRI, SNRI, and Mirtazapine due to possible side effects. Due to limited options, will offer off label Zyprexa given extreme anxiety, insomnia and mood lability. Benefits of using mood stabilizer outweigh the risks at this time.    Pt's mom reports that pt was not functioning at home; was not eating or ambulating and making suicidal comments and plans for death (writing will). Mother states that pt's anxiety and depression is so debilitating that she is not able to care for her at home. Family does not feel safe taking pt. home as they think that pt. is a danger to herself. Given that patient is unable to function due to high anxiety and mood sxs, pt will likely  need inpatient psychiatric admission. Pt is refusing Zyprexa 1.25mg qhs due to fear of side effects. Risks and benefits discussed at length with patient.

## 2018-01-11 NOTE — PROGRESS NOTE ADULT - SUBJECTIVE AND OBJECTIVE BOX
CHIEF COMPLAINT: Patient is a 42y old  female who presents with a chief complaint of chest pain/SOB    SUBJECTIVE / OVERNIGHT EVENTS: Patient seen and examined. No acute events overnight.     MEDICATIONS  (STANDING):  busPIRone 2.5 milliGRAM(s) Oral <User Schedule>  clindamycin 1% Gel 1 Application(s) Topical daily  diltiazem    milliGRAM(s) Oral <User Schedule>  docusate sodium 100 milliGRAM(s) Oral three times a day  famotidine    Tablet 20 milliGRAM(s) Oral two times a day  LORazepam     Tablet 2 milliGRAM(s) Oral <User Schedule>  montelukast 10 milliGRAM(s) Oral at bedtime  OLANZapine 1.25 milliGRAM(s) Oral at bedtime  ondansetron   Disintegrating Tablet 4 milliGRAM(s) Oral every 4 hours  simethicone 160 milliGRAM(s) Chew three times a day  sodium chloride 0.9%. 1000 milliLiter(s) (50 mL/Hr) IV Continuous <Continuous>    MEDICATIONS  (PRN):  acetaminophen   Tablet. 650 milliGRAM(s) Oral every 6 hours PRN Mild Pain (1 - 3)  acetaminophen 300 mG/codeine 30 mG 1 Tablet(s) Oral every 6 hours PRN Moderate Pain (4 - 6)  acetaminophen 300 mG/codeine 30 mG 2 Tablet(s) Oral every 6 hours PRN Severe Pain (7 - 10)  DIGESTIVE ENZYMES (fROM VITAMIN SHOPPE) 3 Capsule(s) 3 Capsule(s) Oral three times a day with meals PRN WHEN NEEDED FOR DIGESTION  QUEtiapine 25 milliGRAM(s) Oral every 6 hours PRN Anxiety or agitation      VITALS:  T(F): 98.4 (18 @ 05:53), Max: 98.6 (01-10-18 @ 19:43)  HR: 73 (18 @ 05:53) (73 - 95)  BP: 128/81 (18 @ 05:53) (127/87 - 137/92)  RR: 17 (18 @ 05:53) (17 - 20)  SpO2: 100% (18 @ 05:53)      PHYSICAL EXAM:  GENERAL: NAD, well-developed  HEAD:  Atraumatic, Normocephalic, +prurigo nodularis appearing lesions on forehead  CHEST/LUNG: Clear to auscultation bilaterally; No wheeze; reproducible pain on pressure point to chest/ribs  HEART: Regular rate and rhythm; No murmurs, rubs, or gallops  ABDOMEN: Soft, Nontender, Nondistended; Bowel sounds present  EXTREMITIES:  2+ Peripheral Pulses, No clubbing, cyanosis, or edema  PSYCH: anxious, speaking very quickly  SKIN: small circular excoriations surrounding scalp line, no purulent drainage or associated erythema or scale    LABS:              12.1                 141  | 23   | 4            6.43  >-----------< 80      ------------------------< 149                   36.4                 3.8  | 105  | 0.92                                         Ca 9.3   Mg 2.1   Ph 2.5         TPro  7.4  /  Alb  4.7      TBili  0.5  /  DBili  x         AST  19  /  ALT  9             AlkPhos  76      INR: 1.02 ;    PT: 11.3 SEC;    PTT: 25.5 SEC<L>    CARDIAC MARKERS  < 0.06 ng/mL / 37 u/L / 1.00 ng/mL  CARDIAC MARKERS  < 0.06 ng/mL / 43 u/L / 1.00 ng/mL      Urinalysis Basic - ( 2018 11:24 )    Color: PLYEL / Appearance: CLEAR / S.008 / pH: 8.0  Gluc: NEGATIVE / Ketone: NEGATIVE  / Bili: NEGATIVE / Urobili: NORMAL mg/dL   Blood: NEGATIVE / Protein: NEGATIVE mg/dL / Nitrite: NEGATIVE   Leuk Esterase: SMALL / RBC: 0-2 / WBC 2-5   Sq Epi: FEW / Non Sq Epi: x / Bacteria: FEW    VB-09 @ 11:45  7.46/30/36/23/64.6/-2.2    RADIOLOGY & ADDITIONAL TESTS:  Imaging Personally Reviewed:  < from: CT Chest No Cont (01.10.18 @ 15:19) >  IMPRESSION: No pneumonia.    < end of copied text >    [ ] Consultant(s) Notes Reviewed:  [x] Care Discussed with Consultants/Other Providers: NP - discussed anxiety management CHIEF COMPLAINT: Patient is a 42y old  female who presents with a chief complaint of chest pain/SOB    SUBJECTIVE / OVERNIGHT EVENTS: Patient seen and examined. Pt did not receive any PRNs overnight. No acute events. Pt refused Zyprexa last night. Pt states this morning that she read through all the side effects of the recommended medication and is very worried about taking the medication. She continues to endorse depressed mood with anxiety, which is largely affected by her acute medical issues.     MEDICATIONS  (STANDING):  busPIRone 2.5 milliGRAM(s) Oral <User Schedule>  clindamycin 1% Gel 1 Application(s) Topical daily  diltiazem    milliGRAM(s) Oral <User Schedule>  docusate sodium 100 milliGRAM(s) Oral three times a day  famotidine    Tablet 20 milliGRAM(s) Oral two times a day  LORazepam     Tablet 2 milliGRAM(s) Oral <User Schedule>  montelukast 10 milliGRAM(s) Oral at bedtime  OLANZapine 1.25 milliGRAM(s) Oral at bedtime  ondansetron   Disintegrating Tablet 4 milliGRAM(s) Oral every 4 hours  simethicone 160 milliGRAM(s) Chew three times a day  sodium chloride 0.9%. 1000 milliLiter(s) (50 mL/Hr) IV Continuous <Continuous>    MEDICATIONS  (PRN):  acetaminophen   Tablet. 650 milliGRAM(s) Oral every 6 hours PRN Mild Pain (1 - 3)  acetaminophen 300 mG/codeine 30 mG 1 Tablet(s) Oral every 6 hours PRN Moderate Pain (4 - 6)  acetaminophen 300 mG/codeine 30 mG 2 Tablet(s) Oral every 6 hours PRN Severe Pain (7 - 10)  DIGESTIVE ENZYMES (fROM VITAMIN SHOPPE) 3 Capsule(s) 3 Capsule(s) Oral three times a day with meals PRN WHEN NEEDED FOR DIGESTION  QUEtiapine 25 milliGRAM(s) Oral every 6 hours PRN Anxiety or agitation      VITALS:  T(F): 98.4 (18 @ 05:53), Max: 98.6 (01-10-18 @ 19:43)  HR: 73 (18 @ 05:53) (73 - 95)  BP: 128/81 (18 @ 05:53) (127/87 - 137/92)  RR: 17 (18 @ 05:53) (17 - 20)  SpO2: 100% (18 @ 05:53)      PHYSICAL EXAM:  GENERAL: NAD, well-developed  HEAD:  Atraumatic, Normocephalic, +prurigo nodularis appearing lesions on forehead  CHEST/LUNG: Clear to auscultation bilaterally; No wheeze; reproducible pain on pressure point to chest/ribs  HEART: Regular rate and rhythm; No murmurs, rubs, or gallops  ABDOMEN: Soft, Nontender, Nondistended; Bowel sounds present  EXTREMITIES:  2+ Peripheral Pulses, No clubbing, cyanosis, or edema  PSYCH: anxious, speaking very quickly  SKIN: small circular excoriations surrounding scalp line, no purulent drainage or associated erythema or scale    LABS:              12.1                 141  | 23   | 4            6.43  >-----------< 80      ------------------------< 149                   36.4                 3.8  | 105  | 0.92                                         Ca 9.3   Mg 2.1   Ph 2.5         TPro  7.4  /  Alb  4.7      TBili  0.5  /  DBili  x         AST  19  /  ALT  9             AlkPhos  76      INR: 1.02 ;    PT: 11.3 SEC;    PTT: 25.5 SEC<L>    CARDIAC MARKERS  < 0.06 ng/mL / 37 u/L / 1.00 ng/mL  CARDIAC MARKERS  < 0.06 ng/mL / 43 u/L / 1.00 ng/mL      Urinalysis Basic - ( 2018 11:24 )    Color: PLYEL / Appearance: CLEAR / S.008 / pH: 8.0  Gluc: NEGATIVE / Ketone: NEGATIVE  / Bili: NEGATIVE / Urobili: NORMAL mg/dL   Blood: NEGATIVE / Protein: NEGATIVE mg/dL / Nitrite: NEGATIVE   Leuk Esterase: SMALL / RBC: 0-2 / WBC 2-5   Sq Epi: FEW / Non Sq Epi: x / Bacteria: FEW    VB-09 @ 11:45  7.46/30/36/23/64.6/-2.2    RADIOLOGY & ADDITIONAL TESTS:  Imaging Personally Reviewed:  < from: CT Chest No Cont (01.10.18 @ 15:19) >  IMPRESSION: No pneumonia.    < end of copied text >    [ ] Consultant(s) Notes Reviewed:  [x] Care Discussed with Consultants/Other Providers: NP - discussed anxiety management CHIEF COMPLAINT: Patient is a 42y old  female who presents with a chief complaint of chest pain/SOB    SUBJECTIVE / OVERNIGHT EVENTS: Patient seen and examined. Pt did not receive any PRNs overnight. No acute events. Pt refused Zyprexa last night. Pt states this morning that she read through all the side effects of the recommended medication and is very worried about taking the medication. She continues to endorse depressed mood with anxiety, which is largely affected by her acute medical issues. Patient still complaining of concern with her heart rate and need for oxygen to feel better. She also is questioning the bumps on her head despite having dermatology see her.    MEDICATIONS  (STANDING):  busPIRone 2.5 milliGRAM(s) Oral <User Schedule>  clindamycin 1% Gel 1 Application(s) Topical daily  diltiazem    milliGRAM(s) Oral <User Schedule>  docusate sodium 100 milliGRAM(s) Oral three times a day  famotidine    Tablet 20 milliGRAM(s) Oral two times a day  LORazepam     Tablet 2 milliGRAM(s) Oral <User Schedule>  montelukast 10 milliGRAM(s) Oral at bedtime  OLANZapine 1.25 milliGRAM(s) Oral at bedtime  ondansetron   Disintegrating Tablet 4 milliGRAM(s) Oral every 4 hours  simethicone 160 milliGRAM(s) Chew three times a day  sodium chloride 0.9%. 1000 milliLiter(s) (50 mL/Hr) IV Continuous <Continuous>    MEDICATIONS  (PRN):  acetaminophen   Tablet. 650 milliGRAM(s) Oral every 6 hours PRN Mild Pain (1 - 3)  acetaminophen 300 mG/codeine 30 mG 1 Tablet(s) Oral every 6 hours PRN Moderate Pain (4 - 6)  acetaminophen 300 mG/codeine 30 mG 2 Tablet(s) Oral every 6 hours PRN Severe Pain (7 - 10)  DIGESTIVE ENZYMES (fROM VITAMIN SHOPPE) 3 Capsule(s) 3 Capsule(s) Oral three times a day with meals PRN WHEN NEEDED FOR DIGESTION  QUEtiapine 25 milliGRAM(s) Oral every 6 hours PRN Anxiety or agitation      VITALS:  T(F): 98.4 (18 @ 05:53), Max: 98.6 (01-10-18 @ 19:43)  HR: 73 (01-11-18 @ 05:53) (73 - 95)  BP: 128/81 (18 @ 05:53) (127/87 - 137/92)  RR: 17 (18 @ 05:53) (17 - 20)  SpO2: 100% (18 @ 05:53)      PHYSICAL EXAM:  GENERAL: NAD, well-developed  HEAD:  Atraumatic, Normocephalic, +prurigo nodularis appearing lesions on forehead  CHEST/LUNG: Clear to auscultation bilaterally; No wheeze; reproducible pain on pressure point to chest/ribs  HEART: Regular rate and rhythm; No murmurs, rubs, or gallops  ABDOMEN: Soft, Nontender, Nondistended; Bowel sounds present  EXTREMITIES:  2+ Peripheral Pulses, No clubbing, cyanosis, or edema  PSYCH: anxious, speaking very quickly  SKIN: small circular excoriations surrounding scalp line, no purulent drainage or associated erythema or scale    LABS:              12.1                 141  | 23   | 4            6.43  >-----------< 80      ------------------------< 149                   36.4                 3.8  | 105  | 0.92                                         Ca 9.3   Mg 2.1   Ph 2.5         TPro  7.4  /  Alb  4.7      TBili  0.5  /  DBili  x         AST  19  /  ALT  9             AlkPhos  76      INR: 1.02 ;    PT: 11.3 SEC;    PTT: 25.5 SEC<L>    CARDIAC MARKERS  < 0.06 ng/mL / 37 u/L / 1.00 ng/mL  CARDIAC MARKERS  < 0.06 ng/mL / 43 u/L / 1.00 ng/mL      Urinalysis Basic - ( 2018 11:24 )    Color: PLYEL / Appearance: CLEAR / S.008 / pH: 8.0  Gluc: NEGATIVE / Ketone: NEGATIVE  / Bili: NEGATIVE / Urobili: NORMAL mg/dL   Blood: NEGATIVE / Protein: NEGATIVE mg/dL / Nitrite: NEGATIVE   Leuk Esterase: SMALL / RBC: 0-2 / WBC 2-5   Sq Epi: FEW / Non Sq Epi: x / Bacteria: FEW    VB-09 @ 11:45  7.46/30/36/23/64.6/-2.2    RADIOLOGY & ADDITIONAL TESTS:  Imaging Personally Reviewed:  < from: CT Chest No Cont (01.10.18 @ 15:19) >  IMPRESSION: No pneumonia.    < end of copied text >    [ ] Consultant(s) Notes Reviewed:  [x] Care Discussed with Consultants/Other Providers: NP - discussed anxiety management

## 2018-01-11 NOTE — PROGRESS NOTE BEHAVIORAL HEALTH - NSBHFUPINTERVALHXFT_PSY_A_CORE
Pt did not receive any PRNs overnight. No acute events. Pt refused Zyprexa last night. Pt states this morning that she read through all the side effects of the recommended medication and is very worried about taking the medication. She continues to endorse depressed mood with anxiety, which is largely affected by her acute medical issues.     Family meeting held with patient's mother today, discussed patient's current care. Mother reiterates she is very concerned for her daughter's safety, stating that pt is "withering away," not eating, severely anxious, making suicidal statements and plans for death, such as will writing.

## 2018-01-11 NOTE — PROGRESS NOTE ADULT - PROBLEM SELECTOR PLAN 2
Patient with shortness of breath, stating she feels better with oxygen on, however noted to be saturating 100% on room air  - denies pleuritic chest pain  - no recent travel or immobilization, no active malignancy  - Wells score is 1 for tachycardia (although per chart review this is a chronic issue secondary to her autonomic dysautonomia)  - D-dimer negative 2 days ago  - unclear etiology, will continue to monitor  - patient refusing imaging requiring IV contrast at this time (had a "bad" reaction in the past)  - CT chest performed with no signs of pneumonia

## 2018-01-12 ENCOUNTER — INPATIENT (INPATIENT)
Facility: HOSPITAL | Age: 43
LOS: 13 days | Discharge: HOME | End: 2018-01-26
Attending: PSYCHIATRY & NEUROLOGY | Admitting: PSYCHIATRY & NEUROLOGY
Payer: MEDICAID

## 2018-01-12 ENCOUNTER — TRANSCRIPTION ENCOUNTER (OUTPATIENT)
Age: 43
End: 2018-01-12

## 2018-01-12 VITALS — WEIGHT: 100.09 LBS | HEIGHT: 62 IN | TEMPERATURE: 99 F

## 2018-01-12 VITALS — OXYGEN SATURATION: 100 % | HEART RATE: 97 BPM | RESPIRATION RATE: 18 BRPM

## 2018-01-12 DIAGNOSIS — F33.2 MAJOR DEPRESSIVE DISORDER, RECURRENT SEVERE WITHOUT PSYCHOTIC FEATURES: ICD-10-CM

## 2018-01-12 LAB
BASOPHILS # BLD AUTO: 0.02 K/UL — SIGNIFICANT CHANGE UP (ref 0–0.2)
BASOPHILS NFR BLD AUTO: 0.3 % — SIGNIFICANT CHANGE UP (ref 0–2)
BUN SERPL-MCNC: 3 MG/DL — LOW (ref 7–23)
CALCIUM SERPL-MCNC: 9 MG/DL — SIGNIFICANT CHANGE UP (ref 8.4–10.5)
CHLORIDE SERPL-SCNC: 105 MMOL/L — SIGNIFICANT CHANGE UP (ref 98–107)
CO2 SERPL-SCNC: 23 MMOL/L — SIGNIFICANT CHANGE UP (ref 22–31)
CREAT SERPL-MCNC: 0.84 MG/DL — SIGNIFICANT CHANGE UP (ref 0.5–1.3)
EOSINOPHIL # BLD AUTO: 0.24 K/UL — SIGNIFICANT CHANGE UP (ref 0–0.5)
EOSINOPHIL NFR BLD AUTO: 4.1 % — SIGNIFICANT CHANGE UP (ref 0–6)
GLUCOSE SERPL-MCNC: 88 MG/DL — SIGNIFICANT CHANGE UP (ref 70–99)
HCT VFR BLD CALC: 36.8 % — SIGNIFICANT CHANGE UP (ref 34.5–45)
HGB BLD-MCNC: 12.1 G/DL — SIGNIFICANT CHANGE UP (ref 11.5–15.5)
IMM GRANULOCYTES # BLD AUTO: 0.02 # — SIGNIFICANT CHANGE UP
IMM GRANULOCYTES NFR BLD AUTO: 0.3 % — SIGNIFICANT CHANGE UP (ref 0–1.5)
LYMPHOCYTES # BLD AUTO: 1.88 K/UL — SIGNIFICANT CHANGE UP (ref 1–3.3)
LYMPHOCYTES # BLD AUTO: 32.4 % — SIGNIFICANT CHANGE UP (ref 13–44)
MCHC RBC-ENTMCNC: 28.1 PG — SIGNIFICANT CHANGE UP (ref 27–34)
MCHC RBC-ENTMCNC: 32.9 % — SIGNIFICANT CHANGE UP (ref 32–36)
MCV RBC AUTO: 85.4 FL — SIGNIFICANT CHANGE UP (ref 80–100)
MONOCYTES # BLD AUTO: 0.43 K/UL — SIGNIFICANT CHANGE UP (ref 0–0.9)
MONOCYTES NFR BLD AUTO: 7.4 % — SIGNIFICANT CHANGE UP (ref 2–14)
NEUTROPHILS # BLD AUTO: 3.21 K/UL — SIGNIFICANT CHANGE UP (ref 1.8–7.4)
NEUTROPHILS NFR BLD AUTO: 55.5 % — SIGNIFICANT CHANGE UP (ref 43–77)
NRBC # FLD: 0 — SIGNIFICANT CHANGE UP
PLATELET # BLD AUTO: 66 K/UL — LOW (ref 150–400)
PMV BLD: SIGNIFICANT CHANGE UP FL (ref 7–13)
POTASSIUM SERPL-MCNC: 3.6 MMOL/L — SIGNIFICANT CHANGE UP (ref 3.5–5.3)
POTASSIUM SERPL-SCNC: 3.6 MMOL/L — SIGNIFICANT CHANGE UP (ref 3.5–5.3)
RBC # BLD: 4.31 M/UL — SIGNIFICANT CHANGE UP (ref 3.8–5.2)
RBC # FLD: 12.4 % — SIGNIFICANT CHANGE UP (ref 10.3–14.5)
SODIUM SERPL-SCNC: 143 MMOL/L — SIGNIFICANT CHANGE UP (ref 135–145)
WBC # BLD: 5.8 K/UL — SIGNIFICANT CHANGE UP (ref 3.8–10.5)
WBC # FLD AUTO: 5.8 K/UL — SIGNIFICANT CHANGE UP (ref 3.8–10.5)

## 2018-01-12 PROCEDURE — 99233 SBSQ HOSP IP/OBS HIGH 50: CPT

## 2018-01-12 PROCEDURE — 93306 TTE W/DOPPLER COMPLETE: CPT | Mod: 26

## 2018-01-12 PROCEDURE — 99239 HOSP IP/OBS DSCHRG MGMT >30: CPT

## 2018-01-12 RX ORDER — DOCUSATE SODIUM 100 MG
100 CAPSULE ORAL
Qty: 0 | Refills: 0 | Status: DISCONTINUED | OUTPATIENT
Start: 2018-01-12 | End: 2018-01-15

## 2018-01-12 RX ORDER — ONDANSETRON 8 MG/1
4 TABLET, FILM COATED ORAL EVERY 4 HOURS
Qty: 0 | Refills: 0 | Status: DISCONTINUED | OUTPATIENT
Start: 2018-01-12 | End: 2018-01-12

## 2018-01-12 RX ORDER — QUETIAPINE FUMARATE 200 MG/1
1 TABLET, FILM COATED ORAL
Qty: 0 | Refills: 0 | COMMUNITY
Start: 2018-01-12

## 2018-01-12 RX ORDER — CLINDAMYCIN PHOSPHATE GEL USP, 1% 10 MG/G
1 GEL TOPICAL
Qty: 0 | Refills: 0 | COMMUNITY
Start: 2018-01-12

## 2018-01-12 RX ORDER — FAMOTIDINE 10 MG/ML
1 INJECTION INTRAVENOUS
Qty: 0 | Refills: 0 | COMMUNITY
Start: 2018-01-12

## 2018-01-12 RX ORDER — SIMETHICONE 80 MG/1
2 TABLET, CHEWABLE ORAL
Qty: 0 | Refills: 0 | COMMUNITY
Start: 2018-01-12

## 2018-01-12 RX ORDER — OLANZAPINE 15 MG/1
1.25 TABLET, FILM COATED ORAL
Qty: 0 | Refills: 0 | COMMUNITY
Start: 2018-01-12

## 2018-01-12 RX ORDER — SIMETHICONE 80 MG/1
160 TABLET, CHEWABLE ORAL THREE TIMES A DAY
Qty: 0 | Refills: 0 | Status: DISCONTINUED | OUTPATIENT
Start: 2018-01-12 | End: 2018-01-12

## 2018-01-12 RX ORDER — MONTELUKAST 4 MG/1
1 TABLET, CHEWABLE ORAL
Qty: 0 | Refills: 0 | COMMUNITY

## 2018-01-12 RX ORDER — HALOPERIDOL DECANOATE 100 MG/ML
2.5 INJECTION INTRAMUSCULAR ONCE
Qty: 0 | Refills: 0 | Status: DISCONTINUED | OUTPATIENT
Start: 2018-01-12 | End: 2018-01-12

## 2018-01-12 RX ORDER — DILTIAZEM HCL 120 MG
1 CAPSULE, EXT RELEASE 24 HR ORAL
Qty: 0 | Refills: 0 | COMMUNITY

## 2018-01-12 RX ORDER — SIMETHICONE 80 MG/1
2 TABLET, CHEWABLE ORAL
Qty: 0 | Refills: 0 | DISCHARGE
Start: 2018-01-12

## 2018-01-12 RX ORDER — FAMOTIDINE 10 MG/ML
20 INJECTION INTRAVENOUS
Qty: 0 | Refills: 0 | Status: DISCONTINUED | OUTPATIENT
Start: 2018-01-12 | End: 2018-01-26

## 2018-01-12 RX ORDER — DILTIAZEM HCL 120 MG
1 CAPSULE, EXT RELEASE 24 HR ORAL
Qty: 0 | Refills: 0 | COMMUNITY
Start: 2018-01-12

## 2018-01-12 RX ORDER — DOCUSATE SODIUM 100 MG
100 CAPSULE ORAL THREE TIMES A DAY
Qty: 0 | Refills: 0 | Status: DISCONTINUED | OUTPATIENT
Start: 2018-01-12 | End: 2018-01-12

## 2018-01-12 RX ORDER — ONDANSETRON 8 MG/1
1 TABLET, FILM COATED ORAL
Qty: 0 | Refills: 0 | COMMUNITY
Start: 2018-01-12

## 2018-01-12 RX ORDER — SIMETHICONE 80 MG/1
160 TABLET, CHEWABLE ORAL
Qty: 0 | Refills: 0 | Status: DISCONTINUED | OUTPATIENT
Start: 2018-01-12 | End: 2018-01-26

## 2018-01-12 RX ORDER — QUETIAPINE FUMARATE 200 MG/1
25 TABLET, FILM COATED ORAL EVERY 6 HOURS
Qty: 0 | Refills: 0 | Status: DISCONTINUED | OUTPATIENT
Start: 2018-01-12 | End: 2018-01-26

## 2018-01-12 RX ORDER — CLINDAMYCIN PHOSPHATE GEL USP, 1% 10 MG/G
1 GEL TOPICAL DAILY
Qty: 0 | Refills: 0 | Status: DISCONTINUED | OUTPATIENT
Start: 2018-01-12 | End: 2018-01-26

## 2018-01-12 RX ORDER — SIMETHICONE 80 MG/1
2 TABLET, CHEWABLE ORAL
Qty: 0 | Refills: 0 | COMMUNITY

## 2018-01-12 RX ORDER — MONTELUKAST 4 MG/1
10 TABLET, CHEWABLE ORAL AT BEDTIME
Qty: 0 | Refills: 0 | Status: DISCONTINUED | OUTPATIENT
Start: 2018-01-12 | End: 2018-01-26

## 2018-01-12 RX ORDER — ALBUTEROL 90 UG/1
2 AEROSOL, METERED ORAL ONCE
Qty: 0 | Refills: 0 | Status: COMPLETED | OUTPATIENT
Start: 2018-01-12 | End: 2018-01-22

## 2018-01-12 RX ORDER — ONDANSETRON 8 MG/1
4 TABLET, FILM COATED ORAL
Qty: 0 | Refills: 0 | Status: DISCONTINUED | OUTPATIENT
Start: 2018-01-12 | End: 2018-01-26

## 2018-01-12 RX ORDER — FAMOTIDINE 10 MG/ML
20 INJECTION INTRAVENOUS
Qty: 0 | Refills: 0 | Status: DISCONTINUED | OUTPATIENT
Start: 2018-01-12 | End: 2018-01-12

## 2018-01-12 RX ADMIN — Medication 2.5 MILLIGRAM(S): at 22:05

## 2018-01-12 RX ADMIN — Medication 2 MILLIGRAM(S): at 22:30

## 2018-01-12 RX ADMIN — Medication 2 MILLIGRAM(S): at 11:58

## 2018-01-12 RX ADMIN — CLINDAMYCIN PHOSPHATE GEL USP, 1% 1 APPLICATION(S): 10 GEL TOPICAL at 13:43

## 2018-01-12 RX ADMIN — SODIUM CHLORIDE 50 MILLILITER(S): 9 INJECTION INTRAMUSCULAR; INTRAVENOUS; SUBCUTANEOUS at 09:05

## 2018-01-12 RX ADMIN — Medication 2 MILLIGRAM(S): at 06:29

## 2018-01-12 RX ADMIN — Medication 2 MILLIGRAM(S): at 17:45

## 2018-01-12 RX ADMIN — FAMOTIDINE 20 MILLIGRAM(S): 10 INJECTION INTRAVENOUS at 00:03

## 2018-01-12 RX ADMIN — ONDANSETRON 4 MILLIGRAM(S): 8 TABLET, FILM COATED ORAL at 01:20

## 2018-01-12 RX ADMIN — Medication 2 MILLIGRAM(S): at 01:36

## 2018-01-12 RX ADMIN — ONDANSETRON 4 MILLIGRAM(S): 8 TABLET, FILM COATED ORAL at 22:55

## 2018-01-12 RX ADMIN — ONDANSETRON 4 MILLIGRAM(S): 8 TABLET, FILM COATED ORAL at 06:07

## 2018-01-12 RX ADMIN — ONDANSETRON 4 MILLIGRAM(S): 8 TABLET, FILM COATED ORAL at 10:58

## 2018-01-12 RX ADMIN — Medication 2.5 MILLIGRAM(S): at 09:05

## 2018-01-12 RX ADMIN — MONTELUKAST 10 MILLIGRAM(S): 4 TABLET, CHEWABLE ORAL at 00:03

## 2018-01-12 NOTE — DISCHARGE NOTE ADULT - CARE PROVIDER_API CALL
Lorena Ford), Internal Medicine  [address] [place] [address] [address]  [place] [address]  Phone: [phone]  Fax: [phone]    Chris Barahona), Cardiac Electrophysiology; Cardiology  [address] [place] [address]  Phone: [phone]  Fax: [phone]    Outpatient Hematologist,   Phone: (   )    -  Fax: (   )    -

## 2018-01-12 NOTE — PROGRESS NOTE ADULT - PROBLEM SELECTOR PLAN 3
Patient with shortness of breath, stating she feels better with oxygen on, however noted to be saturating 100% on room air  - denies pleuritic chest pain  - no recent travel or immobilization, no active malignancy  - Wells score is 1 for tachycardia (although per chart review this is a chronic issue secondary to her autonomic dysautonomia)  - D-dimer negative 2 days ago  - unclear etiology, will continue to monitor  - patient refusing imaging requiring IV contrast at this time (had a "bad" reaction in the past)  - CT chest performed with no signs of pneumonia  - likely related to her anxiety

## 2018-01-12 NOTE — PROGRESS NOTE BEHAVIORAL HEALTH - AXIS III
dysautonomia, Idiopathic thrombocytopenic purpura

## 2018-01-12 NOTE — PROGRESS NOTE BEHAVIORAL HEALTH - NSBHCONSULTMEDPLAN_PSY_A_CORE FT
Continue clindamycin 1% Gel daily for facial acne, Colace 100mg TID, Pepcid 20mg BID, Singulair 10mg qhs, Zofran ODT 4mg q4hrs, Mylicon 160mg TID .

## 2018-01-12 NOTE — PROGRESS NOTE BEHAVIORAL HEALTH - NSBHCONSULTMEDAGITATION_PSY_A_CORE FT
Seroquel 25mg q6h PRN anxiety/mild agitation

## 2018-01-12 NOTE — PROGRESS NOTE BEHAVIORAL HEALTH - NSBHCHARTREVIEWVS_PSY_A_CORE FT
Vital Signs Last 24 Hrs  T(C): 36.9 (11 Jan 2018 05:53), Max: 37 (10 [place] 2018 19:43)  T(F): 98.4 (11 Jan 2018 05:53), Max: 98.6 (10 [place] 2018 19:43)  HR: 73 (11 Jan 2018 05:53) (73 - 95)  BP: 128/81 (11 Jan 2018 05:53) (127/87 - 137/92)  BP(mean): --  RR: 17 (11 Jan 2018 05:53) (17 - 20)  SpO2: 100% (11 Jan 2018 05:53) (100% - 100%) Vital Signs Last 24 Hrs  T(C): 36.9 (11 Jan 2018 21:27), Max: 36.9 (11 Jan 2018 21:27)  T(F): 98.5 (11 Jan 2018 21:27), Max: 98.5 (11 Jan 2018 21:27)  HR: 97 (12 Jan 2018 13:37) (72 - 97)  BP: 127/89 (12 Jan 2018 06:04) (127/86 - 133/91)  BP(mean): --  RR: 18 (12 Jan 2018 13:37) (18 - 18)  SpO2: 100% (12 Jan 2018 13:37) (98% - 100%)

## 2018-01-12 NOTE — PROGRESS NOTE ADULT - ATTENDING COMMENTS
outpt PMD Dr. Lorena Ford, EP Dr. Chris Barahona, cards Dr. Zelalem Hussein, heme Dr. Attila Zamora  Pt stable to tx to [facility], d/w hospitalist at [facility].  Spent 45 minutes counseling and coordinating discharge care.

## 2018-01-12 NOTE — PROGRESS NOTE BEHAVIORAL HEALTH - ORIENTED TO PLACE
[facility] DISCHARGE SUMMARY    Admit:5/9/2017  Discharge:5/13/2017  LOS: 4 days     Primary Care Physician: MAYTE Trevino    Consults:   Palliative Care    CC: Shortness of breath    DISCHARGE DIAGNOSES:   Pneumonia    UTI due to Klebsiella Pneumoniae   Hypertension    Dementia   Parkinson disease (CMS-HCC)    Anemia due to chronic blood loss    Chronic respiratory failure with hypoxia (CMS-[facility]) - oxygen nightly previously   CKD (chronic kidney disease), stage II    Dysphagia - family decided no NGT, allowing diet (modified) for comfort on hospice   DNR (do not resuscitate) /Hospice    PROCEDURES:  None    DIAGNOSTICS/STUDIES:  Interval history/exam for day of discharge:    Filed Vitals:    05/12/17 0800 05/12/17 1650 05/12/17 2000 05/13/17 0322   BP: 140/68 173/80 149/68 139/74   Pulse: 75 85 71 72   Temp: 36.5 °C (97.7 °F) 36.6 °C (97.8 °F) 36.3 °C (97.4 °F) 36.9 °C (98.4 °F)   Resp: 16 16 18 18   Height:       Weight:       SpO2: 97% 90% 92% 91%     Weight/BMI: Body mass index is 16.15 kg/(m^2).  Pulse Oximetry: 91 %, O2 (LPM): 0, O2 Delivery: None (Room Air)      Most Recent Labs:    Lab Results   Component Value Date/Time    WBC 4.5* 05/11/2017 02:30 AM    RBC 2.61* 05/11/2017 02:30 AM    HEMOGLOBIN 7.7* 05/11/2017 02:30 AM    HEMATOCRIT 25.8* 05/11/2017 02:30 AM    MCV 98.9* 05/11/2017 02:30 AM    MCH 29.5 05/11/2017 02:30 AM    MCHC 29.8* 05/11/2017 02:30 AM    MPV 9.1 05/11/2017 02:30 AM    NEUTROPHILS-POLYS 64.20 05/11/2017 02:30 AM    LYMPHOCYTES 19.60* 05/11/2017 02:30 AM    MONOCYTES 11.20 05/11/2017 02:30 AM    EOSINOPHILS 4.20 05/11/2017 02:30 AM    BASOPHILS 0.40 05/11/2017 02:30 AM    ANISOCYTOSIS 1+ 05/10/2017 04:07 AM      Lab Results   Component Value Date/Time    SODIUM 145 05/11/2017 02:30 AM    POTASSIUM 4.1 05/11/2017 02:30 AM    CHLORIDE 117* 05/11/2017 02:30 AM    CO2 17* 05/11/2017 02:30 AM    GLUCOSE 76 05/11/2017 02:30 AM    BUN 31* 05/11/2017 02:30 AM    CREATININE 
1.09 05/11/2017 02:30 AM      Lab Results   Component Value Date/Time    ALT(SGPT) 10 05/09/2017 07:58 AM    AST(SGOT) 14 05/09/2017 07:58 AM    ALKALINE PHOSPHATASE 116* 05/09/2017 07:58 AM    TOTAL BILIRUBIN 0.4 05/09/2017 07:58 AM    ALBUMIN 2.8* 05/11/2017 02:30 AM    GLOBULIN 3.5 05/09/2017 07:58 AM    INR 1.10 05/09/2017 07:58 AM     Lab Results   Component Value Date/Time    PT 14.6 05/09/2017 07:58 AM    INR 1.10 05/09/2017 07:58 AM        Imaging/ Testing:      DX-CHEST-PORTABLE (1 VIEW)   Final Result      Right upper lobe opacity likely represents pneumonia.      Patchy bibasilar opacities may represent atelectasis or pneumonitis.      Prominent atherosclerotic plaque.      Stable blunting of the left costophrenic angle may be related to pleural thickening.              HOSPITAL COURSE:  Please refer to H&P for full details.      In brief, Alison Atkinson is a 80F hx parkinson's complicated by advanced dementia, chronic anemia, living at [facility], noted to be weak and having cough so sent to hospital found to have PNA, likely from aspiration.  After discussion w nephew at bedside, decisions was made to not place NGT and aim primarily for comfort.  She was started on antibiotics for her infection and had improvement of respiratory status and was weaned to RA.  Urine culture also drawn, grew Klebsiella, covered by abx regmen.    Palliative care and hospice were consulted, non-necessary medications were discontinued.  She was set up with hospice which was arranged for her on discharge.  She was comfortable and tolerating a modified diet.  She will complete her abx course but plan to not initiate any further antibiotics as she has known aspiration and allowing diet for comfort.      DISCHARGE MEDICATIONS:     Medication List      START taking these medications       Instructions    amoxicillin-clavulanate 875-125 MG Tabs   Start taking on:  5/14/2017   Commonly known as:  AUGMENTIN    Take 1 Tab by mouth 2 times a 
day for 4 days.   Dose:  1 Tab       azithromycin 250 MG Tabs   Start taking on:  5/14/2017   Last time this was given:  250 mg on 5/13/2017  7:59 AM   Commonly known as:  ZITHROMAX    Take 1 Tab by mouth every day for 3 days.   Dose:  250 mg         CONTINUE taking these medications       Instructions    ATIVAN 0.5 MG Tabs   Last time this was given:  0.5 mg on 5/12/2017  5:52 AM   Generic drug:  lorazepam    Take 0.5 mg by mouth 3 times a day as needed for Anxiety.   Dose:  0.5 mg       docusate sodium 100 MG Caps   Commonly known as:  COLACE    Take 1 Cap by mouth 2 times a day.   Dose:  100 mg       mirtazapine 15 MG Tabs   Last time this was given:  15 mg on 5/12/2017  8:04 PM   Commonly known as:  REMERON    Take 1 Tab by mouth every bedtime.   Dose:  15 mg         STOP taking these medications          aspirin 325 MG Tabs   Commonly known as:  ASA       Calcium-Vitamin D 600-200 MG-UNIT Caps       citalopram 20 MG Tabs   Commonly known as:  CELEXA       donepezil 10 MG tablet   Commonly known as:  ARICEPT       ferrous sulfate 325 (65 FE) MG tablet       ibuprofen 200 MG Tabs   Commonly known as:  MOTRIN       lisinopril-hydrochlorothiazide 20-12.5 MG per tablet   Commonly known as:  PRINZIDE, ZESTORETIC       lovastatin 40 MG tablet   Commonly known as:  MEVACOR       trazodone 50 MG Tabs   Commonly known as:  DESYREL       Vitamin D 2000 UNITS Caps             FOLLOW UP:  Instructed to follow with hospice team on DC    D/C to: Home  CONDITION: Stable  DIET: Modified dysphagia diet for comfort.  ACTIVITY: As tolerated  CODE STATUS: Full    Time spent in coordination of discharge was 40 minutes.    Naila Aguilar D.O.      
Yes

## 2018-01-12 NOTE — PROGRESS NOTE BEHAVIORAL HEALTH - SUMMARY
Pt is a 41 yo female, single, unemployed, domiciled with parents, with ZAK, no past psychiatric hospitalizations, no past suicide attempts, no substance use/abuse, PMH POTS disease, HTN, history of ITP, IBS,  presented with c/o of shortness of breath.  Pt presents with severe anxiety, passive SI per mother.  Psych consulted for evaluation.    On exam yesterday, pt AAOx4, extremely anxious, labile, tearful, circumstantial.  Pt reports depressed mood in the context of medical issues.  Endorses passive thoughts of not wanting to live like this anymore.  Denies active SI/I/P.  Pt's mother also reports compulsive behaviors.  Presentation c/w ZAK and MDD, R/O OCD. Recommend starting Zyprexa for mood stabilization.  Discussed risks, benefits, and potential side effects with pt who refuses to start medication at this time and requests that team consult her hematologist, cardiologist, and GI specialist.  C/w Ativan 2mg fives times per day, Buspar 2.5mg BID.     Today, pt continues to be AAOx4, very labile, anxious, tearful. States that she is "petrified to die".  Have left callback numbers for patient's internist, hematologist, and cardiologist to discuss psychotropics. Awaiting callback. Internist Lorena gomez (), Cardiologist Zelalem Reed (), Hematologist Attila Zamora (). Pt continues to decline SSRI, SNRI, and Mirtazapine due to possible side effects. Due to limited options, will offer off label Zyprexa given extreme anxiety, insomnia and mood lability. Benefits of using mood stabilizer outweigh the risks at this time.    Pt's mom reports that pt was not functioning at home; was not eating or ambulating and making suicidal comments and plans for death (writing will). Mother states that pt's anxiety and depression is so debilitating that she is not able to care for her at home. Family does not feel safe taking pt. home as they think that pt. is a danger to herself. Given that patient is unable to function due to high anxiety and mood sxs, pt will likely  need inpatient psychiatric admission. Pt is refusing Zyprexa 1.25mg qhs due to fear of side effects. Risks and benefits discussed at length with patient. Pt is a 43 yo female, single, unemployed, domiciled with parents, with ZAK, no past psychiatric hospitalizations, no past suicide attempts, no substance use/abuse, PMH POTS disease, HTN, history of ITP, IBS,  presented with c/o of shortness of breath.  Pt presents with severe anxiety, passive SI per mother.  Psych consulted for evaluation.    On exam yesterday, pt AAOx4, extremely anxious, labile, tearful, circumstantial.  Pt reports depressed mood in the context of medical issues.  Endorses passive thoughts of not wanting to live like this anymore.  Denies active SI/I/P.  Pt's mother also reports compulsive behaviors.  Presentation c/w ZAK and MDD, R/O OCD. Recommend starting Zyprexa for mood stabilization.  Discussed risks, benefits, and potential side effects with pt who refuses to start medication at this time and requests that team consult her hematologist, cardiologist, and GI specialist.  C/w Ativan 2mg fives times per day, Buspar 2.5mg BID.     Today, pt continues to be AAOx4, very labile, anxious, tearful. States that she is "petrified to die".  Have left callback numbers for patient's internist, hematologist, and cardiologist to discuss psychotropics. Awaiting callback. Internist Lorena gomez (), Cardiologist Zelalem Reed (), Hematologist Attila Zamora (). Pt continues to decline SSRI, SNRI, and Mirtazapine due to possible side effects. Due to limited options, will offer off label Zyprexa given extreme anxiety, insomnia and mood lability. Benefits of using mood stabilizer outweigh the risks at this time.    Pt's mom reports that pt was not functioning at home; was not eating or ambulating and making suicidal comments and plans for death (writing will). Mother states that pt's anxiety and depression is so debilitating that she is not able to care for her at home. Family does not feel safe taking pt. home as they think that pt. is a danger to herself. Given that patient is unable to function due to high anxiety and mood sxs, pt will likely  need inpatient psychiatric admission. Pt is refusing Zyprexa 1.25mg qhs due to fear of side effects. Risks and benefits discussed at length with patient. Pt was also offered Celexa which she refused. Patient will be admitted to [facility] on 1N involuntarily. Pt is a 43 yo female, single, unemployed, domiciled with parents, with ZAK, no past psychiatric hospitalizations, no past suicide attempts, no substance use/abuse, PMH POTS disease, HTN, history of ITP, IBS,  presented with c/o of shortness of breath.  Pt presents with severe anxiety, passive SI per mother.  Psych consulted for evaluation.    On exam yesterday, pt AAOx4, extremely anxious, labile, tearful, circumstantial.  Pt reports depressed mood in the context of medical issues.  Endorses passive thoughts of not wanting to live like this anymore.  Denies active SI/I/P.  Pt's mother also reports compulsive behaviors.  Presentation c/w ZAK and MDD, R/O OCD. Recommend starting Zyprexa for mood stabilization.  Discussed risks, benefits, and potential side effects with pt who refuses to start medication at this time and requests that team consult her hematologist, cardiologist, and GI specialist.  C/w Ativan 2mg fives times per day, Buspar 2.5mg BID.     Today, pt continues to be AAOx4, very labile, anxious, tearful. States that she is "petrified to die".  Have left callback numbers for patient's internist, hematologist, and cardiologist to discuss psychotropics. Awaiting callback. Internist Lorena gomez (), Cardiologist Zelalem Reed (), Hematologist Attila Zamora (). Pt continues to decline SSRI, SNRI, and Mirtazapine due to possible side effects. Due to limited options, will offer off label Zyprexa given extreme anxiety, insomnia and mood lability. Benefits of using mood stabilizer outweigh the risks at this time.    Pt's mom reports that pt was not functioning at home; was not eating or ambulating and making suicidal comments and plans for death (writing will). Mother states that pt's anxiety and depression is so debilitating that she is not able to care for her at home. Family does not feel safe taking pt. home as they think that pt. is a danger to herself. Given that patient is unable to function due to high anxiety and mood sxs, pt will likely  need inpatient psychiatric admission. Pt is refusing Zyprexa 1.25mg qhs due to fear of side effects. Risks and benefits discussed at length with patient. Pt was also offered Celexa which she refused. Patient will be admitted to [facility] on 1N involuntarily.    In summary, pt is a 42F with PMH POTS, dysautonomia, and ITP. However, she shows strong elements of somatic symptoms disorder, borderline personality, and obsessive compulsive features. She has been refusing care, is preoccupied with her somatic symptoms, and has extreme mood lability. Due to her suicidal statements and significant weight loss and inability to care for self, pt requires inpatient hospitalization for stabilization. Pt is a 41 yo female, single, unemployed, domiciled with parents, with ZAK, no past psychiatric hospitalizations, no past suicide attempts, no substance use/abuse, PMH POTS disease, HTN, history of ITP, IBS,  presented with c/o of shortness of breath.  Pt presents with severe anxiety, passive SI per mother.  Psych consulted for evaluation.    On exam yesterday, pt AAOx4, extremely anxious, labile, tearful, circumstantial.  Pt reports depressed mood in the context of medical issues.  Endorses passive thoughts of not wanting to live like this anymore.  Denies active SI/I/P.  Pt's mother also reports compulsive behaviors.  Presentation c/w ZAK and MDD, R/O OCD. Recommend starting Zyprexa for mood stabilization.  Discussed risks, benefits, and potential side effects with pt who refuses to start medication at this time and requests that team consult her hematologist, cardiologist, and GI specialist.  C/w Ativan 2mg fives times per day, Buspar 2.5mg BID.     Today, pt continues to be AAOx4, very labile, anxious, tearful. States that she is "petrified to die".  Have left callback numbers for patient's internist, hematologist, and cardiologist to discuss psychotropics. Awaiting callback. Internist Lorena gomez (), Cardiologist Zelalem Reed (), Hematologist Attila Zamora (). Pt continues to decline SSRI, SNRI, and Mirtazapine due to possible side effects. Due to limited options, will offer off label Zyprexa given extreme anxiety, insomnia and mood lability. Benefits of using mood stabilizer outweigh the risks at this time.    Pt's mom reports that pt was not functioning at home; was not eating or ambulating and making suicidal comments and plans for death (writing will). Mother states that pt's anxiety and depression is so debilitating that she is not able to care for her at home. Family does not feel safe taking pt. home as they think that pt. is a danger to herself. Given that patient is unable to function due to high anxiety and mood sxs, pt will need inpatient psychiatric admission. Pt is refusing Zyprexa 1.25mg qhs due to fear of side effects. Risks and benefits discussed at length with patient. Pt was also offered Celexa which she refused. Patient will be admitted to [facility] on 1N involuntarily.    In summary, pt is a 42F with PMH POTS, dysautonomia, and ITP. However, she shows strong elements of somatic symptoms disorder, borderline personality, and obsessive compulsive features. She has been refusing care, is preoccupied with her somatic symptoms, and has extreme mood lability. Due to her suicidal statements, writing will, and significant weight loss and inability to care for self, pt requires inpatient hospitalization for stabilization.

## 2018-01-12 NOTE — DISCHARGE NOTE ADULT - SECONDARY DIAGNOSIS.
Shortness of breath POTS (postural orthostatic tachycardia syndrome) Acne vulgaris Anxiety IBS (irritable bowel syndrome) Idiopathic thrombocytopenia purpura

## 2018-01-12 NOTE — DISCHARGE NOTE ADULT - MEDICATION SUMMARY - MEDICATIONS TO CHANGE
I will SWITCH the dose or number of times a day I take the medications listed below when I get home from the hospital:    busPIRone 5 mg oral tablet  -- 0.5 tab(s) by mouth 2 times a day I will SWITCH the dose or number of times a day I take the medications listed below when I get home from the hospital:    busPIRone 5 mg oral tablet  -- 0.5 tab(s) by mouth 2 times a day    LORazepam 2 mg oral tablet  -- 1 tab(s) by mouth every 4 hours

## 2018-01-12 NOTE — PROGRESS NOTE BEHAVIORAL HEALTH - NSBHCONSULTMEDPRNREASON_PSY_A_CORE
agitation.../anxiety.../severe agitation...
anxiety.../severe agitation.../agitation...
anxiety.../severe agitation.../agitation...

## 2018-01-12 NOTE — DISCHARGE NOTE ADULT - MEDICATION SUMMARY - MEDICATIONS TO TAKE
I will START or STAY ON the medications listed below when I get home from the hospital:    dilTIAZem 120 mg/24 hours oral capsule, extended release  -- 1 cap(s) by mouth   -- Indication: For tachycardia    LORazepam 2 mg oral tablet  -- 1 tab(s) by mouth   -- Indication: For ZAK (generalized anxiety disorder)    ondansetron 4 mg oral tablet, disintegrating  -- 1 tab(s) by mouth every 4 hours  -- Indication: For Nausea    OLANZapine  -- 1.25 milligram(s) by mouth once a day (at bedtime)  -- Indication: For ZAK (generalized anxiety disorder)    QUEtiapine 25 mg oral tablet  -- 1 tab(s) by mouth every 6 hours, As needed, Anxiety or agitation  -- Indication: For ZAK (generalized anxiety disorder)    busPIRone  -- 2.5 milligram(s) by mouth 2 times a day at 9am and 9pm  -- Indication: For MDD (major depressive disorder)    clindamycin 1% topical gel  -- 1 application on skin once a day  -- Indication: For Acne vulgaris    digestive enzymes/hyoscyamine/phenyltoloxamine oral capsule  -- 3 cap(s) by mouth 3 times a day (with meals), As Needed for digestion  -- Indication: For Prophylaxis    famotidine 20 mg oral tablet  -- 1 tab(s) by mouth 2 times a day  -- Indication: For GERD    docusate sodium 100 mg oral capsule  -- 1 cap(s) by mouth 3 times a day  -- Indication: For bowel health    montelukast 10 mg oral tablet  -- 1 tab(s) by mouth once a day (at bedtime)  -- Indication: For SOB (shortness of breath)    simethicone 80 mg oral tablet, chewable  -- 2 tab(s) by mouth 3 times a day  -- Indication: For GAs relief I will START or STAY ON the medications listed below when I get home from the hospital:    dilTIAZem 120 mg/24 hours oral capsule, extended release  -- 1 cap(s) by mouth   -- Indication: For tachycardia    LORazepam 2 mg oral tablet  -- 1 tab(s) by mouth 5 times a day at 0100, 0600, 1100, 1600, 2100  -- Indication: For Anxiety    ondansetron 4 mg oral tablet, disintegrating  -- 1 tab(s) by mouth every 4 hours  -- Indication: For Nausea    OLANZapine  -- 1.25 milligram(s) by mouth once a day (at bedtime)  -- Indication: For ZAK (generalized anxiety disorder)    QUEtiapine 25 mg oral tablet  -- 1 tab(s) by mouth every 6 hours, As needed, Anxiety or agitation  -- Indication: For ZAK (generalized anxiety disorder)    busPIRone  -- 2.5 milligram(s) by mouth 2 times a day at 9am and 9pm  -- Indication: For MDD (major depressive disorder)    clindamycin 1% topical gel  -- 1 application on skin once a day  -- Indication: For Acne vulgaris    digestive enzymes/hyoscyamine/phenyltoloxamine oral capsule  -- 3 cap(s) by mouth 3 times a day (with meals), As Needed for digestion  -- Indication: For Prophylaxis    famotidine 20 mg oral tablet  -- 1 tab(s) by mouth 2 times a day  -- Indication: For GERD    docusate sodium 100 mg oral capsule  -- 1 cap(s) by mouth 3 times a day  -- Indication: For bowel health    montelukast 10 mg oral tablet  -- 1 tab(s) by mouth once a day (at bedtime)  -- Indication: For SOB (shortness of breath)    simethicone 80 mg oral tablet, chewable  -- 2 tab(s) by mouth 3 times a day  -- Indication: For GAs relief

## 2018-01-12 NOTE — PROGRESS NOTE ADULT - PROBLEM SELECTOR PLAN 1
Patient with severe anxiety, on Ativan and Buspar at home  - seen and evaluated by psychiatry who recommend involuntary [facility] admission   - c/w home Ativan and Buspar, added PRN Quetiapine per psych (although patient reported to me that she will refused  - pt with severe, disabling anxiety, will benefit from tx to [facility] for further manangement

## 2018-01-12 NOTE — PROGRESS NOTE BEHAVIORAL HEALTH - NSBHFUPINTERVALHXFT_PSY_A_CORE
Pt did not receive any PRNs overnight. No acute events. Pt refused Celexa and Zyprexa yesterday. Pt      Pt states this morning that she read through all the side effects of the recommended medication and is very worried about taking the medication. She continues to endorse depressed mood with anxiety, which is largely affected by her acute medical issues.     Family meeting held with patient's mother today, discussed patient's current care. Mother reiterates she is very concerned for her daughter's safety, stating that pt is "withering away," not eating, severely anxious, making suicidal statements and plans for death, such as will writing. Pt did not receive any PRNs overnight. No acute events. Pt refused Celexa and Zyprexa yesterday. Pt continues to be very concerned about side effects to the medication. Family meeting held this morning with patient's parents discussing the plan to admit patient to [facility] involuntarily. Parents agree to plan and were present when patient was informed. Patient objects due to concerns for inadequate medical care at [facility] and feels that she is not medically appropriate for discharge. Pt became very tearful. Pt did not receive any PRNs overnight. No acute events. Pt refused Celexa and Zyprexa yesterday. Pt continues to be very concerned about side effects to the medication. Family meeting held this morning with patient's parents discussing the plan to admit patient to [facility] involuntarily. Parents agree to plan and were present when patient was informed. Patient objects due to concerns for inadequate medical care at [facility] and feels that she is not medically appropriate for discharge. Pt became very tearful during discussion. Pt did not receive any PRNs overnight. No acute events. Pt refused Celexa and Zyprexa yesterday. Pt continues to be very concerned about side effects to the medication. Family meeting held this morning with patient's parents discussing the plan to admit patient to [facility] involuntarily. Parents agree to plan and were present when patient was informed. Patient objects due to concerns for inadequate medical care at [facility] and feels that she is not medically appropriate for discharge. Pt became very tearful during discussion. Reassurance provided.

## 2018-01-12 NOTE — PROGRESS NOTE BEHAVIORAL HEALTH - PRIMARY DX
ZAK (generalized anxiety disorder)

## 2018-01-12 NOTE — PROGRESS NOTE BEHAVIORAL HEALTH - NSBHCONSULTPSYCHPLAN_PSY_A_CORE FT
Ativan 2mg fives times per day (0100, 0600, 1100, 1600, 2100) (Istop checked and dose confirmed)  Buspar 2.5mg BID  Olanzapine 1.25mg hs    Seroquel 25mg q6h PRN anxiety/mild agitation    Haldol 1mg PO/IV/IM q6h PRN severe agitation.  Hold for QTC > 500.  Only for severe agitation in which pt presents risk of harm to self or others.

## 2018-01-12 NOTE — PROGRESS NOTE BEHAVIORAL HEALTH - NSBHCHARTREVIEWLAB_PSY_A_CORE FT
LABS:                        12.1   6.43  )-----------( 80       ( 10 Rock 2018 10:46 )             36.4     10 [place] 2018 10:46    141    |  105    |  4      ----------------------------<  149    3.8     |  23     |  0.92     Ca    9.3        10 [place] 2018 10:46  Phos  2.5       10 [place] 2018 10:46  Mg     2.1       10 [place] 2018 10:46      PT/INR - ( 09 Jan 2018 11:45 )   PT: 11.3 SEC;   INR: 1.02          PTT - ( 09 Jan 2018 11:45 )  PTT:25.5 SEC LABS:                        12.1   5.80  )-----------( 66       ( 12 Jan 2018 07:14 )             36.8     12 Jan 2018 07:14    143    |  105    |  3      ----------------------------<  88     3.6     |  23     |  0.84     Ca    9.0        12 Jan 2018 07:14

## 2018-01-12 NOTE — DISCHARGE NOTE ADULT - CARE PLAN
Principal Discharge DX:	Atypical chest pain  Goal:	resolution  Instructions for follow-up, activity and diet:	You had a full cardiac work up was negative inlcuding and echocardiogram. CT chest was also performed to rule out any other causes of chest pain, which was also negative. The discomfort in your chest is likely related to costochondritis, especially since the pain is reproducible. Take tylenol for pain as needed  Secondary Diagnosis:	Shortness of breath  Instructions for follow-up, activity and diet:	D-dimer negative, CT chest negative, O2 sat 100% on room air. Follow up with PCP as outpatient  Secondary Diagnosis:	POTS (postural orthostatic tachycardia syndrome)  Instructions for follow-up, activity and diet:	Continue cardizem  Secondary Diagnosis:	Acne vulgaris  Instructions for follow-up, activity and diet:	continue clindamycin gel to affected area. Also continue benzoyl peroxide 3-5% wash three times per week.  -for scalp pruritus c/w fluocinonide soln 3-5x/week.  Secondary Diagnosis:	Anxiety  Instructions for follow-up, activity and diet:	Continue taking ativan, buspar, and seroquel. Follow up with psychiatrist from [facility] upon arrival for further management  Secondary Diagnosis:	IBS (irritable bowel syndrome)  Instructions for follow-up, activity and diet:	Continue zofran, simethicone, pepcid and colace as needed.  Secondary Diagnosis:	Idiopathic thrombocytopenia purpura  Instructions for follow-up, activity and diet:	platelet count stable at this time. Follow up with your hematologist as outpatient Principal Discharge DX:	Atypical chest pain  Goal:	resolution  Instructions for follow-up, activity and diet:	You had a full cardiac work up was negative including an echocardiogram. CT chest was also performed to rule out any other causes of chest pain, which was also negative. The discomfort in your chest is likely related to costochondritis, especially since the pain is reproducible. Take tylenol for pain as needed  Secondary Diagnosis:	Shortness of breath  Instructions for follow-up, activity and diet:	D-dimer negative, CT chest negative, O2 sat 100% on room air. Follow up with PCP as outpatient  Secondary Diagnosis:	POTS (postural orthostatic tachycardia syndrome)  Instructions for follow-up, activity and diet:	Continue cardizem  Secondary Diagnosis:	Acne vulgaris  Instructions for follow-up, activity and diet:	continue clindamycin gel to affected area. Also continue benzoyl peroxide 3-5% wash three times per week.  -for scalp pruritus c/w fluocinonide soln 3-5x/week.  Secondary Diagnosis:	Anxiety  Instructions for follow-up, activity and diet:	Continue taking ativan, buspar, and seroquel. Follow up with psychiatrist from [facility] upon arrival for further management  Secondary Diagnosis:	IBS (irritable bowel syndrome)  Instructions for follow-up, activity and diet:	Continue zofran, simethicone, pepcid and colace as needed.  Secondary Diagnosis:	Idiopathic thrombocytopenia purpura  Instructions for follow-up, activity and diet:	platelet count stable at this time. Follow up with your hematologist as outpatient

## 2018-01-12 NOTE — PROGRESS NOTE BEHAVIORAL HEALTH - NSBHCONSULTMEDSEVERE_PSY_A_CORE FT
Haldol 1mg PO/IV/IM q6h PRN severe agitation.  Hold for QTC > 500.  Only for severe agitation in which pt presents risk of harm to self or others.

## 2018-01-12 NOTE — DISCHARGE NOTE ADULT - PLAN OF CARE
resolution You had a full cardiac work up was negative inlcuding and echocardiogram. CT chest was also performed to rule out any other causes of chest pain, which was also negative. The discomfort in your chest is likely related to costochondritis, especially since the pain is reproducible. Take tylenol for pain as needed D-dimer negative, CT chest negative, O2 sat 100% on room air. Follow up with PCP as outpatient Continue cardizem continue clindamycin gel to affected area. Also continue benzoyl peroxide 3-5% wash three times per week.  -for scalp pruritus c/w fluocinonide soln 3-5x/week. Continue taking ativan, buspar, and seroquel. Follow up with psychiatrist from [facility] upon arrival for further management Continue zofran, simethicone, pepcid and colace as needed. platelet count stable at this time. Follow up with your hematologist as outpatient You had a full cardiac work up was negative including an echocardiogram. CT chest was also performed to rule out any other causes of chest pain, which was also negative. The discomfort in your chest is likely related to costochondritis, especially since the pain is reproducible. Take tylenol for pain as needed

## 2018-01-12 NOTE — DISCHARGE NOTE ADULT - CARE PROVIDERS DIRECT ADDRESSES
[email or web address]

## 2018-01-12 NOTE — PROGRESS NOTE ADULT - SUBJECTIVE AND OBJECTIVE BOX
Patient is a 42y old  Female who presents with a chief complaint of chest pain x 5 days (12 Jan 2018 13:45);  severe anxiety    SUBJECTIVE / OVERNIGHT EVENTS:  Pt very anxious.  Pressured speech, tearful, expressing concerns about taking meds given her underlying "POTS, dysautonomia."  Has her own pulse ox monitor from home, checks it frequently.  Walked around unit on hospital monitor, sat 100% with good tracing.    MEDICATIONS  (STANDING):  busPIRone 2.5 milliGRAM(s) Oral <User Schedule>  clindamycin 1% Gel 1 Application(s) Topical daily  diltiazem    milliGRAM(s) Oral <User Schedule>  docusate sodium 100 milliGRAM(s) Oral three times a day  famotidine    Tablet 20 milliGRAM(s) Oral two times a day  LORazepam     Tablet 2 milliGRAM(s) Oral <User Schedule>  montelukast 10 milliGRAM(s) Oral at bedtime  OLANZapine 1.25 milliGRAM(s) Oral at bedtime  ondansetron   Disintegrating Tablet 4 milliGRAM(s) Oral every 4 hours  simethicone 160 milliGRAM(s) Chew three times a day  sodium chloride 0.9%. 1000 milliLiter(s) (50 mL/Hr) IV Continuous <Continuous>    MEDICATIONS  (PRN):  acetaminophen   Tablet. 650 milliGRAM(s) Oral every 6 hours PRN Mild Pain (1 - 3)  acetaminophen 300 mG/codeine 30 mG 1 Tablet(s) Oral every 6 hours PRN Moderate Pain (4 - 6)  acetaminophen 300 mG/codeine 30 mG 2 Tablet(s) Oral every 6 hours PRN Severe Pain (7 - 10)  DIGESTIVE ENZYMES (fROM VITAMIN SHOPPE) 3 Capsule(s) 3 Capsule(s) Oral three times a day with meals PRN WHEN NEEDED FOR DIGESTION  haloperidol    Injectable 2.5 milliGRAM(s) IntraMuscular once PRN agitation  LORazepam   Injectable 2 milliGRAM(s) IntraMuscular once PRN Agitation  QUEtiapine 25 milliGRAM(s) Oral every 6 hours PRN Anxiety or agitation    Vital Signs Last 24 Hrs  T(C): 36.9 (11 Jan 2018 21:27), Max: 36.9 (11 Jan 2018 21:27)  T(F): 98.5 (11 Jan 2018 21:27), Max: 98.5 (11 Jan 2018 21:27)  HR: 97 (12 Jan 2018 13:37) (72 - 97)  BP: 127/89 (12 Jan 2018 06:04) (127/86 - 133/91)  RR: 18 (12 Jan 2018 13:37) (18 - 18)  SpO2: 100% (12 Jan 2018 13:37) (98% - 100%)    PHYSICAL EXAM:  GENERAL: thin WF, extremely anxious  HEAD:  Atraumatic, Normocephalic  EYES: EOMI, PERRLA, conjunctiva and sclera clear  NECK: Supple, No JVD  CHEST/LUNG: Clear to auscultation bilaterally; No wheeze  HEART: tachy  ABDOMEN: Soft, Nontender, Nondistended; Bowel sounds present  EXTREMITIES:  2+ Peripheral Pulses, No clubbing, cyanosis, or edema  PSYCH: AAOx3, extremely anxious  NEUROLOGY: non-focal  SKIN: No rashes or lesions    LABS:             12.1   5.80  )-----------( 66       ( 12 Jan 2018 07:14 )             36.8     143  |  105  |  3<L>  ----------------------------<  88  3.6   |  23  |  0.84    Ca    9.0      12 Jan 2018 07:14    RADIOLOGY & ADDITIONAL TESTS:    < from: CT Chest No Cont (01.10.18 @ 15:19) >  LUNGS AND LARGE AIRWAYS: Patent central airways.  2 mm left upper lobe   calcified granuloma (series 2, image 17).  PLEURA: No pleural effusion.  VESSELS: Within normal limits.  HEART: Heart size is normal. No pericardial effusion.  MEDIASTINUM AND CARRIE: No lymphadenopathy.  CHEST WALL AND LOWER NECK: Within normal limits.  VISUALIZED UPPER ABDOMEN: Within normal limits.  BONES: Within normal limits.    IMPRESSION: No pneumonia.    < from: Transthoracic Echocardiogram (01.12.18 @ 09:47) >  Dimensions:     Normal Values:  LA:     2.4 cm    2.0 - 4.0 cm  Ao:     2.8 cm    2.0 - 3.8 cm  SEPTUM: 0.6 cm    0.6 - 1.2 cm  PWT:    0.6 cm    0.6 - 1.1 cm  LVIDd:  4.5 cm    3.0 - 5.6 cm  LVIDs:  2.9 cm    1.8 - 4.0 cm  Derived Variables:  LVMI: 54 g/m2  RWT: 0.26  Fractional short: 36 %  Ejection Fraction (Teicholtz): 65 %  ------------------------------------------------------------------------  OBSERVATIONS:  Mitral Valve: Normal mitral valve. Minimal mitral  regurgitation.  Aortic Root: Normal aortic root.  Aortic Valve: Normal trileaflet aortic valve.  Left Atrium: Normal left atrium.  Left Ventricle: Normal left ventricular systolic function.  No segmental wall motion abnormalities. Normal left  ventricular internal dimensions and wall thicknesses.  Right Heart: Normal right atrium. Normal right ventricular  size and function. Normal tricuspid valve.  Minimal  tricuspid regurgitation. Normal pulmonic valve.  Pericardium/PleuraNormal pericardium with no pericardial  effusion.  ------------------------------------------------------------------------  CONCLUSIONS:  1. Normal mitral valve. Minimal mitral regurgitation.  2. Normal left ventricular internal dimensions and wall  thicknesses.  3. Normal left ventricular systolic function. No segmental  wall motion abnormalities.  4. Normal right ventricular size and function.    Imaging Personally Reviewed:    Consultant(s) Notes Reviewed:      Care Discussed with Consultants/Other Providers:  ADS, RN, CM, SW;  psych re behavior

## 2018-01-12 NOTE — DISCHARGE NOTE ADULT - PROVIDER TOKENS
TOKEN:'[id number]',TOKEN:'[id number]',FREE:[LAST:[Outpatient Hematologist],PHONE:[(   )    -],FAX:[(   )    -]]

## 2018-01-12 NOTE — PROGRESS NOTE BEHAVIORAL HEALTH - CASE SUMMARY
Pt is a 43 yo female, single, unemployed, domiciled with parents, with ZAK, no past psychiatric hospitalizations, no past suicide attempts, no substance use/abuse, PMH POTS disease, HTN, history of ITP, IBS,  presented with c/o of shortness of breath.  Pt presents with severe anxiety, passive SI per mother.  Psych consulted for evaluation. Pt. seen and evaluated, AAOX3, remains extremely anxious, depressed and tearful. Pt. refused olanzapine, she stated that she is worried about the side effects. Pt,. was educated and explained again about the indication/risks and benefits of taking medication. Pt. continues to refuse SSRIs, SNRIs and Remeron due to possible side effects. Family meeting held today, please see above for details. Given that patient is unable to function due to high anxiety and mood sxs, endorsing SI, pt will likely  need inpatient psychiatric admission. Will continue to encourage and offer medications. Recommend meds. as written above. Will follow
Pt is a 43 yo female, single, unemployed, domiciled with parents, with ZAK, no past psychiatric hospitalizations, no past suicide attempts, no substance use/abuse, PMH POTS disease, HTN, history of ITP, IBS,  presented with c/o of shortness of breath.  Pt presents with severe anxiety, passive SI per mother.  Psych consulted for evaluation. Pt. seen and evaluated, remains depressed, tearful,  anxious, labile with passive SI. Discussed in details about different medications for anxiety and depression, she declined SSRIs, SNRIs and mirtazapine due to potential side effects.  Given severe mood sxs and very limited options, discussed and offered about starting low dose of olanzapine for mood lability and it will help with insomnia as well. Spoke with pt.'s mother today, she reported that pt. is not able to function due to severe anxiety, she is not eating, not walking, loosing weight, not sleeping, crying all day, tearful and fearful ("something will happen to me") , has been endorsing suicidal ideations. She further stated that she thinks that pt. is a danger to herself.  Mother is strongly advocating for psychiatric inpt. admission. Family meeting tomorrow at 9;30AM with pt.'s mother.  Given that patient is unable to function due to severe anxiety and mood sxs, pt will likely  need inpatient psychiatric admission. Recommend meds. as written above, will follow. case discussed with Ms. Noemi NP.
Pt is a 43 yo female, single, unemployed, domiciled with parents, with ZAK, no past psychiatric hospitalizations, no past suicide attempts, no substance use/abuse, PMH POTS disease, HTN, history of ITP, IBS,  presented with c/o of shortness of breath.  Pt presents with severe anxiety, passive SI per mother.  Psych consulted for evaluation. Pt. seen and evaluated, family meeting held with pt.'s parents, discussed and informed the pt. in detail about the need for inpt. psychiatric admission. Pt. continues to be  labile, extremely anxious, severely depressed, tearful, labile.  Family is not feeling safe to take pt. home and believes that pt. is a danger to herself. Pt. has been making suicidal statements, wrote a will, not eating , not sleeping, loosing weight  and not able to functions at home. Pt. needs psychiatric admission for acute psychiatric decompensation and for danger to herself. Pt. needs further psychiatric stabilization and medication management. Family is strongly advocating for psychiatric admission and agreed with the plan. Case discussed with Dr. Arambula.

## 2018-01-13 PROCEDURE — 99223 1ST HOSP IP/OBS HIGH 75: CPT

## 2018-01-13 PROCEDURE — 99222 1ST HOSP IP/OBS MODERATE 55: CPT

## 2018-01-13 RX ADMIN — ONDANSETRON 4 MILLIGRAM(S): 8 TABLET, FILM COATED ORAL at 18:51

## 2018-01-13 RX ADMIN — CLINDAMYCIN PHOSPHATE GEL USP, 1% 1 APPLICATION(S): 10 GEL TOPICAL at 09:38

## 2018-01-13 RX ADMIN — Medication 2.5 MILLIGRAM(S): at 21:32

## 2018-01-13 RX ADMIN — SIMETHICONE 160 MILLIGRAM(S): 80 TABLET, CHEWABLE ORAL at 21:33

## 2018-01-13 RX ADMIN — Medication 2.5 MILLIGRAM(S): at 09:38

## 2018-01-13 RX ADMIN — MONTELUKAST 10 MILLIGRAM(S): 4 TABLET, CHEWABLE ORAL at 21:33

## 2018-01-13 RX ADMIN — FAMOTIDINE 20 MILLIGRAM(S): 10 INJECTION INTRAVENOUS at 21:33

## 2018-01-13 RX ADMIN — Medication 2 MILLIGRAM(S): at 20:15

## 2018-01-13 RX ADMIN — Medication 100 MILLIGRAM(S): at 21:32

## 2018-01-13 RX ADMIN — Medication 2 MILLIGRAM(S): at 06:32

## 2018-01-13 RX ADMIN — Medication 2 MILLIGRAM(S): at 22:31

## 2018-01-13 NOTE — CONSULT NOTE ADULT - SUBJECTIVE AND OBJECTIVE BOX
HPI:  42 year old female with psych disorder now with exacerbation.  Pt admitted on 2018 to [facility] medicine with complaints of SOB and CP.  Pt with negative work up.  Aden negative x 3.  2-D echo nl LV function and EF 65%.  Pt with a hx of ITP with last platelet count 66.  Pt with ? POTs POSTURAL ORTHOSTATIC TACHYCARDIA SYNDROME on Diltiazem DC  followed by EP Dr. Chris Barahona.  +IBS.  +Dysautonomic Syndrome.  +HTN. +Constipation.    Pt states she desaturates at night and uses home oxygen and has a pulse oxymeter.   At [facility] on RA pt with O2 Sat 100%.      PAST MEDICAL & SURGICAL HISTORY:  Depression  Idiopathic thrombocytopenic purpura  Dysautonomia  Idiopathic thrombocytopenia purpura  Anxiety  POTS (postural orthostatic tachycardia syndrome)  History of ITP  Labyrinthitis  Headache  HTN (hypertension)  Anxiety  IBS (irritable bowel syndrome)  No significant past surgical history        Review of Systems:   CONSTITUTIONAL: No fever,+ weight loss,   EYES: No eye pain, visual disturbances, or discharge  ENMT:  No difficulty hearing, tinnitus, vertigo; No sinus or throat pain  NECK: No pain or stiffness  BREASTS: No pain, masses, or nipple discharge  RESPIRATORY: +SOB on Proventil inhaler prn and Singulair.  CARDIOVASCULAR: +Chest pain reproducible with palpation.  Negative cardiac work up.  +POTS on Diltiazem LA.  GASTROINTESTINAL: +IBS on  ZOFRAN, COLACE, PEPCID AND SIMETHACONE.  GENITOURINARY: No dysuria, frequency, hematuria, or incontinence  NEUROLOGICAL: No headaches, memory loss, loss of strength, numbness, or tremors  SKIN: +ACNE ON CLINDAMYCIN LOTION AND +SCALP PRURITUS ON FLUOCINONIDE SOLN 3=5X A WEEK.  LYMPH NODES: No enlarged glands  ENDOCRINE:+SWEATING  MUSCULOSKELETAL: No joint pain or swelling; No muscle, back, or extremity pain  PSYCHIATRIC:  +ANXIETY  HEME/LYMPH: +ITP WITH PLAT COUNT 66 CHRONIC  ALLERY AND IMMUNOLOGIC: No hives or eczema    Allergies    barium sulfate (Other)  beta blockers (Unknown)  Lexapro (Unknown)  Reglan (Other)    Intolerances    metoprolol (Headache)      Social History:  -CIGS, -ETOH, -DRUGS.    FAMILY HISTORY:  No pertinent family history in first degree relatives  Family history of hypertension (Grandparent)  Family history of atrial fibrillation  Family history of prostate cancer in father      MEDICATIONS  (STANDING):  busPIRone 2.5 milliGRAM(s) Oral <User Schedule>  clindamycin 1% Gel 1 Application(s) Topical daily  diltiazem    milliGRAM(s) Oral daily  docusate sodium 100 milliGRAM(s) Oral <User Schedule>  famotidine    Tablet 20 milliGRAM(s) Oral <User Schedule>  LORazepam     Tablet 2 milliGRAM(s) Oral <User Schedule>  montelukast 10 milliGRAM(s) Oral at bedtime  simethicone 160 milliGRAM(s) Chew <User Schedule>    MEDICATIONS  (PRN):  ALBUTerol    90 MICROgram(s) HFA Inhaler 2 Puff(s) Inhalation once PRN Wheezing  ondansetron   Disintegrating Tablet 4 milliGRAM(s) Oral <User Schedule> PRN Nausea and/or Vomiting  QUEtiapine 25 milliGRAM(s) Oral every 6 hours PRN Anxiety or agitation    VS:  SITTING 121/72 80 STANDING 128/87 107 T 98.7 WT 45.4 KG    PHYSICAL EXAM:  GENERAL: ANXIOUS THIN  HEAD:  Atraumatic, Normocephalic  EYES: EOMI,  conjunctiva and sclera clear  NECK: Supple, No JVD  CHEST/LUNG: Clear to auscultation bilaterally; No wheeze  HEART: Regular rate and rhythm; No murmurs, rubs, or gallops  ABDOMEN: Soft, Nontender, Nondistended; Bowel sounds present  EXTREMITIES:   No clubbing, cyanosis, or edema  NEUROLOGY: non-focal  SKIN: No rashes or lesions    LABS:                        12.1   5.80  )-----------( 66       ( 2018 07:14 )             36.8         143  |  105  |  3<L>  ----------------------------<  88  3.6   |  23  |  0.84    Ca    9.0      2018 07:14                EK-D ECHO 2018  EF 65% NL LV FUNCTION.    RADIOLOGY & ADDITIONAL TESTS:  01/10/2018  CT OF CHEST:  NO PNEUMONIA  2017 NUCLEAR MEDICINE GASTRIC EMPTYING:  +GASTROPARESIS  2017  CT OF HEAD:  NO ACUTE INTRACRANIAL HEMORRHAGE, SKULL FX OR FLUID COLLECTION.      Consultant(s) Notes Reviewed:   HOSPITALIST AND CONSULTANT [facility] NOTES REVIEWED    Care Discussed with Consultants/Other Providers:  ATTENDING AND STAFF. HPI:  42 year old female with psych disorder now with exacerbation.  Pt admitted on 2018 to [facility] medicine with complaints of SOB and CP.  Pt with negative work up.  Aden negative x 3.  2-D echo nl LV function and EF 65%.  Pt with a hx of ITP with last platelet count 66.  Pt with ? POTs POSTURAL ORTHOSTATIC TACHYCARDIA SYNDROME on Diltiazem DC  followed by EP Dr. Chris Barahona.  +IBS.  +Dysautonomic Syndrome.  +HTN. +Constipation.    Pt states she desaturates at night and uses home oxygen and has a pulse oxymeter.   At [facility] on RA pt with O2 Sat 100%.      PAST MEDICAL & SURGICAL HISTORY:  Depression  Idiopathic thrombocytopenic purpura  Dysautonomia  Idiopathic thrombocytopenia purpura  Anxiety  POTS (postural orthostatic tachycardia syndrome)  History of ITP  Labyrinthitis  Headache  HTN (hypertension)  Anxiety  IBS (irritable bowel syndrome)  No significant past surgical history        Review of Systems:   CONSTITUTIONAL: No fever,+ weight loss,   EYES: No eye pain, visual disturbances, or discharge  ENMT:  No difficulty hearing, tinnitus, vertigo; No sinus or throat pain  NECK: No pain or stiffness  BREASTS: No pain, masses, or nipple discharge  RESPIRATORY: +SOB on Proventil inhaler prn and Singulair.  CARDIOVASCULAR: +Chest pain reproducible with palpation.  Negative cardiac work up.  +POTS on Diltiazem LA.  GASTROINTESTINAL: +IBS on  ZOFRAN, COLACE, PEPCID AND SIMETHACONE.  GENITOURINARY: No dysuria, frequency, hematuria, or incontinence  NEUROLOGICAL: No headaches, memory loss, loss of strength, numbness, or tremors  SKIN: +ACNE ON CLINDAMYCIN LOTION AND +SCALP PRURITUS ON FLUOCINONIDE SOLN 3=5X A WEEK.  LYMPH NODES: No enlarged glands  ENDOCRINE:+SWEATING  MUSCULOSKELETAL: No joint pain or swelling; No muscle, back, or extremity pain  PSYCHIATRIC:  +ANXIETY  HEME/LYMPH: +ITP WITH PLAT COUNT 66 CHRONIC  ALLERY AND IMMUNOLOGIC: No hives or eczema    Allergies    barium sulfate (Other)  beta blockers (Unknown)  Lexapro (Unknown)  Reglan (Other)    Intolerances    metoprolol (Headache)      Social History:  -CIGS, -ETOH, -DRUGS.    FAMILY HISTORY:  No pertinent family history in first degree relatives  Family history of hypertension (Grandparent)  Family history of atrial fibrillation  Family history of prostate cancer in father      MEDICATIONS  (STANDING):  busPIRone 2.5 milliGRAM(s) Oral <User Schedule>  clindamycin 1% Gel 1 Application(s) Topical daily  diltiazem    milliGRAM(s) Oral daily  docusate sodium 100 milliGRAM(s) Oral <User Schedule>  famotidine    Tablet 20 milliGRAM(s) Oral <User Schedule>  LORazepam     Tablet 2 milliGRAM(s) Oral <User Schedule>  montelukast 10 milliGRAM(s) Oral at bedtime  simethicone 160 milliGRAM(s) Chew <User Schedule>    MEDICATIONS  (PRN):  ALBUTerol    90 MICROgram(s) HFA Inhaler 2 Puff(s) Inhalation once PRN Wheezing  ondansetron   Disintegrating Tablet 4 milliGRAM(s) Oral <User Schedule> PRN Nausea and/or Vomiting  QUEtiapine 25 milliGRAM(s) Oral every 6 hours PRN Anxiety or agitation    VS:  SITTING 121/72 80 STANDING 128/87 107 T 98.7 WT 45.4 KG    PHYSICAL EXAM:  GENERAL: ANXIOUS THIN  HEAD:  Atraumatic, Normocephalic  EYES: EOMI,  conjunctiva and sclera clear  NECK: Supple, No JVD  CHEST/LUNG: Clear to auscultation bilaterally; No wheeze  HEART: Regular rate and rhythm; No murmurs, rubs, or gallops  ABDOMEN: Soft, Nontender, Nondistended; Bowel sounds present  EXTREMITIES:   No clubbing, cyanosis, or edema  NEUROLOGY: non-focal  SKIN: No rashes or lesions    LABS:                        12.1   5.80  )-----------( 66       ( 2018 07:14 )             36.8         143  |  105  |  3<L>  ----------------------------<  88  3.6   |  23  |  0.84    Ca    9.0      2018 07:14                EK2018  NSR SINUS ARRHYTHMIA  2-D ECHO 2018  EF 65% NL LV FUNCTION.    RADIOLOGY & ADDITIONAL TESTS:  01/10/2018  CT OF CHEST:  NO PNEUMONIA  2017 NUCLEAR MEDICINE GASTRIC EMPTYING:  +GASTROPARESIS  2017  CT OF HEAD:  NO ACUTE INTRACRANIAL HEMORRHAGE, SKULL FX OR FLUID COLLECTION.      Consultant(s) Notes Reviewed:   HOSPITALIST AND CONSULTANT [facility] NOTES REVIEWED    Care Discussed with Consultants/Other Providers:  ATTENDING AND STAFF. HPI:  42 year old female with psych disorder now with exacerbation.  Pt admitted on 2018 to [facility] medicine with complaints of SOB and CP.  Pt with negative work up.  Aden negative x 3.  2-D echo nl LV function and EF 65%.  Pt with a hx of ITP with last platelet count 66.  Pt with ? POTs POSTURAL ORTHOSTATIC TACHYCARDIA SYNDROME on Diltiazem DC  followed by EP Dr. Chris Barahona.  +IBS.  +Dysautonomic Syndrome.  +HTN. +Constipation.    Pt states she desaturates at night and uses home oxygen and has a pulse oxymeter.   At [facility] on RA pt with O2 Sat 100%.      PT HIGHLY SOMATICALLY PREOCCUPIED WITH MULTIPLE MEDICAL COMPLAINTS.    PAST MEDICAL & SURGICAL HISTORY:  Depression  Idiopathic thrombocytopenic purpura  Dysautonomia  Anxiety  POTS (postural orthostatic tachycardia syndrome)  Labyrinthitis  Headache  HTN (hypertension)  IBS (irritable bowel syndrome)  No significant past surgical history        Review of Systems:   CONSTITUTIONAL: No fever,+ weight loss,   EYES: No eye pain, visual disturbances, or discharge  ENMT:  No difficulty hearing, tinnitus, vertigo; No sinus or throat pain  NECK: No pain or stiffness  BREASTS: No pain, masses, or nipple discharge  RESPIRATORY: +SOB on Proventil inhaler prn and Singulair.  CARDIOVASCULAR: +Chest pain reproducible with palpation.  Negative cardiac work up.  +POTS on Diltiazem LA.  GASTROINTESTINAL: +IBS on  ZOFRAN, COLACE, PEPCID AND SIMETHACONE.  GENITOURINARY: No dysuria, frequency, hematuria, or incontinence  NEUROLOGICAL: No headaches, memory loss, loss of strength, numbness, or tremors  SKIN: +ACNE ON CLINDAMYCIN LOTION AND +SCALP PRURITUS ON FLUOCINONIDE SOLN 3=5X A WEEK.  LYMPH NODES: No enlarged glands  ENDOCRINE:+SWEATING  MUSCULOSKELETAL: No joint pain or swelling; No muscle, back, or extremity pain  PSYCHIATRIC:  +ANXIETY  HEME/LYMPH: +ITP WITH PLAT COUNT 66 CHRONIC  ALLERY AND IMMUNOLOGIC: No hives or eczema    Allergies    barium sulfate (Other)  beta blockers (Unknown)  Lexapro (Unknown)  Reglan (Other)    Intolerances    metoprolol (Headache)      Social History:  -CIGS, -ETOH, -DRUGS.    FAMILY HISTORY:  No pertinent family history in first degree relatives  Family history of hypertension (Grandparent)  Family history of atrial fibrillation  Family history of prostate cancer in father      MEDICATIONS  (STANDING):  busPIRone 2.5 milliGRAM(s) Oral <User Schedule>  clindamycin 1% Gel 1 Application(s) Topical daily  diltiazem    milliGRAM(s) Oral daily  docusate sodium 100 milliGRAM(s) Oral <User Schedule>  famotidine    Tablet 20 milliGRAM(s) Oral <User Schedule>  LORazepam     Tablet 2 milliGRAM(s) Oral <User Schedule>  montelukast 10 milliGRAM(s) Oral at bedtime  simethicone 160 milliGRAM(s) Chew <User Schedule>    MEDICATIONS  (PRN):  ALBUTerol    90 MICROgram(s) HFA Inhaler 2 Puff(s) Inhalation once PRN Wheezing  ondansetron   Disintegrating Tablet 4 milliGRAM(s) Oral <User Schedule> PRN Nausea and/or Vomiting  QUEtiapine 25 milliGRAM(s) Oral every 6 hours PRN Anxiety or agitation    VS:  SITTING 121/72 80 STANDING 128/87 107 T 98.7 WT 45.4 KG  OXYGEN % RA    PHYSICAL EXAM:  GENERAL: ANXIOUS THIN  HEAD:  Atraumatic, Normocephalic  EYES: EOMI,  conjunctiva and sclera clear  NECK: Supple, No JVD  CHEST/LUNG: Clear to auscultation bilaterally; No wheeze  HEART: Regular rate and rhythm; No murmurs, rubs, or gallops  ABDOMEN: Soft, Nontender, Nondistended; Bowel sounds present  EXTREMITIES:   No clubbing, cyanosis, or edema  NEUROLOGY: non-focal  SKIN: No rashes or lesions    LABS:                        12.1   5.80  )-----------( 66       ( 2018 07:14 )             36.8         143  |  105  |  3<L>  ----------------------------<  88  3.6   |  23  |  0.84    Ca    9.0      2018 07:14                EK2018  NSR SINUS ARRHYTHMIA  2-D ECHO 2018  EF 65% NL LV FUNCTION.    RADIOLOGY & ADDITIONAL TESTS:  01/10/2018  CT OF CHEST:  NO PNEUMONIA  2017 NUCLEAR MEDICINE GASTRIC EMPTYING:  +GASTROPARESIS  2017  CT OF HEAD:  NO ACUTE INTRACRANIAL HEMORRHAGE, SKULL FX OR FLUID COLLECTION.      Consultant(s) Notes Reviewed:   HOSPITALIST AND CONSULTANT [facility] NOTES REVIEWED    Care Discussed with Consultants/Other Providers:  ATTENDING AND STAFF.

## 2018-01-13 NOTE — CONSULT NOTE ADULT - ASSESSMENT
42 year old female with psych disorder now with exacerbation highly anxious with multiple [facility] ED and Medical Admissions for multiple somatic complaints.  Pt also with several medical issues.  1.  ITP:  PLATELET COUNT 66 CHRONIC.  2.  POTS:  PT WITH MILD RISE IN PULSE WITH STANDING.  PT ON DILTIAZEM CD.  FOLLOW VS.  3.  IBS:  SIMETHICONE, COLACE, PEPCID AND ZOFRAN.  4.  CHEST PAIN: REPRODUCIBLE.  COSTOCHONDRITIS.  5.  RESPIRATORY:  SOB.  PT ON PRN PROVENTIL AND SINGULAIR.  PT CHECKS OXYGEN SATS EXCESSIVELY.  SOB SECONDARY TO ANXIETY.  6.  DERM:  CLINDAMYCIN SOLUTION.  7.  PSYCH: AS PER ATTENDING. 42 year old female with psych disorder now with exacerbation highly anxious with multiple [facility] ED and Medical Admissions for multiple somatic complaints.  Pt also with several medical issues.  1.  ITP:  PLATELET COUNT 66 CHRONIC.  2.  POTS:  PT WITH MILD RISE IN PULSE WITH STANDING.  PT ON DILTIAZEM CD.  FOLLOW VS.  3.  IBS:  SIMETHICONE, COLACE, PEPCID AND ZOFRAN.  4.  CHEST PAIN: REPRODUCIBLE.  COSTOCHONDRITIS.  5.  RESPIRATORY:  SOB.  PT ON PRN PROVENTIL AND SINGULAIR.  PT CHECKS OXYGEN SATS EXCESSIVELY.  SOB SECONDARY TO ANXIETY.  6.  DERM:  CLINDAMYCIN SOLUTION.  7.  PSYCH: AS PER ATTENDING.  PT EXTREMELY SOMATICALLY PREOCCUPIED.

## 2018-01-14 LAB
BUN SERPL-MCNC: 4 MG/DL — LOW (ref 7–23)
CALCIUM SERPL-MCNC: 9.7 MG/DL — SIGNIFICANT CHANGE UP (ref 8.4–10.5)
CHLORIDE SERPL-SCNC: 107 MMOL/L — SIGNIFICANT CHANGE UP (ref 98–107)
CO2 SERPL-SCNC: 24 MMOL/L — SIGNIFICANT CHANGE UP (ref 22–31)
CREAT SERPL-MCNC: 0.93 MG/DL — SIGNIFICANT CHANGE UP (ref 0.5–1.3)
GLUCOSE SERPL-MCNC: 106 MG/DL — HIGH (ref 70–99)
POTASSIUM SERPL-MCNC: 4.1 MMOL/L — SIGNIFICANT CHANGE UP (ref 3.5–5.3)
POTASSIUM SERPL-SCNC: 4.1 MMOL/L — SIGNIFICANT CHANGE UP (ref 3.5–5.3)
SODIUM SERPL-SCNC: 146 MMOL/L — HIGH (ref 135–145)

## 2018-01-14 PROCEDURE — 93010 ELECTROCARDIOGRAM REPORT: CPT

## 2018-01-14 PROCEDURE — 99232 SBSQ HOSP IP/OBS MODERATE 35: CPT

## 2018-01-14 RX ORDER — OLANZAPINE 15 MG/1
2.5 TABLET, FILM COATED ORAL AT BEDTIME
Qty: 0 | Refills: 0 | Status: DISCONTINUED | OUTPATIENT
Start: 2018-01-14 | End: 2018-01-26

## 2018-01-14 RX ORDER — ESCITALOPRAM OXALATE 10 MG/1
5 TABLET, FILM COATED ORAL DAILY
Qty: 0 | Refills: 0 | Status: DISCONTINUED | OUTPATIENT
Start: 2018-01-14 | End: 2018-01-15

## 2018-01-14 RX ADMIN — CLINDAMYCIN PHOSPHATE GEL USP, 1% 1 APPLICATION(S): 10 GEL TOPICAL at 09:56

## 2018-01-14 RX ADMIN — ONDANSETRON 4 MILLIGRAM(S): 8 TABLET, FILM COATED ORAL at 10:42

## 2018-01-14 RX ADMIN — ONDANSETRON 4 MILLIGRAM(S): 8 TABLET, FILM COATED ORAL at 19:13

## 2018-01-14 RX ADMIN — Medication 2 MILLIGRAM(S): at 19:34

## 2018-01-14 RX ADMIN — Medication 2 MILLIGRAM(S): at 15:46

## 2018-01-14 RX ADMIN — ONDANSETRON 4 MILLIGRAM(S): 8 TABLET, FILM COATED ORAL at 15:32

## 2018-01-14 RX ADMIN — Medication 1 MILLIGRAM(S): at 02:22

## 2018-01-14 RX ADMIN — Medication 100 MILLIGRAM(S): at 11:44

## 2018-01-14 RX ADMIN — SIMETHICONE 160 MILLIGRAM(S): 80 TABLET, CHEWABLE ORAL at 21:00

## 2018-01-14 RX ADMIN — Medication 100 MILLIGRAM(S): at 20:59

## 2018-01-14 RX ADMIN — ONDANSETRON 4 MILLIGRAM(S): 8 TABLET, FILM COATED ORAL at 06:45

## 2018-01-14 RX ADMIN — ONDANSETRON 4 MILLIGRAM(S): 8 TABLET, FILM COATED ORAL at 22:26

## 2018-01-14 RX ADMIN — MONTELUKAST 10 MILLIGRAM(S): 4 TABLET, CHEWABLE ORAL at 21:00

## 2018-01-14 RX ADMIN — Medication 2 MILLIGRAM(S): at 06:22

## 2018-01-14 RX ADMIN — Medication 2.5 MILLIGRAM(S): at 09:00

## 2018-01-14 RX ADMIN — SIMETHICONE 160 MILLIGRAM(S): 80 TABLET, CHEWABLE ORAL at 11:44

## 2018-01-14 RX ADMIN — FAMOTIDINE 20 MILLIGRAM(S): 10 INJECTION INTRAVENOUS at 11:44

## 2018-01-14 RX ADMIN — Medication 2 MILLIGRAM(S): at 11:23

## 2018-01-14 RX ADMIN — FAMOTIDINE 20 MILLIGRAM(S): 10 INJECTION INTRAVENOUS at 20:59

## 2018-01-14 RX ADMIN — Medication 2 MILLIGRAM(S): at 22:25

## 2018-01-15 PROCEDURE — 99232 SBSQ HOSP IP/OBS MODERATE 35: CPT

## 2018-01-15 RX ORDER — DOCUSATE SODIUM 100 MG
100 CAPSULE ORAL
Qty: 0 | Refills: 0 | Status: DISCONTINUED | OUTPATIENT
Start: 2018-01-15 | End: 2018-01-26

## 2018-01-15 RX ADMIN — FAMOTIDINE 20 MILLIGRAM(S): 10 INJECTION INTRAVENOUS at 21:35

## 2018-01-15 RX ADMIN — Medication 2 MILLIGRAM(S): at 18:50

## 2018-01-15 RX ADMIN — Medication 2.5 MILLIGRAM(S): at 22:05

## 2018-01-15 RX ADMIN — ONDANSETRON 4 MILLIGRAM(S): 8 TABLET, FILM COATED ORAL at 10:42

## 2018-01-15 RX ADMIN — Medication 2 MILLIGRAM(S): at 11:05

## 2018-01-15 RX ADMIN — ONDANSETRON 4 MILLIGRAM(S): 8 TABLET, FILM COATED ORAL at 14:10

## 2018-01-15 RX ADMIN — Medication 2 MILLIGRAM(S): at 22:49

## 2018-01-15 RX ADMIN — CLINDAMYCIN PHOSPHATE GEL USP, 1% 1 APPLICATION(S): 10 GEL TOPICAL at 09:16

## 2018-01-15 RX ADMIN — Medication 100 MILLIGRAM(S): at 16:42

## 2018-01-15 RX ADMIN — ONDANSETRON 4 MILLIGRAM(S): 8 TABLET, FILM COATED ORAL at 06:39

## 2018-01-15 RX ADMIN — Medication 2 MILLIGRAM(S): at 14:40

## 2018-01-15 RX ADMIN — Medication 2 MILLIGRAM(S): at 07:04

## 2018-01-15 RX ADMIN — SIMETHICONE 160 MILLIGRAM(S): 80 TABLET, CHEWABLE ORAL at 11:48

## 2018-01-15 RX ADMIN — Medication 100 MILLIGRAM(S): at 11:47

## 2018-01-15 RX ADMIN — Medication 100 MILLIGRAM(S): at 21:35

## 2018-01-15 RX ADMIN — Medication 2.5 MILLIGRAM(S): at 08:10

## 2018-01-15 RX ADMIN — MONTELUKAST 10 MILLIGRAM(S): 4 TABLET, CHEWABLE ORAL at 21:42

## 2018-01-15 RX ADMIN — SIMETHICONE 160 MILLIGRAM(S): 80 TABLET, CHEWABLE ORAL at 21:42

## 2018-01-15 RX ADMIN — FAMOTIDINE 20 MILLIGRAM(S): 10 INJECTION INTRAVENOUS at 11:47

## 2018-01-16 PROCEDURE — 99232 SBSQ HOSP IP/OBS MODERATE 35: CPT | Mod: GC

## 2018-01-16 RX ORDER — ESCITALOPRAM OXALATE 10 MG/1
1 TABLET, FILM COATED ORAL ONCE
Qty: 0 | Refills: 0 | Status: DISCONTINUED | OUTPATIENT
Start: 2018-01-16 | End: 2018-01-16

## 2018-01-16 RX ADMIN — Medication 2 MILLIGRAM(S): at 06:40

## 2018-01-16 RX ADMIN — Medication 100 MILLIGRAM(S): at 21:48

## 2018-01-16 RX ADMIN — Medication 1 MILLIGRAM(S): at 21:50

## 2018-01-16 RX ADMIN — Medication 1 MILLIGRAM(S): at 03:56

## 2018-01-16 RX ADMIN — Medication 1 MILLIGRAM(S): at 15:46

## 2018-01-16 RX ADMIN — Medication 100 MILLIGRAM(S): at 15:34

## 2018-01-16 RX ADMIN — MONTELUKAST 10 MILLIGRAM(S): 4 TABLET, CHEWABLE ORAL at 21:49

## 2018-01-16 RX ADMIN — Medication 2 MILLIGRAM(S): at 10:19

## 2018-01-17 ENCOUNTER — EMERGENCY (EMERGENCY)
Facility: HOSPITAL | Age: 43
LOS: 1 days | Discharge: HOME | End: 2018-01-17
Attending: EMERGENCY MEDICINE | Admitting: EMERGENCY MEDICINE
Payer: MEDICAID

## 2018-01-17 VITALS
HEART RATE: 80 BPM | OXYGEN SATURATION: 100 % | DIASTOLIC BLOOD PRESSURE: 93 MMHG | TEMPERATURE: 98 F | SYSTOLIC BLOOD PRESSURE: 123 MMHG | RESPIRATION RATE: 16 BRPM

## 2018-01-17 LAB
ALBUMIN SERPL ELPH-MCNC: 4.9 G/DL — SIGNIFICANT CHANGE UP (ref 3.3–5)
ALP SERPL-CCNC: 77 U/L — SIGNIFICANT CHANGE UP (ref 40–120)
ALT FLD-CCNC: 11 U/L — SIGNIFICANT CHANGE UP (ref 4–33)
AMYLASE P1 CFR SERPL: 54 U/L — SIGNIFICANT CHANGE UP (ref 25–125)
AST SERPL-CCNC: 16 U/L — SIGNIFICANT CHANGE UP (ref 4–32)
BILIRUB SERPL-MCNC: 0.5 MG/DL — SIGNIFICANT CHANGE UP (ref 0.2–1.2)
BUN SERPL-MCNC: 5 MG/DL — LOW (ref 7–23)
CALCIUM SERPL-MCNC: 9.8 MG/DL — SIGNIFICANT CHANGE UP (ref 8.4–10.5)
CHLORIDE SERPL-SCNC: 102 MMOL/L — SIGNIFICANT CHANGE UP (ref 98–107)
CO2 SERPL-SCNC: 20 MMOL/L — LOW (ref 22–31)
CREAT SERPL-MCNC: 0.91 MG/DL — SIGNIFICANT CHANGE UP (ref 0.5–1.3)
GLUCOSE SERPL-MCNC: 105 MG/DL — HIGH (ref 70–99)
HCT VFR BLD CALC: 39.9 % — SIGNIFICANT CHANGE UP (ref 34.5–45)
HGB BLD-MCNC: 13.4 G/DL — SIGNIFICANT CHANGE UP (ref 11.5–15.5)
LIDOCAIN IGE QN: 31.7 U/L — SIGNIFICANT CHANGE UP (ref 7–60)
MAGNESIUM SERPL-MCNC: 2 MG/DL — SIGNIFICANT CHANGE UP (ref 1.6–2.6)
MCHC RBC-ENTMCNC: 27.6 PG — SIGNIFICANT CHANGE UP (ref 27–34)
MCHC RBC-ENTMCNC: 33.6 % — SIGNIFICANT CHANGE UP (ref 32–36)
MCV RBC AUTO: 82.3 FL — SIGNIFICANT CHANGE UP (ref 80–100)
NRBC # FLD: 0 — SIGNIFICANT CHANGE UP
PHOSPHATE SERPL-MCNC: 3.8 MG/DL — SIGNIFICANT CHANGE UP (ref 2.5–4.5)
PLATELET # BLD AUTO: 105 K/UL — LOW (ref 150–400)
PMV BLD: SIGNIFICANT CHANGE UP FL (ref 7–13)
POTASSIUM SERPL-MCNC: 3.9 MMOL/L — SIGNIFICANT CHANGE UP (ref 3.5–5.3)
POTASSIUM SERPL-SCNC: 3.9 MMOL/L — SIGNIFICANT CHANGE UP (ref 3.5–5.3)
PROT SERPL-MCNC: 7.7 G/DL — SIGNIFICANT CHANGE UP (ref 6–8.3)
RBC # BLD: 4.85 M/UL — SIGNIFICANT CHANGE UP (ref 3.8–5.2)
RBC # FLD: 12.8 % — SIGNIFICANT CHANGE UP (ref 10.3–14.5)
SODIUM SERPL-SCNC: 141 MMOL/L — SIGNIFICANT CHANGE UP (ref 135–145)
WBC # BLD: 5.67 K/UL — SIGNIFICANT CHANGE UP (ref 3.8–10.5)
WBC # FLD AUTO: 5.67 K/UL — SIGNIFICANT CHANGE UP (ref 3.8–10.5)

## 2018-01-17 PROCEDURE — 90832 PSYTX W PT 30 MINUTES: CPT

## 2018-01-17 PROCEDURE — 99284 EMERGENCY DEPT VISIT MOD MDM: CPT | Mod: 25

## 2018-01-17 PROCEDURE — 93010 ELECTROCARDIOGRAM REPORT: CPT | Mod: 76

## 2018-01-17 PROCEDURE — 99232 SBSQ HOSP IP/OBS MODERATE 35: CPT | Mod: GC

## 2018-01-17 PROCEDURE — 93010 ELECTROCARDIOGRAM REPORT: CPT

## 2018-01-17 RX ORDER — ONDANSETRON 8 MG/1
2 TABLET, FILM COATED ORAL ONCE
Qty: 0 | Refills: 0 | Status: COMPLETED | OUTPATIENT
Start: 2018-01-17 | End: 2018-01-17

## 2018-01-17 RX ADMIN — ONDANSETRON 4 MILLIGRAM(S): 8 TABLET, FILM COATED ORAL at 09:09

## 2018-01-17 RX ADMIN — Medication 2 MILLIGRAM(S): at 22:39

## 2018-01-17 RX ADMIN — Medication 2.5 MILLIGRAM(S): at 13:14

## 2018-01-17 RX ADMIN — ONDANSETRON 4 MILLIGRAM(S): 8 TABLET, FILM COATED ORAL at 15:03

## 2018-01-17 RX ADMIN — Medication 2 MILLIGRAM(S): at 18:42

## 2018-01-17 RX ADMIN — MONTELUKAST 10 MILLIGRAM(S): 4 TABLET, CHEWABLE ORAL at 22:35

## 2018-01-17 RX ADMIN — ONDANSETRON 4 MILLIGRAM(S): 8 TABLET, FILM COATED ORAL at 22:39

## 2018-01-17 RX ADMIN — ONDANSETRON 4 MILLIGRAM(S): 8 TABLET, FILM COATED ORAL at 18:26

## 2018-01-17 RX ADMIN — Medication 1 MILLIGRAM(S): at 15:29

## 2018-01-17 RX ADMIN — Medication 1 MILLIGRAM(S): at 04:00

## 2018-01-17 RX ADMIN — ONDANSETRON 4 MILLIGRAM(S): 8 TABLET, FILM COATED ORAL at 03:34

## 2018-01-17 RX ADMIN — SIMETHICONE 160 MILLIGRAM(S): 80 TABLET, CHEWABLE ORAL at 22:37

## 2018-01-17 RX ADMIN — FAMOTIDINE 20 MILLIGRAM(S): 10 INJECTION INTRAVENOUS at 22:35

## 2018-01-17 RX ADMIN — Medication 2 MILLIGRAM(S): at 13:57

## 2018-01-17 RX ADMIN — Medication 100 MILLIGRAM(S): at 22:35

## 2018-01-17 RX ADMIN — Medication 2 MILLIGRAM(S): at 09:35

## 2018-01-17 NOTE — CHART NOTE - NSCHARTNOTEFT_GEN_A_CORE
[facility] Inpatient to ED Transfer Summary    Reason for Transfer/Medical Summary: 43 yo F with hx of POTS syndrome is c/o of 6/10 non-reproducible chest pain. Pt is vitally stable, but she is anxious. Of note, pt has been worked up by telemetry at [facility] for a week and has had an CT of the chest and an echocardiogram. Today, She states that the pain is non-radiating, pleuritic in nature, and has been present all day. Pt had an EKG done at the unit which showed Anterior infarct. This is a new finding compared to the previous EKG. Pt is being transferred to the ED for further management.      PAST MEDICAL & SURGICAL HISTORY:  Depression  Idiopathic thrombocytopenic purpura  Dysautonomia  Idiopathic thrombocytopenia purpura  Anxiety  POTS (postural orthostatic tachycardia syndrome)  History of ITP  Labyrinthitis  Headache  HTN (hypertension)  Anxiety  IBS (irritable bowel syndrome)  No significant past surgical history  No significant past surgical history  No significant past surgical history      Allergies    barium sulfate (Other)  beta blockers (Unknown)  Reglan (Other)    Intolerances    metoprolol (Headache)      MEDICATIONS  (STANDING):  clindamycin 1% Gel 1 Application(s) Topical daily  Digestive Enzyme 3 Capsule(s) 3 Capsule(s) Oral three times a day  diltiazem    milliGRAM(s) Oral <User Schedule>  docusate sodium 100 milliGRAM(s) Oral <User Schedule>  famotidine    Tablet 20 milliGRAM(s) Oral <User Schedule>  LORazepam     Tablet 2 milliGRAM(s) Oral <User Schedule>  montelukast 10 milliGRAM(s) Oral at bedtime  OLANZapine 2.5 milliGRAM(s) Oral at bedtime  simethicone 160 milliGRAM(s) Chew <User Schedule>    MEDICATIONS  (PRN):  ALBUTerol    90 MICROgram(s) HFA Inhaler 2 Puff(s) Inhalation once PRN Wheezing  bisacodyl Suppository 10 milliGRAM(s) Rectal daily PRN Constipation  LORazepam     Tablet 1 milliGRAM(s) Oral every 6 hours PRN Anxiety  ondansetron   Disintegrating Tablet 4 milliGRAM(s) Oral <User Schedule> PRN Nausea and/or Vomiting  QUEtiapine 25 milliGRAM(s) Oral every 6 hours PRN Anxiety or agitation      Vital Signs Last 24 Hrs  T(C): 36.3 (17 Jan 2018 08:06), Max: 36.7 (17 Jan 2018 03:18)  T(F): 97.3 (17 Jan 2018 08:06), Max: 98 (17 Jan 2018 03:18)  HR: 100 (17 Jan 2018 08:06) (100 - 100)  BP: 127/98 (17 Jan 2018 08:06) (127/98 - 127/98)  BP(mean): --  RR: --  SpO2: 100% (17 Jan 2018 03:18) (100% - 100%)  CAPILLARY BLOOD GLUCOSE            PHYSICAL EXAM:  GENERAL: NAD, well-developed  HEAD:  Atraumatic, Normocephalic  EYES: EOMI, PERRLA, conjunctiva and sclera clear  NECK: Supple, No JVD  CHEST/LUNG: Clear to auscultation bilaterally; No wheeze  HEART: Regular rate and rhythm; No murmurs, rubs, or gallops  ABDOMEN: Soft, Nontender, Nondistended; Bowel sounds present  EXTREMITIES:  2+ Peripheral Pulses, No clubbing, cyanosis, or edema  PSYCH: AAOx3  NEUROLOGY: non-focal  SKIN: No rashes or lesions    LABS:                        13.4   5.67  )-----------( 105      ( 17 Jan 2018 08:40 )             39.9     01-17    141  |  102  |  5<L>  ----------------------------<  105<H>  3.9   |  20<L>  |  0.91    Ca    9.8      17 Jan 2018 08:40  Phos  3.8     01-17  Mg     2.0     01-17    TPro  7.7  /  Alb  4.9  /  TBili  0.5  /  DBili  x   /  AST  16  /  ALT  11  /  AlkPhos  77  01-17              Psychiatry Section:  Psychiatric Summary/[facility] admitting diagnosis:    Psychiatric Recommendations:    Observation status (check one):   (X) Constant Observation  ( ) Enhanced care  ( ) Routine checks    Risk Status (check all that apply if present):  ( ) at risk for suicide/self-injury  ( ) at risk for aggressive behavior  (X) at risk for elopement  ( ) other risk:

## 2018-01-18 VITALS
OXYGEN SATURATION: 100 % | SYSTOLIC BLOOD PRESSURE: 132 MMHG | RESPIRATION RATE: 16 BRPM | DIASTOLIC BLOOD PRESSURE: 87 MMHG | HEART RATE: 90 BPM

## 2018-01-18 DIAGNOSIS — R69 ILLNESS, UNSPECIFIED: ICD-10-CM

## 2018-01-18 LAB
ALBUMIN SERPL ELPH-MCNC: 5.2 G/DL — HIGH (ref 3.3–5)
ALP SERPL-CCNC: 77 U/L — SIGNIFICANT CHANGE UP (ref 40–120)
ALT FLD-CCNC: 11 U/L — SIGNIFICANT CHANGE UP (ref 4–33)
AST SERPL-CCNC: 20 U/L — SIGNIFICANT CHANGE UP (ref 4–32)
BASOPHILS # BLD AUTO: 0.01 K/UL — SIGNIFICANT CHANGE UP (ref 0–0.2)
BASOPHILS NFR BLD AUTO: 0.1 % — SIGNIFICANT CHANGE UP (ref 0–2)
BILIRUB SERPL-MCNC: 0.5 MG/DL — SIGNIFICANT CHANGE UP (ref 0.2–1.2)
BUN SERPL-MCNC: 3 MG/DL — LOW (ref 7–23)
BUN SERPL-MCNC: 4 MG/DL — LOW (ref 7–23)
CALCIUM SERPL-MCNC: 9.7 MG/DL — SIGNIFICANT CHANGE UP (ref 8.4–10.5)
CALCIUM SERPL-MCNC: 9.7 MG/DL — SIGNIFICANT CHANGE UP (ref 8.4–10.5)
CHLORIDE SERPL-SCNC: 101 MMOL/L — SIGNIFICANT CHANGE UP (ref 98–107)
CHLORIDE SERPL-SCNC: 105 MMOL/L — SIGNIFICANT CHANGE UP (ref 98–107)
CK MB BLD-MCNC: 1 NG/ML — SIGNIFICANT CHANGE UP (ref 1–4.7)
CK MB BLD-MCNC: SIGNIFICANT CHANGE UP (ref 0–2.5)
CK SERPL-CCNC: 53 U/L — SIGNIFICANT CHANGE UP (ref 25–170)
CO2 SERPL-SCNC: 18 MMOL/L — LOW (ref 22–31)
CO2 SERPL-SCNC: 21 MMOL/L — LOW (ref 22–31)
CREAT SERPL-MCNC: 0.93 MG/DL — SIGNIFICANT CHANGE UP (ref 0.5–1.3)
CREAT SERPL-MCNC: 0.95 MG/DL — SIGNIFICANT CHANGE UP (ref 0.5–1.3)
EOSINOPHIL # BLD AUTO: 0.13 K/UL — SIGNIFICANT CHANGE UP (ref 0–0.5)
EOSINOPHIL NFR BLD AUTO: 1.5 % — SIGNIFICANT CHANGE UP (ref 0–6)
GLUCOSE SERPL-MCNC: 92 MG/DL — SIGNIFICANT CHANGE UP (ref 70–99)
GLUCOSE SERPL-MCNC: 97 MG/DL — SIGNIFICANT CHANGE UP (ref 70–99)
HCT VFR BLD CALC: 39.2 % — SIGNIFICANT CHANGE UP (ref 34.5–45)
HCT VFR BLD CALC: 40 % — SIGNIFICANT CHANGE UP (ref 34.5–45)
HGB BLD-MCNC: 13.2 G/DL — SIGNIFICANT CHANGE UP (ref 11.5–15.5)
HGB BLD-MCNC: 13.5 G/DL — SIGNIFICANT CHANGE UP (ref 11.5–15.5)
IMM GRANULOCYTES # BLD AUTO: 0.02 # — SIGNIFICANT CHANGE UP
IMM GRANULOCYTES NFR BLD AUTO: 0.2 % — SIGNIFICANT CHANGE UP (ref 0–1.5)
LYMPHOCYTES # BLD AUTO: 2.83 K/UL — SIGNIFICANT CHANGE UP (ref 1–3.3)
LYMPHOCYTES # BLD AUTO: 33.7 % — SIGNIFICANT CHANGE UP (ref 13–44)
MCHC RBC-ENTMCNC: 27.8 PG — SIGNIFICANT CHANGE UP (ref 27–34)
MCHC RBC-ENTMCNC: 29.2 PG — SIGNIFICANT CHANGE UP (ref 27–34)
MCHC RBC-ENTMCNC: 33.7 % — SIGNIFICANT CHANGE UP (ref 32–36)
MCHC RBC-ENTMCNC: 33.8 % — SIGNIFICANT CHANGE UP (ref 32–36)
MCV RBC AUTO: 82.5 FL — SIGNIFICANT CHANGE UP (ref 80–100)
MCV RBC AUTO: 86.7 FL — SIGNIFICANT CHANGE UP (ref 80–100)
MONOCYTES # BLD AUTO: 0.61 K/UL — SIGNIFICANT CHANGE UP (ref 0–0.9)
MONOCYTES NFR BLD AUTO: 7.3 % — SIGNIFICANT CHANGE UP (ref 2–14)
NEUTROPHILS # BLD AUTO: 4.8 K/UL — SIGNIFICANT CHANGE UP (ref 1.8–7.4)
NEUTROPHILS NFR BLD AUTO: 57.2 % — SIGNIFICANT CHANGE UP (ref 43–77)
NRBC # FLD: 0 — SIGNIFICANT CHANGE UP
NRBC # FLD: 0.02 — SIGNIFICANT CHANGE UP
PLATELET # BLD AUTO: 107 K/UL — LOW (ref 150–400)
PLATELET # BLD AUTO: 98 K/UL — LOW (ref 150–400)
PMV BLD: SIGNIFICANT CHANGE UP FL (ref 7–13)
PMV BLD: SIGNIFICANT CHANGE UP FL (ref 7–13)
POTASSIUM SERPL-MCNC: 3.1 MMOL/L — LOW (ref 3.5–5.3)
POTASSIUM SERPL-MCNC: 3.9 MMOL/L — SIGNIFICANT CHANGE UP (ref 3.5–5.3)
POTASSIUM SERPL-SCNC: 3.1 MMOL/L — LOW (ref 3.5–5.3)
POTASSIUM SERPL-SCNC: 3.9 MMOL/L — SIGNIFICANT CHANGE UP (ref 3.5–5.3)
PROT SERPL-MCNC: 7.8 G/DL — SIGNIFICANT CHANGE UP (ref 6–8.3)
RBC # BLD: 4.52 M/UL — SIGNIFICANT CHANGE UP (ref 3.8–5.2)
RBC # BLD: 4.85 M/UL — SIGNIFICANT CHANGE UP (ref 3.8–5.2)
RBC # FLD: 13 % — SIGNIFICANT CHANGE UP (ref 10.3–14.5)
RBC # FLD: 13.1 % — SIGNIFICANT CHANGE UP (ref 10.3–14.5)
SODIUM SERPL-SCNC: 139 MMOL/L — SIGNIFICANT CHANGE UP (ref 135–145)
SODIUM SERPL-SCNC: 141 MMOL/L — SIGNIFICANT CHANGE UP (ref 135–145)
TROPONIN T SERPL-MCNC: < 0.06 NG/ML — SIGNIFICANT CHANGE UP (ref 0–0.06)
WBC # BLD: 6.3 K/UL — SIGNIFICANT CHANGE UP (ref 3.8–10.5)
WBC # BLD: 8.4 K/UL — SIGNIFICANT CHANGE UP (ref 3.8–10.5)
WBC # FLD AUTO: 6.3 K/UL — SIGNIFICANT CHANGE UP (ref 3.8–10.5)
WBC # FLD AUTO: 8.4 K/UL — SIGNIFICANT CHANGE UP (ref 3.8–10.5)

## 2018-01-18 PROCEDURE — 90832 PSYTX W PT 30 MINUTES: CPT | Mod: 59

## 2018-01-18 PROCEDURE — 90853 GROUP PSYCHOTHERAPY: CPT

## 2018-01-18 PROCEDURE — 99233 SBSQ HOSP IP/OBS HIGH 50: CPT

## 2018-01-18 PROCEDURE — 99232 SBSQ HOSP IP/OBS MODERATE 35: CPT | Mod: 25,GC

## 2018-01-18 PROCEDURE — 71045 X-RAY EXAM CHEST 1 VIEW: CPT | Mod: 26

## 2018-01-18 RX ORDER — POTASSIUM CHLORIDE 20 MEQ
40 PACKET (EA) ORAL ONCE
Qty: 0 | Refills: 0 | Status: DISCONTINUED | OUTPATIENT
Start: 2018-01-18 | End: 2018-01-18

## 2018-01-18 RX ORDER — POTASSIUM CHLORIDE 20 MEQ
40 PACKET (EA) ORAL ONCE
Qty: 0 | Refills: 0 | Status: COMPLETED | OUTPATIENT
Start: 2018-01-18 | End: 2018-01-18

## 2018-01-18 RX ORDER — ASPIRIN/CALCIUM CARB/MAGNESIUM 324 MG
162 TABLET ORAL ONCE
Qty: 0 | Refills: 0 | Status: COMPLETED | OUTPATIENT
Start: 2018-01-18 | End: 2018-01-18

## 2018-01-18 RX ORDER — SODIUM CHLORIDE 9 MG/ML
1000 INJECTION INTRAMUSCULAR; INTRAVENOUS; SUBCUTANEOUS ONCE
Qty: 0 | Refills: 0 | Status: COMPLETED | OUTPATIENT
Start: 2018-01-18 | End: 2018-01-18

## 2018-01-18 RX ADMIN — SIMETHICONE 160 MILLIGRAM(S): 80 TABLET, CHEWABLE ORAL at 20:52

## 2018-01-18 RX ADMIN — ONDANSETRON 4 MILLIGRAM(S): 8 TABLET, FILM COATED ORAL at 11:54

## 2018-01-18 RX ADMIN — ONDANSETRON 4 MILLIGRAM(S): 8 TABLET, FILM COATED ORAL at 07:59

## 2018-01-18 RX ADMIN — ONDANSETRON 4 MILLIGRAM(S): 8 TABLET, FILM COATED ORAL at 16:30

## 2018-01-18 RX ADMIN — Medication 2.5 MILLIGRAM(S): at 02:13

## 2018-01-18 RX ADMIN — Medication 1 MILLIGRAM(S): at 21:25

## 2018-01-18 RX ADMIN — SODIUM CHLORIDE 500 MILLILITER(S): 9 INJECTION INTRAMUSCULAR; INTRAVENOUS; SUBCUTANEOUS at 00:45

## 2018-01-18 RX ADMIN — Medication 1 MILLIGRAM(S): at 10:03

## 2018-01-18 RX ADMIN — Medication 2.5 MILLIGRAM(S): at 09:10

## 2018-01-18 RX ADMIN — Medication 2 MILLIGRAM(S): at 12:18

## 2018-01-18 RX ADMIN — FAMOTIDINE 20 MILLIGRAM(S): 10 INJECTION INTRAVENOUS at 10:03

## 2018-01-18 RX ADMIN — Medication 2 MILLIGRAM(S): at 16:30

## 2018-01-18 RX ADMIN — Medication 40 MILLIEQUIVALENT(S): at 02:38

## 2018-01-18 RX ADMIN — Medication 2 MILLIGRAM(S): at 07:56

## 2018-01-18 RX ADMIN — ONDANSETRON 4 MILLIGRAM(S): 8 TABLET, FILM COATED ORAL at 20:52

## 2018-01-18 NOTE — ED PROVIDER NOTE - OBJECTIVE STATEMENT
42F h/o POTS, anxiety, dysautonomia sent in from [facility] for intermittent non-exertional non-radiating L-sided CP since this morning with EKG changes (isolated TWI in V3). Pt states she had was started on lexipro a few days ago, had multiple episodes of NBNB emesis yesterday and feels dehydrated, is now tolerating PO. Endorses SOB. Pt is anxious. Denies fever, diarrhea, travel, LOC. Had an admission last week for similar symptoms.

## 2018-01-18 NOTE — ED ADULT NURSE NOTE - ED STAT RN HANDOFF DETAILS
Patient discharged by provider with instructions.  Pt iv d/c by RN.  Patient leaving ED ambulatory with [facility] Aide and Security to return to [facility].

## 2018-01-18 NOTE — ED ADULT NURSE NOTE - OBJECTIVE STATEMENT
Pt arrived to trauma B from [facility] with report of abnormal ekg from [facility].  Pt EKG in [facility] shows NSR.  Pt reports feeling left sided chest pain earlier today that "comes and goes."  Pt reports pain will come even at rest.  Pain was left sided and sometimes radiating to right side.  Pt reports extensive history and states she vomited many times yesterday and today.  20g iv placed to Left forearm, labs drawn and sent.  Pt currently 0/10 pain.  Pt normal sinus on cardiac monitor.

## 2018-01-18 NOTE — ED PROVIDER NOTE - ATTENDING CONTRIBUTION TO CARE
I agree with the above H&P.  Briefly this is a 42 year old female from [facility] presents with cp and ekg from [facility] that read as anterior infarct.  after reviewing ekg, likely misplaced v3 lead.  EKG done in ED is the same as previous ekgs.  patient with recent admission for sob and cp which found no cause. will check trops basics and likely dc to [facility] if labs are unchanged from previous

## 2018-01-18 NOTE — PROGRESS NOTE ADULT - ASSESSMENT
42 year old female with psych disorder now with exacerbation with multiple medical complaints and exacerbation of psych disorder.  1.  HTN:  BP ELEVATED AND PT RECEIVES CARDIZEM  [facility].  WILL GIVE HS DOSE NOW.  TOMORROW INCREASED DOSE  [facility].  FOLLOW VS.  2.  HEADACHE:  SECONDARY TO TENSION AND INCREASED BLOOD PRESSURE.  OBSERVE.  ANALGESICS PRN.  3.  TREMOR OF UPPER EXTREMITIES:  APPEARS TENSION INDUCED.  OBSERVE.  4.  ITP:  STABLE  5.  PSYCH:  PT NEEDS AGGRESSIVE PSYCH TREATMENT AS PER ATTENDING    CASE DISCUSSED WITH ATTENDING, STAFF AND HOUSE STAFF.

## 2018-01-18 NOTE — ED ADULT NURSE NOTE - CHIEF COMPLAINT QUOTE
pt arrives w/ c/o left sided chest pain radiating to right side of chest. pt states had EKG done at [facility] that read abnormal. pt appears very anxious and talkative. pt with [facility] aide with her. pt calm and cooperative at present time.

## 2018-01-18 NOTE — PROGRESS NOTE ADULT - SUBJECTIVE AND OBJECTIVE BOX
Called by attending Dr. Mendelowitz to follow up with this 42 year old female with psych disorder now with exacerbation with several medical problems and multiple somatic complaints.  Pt continuously complains of many physical complaints. Pt has been to the ED and admitted multiple times.  Last night pt complained of chest pain all day.  EKG last night believed to show new changes with new anterior wall MI.  Pt sent to ED and ruled out.  EKG reviewed and NSR WNL and no change from prior EKG.    Pt with POTs and takes Cardizem -240 mg daily depending how she feels.  Pt has refused several doses.  Now pt complains of headache and tremor of upper extremities.  Pt demands to be seen by hospitalist.  Pt states at [facility] ED she received 650 ml of IV fluids and that she needs 2 liters and demands to be returned to ED for 24 hours of IV fluids.  Pt takes Cardizem CD at night and has not received any today.    +ITP, +?DYSAUTONOMIC SYNDROME, HEADACHES IBS    VS:  158/99 115 TEMP 97.2  OXYGEN % RA  THIN ANXIOUS FEMALE DEMANDING TRANSFER TO ED.  PT WITH UPPER EXTREMITY BILATERAL TREMOR.  MOUTH PINK AND DRY, COR TACHY S1S2, LUNGS CLEAR. -C/C/E.    LABS 01/18/2018  WBC 6.30  H/H 13.2/39.2 PLAT 98   K 3.9  CO2 18 GLUC 92 BUN 3 CR 0.93  CPK 53 CK MB 1.0  TROPONIN <0.06

## 2018-01-18 NOTE — ED PROVIDER NOTE - PLAN OF CARE
- Follow up with your primary care doctor in 1-2 days.  - Your ECG is at your baseline.  - Bring results with you to the appointment.   - Return to the ED for new or worsening symptoms.

## 2018-01-18 NOTE — ED PROVIDER NOTE - CARE PLAN
Principal Discharge DX:	Chest pain  Assessment and plan of treatment:	- Follow up with your primary care doctor in 1-2 days.  - Your ECG is at your baseline.  - Bring results with you to the appointment.   - Return to the ED for new or worsening symptoms.

## 2018-01-19 LAB
BASOPHILS # BLD AUTO: 0.02 K/UL — SIGNIFICANT CHANGE UP (ref 0–0.2)
BASOPHILS NFR BLD AUTO: 0.2 % — SIGNIFICANT CHANGE UP (ref 0–2)
BUN SERPL-MCNC: 3 MG/DL — LOW (ref 7–23)
CALCIUM SERPL-MCNC: 9.7 MG/DL — SIGNIFICANT CHANGE UP (ref 8.4–10.5)
CHLORIDE SERPL-SCNC: 100 MMOL/L — SIGNIFICANT CHANGE UP (ref 98–107)
CO2 SERPL-SCNC: 21 MMOL/L — LOW (ref 22–31)
CREAT SERPL-MCNC: 0.95 MG/DL — SIGNIFICANT CHANGE UP (ref 0.5–1.3)
EOSINOPHIL # BLD AUTO: 0.01 K/UL — SIGNIFICANT CHANGE UP (ref 0–0.5)
EOSINOPHIL NFR BLD AUTO: 0.1 % — SIGNIFICANT CHANGE UP (ref 0–6)
GLUCOSE SERPL-MCNC: 85 MG/DL — SIGNIFICANT CHANGE UP (ref 70–99)
HCT VFR BLD CALC: 38.2 % — SIGNIFICANT CHANGE UP (ref 34.5–45)
HGB BLD-MCNC: 12.8 G/DL — SIGNIFICANT CHANGE UP (ref 11.5–15.5)
IMM GRANULOCYTES # BLD AUTO: 0.03 # — SIGNIFICANT CHANGE UP
IMM GRANULOCYTES NFR BLD AUTO: 0.3 % — SIGNIFICANT CHANGE UP (ref 0–1.5)
LYMPHOCYTES # BLD AUTO: 1.93 K/UL — SIGNIFICANT CHANGE UP (ref 1–3.3)
LYMPHOCYTES # BLD AUTO: 22.2 % — SIGNIFICANT CHANGE UP (ref 13–44)
MCHC RBC-ENTMCNC: 28.1 PG — SIGNIFICANT CHANGE UP (ref 27–34)
MCHC RBC-ENTMCNC: 33.5 % — SIGNIFICANT CHANGE UP (ref 32–36)
MCV RBC AUTO: 84 FL — SIGNIFICANT CHANGE UP (ref 80–100)
MONOCYTES # BLD AUTO: 0.59 K/UL — SIGNIFICANT CHANGE UP (ref 0–0.9)
MONOCYTES NFR BLD AUTO: 6.8 % — SIGNIFICANT CHANGE UP (ref 2–14)
NEUTROPHILS # BLD AUTO: 6.1 K/UL — SIGNIFICANT CHANGE UP (ref 1.8–7.4)
NEUTROPHILS NFR BLD AUTO: 70.4 % — SIGNIFICANT CHANGE UP (ref 43–77)
NRBC # FLD: 0 — SIGNIFICANT CHANGE UP
PLATELET # BLD AUTO: 117 K/UL — LOW (ref 150–400)
PMV BLD: SIGNIFICANT CHANGE UP FL (ref 7–13)
POTASSIUM SERPL-MCNC: 3.5 MMOL/L — SIGNIFICANT CHANGE UP (ref 3.5–5.3)
POTASSIUM SERPL-SCNC: 3.5 MMOL/L — SIGNIFICANT CHANGE UP (ref 3.5–5.3)
RBC # BLD: 4.55 M/UL — SIGNIFICANT CHANGE UP (ref 3.8–5.2)
RBC # FLD: 13 % — SIGNIFICANT CHANGE UP (ref 10.3–14.5)
SODIUM SERPL-SCNC: 140 MMOL/L — SIGNIFICANT CHANGE UP (ref 135–145)
WBC # BLD: 8.68 K/UL — SIGNIFICANT CHANGE UP (ref 3.8–10.5)
WBC # FLD AUTO: 8.68 K/UL — SIGNIFICANT CHANGE UP (ref 3.8–10.5)

## 2018-01-19 PROCEDURE — 99233 SBSQ HOSP IP/OBS HIGH 50: CPT

## 2018-01-19 PROCEDURE — 99232 SBSQ HOSP IP/OBS MODERATE 35: CPT | Mod: GC

## 2018-01-19 RX ADMIN — Medication 2 MILLIGRAM(S): at 19:17

## 2018-01-19 RX ADMIN — ONDANSETRON 4 MILLIGRAM(S): 8 TABLET, FILM COATED ORAL at 19:17

## 2018-01-19 RX ADMIN — Medication 100 MILLIGRAM(S): at 21:19

## 2018-01-19 RX ADMIN — FAMOTIDINE 20 MILLIGRAM(S): 10 INJECTION INTRAVENOUS at 21:19

## 2018-01-19 RX ADMIN — ONDANSETRON 4 MILLIGRAM(S): 8 TABLET, FILM COATED ORAL at 15:12

## 2018-01-19 RX ADMIN — MONTELUKAST 10 MILLIGRAM(S): 4 TABLET, CHEWABLE ORAL at 21:19

## 2018-01-19 RX ADMIN — Medication 2.5 MILLIGRAM(S): at 21:19

## 2018-01-19 RX ADMIN — Medication 2 MILLIGRAM(S): at 15:12

## 2018-01-19 RX ADMIN — SIMETHICONE 160 MILLIGRAM(S): 80 TABLET, CHEWABLE ORAL at 21:20

## 2018-01-19 RX ADMIN — Medication 1 MILLIGRAM(S): at 04:05

## 2018-01-19 RX ADMIN — ONDANSETRON 4 MILLIGRAM(S): 8 TABLET, FILM COATED ORAL at 11:43

## 2018-01-19 RX ADMIN — ONDANSETRON 4 MILLIGRAM(S): 8 TABLET, FILM COATED ORAL at 04:05

## 2018-01-19 NOTE — PROGRESS NOTE ADULT - SUBJECTIVE AND OBJECTIVE BOX
Patient is a 42y old  Female who presents with a chief complaint of N/V    SUBJECTIVE / OVERNIGHT EVENTS: c/p N/V, anxiety, on exam, trying to vomit but only spitting sputum, no retching. says needs tx to ED for IVF.     ROS:  No HA/DZ  No Vision changes   No CP, SOB  No Edema  No Rash      MEDICATIONS  (STANDING):  busPIRone 2.5 milliGRAM(s) Oral two times a day  clindamycin 1% Gel 1 Application(s) Topical daily  Digestive Enzyme 3 Capsule(s) 3 Capsule(s) Oral three times a day  diltiazem    milliGRAM(s) Oral <User Schedule>  diltiazem    milliGRAM(s) Oral <User Schedule>  docusate sodium 100 milliGRAM(s) Oral <User Schedule>  famotidine    Tablet 20 milliGRAM(s) Oral <User Schedule>  LORazepam     Tablet 2 milliGRAM(s) Oral <User Schedule>  montelukast 10 milliGRAM(s) Oral at bedtime  OLANZapine 2.5 milliGRAM(s) Oral at bedtime  simethicone 160 milliGRAM(s) Chew <User Schedule>    MEDICATIONS  (PRN):  ALBUTerol    90 MICROgram(s) HFA Inhaler 2 Puff(s) Inhalation once PRN Wheezing  bisacodyl Suppository 10 milliGRAM(s) Rectal daily PRN Constipation  LORazepam     Tablet 1 milliGRAM(s) Oral every 6 hours PRN Anxiety  ondansetron   Disintegrating Tablet 4 milliGRAM(s) Oral <User Schedule> PRN Nausea and/or Vomiting  QUEtiapine 25 milliGRAM(s) Oral every 6 hours PRN Anxiety or agitation      T(C): 36.7 (01-19-18 @ 08:02)  HR: 106 (01-19-18 @ 08:02)  BP: 130/96 (01-19-18 @ 08:02)  RR: --  SpO2: --  CAPILLARY BLOOD GLUCOSE        I&O's Summary      PHYSICAL EXAM:  GENERAL: NAD, well-developed, AOx3  HEAD:  Atraumatic, Normocephalic  EYES: EOMI, PERRL, conjunctiva and sclera clear  NECK: Supple, No JVD  CHEST/LUNG: Clear to auscultation bilaterally  HEART: Regular rate and rhythm; No murmurs, rubs, or gallops, No Edema  ABDOMEN: Soft, Nontender, Nondistended; Bowel sounds present  EXTREMITIES:  2+ Peripheral Pulses, No clubbing, cyanosis  PSYCH: No SI/HI  NEUROLOGY: non-focal  SKIN: No rashes or lesions    LABS:                        13.2   6.30  )-----------( 98       ( 18 Jan 2018 08:47 )             39.2     01-18    141  |  105  |  3<L>  ----------------------------<  92  3.9   |  18<L>  |  0.93    Ca    9.7      18 Jan 2018 08:47    TPro  7.8  /  Alb  5.2<H>  /  TBili  0.5  /  DBili  x   /  AST  20  /  ALT  11  /  AlkPhos  77  01-18      CARDIAC MARKERS ( 18 Jan 2018 00:40 )  x     / < 0.06 ng/mL / 53 u/L / 1.00 ng/mL / x                  RADIOLOGY & ADDITIONAL TESTS:    Imaging Personally Reviewed:    Consultant(s) Notes Reviewed:      Care Discussed with Consultants/Other Providers: psych - Dr Mendelowitz

## 2018-01-19 NOTE — PROGRESS NOTE ADULT - ASSESSMENT
42 year old female with psych disorder now with c/o N/V, chest pain, tremors, likely somatic  1.  HTN: CONTROLLED, CW CARDIZEM	   2.  HEADACHE:  SECONDARY TO TENSION AND INCREASED BLOOD PRESSURE.  OBSERVE.  ANALGESICS PRN.  3.  TREMOR OF UPPER EXTREMITIES: APPEARS VOLITIONAL, NOT PRESENT AT TIMES WHEN EXAMINING. LIKELY PSYCH/ANXIETY INDUCED.   4.  ITP:  STABLE  5.  PSYCH:  HER COMPLAINTS ARE LARGELY SOMATIC IN NATURE AND ANXIETY INDUCED. ELECTROLYTES AND RENAL FUNCTION REMAIN STABLE. NO INDICATION FOR FURTHER LABS OR ED EVALUATION AT THIS TIME. ENCOURAGE PO HYDRATION. CLINICALLY APPEARS EUVOLEMIC. TREAT UNDERLYING SOMATIC AND ANXIETY PER PSYCH   6. EKG NSR, NO ACUTE ISCHEMIC ST-T CHANGES, CE NEGATIVE YESTERDAY ON ED EVAL. DOES NOT APPEARS CARDIAC, LIKELY ANXIETY     CASE DISCUSSED WITH ATTENDING, STAFF.

## 2018-01-20 ENCOUNTER — EMERGENCY (EMERGENCY)
Facility: HOSPITAL | Age: 43
LOS: 1 days | Discharge: HOME | End: 2018-01-20
Attending: EMERGENCY MEDICINE | Admitting: EMERGENCY MEDICINE
Payer: MEDICAID

## 2018-01-20 VITALS
HEART RATE: 112 BPM | RESPIRATION RATE: 16 BRPM | OXYGEN SATURATION: 100 % | TEMPERATURE: 98 F | SYSTOLIC BLOOD PRESSURE: 155 MMHG | DIASTOLIC BLOOD PRESSURE: 93 MMHG

## 2018-01-20 LAB
ALBUMIN SERPL ELPH-MCNC: 4.8 G/DL — SIGNIFICANT CHANGE UP (ref 3.3–5)
ALP SERPL-CCNC: 68 U/L — SIGNIFICANT CHANGE UP (ref 40–120)
ALT FLD-CCNC: 12 U/L — SIGNIFICANT CHANGE UP (ref 4–33)
AST SERPL-CCNC: 18 U/L — SIGNIFICANT CHANGE UP (ref 4–32)
BASOPHILS # BLD AUTO: 0.01 K/UL — SIGNIFICANT CHANGE UP (ref 0–0.2)
BASOPHILS NFR BLD AUTO: 0.2 % — SIGNIFICANT CHANGE UP (ref 0–2)
BILIRUB SERPL-MCNC: 0.4 MG/DL — SIGNIFICANT CHANGE UP (ref 0.2–1.2)
BUN SERPL-MCNC: 2 MG/DL — LOW (ref 7–23)
CALCIUM SERPL-MCNC: 9.5 MG/DL — SIGNIFICANT CHANGE UP (ref 8.4–10.5)
CHLORIDE SERPL-SCNC: 102 MMOL/L — SIGNIFICANT CHANGE UP (ref 98–107)
CO2 SERPL-SCNC: 22 MMOL/L — SIGNIFICANT CHANGE UP (ref 22–31)
CREAT SERPL-MCNC: 0.92 MG/DL — SIGNIFICANT CHANGE UP (ref 0.5–1.3)
EOSINOPHIL # BLD AUTO: 0.04 K/UL — SIGNIFICANT CHANGE UP (ref 0–0.5)
EOSINOPHIL NFR BLD AUTO: 0.6 % — SIGNIFICANT CHANGE UP (ref 0–6)
GLUCOSE SERPL-MCNC: 84 MG/DL — SIGNIFICANT CHANGE UP (ref 70–99)
HCT VFR BLD CALC: 36 % — SIGNIFICANT CHANGE UP (ref 34.5–45)
HGB BLD-MCNC: 12.2 G/DL — SIGNIFICANT CHANGE UP (ref 11.5–15.5)
IMM GRANULOCYTES # BLD AUTO: 0.01 # — SIGNIFICANT CHANGE UP
IMM GRANULOCYTES NFR BLD AUTO: 0.2 % — SIGNIFICANT CHANGE UP (ref 0–1.5)
LYMPHOCYTES # BLD AUTO: 2.11 K/UL — SIGNIFICANT CHANGE UP (ref 1–3.3)
LYMPHOCYTES # BLD AUTO: 32.9 % — SIGNIFICANT CHANGE UP (ref 13–44)
MCHC RBC-ENTMCNC: 28.1 PG — SIGNIFICANT CHANGE UP (ref 27–34)
MCHC RBC-ENTMCNC: 33.9 % — SIGNIFICANT CHANGE UP (ref 32–36)
MCV RBC AUTO: 82.9 FL — SIGNIFICANT CHANGE UP (ref 80–100)
MONOCYTES # BLD AUTO: 0.49 K/UL — SIGNIFICANT CHANGE UP (ref 0–0.9)
MONOCYTES NFR BLD AUTO: 7.6 % — SIGNIFICANT CHANGE UP (ref 2–14)
NEUTROPHILS # BLD AUTO: 3.75 K/UL — SIGNIFICANT CHANGE UP (ref 1.8–7.4)
NEUTROPHILS NFR BLD AUTO: 58.5 % — SIGNIFICANT CHANGE UP (ref 43–77)
NRBC # FLD: 0 — SIGNIFICANT CHANGE UP
PLATELET # BLD AUTO: 100 K/UL — LOW (ref 150–400)
PMV BLD: SIGNIFICANT CHANGE UP FL (ref 7–13)
POTASSIUM SERPL-MCNC: 3.5 MMOL/L — SIGNIFICANT CHANGE UP (ref 3.5–5.3)
POTASSIUM SERPL-SCNC: 3.5 MMOL/L — SIGNIFICANT CHANGE UP (ref 3.5–5.3)
PROT SERPL-MCNC: 7.5 G/DL — SIGNIFICANT CHANGE UP (ref 6–8.3)
RBC # BLD: 4.34 M/UL — SIGNIFICANT CHANGE UP (ref 3.8–5.2)
RBC # FLD: 12.8 % — SIGNIFICANT CHANGE UP (ref 10.3–14.5)
SODIUM SERPL-SCNC: 141 MMOL/L — SIGNIFICANT CHANGE UP (ref 135–145)
WBC # BLD: 6.41 K/UL — SIGNIFICANT CHANGE UP (ref 3.8–10.5)
WBC # FLD AUTO: 6.41 K/UL — SIGNIFICANT CHANGE UP (ref 3.8–10.5)

## 2018-01-20 PROCEDURE — 99285 EMERGENCY DEPT VISIT HI MDM: CPT

## 2018-01-20 PROCEDURE — 99232 SBSQ HOSP IP/OBS MODERATE 35: CPT

## 2018-01-20 RX ORDER — SODIUM CHLORIDE 9 MG/ML
1000 INJECTION INTRAMUSCULAR; INTRAVENOUS; SUBCUTANEOUS
Qty: 0 | Refills: 0 | Status: DISCONTINUED | OUTPATIENT
Start: 2018-01-20 | End: 2018-01-24

## 2018-01-20 RX ORDER — ONDANSETRON 8 MG/1
4 TABLET, FILM COATED ORAL ONCE
Qty: 0 | Refills: 0 | Status: COMPLETED | OUTPATIENT
Start: 2018-01-20 | End: 2018-01-20

## 2018-01-20 RX ADMIN — ONDANSETRON 4 MILLIGRAM(S): 8 TABLET, FILM COATED ORAL at 02:14

## 2018-01-20 RX ADMIN — Medication 2.5 MILLIGRAM(S): at 23:04

## 2018-01-20 RX ADMIN — Medication 2 MILLIGRAM(S): at 14:33

## 2018-01-20 RX ADMIN — FAMOTIDINE 20 MILLIGRAM(S): 10 INJECTION INTRAVENOUS at 11:47

## 2018-01-20 RX ADMIN — Medication 1 MILLIGRAM(S): at 02:14

## 2018-01-20 RX ADMIN — Medication 2 MILLIGRAM(S): at 21:01

## 2018-01-20 RX ADMIN — SIMETHICONE 160 MILLIGRAM(S): 80 TABLET, CHEWABLE ORAL at 11:47

## 2018-01-20 RX ADMIN — ONDANSETRON 4 MILLIGRAM(S): 8 TABLET, FILM COATED ORAL at 10:00

## 2018-01-20 RX ADMIN — ONDANSETRON 4 MILLIGRAM(S): 8 TABLET, FILM COATED ORAL at 21:01

## 2018-01-20 RX ADMIN — Medication 2.5 MILLIGRAM(S): at 09:25

## 2018-01-20 RX ADMIN — CLINDAMYCIN PHOSPHATE GEL USP, 1% 1 APPLICATION(S): 10 GEL TOPICAL at 09:25

## 2018-01-20 RX ADMIN — Medication 2 MILLIGRAM(S): at 06:17

## 2018-01-20 RX ADMIN — ONDANSETRON 4 MILLIGRAM(S): 8 TABLET, FILM COATED ORAL at 06:16

## 2018-01-20 RX ADMIN — Medication 2 MILLIGRAM(S): at 10:30

## 2018-01-20 RX ADMIN — Medication 100 MILLIGRAM(S): at 11:47

## 2018-01-20 RX ADMIN — SODIUM CHLORIDE 125 MILLILITER(S): 9 INJECTION INTRAMUSCULAR; INTRAVENOUS; SUBCUTANEOUS at 18:32

## 2018-01-20 NOTE — ED PROVIDER NOTE - OBJECTIVE STATEMENT
42F w/ dilusional disorder, somatiform disorder, POTS syndrome presents as a [facility] inpatient to the ED for IV fluid hydration. Pt has had prolonged hospitalization first at [facility] then transferred to [facility] for multiple medical complaints. Pt has been hyperfocused on hydration status for several days at [facility] and at the same time refusing PO intake. Pt states she has also had tremors worsening for last few days in conjunction with dehydration. Pt states she cannot receive IVF boluses because of her POTS syndrome. She has to have IVF run at max of 125 ml/hr. Pt also notes occasional palpitations during this time period. Cardizem dose planned to be increased today to 180mg ER per her psychiatrist. 42F w/ delusional disorder, somatiform disorder, POTS syndrome presents as a [facility] inpatient to the ED for IV fluid hydration. Pt has had prolonged hospitalization first at [facility] then transferred to [facility] for multiple medical complaints. Pt has been hyperfocused on hydration status for several days at [facility] and at the same time refusing PO intake. Pt states she has also had tremors worsening for last few days in conjunction with dehydration. Pt states she cannot receive IVF boluses because of her POTS syndrome. She has to have IVF run at max of 125 ml/hr. Pt also notes occasional palpitations during this time period. Cardizem dose planned to be increased today to 180mg ER per her psychiatrist.

## 2018-01-20 NOTE — ED ADULT NURSE NOTE - OBJECTIVE STATEMENT
pt to spot # 20 alert and oriented X 3 with [facility] aide at bedside, Pt sent in from [facility] for IVF, pt has hx of "POTS syndrome", pt has been having involuntary tremors, constipation, dehydrated. and multiple other complaints,  Pt tachycardic on monitor. Pt denies cp/discomfort, denies headache/dizziness, labs drawn and sent, vitals as noted , meds given see MAR< MD degroot at bedside, will follow up with additional orders as necessary

## 2018-01-20 NOTE — CHART NOTE - NSCHARTNOTEFT_GEN_A_CORE
[facility] Inpatient to ED Transfer Summary    Reason for Transfer/Medical Summary:  dehydration and need for IV fluids    PAST MEDICAL & SURGICAL HISTORY:  Depression  Idiopathic thrombocytopenic purpura  Dysautonomia  Idiopathic thrombocytopenia purpura  Anxiety  POTS (postural orthostatic tachycardia syndrome)  History of ITP  Labyrinthitis  Headache  HTN (hypertension)  Anxiety  IBS (irritable bowel syndrome)  No significant past surgical history  No significant past surgical history  No significant past surgical history      Allergies    barium sulfate (Other)  beta blockers (Unknown)  Reglan (Other)    Intolerances    metoprolol (Headache)      MEDICATIONS  (STANDING):  busPIRone 2.5 milliGRAM(s) Oral two times a day  clindamycin 1% Gel 1 Application(s) Topical daily  Digestive Enzyme 3 Capsule(s) 3 Capsule(s) Oral three times a day  diltiazem    milliGRAM(s) Oral <User Schedule>  docusate sodium 100 milliGRAM(s) Oral <User Schedule>  famotidine    Tablet 20 milliGRAM(s) Oral <User Schedule>  LORazepam     Tablet 2 milliGRAM(s) Oral <User Schedule>  montelukast 10 milliGRAM(s) Oral at bedtime  OLANZapine 2.5 milliGRAM(s) Oral at bedtime  simethicone 160 milliGRAM(s) Chew <User Schedule>    MEDICATIONS  (PRN):  ALBUTerol    90 MICROgram(s) HFA Inhaler 2 Puff(s) Inhalation once PRN Wheezing  bisacodyl Suppository 10 milliGRAM(s) Rectal daily PRN Constipation  LORazepam     Tablet 1 milliGRAM(s) Oral every 6 hours PRN Anxiety  ondansetron   Disintegrating Tablet 4 milliGRAM(s) Oral <User Schedule> PRN Nausea and/or Vomiting  QUEtiapine 25 milliGRAM(s) Oral every 6 hours PRN Anxiety or agitation        CAPILLARY BLOOD GLUCOSE            PHYSICAL EXAM:  GENERAL: NAD, well-developed  HEAD:  Atraumatic, Normocephalic  EYES: EOMI, PERRLA, conjunctiva and sclera clear  NECK: Supple, No JVD  CHEST/LUNG: Clear to auscultation bilaterally; No wheeze  HEART: Regular rate and rhythm; No murmurs, rubs, or gallops  ABDOMEN: Soft, Nontender, Nondistended; Bowel sounds present  EXTREMITIES:  2+ Peripheral Pulses, No clubbing, cyanosis, or edema  PSYCH: AAOx3  NEUROLOGY: non-focal  SKIN: No rashes or lesions    LABS:                        12.8   8.68  )-----------( 117      ( 19 Jan 2018 15:00 )             38.2     01-19    140  |  100  |  3<L>  ----------------------------<  85  3.5   |  21<L>  |  0.95    Ca    9.7      19 Jan 2018 15:00                Psychiatry Section:  Psychiatric Summary/[facility] admitting diagnosis: Delusional Disorder    Psychiatric Recommendations:  ativan PRN  Observation status (check one):   ( X) Constant Observation  ( ) Enhanced care  ( ) Routine checks    Risk Status (check all that apply if present):  ( ) at risk for suicide/self-injury  ( ) at risk for aggressive behavior  (X ) at risk for elopement  ( ) other risk:

## 2018-01-20 NOTE — ED PROVIDER NOTE - MEDICAL DECISION MAKING DETAILS
42F w/ hx of multiple psychiatric conditions currently inpatient at [facility], as well as autonomic instability presents with several days of progressive dehydration. Pt agitated and manic in the ED. Discussed extensively with the patient's provider at [facility], who is requesting pt receive gentle IV rehydration and then transfer back to [facility]. Extensive w/u both at [facility] and [facility] prior to todays ED visit. Pt's multiple complaints likely a combination of somatic and organic. Will give increased dose of cardizem per chart review as well as scheduled medications. Constant observation. Will give gentle IVF rehydration over the course of several hours and then transfer back to [facility]. Likely CDU for IVF.

## 2018-01-20 NOTE — ED ADULT NURSE REASSESSMENT NOTE - NS ED NURSE REASSESS COMMENT FT1
Patient wants to be admitted for 2L maintenance fluids, MD LISBET MORAES at bedside. Safety maintained, needs attended, will continue to monitor.

## 2018-01-20 NOTE — ED PROVIDER NOTE - ATTENDING CONTRIBUTION TO CARE
I performed a face to face history and physical exam of the patient and discussed their management with the resident. I reviewed the resident's note and agree with the documented findings and plan of care. 43 y/o female sent from [facility] of multiple psychiatric d/o with GUY, presenting with dehydration and autonomic instability, no n/v, no fever, no chills, no abdominal pain, pt complains of tremors which none were seen during examination, pt is tachycardic and hypertensive, regular meds were skipped, pt requesting strict gentle hydration, head CT, and admission, multiple doctors involved in her care will contact [facility] 1)CBC, CMP, VBG 2)EKG 3)IVF 4)review with consultants 5)reevaluation

## 2018-01-20 NOTE — ED PROVIDER NOTE - CARE PLAN
Principal Discharge DX:	Dehydration Principal Discharge DX:	Dehydration  Assessment and plan of treatment:	The patient was given verbal and written discharge instructions. Specifically, instructions when to return to the ED and when to seek follow-up from their pcp was discussed. Any specialty follow-up was discussed, including how to make an appointment.  Instructions were discussed in simple, plain language and was understood by the patient. The patient understands that should their symptoms worsen or any new symptoms arise, they should return to the ED immediately for further evaluation.

## 2018-01-20 NOTE — ED ADULT NURSE NOTE - CHIEF COMPLAINT QUOTE
Pt sent in from [facility] for IVF, pt has hx of "POTS syndrome", pt has been having involuntary tremors, constipation, dehydrated. Pt tachycardic on monitor. Pt denies cp/discomfort, denies headache/dizziness. EKG to be obtained.

## 2018-01-20 NOTE — ED CLERICAL - NS ED CLERK NOTE PRE-ARRIVAL INFORMATION; ADDITIONAL PRE-ARRIVAL INFORMATION
Pt is from [facility] Pt with POTS syndrome Pt is not eating and thinks she needs iv HYSDRATION AS per psych MD pt should be hydrated and sent back to [facility] not admitted medically

## 2018-01-20 NOTE — ED PROVIDER NOTE - UNABLE TO OBTAIN
Pt psychotic and agitated in the ED. Hx obtained via discussion with [facility] hospitalist Dr. Anand Severe Illness/Injury

## 2018-01-20 NOTE — ED ADULT TRIAGE NOTE - CHIEF COMPLAINT QUOTE
Pt sent in from [facility] for IVF, pt has hx of "POTS syndrome", pt has been having involuntary tremors, constipation, dehydrated. Pt tachycardic on monitor. Pt denies cp/discomfort, denies headache/dizziness. EKG to be obtained.

## 2018-01-21 VITALS
TEMPERATURE: 98 F | DIASTOLIC BLOOD PRESSURE: 85 MMHG | RESPIRATION RATE: 16 BRPM | OXYGEN SATURATION: 100 % | SYSTOLIC BLOOD PRESSURE: 122 MMHG | HEART RATE: 80 BPM

## 2018-01-21 PROCEDURE — 70450 CT HEAD/BRAIN W/O DYE: CPT | Mod: 26

## 2018-01-21 PROCEDURE — 99231 SBSQ HOSP IP/OBS SF/LOW 25: CPT

## 2018-01-21 RX ORDER — ONDANSETRON 8 MG/1
4 TABLET, FILM COATED ORAL ONCE
Qty: 0 | Refills: 0 | Status: COMPLETED | OUTPATIENT
Start: 2018-01-21 | End: 2018-01-21

## 2018-01-21 RX ORDER — SODIUM CHLORIDE 9 MG/ML
1000 INJECTION INTRAMUSCULAR; INTRAVENOUS; SUBCUTANEOUS
Qty: 0 | Refills: 0 | Status: COMPLETED | OUTPATIENT
Start: 2018-01-21 | End: 2018-01-21

## 2018-01-21 RX ADMIN — MONTELUKAST 10 MILLIGRAM(S): 4 TABLET, CHEWABLE ORAL at 21:38

## 2018-01-21 RX ADMIN — Medication 100 MILLIGRAM(S): at 16:17

## 2018-01-21 RX ADMIN — Medication 2 MILLIGRAM(S): at 23:04

## 2018-01-21 RX ADMIN — Medication 2 MILLIGRAM(S): at 06:39

## 2018-01-21 RX ADMIN — SODIUM CHLORIDE 125 MILLILITER(S): 9 INJECTION INTRAMUSCULAR; INTRAVENOUS; SUBCUTANEOUS at 02:39

## 2018-01-21 RX ADMIN — Medication 2 MILLIGRAM(S): at 19:02

## 2018-01-21 RX ADMIN — FAMOTIDINE 20 MILLIGRAM(S): 10 INJECTION INTRAVENOUS at 11:43

## 2018-01-21 RX ADMIN — Medication 100 MILLIGRAM(S): at 21:19

## 2018-01-21 RX ADMIN — Medication 2.5 MILLIGRAM(S): at 21:19

## 2018-01-21 RX ADMIN — FAMOTIDINE 20 MILLIGRAM(S): 10 INJECTION INTRAVENOUS at 21:39

## 2018-01-21 RX ADMIN — SIMETHICONE 160 MILLIGRAM(S): 80 TABLET, CHEWABLE ORAL at 11:43

## 2018-01-21 RX ADMIN — Medication 2.5 MILLIGRAM(S): at 09:10

## 2018-01-21 RX ADMIN — ONDANSETRON 4 MILLIGRAM(S): 8 TABLET, FILM COATED ORAL at 10:27

## 2018-01-21 RX ADMIN — ONDANSETRON 4 MILLIGRAM(S): 8 TABLET, FILM COATED ORAL at 18:35

## 2018-01-21 RX ADMIN — Medication 2 MILLIGRAM(S): at 10:45

## 2018-01-21 RX ADMIN — Medication 100 MILLIGRAM(S): at 11:43

## 2018-01-21 RX ADMIN — ONDANSETRON 4 MILLIGRAM(S): 8 TABLET, FILM COATED ORAL at 01:03

## 2018-01-21 RX ADMIN — Medication 2 MILLIGRAM(S): at 01:03

## 2018-01-21 RX ADMIN — ONDANSETRON 4 MILLIGRAM(S): 8 TABLET, FILM COATED ORAL at 06:38

## 2018-01-21 RX ADMIN — OLANZAPINE 2.5 MILLIGRAM(S): 15 TABLET, FILM COATED ORAL at 21:38

## 2018-01-21 RX ADMIN — SIMETHICONE 160 MILLIGRAM(S): 80 TABLET, CHEWABLE ORAL at 21:19

## 2018-01-21 RX ADMIN — ONDANSETRON 4 MILLIGRAM(S): 8 TABLET, FILM COATED ORAL at 22:37

## 2018-01-21 RX ADMIN — CLINDAMYCIN PHOSPHATE GEL USP, 1% 1 APPLICATION(S): 10 GEL TOPICAL at 09:10

## 2018-01-21 RX ADMIN — Medication 2 MILLIGRAM(S): at 14:45

## 2018-01-22 PROCEDURE — 90853 GROUP PSYCHOTHERAPY: CPT

## 2018-01-22 PROCEDURE — 99232 SBSQ HOSP IP/OBS MODERATE 35: CPT | Mod: 25,GC

## 2018-01-22 PROCEDURE — 90834 PSYTX W PT 45 MINUTES: CPT | Mod: 59

## 2018-01-22 RX ORDER — ONDANSETRON 8 MG/1
4 TABLET, FILM COATED ORAL ONCE
Qty: 0 | Refills: 0 | Status: COMPLETED | OUTPATIENT
Start: 2018-01-22 | End: 2018-01-22

## 2018-01-22 RX ADMIN — ONDANSETRON 4 MILLIGRAM(S): 8 TABLET, FILM COATED ORAL at 12:38

## 2018-01-22 RX ADMIN — Medication 2 MILLIGRAM(S): at 10:44

## 2018-01-22 RX ADMIN — ONDANSETRON 4 MILLIGRAM(S): 8 TABLET, FILM COATED ORAL at 22:30

## 2018-01-22 RX ADMIN — Medication 100 MILLIGRAM(S): at 21:25

## 2018-01-22 RX ADMIN — SIMETHICONE 160 MILLIGRAM(S): 80 TABLET, CHEWABLE ORAL at 22:28

## 2018-01-22 RX ADMIN — ONDANSETRON 4 MILLIGRAM(S): 8 TABLET, FILM COATED ORAL at 04:35

## 2018-01-22 RX ADMIN — ONDANSETRON 4 MILLIGRAM(S): 8 TABLET, FILM COATED ORAL at 08:42

## 2018-01-22 RX ADMIN — FAMOTIDINE 20 MILLIGRAM(S): 10 INJECTION INTRAVENOUS at 21:25

## 2018-01-22 RX ADMIN — ALBUTEROL 2 PUFF(S): 90 AEROSOL, METERED ORAL at 11:03

## 2018-01-22 RX ADMIN — Medication 2 MILLIGRAM(S): at 07:47

## 2018-01-22 RX ADMIN — Medication 2 MILLIGRAM(S): at 23:05

## 2018-01-22 RX ADMIN — Medication 2.5 MILLIGRAM(S): at 09:21

## 2018-01-22 RX ADMIN — MONTELUKAST 10 MILLIGRAM(S): 4 TABLET, CHEWABLE ORAL at 17:15

## 2018-01-22 RX ADMIN — Medication 2 MILLIGRAM(S): at 17:39

## 2018-01-22 RX ADMIN — Medication 2.5 MILLIGRAM(S): at 21:25

## 2018-01-22 RX ADMIN — Medication 100 MILLIGRAM(S): at 12:29

## 2018-01-22 RX ADMIN — Medication 1 MILLIGRAM(S): at 04:35

## 2018-01-22 RX ADMIN — FAMOTIDINE 20 MILLIGRAM(S): 10 INJECTION INTRAVENOUS at 12:29

## 2018-01-23 PROCEDURE — 90832 PSYTX W PT 30 MINUTES: CPT | Mod: 59

## 2018-01-23 PROCEDURE — 90853 GROUP PSYCHOTHERAPY: CPT

## 2018-01-23 PROCEDURE — 99232 SBSQ HOSP IP/OBS MODERATE 35: CPT | Mod: 25,GC

## 2018-01-23 RX ADMIN — ONDANSETRON 4 MILLIGRAM(S): 8 TABLET, FILM COATED ORAL at 09:29

## 2018-01-23 RX ADMIN — MONTELUKAST 10 MILLIGRAM(S): 4 TABLET, CHEWABLE ORAL at 21:20

## 2018-01-23 RX ADMIN — Medication 100 MILLIGRAM(S): at 12:45

## 2018-01-23 RX ADMIN — Medication 1 MILLIGRAM(S): at 12:46

## 2018-01-23 RX ADMIN — Medication 2 MILLIGRAM(S): at 09:56

## 2018-01-23 RX ADMIN — Medication 2.5 MILLIGRAM(S): at 09:13

## 2018-01-23 RX ADMIN — Medication 1 MILLIGRAM(S): at 05:19

## 2018-01-23 RX ADMIN — SIMETHICONE 160 MILLIGRAM(S): 80 TABLET, CHEWABLE ORAL at 22:20

## 2018-01-23 RX ADMIN — Medication 2 MILLIGRAM(S): at 15:59

## 2018-01-23 RX ADMIN — ONDANSETRON 4 MILLIGRAM(S): 8 TABLET, FILM COATED ORAL at 14:10

## 2018-01-23 RX ADMIN — Medication 2.5 MILLIGRAM(S): at 21:19

## 2018-01-23 RX ADMIN — CLINDAMYCIN PHOSPHATE GEL USP, 1% 1 APPLICATION(S): 10 GEL TOPICAL at 09:13

## 2018-01-24 ENCOUNTER — APPOINTMENT (OUTPATIENT)
Dept: NEUROLOGY | Facility: CLINIC | Age: 43
End: 2018-01-24

## 2018-01-24 PROCEDURE — 99232 SBSQ HOSP IP/OBS MODERATE 35: CPT | Mod: GC

## 2018-01-24 PROCEDURE — 93010 ELECTROCARDIOGRAM REPORT: CPT

## 2018-01-24 RX ADMIN — ONDANSETRON 4 MILLIGRAM(S): 8 TABLET, FILM COATED ORAL at 11:52

## 2018-01-24 RX ADMIN — ONDANSETRON 4 MILLIGRAM(S): 8 TABLET, FILM COATED ORAL at 07:31

## 2018-01-24 RX ADMIN — FAMOTIDINE 20 MILLIGRAM(S): 10 INJECTION INTRAVENOUS at 21:43

## 2018-01-24 RX ADMIN — FAMOTIDINE 20 MILLIGRAM(S): 10 INJECTION INTRAVENOUS at 12:49

## 2018-01-24 RX ADMIN — Medication 2 MILLIGRAM(S): at 12:14

## 2018-01-24 RX ADMIN — Medication 2.5 MILLIGRAM(S): at 09:14

## 2018-01-24 RX ADMIN — Medication 2.5 MILLIGRAM(S): at 21:43

## 2018-01-24 RX ADMIN — Medication 2 MILLIGRAM(S): at 07:31

## 2018-01-24 RX ADMIN — ONDANSETRON 4 MILLIGRAM(S): 8 TABLET, FILM COATED ORAL at 16:51

## 2018-01-24 RX ADMIN — SIMETHICONE 160 MILLIGRAM(S): 80 TABLET, CHEWABLE ORAL at 21:44

## 2018-01-24 RX ADMIN — Medication 100 MILLIGRAM(S): at 21:43

## 2018-01-24 RX ADMIN — MONTELUKAST 10 MILLIGRAM(S): 4 TABLET, CHEWABLE ORAL at 21:44

## 2018-01-24 RX ADMIN — ONDANSETRON 4 MILLIGRAM(S): 8 TABLET, FILM COATED ORAL at 21:06

## 2018-01-24 RX ADMIN — Medication 2 MILLIGRAM(S): at 21:06

## 2018-01-24 RX ADMIN — Medication 2 MILLIGRAM(S): at 16:48

## 2018-01-25 PROCEDURE — 99232 SBSQ HOSP IP/OBS MODERATE 35: CPT | Mod: GC

## 2018-01-25 RX ORDER — OLANZAPINE 15 MG/1
1 TABLET, FILM COATED ORAL
Qty: 14 | Refills: 0 | OUTPATIENT
Start: 2018-01-25 | End: 2018-02-07

## 2018-01-25 RX ORDER — DILTIAZEM HCL 120 MG
1 CAPSULE, EXT RELEASE 24 HR ORAL
Qty: 14 | Refills: 0 | OUTPATIENT
Start: 2018-01-25 | End: 2018-02-07

## 2018-01-25 RX ORDER — ONDANSETRON 8 MG/1
1 TABLET, FILM COATED ORAL
Qty: 84 | Refills: 0 | OUTPATIENT
Start: 2018-01-25 | End: 2018-02-07

## 2018-01-25 RX ADMIN — Medication 2 MILLIGRAM(S): at 18:51

## 2018-01-25 RX ADMIN — ONDANSETRON 4 MILLIGRAM(S): 8 TABLET, FILM COATED ORAL at 15:20

## 2018-01-25 RX ADMIN — SIMETHICONE 160 MILLIGRAM(S): 80 TABLET, CHEWABLE ORAL at 23:00

## 2018-01-25 RX ADMIN — Medication 2 MILLIGRAM(S): at 06:10

## 2018-01-25 RX ADMIN — Medication 1 MILLIGRAM(S): at 01:05

## 2018-01-25 RX ADMIN — Medication 2 MILLIGRAM(S): at 15:17

## 2018-01-25 RX ADMIN — Medication 2.5 MILLIGRAM(S): at 08:44

## 2018-01-25 RX ADMIN — ONDANSETRON 4 MILLIGRAM(S): 8 TABLET, FILM COATED ORAL at 18:51

## 2018-01-25 RX ADMIN — FAMOTIDINE 20 MILLIGRAM(S): 10 INJECTION INTRAVENOUS at 12:35

## 2018-01-25 RX ADMIN — Medication 2 MILLIGRAM(S): at 23:00

## 2018-01-25 RX ADMIN — Medication 100 MILLIGRAM(S): at 17:53

## 2018-01-25 RX ADMIN — ONDANSETRON 4 MILLIGRAM(S): 8 TABLET, FILM COATED ORAL at 10:36

## 2018-01-25 RX ADMIN — Medication 2 MILLIGRAM(S): at 10:37

## 2018-01-25 RX ADMIN — Medication 100 MILLIGRAM(S): at 12:35

## 2018-01-25 RX ADMIN — ONDANSETRON 4 MILLIGRAM(S): 8 TABLET, FILM COATED ORAL at 06:10

## 2018-01-25 RX ADMIN — SIMETHICONE 160 MILLIGRAM(S): 80 TABLET, CHEWABLE ORAL at 12:37

## 2018-01-26 ENCOUNTER — NON-APPOINTMENT (OUTPATIENT)
Age: 43
End: 2018-01-26

## 2018-01-26 ENCOUNTER — APPOINTMENT (OUTPATIENT)
Dept: CARDIOLOGY | Facility: CLINIC | Age: 43
End: 2018-01-26
Payer: MEDICAID

## 2018-01-26 ENCOUNTER — APPOINTMENT (OUTPATIENT)
Dept: CARDIOLOGY | Facility: CLINIC | Age: 43
End: 2018-01-26

## 2018-01-26 ENCOUNTER — INPATIENT (INPATIENT)
Facility: HOSPITAL | Age: 43
LOS: 3 days | Discharge: HOME | DRG: 641 | End: 2018-01-30
Attending: INTERNAL MEDICINE | Admitting: HOSPITALIST
Payer: MEDICAID

## 2018-01-26 VITALS — OXYGEN SATURATION: 100 % | SYSTOLIC BLOOD PRESSURE: 138 MMHG | DIASTOLIC BLOOD PRESSURE: 107 MMHG | HEART RATE: 103 BPM

## 2018-01-26 VITALS — DIASTOLIC BLOOD PRESSURE: 78 MMHG | TEMPERATURE: 98 F | SYSTOLIC BLOOD PRESSURE: 128 MMHG | HEART RATE: 81 BPM

## 2018-01-26 VITALS
HEART RATE: 115 BPM | SYSTOLIC BLOOD PRESSURE: 131 MMHG | RESPIRATION RATE: 18 BRPM | DIASTOLIC BLOOD PRESSURE: 99 MMHG | OXYGEN SATURATION: 99 % | TEMPERATURE: 98 F

## 2018-01-26 DIAGNOSIS — E86.0 DEHYDRATION: ICD-10-CM

## 2018-01-26 DIAGNOSIS — K58.9 IRRITABLE BOWEL SYNDROME WITHOUT DIARRHEA: ICD-10-CM

## 2018-01-26 DIAGNOSIS — F41.9 ANXIETY DISORDER, UNSPECIFIED: ICD-10-CM

## 2018-01-26 DIAGNOSIS — R00.0 TACHYCARDIA, UNSPECIFIED: ICD-10-CM

## 2018-01-26 DIAGNOSIS — R03.0 ELEVATED BLOOD-PRESSURE READING, W/OUT DIAGNOSIS OF HYPERTENSION: ICD-10-CM

## 2018-01-26 DIAGNOSIS — E87.6 HYPOKALEMIA: ICD-10-CM

## 2018-01-26 LAB
ALBUMIN SERPL ELPH-MCNC: 4.9 G/DL — SIGNIFICANT CHANGE UP (ref 3.3–5)
ALP SERPL-CCNC: 76 U/L — SIGNIFICANT CHANGE UP (ref 40–120)
ALT FLD-CCNC: 13 U/L RC — SIGNIFICANT CHANGE UP (ref 10–45)
ANION GAP SERPL CALC-SCNC: 15 MMOL/L — SIGNIFICANT CHANGE UP (ref 5–17)
AST SERPL-CCNC: 30 U/L — SIGNIFICANT CHANGE UP (ref 10–40)
BASE EXCESS BLDV CALC-SCNC: -0.8 MMOL/L — SIGNIFICANT CHANGE UP (ref -2–2)
BASOPHILS # BLD AUTO: 0 K/UL — SIGNIFICANT CHANGE UP (ref 0–0.2)
BASOPHILS NFR BLD AUTO: 0.4 % — SIGNIFICANT CHANGE UP (ref 0–2)
BILIRUB SERPL-MCNC: 0.5 MG/DL — SIGNIFICANT CHANGE UP (ref 0.2–1.2)
BUN SERPL-MCNC: <4 MG/DL — LOW (ref 7–23)
CA-I SERPL-SCNC: 1.25 MMOL/L — SIGNIFICANT CHANGE UP (ref 1.12–1.3)
CALCIUM SERPL-MCNC: 10.1 MG/DL — SIGNIFICANT CHANGE UP (ref 8.4–10.5)
CHLORIDE BLDV-SCNC: 106 MMOL/L — SIGNIFICANT CHANGE UP (ref 96–108)
CHLORIDE SERPL-SCNC: 104 MMOL/L — SIGNIFICANT CHANGE UP (ref 96–108)
CO2 BLDV-SCNC: 23 MMOL/L — SIGNIFICANT CHANGE UP (ref 22–30)
CO2 SERPL-SCNC: 21 MMOL/L — LOW (ref 22–31)
CREAT SERPL-MCNC: 0.91 MG/DL — SIGNIFICANT CHANGE UP (ref 0.5–1.3)
EOSINOPHIL # BLD AUTO: 0.1 K/UL — SIGNIFICANT CHANGE UP (ref 0–0.5)
EOSINOPHIL NFR BLD AUTO: 0.7 % — SIGNIFICANT CHANGE UP (ref 0–6)
GAS PNL BLDV: 141 MMOL/L — SIGNIFICANT CHANGE UP (ref 136–145)
GAS PNL BLDV: SIGNIFICANT CHANGE UP
GIANT PLATELETS BLD QL SMEAR: PRESENT — SIGNIFICANT CHANGE UP
GLUCOSE BLDV-MCNC: 81 MG/DL — SIGNIFICANT CHANGE UP (ref 70–99)
GLUCOSE SERPL-MCNC: 82 MG/DL — SIGNIFICANT CHANGE UP (ref 70–99)
HCG SERPL-ACNC: <2 MIU/ML — LOW (ref 5–24)
HCO3 BLDV-SCNC: 22 MMOL/L — SIGNIFICANT CHANGE UP (ref 21–29)
HCT VFR BLD CALC: 40.1 % — SIGNIFICANT CHANGE UP (ref 34.5–45)
HCT VFR BLDA CALC: 30 % — LOW (ref 39–50)
HGB BLD CALC-MCNC: 9.8 G/DL — LOW (ref 11.5–15.5)
HGB BLD-MCNC: 14.7 G/DL — SIGNIFICANT CHANGE UP (ref 11.5–15.5)
LACTATE BLDV-MCNC: 1.4 MMOL/L — SIGNIFICANT CHANGE UP (ref 0.7–2)
LG PLATELETS BLD QL AUTO: SLIGHT — SIGNIFICANT CHANGE UP
LYMPHOCYTES # BLD AUTO: 2.3 K/UL — SIGNIFICANT CHANGE UP (ref 1–3.3)
LYMPHOCYTES # BLD AUTO: 27.9 % — SIGNIFICANT CHANGE UP (ref 13–44)
MCHC RBC-ENTMCNC: 30.7 PG — SIGNIFICANT CHANGE UP (ref 27–34)
MCHC RBC-ENTMCNC: 36.7 GM/DL — HIGH (ref 32–36)
MCV RBC AUTO: 83.8 FL — SIGNIFICANT CHANGE UP (ref 80–100)
MONOCYTES # BLD AUTO: 0.8 K/UL — SIGNIFICANT CHANGE UP (ref 0–0.9)
MONOCYTES NFR BLD AUTO: 9.6 % — SIGNIFICANT CHANGE UP (ref 2–14)
NEUTROPHILS # BLD AUTO: 5.1 K/UL — SIGNIFICANT CHANGE UP (ref 1.8–7.4)
NEUTROPHILS NFR BLD AUTO: 61.3 % — SIGNIFICANT CHANGE UP (ref 43–77)
PCO2 BLDV: 33 MMHG — LOW (ref 35–50)
PH BLDV: 7.45 — SIGNIFICANT CHANGE UP (ref 7.35–7.45)
PLAT MORPH BLD: ABNORMAL
PLATELET # BLD AUTO: 81 K/UL — LOW (ref 150–400)
PO2 BLDV: 43 MMHG — SIGNIFICANT CHANGE UP (ref 25–45)
POTASSIUM BLDV-SCNC: 3.3 MMOL/L — LOW (ref 3.5–5)
POTASSIUM SERPL-MCNC: 3.4 MMOL/L — LOW (ref 3.5–5.3)
POTASSIUM SERPL-SCNC: 3.4 MMOL/L — LOW (ref 3.5–5.3)
PROT SERPL-MCNC: 8 G/DL — SIGNIFICANT CHANGE UP (ref 6–8.3)
RBC # BLD: 4.79 M/UL — SIGNIFICANT CHANGE UP (ref 3.8–5.2)
RBC # FLD: 11.9 % — SIGNIFICANT CHANGE UP (ref 10.3–14.5)
RBC BLD AUTO: SIGNIFICANT CHANGE UP
SAO2 % BLDV: 74 % — SIGNIFICANT CHANGE UP (ref 67–88)
SODIUM SERPL-SCNC: 140 MMOL/L — SIGNIFICANT CHANGE UP (ref 135–145)
WBC # BLD: 8.3 K/UL — SIGNIFICANT CHANGE UP (ref 3.8–10.5)
WBC # FLD AUTO: 8.3 K/UL — SIGNIFICANT CHANGE UP (ref 3.8–10.5)

## 2018-01-26 PROCEDURE — 93000 ELECTROCARDIOGRAM COMPLETE: CPT

## 2018-01-26 PROCEDURE — 71045 X-RAY EXAM CHEST 1 VIEW: CPT | Mod: 26

## 2018-01-26 PROCEDURE — 99285 EMERGENCY DEPT VISIT HI MDM: CPT

## 2018-01-26 PROCEDURE — 99215 OFFICE O/P EST HI 40 MIN: CPT

## 2018-01-26 PROCEDURE — 99239 HOSP IP/OBS DSCHRG MGMT >30: CPT | Mod: GC

## 2018-01-26 PROCEDURE — 99223 1ST HOSP IP/OBS HIGH 75: CPT

## 2018-01-26 RX ORDER — DILTIAZEM HCL 120 MG
180 CAPSULE, EXT RELEASE 24 HR ORAL
Qty: 0 | Refills: 0 | Status: DISCONTINUED | OUTPATIENT
Start: 2018-01-27 | End: 2018-01-30

## 2018-01-26 RX ORDER — FAMOTIDINE 10 MG/ML
20 INJECTION INTRAVENOUS
Qty: 0 | Refills: 0 | Status: DISCONTINUED | OUTPATIENT
Start: 2018-01-27 | End: 2018-01-30

## 2018-01-26 RX ORDER — DOCUSATE SODIUM 100 MG
100 CAPSULE ORAL
Qty: 0 | Refills: 0 | Status: DISCONTINUED | OUTPATIENT
Start: 2018-01-26 | End: 2018-01-30

## 2018-01-26 RX ORDER — OLANZAPINE 15 MG/1
1 TABLET, FILM COATED ORAL
Qty: 14 | Refills: 0 | OUTPATIENT
Start: 2018-01-26 | End: 2018-02-08

## 2018-01-26 RX ORDER — SODIUM CHLORIDE 9 MG/ML
1000 INJECTION INTRAMUSCULAR; INTRAVENOUS; SUBCUTANEOUS
Qty: 0 | Refills: 0 | Status: DISCONTINUED | OUTPATIENT
Start: 2018-01-26 | End: 2018-01-30

## 2018-01-26 RX ORDER — CELL/AMY/LIP/PROTE/P-TLOX/HYOS 15MG-62.5
3 CAPSULE ORAL
Qty: 0 | Refills: 0 | COMMUNITY

## 2018-01-26 RX ORDER — ONDANSETRON 8 MG/1
4 TABLET, FILM COATED ORAL
Qty: 0 | Refills: 0 | Status: DISCONTINUED | OUTPATIENT
Start: 2018-01-26 | End: 2018-01-30

## 2018-01-26 RX ORDER — POTASSIUM CHLORIDE 20 MEQ
20 PACKET (EA) ORAL ONCE
Qty: 0 | Refills: 0 | Status: DISCONTINUED | OUTPATIENT
Start: 2018-01-26 | End: 2018-01-26

## 2018-01-26 RX ORDER — POTASSIUM CHLORIDE 20 MEQ
20 PACKET (EA) ORAL ONCE
Qty: 0 | Refills: 0 | Status: COMPLETED | OUTPATIENT
Start: 2018-01-26 | End: 2018-01-26

## 2018-01-26 RX ORDER — SIMETHICONE 80 MG/1
160 TABLET, CHEWABLE ORAL
Qty: 0 | Refills: 0 | Status: DISCONTINUED | OUTPATIENT
Start: 2018-01-26 | End: 2018-01-30

## 2018-01-26 RX ORDER — MONTELUKAST 4 MG/1
10 TABLET, CHEWABLE ORAL
Qty: 0 | Refills: 0 | Status: DISCONTINUED | OUTPATIENT
Start: 2018-01-26 | End: 2018-01-30

## 2018-01-26 RX ADMIN — Medication 100 MILLIGRAM(S): at 23:17

## 2018-01-26 RX ADMIN — SIMETHICONE 160 MILLIGRAM(S): 80 TABLET, CHEWABLE ORAL at 23:17

## 2018-01-26 RX ADMIN — Medication 20 MILLIEQUIVALENT(S): at 23:17

## 2018-01-26 RX ADMIN — ONDANSETRON 4 MILLIGRAM(S): 8 TABLET, FILM COATED ORAL at 01:56

## 2018-01-26 RX ADMIN — Medication 2.5 MILLIGRAM(S): at 08:59

## 2018-01-26 RX ADMIN — SODIUM CHLORIDE 80 MILLILITER(S): 9 INJECTION INTRAMUSCULAR; INTRAVENOUS; SUBCUTANEOUS at 18:36

## 2018-01-26 RX ADMIN — Medication 2 MILLIGRAM(S): at 05:49

## 2018-01-26 RX ADMIN — Medication 1 MILLIGRAM(S): at 01:55

## 2018-01-26 RX ADMIN — ONDANSETRON 4 MILLIGRAM(S): 8 TABLET, FILM COATED ORAL at 05:49

## 2018-01-26 NOTE — H&P ADULT - ASSESSMENT
NIGHT HOSPITALIST:  Presentation of patient by her cardiologist for suspected clinical dehydration but with complex history as above--patient concerned about pneumonia and wishes a repeat chest radiograph>>patient with NO clinical or biochemical evidence of pneumonia but chest radiograph ordered to exclude as such.  Patient at present is calm, interactive with examiner, and presently declines a behavioral health consult, but requests a formal neurologic evaluation from Dr. Julio Torres's group in the AM for history of tremors>>suspect related to patient's benzodiazepine dependence (patient is very specific on dosing times of her Ativan as well as her Zofran--given before her Ativan).    Will continue with patient's IVF.  Potassium orally supplemented.  Will obtain US of the urinary bladder and obtain a urinalysis.

## 2018-01-26 NOTE — ED PROVIDER NOTE - CARDIAC, MLM
Normal rate, regular rhythm.  Heart sounds S1, S2.  No murmurs, rubs or gallops.  Not tachycardic on my exam

## 2018-01-26 NOTE — H&P ADULT - NSHPSOURCEINFOTX_GEN_ALL_CORE
Reviewed medication list with patient.  No family in attendance and patient did not wish family contacted at present.  Cardiology note reviewed.

## 2018-01-26 NOTE — H&P ADULT - HISTORY OF PRESENT ILLNESS
NIGHT HOSPITALIST:  Patient UNKNOWN to me previously, assigned to me at this point via the ER to admit this 41 y/o F--patient with a recent discharge (1/12/18)  from [facility] which included a involuntary admission at [facility]--patient with a history of reported postural tachycardia syndrome, idiopathic thrombocytopenic purpura, IBS, benzodiazepine dependence (see [place] I Stop in chart) with specific dosing schedule per patient, reported gastroparesis with patient dependent on pre benzodiazepine Zofran, with patient referred by her cardiologist after evaluation in the office for concern for dehydration.  Patient with report of decreased urinary frequency and sense of incomplete voiding and palpitation.  No chest pain/pressure.  No dyspnea.  No fever, no chills, no rigors.  No abdominal pain, no dysuria, no hematuria.  No HA, no focal weakness.  Patient with constipation but denies red blood per rectum or melena.  No back pain, no tearing back pain.  Denies weight loss or anorexia.  No suicidal or homicidal ideation.  Remaining review of systems not contributory.

## 2018-01-26 NOTE — H&P ADULT - NSHPLABSRESULTS_GEN_ALL_CORE
WBC 8.3.  hgb 14.7.  Platelets of 81K.  normal differential.  Alb 4.9.  K+ 3.4.>>K+ ordered for supplementation.  Random glucose of 82.  Cr 0.9.  Serum Hcg negative.  January head CTT negative.  January 18 2018 chest radiograph reviewed with no infiltrate or effusion. WBC 8.3.  hgb 14.7.  Platelets of 81K.  normal differential.  Alb 4.9.  K+ 3.4.>>K+ ordered for supplementation.  Random glucose of 82.  Cr 0.9.  Serum Hcg negative.  January head CTT negative.  January 18 2018 chest radiograph reviewed with no infiltrate or effusion.  Chest radiograph in the ER reviewed with no infiltrate or effusion. WBC 8.3.  hgb 14.7.  Platelets of 81K.  normal differential.  Alb 4.9.  K+ 3.4.>>K+ ordered for supplementation.  Random glucose of 82.  Cr 0.9.  Serum Hcg negative.  January head CTT negative.  January 18 2018 chest radiograph reviewed with no infiltrate or effusion.  Chest radiograph in the ER reviewed with no infiltrate or effusion.  EKG tracing reviewed with NSR at 78,.

## 2018-01-26 NOTE — CONSULT NOTE ADULT - SUBJECTIVE AND OBJECTIVE BOX
**********              CARDIOLOGY CONSULT NOTE              ************  ============================================================  CHIEF COMPLAINT/REASON FOR CONSULT:  Patient is a 42y old  Female who presents with a chief complaint of     HISTORY OF PRESENT ILLNESS:  42yFemale with a history of ITP/IBS/ANxiety/Hypertensive Urgency/Psychiatric Issues/?POTS Syndrome (exhaustive workup presumed autonomic dysfunction) presented to my office with recurrent palpitations/hypertension/tremors.   This has been an ongoing issue.   She was recently hospitalized multiple times for similar issues. And also recently hospitalized involuntarily at [facility].   She now presents with similar symptoms but is very malnourished.       Allergies    barium sulfate (Other)  beta blockers (Unknown)  Reglan (Other)    Intolerances    metoprolol (Headache)  	    MEDICATIONS:  Home Medications:  bisacodyl 10 mg rectal suppository: 1 suppository(ies) rectal once a day, As needed, Constipation (25 Jan 2018 15:29)  clindamycin 1% topical gel: 1 application topically once a day (12 Jan 2018 13:52)  digestive enzymes/hyoscyamine/phenyltoloxamine oral capsule: 3 cap(s) orally 3 times a day (with meals), As Needed for digestion (12 Jan 2018 13:52)  docusate sodium 100 mg oral capsule: 1 cap(s) orally 3 times a day (09 Jan 2018 16:22)  famotidine 20 mg oral tablet: 1 tab(s) orally 2 times a day (12 Jan 2018 13:52)  montelukast 10 mg oral tablet: 1 tab(s) orally once a day (at bedtime) (12 Jan 2018 13:52)  QUEtiapine 25 mg oral tablet: 1 tab(s) orally every 6 hours, As needed, Anxiety or agitation (12 Jan 2018 13:52)  simethicone 80 mg oral tablet, chewable: 2 tab(s) orally 3 times a day (12 Jan 2018 13:52)    PAST MEDICAL & SURGICAL HISTORY:  Depression  Idiopathic thrombocytopenic purpura  Dysautonomia  Idiopathic thrombocytopenia purpura  Anxiety  POTS (postural orthostatic tachycardia syndrome)  History of ITP  Labyrinthitis  Headache  HTN (hypertension)  Anxiety  IBS (irritable bowel syndrome)  No significant past surgical history  No significant past surgical history  No significant past surgical history      FAMILY HISTORY:  No pertinent family history in first degree relatives  Family history of hypertension (Grandparent)  Family history of atrial fibrillation  Family history of prostate cancer in father    NC - Mother/Father    SOCIAL HISTORY:    [ x] Non-smoker  [x] No Illicit Drug Use  [ x] No Excess EtOH Use      REVIEW OF SYSTEMS: (Unless + Before Symptom, it is negative)  Constitutional: No Fever, Fatigue, +Weight Loss  Eyes: No Recent Vision Changes, Eye Pain  ENT: No Congestion, Sore Throat  Endocrine: No Excess Sweating, Temperature Intolerance  Cardiovascular: No Chest Pain, +Palpitations, Shortness of Breath, Pre-syncope, Syncope, LE Edema  Respiratory: No Cough, Congestion, Wheezing  Gastrointestinal: No Abdominal Pain, Nausea, Vomiting  Genitourinary: No dysuria, hematuria  Musculoskeletal: No Joint Pain, Swelling  Neurologic: No headaches, Imbalance, Weakness +Tremors  Skin: No rashes, hematoma, purprura    ================================    PHYSICAL EXAM:  T(C): 36.8 (01-26-18 @ 14:33), Max: 36.8 (01-26-18 @ 07:59)  HR: 115 (01-26-18 @ 14:33) (81 - 115)  BP: 131/99 (01-26-18 @ 14:33) (128/78 - 158/104)  RR: 18 (01-26-18 @ 14:33) (18 - 18)  SpO2: 99% (01-26-18 @ 14:33) (99% - 99%)  Wt(kg): --  I&O's Summary    Appearance: +Anxious + Labile + Tearful	  HEENT:   Normal oral mucosa, EOMI	  Lymphatic: No lymphadenopathy  Cardiovascular: Normal S1 S2, +Tachycardic No JVD, No murmurs, No edema  Respiratory: Lungs clear to auscultation, no use of accessory muscles	  Psychiatry: +Anxious +Tearful +Depressed   Gastrointestinal:  Soft, Non-tender	  Skin: No rashes, No ecchymoses, No cyanosis	  Neurologic: Non-focal, No Focal Deficits  Extremities: Normal range of motion, No clubbing, cyanosis or edema  Vascular: Peripheral pulses palpable 2+ bilaterally, no prominent varicosities    ============================    LABS:	   Labs Reviewed01-26-18 @ 16:47	    ECG:   Sinus Tachycardia 	    =========================================================================  ASSESSMENT:  42yFemale with a history of ITP/IBS/ANxiety/Hypertensive Urgency/Psychiatric Issues/?POTS Syndrome (exhaustive workup presumed autonomic dysfunction) presented to my office with recurrent palpitations/hypertension/tremors.   I believe she has been consumed by her HR and BP to the point that it has become the focal point of her life. Anxiety surrounds this issue and compounds it.   She is being admitted for nutrition and social work assistance.   We discussed in detail that she must not be admitted to Tele; she will only have her BP/HR taken once daily.  We also discussed that she must be willing to accept a Behavioral Health Consult     Recommendations  - SHE is being admitted for MALNUTRITION and Social Work  - She should NOT be admitted to Tele.   - BP and HR should only be checked Once a Day (this was discussed with her)  - Behavior Health Consult   - IVF/Ensure/SW to help her get to [facility]  - Continue Current Diltiazem Nurse  - Faculty Cardiology to Cover over the Weekend       Please call with questions.     Philip Reed MD, FACC **********              CARDIOLOGY CONSULT NOTE              ************  ============================================================  CHIEF COMPLAINT/REASON FOR CONSULT:  Patient is a 42y old  Female who presents with a chief complaint of Palpitations/Tremors/Hypertension/Tachycardia/Malnutrition    HISTORY OF PRESENT ILLNESS:  42yFemale with a history of ITP/IBS/ANxiety/Hypertensive Urgency/Psychiatric Issues/?POTS Syndrome (exhaustive workup presumed autonomic dysfunction) presented to my office with recurrent palpitations/hypertension/tremors.   This has been an ongoing issue.   She was recently hospitalized multiple times for similar issues. And also recently hospitalized involuntarily at [facility].   She now presents with similar symptoms but is very malnourished.       Allergies    barium sulfate (Other)  beta blockers (Unknown)  Reglan (Other)    Intolerances    metoprolol (Headache)  	    MEDICATIONS:  Home Medications:  bisacodyl 10 mg rectal suppository: 1 suppository(ies) rectal once a day, As needed, Constipation (25 Jan 2018 15:29)  clindamycin 1% topical gel: 1 application topically once a day (12 Jan 2018 13:52)  digestive enzymes/hyoscyamine/phenyltoloxamine oral capsule: 3 cap(s) orally 3 times a day (with meals), As Needed for digestion (12 Jan 2018 13:52)  docusate sodium 100 mg oral capsule: 1 cap(s) orally 3 times a day (09 Jan 2018 16:22)  famotidine 20 mg oral tablet: 1 tab(s) orally 2 times a day (12 Jan 2018 13:52)  montelukast 10 mg oral tablet: 1 tab(s) orally once a day (at bedtime) (12 Jan 2018 13:52)  QUEtiapine 25 mg oral tablet: 1 tab(s) orally every 6 hours, As needed, Anxiety or agitation (12 Jan 2018 13:52)  simethicone 80 mg oral tablet, chewable: 2 tab(s) orally 3 times a day (12 Jan 2018 13:52)    PAST MEDICAL & SURGICAL HISTORY:  Depression  Idiopathic thrombocytopenic purpura  Dysautonomia  Idiopathic thrombocytopenia purpura  Anxiety  POTS (postural orthostatic tachycardia syndrome)  History of ITP  Labyrinthitis  Headache  HTN (hypertension)  Anxiety  IBS (irritable bowel syndrome)  No significant past surgical history  No significant past surgical history  No significant past surgical history      FAMILY HISTORY:  No pertinent family history in first degree relatives  Family history of hypertension (Grandparent)  Family history of atrial fibrillation  Family history of prostate cancer in father    NC - Mother/Father    SOCIAL HISTORY:    [ x] Non-smoker  [x] No Illicit Drug Use  [ x] No Excess EtOH Use      REVIEW OF SYSTEMS: (Unless + Before Symptom, it is negative)  Constitutional: No Fever, Fatigue, +Weight Loss  Eyes: No Recent Vision Changes, Eye Pain  ENT: No Congestion, Sore Throat  Endocrine: No Excess Sweating, Temperature Intolerance  Cardiovascular: No Chest Pain, +Palpitations, Shortness of Breath, Pre-syncope, Syncope, LE Edema  Respiratory: No Cough, Congestion, Wheezing  Gastrointestinal: No Abdominal Pain, Nausea, Vomiting  Genitourinary: No dysuria, hematuria  Musculoskeletal: No Joint Pain, Swelling  Neurologic: No headaches, Imbalance, Weakness +Tremors  Skin: No rashes, hematoma, purprura    ================================    PHYSICAL EXAM:  T(C): 36.8 (01-26-18 @ 14:33), Max: 36.8 (01-26-18 @ 07:59)  HR: 115 (01-26-18 @ 14:33) (81 - 115)  BP: 131/99 (01-26-18 @ 14:33) (128/78 - 158/104)  RR: 18 (01-26-18 @ 14:33) (18 - 18)  SpO2: 99% (01-26-18 @ 14:33) (99% - 99%)  Wt(kg): --  I&O's Summary    Appearance: +Anxious + Labile + Tearful	  HEENT:   Normal oral mucosa, EOMI	  Lymphatic: No lymphadenopathy  Cardiovascular: Normal S1 S2, +Tachycardic No JVD, No murmurs, No edema  Respiratory: Lungs clear to auscultation, no use of accessory muscles	  Psychiatry: +Anxious +Tearful +Depressed   Gastrointestinal:  Soft, Non-tender	  Skin: No rashes, No ecchymoses, No cyanosis	  Neurologic: Non-focal, No Focal Deficits  Extremities: Normal range of motion, No clubbing, cyanosis or edema  Vascular: Peripheral pulses palpable 2+ bilaterally, no prominent varicosities    ============================    LABS:	   Labs Reviewed01-26-18 @ 16:47	    ECG:   Sinus Tachycardia 	    =========================================================================  ASSESSMENT:  42yFemale with a history of ITP/IBS/ANxiety/Hypertensive Urgency/Psychiatric Issues/?POTS Syndrome (exhaustive workup presumed autonomic dysfunction) presented to my office with recurrent palpitations/hypertension/tremors.   I believe she has been consumed by her HR and BP to the point that it has become the focal point of her life. Anxiety surrounds this issue and compounds it.   She is being admitted for nutrition and social work assistance.   We discussed in detail that she must not be admitted to Tele; she will only have her BP/HR taken once daily.  We also discussed that she must be willing to accept a Behavioral Health Consult     Recommendations  - SHE is being admitted for MALNUTRITION and Social Work  - She should NOT be admitted to Tele.   - BP and HR should only be checked Once a Day (this was discussed with her)  - Behavior Health Consult   - IVF/Ensure/SW to help her get to [facility]  - Continue Current Diltiazem Nurse  - Faculty Cardiology to Cover over the Weekend       Please call with questions.     Philip Reed MD, FACC

## 2018-01-26 NOTE — H&P ADULT - ATTENDING COMMENTS
NIGHT HOSPITALIST:  Patient aware of course and agrees with plan/care as above.  Care reviewed with covering NP.   Care assumed by the DAY HOSPITALIST.    Tim Degroot MD  [facility]

## 2018-01-26 NOTE — ED PROVIDER NOTE - OBJECTIVE STATEMENT
Patient with PMH below, frequent ED visits with multiple complaints, today seen by her cardiologist who was concerned she was becoming dehydrated - sent patient into ED for admission.  Patient c/o tachycardia, feeling dehydrated, decreased urination.  Also c/o constipation (has had issues with this in past).  Cardiologists note reviewed - plan for admission IVF, nutrition/SW consultations, VS once daily, no telemetry admission (patient obsessed with HR/BP and seems to exacerbate problems).

## 2018-01-26 NOTE — ED ADULT NURSE NOTE - OBJECTIVE STATEMENT
43 yo female presents to ED with multiple complaints. Patient is A&O x3 and appears anxious. She is complaining of "tremors, high blood pressure, tachycardia in the mornings, and inability to urinate." She states that MD Zelalem Hussein sent her to be admitted. (See consult note by Zelalem Hussein 1/26/18). Patient does not exhibit tremors on initial exam. Blood pressure and pulse are mildly elevated on initial exam (See Flow sheet, MD Mynor hernandez). She denies chest pain. Lungs clear and equal klever. Abd soft non tender non distended. Skin is warm and dry. There is no acute distress. VSS. Safety and comfort maintained. Will continue to monitor.

## 2018-01-26 NOTE — ED PROVIDER NOTE - MEDICAL DECISION MAKING DETAILS
Patient with multiple medical problems, severe anxiety, chronic hypertension, POTS sent for dehydration, will follow cardiology recommendations regarding vital signs daily (I have seen this patient in the past and agree that checking patients heart rate/BP regularly only seems to exacerbate her issues), check basic  labs, start gentle IVF at 80cc/hr in ED, admit per his plan

## 2018-01-26 NOTE — H&P ADULT - PROBLEM SELECTOR PLAN 3
Reviewed [place] Stop and recent [facility] discharge.  On Ativan as above.  Will defer to the DAY PROVIDER to review Behavioral Health Consult. Reviewed [place] Stop and recent [facility] discharge.  On Ativan as above.  Will defer to the DAY PROVIDER to review Behavioral Health Consult.  Consider neurology consult in AM by Dr. Torres.

## 2018-01-26 NOTE — H&P ADULT - NSHPPHYSICALEXAM_GEN_ALL_CORE
Physical exam with a middle aged, thin, nontoxic F.  Calm.  AxOx3.  BP  131/99  Afebrile.  HR  100-110.  RR 14.  99% on RA.  HEENT, PERRL< EOMI, neck supple, chest clear, cor s1 s2, declined breast exam.  Abdomen soft nontender, no rebound, normal bowel sounds, no suprapubic tenderness.,  Ext no c/c/e.  No tremors.  Neurologic exam AxOx3.  Speech fluent.   Cognition intact.  Calm.  Very specific on patient's regimen for medications.  UE/LE 5/5.  No SI or HI.

## 2018-01-26 NOTE — ED ADULT NURSE NOTE - CHPI ED SYMPTOMS NEG
no pain/no decreased eating/drinking/no weakness/no dizziness/no fever/no tingling/no chills/no nausea/no numbness/no vomiting

## 2018-01-27 DIAGNOSIS — F22 DELUSIONAL DISORDERS: ICD-10-CM

## 2018-01-27 DIAGNOSIS — F60.9 PERSONALITY DISORDER, UNSPECIFIED: ICD-10-CM

## 2018-01-27 DIAGNOSIS — Z29.9 ENCOUNTER FOR PROPHYLACTIC MEASURES, UNSPECIFIED: ICD-10-CM

## 2018-01-27 LAB
ANION GAP SERPL CALC-SCNC: 13 MMOL/L — SIGNIFICANT CHANGE UP (ref 5–17)
APTT BLD: 26.4 SEC — LOW (ref 27.5–37.4)
BUN SERPL-MCNC: 3 MG/DL — LOW (ref 7–23)
CALCIUM SERPL-MCNC: 9.2 MG/DL — SIGNIFICANT CHANGE UP (ref 8.4–10.5)
CHLORIDE SERPL-SCNC: 107 MMOL/L — SIGNIFICANT CHANGE UP (ref 96–108)
CO2 SERPL-SCNC: 21 MMOL/L — LOW (ref 22–31)
CREAT SERPL-MCNC: 0.87 MG/DL — SIGNIFICANT CHANGE UP (ref 0.5–1.3)
GLUCOSE SERPL-MCNC: 86 MG/DL — SIGNIFICANT CHANGE UP (ref 70–99)
INR BLD: 0.94 RATIO — SIGNIFICANT CHANGE UP (ref 0.88–1.16)
POTASSIUM SERPL-MCNC: 4.2 MMOL/L — SIGNIFICANT CHANGE UP (ref 3.5–5.3)
POTASSIUM SERPL-SCNC: 4.2 MMOL/L — SIGNIFICANT CHANGE UP (ref 3.5–5.3)
PROTHROM AB SERPL-ACNC: 10.6 SEC — SIGNIFICANT CHANGE UP (ref 10–13.1)
SODIUM SERPL-SCNC: 141 MMOL/L — SIGNIFICANT CHANGE UP (ref 135–145)

## 2018-01-27 PROCEDURE — 99223 1ST HOSP IP/OBS HIGH 75: CPT

## 2018-01-27 PROCEDURE — 93010 ELECTROCARDIOGRAM REPORT: CPT

## 2018-01-27 PROCEDURE — 99233 SBSQ HOSP IP/OBS HIGH 50: CPT

## 2018-01-27 RX ADMIN — Medication 100 MILLIGRAM(S): at 14:24

## 2018-01-27 RX ADMIN — SIMETHICONE 160 MILLIGRAM(S): 80 TABLET, CHEWABLE ORAL at 14:24

## 2018-01-27 RX ADMIN — Medication 2 MILLIGRAM(S): at 01:35

## 2018-01-27 RX ADMIN — ONDANSETRON 4 MILLIGRAM(S): 8 TABLET, FILM COATED ORAL at 01:35

## 2018-01-27 RX ADMIN — ONDANSETRON 4 MILLIGRAM(S): 8 TABLET, FILM COATED ORAL at 10:51

## 2018-01-27 RX ADMIN — Medication 2.5 MILLIGRAM(S): at 00:04

## 2018-01-27 RX ADMIN — MONTELUKAST 10 MILLIGRAM(S): 4 TABLET, CHEWABLE ORAL at 00:05

## 2018-01-27 RX ADMIN — Medication 100 MILLIGRAM(S): at 10:46

## 2018-01-27 RX ADMIN — FAMOTIDINE 20 MILLIGRAM(S): 10 INJECTION INTRAVENOUS at 21:43

## 2018-01-27 RX ADMIN — SODIUM CHLORIDE 80 MILLILITER(S): 9 INJECTION INTRAMUSCULAR; INTRAVENOUS; SUBCUTANEOUS at 09:10

## 2018-01-27 RX ADMIN — SIMETHICONE 160 MILLIGRAM(S): 80 TABLET, CHEWABLE ORAL at 10:46

## 2018-01-27 RX ADMIN — Medication 2 MILLIGRAM(S): at 05:59

## 2018-01-27 RX ADMIN — ONDANSETRON 4 MILLIGRAM(S): 8 TABLET, FILM COATED ORAL at 14:24

## 2018-01-27 RX ADMIN — FAMOTIDINE 20 MILLIGRAM(S): 10 INJECTION INTRAVENOUS at 10:46

## 2018-01-27 RX ADMIN — Medication 2 MILLIGRAM(S): at 18:36

## 2018-01-27 RX ADMIN — Medication 2.5 MILLIGRAM(S): at 23:36

## 2018-01-27 RX ADMIN — ONDANSETRON 4 MILLIGRAM(S): 8 TABLET, FILM COATED ORAL at 05:59

## 2018-01-27 RX ADMIN — ONDANSETRON 4 MILLIGRAM(S): 8 TABLET, FILM COATED ORAL at 18:36

## 2018-01-27 RX ADMIN — Medication 100 MILLIGRAM(S): at 21:43

## 2018-01-27 RX ADMIN — MONTELUKAST 10 MILLIGRAM(S): 4 TABLET, CHEWABLE ORAL at 21:43

## 2018-01-27 RX ADMIN — Medication 2 MILLIGRAM(S): at 14:25

## 2018-01-27 RX ADMIN — Medication 2.5 MILLIGRAM(S): at 09:11

## 2018-01-27 RX ADMIN — Medication 180 MILLIGRAM(S): at 21:41

## 2018-01-27 RX ADMIN — Medication 2 MILLIGRAM(S): at 10:40

## 2018-01-27 RX ADMIN — SIMETHICONE 160 MILLIGRAM(S): 80 TABLET, CHEWABLE ORAL at 21:43

## 2018-01-27 RX ADMIN — SODIUM CHLORIDE 80 MILLILITER(S): 9 INJECTION INTRAMUSCULAR; INTRAVENOUS; SUBCUTANEOUS at 21:43

## 2018-01-27 NOTE — DIETITIAN INITIAL EVALUATION ADULT. - ENERGY NEEDS
ht: 5 feet 2 inches, wt: 96 pounds, BMI: 17.5 Kg/m2, IBW: 110x pounds (+/- 10%), 87x% IBW. Edema: none noted; Skin: no pressure injuries.

## 2018-01-27 NOTE — BEHAVIORAL HEALTH ASSESSMENT NOTE - NSBHCONSULTFOLLOWAFTERCARE_PSY_A_CORE FT
patient needs to go to Partial Hospitalization Program at [facility]. Currently has an appointment for Monday, but will likely not be able to make it. Please make sure Donovan is aware and will hold her spot, as it is vital she follow up with that program.

## 2018-01-27 NOTE — DIETITIAN INITIAL EVALUATION ADULT. - PHYSICAL APPEARANCE
BMI 17.5Kg/m2. Pt noted with severe muscle wasting of clavice, and temporal depletion. Pt reports she used to have an athletic build, endorses significant muscle and fat loss./underweight

## 2018-01-27 NOTE — DIETITIAN INITIAL EVALUATION ADULT. - NS FNS REASON FOR WEIGHT CHANG
altered GI function (specify)/Pt noted with gastroparesis and auto-immune disorder, reports anxiety has significantly reduced her po intake. Weight history indicates pt losing 10 pounds per months (115 pounds on 11/15/17, 105 pounds on 12/22/17, 96 pounds current admit). Pt reports her prior healthy wt was 128 pounds./decreased po intake

## 2018-01-27 NOTE — BEHAVIORAL HEALTH ASSESSMENT NOTE - RISK ASSESSMENT
Patient is at low risk of harm to herself. Patient has chronic risks, including real and perceived medical illness, isolation from her family, recent discharge from a psychiatric hospital, poor insight, impulsivity. Protective factors: no mood episode, persistent denial of SI, engagement and compliance with treatment.

## 2018-01-27 NOTE — CHART NOTE - NSCHARTNOTEFT_GEN_A_CORE
Upon Nutritional Assessment by the Registered Dietitian your patient was determined to meet criteria / has evidence of the following diagnosis/diagnoses:          [ ]  Mild Protein Calorie Malnutrition        [ ]  Moderate Protein Calorie Malnutrition        [ X] Severe Protein Calorie Malnutrition        [ ] Unspecified Protein Calorie Malnutrition        [X ] Underweight / BMI <19        [ ] Morbid Obesity / BMI > 40      Findings as based on:  [ X] Comprehensive nutrition assessment   [X] Nutrition Focused Physical Exam  [X ] Other: pt with 16.5% wt loss (19 pounds) in 2.5 months, po intake <50% of needs > 2 weeks, muscle and fat depletion, BMI 17.5      Nutrition Plan/Recommendations:    RD will coordinate with diet office to provide suitable vegan options. Pt is willing to drink 1+ soy milks daily, refuses all nutrition supplements due to vegan restriction.      PROVIDER Section:     By signing this assessment you are acknowledging and agree with the diagnosis/diagnoses assigned by the Registered Dietitian    Comments:

## 2018-01-27 NOTE — BEHAVIORAL HEALTH ASSESSMENT NOTE - SUMMARY
Patient is a 41 yo woman, single, unemployed, domiciled with parents, with PMHx  ITP, IBS, reported POTS disease, with PPHx ZAK, discharged yesterday from [facility] inpatient (1/12-1/26) with intended follow up at partial hospitalization on Monday, no other hospitalizations, no SA, no substance use/abuse, in outpatient psychotherapy with Dr. Stephanie Valente, who presented to [facility] for concern for dehydration. Psychiatry is consulted for evaluation of anxiety and med management. Patient is extremely somatically preoccupied during the interview. Denies any psychiatric symptoms. She is tolerating her Ativan and Buspar well.

## 2018-01-27 NOTE — DIETITIAN INITIAL EVALUATION ADULT. - OTHER INFO
Nutrition Consult received and appreciated. Per chart, pt is a "41 y/o woman with ITP/IBS/Anxiety/Hypertensive Urgency/reported psychiatric issues/?POTS Syndrome (exhaustive workup presumed autonomic dysfunction) currently sent from office with recurrent palpitations/tremors." Pt noted in chart with malnutrition and dehydration on admit.

## 2018-01-27 NOTE — DIETITIAN INITIAL EVALUATION ADULT. - ORAL INTAKE PTA
Pt reports she did not eat during recent 2 week involuntary admission to [facility]. Pt noted with 20 pounds wt loss in 2 months. Pt noted with multiple dietary restrictions, including vegan diet, fiber restriction for gastroparesis (requiring digestive enzymes to digest beans). Patient's only sources of protein are beans (when tolerated) and tofu./poor

## 2018-01-27 NOTE — PROGRESS NOTE ADULT - SUBJECTIVE AND OBJECTIVE BOX
Patient is a 42y old  Female who presents with a chief complaint of Sent in by her cardiologist for concern for dehydration (26 Jan 2018 21:14)      SUBJECTIVE / OVERNIGHT EVENTS:  no acute events overnight. vss, afebrile  Pt complains that she still woke up with dry mouth even while on IVF - requesting to have rate go up  Pt also states that she was involuntarily hospitalized at [facility] for no reasons for the past 2 weeks which made her POTS syndrome worse  Agreeable to psych consult but not to inpatient hospitalizatio    MEDICATIONS  (STANDING):  busPIRone 2.5 milliGRAM(s) Oral <User Schedule>  diltiazem    milliGRAM(s) Oral <User Schedule>  docusate sodium 100 milliGRAM(s) Oral <User Schedule>  famotidine    Tablet 20 milliGRAM(s) Oral <User Schedule>  LORazepam     Tablet 2 milliGRAM(s) Oral <User Schedule>  montelukast 10 milliGRAM(s) Oral <User Schedule>  ondansetron   Disintegrating Tablet 4 milliGRAM(s) Oral <User Schedule>  simethicone 160 milliGRAM(s) Chew <User Schedule>  sodium chloride 0.9%. 1000 milliLiter(s) (80 mL/Hr) IV Continuous <Continuous>    MEDICATIONS  (PRN):      CAPILLARY BLOOD GLUCOSE        I&O's Summary    27 Jan 2018 07:01  -  27 Jan 2018 15:53  --------------------------------------------------------  IN: 440 mL / OUT: 0 mL / NET: 440 mL        PHYSICAL EXAM:  Vital Signs Last 24 Hrs  T(C): 36.6 (27 Jan 2018 07:55), Max: 36.8 (26 Jan 2018 18:01)  T(F): 97.9 (27 Jan 2018 07:55), Max: 98.2 (26 Jan 2018 18:01)  HR: 80 (27 Jan 2018 07:55) (80 - 100)  BP: 117/82 (27 Jan 2018 07:55) (117/82 - 131/99)  BP(mean): --  RR: 18 (27 Jan 2018 12:05) (16 - 18)  SpO2: 100% (27 Jan 2018 12:05) (99% - 100%)    GENERAL: NAD, well-developed  HEAD:  Atraumatic, Normocephalic  EYES: EOMI, PERRLA, conjunctiva and sclera clear  NECK: Supple, No JVD  CHEST/LUNG: Clear to auscultation bilaterally; No wheeze  HEART: Regular rate and rhythm; No murmurs, rubs, or gallops  ABDOMEN: Soft, Nontender, Nondistended; Bowel sounds present  EXTREMITIES:  2+ Peripheral Pulses, No clubbing, cyanosis, or edema  NEUROLOGY: aaox3' non-focal  SKIN: No rashes or lesions    LABS:  personally reviewed                        14.7   8.3   )-----------( 81       ( 26 Jan 2018 18:55 )             40.1     01-27    141  |  107  |  3<L>  ----------------------------<  86  4.2   |  21<L>  |  0.87    Ca    9.2      27 Jan 2018 10:30    TPro  8.0  /  Alb  4.9  /  TBili  0.5  /  DBili  x   /  AST  30  /  ALT  13  /  AlkPhos  76  01-26    PT/INR - ( 27 Jan 2018 10:30 )   PT: 10.6 sec;   INR: 0.94 ratio         PTT - ( 27 Jan 2018 10:30 )  PTT:26.4 sec          RADIOLOGY & ADDITIONAL TESTS:    Imaging Personally Reviewed:    Consultant(s) Notes Reviewed:  cardiology    Care Discussed with Consultants/Other Providers:

## 2018-01-27 NOTE — DIETITIAN INITIAL EVALUATION ADULT. - NS AS NUTRI INTERV MEALS SNACK
General/healthful diet/Continue Regular, Vegan diet per pt request. RD will coordinate with diet office to increase vegan options. Pt unable to accept any oral supplements due to vegan diet restriction.

## 2018-01-27 NOTE — BEHAVIORAL HEALTH ASSESSMENT NOTE - NSBHCHARTREVIEWVS_PSY_A_CORE FT
Vital Signs Last 24 Hrs  T(C): 36.6 (27 Jan 2018 16:09), Max: 36.8 (26 Jan 2018 18:01)  T(F): 97.8 (27 Jan 2018 16:09), Max: 98.2 (26 Jan 2018 18:01)  HR: 80 (27 Jan 2018 16:09) (80 - 100)  BP: 121/85 (27 Jan 2018 16:09) (117/82 - 131/99)  RR: 16 (27 Jan 2018 16:09) (16 - 18)  SpO2: 100% (27 Jan 2018 16:09) (99% - 100%)

## 2018-01-27 NOTE — BEHAVIORAL HEALTH ASSESSMENT NOTE - NSBHCHARTREVIEWIMAGING_PSY_A_CORE FT
< from: CT Head No Cont (01.21.18 @ 00:27) >    FINDINGS:      There is no acute intracranial hemorrhage, mass effect, midline shift,   extra-axial collection, hydrocephalus,or evidence of acute vascular   territorial infarction.    The visualized paranasal sinuses and mastoid air cells are clear.   Visualized osseous structures are intact.    IMPRESSION:    No acute intracranial hemorrhage, mass effect, or evidence of acute   vascular territorial infarction.    If clinical symptoms persist or worsen, more sensitive evaluation with   brain MRI may be obtained, if no contraindications exist.    < end of copied text >

## 2018-01-27 NOTE — BEHAVIORAL HEALTH ASSESSMENT NOTE - CASE SUMMARY
Patient is a 43 yo woman, single, unemployed, domiciled with parents, with PMHx  ITP, IBS, reported POTS disease, with PPHx ZAK, discharged yesterday from [facility] inpatient (1/12-1/26) with intended follow up at partial hospitalization on Monday, no other hospitalizations, no SA, no substance use/abuse, in outpatient psychotherapy with Dr. Stephanie Valente, who presented to [facility] for concern for dehydration. Psychiatry is consulted for evaluation of anxiety and med management. Patient is extremely somatically preoccupied during the interview. Denies any psychiatric symptoms. She is tolerating her Ativan and Buspar well. She denies si/hi.

## 2018-01-27 NOTE — DIETITIAN INITIAL EVALUATION ADULT. - PT NOT SOURCE
Pt noted with prior involuntary admission to [facility] in January, reports anxiety/depression

## 2018-01-27 NOTE — DIETITIAN INITIAL EVALUATION ADULT. - PROBLEM SELECTOR PLAN 3
Reviewed [place] Stop and recent [facility] discharge.  On Ativan as above.  Will defer to the DAY PROVIDER to review Behavioral Health Consult.  Consider neurology consult in AM by Dr. Torres.

## 2018-01-27 NOTE — BEHAVIORAL HEALTH ASSESSMENT NOTE - NSBHCHARTREVIEWLAB_PSY_A_CORE FT
Labs reviewed personally [X]                        14.7   8.3   )-----------( 81       ( 26 Jan 2018 18:55 )             40.1     Hgb Trend: 14.7<--, 12.2<--    01-27    141  |  107  |  3<L>  ----------------------------<  86  4.2   |  21<L>  |  0.87    Ca    9.2      27 Jan 2018 10:30    TPro  8.0  /  Alb  4.9  /  TBili  0.5  /  DBili  x   /  AST  30  /  ALT  13  /  AlkPhos  76  01-26    Creatinine Trend: 0.87<--, 0.91<--, 0.92<--, 0.95<--, 0.93<--, 0.95<--  PT/INR - ( 27 Jan 2018 10:30 )   PT: 10.6 sec;   INR: 0.94 ratio         PTT - ( 27 Jan 2018 10:30 )  PTT:26.4 sec

## 2018-01-27 NOTE — BEHAVIORAL HEALTH ASSESSMENT NOTE - NSBHCHARTREVIEWINVESTIGATE_PSY_A_CORE FT
< from: 12 Lead ECG (01.20.18 @ 15:54) >    Ventricular Rate 101 BPM    Atrial Rate 101 BPM    P-R Interval 112 ms    QRS Duration 88 ms    Q-T Interval 346 ms    QTC Calculation(Bezet) 448 ms    P Axis 52 degrees    R Axis 53 degrees    T Axis 39 degrees    Diagnosis Line Sinus tachycardia  Otherwise normal ECG    < end of copied text >

## 2018-01-27 NOTE — DIETITIAN INITIAL EVALUATION ADULT. - NS FNS WEIGHT CHANGE REASON
unintentional/Pt reports she refused po intake x 2 weeks during recent admit to [facility].

## 2018-01-27 NOTE — PROGRESS NOTE ADULT - ASSESSMENT
NIGHT HOSPITALIST:  Presentation of patient by her cardiologist for suspected clinical dehydration but with complex history as above--patient concerned about pneumonia and wishes a repeat chest radiograph>>patient with NO clinical or biochemical evidence of pneumonia but chest radiograph ordered to exclude as such.  Patient at present is calm, interactive with examiner, and presently declines a behavioral health consult, but requests a formal neurologic evaluation from Dr. Julio Torres's group in the AM for history of tremors>>suspect related to patient's benzodiazepine dependence (patient is very specific on dosing times of her Ativan as well as her Zofran--given before her Ativan).    Will continue with patient's IVF.  Potassium orally supplemented.  Will obtain US of the urinary bladder and obtain a urinalysis. 43 y/o woman with ITP/IBS/Anxiety/Hypertensive Urgency/reported psychiatric issues/?POTS Syndrome (exhaustive workup presumed autonomic dysfunction) currently sent from office with recurrent palpitations/tremors in setting of dehydration

## 2018-01-27 NOTE — DIETITIAN INITIAL EVALUATION ADULT. - FACTORS AFF FOOD INTAKE
persistent constipation/persistent nausea/Pt reports persistent nausea requiring zofran every 4 hours and constipation requiring a bowel regimen. Pt reports she felt hungry for the first time last night, consuming a rice & vegetable dish. Pt is now willing to drink soy milk and will accept dishes with hummus or tofu from the Kosher menu. RD will coordinate with diet office to find appropriate menu items. Pt cannot tolerate peanut butter.

## 2018-01-27 NOTE — PROGRESS NOTE ADULT - SUBJECTIVE AND OBJECTIVE BOX
Cardiology Weekend Coverage Note    CC:  Palpitations, tremors, hypotension.    Interval History:  Feels better; still with some dizziness, feeling some palpitations.    MEDICATIONS:  busPIRone 2.5 milliGRAM(s) Oral <User Schedule>  diltiazem    milliGRAM(s) Oral <User Schedule>  docusate sodium 100 milliGRAM(s) Oral <User Schedule>  famotidine    Tablet 20 milliGRAM(s) Oral <User Schedule>  LORazepam     Tablet 2 milliGRAM(s) Oral <User Schedule>  montelukast 10 milliGRAM(s) Oral <User Schedule>  ondansetron   Disintegrating Tablet 4 milliGRAM(s) Oral <User Schedule>  simethicone 160 milliGRAM(s) Chew <User Schedule>  sodium chloride 0.9%. 1000 milliLiter(s) IV Continuous <Continuous>      LABS:  01-27    141  |  107  |  3<L>  ----------------------------<  86  4.2   |  21<L>  |  0.87    Ca    9.2      27 Jan 2018 10:30    TPro  8.0  /  Alb  4.9  /  TBili  0.5  /  DBili  x   /  AST  30  /  ALT  13  /  AlkPhos  76  01-26                          14.7   8.3   )-----------( 81       ( 26 Jan 2018 18:55 )             40.1     PT/INR - ( 27 Jan 2018 10:30 )   PT: 10.6 sec;   INR: 0.94 ratio         PTT - ( 27 Jan 2018 10:30 )  PTT:26.4 sec          VITAL SIGNS:   T(C): 36.6 (01-27-18 @ 07:55), Max: 36.8 (01-26-18 @ 18:01)  HR: 80 (01-27-18 @ 07:55) (80 - 100)  BP: 117/82 (01-27-18 @ 07:55) (117/82 - 131/99)  RR: 18 (01-27-18 @ 12:05) (16 - 18)  SpO2: 100% (01-27-18 @ 12:05) (99% - 100%)  Daily Height in cm: 157.48 (26 Jan 2018 22:55)    Daily   I&O's Summary    27 Jan 2018 07:01  -  27 Jan 2018 15:01  --------------------------------------------------------  IN: 440 mL / OUT: 0 mL / NET: 440 mL        Physical exam  Appearance: Thin, pleasant	  HEENT:   EOMI	  Lymphatic: No lymphadenopathy  Cardiovascular: Normal S1 S2, +Tachycardic No JVD, No murmurs, No edema  Respiratory: Lungs clear to auscultation, no use of accessory muscles	  Gastrointestinal:  Soft, Non-tender	  Neurologic: Non-focal, No Focal Deficits  Extremities: Normal range of motion, No clubbing, cyanosis or edema

## 2018-01-27 NOTE — DIETITIAN INITIAL EVALUATION ADULT. - ADHERENCE
Pt is a very strict vegan, refusing all nutrition supplements containing whey or collagen. Pt expresses concern about gluten and fiber as well, indicating possible disordered eating.

## 2018-01-27 NOTE — BEHAVIORAL HEALTH ASSESSMENT NOTE - HPI (INCLUDE ILLNESS QUALITY, SEVERITY, DURATION, TIMING, CONTEXT, MODIFYING FACTORS, ASSOCIATED SIGNS AND SYMPTOMS)
Patient is a 41 yo woman, single, unemployed, domiciled with parents, with PMHx  ITP, IBS, reported POTS disease, with PPHx ZAK, discharged yesterday from [facility] inpatient (1/12-1/26) with intended follow up at partial hospitalization on Monday, no other hospitalizations, no SA, no substance use/abuse, in outpatient psychotherapy with Dr. Stephanei Valente, who presented to [facility] for concern for dehydration. Psychiatry is consulted for evaluation of anxiety and med management. Patient denies anxiety or depression at this time. She states that she is prescribed Ativan 2 mg q4h for her POTS disease, and takes Buspar 2.5 mg BID for anxiety, both of which she tolerates well. On interview, patient is very somatically preoccupied, multiple times attempting to educate writer about her condition, which she believes has made her disabled. Patient was admitted to [facility] with the consent of her parents, who she states she is having a rift with since that event. Prior to her [facility] admission, she was living with her parents and a HHA that she paid for privately since she was not approved for disability. Patient denies any history of SI. Per her chart, patient has been utilizing the ED multiple times per week for her physical symptoms. She has seen a multitude of specialists for her self-reported POTS, and has been actively seeking out more specialists.

## 2018-01-28 DIAGNOSIS — R53.1 WEAKNESS: ICD-10-CM

## 2018-01-28 LAB
ANION GAP SERPL CALC-SCNC: 19 MMOL/L — HIGH (ref 5–17)
BUN SERPL-MCNC: 3 MG/DL — LOW (ref 7–23)
CALCIUM SERPL-MCNC: 9.5 MG/DL — SIGNIFICANT CHANGE UP (ref 8.4–10.5)
CHLORIDE SERPL-SCNC: 103 MMOL/L — SIGNIFICANT CHANGE UP (ref 96–108)
CO2 SERPL-SCNC: 21 MMOL/L — LOW (ref 22–31)
CREAT SERPL-MCNC: 0.82 MG/DL — SIGNIFICANT CHANGE UP (ref 0.5–1.3)
FOLATE SERPL-MCNC: 14.2 NG/ML — SIGNIFICANT CHANGE UP (ref 4.8–24.2)
GLUCOSE SERPL-MCNC: 91 MG/DL — SIGNIFICANT CHANGE UP (ref 70–99)
HCT VFR BLD CALC: 40.1 % — SIGNIFICANT CHANGE UP (ref 34.5–45)
HGB BLD-MCNC: 12.4 G/DL — SIGNIFICANT CHANGE UP (ref 11.5–15.5)
MCHC RBC-ENTMCNC: 28 PG — SIGNIFICANT CHANGE UP (ref 27–34)
MCHC RBC-ENTMCNC: 30.9 GM/DL — LOW (ref 32–36)
MCV RBC AUTO: 90.5 FL — SIGNIFICANT CHANGE UP (ref 80–100)
PLATELET # BLD AUTO: 89 K/UL — LOW (ref 150–400)
POTASSIUM SERPL-MCNC: 3.6 MMOL/L — SIGNIFICANT CHANGE UP (ref 3.5–5.3)
POTASSIUM SERPL-SCNC: 3.6 MMOL/L — SIGNIFICANT CHANGE UP (ref 3.5–5.3)
RBC # BLD: 4.43 M/UL — SIGNIFICANT CHANGE UP (ref 3.8–5.2)
RBC # FLD: 13.1 % — SIGNIFICANT CHANGE UP (ref 10.3–14.5)
SODIUM SERPL-SCNC: 143 MMOL/L — SIGNIFICANT CHANGE UP (ref 135–145)
TSH SERPL-MCNC: 0.67 UIU/ML — SIGNIFICANT CHANGE UP (ref 0.27–4.2)
VIT B12 SERPL-MCNC: 629 PG/ML — SIGNIFICANT CHANGE UP (ref 232–1245)
WBC # BLD: 6.89 K/UL — SIGNIFICANT CHANGE UP (ref 3.8–10.5)
WBC # FLD AUTO: 6.89 K/UL — SIGNIFICANT CHANGE UP (ref 3.8–10.5)

## 2018-01-28 PROCEDURE — 99233 SBSQ HOSP IP/OBS HIGH 50: CPT

## 2018-01-28 RX ADMIN — SIMETHICONE 160 MILLIGRAM(S): 80 TABLET, CHEWABLE ORAL at 21:45

## 2018-01-28 RX ADMIN — Medication 2 MILLIGRAM(S): at 05:24

## 2018-01-28 RX ADMIN — Medication 2 MILLIGRAM(S): at 10:07

## 2018-01-28 RX ADMIN — FAMOTIDINE 20 MILLIGRAM(S): 10 INJECTION INTRAVENOUS at 10:07

## 2018-01-28 RX ADMIN — ONDANSETRON 4 MILLIGRAM(S): 8 TABLET, FILM COATED ORAL at 14:01

## 2018-01-28 RX ADMIN — ONDANSETRON 4 MILLIGRAM(S): 8 TABLET, FILM COATED ORAL at 23:51

## 2018-01-28 RX ADMIN — ONDANSETRON 4 MILLIGRAM(S): 8 TABLET, FILM COATED ORAL at 05:25

## 2018-01-28 RX ADMIN — SIMETHICONE 160 MILLIGRAM(S): 80 TABLET, CHEWABLE ORAL at 14:04

## 2018-01-28 RX ADMIN — ONDANSETRON 4 MILLIGRAM(S): 8 TABLET, FILM COATED ORAL at 18:01

## 2018-01-28 RX ADMIN — Medication 2 MILLIGRAM(S): at 01:01

## 2018-01-28 RX ADMIN — Medication 100 MILLIGRAM(S): at 14:04

## 2018-01-28 RX ADMIN — MONTELUKAST 10 MILLIGRAM(S): 4 TABLET, CHEWABLE ORAL at 21:45

## 2018-01-28 RX ADMIN — FAMOTIDINE 20 MILLIGRAM(S): 10 INJECTION INTRAVENOUS at 21:45

## 2018-01-28 RX ADMIN — Medication 2 MILLIGRAM(S): at 18:01

## 2018-01-28 RX ADMIN — SODIUM CHLORIDE 80 MILLILITER(S): 9 INJECTION INTRAMUSCULAR; INTRAVENOUS; SUBCUTANEOUS at 04:03

## 2018-01-28 RX ADMIN — SIMETHICONE 160 MILLIGRAM(S): 80 TABLET, CHEWABLE ORAL at 10:07

## 2018-01-28 RX ADMIN — Medication 2.5 MILLIGRAM(S): at 21:45

## 2018-01-28 RX ADMIN — Medication 2 MILLIGRAM(S): at 14:01

## 2018-01-28 RX ADMIN — ONDANSETRON 4 MILLIGRAM(S): 8 TABLET, FILM COATED ORAL at 10:07

## 2018-01-28 RX ADMIN — Medication 180 MILLIGRAM(S): at 20:13

## 2018-01-28 RX ADMIN — SODIUM CHLORIDE 80 MILLILITER(S): 9 INJECTION INTRAMUSCULAR; INTRAVENOUS; SUBCUTANEOUS at 13:00

## 2018-01-28 RX ADMIN — ONDANSETRON 4 MILLIGRAM(S): 8 TABLET, FILM COATED ORAL at 01:01

## 2018-01-28 RX ADMIN — Medication 2.5 MILLIGRAM(S): at 08:31

## 2018-01-28 RX ADMIN — Medication 2 MILLIGRAM(S): at 23:51

## 2018-01-28 RX ADMIN — Medication 100 MILLIGRAM(S): at 21:45

## 2018-01-28 RX ADMIN — Medication 100 MILLIGRAM(S): at 10:06

## 2018-01-28 NOTE — CONSULT NOTE ADULT - SUBJECTIVE AND OBJECTIVE BOX
Pt is a 43 yo F with a PMH of ITP, POTS, IBS, Gastroparesis, Autonomic Dysfunction, HTN, Asthma, Generalized Anxiety d/o, Depression, Somatoform d/o, Personality d/o and multiple allergies who was discharged from [facility] yesterday and was admitted for dehydration. Pt requested a neurological consult for her tremors (for which she follows as outpatient w/ Dr. Torres). Pt describes tremors as occurring in all extremities (but not consistently) and being "Parkinson's like", they tend to last for 1 to 9 hours then go away. Pt does not report and alleviating or exacerbating factors associated with her tremors. Otherwise pt has multiple complaints including generalized weakness, parasthesias in all extremities and feeling dehydrated and urinating very frequently, regardless of how much water she drinks or IV fluids she gets (the pt is very obsessive with this complaint). Pt denies any fevers, HA, dizziness, focal weakness, incontinence, saddle anesthesia.      MEDICATIONS  (STANDING):  busPIRone 2.5 milliGRAM(s) Oral <User Schedule>  diltiazem    milliGRAM(s) Oral <User Schedule>  docusate sodium 100 milliGRAM(s) Oral <User Schedule>  famotidine    Tablet 20 milliGRAM(s) Oral <User Schedule>  LORazepam     Tablet 2 milliGRAM(s) Oral <User Schedule>  montelukast 10 milliGRAM(s) Oral <User Schedule>  ondansetron   Disintegrating Tablet 4 milliGRAM(s) Oral <User Schedule>  simethicone 160 milliGRAM(s) Chew <User Schedule>  sodium chloride 0.9%. 1000 milliLiter(s) (80 mL/Hr) IV Continuous <Continuous>    MEDICATIONS  (PRN):    PAST MEDICAL & SURGICAL HISTORY:  Depression  Idiopathic thrombocytopenic purpura  Dysautonomia  Idiopathic thrombocytopenia purpura  Anxiety  POTS (postural orthostatic tachycardia syndrome)  History of ITP  Labyrinthitis  Headache  HTN (hypertension)  Anxiety  IBS (irritable bowel syndrome)  No significant past surgical history  No significant past surgical history  No significant past surgical history    FAMILY HISTORY:  No pertinent family history in first degree relatives  Family history of hypertension (Grandparent)  Family history of atrial fibrillation  Family history of prostate cancer in father    Allergies    barium sulfate (Other)  beta blockers (Unknown)  Reglan (Other)    Intolerances    metoprolol (Headache)    SHx - No smoking, No ETOH, No drug abuse    Review of Systems:  CONSTITUTIONAL:   See HPI.    Vital Signs Last 24 Hrs  T(C): 36.9 (28 Jan 2018 08:45), Max: 36.9 (28 Jan 2018 08:45)  T(F): 98.5 (28 Jan 2018 08:45), Max: 98.5 (28 Jan 2018 08:45)  HR: 86 (28 Jan 2018 08:45) (80 - 86)  BP: 142/98 (28 Jan 2018 08:45) (121/85 - 142/98)  BP(mean): --  RR: 18 (28 Jan 2018 08:45) (16 - 18)  SpO2: 100% (28 Jan 2018 08:45) (100% - 100%)    General Exam:   General appearance: No acute distress    Neurological Exam:  Mental Status: Orientated to self, date and place.  Attention intact.  No dysarthria. Speech fluent.  Cranial Nerves:  PERRL, EOMI, VFF, no nystagmus.  CN V1-3 intact to light touch b/l.  No facial asymmetry.  Hearing decreased to finger rub on the right when compared to the left.  Tongue, uvula and palate midline.  Sternocleidomastoid and Trapezius intact bilaterally.    Motor:   Tone: normal.                  Strength:     [] Upper extremity                      Delt       Bicep    Tricep                                                  R         5/5        5/5        5/5       5/5                                               L          5/5        5/5        5/5       5/5  [] Lower extremity                       HF          KE          KF        DF         PF                                               R        5/5        5/5        5/5       5/5       5/5                                               L         5/5        5/5       5/5       5/5        5/5  Pronator drift: none                 Dysmetria: None to finger-nose-finger or heel-shin-heel b/l  No truncal ataxia.    Tremor: No resting, postural or action tremor.  No myoclonus.    Sensation: intact to light touch throughout    Deep Tendon Reflexes:              Biceps      BR        Patellar        Ankle       Babinski                                         R       2+           2+        2+               2+           downgoing                                         L        2+           2+        2+               2+           downgoing    Gait: normal.        01-27    141  |  107  |  3<L>  ----------------------------<  86  4.2   |  21<L>  |  0.87    Ca    9.2      27 Jan 2018 10:30    TPro  8.0  /  Alb  4.9  /  TBili  0.5  /  DBili  x   /  AST  30  /  ALT  13  /  AlkPhos  76  01-26                            14.7   8.3   )-----------( 81       ( 26 Jan 2018 18:55 )             40.1

## 2018-01-28 NOTE — PROGRESS NOTE ADULT - ASSESSMENT
43 y/o woman with ITP/IBS/Anxiety/Hypertensive Urgency/reported psychiatric issues/?POTS Syndrome (exhaustive workup presumed autonomic dysfunction) currently sent from office with recurrent palpitations/tremors in setting of dehydration

## 2018-01-28 NOTE — PROGRESS NOTE ADULT - SUBJECTIVE AND OBJECTIVE BOX
Patient is a 42y old  Female who presents with a chief complaint of Sent in by her cardiologist for concern for dehydration (26 Jan 2018 21:14)      SUBJECTIVE / OVERNIGHT EVENTS:  no acute events overnight. vss, afebrile  Pt reports that she feels much worse this morning. She states that her tremor has been back since last night and is non-stoppable.   She requests neurology, hematology, autonomic dysfunction specialist, etc...  She is unsure why she is so weak, fluctuating adrenalin throughout the day, requesting vitamin levels, epinephrine, catecholamine, progesterone, estrogen levels..    ROS:  14 point ROS negative in detail except stated as above    MEDICATIONS  (STANDING):  busPIRone 2.5 milliGRAM(s) Oral <User Schedule>  diltiazem    milliGRAM(s) Oral <User Schedule>  docusate sodium 100 milliGRAM(s) Oral <User Schedule>  famotidine    Tablet 20 milliGRAM(s) Oral <User Schedule>  LORazepam     Tablet 2 milliGRAM(s) Oral <User Schedule>  montelukast 10 milliGRAM(s) Oral <User Schedule>  ondansetron   Disintegrating Tablet 4 milliGRAM(s) Oral <User Schedule>  simethicone 160 milliGRAM(s) Chew <User Schedule>  sodium chloride 0.9%. 1000 milliLiter(s) (80 mL/Hr) IV Continuous <Continuous>    MEDICATIONS  (PRN):      CAPILLARY BLOOD GLUCOSE        I&O's Summary    27 Jan 2018 07:01  -  28 Jan 2018 07:00  --------------------------------------------------------  IN: 1320 mL / OUT: 0 mL / NET: 1320 mL        PHYSICAL EXAM:  Vital Signs Last 24 Hrs  T(C): 36.9 (28 Jan 2018 08:45), Max: 36.9 (28 Jan 2018 08:45)  T(F): 98.5 (28 Jan 2018 08:45), Max: 98.5 (28 Jan 2018 08:45)  HR: 86 (28 Jan 2018 08:45) (80 - 86)  BP: 142/98 (28 Jan 2018 08:45) (121/85 - 142/98)  BP(mean): --  RR: 18 (28 Jan 2018 08:45) (16 - 18)  SpO2: 100% (28 Jan 2018 08:45) (100% - 100%)    GENERAL: thin lady in no acute distress  HEAD:  Atraumatic, Normocephalic  EYES: EOMI, PERRLA, conjunctiva and sclera clear  NECK: Supple, No JVD  CHEST/LUNG: Clear to auscultation bilaterally; No wheeze  HEART: Regular rate and rhythm; No murmurs, rubs, or gallops  ABDOMEN: Soft, Nontender, Nondistended; Bowel sounds present  EXTREMITIES:  2+ Peripheral Pulses, No clubbing, cyanosis, or edema  NEUROLOGY: aaox3; non-focal  SKIN: No rashes or lesions    LABS:  personally reviewed                        14.7   8.3   )-----------( 81       ( 26 Jan 2018 18:55 )             40.1     01-27    141  |  107  |  3<L>  ----------------------------<  86  4.2   |  21<L>  |  0.87    Ca    9.2      27 Jan 2018 10:30    TPro  8.0  /  Alb  4.9  /  TBili  0.5  /  DBili  x   /  AST  30  /  ALT  13  /  AlkPhos  76  01-26    PT/INR - ( 27 Jan 2018 10:30 )   PT: 10.6 sec;   INR: 0.94 ratio         PTT - ( 27 Jan 2018 10:30 )  PTT:26.4 sec          RADIOLOGY & ADDITIONAL TESTS:    Imaging Personally Reviewed:    Consultant(s) Notes Reviewed:  cardiology, psych    Care Discussed with Consultants/Other Providers:

## 2018-01-28 NOTE — CONSULT NOTE ADULT - ASSESSMENT
Pt is a 41 yo F with a PMH of ITP, POTS, IBS, Gastroparesis, Autonomic Dysfunction, HTN, Asthma, Generalized Anxiety d/o, Depression, Somatoform d/o, Personality d/o and multiple allergies who was discharged from [facility] yesterday and was admitted for dehydration. Pt requested a neurological consult for her tremors (for which she follows as outpatient w/ Dr. Torres). Pt describes tremors as occurring in all extremities (but not consistently) and being "Parkinson's like", they tend to last for 1 to 9 hours then go away. Pt complains of feeling dehydrated and urinating very frequently, regardless of how much water she drinks or IV fluids she gets (the pt is very obsessive with this complaint). Exam is non-focal. Likely continuation of her underlying medical condition in combination of her psychiatric condition, or purely psychiatric.    Recommendations:  - B12, B1, Folate, RPR, HIV  - Attending will see in AM Pt is a 43 yo F with a PMH of ITP, POTS, IBS, Gastroparesis, Autonomic Dysfunction, HTN, Asthma, Generalized Anxiety d/o, Depression, Somatoform d/o, Personality d/o and multiple allergies who was discharged from [facility] yesterday and was admitted for dehydration. Pt requested a neurological consult for her tremors (for which she follows as outpatient w/ Dr. Torres). Pt describes tremors as occurring in all extremities (but not consistently) and being "Parkinson's like", they tend to last for 1 to 9 hours then go away. Pt complains of feeling dehydrated and urinating very frequently, regardless of how much water she drinks or IV fluids she gets (the pt is very obsessive with this complaint). Exam is non-focal. Likely continuation of her underlying medical condition in combination of her psychiatric condition, or purely psychiatric.    Recommendations:  - B12, B1, Folate, RPR, HIV  - Attending will see in AM    Addendum   Neurology Attending -  Patient interviewed and examined.  Agree with above assessment. Patient currently without tremor, rigidity or signs of dehydration.   Apparently been ruled out for pheochromocytoma and thyroid disease by endocrine. Hyperadrenergic symptoms, ?due to severe anxiety, for some time but cannot tolerate beta blockers.  Receiving lorazepam frequently with likely tolerance and relative withdrawal symptoms.  Autonomic evaluation for dysautonomia reasonable as outpatient.  No need to treat tremor presently.

## 2018-01-29 LAB — 24R-OH-CALCIDIOL SERPL-MCNC: 22 NG/ML — LOW (ref 30–80)

## 2018-01-29 PROCEDURE — 99233 SBSQ HOSP IP/OBS HIGH 50: CPT

## 2018-01-29 PROCEDURE — 93010 ELECTROCARDIOGRAM REPORT: CPT

## 2018-01-29 PROCEDURE — 99232 SBSQ HOSP IP/OBS MODERATE 35: CPT

## 2018-01-29 RX ADMIN — SODIUM CHLORIDE 80 MILLILITER(S): 9 INJECTION INTRAMUSCULAR; INTRAVENOUS; SUBCUTANEOUS at 10:12

## 2018-01-29 RX ADMIN — Medication 2 MILLIGRAM(S): at 19:59

## 2018-01-29 RX ADMIN — Medication 2 MILLIGRAM(S): at 03:54

## 2018-01-29 RX ADMIN — Medication 2 MILLIGRAM(S): at 11:45

## 2018-01-29 RX ADMIN — SIMETHICONE 160 MILLIGRAM(S): 80 TABLET, CHEWABLE ORAL at 22:43

## 2018-01-29 RX ADMIN — ONDANSETRON 4 MILLIGRAM(S): 8 TABLET, FILM COATED ORAL at 11:45

## 2018-01-29 RX ADMIN — FAMOTIDINE 20 MILLIGRAM(S): 10 INJECTION INTRAVENOUS at 21:32

## 2018-01-29 RX ADMIN — ONDANSETRON 4 MILLIGRAM(S): 8 TABLET, FILM COATED ORAL at 15:37

## 2018-01-29 RX ADMIN — SIMETHICONE 160 MILLIGRAM(S): 80 TABLET, CHEWABLE ORAL at 10:43

## 2018-01-29 RX ADMIN — Medication 2 MILLIGRAM(S): at 07:45

## 2018-01-29 RX ADMIN — Medication 100 MILLIGRAM(S): at 22:43

## 2018-01-29 RX ADMIN — SIMETHICONE 160 MILLIGRAM(S): 80 TABLET, CHEWABLE ORAL at 14:16

## 2018-01-29 RX ADMIN — Medication 180 MILLIGRAM(S): at 21:32

## 2018-01-29 RX ADMIN — Medication 100 MILLIGRAM(S): at 10:43

## 2018-01-29 RX ADMIN — Medication 2.5 MILLIGRAM(S): at 21:32

## 2018-01-29 RX ADMIN — Medication 2 MILLIGRAM(S): at 15:36

## 2018-01-29 RX ADMIN — ONDANSETRON 4 MILLIGRAM(S): 8 TABLET, FILM COATED ORAL at 03:49

## 2018-01-29 RX ADMIN — FAMOTIDINE 20 MILLIGRAM(S): 10 INJECTION INTRAVENOUS at 10:42

## 2018-01-29 RX ADMIN — Medication 2 MILLIGRAM(S): at 12:21

## 2018-01-29 RX ADMIN — ONDANSETRON 4 MILLIGRAM(S): 8 TABLET, FILM COATED ORAL at 07:45

## 2018-01-29 RX ADMIN — Medication 100 MILLIGRAM(S): at 14:17

## 2018-01-29 RX ADMIN — ONDANSETRON 4 MILLIGRAM(S): 8 TABLET, FILM COATED ORAL at 12:21

## 2018-01-29 RX ADMIN — MONTELUKAST 10 MILLIGRAM(S): 4 TABLET, CHEWABLE ORAL at 21:32

## 2018-01-29 RX ADMIN — ONDANSETRON 4 MILLIGRAM(S): 8 TABLET, FILM COATED ORAL at 19:59

## 2018-01-29 RX ADMIN — Medication 2.5 MILLIGRAM(S): at 09:37

## 2018-01-29 NOTE — PROGRESS NOTE ADULT - ASSESSMENT
43 y/o woman with ITP/IBS/Anxiety/ reported h/o Hypertensive Urgency/reported psychiatric issues/?POTS Syndrome (exhaustive workup presumed autonomic dysfunction) admitted for dehydration

## 2018-01-29 NOTE — PROGRESS NOTE ADULT - SUBJECTIVE AND OBJECTIVE BOX
Consult Note:   · Provider Specialty	Cardiology	    Referral/Consultation:   Initial Consult:  · Requested by Name:	Tg Velasquez	  · Date/Time:	29-Jan-2018 	  · Reason for Referral/Consultation:	Palpitations/Tremors/Hypertension/Tachycardia/Malnutrition	      · Subjective and Objective: 	  **********              CARDIOLOGY CONSULT NOTE              ************  ============================================================  CHIEF COMPLAINT/REASON FOR CONSULT:  Patient is a 42y old  Female who presents with a chief complaint of Palpitations/Tremors/Hypertension/Tachycardia/Malnutrition    HISTORY OF PRESENT ILLNESS:  42yFemale with a history of ITP/IBS/ANxiety/Hypertensive Urgency/Psychiatric Issues/?POTS Syndrome (exhaustive workup presumed autonomic dysfunction) presented to my office with recurrent palpitations/hypertension/tremors.   This has been an ongoing issue.   She was recently hospitalized multiple times for similar issues. And also recently hospitalized involuntarily at [facility].   She now presents with similar symptoms but is very malnourished.     ================  24 Hr Events  - Admitted for dehydration and appears improved  - Despite chronic somatic complaints, ready for discharge      ===============      Allergies    barium sulfate (Other)  beta blockers (Unknown)  Reglan (Other)    Intolerances    metoprolol (Headache)  	    MEDICATIONS:  MEDICATIONS  (STANDING):  busPIRone 2.5 milliGRAM(s) Oral <User Schedule>  diltiazem    milliGRAM(s) Oral <User Schedule>  docusate sodium 100 milliGRAM(s) Oral <User Schedule>  famotidine    Tablet 20 milliGRAM(s) Oral <User Schedule>  LORazepam     Tablet 2 milliGRAM(s) Oral <User Schedule>  montelukast 10 milliGRAM(s) Oral <User Schedule>  ondansetron   Disintegrating Tablet 4 milliGRAM(s) Oral <User Schedule>  simethicone 160 milliGRAM(s) Chew <User Schedule>  sodium chloride 0.9%. 1000 milliLiter(s) (80 mL/Hr) IV Continuous <Continuous>      PAST MEDICAL & SURGICAL HISTORY:  Depression  Idiopathic thrombocytopenic purpura  Dysautonomia  Idiopathic thrombocytopenia purpura  Anxiety  POTS (postural orthostatic tachycardia syndrome)  History of ITP  Labyrinthitis  Headache  HTN (hypertension)  Anxiety  IBS (irritable bowel syndrome)  No significant past surgical history  No significant past surgical history  No significant past surgical history      FAMILY HISTORY:  No pertinent family history in first degree relatives  Family history of hypertension (Grandparent)  Family history of atrial fibrillation  Family history of prostate cancer in father    NC - Mother/Father    SOCIAL HISTORY:    [ x] Non-smoker  [x] No Illicit Drug Use  [ x] No Excess EtOH Use      REVIEW OF SYSTEMS: (Unless + Before Symptom, it is negative)  Constitutional: No Fever, Fatigue, +Weight Loss  Eyes: No Recent Vision Changes, Eye Pain  ENT: No Congestion, Sore Throat  Endocrine: No Excess Sweating, Temperature Intolerance  Cardiovascular: No Chest Pain, +Palpitations, Shortness of Breath, Pre-syncope, Syncope, LE Edema  Respiratory: No Cough, Congestion, Wheezing  Gastrointestinal: No Abdominal Pain, Nausea, Vomiting  Genitourinary: No dysuria, hematuria  Musculoskeletal: No Joint Pain, Swelling  Neurologic: No headaches, Imbalance, Weakness +Tremors  Skin: No rashes, hematoma, purprura    ================================    PHYSICAL EXAM:  T(C): 36.8 (01-26-18 @ 14:33), Max: 36.8 (01-26-18 @ 07:59)  HR: 115 (01-26-18 @ 14:33) (81 - 115)  BP: 131/99 (01-26-18 @ 14:33) (128/78 - 158/104)  RR: 18 (01-26-18 @ 14:33) (18 - 18)  SpO2: 99% (01-26-18 @ 14:33) (99% - 99%)  Wt(kg): --  I&O's Summary    Appearance: +Anxious + Labile + Tearful	  HEENT:   Normal oral mucosa, EOMI	  Lymphatic: No lymphadenopathy  Cardiovascular: Normal S1 S2, No JVD, No murmurs, No edema  Respiratory: Lungs clear to auscultation, no use of accessory muscles	  Psychiatry: +Anxious  +Depressed   Gastrointestinal:  Soft, Non-tender	  Skin: No rashes, No ecchymoses, No cyanosis	  Neurologic: Non-focal, No Focal Deficits  Extremities: Normal range of motion, No clubbing, cyanosis or edema  Vascular: Peripheral pulses palpable 2+ bilaterally, no prominent varicosities    ============================    LABS:	   Labs [id number]    ECG:   Sinus 	    =========================================================================  ASSESSMENT:  42yFemale with a history of ITP/IBS/ANxiety/Hypertensive Urgency/Psychiatric Issues/?POTS Syndrome (exhaustive workup presumed autonomic dysfunction) presented to my office with recurrent palpitations/hypertension/tremors.   I believe she has been consumed by her HR and BP to the point that it has become the focal point of her life. Anxiety surrounds this issue and compounds it.   She is being admitted for nutrition and social work assistance.   We discussed in detail that she must not be admitted to Tele; she will only have her BP/HR taken once daily.  We also discussed that she must be willing to accept a Behavioral Health Consult     Recommendations  - SHE is ready for D/C   - Follow up at [facility] to help her get to [facility]  - Continue Current Diltiazem Nurse  - Should D/C from Cardiac Standpoint  - Appreciate Dr. Velasquez help      Please call with questions.     [phone]    Philip Reed MD, [facility]

## 2018-01-29 NOTE — PROGRESS NOTE BEHAVIORAL HEALTH - NSBHCONSULTFOLLOWAFTERCARE_PSY_A_CORE FT
Patient can be discharged to follow up at [facility] partial hosp. program, as originally planned.

## 2018-01-29 NOTE — PROGRESS NOTE ADULT - SUBJECTIVE AND OBJECTIVE BOX
Patient is a 42y old  Female who presents with a chief complaint of Sent in by her cardiologist for concern for dehydration (26 Jan 2018 21:14)    HPI: Pt continues to be anxious. Refusing to go home today. Insisting that more workup be done as an inpatient.     Vital Signs Last 24 Hrs  T(C): 36.5 (29 Jan 2018 16:38), Max: 36.7 (29 Jan 2018 08:14)  T(F): 97.7 (29 Jan 2018 16:38), Max: 98 (29 Jan 2018 08:14)  HR: 76 (29 Jan 2018 16:38) (76 - 84)  BP: 136/97 (29 Jan 2018 16:38) (122/84 - 136/97)  BP(mean): --  RR: 18 (29 Jan 2018 16:38) (18 - 18)  SpO2: 100% (29 Jan 2018 16:38) (98% - 100%)                          12.4   6.89  )-----------( 89       ( 28 Jan 2018 17:31 )             40.1     01-28    143  |  103  |  3<L>  ----------------------------<  91  3.6   |  21<L>  |  0.82    Ca    9.5      28 Jan 2018 17:31    MEDICATIONS  (STANDING):  busPIRone 2.5 milliGRAM(s) Oral <User Schedule>  diltiazem    milliGRAM(s) Oral <User Schedule>  docusate sodium 100 milliGRAM(s) Oral <User Schedule>  famotidine    Tablet 20 milliGRAM(s) Oral <User Schedule>  LORazepam     Tablet 2 milliGRAM(s) Oral <User Schedule>  montelukast 10 milliGRAM(s) Oral <User Schedule>  ondansetron   Disintegrating Tablet 4 milliGRAM(s) Oral <User Schedule>  simethicone 160 milliGRAM(s) Chew <User Schedule>  sodium chloride 0.9%. 1000 milliLiter(s) (80 mL/Hr) IV Continuous <Continuous>    MEDICATIONS  (PRN):

## 2018-01-29 NOTE — PROGRESS NOTE BEHAVIORAL HEALTH - SUMMARY
Pt is a 41 yo female, single, unemployed, domiciled with parents, with ZAK, no past psychiatric hospitalizations, no past suicide attempts, no substance use/abuse, PMH POTS disease, HTN, history of ITP, IBS,  presented with c/o of shortness of breath.  Pt presents with severe anxiety.  Psych consulted for continuation of care (pt. just disacharged from [facility]).    Patient can follow at [facility] partial hospitalization program as originally planned when discharged from [facility].

## 2018-01-29 NOTE — PROGRESS NOTE BEHAVIORAL HEALTH - NSBHCHARTREVIEWVS_PSY_A_CORE FT
ICU Vital Signs Last 24 Hrs  T(C): 36.5 (29 Jan 2018 16:38), Max: 36.9 (28 Jan 2018 17:40)  T(F): 97.7 (29 Jan 2018 16:38), Max: 98.5 (28 Jan 2018 17:40)  HR: 76 (29 Jan 2018 16:38) (76 - 84)  BP: 136/97 (29 Jan 2018 16:38) (122/84 - 136/97)  BP(mean): --  ABP: --  ABP(mean): --  RR: 18 (29 Jan 2018 16:38) (18 - 18)  SpO2: 100% (29 Jan 2018 16:38) (98% - 100%)

## 2018-01-29 NOTE — CHART NOTE - NSCHARTNOTEFT_GEN_A_CORE
Called by pt for c/o chest pain, which she repots has been on/off since she was admitted. VSS. Pt reports pain worsen after eating and felt SOB when she takes a deep breath. EKG - unremarkable with SA noted. D/W Dr. Hussein (Cards)  and Dr. Hernandez, will continue to monitor.   Cooper DUPONT   [id number]

## 2018-01-29 NOTE — PROGRESS NOTE BEHAVIORAL HEALTH - NSBHCHARTREVIEWLAB_PSY_A_CORE FT
12.4   6.89  )-----------( 89       ( 28 Jan 2018 17:31 )             40.1     01-28    143  |  103  |  3<L>  ----------------------------<  91  3.6   |  21<L>  |  0.82    Ca    9.5      28 Jan 2018 17:31

## 2018-01-29 NOTE — PROGRESS NOTE BEHAVIORAL HEALTH - NSBHFUPINTERVALHXFT_PSY_A_CORE
Pt. seen and evaluated, staff consulted, chart, labs, meds reviewed. No acute events. She continues to be very concerned about improbable side effects to the medication. She continues to show preoccupation with somatic sx. and possible complications arising from her pending discharge. Nos SI/HI, no delusions or hallucinations noted or reported. Significant somatic preoccupations persist.

## 2018-01-30 ENCOUNTER — TRANSCRIPTION ENCOUNTER (OUTPATIENT)
Age: 43
End: 2018-01-30

## 2018-01-30 ENCOUNTER — APPOINTMENT (OUTPATIENT)
Dept: PULMONOLOGY | Facility: CLINIC | Age: 43
End: 2018-01-30

## 2018-01-30 VITALS — OXYGEN SATURATION: 99 % | TEMPERATURE: 98 F | RESPIRATION RATE: 18 BRPM | HEART RATE: 86 BPM

## 2018-01-30 PROCEDURE — 82746 ASSAY OF FOLIC ACID SERUM: CPT

## 2018-01-30 PROCEDURE — 83605 ASSAY OF LACTIC ACID: CPT

## 2018-01-30 PROCEDURE — 93005 ELECTROCARDIOGRAM TRACING: CPT

## 2018-01-30 PROCEDURE — 99239 HOSP IP/OBS DSCHRG MGMT >30: CPT

## 2018-01-30 PROCEDURE — 85027 COMPLETE CBC AUTOMATED: CPT

## 2018-01-30 PROCEDURE — 71045 X-RAY EXAM CHEST 1 VIEW: CPT

## 2018-01-30 PROCEDURE — 84702 CHORIONIC GONADOTROPIN TEST: CPT

## 2018-01-30 PROCEDURE — 99233 SBSQ HOSP IP/OBS HIGH 50: CPT

## 2018-01-30 PROCEDURE — 82803 BLOOD GASES ANY COMBINATION: CPT

## 2018-01-30 PROCEDURE — 85730 THROMBOPLASTIN TIME PARTIAL: CPT

## 2018-01-30 PROCEDURE — 84295 ASSAY OF SERUM SODIUM: CPT

## 2018-01-30 PROCEDURE — 82330 ASSAY OF CALCIUM: CPT

## 2018-01-30 PROCEDURE — 82607 VITAMIN B-12: CPT

## 2018-01-30 PROCEDURE — 85014 HEMATOCRIT: CPT

## 2018-01-30 PROCEDURE — 82947 ASSAY GLUCOSE BLOOD QUANT: CPT

## 2018-01-30 PROCEDURE — 85610 PROTHROMBIN TIME: CPT

## 2018-01-30 PROCEDURE — 84132 ASSAY OF SERUM POTASSIUM: CPT

## 2018-01-30 PROCEDURE — 84425 ASSAY OF VITAMIN B-1: CPT

## 2018-01-30 PROCEDURE — 99285 EMERGENCY DEPT VISIT HI MDM: CPT

## 2018-01-30 PROCEDURE — 82306 VITAMIN D 25 HYDROXY: CPT

## 2018-01-30 PROCEDURE — 82435 ASSAY OF BLOOD CHLORIDE: CPT

## 2018-01-30 PROCEDURE — 84443 ASSAY THYROID STIM HORMONE: CPT

## 2018-01-30 PROCEDURE — 80048 BASIC METABOLIC PNL TOTAL CA: CPT

## 2018-01-30 PROCEDURE — 80053 COMPREHEN METABOLIC PANEL: CPT

## 2018-01-30 RX ORDER — CHOLECALCIFEROL (VITAMIN D3) 125 MCG
1000 CAPSULE ORAL DAILY
Qty: 0 | Refills: 0 | Status: DISCONTINUED | OUTPATIENT
Start: 2018-01-30 | End: 2018-01-30

## 2018-01-30 RX ORDER — DILTIAZEM HCL 120 MG
1 CAPSULE, EXT RELEASE 24 HR ORAL
Qty: 30 | Refills: 0 | OUTPATIENT
Start: 2018-01-30 | End: 2018-02-28

## 2018-01-30 RX ORDER — CHOLECALCIFEROL (VITAMIN D3) 125 MCG
1 CAPSULE ORAL
Qty: 30 | Refills: 0 | OUTPATIENT
Start: 2018-01-30 | End: 2018-02-28

## 2018-01-30 RX ORDER — ONDANSETRON 8 MG/1
1 TABLET, FILM COATED ORAL
Qty: 84 | Refills: 0 | OUTPATIENT
Start: 2018-01-30 | End: 2018-02-12

## 2018-01-30 RX ADMIN — Medication 2 MILLIGRAM(S): at 13:55

## 2018-01-30 RX ADMIN — ONDANSETRON 4 MILLIGRAM(S): 8 TABLET, FILM COATED ORAL at 13:55

## 2018-01-30 RX ADMIN — SIMETHICONE 160 MILLIGRAM(S): 80 TABLET, CHEWABLE ORAL at 13:56

## 2018-01-30 RX ADMIN — FAMOTIDINE 20 MILLIGRAM(S): 10 INJECTION INTRAVENOUS at 10:43

## 2018-01-30 RX ADMIN — Medication 100 MILLIGRAM(S): at 10:43

## 2018-01-30 RX ADMIN — Medication 100 MILLIGRAM(S): at 13:56

## 2018-01-30 RX ADMIN — Medication 2.5 MILLIGRAM(S): at 10:27

## 2018-01-30 RX ADMIN — Medication 2 MILLIGRAM(S): at 00:04

## 2018-01-30 RX ADMIN — SODIUM CHLORIDE 80 MILLILITER(S): 9 INJECTION INTRAMUSCULAR; INTRAVENOUS; SUBCUTANEOUS at 08:54

## 2018-01-30 RX ADMIN — Medication 2 MILLIGRAM(S): at 08:53

## 2018-01-30 RX ADMIN — Medication 2 MILLIGRAM(S): at 04:56

## 2018-01-30 RX ADMIN — ONDANSETRON 4 MILLIGRAM(S): 8 TABLET, FILM COATED ORAL at 08:53

## 2018-01-30 RX ADMIN — SIMETHICONE 160 MILLIGRAM(S): 80 TABLET, CHEWABLE ORAL at 10:43

## 2018-01-30 RX ADMIN — ONDANSETRON 4 MILLIGRAM(S): 8 TABLET, FILM COATED ORAL at 00:04

## 2018-01-30 NOTE — PROGRESS NOTE ADULT - PROBLEM SELECTOR PROBLEM 5
POTS (postural orthostatic tachycardia syndrome)
POTS (postural orthostatic tachycardia syndrome)
IBS (irritable bowel syndrome)
POTS (postural orthostatic tachycardia syndrome)

## 2018-01-30 NOTE — PROGRESS NOTE ADULT - PROBLEM SELECTOR PROBLEM 4
IBS (irritable bowel syndrome)
Hypokalemia
IBS (irritable bowel syndrome)
IBS (irritable bowel syndrome)

## 2018-01-30 NOTE — PROGRESS NOTE ADULT - PROBLEM SELECTOR PLAN 4
Continue current meds
Supplemented with oral K+  BMP from this morning wnl
On pre-benzodiazepine Zofran as above.
Continue current meds

## 2018-01-30 NOTE — PROGRESS NOTE ADULT - ATTENDING COMMENTS
Met with patient for 30 minutes this am along with Dr. Philip Reed. Explained that she is stable and is medically ready for discharge. Pt continues to be anxious about her perceived symptoms and is worried about her living situation. She asked me to reach out to her parents. Spoke with patient's family to discuss her request for financial assistance to find a place to live. Her parents are considering this if patient is willing to make an effort to find an appropriate place to live.    D/c today. Pt to live with a friend for 1-2 weeks until she finds long-term housing.     D/c time 60 minutes. Met with patient for 30 minutes this am along with Dr. Philip Reed. Explained that she is stable and is medically ready for discharge. Pt continues to be anxious about her perceived symptoms and is worried about her living situation. She asked me to reach out to her parents.     Spoke with patient's family to discuss her request for financial assistance to find a place to live. Her parents are considering this if patient is willing to make an effort to find an appropriate place to live.    60 minutes spent in discussions.    D/c today. Pt to live with a friend for 1-2 weeks until she finds long-term housing.     D/c time 60 minutes.

## 2018-01-30 NOTE — PROGRESS NOTE ADULT - PROBLEM SELECTOR PLAN 3
continue IVF  once pt with adequate po intake, dc IVF
Supplemented with oral K+  BMP from this morning wnl
Resolved
Resolved

## 2018-01-30 NOTE — DISCHARGE NOTE ADULT - PLAN OF CARE
resolved IVF given. continue with current medications   Follow up with [facility] , appt schedule for tomorrow at 10:15 Low salt diet  Activity as tolerated.  Take all medication as prescribed.  Follow up with your medical doctor for routine blood pressure monitoring at your next visit.  Notify your doctor if you have any of the following symptoms:   Dizziness, Lightheadedness, Blurry vision, Headache, Chest pain, Shortness of breath continue with current medication  SEEK IMMEDIATE CARE IF  You have thoughts about hurting yourself or others.  You lose touch with reality (have psychotic symptoms).  You are taking medicine for depression and have a serious side effect continue with current medication

## 2018-01-30 NOTE — DISCHARGE NOTE ADULT - CARE PROVIDERS DIRECT ADDRESSES
[email or web address]

## 2018-01-30 NOTE — DISCHARGE NOTE ADULT - SECONDARY DIAGNOSIS.
Anxiety Essential hypertension Depression, unspecified depression type POTS (postural orthostatic tachycardia syndrome)

## 2018-01-30 NOTE — PROGRESS NOTE ADULT - PROBLEM SELECTOR PROBLEM 6
Preventive measure
Preventive measure
POTS (postural orthostatic tachycardia syndrome)
Preventive measure

## 2018-01-30 NOTE — PROGRESS NOTE ADULT - SUBJECTIVE AND OBJECTIVE BOX
Patient is a 42y old  Female who presents with a chief complaint of Sent in by her cardiologist for concern for dehydration (26 Jan 2018 21:14)    HPI: Met with patient for 30 minutes this am along with Dr. Philip Reed. Explained that she is stable and is medically ready for discharge. Pt continues to be anxious about her perceived symptoms and is worried about her living situation. She asked me to reach out to her parents. Spoke with patient's family to discuss her request for financial assistance to find a place to live. Her parents are considering this if patient is willing to make an effort to find an appropriate place to live.    Pt is otherwise eating, ambulating.     Vital Signs Last 24 Hrs  T(C): 36.9 (30 Jan 2018 08:55), Max: 36.9 (30 Jan 2018 08:55)  T(F): 98.4 (30 Jan 2018 08:55), Max: 98.4 (30 Jan 2018 08:55)  HR: 87 (30 Jan 2018 08:55) (76 - 88)  BP: 132/80 (30 Jan 2018 08:55) (128/86 - 136/97)  BP(mean): --  RR: 18 (30 Jan 2018 08:55) (18 - 18)  SpO2: 97% (30 Jan 2018 08:55) (97% - 100%)                          12.4   6.89  )-----------( 89       ( 28 Jan 2018 17:31 )             40.1     01-28    143  |  103  |  3<L>  ----------------------------<  91  3.6   |  21<L>  |  0.82    Ca    9.5      28 Jan 2018 17:31    MEDICATIONS  (STANDING):  busPIRone 2.5 milliGRAM(s) Oral <User Schedule>  cholecalciferol 1000 Unit(s) Oral daily  diltiazem    milliGRAM(s) Oral <User Schedule>  docusate sodium 100 milliGRAM(s) Oral <User Schedule>  famotidine    Tablet 20 milliGRAM(s) Oral <User Schedule>  LORazepam     Tablet 2 milliGRAM(s) Oral <User Schedule>  montelukast 10 milliGRAM(s) Oral <User Schedule>  ondansetron   Disintegrating Tablet 4 milliGRAM(s) Oral <User Schedule>  simethicone 160 milliGRAM(s) Chew <User Schedule>  sodium chloride 0.9%. 1000 milliLiter(s) (80 mL/Hr) IV Continuous <Continuous>    MEDICATIONS  (PRN): Patient is a 42y old  Female who presents with a chief complaint of Sent in by her cardiologist for concern for dehydration (26 Jan 2018 21:14)    HPI: Met with patient for 30 minutes this am along with Dr. Philip Reed. Explained that she is stable and is medically ready for discharge. Pt continues to be anxious about her perceived symptoms and is worried about her living situation. She asked me to reach out to her parents.     Spoke with patient's family to discuss her request for financial assistance to find a place to live. Her parents are considering this if patient is willing to make an effort to find an appropriate place to live.    Pt is otherwise eating, ambulating.     Vital Signs Last 24 Hrs  T(C): 36.9 (30 Jan 2018 08:55), Max: 36.9 (30 Jan 2018 08:55)  T(F): 98.4 (30 Jan 2018 08:55), Max: 98.4 (30 Jan 2018 08:55)  HR: 87 (30 Jan 2018 08:55) (76 - 88)  BP: 132/80 (30 Jan 2018 08:55) (128/86 - 136/97)  BP(mean): --  RR: 18 (30 Jan 2018 08:55) (18 - 18)  SpO2: 97% (30 Jan 2018 08:55) (97% - 100%)                          12.4   6.89  )-----------( 89       ( 28 Jan 2018 17:31 )             40.1     01-28    143  |  103  |  3<L>  ----------------------------<  91  3.6   |  21<L>  |  0.82    Ca    9.5      28 Jan 2018 17:31    MEDICATIONS  (STANDING):  busPIRone 2.5 milliGRAM(s) Oral <User Schedule>  cholecalciferol 1000 Unit(s) Oral daily  diltiazem    milliGRAM(s) Oral <User Schedule>  docusate sodium 100 milliGRAM(s) Oral <User Schedule>  famotidine    Tablet 20 milliGRAM(s) Oral <User Schedule>  LORazepam     Tablet 2 milliGRAM(s) Oral <User Schedule>  montelukast 10 milliGRAM(s) Oral <User Schedule>  ondansetron   Disintegrating Tablet 4 milliGRAM(s) Oral <User Schedule>  simethicone 160 milliGRAM(s) Chew <User Schedule>  sodium chloride 0.9%. 1000 milliLiter(s) (80 mL/Hr) IV Continuous <Continuous>    MEDICATIONS  (PRN):

## 2018-01-30 NOTE — PROGRESS NOTE ADULT - PROBLEM SELECTOR PLAN 6
DVT ppx: low risk, pt ambulating
DVT ppx: low risk, pt ambulating
- appreciate cardiology recs  - continue cardizem  - clinical improvement on IVF
DVT ppx: low risk, pt ambulating

## 2018-01-30 NOTE — DISCHARGE NOTE ADULT - PATIENT PORTAL LINK FT
“You can access the [facility] Patient Portal, offered by [facility], by registering with the following website: [email or web address]”

## 2018-01-30 NOTE — PROGRESS NOTE ADULT - PROBLEM SELECTOR PLAN 5
- appreciate cardiology recs  - continue cardizem  - clinical improvement on IVF
- continue cardizem  - clinical improvement on IVF
On pre-benzodiazepine Zofran as above.
- appreciate cardiology recs  - continue cardizem  - clinical improvement on IVF

## 2018-01-30 NOTE — DISCHARGE NOTE ADULT - CARE PLAN
Principal Discharge DX:	Dehydration  Goal:	resolved  Assessment and plan of treatment:	IVF given.  Secondary Diagnosis:	Anxiety  Assessment and plan of treatment:	continue with current medications   Follow up with [facility] , appt schedule for tomorrow at 10:15  Secondary Diagnosis:	Essential hypertension  Assessment and plan of treatment:	Low salt diet  Activity as tolerated.  Take all medication as prescribed.  Follow up with your medical doctor for routine blood pressure monitoring at your next visit.  Notify your doctor if you have any of the following symptoms:   Dizziness, Lightheadedness, Blurry vision, Headache, Chest pain, Shortness of breath  Secondary Diagnosis:	Depression, unspecified depression type  Assessment and plan of treatment:	continue with current medication  SEEK IMMEDIATE CARE IF  You have thoughts about hurting yourself or others.  You lose touch with reality (have psychotic symptoms).  You are taking medicine for depression and have a serious side effect  Secondary Diagnosis:	POTS (postural orthostatic tachycardia syndrome)  Assessment and plan of treatment:	continue with current medication

## 2018-01-30 NOTE — DISCHARGE NOTE ADULT - HOSPITAL COURSE
43 y/o woman with ITP/IBS/Anxiety/ reported h/o Hypertensive Urgency/reported psychiatric issues/?POTS Syndrome (exhaustive workup presumed autonomic dysfunction) admitted for dehydration. Now improved but anxious about discharge. Multiple psychosocial issues.      Problem/Plan - 1:  ·  Problem: Dehydration.  Plan: Resolved.      Problem/Plan - 2:  ·  Problem: Anxiety.  Plan: Continue current meds. Outpatient f/u at [facility]. Pt and family to look into inpatient rehabilitation options. 41 y/o woman with ITP/IBS/Anxiety/ reported h/o Hypertensive Urgency/reported psychiatric issues/?POTS Syndrome (exhaustive workup presumed autonomic dysfunction) admitted for dehydration. Now improved but anxious about discharge. Multiple psychosocial issues.      Problem/Plan - 1:  ·  Problem: Dehydration.  Plan: Resolved.      Problem/Plan - 2:  ·  Problem: Anxiety.  Plan: Continue current meds. Outpatient f/u at [facility]. Pt and family to look into inpatient rehabilitation options.     H/o frequent admissions due to anxiety, perceived symptoms. Psychosocial issues.     Diagnoses: Dehydration; Anxiety; Hypokalemia; Frequent admissions

## 2018-01-30 NOTE — PROGRESS NOTE ADULT - ASSESSMENT
41 y/o woman with ITP/IBS/Anxiety/ reported h/o Hypertensive Urgency/reported psychiatric issues/?POTS Syndrome (exhaustive workup presumed autonomic dysfunction) admitted for dehydration. Now improved but anxious about going home. Multiple psychosocial issues. 41 y/o woman with ITP/IBS/Anxiety/ reported h/o Hypertensive Urgency/reported psychiatric issues/?POTS Syndrome (exhaustive workup presumed autonomic dysfunction) admitted for dehydration. Now improved but anxious about discharge. Multiple psychosocial issues.

## 2018-01-30 NOTE — DISCHARGE NOTE ADULT - MEDICATION SUMMARY - MEDICATIONS TO TAKE
I will START or STAY ON the medications listed below when I get home from the hospital:    Cardizem  mg/24 hours oral capsule, extended release  -- 1 cap(s) by mouth once a day   -- It is very important that you take or use this exactly as directed.  Do not skip doses or discontinue unless directed by your doctor.  Some non-prescription drugs may aggravate your condition.  Read all labels carefully.  If a warning appears, check with your doctor before taking.  Swallow whole.  Do not crush.    -- Indication: For POTS (postural orthostatic tachycardia syndrome)    LORazepam 2 mg oral tablet  -- orally every 4 hours  -- Indication: For Anxiety    ondansetron 4 mg oral tablet, disintegrating  -- 1 tab(s) by mouth every 4 hours MDD:16mg  -- Indication: For Antinausea    busPIRone 5 mg oral tablet  -- 0.5 tab(s) by mouth 2 times a day   -- It is very important that you take or use this exactly as directed.  Do not skip doses or discontinue unless directed by your doctor.  May cause drowsiness or dizziness.  May cause drowsiness.  Alcohol may intensify this effect.  Use care when operating dangerous machinery.  Obtain medical advice before taking any non-prescription drugs as some may affect the action of this medication.    -- Indication: For Delusional disorder, somatic type    famotidine 20 mg oral tablet  -- 1 tab(s) by mouth 2 times a day  -- Indication: For reflux    docusate sodium 100 mg oral capsule  -- 1 cap(s) by mouth 3 times a day  -- Indication: For constipation    montelukast 10 mg oral tablet  -- 1 tab(s) by mouth once a day (at bedtime)  -- Indication: For Allergy    simethicone 80 mg oral tablet, chewable  -- 2 tab(s) by mouth 3 times a day  -- Indication: For gas    cholecalciferol 1000 intl units oral capsule  -- 1 cap(s) by mouth once a day   -- Indication: For supplement I will START or STAY ON the medications listed below when I get home from the hospital:    Cardizem  mg/24 hours oral capsule, extended release  -- 1 cap(s) by mouth once a day   -- It is very important that you take or use this exactly as directed.  Do not skip doses or discontinue unless directed by your doctor.  Some non-prescription drugs may aggravate your condition.  Read all labels carefully.  If a warning appears, check with your doctor before taking.  Swallow whole.  Do not crush.    -- Indication: For POTS (postural orthostatic tachycardia syndrome)    LORazepam 2 mg oral tablet  -- orally every 4 hours  -- Indication: For Anxiety    ondansetron 4 mg oral tablet, disintegrating  -- 1 tab(s) by mouth every 4 hours  -- Indication: For Anti- nausea     busPIRone 5 mg oral tablet  -- 0.5 tab(s) by mouth 2 times a day   -- It is very important that you take or use this exactly as directed.  Do not skip doses or discontinue unless directed by your doctor.  May cause drowsiness or dizziness.  May cause drowsiness.  Alcohol may intensify this effect.  Use care when operating dangerous machinery.  Obtain medical advice before taking any non-prescription drugs as some may affect the action of this medication.    -- Indication: For Delusional disorder, somatic type    famotidine 20 mg oral tablet  -- 1 tab(s) by mouth 2 times a day  -- Indication: For reflux    docusate sodium 100 mg oral capsule  -- 1 cap(s) by mouth 3 times a day  -- Indication: For constipation    montelukast 10 mg oral tablet  -- 1 tab(s) by mouth once a day (at bedtime)  -- Indication: For Allergy    simethicone 80 mg oral tablet, chewable  -- 2 tab(s) by mouth 3 times a day  -- Indication: For gas    cholecalciferol 1000 intl units oral capsule  -- 1 cap(s) by mouth once a day   -- Indication: For supplement

## 2018-01-30 NOTE — PROGRESS NOTE ADULT - PROBLEM SELECTOR PLAN 1
Pt on specific regimen of ativan, requesting if she can on both klonipin and ativan together as she gets morning adrenal rush in AM which makes her anxiety worse. Agreeable to psych consult at this time  - appreciate psych recs on benzo regimen  - ativan as scheduled
Pt on specific regimen of ativan, requesting if she can on both klonipin and ativan together as she gets morning adrenal rush in AM which makes her anxiety worse. Agreeable to psych consult at this time  - consult psych and appreciate recs on benzo regimen  - ativan as scheduled
Continues to be anxious but resisting psych recommendations.
Resolved.

## 2018-01-30 NOTE — DISCHARGE NOTE ADULT - CARE PROVIDER_API CALL
Lorena Ford), Internal Medicine  [address] [address]  Phone: [phone]  Fax: [phone]    Philip Reed), Cardiovascular Disease; Internal Medicine  [address]  [address] [place] [address]  Phone: [phone]  Fax: [phone]

## 2018-01-30 NOTE — PROGRESS NOTE ADULT - PROBLEM SELECTOR PLAN 2
likely multifactorial, definitely component of psychosomatic symptoms.   - will check vitamin B, B12, D, folate, tsh level  - encourage po intake
continue IVF  once pt with adequate po intake, dc IVF
Resolved
Continue current meds. Outpatient f/u at [facility]. Pt and family to look into inpatient rehabilitation options.

## 2018-01-31 ENCOUNTER — EMERGENCY (EMERGENCY)
Facility: HOSPITAL | Age: 43
LOS: 1 days | Discharge: HOME | End: 2018-01-31
Attending: EMERGENCY MEDICINE | Admitting: EMERGENCY MEDICINE
Payer: MEDICAID

## 2018-01-31 VITALS
DIASTOLIC BLOOD PRESSURE: 96 MMHG | WEIGHT: 95.02 LBS | HEART RATE: 98 BPM | OXYGEN SATURATION: 100 % | RESPIRATION RATE: 18 BRPM | TEMPERATURE: 98 F | SYSTOLIC BLOOD PRESSURE: 144 MMHG

## 2018-01-31 VITALS
TEMPERATURE: 98 F | RESPIRATION RATE: 18 BRPM | SYSTOLIC BLOOD PRESSURE: 134 MMHG | DIASTOLIC BLOOD PRESSURE: 98 MMHG | HEART RATE: 100 BPM | OXYGEN SATURATION: 100 %

## 2018-01-31 LAB
ALBUMIN SERPL ELPH-MCNC: 4.3 G/DL — SIGNIFICANT CHANGE UP (ref 3.3–5)
ALP SERPL-CCNC: 66 U/L — SIGNIFICANT CHANGE UP (ref 40–120)
ALT FLD-CCNC: 11 U/L RC — SIGNIFICANT CHANGE UP (ref 10–45)
APPEARANCE UR: CLEAR — SIGNIFICANT CHANGE UP
AST SERPL-CCNC: 19 U/L — SIGNIFICANT CHANGE UP (ref 10–40)
BACTERIA # UR AUTO: ABNORMAL /HPF
BASOPHILS # BLD AUTO: 0 K/UL — SIGNIFICANT CHANGE UP (ref 0–0.2)
BASOPHILS NFR BLD AUTO: 0.2 % — SIGNIFICANT CHANGE UP (ref 0–2)
BILIRUB SERPL-MCNC: 0.4 MG/DL — SIGNIFICANT CHANGE UP (ref 0.2–1.2)
BILIRUB UR-MCNC: NEGATIVE — SIGNIFICANT CHANGE UP
BUN SERPL-MCNC: <4 MG/DL — LOW (ref 7–23)
CALCIUM SERPL-MCNC: 9.8 MG/DL — SIGNIFICANT CHANGE UP (ref 8.4–10.5)
CHLORIDE SERPL-SCNC: 104 MMOL/L — SIGNIFICANT CHANGE UP (ref 96–108)
CO2 SERPL-SCNC: 23 MMOL/L — SIGNIFICANT CHANGE UP (ref 22–31)
COLOR SPEC: COLORLESS — SIGNIFICANT CHANGE UP
CREAT SERPL-MCNC: 0.91 MG/DL — SIGNIFICANT CHANGE UP (ref 0.5–1.3)
DIFF PNL FLD: NEGATIVE — SIGNIFICANT CHANGE UP
EOSINOPHIL # BLD AUTO: 0 K/UL — SIGNIFICANT CHANGE UP (ref 0–0.5)
EOSINOPHIL NFR BLD AUTO: 0.4 % — SIGNIFICANT CHANGE UP (ref 0–6)
EPI CELLS # UR: SIGNIFICANT CHANGE UP /HPF
GAS PNL BLDV: SIGNIFICANT CHANGE UP
GLUCOSE SERPL-MCNC: 98 MG/DL — SIGNIFICANT CHANGE UP (ref 70–99)
GLUCOSE UR QL: NEGATIVE — SIGNIFICANT CHANGE UP
HCT VFR BLD CALC: 36.8 % — SIGNIFICANT CHANGE UP (ref 34.5–45)
HGB BLD-MCNC: 12.9 G/DL — SIGNIFICANT CHANGE UP (ref 11.5–15.5)
KETONES UR-MCNC: NEGATIVE — SIGNIFICANT CHANGE UP
LEUKOCYTE ESTERASE UR-ACNC: NEGATIVE — SIGNIFICANT CHANGE UP
LYMPHOCYTES # BLD AUTO: 1.4 K/UL — SIGNIFICANT CHANGE UP (ref 1–3.3)
LYMPHOCYTES # BLD AUTO: 27.6 % — SIGNIFICANT CHANGE UP (ref 13–44)
MCHC RBC-ENTMCNC: 29.8 PG — SIGNIFICANT CHANGE UP (ref 27–34)
MCHC RBC-ENTMCNC: 35.2 GM/DL — SIGNIFICANT CHANGE UP (ref 32–36)
MCV RBC AUTO: 84.7 FL — SIGNIFICANT CHANGE UP (ref 80–100)
MONOCYTES # BLD AUTO: 0.4 K/UL — SIGNIFICANT CHANGE UP (ref 0–0.9)
MONOCYTES NFR BLD AUTO: 9 % — SIGNIFICANT CHANGE UP (ref 2–14)
NEUTROPHILS # BLD AUTO: 3.1 K/UL — SIGNIFICANT CHANGE UP (ref 1.8–7.4)
NEUTROPHILS NFR BLD AUTO: 62.8 % — SIGNIFICANT CHANGE UP (ref 43–77)
NITRITE UR-MCNC: NEGATIVE — SIGNIFICANT CHANGE UP
PH UR: 6.5 — SIGNIFICANT CHANGE UP (ref 5–8)
PLATELET # BLD AUTO: 70 K/UL — LOW (ref 150–400)
POTASSIUM SERPL-MCNC: 3.9 MMOL/L — SIGNIFICANT CHANGE UP (ref 3.5–5.3)
POTASSIUM SERPL-SCNC: 3.9 MMOL/L — SIGNIFICANT CHANGE UP (ref 3.5–5.3)
PROT SERPL-MCNC: 6.9 G/DL — SIGNIFICANT CHANGE UP (ref 6–8.3)
PROT UR-MCNC: NEGATIVE — SIGNIFICANT CHANGE UP
RAPID RVP RESULT: SIGNIFICANT CHANGE UP
RBC # BLD: 4.34 M/UL — SIGNIFICANT CHANGE UP (ref 3.8–5.2)
RBC # FLD: 11.7 % — SIGNIFICANT CHANGE UP (ref 10.3–14.5)
SODIUM SERPL-SCNC: 140 MMOL/L — SIGNIFICANT CHANGE UP (ref 135–145)
SP GR SPEC: <1.005 — LOW (ref 1.01–1.02)
UROBILINOGEN FLD QL: NEGATIVE — SIGNIFICANT CHANGE UP
WBC # BLD: 5 K/UL — SIGNIFICANT CHANGE UP (ref 3.8–10.5)
WBC # FLD AUTO: 5 K/UL — SIGNIFICANT CHANGE UP (ref 3.8–10.5)
WBC UR QL: SIGNIFICANT CHANGE UP /HPF (ref 0–5)

## 2018-01-31 PROCEDURE — 82565 ASSAY OF CREATININE: CPT

## 2018-01-31 PROCEDURE — 87633 RESP VIRUS 12-25 TARGETS: CPT

## 2018-01-31 PROCEDURE — 82330 ASSAY OF CALCIUM: CPT

## 2018-01-31 PROCEDURE — 85027 COMPLETE CBC AUTOMATED: CPT

## 2018-01-31 PROCEDURE — 81001 URINALYSIS AUTO W/SCOPE: CPT

## 2018-01-31 PROCEDURE — 82803 BLOOD GASES ANY COMBINATION: CPT

## 2018-01-31 PROCEDURE — 83605 ASSAY OF LACTIC ACID: CPT

## 2018-01-31 PROCEDURE — 87086 URINE CULTURE/COLONY COUNT: CPT

## 2018-01-31 PROCEDURE — 84132 ASSAY OF SERUM POTASSIUM: CPT

## 2018-01-31 PROCEDURE — 85014 HEMATOCRIT: CPT

## 2018-01-31 PROCEDURE — 87798 DETECT AGENT NOS DNA AMP: CPT

## 2018-01-31 PROCEDURE — 80053 COMPREHEN METABOLIC PANEL: CPT

## 2018-01-31 PROCEDURE — 93010 ELECTROCARDIOGRAM REPORT: CPT

## 2018-01-31 PROCEDURE — 82947 ASSAY GLUCOSE BLOOD QUANT: CPT

## 2018-01-31 PROCEDURE — 93005 ELECTROCARDIOGRAM TRACING: CPT

## 2018-01-31 PROCEDURE — 87581 M.PNEUMON DNA AMP PROBE: CPT

## 2018-01-31 PROCEDURE — 99285 EMERGENCY DEPT VISIT HI MDM: CPT

## 2018-01-31 PROCEDURE — 99284 EMERGENCY DEPT VISIT MOD MDM: CPT | Mod: 25

## 2018-01-31 PROCEDURE — 84295 ASSAY OF SERUM SODIUM: CPT

## 2018-01-31 PROCEDURE — 71046 X-RAY EXAM CHEST 2 VIEWS: CPT

## 2018-01-31 PROCEDURE — 87486 CHLMYD PNEUM DNA AMP PROBE: CPT

## 2018-01-31 PROCEDURE — 82435 ASSAY OF BLOOD CHLORIDE: CPT

## 2018-01-31 PROCEDURE — 96374 THER/PROPH/DIAG INJ IV PUSH: CPT

## 2018-01-31 PROCEDURE — 71046 X-RAY EXAM CHEST 2 VIEWS: CPT | Mod: 26

## 2018-01-31 RX ORDER — DILTIAZEM HCL 120 MG
1 CAPSULE, EXT RELEASE 24 HR ORAL
Qty: 14 | Refills: 0 | OUTPATIENT
Start: 2018-01-31 | End: 2018-02-13

## 2018-01-31 RX ORDER — ONDANSETRON 8 MG/1
4 TABLET, FILM COATED ORAL ONCE
Qty: 0 | Refills: 0 | Status: DISCONTINUED | OUTPATIENT
Start: 2018-01-31 | End: 2018-01-31

## 2018-01-31 RX ORDER — ONDANSETRON 8 MG/1
4 TABLET, FILM COATED ORAL ONCE
Qty: 0 | Refills: 0 | Status: COMPLETED | OUTPATIENT
Start: 2018-01-31 | End: 2018-01-31

## 2018-01-31 RX ORDER — SODIUM CHLORIDE 9 MG/ML
1000 INJECTION INTRAMUSCULAR; INTRAVENOUS; SUBCUTANEOUS ONCE
Qty: 0 | Refills: 0 | Status: COMPLETED | OUTPATIENT
Start: 2018-01-31 | End: 2018-01-31

## 2018-01-31 RX ADMIN — Medication 2 MILLIGRAM(S): at 09:45

## 2018-01-31 RX ADMIN — SODIUM CHLORIDE 2000 MILLILITER(S): 9 INJECTION INTRAMUSCULAR; INTRAVENOUS; SUBCUTANEOUS at 09:45

## 2018-01-31 RX ADMIN — ONDANSETRON 4 MILLIGRAM(S): 8 TABLET, FILM COATED ORAL at 10:02

## 2018-01-31 NOTE — ED PROVIDER NOTE - CARE PLAN
Principal Discharge DX:	Lightheadedness  Secondary Diagnosis:	Hypertension, unspecified type  Secondary Diagnosis:	Urinary urgency

## 2018-01-31 NOTE — CONSULT NOTE ADULT - SUBJECTIVE AND OBJECTIVE BOX
**********              CARDIOLOGY CONSULT NOTE              ************  ============================================================  CHIEF COMPLAINT/REASON FOR CONSULT:  Patient is a 42y old  Female who presents with a chief complaint of Palpitations/Tremors/Hypertension/Tachycardia    HISTORY OF PRESENT ILLNESS:  42yFemale with a history of ITP/IBS/ANxiety/Hypertensive Urgency/Psychiatric Issues/?POTS Syndrome (exhaustive workup presumed autonomic dysfunction) unfortunately re-presents ~10th time for hypertension/tremors/abnormal ECG  This has been an ongoing issue.   She was recently hospitalized multiple times for similar issues. And also recently hospitalized involuntarily at [facility].   She now presents with similar symptoms.  She reports home BP of very elevated BP.  But BPs here are in the SBPs of 140s and checked manually in the SBPs of 130s/DBPs of 80s with no orthostasis.   She states that her EKG states septal infarct. This is an automated read and on my read, EKG demonstrates NSR with no ischemic changes.        Allergies    barium sulfate (Other)  beta blockers (Unknown)  Reglan (Other)    Intolerances    metoprolol (Headache)  	    MEDICATIONS:  Home Medications:  docusate sodium 100 mg oral capsule: 1 cap(s) orally 3 times a day (26 Jan 2018 21:21)  famotidine 20 mg oral tablet: 1 tab(s) orally 2 times a day (26 Jan 2018 21:21)  LORazepam 2 mg oral tablet: orally every 4 hours (26 Jan 2018 21:21)  montelukast 10 mg oral tablet: 1 tab(s) orally once a day (at bedtime) (26 Jan 2018 21:21)  simethicone 80 mg oral tablet, chewable: 2 tab(s) orally 3 times a day (26 Jan 2018 21:21)        PAST MEDICAL & SURGICAL HISTORY:  Depression  Idiopathic thrombocytopenic purpura  Dysautonomia  Idiopathic thrombocytopenia purpura  Anxiety  POTS (postural orthostatic tachycardia syndrome)  History of ITP  Labyrinthitis  Headache  HTN (hypertension)  Anxiety  IBS (irritable bowel syndrome)  No significant past surgical history  No significant past surgical history  No significant past surgical history      FAMILY HISTORY:  No pertinent family history in first degree relatives  Family history of hypertension (Grandparent)  Family history of atrial fibrillation  Family history of prostate cancer in father    NC - Mother/Father    SOCIAL HISTORY:    [ x] Non-smoker  [x] No Illicit Drug Use  [ x] No Excess EtOH Use      REVIEW OF SYSTEMS: (Unless + Before Symptom, it is negative)  Constitutional: No Fever, Fatigue, +Weight Loss  Eyes: No Recent Vision Changes, Eye Pain  ENT: No Congestion, Sore Throat  Endocrine: No Excess Sweating, Temperature Intolerance  Cardiovascular: No Chest Pain, +Palpitations, Shortness of Breath, Pre-syncope, Syncope, LE Edema  Respiratory: No Cough, Congestion, Wheezing  Gastrointestinal: No Abdominal Pain, Nausea, Vomiting  Genitourinary: No dysuria, hematuria +'excess urination'  Musculoskeletal: No Joint Pain, Swelling  Neurologic: No headaches, Imbalance, Weakness +Tremors  Skin: No rashes, hematoma, purprura    ================================    PHYSICAL EXAM:  T(C): 36.8 (01-26-18 @ 14:33), Max: 36.8 (01-26-18 @ 07:59)  HR: 115 (01-26-18 @ 14:33) (81 - 115)  BP: 131/99 (01-26-18 @ 14:33) (128/78 - 158/104)  RR: 18 (01-26-18 @ 14:33) (18 - 18)  SpO2: 99% (01-26-18 @ 14:33) (99% - 99%)  Wt(kg): --  I&O's Summary    Appearance: +Anxious + Labile + Tearful	  HEENT:   Normal oral mucosa, EOMI	  Lymphatic: No lymphadenopathy  Cardiovascular: Normal S1 S2, No JVD, No murmurs, No edema  Respiratory: Lungs clear to auscultation, no use of accessory muscles	  Psychiatry: +Anxious  +Depressed   Gastrointestinal:  Soft, Non-tender	  Skin: No rashes, No ecchymoses, No cyanosis	  Neurologic: Non-focal, No Focal Deficits  Extremities: Normal range of motion, No clubbing, cyanosis or edema  Vascular: Peripheral pulses palpable 2+ bilaterally, no prominent varicosities    ============================    LABS:	   Labs [id number]    ECG:   Sinus rhythm with no continguous Q waves and no ischemic changes 	    =========================================================================  ASSESSMENT:  42yFemale with a history of ITP/IBS/ANxiety/Hypertensive Urgency/Psychiatric Issues/?POTS Syndrome (exhaustive workup presumed autonomic dysfunction) unfortunately re-presents ~10th time for hypertension/tremors/abnormal ECG  This has been an ongoing issue.   I believe she has been consumed by her HR and BP to the point that it has become the focal point of her life. Anxiety surrounds this issue and compounds it.   She reports home BP that is very elevated BP.  But BPs here are in the SBPs of 140s and checked manually in the SBPs of 130s/DBPs of 80s with no orthostasis.   She states that her EKG states septal infarct. This is an automated read and on my read, EKG demonstrates NSR with no ischemic changes.  No cardiac reason for inpatient admission    Recommendations  - Can increase Diltiazem to 240XL as provides symptomatic relief  - Otherwise, safe to D/C from cardiac standpoint  - Discussed with ED Staff      Please call with questions.     [phone]    Philip Reed MD, [facility]
Neurology Consult Note    Name  DORIS MCBRIDE    HPI: 41 yo F with a PMH of ITP, POTS, IBS, Gastroparesis, Autonomic Dysfunction, HTN, Asthma, Generalized Anxiety d/o, Depression, Somatoform d/o, Personality d/o and multiple allergies who was recently admitted here for tremors, now here again for the same chief complaint. Pt describes tremors as occurring in all extremities (but not consistently) and being "Parkinson's like", they tend to last for 1 to 9 hours then go away. Pt complains of feeling dehydrated and urinating very frequently, regardless of how much water she drinks or IV fluids she gets (the pt is very obsessive with this complaint). Exam is non-focal. Likely continuation of her underlying medical condition in combination of her psychiatric condition, or purely psychiatric.        Subjective:      MEDICATIONS  (STANDING):    MEDICATIONS  (PRN):      Allergies    barium sulfate (Other)  beta blockers (Unknown)  Reglan (Other)    Intolerances    metoprolol (Headache)    PAST MEDICAL & SURGICAL HISTORY:  Depression  Idiopathic thrombocytopenic purpura  Dysautonomia  Idiopathic thrombocytopenia purpura  Anxiety  POTS (postural orthostatic tachycardia syndrome)  Pott disease  History of ITP  Labyrinthitis  Asthma  Headache  HTN (hypertension)  Anxiety  IBS (irritable bowel syndrome)  No significant past surgical history  No significant past surgical history  No significant past surgical history    FH:  SH:    Objective:   Vital Signs Last 24 Hrs  T(C): 36.8 (31 Jan 2018 13:42), Max: 36.8 (30 Jan 2018 15:49)  T(F): 98.3 (31 Jan 2018 13:42), Max: 98.3 (30 Jan 2018 15:49)  HR: 93 (31 Jan 2018 13:42) (86 - 98)  BP: 135/92 (31 Jan 2018 13:42) (124/91 - 144/96)  BP(mean): --  RR: 17 (31 Jan 2018 13:42) (17 - 18)  SpO2: 100% (31 Jan 2018 13:42) (99% - 100%)    Neurological Exam:  Mental Status: Orientated to self, date and place.  Attention intact.  No dysarthria, aphasia or neglect.  Knowledge intact.  Registration intact.  Short and long term memory grossly intact.      Cranial Nerves: CN I - not tested.  PERRL, EOMI, VFF, no nystagmus or diplopia.  No APD.  Fundi not visualized bilaterally.  CN V1-3 intact to light touch and pinprick.  No facial asymmetry.  Hearing intact to finger rub bilaterally.  Tongue, uvula and palate midline.  Sternocleidomastoid and Trapezius intact bilaterally.    Motor:   Tone: normal.                  Strength:     [] Upper extremity                      Delt       Bicep    Tricep                                                  R         5/5 5/5 5/5 5/5                                               L          5/5 5/5 5/5 5/5  [] Lower extremity                       HF          KE          KF        DF         PF                                               R        5/5 5/5 5/5 5/5 5/5                                               L         5/5 5/5 5/5 5/5 5/5  Pronator drift: none                 Dysmetria: None to finger-nose-finger or heel-shin-heel  No truncal ataxia.    Tremor: No resting, postural or action tremor.  No myoclonus.    Sensation: intact to light touch, pinprick, vibration and proprioception    Deep Tendon Reflexes: 1+ bilateral biceps, triceps, brachioradialis, knee and ankle  Toes flexor bilaterally    Gait: normal and stable.        Other:                        12.9   5.0   )-----------( 70       ( 31 Jan 2018 10:03 )             36.8     01-31    140  |  104  |  <4<L>  ----------------------------<  98  3.9   |  23  |  0.91    Ca    9.8      31 Jan 2018 10:03    TPro  6.9  /  Alb  4.3  /  TBili  0.4  /  DBili  x   /  AST  19  /  ALT  11  /  AlkPhos  66  01-31    LIVER FUNCTIONS - ( 31 Jan 2018 10:03 )  Alb: 4.3 g/dL / Pro: 6.9 g/dL / ALK PHOS: 66 U/L / ALT: 11 U/L RC / AST: 19 U/L / GGT: x               Radiology

## 2018-01-31 NOTE — ED PROVIDER NOTE - PROGRESS NOTE DETAILS
Herbert: Spoke with Dr. Hussein who will come see the patient. She is aware that her BP in controlled, her labs are wnl, her EKG is wnl and her urine is negative. She does not want to see a urologist OP for her urinary urgency. She also wants neurology to determine the reason for her tremor. Neurology saw her on last admission and determined "no need to treat tremor presently". Dr. Hussein spoke with the patient and stated that there is no cardiologic reason for admission. He can increase her cardizem to 240mg ER daily for BP control. She is refusing discharge b/c she wants her tremor further worked up as well as her urinary urgency. When explained that these conditions should be worked up outpatient as the symptoms are unchanged, her vitals are stable, she is not displaying tremor in the ED, her UA is unremarkable and her labs are unremarkable she refuses discharge and wants to speak to a hospital . I spoke with the charge nurse to arrange for this. Dr. Stout to speak to the admitting team from yesterday. PIERRE [facility], and Dr AD Hussein pt was seen  and admitted 13 times since June of 2017, Most recent admit for same complaints today, Cleared by neuro, cards, medicine. Pt is clinically well. Labs normal. VS normal, Pt ekg normal recent ct normal. Not febrile. Stable for dc home. PT current not giving Hippa clearance to speak to family.  PT stable for dc  Jair Stout MD, Facep Have had prolonged dw pt who is requesting consults with Neurology and Urology if they agree pt stable for dc pt agrees for opt followup  Jair Stout MD, Facep Attending MD Guerra.  Pt signed out to me in stable condition pending urology and neurology consults.  If cleared by both services pt is aware that she will be d/c'd to follow-up as outpt.  Informed by previous attending that pt has not given permission for staff to discuss her care with her family at this time.  Will maintain HIPPA privacy.  Pt's post-void residual at time of signout is 23 cc inconsistent with retention. Herbert: Dr. Hussein spoke with the patient and stated that there is no cardiologic reason for admission. He can increase her cardizem to 240mg ER daily for BP control. She is refusing discharge b/c she wants her tremor further worked up as well as her urinary urgency. When explained that these conditions should be worked up outpatient as the symptoms are unchanged, her vitals are stable, she is not displaying tremor in the ED, her UA is unremarkable and her labs are unremarkable she refuses discharge and wants to speak to a hospital . I spoke with the charge nurse to arrange for this. Dr. Stout to speak to the admitting team from yesterday. Herbert: Urology has seen the patient and determined no need for further work-up today. Can follow-up with Dr. Burger or Dr. Ackerman at [phone]. Pending neurology assessment. Patient cleared by neurology. Will rx cardizem 240mg ER instead of 180mg. Patient w/o tremor, htn, tachycardia, lightheadedness. ARMY:  Pt cleared by both neurology and urology.  Seen by SW and requesting discharge.  Follow-up with outpt providers as recommended by both urology/neurology.  Return to ED for acutely new/worsening sxs including SOB/light-headedness/severe CP/back pain/syncope.  Stable for discharge.

## 2018-01-31 NOTE — ED PROVIDER NOTE - NS ED ROS FT
No fever, no chills, no change in vision, no throat pain, no abdominal pain, no joint pain, no rashes, no focal neurologic complaints,  all ROS otherwise as per HPI or negative.

## 2018-01-31 NOTE — ED ADULT NURSE NOTE - OBJECTIVE STATEMENT
41 y/o female presents to ED c/o hypertension s/p discharge from [facility] yesterday. Patient reports she was discharged with her blood pressure "180s/110s". Patient reports throughout the night she "took something" and her blood pressure went down to 156/106. Patient reports full body tremors throughout the night and that she was unable to sleep. Patient reports she takes Ativan every 4 hours and took throughout the night. Patient c/o slight nausea and dizziness, denies trying to consume any food/drink. Patient denies SOB, CP, falls, LOC. Patient a&ox4, breathing spontaneously, airway patent, b/l clear lung sounds, + bowel sounds, cap refill <2 seconds, + pulses. Patient resting in bed. Side rails up, plan of care explained.

## 2018-01-31 NOTE — ED PROVIDER NOTE - ATTENDING CONTRIBUTION TO CARE
Private Physician Philip Lynn, Lorena Ford PCP  43 y/o woman with ITP/IBS/Anxiety/ reported h/o Hypertensive Urgency/reported psychiatric issues/?POTS Syndrome admitted 1/26 for dehydration and dc after 4 days. Had presented to ED directly from [facility]. Now comes to ed complains of feeling. Pt states still feels dehydrated. And feels tremulous. No fever chills, nausea without vomiting, No NVDC. No cough. PE WDWN female awake alert normocephalic atraumatic thin, normocephalic atraumatic chest clear anterior & posterior cv no rubs, gallops or murmurs neruo gcs 15 speech fluent power 5.5 all extr pain light touch romberg reflexes normal  Jair Stout MD, Facep

## 2018-01-31 NOTE — ED PROVIDER NOTE - MEDICAL DECISION MAKING DETAILS
Herbert: 42F w/PMH ?POTS, ?gastroparesis, anxiety, tremor p/w feeling dehydrated, cp/sob, tremor. These complaints are chronic and have been difficult to control. R/o ACS, PNA, orthostatic hypotension, lyte abnromality; no e/o surgical abdominal pathology (SBO, etc.). IVF, ativan, zofran, urine studies, reassess. Herbert: 42F w/PMH ?POTS, ?gastroparesis, anxiety, tremor p/w feeling dehydrated, cp/sob, tremor. These complaints are chronic and have been difficult to control. R/o ACS, PNA, UTI, orthostatic hypotension, lyte abnromality; no e/o surgical abdominal pathology (SBO, etc.). IVF, ativan, zofran, urine studies, reassess.

## 2018-01-31 NOTE — ED PROVIDER NOTE - OBJECTIVE STATEMENT
42F w/PMH ?POTS, anxiety, ?gastroparesis, chronic nausea, htn, ITP, IBS, recent admission for dehydration/anxiety p/w continued nausea, "feeling dehydrated", tremors. Patient has had these chronic symptoms for at least a year with difficulty managing and sig psych component. Taking ativan 2mg q4h, last this AM, no missed doses. Taking zofran 4mg q8, last yesterday. Concerned about BP being 180s sys on discharge despite cardizem 180mg last night. Has intolerance to BB. +tremor intermittently, cp/sob/lightheadedness. Denies f/c, URI sx, cough, abd pain, v/d. +urinary urgency and feeling of incomplete void. No dysuria/frequency. +constipation, last BM yesterday. No melena/BRBPR.

## 2018-01-31 NOTE — PROGRESS NOTE ADULT - SUBJECTIVE AND OBJECTIVE BOX
Urology Progress Note    Called by the ED for evaluation for patient Lashonda Presley for urinary urgency with negative UA    Patient is a 42 year old female with PMH POTS, autonomic dysfunction, anxiety, gastroparesis, ITP, IBS recently admitted for dehydration/anixety p/w c/o urinary urgency. Patient states having urge to urinate and when she tries to urinate she dribbles urine and strains in order to force stream despite drinking plenty of fluids and IVF. Mentions having the feeling of not emptying her bladder completely. Patient takes Ativan 2mg q4hr. Patient seen by cardiology for evaluation of blood pressure and neurology for intermittent tremors (not visible on encounter). Urinalysis negative. WBC & Cr WNL. Afebrile. No fevers or hematuria. Patient requested urology evaluation. Patient seen and examined with Dr. Beltran. Instructed patient at the bedside that the possibly of urinary complaints could be related from chronic benzodiazapine use although unclear at this time. Explained in order to further work-up urinary urgency, patient would need urodynamics which is not offered in the inpatient setting and can be done in the outpatient setting. Patient reports full understanding. Patient is stable for discharge from urology standpoint. Patient can follow up with Dr. Burger or Dr. Ackerman outpatient [phone]

## 2018-01-31 NOTE — CONSULT NOTE ADULT - ASSESSMENT
41 yo F with a PMH of ITP, POTS, IBS, Gastroparesis, Autonomic Dysfunction, HTN, Asthma, Generalized Anxiety d/o, Depression, Somatoform d/o, Personality d/o and multiple allergies who was recently admitted here for tremors, now here again for the same chief complaint.  Neurological exam is non-focal.      Plan:  - Patient is neurologically stable  - She may followup with her neurologist upon discharge

## 2018-02-01 ENCOUNTER — APPOINTMENT (OUTPATIENT)
Dept: NEUROLOGY | Facility: CLINIC | Age: 43
End: 2018-02-01

## 2018-02-01 LAB
CULTURE RESULTS: NO GROWTH — SIGNIFICANT CHANGE UP
SPECIMEN SOURCE: SIGNIFICANT CHANGE UP
VIT B1 SERPL-MCNC: 114.8 NMOL/L — SIGNIFICANT CHANGE UP (ref 66.5–200)

## 2018-02-02 ENCOUNTER — MEDICATION RENEWAL (OUTPATIENT)
Age: 43
End: 2018-02-02

## 2018-02-02 ENCOUNTER — EMERGENCY (EMERGENCY)
Facility: HOSPITAL | Age: 43
LOS: 0 days | Discharge: HOME | End: 2018-02-02
Attending: EMERGENCY MEDICINE | Admitting: EMERGENCY MEDICINE
Payer: MEDICAID

## 2018-02-02 VITALS
OXYGEN SATURATION: 100 % | DIASTOLIC BLOOD PRESSURE: 66 MMHG | SYSTOLIC BLOOD PRESSURE: 118 MMHG | RESPIRATION RATE: 16 BRPM | TEMPERATURE: 98 F | HEART RATE: 88 BPM

## 2018-02-02 VITALS — HEIGHT: 64 IN | WEIGHT: 130.07 LBS
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
## 2018-02-02 DIAGNOSIS — I10 ESSENTIAL (PRIMARY) HYPERTENSION: ICD-10-CM

## 2018-02-02 DIAGNOSIS — T78.49XA OTHER ALLERGY, INITIAL ENCOUNTER: ICD-10-CM

## 2018-02-02 DIAGNOSIS — I49.8 OTHER SPECIFIED CARDIAC ARRHYTHMIAS: ICD-10-CM

## 2018-02-02 DIAGNOSIS — G90.1 FAMILIAL DYSAUTONOMIA [RILEY-DAY]: ICD-10-CM

## 2018-02-02 DIAGNOSIS — D69.3 IMMUNE THROMBOCYTOPENIC PURPURA: ICD-10-CM

## 2018-02-02 PROCEDURE — 99284 EMERGENCY DEPT VISIT MOD MDM: CPT

## 2018-02-02 PROCEDURE — 93010 ELECTROCARDIOGRAM REPORT: CPT

## 2018-02-02 RX ORDER — DIPHENHYDRAMINE HCL 50 MG
12.5 CAPSULE ORAL ONCE
Qty: 0 | Refills: 0 | Status: COMPLETED | OUTPATIENT
Start: 2018-02-02 | End: 2018-02-02

## 2018-02-02 RX ADMIN — Medication 12.5 MILLIGRAM(S): at 13:13

## 2018-02-02 NOTE — ED ADULT TRIAGE NOTE - CHIEF COMPLAINT QUOTE
pt states she took a different brand of Zofran reports tachycardia and sob , pulse ox 97% pt wearing a pulse ox sensor, hx gastroparesis

## 2018-02-02 NOTE — ED STATDOCS - PROGRESS NOTE DETAILS
KALLI Champagne:  Pt was seen and examined by intake provider, she presented with c/o SOB s/p taking zofran. Pt checked her Pulse Ox at home and was in 80s and came to ED for evaluation. In Ed pt was in NAD, speaking complete sentences, Continuous Pulse Ox was 100%. Will dc her home for outpt f/u. KALLI Champagne:  Pt was seen and examined by intake provider, she presented with c/o SOB s/p taking zofran. Pt checked her Pulse Ox at home and was in 80s and came to ED for evaluation. In Ed pt was in NAD, speaking complete sentences, no swelling of the face, lip of tongue noted, no hives. Uvula is midline and nonedematous. Continuous Pulse Ox was 100%. Will dc her home for outpt f/u.

## 2018-02-02 NOTE — ED STATDOCS - NS_ ATTENDINGSCRIBEDETAILS _ED_A_ED_FT
I, Teo Collado MD,  performed the initial face to face bedside interview with this patient regarding history of present illness, review of symptoms and relevant past medical, social and family history.  I completed an independent physical examination.    The history, relevant review of systems, past medical and surgical history, medical decision making, and physical examination was documented by the scribe in my presence and I attest to the accuracy of the documentation.

## 2018-02-02 NOTE — ED STATDOCS - OBJECTIVE STATEMENT
42 y-o Female with PMHX of gastroparesis, POTS, HTN, presents to the ED c/o dizziness and SOB with tachycardia s/p taking 4 mg of Zofran x 2 hours ago. Pt states recent brand change to generic version. Hx of similar reaction when switching brands of Zofran. Pt notes difficulty catching breath. Pulse Ox at 98%. Pt currently Anxious. Denies HA, CP, N/V, fever.

## 2018-02-02 NOTE — ED ADULT NURSE REASSESSMENT NOTE - NS ED NURSE REASSESS COMMENT FT1
pt requesting to speak to ED attending prior to being discharged. dr flowers aware. will continue to monitor and reassess

## 2018-02-02 NOTE — ED STATDOCS - ATTENDING CONTRIBUTION TO CARE
I, Teo Collado MD,  performed the initial face to face bedside interview with this patient regarding history of present illness, review of symptoms and relevant past medical, social and family history.  I completed an independent physical examination.  I was the initial provider who evaluated this patient. I have signed out the follow up of any pending tests (i.e. labs, radiological studies) to the ACP.  I have communicated the patient’s plan of care and disposition with the ACP.

## 2018-02-04 ENCOUNTER — EMERGENCY (EMERGENCY)
Facility: HOSPITAL | Age: 43
LOS: 0 days | Discharge: HOME | End: 2018-02-04
Attending: EMERGENCY MEDICINE | Admitting: EMERGENCY MEDICINE
Payer: MEDICAID

## 2018-02-04 VITALS
HEART RATE: 90 BPM | RESPIRATION RATE: 17 BRPM | SYSTOLIC BLOOD PRESSURE: 125 MMHG | TEMPERATURE: 98 F | OXYGEN SATURATION: 100 % | DIASTOLIC BLOOD PRESSURE: 85 MMHG

## 2018-02-04 VITALS — WEIGHT: 89.95 LBS | HEIGHT: 62 IN

## 2018-02-04 DIAGNOSIS — F45.9 SOMATOFORM DISORDER, UNSPECIFIED: ICD-10-CM

## 2018-02-04 DIAGNOSIS — I10 ESSENTIAL (PRIMARY) HYPERTENSION: ICD-10-CM

## 2018-02-04 DIAGNOSIS — G90.1 FAMILIAL DYSAUTONOMIA [RILEY-DAY]: ICD-10-CM

## 2018-02-04 DIAGNOSIS — I49.8 OTHER SPECIFIED CARDIAC ARRHYTHMIAS: ICD-10-CM

## 2018-02-04 DIAGNOSIS — R11.10 VOMITING, UNSPECIFIED: ICD-10-CM

## 2018-02-04 DIAGNOSIS — F41.1 GENERALIZED ANXIETY DISORDER: ICD-10-CM

## 2018-02-04 DIAGNOSIS — F41.9 ANXIETY DISORDER, UNSPECIFIED: ICD-10-CM

## 2018-02-04 DIAGNOSIS — F32.9 MAJOR DEPRESSIVE DISORDER, SINGLE EPISODE, UNSPECIFIED: ICD-10-CM

## 2018-02-04 LAB
ALBUMIN SERPL ELPH-MCNC: 4.6 G/DL — SIGNIFICANT CHANGE UP (ref 3.3–5)
ALP SERPL-CCNC: 74 U/L — SIGNIFICANT CHANGE UP (ref 40–120)
ALT FLD-CCNC: 21 U/L — SIGNIFICANT CHANGE UP (ref 12–78)
AMPHET UR-MCNC: NEGATIVE — SIGNIFICANT CHANGE UP
ANION GAP SERPL CALC-SCNC: 7 MMOL/L — SIGNIFICANT CHANGE UP (ref 5–17)
APAP SERPL-MCNC: < 2 UG/ML — SIGNIFICANT CHANGE UP (ref 10–30)
APPEARANCE UR: CLEAR — SIGNIFICANT CHANGE UP
AST SERPL-CCNC: 14 U/L — LOW (ref 15–37)
BACTERIA # UR AUTO: (no result)
BARBITURATES UR SCN-MCNC: NEGATIVE — SIGNIFICANT CHANGE UP
BASOPHILS # BLD AUTO: 0 K/UL — SIGNIFICANT CHANGE UP (ref 0–0.2)
BASOPHILS NFR BLD AUTO: 0.3 % — SIGNIFICANT CHANGE UP (ref 0–2)
BENZODIAZ UR-MCNC: NEGATIVE — SIGNIFICANT CHANGE UP
BILIRUB SERPL-MCNC: 0.3 MG/DL — SIGNIFICANT CHANGE UP (ref 0.2–1.2)
BILIRUB UR-MCNC: NEGATIVE — SIGNIFICANT CHANGE UP
BUN SERPL-MCNC: 2 MG/DL — LOW (ref 7–23)
CALCIUM SERPL-MCNC: 9 MG/DL — SIGNIFICANT CHANGE UP (ref 8.5–10.1)
CHLORIDE SERPL-SCNC: 108 MMOL/L — SIGNIFICANT CHANGE UP (ref 96–108)
CO2 SERPL-SCNC: 26 MMOL/L — SIGNIFICANT CHANGE UP (ref 22–31)
COCAINE METAB.OTHER UR-MCNC: NEGATIVE — SIGNIFICANT CHANGE UP
COLOR SPEC: YELLOW — SIGNIFICANT CHANGE UP
CREAT SERPL-MCNC: 0.86 MG/DL — SIGNIFICANT CHANGE UP (ref 0.5–1.3)
DIFF PNL FLD: NEGATIVE — SIGNIFICANT CHANGE UP
EOSINOPHIL # BLD AUTO: 0 K/UL — SIGNIFICANT CHANGE UP (ref 0–0.5)
EOSINOPHIL NFR BLD AUTO: 0.2 % — SIGNIFICANT CHANGE UP (ref 0–6)
EPI CELLS # UR: (no result)
ETHANOL SERPL-MCNC: <10 MG/DL — SIGNIFICANT CHANGE UP (ref 0–10)
GLUCOSE SERPL-MCNC: 67 MG/DL — LOW (ref 70–99)
GLUCOSE UR QL: NEGATIVE MG/DL — SIGNIFICANT CHANGE UP
HCT VFR BLD CALC: 36.8 % — SIGNIFICANT CHANGE UP (ref 34.5–45)
HGB BLD-MCNC: 12.3 G/DL — SIGNIFICANT CHANGE UP (ref 11.5–15.5)
KETONES UR-MCNC: NEGATIVE — SIGNIFICANT CHANGE UP
LEUKOCYTE ESTERASE UR-ACNC: (no result)
LIDOCAIN IGE QN: 159 U/L — SIGNIFICANT CHANGE UP (ref 73–393)
LYMPHOCYTES # BLD AUTO: 1.9 K/UL — SIGNIFICANT CHANGE UP (ref 1–3.3)
LYMPHOCYTES # BLD AUTO: 29.2 % — SIGNIFICANT CHANGE UP (ref 13–44)
MCHC RBC-ENTMCNC: 27.6 PG — SIGNIFICANT CHANGE UP (ref 27–34)
MCHC RBC-ENTMCNC: 33.5 GM/DL — SIGNIFICANT CHANGE UP (ref 32–36)
MCV RBC AUTO: 82.4 FL — SIGNIFICANT CHANGE UP (ref 80–100)
METHADONE UR-MCNC: NEGATIVE — SIGNIFICANT CHANGE UP
MONOCYTES # BLD AUTO: 0.6 K/UL — SIGNIFICANT CHANGE UP (ref 0–0.9)
MONOCYTES NFR BLD AUTO: 9.4 % — SIGNIFICANT CHANGE UP (ref 2–14)
NEUTROPHILS # BLD AUTO: 4 K/UL — SIGNIFICANT CHANGE UP (ref 1.8–7.4)
NEUTROPHILS NFR BLD AUTO: 61 % — SIGNIFICANT CHANGE UP (ref 43–77)
NITRITE UR-MCNC: NEGATIVE — SIGNIFICANT CHANGE UP
OPIATES UR-MCNC: NEGATIVE — SIGNIFICANT CHANGE UP
PCP SPEC-MCNC: SIGNIFICANT CHANGE UP
PCP UR-MCNC: NEGATIVE — SIGNIFICANT CHANGE UP
PH UR: 6.5 — SIGNIFICANT CHANGE UP (ref 5–8)
PLATELET # BLD AUTO: 105 K/UL — LOW (ref 150–400)
POTASSIUM SERPL-MCNC: 3.3 MMOL/L — LOW (ref 3.5–5.3)
POTASSIUM SERPL-SCNC: 3.3 MMOL/L — LOW (ref 3.5–5.3)
PROT SERPL-MCNC: 7.5 GM/DL — SIGNIFICANT CHANGE UP (ref 6–8.3)
PROT UR-MCNC: NEGATIVE MG/DL — SIGNIFICANT CHANGE UP
RBC # BLD: 4.47 M/UL — SIGNIFICANT CHANGE UP (ref 3.8–5.2)
RBC # FLD: 11.7 % — SIGNIFICANT CHANGE UP (ref 10.3–14.5)
RBC CASTS # UR COMP ASSIST: (no result) /HPF (ref 0–4)
SALICYLATES SERPL-MCNC: <1.7 MG/DL — LOW (ref 2.8–20)
SODIUM SERPL-SCNC: 141 MMOL/L — SIGNIFICANT CHANGE UP (ref 135–145)
SP GR SPEC: 1 — LOW (ref 1.01–1.02)
THC UR QL: NEGATIVE — SIGNIFICANT CHANGE UP
UROBILINOGEN FLD QL: NEGATIVE MG/DL — SIGNIFICANT CHANGE UP
WBC # BLD: 6.5 K/UL — SIGNIFICANT CHANGE UP (ref 3.8–10.5)
WBC # FLD AUTO: 6.5 K/UL — SIGNIFICANT CHANGE UP (ref 3.8–10.5)
WBC UR QL: SIGNIFICANT CHANGE UP

## 2018-02-04 PROCEDURE — 74176 CT ABD & PELVIS W/O CONTRAST: CPT | Mod: 26

## 2018-02-04 PROCEDURE — 90792 PSYCH DIAG EVAL W/MED SRVCS: CPT | Mod: GT

## 2018-02-04 PROCEDURE — 99285 EMERGENCY DEPT VISIT HI MDM: CPT

## 2018-02-04 RX ORDER — FAMOTIDINE 10 MG/ML
20 INJECTION INTRAVENOUS ONCE
Qty: 0 | Refills: 0 | Status: COMPLETED | OUTPATIENT
Start: 2018-02-04 | End: 2018-02-04

## 2018-02-04 RX ORDER — SODIUM CHLORIDE 9 MG/ML
1000 INJECTION INTRAMUSCULAR; INTRAVENOUS; SUBCUTANEOUS ONCE
Qty: 0 | Refills: 0 | Status: COMPLETED | OUTPATIENT
Start: 2018-02-04 | End: 2018-02-04

## 2018-02-04 RX ORDER — SODIUM CHLORIDE 9 MG/ML
3 INJECTION INTRAMUSCULAR; INTRAVENOUS; SUBCUTANEOUS ONCE
Qty: 0 | Refills: 0 | Status: COMPLETED | OUTPATIENT
Start: 2018-02-04 | End: 2018-02-04

## 2018-02-04 RX ORDER — ONDANSETRON 8 MG/1
4 TABLET, FILM COATED ORAL ONCE
Qty: 0 | Refills: 0 | Status: COMPLETED | OUTPATIENT
Start: 2018-02-04 | End: 2018-02-04

## 2018-02-04 RX ADMIN — ONDANSETRON 4 MILLIGRAM(S): 8 TABLET, FILM COATED ORAL at 17:46

## 2018-02-04 RX ADMIN — SODIUM CHLORIDE 3 MILLILITER(S): 9 INJECTION INTRAMUSCULAR; INTRAVENOUS; SUBCUTANEOUS at 17:20

## 2018-02-04 RX ADMIN — Medication 1 MILLIGRAM(S): at 17:44

## 2018-02-04 RX ADMIN — SODIUM CHLORIDE 1000 MILLILITER(S): 9 INJECTION INTRAMUSCULAR; INTRAVENOUS; SUBCUTANEOUS at 16:45

## 2018-02-04 NOTE — ED ADULT NURSE REASSESSMENT NOTE - NS ED NURSE REASSESS COMMENT FT1
2020: Patient provided with a copy of her results, extensive patient education preformed on her labs, CT scan, and appropriate follow up by SUDHIR Artis and MD Fry. Patient asked multiple questions and preformed a teach back. Telepsych SW called and states they will call into patient's room shortly. Patient placed on 1:1 for duration of telepsych call as per policy. Will reassess patient post telepsych conversation. 2020: Patient provided with a copy of her results, extensive patient education preformed on her labs, CT scan, and appropriate follow up by SUDHIR Artis and MD Sandy. Patient asked multiple questions and preformed a teach back. Telepsych SW called and states they will call into patient's room shortly. Patient placed on 1:1 for duration of telepsych call as per policy. Will reassess patient post telepsych conversation.  2035: Patient speaking with telepsych NP. MD Sandy updated.   2130: Patient finished speaking with telepsych, to be discharged. Patient belongings returned to her. IV discontinued. MD Sandy and SUDHIR Artis preformed patient education and follow up education with patient. Patient had multiple questions, extensive patient education preformed. After teaching, patient ambulatory at her baseline, discharged home.

## 2018-02-04 NOTE — ED ADULT NURSE NOTE - ED STAT RN HANDOFF DETAILS
Received report from RN Maricel and Lary and reassessed patient. Agree with the previous RN assessment. Patient is anxious, undressed in hospital gown, awaiting telepsych assessment. No CO/1:1 ordered by MD Fry as patient has no SI/HI. Belongings in security. Patient reports no pain at this time, is watching tv, anxious, but well appearing/comfortable.

## 2018-02-04 NOTE — ED ADULT NURSE REASSESSMENT NOTE - NS ED NURSE REASSESS COMMENT FT1
Clothing, boots, walker bagged and sent to security.  Money, wallet contents and jewelry in envelope no. [phone].  Metal scan performed by .

## 2018-02-04 NOTE — ED BEHAVIORAL HEALTH ASSESSMENT NOTE - OTHER PAST PSYCHIATRIC HISTORY (INCLUDE DETAILS REGARDING ONSET, COURSE OF ILLNESS, INPATIENT/OUTPATIENT TREATMENT)
See HPI for additional information     Discharged from [facility] 1/26/2018. PHP follow-up 2/8/2018. Diagnosed with Delusional disorder from [facility] in-patient hospitalization however diagnosed with anxiety with somatic preoccupation by out-patient team at [facility].    Was in the [facility] Hospital[facility] program for anxiety (June, 2017).  Pt then entered the [facility] program, followed by outpt program

## 2018-02-04 NOTE — ED BEHAVIORAL HEALTH ASSESSMENT NOTE - AXIS III
Dysautonomia; Headache History of ITP; HTN (hypertension); IBS (irritable bowel syndrome); Idiopathic thrombocytopenia purpura; Idiopathic thrombocytopenic purpura; Labyrinthitis; POTS (postural orthostatic tachycardia syndrome)
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
## 2018-02-04 NOTE — ED STATDOCS - OBJECTIVE STATEMENT
41 y/o female with PMHx of IBS, POTS, HTN presents to the ED c/o LUQ pain x few days, worsening today. +diarrhea, yellow-green color. +nausea. +anxious in ED. Reports poor PO intake x few days. Denies fever, sick contacts. Takes Ativan, Zofran. PMD/Dr. Ford, [place]. Nonsmoker. 41 y/o female with PMHx of IBS, POTS, HTN presents to the ED c/o LUQ pain x few days, worsening today. +diarrhea, yellow-green color. +nausea. +anxious in ED. Reports poor PO intake x few days. Denies fever, sick contacts. Takes Ativan, Zofran. PMD/Dr. Ford, [place]. Nonsmoker. Pt states she needs to lay down in a stretcher, also states she needs totalk to behavioral health regarding her anxiety and ativan dosing. pt. extremely anxious. Pt sent to [facility] for further eval.

## 2018-02-04 NOTE — ED BEHAVIORAL HEALTH ASSESSMENT NOTE - REFERRAL / APPOINTMENT DETAILS
Patient to follow-up with outpatient therapist/psychiatrist as per scheduled appointment: 1/8/2018 at [facility] partial

## 2018-02-04 NOTE — ED BEHAVIORAL HEALTH ASSESSMENT NOTE - CURRENT MEDICATION
Cardizem  mg/24 hours oral capsule, extended release; ondansetron 4 mg oral tablet, disintegratin tab(s) orally every 4 hours; BusPIRone 2.5 mg 2 times a day; simethicone 80 mg oral tablet, chewable: 2 tab(s) orally 3 times a day; montelukast 10 mg oral tablet: 1 tab(s) orally once a day (at bedtime); famotidine 20 mg oral tablet: 1 tab(s) orally 2 times a day; docusate sodium 100 mg oral capsule: 1 cap(s) orally 3 times a day; LORazepam 2 mg oral tablet
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
## 2018-02-04 NOTE — ED BEHAVIORAL HEALTH ASSESSMENT NOTE - DIFFERENTIAL
Adjustment Disorder with Depression and Anxiety  Major Depressive Disorder, moderate  Generalized Anxiety Disorder with somatic preoccupations v delusional disorder  Personality Disorder--Cluster B traits

## 2018-02-04 NOTE — ED BEHAVIORAL HEALTH ASSESSMENT NOTE - DESCRIPTION
Dysautonomia; Headache History of ITP; HTN (hypertension); IBS (irritable bowel syndrome); Idiopathic thrombocytopenia purpura; Idiopathic thrombocytopenic purpura; Labyrinthitis; POTS (postural orthostatic tachycardia syndrome) Calm. As above. 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. Used to live alone. States that combo of losing her job, medical illness and the loss of her beloved 17-year-old cat ("My best friend") necessitated her moving home. She reports she is trying to get disability insurance. cooperative but anxious

## 2018-02-04 NOTE — ED BEHAVIORAL HEALTH ASSESSMENT NOTE - CASE SUMMARY
43yo SWF, currently renting with roommates, medical hx significant for POTS disease and gastroparesis, with multiple medical hospitalizations, no legal hx, no substance abuse hx, psychiatric hx of anxiety, borderline PD, and delusional disorder (related to somatic complaints), no prior SA or SIB, with 1 prior psychiatric hospitalization at [facility] in January. After discharge on 1/26, the pt was supposed to f/u at [facility], however then was admitted to [facility] for medical reasons. While she was inpatient, she was followed by psychiatry CL team who recommended continuing Ativan 2mg q4h for POTS and buspar 2.5mg BID. As per last progress note on 1/29, they did not feel that pt required psychiatric admission as she was not psychotic, suicidal or homicidal. Plan is for pt to f/u at [facility] on Thursday. Pt presents to the ER today for medical reasons- nausea. Pt reports improvement with IV Zofran. She continues to be somatically preoccupied and anxious, but is not presenting overtly delusional. She denies having any AVH. She reports low moods in context of chronic medical illness and stressors with parents, however states that she is not suicidal “or else I wouldn’t be coming to the hospital for help.” She is not presenting with jessica- no decreased need for sleep, grandiosity or increased goal directed activity. No HI or access to guns. No substance abuse. Reports sleeping and setting alarm to take Ativan as prescribed q4h with max dose of 10mg daily. She wanted to speak with psychiatry to get insight (also second opinion) on an SSRI which she thinks would be helpful for POTS and anxiety. Writer answered all pt’s questions about half-life of SSRI, however writer stated that decision to start specific medication should be deferred to psychiatrist at [facility] on Thursday. Writer discussed safety planning including returning the ER for any SI, utilize [facility], [facility] or other hotlines for additional support for anxiety, and pt will continue to speak with her therapist whom she has a good therapeutic relationship. Pt is not presenting at acute risk for suicide or homicide, and appears to be at baseline (from previous documentation and collateral). She does not want voluntary psychiatric hospitalization and does not meet criteria for involuntary admission.

## 2018-02-04 NOTE — ED BEHAVIORAL HEALTH ASSESSMENT NOTE - RISK ASSESSMENT
Patient presents as a low risk for harm to self, with risk factors including strained family relationships, anxiety, medical comorbidities, poor social supports, financial stressors, of which are outweighed by significant protective factors, including no previous suicide attempts, no history of violence, no access to firearms, no global insomnia, positive therapeutic relationships, supportive family, willingness to seek help, no suicidal/homicidal ideations intent or plans, hopefulness for future, ability to cope with stress,  frustration tolerance, engaging in discharge and safety planning, motivation to participate in outpatient treatment.

## 2018-02-04 NOTE — ED BEHAVIORAL HEALTH ASSESSMENT NOTE - SUICIDE PROTECTIVE FACTORS
Identifies reasons for living/Future oriented/Ability to cope with stress/Supportive social network or family

## 2018-02-04 NOTE — ED STATDOCS - PROGRESS NOTE DETAILS
42 yr. old female PMH: IBS,POTS, HTN presents to ED with LUQ pain 42 yr. old female PMH: IBS, POTS, HTN presents to ED with LUQ pain onset 3 days ago worsened today. +nausea +diarrhea. Not drinking enough. Seen and examined by attending in Intake. Plan: IV, IVF, Labs and CT Will F/U with results and re evaluate. Braxton NP

## 2018-02-04 NOTE — ED BEHAVIORAL HEALTH ASSESSMENT NOTE - SUMMARY
Patient is a 42 year-old  female, living alone, unemployed (since medical diagnosis with dysautonomia), with history of depression and anxiety, with one prior in-patient hospitalization at [facility] for inability to care for self in the setting of anxiety v panic disorder v somatization v delusional disorder, with multiple medical comorbidities including dysautonomia, of which is managed with Ativan 2 mg Q4H, presents to ED for abdominal pain with nausea, vomiting, and diarrhaea (green bowel movements) in last 2 days. Psychiatric evaluation requested secondary to depression and anxiety symptoms.    Patient presenting with anxiety symptoms influenced by somatic preoccupations. Patient seeking treatment for her medical comorbidities, namely dysautonomia and gastroparesis that started after her cat  late spring/early summer 2017. Patient has no depressive symptoms or manic / psychotic symptoms. No delusions elicited. Patient has no suicidal ideation/intent/plan. Patient has therapeutic relationships. Patient is future oriented and involved in her care. Patient engaged in safety planning. Patient is not presenting as an imminent risk for harm to self, and does not meet criteria for involuntary in-patient hospitalization. Patient has follow-up with out-patient psychiatrist at [facility] partial program.

## 2018-02-04 NOTE — ED BEHAVIORAL HEALTH ASSESSMENT NOTE - HPI (INCLUDE ILLNESS QUALITY, SEVERITY, DURATION, TIMING, CONTEXT, MODIFYING FACTORS, ASSOCIATED SIGNS AND SYMPTOMS)
Patient is a 42 year-old  female, with history of depression and anxiety, with multiple medical comorbidities including dysautonomia, of which is managed with Ativan 2 mg Q4H, presents to ED for abdominal pain with nausea, vomiting, and diarrhaea (green bowel movements). Psychiatric evaluation requested secondary to depression and anxiety symptoms.     Dysautonomia; Headache History of ITP; HTN (hypertension); IBS (irritable bowel syndrome); Idiopathic thrombocytopenia purpura; Idiopathic thrombocytopenic purpura; Labyrinthitis; POTS (postural orthostatic tachycardia syndrome) Patient is a 42 year-old  female, living alone, unemployed (since medical diagnosis with dysautonomia), with history of depression and anxiety, with one prior in-patient hospitalization at [facility] for inability to care for self in the setting of anxiety v panic disorder v somatization v delusional disorder, with multiple medical comorbidities including dysautonomia, of which is managed with Ativan 2 mg Q4H, presents to ED for abdominal pain with nausea, vomiting, and diarrhaea (green bowel movements) in last 2 days. Psychiatric evaluation requested secondary to depression and anxiety symptoms.    Patient presents anxious, rapid speech however interruptible, circumstantial about medical comorbidities, namely dysautonomia and gastroparesis, and her hopes for being treated with SSRI however concerns over side effects. Patient perseverative on recommendation for SSRI with shortest half-life and best side effect profile secondary to concerning of in the setting of effects on medical comorbidities. Patient reports prior trial on Lexapro "1 mg" which made her "sick" for 1 day. Note. Chart review reflects Lexapro 5 mg. Reports being on Ativan 2 mg Q4H, which is disrupting her sleep because of needing to wake up to take it or else she will be tachycardic. Reports anxiety symptoms related to medical comorbidities and "trying to help herself find best treatment." Patient reports depressive symptoms related to parental conflict: being removed from home and now living in apartment. Denies other depressive symptoms. Denies manic / psychotic symptoms. No delusions elicited. Denies suicidal ideation/intent/plan, stating "I want my old life back." Reports out-patient appointment at [facility] out-patient on Thursday 2/8, and motivated to follow-up and continue out-patient treatment. Reports future orientation. Engaged in safety planning.     Collateral from father by [facility]: stated that patient had no psychiatric history prior to be diagnoses with POTS in June of 2017. Collateral stated that patient has always been high anxiety but medical complaints following the death of her cat in January of 2017 exacerbated her anxiety. Collateral stated that diagnosis of POTs began with abdominal pain and multiple hospitalizations, leading to cardiac complaints including hypertension and tachycardia. Collateral reported patient has had GI complaints as well, including testing for stomach emptying, reporting some slowed emptying. Collateral stated that GI felt it was 2 parts, 50% related to anxiety. Collateral endorsed decreased appetite, including a loss of 25lbs. Collateral reports that at one point patient was insistent that she required a 24 hour in-home aide to  her to eat because her appetite was so poor.  Collateral stated that patient has always been high anxiety and takes somatic complaints to the extreme; referencing taking her blood pressure multiple times a day and buying an in-home pulse ox monitor. Collateral stated that patient was hospitalized in January following somatic complaints and was then transferred involuntarily to [facility], parents signing the part A. Collateral stated that patient has described hospitalization as the worst experience of her life and refused medication while on the unit. Collateral denies history of suicidality, reporting fleeting statements “what difference would it make if I were dead”, but has no history of attempts, plans, or intent. Collateral denied substance use, legal involvement. Collateral denies access to guns or weapons in the home. Collateral denies history of aggression and violence. Collateral reported family history of schizophrenia and depression. Collateral reports that he believes patient would be safe leaving the hospital.

## 2018-02-04 NOTE — ED ADULT TRIAGE NOTE - CHIEF COMPLAINT QUOTE
multiple complaints over the last day, abdominal pain. diarrhea, vomiting, over active bowel sounds, green stool once. Pt denies fever. Pt has her own pulse ox on, reading HR 88 and pulse ox 100%

## 2018-02-04 NOTE — ED ADULT NURSE NOTE - NS ED NURSE DC INFO COMPLEXITY
Simple: Patient demonstrates quick and easy understanding Complex: Multiple Rx/Tx. Pt has difficulty understanding. Requires additional help/Patient asked questions/Returned Demonstration/Verbalized Understanding/Simple: Patient demonstrates quick and easy understanding

## 2018-02-06 ENCOUNTER — EMERGENCY (EMERGENCY)
Facility: HOSPITAL | Age: 43
LOS: 1 days | Discharge: HOME | End: 2018-02-06
Attending: EMERGENCY MEDICINE | Admitting: EMERGENCY MEDICINE
Payer: MEDICAID

## 2018-02-06 VITALS
OXYGEN SATURATION: 100 % | SYSTOLIC BLOOD PRESSURE: 133 MMHG | DIASTOLIC BLOOD PRESSURE: 87 MMHG | RESPIRATION RATE: 17 BRPM

## 2018-02-06 VITALS
TEMPERATURE: 98 F | SYSTOLIC BLOOD PRESSURE: 134 MMHG | OXYGEN SATURATION: 99 % | HEART RATE: 106 BPM | HEIGHT: 62 IN | DIASTOLIC BLOOD PRESSURE: 83 MMHG | WEIGHT: 98.77 LBS | RESPIRATION RATE: 20 BRPM

## 2018-02-06 LAB
ALBUMIN SERPL ELPH-MCNC: 3.9 G/DL — SIGNIFICANT CHANGE UP (ref 3.3–5)
ALP SERPL-CCNC: 66 U/L — SIGNIFICANT CHANGE UP (ref 30–120)
ALT FLD-CCNC: 17 U/L DA — SIGNIFICANT CHANGE UP (ref 10–60)
ANION GAP SERPL CALC-SCNC: 10 MMOL/L — SIGNIFICANT CHANGE UP (ref 5–17)
AST SERPL-CCNC: 15 U/L — SIGNIFICANT CHANGE UP (ref 10–40)
BILIRUB SERPL-MCNC: 0.2 MG/DL — SIGNIFICANT CHANGE UP (ref 0.2–1.2)
BUN SERPL-MCNC: 3 MG/DL — LOW (ref 7–23)
CALCIUM SERPL-MCNC: 8.7 MG/DL — SIGNIFICANT CHANGE UP (ref 8.4–10.5)
CHLORIDE SERPL-SCNC: 110 MMOL/L — HIGH (ref 96–108)
CO2 SERPL-SCNC: 25 MMOL/L — SIGNIFICANT CHANGE UP (ref 22–31)
CREAT SERPL-MCNC: 0.88 MG/DL — SIGNIFICANT CHANGE UP (ref 0.5–1.3)
GLUCOSE SERPL-MCNC: 91 MG/DL — SIGNIFICANT CHANGE UP (ref 70–99)
HCT VFR BLD CALC: 33.8 % — LOW (ref 34.5–45)
HGB BLD-MCNC: 11.7 G/DL — SIGNIFICANT CHANGE UP (ref 11.5–15.5)
MCHC RBC-ENTMCNC: 28.5 PG — SIGNIFICANT CHANGE UP (ref 27–34)
MCHC RBC-ENTMCNC: 34.6 GM/DL — SIGNIFICANT CHANGE UP (ref 32–36)
MCV RBC AUTO: 82.5 FL — SIGNIFICANT CHANGE UP (ref 80–100)
PLATELET # BLD AUTO: 80 K/UL — LOW (ref 150–400)
POTASSIUM SERPL-MCNC: 4.1 MMOL/L — SIGNIFICANT CHANGE UP (ref 3.5–5.3)
POTASSIUM SERPL-SCNC: 4.1 MMOL/L — SIGNIFICANT CHANGE UP (ref 3.5–5.3)
PROT SERPL-MCNC: 6.5 G/DL — SIGNIFICANT CHANGE UP (ref 6–8.3)
RBC # BLD: 4.1 M/UL — SIGNIFICANT CHANGE UP (ref 3.8–5.2)
RBC # FLD: 11.7 % — SIGNIFICANT CHANGE UP (ref 10.3–14.5)
SODIUM SERPL-SCNC: 145 MMOL/L — SIGNIFICANT CHANGE UP (ref 135–145)
WBC # BLD: 4.6 K/UL — SIGNIFICANT CHANGE UP (ref 3.8–10.5)
WBC # FLD AUTO: 4.6 K/UL — SIGNIFICANT CHANGE UP (ref 3.8–10.5)

## 2018-02-06 PROCEDURE — 99283 EMERGENCY DEPT VISIT LOW MDM: CPT

## 2018-02-06 PROCEDURE — 93010 ELECTROCARDIOGRAM REPORT: CPT

## 2018-02-06 PROCEDURE — 99284 EMERGENCY DEPT VISIT MOD MDM: CPT | Mod: 25

## 2018-02-06 PROCEDURE — 96374 THER/PROPH/DIAG INJ IV PUSH: CPT

## 2018-02-06 PROCEDURE — 93005 ELECTROCARDIOGRAM TRACING: CPT

## 2018-02-06 RX ORDER — ONDANSETRON 8 MG/1
4 TABLET, FILM COATED ORAL ONCE
Qty: 0 | Refills: 0 | Status: COMPLETED | OUTPATIENT
Start: 2018-02-06 | End: 2018-02-06

## 2018-02-06 RX ORDER — SODIUM CHLORIDE 9 MG/ML
1000 INJECTION INTRAMUSCULAR; INTRAVENOUS; SUBCUTANEOUS ONCE
Qty: 0 | Refills: 0 | Status: COMPLETED | OUTPATIENT
Start: 2018-02-06 | End: 2018-02-06

## 2018-02-06 RX ADMIN — ONDANSETRON 4 MILLIGRAM(S): 8 TABLET, FILM COATED ORAL at 16:12

## 2018-02-06 RX ADMIN — SODIUM CHLORIDE 33.33 MILLILITER(S): 9 INJECTION INTRAMUSCULAR; INTRAVENOUS; SUBCUTANEOUS at 14:05

## 2018-02-06 NOTE — ED ADULT NURSE REASSESSMENT NOTE - NS ED NURSE REASSESS COMMENT FT1
Patient requesting blood pressure checks frequent for fear that NS will increase her blood pressure.

## 2018-02-06 NOTE — ED ADULT NURSE NOTE - CHPI ED SYMPTOMS NEG
no abdominal distension/no blood in stool/no vomiting/no hematuria/no dysuria/no burning urination/no diarrhea/no fever/no chills

## 2018-02-06 NOTE — ED PROVIDER NOTE - CHPI ED SYMPTOMS NEG
no nausea/no dysuria/no hematuria/no diarrhea/no abdominal distension/no blood in stool/no burning urination/no fever/no chills/no vomiting

## 2018-02-06 NOTE — ED ADULT NURSE NOTE - CHIEF COMPLAINT QUOTE
"Today I have nausea and chest pain with dizziness and I'm losing weight. " Recently seen at [facility].

## 2018-02-06 NOTE — ED ADULT TRIAGE NOTE - CHIEF COMPLAINT QUOTE
"Today I have nausea and chest pain with dizziness and I'm losing weight. " Recently seen at [place].

## 2018-02-06 NOTE — PHYSICAL THERAPY INITIAL EVALUATION ADULT - SHORT TERM MEMORY, REHAB EVAL
NSVT  s/p Right hip surgery  CAD,  JERARDO of LCx 3/13  Chr HFpEF  COPD  HLD  HTN  KENNY  PAD  Spinal stenosis  OA    Suggest:    Resume  beta blockers.  resume pre op cardiac  meds.  Home meds Toprol 25 QD, lipitor 20 QHS, Imdur 60 QD, Proscar 5 mg, Flomax 0.4 mg, Norvasc 5 mg QD, lisinopril 20 mg qd Ranexa 500 mg BID, Advair, Spiriva, Lyrica, Albuterol and Lasix   Discharge planning as planned.   Out pt cardiac f/u intact

## 2018-02-06 NOTE — ED PROVIDER NOTE - OBJECTIVE STATEMENT
42 year old female with a PMHx of gastroparesis and POTS c/o nausea, chest pain and abdominal pain x 4 days.  Pt was seen at [facility] 2 days ago.  Labwork and CT abd/pel was done which was all negative.  Pt states she is still nauseous, gagging, epigastric abdominal pain and left sided chest pain.  Pt states she took Zofran ODT with no relief.  Denies fever, chills, cough, SOB, diarrhea, constipation, urinary complaints.

## 2018-02-06 NOTE — ED ADULT NURSE NOTE - OBJECTIVE STATEMENT
PT c/o nausea, left sided  chest pain and epigastric abdominal pain x 4 days,  seen at [facility] 2 days ago.  Labwork and CT abd/pel was done which was all negative. PMHx of gastroparesis and POTS

## 2018-02-06 NOTE — ED PROVIDER NOTE - PROGRESS NOTE DETAILS
Attending Contribution to Care  41 y/o F with hx of POTS, anxiety, chronic palpitations, with many ED visits and hospitalizations for the same, well known to the ED staff, demanding blood work and IV fluids. She has had many complicated work ups for various sxs, and hospitalizations multiple times with exhaustive work ups including CT, MRI, cardiology, GI, and neurology, all negative. Continues to c/o palpitations and BP instability. Thinks she needs IV fluids and repeat of all blood work.   Exam is negative, stable /80, HR 80, afebrile, benign abd exam. Discussed results with the patient.  Pt has not vomited since being in the ER.  She is in the bed comfortable with NAD.  Vital signs have been stable. Explained to the patient that she needs to follow up with outpatient with her cardiologist/GI

## 2018-02-06 NOTE — ED ADULT NURSE REASSESSMENT NOTE - NS ED NURSE REASSESS COMMENT FT1
Patient d/c home with instructions. Patient demanding clear, tape be used in 3 specific sizes over removed  IV site. Tape  placed as per patients instructions. No bleeding, clean dry dressing noted in place. All discharge instructions reviewed with patient who verbalized understanding of discharge. Copy of labs provided as well as copy of EKG.

## 2018-02-08 ENCOUNTER — OTHER (OUTPATIENT)
Age: 43
End: 2018-02-08

## 2018-02-08 ENCOUNTER — OUTPATIENT (OUTPATIENT)
Dept: OUTPATIENT SERVICES | Facility: HOSPITAL | Age: 43
LOS: 1 days | Discharge: HOME | End: 2018-02-08

## 2018-02-09 DIAGNOSIS — F22 DELUSIONAL DISORDERS: ICD-10-CM

## 2018-02-10 ENCOUNTER — EMERGENCY (EMERGENCY)
Facility: HOSPITAL | Age: 43
LOS: 0 days | Discharge: HOME | End: 2018-02-11
Attending: EMERGENCY MEDICINE | Admitting: EMERGENCY MEDICINE
Payer: MEDICAID

## 2018-02-10 VITALS — WEIGHT: 115.08 LBS | HEIGHT: 63 IN

## 2018-02-10 DIAGNOSIS — D69.3 IMMUNE THROMBOCYTOPENIC PURPURA: ICD-10-CM

## 2018-02-10 DIAGNOSIS — I49.8 OTHER SPECIFIED CARDIAC ARRHYTHMIAS: ICD-10-CM

## 2018-02-10 DIAGNOSIS — F32.9 MAJOR DEPRESSIVE DISORDER, SINGLE EPISODE, UNSPECIFIED: ICD-10-CM

## 2018-02-10 DIAGNOSIS — R07.9 CHEST PAIN, UNSPECIFIED: ICD-10-CM

## 2018-02-10 DIAGNOSIS — F41.9 ANXIETY DISORDER, UNSPECIFIED: ICD-10-CM

## 2018-02-10 DIAGNOSIS — R00.2 PALPITATIONS: ICD-10-CM

## 2018-02-10 DIAGNOSIS — G90.1 FAMILIAL DYSAUTONOMIA [RILEY-DAY]: ICD-10-CM

## 2018-02-10 LAB
ALBUMIN SERPL ELPH-MCNC: 4.4 G/DL — SIGNIFICANT CHANGE UP (ref 3.3–5)
ALP SERPL-CCNC: 78 U/L — SIGNIFICANT CHANGE UP (ref 40–120)
ALT FLD-CCNC: 19 U/L — SIGNIFICANT CHANGE UP (ref 12–78)
ANION GAP SERPL CALC-SCNC: 7 MMOL/L — SIGNIFICANT CHANGE UP (ref 5–17)
AST SERPL-CCNC: 17 U/L — SIGNIFICANT CHANGE UP (ref 15–37)
BASOPHILS # BLD AUTO: 0.1 K/UL — SIGNIFICANT CHANGE UP (ref 0–0.2)
BASOPHILS NFR BLD AUTO: 0.7 % — SIGNIFICANT CHANGE UP (ref 0–2)
BILIRUB SERPL-MCNC: 0.2 MG/DL — SIGNIFICANT CHANGE UP (ref 0.2–1.2)
BUN SERPL-MCNC: 3 MG/DL — LOW (ref 7–23)
CALCIUM SERPL-MCNC: 9.3 MG/DL — SIGNIFICANT CHANGE UP (ref 8.5–10.1)
CHLORIDE SERPL-SCNC: 107 MMOL/L — SIGNIFICANT CHANGE UP (ref 96–108)
CO2 SERPL-SCNC: 26 MMOL/L — SIGNIFICANT CHANGE UP (ref 22–31)
CREAT SERPL-MCNC: 0.95 MG/DL — SIGNIFICANT CHANGE UP (ref 0.5–1.3)
D DIMER BLD IA.RAPID-MCNC: <150 NG/ML DDU — SIGNIFICANT CHANGE UP
EOSINOPHIL # BLD AUTO: 0.1 K/UL — SIGNIFICANT CHANGE UP (ref 0–0.5)
EOSINOPHIL NFR BLD AUTO: 0.8 % — SIGNIFICANT CHANGE UP (ref 0–6)
GLUCOSE SERPL-MCNC: 87 MG/DL — SIGNIFICANT CHANGE UP (ref 70–99)
HCG SERPL-ACNC: <1 MIU/ML — SIGNIFICANT CHANGE UP
HCT VFR BLD CALC: 36.1 % — SIGNIFICANT CHANGE UP (ref 34.5–45)
HGB BLD-MCNC: 12.5 G/DL — SIGNIFICANT CHANGE UP (ref 11.5–15.5)
LYMPHOCYTES # BLD AUTO: 2.8 K/UL — SIGNIFICANT CHANGE UP (ref 1–3.3)
LYMPHOCYTES # BLD AUTO: 35.8 % — SIGNIFICANT CHANGE UP (ref 13–44)
MANUAL DIF COMMENT BLD-IMP: SIGNIFICANT CHANGE UP
MCHC RBC-ENTMCNC: 28.1 PG — SIGNIFICANT CHANGE UP (ref 27–34)
MCHC RBC-ENTMCNC: 34.6 GM/DL — SIGNIFICANT CHANGE UP (ref 32–36)
MCV RBC AUTO: 81.2 FL — SIGNIFICANT CHANGE UP (ref 80–100)
MICROCYTES BLD QL: SLIGHT — SIGNIFICANT CHANGE UP
MONOCYTES # BLD AUTO: 0.6 K/UL — SIGNIFICANT CHANGE UP (ref 0–0.9)
MONOCYTES NFR BLD AUTO: 7.6 % — SIGNIFICANT CHANGE UP (ref 2–14)
NEUTROPHILS # BLD AUTO: 4.2 K/UL — SIGNIFICANT CHANGE UP (ref 1.8–7.4)
NEUTROPHILS NFR BLD AUTO: 55.2 % — SIGNIFICANT CHANGE UP (ref 43–77)
PLAT MORPH BLD: NORMAL — SIGNIFICANT CHANGE UP
PLATELET # BLD AUTO: 91 K/UL — LOW (ref 150–400)
POLYCHROMASIA BLD QL SMEAR: SLIGHT — SIGNIFICANT CHANGE UP
POTASSIUM SERPL-MCNC: 3.6 MMOL/L — SIGNIFICANT CHANGE UP (ref 3.5–5.3)
POTASSIUM SERPL-SCNC: 3.6 MMOL/L — SIGNIFICANT CHANGE UP (ref 3.5–5.3)
PROT SERPL-MCNC: 7.8 GM/DL — SIGNIFICANT CHANGE UP (ref 6–8.3)
RBC # BLD: 4.45 M/UL — SIGNIFICANT CHANGE UP (ref 3.8–5.2)
RBC # FLD: 11.6 % — SIGNIFICANT CHANGE UP (ref 10.3–14.5)
RBC BLD AUTO: SIGNIFICANT CHANGE UP
SODIUM SERPL-SCNC: 140 MMOL/L — SIGNIFICANT CHANGE UP (ref 135–145)
TROPONIN I SERPL-MCNC: <0.015 NG/ML — SIGNIFICANT CHANGE UP (ref 0.01–0.04)
WBC # BLD: 7.8 K/UL — SIGNIFICANT CHANGE UP (ref 3.8–10.5)
WBC # FLD AUTO: 7.8 K/UL — SIGNIFICANT CHANGE UP (ref 3.8–10.5)

## 2018-02-10 PROCEDURE — 93010 ELECTROCARDIOGRAM REPORT: CPT

## 2018-02-10 PROCEDURE — 99285 EMERGENCY DEPT VISIT HI MDM: CPT

## 2018-02-10 PROCEDURE — 71046 X-RAY EXAM CHEST 2 VIEWS: CPT | Mod: 26

## 2018-02-10 RX ORDER — SODIUM CHLORIDE 9 MG/ML
3 INJECTION INTRAMUSCULAR; INTRAVENOUS; SUBCUTANEOUS ONCE
Qty: 0 | Refills: 0 | Status: DISCONTINUED | OUTPATIENT
Start: 2018-02-10 | End: 2018-02-10

## 2018-02-10 RX ORDER — SODIUM CHLORIDE 9 MG/ML
1000 INJECTION INTRAMUSCULAR; INTRAVENOUS; SUBCUTANEOUS ONCE
Qty: 0 | Refills: 0 | Status: DISCONTINUED | OUTPATIENT
Start: 2018-02-10 | End: 2018-02-10

## 2018-02-10 NOTE — ED ADULT TRIAGE NOTE - CHIEF COMPLAINT QUOTE
hx of autonomic dysfunction. patient crying at triage. complains of flu symptoms since this afternoon. o2 sat 100% at time of triage.

## 2018-02-11 VITALS
DIASTOLIC BLOOD PRESSURE: 78 MMHG | RESPIRATION RATE: 18 BRPM | SYSTOLIC BLOOD PRESSURE: 124 MMHG | TEMPERATURE: 98 F | OXYGEN SATURATION: 100 %

## 2018-02-11 LAB — TROPONIN I SERPL-MCNC: <0.015 NG/ML — SIGNIFICANT CHANGE UP (ref 0.01–0.04)

## 2018-02-11 NOTE — ED PROVIDER NOTE - PSYCHIATRIC, MLM
Alert and oriented to person, place, time/situation. No risk to self or others. Anxious affect, pressure to speak, obsessed with medical terminology and findings of the cardiac monitor during evaluation with multiple referral back to the finding of PVC in the monitor.

## 2018-02-11 NOTE — ED PROVIDER NOTE - OBJECTIVE STATEMENT
42 year old female with PMH of anxiety, depression, dysautonomia, IBS, ITP presents to the ED with friend, complaining of multiple symptoms including cough, congestion, runny nose, and also complains of chest pain, palpitation and sob. Denies hx of cardiac disease other than dysautonomia and no known hx of CAD, stents, bypass surgery. No recent exotic travel. No IVDU. No visual, motor strength or focal neurological complaints. Patient is extremely anxious and upon entering the room is looking at the monitor telling me that she had one PVC and that she has read that PVCs can be a sign of heart disease. No rash. No neck stiffness. No incontinence of urine and stool. No melena or hematochezia.

## 2018-02-11 NOTE — ED PROVIDER NOTE - CONSTITUTIONAL, MLM
normal... Well appearing, well nourished, awake, alert, oriented to person, place, time/situation and in no apparent distress. Anxious affect

## 2018-02-11 NOTE — ED PROVIDER NOTE - PROGRESS NOTE DETAILS
Patient was evaluated and results of labs and imaging provided. Patient happy with the care received and states she wishes to follow up with a heart physician/cardiologist so outpatient follow up provided. Had an in depth discussion with patient regarding the results of the labs and the imaging.

## 2018-02-11 NOTE — ED PROVIDER NOTE - NEUROLOGICAL, MLM
Alert and oriented, no focal deficits, no motor or sensory deficits. Anxious affect. CNs 2-12 intact. Gait is normal. No dysarthria, inattention or aphasia.

## 2018-02-11 NOTE — ED ADULT NURSE NOTE - CHPI ED SYMPTOMS NEG
no nausea/no numbness/no vomiting/no weakness/no chills/no dizziness/no decreased eating/drinking/no fever/no tingling

## 2018-02-11 NOTE — ED PROVIDER NOTE - MEDICAL DECISION MAKING DETAILS
Bridget ARRIOLA: Two negative troponin, EKG unremarkable, xray negative for any pathology, negative d-dimer, anxious affect, patient likely suffering from anxiety vs. some other high functioning form of obsessive compulsive d/o. Provided patient with results of labs and imaging, provided outpatient follow up, instructions provided for patient and (with patient's consent and request to share information with) friend. Patient provided with hard copies and education for outpatient follow up.

## 2018-02-11 NOTE — ED ADULT NURSE NOTE - CAS EDN DISCHARGE INTERVENTIONS
Peds Discharge Summary         Patient ID:  Kade Camilo   [id number]  7 month old  5/17/2015    Admit date: 1/25/2017    Discharge date and time: 1/27/2017     Attending Physician: Ani Lewis MD     Reason for [facility] IV discontinued, cath removed intact

## 2018-02-11 NOTE — ED PROVIDER NOTE - MUSCULOSKELETAL, MLM
Spine appears normal, range of motion is not limited, no muscle or joint tenderness. 5/5 strength on flexion and extension.

## 2018-02-12 ENCOUNTER — APPOINTMENT (OUTPATIENT)
Age: 43
End: 2018-02-12
Payer: MEDICAID

## 2018-02-12 VITALS — WEIGHT: 97 LBS | BODY MASS INDEX: 17.85 KG/M2 | HEIGHT: 62 IN

## 2018-02-12 PROCEDURE — 97802 MEDICAL NUTRITION INDIV IN: CPT

## 2018-02-13 ENCOUNTER — EMERGENCY (EMERGENCY)
Facility: HOSPITAL | Age: 43
LOS: 0 days | Discharge: HOME | End: 2018-02-13
Attending: EMERGENCY MEDICINE | Admitting: EMERGENCY MEDICINE
Payer: MEDICAID

## 2018-02-13 VITALS
HEART RATE: 77 BPM | SYSTOLIC BLOOD PRESSURE: 142 MMHG | RESPIRATION RATE: 18 BRPM | DIASTOLIC BLOOD PRESSURE: 80 MMHG | OXYGEN SATURATION: 100 %

## 2018-02-13 VITALS — OXYGEN SATURATION: 99 % | HEART RATE: 102 BPM

## 2018-02-13 DIAGNOSIS — L29.8 OTHER PRURITUS: ICD-10-CM

## 2018-02-13 DIAGNOSIS — F41.9 ANXIETY DISORDER, UNSPECIFIED: ICD-10-CM

## 2018-02-13 DIAGNOSIS — F32.9 MAJOR DEPRESSIVE DISORDER, SINGLE EPISODE, UNSPECIFIED: ICD-10-CM

## 2018-02-13 DIAGNOSIS — I49.8 OTHER SPECIFIED CARDIAC ARRHYTHMIAS: ICD-10-CM
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
## 2018-02-13 DIAGNOSIS — G90.1 FAMILIAL DYSAUTONOMIA [RILEY-DAY]: ICD-10-CM

## 2018-02-13 DIAGNOSIS — D69.3 IMMUNE THROMBOCYTOPENIC PURPURA: ICD-10-CM

## 2018-02-13 DIAGNOSIS — T43.595A ADVERSE EFFECT OF OTHER ANTIPSYCHOTICS AND NEUROLEPTICS, INITIAL ENCOUNTER: ICD-10-CM

## 2018-02-13 DIAGNOSIS — I10 ESSENTIAL (PRIMARY) HYPERTENSION: ICD-10-CM

## 2018-02-13 PROCEDURE — 99284 EMERGENCY DEPT VISIT MOD MDM: CPT

## 2018-02-13 RX ORDER — SODIUM CHLORIDE 9 MG/ML
500 INJECTION INTRAMUSCULAR; INTRAVENOUS; SUBCUTANEOUS ONCE
Qty: 0 | Refills: 0 | Status: COMPLETED | OUTPATIENT
Start: 2018-02-13 | End: 2018-02-13

## 2018-02-13 RX ORDER — DIPHENHYDRAMINE HCL 50 MG
25 CAPSULE ORAL ONCE
Qty: 0 | Refills: 0 | Status: COMPLETED | OUTPATIENT
Start: 2018-02-13 | End: 2018-02-13

## 2018-02-13 RX ORDER — ONDANSETRON 8 MG/1
4 TABLET, FILM COATED ORAL ONCE
Qty: 0 | Refills: 0 | Status: COMPLETED | OUTPATIENT
Start: 2018-02-13 | End: 2018-02-13

## 2018-02-13 RX ADMIN — Medication 25 MILLIGRAM(S): at 18:28

## 2018-02-13 RX ADMIN — ONDANSETRON 4 MILLIGRAM(S): 8 TABLET, FILM COATED ORAL at 18:16

## 2018-02-13 RX ADMIN — SODIUM CHLORIDE 500 MILLILITER(S): 9 INJECTION INTRAMUSCULAR; INTRAVENOUS; SUBCUTANEOUS at 18:23

## 2018-02-13 NOTE — ED ADULT NURSE REASSESSMENT NOTE - NS ED NURSE REASSESS COMMENT FT1
Pt requested labs to be drawn, explained that test was not ordered but I would draw it and discuss with PA, PA denied request and went to discuss with pt. Pt insisted that the Abilify would have a reaction with her ITP, PA explained reasoning for not doing the test. Pt grew increasing agitated and asked to speak with management.

## 2018-02-13 NOTE — ED STATDOCS - OBJECTIVE STATEMENT
41 yo female PMH autonomic dysfunction, presents c/o palpitations, nausea, vomiting, rash, tremors, scalp itching x 8 hours after taking Abilify for the first time today.  States she was prescribed Abilify as a mood stabilizer for her anxiety.  States it started with head pressure.  States she had similar reaction in past to beta blockers.  Took zofran without relief. 43 yo female PMH autonomic dysfunction, presents c/o palpitations, nausea, vomiting, rash, tremors, scalp itching x 8 hours after taking Abilify for the first time today.  States she was prescribed Abilify as a mood stabilizer for her anxiety.  States it started with head pressure.  States she had similar reaction in past to beta blockers.  Took zofran without relief. Pt with small amount of erythema to the right side of neck no dyspnea or stridor.

## 2018-02-13 NOTE — ED ADULT NURSE NOTE - OBJECTIVE STATEMENT
Pt presents to ED c/o palpitations, nausea, vomiting, itching, pressure 8hours after taking first dose of Abilify. Pt is extremely anxious. States she has a h/o ITP and POTS. PT denies SOB, airway patent. Pt presents to ED c/o palpitations, nausea, vomiting, itching, and "rashes" 8hours after taking first dose of Abilify. Pt is extremely anxious. States she has a h/o ITP and POTS. PT denies SOB, airway patent.

## 2018-02-13 NOTE — ED ADULT NURSE NOTE - CHPI ED SYMPTOMS NEG
no difficulty breathing/no shortness of breath/no swelling of face, tongue/no throat itching/no difficulty swallowing/no wheezing/no cough

## 2018-02-13 NOTE — ED STATDOCS - PROGRESS NOTE DETAILS
51 yo female with a PMH of autonomic dysfunction, adrenergic syndrome, POTS, anxiety presents with reaction to abilify. Pt was prescribed the medication today for her anxiety and states she developed a rash to left neck, scalp itching, tremors, n/v. She used zofran ODT without relief. Denies throat swelling, cp, sob, trouble speaking/swallowing/breathing. -Albert Gonsalez PA-C Pt has questions about why she was given benadryl at a different dose than she normally gets. I have discussed with patient that I was treated her hives and for that the normal dose is 50mg IV. On reassessment erythema to the neck gone. Stable entire Ed stay. Pt asking for labs to be drawn, no clinical indication at the moment to draw labs, asked to f/u with her PMD for chronic care and labs.

## 2018-02-13 NOTE — ED ADULT NURSE REASSESSMENT NOTE - NS ED NURSE REASSESS COMMENT FT1
dc instructions written by PA, pt to be dc'd after NS bolus, pt reluctant to complete 500ml bolus due to BP concerns.  pt counseled re: completion of bolus to aid with flushing out the abilify from her system or that she can refuse bolus and DC @ this time.  pt requested to finish NS and speak to MD.  MD Collado made aware

## 2018-02-13 NOTE — ED ADULT TRIAGE NOTE - CHIEF COMPLAINT QUOTE
Pt complains of SOB, nausea and suspicion for possible allergic reaction after taking Abilify today.  Pt presents with own pulse ox on at time and HR of 106 and SPO2 99%.

## 2018-02-14 ENCOUNTER — EMERGENCY (EMERGENCY)
Facility: HOSPITAL | Age: 43
LOS: 1 days | End: 2018-02-14
Attending: EMERGENCY MEDICINE | Admitting: EMERGENCY MEDICINE
Payer: MEDICAID

## 2018-02-14 ENCOUNTER — EMERGENCY (EMERGENCY)
Facility: HOSPITAL | Age: 43
LOS: 1 days | Discharge: LEFT BEFORE DISCHARGE | End: 2018-02-14
Admitting: EMERGENCY MEDICINE
Payer: MEDICAID

## 2018-02-14 VITALS
HEART RATE: 76 BPM | HEIGHT: 62 IN | DIASTOLIC BLOOD PRESSURE: 88 MMHG | OXYGEN SATURATION: 97 % | TEMPERATURE: 99 F | SYSTOLIC BLOOD PRESSURE: 140 MMHG | RESPIRATION RATE: 14 BRPM | WEIGHT: 98.11 LBS

## 2018-02-14 VITALS
TEMPERATURE: 99 F | RESPIRATION RATE: 18 BRPM | OXYGEN SATURATION: 99 % | DIASTOLIC BLOOD PRESSURE: 82 MMHG | HEART RATE: 88 BPM | SYSTOLIC BLOOD PRESSURE: 119 MMHG

## 2018-02-14 DIAGNOSIS — F60.9 PERSONALITY DISORDER, UNSPECIFIED: ICD-10-CM

## 2018-02-14 DIAGNOSIS — F22 DELUSIONAL DISORDERS: ICD-10-CM

## 2018-02-14 LAB
ALBUMIN SERPL ELPH-MCNC: 4.7 G/DL — SIGNIFICANT CHANGE UP (ref 3.3–5)
ALP SERPL-CCNC: 72 U/L — SIGNIFICANT CHANGE UP (ref 40–120)
ALT FLD-CCNC: 17 U/L RC — SIGNIFICANT CHANGE UP (ref 10–45)
ANION GAP SERPL CALC-SCNC: 12 MMOL/L — SIGNIFICANT CHANGE UP (ref 5–17)
AST SERPL-CCNC: 22 U/L — SIGNIFICANT CHANGE UP (ref 10–40)
BASOPHILS # BLD AUTO: 0 K/UL — SIGNIFICANT CHANGE UP (ref 0–0.2)
BASOPHILS NFR BLD AUTO: 0.4 % — SIGNIFICANT CHANGE UP (ref 0–2)
BILIRUB SERPL-MCNC: 0.2 MG/DL — SIGNIFICANT CHANGE UP (ref 0.2–1.2)
BUN SERPL-MCNC: <4 MG/DL — LOW (ref 7–23)
CALCIUM SERPL-MCNC: 9.5 MG/DL — SIGNIFICANT CHANGE UP (ref 8.4–10.5)
CHLORIDE SERPL-SCNC: 104 MMOL/L — SIGNIFICANT CHANGE UP (ref 96–108)
CO2 SERPL-SCNC: 26 MMOL/L — SIGNIFICANT CHANGE UP (ref 22–31)
CREAT SERPL-MCNC: 0.93 MG/DL — SIGNIFICANT CHANGE UP (ref 0.5–1.3)
EOSINOPHIL # BLD AUTO: 0 K/UL — SIGNIFICANT CHANGE UP (ref 0–0.5)
EOSINOPHIL NFR BLD AUTO: 0.6 % — SIGNIFICANT CHANGE UP (ref 0–6)
GLUCOSE SERPL-MCNC: 91 MG/DL — SIGNIFICANT CHANGE UP (ref 70–99)
HCT VFR BLD CALC: 36.9 % — SIGNIFICANT CHANGE UP (ref 34.5–45)
HGB BLD-MCNC: 12.2 G/DL — SIGNIFICANT CHANGE UP (ref 11.5–15.5)
LYMPHOCYTES # BLD AUTO: 2.1 K/UL — SIGNIFICANT CHANGE UP (ref 1–3.3)
LYMPHOCYTES # BLD AUTO: 29.2 % — SIGNIFICANT CHANGE UP (ref 13–44)
MCHC RBC-ENTMCNC: 27.9 PG — SIGNIFICANT CHANGE UP (ref 27–34)
MCHC RBC-ENTMCNC: 33.1 GM/DL — SIGNIFICANT CHANGE UP (ref 32–36)
MCV RBC AUTO: 84.4 FL — SIGNIFICANT CHANGE UP (ref 80–100)
MONOCYTES # BLD AUTO: 0.5 K/UL — SIGNIFICANT CHANGE UP (ref 0–0.9)
MONOCYTES NFR BLD AUTO: 6.7 % — SIGNIFICANT CHANGE UP (ref 2–14)
NEUTROPHILS # BLD AUTO: 4.6 K/UL — SIGNIFICANT CHANGE UP (ref 1.8–7.4)
NEUTROPHILS NFR BLD AUTO: 63 % — SIGNIFICANT CHANGE UP (ref 43–77)
PLATELET # BLD AUTO: 77 K/UL — LOW (ref 150–400)
POTASSIUM SERPL-MCNC: 4.1 MMOL/L — SIGNIFICANT CHANGE UP (ref 3.5–5.3)
POTASSIUM SERPL-SCNC: 4.1 MMOL/L — SIGNIFICANT CHANGE UP (ref 3.5–5.3)
PROT SERPL-MCNC: 7.6 G/DL — SIGNIFICANT CHANGE UP (ref 6–8.3)
RBC # BLD: 4.37 M/UL — SIGNIFICANT CHANGE UP (ref 3.8–5.2)
RBC # FLD: 11.6 % — SIGNIFICANT CHANGE UP (ref 10.3–14.5)
SODIUM SERPL-SCNC: 142 MMOL/L — SIGNIFICANT CHANGE UP (ref 135–145)
WBC # BLD: 7.3 K/UL — SIGNIFICANT CHANGE UP (ref 3.8–10.5)
WBC # FLD AUTO: 7.3 K/UL — SIGNIFICANT CHANGE UP (ref 3.8–10.5)

## 2018-02-14 PROCEDURE — 99285 EMERGENCY DEPT VISIT HI MDM: CPT | Mod: 25

## 2018-02-14 PROCEDURE — 93010 ELECTROCARDIOGRAM REPORT: CPT

## 2018-02-14 RX ADMIN — Medication 2 MILLIGRAM(S): at 20:17

## 2018-02-14 NOTE — ED PROVIDER NOTE - PROGRESS NOTE DETAILS
Attending Note (Dian): signed out pending labs.  psych attending saw patient.  patient danger to self.  2PC admission.  patient increasingly agitated.  transfer to [facility].

## 2018-02-14 NOTE — ED BEHAVIORAL HEALTH ASSESSMENT NOTE - CURRENT MEDICATION
ondansetron 4 mg oral tablet, disintegratin tab(s) orally every 4 hours; busPIRone 5 mg oral tablet: 0.5 tab(s) orally 2 times a day; simethicone 80 mg oral tablet, chewable: 2 tab(s) orally once a day; montelukast 10 mg oral tablet: 1 tab(s) orally once a day (at bedtime); famotidine 20 mg oral tablet: 1 tab(s) orally 2 times a day; docusate sodium 100 mg oral capsule: 1 cap(s) orally 3 times a day  LORazepam 2 mg oral tablet: orally every 4 hours; Cardizem  mg/24 hours oral capsule, extended release: 1 cap(s) orally once a day

## 2018-02-14 NOTE — ED PROVIDER NOTE - MEDICAL DECISION MAKING DETAILS
Patient had been at [facility] and left. She said she came to the ED today because her NP wanted labs drawn. Screening labs sent. Seen by Psych here and deemed in need of in-patient Psych treatment.

## 2018-02-14 NOTE — ED PROVIDER NOTE - OBJECTIVE STATEMENT
42F with PMHx of depression, anxiety, idiopathic thrombocytopenic purpura, dysautonomia, POTS, ITP, labyrinthitis, HTN, IBS, presents to the ED by Nurse Practitioner at [facility], for blood work with concern of allergic reaction to medication. Pt presents with complaints of dizziness and weakness associated with severe headache x1 day, emesis x2 hours today. Notes allergic reaction to medication yesterday with scalp itchiness, tingling and appearance of red bumps. Pt took regular dose of Buspar today and admits to   Denies palpitations    Dr. Jodi Nguyen, unresponsive, jumbled words 42F with PMHx of depression, anxiety, idiopathic thrombocytopenic purpura, dysautonomia, POTS, ITP, labyrinthitis, HTN, IBS, presents to the ED by Nurse Practitioner at [facility], for blood work with concern of acute side effect to Buspar earlier today. Pt presents with complaints of dizziness and weakness associated with severe headache x1 day and emesis x2 hours today. Notes allergic reaction to medication yesterday with scalp itchiness, tingling, nausea and appearance of diffuse red bumps on her scalp. Pt admits to jumbled words s/p taking regular dose of Buspar today. Denies palpitations, edema, or any other complaints.  Dr. Zapata 42F with PMHx of depression, anxiety, idiopathic thrombocytopenic purpura, dysautonomia, POTS, ITP, labyrinthitis, HTN, IBS, presents to the ED by Nurse Practitioner at [facility], for blood work with concern of acute side effect to Buspar earlier today. Pt presents with complaints of dizziness and weakness associated with severe headache x1 day and emesis x2 hours today. Notes allergic reaction to medication yesterday with scalp itchiness, tingling, nausea and appearance of diffuse red bumps on her scalp. Pt admits to feeling like her heart was skipping a beat, jumbled words s/p taking regular dose of Buspar today. Denies palpitations, edema, or any other complaints.  Dr. Zapata

## 2018-02-14 NOTE — ED BEHAVIORAL HEALTH ASSESSMENT NOTE - PSYCHIATRIC ISSUES AND PLAN (INCLUDE STANDING AND PRN MEDICATION)
Pt refusing meds, suspect will require meds over objection hearing to start antipsychotic.  Ativan 2mg PO/IM PRN Q 4 hours PRN agitation.

## 2018-02-14 NOTE — ED ADULT NURSE NOTE - NS ED NURSE ELOPE COMMENTS
Pt was waiting to go into the BH area as decided by the BH NP and CN/ANM. While pt was sitting in the chairs in the intake area, pt was seen and evaluated by the NP for BH.  Pt eloped without notice prior to escort into .

## 2018-02-14 NOTE — ED BEHAVIORAL HEALTH ASSESSMENT NOTE - REASON
Given her diagnosed health problems and complication of her somatic delusions, [facility] recommends holding pt overnight so that she is admitted while internal medicine staff are available.

## 2018-02-14 NOTE — ED BEHAVIORAL HEALTH ASSESSMENT NOTE - OTHER PAST PSYCHIATRIC HISTORY (INCLUDE DETAILS REGARDING ONSET, COURSE OF ILLNESS, INPATIENT/OUTPATIENT TREATMENT)
PPHx of Delusional Disorder, somatic type, continuous and Personality Disorder NOS and with history of treatment since 06/2017 first at [facility], then at [facility] ([facility]), then at [facility], then inpatient last month 01/12/18 - 01/26/18, and most recently at [facility] PHP

## 2018-02-14 NOTE — ED BEHAVIORAL HEALTH ASSESSMENT NOTE - DESCRIPTION
Currently unemployed, recently no longer allowed to stay with parents, living in an airbnb with a night nurse to monitoring her breathing while she sleeps. Pt thin, disheveled, wearing a mask, wearing a pulse ox (pt's not hospital's), presents as irritable and perseverative about health problems and worried about taking her Cardizem 45 minutes late, pt took her own blood pressure from a cuff she carries around with her.  Pt later agitated she is being admitted, received Ativan 2mg IM. Postural Orthostatic Tachycardia Syndrome, ITP

## 2018-02-14 NOTE — ED BEHAVIORAL HEALTH ASSESSMENT NOTE - SUMMARY
Lashonda Presley is a 43 y/o woman with PMH of Postural Orthostatic Tachycardia Syndrome, ITP with PPHx of Delusional Disorder, somatic type, continuous and Personality Disorder NOS and with history of treatment since 06/2017 first at [facility], [facility] then at [facility], then inpatient last month 01/12/18 - 01/26/18, and most recently at [facility], pt with no known substance histroy, violent or elgal history or h/o SAs.  Pt with history of frequent visits to ED's for somatic complaints, pt being sent to ED by NP at [facility] for psychiatric evaluation and recommendation for inpatient admission as her somatic delusions are interfering with her ability to care for herself.    Given pt's weight loss, reported risk of needing enteral feeding tube, poor ADLs, and financial damage to self due to excessive preoccupation with health symptoms over and above what is expected from her diagnoses of POTS and ITP, with her health perseverations preventing her from accessing appropriate healthcare (refuses antipsychotic meds due to feeling she is hypersensitive to side effects), pt requires inpatient psychiatric hospitalization for safety, stabilization and medication management.    Discussed case with Dr. Myers at [facility], given pt's excessive somatic anxiety, she recommends holding transfer until the morning when medical internist staff are available.  Original bed on Low 6 cannot be held overnight, however there are plenty of beds currently available and pt will have a bed at [facility] in the morning.  Will signout this plan to ED and [facility] C/L Psychiatry staff.

## 2018-02-14 NOTE — ED BEHAVIORAL HEALTH ASSESSMENT NOTE - OTHER
thin deferred CVM records Dr. Zapata, [facility] PHP Psychiatric NP Flaco Leahy at [facility] PHP airbnb

## 2018-02-14 NOTE — ED ADULT TRIAGE NOTE - CHIEF COMPLAINT QUOTE
"I took my normal dose of boost bar and I felt like I became drunk and disoriented and I feel much better now, my doctor from [facility] told me to come to ER and I went to [facility] and got tired of waiting 3hrs so I came here"

## 2018-02-14 NOTE — ED ADULT NURSE NOTE - OBJECTIVE STATEMENT
Pt presents to ED awake, alert and ambulatory, c/o possible medication reaction. Pt states yesterday she took Abilify for the first time and afterwards developed itching on her head with eruption of small red bumps, SOB, nausea and vomiting, and dizziness. Pt states she went to [facility] and got IV fluids, IV Zofran and EKG which she states was abnormal with an irregular rhythm. Pt was discharged and slept last night. Pt states today she does not feel better and called her psychiatrist who told her to come to the ED for blood work and IV fluids. Pt took dose of Buspar last night and another dose at 0900 this morning. Pt states she also has been losing weight over the past three months and was told by her doctor that she may need to be admitted for malnutrition. Pt at this time reports SOB. No respiratory distress or increased work of breathing noted. Small red bumps noted to diffuse upper head/forehead. Pt is following all commands, A&Ox4, acting calm and appropriately.

## 2018-02-14 NOTE — ED PROVIDER NOTE - SHIFT CHANGE DETAILS
Awaiting rest of labs and final Psych eval but patient deemed in need of in-patient Psych treatment.

## 2018-02-14 NOTE — ED ADULT TRIAGE NOTE - CHIEF COMPLAINT QUOTE
Patient brought to ER from Outpatient Psych for weakness and dizziness. Pt had an allergic reaction to a medication that was given her yesterday and feels like it was a side effect of abilify that causing her to feel this weak.

## 2018-02-14 NOTE — ED BEHAVIORAL HEALTH ASSESSMENT NOTE - DETAILS
Verbal signout completed. Spoke to [facility] Psychiatrist inpatient [facility] 01/12/18 - 01/26/18 for similar reason, pt currently in psychiatric partial program deferred multiple somatic complaints reports frequent tachycardia reports recent dyspnea, wears pulse ox from home nausea reports rash reports feeling like a zombie instantly after taking Zyprexa so spit it out.  Reports itchy rash on head after taking Abilify.

## 2018-02-14 NOTE — ED PROVIDER NOTE - OBJECTIVE STATEMENT
This is a 42 year old Female came in today for evaluation r/t medication. Provider saw patient at intake area. Patient states she was sent in by NP at [facility] crisis center for further medical evaluation r/t medication reaction. Reports taking ability and became dizzy and weak . Reports she was seen at [facility] last night and was given Benadryl 50 mg for allergic reaction and presents with rash around scalp. Patient denies SI/HI Denies AH/VH. Patient denies N/V/ Diarrhea and fevers. Patient stated Her father works for [employer] in [employer] and feels she should go to [facility]. Patient without any psych complaints at this time and states " MY psychiatric  NP just wants me to be seen medically"  Patient is calm and cooperative alert and oriented ambulates independently appears frail. Spoke with Ashley and informed her this was a medical workup and patient would benefit for a intake workup. Spoke to psychiatrist MD Marley who received report in which patient has lost weight and would benefit from a medical workup for FTT and would see patient for further psych consult. Informed by SUDHIR Lugo patient eloped from intake at [facility]. Patient called Covering Resident at [facility] and said she was going to [facility] for evaluation. Patient arrived at [facility] and I called Charge RN at [facility]. Reported to charge RN patient background at [facility] and [facility]. Recommended 1:1 for elopement and medical clearnance. If patient is medically cleared patient to be admitted to [facility].

## 2018-02-14 NOTE — ED BEHAVIORAL HEALTH ASSESSMENT NOTE - HPI (INCLUDE ILLNESS QUALITY, SEVERITY, DURATION, TIMING, CONTEXT, MODIFYING FACTORS, ASSOCIATED SIGNS AND SYMPTOMS)
Lashonda Presley is a 41 y/o woman with PMH of Postural Orthostatic Tachycardia Syndrome, ITP with PPHx of Delusional Disorder, somatic type, continuous and Personality Disorder NOS and with history of treatment since 2017 first at [facility], then [facility] then at [facility], then inpatient last month 18 - 18, and most recently at [facility], pt with no known substance histroy, violent or elgal history or h/o SAs.  Pt with history of frequent visits to ED's for somatic complaints, pt being sent to ED by NP at [facility] for psychiatric evaluation and recommendation for inpatient admission as her somatic delusions are interfering with her ability to care for herself.    Collateral from psychiatrist at [facility], Dr. Zapata is that patient has been not able to care for ADLs, has lost a significant amount of weight since last seen by them (and seeing a nutritionist and at risk of having an enteral tube placed), is too anxious about side effects and somatic complaints to take antipsychotics they recommend, is disheveled and malodorous, is causing herself financial damage (living in an airbnb, charged $6000 she does not have on credit card as she is unable to support herself) and paying for a home nurse she cannot afford to watch her sleep due to fear she will stop breathing.  Dr. Zapata recommends she is unable to care for herself and needs admission psychiatrically if medically cleared or needs to be followed by C/L psychiatry if medically admitted.  Reviewed note from NP Flaco Leahy from today with same recommendation for psychiatric admission.  Records also document an inpatient admission at [facility] last month for the same issue.    Pt reports she was sent by her NP for evaluation of dizziness and questions if Dr. Zapata asked writer to come.  She spoke at length about POTS she has been diagnosed with and reports she was doing well until 2017 and was healthy, working, and living in an apartment in [place].  Around that time she reports her "long term  " (who was a cat) and her parents decided not to financially support her any longer and she had to move back in with them to [place].  Thereafter she developed the health problems and quit her job. (Rough timeline and history confirmed by records).  On interview, pt thin, disheveled, wearing a mask, wearing a pulse ox (pt's not hospital's), presents as irritable and perseverative about health problems and worried about taking her Cardizem 45 minutes late, pt took her own blood pressure from a cuff she carries around with her.

## 2018-02-14 NOTE — ED BEHAVIORAL HEALTH ASSESSMENT NOTE - RISK ASSESSMENT
Although pt is not suicidal or homicidal, given pt's weight loss, reported risk of needing enteral feeding tube, poor ADLs, and financial damage to self due to excessive preoccupation with health symptoms over and above what is expected from her diagnoses of POTS and ITP, with her health perseverations preventing her from accessing appropriate healthcare (refuses antipsychotic meds due to feeling she is hypersensitive to side effects), pt requires inpatient psychiatric hospitalization for safety, stabilization and medication management.

## 2018-02-14 NOTE — ED BEHAVIORAL HEALTH ASSESSMENT NOTE - AXIS IV
Housing problems/Economic problems/Problem related to social environment/Occupational problems/Problems with access to healthcare services/Problems with primary support

## 2018-02-15 ENCOUNTER — INPATIENT (INPATIENT)
Facility: HOSPITAL | Age: 43
LOS: 5 days | Discharge: HOME | End: 2018-02-21
Attending: PSYCHIATRY & NEUROLOGY | Admitting: PSYCHIATRY & NEUROLOGY
Payer: COMMERCIAL

## 2018-02-15 VITALS — WEIGHT: 93.04 LBS | TEMPERATURE: 98 F | OXYGEN SATURATION: 100 % | RESPIRATION RATE: 20 BRPM | HEIGHT: 62 IN

## 2018-02-15 VITALS
DIASTOLIC BLOOD PRESSURE: 94 MMHG | HEART RATE: 96 BPM | RESPIRATION RATE: 17 BRPM | TEMPERATURE: 98 F | SYSTOLIC BLOOD PRESSURE: 146 MMHG | OXYGEN SATURATION: 98 %

## 2018-02-15 DIAGNOSIS — F22 DELUSIONAL DISORDERS: ICD-10-CM

## 2018-02-15 PROCEDURE — 85027 COMPLETE CBC AUTOMATED: CPT

## 2018-02-15 PROCEDURE — 99285 EMERGENCY DEPT VISIT HI MDM: CPT | Mod: 25

## 2018-02-15 PROCEDURE — 96374 THER/PROPH/DIAG INJ IV PUSH: CPT

## 2018-02-15 PROCEDURE — 80053 COMPREHEN METABOLIC PANEL: CPT

## 2018-02-15 PROCEDURE — 96372 THER/PROPH/DIAG INJ SC/IM: CPT | Mod: XU

## 2018-02-15 PROCEDURE — 93005 ELECTROCARDIOGRAM TRACING: CPT | Mod: 76

## 2018-02-15 RX ORDER — ONDANSETRON 8 MG/1
4 TABLET, FILM COATED ORAL ONCE
Qty: 0 | Refills: 0 | Status: COMPLETED | OUTPATIENT
Start: 2018-02-15 | End: 2018-02-15

## 2018-02-15 RX ORDER — SIMETHICONE 80 MG/1
80 TABLET, CHEWABLE ORAL
Qty: 0 | Refills: 0 | Status: DISCONTINUED | OUTPATIENT
Start: 2018-02-15 | End: 2018-02-21

## 2018-02-15 RX ORDER — HALOPERIDOL DECANOATE 100 MG/ML
5 INJECTION INTRAMUSCULAR EVERY 6 HOURS
Qty: 0 | Refills: 0 | Status: DISCONTINUED | OUTPATIENT
Start: 2018-02-15 | End: 2018-02-21

## 2018-02-15 RX ORDER — ONDANSETRON 8 MG/1
4 TABLET, FILM COATED ORAL
Qty: 0 | Refills: 0 | Status: DISCONTINUED | OUTPATIENT
Start: 2018-02-15 | End: 2018-02-16

## 2018-02-15 RX ORDER — ONDANSETRON 8 MG/1
4 TABLET, FILM COATED ORAL
Qty: 0 | Refills: 0 | Status: DISCONTINUED | OUTPATIENT
Start: 2018-02-15 | End: 2018-02-15

## 2018-02-15 RX ORDER — FAMOTIDINE 10 MG/ML
20 INJECTION INTRAVENOUS ONCE
Qty: 0 | Refills: 0 | Status: COMPLETED | OUTPATIENT
Start: 2018-02-15 | End: 2018-02-15

## 2018-02-15 RX ORDER — DOCUSATE SODIUM 100 MG
100 CAPSULE ORAL
Qty: 0 | Refills: 0 | Status: DISCONTINUED | OUTPATIENT
Start: 2018-02-15 | End: 2018-02-21

## 2018-02-15 RX ORDER — ALBUTEROL 90 UG/1
2 AEROSOL, METERED ORAL EVERY 6 HOURS
Qty: 0 | Refills: 0 | Status: DISCONTINUED | OUTPATIENT
Start: 2018-02-15 | End: 2018-02-21

## 2018-02-15 RX ORDER — DOCUSATE SODIUM 100 MG
100 CAPSULE ORAL ONCE
Qty: 0 | Refills: 0 | Status: COMPLETED | OUTPATIENT
Start: 2018-02-15 | End: 2018-02-15

## 2018-02-15 RX ORDER — DIPHENHYDRAMINE HCL 50 MG
50 CAPSULE ORAL EVERY 6 HOURS
Qty: 0 | Refills: 0 | Status: DISCONTINUED | OUTPATIENT
Start: 2018-02-15 | End: 2018-02-21

## 2018-02-15 RX ORDER — SIMETHICONE 80 MG/1
160 TABLET, CHEWABLE ORAL ONCE
Qty: 0 | Refills: 0 | Status: COMPLETED | OUTPATIENT
Start: 2018-02-15 | End: 2018-02-15

## 2018-02-15 RX ORDER — FAMOTIDINE 10 MG/ML
20 INJECTION INTRAVENOUS
Qty: 0 | Refills: 0 | Status: DISCONTINUED | OUTPATIENT
Start: 2018-02-15 | End: 2018-02-21

## 2018-02-15 RX ORDER — MONTELUKAST 4 MG/1
10 TABLET, CHEWABLE ORAL
Qty: 0 | Refills: 0 | Status: DISCONTINUED | OUTPATIENT
Start: 2018-02-15 | End: 2018-02-18

## 2018-02-15 RX ADMIN — ONDANSETRON 4 MILLIGRAM(S): 8 TABLET, FILM COATED ORAL at 14:26

## 2018-02-15 RX ADMIN — Medication 2 MILLIGRAM(S): at 01:09

## 2018-02-15 RX ADMIN — Medication 100 MILLIGRAM(S): at 11:33

## 2018-02-15 RX ADMIN — Medication 2.5 MILLIGRAM(S): at 12:20

## 2018-02-15 RX ADMIN — Medication 2 MILLIGRAM(S): at 14:25

## 2018-02-15 RX ADMIN — ONDANSETRON 4 MILLIGRAM(S): 8 TABLET, FILM COATED ORAL at 01:13

## 2018-02-15 RX ADMIN — MONTELUKAST 10 MILLIGRAM(S): 4 TABLET, CHEWABLE ORAL at 21:11

## 2018-02-15 RX ADMIN — Medication 2 MILLIGRAM(S): at 21:10

## 2018-02-15 RX ADMIN — ALBUTEROL 2 PUFF(S): 90 AEROSOL, METERED ORAL at 21:36

## 2018-02-15 RX ADMIN — Medication 100 MILLIGRAM(S): at 22:24

## 2018-02-15 RX ADMIN — Medication 2 MILLIGRAM(S): at 05:35

## 2018-02-15 RX ADMIN — Medication 2 MILLIGRAM(S): at 10:02

## 2018-02-15 RX ADMIN — ONDANSETRON 4 MILLIGRAM(S): 8 TABLET, FILM COATED ORAL at 05:44

## 2018-02-15 RX ADMIN — FAMOTIDINE 20 MILLIGRAM(S): 10 INJECTION INTRAVENOUS at 11:33

## 2018-02-15 RX ADMIN — SIMETHICONE 160 MILLIGRAM(S): 80 TABLET, CHEWABLE ORAL at 11:34

## 2018-02-15 RX ADMIN — ONDANSETRON 4 MILLIGRAM(S): 8 TABLET, FILM COATED ORAL at 10:03

## 2018-02-15 NOTE — ED ADULT NURSE REASSESSMENT NOTE - NS ED NURSE REASSESS COMMENT FT1
Patient is having constant phone conversation with different people regarding transfer to [facility]. She complied with her digestive enzymes before breakfast and buspar later.  Safety maintained
Patient remains anxious and preoccupied with medication. V/S are stable. Patient wanted to reverse the transfer process. She was received ativan 2mg and Aufrqe8td po at 10am. Patient is seen by psychiatrist this AM.  She has been hydrating with water.  Patient is medically cleared and is waiting to transfer to [facility].
Psych saw pt. Pt will be involuntarily admitted to psych as per Dr. Carlisle d/t pt not being able to take care of herself. Charge nurse Mirna aware and sending transporter to sit on 1:1. Security called to come wand pt and secure belongings. Pt is calm and cooperative at this time. OK for pt to take home dose of Cardizem as per Orestes. No IV line placed in pt. Awaiting medical clearance
pt. is to be held in the ED for transfer in am as per SEAN, AND. charge desk is aware of the plan of care.
received report from ED RN, Sakina Bryant regarding pt. profile and final disposition
pt. was hyperval, agitated, labile and refused any type of verbal redirection. ativan 2mg im stat given for agitation.
pt. was awake most of the shift and is fixated w/ receiving her ativan/zofran q4hrs. she has poor insight into her behavior. she was medicated w/ ativan/ zofran  twice on this shift, but she did not try to sleep or take a nap. maintained on 1:1 for manic behavior.

## 2018-02-15 NOTE — CHART NOTE - NSCHARTNOTEFT_GEN_A_CORE
Screening Medical Evaluation  Patient Admitted from: [facility] ER    [facility] admitting diagnosis: Delusional disorder    PAST MEDICAL & SURGICAL HISTORY:  Depression  Idiopathic thrombocytopenic purpura  Dysautonomia  Idiopathic thrombocytopenia purpura  Anxiety  POTS (postural orthostatic tachycardia syndrome)  History of ITP  Labyrinthitis  Headache  HTN (hypertension)  Anxiety  IBS (irritable bowel syndrome)  No significant past surgical history  No significant past surgical history  No significant past surgical history        Allergies    barium sulfate (Other)  beta blockers (Unknown)  gadolinium (Hypotension)  Reglan (Other)    Intolerances    metoprolol (Headache)      Social History:     FAMILY HISTORY:  No pertinent family history in first degree relatives  Family history of hypertension (Grandparent)  Family history of atrial fibrillation  Family history of prostate cancer in father      MEDICATIONS  (STANDING):    MEDICATIONS  (PRN):      Vital Signs Last 24 Hrs  T(C): 36.9 (15 Feb 2018 17:24), Max: 37.1 (15 Feb 2018 14:23)  T(F): 98.5 (15 Feb 2018 17:24), Max: 98.8 (15 Feb 2018 14:23)  HR: 96 (15 Feb 2018 17:24) (86 - 110)  BP: 146/94 (15 Feb 2018 17:24) (121/88 - 152/92)  BP(mean): --  RR: 17 (15 Feb 2018 17:24) (17 - 18)  SpO2: 98% (15 Feb 2018 17:24) (97% - 100%)  CAPILLARY BLOOD GLUCOSE            PHYSICAL EXAM:  GENERAL: NAD, well-developed  HEAD:  Atraumatic, Normocephalic  EYES: EOMI, PERRLA, conjunctiva and sclera clear  NECK: Supple, No JVD  CHEST/LUNG: Clear to auscultation bilaterally; No wheeze  HEART: Regular rate and rhythm; No murmurs, rubs, or gallops  ABDOMEN: Soft, Nontender, Nondistended; Bowel sounds present  EXTREMITIES:  2+ Peripheral Pulses, No clubbing, cyanosis, or edema  PSYCH: AAOx3  NEUROLOGY: non-focal  SKIN:     LABS:                        12.2   7.3   )-----------( 77       ( 14 Feb 2018 18:16 )             36.9     02-14    142  |  104  |  <4<L>  ----------------------------<  91  4.1   |  26  |  0.93    Ca    9.5      14 Feb 2018 18:16    TPro  7.6  /  Alb  4.7  /  TBili  0.2  /  DBili  x   /  AST  22  /  ALT  17  /  AlkPhos  72  02-14              RADIOLOGY & ADDITIONAL TESTS:    Assessment and Plan: 43 yo F with a recent admission to [facility] is re-admitted for a primary psychiatric diagnosis of Delusional disorder. The pt has PMH of hypertension, Idiopathic thrombocytopenia purpura, irritable bowel syndrome, POTS (postural orthostatic tachycardia syndrome). The pt currently denies any medical symptoms such as chest pain, sob, abdominal pain, n/v//d/c, or any problems with urination or bowel movements. The rest of her screening physical is unremarkable.    1.Delusional disorder-Plan: continue with meds as per primary psychiatric team Screening Medical Evaluation  Patient Admitted from: [facility] ER    [facility] admitting diagnosis: Delusional disorder    PAST MEDICAL & SURGICAL HISTORY:  Depression  Idiopathic thrombocytopenic purpura  Dysautonomia  Idiopathic thrombocytopenia purpura  Anxiety  POTS (postural orthostatic tachycardia syndrome)  History of ITP  Labyrinthitis  Headache  HTN (hypertension)  Anxiety  IBS (irritable bowel syndrome)  No significant past surgical history  No significant past surgical history  No significant past surgical history        Allergies    barium sulfate (Other)  beta blockers (Unknown)  gadolinium (Hypotension)  Reglan (Other)    Intolerances    metoprolol (Headache)      Social History:     FAMILY HISTORY:  No pertinent family history in first degree relatives  Family history of hypertension (Grandparent)  Family history of atrial fibrillation  Family history of prostate cancer in father      MEDICATIONS  (STANDING):    MEDICATIONS  (PRN):      Vital Signs Last 24 Hrs  T(C): 36.9 (15 Feb 2018 17:24), Max: 37.1 (15 Feb 2018 14:23)  T(F): 98.5 (15 Feb 2018 17:24), Max: 98.8 (15 Feb 2018 14:23)  HR: 96 (15 Feb 2018 17:24) (86 - 110)  BP: 146/94 (15 Feb 2018 17:24) (121/88 - 152/92)  BP(mean): --  RR: 17 (15 Feb 2018 17:24) (17 - 18)  SpO2: 98% (15 Feb 2018 17:24) (97% - 100%)  CAPILLARY BLOOD GLUCOSE            PHYSICAL EXAM:  GENERAL: NAD, well-developed  HEAD:  Atraumatic, Normocephalic  EYES: EOMI, PERRLA, conjunctiva and sclera clear  NECK: Supple, No JVD  CHEST/LUNG: Clear to auscultation bilaterally; No wheeze, rales, or rhonchi  HEART: Regular rate and rhythm; No murmurs, rubs, or gallops  ABDOMEN: Soft, Nontender, Nondistended; Bowel sounds present  EXTREMITIES:  2+ Peripheral Pulses, No clubbing, cyanosis, or edema  PSYCH: AAOx3  NEUROLOGY: non-focal  SKIN: In tact    LABS:                        12.2   7.3   )-----------( 77       ( 14 Feb 2018 18:16 )             36.9     02-14    142  |  104  |  <4<L>  ----------------------------<  91  4.1   |  26  |  0.93    Ca    9.5      14 Feb 2018 18:16    TPro  7.6  /  Alb  4.7  /  TBili  0.2  /  DBili  x   /  AST  22  /  ALT  17  /  AlkPhos  72  02-14              RADIOLOGY & ADDITIONAL TESTS:    Assessment and Plan: 41 yo F with a recent admission to [facility] is re-admitted for a primary psychiatric diagnosis of Delusional disorder. The pt has PMH of hypertension, Idiopathic thrombocytopenia purpura, asthma, irritable bowel syndrome, POTS (postural orthostatic tachycardia syndrome). The pt appeared anxious, however she was cooperative with the screening physical. The pt currently denies any medical symptoms such as chest pain, sob, abdominal pain, n/v//d/c, or any problems with urination or bowel movements. The rest of her screening physical is unremarkable.    1.Delusional disorder-Plan: continue with buspirone, lorazepam, and haloperidol  2. POTS-Plan: continue with ondansetron, simethicone, and famotidine   3. HTN-Plan: continue with diltiazem   4. Asthma-Plan: continue with albuterol, and montelukast  5. Constipation-Plan: continue with colace

## 2018-02-16 RX ORDER — ONDANSETRON 8 MG/1
4 TABLET, FILM COATED ORAL
Qty: 0 | Refills: 0 | Status: DISCONTINUED | OUTPATIENT
Start: 2018-02-16 | End: 2018-02-21

## 2018-02-16 RX ORDER — OLANZAPINE 15 MG/1
5 TABLET, FILM COATED ORAL EVERY 4 HOURS
Qty: 0 | Refills: 0 | Status: DISCONTINUED | OUTPATIENT
Start: 2018-02-16 | End: 2018-02-21

## 2018-02-16 RX ADMIN — ONDANSETRON 4 MILLIGRAM(S): 8 TABLET, FILM COATED ORAL at 19:34

## 2018-02-16 RX ADMIN — Medication 2 MILLIGRAM(S): at 16:28

## 2018-02-16 RX ADMIN — SIMETHICONE 80 MILLIGRAM(S): 80 TABLET, CHEWABLE ORAL at 22:09

## 2018-02-16 RX ADMIN — Medication 2 MILLIGRAM(S): at 23:27

## 2018-02-16 RX ADMIN — Medication 2 MILLIGRAM(S): at 19:34

## 2018-02-16 RX ADMIN — MONTELUKAST 10 MILLIGRAM(S): 4 TABLET, CHEWABLE ORAL at 22:09

## 2018-02-16 RX ADMIN — ONDANSETRON 4 MILLIGRAM(S): 8 TABLET, FILM COATED ORAL at 23:27

## 2018-02-16 RX ADMIN — SIMETHICONE 80 MILLIGRAM(S): 80 TABLET, CHEWABLE ORAL at 11:30

## 2018-02-16 RX ADMIN — FAMOTIDINE 20 MILLIGRAM(S): 10 INJECTION INTRAVENOUS at 11:28

## 2018-02-16 RX ADMIN — Medication 2 MILLIGRAM(S): at 13:30

## 2018-02-16 RX ADMIN — FAMOTIDINE 20 MILLIGRAM(S): 10 INJECTION INTRAVENOUS at 22:09

## 2018-02-16 RX ADMIN — Medication 100 MILLIGRAM(S): at 22:09

## 2018-02-16 RX ADMIN — Medication 2.5 MILLIGRAM(S): at 08:37

## 2018-02-16 RX ADMIN — Medication 2 MILLIGRAM(S): at 05:58

## 2018-02-17 PROCEDURE — 99232 SBSQ HOSP IP/OBS MODERATE 35: CPT

## 2018-02-17 RX ADMIN — MONTELUKAST 10 MILLIGRAM(S): 4 TABLET, CHEWABLE ORAL at 18:04

## 2018-02-17 RX ADMIN — SIMETHICONE 80 MILLIGRAM(S): 80 TABLET, CHEWABLE ORAL at 22:26

## 2018-02-17 RX ADMIN — SIMETHICONE 80 MILLIGRAM(S): 80 TABLET, CHEWABLE ORAL at 13:30

## 2018-02-17 RX ADMIN — SIMETHICONE 80 MILLIGRAM(S): 80 TABLET, CHEWABLE ORAL at 18:04

## 2018-02-17 RX ADMIN — Medication 2 MILLIGRAM(S): at 20:09

## 2018-02-17 RX ADMIN — ONDANSETRON 4 MILLIGRAM(S): 8 TABLET, FILM COATED ORAL at 23:30

## 2018-02-17 RX ADMIN — ONDANSETRON 4 MILLIGRAM(S): 8 TABLET, FILM COATED ORAL at 15:45

## 2018-02-17 RX ADMIN — FAMOTIDINE 20 MILLIGRAM(S): 10 INJECTION INTRAVENOUS at 13:29

## 2018-02-17 RX ADMIN — ONDANSETRON 4 MILLIGRAM(S): 8 TABLET, FILM COATED ORAL at 20:09

## 2018-02-17 RX ADMIN — ONDANSETRON 4 MILLIGRAM(S): 8 TABLET, FILM COATED ORAL at 11:36

## 2018-02-17 RX ADMIN — Medication 100 MILLIGRAM(S): at 17:48

## 2018-02-17 RX ADMIN — Medication 100 MILLIGRAM(S): at 13:29

## 2018-02-17 RX ADMIN — Medication 2.5 MILLIGRAM(S): at 10:48

## 2018-02-17 RX ADMIN — Medication 2 MILLIGRAM(S): at 15:45

## 2018-02-17 RX ADMIN — ALBUTEROL 2 PUFF(S): 90 AEROSOL, METERED ORAL at 13:51

## 2018-02-17 RX ADMIN — ONDANSETRON 4 MILLIGRAM(S): 8 TABLET, FILM COATED ORAL at 03:15

## 2018-02-17 RX ADMIN — Medication 2 MILLIGRAM(S): at 03:15

## 2018-02-17 RX ADMIN — ONDANSETRON 4 MILLIGRAM(S): 8 TABLET, FILM COATED ORAL at 08:38

## 2018-02-17 RX ADMIN — Medication 2 MILLIGRAM(S): at 11:40

## 2018-02-17 RX ADMIN — Medication 2 MILLIGRAM(S): at 08:37

## 2018-02-17 RX ADMIN — Medication 2 MILLIGRAM(S): at 23:30

## 2018-02-18 PROCEDURE — 99232 SBSQ HOSP IP/OBS MODERATE 35: CPT

## 2018-02-18 PROCEDURE — 99221 1ST HOSP IP/OBS SF/LOW 40: CPT

## 2018-02-18 RX ORDER — MONTELUKAST 4 MG/1
10 TABLET, CHEWABLE ORAL
Qty: 0 | Refills: 0 | Status: DISCONTINUED | OUTPATIENT
Start: 2018-02-18 | End: 2018-02-21

## 2018-02-18 RX ADMIN — ONDANSETRON 4 MILLIGRAM(S): 8 TABLET, FILM COATED ORAL at 16:08

## 2018-02-18 RX ADMIN — Medication 100 MILLIGRAM(S): at 15:39

## 2018-02-18 RX ADMIN — Medication 2 MILLIGRAM(S): at 19:45

## 2018-02-18 RX ADMIN — ONDANSETRON 4 MILLIGRAM(S): 8 TABLET, FILM COATED ORAL at 11:59

## 2018-02-18 RX ADMIN — Medication 2 MILLIGRAM(S): at 07:30

## 2018-02-18 RX ADMIN — Medication 2 MILLIGRAM(S): at 12:30

## 2018-02-18 RX ADMIN — FAMOTIDINE 20 MILLIGRAM(S): 10 INJECTION INTRAVENOUS at 22:18

## 2018-02-18 RX ADMIN — Medication 2.5 MILLIGRAM(S): at 22:18

## 2018-02-18 RX ADMIN — SIMETHICONE 80 MILLIGRAM(S): 80 TABLET, CHEWABLE ORAL at 22:18

## 2018-02-18 RX ADMIN — MONTELUKAST 10 MILLIGRAM(S): 4 TABLET, CHEWABLE ORAL at 18:18

## 2018-02-18 RX ADMIN — ONDANSETRON 4 MILLIGRAM(S): 8 TABLET, FILM COATED ORAL at 19:45

## 2018-02-18 RX ADMIN — Medication 100 MILLIGRAM(S): at 22:18

## 2018-02-18 RX ADMIN — Medication 2 MILLIGRAM(S): at 03:08

## 2018-02-18 RX ADMIN — ONDANSETRON 4 MILLIGRAM(S): 8 TABLET, FILM COATED ORAL at 23:47

## 2018-02-18 RX ADMIN — ONDANSETRON 4 MILLIGRAM(S): 8 TABLET, FILM COATED ORAL at 07:30

## 2018-02-18 RX ADMIN — ONDANSETRON 4 MILLIGRAM(S): 8 TABLET, FILM COATED ORAL at 03:08

## 2018-02-18 RX ADMIN — Medication 2 MILLIGRAM(S): at 16:08

## 2018-02-18 RX ADMIN — Medication 2 MILLIGRAM(S): at 23:47

## 2018-02-18 RX ADMIN — SIMETHICONE 80 MILLIGRAM(S): 80 TABLET, CHEWABLE ORAL at 15:44

## 2018-02-18 NOTE — PROGRESS NOTE ADULT - SUBJECTIVE AND OBJECTIVE BOX
42 year old somatically preoccupied patient with psych disorder now with exacerbation with a several month hx of multiple medical complaints with serial ED visit and Medicine admission for chest pain, respiratory distress, GI complaints and others now complains of abdominal pain.  Pt with a questionable hx of Postural Orthostatic Tachycardia Syndrome, Dysautonomia, IBS, ITP, Asthma and other diagnoses.  Today patient complains of right sided upper quadrant abd pain that radiates across her upper abdomen.  Pt refused her Pepcid today.    VS:  SITTING 118/85 90 STANDING 116/81  114 T 98 OXYGEN SAT 97%    PE:    WDWN FEMALE NAD   SKIN WARM AND DRY, HEAD NC/AT  MOUTH PINK AND MOIST  LUNGS CLEAR  COR RRR S1S2  ABD SOFT NT +BS  -C/C/E.

## 2018-02-18 NOTE — PROGRESS NOTE ADULT - ASSESSMENT
42 year old female somatically preoccupied now complaining of nonspecific abdominal pain..     Pt with benign abdominal examination.  VS stable.  Most likely a somatic complaint.  Will get STAT labs CMP, CBC, AMYLASE AND LIPASE.  PT AGREES TO MAALOX 30 MG Q 4 PRN.

## 2018-02-19 LAB
ALBUMIN SERPL ELPH-MCNC: 4.4 G/DL — SIGNIFICANT CHANGE UP (ref 3.3–5)
ALP SERPL-CCNC: 66 U/L — SIGNIFICANT CHANGE UP (ref 40–120)
ALT FLD-CCNC: 10 U/L — SIGNIFICANT CHANGE UP (ref 4–33)
AMYLASE P1 CFR SERPL: 71 U/L — SIGNIFICANT CHANGE UP (ref 25–125)
AST SERPL-CCNC: 17 U/L — SIGNIFICANT CHANGE UP (ref 4–32)
BILIRUB SERPL-MCNC: 0.4 MG/DL — SIGNIFICANT CHANGE UP (ref 0.2–1.2)
BUN SERPL-MCNC: 4 MG/DL — LOW (ref 7–23)
CALCIUM SERPL-MCNC: 9.2 MG/DL — SIGNIFICANT CHANGE UP (ref 8.4–10.5)
CHLORIDE SERPL-SCNC: 100 MMOL/L — SIGNIFICANT CHANGE UP (ref 98–107)
CO2 SERPL-SCNC: 24 MMOL/L — SIGNIFICANT CHANGE UP (ref 22–31)
CREAT SERPL-MCNC: 0.98 MG/DL — SIGNIFICANT CHANGE UP (ref 0.5–1.3)
GLUCOSE SERPL-MCNC: 85 MG/DL — SIGNIFICANT CHANGE UP (ref 70–99)
HCT VFR BLD CALC: 36.1 % — SIGNIFICANT CHANGE UP (ref 34.5–45)
HGB BLD-MCNC: 12.2 G/DL — SIGNIFICANT CHANGE UP (ref 11.5–15.5)
LIDOCAIN IGE QN: 40.1 U/L — SIGNIFICANT CHANGE UP (ref 7–60)
MCHC RBC-ENTMCNC: 28 PG — SIGNIFICANT CHANGE UP (ref 27–34)
MCHC RBC-ENTMCNC: 33.8 % — SIGNIFICANT CHANGE UP (ref 32–36)
MCV RBC AUTO: 83 FL — SIGNIFICANT CHANGE UP (ref 80–100)
NRBC # FLD: 0 — SIGNIFICANT CHANGE UP
PLATELET # BLD AUTO: 104 K/UL — LOW (ref 150–400)
PMV BLD: SIGNIFICANT CHANGE UP FL (ref 7–13)
POTASSIUM SERPL-MCNC: 4 MMOL/L — SIGNIFICANT CHANGE UP (ref 3.5–5.3)
POTASSIUM SERPL-SCNC: 4 MMOL/L — SIGNIFICANT CHANGE UP (ref 3.5–5.3)
PROT SERPL-MCNC: 6.8 G/DL — SIGNIFICANT CHANGE UP (ref 6–8.3)
RBC # BLD: 4.35 M/UL — SIGNIFICANT CHANGE UP (ref 3.8–5.2)
RBC # FLD: 12.5 % — SIGNIFICANT CHANGE UP (ref 10.3–14.5)
SODIUM SERPL-SCNC: 138 MMOL/L — SIGNIFICANT CHANGE UP (ref 135–145)
TROPONIN T SERPL-MCNC: < 0.06 NG/ML — SIGNIFICANT CHANGE UP (ref 0–0.06)
WBC # BLD: 4.63 K/UL — SIGNIFICANT CHANGE UP (ref 3.8–10.5)
WBC # FLD AUTO: 4.63 K/UL — SIGNIFICANT CHANGE UP (ref 3.8–10.5)

## 2018-02-19 PROCEDURE — 99232 SBSQ HOSP IP/OBS MODERATE 35: CPT

## 2018-02-19 RX ADMIN — ONDANSETRON 4 MILLIGRAM(S): 8 TABLET, FILM COATED ORAL at 12:10

## 2018-02-19 RX ADMIN — Medication 2 MILLIGRAM(S): at 19:45

## 2018-02-19 RX ADMIN — SIMETHICONE 80 MILLIGRAM(S): 80 TABLET, CHEWABLE ORAL at 18:04

## 2018-02-19 RX ADMIN — FAMOTIDINE 20 MILLIGRAM(S): 10 INJECTION INTRAVENOUS at 22:02

## 2018-02-19 RX ADMIN — SIMETHICONE 80 MILLIGRAM(S): 80 TABLET, CHEWABLE ORAL at 22:02

## 2018-02-19 RX ADMIN — Medication 100 MILLIGRAM(S): at 18:04

## 2018-02-19 RX ADMIN — Medication 2 MILLIGRAM(S): at 12:09

## 2018-02-19 RX ADMIN — ONDANSETRON 4 MILLIGRAM(S): 8 TABLET, FILM COATED ORAL at 16:00

## 2018-02-19 RX ADMIN — ONDANSETRON 4 MILLIGRAM(S): 8 TABLET, FILM COATED ORAL at 23:31

## 2018-02-19 RX ADMIN — Medication 2 MILLIGRAM(S): at 23:31

## 2018-02-19 RX ADMIN — MONTELUKAST 10 MILLIGRAM(S): 4 TABLET, CHEWABLE ORAL at 18:04

## 2018-02-19 RX ADMIN — ONDANSETRON 4 MILLIGRAM(S): 8 TABLET, FILM COATED ORAL at 07:00

## 2018-02-19 RX ADMIN — ONDANSETRON 4 MILLIGRAM(S): 8 TABLET, FILM COATED ORAL at 19:45

## 2018-02-19 RX ADMIN — Medication 2 MILLIGRAM(S): at 07:00

## 2018-02-19 RX ADMIN — Medication 2 MILLIGRAM(S): at 16:00

## 2018-02-19 RX ADMIN — Medication 100 MILLIGRAM(S): at 22:02

## 2018-02-19 RX ADMIN — ONDANSETRON 4 MILLIGRAM(S): 8 TABLET, FILM COATED ORAL at 03:22

## 2018-02-19 RX ADMIN — Medication 2 MILLIGRAM(S): at 03:22

## 2018-02-20 PROCEDURE — 99232 SBSQ HOSP IP/OBS MODERATE 35: CPT

## 2018-02-20 RX ORDER — DIPHENHYDRAMINE HCL 50 MG
12.5 CAPSULE ORAL EVERY 4 HOURS
Qty: 0 | Refills: 0 | Status: DISCONTINUED | OUTPATIENT
Start: 2018-02-20 | End: 2018-02-21

## 2018-02-20 RX ORDER — DIPHENHYDRAMINE HCL 50 MG
12.5 CAPSULE ORAL EVERY 4 HOURS
Qty: 0 | Refills: 0 | Status: DISCONTINUED | OUTPATIENT
Start: 2018-02-20 | End: 2018-02-20

## 2018-02-20 RX ORDER — HYDROXYZINE HCL 10 MG
25 TABLET ORAL EVERY 4 HOURS
Qty: 0 | Refills: 0 | Status: DISCONTINUED | OUTPATIENT
Start: 2018-02-20 | End: 2018-02-21

## 2018-02-20 RX ADMIN — SIMETHICONE 80 MILLIGRAM(S): 80 TABLET, CHEWABLE ORAL at 15:51

## 2018-02-20 RX ADMIN — Medication 2 MILLIGRAM(S): at 03:30

## 2018-02-20 RX ADMIN — ONDANSETRON 4 MILLIGRAM(S): 8 TABLET, FILM COATED ORAL at 20:38

## 2018-02-20 RX ADMIN — SIMETHICONE 80 MILLIGRAM(S): 80 TABLET, CHEWABLE ORAL at 12:50

## 2018-02-20 RX ADMIN — ONDANSETRON 4 MILLIGRAM(S): 8 TABLET, FILM COATED ORAL at 15:46

## 2018-02-20 RX ADMIN — Medication 2 MILLIGRAM(S): at 06:51

## 2018-02-20 RX ADMIN — ONDANSETRON 4 MILLIGRAM(S): 8 TABLET, FILM COATED ORAL at 03:30

## 2018-02-20 RX ADMIN — Medication 2 MILLIGRAM(S): at 23:45

## 2018-02-20 RX ADMIN — Medication 100 MILLIGRAM(S): at 23:01

## 2018-02-20 RX ADMIN — ALBUTEROL 2 PUFF(S): 90 AEROSOL, METERED ORAL at 15:35

## 2018-02-20 RX ADMIN — ONDANSETRON 4 MILLIGRAM(S): 8 TABLET, FILM COATED ORAL at 11:40

## 2018-02-20 RX ADMIN — Medication 2 MILLIGRAM(S): at 11:59

## 2018-02-20 RX ADMIN — ONDANSETRON 4 MILLIGRAM(S): 8 TABLET, FILM COATED ORAL at 06:51

## 2018-02-20 RX ADMIN — FAMOTIDINE 20 MILLIGRAM(S): 10 INJECTION INTRAVENOUS at 23:01

## 2018-02-20 RX ADMIN — FAMOTIDINE 20 MILLIGRAM(S): 10 INJECTION INTRAVENOUS at 12:49

## 2018-02-20 RX ADMIN — ONDANSETRON 4 MILLIGRAM(S): 8 TABLET, FILM COATED ORAL at 23:45

## 2018-02-20 RX ADMIN — Medication 2 MILLIGRAM(S): at 20:38

## 2018-02-20 RX ADMIN — Medication 2 MILLIGRAM(S): at 15:46

## 2018-02-20 RX ADMIN — MONTELUKAST 10 MILLIGRAM(S): 4 TABLET, CHEWABLE ORAL at 17:29

## 2018-02-20 RX ADMIN — SIMETHICONE 80 MILLIGRAM(S): 80 TABLET, CHEWABLE ORAL at 23:01

## 2018-02-20 RX ADMIN — Medication 100 MILLIGRAM(S): at 12:49

## 2018-02-21 VITALS — RESPIRATION RATE: 17 BRPM | TEMPERATURE: 98 F

## 2018-02-21 PROCEDURE — 99238 HOSP IP/OBS DSCHRG MGMT 30/<: CPT

## 2018-02-21 RX ORDER — ALBUTEROL 90 UG/1
2 AEROSOL, METERED ORAL
Qty: 0 | Refills: 0 | COMMUNITY
Start: 2018-02-21

## 2018-02-21 RX ADMIN — ONDANSETRON 4 MILLIGRAM(S): 8 TABLET, FILM COATED ORAL at 07:36

## 2018-02-21 RX ADMIN — ONDANSETRON 4 MILLIGRAM(S): 8 TABLET, FILM COATED ORAL at 03:30

## 2018-02-21 RX ADMIN — Medication 2 MILLIGRAM(S): at 11:20

## 2018-02-21 RX ADMIN — SIMETHICONE 80 MILLIGRAM(S): 80 TABLET, CHEWABLE ORAL at 10:24

## 2018-02-21 RX ADMIN — SIMETHICONE 80 MILLIGRAM(S): 80 TABLET, CHEWABLE ORAL at 13:00

## 2018-02-21 RX ADMIN — Medication 2 MILLIGRAM(S): at 03:26

## 2018-02-21 RX ADMIN — Medication 2 MILLIGRAM(S): at 07:36

## 2018-02-21 RX ADMIN — ONDANSETRON 4 MILLIGRAM(S): 8 TABLET, FILM COATED ORAL at 11:20

## 2018-02-24 NOTE — DISCHARGE NOTE ADULT - HOSPITAL COURSE
Atypical chest pain.    Patient presenting with chest pain, atypical in nature, both left and right sided, not associated with exertion, not pleuritic, intermittent  - Aden negative x 1, EKG no acute changes  - Chest CT negative    Shortness of breath.    Patient with shortness of breath, stating she feels better with oxygen on, however noted to be saturating 100% on room air  - denies pleuritic chest pain  - no recent travel or immobilization, no active malignancy  - Wells score is 1 for tachycardia (although per chart review this is a chronic issue secondary to her autonomic dysautonomia)  - D-dimer negative 2 days ago  - unclear etiology, will continue to monitor  - patient refusing imaging requiring IV contrast at this time (had a "bad" reaction in the past).   -RVP neg  -CT Chest negative     ·  POTS (postural orthostatic tachycardia syndrome).     - c/w home Cardizem 120 CD.     ·  Idiopathic thrombocytopenia purpura.     Patient with known ITP, platelet count at baseline at this time  - monitor CBC.     ·   Anxiety  - Patient with severe anxiety, on Ativan and Buspar at home. Seroquel started at bedtime as needed. Pt refusing  - seen and evaluated by psychiatry  - c/w home Ativan and Buspar, added PRN Quetiapine per psych (although patient reported to me that she will refuse).   -2 PC admission to [facility]    * IBS(irritable bowel syndrome).   - c/w home Zofran, Simethicone, Pepcid and Colace.    Need for prophylactic measure.     SCDs in the setting of thrombocytopenia  DASH/TLC regular diet.     Facial rash - acne, seen by derm, recommend clinda gel No 43 yo F w/PMH of dysautonomia, ITP, depression, IBS and panic attacks who presented with c/o of shortness of breath and chest pain. The patient is very anxious on my interview and difficult to redirect as she has multiple complaints. She states that she started to feel short of breath last night, and reports wheezing, but only from her left lung, and intermittently. She reports that she was prescribed home O2 by her pulmonologist because she was told her saturations drop at night. She states she never used it until last night when she started to feel short of breath. She reports that she felt much better with the oxygen on and that the wheezing has since stopped. Denies having taken any inhalers at that time. She reports also some associated left-sided chest pain which has been sharp, she describes as feeling like she is being stabbed in the chest. She reports this has been going on intermittently for the past 5 days or so. She reports some recent associated hand and foot sweating but denies sweating on other parts of her body. Also reports poor eating habits contributing to weight loss.    Reports long history of GI issues for which she has been extensively worked up and currently takes standing Zofran, colace, Pepcid and simethicone. She reports anxiety and depression for which she takes Ativan and Buspirone. She notes that she was diagnosed with dysautonomia approximately 6 months ago for which she states the Ativan is also indicated. In addition, she takes Cardizem for her heart rate fluctuations associated with her dysautonomia.     Of note, recent TTE in 12/17 demonstrated EF of 65-70%, normal LV systolic function, normal diastolic function, grossly normal RV systolic function. Minimal MR, minimal TR. Of note, she also had a negative D-dimer 2 days ago when she was seen in the ER for similar complaints. At that time, she was monitored for a few hours and discharged home with negative cardiac work up including negative dAen. She was recently seen in the ER a day prior to that complaining of tachycardia, evaluated by EP with recommendation to start Cardizem CD instead of short acting cardizem. Telemetry was wnl at that time as well and she was discharged home.    In the ED, VS on presentation: T 98.9, , /96, RR 18, SpO2 100% on RA  CBC demonstrated baseline thrombocytopenia to platelet count of 86, otherwise unremarkable. CMP unremarkable. Aden negative x 1. VBG pH 7.46, lactate 1.4. CXR clear lungs. EKG NSR at 88bpm. She was given Zofran x 1 and admitted for further evaluation.    Atypical chest pain.    Patient presenting with chest pain, atypical in nature, both left and right sided, not associated with exertion, not pleuritic, intermittent  - Aden negative x 1, EKG no acute changes, echo WNL, Chest CT negative    Shortness of breath.    Patient with shortness of breath, stating she feels better with oxygen on, however noted to be saturating 100% on room air  - denies pleuritic chest pain  - no recent travel or immobilization, no active malignancy  - Wells score is 1 for tachycardia (although per chart review this is a chronic issue secondary to her autonomic dysautonomia)  - D-dimer negative 2 days ago  - patient refusing imaging requiring IV contrast at this time (had a "bad" reaction in the past).   -RVP neg  -CT Chest negative   - likely related to anxiety    ·  POTS (postural orthostatic tachycardia syndrome).     - c/w home Cardizem 120 CD.     ·  Idiopathic thrombocytopenia purpura.     Patient with known ITP, platelet count at baseline at this time  - monitor CBC.     Anxiety  - Patient with severe anxiety, on Ativan and Buspar at home. Seroquel started at bedtime as needed. Pt refusing  - seen and evaluated by psychiatry  - c/w home Ativan and Buspar, added PRN Quetiapine per psych (although patient reported to me that she will refuse).   -2 PC admission to [facility]    * IBS(irritable bowel syndrome).   - c/w home Zofran, Simethicone, Pepcid and Colace.    Need for prophylactic measure.     SCDs in the setting of thrombocytopenia  DASH/TLC regular diet.     Facial rash - acne, seen by derm, recommend clinda gel

## 2018-02-28 ENCOUNTER — MEDICATION RENEWAL (OUTPATIENT)
Age: 43
End: 2018-02-28

## 2018-02-28 RX ORDER — BUSPIRONE HYDROCHLORIDE 5 MG/1
5 TABLET ORAL TWICE DAILY
Qty: 60 | Refills: 6 | Status: COMPLETED | COMMUNITY
Start: 2017-08-09 | End: 2018-02-28

## 2018-03-02 NOTE — DISCHARGE NOTE ADULT - NSTOBACCONEVERSMOKERY/N_GEN_A
Not sure, how soon do they need him checked? I could see him on Monday 3/5 instead, if he is able to come in... In the morning.   Renato Espinoza     No

## 2018-03-05 ENCOUNTER — MEDICATION RENEWAL (OUTPATIENT)
Age: 43
End: 2018-03-05

## 2018-03-06 ENCOUNTER — APPOINTMENT (OUTPATIENT)
Dept: DERMATOLOGY | Facility: CLINIC | Age: 43
End: 2018-03-06
Payer: MEDICAID

## 2018-03-06 VITALS — HEIGHT: 62 IN | BODY MASS INDEX: 17.48 KG/M2 | WEIGHT: 95 LBS

## 2018-03-06 DIAGNOSIS — Z80.8 FAMILY HISTORY OF MALIGNANT NEOPLASM OF OTHER ORGANS OR SYSTEMS: ICD-10-CM

## 2018-03-06 DIAGNOSIS — Z87.09 PERSONAL HISTORY OF OTHER DISEASES OF THE RESPIRATORY SYSTEM: ICD-10-CM

## 2018-03-06 DIAGNOSIS — G98.8 OTHER DISORDERS OF NERVOUS SYSTEM: ICD-10-CM

## 2018-03-06 DIAGNOSIS — Z86.69 PERSONAL HISTORY OF OTHER DISEASES OF THE NERVOUS SYSTEM AND SENSE ORGANS: ICD-10-CM

## 2018-03-06 DIAGNOSIS — Z91.89 OTHER SPECIFIED PERSONAL RISK FACTORS, NOT ELSEWHERE CLASSIFIED: ICD-10-CM

## 2018-03-06 DIAGNOSIS — L21.9 SEBORRHEIC DERMATITIS, UNSPECIFIED: ICD-10-CM

## 2018-03-06 DIAGNOSIS — K64.9 UNSPECIFIED HEMORRHOIDS: ICD-10-CM

## 2018-03-06 DIAGNOSIS — Z86.79 PERSONAL HISTORY OF OTHER DISEASES OF THE CIRCULATORY SYSTEM: ICD-10-CM

## 2018-03-06 DIAGNOSIS — Z87.19 PERSONAL HISTORY OF OTHER DISEASES OF THE DIGESTIVE SYSTEM: ICD-10-CM

## 2018-03-06 PROCEDURE — 99214 OFFICE O/P EST MOD 30 MIN: CPT

## 2018-03-09 ENCOUNTER — OUTPATIENT (OUTPATIENT)
Dept: OUTPATIENT SERVICES | Facility: HOSPITAL | Age: 43
LOS: 1 days | Discharge: HOME | End: 2018-03-09

## 2018-03-12 DIAGNOSIS — F45.21 HYPOCHONDRIASIS: ICD-10-CM

## 2018-03-14 ENCOUNTER — OTHER (OUTPATIENT)
Age: 43
End: 2018-03-14

## 2018-03-18 ENCOUNTER — LABORATORY RESULT (OUTPATIENT)
Age: 43
End: 2018-03-18

## 2018-03-19 ENCOUNTER — APPOINTMENT (OUTPATIENT)
Dept: INTERNAL MEDICINE | Facility: CLINIC | Age: 43
End: 2018-03-19
Payer: MEDICAID

## 2018-03-19 VITALS
RESPIRATION RATE: 17 BRPM | HEART RATE: 102 BPM | SYSTOLIC BLOOD PRESSURE: 120 MMHG | HEIGHT: 62 IN | WEIGHT: 93 LBS | BODY MASS INDEX: 17.11 KG/M2 | TEMPERATURE: 98.6 F | DIASTOLIC BLOOD PRESSURE: 80 MMHG

## 2018-03-19 PROCEDURE — 99214 OFFICE O/P EST MOD 30 MIN: CPT | Mod: 25

## 2018-03-19 PROCEDURE — 36415 COLL VENOUS BLD VENIPUNCTURE: CPT

## 2018-03-19 RX ORDER — VERAPAMIL HYDROCHLORIDE 120 MG/1
120 TABLET ORAL
Qty: 60 | Refills: 0 | Status: COMPLETED | COMMUNITY
Start: 2017-12-04 | End: 2018-03-19

## 2018-03-19 RX ORDER — CITALOPRAM HYDROBROMIDE 10 MG/1
10 TABLET, FILM COATED ORAL DAILY
Qty: 10 | Refills: 0 | Status: COMPLETED | COMMUNITY
Start: 2018-02-28 | End: 2018-03-19

## 2018-03-19 RX ORDER — DILTIAZEM HYDROCHLORIDE 180 MG/1
180 CAPSULE, EXTENDED RELEASE ORAL DAILY
Qty: 30 | Refills: 1 | Status: COMPLETED | COMMUNITY
Start: 2018-01-08 | End: 2018-03-19

## 2018-03-19 RX ORDER — DILTIAZEM HYDROCHLORIDE 30 MG/1
30 TABLET ORAL EVERY 6 HOURS
Qty: 28 | Refills: 0 | Status: COMPLETED | COMMUNITY
Start: 2017-12-27 | End: 2018-03-19

## 2018-03-21 LAB
ALBUMIN SERPL ELPH-MCNC: 4.4 G/DL
ALP BLD-CCNC: 66 U/L
ALT SERPL-CCNC: 9 U/L
ANION GAP SERPL CALC-SCNC: 12 MMOL/L
AST SERPL-CCNC: 17 U/L
BASOPHILS # BLD AUTO: 0.02 K/UL
BASOPHILS NFR BLD AUTO: 0.3 %
BILIRUB SERPL-MCNC: 0.2 MG/DL
BUN SERPL-MCNC: 5 MG/DL
CALCIUM SERPL-MCNC: 9.5 MG/DL
CHLORIDE SERPL-SCNC: 105 MMOL/L
CO2 SERPL-SCNC: 23 MMOL/L
CREAT SERPL-MCNC: 0.86 MG/DL
EOSINOPHIL # BLD AUTO: 0.06 K/UL
EOSINOPHIL NFR BLD AUTO: 0.9 %
FERRITIN SERPL-MCNC: 8 NG/ML
FOLATE SERPL-MCNC: 9.8 NG/ML
HCT VFR BLD CALC: 35.6 %
HGB BLD-MCNC: 11.6 G/DL
IMM GRANULOCYTES NFR BLD AUTO: 0.2 %
IRON SATN MFR SERPL: 8 %
IRON SERPL-MCNC: 35 UG/DL
LYMPHOCYTES # BLD AUTO: 1.26 K/UL
LYMPHOCYTES NFR BLD AUTO: 19.9 %
MAN DIFF?: NORMAL
MCHC RBC-ENTMCNC: 27.3 PG
MCHC RBC-ENTMCNC: 32.6 GM/DL
MCV RBC AUTO: 83.8 FL
MONOCYTES # BLD AUTO: 0.47 K/UL
MONOCYTES NFR BLD AUTO: 7.4 %
NEUTROPHILS # BLD AUTO: 4.5 K/UL
NEUTROPHILS NFR BLD AUTO: 71.3 %
PLATELET # BLD AUTO: 97 K/UL
POTASSIUM SERPL-SCNC: 4.3 MMOL/L
PROT SERPL-MCNC: 7.1 G/DL
RBC # BLD: 4.25 M/UL
RBC # BLD: 4.25 M/UL
RBC # FLD: 13.6 %
RETICS # AUTO: 1.1 %
RETICS AGGREG/RBC NFR: 46.3 K/UL
SODIUM SERPL-SCNC: 140 MMOL/L
T3 SERPL-MCNC: 121 NG/DL
T3FREE SERPL-MCNC: 2.77 PG/ML
T3RU NFR SERPL: 1.12 INDEX
T4 FREE SERPL-MCNC: 1.2 NG/DL
T4 SERPL-MCNC: 8.4 UG/DL
TIBC SERPL-MCNC: 433 UG/DL
TSH SERPL-ACNC: 1.31 UIU/ML
UIBC SERPL-MCNC: 398 UG/DL
VIT B12 SERPL-MCNC: 438 PG/ML
WBC # FLD AUTO: 6.32 K/UL

## 2018-03-22 ENCOUNTER — APPOINTMENT (OUTPATIENT)
Dept: NEUROLOGY | Facility: CLINIC | Age: 43
End: 2018-03-22
Payer: MEDICAID

## 2018-03-22 VITALS
HEART RATE: 91 BPM | SYSTOLIC BLOOD PRESSURE: 114 MMHG | WEIGHT: 93 LBS | BODY MASS INDEX: 17.11 KG/M2 | HEIGHT: 62 IN | DIASTOLIC BLOOD PRESSURE: 79 MMHG

## 2018-03-22 PROCEDURE — 99215 OFFICE O/P EST HI 40 MIN: CPT

## 2018-03-26 ENCOUNTER — MEDICATION RENEWAL (OUTPATIENT)
Age: 43
End: 2018-03-26

## 2018-03-28 ENCOUNTER — EMERGENCY (EMERGENCY)
Facility: HOSPITAL | Age: 43
LOS: 1 days | Discharge: HOME | End: 2018-03-28
Attending: EMERGENCY MEDICINE
Payer: MEDICAID

## 2018-03-28 VITALS
HEART RATE: 100 BPM | TEMPERATURE: 98 F | DIASTOLIC BLOOD PRESSURE: 85 MMHG | WEIGHT: 95.02 LBS | SYSTOLIC BLOOD PRESSURE: 121 MMHG | HEIGHT: 62 IN | RESPIRATION RATE: 18 BRPM | OXYGEN SATURATION: 92 %

## 2018-03-28 LAB
ALBUMIN SERPL ELPH-MCNC: 4.7 G/DL — SIGNIFICANT CHANGE UP (ref 3.3–5)
ALP SERPL-CCNC: 81 U/L — SIGNIFICANT CHANGE UP (ref 40–120)
ALT FLD-CCNC: 11 U/L RC — SIGNIFICANT CHANGE UP (ref 10–45)
ANION GAP SERPL CALC-SCNC: 15 MMOL/L — SIGNIFICANT CHANGE UP (ref 5–17)
AST SERPL-CCNC: 21 U/L — SIGNIFICANT CHANGE UP (ref 10–40)
BASOPHILS # BLD AUTO: 0 K/UL — SIGNIFICANT CHANGE UP (ref 0–0.2)
BASOPHILS NFR BLD AUTO: 0.4 % — SIGNIFICANT CHANGE UP (ref 0–2)
BILIRUB SERPL-MCNC: 0.2 MG/DL — SIGNIFICANT CHANGE UP (ref 0.2–1.2)
BUN SERPL-MCNC: 6 MG/DL — LOW (ref 7–23)
CALCIUM SERPL-MCNC: 9.6 MG/DL — SIGNIFICANT CHANGE UP (ref 8.4–10.5)
CHLORIDE SERPL-SCNC: 101 MMOL/L — SIGNIFICANT CHANGE UP (ref 96–108)
CO2 SERPL-SCNC: 22 MMOL/L — SIGNIFICANT CHANGE UP (ref 22–31)
CREAT SERPL-MCNC: 0.9 MG/DL — SIGNIFICANT CHANGE UP (ref 0.5–1.3)
EOSINOPHIL # BLD AUTO: 0.1 K/UL — SIGNIFICANT CHANGE UP (ref 0–0.5)
EOSINOPHIL NFR BLD AUTO: 0.7 % — SIGNIFICANT CHANGE UP (ref 0–6)
GIANT PLATELETS BLD QL SMEAR: PRESENT — SIGNIFICANT CHANGE UP
GLUCOSE SERPL-MCNC: 94 MG/DL — SIGNIFICANT CHANGE UP (ref 70–99)
HCT VFR BLD CALC: 36.1 % — SIGNIFICANT CHANGE UP (ref 34.5–45)
HGB BLD-MCNC: 12.2 G/DL — SIGNIFICANT CHANGE UP (ref 11.5–15.5)
LG PLATELETS BLD QL AUTO: SLIGHT — SIGNIFICANT CHANGE UP
LYMPHOCYTES # BLD AUTO: 1.2 K/UL — SIGNIFICANT CHANGE UP (ref 1–3.3)
LYMPHOCYTES # BLD AUTO: 15.2 % — SIGNIFICANT CHANGE UP (ref 13–44)
MCHC RBC-ENTMCNC: 28 PG — SIGNIFICANT CHANGE UP (ref 27–34)
MCHC RBC-ENTMCNC: 33.9 GM/DL — SIGNIFICANT CHANGE UP (ref 32–36)
MCV RBC AUTO: 82.7 FL — SIGNIFICANT CHANGE UP (ref 80–100)
MONOCYTES # BLD AUTO: 0.5 K/UL — SIGNIFICANT CHANGE UP (ref 0–0.9)
MONOCYTES NFR BLD AUTO: 6.1 % — SIGNIFICANT CHANGE UP (ref 2–14)
NEUTROPHILS # BLD AUTO: 6.2 K/UL — SIGNIFICANT CHANGE UP (ref 1.8–7.4)
NEUTROPHILS NFR BLD AUTO: 77.7 % — HIGH (ref 43–77)
PLAT MORPH BLD: ABNORMAL
PLATELET # BLD AUTO: 72 K/UL — LOW (ref 150–400)
POTASSIUM SERPL-MCNC: 4.6 MMOL/L — SIGNIFICANT CHANGE UP (ref 3.5–5.3)
POTASSIUM SERPL-SCNC: 4.6 MMOL/L — SIGNIFICANT CHANGE UP (ref 3.5–5.3)
PROT SERPL-MCNC: 7.6 G/DL — SIGNIFICANT CHANGE UP (ref 6–8.3)
RBC # BLD: 4.37 M/UL — SIGNIFICANT CHANGE UP (ref 3.8–5.2)
RBC # FLD: 12.1 % — SIGNIFICANT CHANGE UP (ref 10.3–14.5)
RBC BLD AUTO: SIGNIFICANT CHANGE UP
SODIUM SERPL-SCNC: 138 MMOL/L — SIGNIFICANT CHANGE UP (ref 135–145)
WBC # BLD: 7.9 K/UL — SIGNIFICANT CHANGE UP (ref 3.8–10.5)
WBC # FLD AUTO: 7.9 K/UL — SIGNIFICANT CHANGE UP (ref 3.8–10.5)

## 2018-03-28 PROCEDURE — 99283 EMERGENCY DEPT VISIT LOW MDM: CPT

## 2018-03-28 PROCEDURE — 80053 COMPREHEN METABOLIC PANEL: CPT

## 2018-03-28 PROCEDURE — 93010 ELECTROCARDIOGRAM REPORT: CPT

## 2018-03-28 PROCEDURE — 93005 ELECTROCARDIOGRAM TRACING: CPT

## 2018-03-28 PROCEDURE — 99284 EMERGENCY DEPT VISIT MOD MDM: CPT | Mod: 25

## 2018-03-28 PROCEDURE — 85027 COMPLETE CBC AUTOMATED: CPT

## 2018-03-28 RX ORDER — SODIUM CHLORIDE 9 MG/ML
1000 INJECTION INTRAMUSCULAR; INTRAVENOUS; SUBCUTANEOUS ONCE
Qty: 0 | Refills: 0 | Status: DISCONTINUED | OUTPATIENT
Start: 2018-03-28 | End: 2018-04-01

## 2018-03-28 NOTE — ED PROVIDER NOTE - MEDICAL DECISION MAKING DETAILS
42F ITP, IBS, POTS, PSYCH, here c/o multiple dysautonomic sx over last 3 weeks, worseing since. PE wnl. Put not sturdy on feet when walked. Will obtain basics, ekg, trop, orthostatics.

## 2018-03-28 NOTE — ED PROVIDER NOTE - OBJECTIVE STATEMENT
42F pmhx anxiety/depression, ITP (no meds), POTS (on cardizem), IBS, here c/o 3 weeks of multiple orthostatic-related symptoms (heart racing when standing, cp, blurry vision, dizziness, facial and arm pain). Per pt she was diagnosed with POTS earlier this year. Pt also endorses subjective chills over the last few weeks. Pt says she recently decreased her dose of cardizem from 240mg daily to 120mg daily.       PMD: Lorena Ford  Cards: [facility]

## 2018-03-28 NOTE — ED ADULT NURSE NOTE - OBJECTIVE STATEMENT
42 y.o female pmh ITP, HTN, anxiety presenting to ED c/o lightheadedness associated with hypotension and palpitations. pt states she began feeling lightheaded earlier and states that her "bp was low" and she felt like her HR was fluctuating. denies feeling like she had palpitations but states that she felt like her HR was tachy. pt with stable VS upon arrival. labs and line obtained. denies any cp, sob, weakness, numbness or tingling. safety maintained. aide at the bedside.

## 2018-03-28 NOTE — ED PROVIDER NOTE - CARE PLAN
Principal Discharge DX:	POTS (postural orthostatic tachycardia syndrome)  Assessment and plan of treatment:	1) Please follow-up with your primary care doctor within the next 3 days.  If you cannot follow-up with your doctor(s), please return to the ED for any urgent issues.  2) If you have any worsening of symptoms or any other concerns please return to the ED immediately.  3) Please continue taking your home medications as directed.

## 2018-03-28 NOTE — ED PROVIDER NOTE - ATTENDING CONTRIBUTION TO CARE
Patient is a 43 yo F with history of POTS/ dysautonomia here requesting admission for concern of different symptoms of tachycardia, easy bruising, blurry vision, dizziness, palpitations when changing position. Patient feels she should be admitted for hematology work up despite having very good outpatient follow up. I discussed with her how her symptoms are likely related to her autonomic dysfunction but patient is adamant on being admitted. She denies fevers, shortness of breath, abdominal pain. I had an extended conversation with patient and discussing with her that an admission does not guarantee that she will get the testing that she wants. She then asked to be discharged so that she could go to [facility].   VS noted  Gen. no acute distress, anxious  HEENT: EOMI, mmm,   Lungs: CTAB/L no C/ W /R   CVS: RRR   Abd; Soft non tender, non distended   Ext: no edema  Skin: no rash  Neuro AAOx3 non focal clear speech  a/p: POTS syndrome/ autonomic dysfunction - sx are likely related to her diagnosis. She has had multiple visits for the same complaints. Patient appears to be very anxious and difficult to de-escalate her concerns. No acute findings on exam. Labs wnl. Patient to be discharged.   - Nikky ARRIOLA

## 2018-03-28 NOTE — ED PROVIDER NOTE - PLAN OF CARE
1) Please follow-up with your primary care doctor within the next 3 days.  If you cannot follow-up with your doctor(s), please return to the ED for any urgent issues.  2) If you have any worsening of symptoms or any other concerns please return to the ED immediately.  3) Please continue taking your home medications as directed.

## 2018-03-31 ENCOUNTER — APPOINTMENT (OUTPATIENT)
Dept: MRI IMAGING | Facility: CLINIC | Age: 43
End: 2018-03-31

## 2018-04-03 ENCOUNTER — APPOINTMENT (OUTPATIENT)
Dept: INTERNAL MEDICINE | Facility: CLINIC | Age: 43
End: 2018-04-03

## 2018-04-04 ENCOUNTER — EMERGENCY (EMERGENCY)
Facility: HOSPITAL | Age: 43
LOS: 1 days | Discharge: HOME | End: 2018-04-04
Attending: EMERGENCY MEDICINE | Admitting: EMERGENCY MEDICINE
Payer: MEDICAID

## 2018-04-04 VITALS
SYSTOLIC BLOOD PRESSURE: 150 MMHG | DIASTOLIC BLOOD PRESSURE: 100 MMHG | OXYGEN SATURATION: 100 % | TEMPERATURE: 98 F | HEART RATE: 96 BPM | RESPIRATION RATE: 20 BRPM

## 2018-04-04 LAB
ALBUMIN SERPL ELPH-MCNC: 4.6 G/DL — SIGNIFICANT CHANGE UP (ref 3.3–5)
ALP SERPL-CCNC: 73 U/L — SIGNIFICANT CHANGE UP (ref 40–120)
ALT FLD-CCNC: 11 U/L RC — SIGNIFICANT CHANGE UP (ref 10–45)
ANION GAP SERPL CALC-SCNC: 14 MMOL/L — SIGNIFICANT CHANGE UP (ref 5–17)
AST SERPL-CCNC: 17 U/L — SIGNIFICANT CHANGE UP (ref 10–40)
BILIRUB SERPL-MCNC: 0.3 MG/DL — SIGNIFICANT CHANGE UP (ref 0.2–1.2)
BUN SERPL-MCNC: 6 MG/DL — LOW (ref 7–23)
CALCIUM SERPL-MCNC: 9.9 MG/DL — SIGNIFICANT CHANGE UP (ref 8.4–10.5)
CHLORIDE SERPL-SCNC: 104 MMOL/L — SIGNIFICANT CHANGE UP (ref 96–108)
CO2 SERPL-SCNC: 23 MMOL/L — SIGNIFICANT CHANGE UP (ref 22–31)
CREAT SERPL-MCNC: 0.82 MG/DL — SIGNIFICANT CHANGE UP (ref 0.5–1.3)
GLUCOSE SERPL-MCNC: 82 MG/DL — SIGNIFICANT CHANGE UP (ref 70–99)
HCT VFR BLD CALC: 33.8 % — LOW (ref 34.5–45)
HGB BLD-MCNC: 11.5 G/DL — SIGNIFICANT CHANGE UP (ref 11.5–15.5)
MAGNESIUM SERPL-MCNC: 2 MG/DL — SIGNIFICANT CHANGE UP (ref 1.6–2.6)
MCHC RBC-ENTMCNC: 28 PG — SIGNIFICANT CHANGE UP (ref 27–34)
MCHC RBC-ENTMCNC: 34 GM/DL — SIGNIFICANT CHANGE UP (ref 32–36)
MCV RBC AUTO: 82.2 FL — SIGNIFICANT CHANGE UP (ref 80–100)
PLATELET # BLD AUTO: 72 K/UL — LOW (ref 150–400)
POTASSIUM SERPL-MCNC: 3.3 MMOL/L — LOW (ref 3.5–5.3)
POTASSIUM SERPL-SCNC: 3.3 MMOL/L — LOW (ref 3.5–5.3)
PROT SERPL-MCNC: 7.5 G/DL — SIGNIFICANT CHANGE UP (ref 6–8.3)
RBC # BLD: 4.11 M/UL — SIGNIFICANT CHANGE UP (ref 3.8–5.2)
RBC # FLD: 12.1 % — SIGNIFICANT CHANGE UP (ref 10.3–14.5)
SODIUM SERPL-SCNC: 141 MMOL/L — SIGNIFICANT CHANGE UP (ref 135–145)
WBC # BLD: 7 K/UL — SIGNIFICANT CHANGE UP (ref 3.8–10.5)
WBC # FLD AUTO: 7 K/UL — SIGNIFICANT CHANGE UP (ref 3.8–10.5)

## 2018-04-04 PROCEDURE — 99283 EMERGENCY DEPT VISIT LOW MDM: CPT

## 2018-04-04 PROCEDURE — 99284 EMERGENCY DEPT VISIT MOD MDM: CPT

## 2018-04-04 PROCEDURE — 85027 COMPLETE CBC AUTOMATED: CPT

## 2018-04-04 PROCEDURE — 83735 ASSAY OF MAGNESIUM: CPT

## 2018-04-04 PROCEDURE — 80053 COMPREHEN METABOLIC PANEL: CPT

## 2018-04-04 RX ORDER — POTASSIUM BICARBONATE 978 MG/1
40 TABLET, EFFERVESCENT ORAL ONCE
Qty: 0 | Refills: 0 | Status: DISCONTINUED | OUTPATIENT
Start: 2018-04-04 | End: 2018-04-04

## 2018-04-04 RX ORDER — POTASSIUM CHLORIDE 20 MEQ
40 PACKET (EA) ORAL ONCE
Qty: 0 | Refills: 0 | Status: COMPLETED | OUTPATIENT
Start: 2018-04-04 | End: 2018-04-04

## 2018-04-04 RX ORDER — SODIUM CHLORIDE 9 MG/ML
1000 INJECTION, SOLUTION INTRAVENOUS
Qty: 0 | Refills: 0 | Status: DISCONTINUED | OUTPATIENT
Start: 2018-04-04 | End: 2018-04-04

## 2018-04-04 RX ADMIN — SODIUM CHLORIDE 75 MILLILITER(S): 9 INJECTION, SOLUTION INTRAVENOUS at 22:32

## 2018-04-04 NOTE — ED PROVIDER NOTE - RESPIRATORY, MLM
Breath sounds clear and equal bilaterally. (pt creating upper airway exp wheeze, not present when pt distracted)

## 2018-04-04 NOTE — ED PROVIDER NOTE - ATTENDING CONTRIBUTION TO CARE
I have seen and evaluated this patient with the resident.   I agree with the findings  unless other wise stated.  I have made appropriate changes in documentations where needed, After my face to face bedside evaluation, I am further  notinF w/PMHx severe anx/dep, ITP, POTS/dysautonomia (reports dx earlier this year) p/w c/o palpitations, HD stable, likely POTS exac. l checked  CBC, BMP, Mag to ensure no electrolyte disturbance low K treated and advised diet supplement like orange juice banana to pt.  d/c home-- Farrell

## 2018-04-04 NOTE — ED PROVIDER NOTE - CARE PLAN
Principal Discharge DX:	POTS (postural orthostatic tachycardia syndrome)  Assessment and plan of treatment:	1) Please follow-up with your primary care doctor and your neurologist Dr. Quinn within the next 3 days.  If you cannot follow-up with your doctor(s), please return to the ED for any urgent issues.  2) If you have any worsening of symptoms or any other concerns please return to the ED immediately.  3) Please continue taking your home medications as directed.

## 2018-04-04 NOTE — ED PROVIDER NOTE - MEDICAL DECISION MAKING DETAILS
42F w/PMHx severe anx/dep, ITP, POTS/dysautonomia (reports dx earlier this year) p/w c/o palpitations, HD stable, likely POTS exac. Will check CBC, BMP, Mag to ensure no electrolyte disturbance. Likely d/c home

## 2018-04-04 NOTE — ED PROVIDER NOTE - OBJECTIVE STATEMENT
42F w/PMHx severe anx/dep, ITP, POTS/dysautonomia (reports dx earlier this year) p/w c/o palpitations. Pt recently in [facility] ED 3/28 for POTS sx, no abn noted, d/c home, went to [facility] where she was admitted for "easy bruising" and d/c 3d ago. Since d/c pt has been feeling "unwell" and today developed palp, SOB, and noted her HR to be in 110s, was concerned she may have "pheo." Currently w/mult vague complaints including HA, palp, tremors, weakness; is perseverating on possibly having VT despite reassurances otherwise. Being evaluated by Dr. Quinn for etiology of POTS.    PMD: Lorena Ford  Cards: [facility]  Neuro: Dr. Quinn

## 2018-04-04 NOTE — ED PROVIDER NOTE - PLAN OF CARE
1) Please follow-up with your primary care doctor and your neurologist Dr. Quinn within the next 3 days.  If you cannot follow-up with your doctor(s), please return to the ED for any urgent issues.  2) If you have any worsening of symptoms or any other concerns please return to the ED immediately.  3) Please continue taking your home medications as directed.

## 2018-04-05 VITALS
TEMPERATURE: 98 F | SYSTOLIC BLOOD PRESSURE: 140 MMHG | RESPIRATION RATE: 18 BRPM | HEART RATE: 78 BPM | OXYGEN SATURATION: 99 % | DIASTOLIC BLOOD PRESSURE: 78 MMHG

## 2018-04-07 ENCOUNTER — LABORATORY RESULT (OUTPATIENT)
Age: 43
End: 2018-04-07

## 2018-04-09 LAB
ALBUMIN MFR SERPL ELPH: 64.1 %
ALBUMIN SERPL-MCNC: 4.4 G/DL
ALBUMIN/GLOB SERPL: 1.8 RATIO
ALPHA1 GLOB MFR SERPL ELPH: 4 %
ALPHA1 GLOB SERPL ELPH-MCNC: 0.3 G/DL
ALPHA2 GLOB MFR SERPL ELPH: 9.7 %
ALPHA2 GLOB SERPL ELPH-MCNC: 0.7 G/DL
B BURGDOR IGG+IGM SER QL IB: NORMAL
B-GLOBULIN MFR SERPL ELPH: 11.3 %
B-GLOBULIN SERPL ELPH-MCNC: 0.8 G/DL
C3 SERPL-MCNC: 102 MG/DL
C4 SERPL-MCNC: 33 MG/DL
DEPRECATED KAPPA LC FREE/LAMBDA SER: 0.87 RATIO
GAMMA GLOB FLD ELPH-MCNC: 0.8 G/DL
GAMMA GLOB MFR SERPL ELPH: 10.9 %
IGA SER QL IEP: 70 MG/DL
IGG SER QL IEP: 726 MG/DL
IGM SER QL IEP: 163 MG/DL
INTERPRETATION SERPL IEP-IMP: NORMAL
KAPPA LC CSF-MCNC: 1.91 MG/DL
KAPPA LC SERPL-MCNC: 1.66 MG/DL
M PROTEIN SPEC IFE-MCNC: NORMAL
MPO AB + PR3 PNL SER: NORMAL
PROT SERPL-MCNC: 6.9 G/DL
PROT SERPL-MCNC: 6.9 G/DL
THYROGLOB AB SERPL-ACNC: <20 IU/ML
THYROPEROXIDASE AB SERPL IA-ACNC: <10 IU/ML
TSH SERPL-ACNC: 1.93 UIU/ML
VIT B12 SERPL-MCNC: 564 PG/ML

## 2018-04-09 NOTE — ED ADULT NURSE NOTE - NSSISCREENINGQ3_ED_A_ED
Increased oropharyngeal secretions Increased oropharyngeal secretions Increased oropharyngeal secretions Increased oropharyngeal secretions Increased oropharyngeal secretions Increased oropharyngeal secretions Increased oropharyngeal secretions Increased oropharyngeal secretions Increased oropharyngeal secretions Increased oropharyngeal secretions Increased oropharyngeal secretions Increased oropharyngeal secretions Increased oropharyngeal secretions Increased oropharyngeal secretions Increased oropharyngeal secretions Increased oropharyngeal secretions Increased oropharyngeal secretions Increased oropharyngeal secretions Increased oropharyngeal secretions Increased oropharyngeal secretions No

## 2018-04-10 ENCOUNTER — EMERGENCY (EMERGENCY)
Facility: HOSPITAL | Age: 43
LOS: 1 days | End: 2018-04-10
Attending: EMERGENCY MEDICINE
Payer: MEDICAID

## 2018-04-10 VITALS
RESPIRATION RATE: 18 BRPM | SYSTOLIC BLOOD PRESSURE: 123 MMHG | OXYGEN SATURATION: 99 % | TEMPERATURE: 98 F | DIASTOLIC BLOOD PRESSURE: 81 MMHG | WEIGHT: 95.02 LBS | HEART RATE: 87 BPM

## 2018-04-10 LAB
ALBUMIN SERPL ELPH-MCNC: 4.5 G/DL — SIGNIFICANT CHANGE UP (ref 3.3–5)
ALP SERPL-CCNC: 79 U/L — SIGNIFICANT CHANGE UP (ref 40–120)
ALT FLD-CCNC: 11 U/L RC — SIGNIFICANT CHANGE UP (ref 10–45)
ANION GAP SERPL CALC-SCNC: 15 MMOL/L — SIGNIFICANT CHANGE UP (ref 5–17)
APAP SERPL-MCNC: <15 UG/ML — SIGNIFICANT CHANGE UP (ref 10–30)
APTT BLD: 25.7 SEC — LOW (ref 27.5–37.4)
AST SERPL-CCNC: 16 U/L — SIGNIFICANT CHANGE UP (ref 10–40)
B2 GLYCOPROT1 IGA SERPL IA-ACNC: <5 SAU
B2 GLYCOPROT1 IGG SER-ACNC: <5 SGU
B2 GLYCOPROT1 IGM SER-ACNC: <5 SMU
BILIRUB SERPL-MCNC: 0.3 MG/DL — SIGNIFICANT CHANGE UP (ref 0.2–1.2)
BUN SERPL-MCNC: 6 MG/DL — LOW (ref 7–23)
CALCIUM SERPL-MCNC: 9.5 MG/DL — SIGNIFICANT CHANGE UP (ref 8.4–10.5)
CARDIOLIPIN IGM SER-MCNC: <5 MPL
CHLORIDE SERPL-SCNC: 104 MMOL/L — SIGNIFICANT CHANGE UP (ref 96–108)
CO2 SERPL-SCNC: 22 MMOL/L — SIGNIFICANT CHANGE UP (ref 22–31)
CREAT SERPL-MCNC: 0.78 MG/DL — SIGNIFICANT CHANGE UP (ref 0.5–1.3)
ETHANOL SERPL-MCNC: SIGNIFICANT CHANGE UP MG/DL (ref 0–10)
GLUCOSE SERPL-MCNC: 84 MG/DL — SIGNIFICANT CHANGE UP (ref 70–99)
HCG SERPL-ACNC: <2 MIU/ML — LOW (ref 5–24)
INR BLD: 1 RATIO — SIGNIFICANT CHANGE UP (ref 0.88–1.16)
MAGNESIUM RBC-MCNC: 5.6 MG/DL
MAGNESIUM SERPL-MCNC: 2.1 MG/DL — SIGNIFICANT CHANGE UP (ref 1.6–2.6)
PHOSPHATE SERPL-MCNC: 3.4 MG/DL — SIGNIFICANT CHANGE UP (ref 2.5–4.5)
POTASSIUM SERPL-MCNC: 3.7 MMOL/L — SIGNIFICANT CHANGE UP (ref 3.5–5.3)
POTASSIUM SERPL-SCNC: 3.7 MMOL/L — SIGNIFICANT CHANGE UP (ref 3.5–5.3)
PROT SERPL-MCNC: 7.3 G/DL — SIGNIFICANT CHANGE UP (ref 6–8.3)
PROTHROM AB SERPL-ACNC: 10.9 SEC — SIGNIFICANT CHANGE UP (ref 9.8–12.7)
SALICYLATES SERPL-MCNC: <2 MG/DL — LOW (ref 15–30)
SODIUM SERPL-SCNC: 141 MMOL/L — SIGNIFICANT CHANGE UP (ref 135–145)

## 2018-04-10 PROCEDURE — 84702 CHORIONIC GONADOTROPIN TEST: CPT

## 2018-04-10 PROCEDURE — 80307 DRUG TEST PRSMV CHEM ANLYZR: CPT

## 2018-04-10 PROCEDURE — 90792 PSYCH DIAG EVAL W/MED SRVCS: CPT | Mod: GT

## 2018-04-10 PROCEDURE — 93010 ELECTROCARDIOGRAM REPORT: CPT

## 2018-04-10 PROCEDURE — 71046 X-RAY EXAM CHEST 2 VIEWS: CPT

## 2018-04-10 PROCEDURE — 71046 X-RAY EXAM CHEST 2 VIEWS: CPT | Mod: 26

## 2018-04-10 PROCEDURE — 85027 COMPLETE CBC AUTOMATED: CPT

## 2018-04-10 PROCEDURE — 83735 ASSAY OF MAGNESIUM: CPT

## 2018-04-10 PROCEDURE — 85610 PROTHROMBIN TIME: CPT

## 2018-04-10 PROCEDURE — 80053 COMPREHEN METABOLIC PANEL: CPT

## 2018-04-10 PROCEDURE — 85730 THROMBOPLASTIN TIME PARTIAL: CPT

## 2018-04-10 PROCEDURE — 93005 ELECTROCARDIOGRAM TRACING: CPT

## 2018-04-10 PROCEDURE — 99284 EMERGENCY DEPT VISIT MOD MDM: CPT | Mod: 25

## 2018-04-10 PROCEDURE — 99285 EMERGENCY DEPT VISIT HI MDM: CPT | Mod: 25

## 2018-04-10 PROCEDURE — 96374 THER/PROPH/DIAG INJ IV PUSH: CPT

## 2018-04-10 PROCEDURE — 84100 ASSAY OF PHOSPHORUS: CPT

## 2018-04-10 RX ORDER — SODIUM CHLORIDE 9 MG/ML
1000 INJECTION INTRAMUSCULAR; INTRAVENOUS; SUBCUTANEOUS
Qty: 0 | Refills: 0 | Status: DISCONTINUED | OUTPATIENT
Start: 2018-04-10 | End: 2018-04-14

## 2018-04-10 RX ORDER — SODIUM CHLORIDE 9 MG/ML
1000 INJECTION INTRAMUSCULAR; INTRAVENOUS; SUBCUTANEOUS ONCE
Qty: 0 | Refills: 0 | Status: COMPLETED | OUTPATIENT
Start: 2018-04-10 | End: 2018-04-10

## 2018-04-10 RX ORDER — ONDANSETRON 8 MG/1
4 TABLET, FILM COATED ORAL ONCE
Qty: 0 | Refills: 0 | Status: COMPLETED | OUTPATIENT
Start: 2018-04-10 | End: 2018-04-10

## 2018-04-10 RX ADMIN — ONDANSETRON 4 MILLIGRAM(S): 8 TABLET, FILM COATED ORAL at 21:00

## 2018-04-10 RX ADMIN — SODIUM CHLORIDE 200 MILLILITER(S): 9 INJECTION INTRAMUSCULAR; INTRAVENOUS; SUBCUTANEOUS at 22:42

## 2018-04-10 RX ADMIN — SODIUM CHLORIDE 2000 MILLILITER(S): 9 INJECTION INTRAMUSCULAR; INTRAVENOUS; SUBCUTANEOUS at 21:00

## 2018-04-10 NOTE — ED BEHAVIORAL HEALTH ASSESSMENT NOTE - DIFFERENTIAL
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

## 2018-04-10 NOTE — ED BEHAVIORAL HEALTH ASSESSMENT NOTE - REASON
Given her diagnosed health problems and complication of her somatic delusions, [facility] recommends holding pt overnight so that she is admitted while internal medicine staff are available.

## 2018-04-10 NOTE — ED PROVIDER NOTE - CARE PLAN
Principal Discharge DX:	Diarrhea  Secondary Diagnosis:	Anemia  Secondary Diagnosis:	Thrombocytopenia

## 2018-04-10 NOTE — ED BEHAVIORAL HEALTH ASSESSMENT NOTE - OTHER PAST PSYCHIATRIC HISTORY (INCLUDE DETAILS REGARDING ONSET, COURSE OF ILLNESS, INPATIENT/OUTPATIENT TREATMENT)
PPHx of Delusional Disorder, somatic type, continuous and Personality Disorder NOS and with history of treatment since 06/2017 first at [facility], then at [facility] ([place] then at [facility], then inpatient last month 01/12/18 - 01/26/18, and most recently at [facility] PHP PPHx of Delusional Disorder, somatic type vs Illness Anxiety d/o and Personality Disorder NOS and with history of treatment since 06/2017 first at [facility], then at [facility] ([place]), then at [facility], then inpatient 01/12/18 - 01/26/18, back to PHP, back to inpatient 02/15-02/21/18, and now at [facility]. NO hx of suicide attempts, no self-harm behaviors, no aggression. Previous admissions were for an inability to care for self due to delusional thoughts.

## 2018-04-10 NOTE — ED BEHAVIORAL HEALTH ASSESSMENT NOTE - AXIS IV
Economic problems/Problems with access to healthcare services/Problems with primary support/Problem related to social environment/Housing problems/Occupational problems Problems with primary support/Occupational problems/Economic problems/Housing problems

## 2018-04-10 NOTE — ED PROVIDER NOTE - PROGRESS NOTE DETAILS
Randolph Henderson MD (resident): discussed with Neuro resident on call, who is in contact w/ Dr. Quinn's office. Awaiting call back after they have a plan from Dr. Quinn. Randolph Henderson MD (resident): discussion w/ Psychiatry attending on call, who is aware of consult. Randolph Henderson MD (resident): day-Psych attending deferred to Tele-Psych attending, who is aware of pt and will see them. Patient was cleared for discharge by Psychiatry. Waiting only on CBC but eloped prior to its having resulted. Its results are c/w prior results. No emergent issue.

## 2018-04-10 NOTE — ED BEHAVIORAL HEALTH ASSESSMENT NOTE - PATIENT'S CHIEF COMPLAINT
"My NP sent me here because I was feeling dizzy." "I don't know why they called you - I go to [facility] outpatient already"

## 2018-04-10 NOTE — ED BEHAVIORAL HEALTH ASSESSMENT NOTE - HPI (INCLUDE ILLNESS QUALITY, SEVERITY, DURATION, TIMING, CONTEXT, MODIFYING FACTORS, ASSOCIATED SIGNS AND SYMPTOMS)
Go to [facility] outpatient, appointment on Friday for group, Monday with SW and psychiatrist. Asking for EMG via neurology admit. Medications - Ativan trying to lower but POTS getting worse, 9-10mg per day, autonomic nervous system disorder, just started Remeron 3.75mg HS last week very low dose due to concern for hypotension. EMG set up, supposed to see Dr. Ferreira week later POTS specialist. Dr. Robles in touch with everyone. Denies depressive symptoms, denies suicidal ideation - vehemently denied, in ER because want help to live. Felt better with Remeron. Denies any s/s of jessica or psychosis, denies auditory/visual hallucinations or delusions. Perseverative on medical complaints. Anxiety right now related to medical complaints, new symptoms - anemia - called hematologist today. Sleep is ok. Appetite - has been a progress, had to learn what works with new system, brought tofu/pasta with her to hospital, drinking pea-protein, healthy fats - guacamole. Trying to push up calorie count - Vegan. Had yoko crackers. No ETOH since last April. Patient is a 42 year old female, [facility], [facility]/awaiting disability, non-caregiver, with a PPH of Illness Anxiety d/o, two prior hospitalizations, most recent 02/15-02/21/18, current outpatient treatment with Dr. Robles and SW at [facility], denies hx of self harm behaviors, denies prior suicide attempts, denies manic or psychotic s/s but with somatic preoccupations/delusions, denies hx of violence or arrests, denies abuse but with chronic family discord, denies substance use/abuse, with a past medical history of "digestive issues", ITP, Dysautonomia, POTS, Asthma, recent dx of Anemia, brought in by self, presenting with somatic complaints consistent with her dx of Illness Anxiety.     Chart review done and spoke with Dr. Robles, pt's outpatient provider. Pt was previously at [facility], then at [facility] ([facility]), then at [facility], then inpatient 01/12/18-01/26/18, back to PHP, then back to inpatient 02/15-02/21/18, now at [facility] with medication management, group and individual therapy. She is currently dx with Illness Anxiety d/o and prescribed Remeron 3.75mg QHS (low dose due to preoccupation with side-effects). In addition, pt is prescribed high dose Ativan by her PMD (See  note for ISTOP). Pt has been attending her appointment regularly and has been endorsing compliance with her medications. She is chronically somatically preoccupied with little insight, however is working with her outpatient providers on this issue. Pt has a hx of malnutrition due to poor PO intake 2/2 concern for GI issues. She is now working with a nutritionist to increase her caloric intake with positive results. Dr. Robles states she has an appointment with pt on Monday 04/16 and pt will also have an individual therapy appointment that day. She has group therapy on Friday 04/13 as well. Dr. Robles has no acute safety concerns and recommends discharge in the absence of off-baseline symptoms.     Today, pt reports she came to the hospital because she was not feeling well physically. She denies any acute psychiatric complaints outside of her baseline anxiety regarding her medical issues. She reports having extensive support at [facility] (confirmed above mentioned appointments/providers). Pt denies any s/s of depression, denies suicidal ideations, intent or plans - in fact vehemently denies these and states "that's why I'm here because I want to feel better and live". She denies any s/s of jessica or psychosis (outside of somatic preoccupation), denies auditory/visual hallucinations or delusions of thought insertion/broadcasting or paranoia. She reports adequate sleep, also helped by her low dose Remeron (endorses compliance with 3.75mg HS). Pt confirms she is working on her appetite and PO intake, shows writer a bag of food she brought to the hospital including a prepared dish and some yoko crackers. She reports being Vegan but working to find healthy fats and proteins to assist with proper nutrition. Pt is highly perseverative on her medical complaints, especially getting a neurological work up for POTS, states she has an outpatient EMG scheduled on Thursday but would be open to a medical admission today for an expedited test. Pt denies any past abuse, however does report chronic discord with her family resulting in estrangement currently and causing pt to live in an airb rental (was financially cut off by parents recently, is currently in high level of debt). Pt denies substance use/abuse.    SW Spoke with patient’s father Jb ([phone]) for collateral. Per father patient has been at baseline consisting of delusional disorder somatic type currently living alone in an AIR B since discharge from [facility] in January/February, patient continues to have consistent medical complaints per father and texts daily that she needs their support to get through her medical issues, wants to be hugged and coddled, likes having someone with her and has hired personal health aides in past to stay with her when she sleeps. Father states patient’s psychologist believes patient has reverted back to an earlier age stage in her life and that the psychologist is working to get her back to her “real” emotional age. Father reports patient has lost around 40lbs in the past 6-8 months due to gastrointestinal complaints and a restrictive vegan diet, he is concerned she may be malnourished and eventually have complications. Father denies patient has been suicidal or homicidal, no psychosis, no jessica, no substance abuse, no violence, no legal issues, no access to weapons. Father is unsure of specific current psychiatric treatment, believes patient sees psychologist Dr. Mcfarland x2 week, attends [facility] outpatient program x3/week, and believes she is on medication and has been compliant with treatment from patient’s report but he can’t confirm. Father does not have emergent psychiatric safety concerns that would require admission, states he is concerned for potential long term effects of malnourishment if patient continues to restrict her diet. Patient is a 42 year old female, domiciled in an airbn, unemployed/awaiting disability, non-caregiver, with a PPH of Illness Anxiety d/o, two prior hospitalizations, most recent 02/15-02/21/18, current outpatient treatment with Dr. Robles and SW at [facility], denies hx of self harm behaviors, denies prior suicide attempts, denies manic or psychotic s/s but with somatic preoccupations/delusions, denies hx of violence or arrests, denies abuse but with chronic family discord, denies substance use/abuse, with a past medical history of "digestive issues", ITP, Dysautonomia, POTS, Asthma, recent dx of Anemia, brought in by self, presenting with somatic complaints consistent with her dx of Illness Anxiety.     Chart review done and spoke with Dr. Robles, pt's outpatient provider. Pt was previously at [facility], then [place], then [facility], then inpatient 01/12-01/26/18, back to [facility], then inpatient 02/15-02/21/18. Pt's hospitalizations were 2/2 inability to care for self due to somatic delusions. Pt now at [facility] with med management, group/individual therapy. She is currently dx with Illness Anxiety d/o and Rx Remeron 3.75mg QHS (low dose due to preoccupation with side-effects). In addition, pt is prescribed high dose Ativan by her PMD (See  note for ISTOP). Pt has been attending her appts regularly and endorsing compliance with her meds. She is chronically somatically preoccupied with little insight, however is working with her outpatient providers. Pt has a hx of malnutrition due to poor PO intake 2/2 concern for GI issues. She is now working with a nutritionist to increase her caloric intake with positive results. Dr. Robles states she has an appointment with pt on Monday 04/16 and pt will also have an individual therapy appointment that day. She has group therapy on Friday 04/13 as well. Dr. Robles has no acute safety concerns and recommends discharge in the absence of off-baseline symptoms.     Today, pt reports she came to the hospital because she was not feeling well physically. She denies any acute psychiatric complaints outside of her baseline anxiety regarding her medical issues. She reports having extensive support at [facility] (confirmed above mentioned appointments/providers). Pt denies any s/s of depression, denies suicidal ideations, intent or plans - in fact vehemently denies these and states "that's why I'm here because I want to feel better and live". She denies any s/s of jessica or psychosis (outside of somatic preoccupation), denies auditory/visual hallucinations or delusions of thought insertion/broadcasting or paranoia. She reports adequate sleep, also helped by her low dose Remeron (endorses compliance with 3.75mg HS). Pt confirms she is working on her appetite and PO intake, shows writer a bag of food she brought to the hospital including a prepared dish and some yoko crackers. She reports being Vegan but working to find healthy fats and proteins to assist with proper nutrition. Pt is highly perseverative on her medical complaints, especially getting a neurological work up for POTS, states she has an outpatient EMG scheduled on Thursday but would be open to a medical admission today for an expedited test. Pt denies any past abuse, however does report chronic discord with her family resulting in estrangement currently and causing pt to live in an airbnb rental (was financially cut off by parents recently, is currently in high level of debt). Pt denies substance use/abuse.    SW Spoke with patient’s father Jb ([phone]) for collateral. Per father patient has been at baseline consisting of delusional disorder somatic type currently living alone in an AIR BNB since discharge from [facility] in January/February, patient continues to have consistent medical complaints per father and texts daily that she needs their support to get through her medical issues, wants to be hugged and coddled, likes having someone with her and has hired personal health aides in past to stay with her when she sleeps. Father states patient’s psychologist believes patient has reverted back to an earlier age stage in her life and that the psychologist is working to get her back to her “real” emotional age. Father reports patient has lost around 40lbs in the past 6-8 months due to gastrointestinal complaints and a restrictive vegan diet, he is concerned she may be malnourished and eventually have complications. Father denies patient has been suicidal or homicidal, no psychosis, no jessica, no substance abuse, no violence, no legal issues, no access to weapons. Father is unsure of specific current psychiatric treatment, believes patient sees psychologist Dr. Mcfarland x2 week, attends [facility] outpatient program x3/week, and believes she is on medication and has been compliant with treatment from patient’s report but he can’t confirm. Father does not have emergent psychiatric safety concerns that would require admission, states he is concerned for potential long term effects of malnourishment if patient continues to restrict her diet.

## 2018-04-10 NOTE — ED BEHAVIORAL HEALTH ASSESSMENT NOTE - SUICIDE RISK FACTORS
Agitation/severe anxiety/Chronic pain or acute medical issue Agitation/severe anxiety/Access to means (pills, firearms, etc.)/Chronic pain or acute medical issue

## 2018-04-10 NOTE — ED PROVIDER NOTE - MEDICAL DECISION MAKING DETAILS
Randolph Henderson MD (resident): diarrhea that is nonbloody w/o abd pain; lightheadedness w/ standing, possible orthostasis w/ hx of diarrhea. vision blurring and feeling off-balance, already worked up with MRI. will get labs to check electrolytes, EKG for palpitations, keep on monitor in the ED to assess for arrhythmias.

## 2018-04-10 NOTE — ED PROVIDER NOTE - NS ED ROS FT
No fever, + chills, + intermittent blurry vision, no change in hearing, + palpitations, no cough, + nausea, + diarrhea, no vomiting, no dysuria, no muscle pain, no rashes, no loss of consciousness. ~ Randolph Henderson MD

## 2018-04-10 NOTE — ED BEHAVIORAL HEALTH ASSESSMENT NOTE - DETAILS
Verbal signout completed. reports feeling like a zombie instantly after taking Zyprexa so spit it out.  Reports itchy rash on head after taking Abilify. deferred multiple somatic complaints reports frequent tachycardia reports recent dyspnea, wears pulse ox from home nausea reports rash self reports feeling like a zombie instantly after taking Zyprexa so spit it out.  Reports itchy rash on head after taking Abilify., minor hypotension with Remeron but not enough to d/c treatment reports frequent tachycardia, had monitor in place at time of exam Had reported diarrhea to medical attending

## 2018-04-10 NOTE — ED ADULT NURSE NOTE - OBJECTIVE STATEMENT
41 y/o F, reported to ED from home. A&Ox3, c/o weakness and "weird heart feelings." Pt reports feeling faint and dizzy. Pt denies any syncopal episodes. Pt denies fall or head injury. Pt reports that her vision is not "clear but it is not spinning either." "I have a hx of tachycardia but I feel like I am having heart and neurological symptoms that need to be investigated." Pt reports that she has been having pain up her left arm. Pt reports that she went to [facility] yesterday for the pain in her left arm and diarrhea. Pt denies chest pain but reports "tightness in her chest." Pt reports that she has had 3 episodes of diarrhea. Pt denies dark black stools or blood in stool. Pt denies urinary symptoms and dysuria. Pt denies hematuria or discharge from the vaginal area. Pt reports intermittent head pressure. Pt denies taking medication for pain prior to arrival to ED. Pt reports that she spoke with her neurologist today and was told to come to the ED. Pt has a hx autonomic dysfunction. Pt has a pulse ox and heart rate monitor that she bought from [employer] because "I choose to monitor my heart." Pt reports that she has been having various symptoms these past couple of months and she feels like she is declining.

## 2018-04-10 NOTE — ED BEHAVIORAL HEALTH ASSESSMENT NOTE - DESCRIPTION
Pt thin, disheveled, wearing a mask, wearing a pulse ox (pt's not hospital's), presents as irritable and perseverative about health problems and worried about taking her Cardizem 45 minutes late, pt took her own blood pressure from a cuff she carries around with her.  Pt later agitated she is being admitted, received Ativan 2mg IM. Postural Orthostatic Tachycardia Syndrome, ITP Currently unemployed, recently no longer allowed to stay with parents, living in an airbnb with a night nurse to monitoring her breathing while she sleeps. Patient was anxious but cooperative in the ED and did not exhibit any aggression. Pt did not require any prn medications or any physical restraints.    Vital Signs Last 24 Hrs  T(C): 36.8 (10 Apr 2018 16:22), Max: 36.8 (10 Apr 2018 16:22)  T(F): 98.2 (10 Apr 2018 16:22), Max: 98.2 (10 Apr 2018 16:22)  HR: 87 (10 Apr 2018 16:22) (87 - 87)  BP: 123/81 (10 Apr 2018 16:22) (123/81 - 123/81)  BP(mean): --  RR: 18 (10 Apr 2018 16:22) (18 - 18)  SpO2: 99% (10 Apr 2018 16:22) (99% - 99%) "digestive issues", ITP, Dysautonomia, POTS, Asthma, recent dx of Anemia Single, childless, currently unemployed/awaiting disability, recently no longer allowed to stay with parents, living in an [facility]

## 2018-04-10 NOTE — ED BEHAVIORAL HEALTH NOTE - BEHAVIORAL HEALTH NOTE
ISTOP Reference #: [id number]     03/04/2018 04/02/2018 lorazepam 1 mg tablet  20 4 Cali Urias MD     03/26/2018 03/27/2018 lorazepam 2 mg tablet  145 29 Lorena Ford MD     01/03/2018 01/03/2018 lorazepam 2 mg tablet  150 30 Lorena Ford MD     11/22/2017 11/22/2017 lorazepam 2 mg tablet  150 30 Lorena Ford MD     10/16/2017 10/19/2017 lorazepam 1 mg tablet  180 30 Lorena Ford MD     10/15/2017 10/15/2017 lorazepam 1 mg tablet  40 5 MarchJb verma MD     08/27/2017 08/27/2017 diphenoxylate-atropine 2.5-0.025 mg tablet  240 30 MarchJb verma MD     08/25/2017 08/25/2017 lorazepam 1 mg tablet  180 30 Ludwin Arango MD     06/21/2017 06/21/2017 lorazepam 1 mg tablet  180 30 Lorena Ford MD     06/14/2017 06/14/2017 lorazepam 1 mg tablet  10 5 Murray-Roberto, Zeta M NP     06/14/2017 06/14/2017 lorazepam 0.5 mg tablet  15 5 Adele, Zeta M NP     05/31/2017 05/31/2017 clonazepam 0.5 mg tablet  120 30 Lorena Ford MD     05/06/2017 05/06/2017 alprazolam 0.5 mg tablet  60 20 Ludwin Arango MD     02/26/2018 02/26/2018 lorazepam 2 mg tablet  150 30 Lorena Ford MD

## 2018-04-10 NOTE — ED BEHAVIORAL HEALTH ASSESSMENT NOTE - ACTIVATING EVENTS/STRESSORS
Non-compliant with treatment/Current or pending isolation Current or pending isolation/Recent losses

## 2018-04-10 NOTE — ED PROVIDER NOTE - PHYSICAL EXAMINATION
Physical Exam: middle aged F who is anxious-appearing, rapid speech, but normal content, AAOx3, NCAT, dry mucosal membranes, neck is supple, PERRL, CTAB, normal rate and regular rhythm, abdomen is soft and NTND, no guarding or rigidity, No edema, No deformity of extremities, No rashes, CN grossly intact, No focal motor or sensory deficits. ~ Randolph Henderson MD

## 2018-04-10 NOTE — ED BEHAVIORAL HEALTH ASSESSMENT NOTE - RISK ASSESSMENT
Although pt is not suicidal or homicidal, given pt's weight loss, reported risk of needing enteral feeding tube, poor ADLs, and financial damage to self due to excessive preoccupation with health symptoms over and above what is expected from her diagnoses of POTS and ITP, with her health perseverations preventing her from accessing appropriate healthcare (refuses antipsychotic meds due to feeling she is hypersensitive to side effects), pt requires inpatient psychiatric hospitalization for safety, stabilization and medication management. Risk factors: Single, chronic mental illness with severe anxiety, prior hospitalizations, occupational decline,  limited insight into symptoms, poor reactivity to stressors, difficulty with medications due to somatic preoccupations, minimal familial supports (emotional and financial).     Protective factors: Young, denies any active suicidal ideation/intent/plan, no hx of prior attempts, no self-harm behaviors, no family hx, has no acute depressive or psychotic disorder/symptoms, identifies reasons for living, future oriented, positive therapeutic relationships, highly help-seeking, is medication/follow up compliant, no active substance use, no access to firearms, no legal issues, no hx of abuse and adequate outpatient follow up with motivation to participate in care.     Based on risk assessment evaluation, Pt does not appear to be at imminent risk of harm to self or others at this time.

## 2018-04-10 NOTE — ED BEHAVIORAL HEALTH ASSESSMENT NOTE - OTHER
[address] Robles air-bnb With other family CVM records thin deferred With another family in the [facility] Rapid but not pressured Chronically impaired but showing some improvement compared to prior visits Extensive outpatient treatment n/a

## 2018-04-10 NOTE — ED PROVIDER NOTE - ATTENDING CONTRIBUTION TO CARE
43 y/o female with the above documented history who presents with a multitude of somatic complaints none of which appear acute, emergent or even physically related. On exam, she appears comfortable but has pressured speech and is preoccupied. All screening studies ordered thus far are unremarkable. At this point, I do not suspect any acute or emergent organic process. She does have a relatively recent admission for psychiatric reasons. She is to be evaluated by Psychiatry. Barring a significantly abnormal finding from her CBC (known to be anemic and thrombocytopenic) that has not yet resulted, she will be cleared from our perspective.

## 2018-04-10 NOTE — ED BEHAVIORAL HEALTH ASSESSMENT NOTE - CASE SUMMARY
Patient is a 42 year old female, PPH of Illness Anxiety d/o, current outpatient treatment with Dr. Robles and SW at [facility], with a past medical history of "digestive issues", ITP, Dysautonomia, POTS, Asthma, recent dx of Anemia, brought in by self, with chronic somatic preoccupations consistent with her dx of Illness Anxiety d/o. She is currently followed by providers at [facility] and has extensive services - biweekly medication management appointments, individual therapy weekly, group therapy weekly. She is not currently depressed, denies suicidal ideations, intent or plans.   Patient is perseverative about medical issues but does not represent a danger to self or others.  Discharge home with outpt. services.

## 2018-04-10 NOTE — ED BEHAVIORAL HEALTH ASSESSMENT NOTE - SUMMARY
Lashonda Presley is a 43 y/o woman with PMH of Postural Orthostatic Tachycardia Syndrome, ITP with PPHx of Delusional Disorder, somatic type, continuous and Personality Disorder NOS and with history of treatment since 06/2017 first at [facility], then at [facility] ([facility]), then at [facility], then inpatient last month 01/12/18 - 01/26/18, and most recently at [facility], pt with no known substance histroy, violent or elgal history or h/o SAs.  Pt with history of frequent visits to ED's for somatic complaints, pt being sent to ED by NP at [facility] for psychiatric evaluation and recommendation for inpatient admission as her somatic delusions are interfering with her ability to care for herself.    Given pt's weight loss, reported risk of needing enteral feeding tube, poor ADLs, and financial damage to self due to excessive preoccupation with health symptoms over and above what is expected from her diagnoses of POTS and ITP, with her health perseverations preventing her from accessing appropriate healthcare (refuses antipsychotic meds due to feeling she is hypersensitive to side effects), pt requires inpatient psychiatric hospitalization for safety, stabilization and medication management.    Discussed case with Dr. Myers at [facility], given pt's excessive somatic anxiety, she recommends holding transfer until the morning when medical internist staff are available.  Original bed on Low [address] cannot be held overnight, however there are plenty of beds currently available and pt will have a bed at [facility] in the morning.  Will signout this plan to ED and [facility] Psychiatry staff. Patient is a 42 year old female, PPH of Illness Anxiety d/o, current outpatient treatment with Dr. Robles and SW at [facility], with a past medical history of "digestive issues", ITP, Dysautonomia, POTS, Asthma, recent dx of Anemia, brought in by self, with chronic somatic preoccupations consistent with her dx of Illness Anxiety d/o. She is currently followed by providers at [facility] and has extensive services - biweekly medication management appointments, individual therapy weekly, group therapy weekly. She is not currently depressed, denies suicidal ideations, intent or plans. Is not acutely manic or psychotic. She is actively working on improving her nutrition, noted to have numerous food items in hospital room. Of note, pt appears to be greatly improved compared to prior ER assessments which resulted in inpatient admissions (was previously malnourished, malodorous, unkempt but not today). No safety concerns from outpatient provider or father. No indication for inpatient hospitalization at this time as pt is not an acute risk of harm to self/others. Family and patient advised to return to ED or call 911 for any worsening symptoms or safety concerns. Pt to follow up with her already scheduled outpatient appointments.

## 2018-04-11 DIAGNOSIS — F60.9 PERSONALITY DISORDER, UNSPECIFIED: ICD-10-CM

## 2018-04-11 DIAGNOSIS — F45.21 HYPOCHONDRIASIS: ICD-10-CM

## 2018-04-11 LAB
BASOPHILS # BLD AUTO: 0 K/UL — SIGNIFICANT CHANGE UP (ref 0–0.2)
BASOPHILS NFR BLD AUTO: 0.1 % — SIGNIFICANT CHANGE UP (ref 0–2)
CARDIOLIPIN IGM SER-MCNC: <5 GPL
EOSINOPHIL # BLD AUTO: 0.1 K/UL — SIGNIFICANT CHANGE UP (ref 0–0.5)
EOSINOPHIL NFR BLD AUTO: 0.7 % — SIGNIFICANT CHANGE UP (ref 0–6)
HCT VFR BLD CALC: 32.3 % — LOW (ref 34.5–45)
HGB BLD-MCNC: 11.1 G/DL — LOW (ref 11.5–15.5)
LYMPHOCYTES # BLD AUTO: 29.4 % — SIGNIFICANT CHANGE UP (ref 13–44)
LYMPHOCYTES # BLD AUTO: 3 K/UL — SIGNIFICANT CHANGE UP (ref 1–3.3)
MCHC RBC-ENTMCNC: 28.1 PG — SIGNIFICANT CHANGE UP (ref 27–34)
MCHC RBC-ENTMCNC: 34.3 GM/DL — SIGNIFICANT CHANGE UP (ref 32–36)
MCV RBC AUTO: 81.8 FL — SIGNIFICANT CHANGE UP (ref 80–100)
MONOCYTES # BLD AUTO: 0.9 K/UL — SIGNIFICANT CHANGE UP (ref 0–0.9)
MONOCYTES NFR BLD AUTO: 8.5 % — SIGNIFICANT CHANGE UP (ref 2–14)
NEUTROPHILS # BLD AUTO: 6.2 K/UL — SIGNIFICANT CHANGE UP (ref 1.8–7.4)
NEUTROPHILS NFR BLD AUTO: 61.3 % — SIGNIFICANT CHANGE UP (ref 43–77)
PLATELET # BLD AUTO: 80 K/UL — LOW (ref 150–400)
RBC # BLD: 3.96 M/UL — SIGNIFICANT CHANGE UP (ref 3.8–5.2)
RBC # FLD: 11.7 % — SIGNIFICANT CHANGE UP (ref 10.3–14.5)
VIT B1 SERPL-MCNC: 128.9 NMOL/L
VIT B6 SERPL-MCNC: 15.5 UG/L
WBC # BLD: 10 K/UL — SIGNIFICANT CHANGE UP (ref 3.8–10.5)
WBC # FLD AUTO: 10 K/UL — SIGNIFICANT CHANGE UP (ref 3.8–10.5)

## 2018-04-12 ENCOUNTER — APPOINTMENT (OUTPATIENT)
Dept: NEUROLOGY | Facility: CLINIC | Age: 43
End: 2018-04-12
Payer: MEDICAID

## 2018-04-12 PROCEDURE — 95923 AUTONOMIC NRV SYST FUNJ TEST: CPT

## 2018-04-12 PROCEDURE — 95885 MUSC TST DONE W/NERV TST LIM: CPT

## 2018-04-12 PROCEDURE — 95910 NRV CNDJ TEST 7-8 STUDIES: CPT

## 2018-04-18 ENCOUNTER — APPOINTMENT (OUTPATIENT)
Dept: NEUROLOGY | Facility: CLINIC | Age: 43
End: 2018-04-18
Payer: MEDICAID

## 2018-04-18 VITALS
HEART RATE: 92 BPM | OXYGEN SATURATION: 99 % | DIASTOLIC BLOOD PRESSURE: 70 MMHG | SYSTOLIC BLOOD PRESSURE: 120 MMHG | HEIGHT: 62 IN | WEIGHT: 92 LBS | BODY MASS INDEX: 16.93 KG/M2

## 2018-04-18 PROCEDURE — 99215 OFFICE O/P EST HI 40 MIN: CPT

## 2018-04-18 RX ORDER — LORAZEPAM 2 MG/1
2 TABLET ORAL
Qty: 145 | Refills: 0 | Status: DISCONTINUED | COMMUNITY
Start: 2017-10-16 | End: 2018-04-18

## 2018-04-18 RX ORDER — ARIPIPRAZOLE 2 MG/1
2 TABLET ORAL
Qty: 15 | Refills: 0 | Status: DISCONTINUED | COMMUNITY
Start: 2018-02-09 | End: 2018-04-18

## 2018-04-18 RX ORDER — TRETINOIN 0.25 MG/G
0.03 CREAM TOPICAL
Qty: 1 | Refills: 2 | Status: DISCONTINUED | COMMUNITY
Start: 2018-03-06 | End: 2018-04-18

## 2018-04-19 ENCOUNTER — APPOINTMENT (OUTPATIENT)
Dept: INTERNAL MEDICINE | Facility: CLINIC | Age: 43
End: 2018-04-19
Payer: MEDICAID

## 2018-04-19 VITALS
SYSTOLIC BLOOD PRESSURE: 110 MMHG | TEMPERATURE: 98 F | WEIGHT: 93 LBS | BODY MASS INDEX: 17.11 KG/M2 | HEIGHT: 62 IN | DIASTOLIC BLOOD PRESSURE: 70 MMHG

## 2018-04-19 DIAGNOSIS — R76.8 OTHER SPECIFIED ABNORMAL IMMUNOLOGICAL FINDINGS IN SERUM: ICD-10-CM

## 2018-04-19 DIAGNOSIS — L70.9 ACNE, UNSPECIFIED: ICD-10-CM

## 2018-04-19 DIAGNOSIS — F13.20 SEDATIVE, HYPNOTIC OR ANXIOLYTIC DEPENDENCE, UNCOMPLICATED: ICD-10-CM

## 2018-04-19 PROCEDURE — 36415 COLL VENOUS BLD VENIPUNCTURE: CPT

## 2018-04-19 PROCEDURE — 99214 OFFICE O/P EST MOD 30 MIN: CPT | Mod: 25

## 2018-04-19 RX ORDER — FAMOTIDINE 20 MG/1
20 TABLET, FILM COATED ORAL
Qty: 60 | Refills: 0 | Status: COMPLETED | COMMUNITY
Start: 2017-06-14 | End: 2018-04-19

## 2018-04-20 ENCOUNTER — APPOINTMENT (OUTPATIENT)
Dept: DERMATOLOGY | Facility: CLINIC | Age: 43
End: 2018-04-20
Payer: MEDICAID

## 2018-04-20 ENCOUNTER — LABORATORY RESULT (OUTPATIENT)
Age: 43
End: 2018-04-20

## 2018-04-20 ENCOUNTER — MESSAGE (OUTPATIENT)
Age: 43
End: 2018-04-20

## 2018-04-20 ENCOUNTER — RX RENEWAL (OUTPATIENT)
Age: 43
End: 2018-04-20

## 2018-04-20 VITALS — SYSTOLIC BLOOD PRESSURE: 106 MMHG | DIASTOLIC BLOOD PRESSURE: 78 MMHG

## 2018-04-20 DIAGNOSIS — L70.8 OTHER ACNE: ICD-10-CM

## 2018-04-20 LAB
A-TUMOR NECROSIS FACT SERPL-MCNC: <5 PG/ML
GD1A GANGL IGG TITR SER IA: NORMAL
GD1A GANGL IGM TITR SER IA: NORMAL
GD1B GANGL IGG TITR SER: NORMAL
GD1B GANGL IGM TITR SER: NORMAL
GM1 ASIALO IGG TITR SER: NORMAL
GM1 ASIALO IGM TITR SER: NORMAL
IL10 SERPL-MCNC: <5 PG/ML
IL12 SERPL-MCNC: <5 PG/ML
IL13 SERPL-MCNC: <5 PG/ML
IL2 SERPL-MCNC: 16 PG/ML
IL2 SERPL-MCNC: <5 PG/ML
IL4 SERPL-MCNC: <5 PG/ML
IL6 SERPL-MCNC: <5 PG/ML
IL8 SERPL-MCNC: <5 PG/ML
INTERFERON GAMMA: <5 PG/ML
INTERLEUKIN 1 BETA: <5 PG/ML
INTERLEUKIN 17: <5 PG/ML
INTERLEUKIN 5: <5 PG/ML
PARANEOPLASTIC AB PNL SER: NORMAL

## 2018-04-20 PROCEDURE — 99214 OFFICE O/P EST MOD 30 MIN: CPT

## 2018-04-20 RX ORDER — LORAZEPAM 2 MG/1
2 TABLET ORAL
Refills: 0 | Status: DISCONTINUED | COMMUNITY
End: 2018-04-20

## 2018-04-21 ENCOUNTER — OUTPATIENT (OUTPATIENT)
Dept: OUTPATIENT SERVICES | Facility: HOSPITAL | Age: 43
LOS: 1 days | End: 2018-04-21

## 2018-04-21 ENCOUNTER — APPOINTMENT (OUTPATIENT)
Dept: MRI IMAGING | Facility: CLINIC | Age: 43
End: 2018-04-21
Payer: MEDICAID

## 2018-04-21 PROCEDURE — 75565 CARD MRI VELOC FLOW MAPPING: CPT | Mod: 26

## 2018-04-21 PROCEDURE — 75557 CARDIAC MRI FOR MORPH: CPT | Mod: 26

## 2018-04-23 ENCOUNTER — MEDICATION RENEWAL (OUTPATIENT)
Age: 43
End: 2018-04-23

## 2018-04-23 LAB
25(OH)D3 SERPL-MCNC: 17.8 NG/ML
ALBUMIN SERPL ELPH-MCNC: 4.7 G/DL
ALP BLD-CCNC: 83 U/L
ALT SERPL-CCNC: 12 U/L
AMYLASE/CREAT SERPL: 81 U/L
ANA PAT FLD IF-IMP: ABNORMAL
ANA SER IF-ACNC: ABNORMAL
ANION GAP SERPL CALC-SCNC: 18 MMOL/L
AST SERPL-CCNC: 19 U/L
BASOPHILS # BLD AUTO: 0 K/UL
BASOPHILS NFR BLD AUTO: 0 %
BILIRUB SERPL-MCNC: 0.2 MG/DL
BUN SERPL-MCNC: 7 MG/DL
CALCIUM SERPL-MCNC: 9.6 MG/DL
CHLORIDE SERPL-SCNC: 101 MMOL/L
CO2 SERPL-SCNC: 22 MMOL/L
CREAT SERPL-MCNC: 0.82 MG/DL
CRP SERPL-MCNC: <0.2 MG/DL
EOSINOPHIL # BLD AUTO: 0.08 K/UL
EOSINOPHIL NFR BLD AUTO: 0.9 %
ERYTHROCYTE [SEDIMENTATION RATE] IN BLOOD BY WESTERGREN METHOD: 9 MM/HR
FERRITIN SERPL-MCNC: 14 NG/ML
FOLATE SERPL-MCNC: 10.4 NG/ML
GLUCOSE SERPL-MCNC: 80 MG/DL
HCT VFR BLD CALC: 36.6 %
HGB BLD-MCNC: 12.1 G/DL
IRON SATN MFR SERPL: 5 %
IRON SERPL-MCNC: 25 UG/DL
LPL SERPL-CCNC: 35 U/L
LYMPHOCYTES # BLD AUTO: 1.98 K/UL
LYMPHOCYTES NFR BLD AUTO: 23.4 %
MAN DIFF?: NORMAL
MCHC RBC-ENTMCNC: 26.7 PG
MCHC RBC-ENTMCNC: 33.1 GM/DL
MCV RBC AUTO: 80.8 FL
MONOCYTES # BLD AUTO: 0.55 K/UL
MONOCYTES NFR BLD AUTO: 6.5 %
NEUTROPHILS # BLD AUTO: 5.85 K/UL
NEUTROPHILS NFR BLD AUTO: 64.5 %
PLATELET # BLD AUTO: NORMAL K/UL
POTASSIUM SERPL-SCNC: 4.4 MMOL/L
PROT SERPL-MCNC: 7.6 G/DL
RBC # BLD: 4.53 M/UL
RBC # FLD: 13.1 %
RHEUMATOID FACT SER QL: 8 IU/ML
SODIUM SERPL-SCNC: 141 MMOL/L
T3FREE SERPL-MCNC: 2.9 PG/ML
T4 FREE SERPL-MCNC: 1.3 NG/DL
TIBC SERPL-MCNC: 497 UG/DL
TSH SERPL-ACNC: 1.3 UIU/ML
UIBC SERPL-MCNC: 472 UG/DL
VIT B12 SERPL-MCNC: 524 PG/ML
WBC # FLD AUTO: 8.45 K/UL

## 2018-04-25 ENCOUNTER — RESULT REVIEW (OUTPATIENT)
Age: 43
End: 2018-04-25

## 2018-04-25 ENCOUNTER — MOBILE ON CALL (OUTPATIENT)
Age: 43
End: 2018-04-25

## 2018-04-27 ENCOUNTER — RESULT REVIEW (OUTPATIENT)
Age: 43
End: 2018-04-27

## 2018-04-27 ENCOUNTER — OTHER (OUTPATIENT)
Age: 43
End: 2018-04-27

## 2018-04-27 ENCOUNTER — OUTPATIENT (OUTPATIENT)
Dept: OUTPATIENT SERVICES | Facility: HOSPITAL | Age: 43
LOS: 1 days | Discharge: HOME | End: 2018-04-27

## 2018-04-27 DIAGNOSIS — D47.3 ESSENTIAL (HEMORRHAGIC) THROMBOCYTHEMIA: ICD-10-CM

## 2018-04-30 PROBLEM — Z80.0 FAMILY HISTORY OF MALIGNANT NEOPLASM OF COLON: Status: ACTIVE | Noted: 2018-04-30

## 2018-04-30 PROBLEM — Z80.9 FAMILY HISTORY OF MALIGNANT NEOPLASM: Status: ACTIVE | Noted: 2018-04-30

## 2018-04-30 PROBLEM — Z80.1 FAMILY HISTORY OF LUNG CANCER: Status: ACTIVE | Noted: 2018-04-30

## 2018-04-30 PROBLEM — Z80.42 FAMILY HISTORY OF MALIGNANT NEOPLASM OF PROSTATE: Status: ACTIVE | Noted: 2018-04-30

## 2018-04-30 PROBLEM — Z80.8 FAMILY HISTORY OF MALIGNANT NEOPLASM OF BRAIN: Status: ACTIVE | Noted: 2018-04-30

## 2018-05-01 ENCOUNTER — RESULT REVIEW (OUTPATIENT)
Age: 43
End: 2018-05-01

## 2018-05-01 ENCOUNTER — APPOINTMENT (OUTPATIENT)
Dept: HEMATOLOGY ONCOLOGY | Facility: CLINIC | Age: 43
End: 2018-05-01
Payer: MEDICAID

## 2018-05-01 VITALS
DIASTOLIC BLOOD PRESSURE: 84 MMHG | RESPIRATION RATE: 16 BRPM | HEART RATE: 115 BPM | WEIGHT: 96.34 LBS | BODY MASS INDEX: 17.62 KG/M2 | SYSTOLIC BLOOD PRESSURE: 129 MMHG | TEMPERATURE: 98.1 F | OXYGEN SATURATION: 98 %

## 2018-05-01 LAB
BASOPHILS # BLD AUTO: 0 K/UL — SIGNIFICANT CHANGE UP (ref 0–0.2)
BASOPHILS NFR BLD AUTO: 0.1 % — SIGNIFICANT CHANGE UP (ref 0–2)
EOSINOPHIL # BLD AUTO: 0 K/UL — SIGNIFICANT CHANGE UP (ref 0–0.5)
EOSINOPHIL NFR BLD AUTO: 0.5 % — SIGNIFICANT CHANGE UP (ref 0–6)
HCT VFR BLD CALC: 30.7 % — LOW (ref 34.5–45)
HGB BLD-MCNC: 10.5 G/DL — LOW (ref 11.5–15.5)
LYMPHOCYTES # BLD AUTO: 1.3 K/UL — SIGNIFICANT CHANGE UP (ref 1–3.3)
LYMPHOCYTES # BLD AUTO: 18 % — SIGNIFICANT CHANGE UP (ref 13–44)
MCHC RBC-ENTMCNC: 27.3 PG — SIGNIFICANT CHANGE UP (ref 27–34)
MCHC RBC-ENTMCNC: 34.1 G/DL — SIGNIFICANT CHANGE UP (ref 32–36)
MCV RBC AUTO: 79.8 FL — LOW (ref 80–100)
MONOCYTES # BLD AUTO: 0.6 K/UL — SIGNIFICANT CHANGE UP (ref 0–0.9)
MONOCYTES NFR BLD AUTO: 7.8 % — SIGNIFICANT CHANGE UP (ref 2–14)
NEUTROPHILS # BLD AUTO: 5.2 K/UL — SIGNIFICANT CHANGE UP (ref 1.8–7.4)
NEUTROPHILS NFR BLD AUTO: 73.6 % — SIGNIFICANT CHANGE UP (ref 43–77)
PLATELET # BLD AUTO: 98 K/UL — LOW (ref 150–400)
RBC # BLD: 3.84 M/UL — SIGNIFICANT CHANGE UP (ref 3.8–5.2)
RBC # FLD: 11.7 % — SIGNIFICANT CHANGE UP (ref 10.3–14.5)
RETICS #: 58.4 K/UL — SIGNIFICANT CHANGE UP (ref 25–125)
RETICS/RBC NFR: 1.5 % — SIGNIFICANT CHANGE UP (ref 0.5–2.5)
WBC # BLD: 7.1 K/UL — SIGNIFICANT CHANGE UP (ref 3.8–10.5)
WBC # FLD AUTO: 7.1 K/UL — SIGNIFICANT CHANGE UP (ref 3.8–10.5)

## 2018-05-01 PROCEDURE — 99214 OFFICE O/P EST MOD 30 MIN: CPT

## 2018-05-01 RX ORDER — MUPIROCIN 2 G/100G
2 CREAM TOPICAL
Qty: 30 | Refills: 0 | Status: DISCONTINUED | COMMUNITY
Start: 2018-03-01 | End: 2018-05-01

## 2018-05-05 ENCOUNTER — MOBILE ON CALL (OUTPATIENT)
Age: 43
End: 2018-05-05

## 2018-05-07 ENCOUNTER — MEDICATION RENEWAL (OUTPATIENT)
Age: 43
End: 2018-05-07

## 2018-05-10 ENCOUNTER — APPOINTMENT (OUTPATIENT)
Dept: CARDIOLOGY | Facility: CLINIC | Age: 43
End: 2018-05-10

## 2018-05-15 ENCOUNTER — APPOINTMENT (OUTPATIENT)
Dept: CARDIOLOGY | Facility: CLINIC | Age: 43
End: 2018-05-15
Payer: MEDICAID

## 2018-05-15 ENCOUNTER — NON-APPOINTMENT (OUTPATIENT)
Age: 43
End: 2018-05-15

## 2018-05-15 VITALS
BODY MASS INDEX: 17.16 KG/M2 | DIASTOLIC BLOOD PRESSURE: 82 MMHG | HEART RATE: 76 BPM | OXYGEN SATURATION: 100 % | WEIGHT: 93.8 LBS | SYSTOLIC BLOOD PRESSURE: 120 MMHG

## 2018-05-15 DIAGNOSIS — R09.89 OTHER SPECIFIED SYMPTOMS AND SIGNS INVOLVING THE CIRCULATORY AND RESPIRATORY SYSTEMS: ICD-10-CM

## 2018-05-15 PROCEDURE — 99215 OFFICE O/P EST HI 40 MIN: CPT

## 2018-05-15 RX ORDER — MIRTAZAPINE 15 MG/1
15 TABLET, FILM COATED ORAL
Qty: 15 | Refills: 0 | Status: DISCONTINUED | COMMUNITY
Start: 2018-03-15

## 2018-05-15 RX ORDER — SERTRALINE 25 MG/1
25 TABLET, FILM COATED ORAL
Qty: 8 | Refills: 0 | Status: DISCONTINUED | COMMUNITY
Start: 2018-04-16 | End: 2018-05-15

## 2018-05-19 ENCOUNTER — APPOINTMENT (OUTPATIENT)
Dept: NEUROLOGY | Facility: CLINIC | Age: 43
End: 2018-05-19

## 2018-05-23 ENCOUNTER — RESULT REVIEW (OUTPATIENT)
Age: 43
End: 2018-05-23

## 2018-05-23 ENCOUNTER — EMERGENCY (EMERGENCY)
Facility: HOSPITAL | Age: 43
LOS: 1 days | Discharge: HOME | End: 2018-05-23
Attending: EMERGENCY MEDICINE
Payer: MEDICAID

## 2018-05-23 ENCOUNTER — MEDICATION RENEWAL (OUTPATIENT)
Age: 43
End: 2018-05-23

## 2018-05-23 VITALS
HEART RATE: 79 BPM | WEIGHT: 93.92 LBS | TEMPERATURE: 98 F | OXYGEN SATURATION: 99 % | RESPIRATION RATE: 18 BRPM | DIASTOLIC BLOOD PRESSURE: 93 MMHG | SYSTOLIC BLOOD PRESSURE: 128 MMHG | HEIGHT: 62 IN

## 2018-05-23 PROCEDURE — 99284 EMERGENCY DEPT VISIT MOD MDM: CPT | Mod: 25

## 2018-05-23 PROCEDURE — 93010 ELECTROCARDIOGRAM REPORT: CPT

## 2018-05-23 NOTE — ED ADULT TRIAGE NOTE - CHIEF COMPLAINT QUOTE
abnormal labs/pt seen at [facility] today for tachy event states she has high NA

## 2018-05-23 NOTE — ED ADULT NURSE NOTE - CHIEF COMPLAINT QUOTE
abnormal labs/pt seen at [facility] today for tachy event states she has high NA

## 2018-05-23 NOTE — ED ADULT NURSE NOTE - OBJECTIVE STATEMENT
41 y/o female PMH anxiety/depression, dysautonomia (says she was diagnosed earlier this year) presents to ED c/o abdnomal labs (hpyernatremia) at [place] earlier this evening. Pt was discharged and she felt that "they didn't do anything for me." She was found to have sodium of 147 and hemoglobin in the 10s. She was told to drink more water. She then began feeling tingling and numbness in fingers as well as chest discomfort, which is why she presented to [facility]. She is currently wearing pryor monitor by her cardiologist. Lungs clear b.l. Skin warm, dry, intact. Gross motor and neuro intact. 22G IV placed in L forearm. Safety and comfort provided.

## 2018-05-23 NOTE — ED PROVIDER NOTE - PMH
Also please advise her to continue checking on the increased dose and call if numbers continue to >160   Anxiety    Idiopathic thrombocytopenia purpura    POTS (postural orthostatic tachycardia syndrome)

## 2018-05-24 ENCOUNTER — APPOINTMENT (OUTPATIENT)
Dept: NEPHROLOGY | Facility: CLINIC | Age: 43
End: 2018-05-24
Payer: MEDICAID

## 2018-05-24 VITALS
DIASTOLIC BLOOD PRESSURE: 82 MMHG | RESPIRATION RATE: 18 BRPM | SYSTOLIC BLOOD PRESSURE: 117 MMHG | OXYGEN SATURATION: 99 % | HEART RATE: 78 BPM

## 2018-05-24 LAB
ALBUMIN SERPL ELPH-MCNC: 4.4 G/DL — SIGNIFICANT CHANGE UP (ref 3.3–5)
ALP SERPL-CCNC: 78 U/L — SIGNIFICANT CHANGE UP (ref 40–120)
ALT FLD-CCNC: 13 U/L — SIGNIFICANT CHANGE UP (ref 10–45)
ANION GAP SERPL CALC-SCNC: 12 MMOL/L — SIGNIFICANT CHANGE UP (ref 5–17)
APPEARANCE UR: CLEAR — SIGNIFICANT CHANGE UP
AST SERPL-CCNC: 20 U/L — SIGNIFICANT CHANGE UP (ref 10–40)
BACTERIA # UR AUTO: ABNORMAL /HPF
BASOPHILS # BLD AUTO: 0 K/UL — SIGNIFICANT CHANGE UP (ref 0–0.2)
BILIRUB SERPL-MCNC: 0.3 MG/DL — SIGNIFICANT CHANGE UP (ref 0.2–1.2)
BILIRUB UR-MCNC: NEGATIVE — SIGNIFICANT CHANGE UP
BUN SERPL-MCNC: 5 MG/DL — LOW (ref 7–23)
CALCIUM SERPL-MCNC: 9.1 MG/DL — SIGNIFICANT CHANGE UP (ref 8.4–10.5)
CHLORIDE SERPL-SCNC: 99 MMOL/L — SIGNIFICANT CHANGE UP (ref 96–108)
CO2 SERPL-SCNC: 25 MMOL/L — SIGNIFICANT CHANGE UP (ref 22–31)
COLOR SPEC: COLORLESS — SIGNIFICANT CHANGE UP
CREAT SERPL-MCNC: 0.83 MG/DL — SIGNIFICANT CHANGE UP (ref 0.5–1.3)
DIFF PNL FLD: NEGATIVE — SIGNIFICANT CHANGE UP
EOSINOPHIL # BLD AUTO: 0 K/UL — SIGNIFICANT CHANGE UP (ref 0–0.5)
EPI CELLS # UR: SIGNIFICANT CHANGE UP /HPF
GLUCOSE SERPL-MCNC: 89 MG/DL — SIGNIFICANT CHANGE UP (ref 70–99)
GLUCOSE UR QL: NEGATIVE — SIGNIFICANT CHANGE UP
HCT VFR BLD CALC: 30.7 % — LOW (ref 34.5–45)
HGB BLD-MCNC: 10.4 G/DL — LOW (ref 11.5–15.5)
KETONES UR-MCNC: NEGATIVE — SIGNIFICANT CHANGE UP
LEUKOCYTE ESTERASE UR-ACNC: NEGATIVE — SIGNIFICANT CHANGE UP
LYMPHOCYTES # BLD AUTO: 2.9 K/UL — SIGNIFICANT CHANGE UP (ref 1–3.3)
LYMPHOCYTES # BLD AUTO: 41 % — SIGNIFICANT CHANGE UP (ref 13–44)
MAGNESIUM SERPL-MCNC: 1.9 MG/DL — SIGNIFICANT CHANGE UP (ref 1.6–2.6)
MCHC RBC-ENTMCNC: 26.8 PG — LOW (ref 27–34)
MCHC RBC-ENTMCNC: 34 GM/DL — SIGNIFICANT CHANGE UP (ref 32–36)
MCV RBC AUTO: 78.8 FL — LOW (ref 80–100)
MONOCYTES # BLD AUTO: 0.7 K/UL — SIGNIFICANT CHANGE UP (ref 0–0.9)
MONOCYTES NFR BLD AUTO: 7 % — SIGNIFICANT CHANGE UP (ref 2–14)
NEUTROPHILS # BLD AUTO: 3.8 K/UL — SIGNIFICANT CHANGE UP (ref 1.8–7.4)
NEUTROPHILS NFR BLD AUTO: 52 % — SIGNIFICANT CHANGE UP (ref 43–77)
NITRITE UR-MCNC: NEGATIVE — SIGNIFICANT CHANGE UP
PH UR: 7 — SIGNIFICANT CHANGE UP (ref 5–8)
PHOSPHATE SERPL-MCNC: 4.1 MG/DL — SIGNIFICANT CHANGE UP (ref 2.5–4.5)
PLAT MORPH BLD: NORMAL — SIGNIFICANT CHANGE UP
PLATELET # BLD AUTO: 72 K/UL — LOW (ref 150–400)
POTASSIUM SERPL-MCNC: 3.2 MMOL/L — LOW (ref 3.5–5.3)
POTASSIUM SERPL-SCNC: 3.2 MMOL/L — LOW (ref 3.5–5.3)
PROT SERPL-MCNC: 6.6 G/DL — SIGNIFICANT CHANGE UP (ref 6–8.3)
PROT UR-MCNC: NEGATIVE — SIGNIFICANT CHANGE UP
RBC # BLD: 3.9 M/UL — SIGNIFICANT CHANGE UP (ref 3.8–5.2)
RBC # FLD: 12.1 % — SIGNIFICANT CHANGE UP (ref 10.3–14.5)
RBC BLD AUTO: SIGNIFICANT CHANGE UP
SODIUM SERPL-SCNC: 136 MMOL/L — SIGNIFICANT CHANGE UP (ref 135–145)
SP GR SPEC: <1.005 — LOW (ref 1.01–1.02)
UROBILINOGEN FLD QL: NEGATIVE — SIGNIFICANT CHANGE UP
WBC # BLD: 7.5 K/UL — SIGNIFICANT CHANGE UP (ref 3.8–10.5)
WBC # FLD AUTO: 7.5 K/UL — SIGNIFICANT CHANGE UP (ref 3.8–10.5)

## 2018-05-24 PROCEDURE — 81001 URINALYSIS AUTO W/SCOPE: CPT

## 2018-05-24 PROCEDURE — 93784 AMBL BP MNTR W/SOFTWARE: CPT | Mod: GC

## 2018-05-24 PROCEDURE — 83735 ASSAY OF MAGNESIUM: CPT

## 2018-05-24 PROCEDURE — 84100 ASSAY OF PHOSPHORUS: CPT

## 2018-05-24 PROCEDURE — 93005 ELECTROCARDIOGRAM TRACING: CPT

## 2018-05-24 PROCEDURE — 80053 COMPREHEN METABOLIC PANEL: CPT

## 2018-05-24 PROCEDURE — 99283 EMERGENCY DEPT VISIT LOW MDM: CPT

## 2018-05-24 PROCEDURE — 85027 COMPLETE CBC AUTOMATED: CPT

## 2018-05-24 RX ORDER — POTASSIUM CHLORIDE 20 MEQ
40 PACKET (EA) ORAL ONCE
Qty: 0 | Refills: 0 | Status: DISCONTINUED | OUTPATIENT
Start: 2018-05-24 | End: 2018-05-24

## 2018-05-24 RX ORDER — SODIUM CHLORIDE 9 MG/ML
1000 INJECTION, SOLUTION INTRAVENOUS ONCE
Qty: 0 | Refills: 0 | Status: COMPLETED | OUTPATIENT
Start: 2018-05-24 | End: 2018-05-24

## 2018-05-24 RX ORDER — POTASSIUM CHLORIDE 20 MEQ
40 PACKET (EA) ORAL ONCE
Qty: 0 | Refills: 0 | Status: COMPLETED | OUTPATIENT
Start: 2018-05-24 | End: 2018-05-24

## 2018-05-24 RX ADMIN — SODIUM CHLORIDE 1000 MILLILITER(S): 9 INJECTION, SOLUTION INTRAVENOUS at 01:21

## 2018-05-24 RX ADMIN — Medication 40 MILLIEQUIVALENT(S): at 02:20

## 2018-05-24 NOTE — ED PROVIDER NOTE - NS ED ROS FT
CONSTITUTIONAL:  No fevers or chills  HEENT:  No rhinorrhea  SKIN:  No rash or itching.  CARDIOVASCULAR:  +chronic chest pain and palpitations  RESPIRATORY:  +SOB  GASTROINTESTINAL:  +chronic abd pain  GENITOURINARY:  Denies hematuria, dysuria.   NEUROLOGICAL:  +numbness/tingling  MUSCULOSKELETAL:  No muscle, back pain, joint pain or stiffness.  HEMATOLOGIC:  No bleeding or bruising.

## 2018-05-24 NOTE — ED PROVIDER NOTE - CARE PLAN
Principal Discharge DX:	Anxiety  Secondary Diagnosis:	POTS (postural orthostatic tachycardia syndrome)  Secondary Diagnosis:	Depression

## 2018-05-24 NOTE — ED PROVIDER NOTE - MEDICAL DECISION MAKING DETAILS
Pt w/ mild hypernatremia and anemia. Repeat labs here, IVF, and EKG. Likely dispo w/ outpatient follow-up. Pt w/ mild hypernatremia and anemia. Repeat labs here, IVF, and EKG. Likely dispo w/ outpatient follow-up.  Attending Ding: 43 y/o female with h/o psychiatric disease, POTS with frequent ed presentations for chest discomfort and weakness who was at [facility] earlier in the day andfound to have a sodium of 147 and presents to the ED with concern. no vomiting or diarrhea. pt with holter monitor in place as intermittently has chest discomfort. unlikely acs. pt with recent cardiac MRI which was normal. does report some weakness. repeat electrolytes unremarkable. pt has multiple follow up appts in place. will d/c

## 2018-05-24 NOTE — ED PROVIDER NOTE - PHYSICAL EXAMINATION
GENERAL APPEARANCE: NAD  HEENT:  NC/AT, clear conjunctiva  NECK: Neck supple, non-tender without lymphadenopathy, masses  CARDIAC: Normal S1 and S2. No S3, S4 or murmurs. Rhythm is regular.  LUNGS: Clear to auscultation and percussion without rales, rhonchi, wheezing or diminished breath sounds.  ABDOMEN: Soft, nondistended, nontender. No guarding or rebound.   MUSKULOSKELETAL: No joint erythema or tenderness.   EXTREMITIES: No edema. pulses intact  NEUROLOGICAL: No focal neurologic deficit.   SKIN: Skin clean, dry, intact  PSYCHIATRIC: Anxious GENERAL APPEARANCE: NAD  HEENT:  NC/AT, clear conjunctiva  NECK: Neck supple, non-tender without lymphadenopathy, masses  CARDIAC: Normal S1 and S2. No S3, S4 or murmurs. Rhythm is regular.  LUNGS: Clear to auscultation and percussion without rales, rhonchi, wheezing or diminished breath sounds.  ABDOMEN: Soft, nondistended, nontender. No guarding or rebound.   MUSKULOSKELETAL: No joint erythema or tenderness.   EXTREMITIES: No edema. pulses intact  NEUROLOGICAL: No focal neurologic deficit.   SKIN: Skin clean, dry, intact  PSYCHIATRIC: Anxious  Attending Ding: Gen: NAD, heent: atrauamtic, eomi, perrla, mmm, op pink, uvula midline, neck; nttp, no nuchal rigidity, chest: nttp, no crepitus,monitor in place cv: rrr, no murmurs, lungs: ctab, abd: soft, nontender, nondistended, no peritoneal signs, +BS, no guarding, ext: wwp, neg homans, skin: no rash, neuro: awake and alert, following commands, speech clear, sensation and strength intact, no focal deficits

## 2018-05-24 NOTE — ED PROVIDER NOTE - OBJECTIVE STATEMENT
42 F  w/ severe anx/dep, ITP, POTS/dysautonomia (reports dx earlier this year) p/w concerns over abnormal labs done at [place] today. She was found to have mild microcytic anemia w/ Hb in the 10s and hypernatremia to 147. She was started on iron supplements and advised to drink more water but started having tingling and numbness in her fingers as well as chest pain and mild SOB which prompted her to present to the ED. Has a holter monitor by her cardiologist Dr. Jay Lisker - did not get called regarding any events.

## 2018-05-30 ENCOUNTER — MEDICATION RENEWAL (OUTPATIENT)
Age: 43
End: 2018-05-30

## 2018-05-30 RX ORDER — DILTIAZEM HYDROCHLORIDE 180 MG/1
180 CAPSULE, EXTENDED RELEASE ORAL DAILY
Qty: 90 | Refills: 1 | Status: ACTIVE | COMMUNITY
Start: 2018-03-05 | End: 1900-01-01

## 2018-06-01 PROCEDURE — G9005: CPT

## 2018-06-01 PROCEDURE — G9001: CPT

## 2018-06-25 ENCOUNTER — EMERGENCY (EMERGENCY)
Facility: HOSPITAL | Age: 43
LOS: 1 days | Discharge: HOME | End: 2018-06-25
Attending: EMERGENCY MEDICINE
Payer: MEDICAID

## 2018-06-25 VITALS
DIASTOLIC BLOOD PRESSURE: 99 MMHG | RESPIRATION RATE: 20 BRPM | WEIGHT: 93.04 LBS | OXYGEN SATURATION: 100 % | TEMPERATURE: 98 F | SYSTOLIC BLOOD PRESSURE: 134 MMHG | HEART RATE: 99 BPM

## 2018-06-25 VITALS
OXYGEN SATURATION: 99 % | TEMPERATURE: 98 F | HEART RATE: 82 BPM | RESPIRATION RATE: 19 BRPM | DIASTOLIC BLOOD PRESSURE: 78 MMHG | SYSTOLIC BLOOD PRESSURE: 115 MMHG

## 2018-06-25 LAB
ALBUMIN SERPL ELPH-MCNC: 4.8 G/DL — SIGNIFICANT CHANGE UP (ref 3.3–5)
ALP SERPL-CCNC: 80 U/L — SIGNIFICANT CHANGE UP (ref 40–120)
ALT FLD-CCNC: 12 U/L — SIGNIFICANT CHANGE UP (ref 10–45)
ANION GAP SERPL CALC-SCNC: 14 MMOL/L — SIGNIFICANT CHANGE UP (ref 5–17)
AST SERPL-CCNC: 21 U/L — SIGNIFICANT CHANGE UP (ref 10–40)
BASOPHILS # BLD AUTO: 0 K/UL — SIGNIFICANT CHANGE UP (ref 0–0.2)
BASOPHILS NFR BLD AUTO: 0.2 % — SIGNIFICANT CHANGE UP (ref 0–2)
BILIRUB SERPL-MCNC: 0.3 MG/DL — SIGNIFICANT CHANGE UP (ref 0.2–1.2)
BUN SERPL-MCNC: 7 MG/DL — SIGNIFICANT CHANGE UP (ref 7–23)
CALCIUM SERPL-MCNC: 9.7 MG/DL — SIGNIFICANT CHANGE UP (ref 8.4–10.5)
CHLORIDE SERPL-SCNC: 103 MMOL/L — SIGNIFICANT CHANGE UP (ref 96–108)
CO2 SERPL-SCNC: 24 MMOL/L — SIGNIFICANT CHANGE UP (ref 22–31)
CREAT SERPL-MCNC: 0.89 MG/DL — SIGNIFICANT CHANGE UP (ref 0.5–1.3)
ELLIPTOCYTES BLD QL SMEAR: SLIGHT — SIGNIFICANT CHANGE UP
EOSINOPHIL # BLD AUTO: 0 K/UL — SIGNIFICANT CHANGE UP (ref 0–0.5)
EOSINOPHIL NFR BLD AUTO: 0.5 % — SIGNIFICANT CHANGE UP (ref 0–6)
GIANT PLATELETS BLD QL SMEAR: PRESENT — SIGNIFICANT CHANGE UP
GLUCOSE SERPL-MCNC: 105 MG/DL — HIGH (ref 70–99)
HCG SERPL-ACNC: <2 MIU/ML — SIGNIFICANT CHANGE UP
HCT VFR BLD CALC: 35.6 % — SIGNIFICANT CHANGE UP (ref 34.5–45)
HGB BLD-MCNC: 11.7 G/DL — SIGNIFICANT CHANGE UP (ref 11.5–15.5)
LG PLATELETS BLD QL AUTO: SIGNIFICANT CHANGE UP
LYMPHOCYTES # BLD AUTO: 2 K/UL — SIGNIFICANT CHANGE UP (ref 1–3.3)
LYMPHOCYTES # BLD AUTO: 34.8 % — SIGNIFICANT CHANGE UP (ref 13–44)
MCHC RBC-ENTMCNC: 25.9 PG — LOW (ref 27–34)
MCHC RBC-ENTMCNC: 32.7 GM/DL — SIGNIFICANT CHANGE UP (ref 32–36)
MCV RBC AUTO: 79.2 FL — LOW (ref 80–100)
MICROCYTES BLD QL: SLIGHT — SIGNIFICANT CHANGE UP
MONOCYTES # BLD AUTO: 0.4 K/UL — SIGNIFICANT CHANGE UP (ref 0–0.9)
MONOCYTES NFR BLD AUTO: 7.4 % — SIGNIFICANT CHANGE UP (ref 2–14)
NEUTROPHILS # BLD AUTO: 3.4 K/UL — SIGNIFICANT CHANGE UP (ref 1.8–7.4)
NEUTROPHILS NFR BLD AUTO: 57.2 % — SIGNIFICANT CHANGE UP (ref 43–77)
OVALOCYTES BLD QL SMEAR: SLIGHT — SIGNIFICANT CHANGE UP
PLAT MORPH BLD: ABNORMAL
PLATELET # BLD AUTO: 74 K/UL — LOW (ref 150–400)
POLYCHROMASIA BLD QL SMEAR: SLIGHT — SIGNIFICANT CHANGE UP
POTASSIUM SERPL-MCNC: 3.6 MMOL/L — SIGNIFICANT CHANGE UP (ref 3.5–5.3)
POTASSIUM SERPL-SCNC: 3.6 MMOL/L — SIGNIFICANT CHANGE UP (ref 3.5–5.3)
PROT SERPL-MCNC: 7.1 G/DL — SIGNIFICANT CHANGE UP (ref 6–8.3)
RBC # BLD: 4.49 M/UL — SIGNIFICANT CHANGE UP (ref 3.8–5.2)
RBC # FLD: 13.3 % — SIGNIFICANT CHANGE UP (ref 10.3–14.5)
RBC BLD AUTO: SIGNIFICANT CHANGE UP
SODIUM SERPL-SCNC: 141 MMOL/L — SIGNIFICANT CHANGE UP (ref 135–145)
WBC # BLD: 5.9 K/UL — SIGNIFICANT CHANGE UP (ref 3.8–10.5)
WBC # FLD AUTO: 5.9 K/UL — SIGNIFICANT CHANGE UP (ref 3.8–10.5)

## 2018-06-25 PROCEDURE — 93005 ELECTROCARDIOGRAM TRACING: CPT

## 2018-06-25 PROCEDURE — 85027 COMPLETE CBC AUTOMATED: CPT

## 2018-06-25 PROCEDURE — 80053 COMPREHEN METABOLIC PANEL: CPT

## 2018-06-25 PROCEDURE — 84702 CHORIONIC GONADOTROPIN TEST: CPT

## 2018-06-25 PROCEDURE — 93010 ELECTROCARDIOGRAM REPORT: CPT

## 2018-06-25 PROCEDURE — 99284 EMERGENCY DEPT VISIT MOD MDM: CPT | Mod: 25

## 2018-06-25 PROCEDURE — 99283 EMERGENCY DEPT VISIT LOW MDM: CPT

## 2018-06-25 NOTE — ED ADULT NURSE NOTE - OBJECTIVE STATEMENT
41 yo female pmh ITP, anemia,  dysautonomia (presents with tachycardia), anxiety, depression, GERD, fibroid uterus, presenting to ED c/o cp, tremors, nausea, stomach pain, irregular heartbeat, pre-syncope- with decreased vision, hot and flushed, pt reports feeling cold. pt describes her pain as feeling sharp and cramping, with substernal cp radiating to under her left breast, describes the pain as constant and non exertional. denies any fevers. states she has had a positive sick contact with a friend. labs obtained, results pending. safety and fall precautions maintained.

## 2018-06-25 NOTE — ED PROVIDER NOTE - OBJECTIVE STATEMENT
41 yo F hx ITP, anemia, dysautonomia (presents with tachycardia), anxiety, depression, GERD, fibroid uterus, presenting with chest pain, tremors, nausea, stomach pain, irregular heartbeats, pre-syncope- with decreased vision, hot and flushed, pt reports feeling cold. Pain is sharp/cramping substernal radiating under L breast- constant, non exertional, not related to food intake, different than acid reflux, no history of ulcers. Denies fevers. Denies recent trauma/travel/surgeries. + black and blue on legs denies hitting legs. + vaginal bleeding "not period bleeding" x 2 weeks, intermittent, thick/brown, off of OCP x 1.5 years.

## 2018-06-25 NOTE — ED PROVIDER NOTE - NS ED ROS FT
CONST: no fevers, no chills, no trauma  EYES: no pain, no visual disturbances  ENT: no sore throat, no epistaxis, no rhinorrhea, no hearing changes  CV: + cp, + irregular heart beat  RESP: no shortness of breath, no cough, no sputum, no pleurisy, no wheezing  ABD: no abdominal pain, no nausea, no vomiting, no diarrhea, no black or bloody stool  : no dysuria, no hematuria, no frequency, no urgency  MSK: no back pain, no neck pain, no extremity pain  NEURO: no headache, no sensory disturbances, no focal weakness, no dizziness  HEME: no easy bleeding or bruising  SKIN: no diaphoresis, no rash

## 2018-07-01 ENCOUNTER — OUTPATIENT (OUTPATIENT)
Dept: OUTPATIENT SERVICES | Facility: HOSPITAL | Age: 43
LOS: 1 days | End: 2018-07-01
Payer: MEDICAID

## 2018-07-02 ENCOUNTER — INPATIENT (INPATIENT)
Facility: HOSPITAL | Age: 43
LOS: 1 days | Discharge: HOME | End: 2018-07-04
Attending: HOSPITALIST | Admitting: HOSPITALIST
Payer: MEDICAID

## 2018-07-02 VITALS
RESPIRATION RATE: 18 BRPM | SYSTOLIC BLOOD PRESSURE: 155 MMHG | HEART RATE: 85 BPM | OXYGEN SATURATION: 98 % | DIASTOLIC BLOOD PRESSURE: 95 MMHG | TEMPERATURE: 98 F

## 2018-07-02 DIAGNOSIS — Z29.9 ENCOUNTER FOR PROPHYLACTIC MEASURES, UNSPECIFIED: ICD-10-CM

## 2018-07-02 DIAGNOSIS — F41.9 ANXIETY DISORDER, UNSPECIFIED: ICD-10-CM

## 2018-07-02 DIAGNOSIS — R55 SYNCOPE AND COLLAPSE: ICD-10-CM

## 2018-07-02 DIAGNOSIS — I10 ESSENTIAL (PRIMARY) HYPERTENSION: ICD-10-CM

## 2018-07-02 DIAGNOSIS — M62.838 OTHER MUSCLE SPASM: ICD-10-CM

## 2018-07-02 DIAGNOSIS — G90.9 DISORDER OF THE AUTONOMIC NERVOUS SYSTEM, UNSPECIFIED: ICD-10-CM

## 2018-07-02 LAB
ALBUMIN SERPL ELPH-MCNC: 5.1 G/DL — HIGH (ref 3.3–5)
ALP SERPL-CCNC: 94 U/L — SIGNIFICANT CHANGE UP (ref 40–120)
ALT FLD-CCNC: 15 U/L — SIGNIFICANT CHANGE UP (ref 4–33)
APPEARANCE UR: CLEAR — SIGNIFICANT CHANGE UP
AST SERPL-CCNC: 18 U/L — SIGNIFICANT CHANGE UP (ref 4–32)
BACTERIA # UR AUTO: SIGNIFICANT CHANGE UP
BASE EXCESS BLDV CALC-SCNC: 2 MMOL/L — SIGNIFICANT CHANGE UP
BASOPHILS # BLD AUTO: 0.02 K/UL — SIGNIFICANT CHANGE UP (ref 0–0.2)
BASOPHILS NFR BLD AUTO: 0.4 % — SIGNIFICANT CHANGE UP (ref 0–2)
BILIRUB SERPL-MCNC: 0.3 MG/DL — SIGNIFICANT CHANGE UP (ref 0.2–1.2)
BILIRUB UR-MCNC: NEGATIVE — SIGNIFICANT CHANGE UP
BLOOD GAS VENOUS - CREATININE: 0.82 MG/DL — SIGNIFICANT CHANGE UP (ref 0.5–1.3)
BLOOD UR QL VISUAL: NEGATIVE — SIGNIFICANT CHANGE UP
BUN SERPL-MCNC: 7 MG/DL — SIGNIFICANT CHANGE UP (ref 7–23)
CALCIUM SERPL-MCNC: 9.8 MG/DL — SIGNIFICANT CHANGE UP (ref 8.4–10.5)
CHLORIDE BLDV-SCNC: 105 MMOL/L — SIGNIFICANT CHANGE UP (ref 96–108)
CHLORIDE SERPL-SCNC: 99 MMOL/L — SIGNIFICANT CHANGE UP (ref 98–107)
CO2 SERPL-SCNC: 25 MMOL/L — SIGNIFICANT CHANGE UP (ref 22–31)
COLOR SPEC: SIGNIFICANT CHANGE UP
CREAT SERPL-MCNC: 0.9 MG/DL — SIGNIFICANT CHANGE UP (ref 0.5–1.3)
EOSINOPHIL # BLD AUTO: 0.01 K/UL — SIGNIFICANT CHANGE UP (ref 0–0.5)
EOSINOPHIL NFR BLD AUTO: 0.2 % — SIGNIFICANT CHANGE UP (ref 0–6)
GAS PNL BLDV: 141 MMOL/L — SIGNIFICANT CHANGE UP (ref 136–146)
GLUCOSE BLDV-MCNC: 79 — SIGNIFICANT CHANGE UP (ref 70–99)
GLUCOSE SERPL-MCNC: 78 MG/DL — SIGNIFICANT CHANGE UP (ref 70–99)
GLUCOSE UR-MCNC: NEGATIVE — SIGNIFICANT CHANGE UP
HCG UR-SCNC: NEGATIVE — SIGNIFICANT CHANGE UP
HCO3 BLDV-SCNC: 24 MMOL/L — SIGNIFICANT CHANGE UP (ref 20–27)
HCT VFR BLD CALC: 39.4 % — SIGNIFICANT CHANGE UP (ref 34.5–45)
HCT VFR BLDV CALC: 40.7 % — SIGNIFICANT CHANGE UP (ref 34.5–45)
HGB BLD-MCNC: 12.4 G/DL — SIGNIFICANT CHANGE UP (ref 11.5–15.5)
HGB BLDV-MCNC: 13.3 G/DL — SIGNIFICANT CHANGE UP (ref 11.5–15.5)
IMM GRANULOCYTES # BLD AUTO: 0.02 # — SIGNIFICANT CHANGE UP
IMM GRANULOCYTES NFR BLD AUTO: 0.4 % — SIGNIFICANT CHANGE UP (ref 0–1.5)
KETONES UR-MCNC: NEGATIVE — SIGNIFICANT CHANGE UP
LACTATE BLDV-MCNC: 1.4 MMOL/L — SIGNIFICANT CHANGE UP (ref 0.5–2)
LEUKOCYTE ESTERASE UR-ACNC: NEGATIVE — SIGNIFICANT CHANGE UP
LYMPHOCYTES # BLD AUTO: 1.56 K/UL — SIGNIFICANT CHANGE UP (ref 1–3.3)
LYMPHOCYTES # BLD AUTO: 28 % — SIGNIFICANT CHANGE UP (ref 13–44)
MCHC RBC-ENTMCNC: 25.2 PG — LOW (ref 27–34)
MCHC RBC-ENTMCNC: 31.5 % — LOW (ref 32–36)
MCV RBC AUTO: 80.1 FL — SIGNIFICANT CHANGE UP (ref 80–100)
MONOCYTES # BLD AUTO: 0.45 K/UL — SIGNIFICANT CHANGE UP (ref 0–0.9)
MONOCYTES NFR BLD AUTO: 8.1 % — SIGNIFICANT CHANGE UP (ref 2–14)
NEUTROPHILS # BLD AUTO: 3.51 K/UL — SIGNIFICANT CHANGE UP (ref 1.8–7.4)
NEUTROPHILS NFR BLD AUTO: 62.9 % — SIGNIFICANT CHANGE UP (ref 43–77)
NITRITE UR-MCNC: NEGATIVE — SIGNIFICANT CHANGE UP
NRBC # FLD: 0 — SIGNIFICANT CHANGE UP
OB PNL STL: POSITIVE — SIGNIFICANT CHANGE UP
PCO2 BLDV: 49 MMHG — SIGNIFICANT CHANGE UP (ref 41–51)
PH BLDV: 7.36 PH — SIGNIFICANT CHANGE UP (ref 7.32–7.43)
PH UR: 8 — SIGNIFICANT CHANGE UP (ref 4.6–8)
PLATELET # BLD AUTO: 105 K/UL — LOW (ref 150–400)
PMV BLD: SIGNIFICANT CHANGE UP FL (ref 7–13)
PO2 BLDV: 26 MMHG — LOW (ref 35–40)
POTASSIUM BLDV-SCNC: 3.4 MMOL/L — SIGNIFICANT CHANGE UP (ref 3.4–4.5)
POTASSIUM SERPL-MCNC: 3.4 MMOL/L — LOW (ref 3.5–5.3)
POTASSIUM SERPL-SCNC: 3.4 MMOL/L — LOW (ref 3.5–5.3)
PROT SERPL-MCNC: 8.3 G/DL — SIGNIFICANT CHANGE UP (ref 6–8.3)
PROT UR-MCNC: NEGATIVE MG/DL — SIGNIFICANT CHANGE UP
RBC # BLD: 4.92 M/UL — SIGNIFICANT CHANGE UP (ref 3.8–5.2)
RBC # FLD: 13.9 % — SIGNIFICANT CHANGE UP (ref 10.3–14.5)
SAO2 % BLDV: 30.7 % — LOW (ref 60–85)
SODIUM SERPL-SCNC: 139 MMOL/L — SIGNIFICANT CHANGE UP (ref 135–145)
SP GR SPEC: 1 — LOW (ref 1–1.04)
TROPONIN T, HIGH SENSITIVITY: < 6 NG/L — SIGNIFICANT CHANGE UP (ref ?–14)
UROBILINOGEN FLD QL: NORMAL MG/DL — SIGNIFICANT CHANGE UP
WBC # BLD: 5.57 K/UL — SIGNIFICANT CHANGE UP (ref 3.8–10.5)
WBC # FLD AUTO: 5.57 K/UL — SIGNIFICANT CHANGE UP (ref 3.8–10.5)

## 2018-07-02 PROCEDURE — 99223 1ST HOSP IP/OBS HIGH 75: CPT | Mod: GC

## 2018-07-02 RX ORDER — DIPHENHYDRAMINE HCL 50 MG
12.5 CAPSULE ORAL ONCE
Qty: 0 | Refills: 0 | Status: DISCONTINUED | OUTPATIENT
Start: 2018-07-02 | End: 2018-07-02

## 2018-07-02 RX ORDER — ONDANSETRON 8 MG/1
4 TABLET, FILM COATED ORAL ONCE
Qty: 0 | Refills: 0 | Status: DISCONTINUED | OUTPATIENT
Start: 2018-07-02 | End: 2018-07-02

## 2018-07-02 RX ORDER — DIPHENHYDRAMINE HCL 50 MG
12.5 CAPSULE ORAL ONCE
Qty: 0 | Refills: 0 | Status: COMPLETED | OUTPATIENT
Start: 2018-07-02 | End: 2018-07-02

## 2018-07-02 RX ORDER — DIPHENHYDRAMINE HCL 50 MG
25 CAPSULE ORAL ONCE
Qty: 0 | Refills: 0 | Status: DISCONTINUED | OUTPATIENT
Start: 2018-07-02 | End: 2018-07-02

## 2018-07-02 RX ORDER — DILTIAZEM HCL 120 MG
1 CAPSULE, EXT RELEASE 24 HR ORAL
Qty: 0 | Refills: 0 | COMMUNITY

## 2018-07-02 RX ORDER — FAMOTIDINE 10 MG/ML
20 INJECTION INTRAVENOUS
Qty: 0 | Refills: 0 | Status: DISCONTINUED | OUTPATIENT
Start: 2018-07-02 | End: 2018-07-04

## 2018-07-02 RX ORDER — SODIUM CHLORIDE 9 MG/ML
1000 INJECTION INTRAMUSCULAR; INTRAVENOUS; SUBCUTANEOUS ONCE
Qty: 0 | Refills: 0 | Status: COMPLETED | OUTPATIENT
Start: 2018-07-02 | End: 2018-07-02

## 2018-07-02 RX ORDER — ENOXAPARIN SODIUM 100 MG/ML
30 INJECTION SUBCUTANEOUS EVERY 24 HOURS
Qty: 0 | Refills: 0 | Status: DISCONTINUED | OUTPATIENT
Start: 2018-07-02 | End: 2018-07-02

## 2018-07-02 RX ORDER — ONDANSETRON 8 MG/1
4 TABLET, FILM COATED ORAL EVERY 6 HOURS
Qty: 0 | Refills: 0 | Status: DISCONTINUED | OUTPATIENT
Start: 2018-07-02 | End: 2018-07-04

## 2018-07-02 RX ORDER — ONDANSETRON 8 MG/1
4 TABLET, FILM COATED ORAL ONCE
Qty: 0 | Refills: 0 | Status: COMPLETED | OUTPATIENT
Start: 2018-07-02 | End: 2018-07-02

## 2018-07-02 RX ORDER — DILTIAZEM HCL 120 MG
180 CAPSULE, EXT RELEASE 24 HR ORAL DAILY
Qty: 0 | Refills: 0 | Status: DISCONTINUED | OUTPATIENT
Start: 2018-07-02 | End: 2018-07-04

## 2018-07-02 RX ORDER — CYCLOBENZAPRINE HYDROCHLORIDE 10 MG/1
5 TABLET, FILM COATED ORAL THREE TIMES A DAY
Qty: 0 | Refills: 0 | Status: DISCONTINUED | OUTPATIENT
Start: 2018-07-02 | End: 2018-07-04

## 2018-07-02 RX ORDER — MONTELUKAST 4 MG/1
10 TABLET, CHEWABLE ORAL AT BEDTIME
Qty: 0 | Refills: 0 | Status: DISCONTINUED | OUTPATIENT
Start: 2018-07-02 | End: 2018-07-04

## 2018-07-02 RX ADMIN — Medication 12.5 MILLIGRAM(S): at 18:37

## 2018-07-02 RX ADMIN — Medication 12.5 MILLIGRAM(S): at 16:35

## 2018-07-02 RX ADMIN — SODIUM CHLORIDE 500 MILLILITER(S): 9 INJECTION INTRAMUSCULAR; INTRAVENOUS; SUBCUTANEOUS at 16:14

## 2018-07-02 RX ADMIN — ONDANSETRON 4 MILLIGRAM(S): 8 TABLET, FILM COATED ORAL at 17:00

## 2018-07-02 RX ADMIN — Medication 2 MILLIGRAM(S): at 20:16

## 2018-07-02 NOTE — ED ADULT TRIAGE NOTE - CHIEF COMPLAINT QUOTE
p/t with hx anxiety with multiple complaints, muscle spasms, bloody stools,  feeling hot, chest pain, weakness to bilateral legs, syncopal episode, sob, and shakiness all over the body p/t appears very anxious,

## 2018-07-02 NOTE — ED PROVIDER NOTE - ATTENDING CONTRIBUTION TO CARE
Dollyramirez: 41 yo female with a h/o anxiety d/o, tachy-javier syndrome (?), dysautonomia, POTS disease, c/o of chronic presyncopal symptoms presents s/p 2 syncopal episodes this morning. + associated dizziness. No new chest pain or SOB. Pt has chronic chest pain. Pt also c/o one episode of BRBPR. No abdominal pain. + nausea. No fevers or chills. Pt c/o LUE muscle twitching as well. No LE pain or edema. Exam: GENERAL: well appearing, NAD, HEENT: MMM, pink conjunctiva CARDIO: +S1/S2, no murmurs, rubs or gallops, LUNGS: CTA B/L, no wheezing, rales or rhonchi, ABD: soft, nontender, BSx4 quadrants, no guarding or rigidity. EXT: No LE edema or calf TTP, 2+ distal pulses x 4 extremities. NEURO: AxOx3, CNII-XII intact, SKIN: no rashes or lesions, well perfused A/P- 41 yo female with bloody stool and syncope x 2. will obtain labs, EKG, stooll guaiac and admit.,

## 2018-07-02 NOTE — H&P ADULT - ATTENDING COMMENTS
Patient was seen and examined personally by me on 7/2/18. I have discussed the plan and reviewed the resident's note and agree with the above physical exam findings including assessment and plan except as indicated below.    42 female with axniety, chronic somatic preoccupation following with outpatient psych, POTs disease, ?tachy-javier presents with multiple nonspecific complaints of chest pain, syncope, blood in stool and muscle spasms.    H/H at baseline and stable although + FOBT, no active gross bleeding, possible hemorrhoids or traumatic digital rectal  Refused examination of rectal tone but nml as per ED  Patient with a grossly nonfocal neuro exam, orthostatics negative, Neuro consult-if no further inpatient workup warranted likely plan to DC  Cardiac MRI from 4/2018 WNL  Labs unremarkable  Unlikely ACS, HST negative  Reassurance provided to patient however preoccupied and determined that something is wrong with her body. Possible inpatient psych eval

## 2018-07-02 NOTE — ED PROVIDER NOTE - GASTROINTESTINAL, MLM
Abdomen soft, mild TTP to deep palpation of the LLQ, no guarding, no distension Abdomen soft, mild TTP to deep palpation of the LLQ, no guarding, no distension, minimal brown stool in rectal vault, no external hemorrhoids, normal rectal tone, + sensation, + anal wink

## 2018-07-02 NOTE — ED PROVIDER NOTE - OBJECTIVE STATEMENT
42F hx of anxiety d/o, tachy-javier syndrome (?), dysautonomia, POTS disease, c/o of chronic presyncopal symptoms with chest pain described as sharp/ migratory of the anterior chest wall, subjective sob, with symptoms worsening over the last few days, with chest pain that stopped last night but restarted this AM at 6AM, noted to have had syncopal episode x 30 sec without head trauma, while going to her group class x 1 at 12:30 PM and another prior to arrival while seated. Patient concerned because of one episode of blood in stool (hx of hemorrrhoids but this was nonpainful) and b/l upper extremity muscle ticking in the biceps, intermittent b/l distal hand numbness/tingling, with knee buckling and feeling of weakness with decreased rectal tone and increased difficulty urinating x few days, no noted back pain/ trauma 42F hx of anxiety d/o, tachy-javier syndrome (?), dysautonomia, POTS disease, c/o of chronic presyncopal symptoms with chest pain described as sharp/ migratory of the anterior chest wall, subjective sob, with symptoms worsening over the last few days, with chest pain that stopped last night but restarted this AM at 6AM, noted to have had syncopal episode x 30 sec without head trauma, while going to her group class x 1 at 12:30 PM and another prior to arrival while seated. Patient concerned because of one episode of blood in stool (hx of hemorrrhoids but this was nonpainful) and b/l upper extremity muscle ticking in the biceps, intermittent b/l distal hand numbness/tingling, with knee buckling and feeling of weakness with decreased rectal tone and increased difficulty urinating x few days, no noted back pain/ trauma.    Has had 30day holter with Dr Lisker and outpt follow up  Has neurologist she has started seeing for symptoms 42F hx of anxiety d/o, tachy-javier syndrome (?), dysautonomia, ITP, POTS disease, c/o of chronic presyncopal symptoms with chest pain described as sharp/ migratory of the anterior chest wall, subjective sob, with symptoms worsening over the last few days, with chest pain that stopped last night but restarted this AM at 6AM, noted to have had syncopal episode x 30 sec without head trauma, while going to her group class x 1 at 12:30 PM and another prior to arrival while seated. Patient concerned because of one episode of blood in stool (hx of hemorrrhoids but this was nonpainful) and b/l upper extremity muscle ticking in the biceps, intermittent b/l distal hand numbness/tingling, with knee buckling and feeling of weakness with decreased rectal tone and increased difficulty urinating x few days, no noted back pain/ trauma.    Has had 30day holter with Dr Lisker and outpt follow up  Has neurologist she has started seeing for symptoms

## 2018-07-02 NOTE — H&P ADULT - ASSESSMENT
42 F with hx of anxiety d/o, dysautonomia, ITP, POTS disease, p/w 3 days of dizziness and presyncopal symptoms with unclear etiology. Previous cardiac workup for similar symptoms was negative. ED cardiac workup also w/ no significant findings. No ongoing CP, SOB seems situational and inconsistent, unlikely cardiac etiology.  Afebrile, benign symptoms not suggestive of infectious cause. Near normal BMP and urine electrolytes do not suggest electrolyte imbalance. Current ddx includes: acute on chronic autonomic dysregulation episode, somatic psychaitric disorder, neurological of unkown etiology. Will consult neuro for further workup.

## 2018-07-02 NOTE — ED PROVIDER NOTE - MEDICAL DECISION MAKING DETAILS
42F with multiple medical complaints, agrees to all review of symptoms at some point in the past few days- months, has had w/u with outpt cardiologist including 30 day holter and recent normal cardiac MRI 4/2018, ekg wnl, will assess sphincter tone though no known risks for formation of cord compression such as trauma/intervention/infection- will rule out uti, possibly orthostatic vs due to dysautonomia, will call cardiologist, pt has outpt neurologist. Will obtain trop, if neg, unlikely acs as chest pain > 3 hrs.

## 2018-07-02 NOTE — H&P ADULT - NSHPREVIEWOFSYSTEMS_GEN_ALL_CORE
REVIEW OF SYSTEMS:  General: no fatigue/malaise, weight loss/gain.  Skin: no rashes.  Ophthalmologic: no blurred vision, no loss of vision. 	  ENT: no sore throat, rhinorrhea, sinus congestion.  Respiratory: +SOB, no cough or wheeze.  Gastrointestinal:  no N/V/D,  no hematemesis, +hematochezia x1  Genitourinary: no dysuria/hesitancy or hematuria.  Musculoskeletal: no myalgias or arthralgias.  Neurological: no changes in vision or hearing, +lightheadedness/dizziness, +near syncope, muscle weakness and spasm	  Psychiatric: + unusual stress/anxiety relating to her illness   Hematology/Lymphatics: no unusual bleeding, bruising and no lymphadenopathy.  All others negative except as stated above and in HPI.

## 2018-07-02 NOTE — H&P ADULT - NSHPPHYSICALEXAM_GEN_ALL_CORE
Vital Signs Last 24 Hrs  T(C): 36.5 (02 Jul 2018 14:35), Max: 36.9 (02 Jul 2018 13:47)  T(F): 97.7 (02 Jul 2018 14:35), Max: 98.4 (02 Jul 2018 13:47)  HR: 87 (02 Jul 2018 14:35) (85 - 87)  BP: 142/97 (02 Jul 2018 14:35) (136/104 - 155/95)  RR: 15 (02 Jul 2018 14:35) (15 - 18)  SpO2: 100% (02 Jul 2018 14:35) (98% - 100%)    Appearance: Well appearing, laying in hospital bed in minor distress  HEENT:   Normal oral mucosa  Cardiovascular: RRR, No m/r/g appreciated, No LE edema  Respiratory: Lungs clear to auscultation bilaterally	  Psychiatry: Mood labile & affect appropriate  Gastrointestinal:  Soft, inconsistently mildly tender to palpation, pt declined rectal exam  Skin: minimal rash at IV infusion site, No ecchymoses, No cyanosis, multiple tattoos	  Neurologic: Decreased sensation over 1st and second digit of R hand, equal  strength, proximal muscle strength 5/5, neg pronator drift  Extremities: No clubbing, cyanosis or edema  Vascular: Peripheral pulses palpable x4, warm extremities

## 2018-07-02 NOTE — H&P ADULT - NSHPLABSRESULTS_GEN_ALL_CORE
12.4   5.57  )-----------( 105      ( 02 Jul 2018 14:00 )             39.4     07-02    139  |  99  |  7   ----------------------------<  78  3.4<L>   |  25  |  0.90    Ca    9.8      02 Jul 2018 14:00    TPro  8.3  /  Alb  5.1<H>  /  TBili  0.3  /  DBili  x   /  AST  18  /  ALT  15  /  AlkPhos  94  07-02      Blood Gas Venous Comprehensive (07.02.18 @ 14:00)    Blood Gas Venous - Lactate: 1.4: Please note updated reference range. mmol/L    Blood Gas Venous - Chloride: 105 mmol/L    Blood Gas Venous - Creatinine: 0.82 mg/dL    pH, Venous: 7.36 pH    pCO2, Venous: 49 mmHg    pO2, Venous: 26 mmHg    HCO3, Venous: 24 mmol/L    Base Excess, Venous: 2.0: REFERENCE RANGE = -3 + 2 mmol/L mmol/L    Oxygen Saturation, Venous: 30.7 %    Blood Gas Venous - Sodium: 141 mmol/L    Blood Gas Venous - Potassium: 3.4 mmol/L    Blood Gas Venous - Glucose: 79    Blood Gas Venous - Hemoglobin: 13.3 g/dL    Blood Gas Venous - Hematocrit: 40.7 %      Occult Blood, Feces (07.02.18 @ 16:18)    Occult Blood: POSITIVE: ** Results**  Positive Control = Positive  Negative Control = Negative      Urinalysis (07.02.18 @ 14:00)    Color: COLORL    Urine Appearance: CLEAR    Glucose: NEGATIVE    Bilirubin: NEGATIVE    Ketone - Urine: NEGATIVE    Specific Gravity: 1.003    Blood: NEGATIVE    pH - Urine: 8.0    Protein, Urine: NEGATIVE mg/dL    Urobilinogen: NORMAL mg/dL    Nitrite: NEGATIVE    Leukocyte Esterase Concentration: NEGATIVE    Bacteria: FEW

## 2018-07-02 NOTE — H&P ADULT - PROBLEM SELECTOR PLAN 1
Non cardiogenic and unkown etiology  - f/u morning BMP for electrolyte abnormalities  - consult neuro in AM for further workup

## 2018-07-02 NOTE — ED PROVIDER NOTE - NEUROLOGICAL, MLM
Alert and oriented, no focal deficits, no motor or sensory deficits. CN II-XII intact, no dysmetria, guarded gait but ambulatory without difficulty, mild tremors in the extremities, able to squat, 4/5 muscle strenght b/l UE/LE Alert and oriented, no focal deficits, no motor or sensory deficits. CN II-XII intact, no dysmetria, guarded gait but ambulatory with some difficulty, mild tremors in the extremities, able to squat, 4/5 muscle strenght b/l UE/LE

## 2018-07-02 NOTE — H&P ADULT - HISTORY OF PRESENT ILLNESS
42 F with hx of anxiety d/o, dysautonomia, ITP, POTS disease, p/w 3 days of chronic dizziness and presyncopal symptoms with cp, palpitations, and sob, worsening at 6AM this morning. Pt is concerned because of onging L/R UE spasms and gluteal spams causing discomfort and supposed loss of sphincter control. Patient was told to go to the ED by out her outpatient cardiologist after symptoms did not mk over two to three days. Pt seen in ED for r/o cardiac etiology, found to have negative ECG and waxing and waning symptoms per patient.  Previous outpatient cardiac workup revealed no unusual findings on 30 day halter monitor and cardiac MRI. She is very concerned about finding the root cause of her problem and feels that there may be an unexplained neurological issues at source.    Pt currently denies cp, palpitations, HA, vision changes, dizziness or lightheadedness, but does endorse SOB less than previously but still persistent. She presented with a packet of information from an outpatient neurologist to work up her condition (as an outpatient). Patient is requesting a neuro consult in hospital if possible. She also endorses one episode of blood in her stool coinciding with loss of sphincter control that she believes maybe connected to the muscle spasms.

## 2018-07-02 NOTE — ED ADULT NURSE NOTE - OBJECTIVE STATEMENT
Pt received in spot 9. Alert and oriented x3, ambulatory. Presents to ED co chest pain, dizziness, intermittent sob. States "the left side of my head is bothering me... I feel like I am drunk". Hx of htn, itp, ibs, depression and anxiety. States " I nearly passed out today". Sent by cardiologist for evaluation. Labs sent. IV placed. NSR on cardiac monitor. Comfort measures provided. call bell within reach. Awaiting MD degroot. Will monitor.

## 2018-07-02 NOTE — ED PROVIDER NOTE - PROGRESS NOTE DETAILS
Dylan PGY2: spoke with DR Lisker, patient has had extensive cardiac w/u, no arrhythmias during the time he has seen her, unlikely cardiac related symptoms but is concerned about a neurologic component, per cardiologist, pt has a structurally normal heart. Dylan PGY2: spoke with DR Lisker, patient has had extensive cardiac w/u, no arrhythmias during the time he has seen her, unlikely cardiac related symptoms but is concerned about a neurologic component, per cardiologist, pt has a structurally normal heart. No tachy- javier hx but does have hx of fluctuations in HR Dylan PGY2: spoke with DR Lisker, patient has had extensive cardiac w/u, no arrhythmias during the time he has seen her, unlikely cardiac related symptoms but is concerned about a neurologic component, per cardiologist, pt has a structurally normal heart. No tachy- javier hx but does have hx of fluctuations in HR, Pt has Richardson PGY1: Will admit pt due to concern of syncope and difficulty gait, pt aware, PCP Lorena Ford (not in system), pt to be placed on tele due to syncope, hospitalist aware

## 2018-07-02 NOTE — ED PROVIDER NOTE - SHIFT CHANGE DETAILS
I have signed over this patient to the above attending physician. Pertinent history, physical exam findings and workup thus far in the ED have been discussed. The pending tests and plan, including labs, were signed over.  All questions from the above attending physician have been answered.

## 2018-07-03 ENCOUNTER — TRANSCRIPTION ENCOUNTER (OUTPATIENT)
Age: 43
End: 2018-07-03

## 2018-07-03 LAB
ALBUMIN SERPL ELPH-MCNC: 3.8 G/DL — SIGNIFICANT CHANGE UP (ref 3.3–5)
ALP SERPL-CCNC: 73 U/L — SIGNIFICANT CHANGE UP (ref 40–120)
ALT FLD-CCNC: 9 U/L — SIGNIFICANT CHANGE UP (ref 4–33)
AST SERPL-CCNC: 13 U/L — SIGNIFICANT CHANGE UP (ref 4–32)
BILIRUB SERPL-MCNC: < 0.2 MG/DL — LOW (ref 0.2–1.2)
BUN SERPL-MCNC: 8 MG/DL — SIGNIFICANT CHANGE UP (ref 7–23)
CALCIUM SERPL-MCNC: 8.6 MG/DL — SIGNIFICANT CHANGE UP (ref 8.4–10.5)
CHLORIDE SERPL-SCNC: 104 MMOL/L — SIGNIFICANT CHANGE UP (ref 98–107)
CO2 SERPL-SCNC: 24 MMOL/L — SIGNIFICANT CHANGE UP (ref 22–31)
CREAT SERPL-MCNC: 0.91 MG/DL — SIGNIFICANT CHANGE UP (ref 0.5–1.3)
GLUCOSE SERPL-MCNC: 99 MG/DL — SIGNIFICANT CHANGE UP (ref 70–99)
HCT VFR BLD CALC: 33.6 % — LOW (ref 34.5–45)
HGB BLD-MCNC: 10.7 G/DL — LOW (ref 11.5–15.5)
MAGNESIUM SERPL-MCNC: 2.1 MG/DL — SIGNIFICANT CHANGE UP (ref 1.6–2.6)
MCHC RBC-ENTMCNC: 25.1 PG — LOW (ref 27–34)
MCHC RBC-ENTMCNC: 31.8 % — LOW (ref 32–36)
MCV RBC AUTO: 78.7 FL — LOW (ref 80–100)
PHOSPHATE SERPL-MCNC: 4.4 MG/DL — SIGNIFICANT CHANGE UP (ref 2.5–4.5)
PLATELET # BLD AUTO: 81 K/UL — LOW (ref 150–400)
PMV BLD: SIGNIFICANT CHANGE UP FL (ref 7–13)
POTASSIUM SERPL-MCNC: 4.2 MMOL/L — SIGNIFICANT CHANGE UP (ref 3.5–5.3)
POTASSIUM SERPL-SCNC: 4.2 MMOL/L — SIGNIFICANT CHANGE UP (ref 3.5–5.3)
PROT SERPL-MCNC: 6.1 G/DL — SIGNIFICANT CHANGE UP (ref 6–8.3)
RBC # BLD: 4.27 M/UL — SIGNIFICANT CHANGE UP (ref 3.8–5.2)
RBC # FLD: 14.3 % — SIGNIFICANT CHANGE UP (ref 10.3–14.5)
SODIUM SERPL-SCNC: 139 MMOL/L — SIGNIFICANT CHANGE UP (ref 135–145)
WBC # BLD: 4.86 K/UL — SIGNIFICANT CHANGE UP (ref 3.8–10.5)
WBC # FLD AUTO: 4.86 K/UL — SIGNIFICANT CHANGE UP (ref 3.8–10.5)

## 2018-07-03 PROCEDURE — 99232 SBSQ HOSP IP/OBS MODERATE 35: CPT | Mod: GC

## 2018-07-03 RX ADMIN — Medication 2 MILLIGRAM(S): at 13:46

## 2018-07-03 RX ADMIN — Medication 2 MILLIGRAM(S): at 22:49

## 2018-07-03 RX ADMIN — Medication 2 MILLIGRAM(S): at 09:27

## 2018-07-03 RX ADMIN — Medication 180 MILLIGRAM(S): at 11:42

## 2018-07-03 NOTE — CONSULT NOTE ADULT - SUBJECTIVE AND OBJECTIVE BOX
Neurology Consult    Name  DORIS MCBRIDE    42 year old female with pre-syncope. Patient has been having episodes in increasing frequency of feeling like she is going to pass out and buckling of the legs. Also has random accelerations of her heart rate and episodes of fluctuating blood pressure. Also complaining of muscle twitching in her arms. Patient has had extensive work up including tilt table test, blood work, holter monitoring, and EMG. Has seen Dr. Quinn and Dr. Baker from our practice as well as an autonomic specialist for this. Has not tolerated propanolol in the past. Was told by cardiologist to come to the ED for her leg weakness.                                                    PAST MEDICAL & SURGICAL HISTORY:  Depression  Idiopathic thrombocytopenic purpura  Dysautonomia  Idiopathic thrombocytopenia purpura  Anxiety  POTS (postural orthostatic tachycardia syndrome)  History of ITP  Labyrinthitis  Headache  HTN (hypertension)  Anxiety  IBS (irritable bowel syndrome)  No significant past surgical history  No significant past surgical history  No significant past surgical history          MEDICATIONS  (STANDING):  diltiazem    milliGRAM(s) Oral daily  famotidine    Tablet 20 milliGRAM(s) Oral two times a day  LORazepam     Tablet 2 milliGRAM(s) Oral every 4 hours  montelukast 10 milliGRAM(s) Oral at bedtime    MEDICATIONS  (PRN):  cyclobenzaprine 5 milliGRAM(s) Oral three times a day PRN Muscle Spasm  ondansetron   Disintegrating Tablet 4 milliGRAM(s) Oral every 6 hours PRN Nausea and/or Vomiting      Allergies    barium sulfate (Other)  beta blockers (Unknown)  gadolinium (Hypotension)  Reglan (Other)    Intolerances    metoprolol (Headache)      Objective  Vital Signs Last 24 Hrs  T(C): 36.6 (03 Jul 2018 14:44), Max: 36.6 (03 Jul 2018 10:41)  T(F): 97.8 (03 Jul 2018 14:44), Max: 97.9 (03 Jul 2018 10:41)  HR: 67 (03 Jul 2018 14:44) (64 - 85)  BP: 128/92 (03 Jul 2018 14:44) (109/60 - 132/89)  BP(mean): --  RR: 18 (03 Jul 2018 14:44) (16 - 18)  SpO2: 100% (03 Jul 2018 14:44) (99% - 100%)    General Exam   General appearance: No acute distress, well-nourished  Respiratory:    non-labored respirations               Neurological Exam  Mental Status:  alert and oriented x3, fluent speech, following commands, repetition and naming intact    Cranial Nerves: PERRL, EOMI without nystagmus, visual fields intact, no facial droop, no dysarthria    Motor:   Tone:   normal               Strength:  Upper extremity                          Delt       Bicep    Tricep                                                  R         5/5 5/5 5/5 5/5                                               L          5/5 5/5 5/5 5/5    Lower extremity                           HF          KE          KF        DF         PF                                               R        5/5 5/5 5/5 5/5 5/5                                               L         5/5 5/5 5/5 5/5 5/5    Pronator drift:   none           Dysmetria: none with finger-to-nose testing  Tremor:  none appreciated at rest or in action    Sensation: intact grossly to light touch    Deep Tendon Reflexes: 2+ and symmetric  Toes flexor bilaterally mute      Other Studies                          10.7   4.86  )-----------( 81       ( 03 Jul 2018 06:03 )             33.6     07-03    139  |  104  |  8   ----------------------------<  99  4.2   |  24  |  0.91    Ca    8.6      03 Jul 2018 06:03  Phos  4.4     07-03  Mg     2.1     07-03    TPro  6.1  /  Alb  3.8  /  TBili  < 0.2<L>  /  DBili  x   /  AST  13  /  ALT  9   /  AlkPhos  73  07-03    LIVER FUNCTIONS - ( 03 Jul 2018 06:03 )  Alb: 3.8 g/dL / Pro: 6.1 g/dL / ALK PHOS: 73 u/L / ALT: 9 u/L / AST: 13 u/L / GGT: x

## 2018-07-03 NOTE — PHYSICAL THERAPY INITIAL EVALUATION ADULT - CRITERIA FOR SKILLED THERAPEUTIC INTERVENTIONS
anticipated discharge recommendation/impairments found/therapy frequency/anticipated equipment needs at discharge/predicted duration of therapy intervention

## 2018-07-03 NOTE — DISCHARGE NOTE ADULT - CARE PLAN
Principal Discharge DX:	Syncope  Goal:	Prevention of future syncopal events  Assessment and plan of treatment:	Electrocardiogram, electrolytes did not show anything to suggest cardiac causes of your syncope. Please follow up with your outpatient cardiologist and neurologist.  Secondary Diagnosis:	POTS (postural orthostatic tachycardia syndrome)  Goal:	Getting off disability  Assessment and plan of treatment:	Please follow up with your outpatient cardiologist and neurologist. Principal Discharge DX:	Syncope  Goal:	Prevention of future syncopal events  Assessment and plan of treatment:	Electrocardiogram, electrolytes did not show anything to suggest cardiac causes of your syncope. Please follow up with your outpatient cardiologist and neurologist.  Secondary Diagnosis:	POTS (postural orthostatic tachycardia syndrome)  Goal:	Improvement of symptoms  Assessment and plan of treatment:	Please follow up with your outpatient cardiologist and neurologist.  Secondary Diagnosis:	Positive fecal occult blood test  Goal:	Resolution of symptoms  Assessment and plan of treatment:	We found trace amounts of blood in your stool, which could be related to a slow gastrointestinal bleed or hemorrhoid. Please follow up with your primary doctor and a gastroenterologist for monitoring and further workup.

## 2018-07-03 NOTE — DISCHARGE NOTE ADULT - ADDITIONAL INSTRUCTIONS
Please schedule your own follow up with your outpatient cardiologist and neurologist. Please schedule a follow up with your outpatient cardiologist and neurologist.

## 2018-07-03 NOTE — DISCHARGE NOTE ADULT - PATIENT PORTAL LINK FT
You can access the [facility] Patient Portal, offered by [facility], by registering with the following website: [email or web address]

## 2018-07-03 NOTE — CONSULT NOTE ADULT - ASSESSMENT
42 year old female with complicated medical history complaining of pre-syncope, tachycardia, and muscle twitches. Has had very extensive work up in the past and has not tolerated treatment for any of these symptoms. Review of vital signs reveals no gross fluctuations.  Likely not neurologic in nature as patient has normal examination and work up.

## 2018-07-03 NOTE — DISCHARGE NOTE ADULT - CARE PROVIDER_API CALL
Julio Duque), Infectious Disease; Internal Medicine  [address] [place] [address]  Phone: [phone]  Fax: [phone]

## 2018-07-03 NOTE — PHYSICAL THERAPY INITIAL EVALUATION ADULT - LIVES WITH, PROFILE
alone in rented room in home with flight of stairs to negotiate. Pt reports 25 hours of home health services and report interest in additional services.

## 2018-07-03 NOTE — PROGRESS NOTE ADULT - SUBJECTIVE AND OBJECTIVE BOX
Patient is a 42y old  Female who presents with a chief complaint of Dizziness and palpitations of unknown etiology (2018 22:41)      SUBJECTIVE / OVERNIGHT EVENTS: No acute events overnight. Patient denies F/C, HA, CP, SOB, abdominal pain, N/V/D/C.     MEDICATIONS  (STANDING):  diltiazem    milliGRAM(s) Oral daily  famotidine    Tablet 20 milliGRAM(s) Oral two times a day  montelukast 10 milliGRAM(s) Oral at bedtime    MEDICATIONS  (PRN):  cyclobenzaprine 5 milliGRAM(s) Oral three times a day PRN Muscle Spasm  LORazepam     Tablet 2 milliGRAM(s) Oral every 4 hours PRN Anxiety  ondansetron   Disintegrating Tablet 4 milliGRAM(s) Oral every 6 hours PRN Nausea and/or Vomiting      T(F): 97.5 ( @ 19:20), Max: 98.4 ( @ 13:47)  HR: 85 ( @ 19:20) (85 - 87)  BP: 109/60 ( @ 19:20) (109/60 - 155/95)  RR: 16 ( @ 19:20) (15 - 18)  SpO2: 100% ( @ 14:35) (98% - 100%)    CAPILLARY BLOOD GLUCOSE        I&O's Summary    PHYSICAL EXAM:  GEN: Appears in no acute distress  HEENT: PERRLA, EOMI and accommodate, neck supple, no lymphadenopathy, no JVD  MOUTH: moist mucous membranes, no exudates or lesions   PULM: Clear to auscultation bilaterally, no wheezes, rales, rhonchi  CV: Regular rate and rhythm, S1S2, no murmurs, rubs or gallops  ABD: Soft, nontender to palpation, non-distended, normoactive bowel sounds  EXTREMITIES: No edema, + peripheral pulses  NEURO: AAOx3, moving all four extremities spontaneously  SKIN: No rashes, lesions, hematomas, ecchymosis    LABS & IMAGING:  Labs personally reviewed [X]                        10.7   4.86  )-----------( 81       ( 2018 06:03 )             33.6     Hgb Trend: 10.7<--, 12.4<--    07-03    139  |  104  |  8   ----------------------------<  99  4.2   |  24  |  0.91    Ca    8.6      2018 06:03  Phos  4.4     07-03  Mg     2.1     07-03    TPro  6.1  /  Alb  3.8  /  TBili  < 0.2<L>  /  DBili  x   /  AST  13  /  ALT  9   /  AlkPhos  73  07-03    Creatinine Trend: 0.91<--, 0.90<--, 0.89<--      Urinalysis Basic - ( 2018 14:00 )    Color: COLORL / Appearance: CLEAR / S.003 / pH: 8.0  Gluc: NEGATIVE / Ketone: NEGATIVE  / Bili: NEGATIVE / Urobili: NORMAL mg/dL   Blood: NEGATIVE / Protein: NEGATIVE mg/dL / Nitrite: NEGATIVE   Leuk Esterase: NEGATIVE / RBC: x / WBC x   Sq Epi: x / Non Sq Epi: x / Bacteria: FEW Patient is a 42y old  Female who presents with a chief complaint of Dizziness and palpitations of unknown etiology (2018 22:41)      SUBJECTIVE / OVERNIGHT EVENTS: No acute events overnight. Patient denies F/C, HA, CP, SOB, abdominal pain, N/V/D/C.  She is very concerned about her medication dosing and because we stopped giving her IV fluids. She would like to take her ativan as she normally does at home.    MEDICATIONS  (STANDING):  diltiazem    milliGRAM(s) Oral daily  famotidine    Tablet 20 milliGRAM(s) Oral two times a day  montelukast 10 milliGRAM(s) Oral at bedtime    MEDICATIONS  (PRN):  cyclobenzaprine 5 milliGRAM(s) Oral three times a day PRN Muscle Spasm  LORazepam     Tablet 2 milliGRAM(s) Oral every 4 hours PRN Anxiety  ondansetron   Disintegrating Tablet 4 milliGRAM(s) Oral every 6 hours PRN Nausea and/or Vomiting      T(F): 97.5 ( @ 19:20), Max: 98.4 ( @ 13:47)  HR: 85 ( @ 19:20) (85 - 87)  BP: 109/60 ( @ 19:20) (109/60 - 155/95)  RR: 16 ( @ 19:20) (15 - 18)  SpO2: 100% ( @ 14:35) (98% - 100%)    CAPILLARY BLOOD GLUCOSE        I&O's Summary    PHYSICAL EXAM:  GEN: Appears in no acute distress  HEENT: PERRLA, EOMI and accommodate, neck supple, no lymphadenopathy, no JVD  MOUTH: moist mucous membranes, no exudates or lesions   PULM: Clear to auscultation bilaterally, no wheezes, rales, rhonchi  CV: Regular rate and rhythm, S1S2, no murmurs, rubs or gallops  ABD: Soft, nontender to palpation, non-distended, normoactive bowel sounds  EXTREMITIES: No edema, + peripheral pulses  NEURO: AAOx3, moving all four extremities spontaneously  SKIN: No rashes, lesions, hematomas, ecchymosis    LABS & IMAGING:  Labs personally reviewed [X]                        10.7   4.86  )-----------( 81       ( 2018 06:03 )             33.6     Hgb Trend: 10.7<--, 12.4<--    07-03    139  |  104  |  8   ----------------------------<  99  4.2   |  24  |  0.91    Ca    8.6      2018 06:03  Phos  4.4     07-03  Mg     2.1     07-03    TPro  6.1  /  Alb  3.8  /  TBili  < 0.2<L>  /  DBili  x   /  AST  13  /  ALT  9   /  AlkPhos  73  07-03    Creatinine Trend: 0.91<--, 0.90<--, 0.89<--      Urinalysis Basic - ( 2018 14:00 )    Color: COLORL / Appearance: CLEAR / S.003 / pH: 8.0  Gluc: NEGATIVE / Ketone: NEGATIVE  / Bili: NEGATIVE / Urobili: NORMAL mg/dL   Blood: NEGATIVE / Protein: NEGATIVE mg/dL / Nitrite: NEGATIVE   Leuk Esterase: NEGATIVE / RBC: x / WBC x   Sq Epi: x / Non Sq Epi: x / Bacteria: FEW

## 2018-07-03 NOTE — PHYSICAL THERAPY INITIAL EVALUATION ADULT - PERTINENT HX OF CURRENT PROBLEM, REHAB EVAL
42 F with hx of anxiety d/o, dysautonomia, ITP, POTS disease, p/w 3 days of chronic dizziness and presyncopal symptoms with cp, palpitations, and sob. Pt is concerned because of onging L/R UE spasms and gluteal spams causing discomfort and supposed loss of sphincter control.

## 2018-07-03 NOTE — DISCHARGE NOTE ADULT - HOSPITAL COURSE
42 F with hx of anxiety d/o, dysautonomia, ITP, POTS disease, presented with 3 days of chronic dizziness and presyncopal symptoms with chest pain, palpitations, and sob, worsening at 6AM the morning of presentation. Pt was concerned because of onging L/R UE spasms and gluteal spams causing discomfort and loss of anal sphincter control. Patient was told to go to the ED the morning of her presentation by out her outpatient cardiologist after symptoms did not mk over two to three days prior. Pt was seen in ED for r/o cardiac etiology, found to have negative ECG and intermittent symptoms.  Previous outpatient cardiac workup revealed no unusual findings on 30 day halter monitor and cardiac MRI. She was very concerned about finding the root cause of her problem and feels that there may be an unexplained neurological issue at source.    When examined in the ED she denied cp, palpitations, HA, vision changes, dizziness or lightheadedness, but did endorse SOB less than previously but still persistent. She presented with a packet of information from an outpatient neurologist to work up her condition previously as an outpatient. Patient is requesting a neuro consult in hospital if possible. She also endorses one episode of blood in her stool coinciding with loss of sphincter control that she believes maybe connected to the muscle spasms. HPI:  42 F with hx of anxiety d/o, dysautonomia, ITP, POTS disease, presented with 3 days of chronic dizziness and presyncopal symptoms with chest pain, palpitations, and sob, worsening at 6AM the morning of presentation. Pt was concerned because of ongoing L/R UE spasms and gluteal spams causing discomfort and loss of anal sphincter control. Patient was told to go to the ED the morning of her presentation by out her outpatient cardiologist after symptoms did not mk over two to three days prior. Pt was seen in ED for r/o cardiac etiology, found to have negative ECG and intermittent symptoms. Previous outpatient cardiac workup revealed no unusual findings on 30 day halter monitor and cardiac MRI. She was very concerned about finding the root cause of her problem and feels that there may be an unexplained neurological issue at source. Ms. Presley has been seen at [facility] and [facility] before for issues related to her autonomic dysregulation diagnosis and associated complications.      When examined in the ED she denied cp, palpitations, HA, vision changes, dizziness or lightheadedness, but did endorse SOB less than previously but still persistent. She presented with a packet of information from a neurologist to work up her condition as an outpatient. Patient is requesting a neuro consult in hospital if possible. She also endorses one episode of blood in her stool, coinciding with loss of sphincter control, that she believes may be connected to her muscle spasms.    ED COURSE:  The patient was boarded in ED for further monitoring and evaluation. In the ED she received IV fluids and her home medications while awaiting a hospital bed. The patient developed no new symptoms, but continued to have mild SOB and spasmic shaking of the hands during interviews. She stayed in the ER overnight without acute event and was seen again the following morning where she continued to endorse SOB. She denied any new onset HA, CP, N/V/D and remained stable awaiting a neuro consult.     MEDICINE FLOOR ADMISSION:  The patient was moved to the medicine floor the following afternoon where she remained stable. The pt was seen by neuro who deemed no inpatient workup was needed and that she should f/u with her outpatient provider. The patient remained on the floor overnight w/o further incident or acute event and was scheduled for discharge to home with the presence of her home health worker. HPI:  42 F with hx of anxiety d/o, dysautonomia, ITP, POTS disease, presented with 3 days of chronic dizziness and presyncopal symptoms with chest pain, palpitations, and sob, worsening at 6AM the morning of presentation. Pt was concerned because of ongoing L/R UE spasms and gluteal spams causing discomfort and loss of anal sphincter control. Patient was told to go to the ED the morning of her presentation by out her outpatient cardiologist after symptoms did not mk over two to three days prior. Pt was seen in ED for r/o cardiac etiology, found to have negative ECG and intermittent symptoms. Previous outpatient cardiac workup revealed no unusual findings on 30 day halter monitor and cardiac MRI. She was very concerned about finding the root cause of her problem and feels that there may be an unexplained neurological issue at source. Ms. Presley has been seen at [facility] and [facility] before for issues related to her autonomic dysregulation diagnosis and associated complications.      When examined in the ED she denied cp, palpitations, HA, vision changes, dizziness or lightheadedness, but did endorse SOB less than previously but still persistent. She presented with a packet of information from a neurologist to work up her condition as an outpatient. Patient is requesting a neuro consult in hospital if possible. She also endorses one episode of blood in her stool, coinciding with loss of sphincter control, that she believes may be connected to her muscle spasms.    ED COURSE:  The patient was boarded in ED for further monitoring and evaluation. In the ED she received IV fluids and her home medications while awaiting a hospital bed. The patient developed no new symptoms, but continued to have mild SOB and spasmic shaking of the hands during interviews. She stayed in the ER overnight without acute event and was seen again the following morning where she continued to endorse SOB. She denied any new onset HA, CP, N/V/D and remained stable awaiting a neuro consult.     MEDICINE FLOOR ADMISSION:  The patient was moved to the medicine floor the following afternoon where she remained stable. She has had no further episodes of blood in her stool. The pt was seen by neuro who deemed no inpatient workup was needed and that she should f/u with her outpatient provider. The patient remained on the floor overnight w/o further incident or acute event and was scheduled for discharge to home with the presence of her home health worker.

## 2018-07-03 NOTE — DISCHARGE NOTE ADULT - MEDICATION SUMMARY - MEDICATIONS TO TAKE
I will START or STAY ON the medications listed below when I get home from the hospital:    Cardizem  mg/24 hours oral capsule, extended release  -- 1 cap(s) by mouth once a day at 11:30am  -- Indication: For Essential hypertension    LORazepam 2 mg oral tablet  -- 1mg at 3:15am  2mg at 8:15 am, 12:45 pm, 17:15, 22:15  -- Indication: For Anxiety    ondansetron 4 mg oral tablet, disintegrating  -- 1 tab(s) by mouth at 8:00am  -- Indication: For Dysautonomia    famotidine 20 mg oral tablet  -- 1 tab(s) by mouth at 19:30  -- Indication: For Prophylactic measure    docusate sodium 100 mg oral capsule  -- 1 cap(s) by mouth at 21:00  -- Indication: For Dysautonomia    montelukast 10 mg oral tablet  -- 1 tab(s) by mouth once a day at 21:00  -- Indication: For Prophylactic measure    simethicone 80 mg oral tablet, chewable  -- 2 tab(s) by mouth at 10:00 am, 16:00, 23:00  -- Indication: For Dysautonomia

## 2018-07-03 NOTE — DISCHARGE NOTE ADULT - PLAN OF CARE
Prevention of future syncopal events Electrocardiogram, electrolytes did not show anything to suggest cardiac causes of your syncope. Please follow up with your outpatient cardiologist and neurologist. Getting off disability Please follow up with your outpatient cardiologist and neurologist. Improvement of symptoms Resolution of symptoms We found trace amounts of blood in your stool, which could be related to a slow gastrointestinal bleed or hemorrhoid. Please follow up with your primary doctor and a gastroenterologist for monitoring and further workup.

## 2018-07-03 NOTE — PROGRESS NOTE ADULT - ASSESSMENT
42 F with hx of anxiety d/o, dysautonomia, ITP, POTS disease, p/w 3 days of dizziness and presyncopal symptoms with unclear etiology. Previous cardiac workup for similar symptoms was negative. ED cardiac workup also w/ no significant findings. No ongoing CP, SOB seems situational and inconsistent, unlikely cardiac etiology.  Afebrile, benign symptoms not suggestive of infectious cause. Near normal BMP and urine electrolytes do not suggest electrolyte imbalance. Current ddx includes: acute on chronic autonomic dysregulation episode, somatic psychaitric disorder, neurological of unkown etiology. Will consult neuro for further workup.    Hgb drop today 12 > 10.4 in setting of 1x bright red blood in stool. No other symptoms. Cont to monitor during inpatient stay.

## 2018-07-04 VITALS — SYSTOLIC BLOOD PRESSURE: 125 MMHG | DIASTOLIC BLOOD PRESSURE: 94 MMHG | HEART RATE: 78 BPM

## 2018-07-04 LAB
BACTERIA UR CULT: SIGNIFICANT CHANGE UP
SPECIMEN SOURCE: SIGNIFICANT CHANGE UP

## 2018-07-04 NOTE — PROGRESS NOTE ADULT - PROBLEM SELECTOR PLAN 1
Non cardiogenic and unkown etiology  - f/u morning BMP for electrolyte abnormalities  - No further inpatient workup needed per neuro

## 2018-07-04 NOTE — PROGRESS NOTE ADULT - PROBLEM SELECTOR PLAN 6
- PT eval for ambulation  - nl diet  - Dispo: pending neuro consult, likely outpatient workup - PT eval for ambulation  - nl diet  - Dispo: pending this AM, ready to go home

## 2018-07-04 NOTE — PROGRESS NOTE ADULT - SUBJECTIVE AND OBJECTIVE BOX
Patient is a 42y old  Female who presents with a chief complaint of Dizziness and palpitations of unknown etiology (2018 16:34)      SUBJECTIVE / OVERNIGHT EVENTS: No acute events overnight. Patient denies F/C, HA, CP, SOB, abdominal pain, N/V/D/C. She continues to endorse vauge SOB, with no acute pain. She is very concerned with taking her medications on a set schedule and refused to take her morning medications.    MEDICATIONS  (STANDING):  diltiazem    milliGRAM(s) Oral daily  famotidine    Tablet 20 milliGRAM(s) Oral two times a day  LORazepam     Tablet 2 milliGRAM(s) Oral every 4 hours  montelukast 10 milliGRAM(s) Oral at bedtime    MEDICATIONS  (PRN):  cyclobenzaprine 5 milliGRAM(s) Oral three times a day PRN Muscle Spasm  ondansetron   Disintegrating Tablet 4 milliGRAM(s) Oral every 6 hours PRN Nausea and/or Vomiting      T(F): 97.6 ( @ 05:36), Max: 98.2 ( @ 20:52)  HR: 59 ( @ 05:36) (53 - 83)  BP: 115/72 ( @ 05:36) (101/65 - 132/89)  RR: 18 ( @ 05:36) (18 - 18)  SpO2: 100% ( @ 05:36) (99% - 100%)    CAPILLARY BLOOD GLUCOSE        I&O's Summary    PHYSICAL EXAM:  GEN: Appears in no acute distress  HEENT: PERRLA, EOMI and accommodate, neck supple, no lymphadenopathy, no JVD  MOUTH: moist mucous membranes, no exudates or lesions   PULM: Clear to auscultation bilaterally, no wheezes, rales, rhonchi  CV: Regular rate and rhythm, S1S2, no murmurs, rubs or gallops  ABD: Soft, nontender to palpation, non-distended, normoactive bowel sounds  EXTREMITIES: No edema, + peripheral pulses  NEURO: AAOx3, moving all four extremities spontaneously  SKIN: No rashes, lesions, hematomas, ecchymosis    LABS & IMAGING:                        10.7   4.86  )-----------( 81       ( 2018 06:03 )             33.6     Hgb Trend: 10.7<--, 12.4<--    07-03    139  |  104  |  8   ----------------------------<  99  4.2   |  24  |  0.91    Ca    8.6      2018 06:03  Phos  4.4     07-03  Mg     2.1     07-03    TPro  6.1  /  Alb  3.8  /  TBili  < 0.2<L>  /  DBili  x   /  AST  13  /  ALT  9   /  AlkPhos  73  07-03    Creatinine Trend: 0.91<--, 0.90<--, 0.89<--      Urinalysis Basic - ( 2018 14:00 )    Color: COLORL / Appearance: CLEAR / S.003 / pH: 8.0  Gluc: NEGATIVE / Ketone: NEGATIVE  / Bili: NEGATIVE / Urobili: NORMAL mg/dL   Blood: NEGATIVE / Protein: NEGATIVE mg/dL / Nitrite: NEGATIVE   Leuk Esterase: NEGATIVE / RBC: x / WBC x   Sq Epi: x / Non Sq Epi: x / Bacteria: FEW Patient is a 42y old  Female who presents with a chief complaint of Dizziness and palpitations of unknown etiology (2018 16:34)      SUBJECTIVE / OVERNIGHT EVENTS: No acute events overnight. Patient denies F/C, HA, CP, SOB, abdominal pain, N/V/D/C. She continues to endorse vauge SOB, with no acute pain. She is very concerned with taking her medications on a set schedule and refused to take her morning medications.    MEDICATIONS  (STANDING):  diltiazem    milliGRAM(s) Oral daily  famotidine    Tablet 20 milliGRAM(s) Oral two times a day  LORazepam     Tablet 2 milliGRAM(s) Oral every 4 hours  montelukast 10 milliGRAM(s) Oral at bedtime    MEDICATIONS  (PRN):  cyclobenzaprine 5 milliGRAM(s) Oral three times a day PRN Muscle Spasm  ondansetron   Disintegrating Tablet 4 milliGRAM(s) Oral every 6 hours PRN Nausea and/or Vomiting      T(F): 97.6 ( @ 05:36), Max: 98.2 ( @ 20:52)  HR: 59 ( @ 05:36) (53 - 83)  BP: 115/72 ( @ 05:36) (101/65 - 132/89)  RR: 18 ( @ 05:36) (18 - 18)  SpO2: 100% ( @ 05:36) (99% - 100%)    CAPILLARY BLOOD GLUCOSE        I&O's Summary    PHYSICAL EXAM:  GEN: Appears in no acute distress  HEENT: PERRLA, EOMI and accommodate, neck supple, no lymphadenopathy, no JVD  MOUTH: moist mucous membranes, no exudates or lesions   PULM: Clear to auscultation bilaterally, no wheezes, rales, rhonchi  CV: Regular rate and rhythm, S1S2, no murmurs, rubs or gallops  ABD: Soft, nontender to palpation, non-distended, normoactive bowel sounds  EXTREMITIES: No edema, + peripheral pulses  NEURO: AAOx3, moving all four extremities spontaneously  SKIN: No rashes, lesions, hematomas, ecchymosis  Rectal exam performed w/ chaperone: No apparent fissures or injury to the anus, negative for palpable external/internal mass, neg presence of blood    LABS & IMAGING:                        10.7   4.86  )-----------( 81       ( 2018 06:03 )             33.6     Hgb Trend: 10.7<--, 12.4<--    07-03    139  |  104  |  8   ----------------------------<  99  4.2   |  24  |  0.91    Ca    8.6      2018 06:03  Phos  4.4     07-03  Mg     2.1     07-03    TPro  6.1  /  Alb  3.8  /  TBili  < 0.2<L>  /  DBili  x   /  AST  13  /  ALT  9   /  AlkPhos  73  07-03    Creatinine Trend: 0.91<--, 0.90<--, 0.89<--      Urinalysis Basic - ( 2018 14:00 )    Color: COLORL / Appearance: CLEAR / S.003 / pH: 8.0  Gluc: NEGATIVE / Ketone: NEGATIVE  / Bili: NEGATIVE / Urobili: NORMAL mg/dL   Blood: NEGATIVE / Protein: NEGATIVE mg/dL / Nitrite: NEGATIVE   Leuk Esterase: NEGATIVE / RBC: x / WBC x   Sq Epi: x / Non Sq Epi: x / Bacteria: FEW

## 2018-07-04 NOTE — PROGRESS NOTE ADULT - ATTENDING COMMENTS
Patient was seen and examined personally by me on 7/2/18. I have discussed the plan and reviewed the resident's note and agree with the above physical exam findings including assessment and plan except as indicated below.    42 female with axniety, chronic somatic preoccupation following with outpatient psych, POTs disease, ?tachy-javier presents with multiple nonspecific complaints of chest pain, syncope, blood in stool and muscle spasms.    Labs unremarkable  No plans for any inpatient neuro workup  Reassurance provided to patient however preoccupied and determined that something is wrong with her body.   Outpatient neuro and psych followup  Dispo: stable for DC home. DC time: 32 min
PATIENT LEFT HOSPITAL THIS AM PRIOR TO MY ABILITY TO EXAMINE AND ASSESS PATIENT

## 2018-07-04 NOTE — PROGRESS NOTE ADULT - ASSESSMENT
42 F with hx of anxiety d/o, dysautonomia, ITP, POTS disease, p/w 3 days of dizziness and presyncopal symptoms with unclear etiology. Previous cardiac workup for similar symptoms was negative. ED cardiac workup also w/ no significant findings. No ongoing CP, SOB seems situational and inconsistent, unlikely cardiac etiology.  Afebrile, benign symptoms not suggestive of infectious cause. Near normal BMP and urine electrolytes do not suggest electrolyte imbalance. Current ddx includes: acute on chronic autonomic dysregulation episode, somatic psychaitric disorder, neurological of unkown etiology. Neuro has no further workup at this time. 42 F with hx of anxiety d/o, dysautonomia, ITP, POTS disease, p/w 3 days of dizziness and presyncopal symptoms with unclear etiology. Previous cardiac workup for similar symptoms was negative. ED cardiac workup also w/ no significant findings. No ongoing CP, SOB seems situational and inconsistent, unlikely cardiac etiology.  Afebrile, benign symptoms not suggestive of infectious cause. Near normal BMP and urine electrolytes do not suggest electrolyte imbalance. Current ddx includes: acute on chronic autonomic dysregulation episode, somatic psychaitric disorder, neurological of unkown etiology. Neuro has no further workup at this time. Set to discharge.

## 2018-07-06 NOTE — ED ADULT NURSE NOTE - FINAL NURSING ELECTRONIC SIGNATURE
Patient is having a cortisone injection in her hip on 7/18. She is having Reclast on 7/16. She was told to stop Coumadin 3 days before the cortisone injection. Will everything be ok for the Reclast procedure, because she will be off of the coumadin?
reassured that ok to get reclast when off coumadin alma updated medication list for insulin
05-Sep-2017 23:43

## 2018-07-13 ENCOUNTER — EMERGENCY (EMERGENCY)
Facility: HOSPITAL | Age: 43
LOS: 1 days | Discharge: HOME | End: 2018-07-13
Attending: EMERGENCY MEDICINE
Payer: MEDICAID

## 2018-07-13 VITALS
RESPIRATION RATE: 20 BRPM | HEART RATE: 67 BPM | DIASTOLIC BLOOD PRESSURE: 72 MMHG | OXYGEN SATURATION: 100 % | SYSTOLIC BLOOD PRESSURE: 124 MMHG

## 2018-07-13 VITALS
HEIGHT: 62 IN | HEART RATE: 91 BPM | TEMPERATURE: 98 F | RESPIRATION RATE: 20 BRPM | WEIGHT: 91.93 LBS | SYSTOLIC BLOOD PRESSURE: 121 MMHG | OXYGEN SATURATION: 100 % | DIASTOLIC BLOOD PRESSURE: 87 MMHG

## 2018-07-13 LAB
ALBUMIN SERPL ELPH-MCNC: 4.9 G/DL — SIGNIFICANT CHANGE UP (ref 3.3–5)
ALP SERPL-CCNC: 81 U/L — SIGNIFICANT CHANGE UP (ref 40–120)
ALT FLD-CCNC: 13 U/L — SIGNIFICANT CHANGE UP (ref 10–45)
ANION GAP SERPL CALC-SCNC: 14 MMOL/L — SIGNIFICANT CHANGE UP (ref 5–17)
APPEARANCE UR: CLEAR — SIGNIFICANT CHANGE UP
AST SERPL-CCNC: 20 U/L — SIGNIFICANT CHANGE UP (ref 10–40)
BACTERIA # UR AUTO: ABNORMAL /HPF
BASE EXCESS BLDV CALC-SCNC: 0.3 MMOL/L — SIGNIFICANT CHANGE UP (ref -2–2)
BASOPHILS # BLD AUTO: 0 K/UL — SIGNIFICANT CHANGE UP (ref 0–0.2)
BASOPHILS NFR BLD AUTO: 0.8 % — SIGNIFICANT CHANGE UP (ref 0–2)
BILIRUB SERPL-MCNC: 0.2 MG/DL — SIGNIFICANT CHANGE UP (ref 0.2–1.2)
BILIRUB UR-MCNC: NEGATIVE — SIGNIFICANT CHANGE UP
BUN SERPL-MCNC: 6 MG/DL — LOW (ref 7–23)
CA-I SERPL-SCNC: 1.24 MMOL/L — SIGNIFICANT CHANGE UP (ref 1.12–1.3)
CALCIUM SERPL-MCNC: 9.4 MG/DL — SIGNIFICANT CHANGE UP (ref 8.4–10.5)
CHLORIDE BLDV-SCNC: 106 MMOL/L — SIGNIFICANT CHANGE UP (ref 96–108)
CHLORIDE SERPL-SCNC: 105 MMOL/L — SIGNIFICANT CHANGE UP (ref 96–108)
CO2 BLDV-SCNC: 27 MMOL/L — SIGNIFICANT CHANGE UP (ref 22–30)
CO2 SERPL-SCNC: 23 MMOL/L — SIGNIFICANT CHANGE UP (ref 22–31)
COLOR SPEC: COLORLESS — SIGNIFICANT CHANGE UP
CREAT SERPL-MCNC: 0.93 MG/DL — SIGNIFICANT CHANGE UP (ref 0.5–1.3)
DIFF PNL FLD: NEGATIVE — SIGNIFICANT CHANGE UP
EOSINOPHIL # BLD AUTO: 0 K/UL — SIGNIFICANT CHANGE UP (ref 0–0.5)
EOSINOPHIL NFR BLD AUTO: 0.6 % — SIGNIFICANT CHANGE UP (ref 0–6)
EPI CELLS # UR: SIGNIFICANT CHANGE UP /HPF
GAS PNL BLDV: 141 MMOL/L — SIGNIFICANT CHANGE UP (ref 136–145)
GAS PNL BLDV: SIGNIFICANT CHANGE UP
GAS PNL BLDV: SIGNIFICANT CHANGE UP
GIANT PLATELETS BLD QL SMEAR: PRESENT — SIGNIFICANT CHANGE UP
GLUCOSE BLDV-MCNC: 92 MG/DL — SIGNIFICANT CHANGE UP (ref 70–99)
GLUCOSE SERPL-MCNC: 99 MG/DL — SIGNIFICANT CHANGE UP (ref 70–99)
GLUCOSE UR QL: NEGATIVE — SIGNIFICANT CHANGE UP
HCO3 BLDV-SCNC: 25 MMOL/L — SIGNIFICANT CHANGE UP (ref 21–29)
HCT VFR BLD CALC: 35.7 % — SIGNIFICANT CHANGE UP (ref 34.5–45)
HCT VFR BLDA CALC: 36 % — LOW (ref 39–50)
HGB BLD CALC-MCNC: 11.6 G/DL — SIGNIFICANT CHANGE UP (ref 11.5–15.5)
HGB BLD-MCNC: 11.9 G/DL — SIGNIFICANT CHANGE UP (ref 11.5–15.5)
HOROWITZ INDEX BLDV+IHG-RTO: SIGNIFICANT CHANGE UP
KETONES UR-MCNC: NEGATIVE — SIGNIFICANT CHANGE UP
LACTATE BLDV-MCNC: 0.9 MMOL/L — SIGNIFICANT CHANGE UP (ref 0.7–2)
LEUKOCYTE ESTERASE UR-ACNC: NEGATIVE — SIGNIFICANT CHANGE UP
LG PLATELETS BLD QL AUTO: SIGNIFICANT CHANGE UP
LYMPHOCYTES # BLD AUTO: 1.4 K/UL — SIGNIFICANT CHANGE UP (ref 1–3.3)
LYMPHOCYTES # BLD AUTO: 23.5 % — SIGNIFICANT CHANGE UP (ref 13–44)
MCHC RBC-ENTMCNC: 26.4 PG — LOW (ref 27–34)
MCHC RBC-ENTMCNC: 33.5 GM/DL — SIGNIFICANT CHANGE UP (ref 32–36)
MCV RBC AUTO: 78.9 FL — LOW (ref 80–100)
MONOCYTES # BLD AUTO: 0.4 K/UL — SIGNIFICANT CHANGE UP (ref 0–0.9)
MONOCYTES NFR BLD AUTO: 6.1 % — SIGNIFICANT CHANGE UP (ref 2–14)
NEUTROPHILS # BLD AUTO: 4.1 K/UL — SIGNIFICANT CHANGE UP (ref 1.8–7.4)
NEUTROPHILS NFR BLD AUTO: 69 % — SIGNIFICANT CHANGE UP (ref 43–77)
NITRITE UR-MCNC: NEGATIVE — SIGNIFICANT CHANGE UP
PCO2 BLDV: 45 MMHG — SIGNIFICANT CHANGE UP (ref 35–50)
PH BLDV: 7.37 — SIGNIFICANT CHANGE UP (ref 7.35–7.45)
PH UR: 7 — SIGNIFICANT CHANGE UP (ref 5–8)
PLAT MORPH BLD: ABNORMAL
PLATELET # BLD AUTO: 68 K/UL — LOW (ref 150–400)
PO2 BLDV: 31 MMHG — SIGNIFICANT CHANGE UP (ref 25–45)
POTASSIUM BLDV-SCNC: 3.8 MMOL/L — SIGNIFICANT CHANGE UP (ref 3.5–5.3)
POTASSIUM SERPL-MCNC: 3.9 MMOL/L — SIGNIFICANT CHANGE UP (ref 3.5–5.3)
POTASSIUM SERPL-SCNC: 3.9 MMOL/L — SIGNIFICANT CHANGE UP (ref 3.5–5.3)
PROT SERPL-MCNC: 7.5 G/DL — SIGNIFICANT CHANGE UP (ref 6–8.3)
PROT UR-MCNC: NEGATIVE — SIGNIFICANT CHANGE UP
RBC # BLD: 4.52 M/UL — SIGNIFICANT CHANGE UP (ref 3.8–5.2)
RBC # FLD: 13.4 % — SIGNIFICANT CHANGE UP (ref 10.3–14.5)
RBC BLD AUTO: NORMAL — SIGNIFICANT CHANGE UP
SAO2 % BLDV: 46 % — LOW (ref 67–88)
SODIUM SERPL-SCNC: 142 MMOL/L — SIGNIFICANT CHANGE UP (ref 135–145)
SP GR SPEC: 1 — LOW (ref 1.01–1.02)
UROBILINOGEN FLD QL: NEGATIVE — SIGNIFICANT CHANGE UP
WBC # BLD: 6 K/UL — SIGNIFICANT CHANGE UP (ref 3.8–10.5)
WBC # FLD AUTO: 6 K/UL — SIGNIFICANT CHANGE UP (ref 3.8–10.5)
WBC UR QL: SIGNIFICANT CHANGE UP /HPF (ref 0–5)

## 2018-07-13 PROCEDURE — 81001 URINALYSIS AUTO W/SCOPE: CPT

## 2018-07-13 PROCEDURE — 82803 BLOOD GASES ANY COMBINATION: CPT

## 2018-07-13 PROCEDURE — 85014 HEMATOCRIT: CPT

## 2018-07-13 PROCEDURE — 93010 ELECTROCARDIOGRAM REPORT: CPT

## 2018-07-13 PROCEDURE — 76705 ECHO EXAM OF ABDOMEN: CPT | Mod: 26,76

## 2018-07-13 PROCEDURE — 74176 CT ABD & PELVIS W/O CONTRAST: CPT | Mod: 26

## 2018-07-13 PROCEDURE — 82330 ASSAY OF CALCIUM: CPT

## 2018-07-13 PROCEDURE — 99284 EMERGENCY DEPT VISIT MOD MDM: CPT | Mod: 25

## 2018-07-13 PROCEDURE — 76705 ECHO EXAM OF ABDOMEN: CPT

## 2018-07-13 PROCEDURE — 82272 OCCULT BLD FECES 1-3 TESTS: CPT

## 2018-07-13 PROCEDURE — 82435 ASSAY OF BLOOD CHLORIDE: CPT

## 2018-07-13 PROCEDURE — 74176 CT ABD & PELVIS W/O CONTRAST: CPT

## 2018-07-13 PROCEDURE — 84295 ASSAY OF SERUM SODIUM: CPT

## 2018-07-13 PROCEDURE — 80053 COMPREHEN METABOLIC PANEL: CPT

## 2018-07-13 PROCEDURE — 84132 ASSAY OF SERUM POTASSIUM: CPT

## 2018-07-13 PROCEDURE — 93005 ELECTROCARDIOGRAM TRACING: CPT

## 2018-07-13 PROCEDURE — 85027 COMPLETE CBC AUTOMATED: CPT

## 2018-07-13 PROCEDURE — 83605 ASSAY OF LACTIC ACID: CPT

## 2018-07-13 PROCEDURE — 82947 ASSAY GLUCOSE BLOOD QUANT: CPT

## 2018-07-13 RX ORDER — SODIUM CHLORIDE 9 MG/ML
1000 INJECTION INTRAMUSCULAR; INTRAVENOUS; SUBCUTANEOUS ONCE
Qty: 0 | Refills: 0 | Status: COMPLETED | OUTPATIENT
Start: 2018-07-13 | End: 2018-07-13

## 2018-07-13 RX ORDER — ACETAMINOPHEN 500 MG
975 TABLET ORAL ONCE
Qty: 0 | Refills: 0 | Status: COMPLETED | OUTPATIENT
Start: 2018-07-13 | End: 2018-07-13

## 2018-07-13 RX ORDER — FAMOTIDINE 10 MG/ML
20 INJECTION INTRAVENOUS ONCE
Qty: 0 | Refills: 0 | Status: COMPLETED | OUTPATIENT
Start: 2018-07-13 | End: 2018-07-13

## 2018-07-13 RX ADMIN — SODIUM CHLORIDE 1000 MILLILITER(S): 9 INJECTION INTRAMUSCULAR; INTRAVENOUS; SUBCUTANEOUS at 17:33

## 2018-07-13 RX ADMIN — Medication 975 MILLIGRAM(S): at 12:01

## 2018-07-13 RX ADMIN — SODIUM CHLORIDE 1000 MILLILITER(S): 9 INJECTION INTRAMUSCULAR; INTRAVENOUS; SUBCUTANEOUS at 10:09

## 2018-07-13 NOTE — ED PROVIDER NOTE - PHYSICAL EXAMINATION
Sandi Martinez, .:   GENERAL: Patient awake alert NAD. Tearful during exam.   HEENT: Moist mucous membranes, PERRL, EOMI  LUNGS: CTAB, no wheezes or crackles.   CARDIAC: RRR, no m/r/g.    ABDOMEN: Soft, +RLQ tenderness, +RUQ tenderness, ND, No rebound, guarding. +R CVA tenderness.   EXT: No edema. No calf tenderness.  NEURO: A&Ox3. Gait normal.    SKIN: Warm and dry. No rash. Sandi Martinez, .:   GENERAL: Patient awake alert NAD. Tearful during exam.   HEENT: Moist mucous membranes, PERRL, EOMI  LUNGS: CTAB, no wheezes or crackles.   CARDIAC: RRR, no m/r/g.    ABDOMEN: Soft, +RLQ tenderness, +RUQ tenderness, ND, No rebound, guarding. +R CVA tenderness. rectal exam with multiple external hemorrhoids, none actively bleeding, no visualized internal hemorrhoids. stool is light brown, hemoccult positive  EXT: No edema. No calf tenderness.  NEURO: A&Ox3. Gait normal.    SKIN: Warm and dry. No rash.    Attending MD Ajay: See attestation for attending contribution

## 2018-07-13 NOTE — ED PROVIDER NOTE - PROGRESS NOTE DETAILS
ATTENDING MD Ajay: repeat exam patient without any focal abd tendernss now pain migrated to L upper side. Extremely low suspicion for appendicitis. ATTENDING MD Ajay: pain on left lower side. pain moving around. exams inconsistent. Extremely low suspicion for acute surgical pathology. Labs reassuring. H/H stable despite 1 week of bleeding. Likely hemorrhoidal. Possible enteritis less likely colitis. If no concerning findings on US will refer for GI f/u. Do not suspect benefit from CT if ultrasound reassuring as pretest probability for appendicitis or surgical pathology is extremely low based on exam. Dr. Rose Note: s/o from Dr. Moss pending ct scan.  Pt ambulatory in ED, no distress, awaiting ct scan.

## 2018-07-13 NOTE — ED ADULT NURSE REASSESSMENT NOTE - NS ED NURSE REASSESS COMMENT FT1
Patient states she can not receive iv fluids as a bolus since she has cardiac issues. Patient informed that iv fluids will not be rapidly infused.

## 2018-07-13 NOTE — ED PROVIDER NOTE - MEDICAL DECISION MAKING DETAILS
42F p/w RLQ/RUQ pain +rectal bleed. Renal colic vs. appy vs. julio cesar. Will get US, basic labs, UA. CT if neg,  Likely d/c home if neg w/u. 42F p/w RLQ/RUQ pain +rectal bleed. Rectal bleed likely hemorrhoidal, has had similar x 1 week and recently seen at [facility]. Pain new, with bowel movement. Most likely colitis vs enteritis. Also will consider renal colic vs. appy vs. julio cesar but based on exam and history have low suspicion. Will get US, chemistry and LFTs, labs, UA. CT if neg,  Likely d/c home if neg w/u.

## 2018-07-13 NOTE — ED PROVIDER NOTE - OBJECTIVE STATEMENT
42F h/o POTS, anxiety presents with 3 days of BRBPR, small amount, mainly when wiping. She also noted sharp abdominal RLQ pain radiating up to her RUQ and to flank, not post-prandial. No urinary complaints, no anorexia, fevers. Admits to some nausea, no vomiting. 42F h/o POTS, anxiety presents with 3 days of BRBPR, small amount, mainly when wiping. It has been ongoing x1 week and she was seen at [facility] for the same and told it was probably hemorrhoids .She has also had some intermittent abdominal cramping this week but today after a bowel movement she developed some cramping/sharp abdominal pain which started in her RLQ pain radiating up to her RUQ and to flank, not post-prandial. No vomiting, No dysuria or hematuria, no vaginal discharge, no anorexia, fevers. Admits to some nausea, no vomiting. She had palpitations in the setting of the bowel movement and nausea which have improved but are currently ongiong .She denies near syncope. She thinks the palpitations are typical of her POTS.

## 2018-07-13 NOTE — ED PROVIDER NOTE - CARE PLAN
Principal Discharge DX:	Generalized abdominal pain  Secondary Diagnosis:	BRBPR (bright red blood per rectum)  Secondary Diagnosis:	Hemorrhoid Principal Discharge DX:	Generalized abdominal pain  Assessment and plan of treatment:	Follow up with your primary care doctor within 48-72 hours.   You must return for new, worsening or concerning symptoms; specifically including those listed on the attached sheet.   Please follow up with a gastroenterologist as soon as possible. You can make an appointment at the following location.  [facility] [place] [facility]  Phone: [phone]  Address: [address] [place] [address]  You may take 600mg of ibuprofen (example: motrin or advil) every 4-6 hours for baseline pain control as indicated with respect to the warnings on the label. This is an over the counter medication.  You may take 1000mg of Tylenol every 6 hours for baseline pain control with respect to the warnings on the label.  Secondary Diagnosis:	BRBPR (bright red blood per rectum)  Secondary Diagnosis:	Hemorrhoid

## 2018-07-13 NOTE — ED ADULT NURSE NOTE - OBJECTIVE STATEMENT
Patient well known to this ED today c/o bright red blood from rectum when having BM for several days as well as right sided abdominal discomfort x 1 day. Denies any fever or chills. Reports constipation and straining as well as hx of hemorrhoids. Abdomen is non-distended and soft. C/o nausea and discomfort on palpation of right upper and lower abdomen. Patient c/o ongoing intermittent palpitations which is chronic. On visual inspection external hemorrhoids are visualized as well as small fissure or tear. No blood is visible at this time. Skin is warm dry and intact.

## 2018-07-13 NOTE — ED PROVIDER NOTE - ATTENDING CONTRIBUTION TO CARE
ATTENDING MD:  Curtis ELAINE, personally have seen and examined this patient.  I have discussed all aspects of care with the resident physician. Resident note reviewed and agree on plan of care and except where noted.  See HPI, PE, and MDM for details.    VITALS: reviewed  GEN: anxious, nauseated appearing, but not otherwise in distress, A & O x 4  HEAD/EYES: NCAT, anicteric sclerae, no conjunctival pallor  ENT: mucus membranes moist, oropharynx WNL, trachea midline, no JVD  RESP: lungs CTA with equal breath sounds bilaterally, chest wall nontender and atraumatic  CV: heart with reg rhythm S1, S2, no murmur; distal pulses intact and symmetric bilaterally  ABDOMEN: thin, hyperactive bowel sounds, soft, nondistended, mild diffuse tenderness without focality, negative Nielson's, negative McBurney's, negative Rosving, negative obturator, negative psoas. no rebound, no guarding, no tenderness to percussion, no palpable masses. rectal exam per resident  : no CVAT  MSK: extremities atraumatic and nontender, no edema, no asymmetry. the back is without midline or lateral tenderness, there is no spinal deformity or stepoff and the back is ranged painlessly. the neck has no midline tenderness, deformity, or stepoff, and is ranged painlessly.  SKIN: warm, dry, no rash, no bruising, no cyanosis. color appropriate for ethnicity  NEURO: alert, mentating appropriately, no facial asymmetry. gross sensation, motor, coordination are intact  PSYCH: Affect appropriate    MDM: Pt with 1 week of very small volume BRBPR, no systemic symptoms, now pain with bowel movement and palpitations (active with NSR on monitor and EKG) likely c/w vasovagal and known pots. abdominal exam is benign with mild tenderness that moves on serial exams. given RLQ and flank complaint esther get US, have an extremely low suspicion for acute surgical pathology. If ultrasound negative and no red flags on labs have very low suspicion that CT scan will be of diagnostic benefit.

## 2018-07-13 NOTE — ED PROVIDER NOTE - NS ED ROS FT
CONST: no fevers, no chills  EYES: no pain, no vision changes  ENT: no sore throat, no ear pain, no change in hearing  CV: no chest pain, no leg swelling  RESP: no shortness of breath, no cough  ABD: +abdominal pain, +nausea, no vomiting, no diarrhea  : no dysuria, +R flank pain, no hematuria  MSK: no back pain, no extremity pain  NEURO: no headache or additional neurologic complaints  HEME: no easy bleeding  SKIN:  no rash CONST: no fevers, no chills  EYES: no pain, no vision changes  ENT: no sore throat, no ear pain, no change in hearing  CV: no chest pain, no leg swelling  RESP: no shortness of breath, no cough  ABD: see HPI  : no dysuria, +R flank pain, no hematuria  MSK: no back pain, no extremity pain  NEURO: no headache or additional neurologic complaints  HEME: no easy bleeding  SKIN:  no rash    ATTENDING MD Ajay: agree with above, but pt now says pain has moved to left side no longer on right

## 2018-07-13 NOTE — CHART NOTE - NSCHARTNOTEFT_GEN_A_CORE
EMERGENCY ROOM SOCIAL WORK: Chart reviewed for discharge transportation. 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 Patient identified as complex care due to frequent ED encounters and hospital admissions. Patient treated and medically by MD.    LMSW met with patient at the bedside to verify discharge address. Patient resting comfortably and observed reading discharge documents. LMSW inquired how patient is feeling. Patient reporting "medical complaint has not resolved but feeling much better than earlier." LMSW reinforced good follow-up for optimal well-being. Patient in agreement with discharge plan and requesting assistance with arranging discharge transport through Medicaid insurance benefits. Patient requesting discharge from [facility] ED to [address] [place] [address]. Patient provided cell #[phone] for contact purposes. Patient with HIP Medicaid (policy #[id number]). [employer] contacted to arrange discharge livery transport. Trip in rotation, awaiting transportation vendor. Reference #[id number]. Patient aware and treating RN informed of discharge details. Patient to await taxi in ED waiting room. PM  provided with handoff.

## 2018-07-13 NOTE — ED PROVIDER NOTE - PLAN OF CARE
Follow up with your primary care doctor within 48-72 hours.   You must return for new, worsening or concerning symptoms; specifically including those listed on the attached sheet.   Please follow up with a gastroenterologist as soon as possible. You can make an appointment at the following location.  [facility] [place] [facility]  Phone: [phone]  Address: [address] [place] [address]  You may take 600mg of ibuprofen (example: motrin or advil) every 4-6 hours for baseline pain control as indicated with respect to the warnings on the label. This is an over the counter medication.  You may take 1000mg of Tylenol every 6 hours for baseline pain control with respect to the warnings on the label.

## 2018-07-16 ENCOUNTER — RX RENEWAL (OUTPATIENT)
Age: 43
End: 2018-07-16

## 2018-07-16 PROBLEM — A18.01 TUBERCULOSIS OF SPINE: Chronic | Status: INACTIVE | Noted: 2017-10-20 | Resolved: 2017-12-18

## 2018-07-16 PROBLEM — J45.909 UNSPECIFIED ASTHMA, UNCOMPLICATED: Chronic | Status: INACTIVE | Noted: 2017-05-23 | Resolved: 2017-09-29

## 2018-07-19 DIAGNOSIS — Z71.89 OTHER SPECIFIED COUNSELING: ICD-10-CM

## 2018-07-19 PROBLEM — H83.09 LABYRINTHITIS, UNSPECIFIED EAR: Chronic | Status: ACTIVE | Noted: 2017-05-23

## 2018-07-19 PROBLEM — Z86.2 PERSONAL HISTORY OF DISEASES OF THE BLOOD AND BLOOD-FORMING ORGANS AND CERTAIN DISORDERS INVOLVING THE IMMUNE MECHANISM: Chronic | Status: ACTIVE | Noted: 2017-06-05

## 2018-07-19 PROBLEM — D69.3 IMMUNE THROMBOCYTOPENIC PURPURA: Chronic | Status: ACTIVE | Noted: 2017-12-08

## 2018-07-19 PROBLEM — R00.0 TACHYCARDIA, UNSPECIFIED: Chronic | Status: ACTIVE | Noted: 2017-12-08

## 2018-07-19 PROBLEM — D69.3 IMMUNE THROMBOCYTOPENIC PURPURA: Chronic | Status: ACTIVE | Noted: 2018-01-07

## 2018-07-19 PROBLEM — F41.9 ANXIETY DISORDER, UNSPECIFIED: Chronic | Status: ACTIVE | Noted: 2017-05-04

## 2018-07-19 PROBLEM — K58.9 IRRITABLE BOWEL SYNDROME WITHOUT DIARRHEA: Chronic | Status: ACTIVE | Noted: 2017-05-04

## 2018-07-19 PROBLEM — F32.9 MAJOR DEPRESSIVE DISORDER, SINGLE EPISODE, UNSPECIFIED: Chronic | Status: ACTIVE | Noted: 2018-01-07

## 2018-07-19 PROBLEM — I10 ESSENTIAL (PRIMARY) HYPERTENSION: Chronic | Status: ACTIVE | Noted: 2017-05-04

## 2018-07-19 PROBLEM — R51 HEADACHE: Chronic | Status: ACTIVE | Noted: 2017-05-04

## 2018-07-19 PROBLEM — G90.9 DISORDER OF THE AUTONOMIC NERVOUS SYSTEM, UNSPECIFIED: Chronic | Status: ACTIVE | Noted: 2018-01-07

## 2018-07-21 ENCOUNTER — EMERGENCY (EMERGENCY)
Facility: HOSPITAL | Age: 43
LOS: 1 days | Discharge: HOME | End: 2018-07-21
Attending: EMERGENCY MEDICINE
Payer: MEDICAID

## 2018-07-21 VITALS
OXYGEN SATURATION: 100 % | SYSTOLIC BLOOD PRESSURE: 133 MMHG | HEART RATE: 94 BPM | DIASTOLIC BLOOD PRESSURE: 81 MMHG | WEIGHT: 91.05 LBS | TEMPERATURE: 98 F | RESPIRATION RATE: 18 BRPM | HEIGHT: 62 IN

## 2018-07-21 VITALS
DIASTOLIC BLOOD PRESSURE: 76 MMHG | SYSTOLIC BLOOD PRESSURE: 124 MMHG | TEMPERATURE: 98 F | HEART RATE: 81 BPM | RESPIRATION RATE: 14 BRPM | OXYGEN SATURATION: 100 %

## 2018-07-21 LAB
ALBUMIN SERPL ELPH-MCNC: 3.9 G/DL — SIGNIFICANT CHANGE UP (ref 3.3–5)
ALP SERPL-CCNC: 75 U/L — SIGNIFICANT CHANGE UP (ref 40–120)
ALT FLD-CCNC: 15 U/L — SIGNIFICANT CHANGE UP (ref 12–78)
ANION GAP SERPL CALC-SCNC: 9 MMOL/L — SIGNIFICANT CHANGE UP (ref 5–17)
APTT BLD: 24.4 SEC — LOW (ref 27.5–37.4)
AST SERPL-CCNC: 15 U/L — SIGNIFICANT CHANGE UP (ref 15–37)
BASOPHILS # BLD AUTO: 0.01 K/UL — SIGNIFICANT CHANGE UP (ref 0–0.2)
BASOPHILS NFR BLD AUTO: 0.2 % — SIGNIFICANT CHANGE UP (ref 0–2)
BILIRUB SERPL-MCNC: 0.3 MG/DL — SIGNIFICANT CHANGE UP (ref 0.2–1.2)
BUN SERPL-MCNC: 8 MG/DL — SIGNIFICANT CHANGE UP (ref 7–23)
CALCIUM SERPL-MCNC: 8.8 MG/DL — SIGNIFICANT CHANGE UP (ref 8.5–10.1)
CHLORIDE SERPL-SCNC: 108 MMOL/L — SIGNIFICANT CHANGE UP (ref 96–108)
CK MB BLD-MCNC: <1.5 % — SIGNIFICANT CHANGE UP (ref 0–3.5)
CK MB CFR SERPL CALC: <1 NG/ML — SIGNIFICANT CHANGE UP (ref 0–3.6)
CK SERPL-CCNC: 66 U/L — SIGNIFICANT CHANGE UP (ref 26–192)
CO2 SERPL-SCNC: 25 MMOL/L — SIGNIFICANT CHANGE UP (ref 22–31)
CREAT SERPL-MCNC: 0.85 MG/DL — SIGNIFICANT CHANGE UP (ref 0.5–1.3)
D DIMER BLD IA.RAPID-MCNC: <150 NG/ML DDU — SIGNIFICANT CHANGE UP
EOSINOPHIL # BLD AUTO: 0.03 K/UL — SIGNIFICANT CHANGE UP (ref 0–0.5)
EOSINOPHIL NFR BLD AUTO: 0.6 % — SIGNIFICANT CHANGE UP (ref 0–6)
GLUCOSE SERPL-MCNC: 79 MG/DL — SIGNIFICANT CHANGE UP (ref 70–99)
HCG SERPL-ACNC: <1 MIU/ML — SIGNIFICANT CHANGE UP
HCT VFR BLD CALC: 32.4 % — LOW (ref 34.5–45)
HGB BLD-MCNC: 10.6 G/DL — LOW (ref 11.5–15.5)
IMM GRANULOCYTES NFR BLD AUTO: 0.2 % — SIGNIFICANT CHANGE UP (ref 0–1.5)
INR BLD: 1.03 RATIO — SIGNIFICANT CHANGE UP (ref 0.88–1.16)
LIDOCAIN IGE QN: 161 U/L — SIGNIFICANT CHANGE UP (ref 73–393)
LYMPHOCYTES # BLD AUTO: 1.43 K/UL — SIGNIFICANT CHANGE UP (ref 1–3.3)
LYMPHOCYTES # BLD AUTO: 29.2 % — SIGNIFICANT CHANGE UP (ref 13–44)
MAGNESIUM SERPL-MCNC: 2.2 MG/DL — SIGNIFICANT CHANGE UP (ref 1.6–2.6)
MCHC RBC-ENTMCNC: 25.2 PG — LOW (ref 27–34)
MCHC RBC-ENTMCNC: 32.7 GM/DL — SIGNIFICANT CHANGE UP (ref 32–36)
MCV RBC AUTO: 77.1 FL — LOW (ref 80–100)
MONOCYTES # BLD AUTO: 0.41 K/UL — SIGNIFICANT CHANGE UP (ref 0–0.9)
MONOCYTES NFR BLD AUTO: 8.4 % — SIGNIFICANT CHANGE UP (ref 2–14)
NEUTROPHILS # BLD AUTO: 3 K/UL — SIGNIFICANT CHANGE UP (ref 1.8–7.4)
NEUTROPHILS NFR BLD AUTO: 61.4 % — SIGNIFICANT CHANGE UP (ref 43–77)
PLATELET # BLD AUTO: 79 K/UL — LOW (ref 150–400)
POTASSIUM SERPL-MCNC: 3.7 MMOL/L — SIGNIFICANT CHANGE UP (ref 3.5–5.3)
POTASSIUM SERPL-SCNC: 3.7 MMOL/L — SIGNIFICANT CHANGE UP (ref 3.5–5.3)
PROT SERPL-MCNC: 7.1 G/DL — SIGNIFICANT CHANGE UP (ref 6–8.3)
PROTHROM AB SERPL-ACNC: 11.2 SEC — SIGNIFICANT CHANGE UP (ref 9.8–12.7)
RBC # BLD: 4.2 M/UL — SIGNIFICANT CHANGE UP (ref 3.8–5.2)
RBC # FLD: 14.1 % — SIGNIFICANT CHANGE UP (ref 10.3–14.5)
SODIUM SERPL-SCNC: 142 MMOL/L — SIGNIFICANT CHANGE UP (ref 135–145)
TROPONIN I SERPL-MCNC: <.015 NG/ML — SIGNIFICANT CHANGE UP (ref 0.01–0.04)
WBC # BLD: 4.89 K/UL — SIGNIFICANT CHANGE UP (ref 3.8–10.5)
WBC # FLD AUTO: 4.89 K/UL — SIGNIFICANT CHANGE UP (ref 3.8–10.5)

## 2018-07-21 PROCEDURE — 85730 THROMBOPLASTIN TIME PARTIAL: CPT

## 2018-07-21 PROCEDURE — 71045 X-RAY EXAM CHEST 1 VIEW: CPT | Mod: 26

## 2018-07-21 PROCEDURE — 83735 ASSAY OF MAGNESIUM: CPT

## 2018-07-21 PROCEDURE — 99285 EMERGENCY DEPT VISIT HI MDM: CPT

## 2018-07-21 PROCEDURE — 83690 ASSAY OF LIPASE: CPT

## 2018-07-21 PROCEDURE — 82553 CREATINE MB FRACTION: CPT

## 2018-07-21 PROCEDURE — 85027 COMPLETE CBC AUTOMATED: CPT

## 2018-07-21 PROCEDURE — 82550 ASSAY OF CK (CPK): CPT

## 2018-07-21 PROCEDURE — 93005 ELECTROCARDIOGRAM TRACING: CPT

## 2018-07-21 PROCEDURE — 99284 EMERGENCY DEPT VISIT MOD MDM: CPT | Mod: 25

## 2018-07-21 PROCEDURE — 93971 EXTREMITY STUDY: CPT

## 2018-07-21 PROCEDURE — 85379 FIBRIN DEGRADATION QUANT: CPT

## 2018-07-21 PROCEDURE — 71045 X-RAY EXAM CHEST 1 VIEW: CPT

## 2018-07-21 PROCEDURE — 84702 CHORIONIC GONADOTROPIN TEST: CPT

## 2018-07-21 PROCEDURE — 84484 ASSAY OF TROPONIN QUANT: CPT

## 2018-07-21 PROCEDURE — 93971 EXTREMITY STUDY: CPT | Mod: 26,RT

## 2018-07-21 PROCEDURE — 80053 COMPREHEN METABOLIC PANEL: CPT

## 2018-07-21 PROCEDURE — 85610 PROTHROMBIN TIME: CPT

## 2018-07-21 NOTE — ED PROVIDER NOTE - PROGRESS NOTE DETAILS
discussed case with Dr. Flores, will observe on monitor for 1 hour, if no events, may be discharged cleared by Dr. Carlson

## 2018-07-21 NOTE — ED PROVIDER NOTE - PHYSICAL EXAMINATION
Gen: Alert, NAD  Head/eyes: NC/AT, PERRL, EOMI, normal lids/conjunctiva, on scleral icterus  ENT: Bilateral TM WNL, normal hearing, patent oropharynx without erythema/exudate, uvula midline, no peritonsillar abscess, no tongue/uvula swelling  Neck: supple, no tenderness/meningismus/JVD, Trachea midline  Pulm: Bilateral clear BS, normal resp effort, no wheeze/stridor/retractions  CV: RRR, no M/R/G, +2 dist pulses (radial, pedal DP/PT, popliteal)  Abd: soft, NT/ND, +BS, no guarding/rebound tenderness  Musculoskeletal: no edema/erythema/cyanosis  Skin: no rash, no vesicles, no petechaie, no ecchymosis, no swelling  Neuro: AAOx3, CN 2-12 intact, normal sensation, 5/5 motor strength in all extremities, normal gait, no dysmetria

## 2018-07-21 NOTE — CONSULT NOTE ADULT - ASSESSMENT
Mrs. Presley is a 42 year old female with a history of dysautonomia (POTS) worsening orthostatic symptoms and labile hypertension. She presents to the ER today with palpitations, and the feeling that something is wrong.  Her blood work is notable for chronic thrombocytopenia from ITP, and anemia.   Cardiac enzymes, EKG, and telemetry in the ER are all unremarkable.  Dopplers are negative for DVT. D-dimer level suggests against thromboembolic disease.  I have explained to her that all of her testing is within normal limits, and that there is no immediate reason to be admitted for further evaluation.  Check orthostatics  Continue Cardizem 180 daily.  Watch on telemetry for another hour to evaluate for jump in HR, and check her manual BP another prior to d/c.  She will follow up with her cardiologist as recommended.

## 2018-07-21 NOTE — CONSULT NOTE ADULT - SUBJECTIVE AND OBJECTIVE BOX
[facility] - Taylor Melgar, Edgar, Gaby, Yola, Mark Brown  Office Number: [phone]    Initial Consult Note    CHIEF COMPLAINT: Patient is a 42y old  Female who presents with a chief complaint of     HPI:  41 yo female hx of autonomic dysfunction, POTS c/o tachycardia noted today at home, HR went up to 120's to 160's with associated shortness of breath/chest pains and right 2nd digit numbness/tingling. Also notes some atraumatic bruising to her right lower leg, is worried about a DVT.    She has a history of dysautonomia (POTS) worsening orthostatic symptoms and labile hypertension.  The long-standing history of palpitations, lightheadedness, cardiac complaints and has been evaluated by at least 3 cardiologists in the past year. She most recently underwent cardiac MRI which was normal. Echocardiography showed a structurally normal heart as well.  She reports that her blood pressure is normal during the morning afternoon, however it gets lower in the evening and she reports feeling unwell during this time. She reports ITP and anemia  She takes Cardizem for palpitations which she reports both APCs and PVCs.      Today, she felt palpitations, and that her HR went up quickly to 160's, and then returned to 90's. He reports a bruise of her right lower leg. She feels that something is definitely wrong.  She had a recent holter, she had brief episodes of SVT that did not correlate with her symptoms.  She reports paresthesias of her fingers.      PAST MEDICAL & SURGICAL HISTORY:  Gastroparesis  Hypertension  Dystonia      SOCIAL HISTORY:  No tobacco, ethanol, or drug abuse.    FAMILY HISTORY:    No family history of acute MI or sudden cardiac death.    MEDICATIONS  (STANDING):    MEDICATIONS  (PRN):      Allergies    IV Contrast (Anaphylaxis)  Reglan (Other)  Zofran (Other)    Intolerances    beta blockers (Other)      REVIEW OF SYSTEMS:    CONSTITUTIONAL: +weakness, no fevers or chills  EYES/ENT: No visual changes;  No vertigo or throat pain   NECK: No pain or stiffness  RESPIRATORY: No cough, wheezing, hemoptysis;+ shortness of breath  CARDIOVASCULAR: No chest pain, + palpitations  GASTROINTESTINAL: No abdominal pain. No nausea, vomiting, or hematemesis; No diarrhea or constipation. No melena or hematochezia.  GENITOURINARY: No dysuria, frequency or hematuria  NEUROLOGICAL: No numbness or weakness, + pararesthesias  SKIN: No itching or rash  All other review of systems is negative unless indicated above    VITAL SIGNS:   Vital Signs Last 24 Hrs  T(C): 36.7 (21 Jul 2018 11:55), Max: 36.7 (21 Jul 2018 11:55)  T(F): 98 (21 Jul 2018 11:55), Max: 98 (21 Jul 2018 11:55)  HR: 76 (21 Jul 2018 12:35) (76 - 94)  BP: 121/79 (21 Jul 2018 12:35) (121/79 - 133/81)  BP(mean): --  RR: 16 (21 Jul 2018 12:35) (16 - 18)  SpO2: 100% (21 Jul 2018 12:35) (100% - 100%)    I&O's Summary      On Exam:    Constitutional: NAD, alert and oriented x 3, anxious  Lungs:  Non-labored, breath sounds are clear bilaterally, No wheezing, rales or rhonchi  Cardiovascular: RRR.  S1 and S2 positive.  No murmurs, rubs, gallops or clicks  Gastrointestinal: Bowel Sounds present, soft, nontender.   Lymph: No peripheral edema. No cervical lymphadenopathy.  Neurological: Alert, no focal deficits  Skin: No rashes or ulcers; small bruise on RLE   Psych:  Mood & affect appropriate.    LABS: All Labs Reviewed:                        10.6   4.89  )-----------( 79       ( 21 Jul 2018 13:08 )             32.4     21 Jul 2018 13:08    142    |  108    |  8      ----------------------------<  79     3.7     |  25     |  0.85     Ca    8.8        21 Jul 2018 13:08  Mg     2.2       21 Jul 2018 13:08    TPro  7.1    /  Alb  3.9    /  TBili  0.3    /  DBili  x      /  AST  15     /  ALT  15     /  AlkPhos  75     21 Jul 2018 13:08    PT/INR - ( 21 Jul 2018 13:08 )   PT: 11.2 sec;   INR: 1.03 ratio         PTT - ( 21 Jul 2018 13:08 )  PTT:24.4 sec  CARDIAC MARKERS ( 21 Jul 2018 13:08 )  <.015 ng/mL / x     / 66 U/L / x     / <1.0 ng/mL      Blood Culture:         RADIOLOGY:    EKG: SR

## 2018-07-21 NOTE — ED ADULT NURSE NOTE - OBJECTIVE STATEMENT
pt reports midsternal chest pain, non radiating.  -nausea -vomiting -diaphoresis pt states her HR went up to the 160's

## 2018-07-21 NOTE — ED PROVIDER NOTE - OBJECTIVE STATEMENT
41 yo female hx of autonomic dysfunction, POTS c/o tachycardia noted today at home, HR went up to 120's to 160's with associated shortness of breath/chest pains and right 2nd digit numbness/tingling. Also notes some atraumatic bruising to her right lower leg, is worried about a DVT.  Cards, Dr. Lisker.  PMD Dr. Ford

## 2018-07-24 PROBLEM — R82.90 FOUL SMELLING URINE: Status: ACTIVE | Noted: 2017-03-13

## 2018-07-28 ENCOUNTER — EMERGENCY (EMERGENCY)
Facility: HOSPITAL | Age: 43
LOS: 1 days | Discharge: HOME | End: 2018-07-28
Attending: EMERGENCY MEDICINE
Payer: MEDICAID

## 2018-07-28 VITALS
OXYGEN SATURATION: 99 % | SYSTOLIC BLOOD PRESSURE: 128 MMHG | RESPIRATION RATE: 16 BRPM | HEART RATE: 86 BPM | TEMPERATURE: 98 F | DIASTOLIC BLOOD PRESSURE: 81 MMHG

## 2018-07-28 VITALS
HEART RATE: 88 BPM | OXYGEN SATURATION: 97 % | SYSTOLIC BLOOD PRESSURE: 113 MMHG | RESPIRATION RATE: 16 BRPM | WEIGHT: 89.95 LBS | TEMPERATURE: 99 F | DIASTOLIC BLOOD PRESSURE: 84 MMHG

## 2018-07-28 LAB
ALBUMIN SERPL ELPH-MCNC: 4.5 G/DL — SIGNIFICANT CHANGE UP (ref 3.3–5)
ALP SERPL-CCNC: 91 U/L — SIGNIFICANT CHANGE UP (ref 40–120)
ALT FLD-CCNC: 22 U/L — SIGNIFICANT CHANGE UP (ref 12–78)
ANION GAP SERPL CALC-SCNC: 10 MMOL/L — SIGNIFICANT CHANGE UP (ref 5–17)
APPEARANCE UR: CLEAR — SIGNIFICANT CHANGE UP
APTT BLD: 25.7 SEC — LOW (ref 27.5–37.4)
AST SERPL-CCNC: 24 U/L — SIGNIFICANT CHANGE UP (ref 15–37)
BASOPHILS # BLD AUTO: 0.01 K/UL — SIGNIFICANT CHANGE UP (ref 0–0.2)
BASOPHILS NFR BLD AUTO: 0.2 % — SIGNIFICANT CHANGE UP (ref 0–2)
BILIRUB SERPL-MCNC: 0.4 MG/DL — SIGNIFICANT CHANGE UP (ref 0.2–1.2)
BILIRUB UR-MCNC: NEGATIVE — SIGNIFICANT CHANGE UP
BUN SERPL-MCNC: 5 MG/DL — LOW (ref 7–23)
CALCIUM SERPL-MCNC: 9.5 MG/DL — SIGNIFICANT CHANGE UP (ref 8.5–10.1)
CHLORIDE SERPL-SCNC: 105 MMOL/L — SIGNIFICANT CHANGE UP (ref 96–108)
CO2 SERPL-SCNC: 26 MMOL/L — SIGNIFICANT CHANGE UP (ref 22–31)
COLOR SPEC: YELLOW — SIGNIFICANT CHANGE UP
CREAT SERPL-MCNC: 1 MG/DL — SIGNIFICANT CHANGE UP (ref 0.5–1.3)
DIFF PNL FLD: NEGATIVE — SIGNIFICANT CHANGE UP
EOSINOPHIL # BLD AUTO: 0.02 K/UL — SIGNIFICANT CHANGE UP (ref 0–0.5)
EOSINOPHIL NFR BLD AUTO: 0.4 % — SIGNIFICANT CHANGE UP (ref 0–6)
GLUCOSE SERPL-MCNC: 82 MG/DL — SIGNIFICANT CHANGE UP (ref 70–99)
GLUCOSE UR QL: NEGATIVE — SIGNIFICANT CHANGE UP
HCT VFR BLD CALC: 35 % — SIGNIFICANT CHANGE UP (ref 34.5–45)
HGB BLD-MCNC: 11.3 G/DL — LOW (ref 11.5–15.5)
IMM GRANULOCYTES NFR BLD AUTO: 0.2 % — SIGNIFICANT CHANGE UP (ref 0–1.5)
INR BLD: 1.04 RATIO — SIGNIFICANT CHANGE UP (ref 0.88–1.16)
KETONES UR-MCNC: NEGATIVE — SIGNIFICANT CHANGE UP
LEUKOCYTE ESTERASE UR-ACNC: NEGATIVE — SIGNIFICANT CHANGE UP
LIDOCAIN IGE QN: 157 U/L — SIGNIFICANT CHANGE UP (ref 73–393)
LYMPHOCYTES # BLD AUTO: 1.73 K/UL — SIGNIFICANT CHANGE UP (ref 1–3.3)
LYMPHOCYTES # BLD AUTO: 35.3 % — SIGNIFICANT CHANGE UP (ref 13–44)
MCHC RBC-ENTMCNC: 24.7 PG — LOW (ref 27–34)
MCHC RBC-ENTMCNC: 32.3 GM/DL — SIGNIFICANT CHANGE UP (ref 32–36)
MCV RBC AUTO: 76.4 FL — LOW (ref 80–100)
MONOCYTES # BLD AUTO: 0.39 K/UL — SIGNIFICANT CHANGE UP (ref 0–0.9)
MONOCYTES NFR BLD AUTO: 8 % — SIGNIFICANT CHANGE UP (ref 2–14)
NEUTROPHILS # BLD AUTO: 2.74 K/UL — SIGNIFICANT CHANGE UP (ref 1.8–7.4)
NEUTROPHILS NFR BLD AUTO: 55.9 % — SIGNIFICANT CHANGE UP (ref 43–77)
NITRITE UR-MCNC: NEGATIVE — SIGNIFICANT CHANGE UP
PH UR: 8 — SIGNIFICANT CHANGE UP (ref 5–8)
PLATELET # BLD AUTO: 76 K/UL — LOW (ref 150–400)
POTASSIUM SERPL-MCNC: 3.5 MMOL/L — SIGNIFICANT CHANGE UP (ref 3.5–5.3)
POTASSIUM SERPL-SCNC: 3.5 MMOL/L — SIGNIFICANT CHANGE UP (ref 3.5–5.3)
PROT SERPL-MCNC: 8.1 G/DL — SIGNIFICANT CHANGE UP (ref 6–8.3)
PROT UR-MCNC: NEGATIVE — SIGNIFICANT CHANGE UP
PROTHROM AB SERPL-ACNC: 11.4 SEC — SIGNIFICANT CHANGE UP (ref 9.8–12.7)
RBC # BLD: 4.58 M/UL — SIGNIFICANT CHANGE UP (ref 3.8–5.2)
RBC # FLD: 14 % — SIGNIFICANT CHANGE UP (ref 10.3–14.5)
SODIUM SERPL-SCNC: 141 MMOL/L — SIGNIFICANT CHANGE UP (ref 135–145)
SP GR SPEC: 1.01 — SIGNIFICANT CHANGE UP (ref 1.01–1.02)
UROBILINOGEN FLD QL: NEGATIVE — SIGNIFICANT CHANGE UP
WBC # BLD: 4.9 K/UL — SIGNIFICANT CHANGE UP (ref 3.8–10.5)
WBC # FLD AUTO: 4.9 K/UL — SIGNIFICANT CHANGE UP (ref 3.8–10.5)

## 2018-07-28 PROCEDURE — 99285 EMERGENCY DEPT VISIT HI MDM: CPT

## 2018-07-28 PROCEDURE — 93005 ELECTROCARDIOGRAM TRACING: CPT

## 2018-07-28 PROCEDURE — 85610 PROTHROMBIN TIME: CPT

## 2018-07-28 PROCEDURE — 84484 ASSAY OF TROPONIN QUANT: CPT

## 2018-07-28 PROCEDURE — 70450 CT HEAD/BRAIN W/O DYE: CPT

## 2018-07-28 PROCEDURE — 85027 COMPLETE CBC AUTOMATED: CPT

## 2018-07-28 PROCEDURE — 71045 X-RAY EXAM CHEST 1 VIEW: CPT | Mod: 26

## 2018-07-28 PROCEDURE — 96365 THER/PROPH/DIAG IV INF INIT: CPT

## 2018-07-28 PROCEDURE — 99284 EMERGENCY DEPT VISIT MOD MDM: CPT | Mod: 25

## 2018-07-28 PROCEDURE — 87086 URINE CULTURE/COLONY COUNT: CPT

## 2018-07-28 PROCEDURE — 93971 EXTREMITY STUDY: CPT | Mod: 26,LT

## 2018-07-28 PROCEDURE — 85730 THROMBOPLASTIN TIME PARTIAL: CPT

## 2018-07-28 PROCEDURE — 36415 COLL VENOUS BLD VENIPUNCTURE: CPT

## 2018-07-28 PROCEDURE — 81003 URINALYSIS AUTO W/O SCOPE: CPT

## 2018-07-28 PROCEDURE — 71045 X-RAY EXAM CHEST 1 VIEW: CPT

## 2018-07-28 PROCEDURE — 82550 ASSAY OF CK (CPK): CPT

## 2018-07-28 PROCEDURE — 83690 ASSAY OF LIPASE: CPT

## 2018-07-28 PROCEDURE — 80053 COMPREHEN METABOLIC PANEL: CPT

## 2018-07-28 PROCEDURE — 82553 CREATINE MB FRACTION: CPT

## 2018-07-28 PROCEDURE — 93971 EXTREMITY STUDY: CPT

## 2018-07-28 PROCEDURE — 70450 CT HEAD/BRAIN W/O DYE: CPT | Mod: 26

## 2018-07-28 PROCEDURE — 99284 EMERGENCY DEPT VISIT MOD MDM: CPT

## 2018-07-28 RX ORDER — SODIUM CHLORIDE 9 MG/ML
1000 INJECTION INTRAMUSCULAR; INTRAVENOUS; SUBCUTANEOUS
Qty: 0 | Refills: 0 | Status: DISCONTINUED | OUTPATIENT
Start: 2018-07-28 | End: 2018-08-01

## 2018-07-28 RX ORDER — ACETAMINOPHEN 500 MG
1000 TABLET ORAL ONCE
Qty: 0 | Refills: 0 | Status: COMPLETED | OUTPATIENT
Start: 2018-07-28 | End: 2018-07-28

## 2018-07-28 RX ADMIN — Medication 400 MILLIGRAM(S): at 19:58

## 2018-07-28 RX ADMIN — SODIUM CHLORIDE 150 MILLILITER(S): 9 INJECTION INTRAMUSCULAR; INTRAVENOUS; SUBCUTANEOUS at 16:03

## 2018-07-28 NOTE — ED PROVIDER NOTE - OBJECTIVE STATEMENT
43 y/o F with hx of autonomic dysfunction, POTS, ITP, labile HTN, anxiety presents with c/o palpitations x today. Pt states that she stared feeling chills yesterday but this morning had one episode of vomiting, was lightheaded and felt palpitations with mild shortness of breath. Pt states that she also noticed atraumatic bruising to her L shin since yesterday. Denies fever, cough, vision changes, numbness, tingling, focal weakness, syncope.   PMD: Dr. Ford   Cardiologist: Dr. Lisker

## 2018-07-28 NOTE — ED PROVIDER NOTE - CHIEF COMPLAINT
The patient is a 42y Female complaining of chest pain. The patient is a 42y Female complaining of palpitations

## 2018-07-28 NOTE — ED ADULT NURSE REASSESSMENT NOTE - NS ED NURSE REASSESS COMMENT FT1
Recieved from AM shift in bed, a&ox3, no distress noted. Will continue to monitor and provide care as needed.

## 2018-07-28 NOTE — ED PROVIDER NOTE - SKIN, MLM
Skin normal color for race, warm, dry and intact. No evidence of rash. +ecchymosis noted to L shin, NT, skin intact, FROM, calf NT, neg homann's sign, LLE without swelling or tenderness, NVI

## 2018-07-28 NOTE — ED PROVIDER NOTE - ATTENDING CONTRIBUTION TO CARE
43 yo female hx of POTS c/o chest burning sensation today with associated dizziness.  Also notes spontaneous bruising to her left leg.  No shortness of breath.  Patient is frequently in the ED with similar complaints.  Seen last week with negative workup.      Gen: Alert, NAD  Head/eyes: NC/AT, PERRL, EOMI, normal lids/conjunctiva, no scleral icterus  ENT: Bilateral TM WNL, normal hearing, patent oropharynx without erythema/exudate, uvula midline, no peritonsillar abscess, no tongue/uvula swelling  Neck: supple, no tenderness/meningismus/JVD, Trachea midline  Pulm: Bilateral clear BS, normal resp effort, no wheeze/stridor/retractions  CV: RRR, no M/R/G, +2 dist pulses (radial, pedal DP/PT, popliteal)  Abd: soft, NT/ND, +BS, no guarding/rebound tenderness  Musculoskeletal: no edema/erythema/cyanosis, FROM in all extremities, no C/T/L spine ttp  Skin: no rash, no vesicles, no petechaie, no ecchymosis, no swelling  Neuro: AAOx3, CN 2-12 intact, normal sensation, 5/5 motor strength in all extremities, normal gait, no dysmetria     Dr. Aceves consulted, cleared from cardiac standpoint.  Pending labs, and US. 41 yo female hx of POTS c/o chest burning sensation today with associated dizziness.  Also notes spontaneous bruising to her left leg.  No shortness of breath.  Patient is frequently in the ED with similar complaints.  Seen last week with negative workup.      Gen: Alert, NAD  Head/eyes: NC/AT, PERRL, EOMI, normal lids/conjunctiva, no scleral icterus  ENT: Bilateral TM WNL, normal hearing, patent oropharynx without erythema/exudate, uvula midline, no peritonsillar abscess, no tongue/uvula swelling  Neck: supple, no tenderness/meningismus/JVD, Trachea midline  Pulm: Bilateral clear BS, normal resp effort, no wheeze/stridor/retractions  CV: RRR, no M/R/G, +2 dist pulses (radial, pedal DP/PT, popliteal)  Abd: soft, NT/ND, +BS, no guarding/rebound tenderness  Musculoskeletal: no edema/erythema/cyanosis, FROM in all extremities, no C/T/L spine ttp  Skin: no rash, no vesicles, no petechaie, +ecchymosis noted on left lower leg, no swelling  Neuro: AAOx3, CN 2-12 intact, normal sensation, 5/5 motor strength in all extremities, normal gait, no dysmetria     Dr. Aceves consulted, cleared from cardiac standpoint.  Pending labs, and US.

## 2018-07-28 NOTE — ED PROVIDER NOTE - MEDICAL DECISION MAKING DETAILS
41 y/o f with hx of POTS, autonomic dysfunction, HTN here with palpitations today, mild lightheadedness/n/v and atraumatic bruising of LLE; will get ekg, cxr, labs, CE, cardiology consult, LLE US, CT head, re-assess

## 2018-07-28 NOTE — ED PROVIDER NOTE - PROGRESS NOTE DETAILS
Pt examined by ED attending, Dr. Armendariz who agreed with disposition and plan. Spoke to Dr. Aceves, cardiologist, discussed case, will consult pt. Pt was seen by cardiologist, cleared pt from cardiology stand point, no acute intervention or further testing needed in ED today, can f/u with pvt cardiologist. SEE CARDIOLOGY CONSULT NOTE.

## 2018-07-28 NOTE — CONSULT NOTE ADULT - SUBJECTIVE AND OBJECTIVE BOX
[facility] - Taylor Melgar, Edgar, Gaby, Yola, Mark Brown  Office Number: [phone]    Initial Consult Note    CHIEF COMPLAINT: Patient is a 42y old  Female who presents with a chief complaint of dizziness    HPI:  This is a 42 year old woman with dysautonomia, inapprpriate sinus tachy, POTS like syndrome, ITP, gastroparesis who comes to the ed with a multitude of complaints.  She was dizzy, felt like she as going to pass out, had a headache, had abdominal pain, and increased LE bruising.  She is also feeling dehydrated.  She has not been drinking enough, and feels that she is dehydrated.  She felt her heart rate too fast, but it was normal when she came to the ED>  She is not having chest pain or increased dyspnea.  She has not had syncope.      PAST MEDICAL & SURGICAL HISTORY:  Gastroparesis  Hypertension  Dystonia      SOCIAL HISTORY:  No tobacco, ethanol, or drug abuse.    FAMILY HISTORY:    No family history of acute MI or sudden cardiac death.    MEDICATIONS  (STANDING):  sodium chloride 0.9%. 1000 milliLiter(s) (150 mL/Hr) IV Continuous <Continuous>          Allergies    IV Contrast (Anaphylaxis)  Reglan (Other)  Zofran (Other)    Intolerances    beta blockers (Other)      REVIEW OF SYSTEMS:       All other review of systems is negative unless indicated above    VITAL SIGNS:   Vital Signs Last 24 Hrs  T(C): 37 (28 Jul 2018 16:01), Max: 37.1 (28 Jul 2018 15:14)  T(F): 98.6 (28 Jul 2018 16:01), Max: 98.7 (28 Jul 2018 15:14)  HR: 80 (28 Jul 2018 16:01) (80 - 88)  BP: 114/78 (28 Jul 2018 16:01) (113/84 - 114/78)  BP(mean): --  RR: 16 (28 Jul 2018 16:01) (16 - 16)  SpO2: 98% (28 Jul 2018 16:01) (97% - 98%)    I&O's Summary      On Exam:    Constitutional: NAD, alert and oriented x 3  Lungs:  Non-labored, breath sounds are clear bilaterally, No wheezing, rales or rhonchi  Cardiovascular: RRR.  S1 and S2 positive.  No murmurs, rubs, gallops or clicks  Gastrointestinal: Bowel Sounds present, soft, nontender.   Lymph: No peripheral edema. No cervical lymphadenopathy.  Neurological: Alert, no focal deficits  Skin: No rashes or ulcers   Psych:  Mood & affect appropriate.    LABS: All Labs Reviewed:                        11.3   4.90  )-----------( x        ( 28 Jul 2018 16:04 )             35.0     28 Jul 2018 16:04    141    |  105    |  5      ----------------------------<  82     3.5     |  26     |  1.00     Ca    9.5        28 Jul 2018 16:04    TPro  8.1    /  Alb  4.5    /  TBili  0.4    /  DBili  x      /  AST  24     /  ALT  22     /  AlkPhos  91     28 Jul 2018 16:04    PT/INR - ( 28 Jul 2018 16:04 )   PT: 11.4 sec;   INR: 1.04 ratio         PTT - ( 28 Jul 2018 16:04 )  PTT:25.7 sec      Blood Culture:         RADIOLOGY:    EKG:  sinus rhythm with sinus arrhythmia

## 2018-07-28 NOTE — CONSULT NOTE ADULT - ASSESSMENT
42 year old woman with above history with a multitude of complaints that are likely non cardiac.  I would monitor her rhythm while in the ED, check a head CT, LE venous Doppler studies, and follow all of her routine labs.  She should get IVF hydration.  Hopefully she feels better and can be discharged home to follow up with her cardiologist.  I do not suspect any acute cardiac pathology to be responsible for her symptoms.

## 2018-07-29 LAB
CULTURE RESULTS: NO GROWTH — SIGNIFICANT CHANGE UP
SPECIMEN SOURCE: SIGNIFICANT CHANGE UP

## 2018-07-31 NOTE — PATIENT PROFILE ADULT. - NS PRO PT REFERRAL QUES 2 YN
[address] [facility], [facility] THRO[facility]  [address] [place] [address] [address]  Dept: [phone]  Dept Fax: [phone]  Loc: [phone]    Shruti Perez is a 81 y.o. male who was referred by No ref. provider found for:  Chief Complaint   Patient presents with    6 Month Follow-Up     Patient is here for 6 month tube check   . HPI:       Patient here for 6 month tube check. He denies any ear pain or drainage. Patient has history of right T tube placment         He had left PET placed in office with Dr Herminia Dandy 12/30/2017. He reports that the left tubes just do not stay in long. He wears hearing aids and follows with Dr Zuhair Carpenter. His left ear is \"dead\". No recent Audio on file. He has appt with Dr Zuhair Carpenter in November to get new hearing aids. Subjective:        Review of Systems   Constitutional: Negative for activity change, appetite change, chills, diaphoresis, fatigue, fever and unexpected weight change. HENT: Negative for congestion, dental problem, drooling, ear discharge, ear pain, facial swelling, hearing loss, mouth sores, nosebleeds, postnasal drip, rhinorrhea, sinus pressure, sneezing, sore throat, tinnitus, trouble swallowing and voice change. Eyes: Negative for photophobia, pain, discharge, redness, itching and visual disturbance. Respiratory: Negative for apnea, cough, choking, chest tightness, shortness of breath, wheezing and stridor. Cardiovascular: Negative for chest pain, palpitations and leg swelling. Gastrointestinal: Negative for abdominal distention, abdominal pain, anal bleeding, blood in stool, constipation, diarrhea, nausea, rectal pain and vomiting. Endocrine: Negative for cold intolerance, heat intolerance, polydipsia, polyphagia and polyuria.    Genitourinary: Negative for decreased urine volume, difficulty urinating, discharge, dysuria, enuresis, flank pain, frequency, genital sores, hematuria, penile pain, penile swelling, scrotal swelling, testicular pain and urgency. Musculoskeletal: Negative for arthralgias, back pain, gait problem, joint swelling, myalgias, neck pain and neck stiffness. Skin: Negative for color change, pallor, rash and wound. Allergic/Immunologic: Negative for environmental allergies, food allergies and immunocompromised state. Neurological: Negative for dizziness, tremors, seizures, syncope, speech difficulty, weakness, light-headedness, numbness and headaches. Hematological: Negative for adenopathy. Does not bruise/bleed easily. Psychiatric/Behavioral: Negative for agitation, behavioral problems, confusion, decreased concentration, dysphoric mood, hallucinations, self-injury, sleep disturbance and suicidal ideas. The patient is not nervous/anxious and is not hyperactive. Objective:       Physical Exam     This is a 81 y.o. male. Patient is alert and oriented to person, place and time. Mood is happy. No respiratory distress. No nasal voice, no hoarseness. Not obviously hearing impaired. Vitals:    07/31/18 1125   BP: 110/74   Pulse: 68   Resp: 14   Temp: 97.5 °F (36.4 °C)       External ears are normal: no scars, lesions or masses. R External auditory canal clear and free of any pathology  L External auditory canal cerumen with tube removed from EAC with alligator forceps   Tympanic membranes:  R T tube in place, TM scarred                                            L intact, scarred, +serous otitis    Data:  All of the past medical history, past surgical history, family history, social history, allergies and current medications were reviewed. Assessment:   Assessment    Diagnosis Orders   1. S/P tympanostomy tube placement     2. Extrusion of left tympanic ventilation tube, initial encounter     3. Conductive hearing loss, bilateral     4. Wears hearing aid     5. Chronic serous otitis media of left ear          Plan:        1.  S/P no

## 2018-08-02 ENCOUNTER — EMERGENCY (EMERGENCY)
Facility: HOSPITAL | Age: 43
LOS: 1 days | Discharge: HOME | End: 2018-08-02
Attending: EMERGENCY MEDICINE
Payer: MEDICAID

## 2018-08-02 VITALS
DIASTOLIC BLOOD PRESSURE: 100 MMHG | RESPIRATION RATE: 15 BRPM | HEART RATE: 79 BPM | OXYGEN SATURATION: 100 % | TEMPERATURE: 98 F | SYSTOLIC BLOOD PRESSURE: 136 MMHG

## 2018-08-02 VITALS
DIASTOLIC BLOOD PRESSURE: 90 MMHG | HEART RATE: 86 BPM | TEMPERATURE: 98 F | HEIGHT: 62 IN | SYSTOLIC BLOOD PRESSURE: 129 MMHG | WEIGHT: 89.95 LBS | OXYGEN SATURATION: 97 % | RESPIRATION RATE: 16 BRPM

## 2018-08-02 LAB
ALBUMIN SERPL ELPH-MCNC: 4.4 G/DL — SIGNIFICANT CHANGE UP (ref 3.3–5)
ALP SERPL-CCNC: 85 U/L — SIGNIFICANT CHANGE UP (ref 40–120)
ALT FLD-CCNC: 16 U/L — SIGNIFICANT CHANGE UP (ref 12–78)
ANION GAP SERPL CALC-SCNC: 9 MMOL/L — SIGNIFICANT CHANGE UP (ref 5–17)
APPEARANCE UR: CLEAR — SIGNIFICANT CHANGE UP
APTT BLD: 26.3 SEC — LOW (ref 27.5–37.4)
AST SERPL-CCNC: 19 U/L — SIGNIFICANT CHANGE UP (ref 15–37)
BASOPHILS # BLD AUTO: 0.02 K/UL — SIGNIFICANT CHANGE UP (ref 0–0.2)
BASOPHILS NFR BLD AUTO: 0.4 % — SIGNIFICANT CHANGE UP (ref 0–2)
BILIRUB SERPL-MCNC: 0.3 MG/DL — SIGNIFICANT CHANGE UP (ref 0.2–1.2)
BILIRUB UR-MCNC: NEGATIVE — SIGNIFICANT CHANGE UP
BUN SERPL-MCNC: 4 MG/DL — LOW (ref 7–23)
CALCIUM SERPL-MCNC: 8.9 MG/DL — SIGNIFICANT CHANGE UP (ref 8.5–10.1)
CHLORIDE SERPL-SCNC: 105 MMOL/L — SIGNIFICANT CHANGE UP (ref 96–108)
CO2 SERPL-SCNC: 26 MMOL/L — SIGNIFICANT CHANGE UP (ref 22–31)
COLOR SPEC: SIGNIFICANT CHANGE UP
CREAT SERPL-MCNC: 0.87 MG/DL — SIGNIFICANT CHANGE UP (ref 0.5–1.3)
D DIMER BLD IA.RAPID-MCNC: <150 NG/ML DDU — SIGNIFICANT CHANGE UP
DIFF PNL FLD: NEGATIVE — SIGNIFICANT CHANGE UP
EOSINOPHIL # BLD AUTO: 0.01 K/UL — SIGNIFICANT CHANGE UP (ref 0–0.5)
EOSINOPHIL NFR BLD AUTO: 0.2 % — SIGNIFICANT CHANGE UP (ref 0–6)
GLUCOSE SERPL-MCNC: 108 MG/DL — HIGH (ref 70–99)
GLUCOSE UR QL: NEGATIVE — SIGNIFICANT CHANGE UP
HCT VFR BLD CALC: 35.1 % — SIGNIFICANT CHANGE UP (ref 34.5–45)
HGB BLD-MCNC: 11.3 G/DL — LOW (ref 11.5–15.5)
IMM GRANULOCYTES NFR BLD AUTO: 0.2 % — SIGNIFICANT CHANGE UP (ref 0–1.5)
INR BLD: 1.02 RATIO — SIGNIFICANT CHANGE UP (ref 0.88–1.16)
KETONES UR-MCNC: NEGATIVE — SIGNIFICANT CHANGE UP
LEUKOCYTE ESTERASE UR-ACNC: NEGATIVE — SIGNIFICANT CHANGE UP
LYMPHOCYTES # BLD AUTO: 1.19 K/UL — SIGNIFICANT CHANGE UP (ref 1–3.3)
LYMPHOCYTES # BLD AUTO: 21.1 % — SIGNIFICANT CHANGE UP (ref 13–44)
MAGNESIUM SERPL-MCNC: 2.2 MG/DL — SIGNIFICANT CHANGE UP (ref 1.6–2.6)
MCHC RBC-ENTMCNC: 25.2 PG — LOW (ref 27–34)
MCHC RBC-ENTMCNC: 32.2 GM/DL — SIGNIFICANT CHANGE UP (ref 32–36)
MCV RBC AUTO: 78.2 FL — LOW (ref 80–100)
MONOCYTES # BLD AUTO: 0.43 K/UL — SIGNIFICANT CHANGE UP (ref 0–0.9)
MONOCYTES NFR BLD AUTO: 7.6 % — SIGNIFICANT CHANGE UP (ref 2–14)
NEUTROPHILS # BLD AUTO: 3.97 K/UL — SIGNIFICANT CHANGE UP (ref 1.8–7.4)
NEUTROPHILS NFR BLD AUTO: 70.5 % — SIGNIFICANT CHANGE UP (ref 43–77)
NITRITE UR-MCNC: NEGATIVE — SIGNIFICANT CHANGE UP
PH UR: 8 — SIGNIFICANT CHANGE UP (ref 5–8)
PLATELET # BLD AUTO: 76 K/UL — LOW (ref 150–400)
POTASSIUM SERPL-MCNC: 3.9 MMOL/L — SIGNIFICANT CHANGE UP (ref 3.5–5.3)
POTASSIUM SERPL-SCNC: 3.9 MMOL/L — SIGNIFICANT CHANGE UP (ref 3.5–5.3)
PROT SERPL-MCNC: 8 G/DL — SIGNIFICANT CHANGE UP (ref 6–8.3)
PROT UR-MCNC: NEGATIVE — SIGNIFICANT CHANGE UP
PROTHROM AB SERPL-ACNC: 11.1 SEC — SIGNIFICANT CHANGE UP (ref 9.8–12.7)
RBC # BLD: 4.49 M/UL — SIGNIFICANT CHANGE UP (ref 3.8–5.2)
RBC # FLD: 14.6 % — HIGH (ref 10.3–14.5)
SODIUM SERPL-SCNC: 140 MMOL/L — SIGNIFICANT CHANGE UP (ref 135–145)
SP GR SPEC: 1.01 — SIGNIFICANT CHANGE UP (ref 1.01–1.02)
UROBILINOGEN FLD QL: NEGATIVE — SIGNIFICANT CHANGE UP
WBC # BLD: 5.63 K/UL — SIGNIFICANT CHANGE UP (ref 3.8–10.5)
WBC # FLD AUTO: 5.63 K/UL — SIGNIFICANT CHANGE UP (ref 3.8–10.5)

## 2018-08-02 PROCEDURE — 85362 FIBRIN DEGRADATION PRODUCTS: CPT

## 2018-08-02 PROCEDURE — 87086 URINE CULTURE/COLONY COUNT: CPT

## 2018-08-02 PROCEDURE — 85730 THROMBOPLASTIN TIME PARTIAL: CPT

## 2018-08-02 PROCEDURE — 85610 PROTHROMBIN TIME: CPT

## 2018-08-02 PROCEDURE — 83735 ASSAY OF MAGNESIUM: CPT

## 2018-08-02 PROCEDURE — 99283 EMERGENCY DEPT VISIT LOW MDM: CPT

## 2018-08-02 PROCEDURE — 81003 URINALYSIS AUTO W/O SCOPE: CPT

## 2018-08-02 PROCEDURE — 80053 COMPREHEN METABOLIC PANEL: CPT

## 2018-08-02 PROCEDURE — 85027 COMPLETE CBC AUTOMATED: CPT

## 2018-08-02 PROCEDURE — 85379 FIBRIN DEGRADATION QUANT: CPT

## 2018-08-02 PROCEDURE — 99285 EMERGENCY DEPT VISIT HI MDM: CPT

## 2018-08-02 NOTE — ED ADULT NURSE NOTE - OBJECTIVE STATEMENT
Patient presents with report of left side facial numbness, bruising on legs with the patient denying trauma to the legs, and leg pain.

## 2018-08-02 NOTE — ED ADULT NURSE REASSESSMENT NOTE - NS ED NURSE REASSESS COMMENT FT1
Pt demanding to speak to supervisor stating she wants to be admitted or be transferred to [facility] (Dr Castro plans to discharge pt home) Shona currently at bedside speaking to pt.

## 2018-08-02 NOTE — ED ADULT NURSE NOTE - NSIMPLEMENTINTERV_GEN_ALL_ED
Implemented All Universal Safety Interventions:  [place] to call system. Call bell, personal items and telephone within reach. Instruct patient to call for assistance. Room bathroom lighting operational. Non-slip footwear when patient is off stretcher. Physically safe environment: no spills, clutter or unnecessary equipment. Stretcher in lowest position, wheels locked, appropriate side rails in place.

## 2018-08-02 NOTE — ED ADULT NURSE REASSESSMENT NOTE - NS ED NURSE REASSESS COMMENT FT1
Dr Castro spoke to the PA at pt's hematologist office who states pt needs to F/U with psych. Pt made aware that Dr Castro spoke to her doctor and is being discharged home.  Pt agreed to go home and signed discharge paperwork.

## 2018-08-02 NOTE — ED PROVIDER NOTE - PHYSICAL EXAMINATION
GEN: awake, alert, well appearing, NAD   HENT: atraumatic, normocephalic, USHA, EOMI, no midline instability, oropharynx w/o erythema or exudates, no lymphadenopathy  CV: normal rate and rhythm, S1, S2, no MRG, equal pulses throughout, no JVD  RESP: no distress, no IWOB, no retraction, clear to auscultation bilaterally   ABD: soft, nontender, nondistended, no rebound, no guarding, normoactive bowel sounds, no organomegally  MUSCULOSKELETAL: strenght 5/5 x 4, full range of motion, CMS intact   SKIN: bruising noted to bilateral LE   NEURO: Awake alert oriented x 3, no facial asymmetry, no slurred speech, no pronator drift, moving all extremities  PSYCH: no suicial ideation, no homicidal ideation, no depression, no anxiety, no hallucination

## 2018-08-02 NOTE — ED PROVIDER NOTE - NS ED ROS FT
GEN: no fever, no chills, no weakness  HENT: no eye pain, no visual changes, no ear pain, no visual or hearing changes, no sore throat, no swelling or neck pain  CV: no chest pain, no palpitations, +dizziness, no swelling  RESP: no coughing, no sob, no IWOB, no ALCANTAR  GI: no abd pain, no distension, no nausea, no vomiting, no diarrhea, no constipation  : no dysuria,  no frequency, no hematuria, no discharge, no flank pain  MUSCULOSKELETAL: no myalgia, no arthralgia, no joint swelling, no bruising   SKIN: no rash, no wounds, no itching, +bruising  NEURO: no change in mentation, no visual changes, no HA, no focal weakness, no trouble speaking, no gait abnormalities, no dizziness, +paresthesia   PSYCH: no suidical ideation, no homicidal ideation, no depression, no anxiety, no hallucinations

## 2018-08-02 NOTE — ED ADULT TRIAGE NOTE - CHIEF COMPLAINT QUOTE
Pt reports multiple complaints, numbness to the Left side of face, dizziness, chest pain/palpitations at times with sob, some bruising to the LE and patient reports she has not had any falls, direct blows, states she has a Hx of ITP but her bruising is something she has never had before

## 2018-08-02 NOTE — ED PROVIDER NOTE - MEDICAL DECISION MAKING DETAILS
43 y/o F with hx of autonomic dysfunction, POTS, ITP, labile HTN, anxiety p/w persistent LE bruising x weeks. pt also c/o lightheadedness and persistent L lower face numbness. pt seen here for the 3rd time for same complaints.

## 2018-08-02 NOTE — ED PROVIDER NOTE - PROGRESS NOTE DETAILS
EKG NS, nonischemic EKG NS, nonischemic, labs unremarkable, discussed w/ Dr Attila Bess's PA, pt's hematologist, no acute intervention or admission is indicated, pt can follow up next week for repeat blood work, pt was supposed to follow up with her psychiatrist for medications.  suspect myriad of symptoms related to her anxiety, mild thrombocytopenia., no e/o neurological deficits.

## 2018-08-03 LAB
CULTURE RESULTS: SIGNIFICANT CHANGE UP
FSP PPP-MCNC: < 5 UG/ML — SIGNIFICANT CHANGE UP
SPECIMEN SOURCE: SIGNIFICANT CHANGE UP

## 2018-08-07 ENCOUNTER — APPOINTMENT (OUTPATIENT)
Dept: INTERNAL MEDICINE | Facility: CLINIC | Age: 43
End: 2018-08-07
Payer: MEDICAID

## 2018-08-07 VITALS — SYSTOLIC BLOOD PRESSURE: 120 MMHG | DIASTOLIC BLOOD PRESSURE: 86 MMHG

## 2018-08-07 VITALS
HEIGHT: 62 IN | WEIGHT: 98 LBS | HEART RATE: 86 BPM | OXYGEN SATURATION: 99 % | TEMPERATURE: 98.5 F | SYSTOLIC BLOOD PRESSURE: 130 MMHG | BODY MASS INDEX: 18.03 KG/M2 | DIASTOLIC BLOOD PRESSURE: 76 MMHG

## 2018-08-07 DIAGNOSIS — R42 DIZZINESS AND GIDDINESS: ICD-10-CM

## 2018-08-07 PROCEDURE — 93000 ELECTROCARDIOGRAM COMPLETE: CPT

## 2018-08-07 PROCEDURE — 99213 OFFICE O/P EST LOW 20 MIN: CPT | Mod: 25

## 2018-08-07 NOTE — HISTORY OF PRESENT ILLNESS
[FreeTextEntry8] : pt accompanied by her mother. \par ADL- pt states she can not shower by herself- states she has fainted in the shower.  pt states she gets tachycardic in the shower.\par pt states she doesn't brush her teeth b/c she feels like her HR fluctuates.\par pt states she always has someone with her when she goes to the bathroom.\par pt states she is in crisis respite right now. \par pt filled backrupt.  pt doesn't do her on her own shopping.  pt is able to pay her bills.\par  pt was seen by Cardiologist-Holter rare, brief  SVT. \par pt has dx of autonomic nervous syst disoder in the chart\par pt states she feels weak\par pt states she has gastroparesis. \par paresthesia- numbness in face, leg. \par

## 2018-08-07 NOTE — PLAN
[FreeTextEntry1] : pt will f/u w Dr. Ford \par pt states she had recent labs done at [facility]- she will have these labs released to us. \par number for [facility] to help her with Assisted Living was given to pt. \par EKG -NSR 80

## 2018-08-10 ENCOUNTER — MOBILE ON CALL (OUTPATIENT)
Age: 43
End: 2018-08-10

## 2018-08-13 ENCOUNTER — EMERGENCY (EMERGENCY)
Facility: HOSPITAL | Age: 43
LOS: 1 days | Discharge: HOME | End: 2018-08-13
Attending: EMERGENCY MEDICINE | Admitting: EMERGENCY MEDICINE
Payer: MEDICAID

## 2018-08-13 VITALS
DIASTOLIC BLOOD PRESSURE: 87 MMHG | RESPIRATION RATE: 18 BRPM | HEIGHT: 62 IN | HEART RATE: 79 BPM | WEIGHT: 93.04 LBS | OXYGEN SATURATION: 99 % | SYSTOLIC BLOOD PRESSURE: 117 MMHG | TEMPERATURE: 98 F

## 2018-08-13 VITALS
OXYGEN SATURATION: 100 % | DIASTOLIC BLOOD PRESSURE: 78 MMHG | HEART RATE: 79 BPM | SYSTOLIC BLOOD PRESSURE: 115 MMHG | RESPIRATION RATE: 20 BRPM | TEMPERATURE: 98 F

## 2018-08-13 LAB
ALBUMIN SERPL ELPH-MCNC: 4.1 G/DL — SIGNIFICANT CHANGE UP (ref 3.3–5)
ALP SERPL-CCNC: 76 U/L — SIGNIFICANT CHANGE UP (ref 30–120)
ALT FLD-CCNC: 22 U/L DA — SIGNIFICANT CHANGE UP (ref 10–60)
ANION GAP SERPL CALC-SCNC: 12 MMOL/L — SIGNIFICANT CHANGE UP (ref 5–17)
APTT BLD: 25.1 SEC — LOW (ref 27.5–37.4)
AST SERPL-CCNC: 16 U/L — SIGNIFICANT CHANGE UP (ref 10–40)
BASOPHILS # BLD AUTO: 0.01 K/UL — SIGNIFICANT CHANGE UP (ref 0–0.2)
BASOPHILS NFR BLD AUTO: 0.1 % — SIGNIFICANT CHANGE UP (ref 0–2)
BILIRUB SERPL-MCNC: 0.2 MG/DL — SIGNIFICANT CHANGE UP (ref 0.2–1.2)
BUN SERPL-MCNC: 7 MG/DL — SIGNIFICANT CHANGE UP (ref 7–23)
CALCIUM SERPL-MCNC: 9.1 MG/DL — SIGNIFICANT CHANGE UP (ref 8.4–10.5)
CHLORIDE SERPL-SCNC: 105 MMOL/L — SIGNIFICANT CHANGE UP (ref 96–108)
CK MB BLD-MCNC: 0.9 % — SIGNIFICANT CHANGE UP (ref 0–3.5)
CK MB CFR SERPL CALC: 0.5 NG/ML — SIGNIFICANT CHANGE UP (ref 0–3.6)
CK SERPL-CCNC: 56 U/L — SIGNIFICANT CHANGE UP (ref 26–192)
CO2 SERPL-SCNC: 25 MMOL/L — SIGNIFICANT CHANGE UP (ref 22–31)
CREAT SERPL-MCNC: 0.78 MG/DL — SIGNIFICANT CHANGE UP (ref 0.5–1.3)
D DIMER BLD IA.RAPID-MCNC: <150 NG/ML DDU — SIGNIFICANT CHANGE UP
EOSINOPHIL # BLD AUTO: 0.06 K/UL — SIGNIFICANT CHANGE UP (ref 0–0.5)
EOSINOPHIL NFR BLD AUTO: 0.9 % — SIGNIFICANT CHANGE UP (ref 0–6)
GLUCOSE SERPL-MCNC: 73 MG/DL — SIGNIFICANT CHANGE UP (ref 70–99)
HCT VFR BLD CALC: 32.4 % — LOW (ref 34.5–45)
HGB BLD-MCNC: 10.5 G/DL — LOW (ref 11.5–15.5)
IMM GRANULOCYTES NFR BLD AUTO: 0.1 % — SIGNIFICANT CHANGE UP (ref 0–1.5)
INR BLD: 1 RATIO — SIGNIFICANT CHANGE UP (ref 0.88–1.16)
LYMPHOCYTES # BLD AUTO: 2.57 K/UL — SIGNIFICANT CHANGE UP (ref 1–3.3)
LYMPHOCYTES # BLD AUTO: 37.6 % — SIGNIFICANT CHANGE UP (ref 13–44)
MCHC RBC-ENTMCNC: 25.1 PG — LOW (ref 27–34)
MCHC RBC-ENTMCNC: 32.4 GM/DL — SIGNIFICANT CHANGE UP (ref 32–36)
MCV RBC AUTO: 77.5 FL — LOW (ref 80–100)
MONOCYTES # BLD AUTO: 0.65 K/UL — SIGNIFICANT CHANGE UP (ref 0–0.9)
MONOCYTES NFR BLD AUTO: 9.5 % — SIGNIFICANT CHANGE UP (ref 2–14)
NEUTROPHILS # BLD AUTO: 3.54 K/UL — SIGNIFICANT CHANGE UP (ref 1.8–7.4)
NEUTROPHILS NFR BLD AUTO: 51.8 % — SIGNIFICANT CHANGE UP (ref 43–77)
NRBC # BLD: 0 /100 WBCS — SIGNIFICANT CHANGE UP (ref 0–0)
PLATELET # BLD AUTO: 99 K/UL — LOW (ref 150–400)
POTASSIUM SERPL-MCNC: 3.3 MMOL/L — LOW (ref 3.5–5.3)
POTASSIUM SERPL-SCNC: 3.3 MMOL/L — LOW (ref 3.5–5.3)
PROT SERPL-MCNC: 7.4 G/DL — SIGNIFICANT CHANGE UP (ref 6–8.3)
PROTHROM AB SERPL-ACNC: 10.9 SEC — SIGNIFICANT CHANGE UP (ref 9.8–12.7)
RBC # BLD: 4.18 M/UL — SIGNIFICANT CHANGE UP (ref 3.8–5.2)
RBC # FLD: 14.5 % — SIGNIFICANT CHANGE UP (ref 10.3–14.5)
SODIUM SERPL-SCNC: 142 MMOL/L — SIGNIFICANT CHANGE UP (ref 135–145)
TROPONIN I SERPL-MCNC: 0 NG/ML — LOW (ref 0.02–0.06)
WBC # BLD: 6.84 K/UL — SIGNIFICANT CHANGE UP (ref 3.8–10.5)
WBC # FLD AUTO: 6.84 K/UL — SIGNIFICANT CHANGE UP (ref 3.8–10.5)

## 2018-08-13 PROCEDURE — 99284 EMERGENCY DEPT VISIT MOD MDM: CPT | Mod: 25

## 2018-08-13 PROCEDURE — 82553 CREATINE MB FRACTION: CPT

## 2018-08-13 PROCEDURE — 82550 ASSAY OF CK (CPK): CPT

## 2018-08-13 PROCEDURE — 99284 EMERGENCY DEPT VISIT MOD MDM: CPT

## 2018-08-13 PROCEDURE — 80053 COMPREHEN METABOLIC PANEL: CPT

## 2018-08-13 PROCEDURE — 93005 ELECTROCARDIOGRAM TRACING: CPT

## 2018-08-13 PROCEDURE — 85379 FIBRIN DEGRADATION QUANT: CPT

## 2018-08-13 PROCEDURE — 84484 ASSAY OF TROPONIN QUANT: CPT

## 2018-08-13 PROCEDURE — 93010 ELECTROCARDIOGRAM REPORT: CPT

## 2018-08-13 PROCEDURE — 85027 COMPLETE CBC AUTOMATED: CPT

## 2018-08-13 PROCEDURE — 85730 THROMBOPLASTIN TIME PARTIAL: CPT

## 2018-08-13 PROCEDURE — 85610 PROTHROMBIN TIME: CPT

## 2018-08-13 NOTE — ED PROVIDER NOTE - MEDICAL DECISION MAKING DETAILS
Patient well known to ED with typical presentation for her, with palpits, sob, CP, dizziness. Exam normal. Will get EKG/labs

## 2018-08-13 NOTE — ED ADULT TRIAGE NOTE - CHIEF COMPLAINT QUOTE
Patient reports 2 hours ago onset of palpitations, dizziness and shortness of breath. Reports discharge from [facility] yesterday. denies chest pain.

## 2018-08-13 NOTE — ED PROVIDER NOTE - OBJECTIVE STATEMENT
43 y/o F pt with hx of autonomic dysfunction, HTN, anxiety, depression, IBS, and blood clots  BIBA presents to the ED c/o tachycardia, palpitations, chest pain, dizziness, and SOB today. Pt was discharged from [facility] yesterday, where she was admitted for 2 episodes of LOC due to autonomic dysfunction. Pt also states that there are erythematous spots on her hand and legs, which are painful. Also c/o frequent bruising on extremities without recollection of trauma. While at [facility] she had blood work up and also worked up for a hematoma on her right lower extremity, which was reported to not be a DVT. Pt is currently on her menstrual period. Denies fever, chills, nausea, vomiting, abd pan, diaphoresis, or LOC. No other complaints at this time.

## 2018-08-14 ENCOUNTER — OTHER (OUTPATIENT)
Age: 43
End: 2018-08-14

## 2018-08-14 NOTE — ED ADULT NURSE NOTE - CHIEF COMPLAINT QUOTE
Patient reports 2 hours ago onset of palpitations, dizziness and shortness of breath. Reports discharge from [facility] yesterday. denies chest pain.

## 2018-08-14 NOTE — ED PROVIDER NOTE - OBJECTIVE STATEMENT
Pt reports pain to left lateral side started this morning at about 5am   Pt reports having history of kidney stones  Pt denies nausea, pain at this time        Miki Stoddard RN  08/14/18 [id number]
41 y/o female with PMHx of gastroparesis secondary to POTS presents to the ED c/o abd pain. +diarrhea starting last night. Pt notes her BM was green this morning. +decreased PO intake. Notes she takes Ativan Q4 hours for her POTS. Has lost 30 lbs over one year, notes 10 lbs lost per month. +abd pain described as sharp and shooting on the left side. +nausea +mild vomiting described as white liquid. No hematemesis. Urine is clear. No hematuria. Has been taking Zofran with mild relief. Intolerant to Reglan. Allergic to beta-blockers. No hx of DM. Psych Dr. Stephanie Arnold

## 2018-08-14 NOTE — ED ADULT NURSE NOTE - NSIMPLEMENTINTERV_GEN_ALL_ED
Implemented All Universal Safety Interventions:  [place] to call system. Call bell, personal items and telephone within reach. Instruct patient to call for assistance. Room bathroom lighting operational. Non-slip footwear when patient is off stretcher. Physically safe environment: no spills, clutter or unnecessary equipment. Stretcher in lowest position, wheels locked, appropriate side rails in place.

## 2018-08-20 NOTE — BEHAVIORAL HEALTH ASSESSMENT NOTE - DESCRIPTION
no complications no complications no complications arterial placement during code blue, no complications ITP, IBS, hypertension

## 2018-08-22 NOTE — DISCHARGE NOTE ADULT - VISION (WITH CORRECTIVE LENSES IF THE PATIENT USUALLY WEARS THEM):
Subjective:       Patient ID:  Asaf Pendleton is a 89 y.o. male who presents for   Chief Complaint   Patient presents with    Follow-up     nose, scalp      He used the efudex on his keratoses and have resolved.         Review of Systems   Constitutional: Negative for fever.   Skin: Negative for itching and rash.   Hematologic/Lymphatic: Does not bruise/bleed easily.        Objective:    Physical Exam   Constitutional: He appears well-developed and well-nourished. No distress.   Neurological: He is alert and oriented to person, place, and time. He is not disoriented.   Psychiatric: He has a normal mood and affect.   Skin:   Areas Examined (abnormalities noted in diagram):   Scalp / Hair Palpated and Inspected  Head / Face Inspection Performed  Neck Inspection Performed  Chest / Axilla Inspection Performed  RUE Inspected  LUE Inspection Performed                   Diagram Legend     Erythematous scaling macule/papule c/w actinic keratosis       Vascular papule c/w angioma      Pigmented verrucoid papule/plaque c/w seborrheic keratosis      Yellow umbilicated papule c/w sebaceous hyperplasia      Irregularly shaped tan macule c/w lentigo     1-2 mm smooth white papules consistent with Milia      Movable subcutaneous cyst with punctum c/w epidermal inclusion cyst      Subcutaneous movable cyst c/w pilar cyst      Firm pink to brown papule c/w dermatofibroma      Pedunculated fleshy papule(s) c/w skin tag(s)      Evenly pigmented macule c/w junctional nevus     Mildly variegated pigmented, slightly irregular-bordered macule c/w mildly atypical nevus      Flesh colored to evenly pigmented papule c/w intradermal nevus       Pink pearly papule/plaque c/w basal cell carcinoma      Erythematous hyperkeratotic cursted plaque c/w SCC      Surgical scar with no sign of skin cancer recurrence      Open and closed comedones      Inflammatory papules and pustules      Verrucoid papule consistent consistent with wart      "Erythematous eczematous patches and plaques     Dystrophic onycholytic nail with subungual debris c/w onychomycosis     Umbilicated papule    Erythematous-base heme-crusted tan verrucoid plaque consistent with inflamed seborrheic keratosis     Erythematous Silvery Scaling Plaque c/w Psoriasis     See annotation      Assessment / Plan:        Lentigines  The "ABCD" rules to observe pigmented lesions were reviewed.      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  " Normal vision: sees adequately in most situations; can see medication labels, newsprint

## 2018-08-23 ENCOUNTER — EMERGENCY (EMERGENCY)
Facility: HOSPITAL | Age: 43
LOS: 1 days | Discharge: HOME | End: 2018-08-23
Attending: EMERGENCY MEDICINE
Payer: MEDICAID

## 2018-08-23 VITALS
RESPIRATION RATE: 18 BRPM | HEART RATE: 70 BPM | SYSTOLIC BLOOD PRESSURE: 100 MMHG | DIASTOLIC BLOOD PRESSURE: 64 MMHG | OXYGEN SATURATION: 99 % | TEMPERATURE: 98 F

## 2018-08-23 VITALS
RESPIRATION RATE: 16 BRPM | HEART RATE: 83 BPM | DIASTOLIC BLOOD PRESSURE: 83 MMHG | TEMPERATURE: 98 F | OXYGEN SATURATION: 100 % | WEIGHT: 91.93 LBS | SYSTOLIC BLOOD PRESSURE: 128 MMHG | HEIGHT: 62 IN

## 2018-08-23 LAB
ALBUMIN SERPL ELPH-MCNC: 4.3 G/DL — SIGNIFICANT CHANGE UP (ref 3.3–5)
ALP SERPL-CCNC: 90 U/L — SIGNIFICANT CHANGE UP (ref 40–120)
ALT FLD-CCNC: 19 U/L — SIGNIFICANT CHANGE UP (ref 12–78)
ANION GAP SERPL CALC-SCNC: 9 MMOL/L — SIGNIFICANT CHANGE UP (ref 5–17)
AST SERPL-CCNC: 17 U/L — SIGNIFICANT CHANGE UP (ref 15–37)
BASOPHILS # BLD AUTO: 0.02 K/UL — SIGNIFICANT CHANGE UP (ref 0–0.2)
BASOPHILS NFR BLD AUTO: 0.3 % — SIGNIFICANT CHANGE UP (ref 0–2)
BILIRUB SERPL-MCNC: 0.2 MG/DL — SIGNIFICANT CHANGE UP (ref 0.2–1.2)
BUN SERPL-MCNC: 7 MG/DL — SIGNIFICANT CHANGE UP (ref 7–23)
CALCIUM SERPL-MCNC: 9 MG/DL — SIGNIFICANT CHANGE UP (ref 8.5–10.1)
CHLORIDE SERPL-SCNC: 106 MMOL/L — SIGNIFICANT CHANGE UP (ref 96–108)
CO2 SERPL-SCNC: 27 MMOL/L — SIGNIFICANT CHANGE UP (ref 22–31)
CREAT SERPL-MCNC: 0.86 MG/DL — SIGNIFICANT CHANGE UP (ref 0.5–1.3)
D DIMER BLD IA.RAPID-MCNC: <150 NG/ML DDU — SIGNIFICANT CHANGE UP
EOSINOPHIL # BLD AUTO: 0.02 K/UL — SIGNIFICANT CHANGE UP (ref 0–0.5)
EOSINOPHIL NFR BLD AUTO: 0.3 % — SIGNIFICANT CHANGE UP (ref 0–6)
GLUCOSE SERPL-MCNC: 79 MG/DL — SIGNIFICANT CHANGE UP (ref 70–99)
HCT VFR BLD CALC: 33.7 % — LOW (ref 34.5–45)
HGB BLD-MCNC: 10.9 G/DL — LOW (ref 11.5–15.5)
IMM GRANULOCYTES NFR BLD AUTO: 0.3 % — SIGNIFICANT CHANGE UP (ref 0–1.5)
LYMPHOCYTES # BLD AUTO: 1.58 K/UL — SIGNIFICANT CHANGE UP (ref 1–3.3)
LYMPHOCYTES # BLD AUTO: 26.1 % — SIGNIFICANT CHANGE UP (ref 13–44)
MCHC RBC-ENTMCNC: 25.3 PG — LOW (ref 27–34)
MCHC RBC-ENTMCNC: 32.3 GM/DL — SIGNIFICANT CHANGE UP (ref 32–36)
MCV RBC AUTO: 78.2 FL — LOW (ref 80–100)
MONOCYTES # BLD AUTO: 0.43 K/UL — SIGNIFICANT CHANGE UP (ref 0–0.9)
MONOCYTES NFR BLD AUTO: 7.1 % — SIGNIFICANT CHANGE UP (ref 2–14)
NEUTROPHILS # BLD AUTO: 3.99 K/UL — SIGNIFICANT CHANGE UP (ref 1.8–7.4)
NEUTROPHILS NFR BLD AUTO: 65.9 % — SIGNIFICANT CHANGE UP (ref 43–77)
PLATELET # BLD AUTO: 91 K/UL — LOW (ref 150–400)
POTASSIUM SERPL-MCNC: 3.6 MMOL/L — SIGNIFICANT CHANGE UP (ref 3.5–5.3)
POTASSIUM SERPL-SCNC: 3.6 MMOL/L — SIGNIFICANT CHANGE UP (ref 3.5–5.3)
PROT SERPL-MCNC: 7.9 G/DL — SIGNIFICANT CHANGE UP (ref 6–8.3)
RBC # BLD: 4.31 M/UL — SIGNIFICANT CHANGE UP (ref 3.8–5.2)
RBC # FLD: 14.8 % — HIGH (ref 10.3–14.5)
SODIUM SERPL-SCNC: 142 MMOL/L — SIGNIFICANT CHANGE UP (ref 135–145)
TROPONIN I SERPL-MCNC: <.015 NG/ML — SIGNIFICANT CHANGE UP (ref 0.01–0.04)
WBC # BLD: 6.06 K/UL — SIGNIFICANT CHANGE UP (ref 3.8–10.5)
WBC # FLD AUTO: 6.06 K/UL — SIGNIFICANT CHANGE UP (ref 3.8–10.5)

## 2018-08-23 PROCEDURE — 71045 X-RAY EXAM CHEST 1 VIEW: CPT | Mod: 26

## 2018-08-23 PROCEDURE — 84484 ASSAY OF TROPONIN QUANT: CPT

## 2018-08-23 PROCEDURE — 93005 ELECTROCARDIOGRAM TRACING: CPT

## 2018-08-23 PROCEDURE — 99284 EMERGENCY DEPT VISIT MOD MDM: CPT | Mod: 25

## 2018-08-23 PROCEDURE — 80053 COMPREHEN METABOLIC PANEL: CPT

## 2018-08-23 PROCEDURE — 99285 EMERGENCY DEPT VISIT HI MDM: CPT | Mod: 25

## 2018-08-23 PROCEDURE — 71045 X-RAY EXAM CHEST 1 VIEW: CPT

## 2018-08-23 PROCEDURE — 85379 FIBRIN DEGRADATION QUANT: CPT

## 2018-08-23 PROCEDURE — 85027 COMPLETE CBC AUTOMATED: CPT

## 2018-08-23 RX ORDER — SODIUM CHLORIDE 9 MG/ML
1000 INJECTION INTRAMUSCULAR; INTRAVENOUS; SUBCUTANEOUS ONCE
Qty: 0 | Refills: 0 | Status: COMPLETED | OUTPATIENT
Start: 2018-08-23 | End: 2018-08-23

## 2018-08-23 RX ORDER — SODIUM CHLORIDE 9 MG/ML
3 INJECTION INTRAMUSCULAR; INTRAVENOUS; SUBCUTANEOUS ONCE
Qty: 0 | Refills: 0 | Status: COMPLETED | OUTPATIENT
Start: 2018-08-23 | End: 2018-08-23

## 2018-08-23 RX ADMIN — SODIUM CHLORIDE 3 MILLILITER(S): 9 INJECTION INTRAMUSCULAR; INTRAVENOUS; SUBCUTANEOUS at 19:23

## 2018-08-23 RX ADMIN — SODIUM CHLORIDE 500 MILLILITER(S): 9 INJECTION INTRAMUSCULAR; INTRAVENOUS; SUBCUTANEOUS at 19:23

## 2018-08-23 NOTE — ED PROVIDER NOTE - ATTENDING CONTRIBUTION TO CARE
I have personally performed a face to face diagnostic evaluation on this patient.  I have reviewed the PA note and agree with the history, exam, and plan of care, except as noted.  History and Exam by me shows 42 female presents to ER states she had a syncopal episode at home, states she was lying in bed and had a pulse oximeter on her and states her heart rate was 150's, patient feels tired and dizziness, requesting iv fluids and lab work, patient has been to ER before for similar symptoms with negative work up, on exam patient pale, heart and lung sounds clear, mild bruising of lower extermity shins, abdomen soft, f/u labs, iv fluids, ekg, chest xray, re-eval.

## 2018-08-23 NOTE — ED ADULT NURSE NOTE - OBJECTIVE STATEMENT
Pt is 42y F, came to ED with c/o elevated heart rate, currently HR wnl, reports not feeling well, denies chest pain, sob, denies any long car/plane trips, denies fevers/chills, advised on plan of care, verbalized understanding, awaiting dispo

## 2018-08-23 NOTE — ED PROVIDER NOTE - OBJECTIVE STATEMENT
pt is a 43yo female with autonomic disorder, POTs, ITP c/o "passed out" x today. pt reports she was monitoring her heart rate at home and noticed it went up to 150, then she had brief episode of loc on her bed, no head trauma. pt reports she "feels weird". pt reports left scapular pain, sob, nausea that resolved with zofran although she is allergic, tingling in legs and blurred vision. pt believes she is dehydrated. sob is described as pt not able to take a deep breath but no trouble with air entry. pt is a 41yo female with autonomic disorder, POTs, ITP c/o "passed out" x today. pt reports she was monitoring her heart rate at home and noticed it went up to 150, then she had brief episode of loc on her bed, no head trauma. pt reports she "feels weird". pt reports left scapular pain, sob, nausea that resolved with zofran although she is allergic, tingling in legs and blurred vision. pt believes she is dehydrated. sob is described as pt not able to take a deep breath but no trouble with air entry. pt seen in ed a few weeks ago for similar symptoms, had labs and ct head without acute findings. pt requests troponin.

## 2018-08-23 NOTE — ED ADULT NURSE REASSESSMENT NOTE - NS ED NURSE REASSESS COMMENT FT1
pt with vital signs stable  blood work within norml limits iv fluids infusing well awaiting disposition
pt reavaluated and vital signs stable d/c home to follow up with PMdpt verbalizes understanding

## 2018-08-23 NOTE — ED ADULT NURSE NOTE - NSIMPLEMENTINTERV_GEN_ALL_ED
Implemented All Universal Safety Interventions:  [place] to call system. Call bell, personal items and telephone within reach. Instruct patient to call for assistance. Room bathroom lighting operational. Non-slip footwear when patient is off stretcher. Physically safe environment: no spills, clutter or unnecessary equipment. Stretcher in lowest position, wheels locked, appropriate side rails in place.

## 2018-08-23 NOTE — ED ADULT NURSE NOTE - CHPI ED NUR SYMPTOMS NEG
no dizziness/no chest pain/no diaphoresis/no shortness of breath/no chills/no back pain/no nausea/no syncope/no vomiting

## 2018-08-23 NOTE — ED PROVIDER NOTE - MEDICAL DECISION MAKING DETAILS
labs, trop, d-dimer, ekg, IVF, pt denies pain control, re-eval labs, trop, d-dimer, ekg, IVF, pt denies pain control, re-eval. pt seen in ed for similar complaints over the past 2 months. no acute findings.

## 2018-08-23 NOTE — ED PROVIDER NOTE - PROGRESS NOTE DETAILS
pt has cardiologist apt sept 4th. labs reviewed. results reviewed with pt including abnormal results. pt given a copy. pt has cardiologist apt sept 4th. labs reviewed. results reviewed with pt including abnormal results. pt given a copy. pt orthostatics checked, no acute findings. pt improved. pt advised to take iron pills for anemia. All questions answered and concerns addressed. pt verbalized understanding and agreement with plan and dx. pt advised to follow up with PMD. pt advised to return to ed for worsenng symptoms including fever, cp, sob. will dc.

## 2018-08-24 ENCOUNTER — MOBILE ON CALL (OUTPATIENT)
Age: 43
End: 2018-08-24

## 2018-08-28 NOTE — PROGRESS NOTE ADULT - PROBLEM/PLAN-6
Same meds. Ff up in 6 months.  Increase valgus deformity on the left knee and this would cause increase problem on the left ankle. Tramadol likely is causing the dizziness. Fluid retention on the right leg, furosemide 1 weekly. Ff up in 6 months.  
DISPLAY PLAN FREE TEXT

## 2018-09-01 ENCOUNTER — OUTPATIENT (OUTPATIENT)
Dept: OUTPATIENT SERVICES | Facility: HOSPITAL | Age: 43
LOS: 1 days | End: 2018-09-01
Payer: MEDICAID

## 2018-09-04 ENCOUNTER — NON-APPOINTMENT (OUTPATIENT)
Age: 43
End: 2018-09-04

## 2018-09-04 ENCOUNTER — APPOINTMENT (OUTPATIENT)
Dept: CARDIOLOGY | Facility: CLINIC | Age: 43
End: 2018-09-04
Payer: MEDICAID

## 2018-09-04 VITALS
BODY MASS INDEX: 17.11 KG/M2 | OXYGEN SATURATION: 100 % | HEIGHT: 62 IN | WEIGHT: 93 LBS | DIASTOLIC BLOOD PRESSURE: 86 MMHG | HEART RATE: 88 BPM | SYSTOLIC BLOOD PRESSURE: 127 MMHG

## 2018-09-04 DIAGNOSIS — R94.31 ABNORMAL ELECTROCARDIOGRAM [ECG] [EKG]: ICD-10-CM

## 2018-09-04 DIAGNOSIS — R00.2 PALPITATIONS: ICD-10-CM

## 2018-09-04 PROCEDURE — 99401 PREV MED CNSL INDIV APPRX 15: CPT

## 2018-09-04 PROCEDURE — 93000 ELECTROCARDIOGRAM COMPLETE: CPT

## 2018-09-04 PROCEDURE — 99215 OFFICE O/P EST HI 40 MIN: CPT | Mod: 25

## 2018-09-05 ENCOUNTER — EMERGENCY (EMERGENCY)
Facility: HOSPITAL | Age: 43
LOS: 1 days | Discharge: HOME | End: 2018-09-05
Attending: EMERGENCY MEDICINE
Payer: MEDICAID

## 2018-09-05 VITALS
DIASTOLIC BLOOD PRESSURE: 95 MMHG | HEART RATE: 88 BPM | WEIGHT: 91.93 LBS | SYSTOLIC BLOOD PRESSURE: 133 MMHG | TEMPERATURE: 99 F | OXYGEN SATURATION: 99 % | RESPIRATION RATE: 19 BRPM

## 2018-09-05 LAB
ALBUMIN SERPL ELPH-MCNC: 3.9 G/DL — SIGNIFICANT CHANGE UP (ref 3.3–5)
ALP SERPL-CCNC: 83 U/L — SIGNIFICANT CHANGE UP (ref 40–120)
ALT FLD-CCNC: 26 U/L — SIGNIFICANT CHANGE UP (ref 12–78)
ANION GAP SERPL CALC-SCNC: 6 MMOL/L — SIGNIFICANT CHANGE UP (ref 5–17)
APPEARANCE UR: CLEAR — SIGNIFICANT CHANGE UP
AST SERPL-CCNC: 51 U/L — HIGH (ref 15–37)
BASOPHILS # BLD AUTO: 0.02 K/UL — SIGNIFICANT CHANGE UP (ref 0–0.2)
BASOPHILS NFR BLD AUTO: 0.2 % — SIGNIFICANT CHANGE UP (ref 0–2)
BILIRUB SERPL-MCNC: 0.4 MG/DL — SIGNIFICANT CHANGE UP (ref 0.2–1.2)
BILIRUB UR-MCNC: NEGATIVE — SIGNIFICANT CHANGE UP
BUN SERPL-MCNC: 6 MG/DL — LOW (ref 7–23)
CALCIUM SERPL-MCNC: 9 MG/DL — SIGNIFICANT CHANGE UP (ref 8.5–10.1)
CHLORIDE SERPL-SCNC: 108 MMOL/L — SIGNIFICANT CHANGE UP (ref 96–108)
CO2 SERPL-SCNC: 25 MMOL/L — SIGNIFICANT CHANGE UP (ref 22–31)
COLOR SPEC: SIGNIFICANT CHANGE UP
CREAT SERPL-MCNC: 0.8 MG/DL — SIGNIFICANT CHANGE UP (ref 0.5–1.3)
DIFF PNL FLD: NEGATIVE — SIGNIFICANT CHANGE UP
EOSINOPHIL # BLD AUTO: 0.02 K/UL — SIGNIFICANT CHANGE UP (ref 0–0.5)
EOSINOPHIL NFR BLD AUTO: 0.2 % — SIGNIFICANT CHANGE UP (ref 0–6)
GLUCOSE SERPL-MCNC: 70 MG/DL — SIGNIFICANT CHANGE UP (ref 70–99)
GLUCOSE UR QL: NEGATIVE — SIGNIFICANT CHANGE UP
HCT VFR BLD CALC: 34.3 % — LOW (ref 34.5–45)
HGB BLD-MCNC: 11 G/DL — LOW (ref 11.5–15.5)
IMM GRANULOCYTES NFR BLD AUTO: 0.4 % — SIGNIFICANT CHANGE UP (ref 0–1.5)
KETONES UR-MCNC: NEGATIVE — SIGNIFICANT CHANGE UP
LEUKOCYTE ESTERASE UR-ACNC: NEGATIVE — SIGNIFICANT CHANGE UP
LYMPHOCYTES # BLD AUTO: 1.87 K/UL — SIGNIFICANT CHANGE UP (ref 1–3.3)
LYMPHOCYTES # BLD AUTO: 23.1 % — SIGNIFICANT CHANGE UP (ref 13–44)
MCHC RBC-ENTMCNC: 25.3 PG — LOW (ref 27–34)
MCHC RBC-ENTMCNC: 32.1 GM/DL — SIGNIFICANT CHANGE UP (ref 32–36)
MCV RBC AUTO: 78.9 FL — LOW (ref 80–100)
MONOCYTES # BLD AUTO: 0.59 K/UL — SIGNIFICANT CHANGE UP (ref 0–0.9)
MONOCYTES NFR BLD AUTO: 7.3 % — SIGNIFICANT CHANGE UP (ref 2–14)
NEUTROPHILS # BLD AUTO: 5.58 K/UL — SIGNIFICANT CHANGE UP (ref 1.8–7.4)
NEUTROPHILS NFR BLD AUTO: 68.8 % — SIGNIFICANT CHANGE UP (ref 43–77)
NITRITE UR-MCNC: NEGATIVE — SIGNIFICANT CHANGE UP
PH UR: 8 — SIGNIFICANT CHANGE UP (ref 5–8)
PLATELET # BLD AUTO: 83 K/UL — LOW (ref 150–400)
POTASSIUM SERPL-MCNC: 4.5 MMOL/L — SIGNIFICANT CHANGE UP (ref 3.5–5.3)
POTASSIUM SERPL-SCNC: 4.5 MMOL/L — SIGNIFICANT CHANGE UP (ref 3.5–5.3)
POTASSIUM UR-SCNC: 23 MMOL/L — SIGNIFICANT CHANGE UP
PROT SERPL-MCNC: 7.5 G/DL — SIGNIFICANT CHANGE UP (ref 6–8.3)
PROT UR-MCNC: NEGATIVE — SIGNIFICANT CHANGE UP
RBC # BLD: 4.35 M/UL — SIGNIFICANT CHANGE UP (ref 3.8–5.2)
RBC # FLD: 14.6 % — HIGH (ref 10.3–14.5)
SODIUM SERPL-SCNC: 139 MMOL/L — SIGNIFICANT CHANGE UP (ref 135–145)
SODIUM UR-SCNC: 58 MMOL/L — SIGNIFICANT CHANGE UP
SP GR SPEC: 1 — LOW (ref 1.01–1.02)
UROBILINOGEN FLD QL: NEGATIVE — SIGNIFICANT CHANGE UP
WBC # BLD: 8.11 K/UL — SIGNIFICANT CHANGE UP (ref 3.8–10.5)
WBC # FLD AUTO: 8.11 K/UL — SIGNIFICANT CHANGE UP (ref 3.8–10.5)

## 2018-09-05 PROCEDURE — 83935 ASSAY OF URINE OSMOLALITY: CPT

## 2018-09-05 PROCEDURE — 99283 EMERGENCY DEPT VISIT LOW MDM: CPT | Mod: 25

## 2018-09-05 PROCEDURE — 36415 COLL VENOUS BLD VENIPUNCTURE: CPT

## 2018-09-05 PROCEDURE — 93010 ELECTROCARDIOGRAM REPORT: CPT | Mod: 76

## 2018-09-05 PROCEDURE — 84300 ASSAY OF URINE SODIUM: CPT

## 2018-09-05 PROCEDURE — 93005 ELECTROCARDIOGRAM TRACING: CPT

## 2018-09-05 PROCEDURE — 85027 COMPLETE CBC AUTOMATED: CPT

## 2018-09-05 PROCEDURE — 84133 ASSAY OF URINE POTASSIUM: CPT

## 2018-09-05 PROCEDURE — 81003 URINALYSIS AUTO W/O SCOPE: CPT

## 2018-09-05 PROCEDURE — 80053 COMPREHEN METABOLIC PANEL: CPT

## 2018-09-05 PROCEDURE — 99284 EMERGENCY DEPT VISIT MOD MDM: CPT

## 2018-09-05 NOTE — ED PROVIDER NOTE - PHYSICAL EXAMINATION
Physical Exam:  General: Anxious, wearing home pulse ox, appears older than stated age  HEENT: NCAT, PERRLA, dry mucous membranes   Neck: Supple, mild tenderness to palpation  Neurology: A&Ox3, nonfocal, CN II-XII grossly intact  Respiratory: CTA B/L, No W/R/R  CV: RRR, +S1/S2, no murmurs, rubs or gallops  Abdominal: Hyperactive bowel sounds x 4, no abdominal tenderness   Extremities: + bruising bilateral, No C/C/E, + peripheral pulses  Skin: warm, dry

## 2018-09-05 NOTE — ED PROVIDER NOTE - NS ED ROS FT
Constitutional: denies fever, chills, diaphoresis   HEENT: denies blurry vision, difficulty hearing  Respiratory: denies SOB, ALCANTAR, cough, sputum production,   Cardiovascular: denies CP, palpitations, edema  Gastrointestinal: denies nausea, vomiting, abdominal pain, melena, hematochezia   Genitourinary: admits frequency, urgency, denies dysuria, hematuria   Skin/Breast: denies rash, itching  Neurologic: denies headache  Hematology/Oncology: admits bruising   ROS negative except as noted above

## 2018-09-05 NOTE — ED PROVIDER NOTE - PLAN OF CARE
Patient c/o increased urinary frequency and urgency without dysuria   - UA, urine osmolality, bladder scan  - CBC, CMP  - EKG

## 2018-09-05 NOTE — ED ADULT NURSE NOTE - NSIMPLEMENTINTERV_GEN_ALL_ED
Implemented All Universal Safety Interventions:  [place] to call system. Call bell, personal items and telephone within reach. Instruct patient to call for assistance. Room bathroom lighting operational. Non-slip footwear when patient is off stretcher. Physically safe environment: no spills, clutter or unnecessary equipment. Stretcher in lowest position, wheels locked, appropriate side rails in place.

## 2018-09-05 NOTE — ED ADULT TRIAGE NOTE - CHIEF COMPLAINT QUOTE
Urinary incontinence w/ urinary frequency for 3 days w/ intermittent tachycardia going up to 150 at times, also states intermittent SOB.

## 2018-09-05 NOTE — ED PROVIDER NOTE - OBJECTIVE STATEMENT
Pt is a 41yo female with autonomic disorder, postural orthostatic tachycardia, ITP, IBS, ? Asthma c/o polyuria every 15 min for 3 days, tachycardia since last PM and frontal head "paresthesia" x 1 hr. Pt reports she has increased urgency and urination every 15 min producing about a cup of clear urine each time. Denies fevers, dysuria, hematuria, malodor, changes in medication. Patient reports monitoring her heart rate at home and noticed it went up to 150. She then took her Cardizem which lowered it to 100's. Patient reports left arm tingling head "paresthesia" and her heart "feeling funky" since the HR spike. Pt admits weakness, oral dryness, orthopnea, frequent bruising. Denies palpitations, syncope, LE Edema. Patient was seen in [facility] ED last week after taking a double dose of Cardizem, was monitor and discharged with outpatient follow up.

## 2018-09-05 NOTE — ED PROVIDER NOTE - ATTENDING CONTRIBUTION TO CARE
Increase in frequency of urination as well as increase in P.O fluid intake. No dysuria. Exam revealed this white female in NAD with benign and non tender abdomen.

## 2018-09-05 NOTE — ED PROVIDER NOTE - CARE PLAN
Principal Discharge DX:	Polyuria  Assessment and plan of treatment:	Patient c/o increased urinary frequency and urgency without dysuria   - UA, urine osmolality, bladder scan  - CBC, CMP  - EKG

## 2018-09-06 LAB — OSMOLALITY UR: 180 MOS/KG — SIGNIFICANT CHANGE UP (ref 50–1200)

## 2018-09-09 PROBLEM — R00.2 PALPITATIONS: Status: ACTIVE | Noted: 2017-03-23

## 2018-09-09 PROBLEM — R94.31 ABNORMAL EKG: Status: ACTIVE | Noted: 2017-03-23

## 2018-09-20 NOTE — ED ADULT NURSE NOTE - CHIEF COMPLAINT
Clinical Summary

 Created on:2018



Patient:Moises Wright

Sex:Male

:1963

External Reference #:[id number]





Demographics







 Address  [address]



   [place] [address]

 

 Home Phone  [phone]

 

 Email Address  [email or web address]

 

 Preferred Language  English

 

 Marital Status  

 

 Gnosticism Affiliation  Unknown

 

 Race  White

 

 Ethnic Group  Not  or 









Author







 Organization  [facility]

 

 Address  [address] [place] [address]









Support







 Name  Relationship  Address  Phone

 

 Chyna Wright  Spouse  [address]  [phone]



     [place] [address]  









Care Team Providers







 Name  Role  Phone

 

 Ja Bueno MD  Primary Care Provider  [phone]









Allergies

No Known Allergies



Current Medications







 Prescription  Sig.  Disp.  Refills  Start Date  End Date  Status

 

 lisinopril-hydrochlorothia        2018    Active



 zide (PRINZIDE,ZESTORETIC)            



 20-12.5 mg per tablet            

 

 rosuvastatin (CRESTOR) 40        2018    Active



 MG tablet            

 

 omeprazole (PriLOSEC) 40  Take 40 mg by          Active



 MG capsule  mouth daily.          







Active Problems







 Problem  Noted Date

 

 Cervical spondylosis with myelopathy  2018

 

 Ulnar neuropathy at elbow, right  2018

 

 Cervical radiculopathy at C7  2018

 

 Cervical radiculopathy at C6  2018







Encounters







 Date  Type  Specialty  Care Team  Description

 

 2018  Telephone  Neurosurgery  Sherrell Lerner LVN  

 

 2018  Hospital Encounter  General Surgery  Hoang Martinez  S/SHARRON cervical 
spinal fusion;



       MD TK  Spondylosis of cervical spine with myelopathy

 

 2018  Ancillary Orders  Radiology  Hoang Martinez  S/SHARRON cervical spinal



       MD TK  fusion

 

 2018  Procedure Pass  General Surgery    

 

 2018  Surgery  General Surgery  Hoang Martinez  ANTERIOR CERVICAL



       MD TK  DISCECTOMY AND FUSION



         C5-C7, RIGHT ILIAC



         CREST BONE MARROW



         ASPIRATION

 

 2018  Pre-Admit Testing  Pre-Admission Testing  Hoang Martinez  Preop 
testing



   Appointment    MD TK  (Primary Dx)

 

 2018  Hospital Encounter  Radiology  Hoang Martienz MD  

 

 2018  Office Visit  Neurosurgery  Hoang Martinez  Cervical spondylosis 
with myelopathy (Primary Dx);



       MD TK  Ulnar neuropathy at elbow, right;



         Cervical radiculopathy at C7;



         Cervical radiculopathy at C6

 

 2018  Hospital Encounter  Radiology  Hoang Martinez MD  radiculopathy

 

 2018  Hospital Encounter  Radiology  Hoang Martinez MD  radiculopathy

 

 2018  Anesthesia Event  General Surgery  Raven Mcguireariannenir,  



       APRRUSSELL  

 

 2018  Procedure Pass  Radiology    

 

 2018  Ancillary Orders  Radiology  Hoang Martinez MD  

 

 2018  Abstract  Neurosurgery  Sherrell Lerner,  



       ABEL  

 

 2018  Orders Only  Neurosurgery  Hoang Martinez MD  radiculopathy



         (Primary Dx)



after 2017



Family History







 Medical History  Relation  Name  Comments

 

 Hepatitis  Father    

 

 Heart disease  Maternal Grandfather    

 

 Hypertension  Maternal Grandfather    

 

 Diabetes  Maternal Grandmother    

 

 Arthritis  Mother    

 

 Migraines  Mother    









 Relation  Name  Status  Comments

 

 Father      

 

 Maternal Grandfather      

 

 Maternal Grandmother      

 

 Mother      







Social History







 Tobacco Use  Types  Packs/Day  Years Used  Date

 

 Never Smoker        









 Smokeless Tobacco: Never Used      









 Alcohol Use  Drinks/Week  oz/Week  Comments

 

 Yes  20 Glasses of wine  12.0  4-5 bottles of wine/week









 Sex Assigned at Birth  Date Recorded

 

 Not on file  







Last Filed Vital Signs







 Vital Sign  Reading  Time Taken

 

 Blood Pressure  132/82  2018  3:22 PM CDT

 

 Pulse  97  2018  3:22 PM CDT

 

 Temperature  36.7 C (98 F)  2018  3:22 PM CDT

 

 Respiratory Rate  18  2018  3:22 PM CDT

 

 Oxygen Saturation  98%  2018  3:22 PM CDT

 

 Inhaled Oxygen Concentration  -  -

 

 Weight  95.3 kg (210 lb 1.6 oz)  2018  8:17 AM CDT

 

 Height  185.4 cm (6' 1")  2018  8:17 AM CDT

 

 Body Mass Index  27.72  2018  8:17 AM CDT







Plan of Treatment







 Health Maintenance  Due Date  Last Done  Comments

 

 COLON CANCER SCREENING  2014    

 

 SHINGRIX VACCINE (#1)  2014    

 

 INFLUENZA VACCINE  2018    







Implants







 Implanted  Type  Area    Device  Expiration  Model /



         Identifier  Date  Serial /



             Lot

 

 Kit Putty Bone 1.5ml Mastergraft - [id number]  Human  N/A:  MEDTRONIC SPINAL  
  2020  [id number] /



 Implanted: Qty: 1 on 2018 by Hoang Martinez MD  Tissue  N/A  AND 
BIOLOGICS       /



   Implants          

 

 Instrument [id number] Barry 2 Bradshaw Trocar, Barry Needle (Other - Tl Retractor / 
Access), Disposable - [id number]  IPM IMPLANT  N/A:  MEDTRONIC      [id number] /



 Implanted: Qty: 1 on 2018 by Hoang Martinez MD  DEVICES  N/A  SOFAMOR 
DANEK       /

 

 Cage [id number] Anatomic Ptc [id number] - [id number] - [id number]  IPM IMPLANT  N/A:  
MEDTRONIC    2026  [id number] /



 Implanted: Qty: 1 on 2018 by Hoang Martinez MD  DEVICES  N/A  SOFAMOR 
DANEK      [id number] /



             14FR

 

 Cage [id number] Anatomic Ptc [id number] - [id number]  IPM IMPLANT  N/A:  MEDTRONIC
    2026  [id number] /



 Implanted: Qty: 1 on 2018 by Hoang Martinez MD  DEVICES  N/A  SOFAMOR 
DANEK       /



             11FS

 

 Screw Spinal Slf-Drl V-Ang 4x17mm - [id number]  Spinal  N/A:  MEDTRONIC SPINAL
      [id number] /



 Implanted: 2018 (Quantity not on file)  Implants  N/A  AND BIOLOGICS    
   /

 

 Plate Cerv Ant 40mm Atlantis Vision Elite - [id number]  Spinal  N/A:  
[employer] SPINAL      [id number] /



 Implanted: 2018 (Quantity not on file)  Implants  N/A  AND BIOLOGICS    
   /

 

 Screw Distrctn 14mm Strl - [id number]  Surgical  N/A:  ARMAMENTARIUM      SM 
0089 /



 Implanted: Qty: 1 on 2018 by Hoang Martinez MD  Implants;  N/A  INC   
    /



   Expanders;          



   Extenders;          



   Surgical          



   Wires          

 

 Screw Distrctn 14mm Strl - [id number]  Surgical  N/A:  ARMAMENTARIUM      SM 
0089 /



 Implanted: Qty: 1 on 2018 by Hoang Martinez MD  Implants;  N/A  INC   
    /



   Expanders;          



   Extenders;          



   Surgical          



   Wires          







Procedures







 Procedure Name  Priority  Date/Time  Associated  Comments



       Diagnosis  

 

 SURGICAL PATHOLOGY  Routine  2018  1:54    Results for this



 REQUEST    PM CDT    procedure are in



         the results



         section.

 

 XR CERVICAL SPINE 1  Routine  2018 12:33  S/P cervical spinal  Results 
for this



 VW    PM CDT  fusion  procedure are in



         the results



         section.

 

 XR CERVICAL SPINE 1  Routine  2018 10:40  S/P cervical spinal  Results 
for this



 VW    AM CDT  fusion  procedure are in



         the results



         section.

 

 XR CERVICAL SPINE 1  Routine  2018 10:33  S/P cervical spinal  Results 
for this



 VW    AM CDT  fusion  procedure are in



         the results



         section.

 

 MO AN ELECTIVE  Routine  2018 10:08    



 ENDOTRACHEAL AIRWAY    AM CDT    









   Procedure Note - Weston Overton MD - 2018 10:08 AM CDT



Airway



   Date/Time: 2018 10:08 AM



   Performed by: WESTON OVERTON



   Authorized by: WESTON OVERTON



   



   Location:  OR



   Urgency:  Elective



   Difficult Airway: No



   Preoxygenated with 100% O2: Yes



   C-spine Precautions Maintained Throughout: No (gentle neck extention )



   Mask Ventilation:  Easy mask



   Final Airway Type:  Endotracheal airway



   Final Endotracheal Airway:  ETT



   Cuffed: No



   Technique Used:  Direct laryngoscopy



   Devices/Methods Used in Placement:  Intubating stylet



   Insertion Site:  Oral



   Blade Type:  Lee



   Laryngoscope Blade/Videolaryngoscope Blade Size:  3



   ETT Size (mm):  8.0



   Measured from:  Lips



   ETT to Lips (cm):  24



   Placement Verified by: CO2 detection, direct visualization and equal breath 
sounds



   Laryngoscopic view:  Grade I - full view of glottis (with laryngeal pressure 
)



   Rapid Sequence Induction (RSI): No



   Modified RSI: No



   Number of Attempts at Approach:  1



    Single uncomplicated attempt. Full view achieved with gentle upward 
pressure on larynx









 DISCECTOMY, CERVICAL, WITH    2018 10:00 AM CDT  Spondylosis of cervical
  



 FUSION, ANTERIOR APPROACH      spine with myelopathy  









   Case Notes







   SUPINE POSITION, MICROSCOPE, MEDTR[employer]IC INSTRUMENTATION, POSSIBLE EXTENDED 
RECOVERY



   NEEDED

 

   Special Needs







   SUPINE POSITION, MICROSCOPE, MEDTRONIC INSTRUMENTATION, POSSIBLE EXTENDED 
RECOVERY



   NEEDED









 ZZESTIMATED GFR  Routine  2018  2:32    Results for this



     PM CDT    procedure are in



         the results



         section.

 

 COMPREHENSIVE  Routine  2018  2:32  Preop testing  Results for this



 METABOLIC PANEL    PM CDT    procedure are in



         the results



         section.

 

 HC COMPLETE BLD COUNT  Routine  2018  2:32  Preop testing  Results for 
this



 W/AUTO DIFF    PM CDT    procedure are in



         the results



         section.

 

 XR CERVICAL SPINE  Routine  2018 12:36  Cervical  Results for this



 COMPLETE W FLEX EXT    PM CDT  radiculopathy  procedure are in



         the results



         section.

 

 XR SPINE SCOLIOSIS  Routine  2018 12:35  Cervical  Results for this



 2-3 VIEWS    PM CDT  radiculopathy  procedure are in



         the results



         section.

 

 MRI SPINE EXTERNAL  Routine  2018  4:19    Results for this



 STUDY    PM CDT    procedure are in



         the results



         section.



after 2017



Results

Surgical pathology request (2018  1:54 PM)





 Component  Value  Ref Range  Performed At

 

 Case number  [id number]    [facility] DEPARTMENT OF



       PATHOLOGY AND GENOMIC



       MEDICINE

 

 Surgical pathology report  See link below for PDF    [facility] DEPARTMENT OF



   Lab Report    PATHOLOGY AND GENOMIC



       MEDICINE

 

 Result status  This is Final Report    [facility] DEPARTMENT OF



   for [id number]    PATHOLOGY AND GENOMIC



       MEDICINE









 Performing Organization  Address  City/[facility]  Phone Number

 

 [facility] DEPARTMENT OF PATHOLOGY AND  [address] [place] [address]  



 GENOMIC MEDICINE      



XR Cervical Spine 1 Vw (2018 12:33 PM)Only the most recent of3 
resultswithin the time period is included.





 Narrative  Performed At

 

 EXAMINATION:XR CERVICAL SPINE 1 VW



   [facility]



  



  



 CLINICAL HISTORY:Z98.1 Arthrodesis status



  



  



  



 COMPARISON:None.



  



  



  



  



  



 IMPRESSION:



  



  



  



  



  



 Lateral intraoperative localization radiograph of the cervical spine  



 demonstrates a clamp anterior to the spine at the level of mid C6.



  



  



  



  



  



  



  



  



  



  



  



  



  



 [facility]-[id number]



  



   









 Procedure Note

 

  Interface, Radiology Results Incoming - 2018 12:43 PM CDT



EXAMINATION:  XR CERVICAL SPINE 1 VW



 



 CLINICAL HISTORY:  Z98.1 Arthrodesis status



 



 COMPARISON:  None.



 



 



 IMPRESSION:



 



 



 Lateral intraoperative localization radiograph of the cervical spine 
demonstrates a clamp anterior to the spine at the level of mid C6.



 



 



 



 



 



 



 [facility]-[id number]









 Performing Organization  Address  City/[facility]/Zipcode  Phone Number

 

  [facility]  [address] [place] [address]  



Estimated GFR (2018  2:32 PM)





 Component  Value  Ref Range  Performed At

 

 GFR Non Af Amer  70  mL/min/1.73 m2  [facility] DEPARTMENT OF



       PATHOLOGY AND GENOMIC



       MEDICINE

 

 GFR Af Amer  84  mL/min/1.73 m2  [facility] DEPARTMENT OF



   Comment:    PATHOLOGY AND GENOMIC



   Chronic kidney disease: <60 mL/min/1.73m2    MEDICINE



   Kidney failure: <15 mL/min/1.73m2    



   The estimated GFR is calculated from the IDMS-traceable Modification of Diet
    



   in Renal Disease Equation. The accuracy of the calculation is poor when the 
   



   creatinine is normal. Calculated values >90 mL/min/1.73m2 are not reported. 
   



   This equation has not been validated in children (<18 years), pregnant    



   women, the elderly (>70 years), or ethnic groups other than Caucasians and  
  



    Americans.    



       









 Specimen

 

 Plasma specimen









 Performing Organization  Address  City/State/Zipcode  Phone Number

 

 [facility] DEPARTMENT OF PATHOLOGY AND  [address] [place] [address]  



 [facility]      



CBC with platelet and differential (2018  2:32 PM)





 Component  Value  Ref Range  Performed At

 

 WBC  7.85  4.50 - 11.00 k/uL  [facility] DEPARTMENT OF



       PATHOLOGY AND GENOMIC



       MEDICINE

 

 RBC  4.81  4.40 - 6.00 m/uL  [facility] DEPARTMENT OF



       PATHOLOGY AND GENOMIC



       MEDICINE

 

 HGB  14.9  14.0 - 18.0 g/dL  [facility] DEPARTMENT OF



       PATHOLOGY AND GENOMIC



       MEDICINE

 

 HCT  44.5  41.0 - 51.0 %  [facility] DEPARTMENT OF



       PATHOLOGY AND GENOMIC



       MEDICINE

 

 MCV  92.5  82.0 - 100.0 fL  [facility] DEPARTMENT OF



       PATHOLOGY AND GENOMIC



       MEDICINE

 

 MCH  31.0  27.0 - 34.0 pg  [facility] DEPARTMENT OF



       PATHOLOGY AND GENOMIC



       MEDICINE

 

 MCHC  33.5  31.0 - 37.0 g/dL  [facility] DEPARTMENT OF



       PATHOLOGY AND GENOMIC



       MEDICINE

 

 RDW - SD  45.4  37.0 - 55.0 fL  [facility] DEPARTMENT OF



       PATHOLOGY AND GENOMIC



       MEDICINE

 

 MPV  10.1  8.8 - 13.2 fL  [facility] DEPARTMENT OF



       PATHOLOGY AND GENOMIC



       MEDICINE

 

 Platelet count  302  150 - 400 k/uL  [facility] DEPARTMENT OF



       PATHOLOGY AND GENOMIC



       MEDICINE

 

 Nucleated RBC  0.00  /100 WBC  [facility] DEPARTMENT OF



       PATHOLOGY AND GENOMIC



       MEDICINE

 

 Neutrophils  64.2  39.0 - 69.0 %  [facility] DEPARTMENT OF



       PATHOLOGY AND GENOMIC



       MEDICINE

 

 Lymphocytes  25.6  25.0 - 45.0 %  [facility] DEPARTMENT OF



       PATHOLOGY AND GENOMIC



       MEDICINE

 

 Monocytes  8.7  0.0 - 10.0 %  [facility] DEPARTMENT OF



       PATHOLOGY AND GENOMIC



       MEDICINE

 

 Eosinophils  0.8  0.0 - 5.0 %  [facility] DEPARTMENT OF



       PATHOLOGY AND GENOMIC



       MEDICINE

 

 Basophils  0.3  0.0 - 1.0 %  [facility] DEPARTMENT OF



       PATHOLOGY AND GENOMIC



       MEDICINE

 

 Immature granulocytes  0.4Comment:  0.0 - 1.0 %  [facility] DEPARTMENT OF



   "Immature    PATHOLOGY AND GENOMIC



   granulocytes"    MEDICINE



   (promyelocytes,    



   myelocytes,    



   metamyelocytes)    









 Specimen

 

 Blood









 Performing Organization  Address  City/[facility]  Phone Number

 

 [facility] DEPARTMENT OF PATHOLOGY AND  [address] [place] [id number]  



 [facility] MEDICINE      



Comprehensive metabolic panel (2018  2:32 PM)





 Component  Value  Ref Range  Performed At

 

 Sodium  140  135 - 148 mEq/L  [facility] DEPARTMENT OF



       PATHOLOGY AND GENOMIC



       MEDICINE

 

 Potassium  4.3  3.5 - 5.0 mEq/L  [facility] DEPARTMENT OF



       PATHOLOGY AND GENOMIC



       MEDICINE

 

 Chloride  100  98 - 112 mEq/L  [facility] DEPARTMENT OF



       PATHOLOGY AND GENOMIC



       MEDICINE

 

 CO2  24  24 - 31 mEq/L  [facility] DEPARTMENT OF



       PATHOLOGY AND GENOMIC



       MEDICINE

 

 Anion gap  16@ANIO (H)  7 - 15 mEq/L  [facility] DEPARTMENT OF



       PATHOLOGY AND GENOMIC



       MEDICINE

 

 BUN  24 (H)  6 - 20 mg/dL  [facility] DEPARTMENT OF



       PATHOLOGY AND GENOMIC



       MEDICINE

 

 Creatinine  1.1  0.70 - 1.20 mg/dL  [facility] DEPARTMENT OF



       PATHOLOGY AND GENOMIC



       MEDICINE

 

 Glucose  102 (H)  65 - 99 mg/dL  [facility] DEPARTMENT OF



       PATHOLOGY AND GENOMIC



       MEDICINE

 

 Calcium  10.2  8.3 - 10.2 mg/dL  [facility] DEPARTMENT OF



       PATHOLOGY AND GENOMIC



       MEDICINE

 

 Protein  7.7  6.3 - 8.3 g/dL  [facility] DEPARTMENT OF



   Comment:    PATHOLOGY AND GENOMIC



   [place] 4.6-7.0 g/dL    MEDICINE



   1 week 4.4-7.6 g/dL    



   7 months-1year5.1-7.3 g/dL    



   1-2 years5.6-7.5 g/dL    



   >3 years6.0-8.0 g/dL    



    6.3-8.3 g/dL    



       

 

 Albumin  4.2  3.5 - 5.0 g/dL  [facility] DEPARTMENT OF



       PATHOLOGY AND GENOMIC



       MEDICINE

 

 A/G ratio  1.2  0.7 - 3.8  [facility] DEPARTMENT OF



       PATHOLOGY AND GENOMIC



       MEDICINE

 

 Alkaline phosphatase  57  40 - 129 U/L  [facility] DEPARTMENT OF



       PATHOLOGY AND GENOMIC



       MEDICINE

 

 AST  33  10 - 50 U/L  [facility] DEPARTMENT OF



       PATHOLOGY AND GENOMIC



       MEDICINE

 

 ALT  42  5 - 50 U/L  [facility] DEPARTMENT OF



       PATHOLOGY AND GENOMIC



       MEDICINE

 

 Total bilirubin  0.3  0.0 - 1.2 mg/dL  [facility] DEPARTMENT OF



       PATHOLOGY AND GENOMIC



       MEDICINE









 Specimen

 

 Plasma specimen









 Performing Organization  Address  City/[facility]/Zipcode  Phone Number

 

 [facility] DEPARTMENT OF PATHOLOGY AND  [address] [place] [address]  



 [facility]      



XR Cervical Spine Complete w flex/ext (2018 12:36 PM)





 Narrative  Performed At

 

 EXAMINATION:XR CERVICAL SPINE COMPLETE W FLEX EXT



   [facility]



  



  



 CLINICAL HISTORY:M54.12 Radiculopathycervical region,  



 radiculopathy



  



  



  



 COMPARISON: None.



  



  



  



 TECHNIQUE:



  



 Total of 8 radiographs of the cervical spine, including AP, lateral, and  



 2 oblique projections, with flexion and extension.



  



  



  



 IMPRESSION:



  



  



  



  



  



 Cervical spine is visualized to C6 level including C6-C7 intervertebral  



 space. 



  



 Straightening the cervical spine, however no subluxation on neutral,  



 flexion or extension views.



  



 Vertebral heights preserved. 



  



 Intervertebral disc space narrowing is noted at C5-C6 and C6-7 with  



 endplate sclerosis. Medium sized osteophytes are seen in the lower  



 portions of the cervical spine.



  



 At least mild neural foraminal narrowing is noted bilaterally at C5-C6  



 and C6-7 secondary to degenerative uncovertebral hypertrophy.



  



 No prevertebral soft tissue swelling.



  



  



  



 I personally reviewed the images and the resident's findings and agree  



 with the final report.



  



  



  



  



  



  



  



 [facility]-[id number]



  



   









 Procedure Note

 

  Interface, Radiology Results Incoming - 2018  5:32 PM CDT



EXAMINATION:  XR CERVICAL SPINE COMPLETE W FLEX EXT



 



 CLINICAL HISTORY:  M54.12 Radiculopathy  cervical region, radiculopathy



 



 COMPARISON: None.



 



 TECHNIQUE:



 Total of 8 radiographs of the cervical spine, including AP, lateral, and 2 
oblique projections, with flexion and extension.



 



 IMPRESSION:



 



 



 Cervical spine is visualized to C6 level including C6-C7 intervertebral space.



 Straightening the cervical spine, however no subluxation on neutral, flexion 
or extension views.



 Vertebral heights preserved.



 Intervertebral disc space narrowing is noted at C5-C6 and C6-7 with endplate 
sclerosis. Medium sized osteophytes are seen in the lower portions of the 
cervical spine.



 At least mild neural foraminal narrowing is noted bilaterally at C5-C6 and C6-
7 secondary to degenerative uncovertebral hypertrophy.



 No prevertebral soft tissue swelling.



 



 I personally reviewed the images and the resident's findings and agree with 
the final report.



 



 



 



 [facility]-[id number]









 Performing Organization  Address  City/State/Zipcode  Phone Number

 

 [facility]  [address] [place] [address]  



XR Spine Scoliosos 2-3 Views (2018 12:35 PM)





 Narrative  Performed At

 

 EXAMINATION:XR SPINE SCOLIOSIS 2-3 VIEWS



   [facility]



  



  



 CLINICAL HISTORY:M54.12 Radiculopathycervical region,  



 radiculopathy



  



  



  



 COMPARISON:None.



  



  



  



 Frontal and lateral views of entire spine was performed per scoliosis  



 protocol.



  



  



  



 IMPRESSION:



  



  



  



 There is mild broad-based leftward curvature at T5. There is 10 degrees  



 rightward curvature at T8. There is 8 degree leftward curvature at L1.



  



  



  



 There is 6 degrees rightward coronal balance. Femoral head heights are  



 symmetric.



  



  



  



 Sagittal imaging shows 0 sagittal balance. There is no acute compression  



 deformity.



  



  



  



 Heart size is upper normal. Aorta is mildly tortuous. Lungs are clear.  



 Bowel gas pattern is nonobstructive. Visualized pelvic bones are intact  



 with degenerative changes in the bilateral hip joints with joint space  



 narrowing which is moderate severe left 



  



 and severe on the right.



  



  



  



 [facility]-[id number]



  



   









 Procedure Note

 

  Interface, Radiology Results Incoming - 2018  2:44 PM CDT



EXAMINATION:  XR SPINE SCOLIOSIS 2-3 VIEWS



 



 CLINICAL HISTORY:  M54.12 Radiculopathy  cervical region, radiculopathy



 



 COMPARISON:  None.



 



 Frontal and lateral views of entire spine was performed per scoliosis protocol.



 



 IMPRESSION:



 



 There is mild broad-based leftward curvature at T5. There is 10 degrees 
rightward curvature at T8. There is 8 degree leftward curvature at L1.



 



 There is 6 degrees rightward coronal balance. Femoral head heights are 
symmetric.



 



 Sagittal imaging shows 0 sagittal balance. There is no acute compression 
deformity.



 



 Heart size is upper normal. Aorta is mildly tortuous. Lungs are clear. Bowel 
gas pattern is nonobstructive. Visualized pelvic bones are intact with 
degenerative changes in the bilateral hip joints with joint space



 narrowing which is moderate severe left



 and severe on the right.



 



 [facility]-[id number]









 Performing Organization  Address  City/[facility]/Zipcode  Phone Number

 

  [email or web address]  [address] [place] [address]  



MRI Spine External Study (2018  4:19 PM)





 Narrative  Performed At

 

 This exam was not acquired at a [facility] facility and has not been 



   [facility]



 interpreted by a [facility] Provider.The exam was imported into our 



  



 imaging system for comparisons purposes.  









 Performing Organization  Address  City/State/Zipcode  Phone Number

 

  [facility]  [address] [place] [address]  



after 2017



Insurance







 Payer  Benefit Plan / Group  Subscriber ID  Type  Phone  Address

 

 BCBS  BCBS CHOICE PPO/F[employer] EMPL PPO  xxxxxxxxxxxx  PPO    









 Guarantor Name  Account Type  Relation to  Date of Birth  Phone  Billing



     Patient      Address

 

 WRIGHT,MOISES  Personal/Family  Self  1963  Home:  [address] 







         [phone]  [place], [place]



         [phone]
The patient is a 42y Female complaining of multiple medical complaints.

## 2018-10-07 ENCOUNTER — EMERGENCY (EMERGENCY)
Facility: HOSPITAL | Age: 43
LOS: 1 days | Discharge: HOME | End: 2018-10-07
Attending: EMERGENCY MEDICINE
Payer: MEDICAID

## 2018-10-07 VITALS
TEMPERATURE: 98 F | SYSTOLIC BLOOD PRESSURE: 137 MMHG | WEIGHT: 93.04 LBS | DIASTOLIC BLOOD PRESSURE: 98 MMHG | OXYGEN SATURATION: 100 % | HEART RATE: 82 BPM | RESPIRATION RATE: 17 BRPM

## 2018-10-07 LAB
ANION GAP SERPL CALC-SCNC: 9 MMOL/L — SIGNIFICANT CHANGE UP (ref 5–17)
BASOPHILS # BLD AUTO: 0 K/UL — SIGNIFICANT CHANGE UP (ref 0–0.2)
BASOPHILS NFR BLD AUTO: 0.3 % — SIGNIFICANT CHANGE UP (ref 0–2)
BUN SERPL-MCNC: <4 MG/DL — LOW (ref 7–23)
CALCIUM SERPL-MCNC: 9.1 MG/DL — SIGNIFICANT CHANGE UP (ref 8.4–10.5)
CHLORIDE SERPL-SCNC: 103 MMOL/L — SIGNIFICANT CHANGE UP (ref 96–108)
CO2 SERPL-SCNC: 26 MMOL/L — SIGNIFICANT CHANGE UP (ref 22–31)
CREAT SERPL-MCNC: 0.85 MG/DL — SIGNIFICANT CHANGE UP (ref 0.5–1.3)
EOSINOPHIL # BLD AUTO: 0 K/UL — SIGNIFICANT CHANGE UP (ref 0–0.5)
EOSINOPHIL NFR BLD AUTO: 0.2 % — SIGNIFICANT CHANGE UP (ref 0–6)
GLUCOSE SERPL-MCNC: 129 MG/DL — HIGH (ref 70–99)
HCT VFR BLD CALC: 32.5 % — LOW (ref 34.5–45)
HGB BLD-MCNC: 10.9 G/DL — LOW (ref 11.5–15.5)
LYMPHOCYTES # BLD AUTO: 1.2 K/UL — SIGNIFICANT CHANGE UP (ref 1–3.3)
LYMPHOCYTES # BLD AUTO: 18.5 % — SIGNIFICANT CHANGE UP (ref 13–44)
MAGNESIUM SERPL-MCNC: 2 MG/DL — SIGNIFICANT CHANGE UP (ref 1.6–2.6)
MCHC RBC-ENTMCNC: 26.2 PG — LOW (ref 27–34)
MCHC RBC-ENTMCNC: 33.5 GM/DL — SIGNIFICANT CHANGE UP (ref 32–36)
MCV RBC AUTO: 78.2 FL — LOW (ref 80–100)
MONOCYTES # BLD AUTO: 0.3 K/UL — SIGNIFICANT CHANGE UP (ref 0–0.9)
MONOCYTES NFR BLD AUTO: 4.4 % — SIGNIFICANT CHANGE UP (ref 2–14)
NEUTROPHILS # BLD AUTO: 5.1 K/UL — SIGNIFICANT CHANGE UP (ref 1.8–7.4)
NEUTROPHILS NFR BLD AUTO: 76.6 % — SIGNIFICANT CHANGE UP (ref 43–77)
PHOSPHATE SERPL-MCNC: 3.5 MG/DL — SIGNIFICANT CHANGE UP (ref 2.5–4.5)
PLATELET # BLD AUTO: 77 K/UL — LOW (ref 150–400)
POTASSIUM SERPL-MCNC: 4 MMOL/L — SIGNIFICANT CHANGE UP (ref 3.5–5.3)
POTASSIUM SERPL-SCNC: 4 MMOL/L — SIGNIFICANT CHANGE UP (ref 3.5–5.3)
RBC # BLD: 4.15 M/UL — SIGNIFICANT CHANGE UP (ref 3.8–5.2)
RBC # FLD: 13.6 % — SIGNIFICANT CHANGE UP (ref 10.3–14.5)
SODIUM SERPL-SCNC: 138 MMOL/L — SIGNIFICANT CHANGE UP (ref 135–145)
WBC # BLD: 6.6 K/UL — SIGNIFICANT CHANGE UP (ref 3.8–10.5)
WBC # FLD AUTO: 6.6 K/UL — SIGNIFICANT CHANGE UP (ref 3.8–10.5)

## 2018-10-07 PROCEDURE — 85027 COMPLETE CBC AUTOMATED: CPT

## 2018-10-07 PROCEDURE — 84100 ASSAY OF PHOSPHORUS: CPT

## 2018-10-07 PROCEDURE — 99283 EMERGENCY DEPT VISIT LOW MDM: CPT

## 2018-10-07 PROCEDURE — 80048 BASIC METABOLIC PNL TOTAL CA: CPT

## 2018-10-07 PROCEDURE — 93010 ELECTROCARDIOGRAM REPORT: CPT

## 2018-10-07 PROCEDURE — 99284 EMERGENCY DEPT VISIT MOD MDM: CPT | Mod: 25

## 2018-10-07 PROCEDURE — 83735 ASSAY OF MAGNESIUM: CPT

## 2018-10-07 PROCEDURE — 93005 ELECTROCARDIOGRAM TRACING: CPT

## 2018-10-07 RX ORDER — SODIUM CHLORIDE 9 MG/ML
1000 INJECTION INTRAMUSCULAR; INTRAVENOUS; SUBCUTANEOUS
Qty: 0 | Refills: 0 | Status: DISCONTINUED | OUTPATIENT
Start: 2018-10-07 | End: 2018-10-11

## 2018-10-07 RX ADMIN — SODIUM CHLORIDE 100 MILLILITER(S): 9 INJECTION INTRAMUSCULAR; INTRAVENOUS; SUBCUTANEOUS at 22:00

## 2018-10-07 RX ADMIN — SODIUM CHLORIDE 75 MILLILITER(S): 9 INJECTION INTRAMUSCULAR; INTRAVENOUS; SUBCUTANEOUS at 16:28

## 2018-10-07 NOTE — ED ADULT NURSE REASSESSMENT NOTE - NS ED NURSE REASSESS COMMENT FT1
Pt remains a&oX4, resting in bed, anxious stating she does not feel comfortable going home today. Pt also reporting a flushing feeling to her face and warmth to her legs, temperature of 98.9 noted at this time. MD Bedoya aware and states she will go speak with patient. safety maintained.

## 2018-10-07 NOTE — ED ADULT NURSE REASSESSMENT NOTE - NS ED NURSE REASSESS COMMENT FT1
Fluids increased per patient request and MD orders. Pt states she feels like the flushing in her face is going down and she is starting to feel better but states that she can still feel her HR increasing with movement. Pt in NAD, resting comfortably in bed. Safety maintained

## 2018-10-07 NOTE — ED PROVIDER NOTE - SHIFT CHANGE DETAILS
Jair Moura MD FACEP note of transfer: Receiving team will follow up on labs, analgesia, iv fluids, discussion with primary medical doctor, reassess and disposition as clinically indicated.  Details of patient and plan conveyed to receiving physician and conveyed back for understanding.  There were no questions at this time about the patient's status, disposition, and plan. Patient's care to be taken over by receiving physician at this time, all decisions regarding the progression of care will be made at their discretion.

## 2018-10-07 NOTE — ED PROVIDER NOTE - PROGRESS NOTE DETAILS
Jair Moura MD, FACEP,   patient blood pressure and hr remain stable and negative for orthostasis going from lying to standing and checking after 5 minutes, 130/80 hr 90s to 140/90 hr 90s  will give iv fluids at patient request, patient is taking po without difficulty, discussion with primary medical doctor and reassess Vitals stable. Dr. Reed's after hours line contacted -- Cardiology fellow Romy has spoken to Dr. Reed, who has said is stable to leave the ED. Patient informed, she indicates she would like to stay to receive IVF. Vitals stable. Dr. Reed's after hours line contacted -- Cardiology fellow Romy has spoken to Dr. Reed, who has said the patient is stable to leave the ED. Patient informed, she indicates she would like to stay to receive IVF. Pt feeling better, VSS, wants to go home, stable for dc. Attending Albert Mason DO

## 2018-10-07 NOTE — ED ADULT NURSE NOTE - OBJECTIVE STATEMENT
42y Female PMH Dysautonomia, ITP, Anemia, Early sjogren's syndrome presents to the ED c/o palpitations. 42y Female PMH Dysautonomia, ITP, Anemia, Early sjogren's syndrome presents to the ED c/o palpitations. Pt 42y Female PMH Dysautonomia, ITP, Anemia, Early sjogren's syndrome presents to the ED c/o palpitations. Pt states that at around 9:30pm last night she had near syncopal episode while walking to get food at her assisted living facility. Pt states that she felt dizzy and a drunk-like feeling. Pt reports having her own pulse ox that she checks very often and she noticed her HR go from in the high 40's to mid 70's. Pt states that she also had blurry vision during that time. Later that night pt states she was lying on her side in bed and turned over and states her HR went from 78 to 130's. pt also reporting some SOB at this time and feels dehydrated. Pt reports diarrhea two days ago and states today it has been soft, also reports smelly urine the last day or two. Pt currently feels like her heart is racing and feels palpitations under her R. breast and feels like her "heart is pumping too fast." Pt is followed by cardiologist Philip Hussein. Pt also followed by neurologist who wants to start her on IVIG for Sjogren's. Pt requesting IV fluids at 75mL/hr because she states she does not tolerate boluses. Pt presents a&oX4, ambulatory, anxious/nervous, airway intact, breathing spontaneously and unlabored, lung sounds clear bilaterally, non-diaphoretic, skin warm dry and intact, cap refill <2 seconds, +peripheral pulsesX4, Follows commands, face symmetrical, speech clear, pupils symmetrical and reactive, no arm drift, equal hand grasp, full ROM x4 extremities, pt denies chills/fever, numbness/tingling to extremities, hematuria, dysuria. MD at bedside for eval. safety maintained.

## 2018-10-07 NOTE — ED PROVIDER NOTE - OBJECTIVE STATEMENT
42 year old female with past medical history of dysautonomia, ITP and anemia who presents due to a 2 day history of dizziness, loss of balance and palpitations. The patient reports she had diarrhea two days ago and feels dehydrated. She did not lose consciousness. Her Cardizem dose was decreased from 180 to 120 a few weeks ago due to hypotension. She denies fevers/chills, nausea/vomiting. She mentions that IVF boluses impact her dysautonomia and she does not tolerate them. Her last BM but this AM and loose. 42 year old female with past medical history of dysautonomia, ITP and anemia who presents due to a 2 day history of dizziness, loss of balance and palpitations. The patient reports she had diarrhea two days ago and feels dehydrated. She did not lose consciousness. Her Cardizem dose was decreased from 180 to 120 a few weeks ago due to hypotension. She denies fevers/chills, nausea/vomiting. She mentions that IVF boluses impact her dysautonomia and she does not tolerate them. Her last BM was this AM and loose. no

## 2018-10-07 NOTE — ED PROVIDER NOTE - ATTENDING CONTRIBUTION TO CARE
Patient with history of dizziness and POTS with recent decrease in Cardizem from 180 to 120 and patient feels that she has felt off since changing the dosage of medication. Patient has had a few episodes of diarrhea without blood over 4-5 days. no nausea/vomiting/diarrhea/constipation/shortness of breath/chest pain/fever/chills.   GEN: NAD, awake, eyes open spontaneously  HEENT: NCAT, MMM, Trachea midline, normal conjunctiva, perrl  CHEST/LUNGS: Non-tachypneic, CTAB, bilateral breath sounds  CARDIAC: Non-tachycardic hr 90s with some fluctuation to 100s when talking, normal perfusion  ABDOMEN: Soft, NTND, No rebound/guarding  MSK: No edema, no gross deformity of extremities  SKIN: No rashes, no petechiae, no vesicles  NEURO: CN 2-12 intact, normal coordination, no pronator drift, no gait instability, 5/5 strength in upper and lower extremities, normal sensory throughout, alert and oriented to person, place, time, and situation.   PSYCH: Alert, appropriate, cooperative, with capacity and insight, patient is anxious about health  Concern for ? electrolyte abnormality and dehydration with POTS vs ? tachycardia secondary to changing Cardizem dosage.  will get iv, cbc, cmp, mg/phos, will give gentle ivf at patient request (pt is tolerating and taking po intake without difficulty) orthostatics, ekg and discuss with primary medical doctor.   Will follow up on labs, analgesia, reassess and disposition as clinically indicated.

## 2018-10-08 VITALS
TEMPERATURE: 98 F | DIASTOLIC BLOOD PRESSURE: 81 MMHG | OXYGEN SATURATION: 100 % | HEART RATE: 87 BPM | RESPIRATION RATE: 16 BRPM | SYSTOLIC BLOOD PRESSURE: 123 MMHG

## 2018-10-08 NOTE — ED ADULT NURSE REASSESSMENT NOTE - NS ED NURSE REASSESS COMMENT FT1
Pt resting comfortably with no c/o pain or discomfort at this time. Pending d/c approx 0300. NS running on pump, IV CDI.  Comfort and safety measures maintained.

## 2018-10-08 NOTE — ED ADULT NURSE REASSESSMENT NOTE - NS ED NURSE REASSESS COMMENT FT1
Patient requesting to be transported home by "[facility]". ED Rep called [facility] and notified no transports available and to call back at 7am. MD aware and pt OK to wait for d/c at this time until transport is set up and patient can return home to destination safely. Pt displays no s/s pain or discomfort at this time. Comfort and safety measures maintained.

## 2018-10-21 NOTE — ED ADULT NURSE NOTE - MODE OF DISCHARGE
DISCHARGE PLANNING     Discharge to home or other facility with appropriate resources Progressing        Knowledge Deficit     Patient/family/caregiver demonstrates understanding of disease process, treatment plan, medications, and discharge instructions Progressing        Potential for Falls     Patient will remain free of falls Progressing        Prexisting or High Potential for Compromised Skin Integrity     Skin integrity is maintained or improved Progressing        SAFETY ADULT     Maintain or return to baseline ADL function Progressing     Maintain or return mobility status to optimal level Progressing Ambulatory

## 2018-11-01 ENCOUNTER — CLINICAL ADVICE (OUTPATIENT)
Age: 43
End: 2018-11-01

## 2018-11-05 ENCOUNTER — LABORATORY RESULT (OUTPATIENT)
Age: 43
End: 2018-11-05

## 2018-11-05 ENCOUNTER — OUTPATIENT (OUTPATIENT)
Dept: OUTPATIENT SERVICES | Facility: HOSPITAL | Age: 43
LOS: 1 days | Discharge: HOME | End: 2018-11-05

## 2018-11-05 ENCOUNTER — APPOINTMENT (OUTPATIENT)
Dept: INTERNAL MEDICINE | Facility: CLINIC | Age: 43
End: 2018-11-05
Payer: MEDICAID

## 2018-11-05 VITALS
RESPIRATION RATE: 18 BRPM | HEIGHT: 62 IN | SYSTOLIC BLOOD PRESSURE: 110 MMHG | DIASTOLIC BLOOD PRESSURE: 74 MMHG | TEMPERATURE: 98 F | HEART RATE: 110 BPM | BODY MASS INDEX: 16.01 KG/M2 | OXYGEN SATURATION: 98 % | WEIGHT: 87 LBS

## 2018-11-05 DIAGNOSIS — G90.9 DISORDER OF THE AUTONOMIC NERVOUS SYSTEM, UNSPECIFIED: ICD-10-CM

## 2018-11-05 DIAGNOSIS — D47.3 ESSENTIAL (HEMORRHAGIC) THROMBOCYTHEMIA: ICD-10-CM

## 2018-11-05 DIAGNOSIS — R00.0 TACHYCARDIA, UNSPECIFIED: ICD-10-CM

## 2018-11-05 PROBLEM — K31.84 GASTROPARESIS: Chronic | Status: ACTIVE | Noted: 2018-07-21

## 2018-11-05 PROBLEM — G90.4 AUTONOMIC DYSREFLEXIA: Chronic | Status: ACTIVE | Noted: 2018-09-05

## 2018-11-05 PROBLEM — G24.9 DYSTONIA, UNSPECIFIED: Chronic | Status: ACTIVE | Noted: 2018-07-21

## 2018-11-05 PROCEDURE — 93000 ELECTROCARDIOGRAM COMPLETE: CPT

## 2018-11-05 PROCEDURE — 36415 COLL VENOUS BLD VENIPUNCTURE: CPT

## 2018-11-05 PROCEDURE — 99214 OFFICE O/P EST MOD 30 MIN: CPT | Mod: 25

## 2018-11-05 RX ORDER — DILTIAZEM HYDROCHLORIDE 180 MG/1
180 CAPSULE, EXTENDED RELEASE ORAL
Qty: 90 | Refills: 0 | Status: COMPLETED | COMMUNITY
Start: 2018-05-30 | End: 2018-11-05

## 2018-11-05 RX ORDER — DOXYCYCLINE HYCLATE 100 MG/1
100 TABLET ORAL TWICE DAILY
Qty: 1 | Refills: 1 | Status: COMPLETED | COMMUNITY
Start: 2018-04-20 | End: 2018-11-05

## 2018-11-05 RX ORDER — LORAZEPAM 2 MG/1
2 TABLET ORAL
Qty: 140 | Refills: 0 | Status: COMPLETED | COMMUNITY
Start: 2018-04-20 | End: 2018-11-05

## 2018-11-05 RX ORDER — FAMOTIDINE 20 MG/1
20 TABLET, FILM COATED ORAL TWICE DAILY
Qty: 60 | Refills: 3 | Status: COMPLETED | COMMUNITY
Start: 2018-05-01 | End: 2018-11-05

## 2018-11-05 RX ORDER — ONDANSETRON 4 MG/1
4 TABLET, ORALLY DISINTEGRATING ORAL
Qty: 60 | Refills: 0 | Status: COMPLETED | COMMUNITY
Start: 2017-11-30 | End: 2018-11-05

## 2018-11-05 RX ORDER — DAPSONE 50 MG/G
5 GEL TOPICAL
Qty: 1 | Refills: 1 | Status: COMPLETED | COMMUNITY
Start: 2018-04-20 | End: 2018-11-05

## 2018-11-06 ENCOUNTER — APPOINTMENT (OUTPATIENT)
Dept: CARDIOLOGY | Facility: CLINIC | Age: 43
End: 2018-11-06
Payer: MEDICAID

## 2018-11-06 ENCOUNTER — OUTPATIENT (OUTPATIENT)
Dept: OUTPATIENT SERVICES | Facility: HOSPITAL | Age: 43
LOS: 1 days | End: 2018-11-06
Payer: MEDICAID

## 2018-11-06 ENCOUNTER — NON-APPOINTMENT (OUTPATIENT)
Age: 43
End: 2018-11-06

## 2018-11-06 VITALS
DIASTOLIC BLOOD PRESSURE: 87 MMHG | SYSTOLIC BLOOD PRESSURE: 128 MMHG | OXYGEN SATURATION: 100 % | BODY MASS INDEX: 16.01 KG/M2 | WEIGHT: 87 LBS | HEART RATE: 109 BPM | HEIGHT: 62 IN

## 2018-11-06 DIAGNOSIS — R07.89 OTHER CHEST PAIN: ICD-10-CM

## 2018-11-06 DIAGNOSIS — I82.409 ACUTE EMBOLISM AND THROMBOSIS OF UNSPECIFIED DEEP VEINS OF UNSPECIFIED LOWER EXTREMITY: ICD-10-CM

## 2018-11-06 PROCEDURE — 93978 VASCULAR STUDY: CPT

## 2018-11-06 PROCEDURE — 99214 OFFICE O/P EST MOD 30 MIN: CPT

## 2018-11-06 PROCEDURE — 93978 VASCULAR STUDY: CPT | Mod: 26

## 2018-11-06 PROCEDURE — 93971 EXTREMITY STUDY: CPT | Mod: 26,59,RT

## 2018-11-06 PROCEDURE — 93970 EXTREMITY STUDY: CPT

## 2018-11-06 PROCEDURE — 93970 EXTREMITY STUDY: CPT | Mod: 26

## 2018-11-06 NOTE — REVIEW OF SYSTEMS
[Feeling Fatigued] : feeling fatigued [Negative] : Heme/Lymph [Anxiety] : anxiety [FreeTextEntry1] : L and R upper extremitiy pains

## 2018-11-06 NOTE — REASON FOR VISIT
[FreeTextEntry1] : Vascular Medicine Consultation for DVT\par \par \par 42F here for evaluation of Left internal jugular vein.  Was diagnosed at [facility] 2 weeks ago.  Has never had a catheter placed to the L IJ.  She had an IV in the L arm.  V/Q scan was low probability of PE.   Reports an appendectomy 4 weeks ago at [place]. \par \par Uncle had a Pulmonary Embolism\par \par Report:\par "10/24/2018:  new DVT of the Left proximal IJ vein with partial compressibility and partial flow.  There was thrombophlebitis of the L cephaic vein"  \par \par Will switch to Xarelto 20mg on November 16th.  \par She has never had a DVT in the past.  \par \par Medications;\par Cardizem 180DC\par Ativan \par Xarelto 15mg BID \par Simethicone\par Monkeluakast 10mg \par \par She is asking to have her R LE checked \par \par + Weight loss, Gastroparesis, 87 pounds.  Father is OB/GYN\par \par \par

## 2018-11-06 NOTE — ASSESSMENT
[FreeTextEntry1] : Assessment:\par 1.  L IJ DVT\par 2.  Cachexia\par 3. Recent Appendectomy\par \par \par Plan\par 1.  Continue with Xarelto for at least 3 months\par 2.  Dr. Zamora to see patient next week to determine length of therapy\par 3.  L arm without edema or red flags \par 4.  Will perform upper and lower extermtiy venous duplex, as well as IVC imaging\par 5.  Return in 1 month.

## 2018-11-06 NOTE — PHYSICAL EXAM
[General Appearance - Well Developed] : well developed [Normal Appearance] : normal appearance [Well Groomed] : well groomed [General Appearance - Well Nourished] : well nourished [No Deformities] : no deformities [General Appearance - In No Acute Distress] : no acute distress [Normal Conjunctiva] : the conjunctiva exhibited no abnormalities [Eyelids - No Xanthelasma] : the eyelids demonstrated no xanthelasmas [Normal Jugular Venous A Waves Present] : normal jugular venous A waves present [Normal Jugular Venous V Waves Present] : normal jugular venous V waves present [No Jugular Venous Huber A Waves] : no jugular venous huber A waves [Heart Rate And Rhythm] : heart rate and rhythm were normal [Heart Sounds] : normal S1 and S2 [Murmurs] : no murmurs present [Respiration, Rhythm And Depth] : normal respiratory rhythm and effort [Exaggerated Use Of Accessory Muscles For Inspiration] : no accessory muscle use [Auscultation Breath Sounds / Voice Sounds] : lungs were clear to auscultation bilaterally [Abdomen Soft] : soft [Abdomen Tenderness] : non-tender [Abdomen Mass (___ Cm)] : no abdominal mass palpated [Abnormal Walk] : normal gait [Gait - Sufficient For Exercise Testing] : the gait was sufficient for exercise testing [Nail Clubbing] : no clubbing of the fingernails [Cyanosis, Localized] : no localized cyanosis [Petechial Hemorrhages (___cm)] : no petechial hemorrhages [FreeTextEntry1] : 3 incision sites - healing.  [Skin Color & Pigmentation] : normal skin color and pigmentation [] : no rash [No Venous Stasis] : no venous stasis [Skin Lesions] : no skin lesions [No Skin Ulcers] : no skin ulcer [No Xanthoma] : no  xanthoma was observed [Oriented To Time, Place, And Person] : oriented to person, place, and time [Affect] : the affect was normal [Mood] : the mood was normal [No Anxiety] : not feeling anxious

## 2018-11-07 ENCOUNTER — LABORATORY RESULT (OUTPATIENT)
Age: 43
End: 2018-11-07

## 2018-11-07 LAB
25(OH)D3 SERPL-MCNC: 22.5 NG/ML
ALBUMIN SERPL ELPH-MCNC: 4.9 G/DL
ALP BLD-CCNC: 83 U/L
ALT SERPL-CCNC: 13 U/L
ANION GAP SERPL CALC-SCNC: 15 MMOL/L
APTT BLD: 34 SEC
AST SERPL-CCNC: 22 U/L
BASOPHILS # BLD AUTO: 0.01 K/UL
BASOPHILS NFR BLD AUTO: 0.2 %
BILIRUB SERPL-MCNC: 0.3 MG/DL
BUN SERPL-MCNC: 7 MG/DL
CALCIUM SERPL-MCNC: 9.6 MG/DL
CHLORIDE SERPL-SCNC: 103 MMOL/L
CO2 SERPL-SCNC: 23 MMOL/L
CREAT SERPL-MCNC: 0.74 MG/DL
EOSINOPHIL # BLD AUTO: 0.01 K/UL
EOSINOPHIL NFR BLD AUTO: 0.2 %
ERYTHROCYTE [SEDIMENTATION RATE] IN BLOOD BY WESTERGREN METHOD: 7 MM/HR
FERRITIN SERPL-MCNC: 11 NG/ML
FOLATE SERPL-MCNC: 12.9 NG/ML
GLUCOSE SERPL-MCNC: 95 MG/DL
HCT VFR BLD CALC: 33.5 %
HGB BLD-MCNC: 10.4 G/DL
IMM GRANULOCYTES NFR BLD AUTO: 0.2 %
INR PPP: 1.75 RATIO
IRON SATN MFR SERPL: 4 %
IRON SERPL-MCNC: 23 UG/DL
LYMPHOCYTES # BLD AUTO: 1.42 K/UL
LYMPHOCYTES NFR BLD AUTO: 21.9 %
MAN DIFF?: NORMAL
MCHC RBC-ENTMCNC: 24.5 PG
MCHC RBC-ENTMCNC: 31 GM/DL
MCV RBC AUTO: 79 FL
MONOCYTES # BLD AUTO: 0.51 K/UL
MONOCYTES NFR BLD AUTO: 7.9 %
NEUTROPHILS # BLD AUTO: 4.51 K/UL
NEUTROPHILS NFR BLD AUTO: 69.6 %
PLATELET # BLD AUTO: NORMAL K/UL
PLATELET # PLAS AUTO: 75 K/UL
POTASSIUM SERPL-SCNC: 3.9 MMOL/L
PROT SERPL-MCNC: 7.5 G/DL
PT BLD: 19.9 SEC
RBC # BLD: 4.24 M/UL
RBC # FLD: 14.5 %
SAVE SPECIMEN: NORMAL
SAVE SPECIMEN: NORMAL
SODIUM SERPL-SCNC: 141 MMOL/L
T3 SERPL-MCNC: 107 NG/DL
T3FREE SERPL-MCNC: 2.98 PG/ML
T3RU NFR SERPL: 1.09 INDEX
T4 FREE SERPL-MCNC: 1.3 NG/DL
T4 SERPL-MCNC: 8.2 UG/DL
TIBC SERPL-MCNC: 540 UG/DL
TSH SERPL-ACNC: 1.86 UIU/ML
UIBC SERPL-MCNC: 517 UG/DL
VIT B12 SERPL-MCNC: 582 PG/ML
WBC # FLD AUTO: 6.47 K/UL

## 2018-11-07 RX ORDER — DOCUSATE SODIUM 100 MG/1
100 CAPSULE ORAL
Refills: 0 | Status: COMPLETED | COMMUNITY
Start: 2017-12-29 | End: 2018-11-07

## 2018-11-07 RX ORDER — KETOCONAZOLE 20.5 MG/ML
2 SHAMPOO, SUSPENSION TOPICAL
Qty: 1 | Refills: 3 | Status: COMPLETED | COMMUNITY
Start: 2018-03-06 | End: 2018-11-07

## 2018-11-07 NOTE — HISTORY OF PRESENT ILLNESS
[FreeTextEntry1] : Patient presents for follow up evaluation for multiple medical concerns including:\par recent appendicitis-s/p Lap appendectomy and recently diagnosed DVT of the Internal jugular vein--pt is now on Xarelto. [de-identified] : Early last month she was admitted to [facility] with acute abdominal pain symptoms. She was diagnosed with acute appendicitis and required emergent laparoscopic appendectomy--performed by Dr. Merchant.  Surgery was uncomplicated but she is not healing properly because she is unable to receive the proper nutrition she requires at her current living facility.\par \par Several weeks following her laparoscopic appendectomy, patient was admitted to [facility] with acute left neck pain and tenderness. She was diagnosed with a "small left internal jugular DVT"- of unclear etiology. She was started on treatment initially with Heparin then started on Xarelto 15mg bid which she is to continue for 3-6 months.  She is scheduled to followup with the hematologist, Dr. Arnold Zamora on November 13 for further evaluation as well as with the vascular surgeon, Dr. Johnson at [facility] later this week.\par She has a long history of chronic ITP but has never had any history of clotting issues.\par \par Her generalized anxiety symptoms are not significantly improved. She is currently on Paxil 5 mg q.d. which she recently started with her psychiatrist-Dr. De 2 months ago.  She continues to followup with her outpatient psychologist weekly when possible.\par \par Her living situation has been stressful.  She was living at an [facility] in [place]--privately renting a room but had to move out last month.  She is paying and staying at an assisted living facility in [place] for the past month--and will be leaving 11/19/2018  to another [facility] in [place].  She may move in with her brother next month but this is uncertain.

## 2018-11-07 NOTE — PHYSICAL EXAM
[Normal Sclera/Conjunctiva] : normal sclera/conjunctiva [PERRL] : pupils equal round and reactive to light [EOMI] : extraocular movements intact [Normal Outer Ear/Nose] : the outer ears and nose were normal in appearance [Normal Oropharynx] : the oropharynx was normal [No JVD] : no jugular venous distention [Supple] : supple [No Lymphadenopathy] : no lymphadenopathy [No Respiratory Distress] : no respiratory distress  [Clear to Auscultation] : lungs were clear to auscultation bilaterally [No Accessory Muscle Use] : no accessory muscle use [Regular Rhythm] : with a regular rhythm [No Murmur] : no murmur heard [No Carotid Bruits] : no carotid bruits [No Edema] : there was no peripheral edema [Soft] : abdomen soft [Non Tender] : non-tender [Non-distended] : non-distended [No Masses] : no abdominal mass palpated [No HSM] : no HSM [Normal Bowel Sounds] : normal bowel sounds [Normal Posterior Cervical Nodes] : no posterior cervical lymphadenopathy [Normal Anterior Cervical Nodes] : no anterior cervical lymphadenopathy [No CVA Tenderness] : no CVA  tenderness [No Joint Swelling] : no joint swelling [Grossly Normal Strength/Tone] : grossly normal strength/tone [Normal Gait] : normal gait [Coordination Grossly Intact] : coordination grossly intact [No Focal Deficits] : no focal deficits [de-identified] : thin, anxious-appearing woman, tearful at times, alert, oriented--ambulating with a rolling walker [de-identified] : tachycardic to HR of 90-120bpm [de-identified] : s/p recent laparoscopic appendectomy scars noted--no hernia [de-identified] : patient appearing anxious but alert/oriented/ tearful at times

## 2018-11-08 LAB
ANA PAT FLD IF-IMP: ABNORMAL
ANA PATTERN: ABNORMAL
ANA SER IF-ACNC: ABNORMAL
ANA TITER: ABNORMAL

## 2018-11-09 LAB — CARDIOLIPIN AB SER IA-ACNC: NEGATIVE

## 2018-11-12 ENCOUNTER — EMERGENCY (EMERGENCY)
Facility: HOSPITAL | Age: 43
LOS: 1 days | Discharge: HOME | End: 2018-11-12
Attending: EMERGENCY MEDICINE | Admitting: EMERGENCY MEDICINE
Payer: MEDICAID

## 2018-11-12 VITALS
TEMPERATURE: 99 F | SYSTOLIC BLOOD PRESSURE: 129 MMHG | RESPIRATION RATE: 18 BRPM | OXYGEN SATURATION: 99 % | DIASTOLIC BLOOD PRESSURE: 90 MMHG | HEART RATE: 95 BPM

## 2018-11-12 DIAGNOSIS — Z90.49 ACQUIRED ABSENCE OF OTHER SPECIFIED PARTS OF DIGESTIVE TRACT: Chronic | ICD-10-CM

## 2018-11-12 PROCEDURE — 99284 EMERGENCY DEPT VISIT MOD MDM: CPT | Mod: 25

## 2018-11-12 PROCEDURE — 74019 RADEX ABDOMEN 2 VIEWS: CPT | Mod: 26

## 2018-11-12 PROCEDURE — 93010 ELECTROCARDIOGRAM REPORT: CPT

## 2018-11-12 RX ORDER — ACETAMINOPHEN 500 MG
975 TABLET ORAL ONCE
Qty: 0 | Refills: 0 | Status: COMPLETED | OUTPATIENT
Start: 2018-11-12 | End: 2018-11-12

## 2018-11-12 RX ADMIN — Medication 975 MILLIGRAM(S): at 22:54

## 2018-11-12 NOTE — ED ADULT TRIAGE NOTE - CHIEF COMPLAINT QUOTE
c/o anxiety and difficulty sleeping c/o anxiety and difficulty sleeping, woke up screaming from sleep. Home pulse ox showed Elevated HR

## 2018-11-12 NOTE — ED ADULT NURSE NOTE - NSIMPLEMENTINTERV_GEN_ALL_ED
Implemented All Universal Safety Interventions:  [place] to call system. Call bell, personal items and telephone within reach. Instruct patient to call for assistance. Room bathroom lighting operational. Non-slip footwear when patient is off stretcher. Physically safe environment: no spills, clutter or unnecessary equipment. Stretcher in lowest position, wheels locked, appropriate side rails in place.

## 2018-11-12 NOTE — ED PROVIDER NOTE - MEDICAL DECISION MAKING DETAILS
43 yo female with dysautonomia, anxiety, recent surgery presents with abdominal pain and syncope. Exam of abdomen is non concerning, with no rebound and no guarding. DDx: perforated viscous, SBO, surgical complications/infection, IBS. low suspicion for perforated viscous (no rebound/ guarding), SBO (no nausea/vomiting), surgical complication (no fever chills, pt non toxic appearing). Most likely IBS. Plan: abdominal x ray, D/C to assisted living.

## 2018-11-12 NOTE — ED PROVIDER NOTE - OBJECTIVE STATEMENT
Pt has hx of DVT, ITP, recent appy 1 month, gastroparesia, dysautonomia.  one episode of syncope, Pt has hx of DVT, ITP, recent appy 1 month, gastroparesia, dysautonomia.  one episode of syncope,   Dr. Vanessa Woodruff 41 yo female with pmhx of dysautonomia with recurrent episodes of syncope, recent appy, ITP, DVT and frequent ED visits, presents with abdominal pain. Per patient, her surgery did not go well and she has been having diffuse, vague abdominal pain since her surgery, no nausea, vomiting, diarrhea, fever, chills, urinary symptoms. Pt believes her abdomen is distended is worried about an intraabdominal bleed.     Pt also reports an episode of syncope. She woke up from sleep with abdominal pain, noticed she was tachycardic to 140's, screamed, and passed out. Per patient's PCP, Dr. Vanessa Woodruff, she has frequent episodes of syncope, wears her pulse ox all the time, and frequently does not eat and is very anxious. PCP states patient frequently texes her and confirms that she will be able to see her in the next few days. 43 yo female with pmhx of dysautonomia with recurrent episodes of syncope, recent appy, ITP, DVT and frequent ED visits, presents with abdominal pain. Per patient, patient reports her surgery did not go well and she has been having diffuse, vague abdominal pain since her surgery over one month, no nausea, vomiting, diarrhea, fever, chills, urinary symptoms. Pt believes her abdomen is distended is worried about an intraabdominal bleed.     Pt also reports an episode of syncope. She woke up from sleep screaming without insult or injury, noticed she was tachycardic to 140's,  and passed out. Per patient's PCP, Dr. Vanessa Woodruff, she has frequent episodes of syncope, wears her pulse ox all the time, and frequently does not eat and is very anxious. PCP states patient frequently texts her and confirms that she will be able to see her in the next few days.

## 2018-11-12 NOTE — ED PROVIDER NOTE - PROGRESS NOTE DETAILS
Malissa Snow M.D. Resident  called Dr. Vanessa Woodruff Attending Mirna Ding: pt signed out to me pending UA. pt known to ED with multiple previous visits. no vomiting or diarrhea. has had multiple episodes of syncope. pt well appearing and at baseline. UA negative for infection. will d/c per sign out plan. Malissa Snow M.D. Resident  Patient refused to sign discharge paperwork because she "disagrees with her care" and demands to be worked up for syncope despite her recent workup here and at [facility] and her medical history of dysautonomia and recurrent syncope episodes, which her PMD agrees.  Pt scheduled for  by medicaid cab at 6:30AM.

## 2018-11-12 NOTE — ED PROVIDER NOTE - SHIFT CHANGE DETAILS
Jair Moura MD FACEP note of transfer: Receiving team will follow up on labs, analgesia, any clinical imaging, reassess and disposition as clinically indicated.  Details of patient and plan conveyed to receiving physician and conveyed back for understanding.  There were no questions at this time about the patient's status, disposition, and plan. Patient's care to be taken over by receiving physician at this time, all decisions regarding the progression of care will be made at their discretion.

## 2018-11-12 NOTE — ED PROVIDER NOTE - ATTENDING CONTRIBUTION TO CARE
Patient with chief complaint of screaming upon awakening  checking pulse instantaneously with her pulse ox that was on her finger and noting that it was 140  then she passed out and 'had a syncopal episode in her bed'   no chest pain no shortness of breath. on xarelto for recent left cephalic vein dvt and patient states she also has a left subclavian/ij dvt diagnosed as an outpatient (pt is compliant with dvt therapies) no shortness of breath or chest pain today.    + chronic abdominal pain status post appendectomy ~ 1 month prior   no fevers  no urinary symptoms   patient is highly anxious  non-tachycardic, non-tachypneic, no murmurs  lungs clear to auscultation bilaterally, equal breath sounds bilaterally  no signs of swelling to extremities   patient attempts to dictate her own care  patient is demonstrating transference of anxiety and other hospital visits onto staff  no rashes  no tactile fever  well appearing and at baseline from previous encounter with patient  will get vs and ua, no life threatening issues at this time with normal ekg  will treat ua if + and encourage patient to  follow up as an outpatient with her chronic medical conditions  non surgical abdomen by exam and history   No immediate life threatening issues present on history or clinical exam. Patient is a safe disposition home, has capacity and insight into their condition, is ambulatory in the Emergency Department with no further questions and will follow up with their doctor(s) this week. Patient  understands anticipatory guidance and was given strict return and follow up precautions. The patient has been informed of all concerning signs and symptoms to return to Emergency Department, the necessity to follow up with PMD/Clinic/follow up provided within 2-3 days was explained, and the patient reports understanding of above with capacity and insight if patient disposition is to be home.

## 2018-11-12 NOTE — ED ADULT NURSE NOTE - PMH
Anxiety    Anxiety    Autonomic dysreflexia    Depression    Dysautonomia    Dystonia    Gastroparesis    Headache    History of ITP    HTN (hypertension)    Hypertension    IBS (irritable bowel syndrome)    Idiopathic thrombocytopenia purpura    Idiopathic thrombocytopenic purpura    Labyrinthitis    POTS (postural orthostatic tachycardia syndrome)

## 2018-11-12 NOTE — ED PROVIDER NOTE - CARE PLAN
Principal Discharge DX:	Abdominal pain Principal Discharge DX:	Abdominal pain  Secondary Diagnosis:	Anxiety about health

## 2018-11-12 NOTE — ED PROVIDER NOTE - PHYSICAL EXAMINATION
General: Patient awake alert NAD.   HEENT: normocephalic, atraumatic, EMOI, neck supple  Cardiac: RRR, S1, S2, no murmur, rubs, gallop  LUNGS: CTA B/L no wheeze, rhonchi, rales.   Abdomen: + soft, + mild diffuse tenderness, + healing laparoscopy incisions, no signs of infection, no rebound no guarding.   EXT: strength and ROM at patient's baseline, no edema, no calf tenderness,   Neuro: A&Ox3, normal gait, no focal neurological deficits.   Skin: warm, dry, no rash, no lesions General: Patient awake alert NAD.   HEENT: normocephalic, atraumatic, EMOI, neck supple  Cardiac: RRR, S1, S2, no murmur, rubs, gallop  LUNGS: CTA B/L no wheeze, rhonchi, rales.   Abdomen: + soft, + mild suprapbuic tenderness, + healing laparoscopy incisions, no signs of infection, no rebound no guarding. no right lower quadrant or left lower quadrant tenderness to palpation, no rebound/guarding   EXT: strength and ROM at patient's baseline, no edema, no calf tenderness,   Neuro: A&Ox3, normal gait, no focal neurological deficits.   Skin: warm, dry, no rash, no lesions  psych: anxious, no si/hi

## 2018-11-12 NOTE — ED PROVIDER NOTE - PMH
Anxiety    Anxiety    Autonomic dysreflexia    Depression    DVT (deep venous thrombosis)    Dysautonomia    Dystonia    Gastroparesis    Headache    History of ITP    HTN (hypertension)    Hypertension    IBS (irritable bowel syndrome)    Idiopathic thrombocytopenia purpura    Idiopathic thrombocytopenic purpura    Labyrinthitis    POTS (postural orthostatic tachycardia syndrome)

## 2018-11-12 NOTE — ED PROVIDER NOTE - NS ED ROS FT
GENERAL: No fever, chills  HEENT: no trouble swallowing or speaking   CARDIAC: no chest pain/pressure, SOB, lower ex edema  PULMONARY: no cough, SOB  GI: + abdominal pain, no n/v/d  : no dysuria, frequency, hematuria  SKIN: no rashes, lesions, vesicles  NEURO: + headache, + syncope, no paraesthesias.   MSK: No joint pain, myalgia, weakness. GENERAL: No fever, chills  HEENT: no trouble swallowing or speaking   CARDIAC: no chest pain/pressure, SOB, lower ex edema, tachycardia  PULMONARY: no cough, SOB  GI: + abdominal pain, no n/v/d  : no dysuria, frequency, hematuria  SKIN: no rashes, lesions, vesicles  NEURO: + headache, + syncope, no paraesthesias. anxiety  MSK: No joint pain, myalgia, weakness.  ROS as per HPI, attending statement, or otherwise negative.

## 2018-11-12 NOTE — ED ADULT NURSE NOTE - OBJECTIVE STATEMENT
43 yo female A&OX3 presents to the ED with the c/o feeling anxious. Pt states that she has been having difficulty sleeping at night. States that she had an episode of " waking up suddenly from her sleep screaming". Pt also c/o syncopal episode today.

## 2018-11-13 ENCOUNTER — APPOINTMENT (OUTPATIENT)
Dept: HEMATOLOGY ONCOLOGY | Facility: CLINIC | Age: 43
End: 2018-11-13
Payer: MEDICAID

## 2018-11-13 VITALS
RESPIRATION RATE: 18 BRPM | OXYGEN SATURATION: 98 % | SYSTOLIC BLOOD PRESSURE: 124 MMHG | DIASTOLIC BLOOD PRESSURE: 84 MMHG | HEART RATE: 86 BPM

## 2018-11-13 LAB
APPEARANCE UR: CLEAR — SIGNIFICANT CHANGE UP
BACTERIA # UR AUTO: NEGATIVE — SIGNIFICANT CHANGE UP
BILIRUB UR-MCNC: NEGATIVE — SIGNIFICANT CHANGE UP
COLOR SPEC: SIGNIFICANT CHANGE UP
DIFF PNL FLD: ABNORMAL
EPI CELLS # UR: 1 /HPF — SIGNIFICANT CHANGE UP
GLUCOSE UR QL: NEGATIVE — SIGNIFICANT CHANGE UP
HYALINE CASTS # UR AUTO: 0 /LPF — SIGNIFICANT CHANGE UP (ref 0–2)
KETONES UR-MCNC: NEGATIVE — SIGNIFICANT CHANGE UP
LEUKOCYTE ESTERASE UR-ACNC: NEGATIVE — SIGNIFICANT CHANGE UP
NITRITE UR-MCNC: NEGATIVE — SIGNIFICANT CHANGE UP
PH UR: 7 — SIGNIFICANT CHANGE UP (ref 5–8)
PROT UR-MCNC: NEGATIVE — SIGNIFICANT CHANGE UP
RBC CASTS # UR COMP ASSIST: 3 /HPF — SIGNIFICANT CHANGE UP (ref 0–4)
SP GR SPEC: 1.01 — SIGNIFICANT CHANGE UP (ref 1.01–1.02)
UROBILINOGEN FLD QL: NEGATIVE — SIGNIFICANT CHANGE UP
WBC UR QL: 1 /HPF — SIGNIFICANT CHANGE UP (ref 0–5)

## 2018-11-13 PROCEDURE — 99284 EMERGENCY DEPT VISIT MOD MDM: CPT | Mod: 25

## 2018-11-13 PROCEDURE — 93005 ELECTROCARDIOGRAM TRACING: CPT

## 2018-11-13 PROCEDURE — 74019 RADEX ABDOMEN 2 VIEWS: CPT

## 2018-11-13 PROCEDURE — 81001 URINALYSIS AUTO W/SCOPE: CPT

## 2018-11-13 PROCEDURE — 87086 URINE CULTURE/COLONY COUNT: CPT

## 2018-11-14 ENCOUNTER — MEDICATION RENEWAL (OUTPATIENT)
Age: 43
End: 2018-11-14

## 2018-11-14 LAB
CULTURE RESULTS: NO GROWTH — SIGNIFICANT CHANGE UP
SPECIMEN SOURCE: SIGNIFICANT CHANGE UP

## 2018-11-21 ENCOUNTER — LABORATORY RESULT (OUTPATIENT)
Age: 43
End: 2018-11-21

## 2018-11-21 ENCOUNTER — RESULT REVIEW (OUTPATIENT)
Age: 43
End: 2018-11-21

## 2018-11-21 ENCOUNTER — APPOINTMENT (OUTPATIENT)
Dept: HEMATOLOGY ONCOLOGY | Facility: CLINIC | Age: 43
End: 2018-11-21
Payer: MEDICAID

## 2018-11-21 ENCOUNTER — OUTPATIENT (OUTPATIENT)
Dept: OUTPATIENT SERVICES | Facility: HOSPITAL | Age: 43
LOS: 1 days | End: 2018-11-21
Payer: MEDICAID

## 2018-11-21 VITALS
HEART RATE: 78 BPM | SYSTOLIC BLOOD PRESSURE: 132 MMHG | RESPIRATION RATE: 16 BRPM | TEMPERATURE: 98.2 F | BODY MASS INDEX: 16.45 KG/M2 | WEIGHT: 89.95 LBS | DIASTOLIC BLOOD PRESSURE: 87 MMHG | OXYGEN SATURATION: 100 %

## 2018-11-21 DIAGNOSIS — D47.3 ESSENTIAL (HEMORRHAGIC) THROMBOCYTHEMIA: ICD-10-CM

## 2018-11-21 DIAGNOSIS — Z90.49 ACQUIRED ABSENCE OF OTHER SPECIFIED PARTS OF DIGESTIVE TRACT: Chronic | ICD-10-CM

## 2018-11-21 LAB
BASOPHILS # BLD AUTO: 0 K/UL — SIGNIFICANT CHANGE UP (ref 0–0.2)
BASOPHILS NFR BLD AUTO: 0.3 % — SIGNIFICANT CHANGE UP (ref 0–2)
EOSINOPHIL # BLD AUTO: 0 K/UL — SIGNIFICANT CHANGE UP (ref 0–0.5)
EOSINOPHIL NFR BLD AUTO: 0.5 % — SIGNIFICANT CHANGE UP (ref 0–6)
HCT VFR BLD CALC: 33.1 % — LOW (ref 34.5–45)
HGB BLD-MCNC: 10.9 G/DL — LOW (ref 11.5–15.5)
LYMPHOCYTES # BLD AUTO: 1.5 K/UL — SIGNIFICANT CHANGE UP (ref 1–3.3)
LYMPHOCYTES # BLD AUTO: 27.2 % — SIGNIFICANT CHANGE UP (ref 13–44)
MCHC RBC-ENTMCNC: 25.3 PG — LOW (ref 27–34)
MCHC RBC-ENTMCNC: 32.9 G/DL — SIGNIFICANT CHANGE UP (ref 32–36)
MCV RBC AUTO: 76.8 FL — LOW (ref 80–100)
MONOCYTES # BLD AUTO: 0.4 K/UL — SIGNIFICANT CHANGE UP (ref 0–0.9)
MONOCYTES NFR BLD AUTO: 6.6 % — SIGNIFICANT CHANGE UP (ref 2–14)
NEUTROPHILS # BLD AUTO: 3.5 K/UL — SIGNIFICANT CHANGE UP (ref 1.8–7.4)
NEUTROPHILS NFR BLD AUTO: 65.5 % — SIGNIFICANT CHANGE UP (ref 43–77)
OB PNL STL: NEGATIVE — SIGNIFICANT CHANGE UP
PLATELET # BLD AUTO: 83 K/UL — LOW (ref 150–400)
RBC # BLD: 4.31 M/UL — SIGNIFICANT CHANGE UP (ref 3.8–5.2)
RBC # FLD: 12.1 % — SIGNIFICANT CHANGE UP (ref 10.3–14.5)
WBC # BLD: 5.4 K/UL — SIGNIFICANT CHANGE UP (ref 3.8–10.5)
WBC # FLD AUTO: 5.4 K/UL — SIGNIFICANT CHANGE UP (ref 3.8–10.5)

## 2018-11-21 PROCEDURE — G0452: CPT | Mod: 26

## 2018-11-21 PROCEDURE — 81240 F2 GENE: CPT

## 2018-11-21 PROCEDURE — 99215 OFFICE O/P EST HI 40 MIN: CPT

## 2018-11-21 PROCEDURE — 81241 F5 GENE: CPT

## 2018-11-21 RX ORDER — RIVAROXABAN 15 MG/1
15 TABLET, FILM COATED ORAL
Qty: 60 | Refills: 6 | Status: DISCONTINUED | COMMUNITY
End: 2018-11-21

## 2018-11-21 NOTE — PHYSICAL EXAM
[Restricted in physically strenuous activity but ambulatory and able to carry out work of a light or sedentary nature] : Status 1- Restricted in physically strenuous activity but ambulatory and able to carry out work of a light or sedentary nature, e.g., light house work, office work [Thin] : thin [Normal] : normal external exam, normal sphincter tone; no palpable masses; stool guaiac negative [FreeTextEntry1] : Stool was brown.  [de-identified] :  Tattoos. Scattered small bruises of varying age. [de-identified] : Anxious

## 2018-11-21 NOTE — RESULTS/DATA
[FreeTextEntry1] : WBC 5,400, Hgb 10.9, Hct 33.1, Plts 83,000, MCV 76.8, Diff normal\par \par 11/6/18\par Sono and Doppler revealed: no evidence of abdominal aortic aneurysm. No evidence of b/l lower extremity DVT\par \par 11/5/18\par MARY BETH 1:160\par Anticardiolipin antibody: negative\par PT, 19.9, INR 1.75, APTT 34.0\par Serum iron 23, TIBC 540, iron saturation 4%, ferritin 11\par CMP: normal\par \par

## 2018-11-21 NOTE — REASON FOR VISIT
[Follow-Up Visit] : a follow-up visit for [Blood Count Assessment] : blood count assessment [Parent] : parent [Coagulopathy] : coagulopathy

## 2018-11-21 NOTE — HISTORY OF PRESENT ILLNESS
[Date: ____________] : Patient's last distress assessment performed on [unfilled]. [5 - Distress Level] : Distress Level: 5 [de-identified] : Idiopathic thrombocytopenic purpura\par 11/8 Left IJ thrombosis\par \par  [de-identified] : Had an appendectomy at [facility] in October 2018  She was recently re-hospitalized at [facility] Platelet count on 11/11/18 was 87,000 with hgb 9.5. Doppler examination of her left arm revealed a partially occlusive DVT in her left internal jugular vein. CT scan of her neck was normal. CT abdomen and pelvis revealed a left lower lobe opacification. APTT was 28.6. Reports V/Q scan was negative. Wenceslao does not recall an IV being placed in her neck.\par . She feels poorly . She is conversing with pressured, rushed speech rambling from topic to topic with multiple lists of physical complaints and concerns. She is wearing a pulse oximeter on her finger almost constantly. Notes her clots developed only after her appendectomy. Was not given Heparin during her surgery. Notes bruises on her legs and feet. Has dysphagia. She reports constant stabbing and sharp abdominal pain, pain is made worse by eating. Lost her appetite. She also has pain radiating from her neck down to her left arm. Frequent neck spasms. Reports a superficial thrombosis on her left arm where her IV was placed. Is concerned about her anemia. She bleeds easily and notes gushing blood when cut. She notes an episode just now with blood mixed into the stool and not on the toilet paper. She has been with BRBPR since her appendectomy. No melena, hematemesis, coffee grounds. She did not tolerate oral Fe supplementation due to constipation, nausea and fear of bleeding.  Reports frequent chronic sharp/ stabbing chest pain which lasts for hours. Gets SOB after walking for a few minutes. She passed a clot in her most recent menses. Her menses were light before being on the Xarelto. Swollen feet. Was found to have early Sjogren's disease by her neurologist and is considering IVIG therapy. Lost about 7 pounds in past 5 months.  Skin rash on her abdomen. Reports her mother's brother passed away due to a pulmonary embolism possibly secondary to his brain cancer. [FreeTextEntry7] : Being seen at [facility]

## 2018-11-21 NOTE — REVIEW OF SYSTEMS
[Easy Bruising] : a tendency for easy bruising [Negative] : Allergic/Immunologic [Chest Pain] : chest pain [Abdominal Pain] : abdominal pain [Anxiety] : anxiety [Depression] : depression [Recent Change In Weight] : ~T recent weight change [Lower Ext Edema] : lower extremity edema [Shortness Of Breath] : shortness of breath [Skin Rash] : skin rash [Joint Pain] : joint pain [Muscle Pain] : muscle pain [Easy Bleeding] : a tendency for easy bleeding [Vomiting] : no vomiting [Constipation] : no constipation [Diarrhea] : no diarrhea [FreeTextEntry2] : poor appetite, weight loss [FreeTextEntry7] : dysphagia [FreeTextEntry9] : neck, left arm pain [de-identified] : neuropathy in left leg

## 2018-11-21 NOTE — ADDENDUM
[FreeTextEntry1] : Documented by Sixto Mathis acting as a scribe for Dr. Attila Zamora on 11/21/18\par \par All medical record entries made by the Scribe were at my, Dr. Attila Zamora's, direction and personally dictated by me on 11/21/18. I have reviewed the chart and agree that the record accurately reflects my personal performance of the history, physical exam, procedure and imaging

## 2018-11-21 NOTE — ASSESSMENT
[Palliative Care Plan] : not applicable at this time [FreeTextEntry1] : 43 YO F with chronic ITP with recent post operative thrombosis of her left internal jugular..She does not recall an IV being placed there. It remains possible she had one intra-operatively or that her neck had been positioned or manipulated in some way during her appendectomy. Given her age and the occurrence of DVT at an unusual location, a hypercoagulable state should be evaluated for.  She is Fe deficient and mildly anemic, but this is stable since May 2018.. It is possible that menstrual blood loss is responsible. She claims an episode of hematochezia just prior to this exam, but stool was brown and guiaiac negative. She reports that she did not tolerate oral Fe supplementation due to constipation, nausea and fear she would bleed from it.She has significant psychiatric disability.\par \par Suggest :\par Xarelto 20 mg daily for at least 3 months (possibly more depending on lab results) as long as platelets > 50,000\par Arrange Fe infusion days 1 and 8\par Hypercoagulable panel\par PI-linked antigen\par Monthly CBC\par Psychiatric f/u\par GI consult\par RTC to be arranged

## 2018-11-28 LAB
DNA PLOIDY SPEC FC-IMP: SIGNIFICANT CHANGE UP
PTR INTERPRETATION: SIGNIFICANT CHANGE UP

## 2018-12-01 PROCEDURE — G9005: CPT

## 2018-12-03 ENCOUNTER — OUTPATIENT (OUTPATIENT)
Dept: OUTPATIENT SERVICES | Facility: HOSPITAL | Age: 43
LOS: 1 days | Discharge: HOME | End: 2018-12-03

## 2018-12-03 DIAGNOSIS — Z90.49 ACQUIRED ABSENCE OF OTHER SPECIFIED PARTS OF DIGESTIVE TRACT: Chronic | ICD-10-CM

## 2018-12-03 DIAGNOSIS — D47.3 ESSENTIAL (HEMORRHAGIC) THROMBOCYTHEMIA: ICD-10-CM

## 2018-12-04 ENCOUNTER — APPOINTMENT (OUTPATIENT)
Dept: HEMATOLOGY ONCOLOGY | Facility: CLINIC | Age: 43
End: 2018-12-04
Payer: MEDICAID

## 2018-12-04 ENCOUNTER — RESULT REVIEW (OUTPATIENT)
Age: 43
End: 2018-12-04

## 2018-12-04 VITALS
SYSTOLIC BLOOD PRESSURE: 130 MMHG | HEART RATE: 88 BPM | RESPIRATION RATE: 16 BRPM | TEMPERATURE: 98.8 F | WEIGHT: 90.83 LBS | OXYGEN SATURATION: 99 % | DIASTOLIC BLOOD PRESSURE: 80 MMHG | BODY MASS INDEX: 16.61 KG/M2

## 2018-12-04 LAB
BASOPHILS # BLD AUTO: 0.1 K/UL — SIGNIFICANT CHANGE UP (ref 0–0.2)
BASOPHILS NFR BLD AUTO: 1.6 % — SIGNIFICANT CHANGE UP (ref 0–2)
EOSINOPHIL # BLD AUTO: 0 K/UL — SIGNIFICANT CHANGE UP (ref 0–0.5)
EOSINOPHIL NFR BLD AUTO: 0.6 % — SIGNIFICANT CHANGE UP (ref 0–6)
HCT VFR BLD CALC: 30.7 % — LOW (ref 34.5–45)
HGB BLD-MCNC: 10.2 G/DL — LOW (ref 11.5–15.5)
LYMPHOCYTES # BLD AUTO: 1.4 K/UL — SIGNIFICANT CHANGE UP (ref 1–3.3)
LYMPHOCYTES # BLD AUTO: 25.3 % — SIGNIFICANT CHANGE UP (ref 13–44)
MCHC RBC-ENTMCNC: 25.1 PG — LOW (ref 27–34)
MCHC RBC-ENTMCNC: 33.2 G/DL — SIGNIFICANT CHANGE UP (ref 32–36)
MCV RBC AUTO: 75.7 FL — LOW (ref 80–100)
MONOCYTES # BLD AUTO: 0.4 K/UL — SIGNIFICANT CHANGE UP (ref 0–0.9)
MONOCYTES NFR BLD AUTO: 7.7 % — SIGNIFICANT CHANGE UP (ref 2–14)
NEUTROPHILS # BLD AUTO: 3.6 K/UL — SIGNIFICANT CHANGE UP (ref 1.8–7.4)
NEUTROPHILS NFR BLD AUTO: 64.8 % — SIGNIFICANT CHANGE UP (ref 43–77)
PLATELET # BLD AUTO: 87 K/UL — LOW (ref 150–400)
RBC # BLD: 4.05 M/UL — SIGNIFICANT CHANGE UP (ref 3.8–5.2)
RBC # FLD: 12.2 % — SIGNIFICANT CHANGE UP (ref 10.3–14.5)
WBC # BLD: 5.6 K/UL — SIGNIFICANT CHANGE UP (ref 3.8–10.5)
WBC # FLD AUTO: 5.6 K/UL — SIGNIFICANT CHANGE UP (ref 3.8–10.5)

## 2018-12-04 PROCEDURE — 99215 OFFICE O/P EST HI 40 MIN: CPT

## 2018-12-04 NOTE — REASON FOR VISIT
[Follow-Up Visit] : a follow-up visit for [Blood Count Assessment] : blood count assessment [Coagulopathy] : coagulopathy

## 2018-12-05 ENCOUNTER — TRANSCRIPTION ENCOUNTER (OUTPATIENT)
Age: 43
End: 2018-12-05

## 2018-12-05 NOTE — ED PROVIDER NOTE - EKG #1 DATE/TIME
Progress Notes by Sylwia Cantu MD at 02/14/18 08:57 AM     Author:  Sylwia Cantu MD Service:  (none) Author Type:  Physician     Filed:  02/14/18 10:19 AM Encounter Date:  2/14/2018 Status:  Signed     :  Sylwia Cantu MD (Physician)              PEDIATRIC ILLNESS VISIT   2/14/2018        Roomed by: Lona Oropeza [facility] 8:57 AM      SUBJECTIVE  Accompanied by:[VV1.1T]  Mother[VV1.1M]   [phone] (cell)  OK to leave message with normal test results.    Ethan is a 7 year old female[VV1.1T] accompanied by Mother[KW1.1M] who is complaining of[VV1.1T] stomach pain[KW1.1M].[VV1.1T]  Mother states that Ethan has been experience stomach pain waxing and waning over the last month.  Mother states that she thinks that Ethan holds her stool.  She states that 2 weeks ago, Ethan started taking a daily probiotic gummy.  This has helped slightly.  Mother notes that currently, Ethan takes the vitamin daily.  Mother states that Ethan will refrain from stooling, experience temporary relief, and then later experience pain.  When asked, Ethan points to the epigastric region as the area of pain.  Mother notes that currently, Ethan has a daily bowel movement.[KW1.1M]  This is happening in the morning.[JO1.1M] Mother has decreased Ethan's lactose intake, stating that Ethan is lactose intoleran[KW1.1M]t.  Previously, she[KW1.2M] ha[KW1.1M]d[KW1.2M] some lactose[KW1.1M] intake through foods like pizza[KW1.2M].      Mother states that Ethan has some anxiety issues.  When the anxiety is exacerbated, the stomach pain worsens.  Ethan sees Dr. Hortencia Ennis for her anxiety, however, Mother reports frustration and difficulty with scheduling appointments[KW1.1M] with Dr. Ennis.  Ethan saw Dr. Ennis each week over the summer, which greatly benefited Ethan.  Currently, Ethan sees Dr. Ennis once monthly, irregularly.  Mother states that she is a , so she works with Ethan at home.   Currently, Ethan has some anxiety going to school, which is exacerbated with bringing winter gear to school.      Mother notes that occasionally Ethan complains of chest pain.  Mother attributes this to reflux.  She[KW1.3M] states that Ethan had reflux as a baby.  She reports that during the episodes of chest pain, Ethan continues to participate in activities.  It has occurred 2-3 times.[KW1.4M]      Ethan complains of some pain in her hands periodically with writing and coloring.  Mother denies noting any swelling to her hands or joints.[KW1.3M]       Ethan denies vomiting and rash.  She denies diarrhea, melena and hematochezia.  Mother states that she had a stomach bug for one day one week ago.  Ethan periodically complains of a sore throat, but Mother attributes this to throat dryness.  Otherwise, no family members are currently sick.[KW1.4M]      Symptoms:  Fever:[VV1.1T]     No elevation of temperature[KW1.3M]  General:[VV1.1T] No problems, irritability, fussiness, crying, or lethargy   GI:    ABDOMINAL PAIN[KW1.3M]    ROS  All other ROS negative unless indicated otherwise above.    Allergies:  Augmentin    Current Outpatient Prescriptions     Medication  Sig   • fluticasone (FLONASE) 50 MCG/ACT nasal spray INHALE 1 SPRAY IN EACH NOSTRIL ONE TIME A DAY    • Cetirizine HCl (ZYRTEC CHILDRENS ALLERGY) 5 MG/5ML SYRP Take 5 mg by mouth daily.       Family history:[VV1.1T] No other family members have acute illnesses[KW1.4M]    Social history:[VV1.1T] Attends school[KW1.4M]      OBJECTIVE:   Physical exam  BP 90/50  Pulse 80  Temp 97.8 °F (36.6 °C) (Temporal)   Resp 20  Ht 3' 10\" (1.168 m)  Wt 50 lb 6.4 oz (22.9 kg)  BMI 16.75 kg/m2    GENERAL:[VV1.1T] Normal- alert and no distress noted HEAD & SCALP: No lesions, swelling, tenderness or abnormalities.  EYES: No redness, swelling, drainage or abnormalities.  EARS: No abnormalities of external ears, canals or tm's.  NOSE: No swelling or drainage.  MOUTH:  No abnormalities of tongue or mucosal membranes.  THROAT: No redness or lesions.  NECK: No masses, swelling or tenderness. No abnormal lymph nodes.  CHEST: Lungs are clear to auscultation and no retractions.  CARDIO: No murmur or cardiac rhythm irregularities.  ABDOMEN: Soft, normal bowel sounds, no organomegaly, masses or tenderness.  SKIN: No rashes, lesions.      Patient points to[KW1.4M] umbilicus[JO1.1M] when asked about region of pain.[KW1.4M]      Reviewed growth chart with mother and weight is satisfactory[JO1.1M]    ASSESS[KW1.4M]MENT/PLAN[VV1.1T]  See Diagnosis.......................See Below[KW1.4M]    Stomach pain  (primary encounter diagnosis)  Plan:[KW1.5T] Counseled patient to continue probiotic.  Drink plenty of fluids.  Leave plenty of time in the morning routine to allow patient to stool.  Continue to work to manage anxiety.[KW1.4M]       Constipation, unspecified constipation type  Plan:[KW1.5T] Counseled patient to continue probiotic.  Drink plenty of fluids.  Leave plenty of time in the morning routine to allow patient to stool.  Continue to work to manage anxiety.[KW1.4M]     Anxiety  Plan:[KW1.5T] I will reach out to Dr. Ennis to see if patient can regularly see Dr. Ennis.  Mother notes that Dr. Ennis wants to see Adelyn during school day, which seems counterproductive to anxiety with attending school.[KW1.4M]      Medication changes:[VV1.1T] No[KW1.4M]    Immunizations given today?[VV1.1T] No.[KW1.4M]  See Orders:  Instructed to call if the problem worsens or does not improve within the next 24 to 48 hours.[VV1.1T]    Blood pressure percentiles are 31.9 % systolic and 27.9 % diastolic based on NHBPEP's 4th Report.[KW1.3T]     Schedule follow-up:[VV1.1T] prn[KW1.3M]    Scribe:[KW1.3T] Electronically signed:[KW1.3M] Rose Marie Weaver[KW1.3T] has scribed for Dr. Cantu[KW1.3M]  2/14/2018 9:30 AM  I have reviewed and edited the progress note and agree with what has been scribed.[KW1.3T]  Electronically signed by:[JO1.1M] Sylwia Cantu MD 2/14/2018[JO1.2T]        Revision History        User Key Date/Time User Provider Type Action    > JO1.2 02/14/18 10:19 AM Sylwia Cantu MD Physician Sign     JO1.1 02/14/18 10:15 AM Sylwia Cantu MD Physician      KW1.2 02/14/18 09:52 AM [place], Rose Marie Scribe Sign at close encounter     KW1.5 02/14/18 09:44 AM [place], Rose Marie Scribe      KW1.4 02/14/18 09:38 AM [place], Rose Marie Scribe      KW1.3 02/14/18 09:30 AM [place], Rose Marie Scribe      KW1.1 02/14/18 09:16 AM [place], Rose Marie Scribe      VV1.1 02/14/18 08:57 AM Lona Oropeza CMA Certified Medical Assistant Sign at close encounter    M - Manual, T - Template             21-Jul-2018 12:31

## 2018-12-05 NOTE — PATIENT PROFILE ADULT. - TEACHING/LEARNING FACTORS IMPACT ABILITY TO LEARN
Progress Notes by Ana Bolaños at 12/01/17 02:17 PM     Author:  Ana Bolaños Service:  (none) Author Type:  Patient      Filed:  12/01/17 02:17 PM Encounter Date:  12/1/2017 Status:  Signed     :  Ana Bolaños (Patient )            Called patient[KR1.1T] and left message on voicemail that a member of the household has physician orders for therapy and to call back to schedule appointments @ [phone].[KR1.1M]           Revision History        User Key Date/Time User Provider Type Action    > KR1.1 12/01/17 02:17 PM Ana Bolaños Patient  Sign    M - Manual, T - Template             none

## 2018-12-10 ENCOUNTER — RESULT REVIEW (OUTPATIENT)
Age: 43
End: 2018-12-10

## 2018-12-13 ENCOUNTER — RESULT REVIEW (OUTPATIENT)
Age: 43
End: 2018-12-13

## 2018-12-13 ENCOUNTER — APPOINTMENT (OUTPATIENT)
Dept: HEMATOLOGY ONCOLOGY | Facility: CLINIC | Age: 43
End: 2018-12-13

## 2018-12-13 VITALS — DIASTOLIC BLOOD PRESSURE: 81 MMHG | SYSTOLIC BLOOD PRESSURE: 123 MMHG

## 2018-12-13 LAB
BASOPHILS # BLD AUTO: 0 K/UL — SIGNIFICANT CHANGE UP (ref 0–0.2)
BASOPHILS NFR BLD AUTO: 0.8 % — SIGNIFICANT CHANGE UP (ref 0–2)
EOSINOPHIL # BLD AUTO: 0 K/UL — SIGNIFICANT CHANGE UP (ref 0–0.5)
EOSINOPHIL NFR BLD AUTO: 0.9 % — SIGNIFICANT CHANGE UP (ref 0–6)
HCT VFR BLD CALC: 30 % — LOW (ref 34.5–45)
HGB BLD-MCNC: 9.7 G/DL — LOW (ref 11.5–15.5)
LYMPHOCYTES # BLD AUTO: 1.3 K/UL — SIGNIFICANT CHANGE UP (ref 1–3.3)
LYMPHOCYTES # BLD AUTO: 30.4 % — SIGNIFICANT CHANGE UP (ref 13–44)
MCHC RBC-ENTMCNC: 24.3 PG — LOW (ref 27–34)
MCHC RBC-ENTMCNC: 32.5 G/DL — SIGNIFICANT CHANGE UP (ref 32–36)
MCV RBC AUTO: 74.8 FL — LOW (ref 80–100)
MONOCYTES # BLD AUTO: 0.3 K/UL — SIGNIFICANT CHANGE UP (ref 0–0.9)
MONOCYTES NFR BLD AUTO: 8 % — SIGNIFICANT CHANGE UP (ref 2–14)
NEUTROPHILS # BLD AUTO: 2.5 K/UL — SIGNIFICANT CHANGE UP (ref 1.8–7.4)
NEUTROPHILS NFR BLD AUTO: 60 % — SIGNIFICANT CHANGE UP (ref 43–77)
PLATELET # BLD AUTO: 83 K/UL — LOW (ref 150–400)
RBC # BLD: 4.01 M/UL — SIGNIFICANT CHANGE UP (ref 3.8–5.2)
RBC # FLD: 12.4 % — SIGNIFICANT CHANGE UP (ref 10.3–14.5)
WBC # BLD: 4.2 K/UL — SIGNIFICANT CHANGE UP (ref 3.8–10.5)
WBC # FLD AUTO: 4.2 K/UL — SIGNIFICANT CHANGE UP (ref 3.8–10.5)

## 2018-12-13 NOTE — ED PROVIDER NOTE - TEMPLATE, MLM
Pt belongings in locker # 27 in PEC room. Pt has white top, and blue pants; with 1 pair of socks; and 1 black cell phone with screen cracked on front.    Abdominal Pain, N/V/D

## 2018-12-20 LAB — IRON SERPL-MCNC: 19 UG/DL

## 2018-12-26 ENCOUNTER — FORM ENCOUNTER (OUTPATIENT)
Age: 43
End: 2018-12-26

## 2018-12-27 ENCOUNTER — APPOINTMENT (OUTPATIENT)
Dept: ULTRASOUND IMAGING | Facility: CLINIC | Age: 43
End: 2018-12-27
Payer: MEDICAID

## 2018-12-27 ENCOUNTER — OUTPATIENT (OUTPATIENT)
Dept: OUTPATIENT SERVICES | Facility: HOSPITAL | Age: 43
LOS: 1 days | End: 2018-12-27
Payer: MEDICAID

## 2018-12-27 DIAGNOSIS — Z90.49 ACQUIRED ABSENCE OF OTHER SPECIFIED PARTS OF DIGESTIVE TRACT: Chronic | ICD-10-CM

## 2018-12-27 DIAGNOSIS — Z00.8 ENCOUNTER FOR OTHER GENERAL EXAMINATION: ICD-10-CM

## 2018-12-27 LAB
ABR COMMENT: NORMAL
ALBUMIN SERPL ELPH-MCNC: 4.9 G/DL
ALP BLD-CCNC: 92 U/L
ALT SERPL-CCNC: 11 U/L
ANION GAP SERPL CALC-SCNC: 15 MMOL/L
APCR PPP: 2.74 RATIO
APTT BLD: 28.3 SEC
AST SERPL-CCNC: 18 U/L
AT III PPP CHRO-ACNC: 139 %
B2 GLYCOPROT1 AB SER QL: POSITIVE
BILIRUB SERPL-MCNC: 0.2 MG/DL
BUN SERPL-MCNC: 7 MG/DL
C4B BP SERPL-MCNC: 81 %
CALCIUM SERPL-MCNC: 9.9 MG/DL
CARDIOLIPIN AB SER IA-ACNC: NEGATIVE
CHLORIDE SERPL-SCNC: 105 MMOL/L
CO2 SERPL-SCNC: 23 MMOL/L
CONFIRM: 27.2 SEC
CREAT SERPL-MCNC: 0.75 MG/DL
DRVVT IMM 1:2 NP PPP: NORMAL
DRVVT SCREEN TO CONFIRM RATIO: 0.96 RATIO
FVL BLANK: 34.9
FVL TEST: 95.8
GLUCOSE SERPL-MCNC: 77 MG/DL
HCYS SERPL-MCNC: 10.1 UMOL/L
INR PPP: 1.01 RATIO
PNH GRANULOCYTES: 0 %
PNH MONOCYTES: 0 %
PNH RBC - COMPLETE: 0 %
PNH RBC - PARTIAL: 0 %
POTASSIUM SERPL-SCNC: 3.6 MMOL/L
PROT S AG ACT/NOR PPP IA: 55 %
PROT S AG ACT/NOR PPP IA: 57 %
PROT SERPL-MCNC: 7.3 G/DL
PT BLD: 11.3 SEC
SCREEN DRVVT: 32 SEC
SILICA CLOTTING TIME INTERPRETATION: NORMAL
SILICA CLOTTING TIME S/C: 0.88 RATIO
SODIUM SERPL-SCNC: 143 MMOL/L

## 2018-12-27 PROCEDURE — 76830 TRANSVAGINAL US NON-OB: CPT

## 2018-12-27 PROCEDURE — 76830 TRANSVAGINAL US NON-OB: CPT | Mod: 26

## 2018-12-27 PROCEDURE — 76856 US EXAM PELVIC COMPLETE: CPT

## 2018-12-27 PROCEDURE — 76856 US EXAM PELVIC COMPLETE: CPT | Mod: 26

## 2018-12-27 NOTE — HISTORY OF PRESENT ILLNESS
[de-identified] : Idiopathic thrombocytopenic purpura\par 11/8 Left IJ thrombosis\par \par  [de-identified] : pt feels  fair. She c/o of tongue bleeding two days ago after taking one dose of iron pills. Bleed for an hr, stopped by putting pressure on it.  Denies trauma. She notices some cracks on the tongue.  Denies any blood in the stool. she checks her stool. Numbness of the fingers remains. she was at [facility] on 12/1  due to syncope at home after going to the bathroom twice. lasted few seconds, she called EMS. They did sono and EKG which did not showed any thing acute. She was not admitted, was discharged home. Labs stable.

## 2018-12-27 NOTE — PHYSICAL EXAM
[Restricted in physically strenuous activity but ambulatory and able to carry out work of a light or sedentary nature] : Status 1- Restricted in physically strenuous activity but ambulatory and able to carry out work of a light or sedentary nature, e.g., light house work, office work [Normal] : affect appropriate [de-identified] : no bleeding noted on the tongue

## 2018-12-27 NOTE — REVIEW OF SYSTEMS
[Lower Ext Edema] : lower extremity edema [Abdominal Pain] : abdominal pain [Joint Pain] : joint pain [Muscle Pain] : muscle pain [Anxiety] : anxiety [Depression] : depression [Easy Bleeding] : a tendency for easy bleeding [Easy Bruising] : a tendency for easy bruising [Negative] : Allergic/Immunologic [Recent Change In Weight] : ~T no recent weight change [Chest Pain] : no chest pain [Shortness Of Breath] : no shortness of breath [Vomiting] : no vomiting [Constipation] : no constipation [Diarrhea] : no diarrhea [Skin Rash] : no skin rash [FreeTextEntry2] : gain one pound  [FreeTextEntry7] : dysphagia [FreeTextEntry9] : neck, left arm pain [de-identified] : neuropathy in left leg

## 2018-12-27 NOTE — ASSESSMENT
[Palliative Care Plan] : not applicable at this time [FreeTextEntry1] : 43 YO F with chronic ITP with recent post operative thrombosis of her left internal jugular..She does not recall an IV being placed there. It remains possible she had one intra-operatively or that her neck had been positioned or manipulated in some way during her appendectomy. Given her age and the occurrence of DVT at an unusual location, a hypercoagulable state should be evaluated for.  She is Fe deficient and mildly anemic, but this is stable since May 2018.. It is possible that menstrual blood loss is responsible. She claims an episode of hematochezia just prior to this exam, but stool was brown and guiaiac negative. She reports that she did not tolerate oral Fe supplementation due to constipation, nausea and fear she would bleed from it.She has significant psychiatric disability. pt is refusing iron infusion. Suggested taking iron pills since she believes is the cause of her tongue bleeds.  Suggested  iron liquids instead. she will call Thursday to  discuss previous labs done in November with Dr Zamora.\par \par \par \par Suggest :\par CBC/Diff stable today\par Xarelto 20 mg daily for at least 3 months (possibly more depending on lab results) as long as platelets > 50,000\par Arrange Fe infusion days 1 and 8, pt still refusing\par Psychiatric f/u\par GI consult\par RTC to be arranged

## 2018-12-28 ENCOUNTER — CLINICAL ADVICE (OUTPATIENT)
Age: 43
End: 2018-12-28

## 2018-12-31 ENCOUNTER — CLINICAL ADVICE (OUTPATIENT)
Age: 43
End: 2018-12-31

## 2019-01-01 NOTE — ED PROVIDER NOTE - NEUROLOGICAL, MLM
----- Message from Leilani Casper DO sent at 2019 12:32 PM CST -----  Neg, no flu. Panel will take 1-2 days  
Conveyed results to mom. Mom verbalized understanding. No other questions or concerns at this time.      
Alert and oriented, no focal deficits, no motor or sensory deficits.

## 2019-01-04 ENCOUNTER — OUTPATIENT (OUTPATIENT)
Dept: OUTPATIENT SERVICES | Facility: HOSPITAL | Age: 44
LOS: 1 days | Discharge: HOME | End: 2019-01-04

## 2019-01-04 DIAGNOSIS — Z90.49 ACQUIRED ABSENCE OF OTHER SPECIFIED PARTS OF DIGESTIVE TRACT: Chronic | ICD-10-CM

## 2019-01-04 DIAGNOSIS — D47.3 ESSENTIAL (HEMORRHAGIC) THROMBOCYTHEMIA: ICD-10-CM

## 2019-01-09 ENCOUNTER — RESULT REVIEW (OUTPATIENT)
Age: 44
End: 2019-01-09

## 2019-01-09 ENCOUNTER — APPOINTMENT (OUTPATIENT)
Dept: HEMATOLOGY ONCOLOGY | Facility: CLINIC | Age: 44
End: 2019-01-09
Payer: MEDICAID

## 2019-01-09 VITALS
TEMPERATURE: 98.1 F | BODY MASS INDEX: 18.15 KG/M2 | OXYGEN SATURATION: 100 % | WEIGHT: 99.21 LBS | DIASTOLIC BLOOD PRESSURE: 78 MMHG | HEART RATE: 98 BPM | SYSTOLIC BLOOD PRESSURE: 115 MMHG | RESPIRATION RATE: 16 BRPM

## 2019-01-09 LAB
BASOPHILS # BLD AUTO: 0 K/UL — SIGNIFICANT CHANGE UP (ref 0–0.2)
BASOPHILS NFR BLD AUTO: 0.4 % — SIGNIFICANT CHANGE UP (ref 0–2)
CANCER AG125 SERPL-ACNC: 19 U/ML
EOSINOPHIL # BLD AUTO: 0.1 K/UL — SIGNIFICANT CHANGE UP (ref 0–0.5)
EOSINOPHIL NFR BLD AUTO: 1.3 % — SIGNIFICANT CHANGE UP (ref 0–6)
HCT VFR BLD CALC: 27.1 % — LOW (ref 34.5–45)
HGB BLD-MCNC: 9.2 G/DL — LOW (ref 11.5–15.5)
LYMPHOCYTES # BLD AUTO: 1.3 K/UL — SIGNIFICANT CHANGE UP (ref 1–3.3)
LYMPHOCYTES # BLD AUTO: 29.2 % — SIGNIFICANT CHANGE UP (ref 13–44)
MCHC RBC-ENTMCNC: 25.2 PG — LOW (ref 27–34)
MCHC RBC-ENTMCNC: 33.9 G/DL — SIGNIFICANT CHANGE UP (ref 32–36)
MCV RBC AUTO: 74.3 FL — LOW (ref 80–100)
MONOCYTES # BLD AUTO: 0.4 K/UL — SIGNIFICANT CHANGE UP (ref 0–0.9)
MONOCYTES NFR BLD AUTO: 8.3 % — SIGNIFICANT CHANGE UP (ref 2–14)
NEUTROPHILS # BLD AUTO: 2.8 K/UL — SIGNIFICANT CHANGE UP (ref 1.8–7.4)
NEUTROPHILS NFR BLD AUTO: 60.8 % — SIGNIFICANT CHANGE UP (ref 43–77)
PLATELET # BLD AUTO: 118 K/UL — SIGNIFICANT CHANGE UP (ref 150–400)
RBC # BLD: 3.65 M/UL — LOW (ref 3.8–5.2)
RBC # FLD: 12.9 % — SIGNIFICANT CHANGE UP (ref 10.3–14.5)
WBC # BLD: 4.6 K/UL — SIGNIFICANT CHANGE UP (ref 3.8–10.5)
WBC # FLD AUTO: 4.6 K/UL — SIGNIFICANT CHANGE UP (ref 3.8–10.5)

## 2019-01-09 PROCEDURE — 99213 OFFICE O/P EST LOW 20 MIN: CPT

## 2019-01-09 NOTE — ASSESSMENT
[Palliative Care Plan] : not applicable at this time [FreeTextEntry1] : 42 YO F with chronic ITP with recent post operative thrombosis of her left internal jugular..She does not recall an IV being placed there. It remains possible she had one intra-operatively or that her neck had been positioned or manipulated in some way during her appendectomy. 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 This is a hereditary condition. I have counseled her regarding it and the increased risk of venous thrombosis associated with it. The need for lifelong anticoagulation was reviewed. Appropriate precautions to take to decrease recurrence were also reviewed including for travel and surgery.   \par \par She is Fe deficient and mildly anemic, but this is stable since May 2018. It is possible that menstrual blood loss is responsible. She reports that she did not tolerate oral Fe supplementation due to constipation, nausea and fear she would bleed from it. She has significant psychiatric disability.\par \par Suggest :\par Xarelto 20 mg daily  as long as platelets > 50,000 for at least 3 months. Then consider decreasing to 10 mg daily prophylaxis.\par Urged her to consider Fe supplementation\par Monthly CBC\par Psychiatric f/u\par GI/GYN  consult\par Screen first degree relatives for Protein S deficiency\par RTC 1 month

## 2019-01-09 NOTE — HISTORY OF PRESENT ILLNESS
[Date: ____________] : Patient's last distress assessment performed on [unfilled]. [5 - Distress Level] : Distress Level: 5 [de-identified] : Idiopathic thrombocytopenic purpura\par 11/8 Left IJ thrombosis\par Protein S deficiency \par \par  [de-identified] : Feels fatigued. Currently having her "heaviest" menstrual cycle for 3 days now. She is also passing clots. Reports bleeding when washing her face due to acne,. Her bruising has increased recently. Was found to have a hemorrhagic ovarian cyst, is following with her OB/GYN. Has not gone for GI consult, will see a motility specialist soon. No melena, BRBPR. Has gained about 10 pounds in the past 2 months with diet and increased tofu intake. Is considering resuming iron supplementation. Declines exam. [FreeTextEntry7] : Being seen at [facility]

## 2019-01-09 NOTE — RESULTS/DATA
[FreeTextEntry1] : WBC 4,600, Hgb 9.2, Hct 27.2, MCV 74.3, Plts 118,000 , Diff normal\par \par 12/13/18\par Protein S free antigen : 57%\par C4B binding protein 81%\par \par 12/4/18\par Serum iron: 19\par \par 11/21/18\par Prothrombin G: normal\par Factor V Leiden: normal\par Ferritin: 8\par Serum iron 20, TIBC 547, iron saturation 4%\par PT 11.3, INR 1.01\par APTT: 28.3\par Homocystiene 10.1\par CMP: normal\par DRVVT: 32.0, LA negative\par Silica clotting time: LA negative\par Protein S free: 55%\par Antithrombin III :139%\par APC resistance 2.74\par Anticardiolipin antibodies: negative\par Beta 2 GP1 antibodies: normal\par PNH: normal

## 2019-01-09 NOTE — REVIEW OF SYSTEMS
[Recent Change In Weight] : ~T recent weight change [Anxiety] : anxiety [Depression] : depression [Easy Bleeding] : a tendency for easy bleeding [Easy Bruising] : a tendency for easy bruising [Negative] : Neurological [Vomiting] : no vomiting [Constipation] : no constipation [Diarrhea] : no diarrhea [Dysmenorrhea/Abn Vaginal Bleeding] : dysmenorrhea/abnormal vaginal bleeding [FreeTextEntry2] : weight gain

## 2019-01-09 NOTE — ADDENDUM
[FreeTextEntry1] : Documented by Sixto Mathis acting as a scribe for Dr. Attial Zamora on 1/9/19\par \par All medical record entries made by the Scribe were at my, Dr. Attila Zamora's, direction and personally dictated by me on 1/9/19. I have reviewed the chart and agree that the record accurately reflects my personal performance of the history, physical exam, results, assessment and plan. I have also personally directed, reviewed, and agree with the discharge instructions.

## 2019-01-10 ENCOUNTER — NON-APPOINTMENT (OUTPATIENT)
Age: 44
End: 2019-01-10

## 2019-01-10 ENCOUNTER — RESULT REVIEW (OUTPATIENT)
Age: 44
End: 2019-01-10

## 2019-01-10 ENCOUNTER — APPOINTMENT (OUTPATIENT)
Dept: PULMONOLOGY | Facility: CLINIC | Age: 44
End: 2019-01-10
Payer: MEDICAID

## 2019-01-10 VITALS
DIASTOLIC BLOOD PRESSURE: 68 MMHG | SYSTOLIC BLOOD PRESSURE: 105 MMHG | HEART RATE: 86 BPM | HEIGHT: 62 IN | OXYGEN SATURATION: 100 % | BODY MASS INDEX: 18.22 KG/M2 | RESPIRATION RATE: 17 BRPM | WEIGHT: 99 LBS

## 2019-01-10 DIAGNOSIS — J98.6 DISORDERS OF DIAPHRAGM: ICD-10-CM

## 2019-01-10 PROCEDURE — 94618 PULMONARY STRESS TESTING: CPT

## 2019-01-10 PROCEDURE — 99214 OFFICE O/P EST MOD 30 MIN: CPT | Mod: 25

## 2019-01-10 PROCEDURE — 71046 X-RAY EXAM CHEST 2 VIEWS: CPT

## 2019-01-10 PROCEDURE — 95012 NITRIC OXIDE EXP GAS DETER: CPT

## 2019-01-10 PROCEDURE — 94010 BREATHING CAPACITY TEST: CPT

## 2019-01-10 NOTE — HISTORY OF PRESENT ILLNESS
[FreeTextEntry1] : Ms. Presley is a 40 y/o female who presents to the office for a follow up visit for allergic rhinitis, asthma, atypical chest pain, palpitations, SOB, vitamin D deficiency. She is s/p hospitalization due to tachycardia Her chief complaint is her cough\par -she reports she was admitted at [facility] for appendicitis, had laparoscopic surgery and later developed a jugular clot \par -she notes she has a cough which persist throughout the day\par -she reports she has been coughing for over a month now\par -she states she has dysphagia with solid foods\par -she notes she is sleeping well but can be interrupted by cough\par -she reports she has been wheezing and notes chest pain when taking in deep breaths\par -she reports she usually sleeps about 8 to 9 hours a night\par -she states her sinuses are clear\par -she notes she cannot sleep on her back\par -she reports she had gained 10 pounds in 2 months with increase of potato and tofu intake\par -she states she had been experiencing palpitations \par -she denies any headaches, nausea, vomiting, fever, chills, chest pressure, diarrhea, constipation, dizziness\par \par \par

## 2019-01-10 NOTE — REVIEW OF SYSTEMS
[Negative] : Sleep Disorder [Cough] : cough [Pleuritic Pain] : pleuritic pain [Wheezing] : wheezing [Palpitations] : palpitations [As Noted in HPI] : as noted in HPI [Heartburn] : heartburn [Reflux] : reflux [Dysphagia] : dysphagia

## 2019-01-10 NOTE — ADDENDUM
[FreeTextEntry1] : Documented by Sixto Mathis acting as a scribe for Dr. Christian Sunshine on 1/10/19.\par \par All medical record entries made by the Scribe were at my, Dr. Christian Sunshine's, direction and personally dictated by me on 1/10/19. I have reviewed the chart and agree that the record accurately reflects my personal performance of the history, physical exam, procedure, assessment and plan. I have also personally directed, reviewed, and agree with the discharge instructions. \par \par

## 2019-01-10 NOTE — ASSESSMENT
[FreeTextEntry1] : Ms. Presley is a 41 year old female with protein S deficiency, ITP,  POTS, autonomic dysfunction, B12 deficiency, vitamin D deficiency, mild asthma, allergies, GERD, who now comes in for evaluation for chest pain and shortness of breath. \par \par Her chest pain appears to be multifactorial due to:\par -part musculoskeletal\par -? pleurisy\par - diaphragm dysfunction\par - anemia\par -pulmonary embolism (no evidence)\par -cardiac\par -? asthma\par \par problem 1: asthma\par -continue to use Singulair 10 mg QHS\par -continue to use Ventolin PRN\par -set up MCT\par \par -Asthma is  believed to be caused by inherited (genetic) and environmental factor, but its exact cause is unknown. Asthma may be triggered by allergens, lung infections, or irritants in the air. Asthma triggers are different for each person\par \par problem 2: diaphragm dysfunction \par -complete fluoroscopy of diaphragm now  \par \par problem 3: ITP/ hypercoagulable state\par - continue on Xarelto\par - continue to follow up with Dr. Nakul Florian \par \par problem 4: allergic rhinitis\par -continue to use nasal saline\par -continue to use OTC antihistamine, PRN\par -blood work to include: food IgE level, asthma profile, IgE level, vitamin D,eosinophil level\par \par problem 5: GERD\par -continue Tums\par -Rule of 2s: avoid eating too much, eating too late, eating too spicy, eating two hours before bed\par -Things to avoid including overeating, spicy foods, tight clothing, eating within three hours of bed, this list is not all inclusive. \par -For treatment of reflux, possible options discussed including diet control, H2 blockers, PPIs, as well as coating motility agents discussed as treatment options. Timing of meals and proximity of last meal to sleep were discussed. If symptoms persist, a formal gastrointestinal evaluation is needed.\par \par problem 6: r/o JUAN\par -she is being set up for an at home sleep study based on her fatigue, EDS, snoring, and elevated mallampati class\par \par -Sleep apnea is associated with adverse clinical consequences which an affect most organ systems.  Cardiovascular disease risk includes arrhythmias, atrial fibrillation, hypertension, coronary artery disease, and stroke. Metabolic disorders include diabetes type 2, non-alcoholic fatty liver disease. Mood disorder especially depression; and cognitive decline especially in the elderly. Associations with  chronic reflux/Bearden’s esophagus some but not all inclusive. \par -Reasons to assess this include arousal consistent with hypopnea; respiratory events most prominent in REM sleep or supine position; therefore sleep staging and body position are important for accurate diagnosis and estimation of AHI.\par \par problem 7: autonomic dysfunction\par -recommended [facility] and was referred to Dr. Sheryl Leventhal\par \par problem 8: r/o PAH (no evidence)\par -obtain 4D cardiac MRI\par -f/u with Dr. Garcia\par \par problem 9: abnormal chest CT (most likely inflammatory)\par -follow up chest CT now ( no-contrast)\par \par problem 10: health maintenance \par -recommended yearly flu shot after October 15\par -recommended strep pneumonia vaccines: Prevnar-13 vaccine, followed by Pneumo vaccine 23 one year following\par -recommended early intervention for URIs\par -recommended regular osteoporosis evaluations\par -recommended early dermatological evaluations\par -recommended after the age of 50 to the age of 70, colonoscopy every 5 years \par \par F/U in 6-8 weeks with full PFTs\par She is encouraged to call with any changes, concerns, or questions

## 2019-01-10 NOTE — PROCEDURE
[FreeTextEntry1] : PFT revealed normal flows, with a FEV1 of 2.38  ,which is 85% of predicted, with a normal flow volume loop\par \par  CXR reveals a normal sized heart; no evidence of infiltrate or effusion--a normal appearing chest radiograph\par \par 6 minute walk test reveals a low oxygen saturation of 95 % with no evidence of dyspnea or fatigue; walked 586.8 meters\par \par FENO was 13 ; a normal value being less than 25\par Fractional exhaled nitric oxide (FENO) is regarded as a simple, noninvasive method for assessing eosinophilic airway inflammation. Produced by a variety of cells within the lung, nitric oxide (NO) concentrations are generally low in healthy individuals. However, high concentrations of NO appear to be involved in nonspecific host defense mechanisms and chronic inflammatory diseases such as asthma. The American Thoracic Society (ATS) therefore has recommended using FENO to aid in the diagnosis and monitoring of eosinophilic airway inflammation and asthma, and for identifying steroid responsive individuals whose chronic respiratory symptoms may be caused by airway inflammation. \par \par \par  \par \par CT abdomen and pelvis (11.8.18) reveals increase in the AP diameter o the thorax, the lower lungs are hyperinflated, and there are prominent slips of the diaphragm. There is a small cluster of tree-in-bud opacifications in the lateral aspect of the left lower lobe, which may represent mucoid impaction or law-grade focal infection, or possibly aspiration. This was no present on the prior CT examination. This area spans approximately 2.2 x 1.6 cm on image 3. There is no dominant nodule or mass in the lower lungs. The visualized tight lower lung is clear. There is no significant adenopathy in the lower thorax. There is no right or left pleural effusion. There is some subsegmental atelectasis/scarring at the left lung base, as before

## 2019-01-10 NOTE — REASON FOR VISIT
[Follow-Up] : a follow-up visit [FreeTextEntry1] : allergic rhinitis, asthma, atypical chest pain, palpitations, SOB, vitamin D deficiency

## 2019-01-11 NOTE — ED ADULT NURSE REASSESSMENT NOTE - TEMPLATE LIST FOR HEAD TO TOE ASSESSMENT
Cardiac VASCULAR & INTERVENTIONAL RADIOLOGY    Procedure: CT Guided Right Renal Biopsy    Pre-operative Diagnosis:  Medical Renal Disease    Post-operative Diagnosis: same    Radiologist: Ebony Tompkins MD    Assistant:  None    Sedation: Versed and fentanyl. Procedure Details: Informed consent obtained from patient prior to procedure. Standard aseptic technique. CT guidance. 1% lidocaine for local anesthesia. 16 gauge Temno needle biopsy. Specimen: Four 16 gauge core biopsy samples obtained. Complications:  None.     EBL: minimal    Assistant:  None           Disposition: Observe for four hours then discharge home             Ebony Tompkins MD  [address] [address]  Vascular & Interventional Radiology  1/11/2019

## 2019-01-23 ENCOUNTER — OTHER (OUTPATIENT)
Age: 44
End: 2019-01-23

## 2019-01-24 ENCOUNTER — OTHER (OUTPATIENT)
Age: 44
End: 2019-01-24

## 2019-01-25 ENCOUNTER — APPOINTMENT (OUTPATIENT)
Dept: GASTROENTEROLOGY | Facility: CLINIC | Age: 44
End: 2019-01-25
Payer: MEDICAID

## 2019-01-25 ENCOUNTER — EMERGENCY (EMERGENCY)
Facility: HOSPITAL | Age: 44
LOS: 1 days | Discharge: HOME | End: 2019-01-25
Attending: EMERGENCY MEDICINE | Admitting: EMERGENCY MEDICINE
Payer: COMMERCIAL

## 2019-01-25 VITALS
WEIGHT: 98.99 LBS | HEIGHT: 62 IN | TEMPERATURE: 98 F | SYSTOLIC BLOOD PRESSURE: 149 MMHG | HEART RATE: 100 BPM | DIASTOLIC BLOOD PRESSURE: 92 MMHG | RESPIRATION RATE: 16 BRPM | OXYGEN SATURATION: 98 %

## 2019-01-25 VITALS
OXYGEN SATURATION: 97 % | DIASTOLIC BLOOD PRESSURE: 86 MMHG | TEMPERATURE: 98 F | RESPIRATION RATE: 16 BRPM | SYSTOLIC BLOOD PRESSURE: 121 MMHG | HEART RATE: 90 BPM

## 2019-01-25 VITALS
HEART RATE: 113 BPM | OXYGEN SATURATION: 98 % | BODY MASS INDEX: 18.22 KG/M2 | WEIGHT: 99 LBS | HEIGHT: 62 IN | SYSTOLIC BLOOD PRESSURE: 120 MMHG | DIASTOLIC BLOOD PRESSURE: 78 MMHG

## 2019-01-25 DIAGNOSIS — Z90.49 ACQUIRED ABSENCE OF OTHER SPECIFIED PARTS OF DIGESTIVE TRACT: Chronic | ICD-10-CM

## 2019-01-25 PROCEDURE — 93970 EXTREMITY STUDY: CPT | Mod: 26

## 2019-01-25 PROCEDURE — 72110 X-RAY EXAM L-2 SPINE 4/>VWS: CPT | Mod: 26

## 2019-01-25 PROCEDURE — 72110 X-RAY EXAM L-2 SPINE 4/>VWS: CPT

## 2019-01-25 PROCEDURE — 73502 X-RAY EXAM HIP UNI 2-3 VIEWS: CPT

## 2019-01-25 PROCEDURE — 73502 X-RAY EXAM HIP UNI 2-3 VIEWS: CPT | Mod: 26,LT

## 2019-01-25 PROCEDURE — 93970 EXTREMITY STUDY: CPT

## 2019-01-25 PROCEDURE — 99284 EMERGENCY DEPT VISIT MOD MDM: CPT | Mod: 25

## 2019-01-25 PROCEDURE — 99215 OFFICE O/P EST HI 40 MIN: CPT

## 2019-01-25 PROCEDURE — 99284 EMERGENCY DEPT VISIT MOD MDM: CPT

## 2019-01-25 RX ORDER — ONDANSETRON 8 MG/1
0 TABLET, FILM COATED ORAL
Qty: 0 | Refills: 0 | COMMUNITY

## 2019-01-25 RX ORDER — MONTELUKAST 4 MG/1
0 TABLET, CHEWABLE ORAL
Qty: 0 | Refills: 0 | COMMUNITY

## 2019-01-25 RX ORDER — SIMETHICONE 80 MG/1
0 TABLET, CHEWABLE ORAL
Qty: 0 | Refills: 0 | COMMUNITY

## 2019-01-25 RX ORDER — DOCUSATE SODIUM 100 MG
1 CAPSULE ORAL
Qty: 0 | Refills: 0 | COMMUNITY

## 2019-01-25 NOTE — ED PROVIDER NOTE - ATTENDING CONTRIBUTION TO CARE
43-year-old patient thin, presents with mother,Post fall1 day ago.She has Protein deficiency causing her to be hypercoagulable. Patient is onT thinners.Noted to have increased legPain most likelyComing fromLumbar radiculopathy. Patient has no bruises over her back the leg does not appear swollen. Plan to do ultrasound pain controlAdvised patient on heat padAnd follow-up with primary care physician.

## 2019-01-25 NOTE — ED PROVIDER NOTE - CARE PLAN
Principal Discharge DX:	Arm pain  Secondary Diagnosis:	Back pain Principal Discharge DX:	Fall  Secondary Diagnosis:	Back pain  Secondary Diagnosis:	Arm DVT (deep venous thromboembolism), chronic

## 2019-01-25 NOTE — ED ADULT NURSE NOTE - CHPI ED NUR SYMPTOMS NEG
no chills/no weakness/no dizziness/no fever/no nausea/no tingling/no decreased eating/drinking/no vomiting

## 2019-01-25 NOTE — ED PROVIDER NOTE - OBJECTIVE STATEMENT
pt is a 44yo female with autonomic d/o, dysautonomia, dvt, protein S deficiency on xarelto, anxiety, itp c/o bl arm pain. pt reports she fell down a few steps 2 days ago, no head injury or loc. pt reports she braced her fall with her hands and buttock. pt reports she is concerned for dvt in arms. pt reports in oct she was dx with IJ DVT post op and was found to have protein S deficiency, tx with xarelto. pt reports she went to see heme dr elena and vascular dr cordero who advised pt needs us of UE. pt reports she had US of le today at [facility] without acute findings.   heme: ulices

## 2019-01-25 NOTE — ED PROVIDER NOTE - MEDICAL DECISION MAKING DETAILS
pt with fall 2 days ago, no head injury, no hematoma, no ecchymosis. physical exam benign. us, x rays, pt denies pain control

## 2019-01-25 NOTE — ED PROVIDER NOTE - PROGRESS NOTE DETAILS
pt advised continue xarelto All imaging and labs reviewed. all results reviewed with pt including abnormal results. pt given a copy of results. pt advised to follow up with pmd regarding abnormal results. All questions answered and concerns addressed. pt verbalized understanding and agreement with plan and dx. pt advised on next step and when/where to follow up. pt advised on all take home and otc medications. pt advised to follow up with PMD. pt advised to return to ed for worsenng symptoms including fever, cp, sob. will dc.

## 2019-01-25 NOTE — ED PROVIDER NOTE - NSFOLLOWUPINSTRUCTIONS_ED_ALL_ED_FT
1. FOLLOW UP WITH YOUR PRIMARY DOCTOR IN 24-48 HOURS.   2. FOLLOW UP WITH ALL SPECALIST DISCUSSED DURING YOUR VISIT.   3. TAKE ALL MEDICATIONS PRESCRIBED IN THE ER.   4. RETURN FOR WORSENING SYMPTOMS INCLUDING BUT NOT LIMITED TO FEVER, CHEST PAIN, OR TROUBLE BREATHING.  5. continue xarelto

## 2019-01-25 NOTE — ED PROVIDER NOTE - PHYSICAL EXAMINATION
Spine Exam:     Cervical: No erythema, ecchymosis, or visible deformity. No midline tenderness or step-off appreciated on palpation. No paravertebral tenderness. No muscle spasm. FROM. NEG NEXUS criteria.          Thoracic: No erythema, ecchymosis, or visible deformity. No midline tenderness or step-off appreciated on palpation. No paravertebral tenderness. No muscle spasm.           Lumbosacral: No erythema, ecchymosis, or visible deformity. No midline tenderness or step-off appreciated on palpation. bl paravertebral tenderness. No muscle spasm.   FROM BL UE AND BL LE. SENSATION GROSSLY INTACT X4. + 2 RADIAL

## 2019-01-28 ENCOUNTER — RESULT REVIEW (OUTPATIENT)
Age: 44
End: 2019-01-28

## 2019-01-28 ENCOUNTER — APPOINTMENT (OUTPATIENT)
Dept: OBGYN | Facility: CLINIC | Age: 44
End: 2019-01-28

## 2019-01-28 LAB
25(OH)D3 SERPL-MCNC: 13.6 NG/ML
ALBUMIN SERPL ELPH-MCNC: 5 G/DL
ALP BLD-CCNC: 81 U/L
ALT SERPL-CCNC: 27 U/L
ANION GAP SERPL CALC-SCNC: 14 MMOL/L
AST SERPL-CCNC: 32 U/L
BASOPHILS # BLD AUTO: 0.01 K/UL
BASOPHILS NFR BLD AUTO: 0.1 %
BILIRUB SERPL-MCNC: 0.2 MG/DL
BUN SERPL-MCNC: 8 MG/DL
CALCIUM SERPL-MCNC: 9.9 MG/DL
CHLORIDE SERPL-SCNC: 102 MMOL/L
CO2 SERPL-SCNC: 24 MMOL/L
CREAT SERPL-MCNC: 0.76 MG/DL
CRP SERPL-MCNC: <0.1 MG/DL
EOSINOPHIL # BLD AUTO: 0.03 K/UL
EOSINOPHIL NFR BLD AUTO: 0.4 %
ERYTHROCYTE [SEDIMENTATION RATE] IN BLOOD BY WESTERGREN METHOD: 8 MM/HR
FERRITIN SERPL-MCNC: 8 NG/ML
GASTRIN SERPL-MCNC: 105 PG/ML
GLUCOSE SERPL-MCNC: 74 MG/DL
HCT VFR BLD CALC: 33.7 %
HGB BLD-MCNC: 9.9 G/DL
IGA SER QL IEP: 79 MG/DL
IGG SER QL IEP: 859 MG/DL
IGM SER QL IEP: 166 MG/DL
IMM GRANULOCYTES NFR BLD AUTO: 0.1 %
IRON SATN MFR SERPL: 4 %
IRON SERPL-MCNC: 23 UG/DL
LYMPHOCYTES # BLD AUTO: 1.68 K/UL
LYMPHOCYTES NFR BLD AUTO: 21.7 %
MAGNESIUM SERPL-MCNC: 2.1 MG/DL
MAN DIFF?: NORMAL
MCHC RBC-ENTMCNC: 22.6 PG
MCHC RBC-ENTMCNC: 29.4 GM/DL
MCV RBC AUTO: 76.9 FL
MONOCYTES # BLD AUTO: 0.46 K/UL
MONOCYTES NFR BLD AUTO: 5.9 %
NEUTROPHILS # BLD AUTO: 5.55 K/UL
NEUTROPHILS NFR BLD AUTO: 71.8 %
PLATELET # BLD AUTO: 107 K/UL
POTASSIUM SERPL-SCNC: 4.3 MMOL/L
PREALB SERPL NEPH-MCNC: 26 MG/DL
PROT SERPL-MCNC: 7.8 G/DL
RBC # BLD: 4.38 M/UL
RBC # FLD: 14.7 %
SODIUM SERPL-SCNC: 140 MMOL/L
TIBC SERPL-MCNC: 619 UG/DL
TSH SERPL-ACNC: 1.85 UIU/ML
UIBC SERPL-MCNC: 596 UG/DL
VIT B12 SERPL-MCNC: 379 PG/ML
WBC # FLD AUTO: 7.74 K/UL

## 2019-01-29 ENCOUNTER — OTHER (OUTPATIENT)
Age: 44
End: 2019-01-29

## 2019-01-29 LAB
SELENIUM SERPL-MCNC: 156 UG/L
TTG IGA SER IA-ACNC: <5 UNITS
TTG IGA SER-ACNC: NEGATIVE
TTG IGG SER IA-ACNC: <5 UNITS
TTG IGG SER IA-ACNC: NEGATIVE
VIT B6 SERPL-MCNC: 9.2 UG/L
VIT C SERPL-MCNC: 1 MG/DL
ZINC SERPL-MCNC: 66 UG/DL

## 2019-01-30 ENCOUNTER — OTHER (OUTPATIENT)
Age: 44
End: 2019-01-30

## 2019-01-30 ENCOUNTER — EMERGENCY (EMERGENCY)
Facility: HOSPITAL | Age: 44
LOS: 1 days | Discharge: HOME | End: 2019-01-30
Attending: EMERGENCY MEDICINE | Admitting: EMERGENCY MEDICINE
Payer: COMMERCIAL

## 2019-01-30 VITALS
TEMPERATURE: 100 F | HEIGHT: 62 IN | WEIGHT: 98.99 LBS | RESPIRATION RATE: 15 BRPM | DIASTOLIC BLOOD PRESSURE: 98 MMHG | HEART RATE: 102 BPM | SYSTOLIC BLOOD PRESSURE: 126 MMHG | OXYGEN SATURATION: 97 %

## 2019-01-30 VITALS
OXYGEN SATURATION: 98 % | TEMPERATURE: 98 F | SYSTOLIC BLOOD PRESSURE: 125 MMHG | DIASTOLIC BLOOD PRESSURE: 86 MMHG | HEART RATE: 89 BPM | RESPIRATION RATE: 14 BRPM

## 2019-01-30 DIAGNOSIS — Z90.49 ACQUIRED ABSENCE OF OTHER SPECIFIED PARTS OF DIGESTIVE TRACT: Chronic | ICD-10-CM

## 2019-01-30 LAB
APPEARANCE UR: CLEAR — SIGNIFICANT CHANGE UP
BILIRUB UR-MCNC: NEGATIVE — SIGNIFICANT CHANGE UP
COLOR SPEC: SIGNIFICANT CHANGE UP
DIFF PNL FLD: NEGATIVE — SIGNIFICANT CHANGE UP
GLUCOSE UR QL: NEGATIVE MG/DL — SIGNIFICANT CHANGE UP
KETONES UR-MCNC: NEGATIVE — SIGNIFICANT CHANGE UP
LEUKOCYTE ESTERASE UR-ACNC: ABNORMAL
NITRITE UR-MCNC: NEGATIVE — SIGNIFICANT CHANGE UP
PH UR: 8 — SIGNIFICANT CHANGE UP (ref 5–8)
PROT UR-MCNC: 15 MG/DL
SP GR SPEC: 1 — LOW (ref 1.01–1.02)
UROBILINOGEN FLD QL: NEGATIVE MG/DL — SIGNIFICANT CHANGE UP

## 2019-01-30 PROCEDURE — 99284 EMERGENCY DEPT VISIT MOD MDM: CPT

## 2019-01-30 PROCEDURE — 81001 URINALYSIS AUTO W/SCOPE: CPT

## 2019-01-30 NOTE — ED ADULT NURSE NOTE - NSIMPLEMENTINTERV_GEN_ALL_ED
Implemented All Universal Safety Interventions:  [place] to call system. Call bell, personal items and telephone within reach. Instruct patient to call for assistance. Room bathroom lighting operational. Non-slip footwear when patient is off stretcher. Physically safe environment: no spills, clutter or unnecessary equipment. Stretcher in lowest position, wheels locked, appropriate side rails in place.

## 2019-01-30 NOTE — ED PROVIDER NOTE - MEDICAL DECISION MAKING DETAILS
SO SIGNS OF DVT. exam benign, abd soft, nontender. will consult dr fannie phillips and dr josselyn GUEVARA prior to any testing. pt with multiple visits for similar symptoms. pt seen and evaluated by ISMAEL friday. pt seen by alan friday had bl le US AND BL UE US. LE us without acute findings friday. will re-eval

## 2019-01-30 NOTE — ED PROVIDER NOTE - OBJECTIVE STATEMENT
pt is a 42yo female with autonomic d/o, dysautonomia, dvt, protein S deficiency on xarelto, anxiety, itp c/o abd pain x today. pt is a 42yo female with autonomic d/o, dysautonomia, anemia, dvt, protein S deficiency on xarelto, anxiety, itp c/o abd pain x days. pt reports she went to her GI friday, rashawn mireles, who advised pt has "abnormal labs" so he wants ct of abd without contrast. pt reports no paperwork given or copy of labs. pt also reports bl le swelling after bx done with local anesthesia on vagina friday (dr singh). pt is a 42yo female with autonomic d/o, dysautonomia, anemia, dvt, protein S deficiency on xarelto, anxiety, itp c/o abd pain x days. pt reports she went to her GI friday, dr mati morocho, who advised pt has "abnormal labs" so he wants ct of abd without contrast. pt reports no paperwork given or copy of labs. pt also reports bl le swelling after bx done with local anesthesia on vagina friday (dr singh). pt advised heme dr elena send pt to ed for US bl le. pt reports she had us friday without acute findings of bl le but is concerned dvt may have formed over the weekend.

## 2019-01-30 NOTE — ED PROVIDER NOTE - ATTENDING CONTRIBUTION TO CARE
pt with chronic medical conditions with c/o bl LE pain concern for DVT with hx of DVT.  Pt was seen int  ED 2 days ago for similar symptoms and had neg doppler.  Discussed case with pt's PCP, GI and will establish outpt follow up.  no need for further imaging at this time.    PE: + 2 pedal pulses, no edema, no rash

## 2019-01-31 LAB
NICOTINAMIDE: 79.8 NG/ML
NICOTINIC ACID: <5 NG/ML
VIT A SERPL-MCNC: 51.8 UG/DL
VIT B1 SERPL-MCNC: 138.3 NMOL/L

## 2019-01-31 NOTE — HISTORY OF PRESENT ILLNESS
[FreeTextEntry1] : Last visit: 12/2017\par Plan after last visit:\par 43 yo female with multiple medical problems including significant anxiety/depression, h/o ITP, POTS which has limited multiple medications for all other etiologies, and recent admission to [facility] for abdominal pain presenting with a multitude of GI complaints including: constipation/inability to have complete bowel movements, early satiety, chronic nausea. and intermittent abdominal pain. Given patients comorbidities and some of her self-imposed restrictions, we are unable to pursue endoscopic evaluation, anorectal motility testing, defecography, GES. In addition, may medications that may be of potential benefit are restricted including antiemetics (i.e. Reglan/domperidone if she truly has gastroparesis), SSRI/SNRI (2/2 ITP and prior bruising), TCAs, bowel regimen (i.e. senna, linzess, amitiza).\par \par We had an extended conversation regarding the multiple symptoms and the difficulty of finding an appropriate treatment with all of these limitations for over 75 minutes today.\par \par I suggested the following today:\par \par - We should improve the constipation as this may help with all symptoms. We can start with Metamucil to see if stool bulking improves her bowel habits. In addition, we can give trial of MIRALAX as it is not a bowel stimulant. She is hesitant to do this currently, but will consider it\par \par - We should try to clarify if she has dysmotility including gastroparesis. Her time course suggests this could be post-infectious gastroparesis. We can try to diagnosis with SmartPill, however she would ideally need to stop multiple medications including: Ativan, Zofran, Bowel regimen. Otherwise, we may have a limited study. She is aware and will consider if she feels comfortable stopping these medications for extended period of time.\par \par - I explained that I think many of her symptoms are worsened if not caused by her anxiety and depression which were clearly not yet controlled at this time (she agrees with this). If she is unable to pursue medical therapy, i suggested talk therapy combined with alternative modalities including CBT and/or hypnotherapy which can have a benefit in GI symptoms. Gave name of Rosemary Jackson at [facility] who focuses on these issues\par \par - I agree with the plan to get her POTS under better control\par \par - As she tolerates some foods and not others, I advised her to focus on foods she tolerates well to assist with weight loss which seems mostly to be associated with not eating/drinking. She denies any underlying eating disorders\par \par Lastly, the patients health insurance may be changing on Jan 1st. She is unclear what she will have insurance wise and whether she can continue following at [facility]. She will let us know after clarification\par \par All of this was discussed at length with patient and her mother\par All questions answered\par Plan agreed upon. \par \par --------------------------------------------------------------------------------------------------------\par Since last visit:\par - Has not followed up for 1+ year\par - Had 1-2 forced admissions to [facility] for [facility] Health reasons\par - Did end up seeing nutrition - given limited diet, unclear of what the next steps were other than increasing protein increase\par - Had recent hospitalization for appendicitis c/b DVT and ultimately diagnosed with borderline Protein S deficiency.  Now on Xarelto\par \par Currently:\par Weight Loss:\par Weight decreased down to 85 lbs; but has been able to gain 12 lbs eating just soy and bread\par Still with significant dietary limitations limiting nutritional intake\par Still declining further supplementation or re-referral to nutrition\par She is mostly concerned about the change in stools below \par \par Altered bowel habits:\par Stool more yellow than brown\par Has incomplete evacuation - though having multiple bowel movements per day\par Increased sounds throughout the GI tract\par Unclear if it is truly less solid stools\par She has not taken anything for this\par \par Post prandial bloating:\par Now having significant discomfort\par Largely this is associated with gas and some visible distension\par She will point to 'distended' area of abdomen that she reports as asymmetric, but appears symmetric\par \par Changes in Diagnosis\par - she is unclear if she truly has POTS\par - she may also now have Sjogren as per patient\par \par Overall:\par Patient continues to decline any procedures\par Patient continues to decline most medications as 'they will interact with her meds or her body\par This includes: antibiotics, probiotics, cholestyramine, iron replacement\par \par *Entirety of interview today was pressured with quick shifting of topics with no segue way between.  Patient remains difficult to direct onto a singular topics

## 2019-01-31 NOTE — ASSESSMENT
[FreeTextEntry1] : 43 yo female with multiple medical problems including significant anxiety/depression with multiple admissions to [facility], h/o ITP,  questionable h/o POTS, JODY, newly diagnosed with Protein S deficiency c/b VTE who presents for follow up.  Her major concerns today are: altered bowel habits, post prandial bloating, and concern for malnutrition given prior weight loss (though has gained weight of late).  She continues to decline all medical procedures, nearly all medications and/or vitamin supplementation.  She will agree to a micronutrient screen, stool tests to evaluate for causes of potential GI malabsorption, and a SIBO breath test.  I explained to her that Iron deficiency anemia workup includes Endoscopic evaluation - she declines any evaluation or any supplementation with Iron (PO or IV) and then follows with the question 'How will we treat my iron deficiency?'  After re-explaining the diagnostic workup and treatment - she again declines.  She also currently declines cholestyramine for loose stools.  She may be willing to try fiber supplementation, but is unsure.  She currently is living in assisted living, which may be playing role in her overall health. She declined any referral to  at this time.  We will plan on completing the above and then regrouping in 4-6 weeks.\par \par Impression:\par 1) Altered bowel habits\par 2) Weight loss - had weight gain more recently\par 3) Bloating\par 4) Hair thinning\par 5) Anxiety/Depression with potential bipolar disorder\par 6) JODY\par 7) Protein S deficiency c/b VTE on Xarelto\par 8) ? POTS ?\par 9) ? Sjogrens ?\par \par Plan:\par 1) Stool tests\par 2) Micronutrient screen\par 3) SIBO testing\par 4) Trial of Fiber - if patient willing\par 5) Continue on going discussions of necessary tests and vitamin supplementation that are currently being refused\par 6) Discussed the role of anxiety/depression manifesting in GI symptoms with patient at length\par 7) All discussed at length for over 50 minutes.  All questions answered.  Plan agreed upon\par 8) RV 6 weeks

## 2019-01-31 NOTE — REVIEW OF SYSTEMS
[Feeling Poorly] : feeling poorly [Recent Weight Gain (___ Lbs)] : recent [unfilled] ~Ulb weight gain [Eyesight Problems] : eyesight problems [Eyes Itch] : itching of the eyes [Heart Rate Is Fast] : fast heart rate [As Noted in HPI] : as noted in HPI [Arthralgias] : arthralgias [Joint Stiffness] : joint stiffness [Skin Lesions] : skin lesion [Skin Wound] : skin wound [Itching] : itching [Dizziness] : dizziness [Fainting] : fainting [Anxiety] : anxiety [Negative] : Heme/Lymph [Sore Throat] : no sore throat [Hoarseness] : no hoarseness [Chest Pain] : no chest pain [Palpitations] : no palpitations [Depression] : no depression [Emotional Problems] : no emotional problems

## 2019-01-31 NOTE — PHYSICAL EXAM
[General Appearance - Alert] : alert [Sclera] : the sclera and conjunctiva were normal [PERRL With Normal Accommodation] : pupils were equal in size, round, and reactive to light [Extraocular Movements] : extraocular movements were intact [Outer Ear] : the ears and nose were normal in appearance [Examination Of The Oral Cavity] : the lips and gums were normal [Oropharynx] : the oropharynx was normal [Neck Appearance] : the appearance of the neck was normal [Thyroid Diffuse Enlargement] : the thyroid was not enlarged [Thyroid Nodule] : there were no palpable thyroid nodules [Respiration, Rhythm And Depth] : normal respiratory rhythm and effort [Exaggerated Use Of Accessory Muscles For Inspiration] : no accessory muscle use [Heart Rate And Rhythm] : heart rate was normal and rhythm regular [Heart Sounds] : normal S1 and S2 [Edema] : there was no peripheral edema [Bowel Sounds] : normal bowel sounds [Abdomen Soft] : soft [Abdomen Tenderness] : non-tender [Abdomen Mass (___ Cm)] : no abdominal mass palpated [Abdomen Hernia] : no hernia was discovered [Cervical Lymph Nodes Enlarged Posterior Bilaterally] : posterior cervical [Cervical Lymph Nodes Enlarged Anterior Bilaterally] : anterior cervical [Supraclavicular Lymph Nodes Enlarged Bilaterally] : supraclavicular [No CVA Tenderness] : no ~M costovertebral angle tenderness [No Spinal Tenderness] : no spinal tenderness [Abnormal Walk] : normal gait [Involuntary Movements] : no involuntary movements were seen [] : no rash [No Focal Deficits] : no focal deficits [Over the Past 2 Weeks, Have You Felt Down, Depressed, or Hopeless?] : 1.) Over the past 2 weeks, have you felt down, depressed, or hopeless? Yes [Over the Past 2 Weeks, Have You Felt Little Interest or Pleasure Doing Things?] : 2.) Over the past 2 weeks, have you felt little interest or pleasure doing things? No [FreeTextEntry1] : + anxious + flight of ideas + inability to focus

## 2019-02-01 ENCOUNTER — OTHER (OUTPATIENT)
Age: 44
End: 2019-02-01

## 2019-02-01 PROCEDURE — G9005: CPT

## 2019-02-02 LAB — DEPRECATED O AND P PREP STL: NORMAL

## 2019-02-04 ENCOUNTER — OTHER (OUTPATIENT)
Age: 44
End: 2019-02-04

## 2019-02-04 LAB
BACTERIA STL CULT: NORMAL
C DIFF TOX B STL QL CT TISS CULT: NORMAL
Lab: NORMAL

## 2019-02-04 NOTE — PATIENT PROFILE ADULT. - TEACHING/LEARNING LEARNING PREFERENCES
Discharge planning



Notified by staff that patient is asking to go home. Met with patient and his wife, who 
confirm that they are ready for discharge. Both the patient and his wife prefer outpatient 
PT to home health care. Patient's wife states they need a longer tub transfer bench, but 
they can obtain this from patient's brother. Physician notified. No barriers to discharge 
noted. Will plan for discharge home on Tuesday, 2/5/19. individual instruction

## 2019-02-05 ENCOUNTER — OTHER (OUTPATIENT)
Age: 44
End: 2019-02-05

## 2019-02-05 LAB — A1AT STL-MCNC: <16 MG/DL

## 2019-02-06 ENCOUNTER — EMERGENCY (EMERGENCY)
Facility: HOSPITAL | Age: 44
LOS: 1 days | Discharge: HOME | End: 2019-02-06
Attending: EMERGENCY MEDICINE
Payer: COMMERCIAL

## 2019-02-06 VITALS
HEART RATE: 92 BPM | OXYGEN SATURATION: 100 % | RESPIRATION RATE: 18 BRPM | TEMPERATURE: 98 F | DIASTOLIC BLOOD PRESSURE: 88 MMHG | SYSTOLIC BLOOD PRESSURE: 138 MMHG

## 2019-02-06 VITALS
OXYGEN SATURATION: 100 % | SYSTOLIC BLOOD PRESSURE: 134 MMHG | WEIGHT: 104.06 LBS | HEART RATE: 81 BPM | DIASTOLIC BLOOD PRESSURE: 86 MMHG | RESPIRATION RATE: 22 BRPM

## 2019-02-06 DIAGNOSIS — Z90.49 ACQUIRED ABSENCE OF OTHER SPECIFIED PARTS OF DIGESTIVE TRACT: Chronic | ICD-10-CM

## 2019-02-06 LAB
ALBUMIN SERPL ELPH-MCNC: 4.9 G/DL — SIGNIFICANT CHANGE UP (ref 3.3–5)
ALP SERPL-CCNC: 87 U/L — SIGNIFICANT CHANGE UP (ref 40–120)
ALT FLD-CCNC: 14 U/L — SIGNIFICANT CHANGE UP (ref 10–45)
ANION GAP SERPL CALC-SCNC: 14 MMOL/L — SIGNIFICANT CHANGE UP (ref 5–17)
ANISOCYTOSIS BLD QL: SLIGHT — SIGNIFICANT CHANGE UP
APPEARANCE UR: CLEAR — SIGNIFICANT CHANGE UP
APTT BLD: 28.7 SEC — SIGNIFICANT CHANGE UP (ref 27.5–36.3)
AST SERPL-CCNC: 20 U/L — SIGNIFICANT CHANGE UP (ref 10–40)
BACTERIA # UR AUTO: NEGATIVE — SIGNIFICANT CHANGE UP
BASE EXCESS BLDV CALC-SCNC: 0.4 MMOL/L — SIGNIFICANT CHANGE UP (ref -2–2)
BASOPHILS # BLD AUTO: 0 K/UL — SIGNIFICANT CHANGE UP (ref 0–0.2)
BASOPHILS NFR BLD AUTO: 0.6 % — SIGNIFICANT CHANGE UP (ref 0–2)
BILIRUB SERPL-MCNC: 0.2 MG/DL — SIGNIFICANT CHANGE UP (ref 0.2–1.2)
BILIRUB UR-MCNC: NEGATIVE — SIGNIFICANT CHANGE UP
BUN SERPL-MCNC: 5 MG/DL — LOW (ref 7–23)
CA-I SERPL-SCNC: 1.15 MMOL/L — SIGNIFICANT CHANGE UP (ref 1.12–1.3)
CALCIUM SERPL-MCNC: 9.4 MG/DL — SIGNIFICANT CHANGE UP (ref 8.4–10.5)
CHLORIDE BLDV-SCNC: 111 MMOL/L — HIGH (ref 96–108)
CHLORIDE SERPL-SCNC: 106 MMOL/L — SIGNIFICANT CHANGE UP (ref 96–108)
CO2 BLDV-SCNC: 25 MMOL/L — SIGNIFICANT CHANGE UP (ref 22–30)
CO2 SERPL-SCNC: 21 MMOL/L — LOW (ref 22–31)
COLOR SPEC: COLORLESS — SIGNIFICANT CHANGE UP
CREAT SERPL-MCNC: 0.72 MG/DL — SIGNIFICANT CHANGE UP (ref 0.5–1.3)
DIFF PNL FLD: ABNORMAL
ELLIPTOCYTES BLD QL SMEAR: SLIGHT — SIGNIFICANT CHANGE UP
EOSINOPHIL # BLD AUTO: 0 K/UL — SIGNIFICANT CHANGE UP (ref 0–0.5)
EOSINOPHIL NFR BLD AUTO: 0.5 % — SIGNIFICANT CHANGE UP (ref 0–6)
EPI CELLS # UR: 0 /HPF — SIGNIFICANT CHANGE UP
FAT STL QN: NORMAL
FAT STL QN: NORMAL
GAS PNL BLDV: 136 MMOL/L — SIGNIFICANT CHANGE UP (ref 136–145)
GAS PNL BLDV: SIGNIFICANT CHANGE UP
GLUCOSE BLDV-MCNC: 95 MG/DL — SIGNIFICANT CHANGE UP (ref 70–99)
GLUCOSE SERPL-MCNC: 99 MG/DL — SIGNIFICANT CHANGE UP (ref 70–99)
GLUCOSE UR QL: NEGATIVE — SIGNIFICANT CHANGE UP
HCG UR QL: NEGATIVE — SIGNIFICANT CHANGE UP
HCO3 BLDV-SCNC: 24 MMOL/L — SIGNIFICANT CHANGE UP (ref 21–29)
HCT VFR BLD CALC: 30.8 % — LOW (ref 34.5–45)
HCT VFR BLDA CALC: 30 % — LOW (ref 39–50)
HGB BLD CALC-MCNC: 9.8 G/DL — LOW (ref 11.5–15.5)
HGB BLD-MCNC: 10.3 G/DL — LOW (ref 11.5–15.5)
HYALINE CASTS # UR AUTO: 0 /LPF — SIGNIFICANT CHANGE UP (ref 0–2)
HYPOCHROMIA BLD QL: SLIGHT — SIGNIFICANT CHANGE UP
INR BLD: 1.06 RATIO — SIGNIFICANT CHANGE UP (ref 0.88–1.16)
KETONES UR-MCNC: NEGATIVE — SIGNIFICANT CHANGE UP
LACTATE BLDV-MCNC: 1.4 MMOL/L — SIGNIFICANT CHANGE UP (ref 0.7–2)
LEUKOCYTE ESTERASE UR-ACNC: NEGATIVE — SIGNIFICANT CHANGE UP
LYMPHOCYTES # BLD AUTO: 1.3 K/UL — SIGNIFICANT CHANGE UP (ref 1–3.3)
LYMPHOCYTES # BLD AUTO: 21 % — SIGNIFICANT CHANGE UP (ref 13–44)
MCHC RBC-ENTMCNC: 23.9 PG — LOW (ref 27–34)
MCHC RBC-ENTMCNC: 33.4 GM/DL — SIGNIFICANT CHANGE UP (ref 32–36)
MCV RBC AUTO: 71.7 FL — LOW (ref 80–100)
MICROCYTES BLD QL: SLIGHT — SIGNIFICANT CHANGE UP
MONOCYTES # BLD AUTO: 0.4 K/UL — SIGNIFICANT CHANGE UP (ref 0–0.9)
MONOCYTES NFR BLD AUTO: 7 % — SIGNIFICANT CHANGE UP (ref 2–14)
NEUTROPHILS # BLD AUTO: 4.3 K/UL — SIGNIFICANT CHANGE UP (ref 1.8–7.4)
NEUTROPHILS NFR BLD AUTO: 70.9 % — SIGNIFICANT CHANGE UP (ref 43–77)
NITRITE UR-MCNC: NEGATIVE — SIGNIFICANT CHANGE UP
PCO2 BLDV: 38 MMHG — SIGNIFICANT CHANGE UP (ref 35–50)
PH BLDV: 7.42 — SIGNIFICANT CHANGE UP (ref 7.35–7.45)
PH UR: 8 — SIGNIFICANT CHANGE UP (ref 5–8)
PLAT MORPH BLD: NORMAL — SIGNIFICANT CHANGE UP
PLATELET # BLD AUTO: 104 K/UL — LOW (ref 150–400)
PO2 BLDV: 55 MMHG — HIGH (ref 25–45)
POIKILOCYTOSIS BLD QL AUTO: SLIGHT — SIGNIFICANT CHANGE UP
POTASSIUM BLDV-SCNC: 3.6 MMOL/L — SIGNIFICANT CHANGE UP (ref 3.5–5.3)
POTASSIUM SERPL-MCNC: 3.8 MMOL/L — SIGNIFICANT CHANGE UP (ref 3.5–5.3)
POTASSIUM SERPL-SCNC: 3.8 MMOL/L — SIGNIFICANT CHANGE UP (ref 3.5–5.3)
PROT SERPL-MCNC: 7.6 G/DL — SIGNIFICANT CHANGE UP (ref 6–8.3)
PROT UR-MCNC: NEGATIVE — SIGNIFICANT CHANGE UP
PROTHROM AB SERPL-ACNC: 12.2 SEC — SIGNIFICANT CHANGE UP (ref 10–12.9)
RBC # BLD: 4.29 M/UL — SIGNIFICANT CHANGE UP (ref 3.8–5.2)
RBC # FLD: 14.9 % — HIGH (ref 10.3–14.5)
RBC BLD AUTO: ABNORMAL
RBC CASTS # UR COMP ASSIST: 0 /HPF — SIGNIFICANT CHANGE UP (ref 0–4)
SAO2 % BLDV: 84 % — SIGNIFICANT CHANGE UP (ref 67–88)
SODIUM SERPL-SCNC: 141 MMOL/L — SIGNIFICANT CHANGE UP (ref 135–145)
SP GR SPEC: 1 — LOW (ref 1.01–1.02)
TROPONIN T, HIGH SENSITIVITY RESULT: <6 NG/L — SIGNIFICANT CHANGE UP (ref 0–51)
TROPONIN T, HIGH SENSITIVITY RESULT: <6 NG/L — SIGNIFICANT CHANGE UP (ref 0–51)
UROBILINOGEN FLD QL: NEGATIVE — SIGNIFICANT CHANGE UP
WBC # BLD: 6 K/UL — SIGNIFICANT CHANGE UP (ref 3.8–10.5)
WBC # FLD AUTO: 6 K/UL — SIGNIFICANT CHANGE UP (ref 3.8–10.5)
WBC UR QL: 0 /HPF — SIGNIFICANT CHANGE UP (ref 0–5)

## 2019-02-06 PROCEDURE — 82947 ASSAY GLUCOSE BLOOD QUANT: CPT

## 2019-02-06 PROCEDURE — 71045 X-RAY EXAM CHEST 1 VIEW: CPT

## 2019-02-06 PROCEDURE — 85730 THROMBOPLASTIN TIME PARTIAL: CPT

## 2019-02-06 PROCEDURE — 99285 EMERGENCY DEPT VISIT HI MDM: CPT | Mod: 25

## 2019-02-06 PROCEDURE — 84132 ASSAY OF SERUM POTASSIUM: CPT

## 2019-02-06 PROCEDURE — 80053 COMPREHEN METABOLIC PANEL: CPT

## 2019-02-06 PROCEDURE — 93005 ELECTROCARDIOGRAM TRACING: CPT

## 2019-02-06 PROCEDURE — 85610 PROTHROMBIN TIME: CPT

## 2019-02-06 PROCEDURE — 83605 ASSAY OF LACTIC ACID: CPT

## 2019-02-06 PROCEDURE — 93010 ELECTROCARDIOGRAM REPORT: CPT

## 2019-02-06 PROCEDURE — 99215 OFFICE O/P EST HI 40 MIN: CPT

## 2019-02-06 PROCEDURE — 84295 ASSAY OF SERUM SODIUM: CPT

## 2019-02-06 PROCEDURE — 84484 ASSAY OF TROPONIN QUANT: CPT

## 2019-02-06 PROCEDURE — 81025 URINE PREGNANCY TEST: CPT

## 2019-02-06 PROCEDURE — 99284 EMERGENCY DEPT VISIT MOD MDM: CPT | Mod: 25

## 2019-02-06 PROCEDURE — 81001 URINALYSIS AUTO W/SCOPE: CPT

## 2019-02-06 PROCEDURE — 85027 COMPLETE CBC AUTOMATED: CPT

## 2019-02-06 PROCEDURE — 82803 BLOOD GASES ANY COMBINATION: CPT

## 2019-02-06 PROCEDURE — 82330 ASSAY OF CALCIUM: CPT

## 2019-02-06 PROCEDURE — 85014 HEMATOCRIT: CPT

## 2019-02-06 PROCEDURE — 71045 X-RAY EXAM CHEST 1 VIEW: CPT | Mod: 26

## 2019-02-06 PROCEDURE — 82435 ASSAY OF BLOOD CHLORIDE: CPT

## 2019-02-06 PROCEDURE — 87086 URINE CULTURE/COLONY COUNT: CPT

## 2019-02-06 RX ORDER — SODIUM CHLORIDE 9 MG/ML
1000 INJECTION INTRAMUSCULAR; INTRAVENOUS; SUBCUTANEOUS ONCE
Qty: 0 | Refills: 0 | Status: COMPLETED | OUTPATIENT
Start: 2019-02-06 | End: 2019-02-06

## 2019-02-06 RX ADMIN — SODIUM CHLORIDE 1000 MILLILITER(S): 9 INJECTION INTRAMUSCULAR; INTRAVENOUS; SUBCUTANEOUS at 13:19

## 2019-02-06 NOTE — ED ADULT NURSE NOTE - OBJECTIVE STATEMENT
43 year old female presents to the ED complaining of CP and GI upset x 2 days. As per Pt she has had left sided cheat pain radiating to left arm x 2 days. pt has Hx of POTS and gastro paresis, and hemorrhagic cysts. Pt has known DVT in the left subclavian and IJ and is currently on xarelto. Pt anxious in appearance, and restless on arrival. Lung sounds clear bilaterally, Pt speaking clearly in full sentences, shows no SS of respiratory distress. Pt placed on CM and ekg complete all showing NS. Pt complains of 2 episodes of 'feeling like I was going to pass out' over the last few days.

## 2019-02-06 NOTE — CHART NOTE - NSCHARTNOTEFT_GEN_A_CORE
Emergency Social Work Note:  LMSW received a referral for assistance with discharge planning. Per medical patient is medically cleared for discharge.  LMSW introduced herself to the patient and verbalized understanding her role.  Demographic information was reviewed and confirmed.  Patient reports she resides at [address] [place] [address]. Patient has [employer] Medicaid insurance benefit.  LMSW contacted [facility] to arrange discharge transportation.  Confirmation number provided: [id number].  Transportation provider to be assigned.   Patient was made aware. Patient declined to identify a primary caregiver.   LMSW will continue to follow up as needed.

## 2019-02-06 NOTE — ED PROVIDER NOTE - PROGRESS NOTE DETAILS
discussed results with patient, spoke with Dr. Zelalem Reed who will come evaluate patient - Clarissa Reveles PA-C as per cardiology recommending second troponin, then patient can be safely discharged if also negative - Clarissa Reveles PA-C as per nursing, no episodes of emesis during stay in ED - Clarissa Reveles PA-C ED eval completed. There is nothing clinically evident to suggest any acute or emergent process; e.g., ACS, Ao catastrophe, PE (in need of intervention beyond the Xarelto she is already taking), PTX, complicated stefania/myocarditis, boerhaave's etc. There is no clinical indication for any additional emergent diagnostic investigation or intervention at this time. The patient has been discharged with appropriate instruction and follow-up.

## 2019-02-06 NOTE — ED ADULT NURSE REASSESSMENT NOTE - NS ED NURSE REASSESS COMMENT FT1
Pt discharged with father at bedside, RN reviewed all lab work and vitals. Pt is A&O x 4, VSS, afebrile, ambulating independently. Pt denies fever, chills, NVD, SOB, or chest pain. Pt verbalized understanding. IV removed and pressure bandage applied as requested by Pt, no SS of bleeding present upon discharge.

## 2019-02-06 NOTE — CONSULT NOTE ADULT - SUBJECTIVE AND OBJECTIVE BOX
**********              CARDIOLOGY CONSULT NOTE              ************  ============================================================  CHIEF COMPLAINT/REASON FOR CONSULT:  Patient is a 43y old  Female who presents with a chief complaint of     HISTORY OF PRESENT ILLNESS:  43yFemale with a history of  presenting with      To Delete  PAST MEDICAL & SURGICAL HISTORY:  DVT (deep venous thrombosis)  Autonomic dysreflexia  Gastroparesis  Hypertension  Dystonia  Depression  Idiopathic thrombocytopenic purpura  Dysautonomia  Idiopathic thrombocytopenia purpura  Anxiety  POTS (postural orthostatic tachycardia syndrome)  History of ITP  Labyrinthitis  Headache  HTN (hypertension)  Anxiety  IBS (irritable bowel syndrome)  S/P appendectomy          ============================================================  Interval Events   -   -     ============================================================      Allergies    barium sulfate (Other)  beta blockers (Unknown)  gadolinium (Hypotension)  IV Contrast (Anaphylaxis)  IV Contrast (Hypotension)  Reglan (Other)  Zofran (Other)  Zofran (Swelling)    Intolerances    beta blockers (Other)  metoprolol (Headache)  	    MEDICATIONS:                  PAST MEDICAL & SURGICAL HISTORY:  DVT (deep venous thrombosis)  Autonomic dysreflexia  Gastroparesis  Hypertension  Dystonia  Depression  Idiopathic thrombocytopenic purpura  Dysautonomia  Idiopathic thrombocytopenia purpura  Anxiety  POTS (postural orthostatic tachycardia syndrome)  History of ITP  Labyrinthitis  Headache  HTN (hypertension)  Anxiety  IBS (irritable bowel syndrome)  S/P appendectomy      FAMILY HISTORY:  No pertinent family history in first degree relatives  Family history of hypertension (Grandparent)  Family history of atrial fibrillation  Family history of prostate cancer in father    Mother - HTN      SOCIAL HISTORY:    [ x] Non-smoker  [x] No Illicit Drug Use  [ x] No Excess EtOH Use      REVIEW OF SYSTEMS: (Unless + Before Symptom, it is negative)  Constitutional: No Fever, Fatigue, Weight Changes  Eyes: No Recent Vision Changes, Eye Pain  ENT: No Congestion, Sore Throat  Endocrine: No Excess Sweating, Temperature Intolerance  Cardiovascular: No Chest Pain, Palpitations, Shortness of Breath, Pre-syncope, Syncope, LE Edema  Respiratory: No Cough, Congestion, Wheezing  Gastrointestinal: No Abdominal Pain, Nausea, Vomiting  Genitourinary: No dysuria, hematuria  Musculoskeletal: No Joint Pain, Swelling  Neurologic: No headaches, Imbalance, Weakness  Skin: No rashes, hematoma, purprura    ================================    PHYSICAL EXAM:  T(C): 36.7 (02-06-19 @ 13:37), Max: 36.7 (02-06-19 @ 13:37)  HR: 98 (02-06-19 @ 13:37) (81 - 98)  BP: 147/96 (02-06-19 @ 13:37) (134/86 - 147/96)  RR: 23 (02-06-19 @ 13:37) (22 - 23)  SpO2: 100% (02-06-19 @ 13:37) (100% - 100%)  Wt(kg): --  I&O's Summary    Appearance: Normal; NAD	  HEENT:   Normal oral mucosa, EOMI	  Lymphatic: No lymphadenopathy  Cardiovascular: Normal S1 S2, No JVD, No murmurs, No edema  Respiratory: Lungs clear to auscultation, no use of accessory muscles	  Psychiatry: A & O x 3, Mood & affect appropriate  Gastrointestinal:  Soft, Non-tender	  Skin: No rashes, No ecchymoses, No cyanosis	  Neurologic: Non-focal, No Focal Deficits  Extremities: Normal range of motion, No clubbing, cyanosis or edema  Vascular: Peripheral pulses palpable 2+ bilaterally, no prominent varicosities    ============================    LABS:	   Labs [id number] @ 14:33	    CBC Full  -  ( 06 Feb 2019 13:16 )  WBC Count : 6.0 K/uL  Hemoglobin : 10.3 g/dL  Hematocrit : 30.8 %  Platelet Count - Automated : 104 K/uL  Mean Cell Volume : 71.7 fl  Mean Cell Hemoglobin : 23.9 pg  Mean Cell Hemoglobin Concentration : 33.4 gm/dL  Auto Neutrophil # : x  Auto Lymphocyte # : x  Auto Monocyte # : x  Auto Eosinophil # : x  Auto Basophil # : x  Auto Neutrophil % : x  Auto Lymphocyte % : x  Auto Monocyte % : x  Auto Eosinophil % : x  Auto Basophil % : x    02-06    141  |  106  |  5<L>  ----------------------------<  99  3.8   |  21<L>  |  0.72    Ca    9.4      06 Feb 2019 13:16    TPro  7.6  /  Alb  4.9  /  TBili  0.2  /  DBili  x   /  AST  20  /  ALT  14  /  AlkPhos  87  02-06        =========================================================================MEDICATIONS  (STANDING):    MEDICATIONS  (PRN):      ASSESSMENT:      Recommendations  -   -   -   -   -   - Will follow        Please call with questions.     Philip Reed MD, [facility]  [phone]                                                                                                        Total time spent in face-to-face encounter was 80 minutes. > 50 minutes spent in counseling and coordination of care addressing all medical issues listed above. All labs, imaging, consultant reports, and any relevant outside medical records personally reviewed in order to evaluate and manage the patient medically as well as provide coordination of care amongst providers. **********              CARDIOLOGY CONSULT NOTE              ************  ============================================================  CHIEF COMPLAINT/REASON FOR CONSULT:  Patient is a 43y old  Female who presents with a chief complaint of     HISTORY OF PRESENT ILLNESS:  43yFemale with a history of Gastroparesis ?Srogren's/ ITP/IBS/ANxiety/Hypertensive Urgency/Psychiatric Issues/?POTS Syndrome (exhaustive workup presumed autonomic dysfunction), Prot S Deficiency/DVT unfortunately re-presents ~12th time for chest pressure/tachcardia/hyper and hypotension.  This has been an ongoing issue.   She now presents with CP. Says apically located, goes down L arm, unrelated to exertion. 10/10. Resolves spontaneously after a few minutes. No SOB/Palps. EKG NSR no ischemic changes. hsT negative.         Allergies    barium sulfate (Other)  beta blockers (Unknown)  gadolinium (Hypotension)  IV Contrast (Anaphylaxis)  IV Contrast (Hypotension)  Reglan (Other)  Zofran (Other)  Zofran (Swelling)    Intolerances    beta blockers (Other)  metoprolol (Headache)  	    MEDICATIONS:                  PAST MEDICAL & SURGICAL HISTORY:  DVT (deep venous thrombosis)  Autonomic dysreflexia  Gastroparesis  Hypertension  Dystonia  Depression  Idiopathic thrombocytopenic purpura  Dysautonomia  Idiopathic thrombocytopenia purpura  Anxiety  POTS (postural orthostatic tachycardia syndrome)  History of ITP  Labyrinthitis  Headache  HTN (hypertension)  Anxiety  IBS (irritable bowel syndrome)  S/P appendectomy      FAMILY HISTORY:  No pertinent family history in first degree relatives  Family history of hypertension (Grandparent)  Family history of atrial fibrillation  Family history of prostate cancer in father    Mother - HTN      SOCIAL HISTORY:    [ x] Non-smoker  [x] No Illicit Drug Use  [ x] No Excess EtOH Use      REVIEW OF SYSTEMS: (Unless + Before Symptom, it is negative)  Constitutional: No Fever, Fatigue, Weight Changes  Eyes: No Recent Vision Changes, Eye Pain  ENT: No Congestion, Sore Throat  Endocrine: No Excess Sweating, Temperature Intolerance  Cardiovascular: +Chest Pain, Palpitations, Shortness of Breath, Pre-syncope, Syncope, LE Edema  Respiratory: No Cough, Congestion, Wheezing  Gastrointestinal: No Abdominal Pain, Nausea, Vomiting  Genitourinary: No dysuria, hematuria  Musculoskeletal: No Joint Pain, Swelling  Neurologic: No headaches, Imbalance, Weakness  Skin: No rashes, hematoma, purprura    ================================    PHYSICAL EXAM:  T(C): 36.7 (02-06-19 @ 13:37), Max: 36.7 (02-06-19 @ 13:37)  HR: 98 (02-06-19 @ 13:37) (81 - 98)  BP: 147/96 (02-06-19 @ 13:37) (134/86 - 147/96)  RR: 23 (02-06-19 @ 13:37) (22 - 23)  SpO2: 100% (02-06-19 @ 13:37) (100% - 100%)  Wt(kg): --  I&O's Summary    Appearance: +Hyperverbal	  HEENT:   Normal oral mucosa, EOMI	  Lymphatic: No lymphadenopathy  Cardiovascular: Normal S1 S2, No JVD, No murmurs, No edema  Respiratory: Lungs clear to auscultation, no use of accessory muscles	  Psychiatry: A & O x 3, Mood & affect appropriate  Gastrointestinal:  Soft, Non-tender	  Skin: No rashes, No ecchymoses, No cyanosis	  Neurologic: Non-focal, No Focal Deficits  Extremities: Normal range of motion, No clubbing, cyanosis or edema  Vascular: Peripheral pulses palpable 2+ bilaterally, no prominent varicosities    ============================    LABS:	   Labs [id number] @ 14:33	    CBC Full  -  ( 06 Feb 2019 13:16 )  WBC Count : 6.0 K/uL  Hemoglobin : 10.3 g/dL  Hematocrit : 30.8 %  Platelet Count - Automated : 104 K/uL  Mean Cell Volume : 71.7 fl  Mean Cell Hemoglobin : 23.9 pg  Mean Cell Hemoglobin Concentration : 33.4 gm/dL  Auto Neutrophil # : x  Auto Lymphocyte # : x  Auto Monocyte # : x  Auto Eosinophil # : x  Auto Basophil # : x  Auto Neutrophil % : x  Auto Lymphocyte % : x  Auto Monocyte % : x  Auto Eosinophil % : x  Auto Basophil % : x    02-06    141  |  106  |  5<L>  ----------------------------<  99  3.8   |  21<L>  |  0.72    Ca    9.4      06 Feb 2019 13:16    TPro  7.6  /  Alb  4.9  /  TBili  0.2  /  DBili  x   /  AST  20  /  ALT  14  /  AlkPhos  87  02-06  EKG  NSR no ischemic      =========================================================================MEDICATIONS  (STANDING):    ASSESSMENT:  43yFemale with a history of Gastroparesis ?Srogren's/ ITP/IBS/ANxiety/Hypertensive Urgency/Psychiatric Issues/?POTS Syndrome (exhaustive workup presumed autonomic dysfunction), Prot S Deficiency/DVT unfortunately re-presents ~12th time for chest pressure/tachcardia/hyper and hypotension.  This has been an ongoing issue.   Atypical. Trop neg. On Xarelto and no desat to suggest PE      Recommendations  - D/C   - Continue Diltiazem        Please call with questions.     Philip Reed MD, [facility]  [phone]                                                                                                        Total time spent in face-to-face encounter was 80 minutes. > 50 minutes spent in counseling and coordination of care addressing all medical issues listed above. All labs, imaging, consultant reports, and any relevant outside medical records personally reviewed in order to evaluate and manage the patient medically as well as provide coordination of care amongst providers.

## 2019-02-06 NOTE — ED PROVIDER NOTE - NSFOLLOWUPINSTRUCTIONS_ED_ALL_ED_FT
Follow up with your cardiologist within the next 2-3 days  Bring a copy of your test results with you to your appointment  Continue your current medication regimen  Return to the Emergency Room if you experience new or worsening symptoms  [place] diet

## 2019-02-06 NOTE — ED PROVIDER NOTE - ATTENDING CONTRIBUTION TO CARE
44 y/o female with the above documented history and HPI who on exam appears well and comfortable. VSs noted, neck supple, lungs CTA, cardiac sounds s/ audible m/r/g, abdomen soft, NT/ND, extremities s/ asymmetry, skin s/ rash or edema and neurologically intact. EKG s/ acute ischemic change. Screening CXR and labs pending. Will follow her remaining studies and treat/dispo accordingly.

## 2019-02-06 NOTE — ED PROVIDER NOTE - CARE PROVIDER_API CALL
Philip Reed)  Cardiovascular Disease; Internal Medicine  [address] [place] [address], [address] [place] [address]  Phone: [phone]  Fax: [phone]  Follow Up Time:

## 2019-02-06 NOTE — ED PROVIDER NOTE - CARE PLAN
Principal Discharge DX:	Chest pain  Assessment and plan of treatment:	Follow up with your cardiologist within the next 2-3 days  Bring a copy of your test results with you to your appointment  Continue your current medication regimen  Return to the Emergency Room if you experience new or worsening symptoms  [place] diet

## 2019-02-06 NOTE — ED PROVIDER NOTE - OBJECTIVE STATEMENT
44 y/o female multiple medical problems, including DVT on xarelto, POTS, gastroparesis who presents to the ED for multiple complaints. patient states she has been having some "gi issues" for 2 days loose stools and nausea, 2 episodes vomiting. additionally she is reporting sharp chest pain that is waxing and waning, lasts a few minutes and then starts to resolve. the patient endorses the pain radiates to the left arm and into the back. no fever/chills/cough or recent URI illness.

## 2019-02-06 NOTE — ED PROVIDER NOTE - PLAN OF CARE
Follow up with your cardiologist within the next 2-3 days  Bring a copy of your test results with you to your appointment  Continue your current medication regimen  Return to the Emergency Room if you experience new or worsening symptoms  [place] diet

## 2019-02-06 NOTE — ED PROVIDER NOTE - PSYCHIATRIC, MLM
Alert and oriented to person, place, time/situation. anxious appearing. no apparent risk to self or others.

## 2019-02-06 NOTE — ED ADULT NURSE NOTE - NSIMPLEMENTINTERV_GEN_ALL_ED
Implemented All Universal Safety Interventions:  [place] to call system. Call bell, personal items and telephone within reach. Instruct patient to call for assistance. Room bathroom lighting operational. Non-slip footwear when patient is off stretcher. Physically safe environment: no spills, clutter or unnecessary equipment. Stretcher in lowest position, wheels locked, appropriate side rails in place.

## 2019-02-06 NOTE — ED ADULT TRIAGE NOTE - NS ED NURSE BANDS TYPE
DISCHARGE PLANNING     Discharge to home or other facility with appropriate resources Progressing        INFECTION - ADULT     Absence or prevention of progression during hospitalization Progressing        Knowledge Deficit     Patient/family/caregiver demonstrates understanding of disease process, treatment plan, medications, and discharge instructions Progressing        PAIN - ADULT     Verbalizes/displays adequate comfort level or baseline comfort level Progressing        SAFETY ADULT     Patient will remain free of falls Progressing     Maintain or return to baseline ADL function Progressing     Maintain or return mobility status to optimal level Progressing Name band;

## 2019-02-07 ENCOUNTER — OTHER (OUTPATIENT)
Age: 44
End: 2019-02-07

## 2019-02-07 ENCOUNTER — EMERGENCY (EMERGENCY)
Facility: HOSPITAL | Age: 44
LOS: 1 days | Discharge: HOME | End: 2019-02-07
Attending: EMERGENCY MEDICINE | Admitting: EMERGENCY MEDICINE
Payer: MEDICAID

## 2019-02-07 ENCOUNTER — RX RENEWAL (OUTPATIENT)
Age: 44
End: 2019-02-07

## 2019-02-07 VITALS
OXYGEN SATURATION: 99 % | HEIGHT: 62 IN | RESPIRATION RATE: 16 BRPM | TEMPERATURE: 98 F | HEART RATE: 96 BPM | WEIGHT: 110.01 LBS

## 2019-02-07 VITALS
SYSTOLIC BLOOD PRESSURE: 135 MMHG | OXYGEN SATURATION: 98 % | HEART RATE: 90 BPM | DIASTOLIC BLOOD PRESSURE: 90 MMHG | TEMPERATURE: 98 F | RESPIRATION RATE: 20 BRPM

## 2019-02-07 DIAGNOSIS — Z90.49 ACQUIRED ABSENCE OF OTHER SPECIFIED PARTS OF DIGESTIVE TRACT: Chronic | ICD-10-CM

## 2019-02-07 LAB
CULTURE RESULTS: SIGNIFICANT CHANGE UP
SPECIMEN SOURCE: SIGNIFICANT CHANGE UP

## 2019-02-07 PROCEDURE — 99284 EMERGENCY DEPT VISIT MOD MDM: CPT

## 2019-02-07 PROCEDURE — 93971 EXTREMITY STUDY: CPT | Mod: 26,LT

## 2019-02-07 PROCEDURE — 93970 EXTREMITY STUDY: CPT

## 2019-02-07 PROCEDURE — 93970 EXTREMITY STUDY: CPT | Mod: 26

## 2019-02-07 PROCEDURE — 93005 ELECTROCARDIOGRAM TRACING: CPT

## 2019-02-07 PROCEDURE — 99284 EMERGENCY DEPT VISIT MOD MDM: CPT | Mod: 25

## 2019-02-07 RX ORDER — MONTELUKAST 10 MG/1
10 TABLET, FILM COATED ORAL DAILY
Qty: 90 | Refills: 0 | Status: ACTIVE | COMMUNITY
Start: 2019-02-07 | End: 1900-01-01

## 2019-02-07 NOTE — ED PROVIDER NOTE - CARE PLAN
Principal Discharge DX:	Clotting disorder  Assessment and plan of treatment:	Return to the ED for any new or worsening symptoms  Take your medication as prescribed  Follow up with your hematologist  Follow up with cardiology   Advance activity as tolerated

## 2019-02-07 NOTE — ED PROVIDER NOTE - PLAN OF CARE
Return to the ED for any new or worsening symptoms  Take your medication as prescribed  Follow up with your hematologist  Follow up with cardiology   Advance activity as tolerated

## 2019-02-07 NOTE — ED ADULT NURSE NOTE - NSIMPLEMENTINTERV_GEN_ALL_ED
Implemented All Universal Safety Interventions:  [place] to call system. Call bell, personal items and telephone within reach. Instruct patient to call for assistance. Room bathroom lighting operational. Non-slip footwear when patient is off stretcher. Physically safe environment: no spills, clutter or unnecessary equipment. Stretcher in lowest position, wheels locked, appropriate side rails in place.

## 2019-02-07 NOTE — ED ADULT NURSE NOTE - CHIEF COMPLAINT QUOTE
Patient reports discharge from [place] yesterday, patient reports DVT in left jugular and requires US of b/l arms. Patient reports referred by hematology. Reports b/l arm pain, chest pain. Patient refusing NIBP in any arm and IV lock until US completed.

## 2019-02-07 NOTE — ED PROVIDER NOTE - CLINICAL SUMMARY MEDICAL DECISION MAKING FREE TEXT BOX
Pt is a 42 yo female who presents from hematologist with concern for possible DVT prorogation.  Will obtain venous duplex and monitor

## 2019-02-07 NOTE — ED ADULT NURSE NOTE - OBJECTIVE STATEMENT
Amb to ED. Pt has been seen in 2 hospitals in past 2 days for c/o CP & SOB. Pt thinks she has a" DVT in my arms or legs & wants an evaluation"  Color fair. Skin warm & dry to touch. Lungs clear. No swelling, redness or warmth noted to extremities.

## 2019-02-07 NOTE — ED ADULT TRIAGE NOTE - CHIEF COMPLAINT QUOTE
Patient reports discharge from [place] yesterday, patient reports DVT in left jugular and requires US of b/l arms. Patient reports referred by hematology. Reports b/l arm pain, chest pain. Patient refusing NIBP in any arm and IV lock until US completed.

## 2019-02-07 NOTE — ED PROVIDER NOTE - OBJECTIVE STATEMENT
44 y/o F pt w/ PMHx Anxiety, Autonomic dysreflexia, Depression, DVT (deep venous thrombosis), Dysautonomia, Dystonia, Gastroparesis, Headache, ITP, HTN, IBS, Idiopathic thrombocytopenia purpura, Labyrinthitis, POTS presents to the ED c/o swelling BUE and SOB x days. Pt states she is currently on Xarelto 20 mg due to hx of protein S disorder. States she was seen at [facility] for complaint of SOB and CP, troponin was neg. Spoke with her hematologist today regarding continuing symptoms and referred to ED for further eval. As per pt's medical record, US of bilateral upper extremities on 1/25 revealed chronic nonocclusive blood clot in L IJ vein. Denies fever or any other complaints at this time.

## 2019-02-10 ENCOUNTER — EMERGENCY (EMERGENCY)
Facility: HOSPITAL | Age: 44
LOS: 1 days | Discharge: HOME | End: 2019-02-10
Attending: EMERGENCY MEDICINE
Payer: MEDICAID

## 2019-02-10 ENCOUNTER — MOBILE ON CALL (OUTPATIENT)
Age: 44
End: 2019-02-10

## 2019-02-10 VITALS
RESPIRATION RATE: 18 BRPM | SYSTOLIC BLOOD PRESSURE: 129 MMHG | HEART RATE: 95 BPM | OXYGEN SATURATION: 97 % | DIASTOLIC BLOOD PRESSURE: 92 MMHG

## 2019-02-10 VITALS
TEMPERATURE: 98 F | OXYGEN SATURATION: 100 % | DIASTOLIC BLOOD PRESSURE: 87 MMHG | SYSTOLIC BLOOD PRESSURE: 131 MMHG | WEIGHT: 95.02 LBS | RESPIRATION RATE: 18 BRPM | HEART RATE: 114 BPM

## 2019-02-10 DIAGNOSIS — Z90.49 ACQUIRED ABSENCE OF OTHER SPECIFIED PARTS OF DIGESTIVE TRACT: Chronic | ICD-10-CM

## 2019-02-10 LAB
ALBUMIN SERPL ELPH-MCNC: 4.8 G/DL — SIGNIFICANT CHANGE UP (ref 3.3–5)
ALP SERPL-CCNC: 95 U/L — SIGNIFICANT CHANGE UP (ref 40–120)
ALT FLD-CCNC: 14 U/L — SIGNIFICANT CHANGE UP (ref 10–45)
ANION GAP SERPL CALC-SCNC: 16 MMOL/L — SIGNIFICANT CHANGE UP (ref 5–17)
APTT BLD: 31.8 SEC — SIGNIFICANT CHANGE UP (ref 27.5–36.3)
AST SERPL-CCNC: 20 U/L — SIGNIFICANT CHANGE UP (ref 10–40)
BASOPHILS # BLD AUTO: 0 K/UL — SIGNIFICANT CHANGE UP (ref 0–0.2)
BASOPHILS NFR BLD AUTO: 0.7 % — SIGNIFICANT CHANGE UP (ref 0–2)
BILIRUB SERPL-MCNC: 0.2 MG/DL — SIGNIFICANT CHANGE UP (ref 0.2–1.2)
BUN SERPL-MCNC: 8 MG/DL — SIGNIFICANT CHANGE UP (ref 7–23)
CALCIUM SERPL-MCNC: 9.7 MG/DL — SIGNIFICANT CHANGE UP (ref 8.4–10.5)
CHLORIDE SERPL-SCNC: 103 MMOL/L — SIGNIFICANT CHANGE UP (ref 96–108)
CO2 SERPL-SCNC: 21 MMOL/L — LOW (ref 22–31)
CREAT SERPL-MCNC: 0.75 MG/DL — SIGNIFICANT CHANGE UP (ref 0.5–1.3)
EOSINOPHIL # BLD AUTO: 0 K/UL — SIGNIFICANT CHANGE UP (ref 0–0.5)
EOSINOPHIL NFR BLD AUTO: 0.2 % — SIGNIFICANT CHANGE UP (ref 0–6)
GLUCOSE SERPL-MCNC: 107 MG/DL — HIGH (ref 70–99)
HCG SERPL-ACNC: <2 MIU/ML — SIGNIFICANT CHANGE UP
HCT VFR BLD CALC: 31.5 % — LOW (ref 34.5–45)
HCT VFR BLD CALC: 36.1 % — SIGNIFICANT CHANGE UP (ref 34.5–45)
HGB BLD-MCNC: 10.4 G/DL — LOW (ref 11.5–15.5)
HGB BLD-MCNC: 11.7 G/DL — SIGNIFICANT CHANGE UP (ref 11.5–15.5)
INR BLD: 1.11 RATIO — SIGNIFICANT CHANGE UP (ref 0.88–1.16)
LYMPHOCYTES # BLD AUTO: 1.6 K/UL — SIGNIFICANT CHANGE UP (ref 1–3.3)
LYMPHOCYTES # BLD AUTO: 22.8 % — SIGNIFICANT CHANGE UP (ref 13–44)
MCHC RBC-ENTMCNC: 23.7 PG — LOW (ref 27–34)
MCHC RBC-ENTMCNC: 24.5 PG — LOW (ref 27–34)
MCHC RBC-ENTMCNC: 32.4 GM/DL — SIGNIFICANT CHANGE UP (ref 32–36)
MCHC RBC-ENTMCNC: 33.2 GM/DL — SIGNIFICANT CHANGE UP (ref 32–36)
MCV RBC AUTO: 73.2 FL — LOW (ref 80–100)
MCV RBC AUTO: 73.7 FL — LOW (ref 80–100)
MONOCYTES # BLD AUTO: 0.4 K/UL — SIGNIFICANT CHANGE UP (ref 0–0.9)
MONOCYTES NFR BLD AUTO: 5.2 % — SIGNIFICANT CHANGE UP (ref 2–14)
NEUTROPHILS # BLD AUTO: 5 K/UL — SIGNIFICANT CHANGE UP (ref 1.8–7.4)
NEUTROPHILS NFR BLD AUTO: 71.1 % — SIGNIFICANT CHANGE UP (ref 43–77)
PLATELET # BLD AUTO: 119 K/UL — LOW (ref 150–400)
PLATELET # BLD AUTO: 96 K/UL — LOW (ref 150–400)
POTASSIUM SERPL-MCNC: 4.1 MMOL/L — SIGNIFICANT CHANGE UP (ref 3.5–5.3)
POTASSIUM SERPL-SCNC: 4.1 MMOL/L — SIGNIFICANT CHANGE UP (ref 3.5–5.3)
PROT SERPL-MCNC: 8.3 G/DL — SIGNIFICANT CHANGE UP (ref 6–8.3)
PROTHROM AB SERPL-ACNC: 12.8 SEC — SIGNIFICANT CHANGE UP (ref 10–12.9)
RBC # BLD: 4.27 M/UL — SIGNIFICANT CHANGE UP (ref 3.8–5.2)
RBC # BLD: 4.93 M/UL — SIGNIFICANT CHANGE UP (ref 3.8–5.2)
RBC # FLD: 15.2 % — HIGH (ref 10.3–14.5)
RBC # FLD: 15.4 % — HIGH (ref 10.3–14.5)
SODIUM SERPL-SCNC: 140 MMOL/L — SIGNIFICANT CHANGE UP (ref 135–145)
WBC # BLD: 7 K/UL — SIGNIFICANT CHANGE UP (ref 3.8–10.5)
WBC # BLD: 8.6 K/UL — SIGNIFICANT CHANGE UP (ref 3.8–10.5)
WBC # FLD AUTO: 7 K/UL — SIGNIFICANT CHANGE UP (ref 3.8–10.5)
WBC # FLD AUTO: 8.6 K/UL — SIGNIFICANT CHANGE UP (ref 3.8–10.5)

## 2019-02-10 PROCEDURE — 93975 VASCULAR STUDY: CPT | Mod: 26

## 2019-02-10 PROCEDURE — 93010 ELECTROCARDIOGRAM REPORT: CPT

## 2019-02-10 PROCEDURE — 70450 CT HEAD/BRAIN W/O DYE: CPT | Mod: 26

## 2019-02-10 PROCEDURE — 99284 EMERGENCY DEPT VISIT MOD MDM: CPT | Mod: 25

## 2019-02-10 PROCEDURE — 76830 TRANSVAGINAL US NON-OB: CPT | Mod: 26

## 2019-02-10 RX ORDER — FAMOTIDINE 10 MG/ML
20 INJECTION INTRAVENOUS ONCE
Qty: 0 | Refills: 0 | Status: COMPLETED | OUTPATIENT
Start: 2019-02-10 | End: 2019-02-10

## 2019-02-10 RX ORDER — SODIUM CHLORIDE 9 MG/ML
1000 INJECTION, SOLUTION INTRAVENOUS
Qty: 0 | Refills: 0 | Status: DISCONTINUED | OUTPATIENT
Start: 2019-02-10 | End: 2019-02-10

## 2019-02-10 RX ORDER — SIMETHICONE 80 MG/1
80 TABLET, CHEWABLE ORAL ONCE
Qty: 0 | Refills: 0 | Status: COMPLETED | OUTPATIENT
Start: 2019-02-10 | End: 2019-02-10

## 2019-02-10 RX ORDER — ACETAMINOPHEN 500 MG
1000 TABLET ORAL ONCE
Qty: 0 | Refills: 0 | Status: COMPLETED | OUTPATIENT
Start: 2019-02-10 | End: 2019-02-10

## 2019-02-10 RX ADMIN — SODIUM CHLORIDE 1000 MILLILITER(S): 9 INJECTION, SOLUTION INTRAVENOUS at 18:28

## 2019-02-10 RX ADMIN — SODIUM CHLORIDE 100 MILLILITER(S): 9 INJECTION, SOLUTION INTRAVENOUS at 18:28

## 2019-02-10 RX ADMIN — SODIUM CHLORIDE 100 MILLILITER(S): 9 INJECTION, SOLUTION INTRAVENOUS at 17:59

## 2019-02-10 NOTE — ED PROVIDER NOTE - PHYSICAL EXAMINATION
Attending MD Mina:    Gen:  anxious, no apparent distress, chronically ill appearing, oriented x 3  Neck: supple, no swelling, trachea midline  CV: heart tachy but reg rhythm, no obvious murmur appreciated   Resp: CTAB, breathing comfortably  Abd: soft, NT, ND  Extremities: extremities warm to the touch, no peripheral edema   Msk: no extremity deformities or bony tenderness  Pysch: anxious   Neuro: moves all extremities spontaneously, no gross motor or sensory deficits

## 2019-02-10 NOTE — ED PROVIDER NOTE - MEDICAL DECISION MAKING DETAILS
Attending MD Herrera: 43F with ho POTS, Protein C deficiency with IJ DVT on Xarelto, ITP presenting with heavy vaginal bleeding (acute on chronic) and syncope the day of presentation. Patient also endorsing a multitude of chronic complaints including "GI distress" and diarrhea, benign abdomen, clinically hydrated. Plan for CBC, T&S, TVUS, GYN consult for heavy vaginal bleeding in setting of full AC. EKG reviewed, SR, no interval abnormalities, unchanged from priors. Also notes head strike from syncope, will obtain CT head to ro ICH given head injury and AC use. Anticipate admission for serial H/H monitoring given full AC and report of bleeding

## 2019-02-10 NOTE — ED PROVIDER NOTE - NSFOLLOWUPINSTRUCTIONS_ED_ALL_ED_FT
Return to the ED immediately if you are soaking through more than 2 pads per hour. These are signs of life-threatening bleeding that would warrant immediate evaluation. Please follow up with your regular doctor or gynecologist in the next 1-2 days to ensure your bleeding is improving.      Please call your gastroenterology doctors to arrange a follow up appointment in 2-3 days.

## 2019-02-10 NOTE — ED PROVIDER NOTE - ATTENDING CONTRIBUTION TO CARE
Attending MD Herrera:  I personally have seen and examined this patient.  Resident note reviewed and agree on plan of care and except where noted.  See HPI, PE, and MDM for details.

## 2019-02-10 NOTE — ED PROVIDER NOTE - PROGRESS NOTE DETAILS
Attending MD Herrera: CBC reviewed and hct 36 (better than baseline) thus making significant bleed not likely as patient has had bleeding x 10 days. Will rpt CBC in 4 hours, if stable patient is appropriate for outpatient management of acute on chronic vaginal bleeding as has appointment with GYN tomorrow Rod Wright PGY2: d/w pt's outpatient obgyn - agreed patient is appropriate for outpatient workup Attending MD Herrera: received call from Dr. Sethi of GI (covering for Dr. Perez) as patient contacted GI office independently. Relayed to Dr. Sethi that patient has benign abdomen, do not think acute surgical pathology present, likely known IBS issues. Dr. Sethi states patient can follow up with GI as outpatient if ED team feels patient is fit for discharge. Attending MD Herrera: patient perseverating on need for CT with "drinking contrast." and hysterosonography. I explained in great detail to patient that CT a/p is not likely to benefit her as I do not suspect surgical pathology at this time. Also explained that hysterosonography is not a test performed through ED and that she could consult with her GYN as an outpatient regarding this. Patient was offered analgesia for reported abdominal pain and nausea, she is declining all of it. Will repeat CBC and pending TVUS results. Pt visibly upset about treatment plan, I offered my apology but stated that risks of radiation from frequent CTs that are not helping her clinical course is too great. Attending MD Herrera: CBC repeated, hct now 31 from 36. Remains HDS at this time, no indication for urgent transfusion. Will consult GYN here and admit for serial H/H monitoring. Attending MD Herrera: CBC repeated, hct now 31 from 36. Remains HDS at this time, no indication for urgent transfusion. Will consult GYN here and admit for serial H/H monitoring to medicine or GYN service Attending MD Herrera: discussed with hospitalist who points out pt's baseline hct is actually 31 so first hct might have been spurious. Will check another h/h in 3 hours, GYN consultation. Decision on admission will be made pending repeat CBC Attending MD Herrera: pt seen by GYN service. Pelvic exam with no active bleeding whatsoever. GYN resident agrees with repeat CBC and if stable outpatient management of ongoing vaginal bleeding given no e/o brisk bleed. Attending MD Herrera: repeat hct 30 from 31, no s/s active bleeding. Do not suspect ongoing significant bleeding, hct is at pt's baseline actually. Stable for discharge with outpatient GYN follow and GI follow up Attending MD Herrera: repeat hct 30 from 31, no s/s active bleeding. Appreciate GYN consultation note. Do not suspect ongoing significant bleeding, hct is at pt's baseline actually. Stable for discharge with outpatient GYN follow and GI follow up. Explained care plan to patient who is amenable to discharge at this time.

## 2019-02-10 NOTE — ED PROVIDER NOTE - CARE PLAN
Principal Discharge DX:	Vaginal bleeding Principal Discharge DX:	Vaginal bleeding  Secondary Diagnosis:	Diarrhea Principal Discharge DX:	Vaginal bleeding  Secondary Diagnosis:	Diarrhea  Secondary Diagnosis:	Abdominal pain

## 2019-02-10 NOTE — ED ADULT NURSE REASSESSMENT NOTE - NS ED NURSE REASSESS COMMENT FT1
Pt persistent on having IV fluids, pt educated on lab values being within normal range and IV fluids being unnecessary at this time.  Safety and comfort maintained.  VSS.  Will continue to monitor. Pt persistent on having IV fluids, pt educated on lab values being within normal range and IV fluids being unnecessary at this time by MD.  Safety and comfort maintained.  VSS.  Will continue to monitor. Pt persistent on having IV fluids, pt educated on lab values being within normal range and IV fluids being unnecessary at this time by MD.  pt expressed agitation about education.  Pt verbally harassing staff.  Safety and comfort maintained.  VSS.  Will continue to monitor.

## 2019-02-10 NOTE — ED ADULT NURSE REASSESSMENT NOTE - NS ED NURSE REASSESS COMMENT FT1
MD De Leon at bedside explaining plan of care to pt, and reasoning for not having CT scan performed.  Pt VSS.  Pt addiment on receiving scan and other tests despite MD de leon education and reassurance.

## 2019-02-10 NOTE — ED PROVIDER NOTE - OBJECTIVE STATEMENT
43F w/ pmh PMHx Anxiety, Autonomic dysreflexia, Depression, DVT (deep venous thrombosis), Dysautonomia, Dystonia, Gastroparesis, Headache, ITP, HTN, IBS, Idiopathic thrombocytopenia purpura, Labyrinthitis, POTS presents to the ED 43F w/ pmh PMHx Anxiety, Autonomic dysreflexia, Depression, DVT (deep venous thrombosis), Dysautonomia, Dystonia, Gastroparesis, Headache, ITP, HTN, IBS, Idiopathic thrombocytopenia purpura, Labyrinthitis, POTS presents to the ED with vaginal bleeding and syncope. Patient reports that she was washing her face at the sink today when fainted, hitting front of her head on sink; back to baseline afterwards, denies any focal neck pain. Has had increased vaginal bleeding x 10 days, seen by OBGYN outpatient w/ normal ultrasound however reports she has been changing pads every several hours. Has multiple chronic symptoms including diarrhea, increased urinary frequency, chest pain.     OBGYN: Kym [phone]

## 2019-02-10 NOTE — ED ADULT NURSE NOTE - OBJECTIVE STATEMENT
43 yr old female with mental and physical issues came in with vag bleed on blood thinners. states she has been bleeding for a few days. on assessment a and o x 3 lungs clear abd soft non tender no heavy bleeding right now. no n/v/d no fevers no other complaints.

## 2019-02-10 NOTE — ED ADULT NURSE REASSESSMENT NOTE - NS ED NURSE REASSESS COMMENT FT1
Pt educated by MD about not receiving anymore fluids at this time. Pt educated by MD about not receiving anymore fluids at this time.  Pt verbally harassing staff.  Safety and comfort maintained.

## 2019-02-10 NOTE — ED ADULT NURSE REASSESSMENT NOTE - NS ED NURSE REASSESS COMMENT FT1
Report received from Mikala PEGUERO. Report received from Mikala PEGUERO.  Pt currently at US, will assess upon return.

## 2019-02-11 ENCOUNTER — FORM ENCOUNTER (OUTPATIENT)
Age: 44
End: 2019-02-11

## 2019-02-11 DIAGNOSIS — N93.9 ABNORMAL UTERINE AND VAGINAL BLEEDING, UNSPECIFIED: ICD-10-CM

## 2019-02-11 LAB
HCT VFR BLD CALC: 30.6 % — LOW (ref 34.5–45)
HGB BLD-MCNC: 9.8 G/DL — LOW (ref 11.5–15.5)
MCHC RBC-ENTMCNC: 23.3 PG — LOW (ref 27–34)
MCHC RBC-ENTMCNC: 31.9 GM/DL — LOW (ref 32–36)
MCV RBC AUTO: 73.2 FL — LOW (ref 80–100)
PANCREATIC ELASTASE, FECAL: >500
PLATELET # BLD AUTO: 95 K/UL — LOW (ref 150–400)
RBC # BLD: 4.19 M/UL — SIGNIFICANT CHANGE UP (ref 3.8–5.2)
RBC # FLD: 15.2 % — HIGH (ref 10.3–14.5)
WBC # BLD: 7 K/UL — SIGNIFICANT CHANGE UP (ref 3.8–10.5)
WBC # FLD AUTO: 7 K/UL — SIGNIFICANT CHANGE UP (ref 3.8–10.5)

## 2019-02-11 PROCEDURE — 85384 FIBRINOGEN ACTIVITY: CPT

## 2019-02-11 PROCEDURE — 76830 TRANSVAGINAL US NON-OB: CPT

## 2019-02-11 PROCEDURE — 85730 THROMBOPLASTIN TIME PARTIAL: CPT

## 2019-02-11 PROCEDURE — 85027 COMPLETE CBC AUTOMATED: CPT

## 2019-02-11 PROCEDURE — 80053 COMPREHEN METABOLIC PANEL: CPT

## 2019-02-11 PROCEDURE — 70450 CT HEAD/BRAIN W/O DYE: CPT

## 2019-02-11 PROCEDURE — 93975 VASCULAR STUDY: CPT

## 2019-02-11 PROCEDURE — 96360 HYDRATION IV INFUSION INIT: CPT

## 2019-02-11 PROCEDURE — 85610 PROTHROMBIN TIME: CPT

## 2019-02-11 PROCEDURE — 93005 ELECTROCARDIOGRAM TRACING: CPT

## 2019-02-11 PROCEDURE — 84702 CHORIONIC GONADOTROPIN TEST: CPT

## 2019-02-11 PROCEDURE — 99284 EMERGENCY DEPT VISIT MOD MDM: CPT | Mod: 25

## 2019-02-11 NOTE — ED ADULT NURSE REASSESSMENT NOTE - NS ED NURSE REASSESS COMMENT FT1
As per MD Herrera, pt okay to be discharged.  Pt was educated on lab results and went through each lab value.  Pt was asking for medicaid transportation services, after referring to the rep, and educated from Charge nurse that pt calls for transportation .  Pt states that she has never called medicaid before.  Pt attempted to call, and opted for [employer] transport instead.  Pt agitated and upset.  Pt repeatedly consoled and educated by MD and RN with no change.  Pt discharged, and became visibly upset when having cardiac monitor removed.  Pt educated that once discharge, cardiac monitoring is not medically necessary.  Once discharged pt ambulated to waiting room waiting for transportation with NAD. As per MD Herrera, pt okay to be discharged.  Pt was educated on lab results and went through each lab value.  Pt was asking for medicaid transportation services, as per Brendan escobar, pt can call medicaid themselves, As per charge nurse pt okay to wait in waiting room for service or Social work.  Pt states that she has never called medicaid before.  Pt attempted to call, and opted for [employer] transport instead.  Pt agitated and upset.  Pt repeatedly consoled and educated by MD and RN with no change.  Pt discharged, and became visibly upset when having cardiac monitor removed.  Pt educated that once discharge, cardiac monitoring is not medically necessary.  Once discharged pt ambulated to waiting room waiting for transportation with NAD.

## 2019-02-11 NOTE — CONSULT NOTE ADULT - ASSESSMENT
43y G0 w/ LMP 2/1 presents with syncopal episode at home causing her to hit her head on the sink and complaints of vaginal bleeding for the last 10 days, on xalrelto for DVT, w/ minimal amount of dark brown blood on pad and no active bleeding on speculum exam, in stable condition.

## 2019-02-11 NOTE — ED ADULT NURSE REASSESSMENT NOTE - NS ED NURSE REASSESS COMMENT FT1
Pt had repeat labs drawn, and educated on initial and secondary results as well as further plan of care.  Safety and comfort maintained. Pt given pillow and more blankets and us currently resting comfortably.

## 2019-02-11 NOTE — CONSULT NOTE ADULT - SUBJECTIVE AND OBJECTIVE BOX
GYN Consult Note     43y G0 w/ LMP 2/1 presents with syncopal episode at home causing her to hit her head on the sink and complaints of vaginal bleeding for the last 10 days. Patient reports that she has had bleeding for the last 10 days and upon sitting on toilet she passes clots but has only used one pad today, as she does not have clots otherwise. Pad w/ small streak of brown d/c. She reports episodes of nausea with vomiting and overall not feeling well the last 2 weeks. Denies fevers, chills, CP/SOB, dysuria, vaginal discharge.     OB/GYN HISTORY: G0. Reports history of OCP use, d/c 2 years ago. Reports since being on xarelto since November, she has had heavier period, lasting ~7 days and passage of clots. Known uterine fibroid and adenomyosis. Hx of HPV in past. Last sexually active 3 years ago.     Last Menstrual Period: 2/1     Name of GYN Physician: Kym      PAST MEDICAL & SURGICAL HISTORY:  DVT (deep venous thrombosis)  Autonomic dysreflexia  Gastroparesis  Hypertension  Dystonia  Depression  Idiopathic thrombocytopenic purpura  Dysautonomia  Idiopathic thrombocytopenia purpura  Anxiety  POTS (postural orthostatic tachycardia syndrome)  History of ITP  Labyrinthitis  Headache  HTN (hypertension)  Anxiety  IBS (irritable bowel syndrome)  Hx of fibroid   S/P appendectomy      REVIEW OF SYSTEMS  +vaginal bleeding, syncopal episode   Denies fevers, chills, CP/SOB, dysuria, vaginal discharge.       MEDICATIONS  (STANDING):    MEDICATIONS  (PRN):      Allergies    barium sulfate (Other)  beta blockers (Unknown)  gadolinium (Hypotension)  IV Contrast (Anaphylaxis)  IV Contrast (Hypotension)  Reglan (Other)  Zofran (Other)  Zofran (Swelling)    Intolerances    beta blockers (Other)  metoprolol (Headache)      FAMILY HISTORY:  No pertinent family history in first degree relatives  Family history of hypertension (Grandparent)  Family history of atrial fibrillation  Family history of prostate cancer in father      Vital Signs Last 24 Hrs  T(C): 36.7 (10 Feb 2019 14:59), Max: 36.7 (10 Feb 2019 14:59)  T(F): 98.1 (10 Feb 2019 14:59), Max: 98.1 (10 Feb 2019 14:59)  HR: 95 (10 Feb 2019 20:18) (95 - 114)  BP: 129/92 (10 Feb 2019 20:18) (129/92 - 131/87)  BP(mean): --  RR: 18 (10 Feb 2019 20:18) (18 - 18)  SpO2: 97% (10 Feb 2019 20:18) (97% - 100%)    PHYSICAL EXAM:    Constitutional: alert and oriented x 3    Gastrointestinal: soft, mild diffuse tenderness, no rebound or guarding     Genitourinary: minimal amount of dark brown d/c near os, no active bleeding or hemorrhage  Cervix: closed/ long, no CMT  Uterus: normal size, non tender  Adnexa: non tender, no palpable masses          LABS:                        10.4   8.6   )-----------( 96       ( 10 Feb 2019 21:36 )             31.5     02-10    140  |  103  |  8   ----------------------------<  107<H>  4.1   |  21<L>  |  0.75    Ca    9.7      10 Feb 2019 17:46    TPro  8.3  /  Alb  4.8  /  TBili  0.2  /  DBili  x   /  AST  20  /  ALT  14  /  AlkPhos  95  02-10    PT/INR - ( 10 Feb 2019 17:49 )   PT: 12.8 sec;   INR: 1.11 ratio         PTT - ( 10 Feb 2019 17:49 )  PTT:31.8 sec      RADIOLOGY & ADDITIONAL STUDIES:    < from: US Doppler Pelvis (02.10.19 @ 20:09) >  EXAM:  US TRANSVAGINAL                          EXAM:  US DPLX PELVIC                          PROCEDURE DATE:  02/10/2019      INTERPRETATION:  CLINICAL INFORMATION: Vaginal bleeding for 10 days    LMP: 2/1/2019    COMPARISON: Pelvic sonogram dated 12/27/2018    TECHNIQUE:     Endovaginal and transabdominal pelvic sonogram. Color and Spectral   Doppler was performed.    FINDINGS:    Uterus: 8.0 x 2.7 x 6.1 cm. Fibroid uterus. Posterior lower uterine   segment intramural myoma with small submucosal component measures 2.8 x   1.6 x 2.1 cm.    Endometrium: 4 mm. Previously noted endocervical polyp is not identified   on this study.    Right ovary: 2.6 x 1.4 x 2.2 cm. Simple cyst measures 1.2 x 1.3 x 1.0 cm.   A complex cyst with internal echoes measures 1.2 x 0.8 x 1.1 cm and may   represent a endometrioma versus hemorrhagic cyst. Normal arterial and   venous waveforms.    Left ovary: 3.2 x 2.0 x 1.9 cm. Simple cyst measures 1.4 x 1.2 x 1.2 cm.   A cyst with internal echoes measures 1.3 x 1.1 x 1.2 cm and may represent   an endometrioma versus hemorrhagic cyst. Normal arterial and venous   waveforms.    Fluid: None.    IMPRESSION:    Fibroid uterus with dominant 3 cm lower uterine segment myoma may be a   source of vaginal bleeding.    Complex bilateral ovarian cysts due to endometrioma/hemorrhagic cysts.   Follow-up pelvic ultrasound in 1-2 measure cycles would aid in   differentiation.    TIMMY MORGAN M.D., RADIOLOGY RESIDENT  This document has been electronically signed.  KEMAL KAUR M.D., ATTENDING RADIOLOGIST  This document has been electronically signed. Feb 10 2019  8:35PM    < end of copied text >

## 2019-02-11 NOTE — CONSULT NOTE ADULT - PROBLEM SELECTOR RECOMMENDATION 9
- TVUS with bilateral hemorrhagic cysts and uterine fibroid present   - GYN exam with minimal amount of dark brown spotting, no active bleeding or hemorrhage present  - Patient to follow up with OBGYN outpatient for further management. Unable to be on hormonal treatment for bleeding in setting of DVT, on xalrelto.    D/w Dr. Chucky Dias, PGY-2 - TVUS with bilateral hemorrhagic cysts and uterine fibroid present   - GYN exam with minimal amount of dark brown spotting, no active bleeding or hemorrhage present  - Patient to follow up with OBGYN outpatient for further management. Unable to be on hormonal treatment for bleeding in setting of DVT, on xalrelto. Recommend workup for other source of bleeding, given Hb/Hct 11.7/36.1->10.4/31 without active vaginal bleeding here in ED.     D/w Dr. Chucky Dias, PGY-2

## 2019-02-12 ENCOUNTER — OUTPATIENT (OUTPATIENT)
Dept: OUTPATIENT SERVICES | Facility: HOSPITAL | Age: 44
LOS: 1 days | End: 2019-02-12
Payer: MEDICAID

## 2019-02-12 ENCOUNTER — APPOINTMENT (OUTPATIENT)
Dept: RADIOLOGY | Facility: HOSPITAL | Age: 44
End: 2019-02-12

## 2019-02-12 ENCOUNTER — APPOINTMENT (OUTPATIENT)
Dept: CT IMAGING | Facility: HOSPITAL | Age: 44
End: 2019-02-12

## 2019-02-12 ENCOUNTER — OTHER (OUTPATIENT)
Age: 44
End: 2019-02-12

## 2019-02-12 DIAGNOSIS — J98.6 DISORDERS OF DIAPHRAGM: ICD-10-CM

## 2019-02-12 DIAGNOSIS — R07.9 CHEST PAIN, UNSPECIFIED: ICD-10-CM

## 2019-02-12 DIAGNOSIS — R93.89 ABNORMAL FINDINGS ON DIAGNOSTIC IMAGING OF OTHER SPECIFIED BODY STRUCTURES: ICD-10-CM

## 2019-02-12 DIAGNOSIS — Z90.49 ACQUIRED ABSENCE OF OTHER SPECIFIED PARTS OF DIGESTIVE TRACT: Chronic | ICD-10-CM

## 2019-02-12 PROCEDURE — 76000 FLUOROSCOPY <1 HR PHYS/QHP: CPT | Mod: 26

## 2019-02-12 PROCEDURE — 76000 FLUOROSCOPY <1 HR PHYS/QHP: CPT

## 2019-02-12 PROCEDURE — 71046 X-RAY EXAM CHEST 2 VIEWS: CPT | Mod: 26

## 2019-02-12 PROCEDURE — 71046 X-RAY EXAM CHEST 2 VIEWS: CPT

## 2019-02-12 PROCEDURE — 71250 CT THORAX DX C-: CPT | Mod: 26

## 2019-02-12 PROCEDURE — 71250 CT THORAX DX C-: CPT

## 2019-02-12 NOTE — PATIENT PROFILE ADULT. - FUNCTIONAL SCREEN CURRENT LEVEL: BATHING, MLM
Prep Survey      Responses   Facility patient discharged from?  [facility]   Is patient eligible?  Yes   Discharge diagnosis  Stress-induced cardiomyopathy   Does the patient have one of the following disease processes/diagnoses(primary or secondary)?  Other   Does the patient have Home health ordered?  No   Is there a DME ordered?  No   Prep survey completed?  Yes          Etta Delcid RN         (2) assistive person

## 2019-02-13 ENCOUNTER — CLINICAL ADVICE (OUTPATIENT)
Age: 44
End: 2019-02-13

## 2019-02-13 ENCOUNTER — APPOINTMENT (OUTPATIENT)
Dept: INTERNAL MEDICINE | Facility: CLINIC | Age: 44
End: 2019-02-13

## 2019-02-13 ENCOUNTER — OTHER (OUTPATIENT)
Age: 44
End: 2019-02-13

## 2019-02-15 ENCOUNTER — EMERGENCY (EMERGENCY)
Facility: HOSPITAL | Age: 44
LOS: 1 days | Discharge: HOME | End: 2019-02-15
Attending: EMERGENCY MEDICINE | Admitting: EMERGENCY MEDICINE
Payer: MEDICAID

## 2019-02-15 ENCOUNTER — APPOINTMENT (OUTPATIENT)
Dept: GASTROENTEROLOGY | Facility: CLINIC | Age: 44
End: 2019-02-15

## 2019-02-15 VITALS
TEMPERATURE: 98 F | SYSTOLIC BLOOD PRESSURE: 132 MMHG | HEART RATE: 98 BPM | RESPIRATION RATE: 17 BRPM | OXYGEN SATURATION: 100 % | DIASTOLIC BLOOD PRESSURE: 86 MMHG | WEIGHT: 100.09 LBS | HEIGHT: 62 IN

## 2019-02-15 VITALS
TEMPERATURE: 98 F | OXYGEN SATURATION: 99 % | SYSTOLIC BLOOD PRESSURE: 122 MMHG | HEART RATE: 85 BPM | RESPIRATION RATE: 15 BRPM | DIASTOLIC BLOOD PRESSURE: 86 MMHG

## 2019-02-15 DIAGNOSIS — Z90.49 ACQUIRED ABSENCE OF OTHER SPECIFIED PARTS OF DIGESTIVE TRACT: Chronic | ICD-10-CM

## 2019-02-15 PROCEDURE — 99283 EMERGENCY DEPT VISIT LOW MDM: CPT

## 2019-02-15 PROCEDURE — 93971 EXTREMITY STUDY: CPT

## 2019-02-15 PROCEDURE — 93010 ELECTROCARDIOGRAM REPORT: CPT

## 2019-02-15 PROCEDURE — 99284 EMERGENCY DEPT VISIT MOD MDM: CPT | Mod: 25

## 2019-02-15 PROCEDURE — 93005 ELECTROCARDIOGRAM TRACING: CPT

## 2019-02-15 PROCEDURE — 93971 EXTREMITY STUDY: CPT | Mod: 26,RT

## 2019-02-15 NOTE — ED PROVIDER NOTE - PROGRESS NOTE DETAILS
Results of doppler reviewed pt with no DVT noted.  Pt has extreme anxious and keeps ruminating about possible DVT.  Results and vitals again reviewed, pt stable for discharge home with outpatient follow up with her rhum.  Brother to come pick pt up

## 2019-02-15 NOTE — ED PROVIDER NOTE - CLINICAL SUMMARY MEDICAL DECISION MAKING FREE TEXT BOX
Pt is a 42 yo female who presents to the ED with a cc of right arm pain/swelling concern for DVT.  Pt with h/o clotting disorder.  Will obtain doppler of RUE

## 2019-02-15 NOTE — ED PROVIDER NOTE - OBJECTIVE STATEMENT
44 y/o F pt w/ PMHx Anxiety, Autonomic dysreflexia, Depression, DVT, Dysautonomia, Dystonia, Gastroparesis, Headache, ITP, HTN, IBS, Idiopathic thrombocytopenia purpura, Labyrinthitis, POTS presents to the ED c/o Pt states she is currently on Xarelto 20 mg due to hx of protein S disorder. 42 y/o F pt w/ PMHx Anxiety, Autonomic dysreflexia, Depression, DVT, Dysautonomia, Dystonia, Gastroparesis, Headache, ITP, HTN, IBS, Idiopathic thrombocytopenia purpura, Labyrinthitis, POTS presents to the ED c/o shooting R arm pain and swelling. Pt states she has had multiple IV attempts due to recent hospital visits and reports has resulted in pain and swelling to R arm, concerned for DVT. Pt states she is currently on Xarelto 20 mg due to hx of protein S disorder. Pt denies fever, n/v or any other complaints at this time. 42 y/o F pt w/ PMHx Anxiety, Autonomic dysreflexia, Depression, DVT, Dysautonomia, Dystonia, Gastroparesis, Headache, ITP, HTN, IBS, Idiopathic thrombocytopenia purpura, Labyrinthitis, POTS presents to the ED c/o shooting R arm pain and swelling. Pt states she has had multiple IV attempts due to recent hospital visits and reports has resulted in pain and swelling to R arm, concerned for DVT. Pt states she is currently on Xarelto 20 mg due to hx of protein S disorder. Pt denies fever, n/v or any other complaints at this time.  She just had outpatient doppler of LUE prior to arrival and has report, there is no evidence of DVT noted

## 2019-02-15 NOTE — ED ADULT NURSE NOTE - OBJECTIVE STATEMENT
patient has c/o right arm pain x 3 days, pulse + and strong, Pt is in no acute distress. lungs clear to auscultate. pending MD's eval. will continue to monitor.

## 2019-02-15 NOTE — ED PROVIDER NOTE - PHYSICAL EXAMINATION
No appreciable swelling noted to RUE, FROM of arm, no signs of cellulitis. +radial pulse. cap refill < 2 seconds. Sensation grossly intact. Multiple areas of previous IV sticks noted along extremity.

## 2019-02-15 NOTE — ED PROVIDER NOTE - CONSTITUTIONAL, MLM
normal... Well appearing, well nourished, awake, alert, oriented to person, place, time/situation and mildly anxious.

## 2019-02-15 NOTE — ED PROVIDER NOTE - CARE PLAN
Principal Discharge DX:	Arm pain  Assessment and plan of treatment:	Return to the ED for any new or worsening symptoms  Take your medication as prescribed  Advance activity as tolerated   Follow up with your PMD in 2-3 days for a recheck

## 2019-02-15 NOTE — ED PROVIDER NOTE - PLAN OF CARE
Return to the ED for any new or worsening symptoms  Take your medication as prescribed  Advance activity as tolerated   Follow up with your PMD in 2-3 days for a recheck

## 2019-02-15 NOTE — ED ADULT NURSE NOTE - NSIMPLEMENTINTERV_GEN_ALL_ED
Implemented All Universal Safety Interventions:  [place] to call system. Call bell, personal items and telephone within reach. Instruct patient to call for assistance. Room bathroom lighting operational. Non-slip footwear when patient is off stretcher. Physically safe environment: no spills, clutter or unnecessary equipment. Stretcher in lowest position, wheels locked, appropriate side rails in place.

## 2019-02-20 ENCOUNTER — APPOINTMENT (OUTPATIENT)
Dept: VASCULAR SURGERY | Facility: CLINIC | Age: 44
End: 2019-02-20

## 2019-02-20 NOTE — ED PROVIDER NOTE - CHPI ED SYMPTOMS POS
Consent: The patient's consent was obtained including but not limited to risks of crusting, scabbing, blistering, scarring, darker or lighter pigmentary change, recurrence, incomplete removal and infection.
Post-Care Instructions: I reviewed with the patient in detail post-care instructions. Patient is to wear sunprotection, and avoid picking at any of the treated lesions. Pt may apply Vaseline to crusted or scabbing areas.
CHEST DISCOMFORT/tachycardia,
Number Of Freeze-Thaw Cycles: 1 freeze-thaw cycle
Render Post-Care Instructions In Note?: yes
Duration Of Freeze Thaw-Cycle (Seconds): 30
Detail Level: Detailed

## 2019-02-21 ENCOUNTER — APPOINTMENT (OUTPATIENT)
Dept: VASCULAR SURGERY | Facility: CLINIC | Age: 44
End: 2019-02-21

## 2019-02-22 ENCOUNTER — APPOINTMENT (OUTPATIENT)
Dept: VASCULAR SURGERY | Facility: CLINIC | Age: 44
End: 2019-02-22
Payer: MEDICAID

## 2019-02-22 ENCOUNTER — APPOINTMENT (OUTPATIENT)
Dept: PULMONOLOGY | Facility: CLINIC | Age: 44
End: 2019-02-22

## 2019-02-22 VITALS
HEART RATE: 94 BPM | SYSTOLIC BLOOD PRESSURE: 127 MMHG | HEIGHT: 62 IN | DIASTOLIC BLOOD PRESSURE: 88 MMHG | WEIGHT: 99 LBS | BODY MASS INDEX: 18.22 KG/M2 | TEMPERATURE: 98.2 F

## 2019-02-22 DIAGNOSIS — Z86.79 PERSONAL HISTORY OF OTHER DISEASES OF THE CIRCULATORY SYSTEM: ICD-10-CM

## 2019-02-22 DIAGNOSIS — Z82.49 FAMILY HISTORY OF ISCHEMIC HEART DISEASE AND OTHER DISEASES OF THE CIRCULATORY SYSTEM: ICD-10-CM

## 2019-02-22 DIAGNOSIS — F41.9 ANXIETY DISORDER, UNSPECIFIED: ICD-10-CM

## 2019-02-22 DIAGNOSIS — Z86.2 PERSONAL HISTORY OF DISEASES OF THE BLOOD AND BLOOD-FORMING ORGANS AND CERTAIN DISORDERS INVOLVING THE IMMUNE MECHANISM: ICD-10-CM

## 2019-02-22 DIAGNOSIS — Z80.8 FAMILY HISTORY OF MALIGNANT NEOPLASM OF OTHER ORGANS OR SYSTEMS: ICD-10-CM

## 2019-02-22 PROCEDURE — 93970 EXTREMITY STUDY: CPT

## 2019-02-22 PROCEDURE — 99202 OFFICE O/P NEW SF 15 MIN: CPT

## 2019-02-22 NOTE — HISTORY OF PRESENT ILLNESS
[FreeTextEntry1] : She  was found  to have  left iJ  and  subclavian  clot  following  appendectomy  She  was placed  on Xarelto  She  has  low levels  of  protein s  She  had  appendectomy recently  She has  leg  cramps  No swelling  or  tenderness Concerned  about  DVT of the  legs  she  also  has  abdominal discomfort

## 2019-02-22 NOTE — ASSESSMENT
[FreeTextEntry1] : Venous  US  is negative  fro any DVT or  SVT  or  insufficiency.  I suggest  her  taking  Xarelto for  6 months   Stop the  Xarelto  to  investigate  Hypercoagulable  state  and  rstart  Xarelto  for  life  if  it  is  positive  for  Thrombophilia  Her  IJ  and  subclavian   thrombus  could  be  unprovoked  or could  be from  IV  placed  in the  antecubital  foosa  .  Her  Protein  s deficiency is  not  marked  on  the  levels  [Arterial/Venous Disease] : arterial/venous disease

## 2019-02-26 ENCOUNTER — APPOINTMENT (OUTPATIENT)
Dept: NEUROLOGY | Facility: CLINIC | Age: 44
End: 2019-02-26
Payer: MEDICAID

## 2019-02-26 ENCOUNTER — LABORATORY RESULT (OUTPATIENT)
Age: 44
End: 2019-02-26

## 2019-02-26 VITALS
HEIGHT: 62 IN | DIASTOLIC BLOOD PRESSURE: 76 MMHG | HEART RATE: 102 BPM | BODY MASS INDEX: 18.22 KG/M2 | TEMPERATURE: 98.6 F | WEIGHT: 99 LBS | SYSTOLIC BLOOD PRESSURE: 122 MMHG

## 2019-02-26 DIAGNOSIS — R20.2 PARESTHESIA OF SKIN: ICD-10-CM

## 2019-02-26 DIAGNOSIS — M79.7 FIBROMYALGIA: ICD-10-CM

## 2019-02-26 DIAGNOSIS — M79.2 NEURALGIA AND NEURITIS, UNSPECIFIED: ICD-10-CM

## 2019-02-26 PROCEDURE — 99215 OFFICE O/P EST HI 40 MIN: CPT

## 2019-02-26 RX ORDER — LORAZEPAM 2 MG/ML
2 INJECTION INTRAMUSCULAR; INTRAVENOUS
Refills: 0 | Status: DISCONTINUED | COMMUNITY
Start: 2018-11-21 | End: 2019-02-26

## 2019-02-26 RX ORDER — PAROXETINE HYDROCHLORIDE 10 MG/1
10 TABLET, FILM COATED ORAL
Qty: 30 | Refills: 3 | Status: DISCONTINUED | COMMUNITY
End: 2019-02-26

## 2019-02-26 NOTE — DATA REVIEWED
[de-identified] : CT head 2/10/19: no acute intracranial bleeding, mass effect, or shift.\par Cortically predominant volume loss, greater than expected for patient's age.

## 2019-02-26 NOTE — HISTORY OF PRESENT ILLNESS
[FreeTextEntry1] : She reports that over the last week she started to have slight pain in her right arm. The pain was radiating up and down her forearm. It has been getting progressively worse.\par She reports having sharp shooting pains throughout the forearm with any movement. \par She is also starting to have numbness and tingling through her fingers, mostly in the second digit.\par She did have an IV placed about two weeks ago in the right forearm. \par She has pain when she checks her blood pressure with the right arm and states that she cannot use her left arm due to a blood clot in the neck. \par She has chronic neck pain but there is not neck pain radiating into the right arm. \par She is not aware of any trauma. \par \par Over the last few days she has started to have pain in her legs. The pain is in the back of her thighs and over her shins. \par She feels unsteady on her feet when standing due to a general sensation of weakness in her legs. \par \par She notes that her body temperature is running higher than usual. \par She denies having any symptoms of an upper respiratory infection other than sneezing. She is not experiencing any cough or congestion. \par She denies having any dysuria. \par She denies having any diarrhea or nausea, vomiting. \par Dr. Alaniz follows her for the dysautonomia.\par \par She has been on blood thinners for four months due to a DVT. She does have a clotting disorder. \par \par \par

## 2019-02-26 NOTE — PHYSICAL EXAM
[FreeTextEntry1] : Examination:\par Constitutional: normal, no apparent distress\par Eyes: normal conjunctiva b/l, no ptosis, visual fields full\par Respiratory: no respiratory distress, normal effort, normal auscultation\par Cardiovascular: normal rate, rhythm, no murmurs\par Neck: supple, no masses\par Vascular: carotids normal\par Skin: normal color, no rashes\par Psych: normal mood, affect\par extremities: no edema or erythema in the extremities\par \par Neurological:\par Memory: normal memory, oriented to person, place, time\par Language intact/no aphasia\par Cranial Nerves: II-XII intact, Pupils equally round and reactive to light, ocular muscles/movements intact, no ptosis, no facial weakness, tongue protrudes normally in the midline, \par Motor: normal tone, no pronator drift, full strength in all four extremities in the proximal and distal muscle groups\par Coordination: Fine motor movements intact, rapid alternating movements intact, finger to nose intact bilaterally\par Sensory: Subjective decrease sensation in the entire right arm compared to the left, decreased sensation does not follow a particular dermatomal distribution. , intact vibration, joint position sense\par DTRs: symmetric, 2+ in b/l triceps, 2+ in b/l biceps, 2+ in b/l brachioradialis, 2+ in bilateral patellars, 2+ in bilateral Achilles, Babinskis negative bilaterally\par Gait: narrow based, steady\par \par

## 2019-02-26 NOTE — DISCUSSION/SUMMARY
[FreeTextEntry1] : Ms. Presley is a 43 year old woman with multiple medical problems including dysautonomia and a clotting disorder, who presents with one week of pain and paresthesias in her right upper extremity as well as less severe pain and subjective weakness in her lower extremities.\par \par Her neurological examination is non-focal other than subjective decreased sensation to light touch over the right arm. This is present throughout the entire extremity and not in a dermatomal distribution.\par I do not think that her symptoms are due to a radiculopathy due to the lack of pain radiating from the neck and the distribution of symptoms.\par I am ordering an ultrasound of the upper extremity to rule out any DVT given her clotting disorder.\par She has had higher than normal temperatures for her. CBC and ESR will be done to look for any other signs of infection.\par I am also ordering a vitamin B12 level to look for other causes of paresthesias and CK to see if there is any evidence of a myopathic process. \par \par If blood tests and ultrasound are unremarkable, nerve conduction testing of the right upper extremity will be done. I will hold off on the needle exam since she is on Xarelto.\par \par She is declining any medications for pain at this time. \par I advised her to follow up with Dr. Aalniz who manages her dysautonomia.\par \par I will follow up with her after completion of the above testing.

## 2019-02-26 NOTE — REVIEW OF SYSTEMS
[Fever] : fever [Feeling Tired] : feeling tired [As Noted in HPI] : as noted in HPI [Anxiety] : anxiety [Depression] : depression [Chest Pain] : chest pain [Palpitations] : palpitations [Shortness Of Breath] : shortness of breath [Constipation] : constipation [Diarrhea] : diarrhea [Heartburn] : heartburn [Arthralgias] : arthralgias [Joint Pain] : joint pain [Negative] : Eyes [de-identified] : dizziness, facial pain,w eakness, imbalance, incocordination, impaired memory, involuntary movements [de-identified] : stressed out [FreeTextEntry4] : tinnitus [FreeTextEntry8] : irregular menstrual cycle, urinary frequency [FreeTextEntry9] : cramps, neck pain

## 2019-02-27 LAB
BASOPHILS # BLD AUTO: 0.02 K/UL
BASOPHILS NFR BLD AUTO: 0.3 %
CK SERPL-CCNC: 81 U/L
EOSINOPHIL # BLD AUTO: 0.06 K/UL
EOSINOPHIL NFR BLD AUTO: 0.8 %
ERYTHROCYTE [SEDIMENTATION RATE] IN BLOOD BY WESTERGREN METHOD: 11 MM/HR
HCT VFR BLD CALC: 35 %
HGB BLD-MCNC: 10.5 G/DL
IMM GRANULOCYTES NFR BLD AUTO: 0.3 %
LYMPHOCYTES # BLD AUTO: 2.07 K/UL
LYMPHOCYTES NFR BLD AUTO: 27.6 %
MAN DIFF?: NORMAL
MCHC RBC-ENTMCNC: 22.9 PG
MCHC RBC-ENTMCNC: 30 GM/DL
MCV RBC AUTO: 76.4 FL
MONOCYTES # BLD AUTO: 0.63 K/UL
MONOCYTES NFR BLD AUTO: 8.4 %
NEUTROPHILS # BLD AUTO: 4.7 K/UL
NEUTROPHILS NFR BLD AUTO: 62.6 %
PLATELET # BLD AUTO: NORMAL K/UL
RBC # BLD: 4.58 M/UL
RBC # FLD: 15.1 %
VIT B12 SERPL-MCNC: 423 PG/ML
WBC # FLD AUTO: 7.5 K/UL

## 2019-02-27 NOTE — ED PROVIDER NOTE - NS_ATTENDINGSCRIBE_ED_ALL_ED
I personally performed the service described in the documentation recorded by the scribe in my presence, and it accurately and completely records my words and actions.
none

## 2019-02-28 ENCOUNTER — EMERGENCY (EMERGENCY)
Facility: HOSPITAL | Age: 44
LOS: 1 days | Discharge: HOME | End: 2019-02-28
Attending: EMERGENCY MEDICINE | Admitting: EMERGENCY MEDICINE
Payer: MEDICAID

## 2019-02-28 ENCOUNTER — TRANSCRIPTION ENCOUNTER (OUTPATIENT)
Age: 44
End: 2019-02-28

## 2019-02-28 VITALS
DIASTOLIC BLOOD PRESSURE: 83 MMHG | SYSTOLIC BLOOD PRESSURE: 125 MMHG | HEART RATE: 85 BPM | RESPIRATION RATE: 14 BRPM | OXYGEN SATURATION: 98 % | TEMPERATURE: 98 F

## 2019-02-28 VITALS
DIASTOLIC BLOOD PRESSURE: 91 MMHG | HEART RATE: 101 BPM | HEIGHT: 70 IN | RESPIRATION RATE: 15 BRPM | OXYGEN SATURATION: 98 % | SYSTOLIC BLOOD PRESSURE: 127 MMHG | WEIGHT: 98.99 LBS | TEMPERATURE: 98 F

## 2019-02-28 DIAGNOSIS — Z90.49 ACQUIRED ABSENCE OF OTHER SPECIFIED PARTS OF DIGESTIVE TRACT: Chronic | ICD-10-CM

## 2019-02-28 PROCEDURE — 93971 EXTREMITY STUDY: CPT

## 2019-02-28 PROCEDURE — 99284 EMERGENCY DEPT VISIT MOD MDM: CPT

## 2019-02-28 PROCEDURE — 99284 EMERGENCY DEPT VISIT MOD MDM: CPT | Mod: 25

## 2019-02-28 PROCEDURE — 93971 EXTREMITY STUDY: CPT | Mod: 26,RT

## 2019-02-28 NOTE — ED PROVIDER NOTE - NS_ ATTENDINGSCRIBEDETAILS _ED_A_ED_FT
Rolando Hart MD - The scribe's documentation has been prepared under my direction and personally reviewed by me in its entirety. I confirm that the note above accurately reflects all work, treatment, procedures, and medical decision making performed by me.

## 2019-02-28 NOTE — ED ADULT NURSE NOTE - PMH
Anxiety    Anxiety    Autonomic dysreflexia    Depression    DVT (deep venous thrombosis)  left internal jugular and subclavian veins  DVT (deep venous thrombosis)    Dysautonomia    Dystonia    Gastroparesis    Headache    History of ITP    HTN (hypertension)    Hypertension    IBS (irritable bowel syndrome)    Idiopathic thrombocytopenia purpura    Idiopathic thrombocytopenic purpura    Labyrinthitis    POTS (postural orthostatic tachycardia syndrome)

## 2019-02-28 NOTE — ED ADULT NURSE NOTE - OBJECTIVE STATEMENT
Right arm pain and tingling Right arm pain and tingling of hand for 5 days. Patient was seen by neuro Dr Cosby 2 days ago and doppler of upper extremity was ordered as outpatient but patient has not received approval as yet and she feels her symptoms have increased. Patient denies injury.

## 2019-02-28 NOTE — ED PROVIDER NOTE - OBJECTIVE STATEMENT
42 y/o F pt w/ PMHx Anxiety, Autonomic dysreflexia, Depression, DVT, Dysautonomia, Dystonia, Gastroparesis, Headache, ITP, HTN, IBS, Idiopathic thrombocytopenia purpura, Labyrinthitis, POTS presents to the ED c/o 44 y/o F pt w/ PMHx Anxiety, Autonomic dysreflexia, Depression, DVT, Dysautonomia, Dystonia, Gastroparesis, Headache, ITP, HTN, IBS, Idiopathic thrombocytopenia purpura, Labyrinthitis, POTS presents to the ED c/o worse shooting pain R arm and numbness R hand. Pt accompanied by aide. Pt states she is here for doppler of R extremity. Endorses pain mostly on R forearm radiating to wrist but states that pain sometimes radiates towards shoulder. Worse pain when picking up objects. Reports she takes Xarelto 20mg qd. Pt denies recent surgery, trauma/fall, CP, SOB, fever or any other complaints at this time.

## 2019-02-28 NOTE — ED PROVIDER NOTE - TEMPLATE
Orthopedic
GI bleed - labs, stool for occ, will start PPIs and admit to medicine; pt is hemodynamically stable.

## 2019-02-28 NOTE — ED ADULT NURSE REASSESSMENT NOTE - NS ED NURSE REASSESS COMMENT FT1
sonogram resulted, reevaluated by MD, DIMITRY, pt d/c home with f/u instructions.
sonogram done, awaiting results.

## 2019-03-01 ENCOUNTER — OUTPATIENT (OUTPATIENT)
Dept: OUTPATIENT SERVICES | Facility: HOSPITAL | Age: 44
LOS: 1 days | End: 2019-03-01
Payer: MEDICAID

## 2019-03-01 ENCOUNTER — OUTPATIENT (OUTPATIENT)
Dept: OUTPATIENT SERVICES | Facility: HOSPITAL | Age: 44
LOS: 1 days | End: 2019-03-01

## 2019-03-01 DIAGNOSIS — Z90.49 ACQUIRED ABSENCE OF OTHER SPECIFIED PARTS OF DIGESTIVE TRACT: Chronic | ICD-10-CM

## 2019-03-04 PROBLEM — I82.409 ACUTE EMBOLISM AND THROMBOSIS OF UNSPECIFIED DEEP VEINS OF UNSPECIFIED LOWER EXTREMITY: Chronic | Status: ACTIVE | Noted: 2019-02-28

## 2019-03-05 ENCOUNTER — APPOINTMENT (OUTPATIENT)
Dept: NEUROLOGY | Facility: CLINIC | Age: 44
End: 2019-03-05

## 2019-03-05 ENCOUNTER — OUTPATIENT (OUTPATIENT)
Dept: OUTPATIENT SERVICES | Facility: HOSPITAL | Age: 44
LOS: 1 days | Discharge: HOME | End: 2019-03-05

## 2019-03-05 DIAGNOSIS — D47.3 ESSENTIAL (HEMORRHAGIC) THROMBOCYTHEMIA: ICD-10-CM

## 2019-03-05 DIAGNOSIS — Z90.49 ACQUIRED ABSENCE OF OTHER SPECIFIED PARTS OF DIGESTIVE TRACT: Chronic | ICD-10-CM

## 2019-03-06 ENCOUNTER — EMERGENCY (EMERGENCY)
Facility: HOSPITAL | Age: 44
LOS: 1 days | Discharge: HOME | End: 2019-03-06
Attending: EMERGENCY MEDICINE
Payer: MEDICAID

## 2019-03-06 ENCOUNTER — APPOINTMENT (OUTPATIENT)
Dept: RHEUMATOLOGY | Facility: CLINIC | Age: 44
End: 2019-03-06

## 2019-03-06 VITALS
OXYGEN SATURATION: 99 % | TEMPERATURE: 99 F | SYSTOLIC BLOOD PRESSURE: 134 MMHG | DIASTOLIC BLOOD PRESSURE: 89 MMHG | RESPIRATION RATE: 18 BRPM | HEART RATE: 85 BPM

## 2019-03-06 VITALS
RESPIRATION RATE: 16 BRPM | TEMPERATURE: 99 F | SYSTOLIC BLOOD PRESSURE: 126 MMHG | WEIGHT: 98.99 LBS | HEIGHT: 62 IN | DIASTOLIC BLOOD PRESSURE: 89 MMHG | HEART RATE: 95 BPM | OXYGEN SATURATION: 100 %

## 2019-03-06 DIAGNOSIS — Z90.49 ACQUIRED ABSENCE OF OTHER SPECIFIED PARTS OF DIGESTIVE TRACT: Chronic | ICD-10-CM

## 2019-03-06 LAB
ALBUMIN SERPL ELPH-MCNC: 4.9 G/DL — SIGNIFICANT CHANGE UP (ref 3.3–5)
ALP SERPL-CCNC: 81 U/L — SIGNIFICANT CHANGE UP (ref 40–120)
ALT FLD-CCNC: 7 U/L — LOW (ref 10–45)
ANION GAP SERPL CALC-SCNC: 15 MMOL/L — SIGNIFICANT CHANGE UP (ref 5–17)
APTT BLD: 21.9 SEC — LOW (ref 27.5–36.3)
AST SERPL-CCNC: 17 U/L — SIGNIFICANT CHANGE UP (ref 10–40)
BASOPHILS # BLD AUTO: 0 K/UL — SIGNIFICANT CHANGE UP (ref 0–0.2)
BILIRUB SERPL-MCNC: 0.3 MG/DL — SIGNIFICANT CHANGE UP (ref 0.2–1.2)
BUN SERPL-MCNC: 6 MG/DL — LOW (ref 7–23)
CALCIUM SERPL-MCNC: 10.1 MG/DL — SIGNIFICANT CHANGE UP (ref 8.4–10.5)
CHLORIDE SERPL-SCNC: 103 MMOL/L — SIGNIFICANT CHANGE UP (ref 96–108)
CO2 SERPL-SCNC: 23 MMOL/L — SIGNIFICANT CHANGE UP (ref 22–31)
CREAT SERPL-MCNC: 0.72 MG/DL — SIGNIFICANT CHANGE UP (ref 0.5–1.3)
EOSINOPHIL # BLD AUTO: 0 K/UL — SIGNIFICANT CHANGE UP (ref 0–0.5)
GLUCOSE SERPL-MCNC: 91 MG/DL — SIGNIFICANT CHANGE UP (ref 70–99)
HCT VFR BLD CALC: 31.1 % — LOW (ref 34.5–45)
HGB BLD-MCNC: 10.5 G/DL — LOW (ref 11.5–15.5)
INR BLD: 1.11 RATIO — SIGNIFICANT CHANGE UP (ref 0.88–1.16)
LYMPHOCYTES # BLD AUTO: 1.7 K/UL — SIGNIFICANT CHANGE UP (ref 1–3.3)
LYMPHOCYTES # BLD AUTO: 17 % — SIGNIFICANT CHANGE UP (ref 13–44)
MCHC RBC-ENTMCNC: 24.5 PG — LOW (ref 27–34)
MCHC RBC-ENTMCNC: 33.8 GM/DL — SIGNIFICANT CHANGE UP (ref 32–36)
MCV RBC AUTO: 72.5 FL — LOW (ref 80–100)
MONOCYTES # BLD AUTO: 0.5 K/UL — SIGNIFICANT CHANGE UP (ref 0–0.9)
MONOCYTES NFR BLD AUTO: 8 % — SIGNIFICANT CHANGE UP (ref 2–14)
NEUTROPHILS # BLD AUTO: 5.6 K/UL — SIGNIFICANT CHANGE UP (ref 1.8–7.4)
NEUTROPHILS NFR BLD AUTO: 72 % — SIGNIFICANT CHANGE UP (ref 43–77)
PLATELET # BLD AUTO: 95 K/UL — LOW (ref 150–400)
POTASSIUM SERPL-MCNC: 4.2 MMOL/L — SIGNIFICANT CHANGE UP (ref 3.5–5.3)
POTASSIUM SERPL-SCNC: 4.2 MMOL/L — SIGNIFICANT CHANGE UP (ref 3.5–5.3)
PROT SERPL-MCNC: 7.5 G/DL — SIGNIFICANT CHANGE UP (ref 6–8.3)
PROTHROM AB SERPL-ACNC: 12.8 SEC — SIGNIFICANT CHANGE UP (ref 10–12.9)
RBC # BLD: 4.29 M/UL — SIGNIFICANT CHANGE UP (ref 3.8–5.2)
RBC # FLD: 14.4 % — SIGNIFICANT CHANGE UP (ref 10.3–14.5)
SODIUM SERPL-SCNC: 141 MMOL/L — SIGNIFICANT CHANGE UP (ref 135–145)
TROPONIN T, HIGH SENSITIVITY RESULT: <6 NG/L — SIGNIFICANT CHANGE UP (ref 0–51)
TSH SERPL-MCNC: 1.52 UIU/ML — SIGNIFICANT CHANGE UP (ref 0.27–4.2)
WBC # BLD: 7.9 K/UL — SIGNIFICANT CHANGE UP (ref 3.8–10.5)
WBC # FLD AUTO: 7.9 K/UL — SIGNIFICANT CHANGE UP (ref 3.8–10.5)

## 2019-03-06 PROCEDURE — 85379 FIBRIN DEGRADATION QUANT: CPT

## 2019-03-06 PROCEDURE — 84484 ASSAY OF TROPONIN QUANT: CPT

## 2019-03-06 PROCEDURE — 71046 X-RAY EXAM CHEST 2 VIEWS: CPT | Mod: 26

## 2019-03-06 PROCEDURE — 96360 HYDRATION IV INFUSION INIT: CPT

## 2019-03-06 PROCEDURE — 85610 PROTHROMBIN TIME: CPT

## 2019-03-06 PROCEDURE — 71046 X-RAY EXAM CHEST 2 VIEWS: CPT

## 2019-03-06 PROCEDURE — 99284 EMERGENCY DEPT VISIT MOD MDM: CPT | Mod: 25

## 2019-03-06 PROCEDURE — 80053 COMPREHEN METABOLIC PANEL: CPT

## 2019-03-06 PROCEDURE — 96361 HYDRATE IV INFUSION ADD-ON: CPT

## 2019-03-06 PROCEDURE — 93005 ELECTROCARDIOGRAM TRACING: CPT

## 2019-03-06 PROCEDURE — 85027 COMPLETE CBC AUTOMATED: CPT

## 2019-03-06 PROCEDURE — 85730 THROMBOPLASTIN TIME PARTIAL: CPT

## 2019-03-06 PROCEDURE — 99285 EMERGENCY DEPT VISIT HI MDM: CPT | Mod: 25

## 2019-03-06 PROCEDURE — 84443 ASSAY THYROID STIM HORMONE: CPT

## 2019-03-06 PROCEDURE — 93010 ELECTROCARDIOGRAM REPORT: CPT

## 2019-03-06 RX ORDER — SODIUM CHLORIDE 9 MG/ML
500 INJECTION INTRAMUSCULAR; INTRAVENOUS; SUBCUTANEOUS ONCE
Qty: 0 | Refills: 0 | Status: COMPLETED | OUTPATIENT
Start: 2019-03-06 | End: 2019-03-06

## 2019-03-06 RX ADMIN — SODIUM CHLORIDE 500 MILLILITER(S): 9 INJECTION INTRAMUSCULAR; INTRAVENOUS; SUBCUTANEOUS at 17:33

## 2019-03-06 RX ADMIN — SODIUM CHLORIDE 500 MILLILITER(S): 9 INJECTION INTRAMUSCULAR; INTRAVENOUS; SUBCUTANEOUS at 21:28

## 2019-03-06 NOTE — ED ADULT NURSE REASSESSMENT NOTE - NS ED NURSE REASSESS COMMENT FT1
Care assumed of patient at this time. Pt is updated to plan of care for discharge by doctor at bedside.

## 2019-03-06 NOTE — CHART NOTE - NSCHARTNOTEFT_GEN_A_CORE
Emergency Social Work Note:  LMSW received a referral for assistance with discharge planning. Per medical patient is medically cleared for discharge.  LMSW introduced herself to the patient and verbalized understanding her role.  Demographic information was reviewed and confirmed.  Patient has Medicaid insurance benefit.  LMSW contacted [facility] to arrange discharge transportation.  Confirmation number provided: [id number].  Transportation to be provided by [facility].  Patient was made aware. Patient declined to identify a primary caregiver.   LMSW will continue to follow up as needed.

## 2019-03-06 NOTE — ED PROVIDER NOTE - OBJECTIVE STATEMENT
43F PMH protein S deficiency on Xarelto, ITP, DVT, Sjogren's presents to ED with chest pain, dyspnea and leg tremors.  Patient reports symptoms started at approximately noon but resolved prior to arrival.  Patient reports tremors in her legs that caused her to have difficulty standing.  Patient denies leg pain or swelling, no recent travel, fevers, chills, abdominal pain, nausea, vomiting.

## 2019-03-06 NOTE — ED PROVIDER NOTE - NSFOLLOWUPINSTRUCTIONS_ED_ALL_ED_FT
Followup with PMD and cardiologist in 1 day without fail.  Return to ED with any worsening signs or symptoms.

## 2019-03-06 NOTE — ED ADULT NURSE NOTE - OBJECTIVE STATEMENT
44 y/o female with pmhx of gastroperesis c/o squeezing like cp that radiates behind neck, R jaw and BUE associated with dizziness, blurred vision, n/v and sob that initiated today.  pt is pale in color but no diaphoresis noted.  pt is awake, alert and responsive to all stimuli.  no respriatory distress noted.  vss.  pt resting with cardiac monitor in place.  safety precautions in place.  will continue to monitor.

## 2019-03-06 NOTE — ED PROVIDER NOTE - ATTENDING CONTRIBUTION TO CARE
****ATTENDING**** 44yo female hx ITP, DVT on xarelto presents with chest pain. States she was sitting and developed symptoms of sharp chest pain radiating to her entire chest and arms. Pt co tremors diffusely through her body.   Denies any pain at this time, no recent illness. + hydration.  On exam, Patient is awake,alert,oriented x 3. Patient is well appearing and in no acute distress. Patient's chest is clear to ausculation, +s1s2. Abdomen is soft nd/nt +BS. Extremity with no swelling or calf tenderness. moving all 4 extremities.   Spoke to Dr. Zelalem Hussein Cards regarding patient. States if labs stable will follow up with patient outpatient.  Check labs, DDimer. IVF and re eval.

## 2019-03-06 NOTE — ED ADULT NURSE NOTE - NSIMPLEMENTINTERV_GEN_ALL_ED
Implemented All Fall with Harm Risk Interventions:  [place] to call system. Call bell, personal items and telephone within reach. Instruct patient to call for assistance. Room bathroom lighting operational. Non-slip footwear when patient is off stretcher. Physically safe environment: no spills, clutter or unnecessary equipment. Stretcher in lowest position, wheels locked, appropriate side rails in place. Provide visual cue, wrist band, yellow gown, etc. Monitor gait and stability. Monitor for mental status changes and reorient to person, place, and time. Review medications for side effects contributing to fall risk. Reinforce activity limits and safety measures with patient and family. Provide visual clues: red socks.

## 2019-03-06 NOTE — ED PROVIDER NOTE - PROGRESS NOTE DETAILS
Case discussed with Dr. Hussein who reports low concern for ACS. All labs, imaging and EKG within normal limits.  All results provided to patient. Case discussed with Dr. Hussein who reports low concern for ACS.  I spoke w Dr. Hussein regarding patient, states he has low concern for ACS and patient can be discharged w outpt follow up with him. [id number]

## 2019-03-06 NOTE — ED PROVIDER NOTE - CLINICAL SUMMARY MEDICAL DECISION MAKING FREE TEXT BOX
43F PMH protein S deficiency presents with chest pain and dyspnea.  Labs including troponin and d-dimer.  If d-dimer elevated with get V/Q scan.  EKG, CXR.  Reassess.

## 2019-03-07 ENCOUNTER — FORM ENCOUNTER (OUTPATIENT)
Age: 44
End: 2019-03-07

## 2019-03-07 DIAGNOSIS — Z71.89 OTHER SPECIFIED COUNSELING: ICD-10-CM

## 2019-03-08 ENCOUNTER — APPOINTMENT (OUTPATIENT)
Dept: HEMATOLOGY ONCOLOGY | Facility: CLINIC | Age: 44
End: 2019-03-08
Payer: MEDICAID

## 2019-03-08 ENCOUNTER — OUTPATIENT (OUTPATIENT)
Dept: OUTPATIENT SERVICES | Facility: HOSPITAL | Age: 44
LOS: 1 days | End: 2019-03-08
Payer: MEDICAID

## 2019-03-08 ENCOUNTER — RESULT REVIEW (OUTPATIENT)
Age: 44
End: 2019-03-08

## 2019-03-08 ENCOUNTER — APPOINTMENT (OUTPATIENT)
Dept: ULTRASOUND IMAGING | Facility: CLINIC | Age: 44
End: 2019-03-08
Payer: MEDICAID

## 2019-03-08 VITALS
WEIGHT: 100.31 LBS | RESPIRATION RATE: 17 BRPM | DIASTOLIC BLOOD PRESSURE: 81 MMHG | SYSTOLIC BLOOD PRESSURE: 129 MMHG | BODY MASS INDEX: 18.35 KG/M2 | OXYGEN SATURATION: 100 % | HEART RATE: 91 BPM | TEMPERATURE: 98.2 F

## 2019-03-08 DIAGNOSIS — Z90.49 ACQUIRED ABSENCE OF OTHER SPECIFIED PARTS OF DIGESTIVE TRACT: Chronic | ICD-10-CM

## 2019-03-08 DIAGNOSIS — Z00.8 ENCOUNTER FOR OTHER GENERAL EXAMINATION: ICD-10-CM

## 2019-03-08 LAB
BASOPHILS # BLD AUTO: 0 K/UL — SIGNIFICANT CHANGE UP (ref 0–0.2)
BASOPHILS NFR BLD AUTO: 0.5 % — SIGNIFICANT CHANGE UP (ref 0–2)
EOSINOPHIL # BLD AUTO: 0 K/UL — SIGNIFICANT CHANGE UP (ref 0–0.5)
EOSINOPHIL NFR BLD AUTO: 0.6 % — SIGNIFICANT CHANGE UP (ref 0–6)
HCT VFR BLD CALC: 28.2 % — LOW (ref 34.5–45)
HGB BLD-MCNC: 9.3 G/DL — LOW (ref 11.5–15.5)
LYMPHOCYTES # BLD AUTO: 0.9 K/UL — LOW (ref 1–3.3)
LYMPHOCYTES # BLD AUTO: 21.1 % — SIGNIFICANT CHANGE UP (ref 13–44)
MCHC RBC-ENTMCNC: 23.5 PG — LOW (ref 27–34)
MCHC RBC-ENTMCNC: 33 G/DL — SIGNIFICANT CHANGE UP (ref 32–36)
MCV RBC AUTO: 71.2 FL — LOW (ref 80–100)
MONOCYTES # BLD AUTO: 0.4 K/UL — SIGNIFICANT CHANGE UP (ref 0–0.9)
MONOCYTES NFR BLD AUTO: 8.8 % — SIGNIFICANT CHANGE UP (ref 2–14)
NEUTROPHILS # BLD AUTO: 3 K/UL — SIGNIFICANT CHANGE UP (ref 1.8–7.4)
NEUTROPHILS NFR BLD AUTO: 69 % — SIGNIFICANT CHANGE UP (ref 43–77)
PLATELET # BLD AUTO: 100 K/UL — LOW (ref 150–400)
RBC # BLD: 3.96 M/UL — SIGNIFICANT CHANGE UP (ref 3.8–5.2)
RBC # FLD: 14.3 % — SIGNIFICANT CHANGE UP (ref 10.3–14.5)
WBC # BLD: 4.3 K/UL — SIGNIFICANT CHANGE UP (ref 3.8–10.5)
WBC # FLD AUTO: 4.3 K/UL — SIGNIFICANT CHANGE UP (ref 3.8–10.5)

## 2019-03-08 PROCEDURE — 93971 EXTREMITY STUDY: CPT

## 2019-03-08 PROCEDURE — 93971 EXTREMITY STUDY: CPT | Mod: 26,RT

## 2019-03-08 PROCEDURE — 99214 OFFICE O/P EST MOD 30 MIN: CPT

## 2019-03-13 ENCOUNTER — APPOINTMENT (OUTPATIENT)
Dept: INTERNAL MEDICINE | Facility: CLINIC | Age: 44
End: 2019-03-13
Payer: MEDICAID

## 2019-03-13 VITALS
OXYGEN SATURATION: 98 % | HEIGHT: 62 IN | BODY MASS INDEX: 17.66 KG/M2 | SYSTOLIC BLOOD PRESSURE: 120 MMHG | WEIGHT: 96 LBS | HEART RATE: 125 BPM | DIASTOLIC BLOOD PRESSURE: 79 MMHG

## 2019-03-13 DIAGNOSIS — R11.0 NAUSEA: ICD-10-CM

## 2019-03-13 DIAGNOSIS — Z87.898 PERSONAL HISTORY OF OTHER SPECIFIED CONDITIONS: ICD-10-CM

## 2019-03-13 PROCEDURE — 99214 OFFICE O/P EST MOD 30 MIN: CPT

## 2019-03-14 ENCOUNTER — OTHER (OUTPATIENT)
Age: 44
End: 2019-03-14

## 2019-03-14 NOTE — ASSESSMENT
[Palliative Care Plan] : not applicable at this time [FreeTextEntry1] : 42 YO F with chronic ITP with recent post operative thrombosis of her left internal jugular..She does not recall an IV being placed there. It remains possible she had one intra-operatively or that her neck had been positioned or manipulated in some way during her appendectomy. 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 This is a hereditary condition. I have counseled her regarding it and the increased risk of venous thrombosis associated with it. The need for lifelong anticoagulation was reviewed. Appropriate precautions to take to decrease recurrence were also reviewed including for travel and surgery.   \par \par She is Fe deficient and mildly anemic, but this is stable since May 2018. It is possible that menstrual blood loss is responsible. She reports that she did not tolerate oral Fe supplementation due to constipation, nausea and fear she would bleed from it. She has significant psychiatric disability. Labs stable today. \par \par Suggest :\par Continue Xarelto 20 mg daily as long as plts > 50,000 for at least 6 months. Then consider decreasing to 10 mg daily prophylaxis.\par Encourage pt to take Fe supplementation more than twice weekly\par CBC in 2 weeks \par Psychiatric f/u\par RTC 1 month

## 2019-03-14 NOTE — REVIEW OF SYSTEMS
[Fatigue] : fatigue [Recent Change In Weight] : ~T recent weight change [Negative] : Heme/Lymph [FreeTextEntry2] : lost weight over the past week

## 2019-03-14 NOTE — PHYSICAL EXAM
[Normal Sclera/Conjunctiva] : normal sclera/conjunctiva [PERRL] : pupils equal round and reactive to light [EOMI] : extraocular movements intact [Normal Outer Ear/Nose] : the outer ears and nose were normal in appearance [Normal Oropharynx] : the oropharynx was normal [No JVD] : no jugular venous distention [Supple] : supple [No Lymphadenopathy] : no lymphadenopathy [No Respiratory Distress] : no respiratory distress  [Clear to Auscultation] : lungs were clear to auscultation bilaterally [No Accessory Muscle Use] : no accessory muscle use [Regular Rhythm] : with a regular rhythm [No Murmur] : no murmur heard [No Carotid Bruits] : no carotid bruits [No Edema] : there was no peripheral edema [Soft] : abdomen soft [Non Tender] : non-tender [Non-distended] : non-distended [No Masses] : no abdominal mass palpated [No HSM] : no HSM [Normal Bowel Sounds] : normal bowel sounds [Normal Posterior Cervical Nodes] : no posterior cervical lymphadenopathy [Normal Anterior Cervical Nodes] : no anterior cervical lymphadenopathy [No CVA Tenderness] : no CVA  tenderness [No Joint Swelling] : no joint swelling [Grossly Normal Strength/Tone] : grossly normal strength/tone [Normal Gait] : normal gait [Coordination Grossly Intact] : coordination grossly intact [No Focal Deficits] : no focal deficits [de-identified] : thin, woman alert, oriented [de-identified] : tachycardic to HR of 90-100bpm [de-identified] : patient appearing anxious but alert/oriented/ tearful at times

## 2019-03-14 NOTE — HISTORY OF PRESENT ILLNESS
[Date: ____________] : Patient's last distress assessment performed on [unfilled]. [5 - Distress Level] : Distress Level: 5 [de-identified] : Idiopathic thrombocytopenic purpura\par 11/8 Left IJ thrombosis\par Protein S deficiency \par \par  [de-identified] : Feels fair. had a heavy periods.  Saw her GYN few weeks ago, wanted to start her on Nor-Qd which she is reluctant to do.  Had an episode of near fainting on Tuesday.  Went to [facility]. Troponin, EKG, and chest x-ray were ordered.  C/O right numbness of right hand, and sharp shooting pain threw her right arm two weeks ago.  Saw neurologist, going for Doppler of the right arm today, and EMG on 3/26/19.  Taking slow Fe twice a week. No change in stool color.  Bruises easily.  No melena, BRBPR. No recent weight loss.   Declines exam. [FreeTextEntry7] : Being seen at [facility]

## 2019-03-14 NOTE — REVIEW OF SYSTEMS
[Recent Change In Weight] : ~T recent weight change [Dysmenorrhea/Abn Vaginal Bleeding] : dysmenorrhea/abnormal vaginal bleeding [Anxiety] : anxiety [Depression] : depression [Easy Bleeding] : a tendency for easy bleeding [Easy Bruising] : a tendency for easy bruising [Negative] : Allergic/Immunologic [Vomiting] : no vomiting [Constipation] : no constipation [Diarrhea] : no diarrhea [FreeTextEntry2] : weight gain

## 2019-03-14 NOTE — HISTORY OF PRESENT ILLNESS
[FreeTextEntry1] : Patient presents for follow up evaluation for multiple medical concerns including:\par  recently diagnosed DVT of the Internal jugular vein--pt is now on Xarelto presents today c/o  [de-identified] : In 11/2018 pt was admitted to [facility] with acute abdominal pain symptoms. She was diagnosed with acute appendicitis and required emergent laparoscopic appendectomy--performed by Dr. Merchant.\par \par Several weeks following her laparoscopic appendectomy, patient was admitted to [facility] with acute left neck pain and tenderness. She was diagnosed with a "small left internal jugular DVT"- of unclear etiology. She was started on treatment initially with Heparin then started on Xarelto 15mg bid which she was to continue for 3-6 months.  She then had  followup with the hematologist, Dr. Arnold Zamora in late November 13 for further evaluation as well as with the vascular surgeon, Dr. Johnson at [facility] and was diagnosed with Protein S deficiency switched to Xarelto 20mg qd \par \par Her generalized anxiety symptoms are not significantly improved. She is currently on Paxil 5 mg q.d. which she recently started with her psychiatrist-Dr. De ~5-6 months ago.  She continues to followup with her outpatient psychologist weekly when possible.\par \par Her living situation has been stressful.  She was living at an [facility] in [place]--privately renting a room and paying out of pocket.\par \par She has been in the 9 of multiple ERs in the past several months- in and out of [place] and went throughout the hospital emergency room for various complaints.\par \par She presents today complaining of several day history of worsening abdominal pains and flank pains and new symptoms of chest pain and shortness of breath. She complains of two-day history of worsening abdominal pain and nausea with increased belching and inability to keep down solids as well as a burning sensation in her epigastric chest region that is radiating to her throat. The sensation is exacerbated with any p.o. intake of liquid or solid food.\par \par She denies any fevers or chills or vomiting but complains of a sense of nausea and retching whenever she drinks Gatorade or water.\par \par She also complains of one to two-day history of chest tightness associated with shortness of breath for the past 24-36 hours. The chest pain is described as sharp and radiating to the left chest wall and to the left scapula and is exacerbated with exertion and deep inspiration. Her pulse ox at home has been desaturating to the mid 90s at rest. She has a history of DVTs and protein S. deficiency and is concerned about a pulmonary embolism.

## 2019-03-14 NOTE — ASSESSMENT
[FreeTextEntry1] : I had discussion with patient. She is concerned her chest pains may be due to a pulmonary embolism.\par I told pt she is already on Xarelto 20mg and it is very unlikely she has a P.E.  She insists on going to the ER to get a CT angiogram and blood work to rule out a pulmonary embolism.\par \par She is also concerned her worsening abdominal pains may represent a possible small bowel obstruction and feels she needs emergent abdominal CT scan imaging and possible IVF hydration given she is unable to keep any po intake down.\par \par She insists on going to [place] ER via [facility].  She refuses to go to [facility] ER at this time.

## 2019-03-15 ENCOUNTER — OTHER (OUTPATIENT)
Age: 44
End: 2019-03-15

## 2019-03-15 DIAGNOSIS — R51 HEADACHE: ICD-10-CM

## 2019-03-16 ENCOUNTER — FORM ENCOUNTER (OUTPATIENT)
Age: 44
End: 2019-03-16

## 2019-03-17 ENCOUNTER — APPOINTMENT (OUTPATIENT)
Dept: MRI IMAGING | Facility: CLINIC | Age: 44
End: 2019-03-17
Payer: MEDICAID

## 2019-03-17 ENCOUNTER — OUTPATIENT (OUTPATIENT)
Dept: OUTPATIENT SERVICES | Facility: HOSPITAL | Age: 44
LOS: 1 days | End: 2019-03-17
Payer: MEDICAID

## 2019-03-17 DIAGNOSIS — Z90.49 ACQUIRED ABSENCE OF OTHER SPECIFIED PARTS OF DIGESTIVE TRACT: Chronic | ICD-10-CM

## 2019-03-17 DIAGNOSIS — R51 HEADACHE: ICD-10-CM

## 2019-03-17 PROCEDURE — 70551 MRI BRAIN STEM W/O DYE: CPT

## 2019-03-17 PROCEDURE — 70551 MRI BRAIN STEM W/O DYE: CPT | Mod: 26

## 2019-03-18 ENCOUNTER — OTHER (OUTPATIENT)
Age: 44
End: 2019-03-18

## 2019-03-18 ENCOUNTER — EMERGENCY (EMERGENCY)
Facility: HOSPITAL | Age: 44
LOS: 1 days | Discharge: HOME | End: 2019-03-18
Attending: EMERGENCY MEDICINE | Admitting: EMERGENCY MEDICINE
Payer: MEDICAID

## 2019-03-18 VITALS
SYSTOLIC BLOOD PRESSURE: 119 MMHG | WEIGHT: 95.9 LBS | RESPIRATION RATE: 14 BRPM | HEART RATE: 84 BPM | DIASTOLIC BLOOD PRESSURE: 78 MMHG | TEMPERATURE: 98 F | HEIGHT: 62 IN | OXYGEN SATURATION: 99 %

## 2019-03-18 VITALS
DIASTOLIC BLOOD PRESSURE: 85 MMHG | RESPIRATION RATE: 16 BRPM | HEART RATE: 85 BPM | TEMPERATURE: 98 F | OXYGEN SATURATION: 98 % | SYSTOLIC BLOOD PRESSURE: 125 MMHG

## 2019-03-18 DIAGNOSIS — Z90.49 ACQUIRED ABSENCE OF OTHER SPECIFIED PARTS OF DIGESTIVE TRACT: Chronic | ICD-10-CM

## 2019-03-18 LAB
ALBUMIN SERPL ELPH-MCNC: 3.7 G/DL — SIGNIFICANT CHANGE UP (ref 3.3–5)
ALP SERPL-CCNC: 73 U/L — SIGNIFICANT CHANGE UP (ref 30–120)
ALT FLD-CCNC: 21 U/L DA — SIGNIFICANT CHANGE UP (ref 10–60)
ANION GAP SERPL CALC-SCNC: 11 MMOL/L — SIGNIFICANT CHANGE UP (ref 5–17)
APPEARANCE UR: CLEAR — SIGNIFICANT CHANGE UP
APTT BLD: 28.5 SEC — SIGNIFICANT CHANGE UP (ref 28.5–37)
AST SERPL-CCNC: 23 U/L — SIGNIFICANT CHANGE UP (ref 10–40)
BACTERIA # UR AUTO: ABNORMAL
BASOPHILS # BLD AUTO: 0.02 K/UL — SIGNIFICANT CHANGE UP (ref 0–0.2)
BASOPHILS NFR BLD AUTO: 0.3 % — SIGNIFICANT CHANGE UP (ref 0–2)
BILIRUB SERPL-MCNC: 0.2 MG/DL — SIGNIFICANT CHANGE UP (ref 0.2–1.2)
BILIRUB UR-MCNC: NEGATIVE — SIGNIFICANT CHANGE UP
BUN SERPL-MCNC: 5 MG/DL — LOW (ref 7–23)
CALCIUM SERPL-MCNC: 8.9 MG/DL — SIGNIFICANT CHANGE UP (ref 8.4–10.5)
CHLORIDE SERPL-SCNC: 110 MMOL/L — HIGH (ref 96–108)
CK MB BLD-MCNC: 1 % — SIGNIFICANT CHANGE UP (ref 0–3.5)
CK MB CFR SERPL CALC: 0.6 NG/ML — SIGNIFICANT CHANGE UP (ref 0–3.6)
CK SERPL-CCNC: 58 U/L — SIGNIFICANT CHANGE UP (ref 26–192)
CO2 SERPL-SCNC: 25 MMOL/L — SIGNIFICANT CHANGE UP (ref 22–31)
COLOR SPEC: SIGNIFICANT CHANGE UP
CREAT SERPL-MCNC: 0.77 MG/DL — SIGNIFICANT CHANGE UP (ref 0.5–1.3)
DIFF PNL FLD: NEGATIVE — SIGNIFICANT CHANGE UP
EOSINOPHIL # BLD AUTO: 0.02 K/UL — SIGNIFICANT CHANGE UP (ref 0–0.5)
EOSINOPHIL NFR BLD AUTO: 0.3 % — SIGNIFICANT CHANGE UP (ref 0–6)
EPI CELLS # UR: SIGNIFICANT CHANGE UP
GLUCOSE SERPL-MCNC: 113 MG/DL — HIGH (ref 70–99)
GLUCOSE UR QL: NEGATIVE MG/DL — SIGNIFICANT CHANGE UP
HCT VFR BLD CALC: 28.7 % — LOW (ref 34.5–45)
HGB BLD-MCNC: 8.6 G/DL — LOW (ref 11.5–15.5)
IMM GRANULOCYTES NFR BLD AUTO: 0.3 % — SIGNIFICANT CHANGE UP (ref 0–1.5)
INR BLD: 1.12 RATIO — SIGNIFICANT CHANGE UP (ref 0.88–1.16)
KETONES UR-MCNC: NEGATIVE — SIGNIFICANT CHANGE UP
LEUKOCYTE ESTERASE UR-ACNC: ABNORMAL
LIDOCAIN IGE QN: 137 U/L — SIGNIFICANT CHANGE UP (ref 73–393)
LYMPHOCYTES # BLD AUTO: 1.27 K/UL — SIGNIFICANT CHANGE UP (ref 1–3.3)
LYMPHOCYTES # BLD AUTO: 19.2 % — SIGNIFICANT CHANGE UP (ref 13–44)
MCHC RBC-ENTMCNC: 22.6 PG — LOW (ref 27–34)
MCHC RBC-ENTMCNC: 30 GM/DL — LOW (ref 32–36)
MCV RBC AUTO: 75.3 FL — LOW (ref 80–100)
MONOCYTES # BLD AUTO: 0.45 K/UL — SIGNIFICANT CHANGE UP (ref 0–0.9)
MONOCYTES NFR BLD AUTO: 6.8 % — SIGNIFICANT CHANGE UP (ref 2–14)
NEUTROPHILS # BLD AUTO: 4.82 K/UL — SIGNIFICANT CHANGE UP (ref 1.8–7.4)
NEUTROPHILS NFR BLD AUTO: 73.1 % — SIGNIFICANT CHANGE UP (ref 43–77)
NITRITE UR-MCNC: NEGATIVE — SIGNIFICANT CHANGE UP
NRBC # BLD: 0 /100 WBCS — SIGNIFICANT CHANGE UP (ref 0–0)
OB PNL STL: NEGATIVE — SIGNIFICANT CHANGE UP
PH UR: 8 — SIGNIFICANT CHANGE UP (ref 5–8)
PLAT MORPH BLD: NORMAL — SIGNIFICANT CHANGE UP
PLATELET # BLD AUTO: 85 K/UL — LOW (ref 150–400)
POTASSIUM SERPL-MCNC: 4.1 MMOL/L — SIGNIFICANT CHANGE UP (ref 3.5–5.3)
POTASSIUM SERPL-SCNC: 4.1 MMOL/L — SIGNIFICANT CHANGE UP (ref 3.5–5.3)
PROT SERPL-MCNC: 6.6 G/DL — SIGNIFICANT CHANGE UP (ref 6–8.3)
PROT UR-MCNC: NEGATIVE MG/DL — SIGNIFICANT CHANGE UP
PROTHROM AB SERPL-ACNC: 12.3 SEC — SIGNIFICANT CHANGE UP (ref 10–12.9)
RBC # BLD: 3.81 M/UL — SIGNIFICANT CHANGE UP (ref 3.8–5.2)
RBC # FLD: 15.1 % — HIGH (ref 10.3–14.5)
RBC BLD AUTO: NORMAL — SIGNIFICANT CHANGE UP
SODIUM SERPL-SCNC: 146 MMOL/L — HIGH (ref 135–145)
SP GR SPEC: 1.01 — SIGNIFICANT CHANGE UP (ref 1.01–1.02)
TROPONIN I SERPL-MCNC: 0.01 NG/ML — LOW (ref 0.02–0.06)
UROBILINOGEN FLD QL: NEGATIVE MG/DL — SIGNIFICANT CHANGE UP
WBC # BLD: 6.6 K/UL — SIGNIFICANT CHANGE UP (ref 3.8–10.5)
WBC # FLD AUTO: 6.6 K/UL — SIGNIFICANT CHANGE UP (ref 3.8–10.5)
WBC UR QL: SIGNIFICANT CHANGE UP

## 2019-03-18 PROCEDURE — 93005 ELECTROCARDIOGRAM TRACING: CPT

## 2019-03-18 PROCEDURE — 82553 CREATINE MB FRACTION: CPT

## 2019-03-18 PROCEDURE — 74176 CT ABD & PELVIS W/O CONTRAST: CPT | Mod: 26

## 2019-03-18 PROCEDURE — 71046 X-RAY EXAM CHEST 2 VIEWS: CPT

## 2019-03-18 PROCEDURE — 93971 EXTREMITY STUDY: CPT | Mod: 26,RT

## 2019-03-18 PROCEDURE — 85610 PROTHROMBIN TIME: CPT

## 2019-03-18 PROCEDURE — 74176 CT ABD & PELVIS W/O CONTRAST: CPT

## 2019-03-18 PROCEDURE — 81001 URINALYSIS AUTO W/SCOPE: CPT

## 2019-03-18 PROCEDURE — 93010 ELECTROCARDIOGRAM REPORT: CPT

## 2019-03-18 PROCEDURE — 96374 THER/PROPH/DIAG INJ IV PUSH: CPT

## 2019-03-18 PROCEDURE — 82272 OCCULT BLD FECES 1-3 TESTS: CPT

## 2019-03-18 PROCEDURE — 84484 ASSAY OF TROPONIN QUANT: CPT

## 2019-03-18 PROCEDURE — 93971 EXTREMITY STUDY: CPT

## 2019-03-18 PROCEDURE — 36415 COLL VENOUS BLD VENIPUNCTURE: CPT

## 2019-03-18 PROCEDURE — 99284 EMERGENCY DEPT VISIT MOD MDM: CPT

## 2019-03-18 PROCEDURE — 80053 COMPREHEN METABOLIC PANEL: CPT

## 2019-03-18 PROCEDURE — 85730 THROMBOPLASTIN TIME PARTIAL: CPT

## 2019-03-18 PROCEDURE — 82550 ASSAY OF CK (CPK): CPT

## 2019-03-18 PROCEDURE — 71046 X-RAY EXAM CHEST 2 VIEWS: CPT | Mod: 26

## 2019-03-18 PROCEDURE — 87086 URINE CULTURE/COLONY COUNT: CPT

## 2019-03-18 PROCEDURE — 83690 ASSAY OF LIPASE: CPT

## 2019-03-18 PROCEDURE — 99284 EMERGENCY DEPT VISIT MOD MDM: CPT | Mod: 25

## 2019-03-18 PROCEDURE — 85027 COMPLETE CBC AUTOMATED: CPT

## 2019-03-18 RX ORDER — CEFTRIAXONE 500 MG/1
1 INJECTION, POWDER, FOR SOLUTION INTRAMUSCULAR; INTRAVENOUS ONCE
Qty: 0 | Refills: 0 | Status: COMPLETED | OUTPATIENT
Start: 2019-03-18 | End: 2019-03-18

## 2019-03-18 RX ORDER — CEFUROXIME AXETIL 250 MG
1 TABLET ORAL
Qty: 10 | Refills: 0
Start: 2019-03-18 | End: 2019-03-22

## 2019-03-18 RX ORDER — SODIUM CHLORIDE 9 MG/ML
1000 INJECTION INTRAMUSCULAR; INTRAVENOUS; SUBCUTANEOUS ONCE
Qty: 0 | Refills: 0 | Status: COMPLETED | OUTPATIENT
Start: 2019-03-18 | End: 2019-03-18

## 2019-03-18 RX ADMIN — CEFTRIAXONE 100 GRAM(S): 500 INJECTION, POWDER, FOR SOLUTION INTRAMUSCULAR; INTRAVENOUS at 15:14

## 2019-03-18 RX ADMIN — SODIUM CHLORIDE 1000 MILLILITER(S): 9 INJECTION INTRAMUSCULAR; INTRAVENOUS; SUBCUTANEOUS at 11:08

## 2019-03-18 NOTE — ED ADULT NURSE REASSESSMENT NOTE - NS ED NURSE REASSESS COMMENT FT1
pt states  iv site may caused dvt, pt appears anxious states "I think I have a dvt on my arm I need a test to check for clot" Pt also c/o chest pain. EKG called, Dr. Escobar informed. pt states she has hx of dvt to left arm pink band applied. iv site right arm intact, no redness or swelling noted, good distal pulses noted.

## 2019-03-18 NOTE — CONSULT NOTE ADULT - ASSESSMENT
The patient is a 43 year old female with a history of psychiatric disorder, anemia, POTS, DVT, protein S deficiency who presents with abdominal pain and chest pain.    Plan:  - Chest pain is atypical and unlikely cardiac in origin  - ECG with no evidence of ischemia or infarction  - One set of cardiac enzymes negative  - CXR with no active disease  - No further inpatient cardiac work-up indicated at this time. She can follow-up with her cardiologist as outpatient.

## 2019-03-18 NOTE — ED PROVIDER NOTE - CARE PLAN
Principal Discharge DX:	Right sided abdominal pain Principal Discharge DX:	Renal colic on right side  Secondary Diagnosis:	Chest pain Principal Discharge DX:	Renal colic on right side  Secondary Diagnosis:	Chest pain  Secondary Diagnosis:	Anemia

## 2019-03-18 NOTE — ED ADULT NURSE NOTE - CHPI ED NUR SYMPTOMS NEG
no fever/no dysuria/no abdominal distension/no burning urination/no chills/no hematuria/no vomiting/no nausea

## 2019-03-18 NOTE — ED PROVIDER NOTE - OBJECTIVE STATEMENT
pt c/o right sided abd pain s/p having MRI head done yesterday. pt denies fevers, chills, ha, d/n/v, sob, cough, diarrhea, constipation, dysuria, freq, hematuria, vag bleeding. pt thinks she has mesh from appendectomy that was moved during MRI. pt declining pain meds  pmd - park

## 2019-03-18 NOTE — ED ADULT NURSE NOTE - NSIMPLEMENTINTERV_GEN_ALL_ED
Implemented All Universal Safety Interventions:  [place] to call system. Call bell, personal items and telephone within reach. Instruct patient to call for assistance. Room bathroom lighting operational. Non-slip footwear when patient is off stretcher. Physically safe environment: no spills, clutter or unnecessary equipment. Stretcher in lowest position, wheels locked, appropriate side rails in place.

## 2019-03-18 NOTE — CONSULT NOTE ADULT - SUBJECTIVE AND OBJECTIVE BOX
History of Present Illness: The patient is a 43 year old female with a history of psychiatric disorder, anemia, POTS, DVT, protein S deficiency who presents with abdominal pain. She states since her MRI yesterday she has had right-sided abdominal pain. She has also noted on and off chest pain, described as left-sided, squeezing, non-radiating. She has a history of multiple ED visits for chest pain and prior work-up has all been negative.    Past Medical/Surgical History:  Psychiatric disorder, anemia, POTS, DVT, protein S deficiency     Medications:  Home Medications:  Ativan: 2 milligram(s) orally 5 times a day (28 Feb 2019 18:15)  Cardizem  mg/24 hours oral capsule, extended release: 1 cap(s) orally once a day at 11:30am (28 Feb 2019 18:15)  montelukast 10 mg oral tablet: 1 tab(s) orally once a day at 21:00 (28 Feb 2019 18:15)  simethicone 80 mg oral tablet, chewable: 2 tab(s) orally at 10:00 am, 16:00, 23:00 (28 Feb 2019 18:15)  Xarelto 20 mg oral tablet: 1 tab(s) orally once a day (in the evening) (28 Feb 2019 18:15)      Family History: Non-contributory family history of premature cardiovascular atherosclerotic disease    Social History: No tobacco, alcohol or drug use    Review of Systems:  General: No fevers, chills, weight loss or gain  Skin: No rashes, color changes  Cardiovascular: No chest pain, orthopnea  Respiratory: No shortness of breath, cough  Gastrointestinal: No nausea, abdominal pain  Genitourinary: No incontinence, pain with urination  Musculoskeletal: No pain, swelling, decreased range of motion  Neurological: No headache, weakness  Psychiatric: No depression, anxiety  Endocrine: No weight loss or gain, increased thirst  All other systems are comprehensively negative.    Physical Exam:  Vitals:        Vital Signs Last 24 Hrs  T(C): 36.9 (18 Mar 2019 11:52), Max: 36.9 (18 Mar 2019 11:52)  T(F): 98.5 (18 Mar 2019 11:52), Max: 98.5 (18 Mar 2019 11:52)  HR: 85 (18 Mar 2019 11:52) (84 - 85)  BP: 125/85 (18 Mar 2019 11:52) (119/78 - 125/85)  BP(mean): --  RR: 16 (18 Mar 2019 11:52) (14 - 16)  SpO2: 98% (18 Mar 2019 11:52) (98% - 99%)  General: NAD  HEENT: MMM  Neck: No JVD, no carotid bruit  Lungs: CTAB  CV: RRR, nl S1/S2, no M/R/G  Abdomen: S/NT/ND, +BS  Extremities: No LE edema, no cyanosis  Neuro: AAOx3, non-focal  Skin: No rash    Labs:                        8.6    6.60  )-----------( 85       ( 18 Mar 2019 11:13 )             28.7     03-18    146<H>  |  110<H>  |  5<L>  ----------------------------<  113<H>  4.1   |  25  |  0.77    Ca    8.9      18 Mar 2019 11:13    TPro  6.6  /  Alb  3.7  /  TBili  0.2  /  DBili  x   /  AST  23  /  ALT  21  /  AlkPhos  73  03-18    CARDIAC MARKERS ( 18 Mar 2019 12:17 )  .009 ng/mL / x     / 58 U/L / x     / 0.6 ng/mL      PT/INR - ( 18 Mar 2019 11:13 )   PT: 12.3 sec;   INR: 1.12 ratio         PTT - ( 18 Mar 2019 11:13 )  PTT:28.5 sec    ECG: NSR, normal axis, no ST abnormality

## 2019-03-18 NOTE — ED PROVIDER NOTE - PROGRESS NOTE DETAILS
pt relates devloped chest pain after IV was placed by RN. will get ekg/cxr/CE. pt also relates she has had rue discomfort similar to when had lue dvt, requests doppler jesse (cards) seen eval pt, cleared for d/c and outpt f/u pt relates hx iron deficiency anemia, last hgb 9.3, concerned why is lower, pt consents to occult blood feces, rectal exam with female RN chaperone (Fadumo Griffith) stool brown case and results d/w dr elena (pt's hematologist), he relates pt noncompliant with her iron pills, recommends encourage pt to take as prescribed and he will f/u in office. case and results d/w dr kovacs (uro), he requests d/c with ceftin and percocet to f/u as outpt. Reevaluated patient at bedside.  Patient feeling improved.  Discussed the results of all diagnostic testing in ED and copies of all reports given.   An opportunity to ask questions was given.  Discussed the importance of prompt, close medical follow-up.  Patient will return with any changes, concerns or persistent / worsening symptoms.  Understanding of all instructions verbalized.

## 2019-03-18 NOTE — ED STATDOCS - CARDIAC, MLM
normal rate, regular rhythm, and no murmur.
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

## 2019-03-19 LAB
CULTURE RESULTS: NO GROWTH — SIGNIFICANT CHANGE UP
SPECIMEN SOURCE: SIGNIFICANT CHANGE UP

## 2019-03-20 NOTE — ED ADULT NURSE NOTE - CAS DISCH TRANSFER METHOD
Hide Additional Notes?: No Additional Notes (Optional): Liquid nitrogen Detail Level: Detailed Private car

## 2019-03-21 ENCOUNTER — EMERGENCY (EMERGENCY)
Facility: HOSPITAL | Age: 44
LOS: 1 days | End: 2019-03-21
Attending: EMERGENCY MEDICINE
Payer: MEDICAID

## 2019-03-21 VITALS
SYSTOLIC BLOOD PRESSURE: 134 MMHG | HEART RATE: 101 BPM | TEMPERATURE: 98 F | DIASTOLIC BLOOD PRESSURE: 96 MMHG | OXYGEN SATURATION: 100 % | RESPIRATION RATE: 20 BRPM | HEIGHT: 62 IN

## 2019-03-21 VITALS
DIASTOLIC BLOOD PRESSURE: 90 MMHG | TEMPERATURE: 98 F | OXYGEN SATURATION: 100 % | SYSTOLIC BLOOD PRESSURE: 140 MMHG | HEART RATE: 85 BPM

## 2019-03-21 DIAGNOSIS — Z90.49 ACQUIRED ABSENCE OF OTHER SPECIFIED PARTS OF DIGESTIVE TRACT: Chronic | ICD-10-CM

## 2019-03-21 LAB
ALBUMIN SERPL ELPH-MCNC: 5 G/DL — SIGNIFICANT CHANGE UP (ref 3.3–5)
ALP SERPL-CCNC: 81 U/L — SIGNIFICANT CHANGE UP (ref 40–120)
ALT FLD-CCNC: 11 U/L — SIGNIFICANT CHANGE UP (ref 10–45)
ANION GAP SERPL CALC-SCNC: 15 MMOL/L — SIGNIFICANT CHANGE UP (ref 5–17)
APPEARANCE UR: CLEAR — SIGNIFICANT CHANGE UP
AST SERPL-CCNC: 15 U/L — SIGNIFICANT CHANGE UP (ref 10–40)
BACTERIA # UR AUTO: NEGATIVE — SIGNIFICANT CHANGE UP
BASOPHILS # BLD AUTO: 0 K/UL — SIGNIFICANT CHANGE UP (ref 0–0.2)
BASOPHILS NFR BLD AUTO: 0.3 % — SIGNIFICANT CHANGE UP (ref 0–2)
BILIRUB SERPL-MCNC: 0.3 MG/DL — SIGNIFICANT CHANGE UP (ref 0.2–1.2)
BILIRUB UR-MCNC: NEGATIVE — SIGNIFICANT CHANGE UP
BUN SERPL-MCNC: <4 MG/DL — LOW (ref 7–23)
CALCIUM SERPL-MCNC: 10 MG/DL — SIGNIFICANT CHANGE UP (ref 8.4–10.5)
CHLORIDE SERPL-SCNC: 105 MMOL/L — SIGNIFICANT CHANGE UP (ref 96–108)
CO2 SERPL-SCNC: 22 MMOL/L — SIGNIFICANT CHANGE UP (ref 22–31)
COLOR SPEC: COLORLESS — SIGNIFICANT CHANGE UP
CREAT SERPL-MCNC: 0.8 MG/DL — SIGNIFICANT CHANGE UP (ref 0.5–1.3)
DIFF PNL FLD: NEGATIVE — SIGNIFICANT CHANGE UP
EOSINOPHIL # BLD AUTO: 0 K/UL — SIGNIFICANT CHANGE UP (ref 0–0.5)
EOSINOPHIL NFR BLD AUTO: 0.1 % — SIGNIFICANT CHANGE UP (ref 0–6)
EPI CELLS # UR: 1 /HPF — SIGNIFICANT CHANGE UP
GLUCOSE SERPL-MCNC: 90 MG/DL — SIGNIFICANT CHANGE UP (ref 70–99)
GLUCOSE UR QL: NEGATIVE — SIGNIFICANT CHANGE UP
HCT VFR BLD CALC: 34.4 % — LOW (ref 34.5–45)
HGB BLD-MCNC: 10.8 G/DL — LOW (ref 11.5–15.5)
HYALINE CASTS # UR AUTO: 1 /LPF — SIGNIFICANT CHANGE UP (ref 0–2)
KETONES UR-MCNC: NEGATIVE — SIGNIFICANT CHANGE UP
LEUKOCYTE ESTERASE UR-ACNC: NEGATIVE — SIGNIFICANT CHANGE UP
LYMPHOCYTES # BLD AUTO: 1.4 K/UL — SIGNIFICANT CHANGE UP (ref 1–3.3)
LYMPHOCYTES # BLD AUTO: 18.1 % — SIGNIFICANT CHANGE UP (ref 13–44)
MCHC RBC-ENTMCNC: 22.7 PG — LOW (ref 27–34)
MCHC RBC-ENTMCNC: 31.4 GM/DL — LOW (ref 32–36)
MCV RBC AUTO: 72.3 FL — LOW (ref 80–100)
MONOCYTES # BLD AUTO: 0.5 K/UL — SIGNIFICANT CHANGE UP (ref 0–0.9)
MONOCYTES NFR BLD AUTO: 7.3 % — SIGNIFICANT CHANGE UP (ref 2–14)
NEUTROPHILS # BLD AUTO: 5.5 K/UL — SIGNIFICANT CHANGE UP (ref 1.8–7.4)
NEUTROPHILS NFR BLD AUTO: 74.2 % — SIGNIFICANT CHANGE UP (ref 43–77)
NITRITE UR-MCNC: NEGATIVE — SIGNIFICANT CHANGE UP
PH UR: 7.5 — SIGNIFICANT CHANGE UP (ref 5–8)
PLATELET # BLD AUTO: 104 K/UL — LOW (ref 150–400)
POTASSIUM SERPL-MCNC: 3.5 MMOL/L — SIGNIFICANT CHANGE UP (ref 3.5–5.3)
POTASSIUM SERPL-SCNC: 3.5 MMOL/L — SIGNIFICANT CHANGE UP (ref 3.5–5.3)
PROT SERPL-MCNC: 7.6 G/DL — SIGNIFICANT CHANGE UP (ref 6–8.3)
PROT UR-MCNC: NEGATIVE — SIGNIFICANT CHANGE UP
RBC # BLD: 4.77 M/UL — SIGNIFICANT CHANGE UP (ref 3.8–5.2)
RBC # FLD: 14.5 % — SIGNIFICANT CHANGE UP (ref 10.3–14.5)
RBC CASTS # UR COMP ASSIST: 1 /HPF — SIGNIFICANT CHANGE UP (ref 0–4)
SODIUM SERPL-SCNC: 142 MMOL/L — SIGNIFICANT CHANGE UP (ref 135–145)
SP GR SPEC: 1 — LOW (ref 1.01–1.02)
UROBILINOGEN FLD QL: NEGATIVE — SIGNIFICANT CHANGE UP
WBC # BLD: 7.4 K/UL — SIGNIFICANT CHANGE UP (ref 3.8–10.5)
WBC # FLD AUTO: 7.4 K/UL — SIGNIFICANT CHANGE UP (ref 3.8–10.5)
WBC UR QL: 1 /HPF — SIGNIFICANT CHANGE UP (ref 0–5)

## 2019-03-21 PROCEDURE — 81001 URINALYSIS AUTO W/SCOPE: CPT

## 2019-03-21 PROCEDURE — 85027 COMPLETE CBC AUTOMATED: CPT

## 2019-03-21 PROCEDURE — 71046 X-RAY EXAM CHEST 2 VIEWS: CPT | Mod: 26

## 2019-03-21 PROCEDURE — 99283 EMERGENCY DEPT VISIT LOW MDM: CPT

## 2019-03-21 PROCEDURE — 80053 COMPREHEN METABOLIC PANEL: CPT

## 2019-03-21 PROCEDURE — 93005 ELECTROCARDIOGRAM TRACING: CPT

## 2019-03-21 PROCEDURE — 71046 X-RAY EXAM CHEST 2 VIEWS: CPT

## 2019-03-21 PROCEDURE — 93010 ELECTROCARDIOGRAM REPORT: CPT

## 2019-03-21 PROCEDURE — 99284 EMERGENCY DEPT VISIT MOD MDM: CPT | Mod: 25

## 2019-03-21 NOTE — ED PROVIDER NOTE - ATTENDING CONTRIBUTION TO CARE
44 y/o female with the above documented history and HPI who on exam appears chronically unwell but comfortable. Thin and frail. VSs noted, neck supple, lungs CTA, cardiac sounds s/ audible m/r/g, abdomen soft, NT/ND, extremities s/ asymmetry, skin s/ rash and neurologically intact. There is nothing clinically evident to suggest any emergent process. We will however obtain basic screening studies in light of the nature of her presentation and co-morbidities. Will follow her studies, reassess and treat/dispo accordingly.

## 2019-03-21 NOTE — ED PROVIDER NOTE - PHYSICAL EXAMINATION
Gen: Thin and frail appearing, tremulous and anxious appearing  Head: NCAT  HEENT: PERRL, MMM, normal conjunctiva, anicteric, neck supple  Lung: CTAB, no adventitious sounds  CV: RRR, no murmurs  Abd: soft, NTND, no rebound or guarding, no CVAT  MSK: No edema, no visible deformities, no calf ttp  Neuro: CN II-XII grossly intact. 5/5 strength and normal sensation in all extremities. Ambulatory with stable gait.  Skin: Warm and dry, no evidence of rash  Psych: normal mood and affect

## 2019-03-21 NOTE — ED PROVIDER NOTE - NS ED ROS FT
CONSTITUTIONAL: No fevers, no chills  Eyes: no visual changes  Ears: no ear drainage, no ear pain  Nose: no nasal congestion  Mouth/Throat: no sore throat  Cardiovascular: see hpi  Respiratory: see hpi  Gastrointestinal: No n/v/d, no abd pain  Genitourinary: no dysuria, no hematuria  SKIN: no rashes.  NEURO: no headache  PSYCHIATRIC: no known mental health issues.

## 2019-03-21 NOTE — ED ADULT NURSE NOTE - CHIEF COMPLAINT QUOTE
abd pain, dyspnea on exertion, chest pain/ pressure,  fast heart rate, fever, diarrhea, low hemoglobin,  shaking/ tremors

## 2019-03-21 NOTE — ED PROVIDER NOTE - OBJECTIVE STATEMENT
43F PMH protein S deficiency on Xarelto, ITP, DVT, Sjogren's presents to ED with concern of a drop in her hgb d/t continued cp, and new sob with L face tingling and ear numbness from last evaluation at ED several days prior. Pt has hx of transient cp that is nonexertional, sharp, nonpleuritic, and has no relieving/exacerbating factors. No recent travel, leg swelling, melena-like stools, or source of bleeding. Has had multiple w/u's in past for similar sx.

## 2019-03-21 NOTE — ED PROVIDER NOTE - NSFOLLOWUPINSTRUCTIONS_ED_ALL_ED_FT
- Return to the ED for new or worsening symptoms    - Follow up with your primary care doctor this week

## 2019-03-21 NOTE — ED ADULT TRIAGE NOTE - CHIEF COMPLAINT QUOTE
abd pain, dyspnea on exertion, fast heart rate, fever, diarrhea, low hemoglobin,  shaking/ tremors abd pain, dyspnea on exertion, chest pain/ pressure,  fast heart rate, fever, diarrhea, low hemoglobin,  shaking/ tremors

## 2019-03-21 NOTE — ED ADULT NURSE NOTE - OBJECTIVE STATEMENT
43 y.o F arrived to ED p/w multiple medical complaints. A&Ox3. pmh anxiety, dystonia, HTN, POTS, IBS, "blood clotting disorder" on Xarelto. pt states she was seen in [facility] ED 2 weeks ago and discharged, was then seen in [place] ED 4 days ago dx with kidney stone, placed on ABX which caused her to have diarrhea. also states h&h has been gradually dropping is unsure why. no blood in urine or stool recently. endorses more frequent urination. also states about 1 hours PTA to ED began having SOB described as "I cant take a full breath" associated with intermittent CP "that is not as bad as the SOB". respirations nonlabored pt in NAD, abdomen soft, neuro intact, able to move all extremities. endorses intermittent nausea no vomiting. Safety and comfort provided/maintained.

## 2019-03-22 ENCOUNTER — RESULT REVIEW (OUTPATIENT)
Age: 44
End: 2019-03-22

## 2019-03-22 ENCOUNTER — APPOINTMENT (OUTPATIENT)
Dept: HEMATOLOGY ONCOLOGY | Facility: CLINIC | Age: 44
End: 2019-03-22
Payer: MEDICAID

## 2019-03-22 VITALS — DIASTOLIC BLOOD PRESSURE: 86 MMHG | SYSTOLIC BLOOD PRESSURE: 147 MMHG

## 2019-03-22 VITALS
HEART RATE: 86 BPM | SYSTOLIC BLOOD PRESSURE: 124 MMHG | DIASTOLIC BLOOD PRESSURE: 87 MMHG | RESPIRATION RATE: 16 BRPM | BODY MASS INDEX: 17.74 KG/M2 | TEMPERATURE: 98.4 F | WEIGHT: 97 LBS | OXYGEN SATURATION: 100 %

## 2019-03-22 LAB
BASOPHILS # BLD AUTO: 0 K/UL — SIGNIFICANT CHANGE UP (ref 0–0.2)
BASOPHILS NFR BLD AUTO: 0 % — SIGNIFICANT CHANGE UP (ref 0–2)
EOSINOPHIL # BLD AUTO: 0 K/UL — SIGNIFICANT CHANGE UP (ref 0–0.5)
EOSINOPHIL NFR BLD AUTO: 0.6 % — SIGNIFICANT CHANGE UP (ref 0–6)
HCT VFR BLD CALC: 28.4 % — LOW (ref 34.5–45)
HGB BLD-MCNC: 9.5 G/DL — LOW (ref 11.5–15.5)
LYMPHOCYTES # BLD AUTO: 1.3 K/UL — SIGNIFICANT CHANGE UP (ref 1–3.3)
LYMPHOCYTES # BLD AUTO: 25.7 % — SIGNIFICANT CHANGE UP (ref 13–44)
MCHC RBC-ENTMCNC: 23.5 PG — LOW (ref 27–34)
MCHC RBC-ENTMCNC: 33.3 G/DL — SIGNIFICANT CHANGE UP (ref 32–36)
MCV RBC AUTO: 70.5 FL — LOW (ref 80–100)
MONOCYTES # BLD AUTO: 0.4 K/UL — SIGNIFICANT CHANGE UP (ref 0–0.9)
MONOCYTES NFR BLD AUTO: 8.4 % — SIGNIFICANT CHANGE UP (ref 2–14)
NEUTROPHILS # BLD AUTO: 3.4 K/UL — SIGNIFICANT CHANGE UP (ref 1.8–7.4)
NEUTROPHILS NFR BLD AUTO: 65.2 % — SIGNIFICANT CHANGE UP (ref 43–77)
PLATELET # BLD AUTO: 82 K/UL — LOW (ref 150–400)
RBC # BLD: 4.03 M/UL — SIGNIFICANT CHANGE UP (ref 3.8–5.2)
RBC # FLD: 14 % — SIGNIFICANT CHANGE UP (ref 10.3–14.5)
WBC # BLD: 5.2 K/UL — SIGNIFICANT CHANGE UP (ref 3.8–10.5)
WBC # FLD AUTO: 5.2 K/UL — SIGNIFICANT CHANGE UP (ref 3.8–10.5)

## 2019-03-22 PROCEDURE — 99214 OFFICE O/P EST MOD 30 MIN: CPT

## 2019-03-22 RX ORDER — FERROUS SULFATE 137(45) MG
142 (45 FE) TABLET, EXTENDED RELEASE ORAL
Refills: 0 | Status: DISCONTINUED | COMMUNITY
Start: 2019-03-08 | End: 2019-03-22

## 2019-03-22 NOTE — REVIEW OF SYSTEMS
[Fatigue] : fatigue [Dysmenorrhea/Abn Vaginal Bleeding] : dysmenorrhea/abnormal vaginal bleeding [Joint Pain] : joint pain [Muscle Pain] : muscle pain [Dizziness] : dizziness [Easy Bruising] : a tendency for easy bruising [Negative] : Allergic/Immunologic [FreeTextEntry4] : Tinnitus [FreeTextEntry7] : BRBPR

## 2019-03-22 NOTE — ASSESSMENT
[FreeTextEntry1] : 42 YO F with chronic ITP with recent post operative thrombosis of her left internal jugular..She does not recall an IV being placed there. It remains possible she had one intra-operatively or that her neck had been positioned or manipulated in some way during her appendectomy. 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 This is a hereditary condition. I have counseled her regarding it and the increased risk of venous thrombosis associated with it. The need for lifelong anticoagulation was reviewed. Appropriate precautions to take to decrease recurrence were also reviewed including for travel and surgery. \par \par She is Fe deficient and anemic, but this is stable since May 2018. It is possible that menstrual blood loss is responsible. She reports that she did not tolerate oral Fe supplementation due to constipation, nausea and fear she would bleed from it. She has significant psychiatric disability. Labs stable today. \par \par Suggest :\par Continue Xarelto 20 mg daily as long as plts > 50,000 for at least 6 months. Then consider decreasing to 10 mg daily prophylaxis.\par Encouraged pt to take Fe supplementation at least daily or to take IV Fe\par Psychiatric f/u\par F/U with all her consultants - GI, Neuro, Uro, Rheum, Medicine\par RTC 1 month. \par  [Palliative Care Plan] : not applicable at this time

## 2019-03-22 NOTE — PHYSICAL EXAM
[Ambulatory and capable of all self care but unable to carry out any work activities] : Status 2- Ambulatory and capable of all self care but unable to carry out any work activities. Up and about more than 50% of waking hours [Thin] : thin [Normal] : grossly intact [de-identified] : Sabios [de-identified] : Pressured speech

## 2019-03-22 NOTE — HISTORY OF PRESENT ILLNESS
[de-identified] : Idiopathic thrombocytopenic purpura\par 11/18 Left IJ thrombosis\par Protein S deficiency \par \par  [de-identified] : Feels fatigued. Has a renal stone. Has vertigo, tinnitus, pre syncope, left HA. Seeing ENT, Neuro, Uro. MRI brain negative. Not taking Fe anymore. Recent hgb 8.6 to 10.8 at various times. Heavy menstrual cycles persist. She is also passing clots between periods. Has watery diarrhea in AM multiple times. Has right vague abdominal discomfort. Her bruising has increased recently. Has scattered aches in her limbs. Thinks has swelling in limbs. No melena. Has streaks of BRBPR May have lost a couple of pounds. Is visiting ERs frequently for numerous concerns.

## 2019-03-23 ENCOUNTER — EMERGENCY (EMERGENCY)
Facility: HOSPITAL | Age: 44
LOS: 0 days | Discharge: HOME | End: 2019-03-23
Attending: EMERGENCY MEDICINE | Admitting: EMERGENCY MEDICINE
Payer: MEDICAID

## 2019-03-23 VITALS
SYSTOLIC BLOOD PRESSURE: 132 MMHG | DIASTOLIC BLOOD PRESSURE: 88 MMHG | OXYGEN SATURATION: 100 % | HEART RATE: 76 BPM | RESPIRATION RATE: 19 BRPM

## 2019-03-23 VITALS — WEIGHT: 98.11 LBS | HEIGHT: 62 IN

## 2019-03-23 DIAGNOSIS — Z91.041 RADIOGRAPHIC DYE ALLERGY STATUS: ICD-10-CM

## 2019-03-23 DIAGNOSIS — N93.9 ABNORMAL UTERINE AND VAGINAL BLEEDING, UNSPECIFIED: ICD-10-CM

## 2019-03-23 DIAGNOSIS — Z90.49 ACQUIRED ABSENCE OF OTHER SPECIFIED PARTS OF DIGESTIVE TRACT: Chronic | ICD-10-CM

## 2019-03-23 DIAGNOSIS — I10 ESSENTIAL (PRIMARY) HYPERTENSION: ICD-10-CM

## 2019-03-23 DIAGNOSIS — D69.3 IMMUNE THROMBOCYTOPENIC PURPURA: ICD-10-CM

## 2019-03-23 DIAGNOSIS — Z86.718 PERSONAL HISTORY OF OTHER VENOUS THROMBOSIS AND EMBOLISM: ICD-10-CM

## 2019-03-23 DIAGNOSIS — Z79.01 LONG TERM (CURRENT) USE OF ANTICOAGULANTS: ICD-10-CM

## 2019-03-23 DIAGNOSIS — F41.9 ANXIETY DISORDER, UNSPECIFIED: ICD-10-CM

## 2019-03-23 DIAGNOSIS — D50.9 IRON DEFICIENCY ANEMIA, UNSPECIFIED: ICD-10-CM

## 2019-03-23 DIAGNOSIS — N20.0 CALCULUS OF KIDNEY: ICD-10-CM

## 2019-03-23 LAB
ABO RH CONFIRMATION: SIGNIFICANT CHANGE UP
ALBUMIN SERPL ELPH-MCNC: 4.2 G/DL — SIGNIFICANT CHANGE UP (ref 3.3–5)
ALP SERPL-CCNC: 86 U/L — SIGNIFICANT CHANGE UP (ref 40–120)
ALT FLD-CCNC: 18 U/L — SIGNIFICANT CHANGE UP (ref 12–78)
ANION GAP SERPL CALC-SCNC: 8 MMOL/L — SIGNIFICANT CHANGE UP (ref 5–17)
ANISOCYTOSIS BLD QL: SLIGHT — SIGNIFICANT CHANGE UP
APPEARANCE UR: CLEAR — SIGNIFICANT CHANGE UP
APTT BLD: 29.1 SEC — SIGNIFICANT CHANGE UP (ref 27.5–36.3)
AST SERPL-CCNC: 13 U/L — LOW (ref 15–37)
BACTERIA # UR AUTO: ABNORMAL
BASOPHILS # BLD AUTO: 0.01 K/UL — SIGNIFICANT CHANGE UP (ref 0–0.2)
BASOPHILS NFR BLD AUTO: 0.2 % — SIGNIFICANT CHANGE UP (ref 0–2)
BILIRUB SERPL-MCNC: 0.1 MG/DL — LOW (ref 0.2–1.2)
BILIRUB UR-MCNC: NEGATIVE — SIGNIFICANT CHANGE UP
BLD GP AB SCN SERPL QL: SIGNIFICANT CHANGE UP
BUN SERPL-MCNC: 3 MG/DL — LOW (ref 7–23)
CALCIUM SERPL-MCNC: 8.7 MG/DL — SIGNIFICANT CHANGE UP (ref 8.5–10.1)
CHLORIDE SERPL-SCNC: 109 MMOL/L — HIGH (ref 96–108)
CO2 SERPL-SCNC: 24 MMOL/L — SIGNIFICANT CHANGE UP (ref 22–31)
COLOR SPEC: YELLOW — SIGNIFICANT CHANGE UP
CREAT SERPL-MCNC: 0.82 MG/DL — SIGNIFICANT CHANGE UP (ref 0.5–1.3)
DIFF PNL FLD: ABNORMAL
ELLIPTOCYTES BLD QL SMEAR: SLIGHT — SIGNIFICANT CHANGE UP
EOSINOPHIL # BLD AUTO: 0.02 K/UL — SIGNIFICANT CHANGE UP (ref 0–0.5)
EOSINOPHIL NFR BLD AUTO: 0.4 % — SIGNIFICANT CHANGE UP (ref 0–6)
EPI CELLS # UR: SIGNIFICANT CHANGE UP
GLUCOSE SERPL-MCNC: 84 MG/DL — SIGNIFICANT CHANGE UP (ref 70–99)
GLUCOSE UR QL: NEGATIVE MG/DL — SIGNIFICANT CHANGE UP
HCT VFR BLD CALC: 30.8 % — LOW (ref 34.5–45)
HGB BLD-MCNC: 9.4 G/DL — LOW (ref 11.5–15.5)
IMM GRANULOCYTES NFR BLD AUTO: 0.6 % — SIGNIFICANT CHANGE UP (ref 0–1.5)
INR BLD: 1.06 RATIO — SIGNIFICANT CHANGE UP (ref 0.88–1.16)
KETONES UR-MCNC: NEGATIVE — SIGNIFICANT CHANGE UP
LEUKOCYTE ESTERASE UR-ACNC: NEGATIVE — SIGNIFICANT CHANGE UP
LIDOCAIN IGE QN: 135 U/L — SIGNIFICANT CHANGE UP (ref 73–393)
LYMPHOCYTES # BLD AUTO: 1.29 K/UL — SIGNIFICANT CHANGE UP (ref 1–3.3)
LYMPHOCYTES # BLD AUTO: 26.7 % — SIGNIFICANT CHANGE UP (ref 13–44)
MAGNESIUM SERPL-MCNC: 2 MG/DL — SIGNIFICANT CHANGE UP (ref 1.6–2.6)
MANUAL SMEAR VERIFICATION: SIGNIFICANT CHANGE UP
MCHC RBC-ENTMCNC: 22.5 PG — LOW (ref 27–34)
MCHC RBC-ENTMCNC: 30.5 GM/DL — LOW (ref 32–36)
MCV RBC AUTO: 73.9 FL — LOW (ref 80–100)
MICROCYTES BLD QL: SLIGHT — SIGNIFICANT CHANGE UP
MONOCYTES # BLD AUTO: 0.47 K/UL — SIGNIFICANT CHANGE UP (ref 0–0.9)
MONOCYTES NFR BLD AUTO: 9.7 % — SIGNIFICANT CHANGE UP (ref 2–14)
NEUTROPHILS # BLD AUTO: 3.01 K/UL — SIGNIFICANT CHANGE UP (ref 1.8–7.4)
NEUTROPHILS NFR BLD AUTO: 62.4 % — SIGNIFICANT CHANGE UP (ref 43–77)
NITRITE UR-MCNC: NEGATIVE — SIGNIFICANT CHANGE UP
NRBC # BLD: 0 /100 WBCS — SIGNIFICANT CHANGE UP (ref 0–0)
PH UR: 8 — SIGNIFICANT CHANGE UP (ref 5–8)
PLAT MORPH BLD: NORMAL — SIGNIFICANT CHANGE UP
PLATELET # BLD AUTO: 100 K/UL — LOW (ref 150–400)
POIKILOCYTOSIS BLD QL AUTO: SIGNIFICANT CHANGE UP
POLYCHROMASIA BLD QL SMEAR: SLIGHT — SIGNIFICANT CHANGE UP
POTASSIUM SERPL-MCNC: 4.1 MMOL/L — SIGNIFICANT CHANGE UP (ref 3.5–5.3)
POTASSIUM SERPL-SCNC: 4.1 MMOL/L — SIGNIFICANT CHANGE UP (ref 3.5–5.3)
PROT SERPL-MCNC: 7.4 GM/DL — SIGNIFICANT CHANGE UP (ref 6–8.3)
PROT UR-MCNC: NEGATIVE MG/DL — SIGNIFICANT CHANGE UP
PROTHROM AB SERPL-ACNC: 11.8 SEC — SIGNIFICANT CHANGE UP (ref 10–12.9)
RBC # BLD: 4.17 M/UL — SIGNIFICANT CHANGE UP (ref 3.8–5.2)
RBC # FLD: 15 % — HIGH (ref 10.3–14.5)
RBC BLD AUTO: ABNORMAL
RBC CASTS # UR COMP ASSIST: ABNORMAL /HPF (ref 0–4)
ROULEAUX BLD QL SMEAR: PRESENT
SODIUM SERPL-SCNC: 141 MMOL/L — SIGNIFICANT CHANGE UP (ref 135–145)
SP GR SPEC: 1.01 — SIGNIFICANT CHANGE UP (ref 1.01–1.02)
TYPE + AB SCN PNL BLD: SIGNIFICANT CHANGE UP
UROBILINOGEN FLD QL: NEGATIVE MG/DL — SIGNIFICANT CHANGE UP
WBC # BLD: 4.83 K/UL — SIGNIFICANT CHANGE UP (ref 3.8–10.5)
WBC # FLD AUTO: 4.83 K/UL — SIGNIFICANT CHANGE UP (ref 3.8–10.5)
WBC UR QL: NEGATIVE — SIGNIFICANT CHANGE UP

## 2019-03-23 PROCEDURE — 74176 CT ABD & PELVIS W/O CONTRAST: CPT | Mod: 26

## 2019-03-23 PROCEDURE — 93971 EXTREMITY STUDY: CPT | Mod: 26,RT

## 2019-03-23 PROCEDURE — 93010 ELECTROCARDIOGRAM REPORT: CPT

## 2019-03-23 PROCEDURE — 99284 EMERGENCY DEPT VISIT MOD MDM: CPT | Mod: 25

## 2019-03-23 RX ORDER — SODIUM CHLORIDE 9 MG/ML
500 INJECTION INTRAMUSCULAR; INTRAVENOUS; SUBCUTANEOUS ONCE
Qty: 0 | Refills: 0 | Status: DISCONTINUED | OUTPATIENT
Start: 2019-03-23 | End: 2019-03-23

## 2019-03-23 NOTE — ED PROVIDER NOTE - CONSTITUTIONAL, MLM
normal... ill-appearing, well nourished, awake, alert, oriented to person, place, time/situation and in no apparent distress.

## 2019-03-23 NOTE — ED PROVIDER NOTE - SECONDARY DIAGNOSIS.
Abdominal pain, unspecified abdominal location Vaginal bleeding Iron deficiency anemia, unspecified iron deficiency anemia type

## 2019-03-23 NOTE — ED ADULT NURSE REASSESSMENT NOTE - NS ED NURSE REASSESS COMMENT FT1
pt resting comfortably. provided w/ ice chips. pending US results. plan for dc if neg as per MD Fry.

## 2019-03-23 NOTE — ED PROVIDER NOTE - CLINICAL SUMMARY MEDICAL DECISION MAKING FREE TEXT BOX
Plan for CBC, CMP, UA, check for occult blood in stool, transfuse if necessary. Consult with hematologist.

## 2019-03-23 NOTE — ED PROVIDER NOTE - CARE PROVIDER_API CALL
Pacheco Gray)  Urology  [address] [place] [address]  Phone: [phone]  Fax: [phone]  Follow Up Time: 1-3 Days

## 2019-03-23 NOTE — ED PROVIDER NOTE - OBJECTIVE STATEMENT
42 y/o female with a PMHx of anxiety, dysautonomia, postural orthostatic tachycardia syndrome, DVT on Xarelto, ITP, HTN, low protein S, s/p appendicitis presents to the ED c/o vaginal bleeding with clots since this morning. +melena this morning. +nausea. +abd pain. Pt was seen at [facility] 5 days ago, found to have a kidney stone and also noted to have hemoglobin of 8.6. Then 2 days ago pt was seen at [facility] for CP, hemoglobin increased to 10.8. Yesterday hemoglobin was 9.5. Pt spoke to her on-call hematologist Dr. Yo this morning and was sent into ED. Pt notes that she took an iron pill this morning for the first time in weeks. Has not taken any Pepto Bismol. Notes that her menstrual period is not due for another week, but has had irregular bleeding since starting Xarelto. Hematologist- Dr. Attila Zamora [phone]. Allergic to IV contrast, Zofran, Reglan

## 2019-03-23 NOTE — ED PROVIDER NOTE - PMH
Anxiety    Anxiety    Autonomic dysreflexia    Depression    DVT (deep venous thrombosis)  left internal jugular and subclavian veins  DVT (deep venous thrombosis)    Dysautonomia    Dystonia    Gastroparesis    Headache    History of ITP    HTN (hypertension)    Hypertension    IBS (irritable bowel syndrome)    Idiopathic thrombocytopenia purpura    Idiopathic thrombocytopenic purpura    Labyrinthitis    POTS (postural orthostatic tachycardia syndrome)    Protein S deficiency

## 2019-03-23 NOTE — ED PROVIDER NOTE - CARE PLAN
Principal Discharge DX:	Kidney stone  Secondary Diagnosis:	Abdominal pain, unspecified abdominal location  Secondary Diagnosis:	Vaginal bleeding  Secondary Diagnosis:	Iron deficiency anemia, unspecified iron deficiency anemia type

## 2019-03-23 NOTE — ED ADULT NURSE NOTE - OBJECTIVE STATEMENT
pt presents to ED c/o vaginal bleeding- states she is due for her period in 5 days unsure if she has her period or is bleeding separately. pt reports eating oreos last night and having hx of 5 days diarrhea which became dark tarry stool this AM. pt w/ hx clot LUE. pt pale and anxious. hx kidney stone, reports R sided abdominal pain.

## 2019-03-23 NOTE — ED ADULT NURSE REASSESSMENT NOTE - NS ED NURSE REASSESS COMMENT FT1
pt refusing IV fluids until bedside sono is completed to "r/o potential DVT" in absence of pain or symptoms. MD Fry made aware. holding fluids.

## 2019-03-23 NOTE — ED ADULT NURSE NOTE - NS_ED_NURSE_TEACHING_TOPIC_ED_A_ED
referral for urology provided; lab reports reviewed at bedside w/ RN and MD and pt multiple times and extensively

## 2019-03-23 NOTE — ED PROVIDER NOTE - PRIOR HOSPITAL/ED VISITS SUMMARY FREE TEXT FOR MDM OBTAINED AND REVIEWED OLD RECORDS QUESTION
Pt was seen at [facility] 5 days ago, found to have a kidney stone and also noted to have hemoglobin of 8.6. Then 2 days ago pt was seen at [facility] for CP, hemoglobin increased to 10.8. Yesterday hemoglobin was 9.5.

## 2019-03-23 NOTE — ED ADULT TRIAGE NOTE - CHIEF COMPLAINT QUOTE
Pt reports vaginal and rectal bleeding with hx of low HGB, also reports right sided abd pain, is pale, pasty and anxious. Pt has a clot in her left arm with pink band (do not use extremity) in place for ED visit to [facility], hx of kidney stone also but does not feel it is from that

## 2019-03-26 ENCOUNTER — APPOINTMENT (OUTPATIENT)
Dept: NEUROLOGY | Facility: CLINIC | Age: 44
End: 2019-03-26
Payer: MEDICAID

## 2019-03-26 DIAGNOSIS — G62.9 POLYNEUROPATHY, UNSPECIFIED: ICD-10-CM

## 2019-03-26 DIAGNOSIS — R20.2 PARESTHESIA OF SKIN: ICD-10-CM

## 2019-03-26 PROCEDURE — 95909 NRV CNDJ TST 5-6 STUDIES: CPT

## 2019-03-26 NOTE — HISTORY OF PRESENT ILLNESS
[FreeTextEntry1] : Patient came for EMG/NCV studies of right upper extremity with history of dysautonomia and neuropathy seen by Dr. Cosby and recommended to have EMG/NCV study of right upper extremity. Currently taking xeralto for DVT involving left upper extremity.\par \par States that she cannot do a needle EMG and will do only NCV studies of right upper extremity. History of dysautonomia with palpitations. And patient is anxious. After discussion  she agreed to do NCV studies of right upper extremity.\par \par Tolerated procedure well and started complaining about pain in the axilla area unrelated to the procedure. Did not have needle studies done. Patient became anxious and states that she did not eat all day and recommended that drink fluids and given and fluids and rested. Vital signs checked by the medical assistant.

## 2019-03-26 NOTE — PROCEDURE
[FreeTextEntry1] : NCV studies of right upper extremity completed did not reveal any median neuropathy, ulnar neuropathy or peripheral neuropathy. No needle EMG studies were done as patient refused and in taking Xarelto does not want to do needle study.\par \par Cannot assess paraspinal or needle EMG.\par \par Full report to follow.

## 2019-03-28 ENCOUNTER — EMERGENCY (EMERGENCY)
Facility: HOSPITAL | Age: 44
LOS: 1 days | Discharge: HOME | End: 2019-03-28
Attending: EMERGENCY MEDICINE | Admitting: EMERGENCY MEDICINE
Payer: MEDICAID

## 2019-03-28 VITALS
TEMPERATURE: 98 F | SYSTOLIC BLOOD PRESSURE: 130 MMHG | RESPIRATION RATE: 18 BRPM | OXYGEN SATURATION: 100 % | HEART RATE: 92 BPM | DIASTOLIC BLOOD PRESSURE: 87 MMHG

## 2019-03-28 VITALS
SYSTOLIC BLOOD PRESSURE: 120 MMHG | RESPIRATION RATE: 16 BRPM | OXYGEN SATURATION: 100 % | HEART RATE: 87 BPM | TEMPERATURE: 98 F | DIASTOLIC BLOOD PRESSURE: 74 MMHG

## 2019-03-28 DIAGNOSIS — Z90.49 ACQUIRED ABSENCE OF OTHER SPECIFIED PARTS OF DIGESTIVE TRACT: Chronic | ICD-10-CM

## 2019-03-28 PROBLEM — D68.59 OTHER PRIMARY THROMBOPHILIA: Chronic | Status: ACTIVE | Noted: 2019-03-23

## 2019-03-28 LAB
ALBUMIN SERPL ELPH-MCNC: 3.9 G/DL — SIGNIFICANT CHANGE UP (ref 3.3–5)
ALP SERPL-CCNC: 84 U/L — SIGNIFICANT CHANGE UP (ref 30–120)
ALT FLD-CCNC: 17 U/L DA — SIGNIFICANT CHANGE UP (ref 10–60)
ANION GAP SERPL CALC-SCNC: 8 MMOL/L — SIGNIFICANT CHANGE UP (ref 5–17)
APTT BLD: 20.5 SEC — LOW (ref 28.5–37)
AST SERPL-CCNC: 20 U/L — SIGNIFICANT CHANGE UP (ref 10–40)
BASOPHILS # BLD AUTO: 0.01 K/UL — SIGNIFICANT CHANGE UP (ref 0–0.2)
BASOPHILS NFR BLD AUTO: 0.2 % — SIGNIFICANT CHANGE UP (ref 0–2)
BILIRUB SERPL-MCNC: 0.2 MG/DL — SIGNIFICANT CHANGE UP (ref 0.2–1.2)
BUN SERPL-MCNC: 5 MG/DL — LOW (ref 7–23)
CALCIUM SERPL-MCNC: 9.1 MG/DL — SIGNIFICANT CHANGE UP (ref 8.4–10.5)
CHLORIDE SERPL-SCNC: 105 MMOL/L — SIGNIFICANT CHANGE UP (ref 96–108)
CO2 SERPL-SCNC: 28 MMOL/L — SIGNIFICANT CHANGE UP (ref 22–31)
CREAT SERPL-MCNC: 0.78 MG/DL — SIGNIFICANT CHANGE UP (ref 0.5–1.3)
EOSINOPHIL # BLD AUTO: 0.01 K/UL — SIGNIFICANT CHANGE UP (ref 0–0.5)
EOSINOPHIL NFR BLD AUTO: 0.2 % — SIGNIFICANT CHANGE UP (ref 0–6)
GLUCOSE SERPL-MCNC: 105 MG/DL — HIGH (ref 70–99)
HCT VFR BLD CALC: 30.7 % — LOW (ref 34.5–45)
HGB BLD-MCNC: 9.3 G/DL — LOW (ref 11.5–15.5)
IMM GRANULOCYTES NFR BLD AUTO: 0.2 % — SIGNIFICANT CHANGE UP (ref 0–1.5)
INR BLD: 1.2 RATIO — HIGH (ref 0.88–1.16)
LYMPHOCYTES # BLD AUTO: 1.29 K/UL — SIGNIFICANT CHANGE UP (ref 1–3.3)
LYMPHOCYTES # BLD AUTO: 23.8 % — SIGNIFICANT CHANGE UP (ref 13–44)
MCHC RBC-ENTMCNC: 22.8 PG — LOW (ref 27–34)
MCHC RBC-ENTMCNC: 30.3 GM/DL — LOW (ref 32–36)
MCV RBC AUTO: 75.2 FL — LOW (ref 80–100)
MONOCYTES # BLD AUTO: 0.34 K/UL — SIGNIFICANT CHANGE UP (ref 0–0.9)
MONOCYTES NFR BLD AUTO: 6.3 % — SIGNIFICANT CHANGE UP (ref 2–14)
NEUTROPHILS # BLD AUTO: 3.75 K/UL — SIGNIFICANT CHANGE UP (ref 1.8–7.4)
NEUTROPHILS NFR BLD AUTO: 69.3 % — SIGNIFICANT CHANGE UP (ref 43–77)
NRBC # BLD: 0 /100 WBCS — SIGNIFICANT CHANGE UP (ref 0–0)
PLATELET # BLD AUTO: 103 K/UL — LOW (ref 150–400)
POTASSIUM SERPL-MCNC: 4.2 MMOL/L — SIGNIFICANT CHANGE UP (ref 3.5–5.3)
POTASSIUM SERPL-SCNC: 4.2 MMOL/L — SIGNIFICANT CHANGE UP (ref 3.5–5.3)
PROT SERPL-MCNC: 7.2 G/DL — SIGNIFICANT CHANGE UP (ref 6–8.3)
PROTHROM AB SERPL-ACNC: 13.1 SEC — HIGH (ref 10–12.9)
RBC # BLD: 4.08 M/UL — SIGNIFICANT CHANGE UP (ref 3.8–5.2)
RBC # FLD: 15.3 % — HIGH (ref 10.3–14.5)
SODIUM SERPL-SCNC: 141 MMOL/L — SIGNIFICANT CHANGE UP (ref 135–145)
WBC # BLD: 5.41 K/UL — SIGNIFICANT CHANGE UP (ref 3.8–10.5)
WBC # FLD AUTO: 5.41 K/UL — SIGNIFICANT CHANGE UP (ref 3.8–10.5)

## 2019-03-28 PROCEDURE — 93971 EXTREMITY STUDY: CPT | Mod: 26,LT

## 2019-03-28 PROCEDURE — 80053 COMPREHEN METABOLIC PANEL: CPT

## 2019-03-28 PROCEDURE — 93010 ELECTROCARDIOGRAM REPORT: CPT

## 2019-03-28 PROCEDURE — 99284 EMERGENCY DEPT VISIT MOD MDM: CPT | Mod: 25

## 2019-03-28 PROCEDURE — 85730 THROMBOPLASTIN TIME PARTIAL: CPT

## 2019-03-28 PROCEDURE — 36415 COLL VENOUS BLD VENIPUNCTURE: CPT

## 2019-03-28 PROCEDURE — 93971 EXTREMITY STUDY: CPT

## 2019-03-28 PROCEDURE — 99284 EMERGENCY DEPT VISIT MOD MDM: CPT

## 2019-03-28 PROCEDURE — 85027 COMPLETE CBC AUTOMATED: CPT

## 2019-03-28 PROCEDURE — 93005 ELECTROCARDIOGRAM TRACING: CPT

## 2019-03-28 PROCEDURE — 85610 PROTHROMBIN TIME: CPT

## 2019-03-28 RX ORDER — ACETAMINOPHEN 500 MG
650 TABLET ORAL ONCE
Qty: 0 | Refills: 0 | Status: COMPLETED | OUTPATIENT
Start: 2019-03-28 | End: 2019-03-28

## 2019-03-28 NOTE — ED PROVIDER NOTE - CARE PROVIDER_API CALL
Attila Zamora)  Hematology; Internal Medicine; Medical Oncology  [address] [place] [address]  Phone: [phone]  Fax: [phone]  Follow Up Time: 1-3 Days    Angel Warner (DO)  Orthopaedic Surgery  [address] [place] [address]  Phone: [phone]  Fax: [phone]  Follow Up Time: 1-3 Days

## 2019-03-28 NOTE — ED PROVIDER NOTE - PRIOR HOSPITAL/ED VISITS SUMMARY FREE TEXT FOR MDM OBTAINED AND REVIEWED OLD RECORDS QUESTION
pt with multiple visits to ed within the past month. had blood work 5 days ago without acute findings. pt well known to ed with multiple complaints.

## 2019-03-28 NOTE — ED PROVIDER NOTE - CPE EDP NEURO NORM
[facility]    Attending Physician Statement  I have discussed the case, including pertinent history and exam findings with the resident. I have seen and examined the patient and the key elements of the encounter have been performed by me. I agree with the assessment, plan and orders as documented by the resident. I have reviewed all pertinent PMHx, PSHx, FamHx, SocialHx, medications, and allergies and updated history as appropriate. Patient is seen for an urgent care visit today. Follow-up I last visit with epigastric pain. It is much improved with increased dose the PPI. She has been on a PPI continuously for many years without scope or imaging. Given the fact that she continues to have symptoms with increased dose PPI and that she has some dysphagia to mostly solids I think she should have a scope and we are referring to general surgery appropriate. CAT scan is still pending. Remainder of medical problems as per resident note.   Mohini Romero D.O.  7/2/2018 5:18 PM
sertraline (ZOLOFT) 100 MG tablet take 1 and 1/2 tablets by mouth once daily 45 tablet 5    loratadine (CLARITIN) 10 MG tablet Take 1 tablet by mouth daily 30 tablet 5    aspirin 81 MG tablet Take 1 tablet by mouth daily 30 tablet 5    atorvastatin (LIPITOR) 10 MG tablet Take 1 tablet by mouth daily 30 tablet 5    Calcium Carbonate-Vitamin D (OYSTER SHELL CALCIUM/D) 500-200 MG-UNIT TABS Take 1 tablet by mouth 2 times daily 60 tablet 5    rOPINIRole (REQUIP) 0.25 MG tablet Take 1 tablet by mouth 3 times daily 90 tablet 5    mometasone-formoterol (DULERA) 100-5 MCG/ACT inhaler Inhale 2 puffs into the lungs 2 times daily 1 Inhaler 5    albuterol sulfate HFA (VENTOLIN HFA) 108 (90 Base) MCG/ACT inhaler Inhale 2 puffs into the lungs every 4 hours as needed for Wheezing 1 Inhaler 5    vitamin C (ASCORBIC ACID) 500 MG tablet Take 1,000 mg by mouth daily      tiotropium (SPIRIVA RESPIMAT) 2.5 MCG/ACT AERS inhaler Inhale 2 puffs into the lungs daily      niacin 500 MG tablet Take 2 tablets by mouth 2 times daily (with meals) (Patient taking differently: Take 1,000 mg by mouth 2 times daily (with meals) Takes OTC only) 120 tablet 3       I have reviewed all pertinent PMHx, PSHx, FamHx, SocialHx, medications, and allergies and updated history as appropriate. OBJECTIVE:    VS: /82   Pulse 83   Temp 98 °F (36.7 °C) (Oral)   Resp 16   Ht 5' 5\" (1.651 m)   Wt 163 lb 6.4 oz (74.1 kg)   BMI 27.19 kg/m²   General appearance: Alert, Awake, Oriented times 3, no distress  Lungs: Lungs clear to auscultation bilaterally. No rhonchi, crackles or wheezes  Heart: S1 S2  Regular rate and rhythm. No rub, murmur or gallop  Abdomen: Abdomen soft but obese, non-tender. non-distended BS normal. No masses, organomegaly, no guarding rebound or rigidity. Extremities: No edema, Peripheral pulses palpable 2/4    ASSESSMENT/PLAN:  1. Ascending aortic aneurysm (HCC)    - POCT INR 2.7 today within therapeutic range (2.5 - 3.5).
normal...

## 2019-03-28 NOTE — ED ADULT NURSE NOTE - NSIMPLEMENTINTERV_GEN_ALL_ED
Implemented All Universal Safety Interventions:  [place] to call system. Call bell, personal items and telephone within reach. Instruct patient to call for assistance. Room bathroom lighting operational. Non-slip footwear when patient is off stretcher. Physically safe environment: no spills, clutter or unnecessary equipment. Stretcher in lowest position, wheels locked, appropriate side rails in place.

## 2019-03-28 NOTE — ED PROVIDER NOTE - CLINICAL SUMMARY MEDICAL DECISION MAKING FREE TEXT BOX
pt with l ue pain started today. pt exam without acute findings. will do labs, us, pain control and re-eval

## 2019-03-28 NOTE — ED PROVIDER NOTE - NSFOLLOWUPINSTRUCTIONS_ED_ALL_ED_FT
1. FOLLOW UP WITH YOUR PRIMARY DOCTOR IN 24-48 HOURS.   2. FOLLOW UP WITH ALL SPECIALIST DISCUSSED DURING YOUR VISIT.   3. TAKE ALL MEDICATIONS PRESCRIBED IN THE ER IF ANY ARE PRESCRIBED. CONTINUE YOUR HOME MEDICATIONS UNLESS OTHERWISE ADVISED DIFFERENTLY.   4. RETURN FOR WORSENING SYMPTOMS OR CONCERNS INCLUDING BUT NOT LIMITED TO FEVER, CHEST PAIN, OR TROUBLE BREATHING OR ANY OTHER CONCERNS  5. continue xarelto

## 2019-03-28 NOTE — ED ADULT NURSE NOTE - CHPI ED NUR SYMPTOMS NEG
no congestion/no diaphoresis/no vomiting/no shortness of breath/no fever/no nausea/no syncope/no back pain/no chills/no dizziness

## 2019-03-28 NOTE — ED PROVIDER NOTE - PROVIDER TOKENS
PROVIDER:[TOKEN:[[id number]],FOLLOWUP:[1-3 Days]],PROVIDER:[TOKEN:[[id number]],FOLLOWUP:[1-3 Days]]

## 2019-03-28 NOTE — ED PROVIDER NOTE - OBJECTIVE STATEMENT
pt is a 44yo female with pmhx of factor c/s deficiency dvt on xarelto, autonomic dysfunction c/o left arm pain x today. pt reports acute onset of left bicep pain with associated redness worse with movement. pt with hx of left IJ dvt. pt is a 42yo female with pmhx of factor c/s deficiency dvt on xarelto, autonomic dysfunction c/o left arm pain x today. pt reports acute onset of left bicep pain with associated redness worse with movement. pt with hx of left IJ dvt. pt did not take anything for symptoms. pt denies cp, sob, injury.

## 2019-03-28 NOTE — PROGRESS NOTE ADULT - SUBJECTIVE AND OBJECTIVE BOX
Patient bp update Consult Note:   · Provider Specialty	Cardiology	    Referral/Consultation:   Initial Consult:  · Requested by Name:	Tg Velsaquez	  · Date/Time:	30-Jan-2018 	  · Reason for Referral/Consultation:	Palpitations/Tremors/Hypertension/Tachycardia/Malnutrition	      · Subjective and Objective: 	  **********              CARDIOLOGY CONSULT NOTE              ************  ============================================================  CHIEF COMPLAINT/REASON FOR CONSULT:  Patient is a 42y old  Female who presents with a chief complaint of Palpitations/Tremors/Hypertension/Tachycardia/Malnutrition    HISTORY OF PRESENT ILLNESS:  42yFemale with a history of ITP/IBS/ANxiety/Hypertensive Urgency/Psychiatric Issues/?POTS Syndrome (exhaustive workup presumed autonomic dysfunction) presented to my office with recurrent palpitations/hypertension/tremors.   This has been an ongoing issue.   She was recently hospitalized multiple times for similar issues. And also recently hospitalized involuntarily at [facility].   She now presents with similar symptoms but is very malnourished.     ================  24 Hr Events-   - Despite chronic somatic complaints, ready for discharge  - CP yesterday; normal EKG    ===============      Allergies    barium sulfate (Other)  beta blockers (Unknown)  Reglan (Other)    Intolerances    metoprolol (Headache)  	    MEDICATIONS:  MEDICATIONS  (STANDING):  busPIRone 2.5 milliGRAM(s) Oral <User Schedule>  diltiazem    milliGRAM(s) Oral <User Schedule>  docusate sodium 100 milliGRAM(s) Oral <User Schedule>  famotidine    Tablet 20 milliGRAM(s) Oral <User Schedule>  LORazepam     Tablet 2 milliGRAM(s) Oral <User Schedule>  montelukast 10 milliGRAM(s) Oral <User Schedule>  ondansetron   Disintegrating Tablet 4 milliGRAM(s) Oral <User Schedule>  simethicone 160 milliGRAM(s) Chew <User Schedule>  sodium chloride 0.9%. 1000 milliLiter(s) (80 mL/Hr) IV Continuous <Continuous>      PAST MEDICAL & SURGICAL HISTORY:  Depression  Idiopathic thrombocytopenic purpura  Dysautonomia  Idiopathic thrombocytopenia purpura  Anxiety  POTS (postural orthostatic tachycardia syndrome)  History of ITP  Labyrinthitis  Headache  HTN (hypertension)  Anxiety  IBS (irritable bowel syndrome)  No significant past surgical history  No significant past surgical history  No significant past surgical history      FAMILY HISTORY:  No pertinent family history in first degree relatives  Family history of hypertension (Grandparent)  Family history of atrial fibrillation  Family history of prostate cancer in father    NC - Mother/Father    SOCIAL HISTORY:    [ x] Non-smoker  [x] No Illicit Drug Use  [ x] No Excess EtOH Use      REVIEW OF SYSTEMS: (Unless + Before Symptom, it is negative)  Constitutional: No Fever, Fatigue, +Weight Loss  Eyes: No Recent Vision Changes, Eye Pain  ENT: No Congestion, Sore Throat  Endocrine: No Excess Sweating, Temperature Intolerance  Cardiovascular: No Chest Pain, +Palpitations, Shortness of Breath, Pre-syncope, Syncope, LE Edema  Respiratory: No Cough, Congestion, Wheezing  Gastrointestinal: No Abdominal Pain, Nausea, Vomiting  Genitourinary: No dysuria, hematuria  Musculoskeletal: No Joint Pain, Swelling  Neurologic: No headaches, Imbalance, Weakness +Tremors  Skin: No rashes, hematoma, purprura    ================================    PHYSICAL EXAM:  T(C): 36.8 (01-26-18 @ 14:33), Max: 36.8 (01-26-18 @ 07:59)  HR: 115 (01-26-18 @ 14:33) (81 - 115)  BP: 131/99 (01-26-18 @ 14:33) (128/78 - 158/104)  RR: 18 (01-26-18 @ 14:33) (18 - 18)  SpO2: 99% (01-26-18 @ 14:33) (99% - 99%)  Wt(kg): --  I&O's Summary    Appearance: +Anxious + Labile + Tearful	  HEENT:   Normal oral mucosa, EOMI	  Lymphatic: No lymphadenopathy  Cardiovascular: Normal S1 S2, No JVD, No murmurs, No edema  Respiratory: Lungs clear to auscultation, no use of accessory muscles	  Psychiatry: +Anxious  +Depressed   Gastrointestinal:  Soft, Non-tender	  Skin: No rashes, No ecchymoses, No cyanosis	  Neurologic: Non-focal, No Focal Deficits  Extremities: Normal range of motion, No clubbing, cyanosis or edema  Vascular: Peripheral pulses palpable 2+ bilaterally, no prominent varicosities    ============================    LABS:	   Labs [id number]    ECG:   Sinus 	    =========================================================================  ASSESSMENT:  42yFemale with a history of ITP/IBS/ANxiety/Hypertensive Urgency/Psychiatric Issues/?POTS Syndrome (exhaustive workup presumed autonomic dysfunction) presented to my office with recurrent palpitations/hypertension/tremors.   I believe she has been consumed by her HR and BP to the point that it has become the focal point of her life. Anxiety surrounds this issue and compounds it.   She is being admitted for nutrition and social work assistance.   We discussed in detail that she must not be admitted to Tele; she will only have her BP/HR taken once daily.  We also discussed that she must be willing to accept a Behavioral Health Consult     Recommendations  - SHE is ready for D/C   - Follow up at [facility] to help her get to [facility]  - Continue Current Diltiazem Nurse  - Should D/C from Cardiac Standpoint  - Appreciate Dr. Velasquez help      Please call with questions.     [phone]    Philip Reed MD, [facility] Consult Note:   · Provider Specialty	Cardiology	    Referral/Consultation:   Initial Consult:  · Requested by Name:	Tg Velasquez	  · Date/Time:	30-Jan-2018 	  · Reason for Referral/Consultation:	Palpitations/Tremors/Hypertension/Tachycardia/Malnutrition	      · Subjective and Objective: 	  **********              CARDIOLOGY CONSULT NOTE              ************  ============================================================  CHIEF COMPLAINT/REASON FOR CONSULT:  Patient is a 42y old  Female who presents with a chief complaint of Palpitations/Tremors/Hypertension/Tachycardia/Malnutrition    HISTORY OF PRESENT ILLNESS:  42yFemale with a history of ITP/IBS/ANxiety/Hypertensive Urgency/Psychiatric Issues/?POTS Syndrome (exhaustive workup presumed autonomic dysfunction) presented to my office with recurrent palpitations/hypertension/tremors.   This has been an ongoing issue.   She was recently hospitalized multiple times for similar issues. And also recently hospitalized involuntarily at [facility].   She now presents with similar symptoms but is very malnourished.     ================  24 Hr Events-   - Very concerned re: SW disposition  - CP yesterday; normal EKG    ===============      Allergies    barium sulfate (Other)  beta blockers (Unknown)  Reglan (Other)    Intolerances    metoprolol (Headache)  	    MEDICATIONS:  MEDICATIONS  (STANDING):  busPIRone 2.5 milliGRAM(s) Oral <User Schedule>  diltiazem    milliGRAM(s) Oral <User Schedule>  docusate sodium 100 milliGRAM(s) Oral <User Schedule>  famotidine    Tablet 20 milliGRAM(s) Oral <User Schedule>  LORazepam     Tablet 2 milliGRAM(s) Oral <User Schedule>  montelukast 10 milliGRAM(s) Oral <User Schedule>  ondansetron   Disintegrating Tablet 4 milliGRAM(s) Oral <User Schedule>  simethicone 160 milliGRAM(s) Chew <User Schedule>  sodium chloride 0.9%. 1000 milliLiter(s) (80 mL/Hr) IV Continuous <Continuous>      PAST MEDICAL & SURGICAL HISTORY:  Depression  Idiopathic thrombocytopenic purpura  Dysautonomia  Idiopathic thrombocytopenia purpura  Anxiety  POTS (postural orthostatic tachycardia syndrome)  History of ITP  Labyrinthitis  Headache  HTN (hypertension)  Anxiety  IBS (irritable bowel syndrome)  No significant past surgical history  No significant past surgical history  No significant past surgical history      FAMILY HISTORY:  No pertinent family history in first degree relatives  Family history of hypertension (Grandparent)  Family history of atrial fibrillation  Family history of prostate cancer in father    NC - Mother/Father    SOCIAL HISTORY:    [ x] Non-smoker  [x] No Illicit Drug Use  [ x] No Excess EtOH Use      REVIEW OF SYSTEMS: (Unless + Before Symptom, it is negative)  Constitutional: No Fever, Fatigue, +Weight Loss  Eyes: No Recent Vision Changes, Eye Pain  ENT: No Congestion, Sore Throat  Endocrine: No Excess Sweating, Temperature Intolerance  Cardiovascular: No Chest Pain, +Palpitations, Shortness of Breath, Pre-syncope, Syncope, LE Edema  Respiratory: No Cough, Congestion, Wheezing  Gastrointestinal: No Abdominal Pain, Nausea, Vomiting  Genitourinary: No dysuria, hematuria +'excess urination'  Musculoskeletal: No Joint Pain, Swelling  Neurologic: No headaches, Imbalance, Weakness +Tremors  Skin: No rashes, hematoma, purprura    ================================    PHYSICAL EXAM:  T(C): 36.8 (01-26-18 @ 14:33), Max: 36.8 (01-26-18 @ 07:59)  HR: 115 (01-26-18 @ 14:33) (81 - 115)  BP: 131/99 (01-26-18 @ 14:33) (128/78 - 158/104)  RR: 18 (01-26-18 @ 14:33) (18 - 18)  SpO2: 99% (01-26-18 @ 14:33) (99% - 99%)  Wt(kg): --  I&O's Summary    Appearance: +Anxious + Labile + Tearful	  HEENT:   Normal oral mucosa, EOMI	  Lymphatic: No lymphadenopathy  Cardiovascular: Normal S1 S2, No JVD, No murmurs, No edema  Respiratory: Lungs clear to auscultation, no use of accessory muscles	  Psychiatry: +Anxious  +Depressed   Gastrointestinal:  Soft, Non-tender	  Skin: No rashes, No ecchymoses, No cyanosis	  Neurologic: Non-focal, No Focal Deficits  Extremities: Normal range of motion, No clubbing, cyanosis or edema  Vascular: Peripheral pulses palpable 2+ bilaterally, no prominent varicosities    ============================    LABS:	   Labs [id number]    ECG:   Sinus 	    =========================================================================  ASSESSMENT:  42yFemale with a history of ITP/IBS/ANxiety/Hypertensive Urgency/Psychiatric Issues/?POTS Syndrome (exhaustive workup presumed autonomic dysfunction) presented to my office with recurrent palpitations/hypertension/tremors.   I believe she has been consumed by her HR and BP to the point that it has become the focal point of her life. Anxiety surrounds this issue and compounds it.   She is being admitted for nutrition and social work assistance.   We discussed in detail that she must not be admitted to Tele; she will only have her BP/HR taken once daily.  We also discussed that she must be willing to accept a Behavioral Health Consult     Recommendations  - SHE is ready for D/C   - Follow up at [facility]  - SW is primary issue  - Continue Current Diltiazem Nurse  - Can D/C from Cardiac Standpoint  - Appreciate Dr. Velasquez help      Please call with questions.     [phone]    Philip Reed MD, [facility]

## 2019-03-28 NOTE — ED PROVIDER NOTE - CARDIAC, MLM
+ left lower lateral rib ttp. no deformity. no crepitus. Normal rate, regular rhythm.  Heart sounds S1, S2.  No murmurs, rubs or gallops.

## 2019-03-28 NOTE — ED PROVIDER NOTE - PROGRESS NOTE DETAILS
Brandon GORE for ED attending, Dr. 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 pt currently on xarelto.  PE: left arm without swelling or tenderness, FROM, pulses an sensation intact, no JVD, lungs clear, heart nl  Plan for veinous doppler left upper extremity and reassess, all imaging and labs reviewed. pt with chronic appearing dvt/ pt on xarelto. pt improved. advised ortho and heme follow up. All imaging and labs reviewed. all results reviewed with pt including abnormal results. pt given a copy of results. pt advised to follow up with pmd regarding abnormal results. All questions answered and concerns addressed. pt verbalized understanding and agreement with plan and dx. pt advised on next step and when/where to follow up. pt advised on all take home and otc medications. pt advised to follow up with PMD. pt advised to return to ed for worsenng symptoms including fever, cp, sob. will dc.

## 2019-03-29 RX ORDER — ALBUTEROL SULFATE 90 UG/1
108 (90 BASE) AEROSOL, METERED RESPIRATORY (INHALATION)
Qty: 1 | Refills: 3 | Status: ACTIVE | COMMUNITY
Start: 2019-03-29 | End: 1900-01-01

## 2019-03-30 ENCOUNTER — RX RENEWAL (OUTPATIENT)
Age: 44
End: 2019-03-30

## 2019-04-01 ENCOUNTER — INBOUND DOCUMENT (OUTPATIENT)
Age: 44
End: 2019-04-01

## 2019-04-01 ENCOUNTER — EMERGENCY (EMERGENCY)
Facility: HOSPITAL | Age: 44
LOS: 1 days | Discharge: HOME | End: 2019-04-01
Attending: EMERGENCY MEDICINE
Payer: MEDICAID

## 2019-04-01 VITALS
TEMPERATURE: 98 F | DIASTOLIC BLOOD PRESSURE: 82 MMHG | SYSTOLIC BLOOD PRESSURE: 122 MMHG | HEART RATE: 88 BPM | OXYGEN SATURATION: 100 % | RESPIRATION RATE: 16 BRPM

## 2019-04-01 VITALS
SYSTOLIC BLOOD PRESSURE: 125 MMHG | DIASTOLIC BLOOD PRESSURE: 89 MMHG | WEIGHT: 95.02 LBS | HEART RATE: 108 BPM | TEMPERATURE: 98 F | HEIGHT: 62 IN | RESPIRATION RATE: 16 BRPM | OXYGEN SATURATION: 100 %

## 2019-04-01 DIAGNOSIS — Z90.49 ACQUIRED ABSENCE OF OTHER SPECIFIED PARTS OF DIGESTIVE TRACT: Chronic | ICD-10-CM

## 2019-04-01 LAB
ALBUMIN SERPL ELPH-MCNC: 4.9 G/DL — SIGNIFICANT CHANGE UP (ref 3.3–5)
ALP SERPL-CCNC: 83 U/L — SIGNIFICANT CHANGE UP (ref 40–120)
ALT FLD-CCNC: 7 U/L — LOW (ref 10–45)
ANION GAP SERPL CALC-SCNC: 15 MMOL/L — SIGNIFICANT CHANGE UP (ref 5–17)
APPEARANCE UR: CLEAR — SIGNIFICANT CHANGE UP
APTT BLD: 28.9 SEC — SIGNIFICANT CHANGE UP (ref 27.5–36.3)
AST SERPL-CCNC: 17 U/L — SIGNIFICANT CHANGE UP (ref 10–40)
BACTERIA # UR AUTO: ABNORMAL
BASOPHILS # BLD AUTO: 0 K/UL — SIGNIFICANT CHANGE UP (ref 0–0.2)
BASOPHILS NFR BLD AUTO: 0.3 % — SIGNIFICANT CHANGE UP (ref 0–2)
BILIRUB SERPL-MCNC: 0.2 MG/DL — SIGNIFICANT CHANGE UP (ref 0.2–1.2)
BILIRUB UR-MCNC: NEGATIVE — SIGNIFICANT CHANGE UP
BUN SERPL-MCNC: <4 MG/DL — LOW (ref 7–23)
CALCIUM SERPL-MCNC: 9.8 MG/DL — SIGNIFICANT CHANGE UP (ref 8.4–10.5)
CHLORIDE SERPL-SCNC: 105 MMOL/L — SIGNIFICANT CHANGE UP (ref 96–108)
CO2 SERPL-SCNC: 22 MMOL/L — SIGNIFICANT CHANGE UP (ref 22–31)
COLOR SPEC: COLORLESS — SIGNIFICANT CHANGE UP
CREAT SERPL-MCNC: 0.82 MG/DL — SIGNIFICANT CHANGE UP (ref 0.5–1.3)
DIFF PNL FLD: NEGATIVE — SIGNIFICANT CHANGE UP
EOSINOPHIL # BLD AUTO: 0 K/UL — SIGNIFICANT CHANGE UP (ref 0–0.5)
EOSINOPHIL NFR BLD AUTO: 0 % — SIGNIFICANT CHANGE UP (ref 0–6)
EPI CELLS # UR: 1 /HPF — SIGNIFICANT CHANGE UP
GLUCOSE SERPL-MCNC: 97 MG/DL — SIGNIFICANT CHANGE UP (ref 70–99)
GLUCOSE UR QL: NEGATIVE — SIGNIFICANT CHANGE UP
HCG SERPL-ACNC: <2 MIU/ML — SIGNIFICANT CHANGE UP
HCT VFR BLD CALC: 34.7 % — SIGNIFICANT CHANGE UP (ref 34.5–45)
HGB BLD-MCNC: 11.3 G/DL — LOW (ref 11.5–15.5)
HYALINE CASTS # UR AUTO: 0 /LPF — SIGNIFICANT CHANGE UP (ref 0–2)
INR BLD: 1.23 RATIO — HIGH (ref 0.88–1.16)
KETONES UR-MCNC: NEGATIVE — SIGNIFICANT CHANGE UP
LEUKOCYTE ESTERASE UR-ACNC: NEGATIVE — SIGNIFICANT CHANGE UP
LYMPHOCYTES # BLD AUTO: 1.2 K/UL — SIGNIFICANT CHANGE UP (ref 1–3.3)
LYMPHOCYTES # BLD AUTO: 16.2 % — SIGNIFICANT CHANGE UP (ref 13–44)
MCHC RBC-ENTMCNC: 23.6 PG — LOW (ref 27–34)
MCHC RBC-ENTMCNC: 32.5 GM/DL — SIGNIFICANT CHANGE UP (ref 32–36)
MCV RBC AUTO: 72.7 FL — LOW (ref 80–100)
MONOCYTES # BLD AUTO: 0.5 K/UL — SIGNIFICANT CHANGE UP (ref 0–0.9)
MONOCYTES NFR BLD AUTO: 6.4 % — SIGNIFICANT CHANGE UP (ref 2–14)
NEUTROPHILS # BLD AUTO: 5.7 K/UL — SIGNIFICANT CHANGE UP (ref 1.8–7.4)
NEUTROPHILS NFR BLD AUTO: 77.1 % — HIGH (ref 43–77)
NITRITE UR-MCNC: NEGATIVE — SIGNIFICANT CHANGE UP
PH UR: 7 — SIGNIFICANT CHANGE UP (ref 5–8)
PLATELET # BLD AUTO: 104 K/UL — LOW (ref 150–400)
POTASSIUM SERPL-MCNC: 3.8 MMOL/L — SIGNIFICANT CHANGE UP (ref 3.5–5.3)
POTASSIUM SERPL-SCNC: 3.8 MMOL/L — SIGNIFICANT CHANGE UP (ref 3.5–5.3)
PROT SERPL-MCNC: 7.4 G/DL — SIGNIFICANT CHANGE UP (ref 6–8.3)
PROT UR-MCNC: NEGATIVE — SIGNIFICANT CHANGE UP
PROTHROM AB SERPL-ACNC: 14.1 SEC — HIGH (ref 10–12.9)
RBC # BLD: 4.78 M/UL — SIGNIFICANT CHANGE UP (ref 3.8–5.2)
RBC # FLD: 15.2 % — HIGH (ref 10.3–14.5)
RBC CASTS # UR COMP ASSIST: 1 /HPF — SIGNIFICANT CHANGE UP (ref 0–4)
SODIUM SERPL-SCNC: 142 MMOL/L — SIGNIFICANT CHANGE UP (ref 135–145)
SP GR SPEC: 1 — LOW (ref 1.01–1.02)
UROBILINOGEN FLD QL: NEGATIVE — SIGNIFICANT CHANGE UP
WBC # BLD: 7.4 K/UL — SIGNIFICANT CHANGE UP (ref 3.8–10.5)
WBC # FLD AUTO: 7.4 K/UL — SIGNIFICANT CHANGE UP (ref 3.8–10.5)
WBC UR QL: 2 /HPF — SIGNIFICANT CHANGE UP (ref 0–5)

## 2019-04-01 PROCEDURE — 85027 COMPLETE CBC AUTOMATED: CPT

## 2019-04-01 PROCEDURE — 85610 PROTHROMBIN TIME: CPT

## 2019-04-01 PROCEDURE — 99284 EMERGENCY DEPT VISIT MOD MDM: CPT | Mod: 25

## 2019-04-01 PROCEDURE — 74176 CT ABD & PELVIS W/O CONTRAST: CPT | Mod: 26

## 2019-04-01 PROCEDURE — 74176 CT ABD & PELVIS W/O CONTRAST: CPT

## 2019-04-01 PROCEDURE — 99284 EMERGENCY DEPT VISIT MOD MDM: CPT

## 2019-04-01 PROCEDURE — 93010 ELECTROCARDIOGRAM REPORT: CPT

## 2019-04-01 PROCEDURE — 81001 URINALYSIS AUTO W/SCOPE: CPT

## 2019-04-01 PROCEDURE — 85730 THROMBOPLASTIN TIME PARTIAL: CPT

## 2019-04-01 PROCEDURE — 80053 COMPREHEN METABOLIC PANEL: CPT

## 2019-04-01 PROCEDURE — 93005 ELECTROCARDIOGRAM TRACING: CPT

## 2019-04-01 PROCEDURE — 87086 URINE CULTURE/COLONY COUNT: CPT

## 2019-04-01 PROCEDURE — 84702 CHORIONIC GONADOTROPIN TEST: CPT

## 2019-04-01 RX ORDER — SODIUM CHLORIDE 9 MG/ML
1000 INJECTION INTRAMUSCULAR; INTRAVENOUS; SUBCUTANEOUS ONCE
Qty: 0 | Refills: 0 | Status: COMPLETED | OUTPATIENT
Start: 2019-04-01 | End: 2019-04-01

## 2019-04-01 RX ORDER — SODIUM CHLORIDE 9 MG/ML
1000 INJECTION, SOLUTION INTRAVENOUS ONCE
Qty: 0 | Refills: 0 | Status: COMPLETED | OUTPATIENT
Start: 2019-04-01 | End: 2019-04-01

## 2019-04-01 RX ADMIN — SODIUM CHLORIDE 2000 MILLILITER(S): 9 INJECTION, SOLUTION INTRAVENOUS at 13:48

## 2019-04-01 RX ADMIN — SODIUM CHLORIDE 1000 MILLILITER(S): 9 INJECTION INTRAMUSCULAR; INTRAVENOUS; SUBCUTANEOUS at 16:16

## 2019-04-01 NOTE — ED PROVIDER NOTE - CLINICAL SUMMARY MEDICAL DECISION MAKING FREE TEXT BOX
Resident: 43y F with multiple medical problems, frequent emergency department visits presents with multiple complaints. vitals wnl. abdomen soft, diffusely tender. Will obtain labs, CT, reassess. Elke: 43y F with multiple medical problems, frequent emergency department visits presents with multiple complaints. vitals wnl. abdomen soft, diffusely tender. Will obtain labs, CT, reassess.

## 2019-04-01 NOTE — ED ADULT NURSE NOTE - NSIMPLEMENTINTERV_GEN_ALL_ED
Implemented All Fall with Harm Risk Interventions:  [place] to call system. Call bell, personal items and telephone within reach. Instruct patient to call for assistance. Room bathroom lighting operational. Non-slip footwear when patient is off stretcher. Physically safe environment: no spills, clutter or unnecessary equipment. Stretcher in lowest position, wheels locked, appropriate side rails in place. Provide visual cue, wrist band, yellow gown, etc. Monitor gait and stability. Monitor for mental status changes and reorient to person, place, and time. Review medications for side effects contributing to fall risk. Reinforce activity limits and safety measures with patient and family. Provide visual clues: red socks.

## 2019-04-01 NOTE — ED ADULT NURSE NOTE - NSSUSCREENINGQ4_ED_ALL_ED
Anxiety disorder    Chronic bronchitis    COPD (Chronic Obstructive Pulmonary Disease)  on home O2 AT NIGHT  Dysphagia    Essential Hypertension    Hip Fracture-Left    Hx of Breast Cancer  HAd Rt in 1989  Morbid obesity    Neuropathy    DAVID on CPAP No

## 2019-04-01 NOTE — ED PROVIDER NOTE - NSFOLLOWUPINSTRUCTIONS_ED_ALL_ED_FT
Stay well-hydrated. Follow-up with gastroenterology. Follow-up with urology. Return immediately to the emergency department if any worsening or concerning symptoms.

## 2019-04-01 NOTE — ED PROVIDER NOTE - PROGRESS NOTE DETAILS
Resident: patient declines antiemetics or analgesia. Resident: extensive explanation of results given to patient, answered all questions. Patient will follow-up with her gastroenterologist tomorrow and with her urologist this week. Will remain in emergency department until 10pm (90min from now) to get additional fluids, cab will be waiting at 10pm per SW.

## 2019-04-01 NOTE — ED ADULT NURSE NOTE - OBJECTIVE STATEMENT
43 y.o F A&Ox3 with PMH of appendectomy (2018), IBS, HTN, anxiety, POTS, idiopathic thrombocytopenia purpura, gastroparesis, protein S deficiency, and DVT (started on Xarelto x4 months Ago), presents to the ED c.o ABD pain, urinary urgency, diarrhea, vomiting. Pt. reports she was seen at [facility] 2 weeks ago and was diagnosed with a kidney stone and UTI. Pt. reports she was started on abx, however, states she stopped taking abx because it was "causing ABD discomfort and diarrhea and urine was tested and she didn't have UTI." Pt. reports she has been experiencing about 4 episodes of diarrhea in the AM. Pt. reports she was also having difficulty urinating on Saturday. States she has urgency to urinate but urinates small amount at a time. Reports her mouth started bleeding yesterday spontaneously which resolved after applying pressure. No signs of spontaneous bleeding at this time. Pt. also endorsing new onset of SOB and CP to L chest wall - describes pain as sharp and constant. Denies fevers, dizziness, cough at this time. Safety and comfort provided.

## 2019-04-01 NOTE — ED PROVIDER NOTE - OBJECTIVE STATEMENT
43y F PMH anxiety, Depression, DVT, Dysautonomia, Dystonia, Gastroparesis, Headache, ITP, HTN, IBS, Labyrinthitis, POTS presents with abdominal pain x 3 days. Recent antibiotic use for infected kidney stone, has had watery stools every morning for 2 weeks, is concerned she has c. diff. Had one episode blood vomiting with few pieces of blood this morning after choking on a piece of cantaloupe. Also complains of intermittent left sided chest pain, shortness of breath. Also complains of not being able to urinate "freely," has to sometimes force herself to urinate. Patient has had frequent emergency department visits, mentions visiting other emergency department in the area for various complaints.

## 2019-04-02 ENCOUNTER — APPOINTMENT (OUTPATIENT)
Dept: GASTROENTEROLOGY | Facility: CLINIC | Age: 44
End: 2019-04-02
Payer: MEDICAID

## 2019-04-02 VITALS
TEMPERATURE: 98.5 F | OXYGEN SATURATION: 99 % | SYSTOLIC BLOOD PRESSURE: 120 MMHG | RESPIRATION RATE: 16 BRPM | WEIGHT: 95 LBS | DIASTOLIC BLOOD PRESSURE: 86 MMHG | HEIGHT: 62 IN | HEART RATE: 101 BPM | BODY MASS INDEX: 17.48 KG/M2

## 2019-04-02 DIAGNOSIS — Z11.4 ENCOUNTER FOR SCREENING FOR HUMAN IMMUNODEFICIENCY VIRUS [HIV]: ICD-10-CM

## 2019-04-02 DIAGNOSIS — R19.7 DIARRHEA, UNSPECIFIED: ICD-10-CM

## 2019-04-02 DIAGNOSIS — R10.9 UNSPECIFIED ABDOMINAL PAIN: ICD-10-CM

## 2019-04-02 DIAGNOSIS — F30.10 MANIC EPISODE W/OUT PSYCHOTIC SYMPTOMS, UNSPECIFIED: ICD-10-CM

## 2019-04-02 LAB
CULTURE RESULTS: SIGNIFICANT CHANGE UP
SPECIMEN SOURCE: SIGNIFICANT CHANGE UP

## 2019-04-02 PROCEDURE — 99215 OFFICE O/P EST HI 40 MIN: CPT

## 2019-04-03 PROBLEM — F30.10 MANIC BEHAVIOR: Status: ACTIVE | Noted: 2019-04-03

## 2019-04-03 PROBLEM — R10.9 ABDOMINAL PAIN: Status: ACTIVE | Noted: 2018-04-19

## 2019-04-03 NOTE — HISTORY OF PRESENT ILLNESS
[FreeTextEntry1] : Last visit: 1/2019\par Plan after last visit:\par 41 yo female with multiple medical problems including significant anxiety/depression with multiple admissions to [facility], h/o ITP, questionable h/o POTS, JODY, newly diagnosed with Protein S deficiency c/b VTE who presents for follow up. Her major concerns today are: altered bowel habits, post prandial bloating, and concern for malnutrition given prior weight loss (though has gained weight of late). She continues to decline all medical procedures, nearly all medications and/or vitamin supplementation. She will agree to a micronutrient screen, stool tests to evaluate for causes of potential GI malabsorption, and a SIBO breath test. I explained to her that Iron deficiency anemia workup includes Endoscopic evaluation - she declines any evaluation or any supplementation with Iron (PO or IV) and then follows with the question 'How will we treat my iron deficiency?' After re-explaining the diagnostic workup and treatment - she again declines. She also currently declines cholestyramine for loose stools. She may be willing to try fiber supplementation, but is unsure. She currently is living in assisted living, which may be playing role in her overall health. She declined any referral to  at this time. We will plan on completing the above and then regrouping in 4-6 weeks.\par \par Impression:\par 1) Altered bowel habits\par 2) Weight loss - had weight gain more recently\par 3) Bloating\par 4) Hair thinning\par 5) Anxiety/Depression with potential bipolar disorder\par 6) JODY\par 7) Protein S deficiency c/b VTE on Xarelto\par 8) ? POTS ?\par 9) ? Sjogrens ?\par \par Plan:\par 1) Stool tests\par 2) Micronutrient screen\par 3) SIBO testing\par 4) Trial of Fiber - if patient willing\par 5) Continue on going discussions of necessary tests and vitamin supplementation that are currently being refused\par 6) Discussed the role of anxiety/depression manifesting in GI symptoms with patient at length\par 7) All discussed at length for over 50 minutes. All questions answered. Plan agreed upon\par 8) RV 6 weeks. \par -----------------------------------------------------------------------\par Since last visit:\melanie Has had multiple ED visits for GI and non-GI related illnesses - This has resulted in at least 3 abdominal CT scans in past 2 months\par - She continues to verbalize that she does not 'trust these results'\melanie Has missed multiple appointments with GI but has called frequently to discuss persist symptoms\par She has persistently referred to concern of having 'internal bleeding' despite being 3 months after surgery and never having documented bleeding complication\melanie Has had multiple medical complications including appendicitis s/p removal, new DVT on a/c being worked up by hematology and nephrolithiasis.\par \par Currently:\par She continues to have watery diarrhea, worst in the morning\par - This is worse after treating with abx a month ago\par She feels as though she is unable to tolerate much PO (feels it goes right through her)\par Some intermittent abdominal pain as well that at times she has described as 10/10 pain\par She remains fixated on these symptoms but when asked how she would like to pursue them, she will then change topics to separate topic\par \par These symptoms appear to be at chronic baseline for her\par Weight is about stable\par She continues to refuse all procedures (including capsule endoscopy), all medication trials\par She continues to say she is too sick for procedures and that she cannot take other medications because she is on xarelto\par - This includes not taking vit d for repletion or iron for repletion\par She continues to deny that psychiatric illness is playing role with these symptoms or the inability to treat these symptoms\par - She perseverates on minute details throughout the conversation as self-evident proof as to why she cannot have a particular test/procedure/medication despite explanation that the two are not related\par \par \par -----------------------------------------------------------------\par Previous Workup:\par Stool tests - OP, Cx, CDiff, Elastase, Fecal Fat - negative\par \par IMAGING:\par 4/1 - CT Abd Pelvis\par FINDINGS: \par \par LOWER CHEST: Within normal limits. \par \par LIVER: Within normal limits. \par BILE DUCTS: Normal caliber. \par GALLBLADDER: Within normal limits. \par SPLEEN: Within normal limits. \par PANCREAS: Within normal limits. \par ADRENALS: Within normal limits. \par KIDNEYS/URETERS: No hydronephrosis or renal stones. \par \par BLADDER: Within normal limits. \par REPRODUCTIVE ORGANS: Uterus and adnexa are within normal limits. \par Calcifications within the pelvis are unchanged compared to prior CT. \par \par BOWEL: No bowel obstruction. Status post appendectomy. \par PERITONEUM: No ascites. \par VESSELS: Within normal limits. \par RETROPERITONEUM: No lymphadenopathy. \par ABDOMINAL WALL: Within normal limits. \par BONES: Mild scoliosis centered about L1. \par \par IMPRESSION: No acute intra-abdominal or pelvic findings. \par \par \par \par CT 3/23/2019\par FINDINGS: \par \par LOWER CHEST: Clear lung bases. \par \par Please note that evaluation of the abdominal organs and vascular structures \par is limited by lack of intravenous contrast. \par \par LIVER: Within normal limits. \par BILE DUCTS: Normal caliber. \par GALLBLADDER: Within normal limits. \par SPLEEN: Within normal limits. \par PANCREAS: Within normal limits. \par ADRENALS: Within normal limits. \par KIDNEYS/URETERS: 1-2 mm right ureterovesical junction calculus. Mild right \par hydroureteronephrosis. \par \par BLADDER: Within normal limits. \par \par REPRODUCTIVE ORGANS: Uterus and adnexa are within normal limits. 2.1 cm \par right ovarian cyst containing wall calcifications. \par \par BOWEL: No bowel obstruction. Appendix not visualized. \par PERITONEUM: Trace pelvic fluid. \par VESSELS: Within normal limits. \par RETROPERITONEUM: No lymphadenopathy. \par ABDOMINAL WALL: Within normal limits. \par BONES: Within normal limits. \par \par IMPRESSION: \par \par 1-2 mm right ureterovesical junction calculus. \par Mild right hydroureteronephrosis. \par \par \par CT 3/23/2019\par \par Comparison study dated 7/31/2018 \par Axial images obtained, coronal and sagittal images computer reformatted. \par Noncontrast exam limits evaluation of the GI tract. Additionally lack of \par retroperitoneal fat makes evaluation of urinary tract difficult. \par \par Trace linear stranding left lung base. \par \par Unenhanced liver, spleen, gallbladder, pancreas, adrenal glands \par unremarkable. No aortic aneurysm ascites or biliary ductal dilatation. No \par retroperitoneal lymphadenopathy. \par \par Left kidney shows no masses obstructive changes or calculi. \par \par Mild obstructive changes right renal collecting system secondary to the \par presence of a stone at the right ureterovesical junction. Stone measures 4 \par mm. \par \par No bowel obstruction. Surgical clips visible in the right lower quadrant, \par recent appendectomy. \par \par Bulky uterus. No free pelvic fluid evident. Inguinal regions intact. No \par acute appearing osseous abnormalities. \par \par IMPRESSION: Mild right hydronephrosis-hydroureter secondary to the presence \par of a 4 mm stone at the right ureterovesical junction. \par \par \par MRI BRAIN 3/2019\par IMPRESSION: \par No evidence for intracranial mass, infarct, hemorrhage, or hydrocephalus. No \par evidence for vestibular schwannoma-cerebellopontine angle mass. \par \par \par Pelvic U/S 2019\par \par IMPRESSION: \par Fibroid uterus with dominant 3 cm lower uterine segment myoma may be a \par source of vaginal bleeding. \par \par Complex bilateral ovarian cysts due to endometrioma/hemorrhagic cysts. \par Follow-up pelvic ultrasound in 1-2 measure cycles would aid in \par differentiation. \par \par \par \par CT Head 2019\par IMPRESSION: \par No acute intracranial bleeding, mass effect, or shift. \par Cortically predominant volume loss, greater than expected for patient's age. \par \par \par Pelvic U/S 12/2018\par IMPRESSION: \par \par Endocervical polyp. \par Small leiomyoma. \par Hemorrhagic right ovarian cyst. \par \par \par Abdominal ultrasound 2018\par IMPRESSION: \par No evidence of abdominal aortic aneurysm. \par \par \par Abdominal CT Scan 7/2018\par IMPRESSION: The etiology of the abdominal pain is not elucidated.

## 2019-04-03 NOTE — PHYSICAL EXAM
[General Appearance - Alert] : alert [Sclera] : the sclera and conjunctiva were normal [PERRL With Normal Accommodation] : pupils were equal in size, round, and reactive to light [Extraocular Movements] : extraocular movements were intact [Outer Ear] : the ears and nose were normal in appearance [Examination Of The Oral Cavity] : the lips and gums were normal [Oropharynx] : the oropharynx was normal [Neck Appearance] : the appearance of the neck was normal [Neck Cervical Mass (___cm)] : no neck mass was observed [Respiration, Rhythm And Depth] : normal respiratory rhythm and effort [Exaggerated Use Of Accessory Muscles For Inspiration] : no accessory muscle use [Heart Rate And Rhythm] : heart rate was normal and rhythm regular [Heart Sounds] : normal S1 and S2 [Edema] : there was no peripheral edema [Bowel Sounds] : normal bowel sounds [Abdomen Soft] : soft [Abdomen Tenderness] : non-tender [Abdomen Mass (___ Cm)] : no abdominal mass palpated [Abnormal Walk] : normal gait [Involuntary Movements] : no involuntary movements were seen [] : no rash [No Focal Deficits] : no focal deficits [Over the Past 2 Weeks, Have You Felt Down, Depressed, or Hopeless?] : 1.) Over the past 2 weeks, have you felt down, depressed, or hopeless? Yes [Over the Past 2 Weeks, Have You Felt Little Interest or Pleasure Doing Things?] : 2.) Over the past 2 weeks, have you felt little interest or pleasure doing things? No [FreeTextEntry1] : + anxious + flight of ideas + inability to focus - appears to be manic

## 2019-04-03 NOTE — ASSESSMENT
[FreeTextEntry1] : 43 yo female with multiple medical problems including significant anxiety/depression with multiple admissions to [facility], h/o ITP, questionable h/o POTS, JODY, newly diagnosed with Protein S deficiency c/b VTE who presents for follow up of multiple GI complaints including: weight loss, persistent diarrhea, intermittent abdominal pain.  She has had extensive imaging and lab work that have not revealed a cause.  There are some tests (non IGA related celiac testing, c diff, fecal ten) that are worth sending.  I remain worried that her uncontrolled psychiatric illness prohbits treatment at this time and I have told her such.  The next steps of treatment involve endoscopic evaluation and trials of medication which she continues to decline, despite my continue reassurance that I believe we can safely pursue endoscopic assessment.  After this extensive discussion for 40+ minutes, she again declined any intervention.  I specifically offered the following, all of which she declined:\par \par - EGD/Colon\par - Video capsule endoscopy\par - Vit D and Iron repletion\par - Trial of antidiarrheals or antispasmodic medication\par - SIBO testing OR Breath test\par - Referrals to nutrition or psychiatry\par - Second opinion from outside GI physician in order to see if someone else has a different approach that we could consider\par \par She will allow me to send stool and blood tests at this time and again reports considering future testing.\par \par Impression:\par 1) Acute on chronic diarrhea\par 2) Vitamin Deficiency - Vit D, Fe\par 3) Weight loss - stable after loosing a few pounds again\par 4) Anxiety/Depression with potential bipolar disorder and suspect current manic behavior\par 5) Protein S deficiency c/b VTE on Xarelto\par 6) Thrombocytopenia - stable\par 7) Nephrolithiasis - appears resolved\par 8) ? POTS ?\par 9) ? Sjogrens ?\par 10) Low IGA\par \par Plan:\par 1) C Diff given recent abx\par 2) Fecal Ca\par 3) DQ2/DQ8 testing, Deaminated TTG IGA IGG\par 4) Giardia (OP negative) but can be persistent in IGA deficiency\par 5) Will continue discussions as outlined extensively before\par 6) Strongly emphasized the need for psychiatric care and re-discussed the role of anxiety/depression manifesting in GI symptoms with patient at length\par 7) All discussed at length for over 40 minutes. All questions answered. Plan agreed upon\par 8) RV pending above

## 2019-04-03 NOTE — REVIEW OF SYSTEMS
[Fever] : no fever [Chills] : no chills [Feeling Poorly] : feeling poorly [Recent Weight Loss (___ Lbs)] : recent [unfilled] ~Ulb weight loss [Sore Throat] : no sore throat [Hoarseness] : no hoarseness [As Noted in HPI] : as noted in HPI [Suicidal] : not suicidal [Anxiety] : anxiety [Emotional Problems] : emotional problems [Negative] : Heme/Lymph [FreeTextEntry4] : + tinnitus,

## 2019-04-08 ENCOUNTER — EMERGENCY (EMERGENCY)
Facility: HOSPITAL | Age: 44
LOS: 0 days | Discharge: HOME | End: 2019-04-09
Attending: EMERGENCY MEDICINE | Admitting: EMERGENCY MEDICINE
Payer: MEDICAID

## 2019-04-08 ENCOUNTER — EMERGENCY (EMERGENCY)
Facility: HOSPITAL | Age: 44
LOS: 1 days | End: 2019-04-08
Attending: EMERGENCY MEDICINE
Payer: MEDICAID

## 2019-04-08 VITALS
RESPIRATION RATE: 16 BRPM | OXYGEN SATURATION: 100 % | WEIGHT: 97 LBS | DIASTOLIC BLOOD PRESSURE: 86 MMHG | HEART RATE: 101 BPM | SYSTOLIC BLOOD PRESSURE: 119 MMHG | TEMPERATURE: 98 F | HEIGHT: 62 IN

## 2019-04-08 VITALS
WEIGHT: 97 LBS | SYSTOLIC BLOOD PRESSURE: 126 MMHG | OXYGEN SATURATION: 100 % | HEIGHT: 62 IN | HEART RATE: 86 BPM | RESPIRATION RATE: 21 BRPM | DIASTOLIC BLOOD PRESSURE: 97 MMHG | TEMPERATURE: 98 F

## 2019-04-08 DIAGNOSIS — R07.9 CHEST PAIN, UNSPECIFIED: ICD-10-CM

## 2019-04-08 DIAGNOSIS — D69.3 IMMUNE THROMBOCYTOPENIC PURPURA: ICD-10-CM

## 2019-04-08 DIAGNOSIS — F32.9 MAJOR DEPRESSIVE DISORDER, SINGLE EPISODE, UNSPECIFIED: ICD-10-CM

## 2019-04-08 DIAGNOSIS — Z86.718 PERSONAL HISTORY OF OTHER VENOUS THROMBOSIS AND EMBOLISM: ICD-10-CM

## 2019-04-08 DIAGNOSIS — K58.9 IRRITABLE BOWEL SYNDROME WITHOUT DIARRHEA: ICD-10-CM

## 2019-04-08 DIAGNOSIS — G90.1 FAMILIAL DYSAUTONOMIA [RILEY-DAY]: ICD-10-CM

## 2019-04-08 DIAGNOSIS — D68.59 OTHER PRIMARY THROMBOPHILIA: ICD-10-CM

## 2019-04-08 DIAGNOSIS — Z90.49 ACQUIRED ABSENCE OF OTHER SPECIFIED PARTS OF DIGESTIVE TRACT: Chronic | ICD-10-CM

## 2019-04-08 DIAGNOSIS — M79.601 PAIN IN RIGHT ARM: ICD-10-CM

## 2019-04-08 DIAGNOSIS — F41.9 ANXIETY DISORDER, UNSPECIFIED: ICD-10-CM

## 2019-04-08 DIAGNOSIS — G24.9 DYSTONIA, UNSPECIFIED: ICD-10-CM

## 2019-04-08 DIAGNOSIS — K31.84 GASTROPARESIS: ICD-10-CM

## 2019-04-08 DIAGNOSIS — Z79.01 LONG TERM (CURRENT) USE OF ANTICOAGULANTS: ICD-10-CM

## 2019-04-08 DIAGNOSIS — I10 ESSENTIAL (PRIMARY) HYPERTENSION: ICD-10-CM

## 2019-04-08 DIAGNOSIS — I49.8 OTHER SPECIFIED CARDIAC ARRHYTHMIAS: ICD-10-CM

## 2019-04-08 LAB
ALBUMIN SERPL ELPH-MCNC: 4 G/DL — SIGNIFICANT CHANGE UP (ref 3.3–5)
ALP SERPL-CCNC: 83 U/L — SIGNIFICANT CHANGE UP (ref 40–120)
ALT FLD-CCNC: 16 U/L — SIGNIFICANT CHANGE UP (ref 12–78)
ANION GAP SERPL CALC-SCNC: 6 MMOL/L — SIGNIFICANT CHANGE UP (ref 5–17)
APTT BLD: 33.5 SEC — SIGNIFICANT CHANGE UP (ref 27.5–36.3)
AST SERPL-CCNC: 16 U/L — SIGNIFICANT CHANGE UP (ref 15–37)
BILIRUB SERPL-MCNC: 0.3 MG/DL — SIGNIFICANT CHANGE UP (ref 0.2–1.2)
BUN SERPL-MCNC: 11 MG/DL — SIGNIFICANT CHANGE UP (ref 7–23)
CALCIUM SERPL-MCNC: 8.8 MG/DL — SIGNIFICANT CHANGE UP (ref 8.5–10.1)
CHLORIDE SERPL-SCNC: 107 MMOL/L — SIGNIFICANT CHANGE UP (ref 96–108)
CO2 SERPL-SCNC: 25 MMOL/L — SIGNIFICANT CHANGE UP (ref 22–31)
CREAT SERPL-MCNC: 0.77 MG/DL — SIGNIFICANT CHANGE UP (ref 0.5–1.3)
D DIMER BLD IA.RAPID-MCNC: <150 NG/ML DDU — SIGNIFICANT CHANGE UP
GLUCOSE SERPL-MCNC: 84 MG/DL — SIGNIFICANT CHANGE UP (ref 70–99)
HCT VFR BLD CALC: 31.1 % — LOW (ref 34.5–45)
HGB BLD-MCNC: 9.5 G/DL — LOW (ref 11.5–15.5)
INR BLD: 1.84 RATIO — HIGH (ref 0.88–1.16)
MCHC RBC-ENTMCNC: 22.6 PG — LOW (ref 27–34)
MCHC RBC-ENTMCNC: 30.5 GM/DL — LOW (ref 32–36)
MCV RBC AUTO: 74 FL — LOW (ref 80–100)
NRBC # BLD: 0 /100 WBCS — SIGNIFICANT CHANGE UP (ref 0–0)
PLATELET # BLD AUTO: 126 K/UL — LOW (ref 150–400)
POTASSIUM SERPL-MCNC: 3.6 MMOL/L — SIGNIFICANT CHANGE UP (ref 3.5–5.3)
POTASSIUM SERPL-SCNC: 3.6 MMOL/L — SIGNIFICANT CHANGE UP (ref 3.5–5.3)
PROT SERPL-MCNC: 7.1 GM/DL — SIGNIFICANT CHANGE UP (ref 6–8.3)
PROTHROM AB SERPL-ACNC: 20.8 SEC — HIGH (ref 10–12.9)
RBC # BLD: 4.2 M/UL — SIGNIFICANT CHANGE UP (ref 3.8–5.2)
RBC # FLD: 15 % — HIGH (ref 10.3–14.5)
SODIUM SERPL-SCNC: 138 MMOL/L — SIGNIFICANT CHANGE UP (ref 135–145)
TROPONIN I SERPL-MCNC: <0.015 NG/ML — SIGNIFICANT CHANGE UP (ref 0.01–0.04)
WBC # BLD: 7.87 K/UL — SIGNIFICANT CHANGE UP (ref 3.8–10.5)
WBC # FLD AUTO: 7.87 K/UL — SIGNIFICANT CHANGE UP (ref 3.8–10.5)

## 2019-04-08 PROCEDURE — 93010 ELECTROCARDIOGRAM REPORT: CPT | Mod: 77

## 2019-04-08 PROCEDURE — 93971 EXTREMITY STUDY: CPT | Mod: 26,RT

## 2019-04-08 PROCEDURE — 93005 ELECTROCARDIOGRAM TRACING: CPT

## 2019-04-08 PROCEDURE — 93010 ELECTROCARDIOGRAM REPORT: CPT

## 2019-04-08 PROCEDURE — 99284 EMERGENCY DEPT VISIT MOD MDM: CPT | Mod: 25

## 2019-04-08 NOTE — ED ADULT NURSE NOTE - NSIMPLEMENTINTERV_GEN_ALL_ED
Implemented All Fall with Harm Risk Interventions:  [place] to call system. Call bell, personal items and telephone within reach. Instruct patient to call for assistance. Room bathroom lighting operational. Non-slip footwear when patient is off stretcher. Physically safe environment: no spills, clutter or unnecessary equipment. Stretcher in lowest position, wheels locked, appropriate side rails in place. Provide visual cue, wrist band, yellow gown, etc. Monitor gait and stability. Monitor for mental status changes and reorient to person, place, and time. Review medications for side effects contributing to fall risk. Reinforce activity limits and safety measures with patient and family. Provide visual clues: red socks.

## 2019-04-08 NOTE — ED ADULT NURSE NOTE - NS ED NURSE ELOPE COMMENTS
patient states she did not want to wait any longer.  staff attempted to educate patient on benefits of staying to complete evaluation vs risk of eloping.   EKG completed and interpreted by MD Valencia upon arrival to ED based on Pt c/o chest pain

## 2019-04-08 NOTE — ED STATDOCS - ATTENDING CONTRIBUTION TO CARE
Attending Contribution to Care: I, Isha Sierra, performed the initial face to face bedside interview with this patient regarding history of present illness, review of symptoms and relevant past medical, social and family history.  I completed an independent physical examination.  I was the initial provider who evaluated this patient and the history, physical, and MDM reflect this intial assessment. I have signed out the follow up of any pending tests after the original (i.e. labs, radiological studies) to the ACP with instructions to review any with instructions to review any concerning findings to me prior to discharge.  I have communicated the patient’s plan of care and disposition with the ACP.

## 2019-04-08 NOTE — ED ADULT NURSE REASSESSMENT NOTE - NS ED NURSE REASSESS COMMENT FT1
Called to ultrasound for patient concern regarding telemetry monitoring. States she is concerned she is not being monitored during test. Explained to patient the portable tx monitor has been on her the entire time and that there is a tele tech watching patients heart rate and rhythm. Patient then states she is concerned because the [employer] tech had to remove and replace one lead. Lead checked, on patient appropriately and monitoring patient appropriately. Patient then concerned that ECG monitoring shown on the tx monitor is too small for anyone to see. Explained to patient that this is portable monitor and reading on big screen in front of a tele tech. All questions answered. Updated on plan of care.

## 2019-04-08 NOTE — ED STATDOCS - OBJECTIVE STATEMENT
43y F PMH Anemia, Anxiety, Depression, DVT on Xarelto, Dysautonomia, Dystonia, Gastroparesis, Headache, ITP, HTN, IBS, Labyrinthitis, POTS presents to the ED c/o right arm pain x 2 days. +CP x 3 hours. Pt is worried that she has another DVT. Pt was recently at [facility], states that she was not seen for 3 hours so she left and came to [facility].

## 2019-04-08 NOTE — ED STATDOCS - MUSCULOSKELETAL, MLM
range of motion is not limited and there is no muscle tenderness. range of motion is not limited and there is no muscle tenderness.+ yellow ecchymosis on left forearm, right arm: no swelling noted

## 2019-04-08 NOTE — ED ADULT NURSE NOTE - OBJECTIVE STATEMENT
44 y/o female comes in A&Ox4 from home with complaints of left arm pain and chest discomfort. She states she has a history of DVT in left arm and this pain feels the same as the left arm did with the positive DVT. The pain in the right arm started today. She denies fevers, vomiting, or diarrhea. She states the chest discomfort started an hour ago. She refused blood work and IV initially until test for DVT is completed. Plan of care discussed with patient. Comfort and safety maintained. Will continue to monitor.

## 2019-04-08 NOTE — ED ADULT TRIAGE NOTE - CHIEF COMPLAINT QUOTE
rt arm pain with swelling x 2days r/o DVT as per PT also c/o chest pain with elevated heart rate  PMH clots disorder .ITP

## 2019-04-08 NOTE — ED ADULT NURSE NOTE - OBJECTIVE STATEMENT
Pt c/o right UE swelling radiating to right chest starting 2 days ago. No distress noted. Able to speak in full sentences. Pt report having a DVT in left UE and is currently on Xarelto

## 2019-04-08 NOTE — ED STATDOCS - PROGRESS NOTE DETAILS
Patient seen and evaluated, ED attending note and orders reviewed, will continue with patient follow up and care -Attila Gutierrez PA-C Results reviewed and discussed with pt. PE unremarkable. Pt is well appearing and stable for dc. Discussed importance of close FU with PMD.  Pt asked to return to ED immediately for any new or concerning sx or worsening sx. Pt acknowledges and understands plan.. -Attila Gutierrez PA-C

## 2019-04-08 NOTE — ED ADULT NURSE NOTE - CADM POA PRESS ULCER
2017    Jacinda Vicente  : 1934  MRN: [id number]    Indication:  Hematochezia    Procedure: Colonoscopy with diagnostic    Endoscopist:  Gigi Rangel MD    Medications:  MAC     Instrument: Pediatric colonoscope    Preparation:Colyte    Description of Procedure:  Informed consent was obtained from the patient.  The patient was placed in the left lateral decubitus position.  Digital rectal exam and perianal inspection was performed and was normal.  The colonoscope was advanced under direct visualization to the cecum, which was identified by the ileocecal valve and appendiceal orifice. The colonoscope was then withdrawn and the mucosa was carefully inspected.  The preparation was average.      Findings:  TI: not visualized.  Cecum: -normal  Ascending Colon: -normal    Transverse Colon: -normal  Descending Colon:-Blood clots visualized, suctioned and cleared. No diverticulosis seen.  Sigmoid Colon: Blood clots. Extensive diverticulosis, no active bleeding seen form the diverticula  Rectum: Internal hemorrhoids  Retroflexed:-hemorrhoids (internal), medium in size    No large polyps or mass lesions seen    Complications:   None    Blood Loss:  None    Impression:  - Sigmoid colon diverticulosis likely source of bleed.  - Hemorrhoids.    Recommendations:  Monitor stools and H/H  Clear liquid diet  If she has recurrence of bleed will need IR intervention    Signed,  Gigi Rangel MD            
No

## 2019-04-08 NOTE — ED STATDOCS - NSFOLLOWUPINSTRUCTIONS_ED_ALL_ED_FT
Chest Pain    WHAT YOU NEED TO KNOW:    Chest pain can be caused by a range of conditions, from not serious to life-threatening. Chest pain can be a symptom of a digestive problem, such as acid reflux or a stomach ulcer. An anxiety attack or a strong emotion, such as anger, can also cause chest pain. Infection, inflammation, or a fracture in the bones or cartilage in your chest can cause pain or discomfort. Sometimes chest pain or pressure is caused by poor blood flow to your heart (angina). Chest pain may also be caused by life-threatening conditions such as a heart attack or blood clot in your lungs.     DISCHARGE INSTRUCTIONS:    Call 911 if:     You have any of the following signs of a heart attack:   Squeezing, pressure, or pain in your chest       and any of the following:   Discomfort or pain in your back, neck, jaw, stomach, or arm       Shortness of breath      Nausea or vomiting      Lightheadedness or a sudden cold sweat        Return to the emergency department if:     You have chest discomfort that gets worse, even with medicine.      You cough or vomit blood.       Your bowel movements are black or bloody.       You cannot stop vomiting, or it hurts to swallow.     Contact your healthcare provider if:     You have questions or concerns about your condition or care.        Medicines:     Medicines may be given to treat the cause of your chest pain. Examples include pain medicine, anxiety medicine, or medicines to increase blood flow to your heart.       Do not take certain medicines without asking your healthcare provider first. These include NSAIDs, herbal or vitamin supplements, or hormones (estrogen or progestin).       Take your medicine as directed. Contact your healthcare provider if you think your medicine is not helping or if you have side effects. Tell him or her if you are allergic to any medicine. Keep a list of the medicines, vitamins, and herbs you take. Include the amounts, and when and why you take them. Bring the list or the pill bottles to follow-up visits. Carry your medicine list with you in case of an emergency.    Follow up with your healthcare provider within 72 hours, or as directed: You may need to return for more tests to find the cause of your chest pain. You may be referred to a specialist, such as a cardiologist or gastroenterologist. Write down your questions so you remember to ask them during your visits.     Healthy living tips: The following are general healthy guidelines. If your chest pain is caused by a heart problem, your healthcare provider will give you specific guidelines to follow.    Do not smoke. Nicotine and other chemicals in cigarettes and cigars can cause lung and heart damage. Ask your healthcare provider for information if you currently smoke and need help to quit. E-cigarettes or smokeless tobacco still contain nicotine. Talk to your healthcare provider before you use these products.       Eat a variety of healthy, low-fat, low-salt foods. Healthy foods include fruits, vegetables, whole-grain breads, low-fat dairy products, beans, lean meats, and fish. Ask for more information about a heart healthy diet.      Drink plenty of water every day. Your body is made of mostly water. Water helps your body to control your temperature and blood pressure. Ask your healthcare provider how much water you should drink every day.      Ask about activity. Your healthcare provider will tell you which activities to limit or avoid. Ask when you can drive, return to work, and have sex. Ask about the best exercise plan for you.      Maintain a healthy weight. Ask your healthcare provider how much you should weigh. Ask him or her to help you create a weight loss plan if you are overweight.       Get the flu and pneumonia vaccines. All adults should get the influenza (flu) vaccine. Get it every year as soon as it becomes available. The pneumococcal vaccine is given to adults aged 65 years or older. The vaccine is given every 5 years to prevent pneumococcal disease, such as pneumonia.    If you have a stent:     Carry your stent card with you at all times.       Let all healthcare providers know that you have a stent.

## 2019-04-09 ENCOUNTER — OTHER (OUTPATIENT)
Age: 44
End: 2019-04-09

## 2019-04-09 NOTE — ED ADULT NURSE REASSESSMENT NOTE - NS ED NURSE REASSESS COMMENT FT1
patient discharged home. written and verbal discharge and followup instructions given to patient, patient verbalized back understanding. copy of all lab reports given and explained to patient. patient alert and oriented x 4, respirations even and unlabored, ms strength 5/5 upper and lower extremities bilaterally. ambulatory with steady gait. discharged home [id number].

## 2019-04-10 ENCOUNTER — APPOINTMENT (OUTPATIENT)
Dept: CV DIAGNOSITCS | Facility: HOSPITAL | Age: 44
End: 2019-04-10

## 2019-04-10 ENCOUNTER — OUTPATIENT (OUTPATIENT)
Dept: OUTPATIENT SERVICES | Facility: HOSPITAL | Age: 44
LOS: 1 days | End: 2019-04-10
Payer: MEDICAID

## 2019-04-10 ENCOUNTER — NON-APPOINTMENT (OUTPATIENT)
Age: 44
End: 2019-04-10

## 2019-04-10 ENCOUNTER — APPOINTMENT (OUTPATIENT)
Dept: CARDIOLOGY | Facility: CLINIC | Age: 44
End: 2019-04-10
Payer: MEDICAID

## 2019-04-10 VITALS — DIASTOLIC BLOOD PRESSURE: 86 MMHG | SYSTOLIC BLOOD PRESSURE: 120 MMHG | HEART RATE: 71 BPM | OXYGEN SATURATION: 100 %

## 2019-04-10 DIAGNOSIS — R06.00 DYSPNEA, UNSPECIFIED: ICD-10-CM

## 2019-04-10 DIAGNOSIS — Z90.49 ACQUIRED ABSENCE OF OTHER SPECIFIED PARTS OF DIGESTIVE TRACT: Chronic | ICD-10-CM

## 2019-04-10 DIAGNOSIS — R07.89 OTHER CHEST PAIN: ICD-10-CM

## 2019-04-10 PROCEDURE — 99401 PREV MED CNSL INDIV APPRX 15: CPT

## 2019-04-10 PROCEDURE — 71046 X-RAY EXAM CHEST 2 VIEWS: CPT | Mod: 26

## 2019-04-10 PROCEDURE — 93000 ELECTROCARDIOGRAM COMPLETE: CPT

## 2019-04-10 PROCEDURE — 93306 TTE W/DOPPLER COMPLETE: CPT

## 2019-04-10 PROCEDURE — 93306 TTE W/DOPPLER COMPLETE: CPT | Mod: 26

## 2019-04-10 PROCEDURE — 71046 X-RAY EXAM CHEST 2 VIEWS: CPT

## 2019-04-10 PROCEDURE — 99214 OFFICE O/P EST MOD 30 MIN: CPT | Mod: 25

## 2019-04-12 ENCOUNTER — APPOINTMENT (OUTPATIENT)
Dept: RHEUMATOLOGY | Facility: CLINIC | Age: 44
End: 2019-04-12
Payer: MEDICAID

## 2019-04-12 ENCOUNTER — LABORATORY RESULT (OUTPATIENT)
Age: 44
End: 2019-04-12

## 2019-04-12 VITALS
BODY MASS INDEX: 17.66 KG/M2 | DIASTOLIC BLOOD PRESSURE: 84 MMHG | HEART RATE: 85 BPM | WEIGHT: 96 LBS | OXYGEN SATURATION: 99 % | HEIGHT: 62 IN | SYSTOLIC BLOOD PRESSURE: 128 MMHG | TEMPERATURE: 97.8 F

## 2019-04-12 DIAGNOSIS — R53.83 OTHER FATIGUE: ICD-10-CM

## 2019-04-12 DIAGNOSIS — M35.00 SICCA SYNDROME, UNSPECIFIED: ICD-10-CM

## 2019-04-12 LAB
ALBUMIN SERPL ELPH-MCNC: 4.6 G/DL
ALP BLD-CCNC: 77 U/L
ALT SERPL-CCNC: 10 U/L
ANION GAP SERPL CALC-SCNC: 13 MMOL/L
AST SERPL-CCNC: 19 U/L
BILIRUB SERPL-MCNC: 0.3 MG/DL
BUN SERPL-MCNC: 4 MG/DL
CALCIUM SERPL-MCNC: 9.4 MG/DL
CHLORIDE SERPL-SCNC: 106 MMOL/L
CO2 SERPL-SCNC: 23 MMOL/L
CREAT SERPL-MCNC: 0.8 MG/DL
CRP SERPL-MCNC: <0.1 MG/DL
ERYTHROCYTE [SEDIMENTATION RATE] IN BLOOD BY WESTERGREN METHOD: 4 MM/HR
GLUCOSE SERPL-MCNC: 93 MG/DL
POTASSIUM SERPL-SCNC: 4.1 MMOL/L
PROT SERPL-MCNC: 7 G/DL
RHEUMATOID FACT SER QL: <10 IU/ML
SODIUM SERPL-SCNC: 142 MMOL/L

## 2019-04-12 PROCEDURE — 99203 OFFICE O/P NEW LOW 30 MIN: CPT

## 2019-04-13 LAB
B BURGDOR AB SER-IMP: NEGATIVE
B BURGDOR IGG+IGM SER QL: 0.03 INDEX
DEPRECATED KAPPA LC FREE/LAMBDA SER: 0.99 RATIO
ENA RNP AB SER IA-ACNC: 0.4 AL
ENA SM AB SER IA-ACNC: <0.2 AL
IGA SER QL IEP: 75 MG/DL
IGG SER QL IEP: 762 MG/DL
IGM SER QL IEP: 149 MG/DL
KAPPA LC CSF-MCNC: 1.49 MG/DL
KAPPA LC SERPL-MCNC: 1.48 MG/DL

## 2019-04-15 ENCOUNTER — EMERGENCY (EMERGENCY)
Facility: HOSPITAL | Age: 44
LOS: 1 days | Discharge: HOME | End: 2019-04-15
Attending: EMERGENCY MEDICINE | Admitting: EMERGENCY MEDICINE
Payer: MEDICAID

## 2019-04-15 ENCOUNTER — EMERGENCY (EMERGENCY)
Facility: HOSPITAL | Age: 44
LOS: 0 days | Discharge: HOME | End: 2019-04-15
Attending: EMERGENCY MEDICINE | Admitting: EMERGENCY MEDICINE
Payer: MEDICAID

## 2019-04-15 VITALS
HEART RATE: 98 BPM | SYSTOLIC BLOOD PRESSURE: 122 MMHG | OXYGEN SATURATION: 99 % | TEMPERATURE: 98 F | RESPIRATION RATE: 15 BRPM | WEIGHT: 95.02 LBS | DIASTOLIC BLOOD PRESSURE: 92 MMHG | HEIGHT: 62 IN

## 2019-04-15 VITALS
RESPIRATION RATE: 16 BRPM | SYSTOLIC BLOOD PRESSURE: 128 MMHG | OXYGEN SATURATION: 100 % | HEART RATE: 98 BPM | TEMPERATURE: 99 F | DIASTOLIC BLOOD PRESSURE: 92 MMHG

## 2019-04-15 VITALS — WEIGHT: 95.9 LBS | HEIGHT: 62 IN
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
## 2019-04-15 DIAGNOSIS — Z91.041 RADIOGRAPHIC DYE ALLERGY STATUS: ICD-10-CM

## 2019-04-15 DIAGNOSIS — Z90.49 ACQUIRED ABSENCE OF OTHER SPECIFIED PARTS OF DIGESTIVE TRACT: Chronic | ICD-10-CM

## 2019-04-15 DIAGNOSIS — D68.59 OTHER PRIMARY THROMBOPHILIA: ICD-10-CM

## 2019-04-15 DIAGNOSIS — Z79.01 LONG TERM (CURRENT) USE OF ANTICOAGULANTS: ICD-10-CM

## 2019-04-15 DIAGNOSIS — F32.9 MAJOR DEPRESSIVE DISORDER, SINGLE EPISODE, UNSPECIFIED: ICD-10-CM

## 2019-04-15 DIAGNOSIS — M79.604 PAIN IN RIGHT LEG: ICD-10-CM

## 2019-04-15 DIAGNOSIS — M79.602 PAIN IN LEFT ARM: ICD-10-CM

## 2019-04-15 DIAGNOSIS — S80.11XA CONTUSION OF RIGHT LOWER LEG, INITIAL ENCOUNTER: ICD-10-CM

## 2019-04-15 DIAGNOSIS — I10 ESSENTIAL (PRIMARY) HYPERTENSION: ICD-10-CM

## 2019-04-15 DIAGNOSIS — Z88.1 ALLERGY STATUS TO OTHER ANTIBIOTIC AGENTS STATUS: ICD-10-CM

## 2019-04-15 DIAGNOSIS — Z86.718 PERSONAL HISTORY OF OTHER VENOUS THROMBOSIS AND EMBOLISM: ICD-10-CM
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
## 2019-04-15 DIAGNOSIS — F41.9 ANXIETY DISORDER, UNSPECIFIED: ICD-10-CM
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
## 2019-04-15 PROCEDURE — 99282 EMERGENCY DEPT VISIT SF MDM: CPT

## 2019-04-15 PROCEDURE — 93971 EXTREMITY STUDY: CPT | Mod: 26

## 2019-04-15 PROCEDURE — 93971 EXTREMITY STUDY: CPT | Mod: 26,RT

## 2019-04-15 PROCEDURE — 99284 EMERGENCY DEPT VISIT MOD MDM: CPT

## 2019-04-15 PROCEDURE — 99284 EMERGENCY DEPT VISIT MOD MDM: CPT | Mod: 25

## 2019-04-15 NOTE — ED ADULT NURSE NOTE - OBJECTIVE STATEMENT
pt c/o eft arm and right leg pain and swelling. no swelling noted. denies any injury. pt appears anxious.

## 2019-04-15 NOTE — ED PROVIDER NOTE - OBJECTIVE STATEMENT
pt is a 42yo female with pmhx of autonomic dysfunction and dvt c/o arm and ankle pain x today. pt is a 42yo female with pmhx of autonomic dysfunction and dvt c/o arm and ankle pain x today. pt reports acute onset left elbow and r ankle pain after moving a lot of boxes this weekend. pt reports she consulted her vascular dr and spoke to the  who advised pt to come to ed for evaluation.    pmd: louisa  vascular: rigoberto.

## 2019-04-15 NOTE — ED ADULT TRIAGE NOTE - CHIEF COMPLAINT QUOTE
patient reports swelling to LUE and RLE that started 3 days ago.  She has a low protein S deficiency and has a known clot and is on Xarelto.  States she was at [facility] but left after 2 hours bcs they were taking too long.  10/10 pain, but has not taken any medications.  States she had an ultrasound on RUE last week secondary to multiple IV sticks which was negative for DVT

## 2019-04-15 NOTE — ED STATDOCS - PROGRESS NOTE DETAILS
KALLI Lew:   Patient has been seen, evaluated and orders have been written by the attending in intake. Patient is stable.  I will follow up the results of orders written and I will continue to evaluate/observe the patient.   Shona Lew PA-C Doppler studies of R Leg without evidence of DVT.  Left upper extremity Doppler shows chronic thrombus at internal jugular.  No acute DVT.  Discussed results with pt.  Recommend Heat applications and Tylenol for pain as pt cannot take Nsaids.  F/U with PMD or Hematologist.  DC home  Shona Lew PA-C

## 2019-04-15 NOTE — ED PROVIDER NOTE - CHPI ED SYMPTOMS NEG
ONEL   PATIENT IS LEAVING FOR [place] 1/6 AND WILL NEED AN ADDITIONAL SCRIPT   no difficulty bearing weight/no tingling/no deformity/no numbness/no abrasion

## 2019-04-15 NOTE — ED ADULT NURSE NOTE - CHPI ED NUR SYMPTOMS NEG
no fever/no nausea/no decreased eating/drinking/no dizziness/no weakness/no chills/no tingling/no vomiting

## 2019-04-15 NOTE — ED ADULT NURSE NOTE - NSIMPLEMENTINTERV_GEN_ALL_ED
Implemented All Universal Safety Interventions:  [place] to call system. Call bell, personal items and telephone within reach. Instruct patient to call for assistance. Room bathroom lighting operational. Non-slip footwear when patient is off stretcher. Physically safe environment: no spills, clutter or unnecessary equipment. Stretcher in lowest position, wheels locked, appropriate side rails in place.

## 2019-04-15 NOTE — ED ADULT NURSE NOTE - CHIEF COMPLAINT QUOTE
patient reports swelling to LUE and RLE that started 3 days ago.  She has a low protein S deficiency and has a known clot and is on Xarelto.  States she was at [facility] but left after 2 hours bcs they were taking too long.  10/10 pain, but has not taken any medications.  States she had an ultrasound on RUE last week secondary to multiple IV sticks which was negative for DVT

## 2019-04-15 NOTE — ED PROVIDER NOTE - PHYSICAL EXAMINATION
MS R LE:+ R ANKLE WITHOUT DEFORMITY. OLD ECCHYMOSIS. MILD TTP LATERAL MALLEOLUS. NO DEFORMITY. FROM SENSATION INTACT + 2 DP NO EDEMA     MS LUE:NO DEFORMITY. NONTENDER. FROM SENSATION INTACT + 2 RADIAL

## 2019-04-15 NOTE — ED STATDOCS - CARE PLAN
Principal Discharge DX:	Contusion of right lower leg, initial encounter  Secondary Diagnosis:	Arm pain, anterior, left

## 2019-04-15 NOTE — ED ADULT NURSE NOTE - NSIMPLEMENTINTERV_GEN_ALL_ED
Implemented All Universal Safety Interventions:  [place] to call system. Call bell, personal items and telephone within reach. Instruct patient to call for assistance. Room bathroom lighting operational. Non-slip footwear when patient is off stretcher. Physically safe environment: no spills, clutter or unnecessary equipment. Stretcher in lowest position, wheels locked, appropriate side rails in place.

## 2019-04-15 NOTE — ED PROVIDER NOTE - PROGRESS NOTE DETAILS
pt refused x ray. will consult vascular dr moran  and re-eval Brandon GORE for ED attending, Dr. Spears : 42 y/o female with a PMHx of DVT, HTN presents to the ED c/o right calf pain and left arm pain. Pt was sent to the ED from her vascular surgeon's office. Reports a sharp shooting pain in the left arm, radiating up to the shoulder and a small bump that wasn't there before. Pt also reports right calf pain and bruising to her right shin. Pt is demanding US.    PE: pt is tearful and anxious at bedside, demanding US of extremities with known clots already, sitting in bed, NAD, wearing portable pulse ox reading 98, heart rrr, lungs cta, abd,  soft, nontender, nondistended, no swelling in LLE, no palpable nodule to left ac, +radial pulse, contusion noted to right lower shin with mild ttp, no swelling or ecchymosis to posterior calf, +pedal pulse, cap refill <2 sec, sensation intact.   MDM: pt refusing XR and states she knows what a broken bone feels like, demanding US of extremities, explained based on clinical presentation, there was no indication for US at this time, pt already on Xarelto, will contact vascular surgeon and monitor. pt refused x ray. will consult vascular dr luisa park  and re-eval consulted vascular spoke to dr morin? on call who advised pt can follow up in office. pt refused x rays. will dc.

## 2019-04-15 NOTE — ED STATDOCS - ATTENDING CONTRIBUTION TO CARE
I, Deion Garcia, performed the initial face to face bedside interview with this patient regarding history of present illness, review of symptoms and relevant past medical, social and family history.  I completed an independent physical examination.  I was the initial provider who evaluated this patient. I have signed out the follow up of any pending tests (i.e. labs, radiological studies) to the ACP.  I have communicated the patient’s plan of care and disposition with the ACP.  The history, relevant review of systems, past medical and surgical history, medical decision making, and physical examination was documented by the scribe in my presence and I attest to the accuracy of the documentation.

## 2019-04-15 NOTE — ED PROVIDER NOTE - ATTENDING CONTRIBUTION TO CARE
Brandon GORE for ED attending, Dr. Spears : 44 y/o female with a PMHx of DVT, HTN presents to the ED c/o right calf pain and left arm pain. Pt was sent to the ED from her vascular surgeon's office. Reports a sharp shooting pain in the left arm, radiating up to the shoulder and a small bump that wasn't there before. Pt also reports right calf pain and bruising to her right shin. Pt is demanding US.    PE: pt is tearful and anxious at bedside, demanding US of extremities with known clots already, sitting in bed, NAD, wearing portable pulse ox reading 98, heart rrr, lungs cta, abd,  soft, nontender, nondistended, no swelling in LLE, no palpable nodule to left ac, +radial pulse, contusion noted to right lower shin with mild ttp, no swelling or ecchymosis to posterior calf, +pedal pulse, cap refill <2 sec, sensation intact.   MDM: pt refusing XR and states she knows what a broken bone feels like, demanding US of extremities, explained based on clinical presentation, there was no indication for US at this time, pt already on Xarelto, will contact vascular surgeon and monitor.

## 2019-04-15 NOTE — ED ADULT NURSE NOTE - OBJECTIVE STATEMENT
Pt presenting to the ED c/o LUE and RLE swelling and pain with bluish discoloration starting 3 days ago. States that she was told due to Protein S Deficiency, any exertion could increase chance of blood clot. A few days ago, pt was moving heavy boxes and soon after, developed swelling, discoloration, and pain in LUE. She then developed similar symptoms in RLE. No swelling or pain to RUE or LLE. No fevers, chills, SOB, CP, abd pain, N/V/D.

## 2019-04-16 ENCOUNTER — APPOINTMENT (OUTPATIENT)
Dept: UROLOGY | Facility: CLINIC | Age: 44
End: 2019-04-16
Payer: MEDICAID

## 2019-04-16 DIAGNOSIS — M79.609 PAIN IN UNSPECIFIED LIMB: ICD-10-CM

## 2019-04-16 DIAGNOSIS — N83.291 OTHER OVARIAN CYST, RIGHT SIDE: ICD-10-CM

## 2019-04-16 DIAGNOSIS — R10.2 PELVIC AND PERINEAL PAIN: ICD-10-CM

## 2019-04-16 DIAGNOSIS — K59.09 OTHER CONSTIPATION: ICD-10-CM

## 2019-04-16 DIAGNOSIS — N20.0 CALCULUS OF KIDNEY: ICD-10-CM

## 2019-04-16 LAB
B2 GLYCOPROT1 AB SER QL: NEGATIVE
CARDIOLIPIN AB SER IA-ACNC: NEGATIVE
CCP AB SER IA-ACNC: <8 UNITS
MPO AB + PR3 PNL SER: NORMAL
RF+CCP IGG SER-IMP: NEGATIVE

## 2019-04-16 PROCEDURE — 99204 OFFICE O/P NEW MOD 45 MIN: CPT

## 2019-04-16 NOTE — REVIEW OF SYSTEMS
[Chills] : chills [Feeling Tired] : feeling tired [Recent Weight Gain (___ Lbs)] : recent [unfilled] ~Ulb weight gain [Chest Pain] : chest pain [Heart Rate Is Fast] : fast heart rate [Palpitations] : palpitations [Shortness Of Breath] : shortness of breath [Wheezing] : wheezing [Abdominal Pain] : abdominal pain [Diarrhea] : diarrhea [Constipation] : constipation [Loss of interest] : loss of interest in sexual activity [Heartburn] : heartburn [Date of last menstrual period ____] : date of last menstrual period: [unfilled] [Abnormal menstrual period, if yes explain ___] : abnormal menstrual period [unfilled] [Seen by urologist before (Name)  ___] : Previously seen by a urologist: [unfilled] [Wake up at night to urinate  How many times?  ___] : wakes up to urinate [unfilled] times during the night [History of kidney stones] : history of kidney stones [Strong urge to urinate] : strong urge to urinate [Bladder pressure] : experiences bladder pressure [Strain or push to urinate] : strain or push to urinate [Slow urine stream] : slow urine stream [Interrupted urine stream] : interrupted urine stream [Bladder fullness after urinating] : bladder fullness after urinating [Leakage of urine with straining, coughing, laughing] : leakage of urine with straining, coughing, laughing [Dizziness] : dizziness [Itching] : itching [Difficulty Walking] : difficulty walking [Limb Weakness] : limb weakness [Muscle Weakness] : muscle weakness [Feelings Of Weakness] : feelings of weakness [Easy Bleeding] : a tendency for easy bleeding [Easy Bruising] : a tendency for easy bruising [Swollen Glands] : swollen glands [Negative] : Psychiatric [Feeling Poorly] : feeling poorly [Incontinence] : incontinence [Cough] : cough [Anxiety] : anxiety [Eyesight Problems] : no eyesight problems [Loss Of Hearing] : no hearing loss [Nosebleeds] : no nosebleeds [Dysuria] : no dysuria [Abn Vaginal Bleeding] : no unexplained vaginal bleeding [Vaginal Discharge] : no vaginal discharge [Convulsions] : no convulsions [Hot Flashes] : no hot flashes [Joint Pain] : no joint pain

## 2019-04-16 NOTE — LETTER BODY
[Consult Letter:] : I had the pleasure of evaluating your patient, [unfilled]. [Please see my note below.] : Please see my note below. [Consult Closing:] : Thank you very much for allowing me to participate in the care of this patient.  If you have any questions, please do not hesitate to contact me. [Sincerely,] : Sincerely, [Dear  ___] : Dear  [unfilled], [( Thank you for referring [unfilled] for consultation for _____ )] : Thank you for referring [unfilled] for consultation for [unfilled] [FreeTextEntry2] : Cristhian Parker M.D.\par [address], [address]\[place] [address] [FreeTextEntry1] : Ms. Presley is a 43 year old female presenting with kidney stones. Denies prior personal history of kidney stones. Family history of kidney stones, brother. Patient was found to have a 2 mm stone by US. Notes that she may have passed her stone a couple of weeks ago, as it did not show up on her most recent CT scan, as the findings showed no hydronephrosis or stones. The patient still feels abdominal and lower back pain. Drinks 2 quarts of water with Gatorade daily. Her urination is adequate. Complains of urinary frequency, urgency and urge incontinence. Denies dysuria and gross hematuria. Patient has a loss of appetite. Notes that she eats tofu and potatoes everyday, as that is the only thing that her body can process. Has ITP and Dysautonomia. Has Early Sjogren's. Had an appendectomy where she had a reaction to the anesthesia. Had a DVT of the left jugular. Doesn't have children. No pregnancies. Patient is medically disabled.\par \par The patient produced a urine sample which will be sent for urinalysis, urine cytology, and urine culture.\par A PVR test by bladder scan found 6 ml of urine in the bladder.\par \par The patient will undergo a 24 hr LL urine collection.\par \par The patient will return in one month for a follow up.  [FreeTextEntry3] : Rodo Crisostomo M.D. PhD

## 2019-04-16 NOTE — ADDENDUM
[FreeTextEntry1] : This note was authored by Ira Walsh working as a scribe for Dr. Rodo Crisostomo.\par I, Dr. Rodo Crisostomo, have reviewed the content of this note and confirm it is true and accurate. I personally performed the history and physical examination and made all the decisions.\par 4/16/19\par

## 2019-04-16 NOTE — HISTORY OF PRESENT ILLNESS
[FreeTextEntry1] : Ms. Presley is a 43 year old female presenting with kidney stones. Denies prior personal history of kidney stones. Family history of kidney stones, brother. Patient was found to have a 2 mm stone by US. Notes that she may have passed her stone a couple of weeks ago, as it did not show up on her most recent CT scan, as the findings showed no hydronephrosis or stones. The patient still feels abdominal and lower back pain. Drinks 2 quarts of water with Gatorade daily. Her urination is adequate. Complains of urinary frequency, urgency and urge incontinence. Denies dysuria and gross hematuria. Patient has a loss of appetite. Notes that she eats tofu and potatoes everyday, as that is the only thing that her body can process. Has ITP and Dysautonomia. Has Early Sjogren's. Had an appendectomy where she had a reaction to the anesthesia. Had a DVT of the left jugular. Doesn't have children. No pregnancies. Patient is medically disabled.

## 2019-04-16 NOTE — ASSESSMENT
[FreeTextEntry1] : Ms. Presley is a 43 year old female presenting with kidney stones. Denies prior personal history of kidney stones. Family history of kidney stones, brother. Patient was found to have a 2 mm stone by US. Notes that she may have passed her stone a couple of weeks ago, as it did not show up on her most recent CT scan, as the findings showed no hydronephrosis or stones. The patient still feels abdominal and lower back pain. Drinks 2 quarts of water with Gatorade daily. Her urination is adequate. Complains of urinary frequency, urgency and urge incontinence. Denies dysuria and gross hematuria. Patient has a loss of appetite. Notes that she eats tofu and potatoes everyday, as that is the only thing that her body can process. Has ITP and Dysautonomia. Has Early Sjogren's. Had an appendectomy where she had a reaction to the anesthesia. Had a DVT of the left jugular. Doesn't have children. No pregnancies. Patient is medically disabled.\par \par The patient produced a urine sample which will be sent for urinalysis, urine cytology, and urine culture.\par A PVR test by bladder scan found 6 ml of urine in the bladder.\par \par The patient will undergo a 24 hr LL urine collection.\par \par The patient will return in one month for a follow up.

## 2019-04-17 LAB
APPEARANCE: CLEAR
BACTERIA UR CULT: NORMAL
BACTERIA: NEGATIVE
BILIRUBIN URINE: NEGATIVE
BLOOD URINE: NEGATIVE
COLOR: COLORLESS
GLUCOSE QUALITATIVE U: NEGATIVE
HYALINE CASTS: 0 /LPF
KETONES URINE: NEGATIVE
LEUKOCYTE ESTERASE URINE: NEGATIVE
MICROSCOPIC-UA: NORMAL
MISCELLANEOUS TEST: NORMAL
NITRITE URINE: NEGATIVE
PH URINE: 7
PROC NAME: NORMAL
PROTEIN URINE: NEGATIVE
RED BLOOD CELLS URINE: 0 /HPF
SPECIFIC GRAVITY URINE: 1
SQUAMOUS EPITHELIAL CELLS: 0 /HPF
UROBILINOGEN URINE: NORMAL
WHITE BLOOD CELLS URINE: 0 /HPF

## 2019-04-18 ENCOUNTER — OTHER (OUTPATIENT)
Age: 44
End: 2019-04-18

## 2019-04-18 LAB
C DIFF TOX GENS STL QL NAA+PROBE: NORMAL
CDIFF BY PCR: NOT DETECTED
GLIADIN IGA SER QL: <5 UNITS
GLIADIN IGG SER QL: <5 UNITS
GLIADIN PEPTIDE IGA SER-ACNC: NEGATIVE
GLIADIN PEPTIDE IGG SER-ACNC: NEGATIVE

## 2019-04-19 LAB
B2 GLYCOPROT1 IGA SERPL IA-ACNC: <5 SAU
M TB IFN-G BLD-IMP: NEGATIVE
QUANTIFERON TB PLUS MITOGEN MINUS NIL: 6.99 IU/ML
QUANTIFERON TB PLUS NIL: 0.02 IU/ML
QUANTIFERON TB PLUS TB1 MINUS NIL: 0 IU/ML
QUANTIFERON TB PLUS TB2 MINUS NIL: 0.01 IU/ML
URINE CYTOLOGY: NORMAL

## 2019-04-23 LAB — CALPROTECTIN FECAL: <16 UG/G

## 2019-04-29 ENCOUNTER — OTHER (OUTPATIENT)
Age: 44
End: 2019-04-29

## 2019-04-29 LAB
ANNOTATION COMMENT IMP: NORMAL
HLA-DQ2: POSITIVE
HLA-DQ8 QL: NEGATIVE
REF LAB TEST METHOD: NORMAL

## 2019-05-03 ENCOUNTER — EMERGENCY (EMERGENCY)
Facility: HOSPITAL | Age: 44
LOS: 0 days | Discharge: HOME | End: 2019-05-03
Attending: EMERGENCY MEDICINE | Admitting: EMERGENCY MEDICINE
Payer: MEDICAID

## 2019-05-03 VITALS
TEMPERATURE: 99 F | OXYGEN SATURATION: 100 % | HEART RATE: 90 BPM | DIASTOLIC BLOOD PRESSURE: 83 MMHG | RESPIRATION RATE: 18 BRPM | SYSTOLIC BLOOD PRESSURE: 123 MMHG

## 2019-05-03 VITALS — HEIGHT: 62 IN

## 2019-05-03 DIAGNOSIS — F41.9 ANXIETY DISORDER, UNSPECIFIED: ICD-10-CM

## 2019-05-03 DIAGNOSIS — G90.1 FAMILIAL DYSAUTONOMIA [RILEY-DAY]: ICD-10-CM

## 2019-05-03 DIAGNOSIS — R11.2 NAUSEA WITH VOMITING, UNSPECIFIED: ICD-10-CM

## 2019-05-03 DIAGNOSIS — D64.9 ANEMIA, UNSPECIFIED: ICD-10-CM

## 2019-05-03 DIAGNOSIS — R53.1 WEAKNESS: ICD-10-CM

## 2019-05-03 DIAGNOSIS — D68.59 OTHER PRIMARY THROMBOPHILIA: ICD-10-CM

## 2019-05-03 DIAGNOSIS — R19.7 DIARRHEA, UNSPECIFIED: ICD-10-CM

## 2019-05-03 DIAGNOSIS — Z86.718 PERSONAL HISTORY OF OTHER VENOUS THROMBOSIS AND EMBOLISM: ICD-10-CM

## 2019-05-03 DIAGNOSIS — G90.4 AUTONOMIC DYSREFLEXIA: ICD-10-CM

## 2019-05-03 DIAGNOSIS — R07.9 CHEST PAIN, UNSPECIFIED: ICD-10-CM

## 2019-05-03 DIAGNOSIS — Z90.49 ACQUIRED ABSENCE OF OTHER SPECIFIED PARTS OF DIGESTIVE TRACT: Chronic | ICD-10-CM

## 2019-05-03 DIAGNOSIS — D69.3 IMMUNE THROMBOCYTOPENIC PURPURA: ICD-10-CM

## 2019-05-03 DIAGNOSIS — H83.09 LABYRINTHITIS, UNSPECIFIED EAR: ICD-10-CM

## 2019-05-03 DIAGNOSIS — K31.84 GASTROPARESIS: ICD-10-CM

## 2019-05-03 DIAGNOSIS — I10 ESSENTIAL (PRIMARY) HYPERTENSION: ICD-10-CM

## 2019-05-03 LAB
ALBUMIN SERPL ELPH-MCNC: 4 G/DL — SIGNIFICANT CHANGE UP (ref 3.3–5)
ALP SERPL-CCNC: 89 U/L — SIGNIFICANT CHANGE UP (ref 40–120)
ALT FLD-CCNC: 36 U/L — SIGNIFICANT CHANGE UP (ref 12–78)
ANION GAP SERPL CALC-SCNC: 6 MMOL/L — SIGNIFICANT CHANGE UP (ref 5–17)
APTT BLD: 29.8 SEC — SIGNIFICANT CHANGE UP (ref 27.5–36.3)
AST SERPL-CCNC: 35 U/L — SIGNIFICANT CHANGE UP (ref 15–37)
BILIRUB SERPL-MCNC: 0.2 MG/DL — SIGNIFICANT CHANGE UP (ref 0.2–1.2)
BUN SERPL-MCNC: 6 MG/DL — LOW (ref 7–23)
CALCIUM SERPL-MCNC: 8.4 MG/DL — LOW (ref 8.5–10.1)
CHLORIDE SERPL-SCNC: 112 MMOL/L — HIGH (ref 96–108)
CO2 SERPL-SCNC: 25 MMOL/L — SIGNIFICANT CHANGE UP (ref 22–31)
CREAT SERPL-MCNC: 0.8 MG/DL — SIGNIFICANT CHANGE UP (ref 0.5–1.3)
D DIMER BLD IA.RAPID-MCNC: <150 NG/ML DDU — SIGNIFICANT CHANGE UP
GLUCOSE SERPL-MCNC: 98 MG/DL — SIGNIFICANT CHANGE UP (ref 70–99)
HCG SERPL-ACNC: <1 MIU/ML — SIGNIFICANT CHANGE UP
HCT VFR BLD CALC: 30.7 % — LOW (ref 34.5–45)
HGB BLD-MCNC: 9.2 G/DL — LOW (ref 11.5–15.5)
INR BLD: 1.09 RATIO — SIGNIFICANT CHANGE UP (ref 0.88–1.16)
MAGNESIUM SERPL-MCNC: 2.5 MG/DL — SIGNIFICANT CHANGE UP (ref 1.6–2.6)
MCHC RBC-ENTMCNC: 22.9 PG — LOW (ref 27–34)
MCHC RBC-ENTMCNC: 30 GM/DL — LOW (ref 32–36)
MCV RBC AUTO: 76.4 FL — LOW (ref 80–100)
NRBC # BLD: 0 /100 WBCS — SIGNIFICANT CHANGE UP (ref 0–0)
NT-PROBNP SERPL-SCNC: 124 PG/ML — SIGNIFICANT CHANGE UP (ref 0–125)
PLATELET # BLD AUTO: 90 K/UL — LOW (ref 150–400)
POTASSIUM SERPL-MCNC: 4.3 MMOL/L — SIGNIFICANT CHANGE UP (ref 3.5–5.3)
POTASSIUM SERPL-SCNC: 4.3 MMOL/L — SIGNIFICANT CHANGE UP (ref 3.5–5.3)
PROT SERPL-MCNC: 7 GM/DL — SIGNIFICANT CHANGE UP (ref 6–8.3)
PROTHROM AB SERPL-ACNC: 12.1 SEC — SIGNIFICANT CHANGE UP (ref 10–12.9)
RBC # BLD: 4.02 M/UL — SIGNIFICANT CHANGE UP (ref 3.8–5.2)
RBC # FLD: 16.2 % — HIGH (ref 10.3–14.5)
SODIUM SERPL-SCNC: 143 MMOL/L — SIGNIFICANT CHANGE UP (ref 135–145)
TROPONIN I SERPL-MCNC: <0.015 NG/ML — SIGNIFICANT CHANGE UP (ref 0.01–0.04)
WBC # BLD: 4.71 K/UL — SIGNIFICANT CHANGE UP (ref 3.8–10.5)
WBC # FLD AUTO: 4.71 K/UL — SIGNIFICANT CHANGE UP (ref 3.8–10.5)

## 2019-05-03 PROCEDURE — 71045 X-RAY EXAM CHEST 1 VIEW: CPT | Mod: 26

## 2019-05-03 PROCEDURE — 93010 ELECTROCARDIOGRAM REPORT: CPT

## 2019-05-03 PROCEDURE — 99284 EMERGENCY DEPT VISIT MOD MDM: CPT

## 2019-05-03 PROCEDURE — 93971 EXTREMITY STUDY: CPT | Mod: 26,RT

## 2019-05-03 RX ORDER — SODIUM CHLORIDE 9 MG/ML
1000 INJECTION INTRAMUSCULAR; INTRAVENOUS; SUBCUTANEOUS ONCE
Qty: 0 | Refills: 0 | Status: COMPLETED | OUTPATIENT
Start: 2019-05-03 | End: 2019-05-03

## 2019-05-03 RX ADMIN — SODIUM CHLORIDE 1000 MILLILITER(S): 9 INJECTION INTRAMUSCULAR; INTRAVENOUS; SUBCUTANEOUS at 09:53

## 2019-05-03 RX ADMIN — SODIUM CHLORIDE 1000 MILLILITER(S): 9 INJECTION INTRAMUSCULAR; INTRAVENOUS; SUBCUTANEOUS at 10:54

## 2019-05-03 NOTE — ED PROVIDER NOTE - CONSTITUTIONAL, MLM
normal... Pale, thin appearing, wearing surgical mask, awake, alert, oriented to person, place, time/situation and in no apparent distress.

## 2019-05-03 NOTE — ED ADULT NURSE REASSESSMENT NOTE - NS ED NURSE REASSESS COMMENT FT1
Pt tearful, monitoring own BP (144/100 per pt report), took own Cardizem 180 mg at 1320 and states "I might have messed myself up."  BP and HR checked, pt reassured.  MD notified.

## 2019-05-03 NOTE — ED ADULT NURSE NOTE - NSIMPLEMENTINTERV_GEN_ALL_ED
Implemented All Universal Safety Interventions:  [place] to call system. Call bell, personal items and telephone within reach. Instruct patient to call for assistance. Room bathroom lighting operational. Non-slip footwear when patient is off stretcher. Physically safe environment: no spills, clutter or unnecessary equipment. Stretcher in lowest position, wheels locked, appropriate side rails in place.

## 2019-05-03 NOTE — ED PROVIDER NOTE - OBJECTIVE STATEMENT
44 y/o female with a PMHx of DVT left arm, protein S deficiency presents to the ED c/o chest pain, nausea and 3 episodes of bloody diarrhea. Pt reports BP at home of 80/60 with associated dizziness and near syncopal episode.

## 2019-05-03 NOTE — ED PROVIDER NOTE - PRO INTERPRETER NEED 2
Is This A New Presentation, Or A Follow-Up?: Skin Lesions
How Severe Is Your Skin Lesion?: mild
Have Your Skin Lesions Been Treated?: not been treated
English

## 2019-05-03 NOTE — ED ADULT NURSE REASSESSMENT NOTE - NS ED NURSE REASSESS COMMENT FT1
pt aaox4, pt verbalized that she "saw VTACH on the monitor, and her hr elevated to 130s while in the bathroom, and with low spO2,"  as per monitor tech, VTACh not noted, hr has been NSR, +artifacts noted from the monitor, verbalized the findings to patient multiple times, pt con't to be agitated about her hr not in VTACH, or elevated.  vss, afebrile, denies cp, sob, no s/sx of distress noted, pt ambulated well upon dc.

## 2019-05-03 NOTE — ED ADULT TRIAGE NOTE - CHIEF COMPLAINT QUOTE
pt c/o not feeling well , chest pain , SOB,  episodes on near syncopal episodes yesterday , hypotensive 80's systolic yesterday , HR fluctuating, tremors , RUE has swelling. Few episodes of diarrhea noted today . Pt has autonomic dysfunction , she is on Cardizem and ativan , she also has a left subclavian and internal jugular DVT on Xarelto x 4 months

## 2019-05-17 NOTE — ED ADULT TRIAGE NOTE - BMI (KG/M2)
How Severe Is Your Skin Lesion?: moderate Have Your Skin Lesions Been Treated?: not been treated Is This A New Presentation, Or A Follow-Up?: Growths 19.9

## 2019-05-21 ENCOUNTER — EMERGENCY (EMERGENCY)
Facility: HOSPITAL | Age: 44
LOS: 1 days | Discharge: HOME | End: 2019-05-21
Attending: EMERGENCY MEDICINE | Admitting: EMERGENCY MEDICINE
Payer: MEDICAID

## 2019-05-21 VITALS
HEIGHT: 62 IN | WEIGHT: 97 LBS | SYSTOLIC BLOOD PRESSURE: 136 MMHG | TEMPERATURE: 97 F | HEART RATE: 88 BPM | OXYGEN SATURATION: 100 % | RESPIRATION RATE: 16 BRPM | DIASTOLIC BLOOD PRESSURE: 94 MMHG

## 2019-05-21 VITALS
RESPIRATION RATE: 16 BRPM | SYSTOLIC BLOOD PRESSURE: 139 MMHG | DIASTOLIC BLOOD PRESSURE: 86 MMHG | HEART RATE: 77 BPM | TEMPERATURE: 98 F | OXYGEN SATURATION: 100 %

## 2019-05-21 DIAGNOSIS — Z90.49 ACQUIRED ABSENCE OF OTHER SPECIFIED PARTS OF DIGESTIVE TRACT: Chronic | ICD-10-CM

## 2019-05-21 LAB
ALBUMIN SERPL ELPH-MCNC: 3.8 G/DL — SIGNIFICANT CHANGE UP (ref 3.3–5)
ALP SERPL-CCNC: 79 U/L — SIGNIFICANT CHANGE UP (ref 30–120)
ALT FLD-CCNC: 22 U/L DA — SIGNIFICANT CHANGE UP (ref 10–60)
ANION GAP SERPL CALC-SCNC: 8 MMOL/L — SIGNIFICANT CHANGE UP (ref 5–17)
ANISOCYTOSIS BLD QL: SLIGHT — SIGNIFICANT CHANGE UP
APPEARANCE UR: CLEAR — SIGNIFICANT CHANGE UP
APTT BLD: 27.6 SEC — LOW (ref 28.5–37)
AST SERPL-CCNC: 20 U/L — SIGNIFICANT CHANGE UP (ref 10–40)
BASOPHILS # BLD AUTO: 0.01 K/UL — SIGNIFICANT CHANGE UP (ref 0–0.2)
BASOPHILS NFR BLD AUTO: 0.2 % — SIGNIFICANT CHANGE UP (ref 0–2)
BILIRUB SERPL-MCNC: 0.1 MG/DL — LOW (ref 0.2–1.2)
BILIRUB UR-MCNC: NEGATIVE — SIGNIFICANT CHANGE UP
BUN SERPL-MCNC: 8 MG/DL — SIGNIFICANT CHANGE UP (ref 7–23)
CALCIUM SERPL-MCNC: 8.6 MG/DL — SIGNIFICANT CHANGE UP (ref 8.4–10.5)
CHLORIDE SERPL-SCNC: 105 MMOL/L — SIGNIFICANT CHANGE UP (ref 96–108)
CO2 SERPL-SCNC: 28 MMOL/L — SIGNIFICANT CHANGE UP (ref 22–31)
COLOR SPEC: SIGNIFICANT CHANGE UP
CREAT SERPL-MCNC: 0.76 MG/DL — SIGNIFICANT CHANGE UP (ref 0.5–1.3)
D DIMER BLD IA.RAPID-MCNC: <150 NG/ML DDU — SIGNIFICANT CHANGE UP
DACRYOCYTES BLD QL SMEAR: SLIGHT — SIGNIFICANT CHANGE UP
DIFF PNL FLD: ABNORMAL
ELLIPTOCYTES BLD QL SMEAR: SLIGHT — SIGNIFICANT CHANGE UP
EOSINOPHIL # BLD AUTO: 0.01 K/UL — SIGNIFICANT CHANGE UP (ref 0–0.5)
EOSINOPHIL NFR BLD AUTO: 0.2 % — SIGNIFICANT CHANGE UP (ref 0–6)
EPI CELLS # UR: SIGNIFICANT CHANGE UP
GLUCOSE SERPL-MCNC: 105 MG/DL — HIGH (ref 70–99)
GLUCOSE UR QL: NEGATIVE MG/DL — SIGNIFICANT CHANGE UP
HCG SERPL-ACNC: <1 MIU/ML — SIGNIFICANT CHANGE UP
HCT VFR BLD CALC: 31.7 % — LOW (ref 34.5–45)
HGB BLD-MCNC: 9.6 G/DL — LOW (ref 11.5–15.5)
HYPERCHROMIA BLD QL AUTO: SLIGHT — SIGNIFICANT CHANGE UP
HYPOCHROMIA BLD QL: SLIGHT — SIGNIFICANT CHANGE UP
IMM GRANULOCYTES NFR BLD AUTO: 0.2 % — SIGNIFICANT CHANGE UP (ref 0–1.5)
INR BLD: 1.04 RATIO — SIGNIFICANT CHANGE UP (ref 0.88–1.16)
KETONES UR-MCNC: NEGATIVE — SIGNIFICANT CHANGE UP
LEUKOCYTE ESTERASE UR-ACNC: NEGATIVE — SIGNIFICANT CHANGE UP
LYMPHOCYTES # BLD AUTO: 1.3 K/UL — SIGNIFICANT CHANGE UP (ref 1–3.3)
LYMPHOCYTES # BLD AUTO: 27.3 % — SIGNIFICANT CHANGE UP (ref 13–44)
MANUAL SMEAR VERIFICATION: SIGNIFICANT CHANGE UP
MCHC RBC-ENTMCNC: 23.1 PG — LOW (ref 27–34)
MCHC RBC-ENTMCNC: 30.3 GM/DL — LOW (ref 32–36)
MCV RBC AUTO: 76.2 FL — LOW (ref 80–100)
MONOCYTES # BLD AUTO: 0.37 K/UL — SIGNIFICANT CHANGE UP (ref 0–0.9)
MONOCYTES NFR BLD AUTO: 7.8 % — SIGNIFICANT CHANGE UP (ref 2–14)
NEUTROPHILS # BLD AUTO: 3.07 K/UL — SIGNIFICANT CHANGE UP (ref 1.8–7.4)
NEUTROPHILS NFR BLD AUTO: 64.3 % — SIGNIFICANT CHANGE UP (ref 43–77)
NITRITE UR-MCNC: NEGATIVE — SIGNIFICANT CHANGE UP
NRBC # BLD: 0 /100 WBCS — SIGNIFICANT CHANGE UP (ref 0–0)
OVALOCYTES BLD QL SMEAR: SLIGHT — SIGNIFICANT CHANGE UP
PH UR: 7 — SIGNIFICANT CHANGE UP (ref 5–8)
PLAT MORPH BLD: NORMAL — SIGNIFICANT CHANGE UP
PLATELET # BLD AUTO: 90 K/UL — LOW (ref 150–400)
PLATELET COUNT - ESTIMATE: ABNORMAL
POLYCHROMASIA BLD QL SMEAR: SLIGHT — SIGNIFICANT CHANGE UP
POTASSIUM SERPL-MCNC: 3.8 MMOL/L — SIGNIFICANT CHANGE UP (ref 3.5–5.3)
POTASSIUM SERPL-SCNC: 3.8 MMOL/L — SIGNIFICANT CHANGE UP (ref 3.5–5.3)
PROT SERPL-MCNC: 6.9 G/DL — SIGNIFICANT CHANGE UP (ref 6–8.3)
PROT UR-MCNC: NEGATIVE MG/DL — SIGNIFICANT CHANGE UP
PROTHROM AB SERPL-ACNC: 11.3 SEC — SIGNIFICANT CHANGE UP (ref 10–12.9)
RBC # BLD: 4.16 M/UL — SIGNIFICANT CHANGE UP (ref 3.8–5.2)
RBC # FLD: 17.9 % — HIGH (ref 10.3–14.5)
RBC BLD AUTO: NORMAL — SIGNIFICANT CHANGE UP
RBC CASTS # UR COMP ASSIST: ABNORMAL /HPF (ref 0–4)
ROULEAUX BLD QL SMEAR: PRESENT
SODIUM SERPL-SCNC: 141 MMOL/L — SIGNIFICANT CHANGE UP (ref 135–145)
SP GR SPEC: 1 — LOW (ref 1.01–1.02)
TROPONIN I SERPL-MCNC: 0 NG/ML — LOW (ref 0.02–0.06)
UROBILINOGEN FLD QL: NEGATIVE MG/DL — SIGNIFICANT CHANGE UP
WBC # BLD: 4.77 K/UL — SIGNIFICANT CHANGE UP (ref 3.8–10.5)
WBC # FLD AUTO: 4.77 K/UL — SIGNIFICANT CHANGE UP (ref 3.8–10.5)

## 2019-05-21 PROCEDURE — 85027 COMPLETE CBC AUTOMATED: CPT

## 2019-05-21 PROCEDURE — 84484 ASSAY OF TROPONIN QUANT: CPT

## 2019-05-21 PROCEDURE — 36415 COLL VENOUS BLD VENIPUNCTURE: CPT

## 2019-05-21 PROCEDURE — 85610 PROTHROMBIN TIME: CPT

## 2019-05-21 PROCEDURE — 93970 EXTREMITY STUDY: CPT | Mod: 26

## 2019-05-21 PROCEDURE — 99284 EMERGENCY DEPT VISIT MOD MDM: CPT | Mod: 25

## 2019-05-21 PROCEDURE — 93005 ELECTROCARDIOGRAM TRACING: CPT

## 2019-05-21 PROCEDURE — 85730 THROMBOPLASTIN TIME PARTIAL: CPT

## 2019-05-21 PROCEDURE — 80053 COMPREHEN METABOLIC PANEL: CPT

## 2019-05-21 PROCEDURE — 93970 EXTREMITY STUDY: CPT

## 2019-05-21 PROCEDURE — 85379 FIBRIN DEGRADATION QUANT: CPT

## 2019-05-21 PROCEDURE — 84702 CHORIONIC GONADOTROPIN TEST: CPT

## 2019-05-21 PROCEDURE — 81001 URINALYSIS AUTO W/SCOPE: CPT

## 2019-05-21 PROCEDURE — 93010 ELECTROCARDIOGRAM REPORT: CPT

## 2019-05-21 PROCEDURE — 99284 EMERGENCY DEPT VISIT MOD MDM: CPT

## 2019-05-21 NOTE — ED ADULT NURSE NOTE - NSIMPLEMENTINTERV_GEN_ALL_ED
Implemented All Universal Safety Interventions:  [place] to call system. Call bell, personal items and telephone within reach. Instruct patient to call for assistance. Room bathroom lighting operational. Non-slip footwear when patient is off stretcher. Physically safe environment: no spills, clutter or unnecessary equipment. Stretcher in lowest position, wheels locked, appropriate side rails in place.

## 2019-05-21 NOTE — ED PROVIDER NOTE - OBJECTIVE STATEMENT
42 y/o F pt w/ PMHx Anxiety, Autonomic dysreflexia, Depression, DVT (deep venous thrombosis)  left internal jugular and subclavian veins, Dysautonomia, Dystonia, Gastroparesis, Headache, History of ITP, HTN, IBS (irritable bowel syndrome), Idiopathic thrombocytopenia purpura, Labyrinthitis, POTS (postural orthostatic tachycardia syndrome), Protein S deficiency presents to the ED c/o L arm pain, L sided CP and SOB x 3 days. Pt states she has a DVT in L neck area, is requesting doppler study and cardiac testing. Pt also endorses that she takes her blood pressure often and has noticed that her R arm tremors when she takes her blood pressure. Pt reports that she has had a heavy menstruation the past couple of days, is concerned because taking Xarelto for chronic DVT. Endorses she takes iron pills. Pt denies dizziness, n/v, cough, fever or any other complaints at this time.      PMD Dr Parker 44 y/o F pt w/ PMHx Anxiety, Autonomic dysreflexia, Depression, DVT (deep venous thrombosis)  left internal jugular and subclavian veins, Dysautonomia, Dystonia, Gastroparesis, Headache, History of ITP, HTN, IBS (irritable bowel syndrome), Idiopathic thrombocytopenia purpura, Labyrinthitis, POTS (postural orthostatic tachycardia syndrome), Protein S deficiency presents to the ED c/o L arm pain, L sided CP and SOB x 3 days. Pt states she has a DVT in L neck area, is requesting doppler study and cardiac testing. Pt also endorses that she takes her blood pressure often and has noticed that her R arm tremors when she takes her blood pressure. Pt reports that she has had a heavy menstruation the past couple of days, is concerned because taking Xarelto for chronic DVT. Endorses she takes iron pills. Pt denies dizziness, vomiting, cough, fever or any other complaints at this time.      PMD Dr Parker

## 2019-05-21 NOTE — ED ADULT TRIAGE NOTE - CHIEF COMPLAINT QUOTE
" I was having tremors right arm, started to have pain on my left side chest area, shortness of breath x 3 hrs "

## 2019-05-21 NOTE — ED ADULT NURSE NOTE - OBJECTIVE STATEMENT
Pt to ED with c/o L arm "shaking", L sided CP that starts under arm and goes across chest and SOB x 3 days. Pt states she has a DVT in L neck area ("where IJ meets subclavian"). pt requesting doppler study and cardiac testing. States she does not want an IV; wants a butterfly used for blood draw.  Educated pt on importance of IV insertion for possible need of medication administration.  Pt states she takes her blood pressure frequently and has noticed that her R arm tremors when doing so. States heavy menstruation the past couple of day and is concerned because of taking Xarelto for chronic DVT. Pt denies dizziness, n/v, cough, fever or any other complaints at this time.

## 2019-05-21 NOTE — ED ADULT NURSE NOTE - CHPI ED NUR SYMPTOMS NEG
no chills/no syncope/no vomiting/no fever/no dizziness/no back pain/no diaphoresis/no shortness of breath/no congestion

## 2019-05-21 NOTE — ED PROVIDER NOTE - CLINICAL SUMMARY MEDICAL DECISION MAKING FREE TEXT BOX
42 y/o F with hx of anxiety, repeated ER visits for various complaints. c/o arm pain and tremor today requesting cardiac testing and venous doppler as she has done in the past. PE normal. Pt extremely anxious and unsatisfied with medical explanations for her symptoms. Plan - labs, venous doppler of arms, EKG

## 2019-05-21 NOTE — ED PROVIDER NOTE - CHPI ED SYMPTOMS POS
SHORTNESS OF BREATH/PALPITATIONS/CHEST PAIN/tremor R arm PALPITATIONS/CHEST PAIN/tremor R arm, L arm pain/SHORTNESS OF BREATH

## 2019-05-23 ENCOUNTER — APPOINTMENT (OUTPATIENT)
Dept: HEMATOLOGY ONCOLOGY | Facility: CLINIC | Age: 44
End: 2019-05-23
Payer: MEDICAID

## 2019-05-23 ENCOUNTER — RESULT REVIEW (OUTPATIENT)
Age: 44
End: 2019-05-23

## 2019-05-23 ENCOUNTER — OUTPATIENT (OUTPATIENT)
Dept: OUTPATIENT SERVICES | Facility: HOSPITAL | Age: 44
LOS: 1 days | Discharge: HOME | End: 2019-05-23

## 2019-05-23 VITALS
BODY MASS INDEX: 18.07 KG/M2 | DIASTOLIC BLOOD PRESSURE: 90 MMHG | WEIGHT: 98.77 LBS | TEMPERATURE: 99 F | OXYGEN SATURATION: 97 % | HEART RATE: 87 BPM | RESPIRATION RATE: 16 BRPM | SYSTOLIC BLOOD PRESSURE: 135 MMHG

## 2019-05-23 DIAGNOSIS — D47.3 ESSENTIAL (HEMORRHAGIC) THROMBOCYTHEMIA: ICD-10-CM

## 2019-05-23 DIAGNOSIS — Z90.49 ACQUIRED ABSENCE OF OTHER SPECIFIED PARTS OF DIGESTIVE TRACT: Chronic | ICD-10-CM

## 2019-05-23 LAB
ALBUMIN SERPL ELPH-MCNC: 4.8 G/DL
ALP BLD-CCNC: 80 U/L
ALT SERPL-CCNC: 14 U/L
ANION GAP SERPL CALC-SCNC: 13 MMOL/L
AST SERPL-CCNC: 19 U/L
BASOPHILS # BLD AUTO: 0 K/UL — SIGNIFICANT CHANGE UP (ref 0–0.2)
BASOPHILS NFR BLD AUTO: 0.1 % — SIGNIFICANT CHANGE UP (ref 0–2)
BILIRUB SERPL-MCNC: 0.2 MG/DL
BUN SERPL-MCNC: 5 MG/DL
CALCIUM SERPL-MCNC: 9.4 MG/DL
CHLORIDE SERPL-SCNC: 103 MMOL/L
CO2 SERPL-SCNC: 25 MMOL/L
CREAT SERPL-MCNC: 0.77 MG/DL
EOSINOPHIL # BLD AUTO: 0 K/UL — SIGNIFICANT CHANGE UP (ref 0–0.5)
EOSINOPHIL NFR BLD AUTO: 0.4 % — SIGNIFICANT CHANGE UP (ref 0–6)
GLUCOSE SERPL-MCNC: 77 MG/DL
HCT VFR BLD CALC: 32.1 % — LOW (ref 34.5–45)
HGB BLD-MCNC: 10.7 G/DL — LOW (ref 11.5–15.5)
LDH SERPL-CCNC: 254 U/L
LYMPHOCYTES # BLD AUTO: 1.4 K/UL — SIGNIFICANT CHANGE UP (ref 1–3.3)
LYMPHOCYTES # BLD AUTO: 23.5 % — SIGNIFICANT CHANGE UP (ref 13–44)
MCHC RBC-ENTMCNC: 24.3 PG — LOW (ref 27–34)
MCHC RBC-ENTMCNC: 33.5 G/DL — SIGNIFICANT CHANGE UP (ref 32–36)
MCV RBC AUTO: 72.7 FL — LOW (ref 80–100)
MONOCYTES # BLD AUTO: 0.5 K/UL — SIGNIFICANT CHANGE UP (ref 0–0.9)
MONOCYTES NFR BLD AUTO: 8.2 % — SIGNIFICANT CHANGE UP (ref 2–14)
NEUTROPHILS # BLD AUTO: 4 K/UL — SIGNIFICANT CHANGE UP (ref 1.8–7.4)
NEUTROPHILS NFR BLD AUTO: 67.7 % — SIGNIFICANT CHANGE UP (ref 43–77)
PLATELET # BLD AUTO: 116 K/UL — SIGNIFICANT CHANGE UP (ref 150–400)
POTASSIUM SERPL-SCNC: 3.6 MMOL/L
PROT SERPL-MCNC: 7.1 G/DL
RBC # BLD: 4.41 M/UL — SIGNIFICANT CHANGE UP (ref 3.8–5.2)
RBC # FLD: 17 % — HIGH (ref 10.3–14.5)
SODIUM SERPL-SCNC: 141 MMOL/L
WBC # BLD: 5.9 K/UL — SIGNIFICANT CHANGE UP (ref 3.8–10.5)
WBC # FLD AUTO: 5.9 K/UL — SIGNIFICANT CHANGE UP (ref 3.8–10.5)

## 2019-05-23 PROCEDURE — 99214 OFFICE O/P EST MOD 30 MIN: CPT

## 2019-05-23 RX ORDER — LORAZEPAM 2 MG/1
2 TABLET ORAL
Refills: 0 | Status: ACTIVE | COMMUNITY

## 2019-05-23 RX ORDER — ALBUTEROL SULFATE 90 UG/1
108 (90 BASE) AEROSOL, METERED RESPIRATORY (INHALATION)
Qty: 18 | Refills: 0 | Status: DISCONTINUED | COMMUNITY
Start: 2018-02-26 | End: 2019-05-23

## 2019-05-23 RX ORDER — DILTIAZEM HYDROCHLORIDE 180 MG/1
180 CAPSULE, COATED, EXTENDED RELEASE ORAL
Refills: 0 | Status: DISCONTINUED | COMMUNITY

## 2019-05-28 ENCOUNTER — TRANSCRIPTION ENCOUNTER (OUTPATIENT)
Age: 44
End: 2019-05-28

## 2019-05-29 ENCOUNTER — EMERGENCY (EMERGENCY)
Facility: HOSPITAL | Age: 44
LOS: 0 days | Discharge: HOME | End: 2019-05-29
Attending: EMERGENCY MEDICINE | Admitting: EMERGENCY MEDICINE
Payer: MEDICAID

## 2019-05-29 VITALS
OXYGEN SATURATION: 100 % | RESPIRATION RATE: 17 BRPM | TEMPERATURE: 98 F | DIASTOLIC BLOOD PRESSURE: 92 MMHG | SYSTOLIC BLOOD PRESSURE: 138 MMHG

## 2019-05-29 VITALS
TEMPERATURE: 98 F | RESPIRATION RATE: 20 BRPM | SYSTOLIC BLOOD PRESSURE: 116 MMHG | OXYGEN SATURATION: 100 % | HEART RATE: 77 BPM | DIASTOLIC BLOOD PRESSURE: 72 MMHG

## 2019-05-29 VITALS
HEART RATE: 83 BPM | DIASTOLIC BLOOD PRESSURE: 97 MMHG | RESPIRATION RATE: 22 BRPM | TEMPERATURE: 98 F | OXYGEN SATURATION: 100 % | SYSTOLIC BLOOD PRESSURE: 136 MMHG

## 2019-05-29 VITALS — WEIGHT: 98.11 LBS | HEIGHT: 62 IN

## 2019-05-29 DIAGNOSIS — Z91.041 RADIOGRAPHIC DYE ALLERGY STATUS: ICD-10-CM

## 2019-05-29 DIAGNOSIS — F41.9 ANXIETY DISORDER, UNSPECIFIED: ICD-10-CM

## 2019-05-29 DIAGNOSIS — Z79.01 LONG TERM (CURRENT) USE OF ANTICOAGULANTS: ICD-10-CM

## 2019-05-29 DIAGNOSIS — Z80.42 FAMILY HISTORY OF MALIGNANT NEOPLASM OF PROSTATE: ICD-10-CM

## 2019-05-29 DIAGNOSIS — R10.9 UNSPECIFIED ABDOMINAL PAIN: ICD-10-CM

## 2019-05-29 DIAGNOSIS — I10 ESSENTIAL (PRIMARY) HYPERTENSION: ICD-10-CM

## 2019-05-29 DIAGNOSIS — I49.8 OTHER SPECIFIED CARDIAC ARRHYTHMIAS: ICD-10-CM

## 2019-05-29 DIAGNOSIS — R07.9 CHEST PAIN, UNSPECIFIED: ICD-10-CM

## 2019-05-29 DIAGNOSIS — F32.9 MAJOR DEPRESSIVE DISORDER, SINGLE EPISODE, UNSPECIFIED: ICD-10-CM

## 2019-05-29 DIAGNOSIS — Z90.49 ACQUIRED ABSENCE OF OTHER SPECIFIED PARTS OF DIGESTIVE TRACT: Chronic | ICD-10-CM

## 2019-05-29 DIAGNOSIS — F41.8 OTHER SPECIFIED ANXIETY DISORDERS: ICD-10-CM

## 2019-05-29 DIAGNOSIS — Z86.718 PERSONAL HISTORY OF OTHER VENOUS THROMBOSIS AND EMBOLISM: ICD-10-CM

## 2019-05-29 DIAGNOSIS — Z88.8 ALLERGY STATUS TO OTHER DRUGS, MEDICAMENTS AND BIOLOGICAL SUBSTANCES STATUS: ICD-10-CM

## 2019-05-29 DIAGNOSIS — Z87.19 PERSONAL HISTORY OF OTHER DISEASES OF THE DIGESTIVE SYSTEM: ICD-10-CM

## 2019-05-29 DIAGNOSIS — Z82.49 FAMILY HISTORY OF ISCHEMIC HEART DISEASE AND OTHER DISEASES OF THE CIRCULATORY SYSTEM: ICD-10-CM

## 2019-05-29 DIAGNOSIS — R55 SYNCOPE AND COLLAPSE: ICD-10-CM

## 2019-05-29 LAB
ALBUMIN SERPL ELPH-MCNC: 4.2 G/DL — SIGNIFICANT CHANGE UP (ref 3.3–5)
ALP SERPL-CCNC: 94 U/L — SIGNIFICANT CHANGE UP (ref 40–120)
ALT FLD-CCNC: 46 U/L — SIGNIFICANT CHANGE UP (ref 12–78)
ANION GAP SERPL CALC-SCNC: 4 MMOL/L — LOW (ref 5–17)
ANISOCYTOSIS BLD QL: SLIGHT — SIGNIFICANT CHANGE UP
APPEARANCE UR: CLEAR — SIGNIFICANT CHANGE UP
APTT BLD: 28 SEC — SIGNIFICANT CHANGE UP (ref 27.5–36.3)
AST SERPL-CCNC: 40 U/L — HIGH (ref 15–37)
BASOPHILS # BLD AUTO: 0.01 K/UL — SIGNIFICANT CHANGE UP (ref 0–0.2)
BASOPHILS NFR BLD AUTO: 0.2 % — SIGNIFICANT CHANGE UP (ref 0–2)
BILIRUB SERPL-MCNC: 0.2 MG/DL — SIGNIFICANT CHANGE UP (ref 0.2–1.2)
BILIRUB UR-MCNC: NEGATIVE — SIGNIFICANT CHANGE UP
BUN SERPL-MCNC: 5 MG/DL — LOW (ref 7–23)
CALCIUM SERPL-MCNC: 9 MG/DL — SIGNIFICANT CHANGE UP (ref 8.5–10.1)
CHLORIDE SERPL-SCNC: 105 MMOL/L — SIGNIFICANT CHANGE UP (ref 96–108)
CO2 SERPL-SCNC: 28 MMOL/L — SIGNIFICANT CHANGE UP (ref 22–31)
COLOR SPEC: YELLOW — SIGNIFICANT CHANGE UP
CREAT SERPL-MCNC: 0.84 MG/DL — SIGNIFICANT CHANGE UP (ref 0.5–1.3)
DACRYOCYTES BLD QL SMEAR: SLIGHT — SIGNIFICANT CHANGE UP
DIFF PNL FLD: NEGATIVE — SIGNIFICANT CHANGE UP
ELLIPTOCYTES BLD QL SMEAR: SLIGHT — SIGNIFICANT CHANGE UP
EOSINOPHIL # BLD AUTO: 0.02 K/UL — SIGNIFICANT CHANGE UP (ref 0–0.5)
EOSINOPHIL NFR BLD AUTO: 0.3 % — SIGNIFICANT CHANGE UP (ref 0–6)
GLUCOSE SERPL-MCNC: 119 MG/DL — HIGH (ref 70–99)
GLUCOSE UR QL: NEGATIVE MG/DL — SIGNIFICANT CHANGE UP
HCT VFR BLD CALC: 37.4 % — SIGNIFICANT CHANGE UP (ref 34.5–45)
HGB BLD-MCNC: 11.4 G/DL — LOW (ref 11.5–15.5)
IMM GRANULOCYTES NFR BLD AUTO: 0.2 % — SIGNIFICANT CHANGE UP (ref 0–1.5)
INR BLD: 1.03 RATIO — SIGNIFICANT CHANGE UP (ref 0.88–1.16)
KETONES UR-MCNC: NEGATIVE — SIGNIFICANT CHANGE UP
LEUKOCYTE ESTERASE UR-ACNC: NEGATIVE — SIGNIFICANT CHANGE UP
LG PLATELETS BLD QL AUTO: SIGNIFICANT CHANGE UP
LIDOCAIN IGE QN: 131 U/L — SIGNIFICANT CHANGE UP (ref 73–393)
LYMPHOCYTES # BLD AUTO: 1.3 K/UL — SIGNIFICANT CHANGE UP (ref 1–3.3)
LYMPHOCYTES # BLD AUTO: 21.4 % — SIGNIFICANT CHANGE UP (ref 13–44)
MAGNESIUM SERPL-MCNC: 2.1 MG/DL — SIGNIFICANT CHANGE UP (ref 1.6–2.6)
MANUAL SMEAR VERIFICATION: SIGNIFICANT CHANGE UP
MCHC RBC-ENTMCNC: 24 PG — LOW (ref 27–34)
MCHC RBC-ENTMCNC: 30.5 GM/DL — LOW (ref 32–36)
MCV RBC AUTO: 78.7 FL — LOW (ref 80–100)
MICROCYTES BLD QL: SLIGHT — SIGNIFICANT CHANGE UP
MONOCYTES # BLD AUTO: 0.32 K/UL — SIGNIFICANT CHANGE UP (ref 0–0.9)
MONOCYTES NFR BLD AUTO: 5.3 % — SIGNIFICANT CHANGE UP (ref 2–14)
NEUTROPHILS # BLD AUTO: 4.41 K/UL — SIGNIFICANT CHANGE UP (ref 1.8–7.4)
NEUTROPHILS NFR BLD AUTO: 72.6 % — SIGNIFICANT CHANGE UP (ref 43–77)
NITRITE UR-MCNC: NEGATIVE — SIGNIFICANT CHANGE UP
OVALOCYTES BLD QL SMEAR: SLIGHT — SIGNIFICANT CHANGE UP
PH UR: 7 — SIGNIFICANT CHANGE UP (ref 5–8)
PLAT MORPH BLD: ABNORMAL
PLATELET # BLD AUTO: 83 K/UL — LOW (ref 150–400)
POIKILOCYTOSIS BLD QL AUTO: SLIGHT — SIGNIFICANT CHANGE UP
POTASSIUM SERPL-MCNC: 3.9 MMOL/L — SIGNIFICANT CHANGE UP (ref 3.5–5.3)
POTASSIUM SERPL-SCNC: 3.9 MMOL/L — SIGNIFICANT CHANGE UP (ref 3.5–5.3)
PROT SERPL-MCNC: 7.7 GM/DL — SIGNIFICANT CHANGE UP (ref 6–8.3)
PROT UR-MCNC: NEGATIVE MG/DL — SIGNIFICANT CHANGE UP
PROTHROM AB SERPL-ACNC: 11.5 SEC — SIGNIFICANT CHANGE UP (ref 10–12.9)
RBC # BLD: 4.75 M/UL — SIGNIFICANT CHANGE UP (ref 3.8–5.2)
RBC # FLD: 19.9 % — HIGH (ref 10.3–14.5)
RBC BLD AUTO: ABNORMAL
SODIUM SERPL-SCNC: 137 MMOL/L — SIGNIFICANT CHANGE UP (ref 135–145)
SP GR SPEC: 1 — LOW (ref 1.01–1.02)
TROPONIN I SERPL-MCNC: <0.015 NG/ML — SIGNIFICANT CHANGE UP (ref 0.01–0.04)
TROPONIN I SERPL-MCNC: <0.015 NG/ML — SIGNIFICANT CHANGE UP (ref 0.01–0.04)
UROBILINOGEN FLD QL: NEGATIVE MG/DL — SIGNIFICANT CHANGE UP
WBC # BLD: 6.07 K/UL — SIGNIFICANT CHANGE UP (ref 3.8–10.5)
WBC # FLD AUTO: 6.07 K/UL — SIGNIFICANT CHANGE UP (ref 3.8–10.5)

## 2019-05-29 PROCEDURE — 74176 CT ABD & PELVIS W/O CONTRAST: CPT | Mod: 26

## 2019-05-29 PROCEDURE — 93010 ELECTROCARDIOGRAM REPORT: CPT

## 2019-05-29 PROCEDURE — 99284 EMERGENCY DEPT VISIT MOD MDM: CPT | Mod: 25

## 2019-05-29 PROCEDURE — 93010 ELECTROCARDIOGRAM REPORT: CPT | Mod: 76

## 2019-05-29 RX ORDER — SODIUM CHLORIDE 9 MG/ML
1000 INJECTION INTRAMUSCULAR; INTRAVENOUS; SUBCUTANEOUS ONCE
Refills: 0 | Status: COMPLETED | OUTPATIENT
Start: 2019-05-29 | End: 2019-05-29

## 2019-05-29 RX ADMIN — SODIUM CHLORIDE 1000 MILLILITER(S): 9 INJECTION INTRAMUSCULAR; INTRAVENOUS; SUBCUTANEOUS at 20:30

## 2019-05-29 RX ADMIN — SODIUM CHLORIDE 1000 MILLILITER(S): 9 INJECTION INTRAMUSCULAR; INTRAVENOUS; SUBCUTANEOUS at 19:30

## 2019-05-29 NOTE — ED ADULT TRIAGE NOTE - CHIEF COMPLAINT QUOTE
Pt presents to ED after just being discharged complaining of chest pain, dizziness after arriving home to [facility]. Pt crying is triage and requesting to be seen immediately.

## 2019-05-29 NOTE — ED PROVIDER NOTE - SKIN, MLM
Skin normal color for race, warm, dry and intact. No evidence of rash. areas of contusion legs, pelvic bone anteriorly, arms

## 2019-05-29 NOTE — ED ADULT NURSE NOTE - OBJECTIVE STATEMENT
Patient comes to ED for left abdominal pain, left arm pain. pt was D/C 3 hours ago. pt reports feeling fatigue and faint. pt reports using home portal O2 monitor where her hear rate is going from . pt reports elevated blood pressure.

## 2019-05-29 NOTE — ED ADULT NURSE REASSESSMENT NOTE - NS ED NURSE REASSESS COMMENT FT1
Received in stretcher, gown on. Alert & responsive. VSS.  No distress noted. Quietly resting. Will continue to monitor

## 2019-05-29 NOTE — ED ADULT NURSE NOTE - CHIEF COMPLAINT QUOTE
Pt presents to ED after just being discharged complaining of chest pain, dizziness after arriving home to [facility]. Pt crying is triage and requesting to be seen immediately.

## 2019-05-29 NOTE — ED PROVIDER NOTE - PROGRESS NOTE DETAILS
pt walking around ED, asking for water. Appears comfortbale. No explanation of Left side pain on labs, ct or ua. non obstructing right renal stone. explained resutls to pt. pt to f/u with her urologist. MD Timbo

## 2019-05-29 NOTE — ED ADULT NURSE NOTE - NSIMPLEMENTINTERV_GEN_ALL_ED
Implemented All Universal Safety Interventions:  [place] to call system. Call bell, personal items and telephone within reach. Instruct patient to call for assistance. Room bathroom lighting operational. Non-slip footwear when patient is off stretcher. Physically safe environment: no spills, clutter or unnecessary equipment. Stretcher in lowest position, wheels locked, appropriate side rails in place.

## 2019-05-29 NOTE — ED PROVIDER NOTE - OBJECTIVE STATEMENT
44 yo female from home with PMHx Anxiety, Depression, DVT of left internal jugular and subclavian veins dx 6 months ago on xarelto, pvcs on cardizem, IBS (irritable bowel syndrome), Labyrinthitis, POTS (postural orthostatic tachycardia syndrome), Protein S def, and kidney stones (  approx 2 months ago on right side urologist  , passed without intervention) presents with c/o left flank pain today.  feels "exactly like last stone."  no n/v. +freq of urination.  no fever. no chills. no diarrhea. c/o palpitations but thinks it is related to her pain.

## 2019-05-29 NOTE — ED ADULT TRIAGE NOTE - CHIEF COMPLAINT QUOTE
c/o left abd pain radiating to back x 2 days. Patient reports hx of kidney stones. Reports she has been experiencing "irregular heart beat" and DVT in neck area. EKG initiated.

## 2019-05-29 NOTE — ED ADULT NURSE REASSESSMENT NOTE - NS ED NURSE REASSESS COMMENT FT1
Discharged to home. Hep lock removed. No bleeding noted. VSS. Discharge instructions & paperwork discussed & given to pt. Pt verbalized understanding. Stable to discharge. Left ER ambulatory, steady gait noted

## 2019-05-29 NOTE — ED PROVIDER NOTE - CLINICAL SUMMARY MEDICAL DECISION MAKING FREE TEXT BOX
42 yo female non toxic appearing with multiple medical problems p/w concern for kidney stone secondary to left flank pain today. requestign ct scan only and ekg to "check on her pvcs." will do ct, ekg , ua. ReEval.

## 2019-05-29 NOTE — ED PROVIDER NOTE - OBJECTIVE STATEMENT
42 y/o female with a PMHx of Anxiety, Autonomic dysreflexia, Depression, DVT, Dysautonomia, Dystonia, Gastroparesis, ITP, HTN, IBS, POTS on Xanax and Diltiazem, Protein S deficiency, Idiopathic thrombocytopenic purpura h/o appendectomy presents to the ED s/p near syncopal event today. Pt was recently in HH today for palpitations and was DC. Pt state she after DC she had a near syncopal event. States she felt dizzy, developed generalized tremors and chest pain. Pain radiates down her left arm. Measured her Blood pressure to be 140/110, states her BP is usually 114/84. Reports she felt that if she moved her "legs would go out". States she took 2mg of Xanax without relief so came to ED. Previous Cardiologist: Dr. Reed. 44 y/o female with a PMHx of Anxiety, Autonomic dysreflexia, Depression, DVT, Dysautonomia, Dystonia, Gastroparesis, ITP, HTN, IBS, POTS on Xanax and Diltiazem, Protein S deficiency, Idiopathic thrombocytopenic purpura h/o appendectomy presents to the ED s/p near syncopal event today. Pt was recently in HH today for palpitations and was DC. Pt state she after DC she had a near syncopal event. States she felt dizzy, developed generalized tremors and chest pain. Pain radiates down her left arm. Measured her Blood pressure to be 140/110, states her BP is usually 140/84. Reports she felt that if she moved her "legs would go out". States she took 2mg of Xanax without relief so came to ED. Previous Cardiologist: Dr. Reed.

## 2019-05-30 LAB
CULTURE RESULTS: SIGNIFICANT CHANGE UP
SPECIMEN SOURCE: SIGNIFICANT CHANGE UP

## 2019-06-04 ENCOUNTER — APPOINTMENT (OUTPATIENT)
Dept: UROLOGY | Facility: CLINIC | Age: 44
End: 2019-06-04
Payer: MEDICAID

## 2019-06-04 VITALS
HEIGHT: 62 IN | HEART RATE: 89 BPM | WEIGHT: 98 LBS | RESPIRATION RATE: 18 BRPM | DIASTOLIC BLOOD PRESSURE: 85 MMHG | BODY MASS INDEX: 18.03 KG/M2 | SYSTOLIC BLOOD PRESSURE: 147 MMHG | TEMPERATURE: 98.4 F

## 2019-06-04 DIAGNOSIS — N20.0 CALCULUS OF KIDNEY: ICD-10-CM

## 2019-06-04 PROCEDURE — 99215 OFFICE O/P EST HI 40 MIN: CPT

## 2019-06-04 RX ORDER — PAROXETINE HYDROCHLORIDE 10 MG/1
10 TABLET, FILM COATED ORAL DAILY
Refills: 0 | Status: COMPLETED | COMMUNITY
End: 2019-06-04

## 2019-06-04 NOTE — REVIEW OF SYSTEMS
[Chills] : chills [Feeling Poorly] : feeling poorly [Feeling Tired] : feeling tired [Recent Weight Gain (___ Lbs)] : recent [unfilled] ~Ulb weight gain [Heart Rate Is Fast] : fast heart rate [Palpitations] : palpitations [Chest Pain] : chest pain [Shortness Of Breath] : shortness of breath [Cough] : cough [Wheezing] : wheezing [Abdominal Pain] : abdominal pain [Constipation] : constipation [Diarrhea] : diarrhea [Heartburn] : heartburn [Incontinence] : incontinence [Loss of interest] : loss of interest in sexual activity [Date of last menstrual period ____] : date of last menstrual period: [unfilled] [Abnormal menstrual period, if yes explain ___] : abnormal menstrual period [unfilled] [Seen by urologist before (Name)  ___] : Previously seen by a urologist: [unfilled] [History of kidney stones] : history of kidney stones [Strong urge to urinate] : strong urge to urinate [Wake up at night to urinate  How many times?  ___] : wakes up to urinate [unfilled] times during the night [Slow urine stream] : slow urine stream [Strain or push to urinate] : strain or push to urinate [Bladder pressure] : experiences bladder pressure [Leakage of urine with straining, coughing, laughing] : leakage of urine with straining, coughing, laughing [Bladder fullness after urinating] : bladder fullness after urinating [Interrupted urine stream] : interrupted urine stream [Itching] : itching [Limb Weakness] : limb weakness [Dizziness] : dizziness [Difficulty Walking] : difficulty walking [Anxiety] : anxiety [Muscle Weakness] : muscle weakness [Feelings Of Weakness] : feelings of weakness [Easy Bleeding] : a tendency for easy bleeding [Easy Bruising] : a tendency for easy bruising [Swollen Glands] : swollen glands [Negative] : Psychiatric [Eyesight Problems] : no eyesight problems [Loss Of Hearing] : no hearing loss [Nosebleeds] : no nosebleeds [Dysuria] : no dysuria [Abn Vaginal Bleeding] : no unexplained vaginal bleeding [Vaginal Discharge] : no vaginal discharge [Joint Pain] : no joint pain [Convulsions] : no convulsions [Hot Flashes] : no hot flashes

## 2019-06-04 NOTE — PHYSICAL EXAM
[General Appearance - Well Developed] : well developed [General Appearance - Well Nourished] : well nourished [Normal Appearance] : normal appearance [Well Groomed] : well groomed [General Appearance - In No Acute Distress] : no acute distress [Abdomen Soft] : soft [Costovertebral Angle Tenderness] : no ~M costovertebral angle tenderness [Abdomen Tenderness] : non-tender [Skin Turgor] : supple [Skin Color & Pigmentation] : normal skin color and pigmentation [Edema] : no peripheral edema [] : no respiratory distress [Respiration, Rhythm And Depth] : normal respiratory rhythm and effort [Affect] : the affect was normal [Exaggerated Use Of Accessory Muscles For Inspiration] : no accessory muscle use [Oriented To Time, Place, And Person] : oriented to person, place, and time [Not Anxious] : not anxious [Mood] : the mood was normal [Normal Station and Gait] : the gait and station were normal for the patient's age [No Palpable Adenopathy] : no palpable adenopathy [No Focal Deficits] : no focal deficits [Cervical Lymph Nodes Enlarged Posterior Bilaterally] : posterior cervical [Cervical Lymph Nodes Enlarged Anterior Bilaterally] : anterior cervical [Supraclavicular Lymph Nodes Enlarged Bilaterally] : supraclavicular [Heart Rate And Rhythm] : Heart rate and rhythm were normal [FreeTextEntry1] : No submandibular gland tenderness.

## 2019-06-04 NOTE — ASSESSMENT
[FreeTextEntry1] : Ms. Presley is a 43 year old female presenting with kidney stones. Denies prior personal history of kidney stones. Family history of kidney stones, brother. Patient was found to have a 2 mm stone by US. Notes that she may have passed her stone a couple of weeks ago, as it did not show up on her most recent CT scan, as the findings showed no hydronephrosis or stones. The patient still feels abdominal and lower back pain. Drinks 2 quarts of water with Gatorade daily. Her urination is adequate. Complains of urinary frequency, urgency and urge incontinence. Denies dysuria and gross hematuria. Patient has a loss of appetite. Notes that she eats tofu and potatoes everyday, as that is the only thing that her body can process. Has ITP and Dysautonomia. Has Early Sjogren's. Had an appendectomy where she had a reaction to the anesthesia. Had a DVT of the left jugular. Doesn't have children. No pregnancies. Patient is medically disabled.\par \par 6/4/19: Patient presents today for a follow up. Since her last visit she reports that a CT scan has been competed. It is reported that the first stone present had been past but that another 2 mm stone was present. The stone is not causing blockage. Additionally she reports noticing blood in her stool and going to [facility]. More testing was done and it was confirmed that blood was in her stool and therefore she was referred to a hematologist. The 24 hour urine collection was completed. She inquires about having a renal ultrasound completed to monitor her kidney stone. \par \par It is advised at this time that she continue to drink a lot of water. We discussed that she begin squeezing lemon juice in the water consumed everyday due to a low citrate level. If the lemon begins to upset her stomach then she is to discontinue it. \par \par After a month of drinking lemon water it is advised that another 24 hour urine collection be completed. This will help determine if her citrate level has increased at that time. \par \par Her magnesium level is also low and it is advised that she can begin to take a magnesium supplement. \par \par A renal ultrasound is to be completed following her visit today. \par \par The patient will follow up once the ultrasound is completed.

## 2019-06-04 NOTE — ADDENDUM
[FreeTextEntry1] : This note was authored by Rolando Cordoba working as scribe for Dr. Rodo Crisostomo. I, Dr. Rodo Crisostomo, have reviewed the content of this note and confirm it is true and accurate. I personally performed the history and physical examination and made all the decisions.\par 6/4/19

## 2019-06-05 ENCOUNTER — OTHER (OUTPATIENT)
Age: 44
End: 2019-06-05

## 2019-06-06 ENCOUNTER — EMERGENCY (EMERGENCY)
Facility: HOSPITAL | Age: 44
LOS: 1 days | Discharge: HOME | End: 2019-06-06
Attending: EMERGENCY MEDICINE
Payer: MEDICAID

## 2019-06-06 ENCOUNTER — APPOINTMENT (OUTPATIENT)
Dept: GASTROENTEROLOGY | Facility: CLINIC | Age: 44
End: 2019-06-06
Payer: MEDICAID

## 2019-06-06 VITALS
DIASTOLIC BLOOD PRESSURE: 90 MMHG | TEMPERATURE: 98 F | SYSTOLIC BLOOD PRESSURE: 131 MMHG | OXYGEN SATURATION: 100 % | RESPIRATION RATE: 18 BRPM | HEART RATE: 84 BPM

## 2019-06-06 VITALS
WEIGHT: 98.31 LBS | BODY MASS INDEX: 18.09 KG/M2 | TEMPERATURE: 98.6 F | HEIGHT: 62 IN | HEART RATE: 76 BPM | DIASTOLIC BLOOD PRESSURE: 79 MMHG | SYSTOLIC BLOOD PRESSURE: 115 MMHG | OXYGEN SATURATION: 100 %

## 2019-06-06 VITALS
DIASTOLIC BLOOD PRESSURE: 89 MMHG | TEMPERATURE: 98 F | HEIGHT: 62 IN | SYSTOLIC BLOOD PRESSURE: 133 MMHG | OXYGEN SATURATION: 98 % | WEIGHT: 98.55 LBS | RESPIRATION RATE: 17 BRPM | HEART RATE: 89 BPM

## 2019-06-06 DIAGNOSIS — R19.4 CHANGE IN BOWEL HABIT: ICD-10-CM

## 2019-06-06 DIAGNOSIS — R63.4 ABNORMAL WEIGHT LOSS: ICD-10-CM

## 2019-06-06 DIAGNOSIS — R00.0 TACHYCARDIA, UNSPECIFIED: ICD-10-CM

## 2019-06-06 DIAGNOSIS — Z90.49 ACQUIRED ABSENCE OF OTHER SPECIFIED PARTS OF DIGESTIVE TRACT: Chronic | ICD-10-CM

## 2019-06-06 DIAGNOSIS — I95.1 TACHYCARDIA, UNSPECIFIED: ICD-10-CM

## 2019-06-06 LAB
ALBUMIN SERPL ELPH-MCNC: 4.4 G/DL — SIGNIFICANT CHANGE UP (ref 3.3–5)
ALP SERPL-CCNC: 78 U/L — SIGNIFICANT CHANGE UP (ref 40–120)
ALT FLD-CCNC: 15 U/L — SIGNIFICANT CHANGE UP (ref 10–45)
ANION GAP SERPL CALC-SCNC: 13 MMOL/L — SIGNIFICANT CHANGE UP (ref 5–17)
APTT BLD: 28.8 SEC — SIGNIFICANT CHANGE UP (ref 27.5–36.3)
AST SERPL-CCNC: 20 U/L — SIGNIFICANT CHANGE UP (ref 10–40)
BASOPHILS # BLD AUTO: 0 K/UL — SIGNIFICANT CHANGE UP (ref 0–0.2)
BASOPHILS NFR BLD AUTO: 0.1 % — SIGNIFICANT CHANGE UP (ref 0–2)
BILIRUB SERPL-MCNC: 0.3 MG/DL — SIGNIFICANT CHANGE UP (ref 0.2–1.2)
BUN SERPL-MCNC: 4 MG/DL — LOW (ref 7–23)
CALCIUM SERPL-MCNC: 9.3 MG/DL — SIGNIFICANT CHANGE UP (ref 8.4–10.5)
CHLORIDE SERPL-SCNC: 103 MMOL/L — SIGNIFICANT CHANGE UP (ref 96–108)
CO2 SERPL-SCNC: 24 MMOL/L — SIGNIFICANT CHANGE UP (ref 22–31)
CREAT SERPL-MCNC: 0.82 MG/DL — SIGNIFICANT CHANGE UP (ref 0.5–1.3)
EOSINOPHIL # BLD AUTO: 0 K/UL — SIGNIFICANT CHANGE UP (ref 0–0.5)
EOSINOPHIL NFR BLD AUTO: 0.2 % — SIGNIFICANT CHANGE UP (ref 0–6)
GLUCOSE SERPL-MCNC: 97 MG/DL — SIGNIFICANT CHANGE UP (ref 70–99)
HCT VFR BLD CALC: 33.6 % — LOW (ref 34.5–45)
HGB BLD-MCNC: 11 G/DL — LOW (ref 11.5–15.5)
INR BLD: 1.13 RATIO — SIGNIFICANT CHANGE UP (ref 0.88–1.16)
LYMPHOCYTES # BLD AUTO: 1.9 K/UL — SIGNIFICANT CHANGE UP (ref 1–3.3)
LYMPHOCYTES # BLD AUTO: 24.9 % — SIGNIFICANT CHANGE UP (ref 13–44)
MCHC RBC-ENTMCNC: 25.4 PG — LOW (ref 27–34)
MCHC RBC-ENTMCNC: 32.8 GM/DL — SIGNIFICANT CHANGE UP (ref 32–36)
MCV RBC AUTO: 77.6 FL — LOW (ref 80–100)
MONOCYTES # BLD AUTO: 0.6 K/UL — SIGNIFICANT CHANGE UP (ref 0–0.9)
MONOCYTES NFR BLD AUTO: 7.2 % — SIGNIFICANT CHANGE UP (ref 2–14)
NEUTROPHILS # BLD AUTO: 5.2 K/UL — SIGNIFICANT CHANGE UP (ref 1.8–7.4)
NEUTROPHILS NFR BLD AUTO: 67.5 % — SIGNIFICANT CHANGE UP (ref 43–77)
OB PNL STL: POSITIVE
PLATELET # BLD AUTO: 65 K/UL — LOW (ref 150–400)
POTASSIUM SERPL-MCNC: 3.9 MMOL/L — SIGNIFICANT CHANGE UP (ref 3.5–5.3)
POTASSIUM SERPL-SCNC: 3.9 MMOL/L — SIGNIFICANT CHANGE UP (ref 3.5–5.3)
PROT SERPL-MCNC: 6.8 G/DL — SIGNIFICANT CHANGE UP (ref 6–8.3)
PROTHROM AB SERPL-ACNC: 13.1 SEC — HIGH (ref 10–12.9)
RBC # BLD: 4.32 M/UL — SIGNIFICANT CHANGE UP (ref 3.8–5.2)
RBC # FLD: 18.6 % — HIGH (ref 10.3–14.5)
SODIUM SERPL-SCNC: 140 MMOL/L — SIGNIFICANT CHANGE UP (ref 135–145)
TROPONIN T, HIGH SENSITIVITY RESULT: <6 NG/L — SIGNIFICANT CHANGE UP (ref 0–51)
WBC # BLD: 8.2 K/UL — SIGNIFICANT CHANGE UP (ref 3.8–10.5)
WBC # FLD AUTO: 8.2 K/UL — SIGNIFICANT CHANGE UP (ref 3.8–10.5)

## 2019-06-06 PROCEDURE — 93010 ELECTROCARDIOGRAM REPORT: CPT

## 2019-06-06 PROCEDURE — 93308 TTE F-UP OR LMTD: CPT | Mod: 26

## 2019-06-06 PROCEDURE — 99215 OFFICE O/P EST HI 40 MIN: CPT

## 2019-06-06 PROCEDURE — 85610 PROTHROMBIN TIME: CPT

## 2019-06-06 PROCEDURE — 82272 OCCULT BLD FECES 1-3 TESTS: CPT

## 2019-06-06 PROCEDURE — 80053 COMPREHEN METABOLIC PANEL: CPT

## 2019-06-06 PROCEDURE — 71046 X-RAY EXAM CHEST 2 VIEWS: CPT | Mod: 26

## 2019-06-06 PROCEDURE — 93308 TTE F-UP OR LMTD: CPT

## 2019-06-06 PROCEDURE — 85027 COMPLETE CBC AUTOMATED: CPT

## 2019-06-06 PROCEDURE — 71046 X-RAY EXAM CHEST 2 VIEWS: CPT

## 2019-06-06 PROCEDURE — 83880 ASSAY OF NATRIURETIC PEPTIDE: CPT

## 2019-06-06 PROCEDURE — 85730 THROMBOPLASTIN TIME PARTIAL: CPT

## 2019-06-06 PROCEDURE — 84484 ASSAY OF TROPONIN QUANT: CPT

## 2019-06-06 PROCEDURE — 93005 ELECTROCARDIOGRAM TRACING: CPT

## 2019-06-06 PROCEDURE — 99284 EMERGENCY DEPT VISIT MOD MDM: CPT | Mod: 25

## 2019-06-06 PROCEDURE — 99285 EMERGENCY DEPT VISIT HI MDM: CPT | Mod: 25

## 2019-06-06 NOTE — ED ADULT NURSE NOTE - DISCHARGE DATE/TIME
Thank you for your patience today.  Please follow-up with your regular doctor or ob/gyn in the next 2-3 days for further evaluation and follow-up care.  Please call to schedule an appointment.  Please continue your own medications.  Please keep your legs elevated while at rest. Please return to the ER if you develop recurrent chest pain, shortness of breath, fainting episodes, or any worsening of your current symptoms.  It was a pleasure taking care of you today.  We hope you feel better soon.     06-Jun-2019 13:30

## 2019-06-06 NOTE — ED PROVIDER NOTE - NSFOLLOWUPCLINICS_GEN_ALL_ED_FT
[facility]  Gastroenterology  [address] [place] [id number]  Phone: [phone]  Fax:   Follow Up Time: 1-3 Days

## 2019-06-06 NOTE — ED ADULT TRIAGE NOTE - CHIEF COMPLAINT QUOTE
pt c/o R cp that radiates across sternum to L shoulder and down LUE associated with abdominal pain and melena

## 2019-06-06 NOTE — ED PROVIDER NOTE - OBJECTIVE STATEMENT
44yo F with extensive PMH including anxiety, POTS, dysautonomia, protein S deficiency (on xarelto), known L subclavian DVT, presenting with CP and SOB x 2 days. CP radiates from R side of chest to L. Pt reports chronic CP but reports this is different because it is now radiating down L arm. Also reports diffuse abd pain for which she was in [place] ED for last week (unremarkable CT a/p and also negative trops at this time). Pt also reporting blood in stool and requesting guaiac be sent. Pt saw PCP this morning and had blood work done. Denies any fever/chills, cough, vomiting, diarrhea, urinary symptoms, recent travel, LE pain/swelling.  PMD Dr. Cristhian Parker 42yo F with extensive PMH including anxiety, POTS, dysautonomia, protein S deficiency (on xarelto), known L subclavian DVT, presenting with CP and SOB x 2 days. CP radiates from R side of chest to L. Pt reports chronic CP but reports this is different because it is now radiating down L arm. Also reports diffuse abd pain for which she was seen in [place] ED for last week (unremarkable CT a/p and also negative trops at this time). Pt also reporting blood in stool and requesting guaiac be sent. Pt saw PCP this morning and had blood work done. Denies any fever/chills, cough, vomiting, diarrhea, urinary symptoms, recent travel, LE pain/swelling.  PMD Dr. Cristhian Parker 42yo F with extensive PMH including anxiety, POTS, dysautonomia, protein S deficiency (on xarelto), known L subclavian DVT, ITP, anemia (takes iron), presenting with CP and SOB x 2 days. CP radiates from R side of chest to L. Pt reports chronic CP but reports this is different because it is now radiating down L arm. Also reports diffuse abd pain for which she was seen in [place] ED for last week (unremarkable CT a/p and also negative trops at this time). Pt also reporting blood in stool and requesting guaiac be sent. Pt saw PCP this morning and had blood work done. Denies any fever/chills, cough, vomiting, diarrhea, urinary symptoms, recent travel, LE pain/swelling.  PMD Dr. Cristhian Parker 42yo F with extensive PMH including anxiety, POTS, dysautonomia, protein S deficiency (on xarelto), known L subclavian DVT, ITP, anemia (takes iron), presenting with CP and SOB x 2 days. CP radiates from R side of chest to L. Pt reports chronic CP but reports this is different because it is now radiating down L arm. Also reports diffuse abd pain for which she was seen in [place] ED for last week (unremarkable CT a/p and also negative trops at this time). Pt also reporting blood in stool (reports not dark) and requesting guaiac be sent. Pt saw PCP this morning and had blood work done. Denies any fever/chills, cough, vomiting, diarrhea, urinary symptoms, recent travel, LE pain/swelling.  PMD Dr. Cristhian Parker

## 2019-06-06 NOTE — ED ADULT NURSE NOTE - NSIMPLEMENTINTERV_GEN_ALL_ED
Implemented All Universal Safety Interventions:  [place] to call system. Call bell, personal items and telephone within reach. Instruct patient to call for assistance. Room bathroom lighting operational. Non-slip footwear when patient is off stretcher. Physically safe environment: no spills, clutter or unnecessary equipment. Stretcher in lowest position, wheels locked, appropriate side rails in place.

## 2019-06-06 NOTE — ED PROVIDER NOTE - NSFOLLOWUPINSTRUCTIONS_ED_ALL_ED_FT
Continue your current medication regimen.  Follow up with your Primary Care Provider Dr. Parker, Hematologist, and Gastroenterologist upon discharge as discussed. Information provided.   Bring a copy of your test results (attached along with your discharge paperwork) with you to your appointment for further discussion, evaluation, and comparison with your prior results.  Please return to the Emergency Department immediately for any new, worsening, or concerning symptoms including new/worsening chest pain, persistent blood in stool, dizziness, new/worsening shortness of breath, increased bleeding/bruising, or any other concerning symptoms. Continue your current medication regimen.  Follow up with your Primary Care Provider Dr. Parker, Hematologist (regarding low platelets), and Gastroenterologist (regarding blood in stool) upon discharge as discussed. Information provided.   Bring a copy of your test results (attached along with your discharge paperwork) with you to your appointment for further discussion, evaluation, and comparison with your prior results.  Please return to the Emergency Department immediately for any new, worsening, or concerning symptoms including new/worsening chest pain, persistent blood in stool, dizziness, new/worsening shortness of breath, increased bleeding/bruising, or any other concerning symptoms.

## 2019-06-06 NOTE — ED PROVIDER NOTE - CLINICAL SUMMARY MEDICAL DECISION MAKING FREE TEXT BOX
mikel - liz depression anxiety  prot s def, upper ext dvt on xeralta with chronic cp and sob - now reportedly worse x several days no leg swelling - compliant w anticoag - seen at  for cp and abd pain last week trop neg, ct abd neg -- ekg no interval change - hemodynamic stable - pocs to eval for strain -- offered vq as allergic to iv dye - will send trop r/o strain r/o acs - and nick sommer if all neg to [facility]

## 2019-06-06 NOTE — ED PROVIDER NOTE - CARE PLAN
Principal Discharge DX:	Chest pain at rest Principal Discharge DX:	Chest pain at rest  Secondary Diagnosis:	Thrombocytopenia

## 2019-06-06 NOTE — ED PROVIDER NOTE - PROGRESS NOTE DETAILS
pt requesting ddimer - doesn't want radiation of vq scan - allergic to iv dye makes cta impossible - pt already with reported upperext dvt on xeralto - no diimer to be sent as will not  -- no hemodynamic instability will pocus to eval for heart straqin and send bomarkers - Dr. Keith castro. - Lorena Diaz PA-C Spoke with Dr. Parker regarding patient's ED course (pending differential and platelet results). Agrees with plan, will f/u with patient in office, and reports he has already recommended that patient f/u with GI. - Lorena Diaz PA-C Platelet count noted to be declining. Pt reports previous h/o of PLT 50-60s for 19 years, reports increased in count to low 100s due to illness. Previously followed with Dr. Attila Briones but reports she does not want to follow with him anymore. Has upcoming appointment with new [facility] onc affil with [place] Dr. Jimbo Brown. [facility] fellow paged. - BIJU LiuC Spoke with Dr. White, Spoke with [facility] fellow Dr. White who spoke with his team, do not think this is an ITP flare based on lab results and no acute ITP intervention recommended. Would not recommend holding xarelto for platelets > 50. - Lorena Diaz PA-C Spoke with [facility] fellow Dr. White who spoke with his team, do not think this is an ITP flare based on lab results and no acute ITP intervention recommended. Would not recommend holding xarelto for platelets > 50. All results d/w patient and patient understands follow up instructions. - Lorena Diaz PA-C

## 2019-06-06 NOTE — ED ADULT NURSE NOTE - OBJECTIVE STATEMENT
42 y/o female on xeralta for upper arm DVT, pt  with chronic chest pain and SOB.  Pt reports it's now getting worse for several days.  Pt reports she was seen at [facility] for chest pain and abd pain last week,  troponin neg, ct abd neg.  No leg swelling.  No acute respiratory distress noted. 44 y/o female on xeralta for upper arm DVT, pt  with chronic chest pain and SOB.  Pt reports it's now getting worse for several days.  Report chest pain radiates to left upper back/ shoulder and down the left arm.  Pt reports she was seen at [facility] for chest pain and abd pain last week,  troponin neg, ct abd neg.  Pt denies cough, fever, chills, vomiting, diarrhea. No leg swelling.  No acute respiratory distress noted. 42 y/o female on xeralta for upper arm DVT, pt  with chronic chest pain and SOB.  Pt reports it's now getting worse for several days.  Report chest pain radiates to left upper back/ shoulder and down the left arm.   Pt also reports blood in stool.  Pt reports she was seen at [facility] for chest pain and abd pain last week,  troponin neg, ct abd neg.  Pt denies cough, fever, chills, vomiting, diarrhea. No leg swelling.  No acute respiratory distress noted.

## 2019-06-06 NOTE — CHART NOTE - NSCHARTNOTEFT_GEN_A_CORE
Assisting :  As per medical team patient is medically cleared for discharge. Patient requires transportation home. Patient with MEDICAID  [id number] insurance benefits. LMSW contacted [facility] [phone] and arranged transportation services. Reservation #[id number]. LMSW awaiting confirmation of [employer].  LMSW informed bedside RN of above. Social Work will continue to remain available and collaborate with interdisciplinary team. Assisting :  As per medical team patient is medically cleared for discharge. Patient requires transportation home. Patient with MEDICAID  [id number] insurance benefits. LMSW contacted [facility] [phone] and arranged transportation services. Reservation #[id number]. LMSW awaiting confirmation of [employer].  LMSW informed bedside RN of above. Social Work will continue to remain available and collaborate with interdisciplinary team.    Patient's discharge arranged for 4pm via [employer].

## 2019-06-07 ENCOUNTER — RESULT REVIEW (OUTPATIENT)
Age: 44
End: 2019-06-07

## 2019-06-07 ENCOUNTER — APPOINTMENT (OUTPATIENT)
Dept: HEMATOLOGY ONCOLOGY | Facility: CLINIC | Age: 44
End: 2019-06-07
Payer: MEDICAID

## 2019-06-07 VITALS
BODY MASS INDEX: 17.98 KG/M2 | HEART RATE: 80 BPM | SYSTOLIC BLOOD PRESSURE: 130 MMHG | RESPIRATION RATE: 16 BRPM | WEIGHT: 98.3 LBS | OXYGEN SATURATION: 100 % | TEMPERATURE: 98.8 F | DIASTOLIC BLOOD PRESSURE: 89 MMHG

## 2019-06-07 LAB
BASOPHILS # BLD AUTO: 0 K/UL — SIGNIFICANT CHANGE UP (ref 0–0.2)
BASOPHILS NFR BLD AUTO: 0.6 % — SIGNIFICANT CHANGE UP (ref 0–2)
EOSINOPHIL # BLD AUTO: 0 K/UL — SIGNIFICANT CHANGE UP (ref 0–0.5)
EOSINOPHIL NFR BLD AUTO: 0.6 % — SIGNIFICANT CHANGE UP (ref 0–6)
HCT VFR BLD CALC: 35.2 % — SIGNIFICANT CHANGE UP (ref 34.5–45)
HGB BLD-MCNC: 11.7 G/DL — SIGNIFICANT CHANGE UP (ref 11.5–15.5)
LYMPHOCYTES # BLD AUTO: 1.4 K/UL — SIGNIFICANT CHANGE UP (ref 1–3.3)
LYMPHOCYTES # BLD AUTO: 23 % — SIGNIFICANT CHANGE UP (ref 13–44)
MCHC RBC-ENTMCNC: 25.1 PG — LOW (ref 27–34)
MCHC RBC-ENTMCNC: 33.1 G/DL — SIGNIFICANT CHANGE UP (ref 32–36)
MCV RBC AUTO: 75.7 FL — LOW (ref 80–100)
MONOCYTES # BLD AUTO: 0.5 K/UL — SIGNIFICANT CHANGE UP (ref 0–0.9)
MONOCYTES NFR BLD AUTO: 8.3 % — SIGNIFICANT CHANGE UP (ref 2–14)
NEUTROPHILS # BLD AUTO: 4.2 K/UL — SIGNIFICANT CHANGE UP (ref 1.8–7.4)
NEUTROPHILS NFR BLD AUTO: 67.5 % — SIGNIFICANT CHANGE UP (ref 43–77)
OB PNL STL: NEGATIVE — SIGNIFICANT CHANGE UP
PLATELET # BLD AUTO: 65 K/UL — LOW (ref 150–400)
RBC # BLD: 4.65 M/UL — SIGNIFICANT CHANGE UP (ref 3.8–5.2)
RBC # FLD: 18.5 % — HIGH (ref 10.3–14.5)
WBC # BLD: 6.3 K/UL — SIGNIFICANT CHANGE UP (ref 3.8–10.5)
WBC # FLD AUTO: 6.3 K/UL — SIGNIFICANT CHANGE UP (ref 3.8–10.5)

## 2019-06-07 PROCEDURE — 99214 OFFICE O/P EST MOD 30 MIN: CPT

## 2019-06-07 NOTE — RESULTS/DATA
[FreeTextEntry1] : WBC 6.300 Hgb 11.7 Hct 35.2 Platelets 65,000 MCV 75.7 Diff normal\par \par 6/7/19\par EKG, Normal\par \par \par 6/7/19\par Rectal exam, Guaiac stool test, negative

## 2019-06-07 NOTE — ASSESSMENT
[Palliative Care Plan] : not applicable at this time [FreeTextEntry1] : 42 YO F with chronic ITP with recent post operative thrombosis of her left internal jugular.  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 This is a hereditary condition. I have counseled her regarding it and the increased risk of venous thrombosis associated with it. The need for lifelong anticoagulation was reviewed. Appropriate precautions to take to decrease recurrence were also reviewed including for travel and surgery. \par \par She is Fe deficient and anemic, but this is stable since May 2018. She is taking the iron pills every other day, and h/h seems to be responding. Platelet counts lower, but stable. Her EKG is normal today. Possible  explanation to her chest pain might be her anxiety, which she was urged to f/u with psych. Her rectal guaiaci test negative today. She will f/u with GI.  Told the pt to reduce her Xarelto to 10 mg. \par \par \par Suggest :\par  Xarelto 10 mg daily, script send to her pharmacy \par D/C Xarelto if  plts < 50,000 \par Encouraged pt to take Fe every day if possible\par Psychiatric f/u\par F/U with all her consultants - GI, Neuro, Uro, Rheum, Medicine\par RTC 2 weeks  \par

## 2019-06-07 NOTE — HISTORY OF PRESENT ILLNESS
[de-identified] : Idiopathic thrombocytopenic purpura\par 11/18 Left IJ thrombosis\par Protein S deficiency \par \par  [de-identified] : Pt is very anxious.  C/O  chest pain, and dizziness about an hr ago. She went to [facility] ER yesterday for chest pain, Chest  x-ray, EKG, and Echo was normal. She found  her platelet's numbers are lower, there fore wanted to be seen today for repeat. She stated she had positive guaiaci test in ER as well. She contacted her GI yesterday and will have endoscopy/colonoscopy as soon as her counts stable.  She was at [facility] last Thursday due to abdominal pain and positive guaiaci test. CT scan showed no acute disease, possible  kidney stone and calcified adnexal cyst right. Saw the urologist as a f/u will do sonogram of kidney's next week. She went to [facility] Sunday,  to r/o DVT in her left arm. Doppler was negative.  Stated she is taking her iron pills every day.  She does not have menses.  LMP 5/18. Notices "yellow" color of her palm and around her eyes since this morning.  She  Is visiting ERs frequently for numerous concerns. She is taking iron pill every other day.

## 2019-06-07 NOTE — PHYSICAL EXAM
[Ambulatory and capable of all self care but unable to carry out any work activities] : Status 2- Ambulatory and capable of all self care but unable to carry out any work activities. Up and about more than 50% of waking hours [Thin] : thin [Normal] : normal external exam, normal sphincter tone; no palpable masses; stool guaiac negative [FreeTextEntry1] : Rectal exam  [de-identified] : Tattoos, scattered small bruises on bilateral legs, arms  [de-identified] : Pressured speech

## 2019-06-07 NOTE — REVIEW OF SYSTEMS
[Fatigue] : fatigue [Joint Pain] : joint pain [Easy Bruising] : a tendency for easy bruising [Dizziness] : dizziness [Negative] : Allergic/Immunologic [Chest Pain] : chest pain [Abdominal Pain] : no abdominal pain [Diarrhea] : no diarrhea [Dysmenorrhea/Abn Vaginal Bleeding] : no dysmenorrhea/abnormal vaginal bleeding [Skin Rash] : no skin rash [Muscle Pain] : no muscle pain

## 2019-06-08 LAB
APPEARANCE: CLEAR
BACTERIA UR CULT: ABNORMAL
BACTERIA: NEGATIVE
BILIRUBIN URINE: NEGATIVE
BLOOD URINE: NEGATIVE
COLOR: COLORLESS
GLUCOSE QUALITATIVE U: NEGATIVE
HYALINE CASTS: 0 /LPF
KETONES URINE: NEGATIVE
LEUKOCYTE ESTERASE URINE: NEGATIVE
MICROSCOPIC-UA: NORMAL
NITRITE URINE: NEGATIVE
PH URINE: 7.5
PROTEIN URINE: NEGATIVE
RED BLOOD CELLS URINE: 0 /HPF
SPECIFIC GRAVITY URINE: 1
SQUAMOUS EPITHELIAL CELLS: 0 /HPF
UROBILINOGEN URINE: NORMAL
WHITE BLOOD CELLS URINE: 0 /HPF

## 2019-06-10 ENCOUNTER — FORM ENCOUNTER (OUTPATIENT)
Age: 44
End: 2019-06-10

## 2019-06-11 ENCOUNTER — APPOINTMENT (OUTPATIENT)
Dept: ULTRASOUND IMAGING | Facility: CLINIC | Age: 44
End: 2019-06-11
Payer: MEDICAID

## 2019-06-11 ENCOUNTER — OUTPATIENT (OUTPATIENT)
Dept: OUTPATIENT SERVICES | Facility: HOSPITAL | Age: 44
LOS: 1 days | End: 2019-06-11
Payer: MEDICAID

## 2019-06-11 ENCOUNTER — EMERGENCY (EMERGENCY)
Facility: HOSPITAL | Age: 44
LOS: 0 days | Discharge: HOME | End: 2019-06-11
Attending: EMERGENCY MEDICINE | Admitting: EMERGENCY MEDICINE
Payer: MEDICAID

## 2019-06-11 VITALS — WEIGHT: 98.99 LBS

## 2019-06-11 VITALS
OXYGEN SATURATION: 100 % | TEMPERATURE: 98 F | DIASTOLIC BLOOD PRESSURE: 72 MMHG | SYSTOLIC BLOOD PRESSURE: 128 MMHG | RESPIRATION RATE: 17 BRPM | HEART RATE: 85 BPM

## 2019-06-11 DIAGNOSIS — Z90.49 ACQUIRED ABSENCE OF OTHER SPECIFIED PARTS OF DIGESTIVE TRACT: Chronic | ICD-10-CM

## 2019-06-11 DIAGNOSIS — R00.2 PALPITATIONS: ICD-10-CM

## 2019-06-11 DIAGNOSIS — Z91.041 RADIOGRAPHIC DYE ALLERGY STATUS: ICD-10-CM

## 2019-06-11 DIAGNOSIS — E78.5 HYPERLIPIDEMIA, UNSPECIFIED: ICD-10-CM

## 2019-06-11 DIAGNOSIS — G90.4 AUTONOMIC DYSREFLEXIA: ICD-10-CM

## 2019-06-11 DIAGNOSIS — K31.84 GASTROPARESIS: ICD-10-CM

## 2019-06-11 DIAGNOSIS — I10 ESSENTIAL (PRIMARY) HYPERTENSION: ICD-10-CM

## 2019-06-11 DIAGNOSIS — K58.9 IRRITABLE BOWEL SYNDROME WITHOUT DIARRHEA: ICD-10-CM

## 2019-06-11 DIAGNOSIS — R07.9 CHEST PAIN, UNSPECIFIED: ICD-10-CM

## 2019-06-11 DIAGNOSIS — D68.59 OTHER PRIMARY THROMBOPHILIA: ICD-10-CM

## 2019-06-11 DIAGNOSIS — Z88.8 ALLERGY STATUS TO OTHER DRUGS, MEDICAMENTS AND BIOLOGICAL SUBSTANCES: ICD-10-CM

## 2019-06-11 DIAGNOSIS — I49.8 OTHER SPECIFIED CARDIAC ARRHYTHMIAS: ICD-10-CM

## 2019-06-11 DIAGNOSIS — D69.3 IMMUNE THROMBOCYTOPENIC PURPURA: ICD-10-CM

## 2019-06-11 DIAGNOSIS — N20.0 CALCULUS OF KIDNEY: ICD-10-CM

## 2019-06-11 DIAGNOSIS — Z86.718 PERSONAL HISTORY OF OTHER VENOUS THROMBOSIS AND EMBOLISM: ICD-10-CM

## 2019-06-11 DIAGNOSIS — G24.9 DYSTONIA, UNSPECIFIED: ICD-10-CM

## 2019-06-11 LAB
ALBUMIN SERPL ELPH-MCNC: 4.2 G/DL — SIGNIFICANT CHANGE UP (ref 3.3–5)
ALP SERPL-CCNC: 83 U/L — SIGNIFICANT CHANGE UP (ref 40–120)
ALT FLD-CCNC: 19 U/L — SIGNIFICANT CHANGE UP (ref 12–78)
ANION GAP SERPL CALC-SCNC: 5 MMOL/L — SIGNIFICANT CHANGE UP (ref 5–17)
ANISOCYTOSIS BLD QL: SLIGHT — SIGNIFICANT CHANGE UP
APPEARANCE UR: CLEAR — SIGNIFICANT CHANGE UP
APTT BLD: 37.4 SEC — HIGH (ref 27.5–36.3)
AST SERPL-CCNC: 22 U/L — SIGNIFICANT CHANGE UP (ref 15–37)
BASOPHILS # BLD AUTO: 0.02 K/UL — SIGNIFICANT CHANGE UP (ref 0–0.2)
BASOPHILS NFR BLD AUTO: 0.2 % — SIGNIFICANT CHANGE UP (ref 0–2)
BILIRUB SERPL-MCNC: 0.4 MG/DL — SIGNIFICANT CHANGE UP (ref 0.2–1.2)
BILIRUB UR-MCNC: NEGATIVE — SIGNIFICANT CHANGE UP
BUN SERPL-MCNC: 5 MG/DL — LOW (ref 7–23)
CALCIUM SERPL-MCNC: 9.7 MG/DL — SIGNIFICANT CHANGE UP (ref 8.5–10.1)
CHLORIDE SERPL-SCNC: 105 MMOL/L — SIGNIFICANT CHANGE UP (ref 96–108)
CO2 SERPL-SCNC: 29 MMOL/L — SIGNIFICANT CHANGE UP (ref 22–31)
COLOR SPEC: YELLOW — SIGNIFICANT CHANGE UP
CREAT SERPL-MCNC: 0.81 MG/DL — SIGNIFICANT CHANGE UP (ref 0.5–1.3)
DIFF PNL FLD: NEGATIVE — SIGNIFICANT CHANGE UP
EOSINOPHIL # BLD AUTO: 0.02 K/UL — SIGNIFICANT CHANGE UP (ref 0–0.5)
EOSINOPHIL NFR BLD AUTO: 0.2 % — SIGNIFICANT CHANGE UP (ref 0–6)
GLUCOSE SERPL-MCNC: 75 MG/DL — SIGNIFICANT CHANGE UP (ref 70–99)
GLUCOSE UR QL: NEGATIVE MG/DL — SIGNIFICANT CHANGE UP
HCT VFR BLD CALC: 36.5 % — SIGNIFICANT CHANGE UP (ref 34.5–45)
HGB BLD-MCNC: 11.4 G/DL — LOW (ref 11.5–15.5)
IMM GRANULOCYTES NFR BLD AUTO: 0.4 % — SIGNIFICANT CHANGE UP (ref 0–1.5)
INR BLD: 2.16 RATIO — HIGH (ref 0.88–1.16)
KETONES UR-MCNC: NEGATIVE — SIGNIFICANT CHANGE UP
LEUKOCYTE ESTERASE UR-ACNC: NEGATIVE — SIGNIFICANT CHANGE UP
LG PLATELETS BLD QL AUTO: SIGNIFICANT CHANGE UP
LYMPHOCYTES # BLD AUTO: 2.5 K/UL — SIGNIFICANT CHANGE UP (ref 1–3.3)
LYMPHOCYTES # BLD AUTO: 26 % — SIGNIFICANT CHANGE UP (ref 13–44)
MAGNESIUM SERPL-MCNC: 2.2 MG/DL — SIGNIFICANT CHANGE UP (ref 1.6–2.6)
MANUAL SMEAR VERIFICATION: SIGNIFICANT CHANGE UP
MCHC RBC-ENTMCNC: 24.6 PG — LOW (ref 27–34)
MCHC RBC-ENTMCNC: 31.2 GM/DL — LOW (ref 32–36)
MCV RBC AUTO: 78.7 FL — LOW (ref 80–100)
MICROCYTES BLD QL: SLIGHT — SIGNIFICANT CHANGE UP
MONOCYTES # BLD AUTO: 0.81 K/UL — SIGNIFICANT CHANGE UP (ref 0–0.9)
MONOCYTES NFR BLD AUTO: 8.4 % — SIGNIFICANT CHANGE UP (ref 2–14)
NEUTROPHILS # BLD AUTO: 6.24 K/UL — SIGNIFICANT CHANGE UP (ref 1.8–7.4)
NEUTROPHILS NFR BLD AUTO: 64.8 % — SIGNIFICANT CHANGE UP (ref 43–77)
NITRITE UR-MCNC: NEGATIVE — SIGNIFICANT CHANGE UP
PH UR: 7 — SIGNIFICANT CHANGE UP (ref 5–8)
PHOSPHATE SERPL-MCNC: 2.6 MG/DL — SIGNIFICANT CHANGE UP (ref 2.5–4.5)
PLAT MORPH BLD: ABNORMAL
PLATELET # BLD AUTO: 81 K/UL — LOW (ref 150–400)
PLATELET COUNT - ESTIMATE: ABNORMAL
POTASSIUM SERPL-MCNC: 3.4 MMOL/L — LOW (ref 3.5–5.3)
POTASSIUM SERPL-SCNC: 3.4 MMOL/L — LOW (ref 3.5–5.3)
PROT SERPL-MCNC: 7.6 GM/DL — SIGNIFICANT CHANGE UP (ref 6–8.3)
PROT UR-MCNC: NEGATIVE MG/DL — SIGNIFICANT CHANGE UP
PROTHROM AB SERPL-ACNC: 24.6 SEC — HIGH (ref 10–12.9)
RBC # BLD: 4.64 M/UL — SIGNIFICANT CHANGE UP (ref 3.8–5.2)
RBC # FLD: 19.9 % — HIGH (ref 10.3–14.5)
RBC BLD AUTO: ABNORMAL
SODIUM SERPL-SCNC: 139 MMOL/L — SIGNIFICANT CHANGE UP (ref 135–145)
SP GR SPEC: 1 — LOW (ref 1.01–1.02)
TROPONIN I SERPL-MCNC: <0.015 NG/ML — SIGNIFICANT CHANGE UP (ref 0.01–0.04)
TROPONIN I SERPL-MCNC: <0.015 NG/ML — SIGNIFICANT CHANGE UP (ref 0.01–0.04)
TSH SERPL-MCNC: 2.74 UU/ML — SIGNIFICANT CHANGE UP (ref 0.34–4.82)
UROBILINOGEN FLD QL: NEGATIVE MG/DL — SIGNIFICANT CHANGE UP
WBC # BLD: 9.63 K/UL — SIGNIFICANT CHANGE UP (ref 3.8–10.5)
WBC # FLD AUTO: 9.63 K/UL — SIGNIFICANT CHANGE UP (ref 3.8–10.5)

## 2019-06-11 PROCEDURE — 99283 EMERGENCY DEPT VISIT LOW MDM: CPT

## 2019-06-11 PROCEDURE — 71046 X-RAY EXAM CHEST 2 VIEWS: CPT | Mod: 26

## 2019-06-11 PROCEDURE — 93010 ELECTROCARDIOGRAM REPORT: CPT

## 2019-06-11 PROCEDURE — 76770 US EXAM ABDO BACK WALL COMP: CPT

## 2019-06-11 PROCEDURE — 76770 US EXAM ABDO BACK WALL COMP: CPT | Mod: 26

## 2019-06-11 RX ORDER — POTASSIUM CHLORIDE 20 MEQ
40 PACKET (EA) ORAL ONCE
Refills: 0 | Status: COMPLETED | OUTPATIENT
Start: 2019-06-11 | End: 2019-06-11

## 2019-06-11 RX ORDER — SODIUM CHLORIDE 9 MG/ML
500 INJECTION INTRAMUSCULAR; INTRAVENOUS; SUBCUTANEOUS ONCE
Refills: 0 | Status: COMPLETED | OUTPATIENT
Start: 2019-06-11 | End: 2019-06-11

## 2019-06-11 NOTE — ED ADULT NURSE NOTE - NSIMPLEMENTINTERV_GEN_ALL_ED
Implemented All Universal Safety Interventions:  [place] to call system. Call bell, personal items and telephone within reach. Instruct patient to call for assistance. Room bathroom lighting operational. Non-slip footwear when patient is off stretcher. Physically safe environment: no spills, clutter or unnecessary equipment. Stretcher in lowest position, wheels locked, appropriate side rails in place.

## 2019-06-11 NOTE — ED ADULT TRIAGE NOTE - CHIEF COMPLAINT QUOTE
pt c/o palpitations for the past 3 hours, pt states she feels like her heart is skipping beats, is irregular or not beating not beating properly. pt states she has multiple medical conditions. pt states she also has SOB, no chest pain, no dizziness or weakness

## 2019-06-11 NOTE — ED PROVIDER NOTE - PROGRESS NOTE DETAILS
KSENIAG: received signout from Dr. Garcia to f/u 2nd troponin which was negative.  Pt. hydrated and refused oral potassium and will eat foods containing potassium at home.  Pt. to f/u with PMD.

## 2019-06-11 NOTE — ED PROVIDER NOTE - OBJECTIVE STATEMENT
42 y/o female with a PMHx of Anxiety, Autonomic dysreflexia, Depression, DVT, Dysautonomia, Dystonia, Gastroparesis, ITP, IBS, HTN, Labyrinthitis, POTS, Protein S deficiency, h/o appendectomy presents to the ED c/o palpitations and SOB x3 hours. Pt states for the past three hours she has felt palpitations. States her heart rate was 130 1.5 hours ago. Described palpitations as "my heart was going to pound out of my chest". +associated chest pain. States she has a h/o of PVC's and chest pain but states today's symptoms are something she has never felt before. 44 y/o female with a PMHx of Anxiety, Autonomic dysreflexia, Depression, DVT on Xarelto, Dysautonomia, Dystonia, Gastroparesis, ITP, IBS, HTN, Labyrinthitis, POTS, Protein S deficiency, h/o appendectomy presents to the ED c/o palpitations and SOB x3 hours. Pt states for the past three hours she has felt palpitations. States her heart rate was 130 1.5 hours ago. Described palpitations as "my heart was going to pound out of my chest". +associated chest pain. States she has a h/o of PVC's and chest pain but states today's symptoms are something she has never felt before. 44 y/o female with a PMHx of Anxiety, Autonomic dysreflexia, Depression, DVT on Xarelto, Dysautonomia, Dystonia, Gastroparesis, ITP, IBS, HTN, Labyrinthitis, POTS, Protein S deficiency, h/o appendectomy presents to the ED c/o palpitations and SOB x3 hours. Pt states for the past three hours she has felt palpitations. States her heart rate was 130, 1.5 hours ago. Has been monitoring her heart rate through an EKG russell on her phone. Described palpitations as "my heart was going to pound out of my chest", +associated chest pain. States she has a h/o of PVC's and chest pain but states today's symptoms are something she has never felt before.

## 2019-06-11 NOTE — ED STATDOCS - PROGRESS NOTE DETAILS
44 y/o F anticoagulated on Xarelto with hx of ITP and anxiety presenting to the ED c/o continued palpitations onset 3 hours. Associated sx's include chest pain and SOB. No vomiting or diarrhea, no fevers or recent illness. Dispo to MAIN ED for further evaluation.     Amanda Quezada documenting for Dr. Akins

## 2019-06-11 NOTE — ED ADULT NURSE REASSESSMENT NOTE - NS ED NURSE REASSESS COMMENT FT1
Patient updated on plan of care. Results of blood work provided to patient. Patient requested to have IV removed. No distress noted. Cardiac monitoring in place. Second Troponin being drawn at this time. will continue to monitor.

## 2019-06-17 ENCOUNTER — APPOINTMENT (OUTPATIENT)
Dept: PULMONOLOGY | Facility: CLINIC | Age: 44
End: 2019-06-17

## 2019-06-18 ENCOUNTER — EMERGENCY (EMERGENCY)
Facility: HOSPITAL | Age: 44
LOS: 0 days | Discharge: HOME | End: 2019-06-18
Attending: EMERGENCY MEDICINE | Admitting: EMERGENCY MEDICINE
Payer: MEDICAID

## 2019-06-18 VITALS — HEIGHT: 62 IN | WEIGHT: 98.11 LBS

## 2019-06-18 VITALS
HEART RATE: 73 BPM | OXYGEN SATURATION: 100 % | RESPIRATION RATE: 13 BRPM | SYSTOLIC BLOOD PRESSURE: 116 MMHG | DIASTOLIC BLOOD PRESSURE: 85 MMHG

## 2019-06-18 DIAGNOSIS — F41.8 OTHER SPECIFIED ANXIETY DISORDERS: ICD-10-CM

## 2019-06-18 DIAGNOSIS — R07.89 OTHER CHEST PAIN: ICD-10-CM

## 2019-06-18 DIAGNOSIS — Z79.01 LONG TERM (CURRENT) USE OF ANTICOAGULANTS: ICD-10-CM

## 2019-06-18 DIAGNOSIS — Z90.49 ACQUIRED ABSENCE OF OTHER SPECIFIED PARTS OF DIGESTIVE TRACT: Chronic | ICD-10-CM

## 2019-06-18 DIAGNOSIS — R05 COUGH: ICD-10-CM

## 2019-06-18 DIAGNOSIS — R42 DIZZINESS AND GIDDINESS: ICD-10-CM

## 2019-06-18 DIAGNOSIS — R07.9 CHEST PAIN, UNSPECIFIED: ICD-10-CM

## 2019-06-18 DIAGNOSIS — I10 ESSENTIAL (PRIMARY) HYPERTENSION: ICD-10-CM

## 2019-06-18 DIAGNOSIS — R06.2 WHEEZING: ICD-10-CM

## 2019-06-18 DIAGNOSIS — R06.02 SHORTNESS OF BREATH: ICD-10-CM

## 2019-06-18 DIAGNOSIS — Z86.718 PERSONAL HISTORY OF OTHER VENOUS THROMBOSIS AND EMBOLISM: ICD-10-CM

## 2019-06-18 DIAGNOSIS — D69.3 IMMUNE THROMBOCYTOPENIC PURPURA: ICD-10-CM

## 2019-06-18 DIAGNOSIS — Z91.041 RADIOGRAPHIC DYE ALLERGY STATUS: ICD-10-CM

## 2019-06-18 LAB
ALBUMIN SERPL ELPH-MCNC: 4.2 G/DL — SIGNIFICANT CHANGE UP (ref 3.3–5)
ALP SERPL-CCNC: 90 U/L — SIGNIFICANT CHANGE UP (ref 40–120)
ALT FLD-CCNC: 19 U/L — SIGNIFICANT CHANGE UP (ref 12–78)
ANION GAP SERPL CALC-SCNC: 10 MMOL/L — SIGNIFICANT CHANGE UP (ref 5–17)
APTT BLD: 22 SEC — LOW (ref 27.5–36.3)
AST SERPL-CCNC: 20 U/L — SIGNIFICANT CHANGE UP (ref 15–37)
BILIRUB SERPL-MCNC: 0.3 MG/DL — SIGNIFICANT CHANGE UP (ref 0.2–1.2)
BUN SERPL-MCNC: 4 MG/DL — LOW (ref 7–23)
CALCIUM SERPL-MCNC: 8.9 MG/DL — SIGNIFICANT CHANGE UP (ref 8.5–10.1)
CHLORIDE SERPL-SCNC: 108 MMOL/L — SIGNIFICANT CHANGE UP (ref 96–108)
CO2 SERPL-SCNC: 23 MMOL/L — SIGNIFICANT CHANGE UP (ref 22–31)
CREAT SERPL-MCNC: 0.78 MG/DL — SIGNIFICANT CHANGE UP (ref 0.5–1.3)
D DIMER BLD IA.RAPID-MCNC: <150 NG/ML DDU — SIGNIFICANT CHANGE UP
GLUCOSE SERPL-MCNC: 104 MG/DL — HIGH (ref 70–99)
HCT VFR BLD CALC: 35.6 % — SIGNIFICANT CHANGE UP (ref 34.5–45)
HGB BLD-MCNC: 11.5 G/DL — SIGNIFICANT CHANGE UP (ref 11.5–15.5)
INR BLD: 1.11 RATIO — SIGNIFICANT CHANGE UP (ref 0.88–1.16)
MCHC RBC-ENTMCNC: 25.7 PG — LOW (ref 27–34)
MCHC RBC-ENTMCNC: 32.3 GM/DL — SIGNIFICANT CHANGE UP (ref 32–36)
MCV RBC AUTO: 79.5 FL — LOW (ref 80–100)
PLATELET # BLD AUTO: 72 K/UL — LOW (ref 150–400)
POTASSIUM SERPL-MCNC: 3.5 MMOL/L — SIGNIFICANT CHANGE UP (ref 3.5–5.3)
POTASSIUM SERPL-SCNC: 3.5 MMOL/L — SIGNIFICANT CHANGE UP (ref 3.5–5.3)
PROT SERPL-MCNC: 7.7 GM/DL — SIGNIFICANT CHANGE UP (ref 6–8.3)
PROTHROM AB SERPL-ACNC: 12.4 SEC — SIGNIFICANT CHANGE UP (ref 10–12.9)
RBC # BLD: 4.48 M/UL — SIGNIFICANT CHANGE UP (ref 3.8–5.2)
RBC # FLD: 19.3 % — HIGH (ref 10.3–14.5)
SODIUM SERPL-SCNC: 141 MMOL/L — SIGNIFICANT CHANGE UP (ref 135–145)
TROPONIN I SERPL-MCNC: <0.015 NG/ML — SIGNIFICANT CHANGE UP (ref 0.01–0.04)
TROPONIN I SERPL-MCNC: <0.015 NG/ML — SIGNIFICANT CHANGE UP (ref 0.01–0.04)
WBC # BLD: 8.04 K/UL — SIGNIFICANT CHANGE UP (ref 3.8–10.5)
WBC # FLD AUTO: 8.04 K/UL — SIGNIFICANT CHANGE UP (ref 3.8–10.5)

## 2019-06-18 PROCEDURE — 78582 LUNG VENTILAT&PERFUS IMAGING: CPT | Mod: 26

## 2019-06-18 PROCEDURE — 71046 X-RAY EXAM CHEST 2 VIEWS: CPT | Mod: 26

## 2019-06-18 PROCEDURE — 99285 EMERGENCY DEPT VISIT HI MDM: CPT | Mod: 25

## 2019-06-18 PROCEDURE — 93970 EXTREMITY STUDY: CPT | Mod: 26

## 2019-06-18 PROCEDURE — 93010 ELECTROCARDIOGRAM REPORT: CPT

## 2019-06-18 NOTE — ED ADULT TRIAGE NOTE - CHIEF COMPLAINT QUOTE
patient reports cough and chest pain with pain radiating down bilateral arms since yesterday.  Patient has a protein S clotting disorder and is on xarelto and has been compliant.  She is concerned that she has a blood clot.  She also reports dizziness.  States she did not take any medications for pain. patient reports cough and chest pain with pain radiating down bilateral arms since yesterday.  Patient has a protein S clotting disorder and is on xarelto and has been compliant.  She is concerned that she has a blood clot.  She also reports dizziness.  States she did not take any medications for pain.  Denies sob at this time.

## 2019-06-18 NOTE — ED PROVIDER NOTE - CONSTITUTIONAL, MLM
normal... +anxious appearing female laying in bed, awake, alert, oriented to person, place, time/situation

## 2019-06-18 NOTE — ED PROVIDER NOTE - PROGRESS NOTE DETAILS
Rogeribaravind AD for attending Dr. Dominguez: Pt signed out to next ED doctor, Dr. Collado. Labs and continuous care.

## 2019-06-18 NOTE — ED ADULT NURSE REASSESSMENT NOTE - NS ED NURSE REASSESS COMMENT FT1
discharged home. iv taken out. copy of all reports given to patient. written and verbal discharge and followup instructions given to patient, patient verbalized back understanding. alert and oriented x 4, respirations even and unlabored, ambulatory with steady gait. discharged home from ED 2310.

## 2019-06-18 NOTE — ED STATDOCS - OBJECTIVE STATEMENT
42 y/o F with hx of anxiety and anticoagulated on Xarelto presents to the ED c/o burning chest pain since last night. Associated sx's include elevated heart rate of 135.

## 2019-06-18 NOTE — ED PROVIDER NOTE - OBJECTIVE STATEMENT
42 y/o female with a PMHx of Anxiety, Autonomic dysreflexia, Depression, DVT on Xarelto, Dysautonomia on Ativan, Dystonia, Gastroparesis, ITP, IBS, HTN, Labyrinthitis, POTS, Protein S deficiency, h/o appendectomy presents to the ED c/o cough with wheezing and atypical burning left sided CP since last night. +dizziness. Pt states today she felt like she was going to pass out. Pain radiates down b/l arms. Pt concerned for PE. Pt states that she has known blood clot in her neck and is complaint with all medications. H/o clotting disorder.  Did not take anything for pain.

## 2019-06-18 NOTE — ED ADULT NURSE NOTE - CHIEF COMPLAINT QUOTE
patient reports cough and chest pain with pain radiating down bilateral arms since yesterday.  Patient has a protein S clotting disorder and is on xarelto and has been compliant.  She is concerned that she has a blood clot.  She also reports dizziness.  States she did not take any medications for pain.  Denies sob at this time.

## 2019-06-18 NOTE — ED ADULT NURSE NOTE - OBJECTIVE STATEMENT
Pt presents to the Ed c/o chest pain and sob. 99 % O2 sat  on room air.  Pt stated that she has  a hx of blood clot in her left   arm that travelled to her neck. Med hx Xarelto. In no acute distress.

## 2019-06-18 NOTE — ED ADULT NURSE NOTE - NSIMPLEMENTINTERV_GEN_ALL_ED
Implemented All Universal Safety Interventions:  [place] to call system. Call bell, personal items and telephone within reach. Instruct patient to call for assistance. Room bathroom lighting operational. Non-slip footwear when patient is off stretcher. Physically safe environment: no spills, clutter or unnecessary equipment. Stretcher in lowest position, wheels locked, appropriate side rails in place.

## 2019-06-18 NOTE — ED STATDOCS - PROGRESS NOTE DETAILS
42 y/o F with hx of anxiety and anticoagulated on Xarelto presents to the ED c/o burning chest pain since last night. Associated sx's include elevated heart rate of 135. Dispo to MAIN for further workup

## 2019-06-18 NOTE — ED ADULT NURSE REASSESSMENT NOTE - NS ED NURSE REASSESS COMMENT FT1
Tried to discharge pt. Explained results and f/u.  Patient refused stating that she is having trouble swallowing  after the vq scan. Stated she want to speak to the doctor.  Dr Collado made aware

## 2019-06-18 NOTE — ED PROVIDER NOTE - CLINICAL SUMMARY MEDICAL DECISION MAKING FREE TEXT BOX
44 y/o female with h/ clotting disorder on Xarelto here for CP and cough. Pt not tachy or hypoxic but she is concerned for PE or DVT in arms. Refusing CT with contrast. Will put in for VQ scan as well as US of arms for known blood clot in neck.

## 2019-06-24 ENCOUNTER — RESULT REVIEW (OUTPATIENT)
Age: 44
End: 2019-06-24

## 2019-06-24 ENCOUNTER — LABORATORY RESULT (OUTPATIENT)
Age: 44
End: 2019-06-24

## 2019-06-24 ENCOUNTER — APPOINTMENT (OUTPATIENT)
Dept: HEMATOLOGY ONCOLOGY | Facility: CLINIC | Age: 44
End: 2019-06-24
Payer: MEDICAID

## 2019-06-24 VITALS — SYSTOLIC BLOOD PRESSURE: 138 MMHG | DIASTOLIC BLOOD PRESSURE: 86 MMHG

## 2019-06-24 VITALS
BODY MASS INDEX: 18.06 KG/M2 | SYSTOLIC BLOOD PRESSURE: 134 MMHG | TEMPERATURE: 98.4 F | DIASTOLIC BLOOD PRESSURE: 94 MMHG | OXYGEN SATURATION: 100 % | HEART RATE: 76 BPM | WEIGHT: 98.77 LBS | RESPIRATION RATE: 16 BRPM

## 2019-06-24 LAB
ALBUMIN SERPL ELPH-MCNC: 5 G/DL
ALP BLD-CCNC: 84 U/L
ALT SERPL-CCNC: 14 U/L
ANION GAP SERPL CALC-SCNC: 12 MMOL/L
AST SERPL-CCNC: 20 U/L
BASOPHILS # BLD AUTO: 0 K/UL — SIGNIFICANT CHANGE UP (ref 0–0.2)
BASOPHILS NFR BLD AUTO: 0.1 % — SIGNIFICANT CHANGE UP (ref 0–2)
BILIRUB SERPL-MCNC: 0.3 MG/DL
BUN SERPL-MCNC: 6 MG/DL
CALCIUM SERPL-MCNC: 8.9 MG/DL
CHLORIDE SERPL-SCNC: 100 MMOL/L
CO2 SERPL-SCNC: 24 MMOL/L
CREAT SERPL-MCNC: 0.72 MG/DL
EOSINOPHIL # BLD AUTO: 0 K/UL — SIGNIFICANT CHANGE UP (ref 0–0.5)
EOSINOPHIL NFR BLD AUTO: 0.2 % — SIGNIFICANT CHANGE UP (ref 0–6)
GLUCOSE SERPL-MCNC: 112 MG/DL
HCT VFR BLD CALC: 35.2 % — SIGNIFICANT CHANGE UP (ref 34.5–45)
HGB BLD-MCNC: 11.7 G/DL — SIGNIFICANT CHANGE UP (ref 11.5–15.5)
LYMPHOCYTES # BLD AUTO: 1.5 K/UL — SIGNIFICANT CHANGE UP (ref 1–3.3)
LYMPHOCYTES # BLD AUTO: 23.6 % — SIGNIFICANT CHANGE UP (ref 13–44)
MCHC RBC-ENTMCNC: 26.2 PG — LOW (ref 27–34)
MCHC RBC-ENTMCNC: 33.1 G/DL — SIGNIFICANT CHANGE UP (ref 32–36)
MCV RBC AUTO: 78.9 FL — LOW (ref 80–100)
MONOCYTES # BLD AUTO: 0.5 K/UL — SIGNIFICANT CHANGE UP (ref 0–0.9)
MONOCYTES NFR BLD AUTO: 7.4 % — SIGNIFICANT CHANGE UP (ref 2–14)
NEUTROPHILS # BLD AUTO: 4.4 K/UL — SIGNIFICANT CHANGE UP (ref 1.8–7.4)
NEUTROPHILS NFR BLD AUTO: 68.7 % — SIGNIFICANT CHANGE UP (ref 43–77)
PLATELET # BLD AUTO: 74 K/UL — LOW (ref 150–400)
POTASSIUM SERPL-SCNC: 3.6 MMOL/L
PROT SERPL-MCNC: 7 G/DL
RBC # BLD: 4.46 M/UL — SIGNIFICANT CHANGE UP (ref 3.8–5.2)
RBC # FLD: 19.2 % — HIGH (ref 10.3–14.5)
SODIUM SERPL-SCNC: 136 MMOL/L
WBC # BLD: 6.3 K/UL — SIGNIFICANT CHANGE UP (ref 3.8–10.5)
WBC # FLD AUTO: 6.3 K/UL — SIGNIFICANT CHANGE UP (ref 3.8–10.5)

## 2019-06-24 PROCEDURE — 99214 OFFICE O/P EST MOD 30 MIN: CPT

## 2019-06-24 RX ORDER — OXYCODONE AND ACETAMINOPHEN 5; 325 MG/1; MG/1
5-325 TABLET ORAL
Qty: 12 | Refills: 0 | Status: DISCONTINUED | COMMUNITY
Start: 2019-03-18

## 2019-06-24 RX ORDER — RIVAROXABAN 20 MG/1
20 TABLET, FILM COATED ORAL
Qty: 90 | Refills: 0 | Status: DISCONTINUED | COMMUNITY
Start: 2019-04-13

## 2019-06-24 RX ORDER — CEFUROXIME AXETIL 500 MG/1
500 TABLET ORAL
Qty: 10 | Refills: 0 | Status: DISCONTINUED | COMMUNITY
Start: 2019-03-18

## 2019-06-25 ENCOUNTER — EMERGENCY (EMERGENCY)
Facility: HOSPITAL | Age: 44
LOS: 1 days | Discharge: HOME | End: 2019-06-25
Attending: EMERGENCY MEDICINE
Payer: MEDICAID

## 2019-06-25 VITALS
TEMPERATURE: 98 F | RESPIRATION RATE: 16 BRPM | DIASTOLIC BLOOD PRESSURE: 95 MMHG | OXYGEN SATURATION: 100 % | SYSTOLIC BLOOD PRESSURE: 148 MMHG | HEART RATE: 85 BPM

## 2019-06-25 VITALS
OXYGEN SATURATION: 100 % | HEART RATE: 77 BPM | DIASTOLIC BLOOD PRESSURE: 89 MMHG | TEMPERATURE: 98 F | SYSTOLIC BLOOD PRESSURE: 121 MMHG | RESPIRATION RATE: 18 BRPM

## 2019-06-25 DIAGNOSIS — Z90.49 ACQUIRED ABSENCE OF OTHER SPECIFIED PARTS OF DIGESTIVE TRACT: Chronic | ICD-10-CM

## 2019-06-25 LAB
ALBUMIN SERPL ELPH-MCNC: 4.6 G/DL — SIGNIFICANT CHANGE UP (ref 3.3–5)
ALP SERPL-CCNC: 84 U/L — SIGNIFICANT CHANGE UP (ref 40–120)
ALT FLD-CCNC: 13 U/L — SIGNIFICANT CHANGE UP (ref 10–45)
ANION GAP SERPL CALC-SCNC: 15 MMOL/L — SIGNIFICANT CHANGE UP (ref 5–17)
APTT BLD: 29.2 SEC — SIGNIFICANT CHANGE UP (ref 27.5–36.3)
AST SERPL-CCNC: 17 U/L — SIGNIFICANT CHANGE UP (ref 10–40)
BASOPHILS # BLD AUTO: 0 K/UL — SIGNIFICANT CHANGE UP (ref 0–0.2)
BASOPHILS NFR BLD AUTO: 0.1 % — SIGNIFICANT CHANGE UP (ref 0–2)
BILIRUB SERPL-MCNC: 0.3 MG/DL — SIGNIFICANT CHANGE UP (ref 0.2–1.2)
BUN SERPL-MCNC: 4 MG/DL — LOW (ref 7–23)
CALCIUM SERPL-MCNC: 9.6 MG/DL — SIGNIFICANT CHANGE UP (ref 8.4–10.5)
CHLORIDE SERPL-SCNC: 99 MMOL/L — SIGNIFICANT CHANGE UP (ref 96–108)
CO2 SERPL-SCNC: 23 MMOL/L — SIGNIFICANT CHANGE UP (ref 22–31)
CREAT SERPL-MCNC: 0.75 MG/DL — SIGNIFICANT CHANGE UP (ref 0.5–1.3)
EOSINOPHIL # BLD AUTO: 0 K/UL — SIGNIFICANT CHANGE UP (ref 0–0.5)
EOSINOPHIL NFR BLD AUTO: 0.1 % — SIGNIFICANT CHANGE UP (ref 0–6)
GLUCOSE SERPL-MCNC: 113 MG/DL — HIGH (ref 70–99)
HCT VFR BLD CALC: 36.3 % — SIGNIFICANT CHANGE UP (ref 34.5–45)
HGB BLD-MCNC: 12 G/DL — SIGNIFICANT CHANGE UP (ref 11.5–15.5)
INR BLD: 1.03 RATIO — SIGNIFICANT CHANGE UP (ref 0.88–1.16)
LYMPHOCYTES # BLD AUTO: 1.2 K/UL — SIGNIFICANT CHANGE UP (ref 1–3.3)
LYMPHOCYTES # BLD AUTO: 19.8 % — SIGNIFICANT CHANGE UP (ref 13–44)
MAGNESIUM SERPL-MCNC: 2.1 MG/DL — SIGNIFICANT CHANGE UP (ref 1.6–2.6)
MCHC RBC-ENTMCNC: 26.4 PG — LOW (ref 27–34)
MCHC RBC-ENTMCNC: 33.1 GM/DL — SIGNIFICANT CHANGE UP (ref 32–36)
MCV RBC AUTO: 79.7 FL — LOW (ref 80–100)
MONOCYTES # BLD AUTO: 0.4 K/UL — SIGNIFICANT CHANGE UP (ref 0–0.9)
MONOCYTES NFR BLD AUTO: 6 % — SIGNIFICANT CHANGE UP (ref 2–14)
NEUTROPHILS # BLD AUTO: 4.5 K/UL — SIGNIFICANT CHANGE UP (ref 1.8–7.4)
NEUTROPHILS NFR BLD AUTO: 74 % — SIGNIFICANT CHANGE UP (ref 43–77)
NT-PROBNP SERPL-SCNC: 21 PG/ML — SIGNIFICANT CHANGE UP (ref 0–300)
PHOSPHATE SERPL-MCNC: 3.1 MG/DL — SIGNIFICANT CHANGE UP (ref 2.5–4.5)
PLATELET # BLD AUTO: 78 K/UL — LOW (ref 150–400)
POTASSIUM SERPL-MCNC: 3.6 MMOL/L — SIGNIFICANT CHANGE UP (ref 3.5–5.3)
POTASSIUM SERPL-SCNC: 3.6 MMOL/L — SIGNIFICANT CHANGE UP (ref 3.5–5.3)
PROT SERPL-MCNC: 7.1 G/DL — SIGNIFICANT CHANGE UP (ref 6–8.3)
PROTHROM AB SERPL-ACNC: 11.8 SEC — SIGNIFICANT CHANGE UP (ref 10–12.9)
RBC # BLD: 4.56 M/UL — SIGNIFICANT CHANGE UP (ref 3.8–5.2)
RBC # FLD: 18.3 % — HIGH (ref 10.3–14.5)
SODIUM SERPL-SCNC: 137 MMOL/L — SIGNIFICANT CHANGE UP (ref 135–145)
TROPONIN T, HIGH SENSITIVITY RESULT: <6 NG/L — SIGNIFICANT CHANGE UP (ref 0–51)
WBC # BLD: 6.1 K/UL — SIGNIFICANT CHANGE UP (ref 3.8–10.5)
WBC # FLD AUTO: 6.1 K/UL — SIGNIFICANT CHANGE UP (ref 3.8–10.5)

## 2019-06-25 PROCEDURE — 93971 EXTREMITY STUDY: CPT

## 2019-06-25 PROCEDURE — 93971 EXTREMITY STUDY: CPT | Mod: 26

## 2019-06-25 PROCEDURE — 76937 US GUIDE VASCULAR ACCESS: CPT | Mod: 26

## 2019-06-25 PROCEDURE — 71045 X-RAY EXAM CHEST 1 VIEW: CPT

## 2019-06-25 PROCEDURE — 36000 PLACE NEEDLE IN VEIN: CPT | Mod: RT

## 2019-06-25 PROCEDURE — 76937 US GUIDE VASCULAR ACCESS: CPT

## 2019-06-25 PROCEDURE — 83735 ASSAY OF MAGNESIUM: CPT

## 2019-06-25 PROCEDURE — 99285 EMERGENCY DEPT VISIT HI MDM: CPT | Mod: 25

## 2019-06-25 PROCEDURE — 36000 PLACE NEEDLE IN VEIN: CPT

## 2019-06-25 PROCEDURE — 84484 ASSAY OF TROPONIN QUANT: CPT

## 2019-06-25 PROCEDURE — 70450 CT HEAD/BRAIN W/O DYE: CPT | Mod: 26

## 2019-06-25 PROCEDURE — 85610 PROTHROMBIN TIME: CPT

## 2019-06-25 PROCEDURE — 85027 COMPLETE CBC AUTOMATED: CPT

## 2019-06-25 PROCEDURE — 71045 X-RAY EXAM CHEST 1 VIEW: CPT | Mod: 26

## 2019-06-25 PROCEDURE — 93005 ELECTROCARDIOGRAM TRACING: CPT | Mod: 76

## 2019-06-25 PROCEDURE — 93010 ELECTROCARDIOGRAM REPORT: CPT

## 2019-06-25 PROCEDURE — 80053 COMPREHEN METABOLIC PANEL: CPT

## 2019-06-25 PROCEDURE — 70450 CT HEAD/BRAIN W/O DYE: CPT

## 2019-06-25 PROCEDURE — 84100 ASSAY OF PHOSPHORUS: CPT

## 2019-06-25 PROCEDURE — 99284 EMERGENCY DEPT VISIT MOD MDM: CPT | Mod: 25

## 2019-06-25 PROCEDURE — 83880 ASSAY OF NATRIURETIC PEPTIDE: CPT

## 2019-06-25 PROCEDURE — 85730 THROMBOPLASTIN TIME PARTIAL: CPT

## 2019-06-25 RX ORDER — SODIUM CHLORIDE 9 MG/ML
1000 INJECTION INTRAMUSCULAR; INTRAVENOUS; SUBCUTANEOUS ONCE
Refills: 0 | Status: COMPLETED | OUTPATIENT
Start: 2019-06-25 | End: 2019-06-25

## 2019-06-25 RX ADMIN — SODIUM CHLORIDE 1000 MILLILITER(S): 9 INJECTION INTRAMUSCULAR; INTRAVENOUS; SUBCUTANEOUS at 16:37

## 2019-06-25 NOTE — ED PROVIDER NOTE - PROGRESS NOTE DETAILS
Rpt EKG normal. Pt. sitting comfortably in stretcher, eating, tolerating PO without difficulty. Results explained to pt in detail. Recommended warm compresses and pain control for superficial thrombophlebitis. Pt. requesting to stay until IV fluids are finished. Will prepare pt. discharge paperwork and contact social work to arrange transportation home. Pt. states she needs medicaid taxi to [facility]. Social Work contacted to arrange transport.

## 2019-06-25 NOTE — ED ADULT NURSE REASSESSMENT NOTE - NS ED NURSE REASSESS COMMENT FT1
Patient states that the IV fluids are making her tachycardic. Patient is tachycardic to 114 while walking back from the bathroom. After patient sits down in the stretcher her heart rate returns to 93. Patient states that she does not want any more IVF and that she wants to have the IV taken out. Patient's IV is taken out and gauze is placed as a pressure dressing as per the patient's request because she takes a blood thinner.  Nicole made aware to arrange transport.

## 2019-06-25 NOTE — ED ADULT NURSE NOTE - NSIMPLEMENTINTERV_GEN_ALL_ED
Implemented All Fall Risk Interventions:  [place] to call system. Call bell, personal items and telephone within reach. Instruct patient to call for assistance. Room bathroom lighting operational. Non-slip footwear when patient is off stretcher. Physically safe environment: no spills, clutter or unnecessary equipment. Stretcher in lowest position, wheels locked, appropriate side rails in place. Provide visual cue, wrist band, yellow gown, etc. Monitor gait and stability. Monitor for mental status changes and reorient to person, place, and time. Review medications for side effects contributing to fall risk. Reinforce activity limits and safety measures with patient and family.

## 2019-06-25 NOTE — ED PROCEDURE NOTE - PROCEDURE ADDITIONAL DETAILS
Asked to obtain access in patient with difficult access. Patient states unable to have access in left arm 2/2 dvt. Risk, benefit, alternatives discussed with patient and she consented to US-guided IV access. Patient declining 1.88" 20 ga IV usage and insisting on 1.0" 22 ga IV being used instead. Patient explained increased risk of failure and extravasation, given her available accessible veins, but declining 1.88" IV. Three attempts made with 1.0" 22ga  but unable to cannulate vein with flash. Patient then consented to use of 1.88" IV. A superficial vein (basilic) located on the medial right upper arm. Peripheral IV access in the Emergency Department obtained under dynamic ultrasound guidance with dark nonpulsatile blood return.  Catheter was flushed afterwards without any resistance or resulting extravasation. IV catheter confirmed in compressible vein after insertion.

## 2019-06-25 NOTE — ED ADULT TRIAGE NOTE - CHIEF COMPLAINT QUOTE
dizziness x 2 hours, nausea, had an episode where HR went to 40, " I think I have a blood clot in my right arm because I Had multiple IVs in my arm" hx of DVT in the past   sob,

## 2019-06-25 NOTE — ED PROVIDER NOTE - CLINICAL SUMMARY MEDICAL DECISION MAKING FREE TEXT BOX
44yo F pmhx DVT, Protein S Deficiency on Xarelto, Dysautonomia, ITP, POTS, HTN, anxiety p/w CC bruising on RUE, dizziness and headache. Low suspicion for RUE DVT as pt. is compliant with xarelto and no obvious swelling/edema to arm, however will send for RUE duplex. Will also CT head in setting of headache on xarelto. Will keep on cardiac monitor to observe for bradydysrhythmia. Send labs trops mag phos. Screening EKG, CXR and reassess.

## 2019-06-25 NOTE — ED ADULT NURSE REASSESSMENT NOTE - NS ED NURSE REASSESS COMMENT FT1
Pt states her IV fluids need to go slower, "my heart cannot take so much fluid so fast." Pt noted to be touching the IV lining and changing around with IV fluid rate on her own. MD Carter aware.

## 2019-06-25 NOTE — ED PROVIDER NOTE - ATTENDING CONTRIBUTION TO CARE
feels weak  rrr, clear speech  basic labs / ekg / cardiac monitor. feels weak  rrr, clear speech  basic labs / ekg / cardiac monitor / check ue for dvt resolution

## 2019-06-25 NOTE — ED ADULT NURSE NOTE - OBJECTIVE STATEMENT
43y f pt c/o dizziness and room spinning; pt has been in ed for same complaint numerous times; pt also c/o frequent iv placements have caused multiple dvt's' pt states cannot use left arm for anything as there was a clot that went to her jugular and lodged there; pt c/o feeling numbness in limbs and face; aox3; no resp distress; 43y f pt has multiple complaints; c/o dizziness and room spinning; pt has been in ed for same complaint numerous times; pt also c/o frequent iv placements have caused multiple dvt's' pt states cannot use left arm for anything as there was a clot that went to her jugular and lodged there; pt c/o feeling tingling in face; aox3; no resp distress; pt reports was at [facility] recently and had many iv placement attempts; now feels like has a dvt in right arm; pt wears a pulse at all times; pt state feels her heart rate dropped to the 40's today; md delcid at bedside; pt right arm has numerous bandages and bruises; did not observe pt ambulating; waiting for iv team to place iv using ultrasound as pt insisted

## 2019-06-25 NOTE — ED PROVIDER NOTE - NSFOLLOWUPINSTRUCTIONS_ED_ALL_ED_FT
Superficial Thrombophlebitis    WHAT YOU NEED TO KNOW:    Superficial thrombophlebitis (STP) is inflammation of a vein just under your skin (superficial vein). The inflammation causes a blood clot to form in your vein. STP most often happens in your leg but may also happen in your arm.     DISCHARGE INSTRUCTIONS:    Call your local emergency number (911 in the [place]) if:     You feel lightheaded, short of breath, and have chest pain.      You cough up blood.    Call your doctor or hematologist if:     Your arm or leg feels warm, tender, and painful. It may look swollen and red.      You have questions or concerns about your condition or care.    Medicines: You may need any of the following:     Antibiotics may be given to treat a bacterial infection.       NSAIDs, such as ibuprofen, help decrease swelling, pain, and fever. NSAIDs can cause stomach bleeding or kidney problems in certain people. If you take blood thinner medicine, always ask your healthcare provider if NSAIDs are safe for you. Always read the medicine label and follow directions.      Blood thinners help prevent blood clots. Clots can cause strokes, heart attacks, and death. The following are general safety guidelines to follow while you are taking a blood thinner:  Watch for bleeding and bruising while you take blood thinners. Watch for bleeding from your gums or nose. Watch for blood in your urine and bowel movements. Use a soft washcloth on your skin, and a soft toothbrush to brush your teeth. This can keep your skin and gums from bleeding. If you shave, use an electric shaver. Do not play contact sports.       Tell your dentist and other healthcare providers that you take a blood thinner. Wear a bracelet or necklace that says you take this medicine.       Do not start or stop any other medicines unless your healthcare provider tells you to. Many medicines cannot be used with blood thinners.      Take your blood thinner exactly as prescribed by your healthcare provider. Do not skip does or take less than prescribed. Tell your provider right away if you forget to take your blood thinner, or if you take too much.      Warfarin is a blood thinner that you may need to take. The following are things you should be aware of if you take warfarin:   Foods and medicines can affect the amount of warfarin in your blood. Do not make major changes to your diet while you take warfarin. Warfarin works best when you eat about the same amount of vitamin K every day. Vitamin K is found in green leafy vegetables and certain other foods. Ask for more information about what to eat when you are taking warfarin.      You will need to see your healthcare provider for follow-up visits when you are on warfarin. You will need regular blood tests. These tests are used to decide how much medicine you need.       Antiplatelets, such as aspirin, help prevent blood clots. Take your antiplatelet medicine exactly as directed. These medicines make it more likely for you to bleed or bruise. If you are told to take aspirin, do not take acetaminophen or ibuprofen instead.       Take your medicine as directed. Contact your healthcare provider if you think your medicine is not helping or if you have side effects. Tell him of her if you are allergic to any medicine. Keep a list of the medicines, vitamins, and herbs you take. Include the amounts, and when and why you take them. Bring the list or the pill bottles to follow-up visits. Carry your medicine list with you in case of an emergency.    Manage STP:     Apply a warm compress to your arm or leg. This will help decrease swelling and pain. Wet a washcloth in warm water. Do not use hot water. Apply the warm compress for 10 minutes. Repeat this 4 times each day.       Wear pressure stockings as directed. Pressure stockings improve blood flow and help prevent clots in your legs. Wear the stockings during the day. Do not wear them when you sleep. Pressure Stockings            Elevate your leg or arm above the level of your heart as often as you can. This will help decrease swelling and pain. Prop your leg or arm on pillows or blankets to keep it elevated comfortably. Elevate Limb         Prevent STP:     Maintain a healthy weight. This will help decrease your risk for another blood clot. Ask your healthcare provider how much you should weigh. Ask him or her to help you create a weight loss plan if you are overweight.      Do not smoke. Nicotine and other chemicals in cigarettes and cigars can damage blood vessels and increase your risk for blood clots. Ask your healthcare provider for information if you currently smoke and need help to quit. E-cigarettes or smokeless tobacco still contain nicotine. Talk to your healthcare provider before you use these products.      Change your body position or move around often. Move and stretch in your seat several times each hour if you travel by car or work at a desk. In an airplane, get up and walk every hour. Move your legs by tightening and releasing your leg muscles while sitting. You can move your legs while sitting by raising and lowering your heels. Keep your toes on the floor while you do this. You can also raise and lower your toes while keeping your heels on the floor.DVT Prevention Heel Raise     DVT Prevention Toe Raise           Exercise regularly to help increase your blood flow. Walking is a good low-impact exercise. Talk to your healthcare provider about the best exercise plan for you. Walking for Exercise           Do not inject illegal drugs. Talk to your healthcare provider if you use IV drugs and need help to quit.    Follow up with your doctor or hematologist as directed: You may need to come in regularly for scans to check for blood clots. Your blood may checked to see how long it takes to clot. Your doctor or specialist will tell you if you need to have this test and how often to have it. Write down your questions so you remember to ask them during your visits.

## 2019-06-25 NOTE — CHART NOTE - NSCHARTNOTEFT_GEN_A_CORE
Emergency Social Work Note:  LMSW received a referral for assistance with discharge planning. Per medical patient is medically cleared for discharge.  LMSW introduced herself to the patient and verbalized understanding her role.  Demographic information was reviewed and confirmed.  Patient has Medicaid insurance benefit.  ([phone]) Authorization number provided: [id number].  Transportation provider to be assigned.  Patient was made aware transportation may take up to 4 hours to arrive to the hospital. Patient declined to identify a primary caregiver.   LMSW will continue to follow up as needed.

## 2019-06-25 NOTE — ED ADULT NURSE REASSESSMENT NOTE - NS ED NURSE REASSESS COMMENT FT1
Patient returned from vascular requesting IV fluids due to "immense dehydration" and 1630 home medications. VSS updated on plan of care, aware to provide urine.

## 2019-06-26 PROBLEM — R63.4 WEIGHT LOSS: Status: ACTIVE | Noted: 2017-11-30

## 2019-06-26 PROBLEM — R00.0 POTS (POSTURAL ORTHOSTATIC TACHYCARDIA SYNDROME): Status: ACTIVE | Noted: 2017-09-19

## 2019-06-26 PROBLEM — R19.4 ALTERED BOWEL HABITS: Status: ACTIVE | Noted: 2019-01-25

## 2019-06-26 NOTE — HISTORY OF PRESENT ILLNESS
[FreeTextEntry1] : Last visit: 4/2019\par Plan after last visit:\par Acute diarrhea (787.91) (R19.7)\par Weight loss (783.21) (R63.4)\par Manic behavior (296.00) (F30.10)\par Abdominal pain (789.00) (R10.9)\par Altered bowel habits (787.99) (R19.4)\par Iron deficiency anemia (280.9) (D50.9)\par \par 43 yo female with multiple medical problems including significant anxiety/depression with multiple admissions to [facility], h/o ITP, questionable h/o POTS, JODY, newly diagnosed with Protein S deficiency c/b VTE who presents for follow up of multiple GI complaints including: weight loss, persistent diarrhea, intermittent abdominal pain. She has had extensive imaging and lab work that have not revealed a cause. There are some tests (non IGA related celiac testing, c diff, fecal ten) that are worth sending. I remain worried that her uncontrolled psychiatric illness prohibits treatment at this time and I have told her such. The next steps of treatment involve endoscopic evaluation and trials of medication which she continues to decline, despite my continue reassurance that I believe we can safely pursue endoscopic assessment. After this extensive discussion for 40+ minutes, she again declined any intervention. I specifically offered the following, all of which she declined:\par \par - EGD/Colon\par - Video capsule endoscopy\par - Vit D and Iron repletion\par - Trial of antidiarrheals or antispasmodic medication\par - SIBO testing OR Breath test\par - Referrals to nutrition or psychiatry\par - Second opinion from outside GI physician in order to see if someone else has a different approach that we could consider\par \par She will allow me to send stool and blood tests at this time and again reports considering future testing.\par \par Impression:\par 1) Acute on chronic diarrhea\par 2) Vitamin Deficiency - Vit D, Fe\par 3) Weight loss - stable after loosing a few pounds again\par 4) Anxiety/Depression with potential bipolar disorder and suspect current manic behavior\par 5) Protein S deficiency c/b VTE on Xarelto\par 6) Thrombocytopenia - stable\par 7) Nephrolithiasis - appears resolved\par 8) ? POTS ?\par 9) ? Sjogrens ?\par 10) Low IGA\par \par Plan:\par 1) C Diff given recent abx\par 2) Fecal Ca\par 3) DQ2/DQ8 testing, Deaminated TTG IGA IGG\par 4) Giardia (OP negative) but can be persistent in IGA deficiency\par 5) Will continue discussions as outlined extensively before\par 6) Strongly emphasized the need for psychiatric care and re-discussed the role of anxiety/depression manifesting in GI symptoms with patient at length\par 7) All discussed at length for over 40 minutes. All questions answered. Plan agreed upon\par 8) RV pending above. \par -------------------------------------------------------------------\par \par Reviewed all testing that was previously sent being negative\par She presents with similar complaints of:\par JODY, Altered bowel habits, malnutrition, and weight loss\par \par We had a 45 minute conversation regarding the risks/benefits of endoscopic procedures and biopsies\par She continues to have significant concerns about any invasive testing\par I reiterated that to the best of my ability, we have exhausted non invasive testing

## 2019-06-26 NOTE — REVIEW OF SYSTEMS
[Fever] : no fever [Chills] : no chills [Feeling Poorly] : feeling poorly [Feeling Tired] : feeling tired [As Noted in HPI] : as noted in HPI [Negative] : Heme/Lymph

## 2019-06-26 NOTE — ASSESSMENT
[FreeTextEntry1] : 42 yo female with multiple medical problems including significant anxiety/depression with multiple admissions to [facility], h/o ITP, questionable h/o POTS, JODY, newly diagnosed with Protein S deficiency c/b VTE who presents for follow up of multiple GI complaints including: weight loss, persistent diarrhea, intermittent abdominal pain. We have conducted all non invasive testing options and the next step in the workup is EGD/Colon +/- VCE.  She continues to have questions, often similar to the same questions from each prior visit.  After extensive discussion, we identified the following barriers to a potential endoscopy/colonoscopy with biopsy\par \par - Potential POTS reaction to procedure\par - Possible side effect of sedation\par - Possible complication re: bleeding with biopsy given need for a/c and low plt\par \par She agrees to discuss these specific issues with her cardiology and hematology physicians.  Of note - she may again change these physicians (which would be I believe her 3rd consult for the same issue).\par \par I again offered to refer her for a second opinion to see if someone can offer her other non invasive options.\par \par Impression:\par 1) Chronic Diarrhea\par 2) Vitamin Deficiency - Vit D, Fe\par 3) Weight loss - stable \par 4) Anxiety/Depression with potential bipolar disorder and suspect current manic behavior (persisting, though improved since last visit)\par 5) Protein S deficiency c/b VTE on Xarelto\par 6) Thrombocytopenia - slightly worse on recent labs\par 7) Nephrolithiasis - appears resolved\par 8) ? POTS ?\par 9) ? Sjogrens ?\par 10) Low IGA\par \par Plan:\par 1) I recommend EGD and Colonoscopy with biopsy as next step\par - She will talk with Cardiology/Hematology re: options\par - Would get PST prior\par 2) GI second opinion if she is willing to see it out\par 3) Strongly emphasized the need for psychiatric care and re-discussed the role of anxiety/depression manifesting in GI symptoms with patient at length\par 4) All discussed at length for over 45 minutes. All questions answered. Plan agreed upon\par 5) RV pending above

## 2019-06-26 NOTE — PHYSICAL EXAM
[General Appearance - Alert] : alert [Sclera] : the sclera and conjunctiva were normal [PERRL With Normal Accommodation] : pupils were equal in size, round, and reactive to light [Extraocular Movements] : extraocular movements were intact [Outer Ear] : the ears and nose were normal in appearance [Examination Of The Oral Cavity] : the lips and gums were normal [Neck Appearance] : the appearance of the neck was normal [Oropharynx] : the oropharynx was normal [Neck Cervical Mass (___cm)] : no neck mass was observed [Respiration, Rhythm And Depth] : normal respiratory rhythm and effort [Exaggerated Use Of Accessory Muscles For Inspiration] : no accessory muscle use [Heart Rate And Rhythm] : heart rate was normal and rhythm regular [Heart Sounds] : normal S1 and S2 [Edema] : there was no peripheral edema [Bowel Sounds] : normal bowel sounds [Abdomen Soft] : soft [Abdomen Tenderness] : non-tender [Abdomen Mass (___ Cm)] : no abdominal mass palpated [Abnormal Walk] : normal gait [] : no rash [Involuntary Movements] : no involuntary movements were seen [No Focal Deficits] : no focal deficits [FreeTextEntry1] : + anxious + flight of ideas + inability to focus - appears to be manic

## 2019-07-01 ENCOUNTER — EMERGENCY (EMERGENCY)
Facility: HOSPITAL | Age: 44
LOS: 1 days | End: 2019-07-01
Attending: EMERGENCY MEDICINE

## 2019-07-01 ENCOUNTER — APPOINTMENT (OUTPATIENT)
Dept: VASCULAR SURGERY | Facility: CLINIC | Age: 44
End: 2019-07-01

## 2019-07-01 VITALS
HEART RATE: 93 BPM | WEIGHT: 98.11 LBS | RESPIRATION RATE: 15 BRPM | DIASTOLIC BLOOD PRESSURE: 95 MMHG | HEIGHT: 62 IN | TEMPERATURE: 99 F | OXYGEN SATURATION: 99 % | SYSTOLIC BLOOD PRESSURE: 136 MMHG

## 2019-07-01 DIAGNOSIS — Z90.49 ACQUIRED ABSENCE OF OTHER SPECIFIED PARTS OF DIGESTIVE TRACT: Chronic | ICD-10-CM

## 2019-07-01 NOTE — ED ADULT TRIAGE NOTE - CHIEF COMPLAINT QUOTE
"I need an u/s of left arm and both legs bc I have a blood clot in my right arm bc I recently had an IV there. I don't want any blood drawn. I just want an ultrasound"

## 2019-07-01 NOTE — ED ADULT NURSE NOTE - OBJECTIVE STATEMENT
Pt. received alert and oriented x3 with chief complaint of "I have blood clots and need a doppler." Pt. stated she does not want to wait and got dressed and left  w/o being seen by doctor.

## 2019-07-01 NOTE — ED ADULT NURSE NOTE - CHIEF COMPLAINT
History    Nba Razo is a 66 year old male who presents for an annual exam. He is complaining of a cough that started 1 year ago. The cough occurs on and off and it is associated with occasional sputum production and wheezing. He has been treated at the urgent care physician clinic in [place] on April 1, 2018. He was diagnosed to have a unspecified acute lower respiratory infection and received a prescription for a Z-Jovan, Tessalon Perles, Albuterol inhaler and a short course of Prednisone. The cough improved to some degree but after he was done with the medications came back. The cough seems to be worse at night. He denies having any hemoptysis. No unexplained fever, night sweats, anorexia, weight changes, diaphoresis. He denies exposure to known cases of pulmonary tuberculosis. No recent travel outside the state or country. He quit smoking in 1973 he smoked more than 100 cigarettes in his lifetime. He denies smoking electronic cigarettes or chewing tobacco. He denies any illicit drug use.    When his cough returned after his antibiotic course, he started taking Lobelia which is  tobacco tablets. He swears that this has provided better relief to his cough. He feels his numbness more lubricated. 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    He also informed me that he has hypertension. 10 years ago, his doctor told them that he might die sooner than later because his blood pressure was too high. He refused to take prescription antihypertensive agents. He dieted and lost 20 pounds and his blood pressure went down. My nurse told him his blood pressure was very high this afternoon. He attributed this to white coat syndrome. He denies having any headache, vision changes, heaviness in his sleep area, dizziness or lightheadedness.  medical history    Past Medical History:   Diagnosis Date   • Cough in adult    • Essential  hypertension        SURGICAL history    History reviewed. No pertinent surgical history.    social history    Social History     Social History   • Marital status:      Spouse name: Christal   • Number of children: 2   • Years of education: 15     Occupational History   • service fitter      Social History Main Topics   • Smoking status: Former Smoker     Types: Cigarettes     Quit date: 5/17/1973   • Smokeless tobacco: Never Used   • Alcohol use No   • Drug use: No   • Sexual activity: Yes     Partners: Female      Comment: once a week     Other Topics Concern   • None     Social History Narrative   • None       family history    Family History   Problem Relation Age of Onset   • Diabetes Mother    • Hypertension Mother    • Cancer Mother 32     lymphatic system   • Diabetes Father    • Heart disease Father    • Hypertension Father    • Stroke Father    • Diabetes Brother    • COPD Brother    • Cancer, Lung Brother 66   • Coronary Artery Disease Maternal Grandmother 70   • Natural Causes Maternal Grandfather 70   • Natural Causes Paternal Grandmother 98   • Stroke Paternal Grandfather 82   • Obesity Brother    • Hypertension Brother    • Schizophrenia Brother    • Obesity Daughter        mEDICATIONS    Current Outpatient Prescriptions   Medication Sig   • UNKNOWN TO PATIENT Lobelia- 50mg   • Multiple Vitamins-Minerals (MULTIVITAMIN ADULT PO)    • albuterol 108 (90 Base) MCG/ACT inhaler Inhale 2 puffs into the lungs every 4 hours as needed for Shortness of Breath or Wheezing.   • benzonatate (TESSALON PERLES) 100 MG capsule Take 1 capsule by mouth 3 times daily as needed for Cough.     No current facility-administered medications for this visit.        aLLERGIES    ALLERGIES:  No Known Allergies    I have reviewed the patient's medications and allergies, past medical, surgical, social and family history, updating these as appropriate.  See Histories section of the electronic medical record for a display of this  information.    Review of systems   Constitutional:  Denies fevers, chills, weakness, fatigue, loss of appetite, abnormal weight gain or abnormal weight loss, malaise or insomnia.  Skin:  Denies new rashes or lesions, pruritus or dryness of skin.  HEENT:  Denies blindness, blurred vision, double vision, pain, itching or burning. No facial pain, ear pain, hearing loss, tinnitus, nasal congestion, rhinorrhea, epistaxis, sinus pain, mouth lesions or sore throat.  Respiratory: One-year history of cough which is occasionally productive and associated with wheezing. Denies shortness of breath, hemoptysis or wheezing.  Cardiovascular: Hypertension. White coat syndrome. Denies chest pain, palpitations, tachycardia, edema, cyanosis or vertigo.  No near-syncope or syncope. No exertional chest pains or shortness of breath. No orthopnea or paroxysmal nocturnal dyspnea. No leg swelling.  Gastrointestinal:  Denies abdominal pain, heartburn, nausea, vomiting, diarrhea, constipation or blood in stool.  No melena, hematemesis, hematochezia or rectal bleeding.  Musculoskeletal:  Denies back pain, joint pain, joint swelling or tenderness, muscle pains or spasms.  Denies neck pain, stiffness or swelling.  Neurologic:  Denies numbness, tingling, other sensory changes, sudden weakness in arms or legs.  Denies confusion, headache, dizziness, memory loss or tremors.  Genitourinary:  Denies urinary frequency, nocturia, urgency, incontinence, dysuria or hematuria.  No problems with impotence or libido.   No discharge or lesions of penis.  Hematologic/Lymph:  Denies easy bruising or bleeding, swollen lymph glands.  Endocrine:  Denies heat or cold intolerance, polyphagia, polydipsia or polyuria.  Denies changes in hair or skin texture.  Psychiatric:  Denies anxiety, depression, suicidal or homicidal thoughts, hallucinations, irritability or sleeping problems.  Allergic/Immunologic:  Denies recurrent infections, hypersensitivity.    Physical  Exam    Vital Signs:    Visit Vitals  BP (!) 180/100 (BP Location: [facility], Patient Position: Sitting)   Pulse 68   Temp 97.6 °F (36.4 °C) (Oral)   Resp 16   Ht 5' 7\" (1.702 m)   Wt 101.2 kg   BMI 34.93 kg/m²     Wt Readings from Last 3 Encounters:   05/17/18 101.2 kg     General:  Patient is a 66-year-old  male. He is well developed, well nourished. Awake, alert and not in apparent distress.  Skin:  Warm. Dry. Pink. No rashes or lesions. No wounds. No jaundice or pallor noted.  HEENT:  Normocephalic, atraumatic.  Pupils equal, round, reactive to light and accommodation, bilaterally; extraocular movements intact, bilaterally. Conjunctivae pink. Sclerae anicteric. Funduscopic exam: No papilledema or hemorrhages noted. Bilateral external ears are normal. Tympanic membrane are intact, bilaterally.Hearing grossly normal to whisper exam. Buccal mucosa is moist. Tongue and uvula are midline. External nose is normal. Nasal mucosa is moist. No facial asymmetry.    Neck:  Supple. Trachea midline. No JVD, carotid bruit, thyromegaly or lymphadenopathy noted. No masses. Nontender. Normal range of motion.   Breast: No gynecomastia noted. No palpable masses felt, bilaterally. No nipple discharge, dimpling, retraction or skin changes noted, bilaterally.  Respiratory:  Normal respiratory effort. No chest wall tenderness. Lungs clear to auscultation bilaterally. Symmetrical chest expansion.  Cardiovascular:  Regular rate and rhythm. No murmurs, rubs or gallops. Normal S1 and S2. No S3 or S4.   Gastrointestinal:  Obese. Protuberant. Soft. Nontender. Nondistended. Normal bowel sounds. No abdominal masses. No hepatosplenomegaly. No bruit noted.  Genitourinary: Circumcised. No penile shaft deformity. No urethral discharge noted. Normal scrotum. Testicles are bilaterally descended.  No testicular masses or tenderness.  No inguinal hernia noted.    Rectal: No external hemorrhoid noted. Tight sphincter tone. Stool sample was obtained  for hemoccult testing. No masses.  Prostate normal in size, smooth and nontender to touch. No nodules felt.  Musculoskeletal:  Upper extremities: No clubbing or cyanosis. No acute swelling or tenderness noted in the shoulder, elbow, wrist or hand joints.  Range of motion of the shoulder, elbow, wrist and hand joints are full and equal. Peripheral pulses are 2+, bilaterally. DTR's are 2+, bilaterally. No sensory deficits, bilaterally. Motor strength is 5/5 bilaterally. No Jalen's or Heberden's nodes. No tremors or rigidity.  Lower extremities: Tight hamstrings, bilaterally. No acute swelling or tenderness in the hips, knees, ankles and foot joints. Range of motion of the hips, knees, ankles and foot joints are full and equal. Peripheral pulses are 2+ in all extremities. DTR's are 2+ in all extremities. No sensory deficits noted. Motor strength is 5/5, bilaterally. He has high foot arches. His toenails are yellow, thick and dystrophic consistent with onychomycosis of the toenails No interdigital web maceration, callous or ulcerations in the plantar portion of the feet. No pretibial edema or calf tenderness, bilaterally. No tremors or rigidity noted.   Back: No pain on palpation. Range of motion of the back is not limited.  Neurologic:  Oriented x 3. Cranial nerves II-XII intact. No sensory deficits noted. No motor deficits. DTR's are 2+ in all extremities. Negative Babinski's sign. Negative Romberg's sign. Gait is steady. Mental status normal with no gross cognitive impairment.   Lymphatic:  No lymphadenopathy in submental, submandibular or anterior and posterior cervical chain. No supraclavicular or infraclavicular lymphadenopathy. No axillary or inguinal lymphadenopathy.  Psychiatric: Cooperative. Appropriate mood and affect. Normal judgment.    Assessment  1. Essential hypertension    2. Chronic cough    3. Need for hepatitis C screening test    4. Screen for colon cancer    5. Screening for AAA (abdominal  aortic aneurysm)    6. Wheezing    7. Need for vaccination        PLAN  The patient was instructed to follow the DASH diet and decrease his salt intake he was instructed to limit it to 2 g per day. Information on hypertension provided to him today. Body mass index is 34.93 kg/m². BMI ASSESSMENT/PLAN:  Follow-up in 1 year   He will exercise at least half an hour per day 3-5 times a week to lose weight. He declined a prescription for an antihypertensive agent at this time. He wants to work on his diet, exercise and weight loss.    I advised him to stop taking his over-the-counter supplements Lobelia.I did a short research on up-to-date and found that this can cause hypertension because it has nicotine and its and it can also increase pulse rate. He will return to the clinic in 1 week for a blood pressure and pulse check.     The following tests were requested today: CBC, comprehensive metabolic panel, fasting lipid panel, urinalysis and Hepatitis C antibody with reflex. 2 view chest x-ray was requested due to cough. He will also be scheduled for the ultrasound screening for abdominal aortic aneurysm due to his significant smoking history. He has been scheduled for a screening colonoscopy.    He was given Prevnar 13. Next year he will receive the Pneumovax 23.    He was given the following medications: Tessalon Perles 100 mg every 8 hours p.r.n. cough and Albuterol inhaler 2 inhalations every 4 hours p.r.n. shortness of breath or wheezing. We discussed the possible antihypertensive agents I will prescribe to him this included Hydrochlorothiazide, an ACE inhibitor or an ARB.    He will be referred to Pulmonary medicine for his chronic cough evaluation and treatment I also ordered her for pulmonary function tests with bronchodilators.    The patient was instructed to avoid eating before going to bed. I informed him that, and because of chronic cough is silent acid reflux or laryngopharyngeal reflux. He was also  instructed to keep his head elevated by 30° when he sleeps at night.    Patient will return to the clinic in 1 week for a blood pressure and pulse check. Otherwise, I will see him again in 6 months. At that time, the following tests were requested: CBC, comprehensive about panel and fasting lipid panel.  Orders Placed This Encounter   • XR CHEST PA AND LATERAL - 2 views   • US Aorta Screening   • Pneumococcal Conjugate 13 Valent Vaccine (Prevnar 13) - 36259   • CBC & Auto Differential   • Comprehensive Metabolic Panel   • Lipid Panel with Reflex   • Urinalysis & Reflex Micro   • Hepatitis C Antibody with Reflex   • SERVICE TO SURGERY GENERAL   • SERVICE TO PULMONARY TESTING   • SERVICE TO PULMONARY MEDICINE   • UNKNOWN TO PATIENT   • Multiple Vitamins-Minerals (MULTIVITAMIN ADULT PO)   • albuterol 108 (90 Base) MCG/ACT inhaler   • benzonatate (TESSALON PERLES) 100 MG capsule         All the above was discussed with the patient in detail; questions were answered to the patient's satisfaction.  Patient left the office in stable condition with verbal and written instructions.   The patient is a 43y Female complaining of pain, arm.

## 2019-07-08 ENCOUNTER — CLINICAL ADVICE (OUTPATIENT)
Age: 44
End: 2019-07-08

## 2019-07-08 RX ORDER — CEFUROXIME AXETIL 500 MG/1
500 TABLET ORAL
Qty: 20 | Refills: 0 | Status: COMPLETED | COMMUNITY
Start: 2019-07-08 | End: 2019-07-08

## 2019-07-09 DIAGNOSIS — N13.30 UNSPECIFIED HYDRONEPHROSIS: ICD-10-CM

## 2019-07-09 NOTE — ED ADULT NURSE NOTE - PRO INTERPRETER NEED 2
As per parents, pt fell from shopping cart in [facility] striking her head and back.  Upon registration, pt was crying.  At triage, pt was lethargic.  Pt moved to  for evaluation.  Dr. Longoria at bedside.
English

## 2019-07-29 NOTE — PHYSICAL EXAM
[Ambulatory and capable of all self care but unable to carry out any work activities] : Status 2- Ambulatory and capable of all self care but unable to carry out any work activities. Up and about more than 50% of waking hours [Thin] : thin [Normal] : grossly intact [de-identified] : Sabios [de-identified] : Pressured speech

## 2019-07-29 NOTE — HISTORY OF PRESENT ILLNESS
[de-identified] : Idiopathic thrombocytopenic purpura\par 11/18 Left IJ thrombosis\par Protein S deficiency \par \par  [de-identified] : Feels fatigued. C/O diarrhea x 5 days.  Loose bowel movement  3-4 times a day.  C/O  palpitation.  she went to  [facility] two days ago due to palpitation. Takes iron pills every other day for past month. She started taking it once a day for past week. She feels dizzy. C/O red dots all over the body since a week ago. Has tremor.   Has tinnitus.   Didn't see See ENT. Saw  Neurologist two months ago. feels  swollen glans 5 days ago left side of her neck. trouble swallowing.  Heavy menstrual cycles persist. Started Saturday.  She is also passing clots between periods. Has  left vague abdominal discomfort. Her bruising has increased recently. Generalized rash starts lower legs, on and off, moves up the body.  Thinks has swelling in limbs. No melena. Has streaks of BRBP. She  Is visiting ERs frequently for numerous concerns.

## 2019-07-29 NOTE — REVIEW OF SYSTEMS
[Fatigue] : fatigue [Chest Pain] : chest pain [Joint Pain] : joint pain [Dizziness] : dizziness [Easy Bruising] : a tendency for easy bruising [Negative] : Allergic/Immunologic [Abdominal Pain] : no abdominal pain [Diarrhea] : no diarrhea [Dysmenorrhea/Abn Vaginal Bleeding] : no dysmenorrhea/abnormal vaginal bleeding [Muscle Pain] : no muscle pain [Skin Rash] : no skin rash

## 2019-07-29 NOTE — PHYSICAL EXAM
[Thin] : thin [Normal] : PERRL, EOMI, no conjunctival infection, anicteric [Ambulatory and capable of all self care but unable to carry out any work activities] : Status 2- Ambulatory and capable of all self care but unable to carry out any work activities. Up and about more than 50% of waking hours [de-identified] : Tattoos, scattered small bruises on bilateral legs, arms

## 2019-07-29 NOTE — ASSESSMENT
[Palliative Care Plan] : not applicable at this time [FreeTextEntry1] : 42 YO F with chronic ITP with recent post operative thrombosis of her left internal jugular.  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 This is a hereditary condition. I have counseled her regarding it and the increased risk of venous thrombosis associated with it. The need for lifelong anticoagulation was reviewed. Appropriate precautions to take to decrease recurrence were also reviewed including for travel and surgery. \par \par She is Fe deficient and anemic, but this is stable since May 2018. She is taking the iron pills every  day, and h/h seems to be responding. Platelet counts better today.  Duplex of upper ext,  bilateral  showed chronic nonocclusive thrombus of left internal jugular vein, and 1.4 cm abnormal left level 4 lymph node. Pt was asked to  obtain CD, which will be  reviewed  by our radiologist.  Told the pt to reduce her Xarelto to 10 mg, script send to her pharmacy. \par \par \par Suggest :\par Obtain  CD, upload and review once received \par  Xarelto 10 mg daily, script send to her pharmacy \par D/C Xarelto if  plts < 50,000 \par CMP, LDH\par Encouraged pt to take Fe every day \par Psychiatric f/u\par F/U with all her consultants - GI, Neuro, Uro, Rheum, Medicine\par RTC 1 month \par

## 2019-07-29 NOTE — ASSESSMENT
[Palliative Care Plan] : not applicable at this time [FreeTextEntry1] : 42 YO F with chronic ITP with recent post operative thrombosis of her left internal jugular.  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 This is a hereditary condition. I have counseled her regarding it and the increased risk of venous thrombosis associated with it. The need for lifelong anticoagulation was reviewed. Appropriate precautions to take to decrease recurrence were also reviewed including for travel and surgery. \par \par She is Fe deficient and anemic, but this is stable since May 2018. She sates taking the iron pills every other day, and h/h seems to be responding. Her platelet counts are better as well, does not explain the "red dots" she notices all over the body on and off,  told her to see a dermatologist if persists. She c/o lose bowel movement, trying J CARLOS diet, with no improvements. she thinks it related to iron pills, does not want to take any medication. Stated saw GI recently. C-Diff was negative last months. No Lymph node palpable on the exam today, she  insisted on "cancer screening blood test" to be done,  was drawn 1/9/19, normal value, no need to repeat. Protein S does not to be repeated. She is aware to reduce the dose to 10 mg when 6 month is done.  She  has significant psychiatric disability. \par \par Suggest :\par Continue Xarelto 20 mg daily as long as plts > 50,000 for at least 6 months. Then consider decreasing to 10 mg daily prophylaxis.\par Encouraged pt to take Fe supplementation at least daily or to take IV Fe\par Psychiatric f/u\par F/U with all her consultants - GI, Neuro, Uro, Rheum, Medicine\par CMP-LDH\par RTC 1 month. \par

## 2019-07-29 NOTE — HISTORY OF PRESENT ILLNESS
[de-identified] : Idiopathic thrombocytopenic purpura\par 11/18 Left IJ thrombosis\par Protein S deficiency \par \par  [de-identified] : Pt is fair.  C/O  chest pain went to [facility] last week. She had a V- scan showed no PE. Doppler of the bilateral arms which was negative for DVT, but she was told there is a 1.5 cm  lymph node in left neck. She has heavy periods now. She is taking iron pills qd for past 2 weeks.  She has bruises all over  the right arm, back, and legs. She is on 20 mg Xeralto.   Sated BP is high despite taking medication.  She is visiting ERs frequently for numerous concerns.

## 2019-07-29 NOTE — RESULTS/DATA
[FreeTextEntry1] : WBC 6.300 Hgb 11.7 Hct 35.2 Platelets 74,000 MCV 78.9 Diff normal\par \par 6/18/19 [facility] ER\par Chest X-Ray, no acute changes, dz\par \par EKG, normal\par \par US Duplex Upper Ext Bilateral\par No evidence of DVT\par Chronic nonocclusive thrombus of left internal jugular vein, \par 1.4 cm abnormal left level 4 lymph node\par

## 2019-07-29 NOTE — REVIEW OF SYSTEMS
[Fatigue] : fatigue [Dysmenorrhea/Abn Vaginal Bleeding] : dysmenorrhea/abnormal vaginal bleeding [Joint Pain] : joint pain [Muscle Pain] : muscle pain [Dizziness] : dizziness [Easy Bruising] : a tendency for easy bruising [Negative] : Allergic/Immunologic [Abdominal Pain] : abdominal pain [Diarrhea] : diarrhea [Skin Rash] : skin rash [FreeTextEntry4] : Tinnitus

## 2019-08-01 ENCOUNTER — EMERGENCY (EMERGENCY)
Facility: HOSPITAL | Age: 44
LOS: 0 days | Discharge: HOME | End: 2019-08-01
Attending: EMERGENCY MEDICINE | Admitting: EMERGENCY MEDICINE
Payer: MEDICAID

## 2019-08-01 VITALS — WEIGHT: 98.11 LBS | HEIGHT: 62 IN

## 2019-08-01 VITALS
HEART RATE: 82 BPM | OXYGEN SATURATION: 99 % | TEMPERATURE: 98 F | DIASTOLIC BLOOD PRESSURE: 88 MMHG | RESPIRATION RATE: 17 BRPM | SYSTOLIC BLOOD PRESSURE: 129 MMHG
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
## 2019-08-01 DIAGNOSIS — Z79.01 LONG TERM (CURRENT) USE OF ANTICOAGULANTS: ICD-10-CM

## 2019-08-01 DIAGNOSIS — S30.0XXA CONTUSION OF LOWER BACK AND PELVIS, INITIAL ENCOUNTER: ICD-10-CM

## 2019-08-01 DIAGNOSIS — I10 ESSENTIAL (PRIMARY) HYPERTENSION: ICD-10-CM

## 2019-08-01 DIAGNOSIS — G90.1 FAMILIAL DYSAUTONOMIA [RILEY-DAY]: ICD-10-CM

## 2019-08-01 DIAGNOSIS — R22.2 LOCALIZED SWELLING, MASS AND LUMP, TRUNK: ICD-10-CM

## 2019-08-01 DIAGNOSIS — Z82.49 FAMILY HISTORY OF ISCHEMIC HEART DISEASE AND OTHER DISEASES OF THE CIRCULATORY SYSTEM: ICD-10-CM

## 2019-08-01 DIAGNOSIS — R07.9 CHEST PAIN, UNSPECIFIED: ICD-10-CM

## 2019-08-01 DIAGNOSIS — Z90.49 ACQUIRED ABSENCE OF OTHER SPECIFIED PARTS OF DIGESTIVE TRACT: Chronic | ICD-10-CM

## 2019-08-01 DIAGNOSIS — F41.9 ANXIETY DISORDER, UNSPECIFIED: ICD-10-CM

## 2019-08-01 DIAGNOSIS — Z87.19 PERSONAL HISTORY OF OTHER DISEASES OF THE DIGESTIVE SYSTEM: ICD-10-CM

## 2019-08-01 DIAGNOSIS — D68.59 OTHER PRIMARY THROMBOPHILIA: ICD-10-CM

## 2019-08-01 DIAGNOSIS — Z86.718 PERSONAL HISTORY OF OTHER VENOUS THROMBOSIS AND EMBOLISM: ICD-10-CM

## 2019-08-01 DIAGNOSIS — I49.8 OTHER SPECIFIED CARDIAC ARRHYTHMIAS: ICD-10-CM

## 2019-08-01 DIAGNOSIS — F32.9 MAJOR DEPRESSIVE DISORDER, SINGLE EPISODE, UNSPECIFIED: ICD-10-CM

## 2019-08-01 DIAGNOSIS — M54.9 DORSALGIA, UNSPECIFIED: ICD-10-CM
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
## 2019-08-01 DIAGNOSIS — D69.3 IMMUNE THROMBOCYTOPENIC PURPURA: ICD-10-CM

## 2019-08-01 DIAGNOSIS — Z80.42 FAMILY HISTORY OF MALIGNANT NEOPLASM OF PROSTATE: ICD-10-CM

## 2019-08-01 LAB
ALBUMIN SERPL ELPH-MCNC: 4.1 G/DL — SIGNIFICANT CHANGE UP (ref 3.3–5)
ALP SERPL-CCNC: 80 U/L — SIGNIFICANT CHANGE UP (ref 40–120)
ALT FLD-CCNC: 17 U/L — SIGNIFICANT CHANGE UP (ref 12–78)
ANION GAP SERPL CALC-SCNC: 8 MMOL/L — SIGNIFICANT CHANGE UP (ref 5–17)
AST SERPL-CCNC: 16 U/L — SIGNIFICANT CHANGE UP (ref 15–37)
BASOPHILS # BLD AUTO: 0.02 K/UL — SIGNIFICANT CHANGE UP (ref 0–0.2)
BASOPHILS NFR BLD AUTO: 0.2 % — SIGNIFICANT CHANGE UP (ref 0–2)
BILIRUB SERPL-MCNC: 0.3 MG/DL — SIGNIFICANT CHANGE UP (ref 0.2–1.2)
BUN SERPL-MCNC: 4 MG/DL — LOW (ref 7–23)
CALCIUM SERPL-MCNC: 9.1 MG/DL — SIGNIFICANT CHANGE UP (ref 8.5–10.1)
CHLORIDE SERPL-SCNC: 107 MMOL/L — SIGNIFICANT CHANGE UP (ref 96–108)
CO2 SERPL-SCNC: 24 MMOL/L — SIGNIFICANT CHANGE UP (ref 22–31)
CREAT SERPL-MCNC: 0.82 MG/DL — SIGNIFICANT CHANGE UP (ref 0.5–1.3)
EOSINOPHIL # BLD AUTO: 0.01 K/UL — SIGNIFICANT CHANGE UP (ref 0–0.5)
EOSINOPHIL NFR BLD AUTO: 0.1 % — SIGNIFICANT CHANGE UP (ref 0–6)
GLUCOSE SERPL-MCNC: 104 MG/DL — HIGH (ref 70–99)
HCT VFR BLD CALC: 37.2 % — SIGNIFICANT CHANGE UP (ref 34.5–45)
HGB BLD-MCNC: 12.3 G/DL — SIGNIFICANT CHANGE UP (ref 11.5–15.5)
IMM GRANULOCYTES NFR BLD AUTO: 0.8 % — SIGNIFICANT CHANGE UP (ref 0–1.5)
LYMPHOCYTES # BLD AUTO: 1.23 K/UL — SIGNIFICANT CHANGE UP (ref 1–3.3)
LYMPHOCYTES # BLD AUTO: 14.8 % — SIGNIFICANT CHANGE UP (ref 13–44)
MCHC RBC-ENTMCNC: 27.3 PG — SIGNIFICANT CHANGE UP (ref 27–34)
MCHC RBC-ENTMCNC: 33.1 GM/DL — SIGNIFICANT CHANGE UP (ref 32–36)
MCV RBC AUTO: 82.5 FL — SIGNIFICANT CHANGE UP (ref 80–100)
MONOCYTES # BLD AUTO: 0.52 K/UL — SIGNIFICANT CHANGE UP (ref 0–0.9)
MONOCYTES NFR BLD AUTO: 6.3 % — SIGNIFICANT CHANGE UP (ref 2–14)
NEUTROPHILS # BLD AUTO: 6.46 K/UL — SIGNIFICANT CHANGE UP (ref 1.8–7.4)
NEUTROPHILS NFR BLD AUTO: 77.8 % — HIGH (ref 43–77)
PLATELET # BLD AUTO: 70 K/UL — LOW (ref 150–400)
POTASSIUM SERPL-MCNC: 3.5 MMOL/L — SIGNIFICANT CHANGE UP (ref 3.5–5.3)
POTASSIUM SERPL-SCNC: 3.5 MMOL/L — SIGNIFICANT CHANGE UP (ref 3.5–5.3)
PROT SERPL-MCNC: 7.1 GM/DL — SIGNIFICANT CHANGE UP (ref 6–8.3)
RBC # BLD: 4.51 M/UL — SIGNIFICANT CHANGE UP (ref 3.8–5.2)
RBC # FLD: 15.2 % — HIGH (ref 10.3–14.5)
SODIUM SERPL-SCNC: 139 MMOL/L — SIGNIFICANT CHANGE UP (ref 135–145)
TROPONIN I SERPL-MCNC: <0.015 NG/ML — SIGNIFICANT CHANGE UP (ref 0.01–0.04)
WBC # BLD: 8.31 K/UL — SIGNIFICANT CHANGE UP (ref 3.8–10.5)
WBC # FLD AUTO: 8.31 K/UL — SIGNIFICANT CHANGE UP (ref 3.8–10.5)

## 2019-08-01 PROCEDURE — 76882 US LMTD JT/FCL EVL NVASC XTR: CPT | Mod: 26

## 2019-08-01 PROCEDURE — 99283 EMERGENCY DEPT VISIT LOW MDM: CPT

## 2019-08-01 PROCEDURE — 93010 ELECTROCARDIOGRAM REPORT: CPT

## 2019-08-01 NOTE — ED PROVIDER NOTE - SKIN, MLM
Skin normal color for race, warm, dry and intact. No evidence of rash. Skin warm, dry and intact. No evidence of rash. +localized area of ecchymosis and palpable nodule

## 2019-08-01 NOTE — ED PROVIDER NOTE - OBJECTIVE STATEMENT
44 y/o female with a PMHx of DVT, Protein S Deficiency on Xarelto, Dysautonomia, ITP, POTS, HTN, anxiety presents to the ED sent by internist Dr. Cristhian Parker for potential blood clot in lower back. Pt noted mass to lower back and lower back pain x3 days which made her concerned for a possible blood clot and came to the ED for evaluation. Also reports continued chest pain and SOB intermittently that are consistent with prior episodes she has presented to [facility] for in the past couple of months. Denies lower extremities swelling, fever, abdominal pain. 44 y/o female with a PMHx of DVT, Protein S Deficiency (on Xarelto), Dysautonomia, ITP, POTS, HTN, anxiety, depression, s/p appendectomy presents to the ED sent by internist Dr. Cristhian Parker for potential blood clot in lower back. Pt noted mass to lower back and lower back pain x3 days which concerned her for a possible blood clot and came to the ED for evaluation. Reports the area around the lump was red in color yesterday. Denies recent trauma or injury. Also reports continued chest pain and SOB intermittently that are consistent with prior episodes she has presented to [facility] for in the past couple of months. Denies lower extremities swelling, fever, abdominal pain. Currently on a heart monitor for chest pain and tremors. Fhx: uncle  from PE, grandmother had blood clot in lower back which turned into a CVA. On Xarelto, Cardizem. Internist: Keith.

## 2019-08-01 NOTE — ED PROVIDER NOTE - ATTENDING CONTRIBUTION TO CARE
42 y/o female with a PMHx of DVT, Protein S Deficiency (on Xarelto), Dysautonomia, ITP here with small ecchymotic nodule lower back. ?injury. No fever/chill.  Likely hematoma vs cyst vs lipoma  Plan: US imaging, labs, reassess

## 2019-08-01 NOTE — ED PROVIDER NOTE - NSFOLLOWUPINSTRUCTIONS_ED_ALL_ED_FT
Follow up with your primary care doctor within the next 3-5 days for re-evaluation and continued care.    Hematoma  Image   A hematoma is a collection of blood under the skin, in an organ, in a body space, in a joint space, or in other tissue. The blood can thicken (clot) to form a lump that you can see and feel. The lump is often firm and may become sore and tender. Most hematomas get better in a few days to weeks. However, some hematomas may be serious and require medical care. Hematomas can range from very small to very large.    What are the causes?  This condition is caused by:  A blunt or penetrating injury.  A leakage from a blood vessel under the skin. This leak happens on its own (is spontaneous) and is more likely to occur in older people, especially those who take blood thinners.  Some medical procedures including surgeries, such as oral surgery, face lifts, and surgeries that involve the joints.  Some medical conditions that cause bleeding or bruising problems. There may be multiple hematomas that appear in different areas of the body.  What are the signs or symptoms?  Symptoms of this condition can depend on where the hematoma is located. Common symptoms of a hematoma under the skin include:  A firm lump on the body.  Pain and tenderness in the area.  Bruising. Blue, dark blue, purple-red, or yellowish skin (discoloration) may appear at the site of the hematoma if the hematoma is close to the surface of the skin.  For hematomas in deeper tissues or body spaces, symptoms may be less obvious. A collection of blood in the stomach (intra-abdominal hematoma) may cause pain in the abdomen, weakness, fainting, and shortness of breath. A collection of blood in the head (intracranial hematoma) may cause a headache or symptoms such as weakness, trouble speaking or understanding, or a change in consciousness.    How is this diagnosed?  This condition is diagnosed based on:  Your medical history.  A physical exam.  Imaging tests, such as an ultrasonogram or CT scan. These may be needed if your health care provider suspects a hematoma in deeper tissues or body spaces.  Blood tests. These may be needed if your health care provider believes that the hematoma is caused by a medical condition.  How is this treated?  This condition usually does not need treatment because many hematomas go away on their own over time. However, large hematomas, or those that may affect vital organs, may need surgical drainage or monitoring. If the hematoma is caused by a medical condition, medicines may be prescribed.    Follow these instructions at home:  Managing pain, stiffness, and swelling     Image   If directed, apply ice to the affected area:  Put ice in a plastic bag.  Place a towel between your skin and the bag.  Leave the ice on for 20 minutes, 2–3 times a day for the first couple of days.  After applying ice for a couple of days, your health care provider may recommend that you apply warm compresses to the affected area instead. Do this as told by your health care provider. Remove the heat if your skin turns bright red. This is especially important if you are unable to feel pain, heat, or cold. You may have a greater risk of getting burned  Raise (elevate) the affected area above the level of your heart while you are sitting or lying down.  Wrap the affected area with an elastic bandage, if told by your health care provider. The bandage applies pressure (compression) to the area, which may help to reduce swelling and help the hematoma heal. Make sure the bandage is not wrapped too tight.  If your hematoma is on a leg or foot (lower extremity) and is painful, your health care provider may recommend crutches. Use them as told by your health care provider.  General instructions     Take over-the-counter and prescription medicines only as told by your health care provider.  Keep all follow-up visits as told by your health care provider. This is important.  Contact a health care provider if:  You have a fever.  The swelling or discoloration gets worse.  You develop more hematomas.  Get help right away if:  Your pain is worse or your pain is not controlled with medicine.  Your skin over the hematoma breaks or starts bleeding.  Your hematoma is in your chest or abdomen and you have weakness, shortness of breath, or a change in consciousness.  You have a hematoma on your scalp caused by a fall or injury and you have a headache that gets worse, trouble speaking or understanding, weakness, or a change in alertness or consciousness.  Summary  A hematoma is a collection of blood under the skin, in an organ, in a body space, in a joint space, or in other tissue.  This condition usually does not need treatment because many hematomas go away on their own over time.  Large hematomas, or those that may affect vital organs, may need surgical drainage or monitoring. If the hematoma is caused by a medical condition, medicines may be prescribed.  This information is not intended to replace advice given to you by your health care provider. Make sure you discuss any questions you have with your health care provider.

## 2019-08-01 NOTE — ED ADULT NURSE NOTE - OBJECTIVE STATEMENT
pt c/o lump on lower back that causes pain for a few days. pt also c/o continuing sob and intermittent chest pain. pt is requesting a CT scan to r/o blood clot. pt is denying IV access. MD Mahan aware.

## 2019-08-01 NOTE — ED PROVIDER NOTE - MUSCULOSKELETAL, MLM
Spine appears normal, range of motion is not limited, + Spine appears normal, range of motion is not limited, no muscular tenderness, ?lipoma right paraspinal tissues

## 2019-08-01 NOTE — ED ADULT TRIAGE NOTE - CHIEF COMPLAINT QUOTE
pt c/o sob x 1 hr , lump on her lower back x 2 days , tremors . hx multiple medical history , 2 autoimmune disorder . Pt has holter monitor to evaluate skipped beats

## 2019-08-01 NOTE — ED PROVIDER NOTE - CONSTITUTIONAL, MLM
normal... Well appearing, well nourished, awake, alert, oriented to person, place, time/situation and in no apparent distress. Well appearing, well nourished, awake, alert, oriented to person, place, time/situation +anxious

## 2019-08-01 NOTE — ED ADULT NURSE NOTE - NSIMPLEMENTINTERV_GEN_ALL_ED
Implemented All Fall with Harm Risk Interventions:  [place] to call system. Call bell, personal items and telephone within reach. Instruct patient to call for assistance. Room bathroom lighting operational. Non-slip footwear when patient is off stretcher. Physically safe environment: no spills, clutter or unnecessary equipment. Stretcher in lowest position, wheels locked, appropriate side rails in place. Provide visual cue, wrist band, yellow gown, etc. Monitor gait and stability. Monitor for mental status changes and reorient to person, place, and time. Review medications for side effects contributing to fall risk. Reinforce activity limits and safety measures with patient and family. Provide visual clues: red socks.

## 2019-08-02 NOTE — ED PROVIDER NOTE - CPE EDP ENMT NORM
Lisette, I'm glad to see there is nothing suspicious on your ultrasound.  I agree with the recommendations to repeat your mammogram in 6 months.  Jovita Marks MD
normal...

## 2019-08-06 ENCOUNTER — APPOINTMENT (OUTPATIENT)
Dept: DERMATOLOGY | Facility: CLINIC | Age: 44
End: 2019-08-06
Payer: MEDICAID

## 2019-08-06 DIAGNOSIS — Z12.83 ENCOUNTER FOR SCREENING FOR MALIGNANT NEOPLASM OF SKIN: ICD-10-CM

## 2019-08-06 DIAGNOSIS — R23.8 OTHER SKIN CHANGES: ICD-10-CM

## 2019-08-06 DIAGNOSIS — D22.9 MELANOCYTIC NEVI, UNSPECIFIED: ICD-10-CM

## 2019-08-06 DIAGNOSIS — D18.01 HEMANGIOMA OF SKIN AND SUBCUTANEOUS TISSUE: ICD-10-CM

## 2019-08-06 PROCEDURE — 99214 OFFICE O/P EST MOD 30 MIN: CPT

## 2019-08-06 NOTE — HISTORY OF PRESENT ILLNESS
[FreeTextEntry1] : growths [de-identified] : 43F with no personal hx of NMSC or melanoma here for growths. Noticed some pain in the lower back associated with bruising and subsequently noted a growth. Had an ultrasound done on 8/1/19 that showed 0.3x0.4cm hypoechoic nodule that was likely c/w hematoma. Notes some improvement since she initially noted the area. Otherwise, notes multiple new brown spots and red spots over the body. Some have grown over time. No bleeding, pain or itch. Otherwise, no new, evolving, or symptomatic skin lesions.

## 2019-08-06 NOTE — PHYSICAL EXAM
[Alert] : alert [Oriented x 3] : ~L oriented x 3 [Well Nourished] : well nourished [Conjunctiva Non-injected] : conjunctiva non-injected [No Visual Lymphadenopathy] : no visual  lymphadenopathy [No Clubbing] : no clubbing [No Edema] : no edema [No Bromhidrosis] : no bromhidrosis [No Chromhidrosis] : no chromhidrosis [FreeTextEntry3] : The patient is well-appearing, in no acute distress, alert and oriented x 3. Mood and affect are normal. A complete cutaneous examination of the scalp, face, neck, chest, abdomen, back, bilateral arms, bilateral legs, buttocks, genitals, digits, nails, eyelids, conjunctiva and lips reveals the following significant findings:\par -Tan to dark brown macules on the trunk and extremities with no concerning dermoscopic features \par -Red vascular papules on the trunk and extremities \par -lower midback with skin colored mobile papule

## 2019-08-07 ENCOUNTER — EMERGENCY (EMERGENCY)
Facility: HOSPITAL | Age: 44
LOS: 0 days | Discharge: HOME | End: 2019-08-07
Attending: EMERGENCY MEDICINE
Payer: MEDICAID

## 2019-08-07 VITALS — HEIGHT: 62 IN | WEIGHT: 97 LBS

## 2019-08-07 VITALS
SYSTOLIC BLOOD PRESSURE: 127 MMHG | OXYGEN SATURATION: 99 % | DIASTOLIC BLOOD PRESSURE: 88 MMHG | HEART RATE: 72 BPM | RESPIRATION RATE: 15 BRPM

## 2019-08-07 DIAGNOSIS — R10.13 EPIGASTRIC PAIN: ICD-10-CM

## 2019-08-07 DIAGNOSIS — F41.8 OTHER SPECIFIED ANXIETY DISORDERS: ICD-10-CM

## 2019-08-07 DIAGNOSIS — Z86.718 PERSONAL HISTORY OF OTHER VENOUS THROMBOSIS AND EMBOLISM: ICD-10-CM

## 2019-08-07 DIAGNOSIS — R11.0 NAUSEA: ICD-10-CM

## 2019-08-07 DIAGNOSIS — Z91.041 RADIOGRAPHIC DYE ALLERGY STATUS: ICD-10-CM

## 2019-08-07 DIAGNOSIS — I10 ESSENTIAL (PRIMARY) HYPERTENSION: ICD-10-CM

## 2019-08-07 DIAGNOSIS — R19.7 DIARRHEA, UNSPECIFIED: ICD-10-CM

## 2019-08-07 DIAGNOSIS — R00.2 PALPITATIONS: ICD-10-CM

## 2019-08-07 DIAGNOSIS — Z90.49 ACQUIRED ABSENCE OF OTHER SPECIFIED PARTS OF DIGESTIVE TRACT: Chronic | ICD-10-CM

## 2019-08-07 DIAGNOSIS — R07.9 CHEST PAIN, UNSPECIFIED: ICD-10-CM

## 2019-08-07 LAB
ADD ON TEST-SPECIMEN IN LAB: SIGNIFICANT CHANGE UP
ADD ON TEST-SPECIMEN IN LAB: SIGNIFICANT CHANGE UP
ALBUMIN SERPL ELPH-MCNC: 4.1 G/DL — SIGNIFICANT CHANGE UP (ref 3.3–5)
ALP SERPL-CCNC: 80 U/L — SIGNIFICANT CHANGE UP (ref 40–120)
ALT FLD-CCNC: 16 U/L — SIGNIFICANT CHANGE UP (ref 12–78)
ANION GAP SERPL CALC-SCNC: 7 MMOL/L — SIGNIFICANT CHANGE UP (ref 5–17)
AST SERPL-CCNC: 16 U/L — SIGNIFICANT CHANGE UP (ref 15–37)
BASOPHILS # BLD AUTO: 0.02 K/UL — SIGNIFICANT CHANGE UP (ref 0–0.2)
BASOPHILS NFR BLD AUTO: 0.3 % — SIGNIFICANT CHANGE UP (ref 0–2)
BILIRUB SERPL-MCNC: 0.4 MG/DL — SIGNIFICANT CHANGE UP (ref 0.2–1.2)
BUN SERPL-MCNC: 5 MG/DL — LOW (ref 7–23)
CALCIUM SERPL-MCNC: 9.4 MG/DL — SIGNIFICANT CHANGE UP (ref 8.5–10.1)
CHLORIDE SERPL-SCNC: 111 MMOL/L — HIGH (ref 96–108)
CO2 SERPL-SCNC: 23 MMOL/L — SIGNIFICANT CHANGE UP (ref 22–31)
CREAT SERPL-MCNC: 0.9 MG/DL — SIGNIFICANT CHANGE UP (ref 0.5–1.3)
EOSINOPHIL # BLD AUTO: 0.03 K/UL — SIGNIFICANT CHANGE UP (ref 0–0.5)
EOSINOPHIL NFR BLD AUTO: 0.5 % — SIGNIFICANT CHANGE UP (ref 0–6)
GLUCOSE SERPL-MCNC: 91 MG/DL — SIGNIFICANT CHANGE UP (ref 70–99)
HCG SERPL-ACNC: <1 MIU/ML — SIGNIFICANT CHANGE UP
HCT VFR BLD CALC: 41.8 % — SIGNIFICANT CHANGE UP (ref 34.5–45)
HGB BLD-MCNC: 13.6 G/DL — SIGNIFICANT CHANGE UP (ref 11.5–15.5)
IMM GRANULOCYTES NFR BLD AUTO: 0.2 % — SIGNIFICANT CHANGE UP (ref 0–1.5)
LIDOCAIN IGE QN: 116 U/L — SIGNIFICANT CHANGE UP (ref 73–393)
LYMPHOCYTES # BLD AUTO: 1.65 K/UL — SIGNIFICANT CHANGE UP (ref 1–3.3)
LYMPHOCYTES # BLD AUTO: 25.9 % — SIGNIFICANT CHANGE UP (ref 13–44)
MAGNESIUM SERPL-MCNC: 2.1 MG/DL — SIGNIFICANT CHANGE UP (ref 1.6–2.6)
MCHC RBC-ENTMCNC: 27.4 PG — SIGNIFICANT CHANGE UP (ref 27–34)
MCHC RBC-ENTMCNC: 32.5 GM/DL — SIGNIFICANT CHANGE UP (ref 32–36)
MCV RBC AUTO: 84.1 FL — SIGNIFICANT CHANGE UP (ref 80–100)
MONOCYTES # BLD AUTO: 0.51 K/UL — SIGNIFICANT CHANGE UP (ref 0–0.9)
MONOCYTES NFR BLD AUTO: 8 % — SIGNIFICANT CHANGE UP (ref 2–14)
NEUTROPHILS # BLD AUTO: 4.15 K/UL — SIGNIFICANT CHANGE UP (ref 1.8–7.4)
NEUTROPHILS NFR BLD AUTO: 65.1 % — SIGNIFICANT CHANGE UP (ref 43–77)
PLATELET # BLD AUTO: 69 K/UL — LOW (ref 150–400)
POTASSIUM SERPL-MCNC: 3.7 MMOL/L — SIGNIFICANT CHANGE UP (ref 3.5–5.3)
POTASSIUM SERPL-SCNC: 3.7 MMOL/L — SIGNIFICANT CHANGE UP (ref 3.5–5.3)
PROT SERPL-MCNC: 7.5 GM/DL — SIGNIFICANT CHANGE UP (ref 6–8.3)
RBC # BLD: 4.97 M/UL — SIGNIFICANT CHANGE UP (ref 3.8–5.2)
RBC # FLD: 14.8 % — HIGH (ref 10.3–14.5)
SODIUM SERPL-SCNC: 141 MMOL/L — SIGNIFICANT CHANGE UP (ref 135–145)
TROPONIN I SERPL-MCNC: <0.015 NG/ML — SIGNIFICANT CHANGE UP (ref 0.01–0.04)
TROPONIN I SERPL-MCNC: <0.015 NG/ML — SIGNIFICANT CHANGE UP (ref 0.01–0.04)
WBC # BLD: 6.37 K/UL — SIGNIFICANT CHANGE UP (ref 3.8–10.5)
WBC # FLD AUTO: 6.37 K/UL — SIGNIFICANT CHANGE UP (ref 3.8–10.5)

## 2019-08-07 PROCEDURE — 80053 COMPREHEN METABOLIC PANEL: CPT

## 2019-08-07 PROCEDURE — 84484 ASSAY OF TROPONIN QUANT: CPT

## 2019-08-07 PROCEDURE — 71045 X-RAY EXAM CHEST 1 VIEW: CPT | Mod: 26

## 2019-08-07 PROCEDURE — 74176 CT ABD & PELVIS W/O CONTRAST: CPT | Mod: 26

## 2019-08-07 PROCEDURE — 84443 ASSAY THYROID STIM HORMONE: CPT

## 2019-08-07 PROCEDURE — 93010 ELECTROCARDIOGRAM REPORT: CPT

## 2019-08-07 PROCEDURE — 36415 COLL VENOUS BLD VENIPUNCTURE: CPT

## 2019-08-07 PROCEDURE — 99284 EMERGENCY DEPT VISIT MOD MDM: CPT | Mod: 25

## 2019-08-07 PROCEDURE — 99284 EMERGENCY DEPT VISIT MOD MDM: CPT

## 2019-08-07 PROCEDURE — 93005 ELECTROCARDIOGRAM TRACING: CPT

## 2019-08-07 PROCEDURE — 83735 ASSAY OF MAGNESIUM: CPT

## 2019-08-07 PROCEDURE — 83690 ASSAY OF LIPASE: CPT

## 2019-08-07 PROCEDURE — 71045 X-RAY EXAM CHEST 1 VIEW: CPT

## 2019-08-07 PROCEDURE — 85025 COMPLETE CBC W/AUTO DIFF WBC: CPT

## 2019-08-07 PROCEDURE — 84702 CHORIONIC GONADOTROPIN TEST: CPT

## 2019-08-07 PROCEDURE — 74176 CT ABD & PELVIS W/O CONTRAST: CPT

## 2019-08-07 RX ORDER — SODIUM CHLORIDE 9 MG/ML
1000 INJECTION INTRAMUSCULAR; INTRAVENOUS; SUBCUTANEOUS ONCE
Refills: 0 | Status: COMPLETED | OUTPATIENT
Start: 2019-08-07 | End: 2019-08-07

## 2019-08-07 RX ORDER — ASPIRIN/CALCIUM CARB/MAGNESIUM 324 MG
324 TABLET ORAL ONCE
Refills: 0 | Status: COMPLETED | OUTPATIENT
Start: 2019-08-07 | End: 2019-08-07

## 2019-08-07 RX ADMIN — SODIUM CHLORIDE 1000 MILLILITER(S): 9 INJECTION INTRAMUSCULAR; INTRAVENOUS; SUBCUTANEOUS at 08:48

## 2019-08-07 NOTE — ED ADULT NURSE NOTE - OBJECTIVE STATEMENT
Patient complaining of left arm pain radiating to chest. Patient reports nausea and diarrhea for over 1 day. Patient reports 10/10 pain of left arm.

## 2019-08-07 NOTE — ED PROVIDER NOTE - CLINICAL SUMMARY MEDICAL DECISION MAKING FREE TEXT BOX
43F hx dvt in past presents to the ED with epigastric and cp radiating to left arm. pain started a yesterday. associated with palpitations. no sob. + nausea and diarrhea. no headache or dizziness. no dysuria. has had similar symptoms in the past. no leg swelling recent travel or surgeries. exam with Abd soft + epigastric ttp no rebound or guarding no cvat negative buckley/mcburney. symptoms and exam consistent with pancreatitis. lower suspicion for cardiac etiology. will obtain labs ct delta trop and reassess. Rolando Atkinson M.D., Attending Physician

## 2019-08-07 NOTE — ED PROVIDER NOTE - OBJECTIVE STATEMENT
43F hx dvt in past presents to the ED with epigastric and cp radiating to left arm. pain started a yesterday. associated with palpitations. no sob. + nausea and diarrhea. no headache or dizziness. no dysuria. has had similar symptoms in the past. no leg swelling recent travel or surgeries.

## 2019-08-07 NOTE — ED PROVIDER NOTE - NSFOLLOWUPINSTRUCTIONS_ED_ALL_ED_FT
1. return for worsening symptoms or anything concerning to you  2. take all home meds as prescribed  3. follow up with your pmd call to make an appointment  4. follow up with cardiology see attached    Heart Palpitations    WHAT YOU NEED TO KNOW:    Heart palpitations are feelings that your heart races, jumps, throbs, or flutters. You may feel extra beats, no beats for a short time, or skipped beats. You may have these feelings in your chest, throat, or neck. They may happen when you are sitting, standing, or lying. Heart palpitations may be frightening, but are usually not caused by a serious problem.     DISCHARGE INSTRUCTIONS:    Call 911 or have someone else call for any of the following:     You have any of the following signs of a heart attack:   Squeezing, pressure, or pain in your chest       and any of the following:   Discomfort or pain in your back, neck, jaw, stomach, or arm       Shortness of breath      Nausea or vomiting      Lightheadedness or a sudden cold sweat      You have any of the following signs of a stroke:   Numbness or drooping on one side of your face       Weakness in an arm or leg      Confusion or difficulty speaking      Dizziness, a severe headache, or vision loss      You faint or lose consciousness.     Return to the emergency department if:     Your palpitations happen more often or get more intense.         Contact your healthcare provider if:     You have new or worsening swelling in your feet or ankles.      You have questions or concerns about your condition or care.    Follow up with your healthcare provider as directed: You may need to follow up with a cardiologist. You may need tests to check for heart problems that cause palpitations. Write down your questions so you remember to ask them during your visits.     Keep a record: Write down when your palpitations start and stop, what you were doing when they started, and your symptoms. Keep track of what you ate or drank within a few hours of your palpitations. Include anything that seemed to help your symptoms, such as lying down or holding your breath. This record will help you and your healthcare provider learn what triggers your palpitations. Bring this record with you to your follow up visits.    Help prevent heart palpitations:     Manage stress and anxiety. Find ways to relax such as listening to music, meditating, or doing yoga. Exercise can also help decrease stress and anxiety. Talk to someone you trust about your stress or anxiety. You can also talk to a therapist.       Get plenty of sleep every night. Ask your healthcare provider how much sleep you need each night.       Do not drink caffeine or alcohol. Caffeine and alcohol can make your palpitations worse. Caffeine is found in soda, coffee, tea, chocolate, and drinks that increase your energy.       Do not smoke. Nicotine and other chemicals in cigarettes and cigars may damage your heart and blood vessels. Ask your healthcare provider for information if you currently smoke and need help to quit. E-cigarettes or smokeless tobacco still contain nicotine. Talk to your healthcare provider before you use these products.       Do not use illegal drugs. Talk to your healthcare provider if you use illegal drugs and want help to quit.

## 2019-08-07 NOTE — ED PROVIDER NOTE - NS ED ROS FT
Constitutional: No fever or chills  Eyes: No visual changes  HEENT: No throat pain  CV: + chest pain  Resp: No SOB no cough  GI: + abd pain, nausea no vomiting + diarrhea  : No dysuria  MSK: No musculoskeletal pain  Skin: No rash  Neuro: No headache

## 2019-08-07 NOTE — ED ADULT TRIAGE NOTE - CHIEF COMPLAINT QUOTE
pt aaox4, pt states "I think i'm having heart attack," pt states the symptom started with the pain in the left arm yesterday and started to have left side chest pain, +sob.  pt does not appear to be in distress, +anxiety noted.  pt to MAIN

## 2019-08-07 NOTE — ED PROVIDER NOTE - PHYSICAL EXAMINATION
Constitutional: mild disterss AAOx3  Eyes: PERRLA EOMI  Head: Normocephalic atraumatic  Mouth: MMM  Cardiac: regular rate normal peripheral pulses no LE edema  Resp: Lungs CTAB  GI: Abd soft + epigastric ttp no rebound or guarding no cvat negative buckley/dyloney  Neuro: CN2-12 intact  Skin: No rashes

## 2019-08-07 NOTE — ED PROVIDER NOTE - PROGRESS NOTE DETAILS
pt feeling better. trop negative x 2. no tele events. ct normal. vss. will d/c with follow up and strict return precautions. Rolando Atkinson M.D., Attending Physician

## 2019-08-07 NOTE — ED ADULT NURSE REASSESSMENT NOTE - NS ED NURSE REASSESS COMMENT FT1
Pt is discharged, Per pt's request to receive 500ml of NS fluids Dr. Atkinson agreeing for the pt to stay until pt receives her requested amount.

## 2019-08-07 NOTE — ED ADULT NURSE NOTE - NSIMPLEMENTINTERV_GEN_ALL_ED
Implemented All Universal Safety Interventions:  [place] to call system. Call bell, personal items and telephone within reach. Instruct patient to call for assistance. Room bathroom lighting operational. Non-slip footwear when patient is off stretcher. Physically safe environment: no spills, clutter or unnecessary equipment. Stretcher in lowest position, wheels locked, appropriate side rails in place.

## 2019-08-27 ENCOUNTER — EMERGENCY (EMERGENCY)
Facility: HOSPITAL | Age: 44
LOS: 0 days | Discharge: HOME | End: 2019-08-27
Attending: EMERGENCY MEDICINE
Payer: MEDICAID

## 2019-08-27 VITALS
TEMPERATURE: 99 F | SYSTOLIC BLOOD PRESSURE: 130 MMHG | DIASTOLIC BLOOD PRESSURE: 92 MMHG | HEART RATE: 89 BPM | OXYGEN SATURATION: 100 % | RESPIRATION RATE: 12 BRPM

## 2019-08-27 VITALS — HEIGHT: 62 IN | WEIGHT: 98.33 LBS

## 2019-08-27 DIAGNOSIS — Z91.041 RADIOGRAPHIC DYE ALLERGY STATUS: ICD-10-CM

## 2019-08-27 DIAGNOSIS — I10 ESSENTIAL (PRIMARY) HYPERTENSION: ICD-10-CM

## 2019-08-27 DIAGNOSIS — R06.02 SHORTNESS OF BREATH: ICD-10-CM

## 2019-08-27 DIAGNOSIS — R11.0 NAUSEA: ICD-10-CM

## 2019-08-27 DIAGNOSIS — R10.9 UNSPECIFIED ABDOMINAL PAIN: ICD-10-CM

## 2019-08-27 DIAGNOSIS — Z86.718 PERSONAL HISTORY OF OTHER VENOUS THROMBOSIS AND EMBOLISM: ICD-10-CM

## 2019-08-27 DIAGNOSIS — M35.00 SJOGREN SYNDROME, UNSPECIFIED: ICD-10-CM

## 2019-08-27 DIAGNOSIS — F41.9 ANXIETY DISORDER, UNSPECIFIED: ICD-10-CM

## 2019-08-27 DIAGNOSIS — Z90.49 ACQUIRED ABSENCE OF OTHER SPECIFIED PARTS OF DIGESTIVE TRACT: Chronic | ICD-10-CM

## 2019-08-27 DIAGNOSIS — D64.9 ANEMIA, UNSPECIFIED: ICD-10-CM

## 2019-08-27 DIAGNOSIS — R42 DIZZINESS AND GIDDINESS: ICD-10-CM

## 2019-08-27 LAB
ALBUMIN SERPL ELPH-MCNC: 4.1 G/DL — SIGNIFICANT CHANGE UP (ref 3.3–5)
ALP SERPL-CCNC: 76 U/L — SIGNIFICANT CHANGE UP (ref 40–120)
ALT FLD-CCNC: 15 U/L — SIGNIFICANT CHANGE UP (ref 12–78)
ANION GAP SERPL CALC-SCNC: 6 MMOL/L — SIGNIFICANT CHANGE UP (ref 5–17)
APPEARANCE UR: CLEAR — SIGNIFICANT CHANGE UP
AST SERPL-CCNC: 16 U/L — SIGNIFICANT CHANGE UP (ref 15–37)
BASOPHILS # BLD AUTO: 0.02 K/UL — SIGNIFICANT CHANGE UP (ref 0–0.2)
BASOPHILS NFR BLD AUTO: 0.2 % — SIGNIFICANT CHANGE UP (ref 0–2)
BILIRUB SERPL-MCNC: 0.4 MG/DL — SIGNIFICANT CHANGE UP (ref 0.2–1.2)
BILIRUB UR-MCNC: NEGATIVE — SIGNIFICANT CHANGE UP
BUN SERPL-MCNC: 5 MG/DL — LOW (ref 7–23)
CALCIUM SERPL-MCNC: 8.8 MG/DL — SIGNIFICANT CHANGE UP (ref 8.5–10.1)
CHLORIDE SERPL-SCNC: 104 MMOL/L — SIGNIFICANT CHANGE UP (ref 96–108)
CO2 SERPL-SCNC: 27 MMOL/L — SIGNIFICANT CHANGE UP (ref 22–31)
COLOR SPEC: YELLOW — SIGNIFICANT CHANGE UP
CREAT SERPL-MCNC: 0.82 MG/DL — SIGNIFICANT CHANGE UP (ref 0.5–1.3)
DIFF PNL FLD: NEGATIVE — SIGNIFICANT CHANGE UP
EOSINOPHIL # BLD AUTO: 0.02 K/UL — SIGNIFICANT CHANGE UP (ref 0–0.5)
EOSINOPHIL NFR BLD AUTO: 0.2 % — SIGNIFICANT CHANGE UP (ref 0–6)
GLUCOSE SERPL-MCNC: 92 MG/DL — SIGNIFICANT CHANGE UP (ref 70–99)
GLUCOSE UR QL: NEGATIVE MG/DL — SIGNIFICANT CHANGE UP
HCT VFR BLD CALC: 38.2 % — SIGNIFICANT CHANGE UP (ref 34.5–45)
HGB BLD-MCNC: 12.8 G/DL — SIGNIFICANT CHANGE UP (ref 11.5–15.5)
IMM GRANULOCYTES NFR BLD AUTO: 0.5 % — SIGNIFICANT CHANGE UP (ref 0–1.5)
KETONES UR-MCNC: NEGATIVE — SIGNIFICANT CHANGE UP
LEUKOCYTE ESTERASE UR-ACNC: NEGATIVE — SIGNIFICANT CHANGE UP
LIDOCAIN IGE QN: 111 U/L — SIGNIFICANT CHANGE UP (ref 73–393)
LYMPHOCYTES # BLD AUTO: 0.99 K/UL — LOW (ref 1–3.3)
LYMPHOCYTES # BLD AUTO: 11.4 % — LOW (ref 13–44)
MCHC RBC-ENTMCNC: 28.3 PG — SIGNIFICANT CHANGE UP (ref 27–34)
MCHC RBC-ENTMCNC: 33.5 GM/DL — SIGNIFICANT CHANGE UP (ref 32–36)
MCV RBC AUTO: 84.3 FL — SIGNIFICANT CHANGE UP (ref 80–100)
MONOCYTES # BLD AUTO: 0.48 K/UL — SIGNIFICANT CHANGE UP (ref 0–0.9)
MONOCYTES NFR BLD AUTO: 5.5 % — SIGNIFICANT CHANGE UP (ref 2–14)
NEUTROPHILS # BLD AUTO: 7.17 K/UL — SIGNIFICANT CHANGE UP (ref 1.8–7.4)
NEUTROPHILS NFR BLD AUTO: 82.2 % — HIGH (ref 43–77)
NITRITE UR-MCNC: NEGATIVE — SIGNIFICANT CHANGE UP
PH UR: 7 — SIGNIFICANT CHANGE UP (ref 5–8)
PLATELET # BLD AUTO: 72 K/UL — LOW (ref 150–400)
POTASSIUM SERPL-MCNC: 3.8 MMOL/L — SIGNIFICANT CHANGE UP (ref 3.5–5.3)
POTASSIUM SERPL-SCNC: 3.8 MMOL/L — SIGNIFICANT CHANGE UP (ref 3.5–5.3)
PROT SERPL-MCNC: 7.3 GM/DL — SIGNIFICANT CHANGE UP (ref 6–8.3)
PROT UR-MCNC: NEGATIVE MG/DL — SIGNIFICANT CHANGE UP
RBC # BLD: 4.53 M/UL — SIGNIFICANT CHANGE UP (ref 3.8–5.2)
RBC # FLD: 13.4 % — SIGNIFICANT CHANGE UP (ref 10.3–14.5)
SODIUM SERPL-SCNC: 137 MMOL/L — SIGNIFICANT CHANGE UP (ref 135–145)
SP GR SPEC: 1 — LOW (ref 1.01–1.02)
TROPONIN I SERPL-MCNC: <0.015 NG/ML — SIGNIFICANT CHANGE UP (ref 0.01–0.04)
TROPONIN I SERPL-MCNC: <0.015 NG/ML — SIGNIFICANT CHANGE UP (ref 0.01–0.04)
UROBILINOGEN FLD QL: NEGATIVE MG/DL — SIGNIFICANT CHANGE UP
WBC # BLD: 8.72 K/UL — SIGNIFICANT CHANGE UP (ref 3.8–10.5)
WBC # FLD AUTO: 8.72 K/UL — SIGNIFICANT CHANGE UP (ref 3.8–10.5)

## 2019-08-27 PROCEDURE — 85730 THROMBOPLASTIN TIME PARTIAL: CPT

## 2019-08-27 PROCEDURE — 76830 TRANSVAGINAL US NON-OB: CPT

## 2019-08-27 PROCEDURE — 93010 ELECTROCARDIOGRAM REPORT: CPT

## 2019-08-27 PROCEDURE — 99284 EMERGENCY DEPT VISIT MOD MDM: CPT | Mod: 25

## 2019-08-27 PROCEDURE — 36415 COLL VENOUS BLD VENIPUNCTURE: CPT

## 2019-08-27 PROCEDURE — 85025 COMPLETE CBC W/AUTO DIFF WBC: CPT

## 2019-08-27 PROCEDURE — 85610 PROTHROMBIN TIME: CPT

## 2019-08-27 PROCEDURE — 93005 ELECTROCARDIOGRAM TRACING: CPT

## 2019-08-27 PROCEDURE — 76830 TRANSVAGINAL US NON-OB: CPT | Mod: 26

## 2019-08-27 PROCEDURE — 80053 COMPREHEN METABOLIC PANEL: CPT

## 2019-08-27 PROCEDURE — 74176 CT ABD & PELVIS W/O CONTRAST: CPT

## 2019-08-27 PROCEDURE — 74176 CT ABD & PELVIS W/O CONTRAST: CPT | Mod: 26

## 2019-08-27 PROCEDURE — 84484 ASSAY OF TROPONIN QUANT: CPT

## 2019-08-27 PROCEDURE — 99285 EMERGENCY DEPT VISIT HI MDM: CPT

## 2019-08-27 PROCEDURE — 81003 URINALYSIS AUTO W/O SCOPE: CPT

## 2019-08-27 PROCEDURE — 87086 URINE CULTURE/COLONY COUNT: CPT

## 2019-08-27 PROCEDURE — 71046 X-RAY EXAM CHEST 2 VIEWS: CPT

## 2019-08-27 PROCEDURE — 71046 X-RAY EXAM CHEST 2 VIEWS: CPT | Mod: 26

## 2019-08-27 PROCEDURE — 83690 ASSAY OF LIPASE: CPT

## 2019-08-27 NOTE — ED ADULT NURSE NOTE - CHIEF COMPLAINT QUOTE
Pt c/o sob , chest pain ,dizziness , nausea , and tremors x 1 hr PTA , intermittent heart skipping a beat x 1 day , urinary frequency and feeling of fullness post defecation x 3 days , weight loss

## 2019-08-27 NOTE — ED PROVIDER NOTE - OBJECTIVE STATEMENT
42 y/o F with PMHx of DVT, HTN, ITP, anemia, Sjogren's, protein S deficiency, thrombocytopenia, anxiety, IBS, s/p appy, gastroparesis presenting to the ED c/o SOB, CP, dizziness, nausea, and tremors beginning 1 hour PTA. Pt also c/o intermittent palpitations x1 day, urinary frequency, weight loss, and feeling full after having a BM for the past 3 days. States that over the past few days, she has lost her appetite and notes that when she does eat, she bloats quickly. Pt does not take constipating medications at baseline. Pt takes iron pills regularly which she took this morning PTA. States that her platelets have recently "been dropping very low." Pt was diagnosed with mitral valve prolapse recently s/p 30 day holter monitor. Pt states that she thinks she needs to be admitted to  because "symptoms have been ongoing for the past 3 days." Last time pt passed gas was yesterday, last BM was "a little bit" this morning. States that she has had to be disimpacted in the past. Denies fever. Internist/Cardiologist: Dr. Cristhian Bazzi(?). Allergy: Zofran, Reglan, Compazine, IV and oral contrast, beta blockers. Pt is anticoagulated on Xarelto. 44 y/o F with PMHx of DVT, HTN, ITP, anemia, Sjogren's, protein S deficiency, thrombocytopenia, anxiety, IBS, s/p appy, gastroparesis presenting to the ED c/o SOB, CP, dizziness, nausea, and tremors beginning 1 hour PTA. Pt also c/o intermittent palpitations x1 day, urinary frequency, weight loss, and feeling full after having a BM for the past 3 days. States that over the past few days, she has lost her appetite and notes that when she does eat, she bloats quickly. Pt does not take constipating medications at baseline. Pt takes iron pills regularly which she took this morning PTA. States that her platelets have recently "been dropping very low." Pt was diagnosed with mitral valve prolapse recently s/p 30 day holter monitor. Pt states that she thinks she needs to be admitted to  because "symptoms have been ongoing for the past 3 days." Last time pt passed gas was yesterday, last BM was "a little bit" this morning. States that she has had to be disimpacted in the past. Denies fever. Internist/Cardiologist: Dr. Cristhian Parker. Allergy: Zofran, Reglan, Compazine, IV and oral contrast, beta blockers. Pt is anticoagulated on Xarelto.

## 2019-08-27 NOTE — ED PROVIDER NOTE - CLINICAL SUMMARY MEDICAL DECISION MAKING FREE TEXT BOX
pt with chest pain, will check cardiacs x 2 ekg, cxr, also abdominal pain, hx of constipation + bowel surgery will CTAP r/o obstruction.

## 2019-08-27 NOTE — ED PROVIDER NOTE - PROGRESS NOTE DETAILS
Brandon GONZALES for ED attending, Dr. Celaya: Cyst was seen on CT scan. Will get pelvic US to further evaluate.

## 2019-08-27 NOTE — ED PROVIDER NOTE - PATIENT PORTAL LINK FT
You can access the FollowMyHealth Patient Portal offered by [facility] by registering at the following website: [email or web address]. By joining [facility]’s FollowMyHealth portal, you will also be able to view your health information using other applications (apps) compatible with our system.

## 2019-08-27 NOTE — ED PROVIDER NOTE - CHPI ED SYMPTOMS POS
SHORTNESS OF BREATH/CHEST PAIN/DIZZINESS/PALPITATIONS/+tremors. +urinary frequency, weight loss, feeling full./NAUSEA

## 2019-08-27 NOTE — ED ADULT NURSE NOTE - NSIMPLEMENTINTERV_GEN_ALL_ED
Implemented All Universal Safety Interventions:  [place] to call system. Call bell, personal items and telephone within reach. Instruct patient to call for assistance. Room bathroom lighting operational. Non-slip footwear when patient is off stretcher. Physically safe environment: no spills, clutter or unnecessary equipment. Stretcher in lowest position, wheels locked, appropriate side rails in place.

## 2019-08-27 NOTE — ED ADULT NURSE NOTE - OBJECTIVE STATEMENT
patient presents with intermittent abdominal bloating, urinary frequency, palpitations s/p one month of holter monitoring.  unexplained bruising to bilateral le with h/o ITP

## 2019-08-27 NOTE — ED PROVIDER NOTE - NSFOLLOWUPINSTRUCTIONS_ED_ALL_ED_FT
Constipation, Adult  Constipation is when a person has fewer bowel movements in a week than normal, has difficulty having a bowel movement, or has stools that are dry, hard, or larger than normal. Constipation may be caused by an underlying condition. It may become worse with age if a person takes certain medicines and does not take in enough fluids.    Follow these instructions at home:  Eating and drinking     Image   Eat foods that have a lot of fiber, such as fresh fruits and vegetables, whole grains, and beans.  Limit foods that are high in fat, low in fiber, or overly processed, such as french fries, hamburgers, cookies, candies, and soda.  Drink enough fluid to keep your urine clear or pale yellow.  General instructions     Exercise regularly or as told by your health care provider.  Go to the restroom when you have the urge to go. Do not hold it in.  Take over-the-counter and prescription medicines only as told by your health care provider. These include any fiber supplements.  Practice pelvic floor retraining exercises, such as deep breathing while relaxing the lower abdomen and pelvic floor relaxation during bowel movements.  Watch your condition for any changes.  Keep all follow-up visits as told by your health care provider. This is important.  Contact a health care provider if:  You have pain that gets worse.  You have a fever.  You do not have a bowel movement after 4 days.  You vomit.  You are not hungry.  You lose weight.  You are bleeding from the anus.  You have thin, pencil-like stools.  Get help right away if:  You have a fever and your symptoms suddenly get worse.  You leak stool or have blood in your stool.  Your abdomen is bloated.  You have severe pain in your abdomen.  You feel dizzy or you faint.  This information is not intended to replace advice given to you by your health care provider. Make sure you discuss any questions you have with your health care provider.

## 2019-08-28 LAB
CULTURE RESULTS: NO GROWTH — SIGNIFICANT CHANGE UP
SPECIMEN SOURCE: SIGNIFICANT CHANGE UP

## 2019-09-11 NOTE — ED BEHAVIORAL HEALTH ASSESSMENT NOTE - LEGAL HISTORY
HISTORY OF PRESENT ILLNESS  Katie Barajas is a 33 y.o. female. HPI  Patient comes in today for CPE. Moved to [place] in Sep 2017. Used to live in [place] and [facility] Originally from [facility]. Went to [facility] in July 2019 with boyfriend. 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 Tries to eat healthy. Eats fruits and veggies. Drinks water. Does yoga 2-3 days per week. GYN - last visit in March 2019 with Ivone Whiteside NP. Pap normal.  On OCPs. Takes continuously, has menses every 2-3 months. No Known Allergies    History reviewed. No pertinent past medical history. History reviewed. No pertinent surgical history. Social History     Socioeconomic History    Marital status: UNKNOWN     Spouse name: Not on file    Number of children: Not on file    Years of education: Not on file    Highest education level: Not on file   Occupational History    Not on file   Social Needs    Financial resource strain: Not on file    Food insecurity:     Worry: Not on file     Inability: Not on file    Transportation needs:     Medical: Not on file     Non-medical: Not on file   Tobacco Use    Smoking status: Never Smoker    Smokeless tobacco: Never Used   Substance and Sexual Activity    Alcohol use:  Yes     Alcohol/week: 3.0 standard drinks     Types: 3 Glasses of wine per week     Comment: social    Drug use: No    Sexual activity: Yes     Partners: Male     Birth control/protection: Pill   Lifestyle    Physical activity:     Days per week: Not on file     Minutes per session: Not on file    Stress: Not on file   Relationships    Social connections:     Talks on phone: Not on file     Gets together: Not on file     Attends Jehovah's witness service: Not on file     Active member of club or organization: Not on file     Attends meetings of clubs or organizations: Not on file     Relationship status: Not on file    Intimate partner violence:     Fear of current or ex partner: Not on file     Emotionally abused: Not on file     Physically abused: Not on file     Forced sexual activity: Not on file   Other Topics Concern    Not on file   Social History Narrative    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 Living with boyfriend. No children. Family History   Problem Relation Age of Onset    No Known Problems Mother     No Known Problems Father     No Known Problems Brother        Current Outpatient Medications   Medication Sig    norethindrone-ethinyl estradiol (BLISOVI FE 1/20, 28,) 1 mg-20 mcg (21)/75 mg (7) tab Take 1 Tab by mouth daily. No current facility-administered medications for this visit. Review of Systems   Constitutional: Negative for chills, diaphoresis, fever, malaise/fatigue and weight loss. HENT: Negative for congestion, ear pain, sore throat and tinnitus. Eyes: Negative for blurred vision and double vision. Respiratory: Negative for cough, sputum production, shortness of breath and wheezing. Cardiovascular: Negative for chest pain, palpitations and leg swelling. Gastrointestinal: Negative for abdominal pain, blood in stool, constipation, diarrhea, nausea and vomiting. Genitourinary: Negative for dysuria, flank pain, frequency, hematuria and urgency. Musculoskeletal: Negative for back pain, joint pain and myalgias. Skin: Negative. Neurological: Negative for dizziness, tingling, sensory change, speech change, focal weakness and headaches. Psychiatric/Behavioral: Negative for depression. The patient is not nervous/anxious and does not have insomnia. Vitals:    09/11/19 1232   BP: 109/64   Pulse: 66   Resp: 16   Temp: 97.5 °F (36.4 °C)   TempSrc: Oral   SpO2: 100%   Weight: 124 lb 9.6 oz (56.5 kg)   Height: 5' 1\" (1.549 m)     Physical Exam   Constitutional: She is oriented to person, place, and time.  Vital signs are normal. She appears well-developed and well-nourished. She is cooperative. HENT:   Right Ear: Hearing, tympanic membrane, external ear and ear canal normal.   Left Ear: Hearing, tympanic membrane, external ear and ear canal normal.   Nose: Nose normal.   Mouth/Throat: Uvula is midline, oropharynx is clear and moist and mucous membranes are normal.   Neck: No thyromegaly present. Cardiovascular: Normal rate, regular rhythm, S1 normal, S2 normal and normal heart sounds. No murmur heard. Pulses:       Dorsalis pedis pulses are 2+ on the right side, and 2+ on the left side. No edema noted   Pulmonary/Chest: Effort normal and breath sounds normal.   Abdominal: Soft. Normal appearance and bowel sounds are normal. There is no hepatosplenomegaly. There is no tenderness. Genitourinary:   Genitourinary Comments: Deferred - done by GYN   Musculoskeletal: Normal range of motion. Lymphadenopathy:     She has no cervical adenopathy. Right: No supraclavicular adenopathy present. Left: No supraclavicular adenopathy present. Neurological: She is alert and oriented to person, place, and time. Skin: Skin is warm, dry and intact. Psychiatric: She has a normal mood and affect. Her speech is normal and behavior is normal. Thought content normal.   Vitals reviewed. ASSESSMENT and PLAN    ICD-10-CM ICD-9-CM    1. Routine general medical examination at [facility] care facility H28.22 L23.8 METABOLIC PANEL, BASIC      LIPID PANEL   2. Encounter for immunization Z23 V03.89 INFLUENZA VIRUS VAC QUAD,SPLIT,PRESV FREE SYRINGE IM      IN IMMUNIZ ADMIN,1 SINGLE/COMB VAC/TOXOID     Encounter Diagnoses   Name Primary?  Routine general medical examination at health care facility Yes    Encounter for immunization      Orders Placed This Encounter    Influenza virus vaccine (QUADRIVALENT PRES FREE SYRINGE) IM (14716)    METABOLIC PANEL, BASIC    LIPID PANEL     Diagnoses and all orders for this visit:    1.  Routine general medical examination at health care facility  -     METABOLIC PANEL, BASIC  -     LIPID PANEL    2. Encounter for immunization  -     INFLUENZA VIRUS VAC QUAD,SPLIT,PRESV FREE SYRINGE IM  -     MD IMMUNIZ ADMIN,1 SINGLE/COMB VAC/TOXOID      Follow-up and Dispositions    · Return in about 1 year (around 9/11/2020). lab results and schedule of future lab studies reviewed with patient  reviewed diet, exercise and weight control    I have reviewed the patient's allergies and made any necessary changes. Medical, procedural, social and family histories have been reviewed and updated as medically indicated. I have reconciled and/or revised patient medications in the EMR. I have discussed each diagnosis listed in this note with Alex Freeman and/or their family. I have discussed treatment options and the risk/benefit analysis of those options, including safe use of medications and possible medication side effects. Through the use of shared decision making we have agreed to the above plan. The patient has received an after-visit summary and questions were answered concerning future plans. Rhea Culver, AMAIRANI-C    This note will not be viewable in [employer]. none known

## 2019-09-21 ENCOUNTER — EMERGENCY (EMERGENCY)
Facility: HOSPITAL | Age: 44
LOS: 0 days | Discharge: HOME | End: 2019-09-21
Attending: EMERGENCY MEDICINE
Payer: MEDICAID

## 2019-09-21 VITALS
TEMPERATURE: 98 F | RESPIRATION RATE: 16 BRPM | OXYGEN SATURATION: 100 % | SYSTOLIC BLOOD PRESSURE: 123 MMHG | DIASTOLIC BLOOD PRESSURE: 84 MMHG | HEART RATE: 74 BPM

## 2019-09-21 VITALS — WEIGHT: 97 LBS

## 2019-09-21 DIAGNOSIS — Z91.041 RADIOGRAPHIC DYE ALLERGY STATUS: ICD-10-CM

## 2019-09-21 DIAGNOSIS — M79.89 OTHER SPECIFIED SOFT TISSUE DISORDERS: ICD-10-CM

## 2019-09-21 DIAGNOSIS — Z90.49 ACQUIRED ABSENCE OF OTHER SPECIFIED PARTS OF DIGESTIVE TRACT: Chronic | ICD-10-CM

## 2019-09-21 DIAGNOSIS — I10 ESSENTIAL (PRIMARY) HYPERTENSION: ICD-10-CM

## 2019-09-21 DIAGNOSIS — Z86.718 PERSONAL HISTORY OF OTHER VENOUS THROMBOSIS AND EMBOLISM: ICD-10-CM

## 2019-09-21 LAB
ALBUMIN SERPL ELPH-MCNC: 3.9 G/DL — SIGNIFICANT CHANGE UP (ref 3.3–5)
ALP SERPL-CCNC: 91 U/L — SIGNIFICANT CHANGE UP (ref 40–120)
ALT FLD-CCNC: 16 U/L — SIGNIFICANT CHANGE UP (ref 12–78)
ANION GAP SERPL CALC-SCNC: 7 MMOL/L — SIGNIFICANT CHANGE UP (ref 5–17)
APTT BLD: 30.9 SEC — SIGNIFICANT CHANGE UP (ref 27.5–36.3)
AST SERPL-CCNC: 16 U/L — SIGNIFICANT CHANGE UP (ref 15–37)
BASOPHILS # BLD AUTO: 0 K/UL — SIGNIFICANT CHANGE UP (ref 0–0.2)
BASOPHILS NFR BLD AUTO: 0 % — SIGNIFICANT CHANGE UP (ref 0–2)
BILIRUB SERPL-MCNC: 0.3 MG/DL — SIGNIFICANT CHANGE UP (ref 0.2–1.2)
BUN SERPL-MCNC: 3 MG/DL — LOW (ref 7–23)
CALCIUM SERPL-MCNC: 8.6 MG/DL — SIGNIFICANT CHANGE UP (ref 8.5–10.1)
CHLORIDE SERPL-SCNC: 108 MMOL/L — SIGNIFICANT CHANGE UP (ref 96–108)
CO2 SERPL-SCNC: 27 MMOL/L — SIGNIFICANT CHANGE UP (ref 22–31)
CREAT SERPL-MCNC: 0.76 MG/DL — SIGNIFICANT CHANGE UP (ref 0.5–1.3)
EOSINOPHIL # BLD AUTO: 0.05 K/UL — SIGNIFICANT CHANGE UP (ref 0–0.5)
EOSINOPHIL NFR BLD AUTO: 1 % — SIGNIFICANT CHANGE UP (ref 0–6)
GLUCOSE SERPL-MCNC: 86 MG/DL — SIGNIFICANT CHANGE UP (ref 70–99)
HCG SERPL-ACNC: <1 MIU/ML — SIGNIFICANT CHANGE UP
HCT VFR BLD CALC: 37.4 % — SIGNIFICANT CHANGE UP (ref 34.5–45)
HGB BLD-MCNC: 12.2 G/DL — SIGNIFICANT CHANGE UP (ref 11.5–15.5)
INR BLD: 1.11 RATIO — SIGNIFICANT CHANGE UP (ref 0.88–1.16)
LYMPHOCYTES # BLD AUTO: 0.81 K/UL — LOW (ref 1–3.3)
LYMPHOCYTES # BLD AUTO: 15 % — SIGNIFICANT CHANGE UP (ref 13–44)
MCHC RBC-ENTMCNC: 28.5 PG — SIGNIFICANT CHANGE UP (ref 27–34)
MCHC RBC-ENTMCNC: 32.6 GM/DL — SIGNIFICANT CHANGE UP (ref 32–36)
MCV RBC AUTO: 87.4 FL — SIGNIFICANT CHANGE UP (ref 80–100)
MONOCYTES # BLD AUTO: 0.81 K/UL — SIGNIFICANT CHANGE UP (ref 0–0.9)
MONOCYTES NFR BLD AUTO: 15 % — HIGH (ref 2–14)
NEUTROPHILS # BLD AUTO: 3.64 K/UL — SIGNIFICANT CHANGE UP (ref 1.8–7.4)
NEUTROPHILS NFR BLD AUTO: 67 % — SIGNIFICANT CHANGE UP (ref 43–77)
NRBC # BLD: SIGNIFICANT CHANGE UP /100 WBCS (ref 0–0)
PLATELET # BLD AUTO: 73 K/UL — LOW (ref 150–400)
POTASSIUM SERPL-MCNC: 3.8 MMOL/L — SIGNIFICANT CHANGE UP (ref 3.5–5.3)
POTASSIUM SERPL-SCNC: 3.8 MMOL/L — SIGNIFICANT CHANGE UP (ref 3.5–5.3)
PROT SERPL-MCNC: 7.1 GM/DL — SIGNIFICANT CHANGE UP (ref 6–8.3)
PROTHROM AB SERPL-ACNC: 12.4 SEC — SIGNIFICANT CHANGE UP (ref 10–12.9)
RBC # BLD: 4.28 M/UL — SIGNIFICANT CHANGE UP (ref 3.8–5.2)
RBC # FLD: 13.5 % — SIGNIFICANT CHANGE UP (ref 10.3–14.5)
SODIUM SERPL-SCNC: 142 MMOL/L — SIGNIFICANT CHANGE UP (ref 135–145)
WBC # BLD: 5.43 K/UL — SIGNIFICANT CHANGE UP (ref 3.8–10.5)
WBC # FLD AUTO: 5.43 K/UL — SIGNIFICANT CHANGE UP (ref 3.8–10.5)

## 2019-09-21 PROCEDURE — 93010 ELECTROCARDIOGRAM REPORT: CPT

## 2019-09-21 PROCEDURE — 99284 EMERGENCY DEPT VISIT MOD MDM: CPT | Mod: 25

## 2019-09-21 PROCEDURE — 71046 X-RAY EXAM CHEST 2 VIEWS: CPT | Mod: 26

## 2019-09-21 PROCEDURE — 36415 COLL VENOUS BLD VENIPUNCTURE: CPT

## 2019-09-21 PROCEDURE — 93971 EXTREMITY STUDY: CPT | Mod: 26,RT

## 2019-09-21 PROCEDURE — 99284 EMERGENCY DEPT VISIT MOD MDM: CPT

## 2019-09-21 PROCEDURE — 85025 COMPLETE CBC W/AUTO DIFF WBC: CPT

## 2019-09-21 PROCEDURE — 80053 COMPREHEN METABOLIC PANEL: CPT

## 2019-09-21 PROCEDURE — 85610 PROTHROMBIN TIME: CPT

## 2019-09-21 PROCEDURE — 85730 THROMBOPLASTIN TIME PARTIAL: CPT

## 2019-09-21 PROCEDURE — 71046 X-RAY EXAM CHEST 2 VIEWS: CPT

## 2019-09-21 PROCEDURE — 93971 EXTREMITY STUDY: CPT | Mod: RT

## 2019-09-21 PROCEDURE — 93005 ELECTROCARDIOGRAM TRACING: CPT

## 2019-09-21 PROCEDURE — 73090 X-RAY EXAM OF FOREARM: CPT | Mod: RT

## 2019-09-21 PROCEDURE — 73090 X-RAY EXAM OF FOREARM: CPT | Mod: 26,RT

## 2019-09-21 PROCEDURE — 84702 CHORIONIC GONADOTROPIN TEST: CPT

## 2019-09-21 NOTE — ED PROVIDER NOTE - NSFOLLOWUPINSTRUCTIONS_ED_ALL_ED_FT
1. return for worsening symptoms or anything concerning to you  2. take all home meds as prescribed  3. follow up with your pmd call to make an appointment    Muscle Strain    WHAT YOU NEED TO KNOW:    A muscle strain is a twist, pull, or tear of a muscle or tendon. A tendon is a strong elastic tissue that connects a muscle to a bone. Signs of a strained muscle include bruising and swelling over the area, pain with movement, and loss of strength.          DISCHARGE INSTRUCTIONS:    Return to the emergency department if:     You suddenly cannot feel or move your injured muscle.        Contact your healthcare provider if:     Your pain and swelling worsen or do not go away.       You have questions or concerns about your condition or care.    Medicines:     NSAIDs help decrease swelling and pain or fever. This medicine is available with or without a doctor's order. NSAIDs can cause stomach bleeding or kidney problems in certain people. If you take blood thinner medicine, always ask your healthcare provider if NSAIDs are safe for you. Always read the medicine label and follow directions.      Muscle relaxers help decrease pain and muscle spasms.      Take your medicine as directed. Contact your healthcare provider if you think your medicine is not helping or if you have side effects. Tell him of her if you are allergic to any medicine. Keep a list of the medicines, vitamins, and herbs you take. Include the amounts, and when and why you take them. Bring the list or the pill bottles to follow-up visits. Carry your medicine list with you in case of an emergency.    Follow up with your healthcare provider as directed: Your healthcare provider may suggest that you have a follow-up visit before you go back to your usual activity. Write down your questions so you remember to ask them during your visits.    Self-care:     3 to 7 days after the injury: Use Rest, Ice, Compression, and Elevation (RICE) to help stop bruising and decrease pain and swelling.  Rest: Rest your muscle to allow your injury to heal. When the pain decreases, begin normal, slow movements. For mild and moderate muscle strains, you should rest your muscles for about 2 days. However, if you have a severe muscle strain, you should rest for 10 to 14 days. You may need to use crutches to walk if your muscle strain is in your legs or lower body.       Ice: Put an ice pack on the injured area. Put a towel between the ice pack and your skin. Do not put the ice pack directly on your skin. You can use a package of frozen peas instead of an ice pack.      Compression: You may need to wrap an elastic bandage around the area to decrease swelling. It should be tight enough for you to feel support. Do not wrap it too tightly.       Elevation: Keep the injured muscle raised above your heart if possible. For example if you have a strain of your lower leg muscle, lie down and prop your leg up on pillows. This helps decrease pain and swelling.      3 to 21 days after the injury: Start to slowly and regularly exercise your muscle. This will help it heal. If you feel pain, decrease how hard you are exercising.       1 to 6 weeks after the injury: Stretch the injured muscle. Hold the stretch for about 30 seconds. Do this 4 times a day. You may stretch the muscle until you feel a slight pull. Stop stretching if you feel pain.       2 weeks to 6 months after the injury: The goal of this phase is to return to the activity you were doing before the injury happened, without hurting the muscle again.       3 weeks to 6 months after the injury: Keep stretching and strengthening your muscles to avoid injury. Slowly increase the time and distance that you exercise. You may have signs and symptoms of muscle strain 6 months after the injury, even if you do things to help it heal. In this case, you may need surgery on the muscle.

## 2019-09-21 NOTE — ED PROVIDER NOTE - PATIENT PORTAL LINK FT
You can access the FollowMyHealth Patient Portal offered by [facility] by registering at the following website: [email or web address]. By joining [facility] FollowMyHealth portal, you will also be able to view your health information using other applications (apps) compatible with our system.

## 2019-09-21 NOTE — ED ADULT TRIAGE NOTE - CHIEF COMPLAINT QUOTE
pt c/o wheezing SOB tremors, pt states she believes she has a blood clot in her right arm because she has a bump with discoloration as well as a blood clot in her  right calf, pt has hx of clotting disorder, pt does take xarelto everyda. pt states that if she doesn't have a blood clot to the right arm than she probably has a hairl;ine fracture from ehavy lifting because she is vitamin d deficient. pt states she also feels anxisous. all symptoms started over the past few days. pt speaking clearly, no signs of resp distress.

## 2019-09-21 NOTE — ED PROVIDER NOTE - CLINICAL SUMMARY MEDICAL DECISION MAKING FREE TEXT BOX
43F hx dvt htn itp protein s deficiency presents to the ED with right arm swelling. pt states that she takes xarelto every day - states she thinks she was carrying something heavy the other day bumped arm-  now with swelling - concerned it is a dvt or a hairline fx. pt also complains of cramping behind right knee. called PMD who told her to r/o dvt. pt states she did not have any other symptoms but upon arrival to the ED started feeling SOB. thinks maybe it is just due to anxiety of being here because has not have sob prior to this. exam non-focal. will obtain us r/o dvt xray r/o fx. very low suspicion for PE as O2 100% on RA, hr 70 on xarelto and pt states she just felt sob upon arrival maybe from anxiety - will allow pt to relax and then reassess if sob. Rolando Atkinson M.D., Attending Physician

## 2019-09-21 NOTE — ED PROVIDER NOTE - NS ED ROS FT
Constitutional: No fever or chills  Eyes: No visual changes  HEENT: No throat pain  CV: No chest pain  Resp: mild SOB no cough  GI: No abd pain, nausea or vomiting  : No dysuria  MSK: right arm and leg pain  Skin: No rash  Neuro: No headache

## 2019-09-21 NOTE — ED PROVIDER NOTE - PHYSICAL EXAMINATION
Constitutional: NAD AAOx3 + anxious  Eyes: PERRLA EOMI  Head: Normocephalic atraumatic  Mouth: MMM  Cardiac: regular rate   Resp: Lungs CTAB  GI: Abd s/nt/nd  Neuro: CN2-12 intact  Skin: No rashes   msk: normal peripheral pulses no bony ttp no ue or le swelling

## 2019-09-21 NOTE — ED ADULT NURSE NOTE - NSIMPLEMENTINTERV_GEN_ALL_ED
Implemented All Universal Safety Interventions:  [place] to call system. Call bell, personal items and telephone within reach. Instruct patient to call for assistance. Room bathroom lighting operational. Non-slip footwear when patient is off stretcher. Physically safe environment: no spills, clutter or unnecessary equipment. Stretcher in lowest position, wheels locked, appropriate side rails in place.

## 2019-09-21 NOTE — ED ADULT NURSE NOTE - OBJECTIVE STATEMENT
pt presents to ed c/o right arm swelling. pt reports taking xarelto daily. pt reports carrying heavy object and "bumped" arm. pt reports increased swelling, pt concerned for dvt or fx.pt denies sob,cp,fever,chills,numbness and tingling in effected extremity.

## 2019-09-21 NOTE — ED PROVIDER NOTE - PROGRESS NOTE DETAILS
labs ultrasound xray unremarkable. pt feeling better. no sob no concern for PE. spoke at length with patient regarding symptoms. will d/c with follow up and strict return precautions. Rolando Atkinson M.D., Attending Physician

## 2019-09-21 NOTE — ED PROVIDER NOTE - OBJECTIVE STATEMENT
43F hx dvt htn itp protein s deficiency presents to the ED with right arm swelling. pt states that she takes xarelto every day - states she thinks she was carrying something heavy the other day bumped arm-  now with swelling - concerned it is a dvt or a hairline fx. pt also complains of cramping behind right knee. called PMD who told her to r/o dvt. pt states she did not have any other symptoms but upon arrival to the ED started feeling SOB. thinks maybe it is just due to anxiety of being here because has not have sob prior to this.

## 2019-09-21 NOTE — ED PROVIDER NOTE - NS_ ATTENDINGSCRIBEDETAILS _ED_A_ED_FT
I, Rolando Atkinson MD,  performed the initial face to face bedside interview with this patient regarding history of present illness, review of symptoms and relevant past medical, social and family history.  I completed an independent physical examination.  I was the initial provider who evaluated this patient.  The history, relevant review of systems, past medical and surgical history, medical decision making, and physical examination was documented by the scribe in my presence and I attest to the accuracy of the documentation.

## 2019-09-29 ENCOUNTER — EMERGENCY (EMERGENCY)
Facility: HOSPITAL | Age: 44
LOS: 0 days | Discharge: HOME | End: 2019-09-29
Attending: EMERGENCY MEDICINE
Payer: MEDICAID

## 2019-09-29 VITALS — HEIGHT: 62 IN | WEIGHT: 95.02 LBS

## 2019-09-29 VITALS
SYSTOLIC BLOOD PRESSURE: 123 MMHG | TEMPERATURE: 98 F | RESPIRATION RATE: 16 BRPM | HEART RATE: 72 BPM | OXYGEN SATURATION: 100 % | DIASTOLIC BLOOD PRESSURE: 87 MMHG

## 2019-09-29 DIAGNOSIS — Z91.041 RADIOGRAPHIC DYE ALLERGY STATUS: ICD-10-CM

## 2019-09-29 DIAGNOSIS — Z86.718 PERSONAL HISTORY OF OTHER VENOUS THROMBOSIS AND EMBOLISM: ICD-10-CM

## 2019-09-29 DIAGNOSIS — F41.9 ANXIETY DISORDER, UNSPECIFIED: ICD-10-CM

## 2019-09-29 DIAGNOSIS — K31.84 GASTROPARESIS: ICD-10-CM

## 2019-09-29 DIAGNOSIS — G90.4 AUTONOMIC DYSREFLEXIA: ICD-10-CM

## 2019-09-29 DIAGNOSIS — S09.90XA UNSPECIFIED INJURY OF HEAD, INITIAL ENCOUNTER: ICD-10-CM

## 2019-09-29 DIAGNOSIS — D68.59 OTHER PRIMARY THROMBOPHILIA: ICD-10-CM
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
## 2019-09-29 DIAGNOSIS — Z79.01 LONG TERM (CURRENT) USE OF ANTICOAGULANTS: ICD-10-CM

## 2019-09-29 DIAGNOSIS — Z90.49 ACQUIRED ABSENCE OF OTHER SPECIFIED PARTS OF DIGESTIVE TRACT: Chronic | ICD-10-CM

## 2019-09-29 DIAGNOSIS — F32.9 MAJOR DEPRESSIVE DISORDER, SINGLE EPISODE, UNSPECIFIED: ICD-10-CM

## 2019-09-29 DIAGNOSIS — K58.9 IRRITABLE BOWEL SYNDROME WITHOUT DIARRHEA: ICD-10-CM

## 2019-09-29 DIAGNOSIS — I10 ESSENTIAL (PRIMARY) HYPERTENSION: ICD-10-CM

## 2019-09-29 LAB
ANION GAP SERPL CALC-SCNC: 4 MMOL/L — LOW (ref 5–17)
APTT BLD: 31.5 SEC — SIGNIFICANT CHANGE UP (ref 27.5–36.3)
BASOPHILS # BLD AUTO: 0.02 K/UL — SIGNIFICANT CHANGE UP (ref 0–0.2)
BASOPHILS NFR BLD AUTO: 0.3 % — SIGNIFICANT CHANGE UP (ref 0–2)
BUN SERPL-MCNC: 5 MG/DL — LOW (ref 7–23)
CALCIUM SERPL-MCNC: 9.2 MG/DL — SIGNIFICANT CHANGE UP (ref 8.5–10.1)
CHLORIDE SERPL-SCNC: 109 MMOL/L — HIGH (ref 96–108)
CO2 SERPL-SCNC: 29 MMOL/L — SIGNIFICANT CHANGE UP (ref 22–31)
CREAT SERPL-MCNC: 0.84 MG/DL — SIGNIFICANT CHANGE UP (ref 0.5–1.3)
EOSINOPHIL # BLD AUTO: 0.02 K/UL — SIGNIFICANT CHANGE UP (ref 0–0.5)
EOSINOPHIL NFR BLD AUTO: 0.3 % — SIGNIFICANT CHANGE UP (ref 0–6)
GLUCOSE SERPL-MCNC: 81 MG/DL — SIGNIFICANT CHANGE UP (ref 70–99)
HCT VFR BLD CALC: 38.1 % — SIGNIFICANT CHANGE UP (ref 34.5–45)
HGB BLD-MCNC: 12.9 G/DL — SIGNIFICANT CHANGE UP (ref 11.5–15.5)
IMM GRANULOCYTES NFR BLD AUTO: 0.7 % — SIGNIFICANT CHANGE UP (ref 0–1.5)
INR BLD: 1.21 RATIO — HIGH (ref 0.88–1.16)
LYMPHOCYTES # BLD AUTO: 1.58 K/UL — SIGNIFICANT CHANGE UP (ref 1–3.3)
LYMPHOCYTES # BLD AUTO: 26.6 % — SIGNIFICANT CHANGE UP (ref 13–44)
MCHC RBC-ENTMCNC: 29 PG — SIGNIFICANT CHANGE UP (ref 27–34)
MCHC RBC-ENTMCNC: 33.9 GM/DL — SIGNIFICANT CHANGE UP (ref 32–36)
MCV RBC AUTO: 85.6 FL — SIGNIFICANT CHANGE UP (ref 80–100)
MONOCYTES # BLD AUTO: 0.43 K/UL — SIGNIFICANT CHANGE UP (ref 0–0.9)
MONOCYTES NFR BLD AUTO: 7.2 % — SIGNIFICANT CHANGE UP (ref 2–14)
NEUTROPHILS # BLD AUTO: 3.85 K/UL — SIGNIFICANT CHANGE UP (ref 1.8–7.4)
NEUTROPHILS NFR BLD AUTO: 64.9 % — SIGNIFICANT CHANGE UP (ref 43–77)
PLATELET # BLD AUTO: 84 K/UL — LOW (ref 150–400)
POTASSIUM SERPL-MCNC: 4.1 MMOL/L — SIGNIFICANT CHANGE UP (ref 3.5–5.3)
POTASSIUM SERPL-SCNC: 4.1 MMOL/L — SIGNIFICANT CHANGE UP (ref 3.5–5.3)
PROTHROM AB SERPL-ACNC: 13.5 SEC — HIGH (ref 10–12.9)
RBC # BLD: 4.45 M/UL — SIGNIFICANT CHANGE UP (ref 3.8–5.2)
RBC # FLD: 13.2 % — SIGNIFICANT CHANGE UP (ref 10.3–14.5)
SODIUM SERPL-SCNC: 142 MMOL/L — SIGNIFICANT CHANGE UP (ref 135–145)
WBC # BLD: 5.94 K/UL — SIGNIFICANT CHANGE UP (ref 3.8–10.5)
WBC # FLD AUTO: 5.94 K/UL — SIGNIFICANT CHANGE UP (ref 3.8–10.5)

## 2019-09-29 PROCEDURE — 70450 CT HEAD/BRAIN W/O DYE: CPT | Mod: 26

## 2019-09-29 PROCEDURE — 85730 THROMBOPLASTIN TIME PARTIAL: CPT

## 2019-09-29 PROCEDURE — 99284 EMERGENCY DEPT VISIT MOD MDM: CPT

## 2019-09-29 PROCEDURE — 80048 BASIC METABOLIC PNL TOTAL CA: CPT

## 2019-09-29 PROCEDURE — 85610 PROTHROMBIN TIME: CPT

## 2019-09-29 PROCEDURE — 99284 EMERGENCY DEPT VISIT MOD MDM: CPT | Mod: 25

## 2019-09-29 PROCEDURE — 36415 COLL VENOUS BLD VENIPUNCTURE: CPT

## 2019-09-29 PROCEDURE — 85025 COMPLETE CBC W/AUTO DIFF WBC: CPT

## 2019-09-29 PROCEDURE — 70450 CT HEAD/BRAIN W/O DYE: CPT

## 2019-09-29 NOTE — ED PROVIDER NOTE - NSFOLLOWUPINSTRUCTIONS_ED_ALL_ED_FT
Concussion    WHAT YOU NEED TO KNOW:    A concussion is a mild brain injury. It is usually caused by a bump or blow to the head from a fall, a motor vehicle crash, or a sports injury. Sometimes being shaken forcefully may cause a concussion.    DISCHARGE INSTRUCTIONS:    Have someone call 911 for any of the following:     Someone tries to wake you and cannot do so.      You have a seizure, increasing confusion, or a change in personality.      Your speech becomes slurred, or you have new vision problems.    Return to the emergency department if:     You have sudden and new vision problems.      You have a severe headache that does not go away.      You have arm or leg weakness, numbness, or new problems with coordination.      You have blood or clear fluid coming out of the ears or nose.    Contact your healthcare provider if:     You have nausea or are vomiting.      You feel more sleepy than usual.      Your symptoms get worse.      Your symptoms last longer than 6 weeks after the injury.      You have questions or concerns about your condition or care.    Medicines: You may need any of the following:     Acetaminophen decreases pain and fever. It is available without a doctor's order. Ask how much to take and how often to take it. Follow directions. Read the labels of all other medicines you are using to see if they also contain acetaminophen, or ask your doctor or pharmacist. Acetaminophen can cause liver damage if not taken correctly. Do not use more than 4 grams (4,000 milligrams) total of acetaminophen in one day.       NSAIDs help decrease swelling and pain or fever. This medicine is available with or without a doctor's order. NSAIDs can cause stomach bleeding or kidney problems in certain people. If you take blood thinner medicine, always ask your healthcare provider if NSAIDs are safe for you. Always read the medicine label and follow directions.      Take your medicine as directed. Contact your healthcare provider if you think your medicine is not helping or if you have side effects. Tell him or her if you are allergic to any medicine. Keep a list of the medicines, vitamins, and herbs you take. Include the amounts, and when and why you take them. Bring the list or the pill bottles to follow-up visits. Carry your medicine list with you in case of an emergency.    Self-care: Concussion symptoms usually go away within about 10 days, but they may last longer. The following may be recommended to manage your symptoms:     Rest from physical and mental activities as directed. Mental activities are those that require thinking, concentration, and attention. You will need to rest until your symptoms are gone. Rest will allow you to recover from your concussion. Ask your healthcare provider when you can return to work and other daily activities.      Have someone stay with you for the first 24 hours after your injury. Your healthcare provider should be contacted if your symptoms get worse, or you develop new symptoms.      Do not participate in sports and physical activities until your healthcare provider says it is okay. They could make your symptoms worse or lead to another concussion. Your healthcare provider will tell you when it is okay for you to return to sports or physical activities. Ask for more information about sports concussions.    Prevent another concussion:     Wear protective sports equipment that fits properly. Helmets help decrease your risk for a serious brain injury. Talk to your healthcare provider about ways you can decrease your risk for a concussion if you play sports.      Wear your seatbelt every time you travel. This helps to decrease your risk for a head injury if you are in a car accident.     Follow up with your healthcare provider as directed: Write down your questions so you remember to ask them during your visits.     FOLLOW UP EVALUATION  To ensure optimal concussion recovery, follow up with a doctor specialized in concussion management. An evaluation by a specialty concussion program can ensure timely return to activities.    We offer appointments through the [facility]’s Hotline at [phone].

## 2019-09-29 NOTE — ED ADULT NURSE NOTE - DOES PATIENT HAVE ADVANCE DIRECTIVE
"Name: Kath Mcclellan  [facility] Number: [id number]  Date of Treatment: 05/15/2017   Diagnosis:   Encounter Diagnoses   Name Primary?    Foot drop, left foot     Decreased range of motion (ROM) of knee     Decreased range of motion of ankle        Time in: 1101  Time Out: 1159  Total Treatment Time: 58  Group Time: 0      Subjective:    Kath reports f/u w MD tomorrow. Family has been assisting w bandage changes, steri strips remain in place.  Patient reports their pain to be 2/10 on a 0-10 scale with 0 being no pain and 10 being the worst pain imaginable.    Objective    Patient received individual therapy to increase strength, endurance, ROM, flexibility and gait with 0 patients with activities as follows:     Kath received therapeutic exercises to develop strength, endurance, ROM, flexibility and gait for 58 minutes including:     Seated L HS stretch 3 x 30 sec  Seated L gastroc stretch w strap 3 x 30 sec  L LAQ 3 x 10  Ankle alphabet 1 rep   Seated L hip flexion 3 x 10  Supine:  Mod Assisted w L SAQ 3 x 10  L Heel slide w sheet 3 x 10   Bridges 2 x 10  Long sitting L gastroc stretch 3 x 10  Ankle DF, PF, inv,eversion 3 x 10 w GTB  // bars:  R LE on 3" box for wt shift L 3 x 30 sec  Step through LLE x 10  amb fwd/bwd 4 x 8 ft  Hip abd 3 x 10 R/L  Timed amb w axillary crutches from gym entrance to waiting area 2:35 min  Written Home Exercises Provided: ankle ex w theraband, ankle alphabet, SAQ  Pt demo good understanding of the education provided. Kath demonstrated good return demonstration of activities.     Assessment:     Pt cont to hike L hip to clear LLE when advancing for swing through phase of gait. Poor ability to actively DF to clear foot  Improved quad contraction w repetition. Encouraged family assist w holding theraband w ankle ex.  Pt will continue to benefit from skilled PT intervention. Medical Necessity is demonstrated by:  Fall Risk, Continued inability to participate in vocational pursuits, " Pain limits function of effected part for some activities, Requires skilled supervision to complete and progress HEP, Weakness and Edema.    Patient is making fair progress towards established goals.    New/Revised goals: n/a      Plan:  Continue with established Plan of Care towards PT goals.    No

## 2019-09-29 NOTE — ED STATDOCS - OBJECTIVE STATEMENT
44 y/o female with a PMHx of DVT, low protein S, ITP, HTN, POTS presents to the ED c/o head injury yesterday. Pt banged left side of head on the wall at 5:30pm yesterday, now feels dizziness. +HA, blurry vision. No LOC. Pt is on Xarelto. Pt was seen in ED on 9/21 for right arm swelling-  no DVT. See Progress Note

## 2019-09-29 NOTE — ED PROVIDER NOTE - SKIN, MLM
+Small contusion to left temple, approximately 1 cm round. +Right forearm yellowish discoloring over hematoma.

## 2019-09-29 NOTE — ED ADULT NURSE NOTE - NSIMPLEMENTINTERV_GEN_ALL_ED
Implemented All Fall with Harm Risk Interventions:  [place] to call system. Call bell, personal items and telephone within reach. Instruct patient to call for assistance. Room bathroom lighting operational. Non-slip footwear when patient is off stretcher. Physically safe environment: no spills, clutter or unnecessary equipment. Stretcher in lowest position, wheels locked, appropriate side rails in place. Provide visual cue, wrist band, yellow gown, etc. Monitor gait and stability. Monitor for mental status changes and reorient to person, place, and time. Review medications for side effects contributing to fall risk. Reinforce activity limits and safety measures with patient and family. Provide visual clues: red socks.

## 2019-09-29 NOTE — ED PROVIDER NOTE - OBJECTIVE STATEMENT
44 y/o female with a PMHx of POTS, HTN, gastroparesis, IBS, DVT on Xarelto, ITP, protein S deficiency, autonomic dysreflexia, anxiety presents to the ED c/o gradual onset of dizziness, vision changes since yesterday. Pt states she was turning around while moving something around 17:30 yesterday when she struck the left side of her head. No LOC. Currently complains of dizziness and states her vision is "a little off." Notes it "was not a small bang," reports "smashing" her head while falling. Presenting with concern for head strike on Xarelto.

## 2019-09-29 NOTE — ED ADULT NURSE NOTE - OBJECTIVE STATEMENT
Pt reports bumping head on wall yesterday without LOC.  Pt complains of dizziness today.  Daily Xarelto.  Pt also concerned about healing bruise to right forearm from blood work one week earlier.  Small contusion to left temple.  Pt wearing two face masks.  Pt voices concern about anemia, thrombocytopenia, hypokalemia, coagulation disorder; requests blood work.

## 2019-09-29 NOTE — ED ADULT TRIAGE NOTE - CHIEF COMPLAINT QUOTE
Pt to triage with PMHx of low protein S clotting deficiency on Xarelto with DVT's, banged her head on wall yesterday at 1730 and now c/o some dizziness, wants a CT scan to make sure she has no internal bleeding. While she is here, would like to have her right arm evaluated also with swelling and DVT that was evaluated recently.

## 2019-09-29 NOTE — ED PROVIDER NOTE - PROGRESS NOTE DETAILS
neg head ct. no indication for labs. pt demands labs prior to dc. d/w pt she just had labs 9/21/19. pt still demands labs. MD ANGELICA

## 2019-09-29 NOTE — ED STATDOCS - PROGRESS NOTE DETAILS
Scribe CD for ED attending, Doctor Saman. 42 y/o female with a PMHx of DVT, low protein S, ITP, HTN, POTS presents to the ED c/o head injury yesterday. Pt banged left side of head on the wall at 5:30pm yesterday, now feels dizziness. +HA, blurry vision. No LOC. Pt is on Xarelto. Pt was seen in ED on 9/21 for right arm swelling-  no DVT. Will send pt to main ED for further evaluation. +small hematoma left forehead. Head injury on Xarelto within 24 hour period, now with HA, dizziness, vision changes, Trauma alert initiated.

## 2019-09-29 NOTE — ED PROVIDER NOTE - PATIENT PORTAL LINK FT
You can access the FollowMyHealth Patient Portal offered by [facility] by registering at the following website: [email or web address]. By joining [employer]’s FollowMyHealth portal, you will also be able to view your health information using other applications (apps) compatible with our system.

## 2019-09-29 NOTE — ED PROVIDER NOTE - CONSTITUTIONAL, MLM
normal... +Thin. Well nourished, awake, alert, oriented to person, place, time/situation and in no apparent distress.

## 2019-10-01 PROCEDURE — G9005: CPT

## 2019-10-14 ENCOUNTER — OUTPATIENT (OUTPATIENT)
Dept: OUTPATIENT SERVICES | Facility: HOSPITAL | Age: 44
LOS: 1 days | Discharge: HOME | End: 2019-10-14

## 2019-10-14 DIAGNOSIS — Z90.49 ACQUIRED ABSENCE OF OTHER SPECIFIED PARTS OF DIGESTIVE TRACT: Chronic | ICD-10-CM

## 2019-10-14 DIAGNOSIS — D47.3 ESSENTIAL (HEMORRHAGIC) THROMBOCYTHEMIA: ICD-10-CM

## 2019-10-16 ENCOUNTER — OTHER (OUTPATIENT)
Age: 44
End: 2019-10-16

## 2019-10-16 ENCOUNTER — RESULT REVIEW (OUTPATIENT)
Age: 44
End: 2019-10-16

## 2019-10-16 ENCOUNTER — APPOINTMENT (OUTPATIENT)
Dept: HEMATOLOGY ONCOLOGY | Facility: CLINIC | Age: 44
End: 2019-10-16
Payer: MEDICAID

## 2019-10-16 VITALS
WEIGHT: 99.21 LBS | HEART RATE: 78 BPM | DIASTOLIC BLOOD PRESSURE: 85 MMHG | SYSTOLIC BLOOD PRESSURE: 127 MMHG | TEMPERATURE: 98.8 F | BODY MASS INDEX: 18.15 KG/M2 | OXYGEN SATURATION: 100 % | RESPIRATION RATE: 15 BRPM

## 2019-10-16 DIAGNOSIS — T14.8XXA OTHER INJURY OF UNSPECIFIED BODY REGION, INITIAL ENCOUNTER: ICD-10-CM

## 2019-10-16 DIAGNOSIS — Z78.9 OTHER SPECIFIED HEALTH STATUS: ICD-10-CM

## 2019-10-16 DIAGNOSIS — K31.84 GASTROPARESIS: ICD-10-CM

## 2019-10-16 DIAGNOSIS — Z98.890 OTHER SPECIFIED POSTPROCEDURAL STATES: ICD-10-CM

## 2019-10-16 DIAGNOSIS — Z86.2 PERSONAL HISTORY OF DISEASES OF THE BLOOD AND BLOOD-FORMING ORGANS AND CERTAIN DISORDERS INVOLVING THE IMMUNE MECHANISM: ICD-10-CM

## 2019-10-16 LAB
25(OH)D3 SERPL-MCNC: 8.6 NG/ML
BASOPHILS # BLD AUTO: 0 K/UL — SIGNIFICANT CHANGE UP (ref 0–0.2)
BASOPHILS NFR BLD AUTO: 0.2 % — SIGNIFICANT CHANGE UP (ref 0–2)
EOSINOPHIL # BLD AUTO: 0.1 K/UL — SIGNIFICANT CHANGE UP (ref 0–0.5)
EOSINOPHIL NFR BLD AUTO: 0.9 % — SIGNIFICANT CHANGE UP (ref 0–6)
FERRITIN SERPL-MCNC: 28 NG/ML
FIBRINOGEN PPP COAG.DERIVED-MCNC: 388 MG/DL
FOLATE SERPL-MCNC: 12.2 NG/ML
HCT VFR BLD CALC: 42.3 % — SIGNIFICANT CHANGE UP (ref 34.5–45)
HGB BLD-MCNC: 14 G/DL — SIGNIFICANT CHANGE UP (ref 11.5–15.5)
IRON SATN MFR SERPL: 11 %
IRON SERPL-MCNC: 54 UG/DL
LYMPHOCYTES # BLD AUTO: 1.7 K/UL — SIGNIFICANT CHANGE UP (ref 1–3.3)
LYMPHOCYTES # BLD AUTO: 26.3 % — SIGNIFICANT CHANGE UP (ref 13–44)
MCHC RBC-ENTMCNC: 29.6 PG — SIGNIFICANT CHANGE UP (ref 27–34)
MCHC RBC-ENTMCNC: 33 G/DL — SIGNIFICANT CHANGE UP (ref 32–36)
MCV RBC AUTO: 89.7 FL — SIGNIFICANT CHANGE UP (ref 80–100)
MONOCYTES # BLD AUTO: 0.5 K/UL — SIGNIFICANT CHANGE UP (ref 0–0.9)
MONOCYTES NFR BLD AUTO: 7 % — SIGNIFICANT CHANGE UP (ref 2–14)
NEUTROPHILS # BLD AUTO: 4.3 K/UL — SIGNIFICANT CHANGE UP (ref 1.8–7.4)
NEUTROPHILS NFR BLD AUTO: 65.6 % — SIGNIFICANT CHANGE UP (ref 43–77)
PLATELET # BLD AUTO: 49 K/UL — LOW (ref 150–400)
PROT S AG ACT/NOR PPP IA: 58 %
RBC # BLD: 4.72 M/UL — SIGNIFICANT CHANGE UP (ref 3.8–5.2)
RBC # FLD: 13.6 % — SIGNIFICANT CHANGE UP (ref 10.3–14.5)
RETICS #: 73.2 K/UL — SIGNIFICANT CHANGE UP (ref 25–125)
RETICS/RBC NFR: 1.5 % — SIGNIFICANT CHANGE UP (ref 0.5–2.5)
TIBC SERPL-MCNC: 501 UG/DL
UIBC SERPL-MCNC: 447 UG/DL
VIT B12 SERPL-MCNC: 218 PG/ML
WBC # BLD: 6.5 K/UL — SIGNIFICANT CHANGE UP (ref 3.8–10.5)
WBC # FLD AUTO: 6.5 K/UL — SIGNIFICANT CHANGE UP (ref 3.8–10.5)

## 2019-10-16 PROCEDURE — 99215 OFFICE O/P EST HI 40 MIN: CPT

## 2019-10-16 RX ORDER — CHROMIUM 200 MCG
1000 TABLET ORAL
Refills: 0 | Status: DISCONTINUED | COMMUNITY
End: 2019-10-16

## 2019-10-16 NOTE — CONSULT LETTER
[Dear  ___] : Dear  [unfilled], [Courtesy Letter:] : I had the pleasure of seeing your patient, [unfilled], in my office today. [Please see my note below.] : Please see my note below. [Consult Closing:] : Thank you very much for allowing me to participate in the care of this patient.  If you have any questions, please do not hesitate to contact me. [Sincerely,] : Sincerely, [FreeTextEntry3] : Skylar Sorto M.D.

## 2019-10-16 NOTE — HISTORY OF PRESENT ILLNESS
[de-identified] : ITP-diagnosed after episode of mono-1999-has not required treatment.\par LIJ DVT post appendectomy when had ?IV in LUE-10/2018. Maintained on Xarelto since that time. Protein S deficiency.\par Patient was under the hematologic care of Dr. Zamora through 9/2019. [de-identified] : This is my initial office visit with patient who has been under the hematologic care of Dr. Zamora. \par Takes Slo-Fe three times a week.\par Taking Xarelto 20 mg PO daily.\par Bruises easily.

## 2019-10-16 NOTE — REVIEW OF SYSTEMS
[Fatigue] : fatigue [Anxiety] : anxiety [Easy Bruising] : a tendency for easy bruising [Negative] : Allergic/Immunologic [Fever] : no fever [Abdominal Pain] : no abdominal pain [Dysuria] : no dysuria [Fainting] : no fainting [Proptosis] : no proptosis [FreeTextEntry7] : gastroparesis

## 2019-10-16 NOTE — PHYSICAL EXAM
[Thin] : thin [Normal] : normal spine exam without palpable tenderness, no kyphosis or scoliosis [de-identified] : no dominant mass or nipple discharge [de-identified] : scattered ecchymoses on extremities; no petechiae [de-identified] : anxious, rapid speech

## 2019-10-16 NOTE — ASSESSMENT
[FreeTextEntry1] : #1) ITP-chronic. CBC reviewed with patient. Discussed potential future treatment options pending course. Holding on therapy for now, though will reconsider if further decrease in platelet count, in light of ongoing anticoagulation.\par Patient has h/o borderline protein S deficiency with history of left internal jugular DVT (following ? IV catheter)-discussed my concern regarding patient's decreased platelet count and have recommended patient at least decrease her xarelto to 10 mg daily (recommended she hold the xarelto for now, pending repeat platelet count, however, she refused to hold her anticoagulation). Following lengthy discussion, the patient stated she will not decrease her xarelto to 10 mg daily, but will try 15 mg daily.\par Patient was advised to go directly to the emergency department if any abnormal bleeding (she is expecting her menses soon).\par #2) history of anemia/iron deficiency/vitamin B12 deficiency-hemoglobin currently within normal limits. Recommend vitamin B12 supplementation-1 mg p.o. daily to start.\par Patient's many questions have been answered at this time.

## 2019-10-18 ENCOUNTER — EMERGENCY (EMERGENCY)
Facility: HOSPITAL | Age: 44
LOS: 0 days | Discharge: HOME | End: 2019-10-19
Attending: FAMILY MEDICINE
Payer: MEDICAID

## 2019-10-18 VITALS — WEIGHT: 139.99 LBS | HEIGHT: 64 IN

## 2019-10-18 DIAGNOSIS — M79.10 MYALGIA, UNSPECIFIED SITE: ICD-10-CM

## 2019-10-18 DIAGNOSIS — Z91.041 RADIOGRAPHIC DYE ALLERGY STATUS: ICD-10-CM

## 2019-10-18 DIAGNOSIS — Z90.49 ACQUIRED ABSENCE OF OTHER SPECIFIED PARTS OF DIGESTIVE TRACT: Chronic | ICD-10-CM

## 2019-10-18 DIAGNOSIS — D69.6 THROMBOCYTOPENIA, UNSPECIFIED: ICD-10-CM

## 2019-10-18 DIAGNOSIS — Z79.01 LONG TERM (CURRENT) USE OF ANTICOAGULANTS: ICD-10-CM

## 2019-10-18 DIAGNOSIS — I10 ESSENTIAL (PRIMARY) HYPERTENSION: ICD-10-CM

## 2019-10-18 DIAGNOSIS — Z88.8 ALLERGY STATUS TO OTHER DRUGS, MEDICAMENTS AND BIOLOGICAL SUBSTANCES STATUS: ICD-10-CM

## 2019-10-18 DIAGNOSIS — R00.2 PALPITATIONS: ICD-10-CM

## 2019-10-18 DIAGNOSIS — F41.9 ANXIETY DISORDER, UNSPECIFIED: ICD-10-CM

## 2019-10-18 LAB
ALBUMIN SERPL ELPH-MCNC: 3.7 G/DL — SIGNIFICANT CHANGE UP (ref 3.3–5)
ALP SERPL-CCNC: 76 U/L — SIGNIFICANT CHANGE UP (ref 40–120)
ALT FLD-CCNC: 21 U/L — SIGNIFICANT CHANGE UP (ref 12–78)
ANION GAP SERPL CALC-SCNC: 7 MMOL/L — SIGNIFICANT CHANGE UP (ref 5–17)
APTT BLD: 35.3 SEC — SIGNIFICANT CHANGE UP (ref 27.5–36.3)
AST SERPL-CCNC: 15 U/L — SIGNIFICANT CHANGE UP (ref 15–37)
BILIRUB SERPL-MCNC: 0.3 MG/DL — SIGNIFICANT CHANGE UP (ref 0.2–1.2)
BUN SERPL-MCNC: 11 MG/DL — SIGNIFICANT CHANGE UP (ref 7–23)
CALCIUM SERPL-MCNC: 8.6 MG/DL — SIGNIFICANT CHANGE UP (ref 8.5–10.1)
CHLORIDE SERPL-SCNC: 107 MMOL/L — SIGNIFICANT CHANGE UP (ref 96–108)
CO2 SERPL-SCNC: 25 MMOL/L — SIGNIFICANT CHANGE UP (ref 22–31)
CREAT SERPL-MCNC: 0.64 MG/DL — SIGNIFICANT CHANGE UP (ref 0.5–1.3)
GLUCOSE SERPL-MCNC: 89 MG/DL — SIGNIFICANT CHANGE UP (ref 70–99)
HCT VFR BLD CALC: 36.1 % — SIGNIFICANT CHANGE UP (ref 34.5–45)
HGB BLD-MCNC: 12 G/DL — SIGNIFICANT CHANGE UP (ref 11.5–15.5)
INR BLD: 1.86 RATIO — HIGH (ref 0.88–1.16)
MAGNESIUM SERPL-MCNC: 2 MG/DL — SIGNIFICANT CHANGE UP (ref 1.6–2.6)
MCHC RBC-ENTMCNC: 29 PG — SIGNIFICANT CHANGE UP (ref 27–34)
MCHC RBC-ENTMCNC: 33.2 GM/DL — SIGNIFICANT CHANGE UP (ref 32–36)
MCV RBC AUTO: 87.2 FL — SIGNIFICANT CHANGE UP (ref 80–100)
PLATELET # BLD AUTO: 64 K/UL — LOW (ref 150–400)
POTASSIUM SERPL-MCNC: 3.4 MMOL/L — LOW (ref 3.5–5.3)
POTASSIUM SERPL-SCNC: 3.4 MMOL/L — LOW (ref 3.5–5.3)
PROT SERPL-MCNC: 6.7 GM/DL — SIGNIFICANT CHANGE UP (ref 6–8.3)
PROTHROM AB SERPL-ACNC: 21 SEC — HIGH (ref 10–12.9)
RBC # BLD: 4.14 M/UL — SIGNIFICANT CHANGE UP (ref 3.8–5.2)
RBC # FLD: 13.3 % — SIGNIFICANT CHANGE UP (ref 10.3–14.5)
SODIUM SERPL-SCNC: 139 MMOL/L — SIGNIFICANT CHANGE UP (ref 135–145)
TROPONIN I SERPL-MCNC: <0.015 NG/ML — SIGNIFICANT CHANGE UP (ref 0.01–0.04)
WBC # BLD: 8.35 K/UL — SIGNIFICANT CHANGE UP (ref 3.8–10.5)
WBC # FLD AUTO: 8.35 K/UL — SIGNIFICANT CHANGE UP (ref 3.8–10.5)

## 2019-10-18 PROCEDURE — 85027 COMPLETE CBC AUTOMATED: CPT

## 2019-10-18 PROCEDURE — 83735 ASSAY OF MAGNESIUM: CPT

## 2019-10-18 PROCEDURE — 71045 X-RAY EXAM CHEST 1 VIEW: CPT | Mod: 26

## 2019-10-18 PROCEDURE — 71045 X-RAY EXAM CHEST 1 VIEW: CPT

## 2019-10-18 PROCEDURE — 74176 CT ABD & PELVIS W/O CONTRAST: CPT

## 2019-10-18 PROCEDURE — 93971 EXTREMITY STUDY: CPT | Mod: RT

## 2019-10-18 PROCEDURE — 93005 ELECTROCARDIOGRAM TRACING: CPT

## 2019-10-18 PROCEDURE — 85730 THROMBOPLASTIN TIME PARTIAL: CPT

## 2019-10-18 PROCEDURE — 99284 EMERGENCY DEPT VISIT MOD MDM: CPT | Mod: 25

## 2019-10-18 PROCEDURE — 99285 EMERGENCY DEPT VISIT HI MDM: CPT

## 2019-10-18 PROCEDURE — 93970 EXTREMITY STUDY: CPT

## 2019-10-18 PROCEDURE — 36415 COLL VENOUS BLD VENIPUNCTURE: CPT

## 2019-10-18 PROCEDURE — 84484 ASSAY OF TROPONIN QUANT: CPT

## 2019-10-18 PROCEDURE — 71250 CT THORAX DX C-: CPT

## 2019-10-18 PROCEDURE — 74176 CT ABD & PELVIS W/O CONTRAST: CPT | Mod: 26

## 2019-10-18 PROCEDURE — 93010 ELECTROCARDIOGRAM REPORT: CPT

## 2019-10-18 PROCEDURE — 80053 COMPREHEN METABOLIC PANEL: CPT

## 2019-10-18 PROCEDURE — 85610 PROTHROMBIN TIME: CPT

## 2019-10-18 PROCEDURE — 71250 CT THORAX DX C-: CPT | Mod: 26

## 2019-10-18 NOTE — ED PROVIDER NOTE - SKIN, MLM
Skin normal color for race, warm, dry and intact. No evidence of rash. +ecchymotic area to RUE and BL LE

## 2019-10-18 NOTE — ED STATDOCS - PMH
Anxiety    Autonomic dysreflexia    Depression    DVT (deep venous thrombosis)  left internal jugular and subclavian veins  Dysautonomia    Dystonia    Gastroparesis    Headache    History of ITP    HTN (hypertension)    IBS (irritable bowel syndrome)    Idiopathic thrombocytopenia purpura    Idiopathic thrombocytopenic purpura    Labyrinthitis    POTS (postural orthostatic tachycardia syndrome)    Protein S deficiency

## 2019-10-18 NOTE — ED ADULT NURSE NOTE - OBJECTIVE STATEMENT
Patient complaining of multiple medical complaints.  Abdominal pain, dizziness, "PVC's" and "DVT's".  She is alert and oriented.  Says she has been feeling bloated decreased appetite.  She has a history of DVT, multiple autoimmune disorders.  Patient refusing IV access because she says "I have DVT's in my arms and cannot have an IV, only phlebotomy can draw labs."  Phlebotomist contacted and brittany labs successfully.  She walks with a steady gait however feels weak and dizzy.  Escorted to bathroom with wheelchair, ambulates with no assist.  She arrived to the ED via [employer].

## 2019-10-18 NOTE — ED STATDOCS - NS_ ATTENDINGSCRIBEDETAILS _ED_A_ED_FT
I Sagar Gill MD saw and examined the patient. Scribe documented for me and under my supervision. I have modified the scribe's documentation where necessary to reflect my history, physical exam and other relevant documentations pertinent to the care of the patient.

## 2019-10-18 NOTE — ED ADULT NURSE NOTE - CHIEF COMPLAINT QUOTE
patient ambulatory to triage. Patient states she feels as though she is having "multiple PVCs". Patient also states she has generalized abdominal pain and nausea. Patient states her platelets are low and "fears should could be bleeding internally". Patient also states she has had an increases in tremors and bruising. No signs of acute distress noted. patient denies cp/sob. patient well perfused, respirations even and unlabored.

## 2019-10-18 NOTE — ED PROVIDER NOTE - PROGRESS NOTE DETAILS
Signed out to /. Thu. To f/u results of doppler. Demarco ARRIOLA pt told of results.   pt anxious appearing.   again told of negative findings and advised to f/u with PMD.

## 2019-10-18 NOTE — ED PROVIDER NOTE - CONSTITUTIONAL, MLM
normal... Well appearing, well nourished, awake, alert, oriented to person, place, time/situation. +Patient is anxious

## 2019-10-18 NOTE — ED STATDOCS - PROGRESS NOTE DETAILS
Brandon PEGUERO for ED attending, Dr. Gill: 44 y/o female with PMHx of low protein S clotting disorder, DVT, HTN, gastroparesis, anxiety, idiopathic thrombocytopenia purpura, POTS, IBS, and s/p appendectomy presents to the ED c/o frequent PVCs. Reports platelets have dropped to 49,000. Notes +CP, +palpitations, +abd pain, and diffuse random +bruising to extremities. Pt also notes +rectal bleeding. States she fears she is bleeding internally. No fever. On Xarelto. Allergic to IV contrast. PCP: Dr. Cristhian Parker. Will send pt to main ED for further evaluation.

## 2019-10-18 NOTE — ED PROVIDER NOTE - PATIENT PORTAL LINK FT
You can access the FollowMyHealth Patient Portal offered by [facility] by registering at the following website: [email or web address]. By joining [facility]’s FollowMyHealth portal, you will also be able to view your health information using other applications (apps) compatible with our system.

## 2019-10-18 NOTE — ED PROVIDER NOTE - NSFOLLOWUPINSTRUCTIONS_ED_ALL_ED_FT
please follow up with your doctor in 2-3 days.    drink plenty of fluids.  return to ED for any concerns

## 2019-10-18 NOTE — ED PROVIDER NOTE - CLINICAL SUMMARY MEDICAL DECISION MAKING FREE TEXT BOX
Patient with history of DVT, low protein S, ITP on Xarelto has had recent decreased dose due to low platelets. Concerned regarding ecchymosis on arms and legs. Concerned for DVT and internal bleeding will do rectal and US. Patient is anxious

## 2019-10-18 NOTE — ED ADULT NURSE NOTE - NSIMPLEMENTINTERV_GEN_ALL_ED
Implemented All Fall with Harm Risk Interventions:  [place] to call system. Call bell, personal items and telephone within reach. Instruct patient to call for assistance. Room bathroom lighting operational. Non-slip footwear when patient is off stretcher. Physically safe environment: no spills, clutter or unnecessary equipment. Stretcher in lowest position, wheels locked, appropriate side rails in place. Provide visual cue, wrist band, yellow gown, etc. Monitor gait and stability. Monitor for mental status changes and reorient to person, place, and time. Review medications for side effects contributing to fall risk. Reinforce activity limits and safety measures with patient and family. Provide visual clues: red socks.

## 2019-10-18 NOTE — ED ADULT TRIAGE NOTE - CHIEF COMPLAINT QUOTE
patient ambulatory to triage. Patient states she feels as though she is having "multiple PVCs". Patient also states she has generalized abdominal pain and nausea. Patient states her platelets are low and "fears should could be bleeding internally". Patient also states she has had an increases in tremors and bruising. No signs of acute distress noted. patient denies cp/sob. no signs of acute distress noted. patient ambulatory to triage. Patient states she feels as though she is having "multiple PVCs". Patient also states she has generalized abdominal pain and nausea. Patient states her platelets are low and "fears should could be bleeding internally". Patient also states she has had an increases in tremors and bruising. No signs of acute distress noted. patient denies cp/sob. patient well perfused, respirations even and unlabored.

## 2019-10-18 NOTE — ED PROVIDER NOTE - OBJECTIVE STATEMENT
42 y/o female with PMHx of low protein S clotting disorder, DVT, HTN, gastroparesis, anxiety, idiopathic thrombocytopenia purpura, POTS, IBS, and s/p appendectomy presents to the ED c/o frequent PVCs. Reports platelets have dropped to 49,000. Notes  +palpitations, +abd pain, +dizziness and diffuse random bruising and pain to RUE, and BL LE. Patient has had recent decrease dose due to low platelets. States she fears she is bleeding internally. +blood in stool No fever. On Xarelto. Allergic to IV contrast. PCP: Dr. Cristhian Parker.

## 2019-10-19 VITALS
DIASTOLIC BLOOD PRESSURE: 91 MMHG | TEMPERATURE: 98 F | OXYGEN SATURATION: 99 % | HEART RATE: 72 BPM | SYSTOLIC BLOOD PRESSURE: 137 MMHG | RESPIRATION RATE: 17 BRPM

## 2019-10-19 PROCEDURE — 93970 EXTREMITY STUDY: CPT | Mod: 26

## 2019-10-19 PROCEDURE — 93971 EXTREMITY STUDY: CPT | Mod: 26,RT

## 2019-10-21 ENCOUNTER — RESULT REVIEW (OUTPATIENT)
Age: 44
End: 2019-10-21

## 2019-10-21 ENCOUNTER — OTHER (OUTPATIENT)
Age: 44
End: 2019-10-21

## 2019-10-21 ENCOUNTER — MOBILE ON CALL (OUTPATIENT)
Age: 44
End: 2019-10-21

## 2019-10-21 ENCOUNTER — APPOINTMENT (OUTPATIENT)
Dept: HEMATOLOGY ONCOLOGY | Facility: CLINIC | Age: 44
End: 2019-10-21

## 2019-10-21 LAB
BASOPHILS # BLD AUTO: 0 K/UL — SIGNIFICANT CHANGE UP (ref 0–0.2)
BASOPHILS NFR BLD AUTO: 0.1 % — SIGNIFICANT CHANGE UP (ref 0–2)
EOSINOPHIL # BLD AUTO: 0 K/UL — SIGNIFICANT CHANGE UP (ref 0–0.5)
EOSINOPHIL NFR BLD AUTO: 0.7 % — SIGNIFICANT CHANGE UP (ref 0–6)
HCT VFR BLD CALC: 37.8 % — SIGNIFICANT CHANGE UP (ref 34.5–45)
HGB BLD-MCNC: 13.1 G/DL — SIGNIFICANT CHANGE UP (ref 11.5–15.5)
LYMPHOCYTES # BLD AUTO: 1.5 K/UL — SIGNIFICANT CHANGE UP (ref 1–3.3)
LYMPHOCYTES # BLD AUTO: 27.2 % — SIGNIFICANT CHANGE UP (ref 13–44)
MCHC RBC-ENTMCNC: 31 PG — SIGNIFICANT CHANGE UP (ref 27–34)
MCHC RBC-ENTMCNC: 34.8 G/DL — SIGNIFICANT CHANGE UP (ref 32–36)
MCV RBC AUTO: 89.3 FL — SIGNIFICANT CHANGE UP (ref 80–100)
MONOCYTES # BLD AUTO: 0.4 K/UL — SIGNIFICANT CHANGE UP (ref 0–0.9)
MONOCYTES NFR BLD AUTO: 8.1 % — SIGNIFICANT CHANGE UP (ref 2–14)
NEUTROPHILS # BLD AUTO: 3.5 K/UL — SIGNIFICANT CHANGE UP (ref 1.8–7.4)
NEUTROPHILS NFR BLD AUTO: 63.9 % — SIGNIFICANT CHANGE UP (ref 43–77)
PLATELET # BLD AUTO: 46 K/UL — LOW (ref 150–400)
RBC # BLD: 4.23 M/UL — SIGNIFICANT CHANGE UP (ref 3.8–5.2)
RBC # FLD: 12.7 % — SIGNIFICANT CHANGE UP (ref 10.3–14.5)
VIT B12 SERPL-MCNC: 213 PG/ML
WBC # BLD: 5.4 K/UL — SIGNIFICANT CHANGE UP (ref 3.8–10.5)
WBC # FLD AUTO: 5.4 K/UL — SIGNIFICANT CHANGE UP (ref 3.8–10.5)

## 2019-10-22 ENCOUNTER — OTHER (OUTPATIENT)
Age: 44
End: 2019-10-22

## 2019-10-22 LAB
INR PPP: 1.03 RATIO
PT BLD: 11.6 SEC

## 2019-10-23 ENCOUNTER — RESULT REVIEW (OUTPATIENT)
Age: 44
End: 2019-10-23

## 2019-10-23 ENCOUNTER — APPOINTMENT (OUTPATIENT)
Dept: HEMATOLOGY ONCOLOGY | Facility: CLINIC | Age: 44
End: 2019-10-23

## 2019-10-23 LAB
BASOPHILS # BLD AUTO: 0 K/UL — SIGNIFICANT CHANGE UP (ref 0–0.2)
BASOPHILS NFR BLD AUTO: 0.1 % — SIGNIFICANT CHANGE UP (ref 0–2)
EOSINOPHIL # BLD AUTO: 0 K/UL — SIGNIFICANT CHANGE UP (ref 0–0.5)
EOSINOPHIL NFR BLD AUTO: 0.5 % — SIGNIFICANT CHANGE UP (ref 0–6)
HCT VFR BLD CALC: 40 % — SIGNIFICANT CHANGE UP (ref 34.5–45)
HCYS SERPL-MCNC: 13.9 UMOL/L
HGB BLD-MCNC: 13.6 G/DL — SIGNIFICANT CHANGE UP (ref 11.5–15.5)
LYMPHOCYTES # BLD AUTO: 1.7 K/UL — SIGNIFICANT CHANGE UP (ref 1–3.3)
LYMPHOCYTES # BLD AUTO: 29.6 % — SIGNIFICANT CHANGE UP (ref 13–44)
MCHC RBC-ENTMCNC: 29.9 PG — SIGNIFICANT CHANGE UP (ref 27–34)
MCHC RBC-ENTMCNC: 33.9 G/DL — SIGNIFICANT CHANGE UP (ref 32–36)
MCV RBC AUTO: 88.1 FL — SIGNIFICANT CHANGE UP (ref 80–100)
MONOCYTES # BLD AUTO: 0.5 K/UL — SIGNIFICANT CHANGE UP (ref 0–0.9)
MONOCYTES NFR BLD AUTO: 8.1 % — SIGNIFICANT CHANGE UP (ref 2–14)
NEUTROPHILS # BLD AUTO: 3.5 K/UL — SIGNIFICANT CHANGE UP (ref 1.8–7.4)
NEUTROPHILS NFR BLD AUTO: 61.7 % — SIGNIFICANT CHANGE UP (ref 43–77)
PLATELET # BLD AUTO: 57 K/UL — LOW (ref 150–400)
RBC # BLD: 4.54 M/UL — SIGNIFICANT CHANGE UP (ref 3.8–5.2)
RBC # FLD: 13.4 % — SIGNIFICANT CHANGE UP (ref 10.3–14.5)
WBC # BLD: 5.7 K/UL — SIGNIFICANT CHANGE UP (ref 3.8–10.5)
WBC # FLD AUTO: 5.7 K/UL — SIGNIFICANT CHANGE UP (ref 3.8–10.5)

## 2019-10-23 NOTE — ED ADULT NURSE NOTE - RN DISCHARGE SIGNATURE
Admission medication history interview status for the 10/21/2019 admission is complete. See Epic admission navigator for allergy information, pharmacy, prior to admission medications and immunization status.     Medication history interview sources:  Patient    Changes made to PTA medication list (reason)  Added: none  Deleted: metoprolol (now on carvedilol)  Changed: none    Additional medication history information (including reliability of information, actions taken by pharmacist):     -Pt and  VERY knowledgeable about medications, fill history shows full compliance with scheduled medications is likely    -Pt confirmed NO medication allergies that she is aware of      Prior to Admission medications    Medication Sig Last Dose Taking? Auth Provider   acetaminophen (TYLENOL) 500 MG tablet Take 1,000 mg by mouth every 6 hours as needed for pain   at prn Yes Reported, Patient   amLODIPine (NORVASC) 10 MG tablet Take 1 tablet (10 mg) by mouth daily 10/21/2019 Yes Ame Chow PA-C   calcium-vitamin D (CALTRATE) 600-400 MG-UNIT per tablet Take 1 tablet by mouth daily 10/21/2019 Yes Ame Chow PA-C   carvedilol (COREG) 25 MG tablet Take 1 tablet (25 mg) by mouth 2 times daily (with meals) 10/21/2019 Yes Clarence Rabago MD   dronabinol (MARINOL) 5 MG capsule Take 5 mg by mouth 2 times daily (before meals) as needed for nausea Past Month at prn Yes Unknown, Entered By History   ferrous sulfate (FEROSUL) 325 (65 Fe) MG tablet Take 1 tablet (325 mg) by mouth daily (with breakfast) 10/21/2019 Yes Christelle Lopez MD   isosorbide mononitrate (IMDUR) 120 MG 24 HR ER tablet Take 1 tablet (120 mg) by mouth daily 10/21/2019 Yes Clarence Rabago MD   magnesium oxide (MAG-OX) 400 MG tablet Take 400 mg by mouth daily  10/21/2019 Yes Unknown, Entered By History   mirtazapine (REMERON SOL-TAB) 15 MG ODT 1 tablet (15 mg) by Orally disintegrating tablet route At Bedtime  Patient taking differently: 15 mg  by Orally disintegrating tablet route At Bedtime Held by providers a few day prior to 10/21/19 Hospital Admission Past Week at Unknown time Yes Clarence Rabago MD   multivitamin, therapeutic with minerals (THERA-VIT-M) TABS tablet Take 1 tablet by mouth daily 10/21/2019 at Unknown time Yes Alex Hale MD   ondansetron (ZOFRAN) 4 MG tablet Take 1 tablet (4 mg) by mouth every 12 hours as needed for nausea Past Week at prn Yes Ame Chow PA-C   pantoprazole (PROTONIX) 40 MG EC tablet Take 1 tablet (40 mg) by mouth 2 times daily 10/21/2019 Yes Ame Chow PA-C   predniSONE (DELTASONE) 2.5 MG tablet Take two tablets (5mg) every AM and one tablet (2.5mg) every evening 10/21/2019 Yes Ame Chow PA-C   senna-docusate (SENOKOT-S/PERICOLACE) 8.6-50 MG tablet Take 2 tablets by mouth 2 times daily as needed for constipation  at prn Yes Christelle Lopez MD   sulfamethoxazole-trimethoprim (BACTRIM/SEPTRA) 400-80 MG tablet Take 1 tablet by mouth daily 10/21/2019 Yes Alex Hale MD   tacrolimus (GENERIC EQUIVALENT) 0.5 MG capsule Take 1 capsule (0.5 mg) by mouth every morning Total dose: 2.5 mg in the AM and 2 mg in the PM  Patient taking differently: Take 0.5 mg by mouth every evening Total dose: 2 mg in the AM and 2.5 mg in the PM 10/21/2019 Yes Ame Chow PA-C   tacrolimus (GENERIC EQUIVALENT) 1 MG capsule Take 2 capsules (2 mg) by mouth 2 times daily Total dose: 2.5 mg in the AM and 2 mg in the PM  Patient taking differently: Take 2 mg by mouth 2 times daily Total dose: 2 mg in the AM and 2.5 mg in the PM 10/21/2019 Yes Ame Chow PA-C   voriconazole (VFEND) 200 MG tablet Take 1 tablet (200 mg) by mouth every 12 hours for 28 days 10/21/2019 Yes Clarence Rabago MD   order for DME Equipment being ordered: Nebulizer   Wilber Lin MD   order for DME Equipment being ordered: NebulWilber Tello MD         Medication history completed  by:      Adriano Cifuentes PharmD, [facility]    [phone]  Pager [phone]       06-Jan-2018

## 2019-10-23 NOTE — ED PROVIDER NOTE - DISCHARGE REVIEW MATERIAL PRESENTED
. The scribe's documentation has been prepared under my direction and personally reviewed by me in its entirety. I confirm that the note above accurately reflects all work, treatment, procedures, and medical decision making performed by me.  Marcia Shirley MD

## 2019-10-26 NOTE — BEHAVIORAL HEALTH ASSESSMENT NOTE - LANGUAGE
[phone]: Patient arrives via EMS for nausea, vomiting and diarrhea that has been ongoing since this morning around 0930.     + 20x vomiting since this morning    Patient also c/o of right elbow pain that started this afternoon, \"it just feels like that sickle cell pain. \"    Denies abdominal pain, chest pain, dizziness    Hx: GERD No abnormalities noted

## 2019-10-27 ENCOUNTER — EMERGENCY (EMERGENCY)
Facility: HOSPITAL | Age: 44
LOS: 1 days | Discharge: HOME | End: 2019-10-27
Attending: EMERGENCY MEDICINE
Payer: MEDICAID

## 2019-10-27 VITALS
HEIGHT: 62 IN | RESPIRATION RATE: 18 BRPM | OXYGEN SATURATION: 100 % | HEART RATE: 94 BPM | SYSTOLIC BLOOD PRESSURE: 104 MMHG | WEIGHT: 98.11 LBS | DIASTOLIC BLOOD PRESSURE: 100 MMHG | TEMPERATURE: 98 F

## 2019-10-27 VITALS
HEART RATE: 91 BPM | TEMPERATURE: 98 F | SYSTOLIC BLOOD PRESSURE: 132 MMHG | RESPIRATION RATE: 18 BRPM | DIASTOLIC BLOOD PRESSURE: 88 MMHG | OXYGEN SATURATION: 100 %

## 2019-10-27 DIAGNOSIS — Z90.49 ACQUIRED ABSENCE OF OTHER SPECIFIED PARTS OF DIGESTIVE TRACT: Chronic | ICD-10-CM

## 2019-10-27 LAB
ALBUMIN SERPL ELPH-MCNC: 4.6 G/DL — SIGNIFICANT CHANGE UP (ref 3.3–5)
ALP SERPL-CCNC: 77 U/L — SIGNIFICANT CHANGE UP (ref 40–120)
ALT FLD-CCNC: 14 U/L — SIGNIFICANT CHANGE UP (ref 10–45)
ANION GAP SERPL CALC-SCNC: 12 MMOL/L — SIGNIFICANT CHANGE UP (ref 5–17)
APTT BLD: 29.3 SEC — SIGNIFICANT CHANGE UP (ref 27.5–36.3)
AST SERPL-CCNC: 16 U/L — SIGNIFICANT CHANGE UP (ref 10–40)
BASOPHILS # BLD AUTO: 0 K/UL — SIGNIFICANT CHANGE UP (ref 0–0.2)
BASOPHILS NFR BLD AUTO: 0 % — SIGNIFICANT CHANGE UP (ref 0–2)
BILIRUB SERPL-MCNC: 0.4 MG/DL — SIGNIFICANT CHANGE UP (ref 0.2–1.2)
BUN SERPL-MCNC: 5 MG/DL — LOW (ref 7–23)
CALCIUM SERPL-MCNC: 8.9 MG/DL — SIGNIFICANT CHANGE UP (ref 8.4–10.5)
CHLORIDE SERPL-SCNC: 103 MMOL/L — SIGNIFICANT CHANGE UP (ref 96–108)
CO2 SERPL-SCNC: 24 MMOL/L — SIGNIFICANT CHANGE UP (ref 22–31)
CREAT SERPL-MCNC: 0.69 MG/DL — SIGNIFICANT CHANGE UP (ref 0.5–1.3)
EOSINOPHIL # BLD AUTO: 0.07 K/UL — SIGNIFICANT CHANGE UP (ref 0–0.5)
EOSINOPHIL NFR BLD AUTO: 0.9 % — SIGNIFICANT CHANGE UP (ref 0–6)
GLUCOSE SERPL-MCNC: 92 MG/DL — SIGNIFICANT CHANGE UP (ref 70–99)
HCT VFR BLD CALC: 38.4 % — SIGNIFICANT CHANGE UP (ref 34.5–45)
HGB BLD-MCNC: 13 G/DL — SIGNIFICANT CHANGE UP (ref 11.5–15.5)
INR BLD: 1.14 RATIO — SIGNIFICANT CHANGE UP (ref 0.88–1.16)
LYMPHOCYTES # BLD AUTO: 1.01 K/UL — SIGNIFICANT CHANGE UP (ref 1–3.3)
LYMPHOCYTES # BLD AUTO: 13.3 % — SIGNIFICANT CHANGE UP (ref 13–44)
MCHC RBC-ENTMCNC: 29.3 PG — SIGNIFICANT CHANGE UP (ref 27–34)
MCHC RBC-ENTMCNC: 33.9 GM/DL — SIGNIFICANT CHANGE UP (ref 32–36)
MCV RBC AUTO: 86.5 FL — SIGNIFICANT CHANGE UP (ref 80–100)
MONOCYTES # BLD AUTO: 0.4 K/UL — SIGNIFICANT CHANGE UP (ref 0–0.9)
MONOCYTES NFR BLD AUTO: 5.3 % — SIGNIFICANT CHANGE UP (ref 2–14)
NEUTROPHILS # BLD AUTO: 6.09 K/UL — SIGNIFICANT CHANGE UP (ref 1.8–7.4)
NEUTROPHILS NFR BLD AUTO: 79.6 % — HIGH (ref 43–77)
NEUTS BAND # BLD: 0.9 % — SIGNIFICANT CHANGE UP (ref 0–8)
PLAT MORPH BLD: NORMAL — SIGNIFICANT CHANGE UP
PLATELET # BLD AUTO: 69 K/UL — LOW (ref 150–400)
POIKILOCYTOSIS BLD QL AUTO: SLIGHT — SIGNIFICANT CHANGE UP
POTASSIUM SERPL-MCNC: 3.8 MMOL/L — SIGNIFICANT CHANGE UP (ref 3.5–5.3)
POTASSIUM SERPL-SCNC: 3.8 MMOL/L — SIGNIFICANT CHANGE UP (ref 3.5–5.3)
PROT SERPL-MCNC: 7 G/DL — SIGNIFICANT CHANGE UP (ref 6–8.3)
PROTHROM AB SERPL-ACNC: 13 SEC — HIGH (ref 10–12.9)
RBC # BLD: 4.44 M/UL — SIGNIFICANT CHANGE UP (ref 3.8–5.2)
RBC # FLD: 13.2 % — SIGNIFICANT CHANGE UP (ref 10.3–14.5)
RBC BLD AUTO: SIGNIFICANT CHANGE UP
SODIUM SERPL-SCNC: 139 MMOL/L — SIGNIFICANT CHANGE UP (ref 135–145)
WBC # BLD: 7.57 K/UL — SIGNIFICANT CHANGE UP (ref 3.8–10.5)
WBC # FLD AUTO: 7.57 K/UL — SIGNIFICANT CHANGE UP (ref 3.8–10.5)

## 2019-10-27 PROCEDURE — 85027 COMPLETE CBC AUTOMATED: CPT

## 2019-10-27 PROCEDURE — 71046 X-RAY EXAM CHEST 2 VIEWS: CPT | Mod: 26

## 2019-10-27 PROCEDURE — 85730 THROMBOPLASTIN TIME PARTIAL: CPT

## 2019-10-27 PROCEDURE — 99283 EMERGENCY DEPT VISIT LOW MDM: CPT

## 2019-10-27 PROCEDURE — 85610 PROTHROMBIN TIME: CPT

## 2019-10-27 PROCEDURE — 84484 ASSAY OF TROPONIN QUANT: CPT

## 2019-10-27 PROCEDURE — 71046 X-RAY EXAM CHEST 2 VIEWS: CPT

## 2019-10-27 PROCEDURE — 99284 EMERGENCY DEPT VISIT MOD MDM: CPT

## 2019-10-27 PROCEDURE — 80053 COMPREHEN METABOLIC PANEL: CPT

## 2019-10-27 NOTE — ED PROVIDER NOTE - ATTENDING CONTRIBUTION TO CARE
44 y/o m low protein S clotting disorder, DVT, HTN, gastroparesis, anxiety, idiopathic thrombocytopenia purpura, POTS, IBS, and s/p appendectomy presents to the ED for concern of low platelets / problems taking her Xarelto because of milk allergy (product made with some lactose). patient denies any chest pain or heart palp./ has gi discomfort which she attributes to med allergy or worsening flair of her gastroparesis. no fever no chills. no other concerns  Gen.  mild discomfort no resp distress  HEENT:  perrl mmm, eomi. neck supple  Lungs:  b/l bs  CVS: S1S2   Abd;  soft no guarding no distention. no ttp.   Ext: no pitting edema or erythema  Neuro: aaox3 no focal deficits  MSK: moving all ext spon  skin areas of hematomas with ecchymosis (not recent based on color yellow / green) with min ttp over hematoma

## 2019-10-27 NOTE — ED ADULT NURSE NOTE - OBJECTIVE STATEMENT
44 y/o F pmhx of DVT, HTN, gastroparesis, anxiety, idiopathic thrombocytopenia purpura, POTS, IBS, and s/p appendectomy presents to the ED for concern of low platelets on xarelto presents to the ED with c/o SOB and GI discomfort. States she is having a hard time taking in deep breathes. PT noted to have healing hematomas noted to be light green and yellow on multiple areas of the body, right arm, left lower leg and right lower leg. Pt denies headache, dizziness, chest pain, palpitations, cough n/v/d, urinary symptoms, fevers, chills, weakness at this time.

## 2019-10-27 NOTE — ED PROVIDER NOTE - PATIENT PORTAL LINK FT
You can access the FollowMyHealth Patient Portal offered by [facility] by registering at the following website: [email or web address]. By joining [employer]’s FollowMyHealth portal, you will also be able to view your health information using other applications (apps) compatible with our system.

## 2019-10-27 NOTE — ED PROVIDER NOTE - OBJECTIVE STATEMENT
44 y/o female with PMHx of low protein S clotting disorder, DVT on Xarelto, HTN, gastroparesis, anxiety, idiopathic thrombocytopenia purpura, POTS, IBS, and s/p appendectomy presents to the ED c/o headache, chest pain, and abdominal pain for 1 week after decreasing her xeralto dose from 20 to 15 to 10. Reports all her symptoms start 3 hours after taking Xeralto. Chest pain Is non-exertional, reproducible, not pleuritic. Denies cough, fever. Reports SOB. Reports diffuse abdominal pain that starts 3 hours after taking xeralto. Denies nausea, v or d. LBM was today. Also reports multiple ecchymosis.

## 2019-10-27 NOTE — ED PROVIDER NOTE - NS ED ROS FT
GENERAL: No fever or chills  EYES: no change in vision  HEENT: no trouble swallowing or speaking  CARDIAC: see hpi  PULMONARY: see hpi  GI: see hpi  : No changes in urination  SKIN: echymosis see hpi  NEURO: no headache, numbness, or weakness.  MSK: No joint pain

## 2019-10-27 NOTE — ED ADULT NURSE NOTE - NSIMPLEMENTINTERV_GEN_ALL_ED
Implemented All Fall with Harm Risk Interventions:  [place] to call system. Call bell, personal items and telephone within reach. Instruct patient to call for assistance. Room bathroom lighting operational. Non-slip footwear when patient is off stretcher. Physically safe environment: no spills, clutter or unnecessary equipment. Stretcher in lowest position, wheels locked, appropriate side rails in place. Provide visual cue, wrist band, yellow gown, etc. Monitor gait and stability. Monitor for mental status changes and reorient to person, place, and time. Review medications for side effects contributing to fall risk. Reinforce activity limits and safety measures with patient and family. Provide visual clues: red socks.

## 2019-10-27 NOTE — ED PROVIDER NOTE - PROGRESS NOTE DETAILS
ATTG: : patient has been to the ED in the past. she is requesting that only a 25 guage needle be used to obtain her blood sample. she is refusing an IV at this time  and understands her limitations can compromise her care and diagnosis. She is also asking for chronic medications doses to be adjusted. Spoke to heme onc fellow who recommends having patient follow with her oncologist. Dr. Rosas. No change in her xeralto dose at the moment recommended, Patient has an appointment this Wednesday.

## 2019-10-27 NOTE — ED PROVIDER NOTE - CLINICAL SUMMARY MEDICAL DECISION MAKING FREE TEXT BOX
ATTG: : gi complaints / reaction to medication - will check labs, inr, platelets. check xray chest, po fluids as refusing iv. and refuses antiemetics. ATTG: : gi complaints / reaction to medication - will check labs, inr, platelets. check xray chest, po fluids as refusing iv. and refuses antiemetics.  PGY 1 44 y/o female with PMHx of low protein S clotting disorder, DVT on Xarelto, HTN, gastroparesis, anxiety, idiopathic thrombocytopenia purpura, POTS, IBS, and s/p appendectomy presents to the ED c/o headache, chest pain, and abdominal pain for 1 week after decreasing her xeralto dose from 20 to 15 to 10.  Vitals WNL. Will get basic labs to assess platelets, troponin, xray, re-carine

## 2019-10-27 NOTE — ED PROVIDER NOTE - PHYSICAL EXAMINATION
Gen: AAOx3, non-toxic  Head: NCAT  HEENT: EOMI, oral mucosa moist, normal conjunctiva  Lung: CTAB, no respiratory distress, no wheezes/rhonchi/rales B/L, speaking in full sentences  CV: RRR, no murmurs, rubs or gallops. Reproducible left sided chest pain. Symmetrical pulses.   Abd: No guarding, no CVA tenderness  MSK: No visible deformities  Neuro: No focal sensory or motor deficits, normal CN exam   Skin: Ecchymosis in shoulders, legs  Psych: normal affect.

## 2019-10-27 NOTE — ED PROVIDER NOTE - NSFOLLOWUPINSTRUCTIONS_ED_ALL_ED_FT
You were seen in the ED for abdominal pain.   The following labs/imaging were obtained: see attached  Continue home meds.   Return to the ED if you develop fever, worsening pain, shortness of breath or chest pain.   Follow up with your primary care in 2-3 days. Follow up with you hematologist, Dr. Rosas, to discuss medication adjustment.   Discussed with pt results of work up, strict return precautions, and need for follow up.  Pt expressed understanding and agrees with plan.

## 2019-10-27 NOTE — ED ADULT TRIAGE NOTE - CHIEF COMPLAINT QUOTE
Pt c/o of Chest pain, abd pain, and sob, Also reporting Multiple hematomas. PMH Low platelets on Xarelto.

## 2019-10-28 LAB — METHYLMALONATE SERPL-SCNC: 268 NMOL/L

## 2019-10-30 ENCOUNTER — APPOINTMENT (OUTPATIENT)
Dept: HEMATOLOGY ONCOLOGY | Facility: CLINIC | Age: 44
End: 2019-10-30
Payer: MEDICAID

## 2019-10-30 VITALS
RESPIRATION RATE: 16 BRPM | WEIGHT: 99.21 LBS | TEMPERATURE: 98.1 F | SYSTOLIC BLOOD PRESSURE: 141 MMHG | BODY MASS INDEX: 18.15 KG/M2 | HEART RATE: 76 BPM | DIASTOLIC BLOOD PRESSURE: 88 MMHG | OXYGEN SATURATION: 100 %

## 2019-10-30 PROCEDURE — 99214 OFFICE O/P EST MOD 30 MIN: CPT

## 2019-10-30 NOTE — ASSESSMENT
[FreeTextEntry1] : #1) ITP-chronic. Recent CBC reviewed with patient. Platelet count currently stable.\par #2) Patient has h/o borderline protein S deficiency with history of left internal jugular DVT (following ? IV catheter)-discussed my concern regarding patient's thrombocytopenia and have recommended patient decrease her xarelto to 10 mg daily. Following lengthy discussion, the patient stated she will not decrease her xarelto to 10 mg daily, but will remain on 15 mg daily (and states she wishes to return to 20 mg daily in near future)-she was advised of bleeding risks with xarelto (especially with thrombocytopenia history) which can be life-threatening. She expressed her understanding of bleeding vs. clotting risks in her situation and plans to take xarelto as she seems fit.\par Patient was advised to go directly to the emergency department if any increased symptoms/concerns. Also advised she f/u with PCP for many of her medical issues.\par #2) history of anemia/iron deficiency/vitamin B12 deficiency-hemoglobin currently within normal limits. Recommend vitamin B12 supplementation-patient only agrees to taking vitamin B12 at 100 mcg a few days per week at this point.\par Patient's many questions have been answered at this time.

## 2019-10-30 NOTE — REASON FOR VISIT
[Follow-Up Visit] : a follow-up visit for [Formal Caregiver] : formal caregiver [FreeTextEntry2] : ITP

## 2019-10-30 NOTE — REVIEW OF SYSTEMS
[Anxiety] : anxiety [Easy Bruising] : a tendency for easy bruising [Negative] : Allergic/Immunologic [Fever] : no fever [Palpitations] : no palpitations [Shortness Of Breath] : no shortness of breath [Vomiting] : no vomiting [Dysuria] : no dysuria [Fainting] : no fainting

## 2019-10-30 NOTE — PHYSICAL EXAM
[Thin] : thin [Normal] : grossly intact [de-identified] : no petechiae [de-identified] : anxious, rapid speech

## 2019-10-30 NOTE — HISTORY OF PRESENT ILLNESS
[de-identified] : ITP-diagnosed after episode of mono-1999-has not required treatment.\par LIJ DVT post appendectomy when had ?IV in LUE-10/2018. Maintained on Xarelto since that time. Mils protein S deficiency.\par Patient was under the hematologic care of Dr. Zamora through 9/2019.\par \par Note-patient requests being called "Ally." [de-identified] : Patient remains quite anxious. She has refused to decrease xarelto further down from 15 mg daily. She also refuses to take vitamin B12 at 1 mg daily-she takes 100 mcg maybe a few times per week.  No gross bleeding or new bruises.\par Grazyna Meyer present.\par

## 2019-11-01 ENCOUNTER — OUTPATIENT (OUTPATIENT)
Dept: OUTPATIENT SERVICES | Facility: HOSPITAL | Age: 44
LOS: 1 days | End: 2019-11-01
Payer: MEDICARE

## 2019-11-01 DIAGNOSIS — Z90.49 ACQUIRED ABSENCE OF OTHER SPECIFIED PARTS OF DIGESTIVE TRACT: Chronic | ICD-10-CM

## 2019-11-04 ENCOUNTER — APPOINTMENT (OUTPATIENT)
Dept: HEMATOLOGY ONCOLOGY | Facility: CLINIC | Age: 44
End: 2019-11-04

## 2019-11-04 ENCOUNTER — OUTPATIENT (OUTPATIENT)
Dept: OUTPATIENT SERVICES | Facility: HOSPITAL | Age: 44
LOS: 1 days | Discharge: HOME | End: 2019-11-04

## 2019-11-04 ENCOUNTER — TRANSCRIPTION ENCOUNTER (OUTPATIENT)
Age: 44
End: 2019-11-04

## 2019-11-04 ENCOUNTER — RESULT REVIEW (OUTPATIENT)
Age: 44
End: 2019-11-04

## 2019-11-04 DIAGNOSIS — Z90.49 ACQUIRED ABSENCE OF OTHER SPECIFIED PARTS OF DIGESTIVE TRACT: Chronic | ICD-10-CM

## 2019-11-04 DIAGNOSIS — Z71.89 OTHER SPECIFIED COUNSELING: ICD-10-CM

## 2019-11-04 DIAGNOSIS — D47.3 ESSENTIAL (HEMORRHAGIC) THROMBOCYTHEMIA: ICD-10-CM

## 2019-11-04 PROBLEM — I82.409 ACUTE EMBOLISM AND THROMBOSIS OF UNSPECIFIED DEEP VEINS OF UNSPECIFIED LOWER EXTREMITY: Chronic | Status: INACTIVE | Noted: 2018-11-13 | Resolved: 2019-10-18

## 2019-11-04 PROBLEM — F41.9 ANXIETY DISORDER, UNSPECIFIED: Chronic | Status: INACTIVE | Noted: 2017-12-08 | Resolved: 2019-10-18

## 2019-11-04 PROBLEM — I10 ESSENTIAL (PRIMARY) HYPERTENSION: Chronic | Status: INACTIVE | Noted: 2018-07-21 | Resolved: 2019-10-18

## 2019-11-04 LAB
BASOPHILS # BLD AUTO: 0 K/UL — SIGNIFICANT CHANGE UP (ref 0–0.2)
BASOPHILS NFR BLD AUTO: 0.1 % — SIGNIFICANT CHANGE UP (ref 0–2)
EOSINOPHIL # BLD AUTO: 0.1 K/UL — SIGNIFICANT CHANGE UP (ref 0–0.5)
EOSINOPHIL NFR BLD AUTO: 0.8 % — SIGNIFICANT CHANGE UP (ref 0–6)
HCT VFR BLD CALC: 40.6 % — SIGNIFICANT CHANGE UP (ref 34.5–45)
HGB BLD-MCNC: 14.1 G/DL — SIGNIFICANT CHANGE UP (ref 11.5–15.5)
LYMPHOCYTES # BLD AUTO: 2.1 K/UL — SIGNIFICANT CHANGE UP (ref 1–3.3)
LYMPHOCYTES # BLD AUTO: 25.8 % — SIGNIFICANT CHANGE UP (ref 13–44)
MCHC RBC-ENTMCNC: 30.9 PG — SIGNIFICANT CHANGE UP (ref 27–34)
MCHC RBC-ENTMCNC: 34.6 G/DL — SIGNIFICANT CHANGE UP (ref 32–36)
MCV RBC AUTO: 89.4 FL — SIGNIFICANT CHANGE UP (ref 80–100)
MONOCYTES # BLD AUTO: 0.7 K/UL — SIGNIFICANT CHANGE UP (ref 0–0.9)
MONOCYTES NFR BLD AUTO: 8.4 % — SIGNIFICANT CHANGE UP (ref 2–14)
NEUTROPHILS # BLD AUTO: 5.3 K/UL — SIGNIFICANT CHANGE UP (ref 1.8–7.4)
NEUTROPHILS NFR BLD AUTO: 64.9 % — SIGNIFICANT CHANGE UP (ref 43–77)
PLATELET # BLD AUTO: 61 K/UL — LOW (ref 150–400)
RBC # BLD: 4.55 M/UL — SIGNIFICANT CHANGE UP (ref 3.8–5.2)
RBC # FLD: 13.5 % — SIGNIFICANT CHANGE UP (ref 10.3–14.5)
WBC # BLD: 8.2 K/UL — SIGNIFICANT CHANGE UP (ref 3.8–10.5)
WBC # FLD AUTO: 8.2 K/UL — SIGNIFICANT CHANGE UP (ref 3.8–10.5)

## 2019-11-09 NOTE — ED ADULT NURSE NOTE - TEMPLATE
Neuro
Admit    F/u cxs     Sepsis work up    Keep bilateral groins clean and dry    CT scan abd/pelvis

## 2019-11-11 ENCOUNTER — APPOINTMENT (OUTPATIENT)
Dept: HEMATOLOGY ONCOLOGY | Facility: CLINIC | Age: 44
End: 2019-11-11

## 2019-11-11 ENCOUNTER — RESULT REVIEW (OUTPATIENT)
Age: 44
End: 2019-11-11

## 2019-11-12 LAB
BASOPHILS # BLD AUTO: 0 K/UL — SIGNIFICANT CHANGE UP (ref 0–0.2)
BASOPHILS NFR BLD AUTO: 0.1 % — SIGNIFICANT CHANGE UP (ref 0–2)
EOSINOPHIL # BLD AUTO: 0 K/UL — SIGNIFICANT CHANGE UP (ref 0–0.5)
EOSINOPHIL NFR BLD AUTO: 0.7 % — SIGNIFICANT CHANGE UP (ref 0–6)
HCT VFR BLD CALC: 37.9 % — SIGNIFICANT CHANGE UP (ref 34.5–45)
HGB BLD-MCNC: 13.4 G/DL — SIGNIFICANT CHANGE UP (ref 11.5–15.5)
LYMPHOCYTES # BLD AUTO: 1.2 K/UL — SIGNIFICANT CHANGE UP (ref 1–3.3)
LYMPHOCYTES # BLD AUTO: 22.7 % — SIGNIFICANT CHANGE UP (ref 13–44)
MCHC RBC-ENTMCNC: 31.8 PG — SIGNIFICANT CHANGE UP (ref 27–34)
MCHC RBC-ENTMCNC: 35.3 G/DL — SIGNIFICANT CHANGE UP (ref 32–36)
MCV RBC AUTO: 90 FL — SIGNIFICANT CHANGE UP (ref 80–100)
MONOCYTES # BLD AUTO: 0.4 K/UL — SIGNIFICANT CHANGE UP (ref 0–0.9)
MONOCYTES NFR BLD AUTO: 7.6 % — SIGNIFICANT CHANGE UP (ref 2–14)
NEUTROPHILS # BLD AUTO: 3.6 K/UL — SIGNIFICANT CHANGE UP (ref 1.8–7.4)
NEUTROPHILS NFR BLD AUTO: 68.9 % — SIGNIFICANT CHANGE UP (ref 43–77)
PLATELET # BLD AUTO: 49 K/UL — LOW (ref 150–400)
RBC # BLD: 4.21 M/UL — SIGNIFICANT CHANGE UP (ref 3.8–5.2)
RBC # FLD: 12.3 % — SIGNIFICANT CHANGE UP (ref 10.3–14.5)
WBC # BLD: 5.2 K/UL — SIGNIFICANT CHANGE UP (ref 3.8–10.5)
WBC # FLD AUTO: 5.2 K/UL — SIGNIFICANT CHANGE UP (ref 3.8–10.5)

## 2019-11-13 NOTE — ED ADULT TRIAGE NOTE - CCCP TRG CHIEF CMPLNT
Trigger Point Injections  Date/Time: 11/13/2019 9:15 AM  Performed by: Wilver Diaz MD  Authorized by: Wilver Diaz MD     Consent Done?:  Yes (Verbal)  Timeout: prior to procedure the correct patient, procedure, and site was verified    Indications:  Pain  Site marked: the procedure site was marked    Timeout: prior to procedure the correct patient, procedure, and site was verified    Location: right sided cervical trigger point injections.  Prep: patient was prepped and draped in usual sterile fashion    Needle size:  25 G  Medications:  40 mg methylPREDNISolone acetate 40 mg/mL; 40 mg methylPREDNISolone acetate 40 mg/mL  Patient tolerance:  Patient tolerated the procedure well with no immediate complications      
heart rate bouncing around

## 2019-11-18 ENCOUNTER — RESULT REVIEW (OUTPATIENT)
Age: 44
End: 2019-11-18

## 2019-11-18 ENCOUNTER — APPOINTMENT (OUTPATIENT)
Dept: HEMATOLOGY ONCOLOGY | Facility: CLINIC | Age: 44
End: 2019-11-18

## 2019-11-18 LAB
BASOPHILS # BLD AUTO: 0 K/UL — SIGNIFICANT CHANGE UP (ref 0–0.2)
BASOPHILS NFR BLD AUTO: 0.3 % — SIGNIFICANT CHANGE UP (ref 0–2)
EOSINOPHIL # BLD AUTO: 0 K/UL — SIGNIFICANT CHANGE UP (ref 0–0.5)
EOSINOPHIL NFR BLD AUTO: 0.6 % — SIGNIFICANT CHANGE UP (ref 0–6)
HCT VFR BLD CALC: 38.9 % — SIGNIFICANT CHANGE UP (ref 34.5–45)
HGB BLD-MCNC: 13.7 G/DL — SIGNIFICANT CHANGE UP (ref 11.5–15.5)
LYMPHOCYTES # BLD AUTO: 1.2 K/UL — SIGNIFICANT CHANGE UP (ref 1–3.3)
LYMPHOCYTES # BLD AUTO: 19.7 % — SIGNIFICANT CHANGE UP (ref 13–44)
MCHC RBC-ENTMCNC: 31.6 PG — SIGNIFICANT CHANGE UP (ref 27–34)
MCHC RBC-ENTMCNC: 35.2 G/DL — SIGNIFICANT CHANGE UP (ref 32–36)
MCV RBC AUTO: 89.8 FL — SIGNIFICANT CHANGE UP (ref 80–100)
MONOCYTES # BLD AUTO: 0.4 K/UL — SIGNIFICANT CHANGE UP (ref 0–0.9)
MONOCYTES NFR BLD AUTO: 7.1 % — SIGNIFICANT CHANGE UP (ref 2–14)
NEUTROPHILS # BLD AUTO: 4.5 K/UL — SIGNIFICANT CHANGE UP (ref 1.8–7.4)
NEUTROPHILS NFR BLD AUTO: 72.3 % — SIGNIFICANT CHANGE UP (ref 43–77)
PLATELET # BLD AUTO: 51 K/UL — LOW (ref 150–400)
RBC # BLD: 4.33 M/UL — SIGNIFICANT CHANGE UP (ref 3.8–5.2)
RBC # FLD: 12.1 % — SIGNIFICANT CHANGE UP (ref 10.3–14.5)
WBC # BLD: 6.2 K/UL — SIGNIFICANT CHANGE UP (ref 3.8–10.5)
WBC # FLD AUTO: 6.2 K/UL — SIGNIFICANT CHANGE UP (ref 3.8–10.5)

## 2019-11-25 ENCOUNTER — APPOINTMENT (OUTPATIENT)
Dept: HEMATOLOGY ONCOLOGY | Facility: CLINIC | Age: 44
End: 2019-11-25

## 2019-11-25 ENCOUNTER — RESULT REVIEW (OUTPATIENT)
Age: 44
End: 2019-11-25

## 2019-11-25 LAB
BASOPHILS # BLD AUTO: 0 K/UL — SIGNIFICANT CHANGE UP (ref 0–0.2)
BASOPHILS NFR BLD AUTO: 0.3 % — SIGNIFICANT CHANGE UP (ref 0–2)
EOSINOPHIL # BLD AUTO: 0.1 K/UL — SIGNIFICANT CHANGE UP (ref 0–0.5)
EOSINOPHIL NFR BLD AUTO: 1 % — SIGNIFICANT CHANGE UP (ref 0–6)
HCT VFR BLD CALC: 39.3 % — SIGNIFICANT CHANGE UP (ref 34.5–45)
HGB BLD-MCNC: 13.5 G/DL — SIGNIFICANT CHANGE UP (ref 11.5–15.5)
LYMPHOCYTES # BLD AUTO: 1.7 K/UL — SIGNIFICANT CHANGE UP (ref 1–3.3)
LYMPHOCYTES # BLD AUTO: 24.7 % — SIGNIFICANT CHANGE UP (ref 13–44)
MCHC RBC-ENTMCNC: 30.7 PG — SIGNIFICANT CHANGE UP (ref 27–34)
MCHC RBC-ENTMCNC: 34.5 G/DL — SIGNIFICANT CHANGE UP (ref 32–36)
MCV RBC AUTO: 89.1 FL — SIGNIFICANT CHANGE UP (ref 80–100)
MONOCYTES # BLD AUTO: 0.4 K/UL — SIGNIFICANT CHANGE UP (ref 0–0.9)
MONOCYTES NFR BLD AUTO: 6.7 % — SIGNIFICANT CHANGE UP (ref 2–14)
NEUTROPHILS # BLD AUTO: 4.5 K/UL — SIGNIFICANT CHANGE UP (ref 1.8–7.4)
NEUTROPHILS NFR BLD AUTO: 67.3 % — SIGNIFICANT CHANGE UP (ref 43–77)
PLATELET # BLD AUTO: 53 K/UL — LOW (ref 150–400)
RBC # BLD: 4.41 M/UL — SIGNIFICANT CHANGE UP (ref 3.8–5.2)
RBC # FLD: 11.7 % — SIGNIFICANT CHANGE UP (ref 10.3–14.5)
WBC # BLD: 6.7 K/UL — SIGNIFICANT CHANGE UP (ref 3.8–10.5)
WBC # FLD AUTO: 6.7 K/UL — SIGNIFICANT CHANGE UP (ref 3.8–10.5)

## 2019-12-02 ENCOUNTER — APPOINTMENT (OUTPATIENT)
Dept: HEMATOLOGY ONCOLOGY | Facility: CLINIC | Age: 44
End: 2019-12-02

## 2019-12-02 ENCOUNTER — RESULT REVIEW (OUTPATIENT)
Age: 44
End: 2019-12-02

## 2019-12-02 LAB
BASOPHILS # BLD AUTO: 0 K/UL — SIGNIFICANT CHANGE UP (ref 0–0.2)
BASOPHILS NFR BLD AUTO: 0.3 % — SIGNIFICANT CHANGE UP (ref 0–2)
EOSINOPHIL # BLD AUTO: 0 K/UL — SIGNIFICANT CHANGE UP (ref 0–0.5)
EOSINOPHIL NFR BLD AUTO: 0.4 % — SIGNIFICANT CHANGE UP (ref 0–6)
HCT VFR BLD CALC: 39.2 % — SIGNIFICANT CHANGE UP (ref 34.5–45)
HGB BLD-MCNC: 13.3 G/DL — SIGNIFICANT CHANGE UP (ref 11.5–15.5)
LYMPHOCYTES # BLD AUTO: 1.5 K/UL — SIGNIFICANT CHANGE UP (ref 1–3.3)
LYMPHOCYTES # BLD AUTO: 19.7 % — SIGNIFICANT CHANGE UP (ref 13–44)
MCHC RBC-ENTMCNC: 30.2 PG — SIGNIFICANT CHANGE UP (ref 27–34)
MCHC RBC-ENTMCNC: 33.9 G/DL — SIGNIFICANT CHANGE UP (ref 32–36)
MCV RBC AUTO: 89 FL — SIGNIFICANT CHANGE UP (ref 80–100)
MONOCYTES # BLD AUTO: 0.6 K/UL — SIGNIFICANT CHANGE UP (ref 0–0.9)
MONOCYTES NFR BLD AUTO: 7.6 % — SIGNIFICANT CHANGE UP (ref 2–14)
NEUTROPHILS # BLD AUTO: 5.4 K/UL — SIGNIFICANT CHANGE UP (ref 1.8–7.4)
NEUTROPHILS NFR BLD AUTO: 72.1 % — SIGNIFICANT CHANGE UP (ref 43–77)
PLATELET # BLD AUTO: 55 K/UL — LOW (ref 150–400)
RBC # BLD: 4.4 M/UL — SIGNIFICANT CHANGE UP (ref 3.8–5.2)
RBC # FLD: 11.6 % — SIGNIFICANT CHANGE UP (ref 10.3–14.5)
WBC # BLD: 7.5 K/UL — SIGNIFICANT CHANGE UP (ref 3.8–10.5)
WBC # FLD AUTO: 7.5 K/UL — SIGNIFICANT CHANGE UP (ref 3.8–10.5)

## 2019-12-03 ENCOUNTER — OUTPATIENT (OUTPATIENT)
Dept: OUTPATIENT SERVICES | Facility: HOSPITAL | Age: 44
LOS: 1 days | Discharge: HOME | End: 2019-12-03

## 2019-12-03 DIAGNOSIS — Z90.49 ACQUIRED ABSENCE OF OTHER SPECIFIED PARTS OF DIGESTIVE TRACT: Chronic | ICD-10-CM

## 2019-12-03 DIAGNOSIS — D47.3 ESSENTIAL (HEMORRHAGIC) THROMBOCYTHEMIA: ICD-10-CM

## 2019-12-06 DIAGNOSIS — Z71.89 OTHER SPECIFIED COUNSELING: ICD-10-CM

## 2019-12-09 ENCOUNTER — APPOINTMENT (OUTPATIENT)
Dept: HEMATOLOGY ONCOLOGY | Facility: CLINIC | Age: 44
End: 2019-12-09

## 2019-12-09 ENCOUNTER — RESULT REVIEW (OUTPATIENT)
Age: 44
End: 2019-12-09

## 2019-12-09 VITALS
TEMPERATURE: 98.1 F | HEART RATE: 79 BPM | OXYGEN SATURATION: 100 % | RESPIRATION RATE: 18 BRPM | SYSTOLIC BLOOD PRESSURE: 143 MMHG | DIASTOLIC BLOOD PRESSURE: 94 MMHG

## 2019-12-09 LAB
BASOPHILS # BLD AUTO: 0 K/UL — SIGNIFICANT CHANGE UP (ref 0–0.2)
BASOPHILS NFR BLD AUTO: 0.1 % — SIGNIFICANT CHANGE UP (ref 0–2)
EOSINOPHIL # BLD AUTO: 0 K/UL — SIGNIFICANT CHANGE UP (ref 0–0.5)
EOSINOPHIL NFR BLD AUTO: 0.4 % — SIGNIFICANT CHANGE UP (ref 0–6)
HCT VFR BLD CALC: 40.9 % — SIGNIFICANT CHANGE UP (ref 34.5–45)
HGB BLD-MCNC: 14.1 G/DL — SIGNIFICANT CHANGE UP (ref 11.5–15.5)
LYMPHOCYTES # BLD AUTO: 1.3 K/UL — SIGNIFICANT CHANGE UP (ref 1–3.3)
LYMPHOCYTES # BLD AUTO: 16.1 % — SIGNIFICANT CHANGE UP (ref 13–44)
MCHC RBC-ENTMCNC: 30.3 PG — SIGNIFICANT CHANGE UP (ref 27–34)
MCHC RBC-ENTMCNC: 34.5 G/DL — SIGNIFICANT CHANGE UP (ref 32–36)
MCV RBC AUTO: 87.9 FL — SIGNIFICANT CHANGE UP (ref 80–100)
MONOCYTES # BLD AUTO: 0.5 K/UL — SIGNIFICANT CHANGE UP (ref 0–0.9)
MONOCYTES NFR BLD AUTO: 6.4 % — SIGNIFICANT CHANGE UP (ref 2–14)
NEUTROPHILS # BLD AUTO: 6.4 K/UL — SIGNIFICANT CHANGE UP (ref 1.8–7.4)
NEUTROPHILS NFR BLD AUTO: 77 % — SIGNIFICANT CHANGE UP (ref 43–77)
PLATELET # BLD AUTO: 60 K/UL — LOW (ref 150–400)
RBC # BLD: 4.65 M/UL — SIGNIFICANT CHANGE UP (ref 3.8–5.2)
RBC # FLD: 11.6 % — SIGNIFICANT CHANGE UP (ref 10.3–14.5)
WBC # BLD: 8.4 K/UL — SIGNIFICANT CHANGE UP (ref 3.8–10.5)
WBC # FLD AUTO: 8.4 K/UL — SIGNIFICANT CHANGE UP (ref 3.8–10.5)

## 2019-12-11 NOTE — ED ADULT NURSE NOTE - CAS TRG GEN SKIN CONDITION
[>50% of Time Spent on Counseling and Coordination of Care for  ___] : Greater than 50% of the encounter time was spent on counseling and coordination of care for [unfilled]
Warm/Dry

## 2019-12-16 ENCOUNTER — APPOINTMENT (OUTPATIENT)
Dept: HEMATOLOGY ONCOLOGY | Facility: CLINIC | Age: 44
End: 2019-12-16

## 2019-12-16 ENCOUNTER — OTHER (OUTPATIENT)
Age: 44
End: 2019-12-16

## 2019-12-16 ENCOUNTER — RESULT REVIEW (OUTPATIENT)
Age: 44
End: 2019-12-16

## 2019-12-16 LAB
BASOPHILS # BLD AUTO: 0 K/UL — SIGNIFICANT CHANGE UP (ref 0–0.2)
BASOPHILS NFR BLD AUTO: 0.3 % — SIGNIFICANT CHANGE UP (ref 0–2)
EOSINOPHIL # BLD AUTO: 0 K/UL — SIGNIFICANT CHANGE UP (ref 0–0.5)
EOSINOPHIL NFR BLD AUTO: 0.7 % — SIGNIFICANT CHANGE UP (ref 0–6)
HCT VFR BLD CALC: 41.1 % — SIGNIFICANT CHANGE UP (ref 34.5–45)
HGB BLD-MCNC: 14.5 G/DL — SIGNIFICANT CHANGE UP (ref 11.5–15.5)
LYMPHOCYTES # BLD AUTO: 1.2 K/UL — SIGNIFICANT CHANGE UP (ref 1–3.3)
LYMPHOCYTES # BLD AUTO: 17.9 % — SIGNIFICANT CHANGE UP (ref 13–44)
MCHC RBC-ENTMCNC: 31.3 PG — SIGNIFICANT CHANGE UP (ref 27–34)
MCHC RBC-ENTMCNC: 35.2 G/DL — SIGNIFICANT CHANGE UP (ref 32–36)
MCV RBC AUTO: 88.9 FL — SIGNIFICANT CHANGE UP (ref 80–100)
MONOCYTES # BLD AUTO: 0.5 K/UL — SIGNIFICANT CHANGE UP (ref 0–0.9)
MONOCYTES NFR BLD AUTO: 7.5 % — SIGNIFICANT CHANGE UP (ref 2–14)
NEUTROPHILS # BLD AUTO: 5 K/UL — SIGNIFICANT CHANGE UP (ref 1.8–7.4)
NEUTROPHILS NFR BLD AUTO: 73.7 % — SIGNIFICANT CHANGE UP (ref 43–77)
PLATELET # BLD AUTO: 60 K/UL — LOW (ref 150–400)
RBC # BLD: 4.62 M/UL — SIGNIFICANT CHANGE UP (ref 3.8–5.2)
RBC # FLD: 11.8 % — SIGNIFICANT CHANGE UP (ref 10.3–14.5)
WBC # BLD: 6.9 K/UL — SIGNIFICANT CHANGE UP (ref 3.8–10.5)
WBC # FLD AUTO: 6.9 K/UL — SIGNIFICANT CHANGE UP (ref 3.8–10.5)

## 2019-12-17 NOTE — PHYSICAL THERAPY INITIAL EVALUATION ADULT - ASR WT BEARING STATUS EVAL
[facility] EMERGENCY DEPT 
[address] [address] 
[phone] Work/School Note Date: 12/17/2019 To Whom It May concern: 
 
Tania Callaway was seen and treated today in the emergency room by the following provider(s): 
Attending Provider: Corrie Celestin MD 
Physician Assistant: Mark Koenig. Tania Callaway may return to work on 12/20/2019.  
 
Sincerely, 
 
 
 
 
TRICIA Montalvo 
 
 
 
 no weight-bearing restrictions

## 2019-12-19 ENCOUNTER — RESULT REVIEW (OUTPATIENT)
Age: 44
End: 2019-12-19

## 2019-12-19 NOTE — ED ADULT TRIAGE NOTE - ADDITIONAL SAFETY/BANDS...
----- Message from Anna Quick MD sent at 12/18/2019  3:45 PM CST -----  Happy to see her first avail (does not at all look urgent by her echo- will need a repeat RHC after as she hasnt had one since 2016 that I can see)    ----- Message -----  From: COLEMAN Dumas, RN  Sent: 12/18/2019  11:15 AM CST  To: Anna Quick MD    Please see message below and let me know what you'd like to do.  Alfreda   ----- Message -----  From: Sona Mendoza, RN  Sent: 12/18/2019   9:46 AM CST  To: COLEMAN Dumas, RN, #    We will get her scheduled!  Thanks for the referral!  [facility] Nurse Coordinator  Ext [phone]  ----- Message -----  From: Laine Rodriguez RN  Sent: 12/18/2019   9:32 AM CST  To: Ynes CORONA Staff    Good Morning,  Dr. Juarez would like me to refer this patient to Dr. Quick for pulmonary HTN evaluation. We closed her PFO back in 12/16 and she recently had a TTE with Dr. Crisostomo ( primary cardiologist) that showed PA pressure of 31 mmHg. She has a medical hx of Lupas and her rhumatologist suggested a consult with Dr. Quick as well. Please schedule her for a clinic visit, if Dr. Quick agrees. Let me know if I can help in any way.  Thank you!  Laine         
Sent to scheduling, w/request to contact pt to schedule PH Consult w/Dr Quick to include lab and walk.  
Additional Safety/Bands:

## 2019-12-20 NOTE — ED PROVIDER NOTE - PHYSICAL EXAMINATION
Constitutional: mild distress + anxious  Eyes: PERRLA EOMI  Head: Normocephalic atraumatic  Cardiac: regular rate   Resp: Lungs CTAB  GI: Abd s/nt/nd no rebound or guarding no cvat  Neuro: CN2-12 intact  Skin: No rashes Attending Only Constitutional: mild distress + anxious  Eyes: PERRLA EOMI  Head: Normocephalic atraumatic  Cardiac: regular rate   Resp: Lungs CTAB  GI: Abd s/nt/nd no rebound or guarding no cvat  Neuro: CN2-12 intact  Skin: No rashes  Attending Ding: Gen: NAD, heent: atrauamtic, eomi, perrla, mmm, op pink, uvula midline, neck; nttp, no nuchal rigidity, chest: nttp, no crepitus, cv: rrr, no murmurs, lungs: ctab, abd: soft, nontender, nondistended, no peritoneal signs, +BS, no guarding, ext: wwp, neg homans, skin: no rash, neuro: awake and alert, following commands, speech clear, sensation and strength intact, no focal deficits

## 2019-12-23 ENCOUNTER — APPOINTMENT (OUTPATIENT)
Dept: HEMATOLOGY ONCOLOGY | Facility: CLINIC | Age: 44
End: 2019-12-23

## 2019-12-23 ENCOUNTER — RESULT REVIEW (OUTPATIENT)
Age: 44
End: 2019-12-23

## 2019-12-23 LAB
BASOPHILS # BLD AUTO: 0 K/UL — SIGNIFICANT CHANGE UP (ref 0–0.2)
BASOPHILS NFR BLD AUTO: 0.2 % — SIGNIFICANT CHANGE UP (ref 0–2)
EOSINOPHIL # BLD AUTO: 0 K/UL — SIGNIFICANT CHANGE UP (ref 0–0.5)
EOSINOPHIL NFR BLD AUTO: 0.4 % — SIGNIFICANT CHANGE UP (ref 0–6)
HCT VFR BLD CALC: 37.9 % — SIGNIFICANT CHANGE UP (ref 34.5–45)
HGB BLD-MCNC: 13.5 G/DL — SIGNIFICANT CHANGE UP (ref 11.5–15.5)
LYMPHOCYTES # BLD AUTO: 1.4 K/UL — SIGNIFICANT CHANGE UP (ref 1–3.3)
LYMPHOCYTES # BLD AUTO: 21.1 % — SIGNIFICANT CHANGE UP (ref 13–44)
MCHC RBC-ENTMCNC: 31.5 PG — SIGNIFICANT CHANGE UP (ref 27–34)
MCHC RBC-ENTMCNC: 35.6 G/DL — SIGNIFICANT CHANGE UP (ref 32–36)
MCV RBC AUTO: 88.6 FL — SIGNIFICANT CHANGE UP (ref 80–100)
MONOCYTES # BLD AUTO: 0.6 K/UL — SIGNIFICANT CHANGE UP (ref 0–0.9)
MONOCYTES NFR BLD AUTO: 9.2 % — SIGNIFICANT CHANGE UP (ref 2–14)
NEUTROPHILS # BLD AUTO: 4.6 K/UL — SIGNIFICANT CHANGE UP (ref 1.8–7.4)
NEUTROPHILS NFR BLD AUTO: 69.1 % — SIGNIFICANT CHANGE UP (ref 43–77)
PLATELET # BLD AUTO: 54 K/UL — LOW (ref 150–400)
RBC # BLD: 4.28 M/UL — SIGNIFICANT CHANGE UP (ref 3.8–5.2)
RBC # FLD: 11.3 % — SIGNIFICANT CHANGE UP (ref 10.3–14.5)
WBC # BLD: 6.7 K/UL — SIGNIFICANT CHANGE UP (ref 3.8–10.5)
WBC # FLD AUTO: 6.7 K/UL — SIGNIFICANT CHANGE UP (ref 3.8–10.5)

## 2019-12-24 ENCOUNTER — EMERGENCY (EMERGENCY)
Facility: HOSPITAL | Age: 44
LOS: 1 days | End: 2019-12-24
Attending: EMERGENCY MEDICINE
Payer: COMMERCIAL

## 2019-12-24 ENCOUNTER — EMERGENCY (EMERGENCY)
Facility: HOSPITAL | Age: 44
LOS: 0 days | Discharge: HOME | End: 2019-12-24
Attending: EMERGENCY MEDICINE
Payer: MEDICARE

## 2019-12-24 VITALS — WEIGHT: 139.99 LBS | HEIGHT: 62 IN

## 2019-12-24 VITALS
HEIGHT: 62 IN | HEART RATE: 95 BPM | OXYGEN SATURATION: 99 % | TEMPERATURE: 98 F | SYSTOLIC BLOOD PRESSURE: 116 MMHG | RESPIRATION RATE: 16 BRPM | WEIGHT: 95.9 LBS | DIASTOLIC BLOOD PRESSURE: 84 MMHG

## 2019-12-24 VITALS
RESPIRATION RATE: 18 BRPM | DIASTOLIC BLOOD PRESSURE: 90 MMHG | SYSTOLIC BLOOD PRESSURE: 124 MMHG | HEART RATE: 84 BPM | OXYGEN SATURATION: 100 % | TEMPERATURE: 98 F

## 2019-12-24 DIAGNOSIS — R11.0 NAUSEA: ICD-10-CM

## 2019-12-24 DIAGNOSIS — Z88.9 ALLERGY STATUS TO UNSPECIFIED DRUGS, MEDICAMENTS AND BIOLOGICAL SUBSTANCES: ICD-10-CM

## 2019-12-24 DIAGNOSIS — K58.9 IRRITABLE BOWEL SYNDROME WITHOUT DIARRHEA: ICD-10-CM

## 2019-12-24 DIAGNOSIS — Z86.718 PERSONAL HISTORY OF OTHER VENOUS THROMBOSIS AND EMBOLISM: ICD-10-CM

## 2019-12-24 DIAGNOSIS — Z90.49 ACQUIRED ABSENCE OF OTHER SPECIFIED PARTS OF DIGESTIVE TRACT: Chronic | ICD-10-CM

## 2019-12-24 DIAGNOSIS — F41.8 OTHER SPECIFIED ANXIETY DISORDERS: ICD-10-CM

## 2019-12-24 DIAGNOSIS — N83.202 UNSPECIFIED OVARIAN CYST, LEFT SIDE: ICD-10-CM

## 2019-12-24 DIAGNOSIS — D69.3 IMMUNE THROMBOCYTOPENIC PURPURA: ICD-10-CM

## 2019-12-24 DIAGNOSIS — Z91.041 RADIOGRAPHIC DYE ALLERGY STATUS: ICD-10-CM

## 2019-12-24 DIAGNOSIS — Z98.890 OTHER SPECIFIED POSTPROCEDURAL STATES: ICD-10-CM

## 2019-12-24 DIAGNOSIS — Z79.01 LONG TERM (CURRENT) USE OF ANTICOAGULANTS: ICD-10-CM

## 2019-12-24 DIAGNOSIS — R07.9 CHEST PAIN, UNSPECIFIED: ICD-10-CM

## 2019-12-24 DIAGNOSIS — N83.201 UNSPECIFIED OVARIAN CYST, RIGHT SIDE: ICD-10-CM

## 2019-12-24 LAB
25(OH)D3 SERPL-MCNC: 11.9 NG/ML
ALBUMIN SERPL ELPH-MCNC: 4.4 G/DL — SIGNIFICANT CHANGE UP (ref 3.3–5)
ALBUMIN SERPL ELPH-MCNC: 5.2 G/DL
ALP BLD-CCNC: 95 U/L
ALP SERPL-CCNC: 105 U/L — SIGNIFICANT CHANGE UP (ref 40–120)
ALT FLD-CCNC: 22 U/L — SIGNIFICANT CHANGE UP (ref 12–78)
ALT SERPL-CCNC: 18 U/L
ANION GAP SERPL CALC-SCNC: 14 MMOL/L
ANION GAP SERPL CALC-SCNC: 7 MMOL/L — SIGNIFICANT CHANGE UP (ref 5–17)
APPEARANCE: CLEAR
APTT BLD: 29.1 SEC
AST SERPL-CCNC: 17 U/L — SIGNIFICANT CHANGE UP (ref 15–37)
AST SERPL-CCNC: 20 U/L
BASOPHILS # BLD AUTO: 0.01 K/UL — SIGNIFICANT CHANGE UP (ref 0–0.2)
BASOPHILS NFR BLD AUTO: 0.1 % — SIGNIFICANT CHANGE UP (ref 0–2)
BILIRUB SERPL-MCNC: 0.4 MG/DL
BILIRUB SERPL-MCNC: 0.4 MG/DL — SIGNIFICANT CHANGE UP (ref 0.2–1.2)
BILIRUBIN URINE: NEGATIVE
BLOOD URINE: NEGATIVE
BUN SERPL-MCNC: 6 MG/DL
BUN SERPL-MCNC: 6 MG/DL — LOW (ref 7–23)
CALCIUM SERPL-MCNC: 10.3 MG/DL
CALCIUM SERPL-MCNC: 9.2 MG/DL — SIGNIFICANT CHANGE UP (ref 8.5–10.1)
CHLORIDE SERPL-SCNC: 103 MMOL/L
CHLORIDE SERPL-SCNC: 108 MMOL/L — SIGNIFICANT CHANGE UP (ref 96–108)
CO2 SERPL-SCNC: 25 MMOL/L
CO2 SERPL-SCNC: 27 MMOL/L — SIGNIFICANT CHANGE UP (ref 22–31)
COLOR: COLORLESS
CREAT SERPL-MCNC: 0.77 MG/DL
CREAT SERPL-MCNC: 0.8 MG/DL — SIGNIFICANT CHANGE UP (ref 0.5–1.3)
EOSINOPHIL # BLD AUTO: 0.03 K/UL — SIGNIFICANT CHANGE UP (ref 0–0.5)
EOSINOPHIL NFR BLD AUTO: 0.4 % — SIGNIFICANT CHANGE UP (ref 0–6)
ESTRADIOL SERPL-MCNC: 46 PG/ML
FOLATE SERPL-MCNC: 9.1 NG/ML
FSH SERPL-MCNC: 12.7 IU/L
GLUCOSE QUALITATIVE U: NEGATIVE
GLUCOSE SERPL-MCNC: 83 MG/DL
GLUCOSE SERPL-MCNC: 83 MG/DL — SIGNIFICANT CHANGE UP (ref 70–99)
HCG SERPL-ACNC: <1 MIU/ML — SIGNIFICANT CHANGE UP
HCT VFR BLD CALC: 42.3 % — SIGNIFICANT CHANGE UP (ref 34.5–45)
HGB BLD-MCNC: 14.5 G/DL — SIGNIFICANT CHANGE UP (ref 11.5–15.5)
IMM GRANULOCYTES NFR BLD AUTO: 0.1 % — SIGNIFICANT CHANGE UP (ref 0–1.5)
INR PPP: 0.99 RATIO
KETONES URINE: NEGATIVE
LEUKOCYTE ESTERASE URINE: NEGATIVE
LIDOCAIN IGE QN: 116 U/L — SIGNIFICANT CHANGE UP (ref 73–393)
LYMPHOCYTES # BLD AUTO: 1.78 K/UL — SIGNIFICANT CHANGE UP (ref 1–3.3)
LYMPHOCYTES # BLD AUTO: 23.8 % — SIGNIFICANT CHANGE UP (ref 13–44)
MCHC RBC-ENTMCNC: 30.2 PG — SIGNIFICANT CHANGE UP (ref 27–34)
MCHC RBC-ENTMCNC: 34.3 GM/DL — SIGNIFICANT CHANGE UP (ref 32–36)
MCV RBC AUTO: 88.1 FL — SIGNIFICANT CHANGE UP (ref 80–100)
METHYLMALONATE SERPL-SCNC: 382 NMOL/L
MONOCYTES # BLD AUTO: 0.48 K/UL — SIGNIFICANT CHANGE UP (ref 0–0.9)
MONOCYTES NFR BLD AUTO: 6.4 % — SIGNIFICANT CHANGE UP (ref 2–14)
NEUTROPHILS # BLD AUTO: 5.18 K/UL — SIGNIFICANT CHANGE UP (ref 1.8–7.4)
NEUTROPHILS NFR BLD AUTO: 69.2 % — SIGNIFICANT CHANGE UP (ref 43–77)
NITRITE URINE: NEGATIVE
PH URINE: 7.5
PLATELET # BLD AUTO: 71 K/UL — LOW (ref 150–400)
POTASSIUM SERPL-MCNC: 3.3 MMOL/L — LOW (ref 3.5–5.3)
POTASSIUM SERPL-SCNC: 3.3 MMOL/L — LOW (ref 3.5–5.3)
POTASSIUM SERPL-SCNC: 4.8 MMOL/L
PROT SERPL-MCNC: 7.5 G/DL
PROT SERPL-MCNC: 8 GM/DL — SIGNIFICANT CHANGE UP (ref 6–8.3)
PROTEIN URINE: NEGATIVE
PT BLD: 11.3 SEC
RBC # BLD: 4.8 M/UL — SIGNIFICANT CHANGE UP (ref 3.8–5.2)
RBC # FLD: 12.5 % — SIGNIFICANT CHANGE UP (ref 10.3–14.5)
SODIUM SERPL-SCNC: 142 MMOL/L
SODIUM SERPL-SCNC: 142 MMOL/L — SIGNIFICANT CHANGE UP (ref 135–145)
SPECIFIC GRAVITY URINE: 1
TROPONIN I SERPL-MCNC: <0.015 NG/ML — SIGNIFICANT CHANGE UP (ref 0.01–0.04)
UROBILINOGEN URINE: NORMAL
VIT B12 SERPL-MCNC: 264 PG/ML
WBC # BLD: 7.49 K/UL — SIGNIFICANT CHANGE UP (ref 3.8–10.5)
WBC # FLD AUTO: 7.49 K/UL — SIGNIFICANT CHANGE UP (ref 3.8–10.5)

## 2019-12-24 PROCEDURE — 76856 US EXAM PELVIC COMPLETE: CPT | Mod: 26

## 2019-12-24 PROCEDURE — 76705 ECHO EXAM OF ABDOMEN: CPT | Mod: 26

## 2019-12-24 PROCEDURE — 84702 CHORIONIC GONADOTROPIN TEST: CPT

## 2019-12-24 PROCEDURE — 84484 ASSAY OF TROPONIN QUANT: CPT

## 2019-12-24 PROCEDURE — 99283 EMERGENCY DEPT VISIT LOW MDM: CPT

## 2019-12-24 PROCEDURE — 36415 COLL VENOUS BLD VENIPUNCTURE: CPT

## 2019-12-24 PROCEDURE — 99282 EMERGENCY DEPT VISIT SF MDM: CPT

## 2019-12-24 PROCEDURE — 76830 TRANSVAGINAL US NON-OB: CPT | Mod: 26

## 2019-12-24 PROCEDURE — 74176 CT ABD & PELVIS W/O CONTRAST: CPT

## 2019-12-24 PROCEDURE — 76856 US EXAM PELVIC COMPLETE: CPT

## 2019-12-24 PROCEDURE — 81025 URINE PREGNANCY TEST: CPT

## 2019-12-24 PROCEDURE — 83690 ASSAY OF LIPASE: CPT

## 2019-12-24 PROCEDURE — 76830 TRANSVAGINAL US NON-OB: CPT

## 2019-12-24 PROCEDURE — 99284 EMERGENCY DEPT VISIT MOD MDM: CPT | Mod: 25

## 2019-12-24 PROCEDURE — 74176 CT ABD & PELVIS W/O CONTRAST: CPT | Mod: 26

## 2019-12-24 PROCEDURE — 93010 ELECTROCARDIOGRAM REPORT: CPT

## 2019-12-24 PROCEDURE — 76705 ECHO EXAM OF ABDOMEN: CPT

## 2019-12-24 PROCEDURE — 80053 COMPREHEN METABOLIC PANEL: CPT

## 2019-12-24 PROCEDURE — 93005 ELECTROCARDIOGRAM TRACING: CPT

## 2019-12-24 PROCEDURE — 99284 EMERGENCY DEPT VISIT MOD MDM: CPT

## 2019-12-24 PROCEDURE — 85025 COMPLETE CBC W/AUTO DIFF WBC: CPT

## 2019-12-24 NOTE — ED ADULT TRIAGE NOTE - CHIEF COMPLAINT QUOTE
Patient comes in with abdominal pain s/p endometrial biopsy on Thursday. Patient c/o cp and sob x 2 days as well. Patient states she also has a moderate amount of vaginal bleeding. No signs of acute distress noted. Patient sent for stat EKG.

## 2019-12-24 NOTE — ED PROVIDER NOTE - PHYSICAL EXAMINATION
PHYSICAL EXAM:   GEN: Age appropriate, resting comfortably in bed, no acute distress, non toxic appearing, speaking in complete sentences.   HEENT: Conjunctiva and sclera normal  PULM: Lungs CTAB, no wheezes, rales, rhonchi  CV: RRR, S1S2, no MRG  MSK: no stiffness or joint effusions  Abdominal: Soft, nontender to palpation, non-distended, +BS  Extremities: No edema or cyanosis  NEURO: AAOx3  Psych: normal affect, normal behavior  Skin: No rashes, lesions    T(C): 36.7 (12-24-19 @ 08:04), Max: 36.7 (12-24-19 @ 08:04)  HR: 95 (12-24-19 @ 08:04) (95 - 95)  BP: 116/84 (12-24-19 @ 08:04) (116/84 - 116/84)  RR: 16 (12-24-19 @ 08:04) (16 - 16)  SpO2: 99% (12-24-19 @ 08:04) (99% - 99%)

## 2019-12-24 NOTE — ED ADULT TRIAGE NOTE - CHIEF COMPLAINT QUOTE
abdominal pain with vaginal bleeding and clots   CP and SOB with palpitations since yesterday   Hx endometriosis biopsy on 12/19

## 2019-12-24 NOTE — ED ADULT NURSE NOTE - OBJECTIVE STATEMENT
pt presents to the ED c/o abd pain, nausea, chest pain since last night. Pt had an endometrial biopsy done by Dr. Felix ([place]) last week that was very pain full and she had spotting that became bleeding intermittently and now she has odd discharge. Pt is nauseous with abd pain and feels like she has to push hard to pee and has had trouble having bowel movements. Pt states she takes iron pills and even with them her h&h was low on out patient blood work. Pt is asking for a CT and US to see if something was perforated as per three different OBGYN's that she had seen. pt anxious at this time.

## 2019-12-24 NOTE — ED ADULT NURSE NOTE - OBJECTIVE STATEMENT
43 year old female a/ox3 ambulatory with steady gait presenting to ed with c/o of vaginal bleeding with large clots and abd pain s/p endometriosis biopsy on 12/19. patient states she believes "they may have perforated something like my appendix" during procedure and was told to come in by OB. upon arrival patient requesting to have blood drawn with 25G Butterfly that is only available in NICU. NICU called and waiting for butterfly needle to be sent. patient anxious in appearance and unwilling to exam done by RN at this time. OB at bedside for pelvic exam.   9:30: patient tearful and anxious, demanding to have EKG, troponin, cardiac monitoring, Ct of chest and abd. "I need these tests and I came here for this" explained to patient reasoning behind tests, patient states "I came here for these tests and I want them done." MD Dr. Narvaez made aware.   10:30: patient states " I am leaving. Im very upset, they are not doing what I want. I don't care about the radiation, I Want the cat scan." explained to patient risks regarding leaving, patient verbalized understanding and still wants to leave. MD Dr. Narvaez. aware. 43 year old female a/ox3 ambulatory with steady gait presenting to ed with c/o of vaginal bleeding with large clots and abd pain s/p endometriosis biopsy on 12/19. patient states she believes "they may have perforated something like my appendix" during procedure and was told to come in by OB. upon arrival patient requesting to have blood drawn with 25G Butterfly that is only available in NICU. NICU called and waiting for butterfly needle to be sent. patient anxious in appearance and unwilling to exam done by RN at this time. OB at bedside for pelvic exam.   9:30: patient tearful and anxious, demanding to have EKG, troponin, cardiac monitoring, Ct of chest and abd. "I need these tests and I came here for this" explained to patient reasoning behind tests, patient states "I came here for these tests and I want them done." MD Dr. Narvaez made aware.   10:30: patient states " I am leaving. Im very upset, they are not doing what I want. I don't care about the radiation, I Want the cat scan." explained to patient risks regarding leaving, and ultrasound and Troponin was offered by [facility],  patient verbalized understanding and declines, still wants to leave. MD Dr. Narvaez. aware.

## 2019-12-24 NOTE — ED STATDOCS - PATIENT PORTAL LINK FT
You can access the FollowMyHealth Patient Portal offered by [facility] by registering at the following website: [email or web address]. By joining [facility]’s FollowMyHealth portal, you will also be able to view your health information using other applications (apps) compatible with our system.

## 2019-12-24 NOTE — CONSULT NOTE ADULT - SUBJECTIVE AND OBJECTIVE BOX
DORIS MCBRIDE  43y  Female [id number]    HPI: 44yo LMP 12/7 p/w concern for perforation after an EMB done in the office last week. Patient was being worked up for AUB and heavy bleeding. Patient reports that she had been bleeding from her prolonged period at the time of the biopsy, but that the bleeding became heavier afterwards. In the last few days she has passed heavy clots, but the bleeding has lessened now. She is concerned for perforation because she reports that it was difficult to enter the cervix with the pipelle and the "doctor had to push the biopsy really hard." She also reports new onset R-sided abdominal pain since the EMB. Patient had an appendectomy last year and is concerned that "something popped during the biopsy" because it was so painful and/or that a perforation caused this pain. Since the biopsy, she reports having some difficulty urinating. Regular bowel movements. She is also complaining of new onset CP since this morning. No N/V/D. No fevers, chills, headaches, lightheadedness, dizziness.  No hematuria or dysuria.     Name of GYN Physician: Dr. Emerson Tan: Recent EMB for AUB as above, denies fibroids, cysts, endometriosis, STI's, Abnormal pap smears     Home meds: Cardizem, xarelto, montelukast, mylicon, Fe    Allergies:  barium sulfate (Other)  beta blockers (Unknown)  gadolinium (Hypotension)  IV Contrast (Anaphylaxis)  Reglan (Other)  Zofran (Other)    Intolerances:  beta blockers (Other)  metoprolol (Headache)      PAST MEDICAL & SURGICAL HISTORY:  Protein S deficiency  DVT (deep venous thrombosis): left internal jugular and subclavian veins  Autonomic dysreflexia  Gastroparesis  Dystonia  Depression  Idiopathic thrombocytopenic purpura  Dysautonomia  Idiopathic thrombocytopenia purpura  POTS (postural orthostatic tachycardia syndrome)  History of ITP  Labyrinthitis  Headache  HTN (hypertension)  Anxiety  IBS (irritable bowel syndrome)  S/P appendectomy      FAMILY HISTORY:  Family history of hypertension (Grandparent)  Family history of atrial fibrillation  Family history of prostate cancer in father    Vital Signs Last 24 Hrs  T(C): 36.7 (24 Dec 2019 08:04), Max: 36.7 (24 Dec 2019 08:04)  T(F): 98.1 (24 Dec 2019 08:04), Max: 98.1 (24 Dec 2019 08:04)  HR: 95 (24 Dec 2019 08:04) (95 - 95)  BP: 116/84 (24 Dec 2019 08:04) (116/84 - 116/84)  BP(mean): --  RR: 16 (24 Dec 2019 08:04) (16 - 16)  SpO2: 99% (24 Dec 2019 08:04) (99% - 99%)    Physical Exam:   General: anxious  CV: RR S1S2 no m/r/g  Lungs: CTA b/l, good air flow b/l   Back: No CVA tenderness  Abd: Soft, RUQ mildly TTP, non-distended.  Bowel sounds present.  No rebound or guarding.  :  No bleeding on pad.    External labia wnl.  Bimanual exam with no cervical motion tenderness, uterus wnl, adnexa non palpable b/l.  Cervix closed.  Speculum Exam: No active bleeding from os. No abnormal or odorous discharge.    Ext: non-tender b/l, no edema     LABS:                              13.5   6.7   )-----------( 54       ( 23 Dec 2019 14:01 )             37.9           I&O's Detail

## 2019-12-24 NOTE — ED ADULT NURSE REASSESSMENT NOTE - NS ED NURSE REASSESS COMMENT FT1
pt currently eating motzoah despite c/o abd pain and nausea, reports she has to take her BP medication

## 2019-12-24 NOTE — ED STATDOCS - CARE PLAN
Principal Discharge DX:	Hemorrhagic cyst of ovary Principal Discharge DX:	Hemorrhagic cyst of ovary  Secondary Diagnosis:	Thrombocytopenia  Secondary Diagnosis:	Chest pain

## 2019-12-24 NOTE — ED PROVIDER NOTE - OBJECTIVE STATEMENT
42 y/o female with PMHx of low protein S clotting disorder, DVT on Xarelto, HTN, gastroparesis, anxiety, idiopathic thrombocytopenia purpura, POTS, IBS, and s/p appendectomy presents to the ED complaining of 1 week of abdominal pain and bloody vaginal discharge which started after an endometrial biopsy she had on 12/19. She reports she initially was passing blots clots however now the bleeding has improved but not resolved. She is concerned about her persistent abdominal pain. She called her OBGYN who she says advised her to come to the ED for labs and imaging. She also reports chest pain and palpitations during this time period. Patient requesting CT of the abdomen and pelvis as well as EKG and troponin levels because she is concerned about heart attack.

## 2019-12-24 NOTE — ED PROVIDER NOTE - NS ED ROS FT
Constitutional: denies fevers, chills, night sweats, weight loss  HEENT: denies visual changes, cough  Cardiovascular: + palpitations, chest pain, denies edema  Respiratory: denies SOB, wheezing  Gastrointestinal: denies N/V/D, +abdominal pain, denies hematochezia or melena  : endorses blooody vaginal discharge. denies dysuria, urinary urgency, increased frequency  MSK: denies muscle weakness, joint pain  Skin: denies new rashes or masses  Heme: denies bruising  Neuro: denies headache, weakness

## 2019-12-24 NOTE — CONSULT NOTE ADULT - ASSESSMENT
50yo LMP 12/7 p/w concern for perforation after EMB for AUB workup. No suspicion for perforation at this time given pain mostly RUQ and pelvic exam unremarkable.     - consider UA/UCx for urinary complaints  - remainder of workup per ED; appreciate excellent care  - no acute GYN intervention at this time  - patient to follow up as planned in office    H Noam PGY2  d/w Dr. Perdomo

## 2019-12-24 NOTE — ED PROVIDER NOTE - PROGRESS NOTE DETAILS
Patient requests special needle 25 gauge butterfly from NICU as well as IV and oral CT scan. She is allergic to IV dye and wants to premedicated. -[facility] came to patient room to perform bedside US however patient no longer in room. Eloped from ED without speaking to ED team or signing paperwork. Charles Arceo

## 2019-12-24 NOTE — ED STATDOCS - OBJECTIVE STATEMENT
44 y/o female with a PMHx of anxiety, autonomic dysreflexia, depression, DVT, dysautonomia, gastroparesis, ha, ITP, HTN, IBS, idiopathic thrombocytopenia purpura, labyrinthitis, POTS, protein S deficiency presents to the ED c/o abd pain, nausea, chest pain since last night. Pt had an endometrial biopsy done by Dr. Felix ([place]) last week that was very pain full and she had spotting that became bleeding intermittently and now she has odd discharge. Pt is nauseous with abd pain and feels like she has to push hard to pee and has had trouble having bowel movements. Pt states she takes iron pills and even with them her h&h was low on out patient blood work. Pt is asking for a CT and US to see if something was perforated as per three different OBGYN's that she had seen.

## 2019-12-24 NOTE — ED STATDOCS - CLINICAL SUMMARY MEDICAL DECISION MAKING FREE TEXT BOX
Plan: labs, CT, US Plan: labs, CT, US      Platelets improving.  US showing small hemorrhagic cyst, small right ovarian cyst.   Normal vascular flow.   CT Neg. for other acute findings.  Neg. Troponin.  Will DC with GYN follow up.  Catalina Daniels PA-C

## 2019-12-24 NOTE — ED STATDOCS - ATTENDING CONTRIBUTION TO CARE
I, Ben Kwan, performed the initial face to face bedside interview with this patient regarding history of present illness, review of symptoms and relevant past medical, social and family history.  I completed an independent physical examination.  I was the initial provider who evaluated this patient. I have signed out the follow up of any pending tests (i.e. labs, radiological studies) to the ACP.  I have communicated the patient’s plan of care and disposition with the ACP.  The history, relevant review of systems, past medical and surgical history, medical decision making, and physical examination was documented by the scribe in my presence and I attest to the accuracy of the documentation.

## 2019-12-24 NOTE — ED PROVIDER NOTE - CLINICAL SUMMARY MEDICAL DECISION MAKING FREE TEXT BOX
43 year old female known well to emergency department staff for frequent visits and multiple complaints, comes in today complaining of vaginal bleeding and cramps after endometrial biopsy on 12/19. She requests multiple tests including CT of the abdomen stating that she may have perforation and is worried she may die from this. Abdominal exam is completely benign. Seen by OBGYN and cleared to go home. Patient got extremely upset after offering her only US, and blood work for CBC and not CT scan. She states that she "hates [facility], and she is going to get all of the tests at [facility]." Patient is cleared for discharge. ZR

## 2019-12-24 NOTE — ED STATDOCS - PROGRESS NOTE DETAILS
44 y/o female with a PMHx of anxiety, autonomic dysreflexia, depression, DVT, dysautonomia, gastroparesis, ha, ITP, HTN, IBS, idiopathic thrombocytopenia purpura, labyrinthitis, POTS, protein S deficiency presents to the ED c/o abd pain, nausea, chest pain since last night. Pt had an endometrial biopsy done by Dr. Felix ([place]) last week that was very pain full and she had spotting that became bleeding intermittently and now she has odd discharge. Pt is nauseous with abd pain and feels like she has to push hard to pee and has had trouble having bowel movements. Pt states she takes iron pills and even with them her h&h was low on out patient blood work. Pt is asking for a CT and US to see if something was perforated as per three different OBGYN's that she had seen.  Catalina Daniels PA-C Platelets improving.  US showing small hemorrhagic cyst, small right ovarian cyst.   Normal vascular flow.   CT Neg. for other acute findings.  Neg. Troponin.  Will DC with GYN follow up.  Catalina Daniels PA-C

## 2019-12-25 PROBLEM — R10.2 PELVIC PAIN IN FEMALE: Status: ACTIVE | Noted: 2018-11-01

## 2019-12-29 ENCOUNTER — MOBILE ON CALL (OUTPATIENT)
Age: 44
End: 2019-12-29

## 2019-12-29 NOTE — ED STATDOCS - MEDICAL DECISION MAKING DETAILS
oral
Pt with likely allergic reaction to Abilify, plan for symptomatic control with benadryl, zofran, IVF.

## 2019-12-30 ENCOUNTER — RESULT REVIEW (OUTPATIENT)
Age: 44
End: 2019-12-30

## 2019-12-30 ENCOUNTER — APPOINTMENT (OUTPATIENT)
Dept: HEMATOLOGY ONCOLOGY | Facility: CLINIC | Age: 44
End: 2019-12-30

## 2019-12-30 LAB
BASOPHILS # BLD AUTO: 0 K/UL — SIGNIFICANT CHANGE UP (ref 0–0.2)
BASOPHILS NFR BLD AUTO: 0.2 % — SIGNIFICANT CHANGE UP (ref 0–2)
EOSINOPHIL # BLD AUTO: 0 K/UL — SIGNIFICANT CHANGE UP (ref 0–0.5)
EOSINOPHIL NFR BLD AUTO: 0.3 % — SIGNIFICANT CHANGE UP (ref 0–6)
HCT VFR BLD CALC: 28 % — LOW (ref 34.5–45)
HGB BLD-MCNC: 9.5 G/DL — LOW (ref 11.5–15.5)
LYMPHOCYTES # BLD AUTO: 1.5 K/UL — SIGNIFICANT CHANGE UP (ref 1–3.3)
LYMPHOCYTES # BLD AUTO: 13.3 % — SIGNIFICANT CHANGE UP (ref 13–44)
MCHC RBC-ENTMCNC: 29.8 PG — SIGNIFICANT CHANGE UP (ref 27–34)
MCHC RBC-ENTMCNC: 34.1 G/DL — SIGNIFICANT CHANGE UP (ref 32–36)
MCV RBC AUTO: 87.5 FL — SIGNIFICANT CHANGE UP (ref 80–100)
MONOCYTES # BLD AUTO: 0.8 K/UL — SIGNIFICANT CHANGE UP (ref 0–0.9)
MONOCYTES NFR BLD AUTO: 7.3 % — SIGNIFICANT CHANGE UP (ref 2–14)
NEUTROPHILS # BLD AUTO: 8.8 K/UL — HIGH (ref 1.8–7.4)
NEUTROPHILS NFR BLD AUTO: 79 % — HIGH (ref 43–77)
PLATELET # BLD AUTO: 61 K/UL — LOW (ref 150–400)
RBC # BLD: 3.2 M/UL — LOW (ref 3.8–5.2)
RBC # FLD: 11.3 % — SIGNIFICANT CHANGE UP (ref 10.3–14.5)
WBC # BLD: 11.1 K/UL — HIGH (ref 3.8–10.5)
WBC # FLD AUTO: 11.1 K/UL — HIGH (ref 3.8–10.5)

## 2019-12-31 ENCOUNTER — APPOINTMENT (OUTPATIENT)
Dept: HEMATOLOGY ONCOLOGY | Facility: CLINIC | Age: 44
End: 2019-12-31

## 2019-12-31 ENCOUNTER — RESULT REVIEW (OUTPATIENT)
Age: 44
End: 2019-12-31

## 2019-12-31 ENCOUNTER — OUTPATIENT (OUTPATIENT)
Dept: OUTPATIENT SERVICES | Facility: HOSPITAL | Age: 44
LOS: 1 days | Discharge: HOME | End: 2019-12-31

## 2019-12-31 DIAGNOSIS — Z90.49 ACQUIRED ABSENCE OF OTHER SPECIFIED PARTS OF DIGESTIVE TRACT: Chronic | ICD-10-CM

## 2019-12-31 DIAGNOSIS — D47.3 ESSENTIAL (HEMORRHAGIC) THROMBOCYTHEMIA: ICD-10-CM

## 2019-12-31 LAB
BASOPHILS # BLD AUTO: 0 K/UL — SIGNIFICANT CHANGE UP (ref 0–0.2)
BASOPHILS NFR BLD AUTO: 0.3 % — SIGNIFICANT CHANGE UP (ref 0–2)
EOSINOPHIL # BLD AUTO: 0 K/UL — SIGNIFICANT CHANGE UP (ref 0–0.5)
EOSINOPHIL NFR BLD AUTO: 0.5 % — SIGNIFICANT CHANGE UP (ref 0–6)
HCT VFR BLD CALC: 26.6 % — LOW (ref 34.5–45)
HGB BLD-MCNC: 9.3 G/DL — LOW (ref 11.5–15.5)
LYMPHOCYTES # BLD AUTO: 1.1 K/UL — SIGNIFICANT CHANGE UP (ref 1–3.3)
LYMPHOCYTES # BLD AUTO: 15.3 % — SIGNIFICANT CHANGE UP (ref 13–44)
MCHC RBC-ENTMCNC: 30.7 PG — SIGNIFICANT CHANGE UP (ref 27–34)
MCHC RBC-ENTMCNC: 34.9 G/DL — SIGNIFICANT CHANGE UP (ref 32–36)
MCV RBC AUTO: 88.1 FL — SIGNIFICANT CHANGE UP (ref 80–100)
MONOCYTES # BLD AUTO: 0.6 K/UL — SIGNIFICANT CHANGE UP (ref 0–0.9)
MONOCYTES NFR BLD AUTO: 8.9 % — SIGNIFICANT CHANGE UP (ref 2–14)
NEUTROPHILS # BLD AUTO: 5.4 K/UL — SIGNIFICANT CHANGE UP (ref 1.8–7.4)
NEUTROPHILS NFR BLD AUTO: 75 % — SIGNIFICANT CHANGE UP (ref 43–77)
PLATELET # BLD AUTO: 51 K/UL — LOW (ref 150–400)
RBC # BLD: 3.02 M/UL — LOW (ref 3.8–5.2)
RBC # FLD: 11.4 % — SIGNIFICANT CHANGE UP (ref 10.3–14.5)
WBC # BLD: 7.2 K/UL — SIGNIFICANT CHANGE UP (ref 3.8–10.5)
WBC # FLD AUTO: 7.2 K/UL — SIGNIFICANT CHANGE UP (ref 3.8–10.5)

## 2019-12-31 NOTE — ED ADULT TRIAGE NOTE - MEANS OF ARRIVAL
12/31/19 0900   Group 1   Start Time 0830   Stop Time 0900   Length (min) 30 Min   Group Name Check-in   Focus of Group Symptoms and Goals   Attendance Present   Mood Depressed;Anxious;Stressed   Affect Constricted   Behavior/Socialization Cooperative;Engaged   Thought Process Focused;Tracking   Patient Response Attentive;Interactive   Task Performance Follows directions   Safety Concerns Other  (Sleep 5/10)   Degree Patient Ready for Discharge Yes   Group Notes Pt. attended check-in group this morning and stated that she is feeling  less hopeless today, and that she is not concerned with any of her symptoms. Pt. stated that she is looking to get enrolled in outpatient family therapy after discharge in order to work with her family problems. Pt. identified a goal of learning new coping skills.     GILES Mobley     ambulatory

## 2020-01-02 ENCOUNTER — EMERGENCY (EMERGENCY)
Facility: HOSPITAL | Age: 45
LOS: 0 days | Discharge: HOME | End: 2020-01-02
Attending: STUDENT IN AN ORGANIZED HEALTH CARE EDUCATION/TRAINING PROGRAM
Payer: MEDICARE

## 2020-01-02 ENCOUNTER — RESULT REVIEW (OUTPATIENT)
Age: 45
End: 2020-01-02

## 2020-01-02 ENCOUNTER — APPOINTMENT (OUTPATIENT)
Dept: HEMATOLOGY ONCOLOGY | Facility: CLINIC | Age: 45
End: 2020-01-02

## 2020-01-02 VITALS
OXYGEN SATURATION: 99 % | RESPIRATION RATE: 17 BRPM | TEMPERATURE: 99 F | SYSTOLIC BLOOD PRESSURE: 119 MMHG | DIASTOLIC BLOOD PRESSURE: 87 MMHG | HEART RATE: 99 BPM

## 2020-01-02 VITALS
TEMPERATURE: 99 F | OXYGEN SATURATION: 100 % | HEART RATE: 100 BPM | SYSTOLIC BLOOD PRESSURE: 124 MMHG | RESPIRATION RATE: 18 BRPM | DIASTOLIC BLOOD PRESSURE: 88 MMHG

## 2020-01-02 DIAGNOSIS — R10.9 UNSPECIFIED ABDOMINAL PAIN: ICD-10-CM

## 2020-01-02 DIAGNOSIS — D68.59 OTHER PRIMARY THROMBOPHILIA: ICD-10-CM

## 2020-01-02 DIAGNOSIS — Z79.01 LONG TERM (CURRENT) USE OF ANTICOAGULANTS: ICD-10-CM

## 2020-01-02 DIAGNOSIS — Z86.718 PERSONAL HISTORY OF OTHER VENOUS THROMBOSIS AND EMBOLISM: ICD-10-CM

## 2020-01-02 DIAGNOSIS — Z90.49 ACQUIRED ABSENCE OF OTHER SPECIFIED PARTS OF DIGESTIVE TRACT: Chronic | ICD-10-CM

## 2020-01-02 DIAGNOSIS — D64.9 ANEMIA, UNSPECIFIED: ICD-10-CM

## 2020-01-02 DIAGNOSIS — I49.8 OTHER SPECIFIED CARDIAC ARRHYTHMIAS: ICD-10-CM

## 2020-01-02 DIAGNOSIS — D69.3 IMMUNE THROMBOCYTOPENIC PURPURA: ICD-10-CM

## 2020-01-02 DIAGNOSIS — I10 ESSENTIAL (PRIMARY) HYPERTENSION: ICD-10-CM

## 2020-01-02 DIAGNOSIS — N83.201 UNSPECIFIED OVARIAN CYST, RIGHT SIDE: ICD-10-CM

## 2020-01-02 DIAGNOSIS — G24.9 DYSTONIA, UNSPECIFIED: ICD-10-CM

## 2020-01-02 DIAGNOSIS — G90.1 FAMILIAL DYSAUTONOMIA [RILEY-DAY]: ICD-10-CM

## 2020-01-02 LAB
ANION GAP SERPL CALC-SCNC: 4 MMOL/L — LOW (ref 5–17)
APTT BLD: 26.4 SEC — LOW (ref 27.5–36.3)
BASOPHILS # BLD AUTO: 0 K/UL — SIGNIFICANT CHANGE UP (ref 0–0.2)
BASOPHILS # BLD AUTO: 0.02 K/UL — SIGNIFICANT CHANGE UP (ref 0–0.2)
BASOPHILS NFR BLD AUTO: 0.1 % — SIGNIFICANT CHANGE UP (ref 0–2)
BASOPHILS NFR BLD AUTO: 0.2 % — SIGNIFICANT CHANGE UP (ref 0–2)
BUN SERPL-MCNC: 4 MG/DL — LOW (ref 7–23)
CALCIUM SERPL-MCNC: 8.6 MG/DL — SIGNIFICANT CHANGE UP (ref 8.5–10.1)
CHLORIDE SERPL-SCNC: 109 MMOL/L — HIGH (ref 96–108)
CO2 SERPL-SCNC: 26 MMOL/L — SIGNIFICANT CHANGE UP (ref 22–31)
CREAT SERPL-MCNC: 0.66 MG/DL — SIGNIFICANT CHANGE UP (ref 0.5–1.3)
EOSINOPHIL # BLD AUTO: 0.03 K/UL — SIGNIFICANT CHANGE UP (ref 0–0.5)
EOSINOPHIL # BLD AUTO: 0.1 K/UL — SIGNIFICANT CHANGE UP (ref 0–0.5)
EOSINOPHIL NFR BLD AUTO: 0.4 % — SIGNIFICANT CHANGE UP (ref 0–6)
EOSINOPHIL NFR BLD AUTO: 0.9 % — SIGNIFICANT CHANGE UP (ref 0–6)
GLUCOSE SERPL-MCNC: 77 MG/DL — SIGNIFICANT CHANGE UP (ref 70–99)
HCT VFR BLD CALC: 26.8 % — LOW (ref 34.5–45)
HCT VFR BLD CALC: 28.4 % — LOW (ref 34.5–45)
HGB BLD-MCNC: 9 G/DL — LOW (ref 11.5–15.5)
HGB BLD-MCNC: 9.5 G/DL — LOW (ref 11.5–15.5)
IMM GRANULOCYTES NFR BLD AUTO: 0.5 % — SIGNIFICANT CHANGE UP (ref 0–1.5)
INR BLD: 1.13 RATIO — SIGNIFICANT CHANGE UP (ref 0.88–1.16)
LYMPHOCYTES # BLD AUTO: 1.4 K/UL — SIGNIFICANT CHANGE UP (ref 1–3.3)
LYMPHOCYTES # BLD AUTO: 1.49 K/UL — SIGNIFICANT CHANGE UP (ref 1–3.3)
LYMPHOCYTES # BLD AUTO: 17.3 % — SIGNIFICANT CHANGE UP (ref 13–44)
LYMPHOCYTES # BLD AUTO: 18.2 % — SIGNIFICANT CHANGE UP (ref 13–44)
MCHC RBC-ENTMCNC: 29.7 PG — SIGNIFICANT CHANGE UP (ref 27–34)
MCHC RBC-ENTMCNC: 29.8 PG — SIGNIFICANT CHANGE UP (ref 27–34)
MCHC RBC-ENTMCNC: 33.5 G/DL — SIGNIFICANT CHANGE UP (ref 32–36)
MCHC RBC-ENTMCNC: 33.6 GM/DL — SIGNIFICANT CHANGE UP (ref 32–36)
MCV RBC AUTO: 88.4 FL — SIGNIFICANT CHANGE UP (ref 80–100)
MCV RBC AUTO: 88.9 FL — SIGNIFICANT CHANGE UP (ref 80–100)
MONOCYTES # BLD AUTO: 0.65 K/UL — SIGNIFICANT CHANGE UP (ref 0–0.9)
MONOCYTES # BLD AUTO: 0.7 K/UL — SIGNIFICANT CHANGE UP (ref 0–0.9)
MONOCYTES NFR BLD AUTO: 7.8 % — SIGNIFICANT CHANGE UP (ref 2–14)
MONOCYTES NFR BLD AUTO: 7.9 % — SIGNIFICANT CHANGE UP (ref 2–14)
NEUTROPHILS # BLD AUTO: 5.97 K/UL — SIGNIFICANT CHANGE UP (ref 1.8–7.4)
NEUTROPHILS # BLD AUTO: 6.2 K/UL — SIGNIFICANT CHANGE UP (ref 1.8–7.4)
NEUTROPHILS NFR BLD AUTO: 72.8 % — SIGNIFICANT CHANGE UP (ref 43–77)
NEUTROPHILS NFR BLD AUTO: 73.9 % — SIGNIFICANT CHANGE UP (ref 43–77)
PLATELET # BLD AUTO: 100 K/UL — LOW (ref 150–400)
PLATELET # BLD AUTO: 113 K/UL — LOW (ref 150–400)
POTASSIUM SERPL-MCNC: 3.3 MMOL/L — LOW (ref 3.5–5.3)
POTASSIUM SERPL-SCNC: 3.3 MMOL/L — LOW (ref 3.5–5.3)
PROTHROM AB SERPL-ACNC: 12.6 SEC — SIGNIFICANT CHANGE UP (ref 10–12.9)
RBC # BLD: 3.03 M/UL — LOW (ref 3.8–5.2)
RBC # BLD: 3.19 M/UL — LOW (ref 3.8–5.2)
RBC # FLD: 11.9 % — SIGNIFICANT CHANGE UP (ref 10.3–14.5)
RBC # FLD: 12.9 % — SIGNIFICANT CHANGE UP (ref 10.3–14.5)
SODIUM SERPL-SCNC: 139 MMOL/L — SIGNIFICANT CHANGE UP (ref 135–145)
WBC # BLD: 8.2 K/UL — SIGNIFICANT CHANGE UP (ref 3.8–10.5)
WBC # BLD: 8.4 K/UL — SIGNIFICANT CHANGE UP (ref 3.8–10.5)
WBC # FLD AUTO: 8.2 K/UL — SIGNIFICANT CHANGE UP (ref 3.8–10.5)
WBC # FLD AUTO: 8.4 K/UL — SIGNIFICANT CHANGE UP (ref 3.8–10.5)

## 2020-01-02 PROCEDURE — 85730 THROMBOPLASTIN TIME PARTIAL: CPT

## 2020-01-02 PROCEDURE — 93970 EXTREMITY STUDY: CPT

## 2020-01-02 PROCEDURE — 93010 ELECTROCARDIOGRAM REPORT: CPT

## 2020-01-02 PROCEDURE — 99284 EMERGENCY DEPT VISIT MOD MDM: CPT

## 2020-01-02 PROCEDURE — 36415 COLL VENOUS BLD VENIPUNCTURE: CPT

## 2020-01-02 PROCEDURE — 85610 PROTHROMBIN TIME: CPT

## 2020-01-02 PROCEDURE — 76830 TRANSVAGINAL US NON-OB: CPT | Mod: 26

## 2020-01-02 PROCEDURE — 80048 BASIC METABOLIC PNL TOTAL CA: CPT

## 2020-01-02 PROCEDURE — 99284 EMERGENCY DEPT VISIT MOD MDM: CPT | Mod: 25

## 2020-01-02 PROCEDURE — 85025 COMPLETE CBC W/AUTO DIFF WBC: CPT

## 2020-01-02 PROCEDURE — 76830 TRANSVAGINAL US NON-OB: CPT

## 2020-01-02 PROCEDURE — 93970 EXTREMITY STUDY: CPT | Mod: 26

## 2020-01-02 PROCEDURE — 93005 ELECTROCARDIOGRAM TRACING: CPT

## 2020-01-02 RX ORDER — SODIUM CHLORIDE 9 MG/ML
1000 INJECTION INTRAMUSCULAR; INTRAVENOUS; SUBCUTANEOUS ONCE
Refills: 0 | Status: DISCONTINUED | OUTPATIENT
Start: 2020-01-02 | End: 2020-01-02

## 2020-01-02 NOTE — ED ADULT TRIAGE NOTE - CHIEF COMPLAINT QUOTE
Pt presents to er with known hematologic issues and complains of abd known with ovarian cysts, pt with multiple medical complaints.

## 2020-01-02 NOTE — ED PROVIDER NOTE - PROGRESS NOTE DETAILS
Brandon Tyler for attending Dr. Leiva: Pt needs to go home and is refusing to wait for ultrasound. Explained the risks. Pt will AMA. I Jacinda Tyler attest that this documentation has been prepared under the direction and in the presence of Doctor Leiva. Abbie LINO: Patient with known ovarian cyst; H&H stable from this morning; instructed to f/u with ob/gyn as outpatient; strict return precautions given.

## 2020-01-02 NOTE — ED ADULT NURSE NOTE - CHPI ED NUR SYMPTOMS NEG
no nausea/no tingling/no weakness/no fever/no dizziness/no chills/no vomiting/no decreased eating/drinking

## 2020-01-02 NOTE — ED STATDOCS - PROGRESS NOTE DETAILS
Scribe CD for ED attending, Doctor Angelia. 45 y/o female with a PMHx of anxiety, anemia, DVT, ITP, HTN, IBS, POTS, protein S deficiency presents to the ED with multiple medical complaints. +palpitations +abd pain. +nausea, vomiting. States she had a ruptured ovarian cyst recently. Also c/o bilateral UE and LE pain. States she has a h/o DVT and is not on her blood thinners. Will send pt to main ED for further evaluation.

## 2020-01-02 NOTE — ED ADULT NURSE REASSESSMENT NOTE - NS ED NURSE REASSESS COMMENT FT1
pt dc to home in no acute distress. ambulated out of ER. verbalized understanding of dc instructions given

## 2020-01-02 NOTE — ED PROVIDER NOTE - OBJECTIVE STATEMENT
43 y/o female with a PMHx of Anxiety, Anemia, DVT, ITP, HTN, IBS, POTS, protein S deficiency, presents to the ED with multiple medical complaints. Pt had recent ruptured ovarian cyst and reports abdominal pain since then.  Has recent drop H&H and was taken off Xarelto. Now c/o b/l upper extremity pain, hx of DVT. Denies SOB, hematuria, burning urination, melena, no hematochezia.

## 2020-01-02 NOTE — ED ADULT NURSE NOTE - OBJECTIVE STATEMENT
pt is a 45 y/o female w/ pmhx of anxiety, anemia, dvt, ITP, HTN, IBS, POTS, and protein S deficiency presenting to ED for eval of abd pain and drop in H&H, bilat upper arm pain. denies dyspnea, hematuria or GI bleeding.

## 2020-01-02 NOTE — ED STATDOCS - ATTENDING CONTRIBUTION TO CARE
I, Brie Galan MD,  performed the initial face to face bedside interview with this patient regarding history of present illness, review of symptoms and relevant past medical, social and family history.  I completed an independent physical examination.  I was the initial provider who evaluated this patient. I have signed out the follow up of any pending tests (i.e. labs, radiological studies) to the ACP.  I have communicated the patient’s plan of care and disposition with the ACP.  The history, relevant review of systems, past medical and surgical history, medical decision making, and physical examination was documented by the scribe in my presence and I attest to the accuracy of the documentation.

## 2020-01-06 ENCOUNTER — RESULT REVIEW (OUTPATIENT)
Age: 45
End: 2020-01-06

## 2020-01-06 ENCOUNTER — APPOINTMENT (OUTPATIENT)
Dept: HEMATOLOGY ONCOLOGY | Facility: CLINIC | Age: 45
End: 2020-01-06

## 2020-01-06 LAB
BASOPHILS # BLD AUTO: 0 K/UL — SIGNIFICANT CHANGE UP (ref 0–0.2)
BASOPHILS NFR BLD AUTO: 0.3 % — SIGNIFICANT CHANGE UP (ref 0–2)
EOSINOPHIL # BLD AUTO: 0 K/UL — SIGNIFICANT CHANGE UP (ref 0–0.5)
EOSINOPHIL NFR BLD AUTO: 0.3 % — SIGNIFICANT CHANGE UP (ref 0–6)
HCT VFR BLD CALC: 30 % — LOW (ref 34.5–45)
HGB BLD-MCNC: 10.5 G/DL — LOW (ref 11.5–15.5)
LYMPHOCYTES # BLD AUTO: 1.2 K/UL — SIGNIFICANT CHANGE UP (ref 1–3.3)
LYMPHOCYTES # BLD AUTO: 14 % — SIGNIFICANT CHANGE UP (ref 13–44)
MCHC RBC-ENTMCNC: 30.9 PG — SIGNIFICANT CHANGE UP (ref 27–34)
MCHC RBC-ENTMCNC: 34.9 G/DL — SIGNIFICANT CHANGE UP (ref 32–36)
MCV RBC AUTO: 88.5 FL — SIGNIFICANT CHANGE UP (ref 80–100)
MONOCYTES # BLD AUTO: 0.6 K/UL — SIGNIFICANT CHANGE UP (ref 0–0.9)
MONOCYTES NFR BLD AUTO: 7.3 % — SIGNIFICANT CHANGE UP (ref 2–14)
NEUTROPHILS # BLD AUTO: 6.6 K/UL — SIGNIFICANT CHANGE UP (ref 1.8–7.4)
NEUTROPHILS NFR BLD AUTO: 78.2 % — HIGH (ref 43–77)
PLATELET # BLD AUTO: 116 K/UL — LOW (ref 150–400)
RBC # BLD: 3.39 M/UL — LOW (ref 3.8–5.2)
RBC # FLD: 12 % — SIGNIFICANT CHANGE UP (ref 10.3–14.5)
WBC # BLD: 8.5 K/UL — SIGNIFICANT CHANGE UP (ref 3.8–10.5)
WBC # FLD AUTO: 8.5 K/UL — SIGNIFICANT CHANGE UP (ref 3.8–10.5)

## 2020-01-07 NOTE — ED ADULT TRIAGE NOTE - WEIGHT IN LBS
93
I have reviewed and confirmed nurses' notes for patient's medications, allergies, medical history, and surgical history.

## 2020-01-08 LAB
APTT BLD: 26 SEC
FIBRINOGEN PPP COAG.DERIVED-MCNC: 540 MG/DL
INR PPP: 0.98 RATIO
PROT S AG ACT/NOR PPP IA: 50 %
PT BLD: 11.2 SEC

## 2020-01-13 ENCOUNTER — EMERGENCY (EMERGENCY)
Facility: HOSPITAL | Age: 45
LOS: 1 days | Discharge: HOME | End: 2020-01-13
Attending: EMERGENCY MEDICINE
Payer: COMMERCIAL

## 2020-01-13 ENCOUNTER — APPOINTMENT (OUTPATIENT)
Dept: HEMATOLOGY ONCOLOGY | Facility: CLINIC | Age: 45
End: 2020-01-13

## 2020-01-13 VITALS
DIASTOLIC BLOOD PRESSURE: 90 MMHG | SYSTOLIC BLOOD PRESSURE: 130 MMHG | HEART RATE: 83 BPM | TEMPERATURE: 98 F | OXYGEN SATURATION: 100 % | RESPIRATION RATE: 16 BRPM

## 2020-01-13 VITALS
TEMPERATURE: 98 F | DIASTOLIC BLOOD PRESSURE: 93 MMHG | SYSTOLIC BLOOD PRESSURE: 128 MMHG | OXYGEN SATURATION: 100 % | RESPIRATION RATE: 18 BRPM | WEIGHT: 97 LBS | HEART RATE: 93 BPM | HEIGHT: 62 IN

## 2020-01-13 DIAGNOSIS — Z90.49 ACQUIRED ABSENCE OF OTHER SPECIFIED PARTS OF DIGESTIVE TRACT: Chronic | ICD-10-CM

## 2020-01-13 LAB
ALBUMIN SERPL ELPH-MCNC: 4.7 G/DL — SIGNIFICANT CHANGE UP (ref 3.3–5)
ALP SERPL-CCNC: 95 U/L — SIGNIFICANT CHANGE UP (ref 40–120)
ALT FLD-CCNC: 29 U/L — SIGNIFICANT CHANGE UP (ref 10–45)
ANION GAP SERPL CALC-SCNC: 10 MMOL/L — SIGNIFICANT CHANGE UP (ref 5–17)
APPEARANCE UR: CLEAR — SIGNIFICANT CHANGE UP
AST SERPL-CCNC: 36 U/L — SIGNIFICANT CHANGE UP (ref 10–40)
BASOPHILS # BLD AUTO: 0.01 K/UL — SIGNIFICANT CHANGE UP (ref 0–0.2)
BASOPHILS NFR BLD AUTO: 0.2 % — SIGNIFICANT CHANGE UP (ref 0–2)
BILIRUB SERPL-MCNC: 0.4 MG/DL — SIGNIFICANT CHANGE UP (ref 0.2–1.2)
BILIRUB UR-MCNC: NEGATIVE — SIGNIFICANT CHANGE UP
BUN SERPL-MCNC: 6 MG/DL — LOW (ref 7–23)
CALCIUM SERPL-MCNC: 9.3 MG/DL — SIGNIFICANT CHANGE UP (ref 8.4–10.5)
CHLORIDE SERPL-SCNC: 103 MMOL/L — SIGNIFICANT CHANGE UP (ref 96–108)
CO2 SERPL-SCNC: 26 MMOL/L — SIGNIFICANT CHANGE UP (ref 22–31)
COLOR SPEC: COLORLESS — SIGNIFICANT CHANGE UP
CREAT SERPL-MCNC: 0.67 MG/DL — SIGNIFICANT CHANGE UP (ref 0.5–1.3)
DIFF PNL FLD: NEGATIVE — SIGNIFICANT CHANGE UP
EOSINOPHIL # BLD AUTO: 0.02 K/UL — SIGNIFICANT CHANGE UP (ref 0–0.5)
EOSINOPHIL NFR BLD AUTO: 0.3 % — SIGNIFICANT CHANGE UP (ref 0–6)
GLUCOSE SERPL-MCNC: 111 MG/DL — HIGH (ref 70–99)
GLUCOSE UR QL: NEGATIVE — SIGNIFICANT CHANGE UP
HCT VFR BLD CALC: 35.8 % — SIGNIFICANT CHANGE UP (ref 34.5–45)
HGB BLD-MCNC: 11.4 G/DL — LOW (ref 11.5–15.5)
IMM GRANULOCYTES NFR BLD AUTO: 0.3 % — SIGNIFICANT CHANGE UP (ref 0–1.5)
KETONES UR-MCNC: NEGATIVE — SIGNIFICANT CHANGE UP
LEUKOCYTE ESTERASE UR-ACNC: NEGATIVE — SIGNIFICANT CHANGE UP
LYMPHOCYTES # BLD AUTO: 1.1 K/UL — SIGNIFICANT CHANGE UP (ref 1–3.3)
LYMPHOCYTES # BLD AUTO: 17.7 % — SIGNIFICANT CHANGE UP (ref 13–44)
MAGNESIUM SERPL-MCNC: 2.3 MG/DL — SIGNIFICANT CHANGE UP (ref 1.6–2.6)
MCHC RBC-ENTMCNC: 28.6 PG — SIGNIFICANT CHANGE UP (ref 27–34)
MCHC RBC-ENTMCNC: 31.8 GM/DL — LOW (ref 32–36)
MCV RBC AUTO: 89.9 FL — SIGNIFICANT CHANGE UP (ref 80–100)
MONOCYTES # BLD AUTO: 0.44 K/UL — SIGNIFICANT CHANGE UP (ref 0–0.9)
MONOCYTES NFR BLD AUTO: 7.1 % — SIGNIFICANT CHANGE UP (ref 2–14)
NEUTROPHILS # BLD AUTO: 4.64 K/UL — SIGNIFICANT CHANGE UP (ref 1.8–7.4)
NEUTROPHILS NFR BLD AUTO: 74.4 % — SIGNIFICANT CHANGE UP (ref 43–77)
NITRITE UR-MCNC: NEGATIVE — SIGNIFICANT CHANGE UP
NRBC # BLD: 0 /100 WBCS — SIGNIFICANT CHANGE UP (ref 0–0)
PH UR: 7 — SIGNIFICANT CHANGE UP (ref 5–8)
PHOSPHATE SERPL-MCNC: 3 MG/DL — SIGNIFICANT CHANGE UP (ref 2.5–4.5)
PLATELET # BLD AUTO: 155 K/UL — SIGNIFICANT CHANGE UP (ref 150–400)
POTASSIUM SERPL-MCNC: 3.5 MMOL/L — SIGNIFICANT CHANGE UP (ref 3.5–5.3)
POTASSIUM SERPL-SCNC: 3.5 MMOL/L — SIGNIFICANT CHANGE UP (ref 3.5–5.3)
PROT SERPL-MCNC: 7.4 G/DL — SIGNIFICANT CHANGE UP (ref 6–8.3)
PROT UR-MCNC: NEGATIVE — SIGNIFICANT CHANGE UP
RBC # BLD: 3.98 M/UL — SIGNIFICANT CHANGE UP (ref 3.8–5.2)
RBC # FLD: 13.6 % — SIGNIFICANT CHANGE UP (ref 10.3–14.5)
SODIUM SERPL-SCNC: 139 MMOL/L — SIGNIFICANT CHANGE UP (ref 135–145)
SP GR SPEC: 1 — LOW (ref 1.01–1.02)
UROBILINOGEN FLD QL: NEGATIVE — SIGNIFICANT CHANGE UP
WBC # BLD: 6.23 K/UL — SIGNIFICANT CHANGE UP (ref 3.8–10.5)
WBC # FLD AUTO: 6.23 K/UL — SIGNIFICANT CHANGE UP (ref 3.8–10.5)

## 2020-01-13 PROCEDURE — 93005 ELECTROCARDIOGRAM TRACING: CPT

## 2020-01-13 PROCEDURE — 70450 CT HEAD/BRAIN W/O DYE: CPT

## 2020-01-13 PROCEDURE — 71046 X-RAY EXAM CHEST 2 VIEWS: CPT | Mod: 26

## 2020-01-13 PROCEDURE — 81003 URINALYSIS AUTO W/O SCOPE: CPT

## 2020-01-13 PROCEDURE — 83735 ASSAY OF MAGNESIUM: CPT

## 2020-01-13 PROCEDURE — 80053 COMPREHEN METABOLIC PANEL: CPT

## 2020-01-13 PROCEDURE — 85027 COMPLETE CBC AUTOMATED: CPT

## 2020-01-13 PROCEDURE — 71046 X-RAY EXAM CHEST 2 VIEWS: CPT

## 2020-01-13 PROCEDURE — 84484 ASSAY OF TROPONIN QUANT: CPT

## 2020-01-13 PROCEDURE — 99284 EMERGENCY DEPT VISIT MOD MDM: CPT | Mod: 25

## 2020-01-13 PROCEDURE — 70450 CT HEAD/BRAIN W/O DYE: CPT | Mod: 26

## 2020-01-13 PROCEDURE — 93010 ELECTROCARDIOGRAM REPORT: CPT

## 2020-01-13 PROCEDURE — 84100 ASSAY OF PHOSPHORUS: CPT

## 2020-01-13 PROCEDURE — 99284 EMERGENCY DEPT VISIT MOD MDM: CPT

## 2020-01-13 RX ORDER — ACETAMINOPHEN 500 MG
650 TABLET ORAL ONCE
Refills: 0 | Status: COMPLETED | OUTPATIENT
Start: 2020-01-13 | End: 2020-01-13

## 2020-01-13 NOTE — ED PROVIDER NOTE - PATIENT PORTAL LINK FT
You can access the FollowMyHealth Patient Portal offered by [facility] by registering at the following website: [email or web address]. By joining [employer]’s FollowMyHealth portal, you will also be able to view your health information using other applications (apps) compatible with our system.

## 2020-01-13 NOTE — ED ADULT NURSE NOTE - OBJECTIVE STATEMENT
Patient is a 44 y/e female c/o CP, SOB, dizziness, vertigo, and numbness of headache. States about 1 hour before arrival to ED she left a sharp chest pain radiating to her left arm. States after this she vomited twice. Currently nauseas with slight tingling of left arm. States she was seen at [facility] ED yesterday for similar complaints and was discharged with no significant findings. States she has blood clotting and autoimmune diseases. Patient refused/delayed IV access until seeing MD FLAKO notified. States 2 days ago she had dizziness/vertigo with numbness in the back of her head. States a few weeks ago she had a ruptured ovarian cyst and was told to stop taking her blood thinners. Pt. appears anxious. Denies diarrhea, constipation, cough, fever, chills, headache. A&Ox3. Breathing unlabored and spontaneous. Abdomen soft, nontender and nondistended. Full ROM and strength of extremities. Skin dry and intact.

## 2020-01-13 NOTE — ED PROVIDER NOTE - ATTENDING CONTRIBUTION TO CARE
44yr F hx of prot S def now off rivaroxaban due to hem cyst last year, POTS, HTN p/w tingling of ext, left arm numbness and chest tightness. pt reports symptoms for several days, seen at another ED and had 4 ext DVT which was negative, but did not some prominent cervical lymph nodes. denies sob, denies fever chills, reports constant abd pain, but unchanged from her baseline. reports feeling anxious due to her history. no leg swelling.  exam notable for a visibly anxious emaciated young female , neuro exam notable for paresthesia bilat lower jaw, bilat hands, and bilat feet. motor intact, CN intact. chest clear, S1-2, gen mild ttp over abd, no rebound, no CVAT, no peripheral edema.  Pt is insistent to had blood work and CT of neck and head performed. We had a lengthy discussion about the symptoms of bleeding intracranially, pinched nerves in neck and pulmonary embolism. I explained to her that given her co morbid conditions, a d dimer would not be differentiating and may require CTPE however she says she is allergic to die and prefer not to get CT. I explained to her given her ekg and vital signs, concern for massive PE is low. we agreed on blood work including troponin, and head CT.

## 2020-01-13 NOTE — ED ADULT NURSE REASSESSMENT NOTE - NS ED NURSE REASSESS COMMENT FT1
Patient is resting in bed. VSS/NAD. Patient ready to be discharged.
Patient resting in bed. VSS/NAD. Pt. is anxious. Pending dispo.

## 2020-01-13 NOTE — ED PROVIDER NOTE - PHYSICAL EXAMINATION
General: well appearing, interactive, well nourished, no apparent distress, ncat  HEENT: EOMI, PERRLA, normal mucosa, normal oropharynx, no lesions on the lips or on oral mucosa, normal external ear  Neck: supple, no lymphadenopathy, full range of motion, no nuchal rigidity  CV: RRR, normal S1 and S2 with no murmur, capillary refill less than two seconds, pp 2+  Resp: lungs CTA b/l, good aeration bilaterally, symmetric chest wall   Abd: non-distended, soft, non-tender  : no CVA tenderness  MSK: full range of motion, no cyanosis, no edema, no clubbing, no immobility  Neuro: CN II-XII grossly intact, muscle strength 5/5 in all extremities, normal gait  Skin: no rashes, skin intact

## 2020-01-13 NOTE — ED PROVIDER NOTE - OBJECTIVE STATEMENT
43 yo f pmh  Anxiety, Anemia, DVT, ITP, HTN, IBS, POTS, protein S deficiency, pw cp. pt reports recently DC xarelto due to hemorrhagic cyst rupture causing drop in h/h. pt reports intermittent paresthesias and vertiginous sx over last week, prompting eval 43 yo f pmh  Anxiety, Anemia, DVT, ITP, HTN, IBS, POTS, protein S deficiency, pw cp. pt reports recently DC xarelto due to hemorrhagic cyst rupture causing drop in h/h. pt reports intermittent paresthesias and vertiginous sx over last week, prompting eval at [facility] yesterday where they discovered "left cervical LAD" on US. today pt reports acute onset left sided cp radiating to side, 9/10, sharp, stabbing, no known exacerbating/remitting factors. no prior hx. reports mild sob. denies trauma. denies f/c.

## 2020-01-13 NOTE — ED PROVIDER NOTE - PRINCIPAL DIAGNOSIS
Late Entry    Spiritual Care Visit, initial visit.  visited with patient and conversed with him about his hospitalization. Affirmed patient's eliana and prayed with patient for healing.     Visit by Aung Kulkarni M.Ed., Th.B. ,Staff  Chest pain

## 2020-01-13 NOTE — ED PROVIDER NOTE - PROGRESS NOTE DETAILS
Patient reassessed, NAD, non-toxic appearing. results dw pt, questions answered. pt distressed at trop of 9 because her troponin is "always <6". informed of necessity of repeat troponin. case dw pts hem Dr. Kapadia who states platelets elevation likely secondary to reactive thrombocytosis and no further workup regarding hem needed at this time.  - Sha Ruiz D.O. PGY2 Patient reassessed, NAD, non-toxic appearing. results dw pt at length, questions answered. trop <6 on repeat. pt has made fu w/ pcp on thursday.   - Sha Ruiz D.O. PGY2

## 2020-01-13 NOTE — ED PROVIDER NOTE - NS ED ROS FT
GENERAL: No fever or chills, //             EYES: no change in vision, //             HEENT: no trouble swallowing or speaking, //             CARDIAC:  chest pain, //              PULMONARY: no cough or SOB, //             GI: no abdominal pain, no nausea or no vomiting, no diarrhea or constipation, //             : No changes in urination,  //            SKIN: no rashes,  //            NEURO: parethesias,  //             MSK: No joint pain otherwise as HPI or negative. ~Sha Ruiz DO PGY2

## 2020-01-13 NOTE — ED ADULT TRIAGE NOTE - CHIEF COMPLAINT QUOTE
chest pain started 30 minutes ago going to back and neck going down the left arm; +nausea and vomiting

## 2020-01-13 NOTE — ED PROVIDER NOTE - NSFOLLOWUPCLINICS_GEN_ALL_ED_FT
[facility]  Neurology  [address] [place] [address] [address] [place] [address]  Phone: [phone]  Fax:   Follow Up Time:

## 2020-01-21 ENCOUNTER — RESULT REVIEW (OUTPATIENT)
Age: 45
End: 2020-01-21

## 2020-01-21 ENCOUNTER — APPOINTMENT (OUTPATIENT)
Dept: HEMATOLOGY ONCOLOGY | Facility: CLINIC | Age: 45
End: 2020-01-21

## 2020-01-21 LAB
BASOPHILS # BLD AUTO: 0 K/UL — SIGNIFICANT CHANGE UP (ref 0–0.2)
BASOPHILS NFR BLD AUTO: 0.7 % — SIGNIFICANT CHANGE UP (ref 0–2)
EOSINOPHIL # BLD AUTO: 0 K/UL — SIGNIFICANT CHANGE UP (ref 0–0.5)
EOSINOPHIL NFR BLD AUTO: 0.5 % — SIGNIFICANT CHANGE UP (ref 0–6)
HCT VFR BLD CALC: 37.5 % — SIGNIFICANT CHANGE UP (ref 34.5–45)
HGB BLD-MCNC: 12.7 G/DL — SIGNIFICANT CHANGE UP (ref 11.5–15.5)
LYMPHOCYTES # BLD AUTO: 1.1 K/UL — SIGNIFICANT CHANGE UP (ref 1–3.3)
LYMPHOCYTES # BLD AUTO: 22.2 % — SIGNIFICANT CHANGE UP (ref 13–44)
MCHC RBC-ENTMCNC: 30.2 PG — SIGNIFICANT CHANGE UP (ref 27–34)
MCHC RBC-ENTMCNC: 33.8 G/DL — SIGNIFICANT CHANGE UP (ref 32–36)
MCV RBC AUTO: 89.2 FL — SIGNIFICANT CHANGE UP (ref 80–100)
MONOCYTES # BLD AUTO: 0.4 K/UL — SIGNIFICANT CHANGE UP (ref 0–0.9)
MONOCYTES NFR BLD AUTO: 8.3 % — SIGNIFICANT CHANGE UP (ref 2–14)
NEUTROPHILS # BLD AUTO: 3.4 K/UL — SIGNIFICANT CHANGE UP (ref 1.8–7.4)
NEUTROPHILS NFR BLD AUTO: 68.4 % — SIGNIFICANT CHANGE UP (ref 43–77)
PLATELET # BLD AUTO: 81 K/UL — SIGNIFICANT CHANGE UP (ref 150–400)
RBC # BLD: 4.2 M/UL — SIGNIFICANT CHANGE UP (ref 3.8–5.2)
RBC # FLD: 11.6 % — SIGNIFICANT CHANGE UP (ref 10.3–14.5)
WBC # BLD: 4.9 K/UL — SIGNIFICANT CHANGE UP (ref 3.8–10.5)
WBC # FLD AUTO: 4.9 K/UL — SIGNIFICANT CHANGE UP (ref 3.8–10.5)

## 2020-01-22 ENCOUNTER — TRANSCRIPTION ENCOUNTER (OUTPATIENT)
Age: 45
End: 2020-01-22

## 2020-01-25 LAB
DEPRECATED D DIMER PPP IA-ACNC: <150 NG/ML DDU
INR PPP: 0.96 RATIO
PT BLD: 11 SEC

## 2020-01-27 ENCOUNTER — RESULT REVIEW (OUTPATIENT)
Age: 45
End: 2020-01-27

## 2020-01-27 ENCOUNTER — APPOINTMENT (OUTPATIENT)
Dept: HEMATOLOGY ONCOLOGY | Facility: CLINIC | Age: 45
End: 2020-01-27

## 2020-01-27 LAB
BASOPHILS # BLD AUTO: 0 K/UL — SIGNIFICANT CHANGE UP (ref 0–0.2)
BASOPHILS NFR BLD AUTO: 0.3 % — SIGNIFICANT CHANGE UP (ref 0–2)
EOSINOPHIL # BLD AUTO: 0 K/UL — SIGNIFICANT CHANGE UP (ref 0–0.5)
EOSINOPHIL NFR BLD AUTO: 0.8 % — SIGNIFICANT CHANGE UP (ref 0–6)
HCT VFR BLD CALC: 41.2 % — SIGNIFICANT CHANGE UP (ref 34.5–45)
HGB BLD-MCNC: 13.4 G/DL — SIGNIFICANT CHANGE UP (ref 11.5–15.5)
LYMPHOCYTES # BLD AUTO: 1.4 K/UL — SIGNIFICANT CHANGE UP (ref 1–3.3)
LYMPHOCYTES # BLD AUTO: 21.7 % — SIGNIFICANT CHANGE UP (ref 13–44)
MCHC RBC-ENTMCNC: 29.2 PG — SIGNIFICANT CHANGE UP (ref 27–34)
MCHC RBC-ENTMCNC: 32.6 G/DL — SIGNIFICANT CHANGE UP (ref 32–36)
MCV RBC AUTO: 89.7 FL — SIGNIFICANT CHANGE UP (ref 80–100)
MONOCYTES # BLD AUTO: 0.4 K/UL — SIGNIFICANT CHANGE UP (ref 0–0.9)
MONOCYTES NFR BLD AUTO: 7.1 % — SIGNIFICANT CHANGE UP (ref 2–14)
NEUTROPHILS # BLD AUTO: 4.4 K/UL — SIGNIFICANT CHANGE UP (ref 1.8–7.4)
NEUTROPHILS NFR BLD AUTO: 70.1 % — SIGNIFICANT CHANGE UP (ref 43–77)
PLATELET # BLD AUTO: 54 K/UL — LOW (ref 150–400)
RBC # BLD: 4.6 M/UL — SIGNIFICANT CHANGE UP (ref 3.8–5.2)
RBC # FLD: 11.9 % — SIGNIFICANT CHANGE UP (ref 10.3–14.5)
WBC # BLD: 6.3 K/UL — SIGNIFICANT CHANGE UP (ref 3.8–10.5)
WBC # FLD AUTO: 6.3 K/UL — SIGNIFICANT CHANGE UP (ref 3.8–10.5)

## 2020-01-28 ENCOUNTER — OUTPATIENT (OUTPATIENT)
Dept: OUTPATIENT SERVICES | Facility: HOSPITAL | Age: 45
LOS: 1 days | Discharge: HOME | End: 2020-01-28

## 2020-01-28 DIAGNOSIS — D47.3 ESSENTIAL (HEMORRHAGIC) THROMBOCYTHEMIA: ICD-10-CM

## 2020-01-28 DIAGNOSIS — Z90.49 ACQUIRED ABSENCE OF OTHER SPECIFIED PARTS OF DIGESTIVE TRACT: Chronic | ICD-10-CM

## 2020-01-31 ENCOUNTER — APPOINTMENT (OUTPATIENT)
Dept: HEMATOLOGY ONCOLOGY | Facility: CLINIC | Age: 45
End: 2020-01-31

## 2020-01-31 NOTE — PROGRESS NOTE BEHAVIORAL HEALTH - PERCEPTIONS
Patient/Caregiver provided printed discharge information.
No abnormalities

## 2020-02-02 ENCOUNTER — EMERGENCY (EMERGENCY)
Facility: HOSPITAL | Age: 45
LOS: 1 days | Discharge: HOME | End: 2020-02-02
Attending: EMERGENCY MEDICINE | Admitting: EMERGENCY MEDICINE
Payer: COMMERCIAL

## 2020-02-02 VITALS
HEART RATE: 90 BPM | RESPIRATION RATE: 18 BRPM | DIASTOLIC BLOOD PRESSURE: 90 MMHG | SYSTOLIC BLOOD PRESSURE: 130 MMHG | TEMPERATURE: 98 F | WEIGHT: 98.11 LBS | OXYGEN SATURATION: 99 %

## 2020-02-02 DIAGNOSIS — R10.9 UNSPECIFIED ABDOMINAL PAIN: ICD-10-CM

## 2020-02-02 DIAGNOSIS — Z90.49 ACQUIRED ABSENCE OF OTHER SPECIFIED PARTS OF DIGESTIVE TRACT: Chronic | ICD-10-CM

## 2020-02-02 LAB
ALBUMIN SERPL ELPH-MCNC: 4.2 G/DL — SIGNIFICANT CHANGE UP (ref 3.3–5)
ALP SERPL-CCNC: 97 U/L — SIGNIFICANT CHANGE UP (ref 40–120)
ALT FLD-CCNC: 25 U/L — SIGNIFICANT CHANGE UP (ref 10–45)
ANION GAP SERPL CALC-SCNC: 8 MMOL/L — SIGNIFICANT CHANGE UP (ref 5–17)
APTT BLD: 23.8 SEC — LOW (ref 27.5–36.3)
AST SERPL-CCNC: 22 U/L — SIGNIFICANT CHANGE UP (ref 10–40)
BASOPHILS # BLD AUTO: 0.02 K/UL — SIGNIFICANT CHANGE UP (ref 0–0.2)
BASOPHILS NFR BLD AUTO: 0.2 % — SIGNIFICANT CHANGE UP (ref 0–2)
BILIRUB SERPL-MCNC: 0.4 MG/DL — SIGNIFICANT CHANGE UP (ref 0.2–1.2)
BLD GP AB SCN SERPL QL: SIGNIFICANT CHANGE UP
BUN SERPL-MCNC: 9 MG/DL — SIGNIFICANT CHANGE UP (ref 7–23)
CALCIUM SERPL-MCNC: 8.7 MG/DL — SIGNIFICANT CHANGE UP (ref 8.4–10.5)
CHLORIDE SERPL-SCNC: 106 MMOL/L — SIGNIFICANT CHANGE UP (ref 96–108)
CO2 SERPL-SCNC: 27 MMOL/L — SIGNIFICANT CHANGE UP (ref 22–31)
CREAT SERPL-MCNC: 0.73 MG/DL — SIGNIFICANT CHANGE UP (ref 0.5–1.3)
EOSINOPHIL # BLD AUTO: 0.09 K/UL — SIGNIFICANT CHANGE UP (ref 0–0.5)
EOSINOPHIL NFR BLD AUTO: 0.9 % — SIGNIFICANT CHANGE UP (ref 0–6)
GLUCOSE SERPL-MCNC: 80 MG/DL — SIGNIFICANT CHANGE UP (ref 70–99)
HCT VFR BLD CALC: 39.5 % — SIGNIFICANT CHANGE UP (ref 34.5–45)
HGB BLD-MCNC: 13 G/DL — SIGNIFICANT CHANGE UP (ref 11.5–15.5)
IMM GRANULOCYTES NFR BLD AUTO: 0.4 % — SIGNIFICANT CHANGE UP (ref 0–1.5)
INR BLD: 1.03 RATIO — SIGNIFICANT CHANGE UP (ref 0.88–1.16)
LIDOCAIN IGE QN: 167 U/L — SIGNIFICANT CHANGE UP (ref 73–393)
LYMPHOCYTES # BLD AUTO: 1.78 K/UL — SIGNIFICANT CHANGE UP (ref 1–3.3)
LYMPHOCYTES # BLD AUTO: 17.6 % — SIGNIFICANT CHANGE UP (ref 13–44)
MCHC RBC-ENTMCNC: 29.6 PG — SIGNIFICANT CHANGE UP (ref 27–34)
MCHC RBC-ENTMCNC: 32.9 GM/DL — SIGNIFICANT CHANGE UP (ref 32–36)
MCV RBC AUTO: 90 FL — SIGNIFICANT CHANGE UP (ref 80–100)
MONOCYTES # BLD AUTO: 0.81 K/UL — SIGNIFICANT CHANGE UP (ref 0–0.9)
MONOCYTES NFR BLD AUTO: 8 % — SIGNIFICANT CHANGE UP (ref 2–14)
NEUTROPHILS # BLD AUTO: 7.37 K/UL — SIGNIFICANT CHANGE UP (ref 1.8–7.4)
NEUTROPHILS NFR BLD AUTO: 72.9 % — SIGNIFICANT CHANGE UP (ref 43–77)
NRBC # BLD: 0 /100 WBCS — SIGNIFICANT CHANGE UP (ref 0–0)
PLATELET # BLD AUTO: 64 K/UL — LOW (ref 150–400)
POTASSIUM SERPL-MCNC: 4.2 MMOL/L — SIGNIFICANT CHANGE UP (ref 3.5–5.3)
POTASSIUM SERPL-SCNC: 4.2 MMOL/L — SIGNIFICANT CHANGE UP (ref 3.5–5.3)
PROT SERPL-MCNC: 7.6 G/DL — SIGNIFICANT CHANGE UP (ref 6–8.3)
PROTHROM AB SERPL-ACNC: 11.6 SEC — SIGNIFICANT CHANGE UP (ref 10–12.9)
RBC # BLD: 4.39 M/UL — SIGNIFICANT CHANGE UP (ref 3.8–5.2)
RBC # FLD: 12.5 % — SIGNIFICANT CHANGE UP (ref 10.3–14.5)
SODIUM SERPL-SCNC: 141 MMOL/L — SIGNIFICANT CHANGE UP (ref 135–145)
WBC # BLD: 10.11 K/UL — SIGNIFICANT CHANGE UP (ref 3.8–10.5)
WBC # FLD AUTO: 10.11 K/UL — SIGNIFICANT CHANGE UP (ref 3.8–10.5)

## 2020-02-02 PROCEDURE — 76830 TRANSVAGINAL US NON-OB: CPT

## 2020-02-02 PROCEDURE — 74176 CT ABD & PELVIS W/O CONTRAST: CPT | Mod: 26

## 2020-02-02 PROCEDURE — 99284 EMERGENCY DEPT VISIT MOD MDM: CPT | Mod: 25

## 2020-02-02 PROCEDURE — 86901 BLOOD TYPING SEROLOGIC RH(D): CPT

## 2020-02-02 PROCEDURE — 86900 BLOOD TYPING SEROLOGIC ABO: CPT

## 2020-02-02 PROCEDURE — 99285 EMERGENCY DEPT VISIT HI MDM: CPT

## 2020-02-02 PROCEDURE — 80053 COMPREHEN METABOLIC PANEL: CPT

## 2020-02-02 PROCEDURE — 76856 US EXAM PELVIC COMPLETE: CPT | Mod: 26

## 2020-02-02 PROCEDURE — 85730 THROMBOPLASTIN TIME PARTIAL: CPT

## 2020-02-02 PROCEDURE — 81025 URINE PREGNANCY TEST: CPT

## 2020-02-02 PROCEDURE — 85027 COMPLETE CBC AUTOMATED: CPT

## 2020-02-02 PROCEDURE — 36415 COLL VENOUS BLD VENIPUNCTURE: CPT

## 2020-02-02 PROCEDURE — 76856 US EXAM PELVIC COMPLETE: CPT

## 2020-02-02 PROCEDURE — 86850 RBC ANTIBODY SCREEN: CPT

## 2020-02-02 PROCEDURE — 85610 PROTHROMBIN TIME: CPT

## 2020-02-02 PROCEDURE — 83690 ASSAY OF LIPASE: CPT

## 2020-02-02 PROCEDURE — 76830 TRANSVAGINAL US NON-OB: CPT | Mod: 26

## 2020-02-02 PROCEDURE — 74176 CT ABD & PELVIS W/O CONTRAST: CPT

## 2020-02-02 NOTE — ED ADULT NURSE NOTE - OBJECTIVE STATEMENT
Pt presents to ED from home for lower abdominal pain. Pt states she has a history of ovarian cysts and feels as though one might have ruptured. Abdomen distended. Denies fever. Pt presents to ED from home for lower abdominal pain. Pt states she has a history of ovarian cysts and feels as though one might have ruptured. Abdomen distended. Denies fever.  Pt refusing pain medications at this time.

## 2020-02-02 NOTE — ED PROVIDER NOTE - CLINICAL SUMMARY MEDICAL DECISION MAKING FREE TEXT BOX
Dr. Lawrence: 44Fh/o left neck DVT (no longer on Xarelto due to ruptured ovarian cyst in the past), ITP (not on steroids), p/w sudden onset LLQ pain yesterday that feels like a ruptured cyst. Denies vag bleeding, fevers, chills, nausea, vomiting. On exam pt extremely anxious, declining IV (despite explaining the need for pain meds, fluids, potential blood transfusion) and states only 1 person may attempt a butterfly using a 25 gauge needle only. Pt states she needs a non con CT a/p and TVUS/abdo US. RRR, CTAB, abdo soft with bilateral lower abdo ttp, no CVAT. Will check labs, CT, US, reassess Dr. Lawrence: 44Fh/o left neck DVT (no longer on Xarelto due to ruptured ovarian cyst in the past), ITP (not on steroids), p/w sudden onset LLQ pain yesterday that feels like a ruptured cyst. Denies vag bleeding, fevers, chills, nausea, vomiting. On exam pt extremely anxious, declining IV (despite explaining the need for pain meds, fluids, potential blood transfusion) and states only 1 person may attempt a butterfly using a 25 gauge needle only. Pt states she needs a non con CT a/p and TVUS/abdo US. RRR, CTAB, abdo soft with bilateral lower abdo ttp, no CVAT. Will check labs, CT, US, reassess  On reassessment pt doing well, H/H unchanged, platelets improved since last visit (50s), d/w Dr. Blanco, pt is stable for DC after US. Dr. Lawrence: 44Fh/o left neck DVT (no longer on Xarelto due to ruptured ovarian cyst in the past), ITP (not on steroids), p/w sudden onset LLQ pain yesterday that feels like a ruptured cyst. Denies vag bleeding, fevers, chills, nausea, vomiting. On exam pt extremely anxious, declining IV (despite explaining the need for pain meds, fluids, potential blood transfusion) and states only 1 person may attempt a butterfly using a 25 gauge needle only. Pt states she needs a non con CT a/p and TVUS/abdo US. RRR, CTAB, abdo soft with bilateral lower abdo ttp, no CVAT. Will check labs, CT, US, reassess  On reassessment pt doing well, H/H unchanged, platelets improved since last visit (50s), d/w Dr. Blanco, pt is stable for DC after US.  Pt's father (Dr. Presley) at bedside, who is director of obgyn at [employer]. Agrees with DC now, does not want to wait for US, agrees does not  at this time.

## 2020-02-02 NOTE — ED PROVIDER NOTE - PATIENT PORTAL LINK FT
You can access the FollowMyHealth Patient Portal offered by [facility] by registering at the following website: [email or web address]. By joining [employer]’s FollowMyHealth portal, you will also be able to view your health information using other applications (apps) compatible with our system.

## 2020-02-02 NOTE — ED ADULT NURSE NOTE - NSIMPLEMENTINTERV_GEN_ALL_ED
Implemented All Universal Safety Interventions:  [place] to call system. Call bell, personal items and telephone within reach. Instruct patient to call for assistance. Room bathroom lighting operational. Non-slip footwear when patient is off stretcher. Physically safe environment: no spills, clutter or unnecessary equipment. Stretcher in lowest position, wheels locked, appropriate side rails in place.

## 2020-02-02 NOTE — ED PROVIDER NOTE - NSFOLLOWUPINSTRUCTIONS_ED_ALL_ED_FT
Ruptured Ovarian Cyst    AMBULATORY CARE:    A ruptured ovarian cyst is a cyst that breaks open. A cyst is a sac that grows on an ovary. This sac usually contains fluid, but may sometimes have blood or tissue in it. A large cyst that ruptures may lead to problems that need immediate care.    Signs and symptoms of a ruptured ovarian cyst: You may have no signs or symptoms, or you may have any of the following:     Pain that can range from mild to severe or be mild at first but become severe quickly       Sudden, sharp, or stabbing pain that happens on one side       Pain that starts during activity or sex, or that gets worse when you move       Tenderness in the area of your ovary       A low fever       Nausea, vomiting, or dizziness    Call 911 for any of the following:     You are too weak or dizzy to stand up.        Seek care immediately if:     You have severe pain in your pelvis or in your abdomen.       You have pain along with a fever, nausea, or vomiting.       You have signs of shock from blood loss, such as dizziness, cold or clammy skin, or fast breathing.    Contact your healthcare provider if:     You notice changes in your monthly periods, or you begin to have nausea or vomiting with your periods.      You have new or worsening symptoms.      Your pain does not get better with pain medicine.      You have pain during sex.      You have bleeding from your vagina that is not your period.      Your abdomen is swollen, or you have a full or heavy feeling in your lower abdomen.      You have trouble urinating.      You have questions or concerns about your condition or care.    Treatment depends on your age, the size of the cyst, and if it caused problems that need treatment. Treatment may not be needed if the cyst was small or your body absorbed the fluid that came out of the cyst when it ruptured.     NSAIDs, such as ibuprofen, help decrease swelling, pain, and fever. This medicine is available with or without a doctor's order. NSAIDs can cause stomach bleeding or kidney problems in certain people. If you take blood thinner medicine, always ask if NSAIDs are safe for you. Always read the medicine label and follow directions. Do not give these medicines to children under 6 months of age without direction from your child's healthcare provider.      Prescription pain medicine may be given. Ask your healthcare provider how to take this medicine safely. Some prescription pain medicines contain acetaminophen. Do not take other medicines that contain acetaminophen without talking to your healthcare provider. Too much acetaminophen may cause liver damage. Prescription pain medicine may cause constipation. Ask your healthcare provider how to prevent or treat constipation.       Antibiotics may be needed to prevent or fight an infection caused by bacteria.       Surgery may be needed to remove fluid or blood in the area of the ruptured cyst. The outside of the ruptured cyst may also need to be removed.     Manage or prevent a ruptured ovarian cyst:     Apply heat where you have pain, as directed. Heat can help relieve mild pain. Use a heating pad (set on low) or hot water bottle. Wrap the pad or bottle in a towel before you apply it to your skin. Apply heat for 20 minutes every hour, or as directed. A warm bath may also help relieve the pain.      Ask when to come in for a follow-up examination. You may need another ultrasound 6 weeks after your cyst was treated. This will help make sure the cyst is no longer growing or causing health problems. You may also need ultrasound tests for 2 or 3 monthly periods to see how hormones affect your ovaries.      Ask about birth control pills. These may help reduce your risk for cysts. Ask your healthcare provider if birth control pills are right for you. The risk for a blood clot is higher if you take birth control pills, especially if you are older than 35 or smoke.       Have a pelvic exam every year. This may also be called a well woman visit. The exam will include a Pap smear to check for certain cancers. Your healthcare provider will also press on your abdomen to check for lumps or other problems. A pelvic exam can help find problems early. This makes treatment easier and more effective. Tell your healthcare provider if you notice any changes in your monthly periods. Examples include periods that start on a different day than usual, or are lighter or heavier than usual. Tell your provider if you have worse pain than usual, or if the pain is different than you had before.    Follow up with your healthcare provider as directed: Write down your questions so you remember to ask them during your visits.

## 2020-02-02 NOTE — ED PROVIDER NOTE - CONSULTANT FREE TEXT FOR MDM DISCUSSED CASE WITH QUESTION
Dr. Blanco, case d/w with him, he agrees that since her h/h is wnl , patient hemodynamically stable she  can f/u with her outpatient GYN

## 2020-02-02 NOTE — ED PROVIDER NOTE - OBJECTIVE STATEMENT
43 y/o F with PMH of DVT (no longer on xarelto), ruptured right ovarian cyst 12/28/19, ITP presents for LLQ abd pain x yesterday after trying to have a BM. Reports similar symptoms when her right ovary ruptured so she became concerned. She denies n/v/d, vaginal bleeding, CP, SOB, urinary symptoms or all other complaints

## 2020-02-02 NOTE — ED ADULT NURSE REASSESSMENT NOTE - NS ED NURSE REASSESS COMMENT FT1
Pt refusing RN to obtain repeat CBC, only wants a phlebotomist. Lab called and phlebotomist at bedside.

## 2020-02-02 NOTE — ED PROVIDER NOTE - ATTENDING CONTRIBUTION TO CARE
Dr. Lawrence: I performed a face to face bedside interview with patient regarding history of present illness, review of symptoms and past medical history. I completed an independent physical exam.  I have discussed patient's plan of care with PA.   I agree with note as stated above, having amended the EMR as needed to reflect my findings.   This includes HISTORY OF PRESENT ILLNESS, HIV, PAST MEDICAL/SURGICAL/FAMILY/SOCIAL HISTORY, ALLERGIES AND HOME MEDICATIONS, REVIEW OF SYSTEMS, PHYSICAL EXAM, and any PROGRESS NOTES during the time I functioned as the attending physician for this patient.    see mdm

## 2020-02-03 ENCOUNTER — RESULT REVIEW (OUTPATIENT)
Age: 45
End: 2020-02-03

## 2020-02-03 ENCOUNTER — APPOINTMENT (OUTPATIENT)
Dept: HEMATOLOGY ONCOLOGY | Facility: CLINIC | Age: 45
End: 2020-02-03

## 2020-02-03 LAB
BASOPHILS # BLD AUTO: 0 K/UL — SIGNIFICANT CHANGE UP (ref 0–0.2)
BASOPHILS NFR BLD AUTO: 0.2 % — SIGNIFICANT CHANGE UP (ref 0–2)
EOSINOPHIL # BLD AUTO: 0 K/UL — SIGNIFICANT CHANGE UP (ref 0–0.5)
EOSINOPHIL NFR BLD AUTO: 0.7 % — SIGNIFICANT CHANGE UP (ref 0–6)
HCT VFR BLD CALC: 40.5 % — SIGNIFICANT CHANGE UP (ref 34.5–45)
HGB BLD-MCNC: 13.5 G/DL — SIGNIFICANT CHANGE UP (ref 11.5–15.5)
LYMPHOCYTES # BLD AUTO: 1.2 K/UL — SIGNIFICANT CHANGE UP (ref 1–3.3)
LYMPHOCYTES # BLD AUTO: 21.8 % — SIGNIFICANT CHANGE UP (ref 13–44)
MCHC RBC-ENTMCNC: 29.5 PG — SIGNIFICANT CHANGE UP (ref 27–34)
MCHC RBC-ENTMCNC: 33.3 G/DL — SIGNIFICANT CHANGE UP (ref 32–36)
MCV RBC AUTO: 88.7 FL — SIGNIFICANT CHANGE UP (ref 80–100)
MONOCYTES # BLD AUTO: 0.5 K/UL — SIGNIFICANT CHANGE UP (ref 0–0.9)
MONOCYTES NFR BLD AUTO: 8.3 % — SIGNIFICANT CHANGE UP (ref 2–14)
NEUTROPHILS # BLD AUTO: 3.9 K/UL — SIGNIFICANT CHANGE UP (ref 1.8–7.4)
NEUTROPHILS NFR BLD AUTO: 68.9 % — SIGNIFICANT CHANGE UP (ref 43–77)
PLATELET # BLD AUTO: 47 K/UL — LOW (ref 150–400)
RBC # BLD: 4.56 M/UL — SIGNIFICANT CHANGE UP (ref 3.8–5.2)
RBC # FLD: 11.8 % — SIGNIFICANT CHANGE UP (ref 10.3–14.5)
WBC # BLD: 5.6 K/UL — SIGNIFICANT CHANGE UP (ref 3.8–10.5)
WBC # FLD AUTO: 5.6 K/UL — SIGNIFICANT CHANGE UP (ref 3.8–10.5)

## 2020-02-04 LAB
M TB IFN-G BLD-IMP: NEGATIVE
QUANTIFERON TB PLUS MITOGEN MINUS NIL: >10 IU/ML
QUANTIFERON TB PLUS NIL: 0.03 IU/ML
QUANTIFERON TB PLUS TB1 MINUS NIL: 0.04 IU/ML
QUANTIFERON TB PLUS TB2 MINUS NIL: 0.01 IU/ML

## 2020-02-06 ENCOUNTER — APPOINTMENT (OUTPATIENT)
Dept: HEMATOLOGY ONCOLOGY | Facility: CLINIC | Age: 45
End: 2020-02-06

## 2020-02-06 ENCOUNTER — RESULT REVIEW (OUTPATIENT)
Age: 45
End: 2020-02-06

## 2020-02-06 LAB
BASOPHILS # BLD AUTO: 0 K/UL — SIGNIFICANT CHANGE UP (ref 0–0.2)
BASOPHILS NFR BLD AUTO: 0.7 % — SIGNIFICANT CHANGE UP (ref 0–2)
EOSINOPHIL # BLD AUTO: 0.1 K/UL — SIGNIFICANT CHANGE UP (ref 0–0.5)
EOSINOPHIL NFR BLD AUTO: 1.1 % — SIGNIFICANT CHANGE UP (ref 0–6)
HCT VFR BLD CALC: 40.7 % — SIGNIFICANT CHANGE UP (ref 34.5–45)
HGB BLD-MCNC: 13.6 G/DL — SIGNIFICANT CHANGE UP (ref 11.5–15.5)
LYMPHOCYTES # BLD AUTO: 1.2 K/UL — SIGNIFICANT CHANGE UP (ref 1–3.3)
LYMPHOCYTES # BLD AUTO: 22.7 % — SIGNIFICANT CHANGE UP (ref 13–44)
MCHC RBC-ENTMCNC: 29.7 PG — SIGNIFICANT CHANGE UP (ref 27–34)
MCHC RBC-ENTMCNC: 33.3 G/DL — SIGNIFICANT CHANGE UP (ref 32–36)
MCV RBC AUTO: 89 FL — SIGNIFICANT CHANGE UP (ref 80–100)
MONOCYTES # BLD AUTO: 0.4 K/UL — SIGNIFICANT CHANGE UP (ref 0–0.9)
MONOCYTES NFR BLD AUTO: 7.1 % — SIGNIFICANT CHANGE UP (ref 2–14)
NEUTROPHILS # BLD AUTO: 3.7 K/UL — SIGNIFICANT CHANGE UP (ref 1.8–7.4)
NEUTROPHILS NFR BLD AUTO: 68.5 % — SIGNIFICANT CHANGE UP (ref 43–77)
PLATELET # BLD AUTO: 49 K/UL — LOW (ref 150–400)
RBC # BLD: 4.57 M/UL — SIGNIFICANT CHANGE UP (ref 3.8–5.2)
RBC # FLD: 11.7 % — SIGNIFICANT CHANGE UP (ref 10.3–14.5)
WBC # BLD: 5.3 K/UL — SIGNIFICANT CHANGE UP (ref 3.8–10.5)
WBC # FLD AUTO: 5.3 K/UL — SIGNIFICANT CHANGE UP (ref 3.8–10.5)

## 2020-02-09 NOTE — ED ADULT TRIAGE NOTE - CHIEF COMPLAINT QUOTE
HX of Harrison disease; c/o "burning sensation" to the chest; denies sob; recently seen the the ED told everything was normal DISPLAY PLAN FREE TEXT HX of Harrison disease; c/o "burning sensation" to the chest; denies sob; recently seen the the ED told everything was normal; pt ambulatory to the bathroom without difficulty

## 2020-02-12 NOTE — ED ADULT NURSE NOTE - CAS EDP DISCH TYPE
April is scheduled for labwork on 2/13/20. There are no orders on file. Please enter orders for 2/13 visit.
No additional lab tests ordered by Dr. Licha Marie. She will have a follow up visit and ultrasound with Dr. Licha Marie 2/13/20. Lab appointment can be canceled.
Home

## 2020-02-13 ENCOUNTER — RESULT REVIEW (OUTPATIENT)
Age: 45
End: 2020-02-13

## 2020-02-13 ENCOUNTER — APPOINTMENT (OUTPATIENT)
Dept: HEMATOLOGY ONCOLOGY | Facility: CLINIC | Age: 45
End: 2020-02-13

## 2020-02-13 LAB
BASOPHILS # BLD AUTO: 0 K/UL — SIGNIFICANT CHANGE UP (ref 0–0.2)
BASOPHILS NFR BLD AUTO: 0.2 % — SIGNIFICANT CHANGE UP (ref 0–2)
EOSINOPHIL # BLD AUTO: 0 K/UL — SIGNIFICANT CHANGE UP (ref 0–0.5)
EOSINOPHIL NFR BLD AUTO: 0.6 % — SIGNIFICANT CHANGE UP (ref 0–6)
HCT VFR BLD CALC: 40.9 % — SIGNIFICANT CHANGE UP (ref 34.5–45)
HGB BLD-MCNC: 14 G/DL — SIGNIFICANT CHANGE UP (ref 11.5–15.5)
LYMPHOCYTES # BLD AUTO: 1.4 K/UL — SIGNIFICANT CHANGE UP (ref 1–3.3)
LYMPHOCYTES # BLD AUTO: 24.3 % — SIGNIFICANT CHANGE UP (ref 13–44)
MCHC RBC-ENTMCNC: 30.1 PG — SIGNIFICANT CHANGE UP (ref 27–34)
MCHC RBC-ENTMCNC: 34.3 G/DL — SIGNIFICANT CHANGE UP (ref 32–36)
MCV RBC AUTO: 87.8 FL — SIGNIFICANT CHANGE UP (ref 80–100)
MONOCYTES # BLD AUTO: 0.4 K/UL — SIGNIFICANT CHANGE UP (ref 0–0.9)
MONOCYTES NFR BLD AUTO: 7.9 % — SIGNIFICANT CHANGE UP (ref 2–14)
NEUTROPHILS # BLD AUTO: 3.8 K/UL — SIGNIFICANT CHANGE UP (ref 1.8–7.4)
NEUTROPHILS NFR BLD AUTO: 67.1 % — SIGNIFICANT CHANGE UP (ref 43–77)
PLATELET # BLD AUTO: 43 K/UL — LOW (ref 150–400)
RBC # BLD: 4.66 M/UL — SIGNIFICANT CHANGE UP (ref 3.8–5.2)
RBC # FLD: 11.7 % — SIGNIFICANT CHANGE UP (ref 10.3–14.5)
WBC # BLD: 5.7 K/UL — SIGNIFICANT CHANGE UP (ref 3.8–10.5)
WBC # FLD AUTO: 5.7 K/UL — SIGNIFICANT CHANGE UP (ref 3.8–10.5)

## 2020-02-18 ENCOUNTER — EMERGENCY (EMERGENCY)
Facility: HOSPITAL | Age: 45
LOS: 1 days | Discharge: HOME | End: 2020-02-18
Attending: EMERGENCY MEDICINE | Admitting: EMERGENCY MEDICINE
Payer: COMMERCIAL

## 2020-02-18 ENCOUNTER — APPOINTMENT (OUTPATIENT)
Dept: HEMATOLOGY ONCOLOGY | Facility: CLINIC | Age: 45
End: 2020-02-18

## 2020-02-18 VITALS
TEMPERATURE: 98 F | SYSTOLIC BLOOD PRESSURE: 128 MMHG | DIASTOLIC BLOOD PRESSURE: 74 MMHG | RESPIRATION RATE: 16 BRPM | HEART RATE: 88 BPM | OXYGEN SATURATION: 98 %

## 2020-02-18 VITALS
RESPIRATION RATE: 14 BRPM | OXYGEN SATURATION: 99 % | TEMPERATURE: 98 F | HEIGHT: 62 IN | DIASTOLIC BLOOD PRESSURE: 80 MMHG | WEIGHT: 97 LBS | SYSTOLIC BLOOD PRESSURE: 130 MMHG | HEART RATE: 92 BPM

## 2020-02-18 DIAGNOSIS — Z90.49 ACQUIRED ABSENCE OF OTHER SPECIFIED PARTS OF DIGESTIVE TRACT: Chronic | ICD-10-CM

## 2020-02-18 PROBLEM — N83.209 UNSPECIFIED OVARIAN CYST, UNSPECIFIED SIDE: Chronic | Status: ACTIVE | Noted: 2020-02-02

## 2020-02-18 PROBLEM — D69.3 IMMUNE THROMBOCYTOPENIC PURPURA: Chronic | Status: ACTIVE | Noted: 2020-02-02

## 2020-02-18 PROCEDURE — 99283 EMERGENCY DEPT VISIT LOW MDM: CPT

## 2020-02-18 PROCEDURE — 99284 EMERGENCY DEPT VISIT MOD MDM: CPT | Mod: 25

## 2020-02-18 PROCEDURE — 93970 EXTREMITY STUDY: CPT

## 2020-02-18 PROCEDURE — 93970 EXTREMITY STUDY: CPT | Mod: 26

## 2020-02-18 PROCEDURE — 73030 X-RAY EXAM OF SHOULDER: CPT

## 2020-02-18 PROCEDURE — 73030 X-RAY EXAM OF SHOULDER: CPT | Mod: 26,LT

## 2020-02-18 NOTE — ED PROVIDER NOTE - OBJECTIVE STATEMENT
43 y/o female with a PMHx of DVT (no longer on xarelto), ruptured right ovarian cyst 12/28/19, ITP presents to the ED c/o left arm pain, left third finger numbness, right arm pain. Pt states that the pain on her right arm is more diffuse. The left arm pain started with left third finger numbness, numbness started to shoot up the left arm with pain. Pt requesting an US of both UE due to her hx of DVT. Pt also presents with a contusion on her left shoulder and bruising on left arm. Pt also with newly developed thrombocytopenia. States her last platelet levels were in the 40s, which is not baseline. 45 y/o female with a PMHx of DVT (no longer on xarelto), ruptured right ovarian cyst 12/28/19, ITP presents to the ED c/o left arm pain, left third finger numbness, right arm pain. Pt states that the pain on her right arm is more diffuse. The left arm pain started with left third finger numbness, numbness started to shoot up the left arm with pain. Pt requesting an US of both UE due to her hx of DVT. Pt also presents with a contusion on her left shoulder and bruising on left arm. Reports that this was sustained s/p syncopal episode. She was worked up for the syncope but not th shoulder injury. Pt also with newly developed thrombocytopenia. States her last platelet levels were in the 40s, which is not baseline.

## 2020-02-18 NOTE — ED PROVIDER NOTE - CLINICAL SUMMARY MEDICAL DECISION MAKING FREE TEXT BOX
44 year old female presenting with bilateral arm pain, not currently on anticoagulation, with multiple needle sticks and newly developed thrombocytopenia, will get US and XR of left shoulder.

## 2020-02-18 NOTE — ED ADULT NURSE NOTE - CHPI ED NUR SYMPTOMS NEG
no weakness/no numbness/no back pain/no fever/no difficulty bearing weight/no deformity/no stiffness/no tingling

## 2020-02-18 NOTE — ED PROVIDER NOTE - PATIENT PORTAL LINK FT
You can access the FollowMyHealth Patient Portal offered by [facility] by registering at the following website: [email or web address]. By joining [employer]’s FollowMyHealth portal, you will also be able to view your health information using other applications (apps) compatible with our system.

## 2020-02-18 NOTE — ED PROVIDER NOTE - NSFOLLOWUPINSTRUCTIONS_ED_ALL_ED_FT
Return to the ED for any new or worsening symptoms  Take your medication as prescribed  Follow up with your PMD as scheduled   Advance activity as tolerated  Call your hematologist tomorrow to schedule follow up

## 2020-02-18 NOTE — ED PROVIDER NOTE - PHYSICAL EXAMINATION
FROM of UE bilaterally with no swelling noted  +radial pulses bilaterally   Intact sensation to bilaterally UE on exam   no finger or hand swelling noted

## 2020-02-18 NOTE — ED PROVIDER NOTE - SKIN, MLM
Skin normal color for race, warm, dry and intact. + contusion to left shoulder, bruise to her left arm

## 2020-02-18 NOTE — ED ADULT NURSE NOTE - PMH
Anxiety    Autonomic dysreflexia    Depression    DVT (deep venous thrombosis)  left internal jugular and subclavian veins  Dysautonomia    Dystonia    Gastroparesis    Headache    History of ITP    HTN (hypertension)    IBS (irritable bowel syndrome)    Idiopathic thrombocytopenia purpura    Idiopathic thrombocytopenia purpura    Idiopathic thrombocytopenic purpura    Labyrinthitis    Ovarian cyst    POTS (postural orthostatic tachycardia syndrome)    Protein S deficiency

## 2020-02-19 ENCOUNTER — LABORATORY RESULT (OUTPATIENT)
Age: 45
End: 2020-02-19

## 2020-02-19 ENCOUNTER — RESULT REVIEW (OUTPATIENT)
Age: 45
End: 2020-02-19

## 2020-02-19 ENCOUNTER — APPOINTMENT (OUTPATIENT)
Dept: HEMATOLOGY ONCOLOGY | Facility: CLINIC | Age: 45
End: 2020-02-19
Payer: MEDICARE

## 2020-02-19 VITALS
WEIGHT: 100.53 LBS | DIASTOLIC BLOOD PRESSURE: 81 MMHG | OXYGEN SATURATION: 100 % | TEMPERATURE: 98.5 F | SYSTOLIC BLOOD PRESSURE: 128 MMHG | BODY MASS INDEX: 18.39 KG/M2 | RESPIRATION RATE: 17 BRPM | HEART RATE: 72 BPM

## 2020-02-19 LAB
BASOPHILS # BLD AUTO: 0 K/UL — SIGNIFICANT CHANGE UP (ref 0–0.2)
BASOPHILS NFR BLD AUTO: 0.4 % — SIGNIFICANT CHANGE UP (ref 0–2)
EOSINOPHIL # BLD AUTO: 0.1 K/UL — SIGNIFICANT CHANGE UP (ref 0–0.5)
EOSINOPHIL NFR BLD AUTO: 0.9 % — SIGNIFICANT CHANGE UP (ref 0–6)
HCT VFR BLD CALC: 39.2 % — SIGNIFICANT CHANGE UP (ref 34.5–45)
HGB BLD-MCNC: 13.7 G/DL — SIGNIFICANT CHANGE UP (ref 11.5–15.5)
LYMPHOCYTES # BLD AUTO: 1.8 K/UL — SIGNIFICANT CHANGE UP (ref 1–3.3)
LYMPHOCYTES # BLD AUTO: 27.2 % — SIGNIFICANT CHANGE UP (ref 13–44)
MCHC RBC-ENTMCNC: 30.8 PG — SIGNIFICANT CHANGE UP (ref 27–34)
MCHC RBC-ENTMCNC: 34.8 G/DL — SIGNIFICANT CHANGE UP (ref 32–36)
MCV RBC AUTO: 88.5 FL — SIGNIFICANT CHANGE UP (ref 80–100)
MONOCYTES # BLD AUTO: 0.6 K/UL — SIGNIFICANT CHANGE UP (ref 0–0.9)
MONOCYTES NFR BLD AUTO: 8.4 % — SIGNIFICANT CHANGE UP (ref 2–14)
NEUTROPHILS # BLD AUTO: 4.3 K/UL — SIGNIFICANT CHANGE UP (ref 1.8–7.4)
NEUTROPHILS NFR BLD AUTO: 63.2 % — SIGNIFICANT CHANGE UP (ref 43–77)
PLATELET # BLD AUTO: 42 K/UL — LOW (ref 150–400)
RBC # BLD: 4.43 M/UL — SIGNIFICANT CHANGE UP (ref 3.8–5.2)
RBC # FLD: 11.9 % — SIGNIFICANT CHANGE UP (ref 10.3–14.5)
WBC # BLD: 6.8 K/UL — SIGNIFICANT CHANGE UP (ref 3.8–10.5)
WBC # FLD AUTO: 6.8 K/UL — SIGNIFICANT CHANGE UP (ref 3.8–10.5)

## 2020-02-19 PROCEDURE — 99214 OFFICE O/P EST MOD 30 MIN: CPT

## 2020-02-19 RX ORDER — RIVAROXABAN 15 MG/1
15 TABLET, FILM COATED ORAL
Qty: 90 | Refills: 0 | Status: DISCONTINUED | COMMUNITY
Start: 2019-10-16 | End: 2020-02-19

## 2020-02-19 RX ORDER — RIVAROXABAN 10 MG/1
10 TABLET, FILM COATED ORAL
Qty: 30 | Refills: 5 | Status: DISCONTINUED | COMMUNITY
Start: 2018-11-14 | End: 2020-02-19

## 2020-02-19 RX ORDER — SODIUM FLUORIDE 1.1 G/100G
1.1 GEL ORAL
Qty: 56 | Refills: 0 | Status: DISCONTINUED | COMMUNITY
Start: 2019-02-19 | End: 2020-02-19

## 2020-02-19 RX ORDER — FERROUS SULFATE 137(45) MG
142 (45 FE) TABLET, EXTENDED RELEASE ORAL
Refills: 0 | Status: ACTIVE | COMMUNITY
Start: 2019-05-23

## 2020-02-19 NOTE — REVIEW OF SYSTEMS
[Dysmenorrhea/Abn Vaginal Bleeding] : dysmenorrhea/abnormal vaginal bleeding [Fatigue] : fatigue [Joint Pain] : joint pain [Muscle Pain] : muscle pain [Dizziness] : dizziness [Easy Bruising] : a tendency for easy bruising [Abdominal Pain] : abdominal pain [Fainting] : fainting [Negative] : Integumentary

## 2020-02-19 NOTE — ASSESSMENT
[Palliative Care Plan] : not applicable at this time [FreeTextEntry1] : 44 year old female with chronic ITP with post operative thrombosis of her left internal jugular..She does not recall an IV being placed there. It remains possible she had one intra-operatively or that her neck had been positioned or manipulated in some way during her appendectomy. Evaluation reveals her to have borderline protein S deficiency.\par In view of her recent significant bleeding due to ruptured ovarian cyst in the setting of progressive thrombocytopenia, will continue to hold Xarelto as the risk of bleeding appears to outweigh the benefit of continued anticoagulation. Her shortened  APTT is noted and we will evaluate further. My suspicion that she may have lymphoma is extremely low. \par \par \par Suggest :\par Hold Xarelto\par CMP, LDH, Protein S antigen free and total, LA screen\par PT/INR, APTT, DTT, Fibrinogen Assay,  D-Dimer assay,\par F/U with all her consultants - GI, Neuro, Uro, Rheum, Medicine\par Weekly CBC\par Obtain each parent's protein S screen\par Call me in 1 week\par

## 2020-02-19 NOTE — RESULTS/DATA
[FreeTextEntry1] : WBC 6,800, Hgb 13.7, Hct 39.2, MCV 88.5, Plts 42,000, Diff normal, ANC 4,300\par  \par 02/13/20\par PT 10.6, INR 0.93, APTT 25.2\par Protein S activity 53%\par C4B Binding protein 87%\par \par 01/27/20\par CT Neck: There is a mildly prominent lymph node within the lower left neck which is at the upper limits of normal by CT size criteria. This was also present on prior CT study of November 2018. No definite new pathologic adenopathy is identified although assessment for adenopathy somewhat limited by the lack of intravenous contrast. There is slight flattening of the left side of the nasopharynx which is new as compared to prior CT study. Correlation with direct visualization is recommended to exclude small mucosal lesion.

## 2020-02-19 NOTE — REASON FOR VISIT
[Blood Count Assessment] : blood count assessment [Follow-Up Visit] : a follow-up visit for [Coagulopathy] : coagulopathy

## 2020-02-19 NOTE — PHYSICAL EXAM
[Ambulatory and capable of all self care but unable to carry out any work activities] : Status 2- Ambulatory and capable of all self care but unable to carry out any work activities. Up and about more than 50% of waking hours [Thin] : thin [Normal] : affect appropriate [de-identified] : Sabios

## 2020-02-19 NOTE — CONSULT LETTER
[Dear  ___] : Dear ~CYN, [Courtesy Letter:] : I had the pleasure of seeing your patient, [unfilled], in my office today. [Please see my note below.] : Please see my note below. [Sincerely,] : Sincerely, [FreeTextEntry2] : Cristhian Parker MD [FreeTextEntry3] : Nakul\par Attila Zamora M.D., FACP\par Professor of Medicine\par [facility] [employer] [employer]\par Associate Chief, Division of Hematology\par [facility]\par [facility]\par [address]\[place] [address]\par [phone]\par \par \par \par

## 2020-02-19 NOTE — ADDENDUM
[FreeTextEntry1] : Documented by Rod Rossi acting as a scribe for Dr. Attila Zamora on 02/19/2020 \par \par All medical record entries made by the Scribe were at my, Dr. Attila Zamora's, direction and personally dictated by me on 02/19/2020. I have reviewed the chart and agree that the record accurately reflects my personal performance of the history, physical exam, results, assessment and plan. I have also personally directed, reviewed, and agree with the discharge instructions.

## 2020-02-19 NOTE — HISTORY OF PRESENT ILLNESS
[de-identified] : Idiopathic thrombocytopenic purpura\par 11/18 Left IJ thrombosis\par Protein S deficiency \par \par  [de-identified] : Patient requests that I resume as her hematologist. Reports two incidents of ovarian cyst rupture - December 6th and a second on the January 30th. Bled to hgb approximately 9.5 after first event. Xarelto was discontinued after the first ovarian cyst rupture. Feels fatigued. Reports brief blackout 2 weeks ago. Did not hit head nor have incontinence. Reports bruising on arms and hips occurred.. Notes numbness in her fingertips. Has scattered aches in her limbs. Notes neck pain. Reports vertigo and dizziness are worsening. Feels like her balance has decreased. Notes bleeding gums. Heavy menstrual cycles persist. Notes vague abdominal discomfort. Her bruising has increased recently. No other complaints. She notes no headaches, visual problems, arm pain/swelling, fevers, night sweats, swollen glands. Weight is stable. Saw ENT recently. She is concerned that she has lymphoma.\par \par She does not obtain the Flu vaccine.

## 2020-02-24 ENCOUNTER — APPOINTMENT (OUTPATIENT)
Dept: HEMATOLOGY ONCOLOGY | Facility: CLINIC | Age: 45
End: 2020-02-24

## 2020-02-24 ENCOUNTER — RESULT REVIEW (OUTPATIENT)
Age: 45
End: 2020-02-24

## 2020-02-26 DIAGNOSIS — Z71.89 OTHER SPECIFIED COUNSELING: ICD-10-CM

## 2020-03-02 ENCOUNTER — OUTPATIENT (OUTPATIENT)
Dept: OUTPATIENT SERVICES | Facility: HOSPITAL | Age: 45
LOS: 1 days | Discharge: HOME | End: 2020-03-02

## 2020-03-02 DIAGNOSIS — Z90.49 ACQUIRED ABSENCE OF OTHER SPECIFIED PARTS OF DIGESTIVE TRACT: Chronic | ICD-10-CM

## 2020-03-02 DIAGNOSIS — D47.3 ESSENTIAL (HEMORRHAGIC) THROMBOCYTHEMIA: ICD-10-CM

## 2020-03-04 ENCOUNTER — RESULT REVIEW (OUTPATIENT)
Age: 45
End: 2020-03-04

## 2020-03-04 ENCOUNTER — APPOINTMENT (OUTPATIENT)
Dept: HEMATOLOGY ONCOLOGY | Facility: CLINIC | Age: 45
End: 2020-03-04

## 2020-03-04 LAB
BASOPHILS # BLD AUTO: 0 K/UL — SIGNIFICANT CHANGE UP (ref 0–0.2)
BASOPHILS NFR BLD AUTO: 0 % — SIGNIFICANT CHANGE UP (ref 0–2)
EOSINOPHIL # BLD AUTO: 0 K/UL — SIGNIFICANT CHANGE UP (ref 0–0.5)
EOSINOPHIL NFR BLD AUTO: 0 % — SIGNIFICANT CHANGE UP (ref 0–6)
HCT VFR BLD CALC: 40.5 % — SIGNIFICANT CHANGE UP (ref 34.5–45)
HGB BLD-MCNC: 13.9 G/DL — SIGNIFICANT CHANGE UP (ref 11.5–15.5)
LYMPHOCYTES # BLD AUTO: 1.25 K/UL — SIGNIFICANT CHANGE UP (ref 1–3.3)
LYMPHOCYTES # BLD AUTO: 19 % — SIGNIFICANT CHANGE UP (ref 13–44)
MCHC RBC-ENTMCNC: 29.6 PG — SIGNIFICANT CHANGE UP (ref 27–34)
MCHC RBC-ENTMCNC: 34.3 GM/DL — SIGNIFICANT CHANGE UP (ref 32–36)
MCV RBC AUTO: 86.4 FL — SIGNIFICANT CHANGE UP (ref 80–100)
MONOCYTES # BLD AUTO: 0.53 K/UL — SIGNIFICANT CHANGE UP (ref 0–0.9)
MONOCYTES NFR BLD AUTO: 8 % — SIGNIFICANT CHANGE UP (ref 2–14)
NEUTROPHILS # BLD AUTO: 4.82 K/UL — SIGNIFICANT CHANGE UP (ref 1.8–7.4)
NEUTROPHILS NFR BLD AUTO: 73 % — SIGNIFICANT CHANGE UP (ref 43–77)
NRBC # BLD: 0 — SIGNIFICANT CHANGE UP
NRBC # BLD: SIGNIFICANT CHANGE UP /100 WBCS (ref 0–0)
PLAT MORPH BLD: NORMAL — SIGNIFICANT CHANGE UP
PLATELET # BLD AUTO: 74 K/UL — LOW (ref 150–400)
RBC # BLD: 4.69 M/UL — SIGNIFICANT CHANGE UP (ref 3.8–5.2)
RBC # FLD: 12.6 % — SIGNIFICANT CHANGE UP (ref 10.3–14.5)
RBC BLD AUTO: SIGNIFICANT CHANGE UP
WBC # BLD: 6.6 K/UL — SIGNIFICANT CHANGE UP (ref 3.8–10.5)
WBC # FLD AUTO: 6.6 K/UL — SIGNIFICANT CHANGE UP (ref 3.8–10.5)

## 2020-03-12 ENCOUNTER — RESULT REVIEW (OUTPATIENT)
Age: 45
End: 2020-03-12

## 2020-03-12 ENCOUNTER — APPOINTMENT (OUTPATIENT)
Dept: HEMATOLOGY ONCOLOGY | Facility: CLINIC | Age: 45
End: 2020-03-12

## 2020-03-12 LAB
BASOPHILS # BLD AUTO: 0.01 K/UL — SIGNIFICANT CHANGE UP (ref 0–0.2)
BASOPHILS NFR BLD AUTO: 0.2 % — SIGNIFICANT CHANGE UP (ref 0–2)
EOSINOPHIL # BLD AUTO: 0.02 K/UL — SIGNIFICANT CHANGE UP (ref 0–0.5)
EOSINOPHIL NFR BLD AUTO: 0.4 % — SIGNIFICANT CHANGE UP (ref 0–6)
HCT VFR BLD CALC: 41.4 % — SIGNIFICANT CHANGE UP (ref 34.5–45)
HGB BLD-MCNC: 13.7 G/DL — SIGNIFICANT CHANGE UP (ref 11.5–15.5)
IMM GRANULOCYTES NFR BLD AUTO: 0.2 % — SIGNIFICANT CHANGE UP (ref 0–1.5)
LYMPHOCYTES # BLD AUTO: 1.16 K/UL — SIGNIFICANT CHANGE UP (ref 1–3.3)
LYMPHOCYTES # BLD AUTO: 23.5 % — SIGNIFICANT CHANGE UP (ref 13–44)
MCHC RBC-ENTMCNC: 29.2 PG — SIGNIFICANT CHANGE UP (ref 27–34)
MCHC RBC-ENTMCNC: 33.1 GM/DL — SIGNIFICANT CHANGE UP (ref 32–36)
MCV RBC AUTO: 88.3 FL — SIGNIFICANT CHANGE UP (ref 80–100)
MONOCYTES # BLD AUTO: 0.37 K/UL — SIGNIFICANT CHANGE UP (ref 0–0.9)
MONOCYTES NFR BLD AUTO: 7.5 % — SIGNIFICANT CHANGE UP (ref 2–14)
NEUTROPHILS # BLD AUTO: 3.36 K/UL — SIGNIFICANT CHANGE UP (ref 1.8–7.4)
NEUTROPHILS NFR BLD AUTO: 68.2 % — SIGNIFICANT CHANGE UP (ref 43–77)
NRBC # BLD: 0 /100 WBCS — SIGNIFICANT CHANGE UP (ref 0–0)
PLATELET # BLD AUTO: 72 K/UL — LOW (ref 150–400)
RBC # BLD: 4.69 M/UL — SIGNIFICANT CHANGE UP (ref 3.8–5.2)
RBC # FLD: 12.6 % — SIGNIFICANT CHANGE UP (ref 10.3–14.5)
WBC # BLD: 4.93 K/UL — SIGNIFICANT CHANGE UP (ref 3.8–10.5)
WBC # FLD AUTO: 4.93 K/UL — SIGNIFICANT CHANGE UP (ref 3.8–10.5)

## 2020-03-24 ENCOUNTER — APPOINTMENT (OUTPATIENT)
Dept: HEMATOLOGY ONCOLOGY | Facility: CLINIC | Age: 45
End: 2020-03-24

## 2020-03-24 LAB
ALBUMIN SERPL ELPH-MCNC: 4.8 G/DL
ALP BLD-CCNC: 85 U/L
ALT SERPL-CCNC: 39 U/L
ANION GAP SERPL CALC-SCNC: 15 MMOL/L
APTT BLD: 25.2 SEC
APTT BLD: 26.6 SEC
AST SERPL-CCNC: 39 U/L
B2 GLYCOPROT1 AB SER QL: NEGATIVE
BILIRUB SERPL-MCNC: 0.4 MG/DL
BUN SERPL-MCNC: 7 MG/DL
C4B BP SERPL-MCNC: 87 %
CALCIUM SERPL-MCNC: 9.2 MG/DL
CARDIOLIPIN AB SER IA-ACNC: NEGATIVE
CHLORIDE SERPL-SCNC: 101 MMOL/L
CO2 SERPL-SCNC: 23 MMOL/L
CONFIRM: 29.5 SEC
CREAT SERPL-MCNC: 0.73 MG/DL
DEPRECATED D DIMER PPP IA-ACNC: <150 NG/ML DDU
DRVVT IMM 1:2 NP PPP: NORMAL
DRVVT SCREEN TO CONFIRM RATIO: 1.02 RATIO
FIBRINOGEN PPP COAG.DERIVED-MCNC: 373 MG/DL
GLUCOSE SERPL-MCNC: 135 MG/DL
INR PPP: 0.93 RATIO
INR PPP: 0.95 RATIO
LDH SERPL-CCNC: 242 U/L
POTASSIUM SERPL-SCNC: 4 MMOL/L
PROT S AG ACT/NOR PPP IA: 64 %
PROT S FREE PPP-ACNC: 53 % NORMAL
PROT SERPL-MCNC: 7 G/DL
PT BLD: 10.6 SEC
PT BLD: 10.9 SEC
SCREEN DRVVT: 33.4 SEC
SODIUM SERPL-SCNC: 139 MMOL/L
TT CONT PPP: 24.1 SEC

## 2020-03-25 NOTE — ED PROVIDER NOTE - CADM POA CENTRAL LINE
Attempted to call and discussed with patient.  No answer.  Voicemail left to return call to [phone] sick [phone].   No

## 2020-03-31 ENCOUNTER — APPOINTMENT (OUTPATIENT)
Dept: HEMATOLOGY ONCOLOGY | Facility: CLINIC | Age: 45
End: 2020-03-31

## 2020-04-01 ENCOUNTER — OUTPATIENT (OUTPATIENT)
Dept: OUTPATIENT SERVICES | Facility: HOSPITAL | Age: 45
LOS: 1 days | Discharge: HOME | End: 2020-04-01

## 2020-04-01 DIAGNOSIS — Z90.49 ACQUIRED ABSENCE OF OTHER SPECIFIED PARTS OF DIGESTIVE TRACT: Chronic | ICD-10-CM

## 2020-04-01 DIAGNOSIS — D47.3 ESSENTIAL (HEMORRHAGIC) THROMBOCYTHEMIA: ICD-10-CM

## 2020-04-07 ENCOUNTER — APPOINTMENT (OUTPATIENT)
Dept: HEMATOLOGY ONCOLOGY | Facility: CLINIC | Age: 45
End: 2020-04-07

## 2020-05-09 NOTE — ED ADULT NURSE NOTE - NS PRO PASSIVE SMOKE EXP
No No lymphadedenopathy No lymphadedenopathy No lymphadedenopathy No lymphadedenopathy No lymphadedenopathy No lymphadedenopathy No lymphadedenopathy No lymphadedenopathy No lymphadedenopathy No lymphadedenopathy No lymphadedenopathy No lymphadedenopathy

## 2020-05-15 ENCOUNTER — TRANSCRIPTION ENCOUNTER (OUTPATIENT)
Age: 45
End: 2020-05-15

## 2020-05-19 ENCOUNTER — LABORATORY RESULT (OUTPATIENT)
Age: 45
End: 2020-05-19

## 2020-05-19 ENCOUNTER — TRANSCRIPTION ENCOUNTER (OUTPATIENT)
Age: 45
End: 2020-05-19

## 2020-05-19 ENCOUNTER — APPOINTMENT (OUTPATIENT)
Dept: UROLOGY | Facility: CLINIC | Age: 45
End: 2020-05-19

## 2020-05-24 ENCOUNTER — OUTPATIENT (OUTPATIENT)
Dept: OUTPATIENT SERVICES | Facility: HOSPITAL | Age: 45
LOS: 1 days | Discharge: HOME | End: 2020-05-24

## 2020-05-24 DIAGNOSIS — D47.3 ESSENTIAL (HEMORRHAGIC) THROMBOCYTHEMIA: ICD-10-CM

## 2020-05-24 DIAGNOSIS — Z90.49 ACQUIRED ABSENCE OF OTHER SPECIFIED PARTS OF DIGESTIVE TRACT: Chronic | ICD-10-CM

## 2020-05-29 ENCOUNTER — APPOINTMENT (OUTPATIENT)
Dept: HEMATOLOGY ONCOLOGY | Facility: CLINIC | Age: 45
End: 2020-05-29

## 2020-06-10 NOTE — ED ADULT TRIAGE NOTE - NS ED NOTE AC HIGH RISK COUNTRIES
Device interrogation performed post procedure with stable parameters. PPM settings by Mark Saldaña to DDD , verified by AA. Pt pacer dependant.    No

## 2020-07-02 ENCOUNTER — NON-APPOINTMENT (OUTPATIENT)
Age: 45
End: 2020-07-02

## 2020-07-02 NOTE — ED ADULT NURSE NOTE - THOUGHTS OF SUICIDE/SELF-HARM YN, MLM
No Implemented All Fall Risk Interventions:  [place] to call system. Call bell, personal items and telephone within reach. Instruct patient to call for assistance. Room bathroom lighting operational. Non-slip footwear when patient is off stretcher. Physically safe environment: no spills, clutter or unnecessary equipment. Stretcher in lowest position, wheels locked, appropriate side rails in place. Provide visual cue, wrist band, yellow gown, etc. Monitor gait and stability. Monitor for mental status changes and reorient to person, place, and time. Review medications for side effects contributing to fall risk. Reinforce activity limits and safety measures with patient and family.

## 2020-07-11 NOTE — CONSULT NOTE ADULT - NSPROBSELRECBLANK_6_GEN
43M w/ PHMx of nephrolithiasis, HTN, HLD, presents with BRBPR for 3 days. Patient reports that he underwent a rubber band ligation of internal hemorrhoids on 6/16. He experienced some rectal bleeding post-op but it eventually resolved on its own. 3 days ago, the patient was and work and had a large volume BRBPR that persisted over the next couple of days. Day before admission, after having a bloody BM, the patient reported that he felt lightheaded and fainted. He went to a hospital in [place] where they performed a CT which showed no active bleed. It did however show a L stone in the proximal ureter, for which he underwent cystoscopy with stent placement. He went for a colonoscopy this AM at the OSH which showed diverticulosis throughout the left colon with some residual clots but no active bleed. The patient did not believe he was receiving adequate care so he left AMA and came to [facility]. CTAP on admission showed no active GI bleed. Pt was evaluated by urology which recommended that he follow up as an outpatient for ureteroscopy and continue ciprofloxacin for UTI. GI evaluated pt and thought abdominal pain and distension likely 2/2 to colonoscopy performed day before admission and that etiology of bleed likely 2/2 to band ligation of hemorrhoids. They recommended that he follow up as an outpatient for colonoscopy. Patient's diet was advanced as tolerated and pain was well controlled on medication. At time of discharge, pt deemed stable and ready to return home with plan to follow up as an outpatient.
DISPLAY PLAN FREE TEXT

## 2020-07-17 ENCOUNTER — OUTPATIENT (OUTPATIENT)
Dept: OUTPATIENT SERVICES | Facility: HOSPITAL | Age: 45
LOS: 1 days | Discharge: HOME | End: 2020-07-17

## 2020-07-17 DIAGNOSIS — D47.3 ESSENTIAL (HEMORRHAGIC) THROMBOCYTHEMIA: ICD-10-CM

## 2020-07-17 DIAGNOSIS — Z90.49 ACQUIRED ABSENCE OF OTHER SPECIFIED PARTS OF DIGESTIVE TRACT: Chronic | ICD-10-CM

## 2020-07-21 ENCOUNTER — RESULT REVIEW (OUTPATIENT)
Age: 45
End: 2020-07-21

## 2020-07-21 ENCOUNTER — APPOINTMENT (OUTPATIENT)
Dept: HEMATOLOGY ONCOLOGY | Facility: CLINIC | Age: 45
End: 2020-07-21
Payer: MEDICARE

## 2020-07-21 VITALS
HEART RATE: 96 BPM | DIASTOLIC BLOOD PRESSURE: 87 MMHG | BODY MASS INDEX: 18.35 KG/M2 | WEIGHT: 100.31 LBS | TEMPERATURE: 98.6 F | RESPIRATION RATE: 17 BRPM | SYSTOLIC BLOOD PRESSURE: 128 MMHG | OXYGEN SATURATION: 98 %

## 2020-07-21 DIAGNOSIS — E53.8 DEFICIENCY OF OTHER SPECIFIED B GROUP VITAMINS: ICD-10-CM

## 2020-07-21 LAB
BASOPHILS # BLD AUTO: 0.01 K/UL — SIGNIFICANT CHANGE UP (ref 0–0.2)
BASOPHILS NFR BLD AUTO: 0.1 % — SIGNIFICANT CHANGE UP (ref 0–2)
EOSINOPHIL # BLD AUTO: 0.02 K/UL — SIGNIFICANT CHANGE UP (ref 0–0.5)
EOSINOPHIL NFR BLD AUTO: 0.3 % — SIGNIFICANT CHANGE UP (ref 0–6)
HCT VFR BLD CALC: 36.6 % — SIGNIFICANT CHANGE UP (ref 34.5–45)
HGB BLD-MCNC: 13 G/DL — SIGNIFICANT CHANGE UP (ref 11.5–15.5)
IMM GRANULOCYTES NFR BLD AUTO: 0.4 % — SIGNIFICANT CHANGE UP (ref 0–1.5)
LYMPHOCYTES # BLD AUTO: 1.69 K/UL — SIGNIFICANT CHANGE UP (ref 1–3.3)
LYMPHOCYTES # BLD AUTO: 22.2 % — SIGNIFICANT CHANGE UP (ref 13–44)
MCHC RBC-ENTMCNC: 31.1 PG — SIGNIFICANT CHANGE UP (ref 27–34)
MCHC RBC-ENTMCNC: 35.5 GM/DL — SIGNIFICANT CHANGE UP (ref 32–36)
MCV RBC AUTO: 87.6 FL — SIGNIFICANT CHANGE UP (ref 80–100)
MONOCYTES # BLD AUTO: 0.52 K/UL — SIGNIFICANT CHANGE UP (ref 0–0.9)
MONOCYTES NFR BLD AUTO: 6.8 % — SIGNIFICANT CHANGE UP (ref 2–14)
NEUTROPHILS # BLD AUTO: 5.34 K/UL — SIGNIFICANT CHANGE UP (ref 1.8–7.4)
NEUTROPHILS NFR BLD AUTO: 70.2 % — SIGNIFICANT CHANGE UP (ref 43–77)
NRBC # BLD: 0 /100 WBCS — SIGNIFICANT CHANGE UP (ref 0–0)
PLATELET # BLD AUTO: 69 K/UL — LOW (ref 150–400)
RBC # BLD: 4.18 M/UL — SIGNIFICANT CHANGE UP (ref 3.8–5.2)
RBC # FLD: 12.7 % — SIGNIFICANT CHANGE UP (ref 10.3–14.5)
WBC # BLD: 7.61 K/UL — SIGNIFICANT CHANGE UP (ref 3.8–10.5)
WBC # FLD AUTO: 7.61 K/UL — SIGNIFICANT CHANGE UP (ref 3.8–10.5)

## 2020-07-21 PROCEDURE — 99214 OFFICE O/P EST MOD 30 MIN: CPT

## 2020-07-21 NOTE — REVIEW OF SYSTEMS
[Night Sweats] : night sweats [Dysmenorrhea/Abn Vaginal Bleeding] : dysmenorrhea/abnormal vaginal bleeding [Fatigue] : fatigue [Joint Pain] : joint pain [Easy Bruising] : a tendency for easy bruising [Negative] : Allergic/Immunologic

## 2020-07-21 NOTE — PHYSICAL EXAM
[Fully active, able to carry on all pre-disease performance without restriction] : Status 0 - Fully active, able to carry on all pre-disease performance without restriction [Thin] : thin [Normal] : affect appropriate [de-identified] : Tattoos. Scattered bruises on all limbs, fading.

## 2020-07-21 NOTE — ASSESSMENT
[FreeTextEntry1] : 44 year old female with chronic ITP with post operative thrombosis of her left internal jugular. Having increased bruising.\par \par Suggest :\par Reassure\par Avoid trauma\par PT/INR, APTT, MRAY BETH, RF\par Call me in 1 week\par  [Palliative Care Plan] : not applicable at this time

## 2020-07-21 NOTE — CONSULT LETTER
[Dear  ___] : Dear ~CYN, [Courtesy Letter:] : I had the pleasure of seeing your patient, [unfilled], in my office today. [Sincerely,] : Sincerely, [Please see my note below.] : Please see my note below. [FreeTextEntry2] : Cristhian Parker MD [FreeTextEntry3] : Nakul\par Attila Zamora M.D., FACP\par Professor of Medicine\par [facility] [facility] [employer]\par Associate Chief, Division of Hematology\par [facility]\par [facility]\par [address]\[place] [address]\par [phone]\par \par \par \par

## 2020-07-21 NOTE — HISTORY OF PRESENT ILLNESS
[de-identified] : Idiopathic thrombocytopenic purpura\par 11/18 Left IJ thrombosis\par \par \par  [de-identified] : Has numerous spontaneous bruises on limbs. Left ankle a bit swollen and painful. Fatigued. Lightheaded. Thinks had another ovarian cyst rupture. Notes bleeding gums. Heavy menstrual cycles persist. Has occasional HAs of various types.She notes no visual problems, limb pain/swelling, fevers, swollen glands. Occasional night sweats. Weight is stable.

## 2020-07-22 LAB
ANA SER IF-ACNC: NEGATIVE
APTT BLD: 26.3 SEC
INR PPP: 1.04 RATIO
PT BLD: 12.3 SEC
RHEUMATOID FACT SER QL: <10 IU/ML

## 2020-07-27 NOTE — ED ADULT NURSE NOTE - DOES PATIENT HAVE ADVANCE DIRECTIVE
DM LOWE [id number]
 
Please refer to the physician's history and physical for past medical history,
comorbid conditions, and allergies.
   Diagnosis: AXILLARY ABSCESS
 
   Go Score: 23,LOW OR NO RISK
 
WOUND DESCRIPTIONS:
This nurse along with with Cuca Espinoza RN evaluated patient for skin
impairments.
 
Wound Number: 1
Location of the wound: left axilla
Type of wound: abscess
Thickness: Partial
Size: 6.5cm x 7.0cm x <0.1cm
Tunneling: none
Undermining: none
Sinus Tract: none
Presence of Exudate: none
Amount: none
Color: Red
Odor: None
Periwound Skin Appearance: erythema
Wound edges: approximated
Pain (associated with wound): none at time of assessment
How does patient state this happened? pt states these areas started last week
 
Wound Number: 2
Location of the wound: right axilla
Type of wound: abscess
Thickness: Partial
Size: 12.6cm x 11.7cm x <0.1cm
Tunneling: none
Undermining: none
Sinus Tract: none
Presence of Exudate: none
Amount: none
Color: Red
Odor: None
Periwound Skin Appearance: Normal
Wound edges: approximated
Pain (associated with wound): none at time of assessment
How does patient state this happened? pt states these areas started last week
 
Surface the patient is resting on: Isoflex
 
SKIN PREVENTION RECOMMENDATION:
   1.  Pressure redistribution support surface as appropriate
   2.  Elevate heels
   3.  Remove boots/TEDS every shift and reapply
   4.  Head of bed 30 degrees as tolerated
   5.  Assess nutrition and hydration
   6.  Manage moisture
   7.  Avoid the use of containment devices while in bed
   8.  Use absorptive products on surfaces limit layers of linens on bed
   9.  Turn and reposition every 1-2 hours in bed and every 1 hour in chair as
       tolerated
   10. Weight shifts every 15 minutes while up in chair
   11. Offloading with pillows or device to keep heels elevated off bed
   12. Monitor skin at least every shift
   13. Inspect under medical devices twice a day
 
WOUND TREATMENT RECOMMENDATIONS:
Dr. Rene is already on consult at this time.
Warm compress QID for 15 mins or as tolerated per patient to right axilla and
left axilla.
Pt states he will manage these areas upon discharge. unk

## 2020-08-06 NOTE — DISCHARGE NOTE ADULT - PROVIDER RX CONTACT NUMBER
LVM for pt advising was closing encounter but if pt still had questions to call office back to further discuss. [phone]

## 2020-08-21 NOTE — ED PROVIDER NOTE - PROGRESS NOTE DETAILS
No Headache improved with zofran, tylenol, ativan PO (home dose), and IVF. Orthostatics negative. Pt feels well to go home and follow up outpatient.

## 2020-08-31 NOTE — ED PROVIDER NOTE - CHPI ED SYMPTOMS POS
NURSE NOTES:

Levophed dropped to 8mcg. VSS. Will monitor at this time before changing rate.

-------------------------------------------------------------------------------

Addendum: 08/31/20 at 1935 by CARLA NGUYEN RN

-------------------------------------------------------------------------------

Levophed not changed at this time. Rate still at 10mcg. NAUSEA/PAIN

## 2020-09-22 NOTE — ED BEHAVIORAL HEALTH ASSESSMENT NOTE - ORIENTED TO TIME
Pt is less isolated  and is attending some of the groups.  Pt with verbalizationof willing to continue with the medication . Pt is inbehavioral control, no threats or argument. Yes

## 2020-10-01 ENCOUNTER — OUTPATIENT (OUTPATIENT)
Dept: OUTPATIENT SERVICES | Facility: HOSPITAL | Age: 45
LOS: 1 days | End: 2020-10-01
Payer: COMMERCIAL

## 2020-10-01 ENCOUNTER — APPOINTMENT (OUTPATIENT)
Dept: CT IMAGING | Facility: IMAGING CENTER | Age: 45
End: 2020-10-01
Payer: MEDICARE

## 2020-10-01 DIAGNOSIS — Z00.8 ENCOUNTER FOR OTHER GENERAL EXAMINATION: ICD-10-CM

## 2020-10-01 DIAGNOSIS — Z90.49 ACQUIRED ABSENCE OF OTHER SPECIFIED PARTS OF DIGESTIVE TRACT: Chronic | ICD-10-CM

## 2020-10-01 PROCEDURE — 74176 CT ABD & PELVIS W/O CONTRAST: CPT

## 2020-10-01 PROCEDURE — 74176 CT ABD & PELVIS W/O CONTRAST: CPT | Mod: 26

## 2020-10-20 NOTE — ED ADULT NURSE NOTE - TEMPERATURE IN FAHRENHEIT (DEGREES F)
98.2 W Plasty Text: The lesion was extirpated to the level of the fat with a #15 scalpel blade.  Given the location of the defect, shape of the defect and the proximity to free margins a W-plasty was deemed most appropriate for repair.  Using a sterile surgical marker, the appropriate transposition arms of the W-plasty were drawn incorporating the defect and placing the expected incisions within the relaxed skin tension lines where possible.    The area thus outlined was incised deep to adipose tissue with a #15 scalpel blade.  The skin margins were undermined to an appropriate distance in all directions utilizing iris scissors.  The opposing transposition arms were then transposed into place in opposite direction and anchored with interrupted buried subcutaneous sutures.
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
## 2020-11-03 NOTE — ED ADULT NURSE NOTE - HARM RISK FACTORS
----- Message from Brian Alonso MD sent at 11/3/2020  7:55 AM CST -----  Lipids very good LDL 75   no

## 2020-11-25 NOTE — ED PROVIDER NOTE - PROGRESS NOTE
Contacted patient after receiving a voicemail message today about resume therapy with writer.  Patient aware of the integration of behavioral health services into the primary care clinic and the provision of care coordination and/or short-term, brief interventions to improve overall health and functioning.     No imminent safety concerns reported or identified.  Patient was assisted in scheduling an appointment for 12/04/2020 with writer.  Patient was encouraged to call with any questions or concerns in the interim.    
Improved.

## 2020-12-23 NOTE — ED ADULT TRIAGE NOTE - PAIN: PRESENCE, MLM
Preferred pharmacy verified and updated.    Pt advised to check with pharmacy first before picking up prescriptions.      Pt advised their refill will be addressed within 24-48 hrs.    Please call patient back if any questions, comments or concerns.    Telephone: CELL: [phone]         Pt has scheduled new pt visit for 1/5/21. Pt is requesting a call once prescriptions have been sent to the pharmacy.     
Refill requested for   rizatriptan (MAXALT) 10 MG tablet 10 tablet 5 12/17/2019     topiramate (TOPAMAX) 100 MG tablet 30 tablet 0 11/2/2020         Last office visit 12/17/2019  Next Office visit 1/5/2021  Refilled per [facility] protocol    Thank you  
denies pain/discomfort

## 2021-01-01 NOTE — ED PROVIDER NOTE - DATA REVIEWED, MDM
I will START or STAY ON the medications listed below when I get home from the hospital:  None vital signs/old records

## 2021-01-01 NOTE — ED ADULT TRIAGE NOTE - CCCP TRG CHIEF CMPLNT
ATTENDING ADDENDUM:   I have reviewed and discussed the following case and agree with the assessment and plan as documented above.     Gilmar Medina MD       pain, arm

## 2021-01-01 NOTE — ED ADULT NURSE NOTE - CAS DISCH ACCOMP BY
[facility] [place]  HISTORY AND PHYSICAL      Patient: Marta Cantu Date: 2021   Sex:male  Age: 0 day old  MRN: [id number] Admit Date: 2021   Attending: Leticia Rangel MD      Delivery Clinician: CHIQUITA LOPEZ     Referring Physician: Dr. Leticia Rangel       History   This patient is a Gestational Age: 36w0d male infant, born via , Low Transverse at 9:46 AM on 2021. The mother is Marta Cantu , 32 year old ,   OB History    Para Term  AB Living   3 2 2     2   SAB TAB Ectopic Molar Multiple Live Births           0 2      # Outcome Date GA Lbr Randy/2nd Weight Sex Delivery Anes PTL Lv   3 Current            2 Term 10/02/17 39w2d  3766 g M CS-LTranv Spinal N ZAC   1 Term 11/15/06 40w0d   M CS-LTranv   ZAC    .      Estimated date of confinement for the pregnancy was 2021, by Ultrasound . Maternal serologies include:   Information for the patient's mother:  Marta Cantu [[id number]]     Lab Results   Component Value Date/Time    CULT (P) 2017 03:15 PM     POSITIVE FOR STREPTOCOCCUS AGALACTIAE(STREP GRP B) , in the event this patient is penicillin or cephalosporin intolerant, please contact Microbiology within 5 days for antibiotic susceptibility testing.    ABR A POSITIVE 10/02/2017 05:53 AM    HBAG NEGATIVE 02/15/2017 03:33 PM    SYPIGG NONREACTIVE 02/15/2017 03:33 PM    HIV Nonreactive 2021 11:56 AM    HIV NONREACTIVE 02/15/2017 03:33 PM    RUBEL 2021 11:56 AM    RUBEL 17.1 02/15/2017 03:33 PM    GCPT Negative 2021 11:49 AM    GCPT NEGATIVE 02/15/2017 03:17 PM    GCPT NEGATIVE 05/15/2014 09:58 AM    CHPT Negative 2021 11:49 AM    CHPT NEGATIVE 02/15/2017 03:17 PM    CHPT NEGATIVE 05/15/2014 09:58 AM    .       Pregnancy was complicated by placenta previa and breech .      Social History  Social History     Tobacco Use   • Smoking status: Former Smoker     Packs/day: 0.50     Years: 5.00     Pack years: 2.50      Types: Cigarettes     Quit date: 2016     Years since quittin.6   • Smokeless tobacco: Never Used   Substance Use Topics   • Alcohol use: No     Comment: social alcohol use         Maternal History  Past Medical History:   Diagnosis Date   • Migraines     since 3rd grade   • STD (sexually transmitted disease)          Family History  family history includes Cancer, Breast in her paternal grandmother; Diabetes in her maternal grandfather; Hypertension in her father; Stroke in her paternal grandfather; Thyroid in her maternal grandmother and paternal grandmother.     Prenatal Medications:   Steroids: Yes Date: 2 weeks ago  and Doses: 2   Magnesium Sulfate: No  Home Medications:   Medications Prior to Admission   Medication Sig Dispense Refill   • famotidine (PEPCID) 20 MG tablet Take 20 mg by mouth 2 times daily.     • Ferrous Sulfate (Iron) 325 (65 Fe) MG Tab Take 1 tablet by mouth daily.      • Docusate Calcium (STOOL SOFTENER PO) Take 1 tablet by mouth as needed.      • VITAMIN D PO Take 1 tablet by mouth daily.      • ZINC SULFATE PO Take 1 tablet by mouth daily.      • Prenatal 27-1 MG tablet Take 1 tablet by mouth daily. Please fill per insurance coverage. 180 tablet 1   • Acetaminophen-Caffeine (EXCEDRIN TENSION HEADACHE PO) Take 1 tablet by mouth as needed.             Other Medications received during pregnancy: see above       Labor   labor?: No   Delivery Method: , Low Transverse [251]   Rupture Date: 2021   Rupture Time: 9:46 AM   Rupture Method: Artificial [2]   Time of Birth: 9:46 AM     Analgesia: Spinal [252]   Labor Medications: Ancef      Antibiotics received: No   LESS THAN 4HR Broad Spectrum      APGARS:    One Minute:    Five Minutes:     Ten Minutes:     Delayed Cord Clamping: No      Resuscitation:  Method:    Suctioning:    Oxygen:      Resuscitation: TP Resuscitation; #min: 1  Medications given: None        Physical Exam   Weight:    Length:      Head circumference:       On radiant warmer; continuous C-R monitoring     Physical Exam  Constitutional:       General: He is active. He is irritable. He is in acute distress.      Appearance: Normal appearance. He is well-developed.   HENT:      Head: Normocephalic and atraumatic. Anterior fontanelle is flat.      Right Ear: External ear normal.      Left Ear: External ear normal.      Nose: Nose normal.      Mouth/Throat:      Mouth: Mucous membranes are moist.      Pharynx: Oropharynx is clear.   Eyes:      General: Red reflex is present bilaterally.      Conjunctiva/sclera: Conjunctivae normal.      Pupils: Pupils are equal, round, and reactive to light.   Cardiovascular:      Rate and Rhythm: Normal rate and regular rhythm.      Pulses: Normal pulses.      Heart sounds: Normal heart sounds.   Pulmonary:      Effort: Tachypnea, prolonged expiration, respiratory distress, nasal flaring and retractions present.      Breath sounds: Decreased air movement present.      Comments: grunting  Musculoskeletal:      Cervical back: Normal range of motion and neck supple.   Neurological:      Mental Status: He is alert.           Laboratory Studies  No results found for this or any previous visit (from the past 24 hour(s)).    Imaging  No results found.     Assessment and Plan  1. Late ,male, Gestational Age: 36w0d, AGA.  Prenatal maternal HBsAg,HIV/TPA/GBS/GC/Chlamydia/COVID-19 - all negative; Hepatitis C antibody not availlable. Mom declined Hepatitis B vaccine despite extensive counseling.No cord gases available.     Plan:Hearing, NBS, TcB at 24h; CCHD screen, CPR video pre DC/per protocol; encourage all household members to be up to date with influenza and Pertussis shots; COVID-19 precautions. FU maternal  Hepatitis C antibody; FU cord gases.    2.  FLUIDS AND NUTRITION - On admission infant is hypoglycemic with a POC glucose of 28.An OGT was placed and baby was started onsmall enteral feeds of breast milk   and Neosure 22 kcal per ounce at a total fluid rate of 72 ml per kilogram per day.  Mom plans to breast feed and will commence pumping soon.  Plan: EBM/Neosure 22 tonia/oz 30 ml via OG Q3h (72 ml/kg/day); feeds over 30-60 minutes. Check AC POC glucoses per protocol.     3.  RESPIRATORY - {Resp Concerns:983370} ***.  He was started on BCPAP and is currently requiring ***% FiO2.  A blood gas was drawn and was {acceptable:345736}.  We will monitor his respiratory status and adjust support as needed.  Plan: BCPAP +6 cm H2O; monitor need for surfactant if evolves into RDS    4.  CARDIAC - {Concerns:211397}    5.  INFECTIOUS DISEASE - {No/Blood Culture:061820}. {Antibiotics/No :026735} [place] Sepsis Risk Calculator score was ***.  A CBC with differential and CRP were ordered for tomorrow.  Plan: FU blood c/s; FU CBC; start antibiotics if clinically deteriorates    6.  NEUROLOGIC - {Concerns:837445}    7.  HEMATOLOGY - Hemoglobin and Hematocrit on admission were: No results found for: HCT  No results found for: HGB  MBT = ; IBT= ; SIMONA=   Plan: TcB @ 24h    8.  SOCIAL - Parents are {/ UNMARRIED:395540}.  This is their *** baby.  There {ARE/ARE NO:336332} social concerns identified.  Parents were updated on the current status of the baby and plans of care.  They had their questions answered.        Nay Mejia MD   2021   10:46 AM      Self

## 2021-01-04 NOTE — BEHAVIORAL HEALTH ASSESSMENT NOTE - DIFFERENTIAL
FOLLOW AS AMD/AV CONTRIBUTING TO DECREASED VISION AND EDGES ARE FLAT. Generalized anxiety disorder, panic disorder, somatic symptom disorder, substance use disorder

## 2021-01-11 NOTE — ED ADULT NURSE NOTE - CAS EDP DISCH TYPE
----- Message from Leila Abarca MD sent at 1/9/2021  1:18 PM EST -----  Please keep scheduled appointment with Dr. Hayes in February 2012 but keep him for weekly CBC, with nurse review until then.  He is a hospital return and patient needs to see nurse practitioner in 1  week with labs    Leila Abarca MD      Home

## 2021-01-20 NOTE — PATIENT PROFILE ADULT. - NS TRANSFER PATIENT BELONGINGS
--Rest, drink plenty of fluids  --Advise contacting PCP for possible mono test if ongoing fatigue, sore throat, body aches over the next week  --For nasal/sinus congestion, helpful measures include steam, warm compresses, an OTC saline nasal spray or Neti pot, or an OTC decongestant (such as Sudafed)  The decongestant should be avoided, however, if you are under 10years of age, or have a history of high blood pressure or heart disease  In addition, an OTC nasal steroid (Flonase, Nasocort) or nasal decongestant (Afrin, Toni-synephrine) may be taken  The nasal steroid should be used at bedtime, after the saline nasal spray  The nasal decongestant should not be taken more than 3 days consecutively in order to prevent rebound congestion  --For nasal drainage, postnasal drip, sneezing and itching, an OTC antihistamine (Allegra, Benadryl, etc) can be taken  --For cough, you can take an OTC expectorant such as plain Robitussion or Mucinex (active ingredient guaifenesin)  A spoonful of honey at bedtime may also be helpful, as may a prescription cough medicine  Also recommended is the use of a cool mist humidifier (with or without Vicks) in the bedroom at night  --For sore throat, you can take OTC lozenges, use warm gargles (salt water or apple cider vinegar and honey), herbal teas, or an OTC throat spray (Chloraseptic)  --You can take Tylenol or Motrin/Advil as needed for fever, headache, body aches  Motrin/Advil should be avoided, however, if you have a history of heart disease, bleeding ulcers, or if you take blood thinners  --You should contact your primary care provider and/or go to the ER if your symptoms are not improved or get worse over the next 7 days  This includes new onset fever, localized ear pain, sinus pain, as well as worsening cough, chest pain, shortness of breath, or significant weakness/fatigue 
Jewelry/Money (specify)/Cell Phone/PDA (specify)/Clothing

## 2021-01-25 ENCOUNTER — APPOINTMENT (OUTPATIENT)
Dept: GASTROENTEROLOGY | Facility: CLINIC | Age: 46
End: 2021-01-25

## 2021-02-02 NOTE — ED PROVIDER NOTE - DATE/TIME 1
-- DO NOT REPLY / DO NOT REPLY ALL --  -- Message is from the [facility]--      Patient is requesting a medication refill - medication is on active list    Was Medication Pended? Yes.    Rx Name and Dose:  Vitamin D, Ergocalciferol, 1.25 mg (50,000 units) capsule  HYDROcodone-acetaminophen (NORCO)  MG per tablet    Duration: 30 days    Pharmacy  [facility] [address] [place] [address] [id number] [place] [address]    Patient confirmed the above pharmacy as correct?  Yes    Caller Information       Type Contact Phone    02/02/2021 10:50 AM CST Phone (Incoming) Stefania Chacon (Self) [phone] (M)          Alternative phone number: none     Turnaround time given to caller:   \"This message will be sent to [state Provider's name]. The clinical team will fulfill your request as soon as they review your message.\"  
07-Nov-2017 18:40

## 2021-02-13 NOTE — ED ADULT NURSE NOTE - CAS DISCH TRANSFER METHOD
"Chief Complaint   Patient presents with     Dizziness     Generalized Body Aches     Nausea     Patient presented to the clinic with dizziness, body aches, and nausea that started yesterday. He reported that the nausea is better today as well as the dizziness.    Initial /60 (BP Location: Right arm, Patient Position: Sitting, Cuff Size: Adult Regular)   Pulse 80   Temp 97.4  F (36.3  C) (Tympanic)   Resp 18   Ht 1.676 m (5' 6\")   Wt 56.9 kg (125 lb 8 oz)   SpO2 98%   BMI 20.26 kg/m   Estimated body mass index is 20.26 kg/m  as calculated from the following:    Height as of this encounter: 1.676 m (5' 6\").    Weight as of this encounter: 56.9 kg (125 lb 8 oz).         Medication Reconciliation: Complete      Emely Martin LPN   "
Transportation service

## 2021-03-08 NOTE — DISCHARGE NOTE ADULT - PATIENT PORTAL LINK FT
T(C): 36.6 (03-08-21 @ 00:17), Max: 36.9 (03-07-21 @ 17:08)  HR: 64 (03-08-21 @ 00:17) (64 - 67)  BP: 149/98 (03-08-21 @ 00:17) (149/98 - 154/98)  RR: 18 (03-08-21 @ 00:17) (18 - 18)  SpO2: 100% (03-08-21 @ 00:17) (100% - 100%)
“You can access the FollowHealth Patient Portal, offered by [facility], by registering with the following website: [email or web address]”

## 2021-06-06 NOTE — ED ADULT NURSE NOTE - CCCP TRG CHIEF CMPLNT
Principal Discharge DX:	Sepsis  Secondary Diagnosis:	Nausea & vomiting  Secondary Diagnosis:	Weakness  Secondary Diagnosis:	Cough   rapid heart beat Principal Discharge DX:	Sepsis  Secondary Diagnosis:	Nausea & vomiting  Secondary Diagnosis:	Weakness  Secondary Diagnosis:	Cough  Secondary Diagnosis:	H/O splenectomy

## 2021-06-09 NOTE — ED PROVIDER NOTE - PATIENT PORTAL LINK FT
The patient's cardiac condition is not prohibitory to undergo surgery. The patient's cardiac risk is moderate based upon my evaluation and testing. Stable to proceed.      Okay to hold meds as necessary You can access the FollowMyHealth Patient Portal offered by [facility] by registering at the following website: [email or web address]. By joining [employer] [facility]’s FollowMyHealth portal, you will also be able to view your health information using other applications (apps) compatible with our system.

## 2021-06-11 ENCOUNTER — NON-APPOINTMENT (OUTPATIENT)
Age: 46
End: 2021-06-11

## 2021-06-14 ENCOUNTER — APPOINTMENT (OUTPATIENT)
Dept: INFECTIOUS DISEASE | Facility: CLINIC | Age: 46
End: 2021-06-14
Payer: MEDICARE

## 2021-06-14 ENCOUNTER — NON-APPOINTMENT (OUTPATIENT)
Age: 46
End: 2021-06-14

## 2021-06-14 VITALS
OXYGEN SATURATION: 99 % | HEIGHT: 62 IN | BODY MASS INDEX: 20.24 KG/M2 | RESPIRATION RATE: 15 BRPM | SYSTOLIC BLOOD PRESSURE: 125 MMHG | TEMPERATURE: 99.6 F | DIASTOLIC BLOOD PRESSURE: 87 MMHG | WEIGHT: 110 LBS | HEART RATE: 80 BPM

## 2021-06-14 DIAGNOSIS — R14.0 ABDOMINAL DISTENSION (GASEOUS): ICD-10-CM

## 2021-06-14 PROCEDURE — 99203 OFFICE O/P NEW LOW 30 MIN: CPT

## 2021-06-15 LAB
ALBUMIN SERPL ELPH-MCNC: 4.8 G/DL
ALBUMIN SERPL ELPH-MCNC: 5 G/DL
ALP BLD-CCNC: 87 U/L
ALP BLD-CCNC: 90 U/L
ALT SERPL-CCNC: 12 U/L
ALT SERPL-CCNC: 15 U/L
ANION GAP SERPL CALC-SCNC: 14 MMOL/L
ANION GAP SERPL CALC-SCNC: 15 MMOL/L
AST SERPL-CCNC: 19 U/L
AST SERPL-CCNC: 19 U/L
BASOPHILS # BLD AUTO: 0.03 K/UL
BASOPHILS NFR BLD AUTO: 0.4 %
BILIRUB SERPL-MCNC: 0.4 MG/DL
BILIRUB SERPL-MCNC: 0.4 MG/DL
BUN SERPL-MCNC: 7 MG/DL
BUN SERPL-MCNC: 7 MG/DL
CALCIUM SERPL-MCNC: 9.1 MG/DL
CALCIUM SERPL-MCNC: 9.3 MG/DL
CHLORIDE SERPL-SCNC: 104 MMOL/L
CHLORIDE SERPL-SCNC: 104 MMOL/L
CO2 SERPL-SCNC: 20 MMOL/L
CO2 SERPL-SCNC: 21 MMOL/L
CREAT SERPL-MCNC: 0.71 MG/DL
CREAT SERPL-MCNC: 0.72 MG/DL
EOSINOPHIL # BLD AUTO: 0.17 K/UL
EOSINOPHIL NFR BLD AUTO: 2 %
ERYTHROCYTE [SEDIMENTATION RATE] IN BLOOD BY WESTERGREN METHOD: 11 MM/HR
ERYTHROCYTE [SEDIMENTATION RATE] IN BLOOD BY WESTERGREN METHOD: 7 MM/HR
GLUCOSE SERPL-MCNC: 66 MG/DL
GLUCOSE SERPL-MCNC: 67 MG/DL
HCT VFR BLD CALC: 42.4 %
HGB BLD-MCNC: 13.8 G/DL
IMM GRANULOCYTES NFR BLD AUTO: 0.5 %
IRON SERPL-MCNC: 76 UG/DL
LYMPHOCYTES # BLD AUTO: 1.94 K/UL
LYMPHOCYTES NFR BLD AUTO: 23.1 %
MAN DIFF?: NORMAL
MCHC RBC-ENTMCNC: 30.8 PG
MCHC RBC-ENTMCNC: 32.5 GM/DL
MCV RBC AUTO: 94.6 FL
MONOCYTES # BLD AUTO: 0.66 K/UL
MONOCYTES NFR BLD AUTO: 7.9 %
NEUTROPHILS # BLD AUTO: 5.56 K/UL
NEUTROPHILS NFR BLD AUTO: 66.1 %
PLATELET # BLD AUTO: 73 K/UL
PLATELET # PLAS AUTO: 72 K/UL
POTASSIUM SERPL-SCNC: 3.9 MMOL/L
POTASSIUM SERPL-SCNC: 3.9 MMOL/L
PROT SERPL-MCNC: 7.1 G/DL
PROT SERPL-MCNC: 7.2 G/DL
RBC # BLD: 4.48 M/UL
RBC # FLD: 13.1 %
SODIUM SERPL-SCNC: 139 MMOL/L
SODIUM SERPL-SCNC: 140 MMOL/L
WBC # FLD AUTO: 8.4 K/UL

## 2021-06-16 NOTE — ED ADULT NURSE NOTE - MUSCULOSKELETAL WDL
RASHIDA White MD, LATIA Rust
Full range of motion of upper and lower extremities, no joint tenderness/swelling.

## 2021-06-17 PROBLEM — R14.0 ABDOMINAL BLOATING: Status: ACTIVE | Noted: 2017-12-29

## 2021-06-17 NOTE — REVIEW OF SYSTEMS
[Body Aches] : body aches [Difficulty Sleeping] : difficulty sleeping [Feeling Tired] : feeling tired [Feeling Sick] : feeling sick [Normal Appetite] : appetite not normal  [As Noted in HPI] : as noted in HPI [Abdominal Pain] : abdominal pain [Vomiting] : no vomiting [Negative] : ENT

## 2021-06-17 NOTE — ASSESSMENT
[FreeTextEntry1] : Case discussed with Dr. Boles.  Review history for 30 minutes with pt.  Long history of multiple somatic complaints felt to be related to dysautonomia  has been treated on and  off for Cdiff .  over the last 6 months with courses of fidaxomicin ; currently on a taper . Pt refuses colonoscopy for fear of IV related reaction  She is not having diarrhea t present, and it is not clear  that she still has active ciff.   I don’t think the single positive shigella toxin assay is significant but she is fixated on it. The last think I want to do is give her antibiotics at present.\par \par I would like her to compete the current therapy and then stop all therapy and see what happens. She should have a sigmoscope, if she cant not have a full colonoscopy I don’t think there is a contraindication to the latter.\par \par I have talked  to her and urged her not to get additional PCR for cdiff in the near future as they are not useful in following the infection , only in making the initial  diagnosis .

## 2021-06-17 NOTE — PHYSICAL EXAM
[General Appearance - Alert] : alert [General Appearance - In No Acute Distress] : in no acute distress [Bowel Sounds] : normal bowel sounds [Abdomen Soft] : soft [Abdomen Tenderness] : non-tender [] : no hepato-splenomegaly [Abdomen Mass (___ Cm)] : no abdominal mass palpated [Costovertebral Angle Tenderness] : no CVA tenderness

## 2021-06-17 NOTE — HISTORY OF PRESENT ILLNESS
[FreeTextEntry1] : reviewed long history with patient.  pt developed diarrhea and cidff .  was treated with fidaxomicin and then had a  another positve  cidff test; in jan/2021. and is now on tapering fidaxomicin   has also had a  assay for shigella toxin w=that has not been treated.  no diarrhea at present.  pt has many other symptoms and has chronic ITP and dysautonomia . \par \par no fever or chills ;

## 2021-06-18 DIAGNOSIS — B78.9 STRONGYLOIDIASIS, UNSPECIFIED: ICD-10-CM

## 2021-06-27 NOTE — ED STATDOCS - SCRIBE NAME
Epidural Block    Patient location during procedure: OB  Start time: 6/27/2021 7:29 AM  End time: 6/27/2021 7:34 AM  Reason for block: primary anesthetic and labor epidural  Staffing  Performed: attending   Anesthesiologist: Gomez Altman MD  Preanesthetic Checklist  Completed: patient identified, IV checked, risks and benefits discussed, surgical consent, monitors and equipment checked, pre-op evaluation and timeout performed  Block Placement  Patient position: sitting  Prep: ChloraPrep  Sterility prep: cap, drape, gloves, hand and mask  Sedation level: no sedation  Patient monitoring: continuous pulse oximetry and heart rate  Approach: midline  Location: lumbar  Lumbar location: L3-L4  Epidural  Loss of resistance technique: air  Guidance: landmark technique  Needle  Needle type: Tuohy   Needle gauge: 17 G  Needle length: 10 cm  Needle insertion depth: 4.5 cm  Catheter type: end hole  Catheter size: 19 G  Catheter at skin depth: 9.5 cm  Catheter securement method: clear occlusive dressing, liquid medical adhesive and surgical tape  Test dose: negative  Assessment  Block outcome: pain improved  Number of attempts: 1  Procedure assessment: patient tolerated procedure well with no immediate complications  Additional Notes  Pain improved. Scoliosis noted. Amanda Quezada

## 2021-06-28 LAB
BACTERIA BLD CULT: NORMAL
H.PYLORI ANTIBODY IGA: 7.9 UNITS

## 2021-06-29 LAB — BACTERIA BLD CULT: NORMAL

## 2021-07-02 NOTE — PROGRESS NOTE ADULT - PROBLEM SELECTOR PLAN 2
Assessment     Healthy female exam       Plan     Follow up in: 3 years  Subjective     Douglas Taryn is a 55 y o  female here for a routine exam   Current complaints: none  Gynecologic History  No LMP recorded  Patient has had an ablation  Menses Regular:no  Contraception: post menopausal status  Last Pap:   Results were: normal  Last mammogram: 2020  Results were: normal  BSE: yes  Last DEXA:N/A  Results were: not examined    Obstetric History  OB History    Para Term  AB Living   2 2           SAB TAB Ectopic Multiple Live Births                  # Outcome Date GA Lbr [place]/2nd Weight Sex Delivery Anes PTL Lv   2 Para            1 Para                  The following portions of the patient's history were reviewed and updated as appropriate: She  has a past medical history of Anxiety and Arthritis  She   Patient Active Problem List    Diagnosis Date Noted    Urinary problem 2020    Flank pain 2020    Essential hypertension 2019    VÍCTOR (generalized anxiety disorder) 2019    Rheumatoid arthritis ([facility] [address] ) 2019    Abnormal mammogram 2019     She  has a past surgical history that includes  section and Tonsillectomy  Her family history includes Cancer in her mother; Coronary artery disease in her father; Prostate cancer in her father  She  reports that she has quit smoking  She has never used smokeless tobacco  She reports current alcohol use  She reports that she does not use drugs    Current Outpatient Medications   Medication Sig Dispense Refill    GLUCOSAMINE-CHONDROITIN PO Take by mouth daily      ibuprofen (MOTRIN) 800 mg tablet Take 1 tablet (800 mg total) by mouth every 8 (eight) hours as needed for mild pain or moderate pain 90 tablet 6    Multiple Vitamin (MULTIVITAMIN) tablet Take 1 tablet by mouth daily      Red Yeast Rice Extract (RED YEAST RICE PO) red yeast rice      sulfaSALAzine (AZULFIDINE) 500 mg tablet Take 2 tablets (1,000 mg total) by mouth 2 (two) times a day 360 tablet 1    buPROPion (WELLBUTRIN XL) 150 mg 24 hr tablet Take 1 tablet (150 mg total) by mouth daily 30 tablet 5    hydrochlorothiazide (HYDRODIURIL) 25 mg tablet Take 1 tablet (25 mg total) by mouth daily 30 tablet 5    LORazepam (ATIVAN) 0 5 mg tablet Take 1 tablet (0 5 mg total) by mouth daily at bedtime May take additional pill at night if needed 45 tablet 0     No current facility-administered medications for this visit  Current Outpatient Medications on File Prior to Visit   Medication Sig    GLUCOSAMINE-CHONDROITIN PO Take by mouth daily    ibuprofen (MOTRIN) 800 mg tablet Take 1 tablet (800 mg total) by mouth every 8 (eight) hours as needed for mild pain or moderate pain    Multiple Vitamin (MULTIVITAMIN) tablet Take 1 tablet by mouth daily    Red Yeast Rice Extract (RED YEAST RICE PO) red yeast rice    sulfaSALAzine (AZULFIDINE) 500 mg tablet Take 2 tablets (1,000 mg total) by mouth 2 (two) times a day    [DISCONTINUED] buPROPion (WELLBUTRIN XL) 150 mg 24 hr tablet Take 1 tablet (150 mg total) by mouth daily    [DISCONTINUED] hydrochlorothiazide (HYDRODIURIL) 25 mg tablet Take 1 tablet (25 mg total) by mouth daily    [DISCONTINUED] LORazepam (ATIVAN) 0 5 mg tablet Take 1 tablet (0 5 mg total) by mouth daily at bedtime May take additional pill at night if needed     No current facility-administered medications on file prior to visit  She has No Known Allergies       Review of Systems  Constitutional: negative  Eyes: negative  Ears, nose, mouth, throat, and face: negative  Respiratory: negative  Cardiovascular: negative  Genitourinary:negative  Integument/breast: negative      Objective  General Appearance: Alert, appropriate appearance for age  No acute distress, HEENT Exam: Grossly normal , Chest/Respiratory Exam: Normal chest wall and respirations   Clear to auscultation , Breast Exam: No dimpling, nipple retraction or discharge  No masses or nodes  , Pelvic Exam Female: Vulva and vagina appear normal Cervix appears normal   Bimanual exam reveals normal uterus and adnexa  Musculoskeletal Exam: Back is straight and non-tender, full ROM of upper and lower extremities   and Skin: no rash or abnormalities Patient presenting with chest pain, atypical in nature, both left and right sided, not associated with exertion, not pleuritic, intermittent, Aden negative x 3, EKG with no acute changes, noncontrast CT chest and echo WNL, pain was reproducible c/w costochondritis  - pt cannot take NSAIDS given ITP and low platelet;  using Tylenol rarely  - no further w/u rec'd by cards

## 2021-07-12 NOTE — ED ADULT NURSE NOTE - DISCHARGE DATE/TIME
[FreeTextEntry1] : Pt presents today in f/u for MG.  States she came in today because legs are bad, getting cramps and mallika horses.  She was very swollen, and took water pill.  States legs went down a bit.  Has redness in LE which are new.  When she flexes her feet she gets cramps and mallika horses, inintially just in her calves, now more in thighs and in hands. \par \par States pulmonary called to reschedule her.\par \par C/o  nausea, abdomen bothering her.  Feels bloated.  She is on 90 mg pyridostigmine 4 times a day.\par When it starts rumbling feels it is debilitating.  Told to increase protonix but insurance isn't covering it.
04-Dec-2017 23:25

## 2021-07-20 NOTE — ED PROVIDER NOTE - NS ED MD DISPO DISCHARGE
Pt repositions self in bed and is assisted with straightening linens. Pt states she is waiting for breakfast. Returns quickly to resting with eyes closed, audibile snoring observed. Close observation maintained.       Juan Ornelas RN  07/20/21 [id number] Home

## 2021-07-21 LAB — GI PCR PANEL, STOOL: NORMAL

## 2021-07-22 NOTE — ED PROVIDER NOTE - CPE EDP NEURO NORM
Plan: [facility] in [place] was recommended for the patient to schedule a consultation with for possible treatment Plan: [facility] in [place] was recommended for the patient to schedule a consultation with for possible treatment normal...

## 2021-08-02 ENCOUNTER — APPOINTMENT (OUTPATIENT)
Dept: ULTRASOUND IMAGING | Facility: CLINIC | Age: 46
End: 2021-08-02
Payer: MEDICARE

## 2021-08-02 ENCOUNTER — APPOINTMENT (OUTPATIENT)
Dept: MAMMOGRAPHY | Facility: CLINIC | Age: 46
End: 2021-08-02
Payer: MEDICARE

## 2021-08-02 ENCOUNTER — OUTPATIENT (OUTPATIENT)
Dept: OUTPATIENT SERVICES | Facility: HOSPITAL | Age: 46
LOS: 1 days | End: 2021-08-02
Payer: COMMERCIAL

## 2021-08-02 DIAGNOSIS — Z90.49 ACQUIRED ABSENCE OF OTHER SPECIFIED PARTS OF DIGESTIVE TRACT: Chronic | ICD-10-CM

## 2021-08-02 DIAGNOSIS — Z00.8 ENCOUNTER FOR OTHER GENERAL EXAMINATION: ICD-10-CM

## 2021-08-02 DIAGNOSIS — N64.4 MASTODYNIA: ICD-10-CM

## 2021-08-02 PROCEDURE — G0279: CPT | Mod: 26

## 2021-08-02 PROCEDURE — G0279: CPT

## 2021-08-02 PROCEDURE — 76641 ULTRASOUND BREAST COMPLETE: CPT

## 2021-08-02 PROCEDURE — 77066 DX MAMMO INCL CAD BI: CPT | Mod: 26

## 2021-08-02 PROCEDURE — 76641 ULTRASOUND BREAST COMPLETE: CPT | Mod: 26,50

## 2021-08-02 PROCEDURE — 77066 DX MAMMO INCL CAD BI: CPT

## 2021-08-03 ENCOUNTER — RESULT REVIEW (OUTPATIENT)
Age: 46
End: 2021-08-03

## 2021-08-04 ENCOUNTER — OUTPATIENT (OUTPATIENT)
Dept: OUTPATIENT SERVICES | Facility: HOSPITAL | Age: 46
LOS: 1 days | Discharge: HOME | End: 2021-08-04

## 2021-08-04 DIAGNOSIS — D47.3 ESSENTIAL (HEMORRHAGIC) THROMBOCYTHEMIA: ICD-10-CM

## 2021-08-04 DIAGNOSIS — Z90.49 ACQUIRED ABSENCE OF OTHER SPECIFIED PARTS OF DIGESTIVE TRACT: Chronic | ICD-10-CM

## 2021-08-05 ENCOUNTER — RESULT REVIEW (OUTPATIENT)
Age: 46
End: 2021-08-05

## 2021-08-05 ENCOUNTER — APPOINTMENT (OUTPATIENT)
Dept: HEMATOLOGY ONCOLOGY | Facility: CLINIC | Age: 46
End: 2021-08-05
Payer: MEDICARE

## 2021-08-05 VITALS
OXYGEN SATURATION: 99 % | WEIGHT: 103 LBS | TEMPERATURE: 97.4 F | SYSTOLIC BLOOD PRESSURE: 132 MMHG | DIASTOLIC BLOOD PRESSURE: 86 MMHG | HEART RATE: 99 BPM | RESPIRATION RATE: 16 BRPM | HEIGHT: 61 IN | BODY MASS INDEX: 19.45 KG/M2

## 2021-08-05 DIAGNOSIS — D64.9 ANEMIA, UNSPECIFIED: ICD-10-CM

## 2021-08-05 LAB
BASOPHILS # BLD AUTO: 0.02 K/UL — SIGNIFICANT CHANGE UP (ref 0–0.2)
BASOPHILS NFR BLD AUTO: 0.2 % — SIGNIFICANT CHANGE UP (ref 0–2)
EOSINOPHIL # BLD AUTO: 0.03 K/UL — SIGNIFICANT CHANGE UP (ref 0–0.5)
EOSINOPHIL NFR BLD AUTO: 0.3 % — SIGNIFICANT CHANGE UP (ref 0–6)
HCT VFR BLD CALC: 39.7 % — SIGNIFICANT CHANGE UP (ref 34.5–45)
HGB BLD-MCNC: 13.8 G/DL — SIGNIFICANT CHANGE UP (ref 11.5–15.5)
IMM GRANULOCYTES NFR BLD AUTO: 0.3 % — SIGNIFICANT CHANGE UP (ref 0–1.5)
LYMPHOCYTES # BLD AUTO: 1.74 K/UL — SIGNIFICANT CHANGE UP (ref 1–3.3)
LYMPHOCYTES # BLD AUTO: 18.8 % — SIGNIFICANT CHANGE UP (ref 13–44)
MCHC RBC-ENTMCNC: 31 PG — SIGNIFICANT CHANGE UP (ref 27–34)
MCHC RBC-ENTMCNC: 34.8 G/DL — SIGNIFICANT CHANGE UP (ref 32–36)
MCV RBC AUTO: 89.2 FL — SIGNIFICANT CHANGE UP (ref 80–100)
MONOCYTES # BLD AUTO: 0.57 K/UL — SIGNIFICANT CHANGE UP (ref 0–0.9)
MONOCYTES NFR BLD AUTO: 6.2 % — SIGNIFICANT CHANGE UP (ref 2–14)
NEUTROPHILS # BLD AUTO: 6.87 K/UL — SIGNIFICANT CHANGE UP (ref 1.8–7.4)
NEUTROPHILS NFR BLD AUTO: 74.2 % — SIGNIFICANT CHANGE UP (ref 43–77)
NRBC # BLD: 0 /100 WBCS — SIGNIFICANT CHANGE UP (ref 0–0)
PLATELET # BLD AUTO: 85 K/UL — LOW (ref 150–400)
PLATELET # BLD MANUAL: 79 K/UL — LOW (ref 150–400)
RBC # BLD: 4.45 M/UL — SIGNIFICANT CHANGE UP (ref 3.8–5.2)
RBC # FLD: 12.3 % — SIGNIFICANT CHANGE UP (ref 10.3–14.5)
WBC # BLD: 9.26 K/UL — SIGNIFICANT CHANGE UP (ref 3.8–10.5)
WBC # FLD AUTO: 9.26 K/UL — SIGNIFICANT CHANGE UP (ref 3.8–10.5)

## 2021-08-05 PROCEDURE — 99213 OFFICE O/P EST LOW 20 MIN: CPT

## 2021-08-05 NOTE — PATIENT PROFILE ADULT. - NS SC CAGE ALCOHOL ANNOYED YOU
Health Maintenance Due   Topic Date Due   • Pneumococcal Vaccine 0-64 (1 of 2 - PPSV23) Never done   • COVID-19 Vaccine (1) Never done   • Influenza Vaccine (1) 08/01/2021       Patient is due for topics as listed above but is not proceeding with Immunization(s) COVID-19 and Pneumococcal at this time.         
SUBJECTIVE:   Presents to the office with a rash that developed over his right hand over the dorsal aspect lateral component.  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  Describes as warm raised area.  Feels that has been spreading.  No weakness numbness or tingling the arms.  No other issues or concerns.  He has been having chronic issues with some back pain discomfort.  Due to the significant issues with his back had he is looking to be evaluated for disability.  He has been working with pain management and his surgeon in regards to this.    Allergies, Medications, Problem List, Past Medical and Surgical Histories:  As reviewed in EPIC.     PHYSICAL EXAMINATION:   GENERAL:  Pleasant 40 year old male    VITAL SIGNS:  As noted.   HEENT:  Oropharynx with no lesions.   NECK:  Supple.  Carotids 2+.  No bruits, lymphadenopathy or thyromegaly.   CARDIAC:  Regular rate and rhythm.  Normal S1, S2.  No rubs, murmurs or gallops.   LUNGS:  Clear to auscultation and percussion.   ABDOMEN:  Bowel sounds normoactive.  Soft, nontender, nondistended.   EXTREMITIES:  No clubbing, cyanosis or edema.  Right hand posterior aspect lateral region showed a raise red rash probable tinea    ASSESSMENT AND PLAN:   Rash  Discussed with patient issues concerns.  Lotrisone lotion twice daily to the area.  If not improving have asked that he contact.      
no

## 2021-08-09 NOTE — CONSULT LETTER
[Dear  ___] : Dear ~CYN, [Courtesy Letter:] : I had the pleasure of seeing your patient, [unfilled], in my office today. [Please see my note below.] : Please see my note below. [Sincerely,] : Sincerely, [FreeTextEntry2] : Cristhian Parker MD [FreeTextEntry3] : Nakul\par Attila Zamora M.D., FACP\par Professor of Medicine\par [employer]\par Associate Chief, Division of Hematology\par [facility]\par [facility]\par [address]\[place] [address]\par [phone]\par \par \par \par

## 2021-08-09 NOTE — HISTORY OF PRESENT ILLNESS
[de-identified] : Idiopathic thrombocytopenic purpura\par 11/18 Left IJ thrombosis\par \par \par  [de-identified] : Heavy menstrual cycles persist. Has occasional HAs of various types.She notes no visual problems, limb pain/swelling, fevers, swollen glands. Remains with occasional night sweats. Weight is stable. Pain reports weakness and pain in L breast for a few months-had US of breast which showed 4 mm lymph node.  Has had numerous bouts with C diff colitis-being followed by Jose Armando Ashby Dr, ID; completed course of Fidaxomicin.  Wasl lso found to have positive Shigella toxin but that is just being monitored for now, pt denies diarrhea currently.  Pt refuses colonoscopy at this time.

## 2021-08-09 NOTE — PHYSICAL EXAM
[Fully active, able to carry on all pre-disease performance without restriction] : Status 0 - Fully active, able to carry on all pre-disease performance without restriction [Thin] : thin [Normal] : affect appropriate [de-identified] : Tattoos. Scattered bruises on all limbs, fading.

## 2021-08-09 NOTE — REVIEW OF SYSTEMS
[Night Sweats] : night sweats [Fatigue] : fatigue [Dysmenorrhea/Abn Vaginal Bleeding] : dysmenorrhea/abnormal vaginal bleeding [Joint Pain] : joint pain [Easy Bruising] : a tendency for easy bruising [Negative] : Allergic/Immunologic [FreeTextEntry9] : pain in L breast

## 2021-08-09 NOTE — ASSESSMENT
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 Has 4 mm lymph node of L breast with pain.  Has finished treatment for C diff colitis.  Pt requesting multiple blood tests to be done.  Care discussed with Dr Zamora-will recommend breast surgeon Dr Albertina Rahman-contact info provided to patient.  Pt is hematologically stable at this time.\par \par Suggest :\par Reassure\par Avoid trauma\par PT/INR, APTT\par Will send pt results when available.\par

## 2021-08-10 ENCOUNTER — NON-APPOINTMENT (OUTPATIENT)
Age: 46
End: 2021-08-10

## 2021-08-11 ENCOUNTER — LABORATORY RESULT (OUTPATIENT)
Age: 46
End: 2021-08-11

## 2021-08-11 ENCOUNTER — APPOINTMENT (OUTPATIENT)
Dept: SURGICAL ONCOLOGY | Facility: CLINIC | Age: 46
End: 2021-08-11
Payer: MEDICARE

## 2021-08-11 VITALS
OXYGEN SATURATION: 98 % | DIASTOLIC BLOOD PRESSURE: 82 MMHG | BODY MASS INDEX: 19.26 KG/M2 | WEIGHT: 102 LBS | HEART RATE: 79 BPM | SYSTOLIC BLOOD PRESSURE: 126 MMHG | TEMPERATURE: 98.1 F | HEIGHT: 61 IN | RESPIRATION RATE: 16 BRPM

## 2021-08-11 PROCEDURE — 99205 OFFICE O/P NEW HI 60 MIN: CPT

## 2021-08-11 NOTE — ED ADULT NURSE REASSESSMENT NOTE - NS ED NURSE REASSESS COMMENT FT1
no
Bladder scanned pt after voiding. 23ml noted on bladder scanner - Dr. Stout aware. MARIAELENA Garcias aware. Will continue to monitor. Pt is in no current distress. On cellphone in stretcher with both rails up and at lowest position.
MANUEL Gary, spoke with patient. Patient requesting d/c instructions so she can leave. MD Godinez completing d/c instructions at this time.
Patient resting comfortably in bed with IV fluids runnings. MD Godinez spoke with patient's cardiologist who will come speak to patient about d/c plan. Patient in no acute distress at this time.
Patient resting in bed on cell phone. Patient reports IV Zofran "affects her heart", MD Godinez to order PO Zofran. Patient expressing concerns re: EKG, notified MD Godinez. Patient VSS at this time, in no acute distress.
cardiology at bedside.

## 2021-08-12 LAB
BASOPHILS # BLD AUTO: 0.02 K/UL
BASOPHILS NFR BLD AUTO: 0.2 %
EOSINOPHIL # BLD AUTO: 0.03 K/UL
EOSINOPHIL NFR BLD AUTO: 0.3 %
HCT VFR BLD CALC: 38.6 %
HGB BLD-MCNC: 13.2 G/DL
IMM GRANULOCYTES NFR BLD AUTO: 0.5 %
LYMPHOCYTES # BLD AUTO: 2.37 K/UL
LYMPHOCYTES NFR BLD AUTO: 22.3 %
MAN DIFF?: NORMAL
MCHC RBC-ENTMCNC: 30.8 PG
MCHC RBC-ENTMCNC: 34.2 GM/DL
MCV RBC AUTO: 90.2 FL
MONOCYTES # BLD AUTO: 0.74 K/UL
MONOCYTES NFR BLD AUTO: 7 %
NEUTROPHILS # BLD AUTO: 7.42 K/UL
NEUTROPHILS NFR BLD AUTO: 69.7 %
PLATELET # BLD AUTO: 77 K/UL
RBC # BLD: 4.28 M/UL
RBC # FLD: 12.9 %
WBC # FLD AUTO: 10.63 K/UL

## 2021-08-13 ENCOUNTER — NON-APPOINTMENT (OUTPATIENT)
Age: 46
End: 2021-08-13

## 2021-08-19 ENCOUNTER — RESULT REVIEW (OUTPATIENT)
Age: 46
End: 2021-08-19

## 2021-08-19 ENCOUNTER — APPOINTMENT (OUTPATIENT)
Dept: CT IMAGING | Facility: IMAGING CENTER | Age: 46
End: 2021-08-19
Payer: MEDICARE

## 2021-08-19 ENCOUNTER — APPOINTMENT (OUTPATIENT)
Dept: HEMATOLOGY ONCOLOGY | Facility: CLINIC | Age: 46
End: 2021-08-19

## 2021-08-19 ENCOUNTER — OUTPATIENT (OUTPATIENT)
Dept: OUTPATIENT SERVICES | Facility: HOSPITAL | Age: 46
LOS: 1 days | End: 2021-08-19
Payer: COMMERCIAL

## 2021-08-19 DIAGNOSIS — R14.0 ABDOMINAL DISTENSION (GASEOUS): ICD-10-CM

## 2021-08-19 DIAGNOSIS — Z00.8 ENCOUNTER FOR OTHER GENERAL EXAMINATION: ICD-10-CM

## 2021-08-19 DIAGNOSIS — Z90.49 ACQUIRED ABSENCE OF OTHER SPECIFIED PARTS OF DIGESTIVE TRACT: Chronic | ICD-10-CM

## 2021-08-19 LAB
BASOPHILS # BLD AUTO: 0.02 K/UL — SIGNIFICANT CHANGE UP (ref 0–0.2)
BASOPHILS NFR BLD AUTO: 0.2 % — SIGNIFICANT CHANGE UP (ref 0–2)
EOSINOPHIL # BLD AUTO: 0.03 K/UL — SIGNIFICANT CHANGE UP (ref 0–0.5)
EOSINOPHIL NFR BLD AUTO: 0.3 % — SIGNIFICANT CHANGE UP (ref 0–6)
HCT VFR BLD CALC: 36.9 % — SIGNIFICANT CHANGE UP (ref 34.5–45)
HGB BLD-MCNC: 12.9 G/DL — SIGNIFICANT CHANGE UP (ref 11.5–15.5)
IMM GRANULOCYTES NFR BLD AUTO: 0.7 % — SIGNIFICANT CHANGE UP (ref 0–1.5)
LYMPHOCYTES # BLD AUTO: 1.4 K/UL — SIGNIFICANT CHANGE UP (ref 1–3.3)
LYMPHOCYTES # BLD AUTO: 12 % — LOW (ref 13–44)
MCHC RBC-ENTMCNC: 31.1 PG — SIGNIFICANT CHANGE UP (ref 27–34)
MCHC RBC-ENTMCNC: 35 G/DL — SIGNIFICANT CHANGE UP (ref 32–36)
MCV RBC AUTO: 88.9 FL — SIGNIFICANT CHANGE UP (ref 80–100)
MONOCYTES # BLD AUTO: 0.76 K/UL — SIGNIFICANT CHANGE UP (ref 0–0.9)
MONOCYTES NFR BLD AUTO: 6.5 % — SIGNIFICANT CHANGE UP (ref 2–14)
NEUTROPHILS # BLD AUTO: 9.37 K/UL — HIGH (ref 1.8–7.4)
NEUTROPHILS NFR BLD AUTO: 80.3 % — HIGH (ref 43–77)
NRBC # BLD: 0 /100 WBCS — SIGNIFICANT CHANGE UP (ref 0–0)
PLATELET # BLD AUTO: 88 K/UL — LOW (ref 150–400)
RBC # BLD: 4.15 M/UL — SIGNIFICANT CHANGE UP (ref 3.8–5.2)
RBC # FLD: 12.6 % — SIGNIFICANT CHANGE UP (ref 10.3–14.5)
WBC # BLD: 11.66 K/UL — HIGH (ref 3.8–10.5)
WBC # FLD AUTO: 11.66 K/UL — HIGH (ref 3.8–10.5)

## 2021-08-19 PROCEDURE — 74176 CT ABD & PELVIS W/O CONTRAST: CPT | Mod: 26

## 2021-08-19 PROCEDURE — 71250 CT THORAX DX C-: CPT | Mod: 26

## 2021-08-19 PROCEDURE — 71250 CT THORAX DX C-: CPT

## 2021-08-19 PROCEDURE — 74176 CT ABD & PELVIS W/O CONTRAST: CPT

## 2021-08-25 ENCOUNTER — NON-APPOINTMENT (OUTPATIENT)
Age: 46
End: 2021-08-25

## 2021-08-26 ENCOUNTER — APPOINTMENT (OUTPATIENT)
Dept: HEMATOLOGY ONCOLOGY | Facility: CLINIC | Age: 46
End: 2021-08-26

## 2021-08-27 LAB
ALBUMIN SERPL ELPH-MCNC: 4.9 G/DL
ALP BLD-CCNC: 95 U/L
ALT SERPL-CCNC: 12 U/L
ANION GAP SERPL CALC-SCNC: 13 MMOL/L
APTT BLD: 25.8 SEC
AST SERPL-CCNC: 17 U/L
BILIRUB SERPL-MCNC: 0.4 MG/DL
BUN SERPL-MCNC: 7 MG/DL
CALCIUM SERPL-MCNC: 9.4 MG/DL
CANCER AG125 SERPL-ACNC: 17 U/ML
CHLORIDE SERPL-SCNC: 105 MMOL/L
CO2 SERPL-SCNC: 21 MMOL/L
CREAT SERPL-MCNC: 0.76 MG/DL
DEPRECATED D DIMER PPP IA-ACNC: <150 NG/ML DDU
GLUCOSE SERPL-MCNC: 83 MG/DL
INR PPP: 0.9 RATIO
LDH SERPL-CCNC: 254 U/L
PLATELET # PLAS AUTO: 75 K/UL
POTASSIUM SERPL-SCNC: 4.2 MMOL/L
PROLACTIN SERPL-MCNC: 14.7 NG/ML
PROT S AG ACT/NOR PPP IA: 59 %
PROT SERPL-MCNC: 7.2 G/DL
PT BLD: 10.8 SEC
SODIUM SERPL-SCNC: 139 MMOL/L
TROPONIN I SERPL-MCNC: <0.01 NG/ML
TSH SERPL-ACNC: 1.7 UIU/ML

## 2021-08-29 NOTE — HISTORY OF PRESENT ILLNESS
[de-identified] : 45 year-old female presents for an initial consultation, referred by Dr. Sabina Pitt for an intramammary lymph node in the left breast seen on mammogram as well as left nipple discoloration.  The pt. has an extensive past medical history including ITP, recurrent c diff infection as well as non-specific musculoskeletal and abdominal complaints. She is currently being followed by Dr. Zamora of heme-onc, Dr. Borden of ID and GI.\par She complains of tender nodularity in the left upper outer quadrant but denies any nipple discharge.\par Family history significant for her mother who had multiple skin cancers including melanoma, father who had prostate cancer, paternal grandfather who had colon cancer, and a maternal uncle who had brain cancer.\par No family history of breast or ovarian cancer.\par \par Mammogram and breast ultrasound August 3, 2021 revealed a 3 mm intramammary lymph node in the left lateral breast seen on both mammogram and breast ultrasound -BI-RADS 2.\par \par She is being followed by Dr. Zamora of hematology/oncology for her ITP and is on multiple medications for her hypertension, asthma and anxiety.\par \par

## 2021-08-29 NOTE — ASSESSMENT
[FreeTextEntry1] : Intramammary lymph node left lateral breast\par BI-RADS 2 imaging\par Reassured patient index of suspicion for breast cancer is low\par Patient concerned about normal but rising recent WBC -we will order repeat CBC with differential\par F/U Dr. Borden of ID if WBC continues to rise given hx c diff although doubt active infection\par Patient also concerned about having an occult cancer given her strong family history along with her nonspecific complaints - will order CT scan of the chest abdomen and pelvis\par Genetic testing performed given her strong family history of cancer\par All questions answered satisfactorily\par

## 2021-08-29 NOTE — CONSULT LETTER
[Dear  ___] : Dear  [unfilled], [Consult Letter:] : I had the pleasure of evaluating your patient, [unfilled]. [Please see my note below.] : Please see my note below. [Sincerely,] : Sincerely, [FreeTextEntry3] : Shahriar Pearson MD FACS

## 2021-08-29 NOTE — PHYSICAL EXAM
[Normal] : supple, no neck mass and thyroid not enlarged [Normal Neck Lymph Nodes] : normal neck lymph nodes  [Normal Supraclavicular Lymph Nodes] : normal supraclavicular lymph nodes [Normal Groin Lymph Nodes] : normal groin lymph nodes [Normal Axillary Lymph Nodes] : normal axillary lymph nodes [Normal] : oriented to person, place and time, with appropriate affect [de-identified] : Tender nodularity left upper outer quadrant, no dominant masses or axillary adenopathy bilaterally, normal-appearing nipple areolar complex bilaterally, no significant nipple discoloration, lesions or discharge bilaterally.

## 2021-09-03 NOTE — ED ADULT NURSE NOTE - CAS DISCH TRANSFER METHOD
Notification Instructions: Patient will be notified of biopsy results. However, patient instructed to call the office if not contacted within 2 weeks. Private car

## 2021-09-13 ENCOUNTER — APPOINTMENT (OUTPATIENT)
Dept: OTOLARYNGOLOGY | Facility: CLINIC | Age: 46
End: 2021-09-13
Payer: MEDICARE

## 2021-09-13 VITALS
DIASTOLIC BLOOD PRESSURE: 98 MMHG | SYSTOLIC BLOOD PRESSURE: 133 MMHG | HEART RATE: 93 BPM | BODY MASS INDEX: 18.91 KG/M2 | HEIGHT: 61 IN | WEIGHT: 100.13 LBS | TEMPERATURE: 98 F

## 2021-09-13 DIAGNOSIS — R07.0 PAIN IN THROAT: ICD-10-CM

## 2021-09-13 DIAGNOSIS — J35.8 OTHER CHRONIC DISEASES OF TONSILS AND ADENOIDS: ICD-10-CM

## 2021-09-13 PROCEDURE — 99204 OFFICE O/P NEW MOD 45 MIN: CPT

## 2021-09-13 NOTE — CONSULT LETTER
[Dear  ___] : Dear  [unfilled], [Consult Letter:] : I had the pleasure of evaluating your patient, [unfilled]. [Please see my note below.] : Please see my note below. [Consult Closing:] : Thank you very much for allowing me to participate in the care of this patient.  If you have any questions, please do not hesitate to contact me. [Sincerely,] : Sincerely, [FreeTextEntry3] : Herman Hein MD\par [facility]\par Otolaryngology and Facial Plastics\par Associated Professor, [employer]\par

## 2021-09-13 NOTE — END OF VISIT
[FreeTextEntry3] : I saw and examined this patient in person. I have discussed with Meghan Baird, Physician Assistant, in detail the above note and agree with the above assessment and plan of care.\par

## 2021-09-13 NOTE — ASSESSMENT
[FreeTextEntry1] : Patient concerned because of some whitish collection in her right tonsillar fossa she is wondering if it could be herpes on examination her tonsil fossa looks fine there is some remnant of right tonsil tissue with a crevice which is causing accumulation of dry mucous which is the etiology of her findings she was reassured there is no pathology here to be concerned about at her insistence we cultured both her right and left tonsil reassured her there is no evidence of any herpes or any lesions that are of concern neck examination was also perfectly normal all of patient's questions were answered we will contact her once we get the results of her throat cultures.

## 2021-09-13 NOTE — PHYSICAL EXAM
[Midline] : trachea located in midline position [Normal] : no rashes [de-identified] : small white area- appears to be tonsillolith in crypt / scar tissue from tonsil

## 2021-09-13 NOTE — HISTORY OF PRESENT ILLNESS
[de-identified] : Patient is only half vaccinated and has underlying Sjogrens, ITP and dysautonomia, low protein S (clotting issues), has a spot on the back of the tonsil that is raised and white. She is concerned for herpes infection or other kind of throat infection. She states that she has had recent blood work that showed her white blood cells were "off". She does not have any issues eating, drinking or swallowing. She has minor throat pain as well recently. She states that she has a family history of many different cancers. She also thinks she has had swollen lymph nodes in her neck

## 2021-09-15 ENCOUNTER — NON-APPOINTMENT (OUTPATIENT)
Age: 46
End: 2021-09-15

## 2021-09-16 LAB
BACTERIA THROAT CULT: NORMAL
BACTERIA THROAT CULT: NORMAL

## 2021-09-28 ENCOUNTER — RESULT REVIEW (OUTPATIENT)
Age: 46
End: 2021-09-28

## 2021-10-01 PROCEDURE — G9005: CPT

## 2021-10-14 PROBLEM — N92.0 MENORRHAGIA: Status: ACTIVE | Noted: 2021-10-14

## 2021-10-14 PROBLEM — A04.72 C. DIFFICILE DIARRHEA: Status: ACTIVE | Noted: 2021-06-18

## 2021-10-15 ENCOUNTER — APPOINTMENT (OUTPATIENT)
Dept: SURGICAL ONCOLOGY | Facility: CLINIC | Age: 46
End: 2021-10-15
Payer: MEDICARE

## 2021-10-15 VITALS
BODY MASS INDEX: 18.88 KG/M2 | OXYGEN SATURATION: 98 % | RESPIRATION RATE: 16 BRPM | WEIGHT: 100 LBS | DIASTOLIC BLOOD PRESSURE: 86 MMHG | SYSTOLIC BLOOD PRESSURE: 120 MMHG | HEIGHT: 61 IN | HEART RATE: 89 BPM

## 2021-10-15 DIAGNOSIS — N60.19 DIFFUSE CYSTIC MASTOPATHY OF UNSPECIFIED BREAST: ICD-10-CM

## 2021-10-15 PROCEDURE — 99214 OFFICE O/P EST MOD 30 MIN: CPT

## 2021-10-15 NOTE — ASSESSMENT
[FreeTextEntry1] : Intramammary lymph node left lateral breast\par BI-RADS 2 imaging\par Lymph node not definitively seen on ultrasound today in the office\par Will get official left breast ultrasound November 2021\par Reassured patient index of suspicion for breast cancer or breast infection is low\par Intermittent left breast pain likely related to fibrocystic changes -recommend vitamin E and/or primrose oil as needed\par Patient scheduled to see Dr. Flores of gynecologic oncology next week to discuss endometrial biopsy results which were negative for atypia\par RTO 3 months\par

## 2021-10-15 NOTE — HISTORY OF PRESENT ILLNESS
[de-identified] : 45 year-old female presents for a follow up consultation, referred by Dr. Sabina Pitt for an intramammary lymph node in the left breast seen on mammogram as well as left nipple discoloration.  \par \par CT C/A/P performed on 8/11/21 revealed no abnormalities. \par Genetic testing was negative (VUS in RET gene).\par \par Today she notes having had a recent "abnormal endometrial biopsy."  She is scheduled to see Dr. Albert Flores from gyn oncology next week for further evaluation.  In terms of her breasts, she continues to note a "red line" on the skin of the left breast.  It varies from day to day, today it is particularly faint.  She denies any breast mass or nipple discharge.  She is concerned about an intramammary lymph node that was noted on prior breast imaging (although report states that it appears benign).  She returns today for an exam in order to rule out breast infection or enlarged lymph nodes in her chest.  She is generally concerned because her platelet counts have been more abnormal than usual and believes this may be related to the issues she is having with her uterus and breasts. \par \par Previous pertinent history:\par The pt. has an extensive past medical history including ITP, recurrent c diff infection as well as non-specific musculoskeletal and abdominal complaints. She is currently being followed by Dr. Zamora of heme-onc, Dr. Borden of ID and GI.\par She complains of tender nodularity in the left upper outer quadrant but denies any nipple discharge.\par \par Family history significant for her mother who had multiple skin cancers including melanoma, father who had prostate cancer, paternal grandfather who had colon cancer, and a maternal uncle who had brain cancer.\par No family history of breast or ovarian cancer.\par \par Mammogram and breast ultrasound August 3, 2021 revealed a 3 mm intramammary lymph node in the left lateral breast seen on both mammogram and breast ultrasound -BI-RADS 2.\par \par She is being followed by Dr. Zamora of hematology/oncology for her ITP and is on multiple medications for her hypertension, asthma and anxiety.\par

## 2021-10-15 NOTE — PHYSICAL EXAM
[Normal] : supple, no neck mass and thyroid not enlarged [Normal Neck Lymph Nodes] : normal neck lymph nodes  [Normal Supraclavicular Lymph Nodes] : normal supraclavicular lymph nodes [Normal Axillary Lymph Nodes] : normal axillary lymph nodes [Normal] : oriented to person, place and time, with appropriate affect [de-identified] : No breast masses or adenopathy bilaterally, no red streaking seen on exam today, ultrasound does not identify previously seen intramammary lymph nodes left breast 1:00

## 2021-10-19 ENCOUNTER — APPOINTMENT (OUTPATIENT)
Dept: GYNECOLOGIC ONCOLOGY | Facility: CLINIC | Age: 46
End: 2021-10-19
Payer: MEDICARE

## 2021-10-19 VITALS
HEART RATE: 83 BPM | WEIGHT: 101 LBS | HEIGHT: 61 IN | BODY MASS INDEX: 19.07 KG/M2 | DIASTOLIC BLOOD PRESSURE: 81 MMHG | SYSTOLIC BLOOD PRESSURE: 118 MMHG

## 2021-10-19 DIAGNOSIS — N85.01 BENIGN ENDOMETRIAL HYPERPLASIA: ICD-10-CM

## 2021-10-19 DIAGNOSIS — D25.9 LEIOMYOMA OF UTERUS, UNSPECIFIED: ICD-10-CM

## 2021-10-19 DIAGNOSIS — E78.5 HYPERLIPIDEMIA, UNSPECIFIED: ICD-10-CM

## 2021-10-19 DIAGNOSIS — I10 ESSENTIAL (PRIMARY) HYPERTENSION: ICD-10-CM

## 2021-10-19 DIAGNOSIS — R39.9 UNSPECIFIED SYMPTOMS AND SIGNS INVOLVING THE GENITOURINARY SYSTEM: ICD-10-CM

## 2021-10-19 DIAGNOSIS — N92.0 EXCESSIVE AND FREQUENT MENSTRUATION WITH REGULAR CYCLE: ICD-10-CM

## 2021-10-19 DIAGNOSIS — N94.6 DYSMENORRHEA, UNSPECIFIED: ICD-10-CM

## 2021-10-19 DIAGNOSIS — A04.72 ENTEROCOLITIS DUE TO CLOSTRIDIUM DIFFICILE, NOT SPECIFIED AS RECURRENT: ICD-10-CM

## 2021-10-19 PROCEDURE — 99205 OFFICE O/P NEW HI 60 MIN: CPT

## 2021-10-19 RX ORDER — SIMETHICONE 180 MG
CAPSULE ORAL
Refills: 0 | Status: DISCONTINUED | COMMUNITY
End: 2021-10-19

## 2021-10-19 RX ORDER — MENTHOL 5.8 MG/1
100 LOZENGE ORAL
Qty: 90 | Refills: 0 | Status: DISCONTINUED | COMMUNITY
Start: 2019-10-22 | End: 2021-10-19

## 2021-10-21 LAB
APPEARANCE: CLEAR
BILIRUBIN URINE: NEGATIVE
BLOOD URINE: NEGATIVE
COLOR: COLORLESS
GLUCOSE QUALITATIVE U: NEGATIVE
KETONES URINE: NEGATIVE
LEUKOCYTE ESTERASE URINE: NEGATIVE
NITRITE URINE: NEGATIVE
PH URINE: 7
PROTEIN URINE: NEGATIVE
SPECIFIC GRAVITY URINE: 1
UROBILINOGEN URINE: NORMAL

## 2021-10-22 LAB — BACTERIA UR CULT: ABNORMAL

## 2021-10-28 NOTE — ED ADULT NURSE NOTE - OBJECTIVE STATEMENT
General Surgery Daily Progress Note    Patient: Akanksha Maxwell MRN: [id number]  SSN: [id number]    YOB: 1951  Age: 77 y.o. Sex: male      Admit Date: 10/24/2021    POD * No surgery found *    Procedure: * No surgery found *    Subjective:   Patient states pain from LLE slightly improved    Current Facility-Administered Medications   Medication Dose Route Frequency    oxyCODONE-acetaminophen (PERCOCET) 5-325 mg per tablet 2 Tablet  2 Tablet Oral Q6H PRN    DAPTOmycin (CUBICIN) 700 mg in 0.9% sodium chloride 14 mL IV Syringe  700 mg IntraVENous Q48H    clindamycin (CLEOCIN) 900mg NS 50mL IVPB (premix)  900 mg IntraVENous Q8H    lactobacillus-acidophilus (LACTINEX) 1 Packet  1 Packet Oral BID    NIFEdipine ER (PROCARDIA XL) tablet 60 mg  60 mg Oral DAILY    cefepime (MAXIPIME) 2 g in sterile water (preservative free) 10 mL IV syringe  2 g IntraVENous Q24H    apixaban (ELIQUIS) tablet 5 mg  5 mg Oral BID    calcitRIOL (ROCALTROL) capsule 0.25 mcg  0.25 mcg Oral DAILY    carvediloL (COREG) tablet 12.5 mg  12.5 mg Oral BID WITH MEALS    cloNIDine HCL (CATAPRES) tablet 0.2 mg  0.2 mg Oral BID    fluticasone propionate (FLONASE) 50 mcg/actuation nasal spray 2 Spray  2 Spray Both Nostrils DAILY    sodium chloride (NS) flush 5-40 mL  5-40 mL IntraVENous Q8H    sodium chloride (NS) flush 5-40 mL  5-40 mL IntraVENous PRN    acetaminophen (TYLENOL) tablet 650 mg  650 mg Oral Q6H PRN    Or    acetaminophen (TYLENOL) suppository 650 mg  650 mg Rectal Q6H PRN    polyethylene glycol (MIRALAX) packet 17 g  17 g Oral DAILY PRN    ondansetron (ZOFRAN ODT) tablet 4 mg  4 mg Oral Q8H PRN    Or    ondansetron (ZOFRAN) injection 4 mg  4 mg IntraVENous Q6H PRN        Objective:   No intake/output data recorded. 10/26 1901 - 10/28 0700  In: 300 [P.O.:200;  I.V.:100]  Out: 425 [Urine:425]  Patient Vitals for the past 8 hrs:   BP Temp Pulse Resp SpO2   10/28/21 1206 (!) 143/97 98.8 °F (37.1 °C) 83 24 99 % 10/28/21 0842 (!) 145/80 98.4 °F (36.9 °C) 90 22 96 %       Physical Exam:  General: Alert, cooperative, NAD  Lungs: Unlabored  Extremities: Upper extrem normal, bilat lower extremities appear to be discolored from upper shin to ankles with bilat pitting edema.  LLE appearing much more erythematous and swollen as well as some light pink discoloring extends up to mid medial thigh, extrem tenderness throughout calf area mainly. Redness from thigh appears t b going down, and tenderness in thigh area improved  Skin:  Warm and dry, no rash    Labs:   Recent Labs     10/28/21  0428   WBC 8.2   HGB 10.6*   HCT 32.2*   *     Recent Labs     10/28/21  0428 10/27/21  0436 10/26/21  0156      < > 138   K 5.1   < > 5.2*   *   < > 112*   CO2 18*   < > 18*   *   < > 108*   BUN 79*   < > 84*   CREA 4.81*   < > 4.64*   CA 8.2*   < > 8.3*   ALB  --   --  2.2*   TBILI  --   --  0.6   ALT  --   --  20    < > = values in this interval not displayed.        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  Pt has hx anxiety, depression, ITP, POTS, dysautonomia, HTN, IBS.  Pt presents c/o palpitations since earlier today.  Patient is A&Ox4. Face is symmetrical. PERRL 3mmB. Speech is clear. Patient is moving all extremities with 5/5 strength and walks with steady gait.   EKG complete, NSR, pt placed on cardiac monitor.  VSS 41 y/o F presents to the ED c/o palpitations.  Pt has hx anxiety, depression, ITP, POTS, dysautonomia, HTN, IBS.  Pt presents c/o palpitations since earlier today.  Pt states at home her BP was 150/110 and her HR was in the 130's.  Pt states she has some mild SOB and states she has "wheezes".  Pt also states she is in "v-tach" and states she is anxious.  Patient is A&Ox4. Face is symmetrical. PERRL 3mmB. Speech is clear. Patient is moving all extremities with 5/5 strength and walks with steady gait.   EKG complete, NSR, pt placed on cardiac monitor.  VSS

## 2021-10-29 ENCOUNTER — APPOINTMENT (OUTPATIENT)
Dept: GYNECOLOGIC ONCOLOGY | Facility: CLINIC | Age: 46
End: 2021-10-29

## 2021-10-31 NOTE — BEHAVIORAL HEALTH ASSESSMENT NOTE - NSBHCONSULTMEDNEW_PSY_A_CORE
[facility] Hospitalist Team  History and Physical      ASSESSMENT / PLAN:   Jason Baker  is a 73 year old male w/ pmh cad s/p cabg, DM who presented w/ chest pain, found to be in a flutter w/ rvr    A. Flutter w. RVR  - HR in 130s on initial presentation, received adenosine revealing underling A. Flutter. Pt received cardizem bolus w/ improvement in HR  - cardizem drip  - heparin drip  - cards consult    Elevated troponin  - likely demand ischemia due to above  - trend trop  - cards consult    Cad/HTN/HL - cont home meds    DM type II on insulin w/ hyperglycemia  - accucheks  - resume home insulin regimen  - add high dose SSI which is closest to SS he uses at home  - hold oral meds    DELORIS - CPAP    BPH - cont home med    Morbid obesity - will benefit from weight loss    Asthma - cont home meds      FEN  Dm diet    Prophy  -SCD    Dispo  -pending clinical course      ADOD: 1-2 days      Code status: full    PCP: Theo Martinez MD    Concerns regarding plan of care were discussed with patient. Patient agrees with plan as detailed above.       HISTORY:   CC:   Chief Complaint   Patient presents with   • Chest Pain Adult        PCP: Theo Martinez MD    History of Present Illness:   Jason Baker  is a 73 year old male w/ pmh cad s/p cabg, DM who presented w/ chest pain, found to be in a flutter w/ rvr.  Pt first presented due to chest pain. He states he had right sided chest pain in the past. Today, he also noted more left sided chest pain along w/ radiation to arm. He denies any other new sxs. He does have some baseline lightheadedness that's unchanged.      Objectives    Visit Vitals  /70   Pulse 94   Temp 98.2 °F (36.8 °C) (Oral)   Resp 16   Ht 5' 9\" (1.753 m)   Wt (!) 140.3 kg (309 lb 4.9 oz)   SpO2 97%   BMI 45.68 kg/m²       GENERAL: no apparent distress  NEUROLOGIC: A/A; Ox3: moving all extremities  SKIN: no rashes  HEENT: normocephalic, normal nose, sclera anicteric, conjunctiva normal  NECK: supple, no  JVD, no carotid bruits   RESPIRATORY: normal expansion; non labored, CTA   CARDIOVASCULAR: irregularly irregular. HR currently in 80s.  ABDOMEN:  Soft, BS+; non distended, non tender    EXTREMITIES: 1+ BLE edema, no cyanosis.    PSYCH: appropriate mood and affect    PMH  Past Medical History:   Diagnosis Date   • Arthritis    • Chronic pain     chronic pain in left knee   • Coronary artery disease    • Diabetes mellitus (CMS/HCC)    • Essential (primary) hypertension    • Fatty liver disease, nonalcoholic    • Glaucoma    • High cholesterol    • History of atrial flutter     patient states condition resolved   • History of breast lump removal     non-cancerous lump removal from right breast   • Ingrown nail     ingrown nail on left big toe surgery   • Malignant neoplasm (CMS/HCC) Janurary 2015    history of skin cancer on the arms and face   • Metabolic syndrome    • Myocardial infarction (CMS/HCC)    • Overactive bladder    • RAD (reactive airway disease)    • Retinal tear of both eyes     history    • S/P triple vessel bypass 07/05/1987   • Sleep apnea         PSH  Past Surgical History:   Procedure Laterality Date   • Aortic angiogram     • Basal cell carcinoma excision      excised from right forearm   • Cardiovascular stress test     • Carpal tunnel release     • Cholecystectomy     • Colonoscopy     • Coronary stent placement      9 stents    • Cyst removal      cyst removal from back   • Gallbladder surgery     • Joint replacement     • Squamous cell carcinoma excision      excided from left forearm   • Tonsillectomy     • Total knee arthroplasty      left knee        ALL:  ALLERGIES:   Allergen Reactions   • Chlorhexidine HIVES   • Clindamycin HIVES and PRURITUS   • Codeine HEADACHES and NAUSEA     Lightheadedness,dizziness    • Hydrocodone-Acetaminophen DIZZINESS and NAUSEA     Lightheadedness,dizziness (can take tylenol ok)   Severe headaches     • Iodinated Diagnostic Agents ANAPHYLAXIS   • Iodine  ANAPHYLAXIS     IV dye     • Liraglutide Other (See Comments)     Pancreatitis (presumed)   • Meperidine NAUSEA     Lightheadedness, dizziness    • Nitroglycerin DIZZINESS and HEADACHES     Dizziness   Severe headaches     • Penicillins RASH and Other (See Comments)     Can't remember reaction      • Shellfish-Derived Products   (Food Or Med) ANAPHYLAXIS   • Hibiclens Other (See Comments)   • Adhesive   (Environmental) Other (See Comments)   • Erythromycin PRURITUS        Home Medications:  No current facility-administered medications on file prior to encounter.  irbesartan (AVAPRO) 300 MG tablet  fluticasone-vilanterol (BREO ELLIPTA) 200-25 MCG/INH inhaler  clotrimazole-betamethasone (LOTRISONE) 1-0.05 % cream  tadalafil (CIALIS) 20 MG tablet  aspirin (ECOTRIN) 81 MG EC tablet  Dexlansoprazole 60 MG capsule  dicyclomine (BENTYL) 10 MG capsule  glimepiride (AMARYL) 2 MG tablet  hydrochlorothiazide (HYDRODIURIL) 12.5 MG tablet  insulin lispro (HUMALOG) 100 UNIT/ML injectable solution  canagliflozin (INVOKANA) 300 MG tablet  atorvastatin (LIPITOR) 20 MG tablet  latanoprost (XALATAN) 0.005 % ophthalmic solution  metoPROLOL tartrate (LOPRESSOR) 100 MG tablet  metformin (GLUCOPHAGE) 1000 MG tablet  mirabegron ER (MYRBETRIQ) 50 MG 24 hr tablet  nortriptyline (PAMELOR) 25 MG capsule  clopidogrel (PLAVIX) 75 MG tablet  albuterol 108 (90 Base) MCG/ACT inhaler  simethicone (MYLICON) 125 MG chewable tablet  fenofibrate (TRICOR) 145 MG tablet  solifenacin (VESICARE) 10 MG tablet  Diclofenac Sodium 1 % Cream  B Complex Vitamins (B COMPLEX 100 PO)  Coenzyme Q10 (COQ-10) 200 MG Cap  therapeutic multivitamin-minerals (THERAGRAN-M) tablet  Probiotic Product (PROBIOTIC-10 PO)  Vitamin A 3 mg (10,000 units) tablet  Ascorbic Acid (VITAMIN C) 500 MG tablet  Cholecalciferol (VITAMIN D) 125 MCG (5000 UT) Cap  vitamin E 400 UNIT capsule  fluticasone (FLONASE) 50 MCG/ACT nasal spray  semaglutide,0.25 or 0.5 mg/DOSE, (OZEMPIC, 0.25 OR 0.5  MG/DOSE,) (1.34 mg/ml) 0.25 or 0.5 MG/DOSE injection         Soc Hx  Social History     Tobacco Use   • Smoking status: Former Smoker   • Smokeless tobacco: Never Used   Substance Use Topics   • Alcohol use: Not Currently        Fam Hx  Family History   Problem Relation Age of Onset   • Cancer Mother    • Heart disease Father        Review of Systems  A comprehensive 10 point review of systems was completed.  Pertinent positives and negatives noted in the the HPI.      DIAGNOSTIC DATA:     Recent Results (from the past 24 hour(s))   Comprehensive Metabolic Panel    Collection Time: 10/31/21 11:54 AM   Result Value Ref Range    Fasting Status      Sodium 135 135 - 145 mmol/L    Potassium 4.2 3.4 - 5.1 mmol/L    Chloride 102 98 - 107 mmol/L    Carbon Dioxide 26 21 - 32 mmol/L    Anion Gap 11 10 - 20 mmol/L    Glucose 232 (H) 70 - 99 mg/dL    BUN 19 6 - 20 mg/dL    Creatinine 0.96 0.67 - 1.17 mg/dL    Glomerular Filtration Rate 79 >=60    BUN/ Creatinine Ratio 20 7 - 25    Calcium 9.7 8.4 - 10.2 mg/dL    Bilirubin, Total 0.5 0.2 - 1.0 mg/dL    GOT/AST 72 (H) <=37 Units/L    GPT/ALT 74 (H) <64 Units/L    Alkaline Phosphatase 60 45 - 117 Units/L    Albumin 4.0 3.6 - 5.1 g/dL    Protein, Total 7.9 6.4 - 8.2 g/dL    Globulin 3.9 2.0 - 4.0 g/dL    A/G Ratio 1.0 1.0 - 2.4   TROPONIN I, HIGH SENSITIVITY    Collection Time: 10/31/21 11:54 AM   Result Value Ref Range    Troponin I, High Sensitivity 112 (HH) <77 ng/L   CBC with Automated Differential (performable only)    Collection Time: 10/31/21 11:54 AM   Result Value Ref Range    WBC 10.9 4.2 - 11.0 K/mcL    RBC 5.34 4.50 - 5.90 mil/mcL    HGB 16.7 13.0 - 17.0 g/dL    HCT 49.7 39.0 - 51.0 %    MCV 93.1 78.0 - 100.0 fl    MCH 31.3 26.0 - 34.0 pg    MCHC 33.6 32.0 - 36.5 g/dL    RDW-CV 13.7 11.0 - 15.0 %    RDW-SD 46.4 39.0 - 50.0 fL     140 - 450 K/mcL    NRBC 0 <=0 /100 WBC    Neutrophil, Percent 61 %    Lymphocytes, Percent 22 %    Mono, Percent 12 %    Eosinophils,  Percent 3 %    Basophils, Percent 1 %    Immature Granulocytes 1 %    Absolute Neutrophils 6.8 1.8 - 7.7 K/mcL    Absolute Lymphocytes 2.4 1.0 - 4.0 K/mcL    Absolute Monocytes 1.3 (H) 0.3 - 0.9 K/mcL    Absolute Eosinophils  0.3 0.0 - 0.5 K/mcL    Absolute Basophils 0.1 0.0 - 0.3 K/mcL    Absolute Immmature Granulocytes 0.1 0.0 - 0.2 K/mcL   NT proBNP    Collection Time: 10/31/21 11:54 AM   Result Value Ref Range    NT-proBNP 192 (H) <=125 pg/mL   Prothrombin Time    Collection Time: 10/31/21 11:54 AM   Result Value Ref Range    Prothrombin Time 11.0 9.7 - 11.8 sec    INR 1.0     Partial Thromboplastin Time    Collection Time: 10/31/21 11:54 AM   Result Value Ref Range    PTT 26 22 - 30 sec   Rapid SARS-CoV-2 by PCR    Collection Time: 10/31/21  1:22 PM    Specimen: Nasal, Mid-turbinate; Swab   Result Value Ref Range    Rapid SARS-COV-2 by PCR Not Detected Not Detected / Detected / Presumptive Positive / Inhibitors present    Isolation Guidelines      Procedural Comment     TROPONIN I, HIGH SENSITIVITY    Collection Time: 10/31/21  2:13 PM   Result Value Ref Range    Troponin I, High Sensitivity 144 (HH) <77 ng/L   Electrocardiogram 12-Lead    Collection Time: 10/31/21  2:32 PM   Result Value Ref Range    Ventricular Rate EKG/Min (BPM) 85     Atrial Rate (BPM) 250     QRS-Interval (MSEC) 146     QT-Interval (MSEC) 398     QTc 473     R Axis (Degrees) -69     T Axis (Degrees) 41     REPORT TEXT       Atrial flutter  with variable AV block  Right bundle branch block  Left anterior fascicular block   Bifascicular block  Abnormal ECG  When compared with ECG of  31-OCT-2021 11:49,  Atrial flutter  has replaced  Ectopic atrial rhythm  Vent. rate  has decreased  BY  42 BPM         Additional Diagnostics:   ECG: reviewed personally. Aflutter     Radiology: XR CHEST PA AND LATERAL 2 VIEWS    Result Date: 10/31/2021  EXAM: AP upright and lateral chest CLINICAL INDICATION: Chest pain. COMPARISON: Chest 05/08/2020 FINDINGS: AP  upright and lateral chest.  Sequelae of coronary bypass surgery.  The heart is nonenlarged.  No pulmonary edema.  Subsegmental atelectasis at the left lower lung.  No focal right lung abnormality.  No lung mass.  No pleural effusion.  No consolidation the lungs.  No evidence for pneumothorax.  Body habitus degrades detail.  Mildly increased thoracic kyphosis.  Multilevel moderate spurring at the margins of the vertebral bodies.  Sequelae of coronary stenting.     1.  As above. Electronically Signed by: JOHN WASHINGTON M.D. Signed on: 10/31/2021 12:37 PM          no

## 2021-11-04 NOTE — PHYSICAL EXAM
[General Appearance - Well Developed] : well developed [Normal Appearance] : normal appearance [General Appearance - Well Nourished] : well nourished [Well Groomed] : well groomed [General Appearance - In No Acute Distress] : no acute distress [Edema] : no peripheral edema [Respiration, Rhythm And Depth] : normal respiratory rhythm and effort [Exaggerated Use Of Accessory Muscles For Inspiration] : no accessory muscle use [Abdomen Soft] : soft [Abdomen Tenderness] : non-tender [Costovertebral Angle Tenderness] : no ~M costovertebral angle tenderness [Skin Color & Pigmentation] : normal skin color and pigmentation [Normal Station and Gait] : the gait and station were normal for the patient's age [] : no rash [Skin Turgor] : supple [Oriented To Time, Place, And Person] : oriented to person, place, and time [No Focal Deficits] : no focal deficits [Affect] : the affect was normal [Not Anxious] : not anxious [Mood] : the mood was normal [Cervical Lymph Nodes Enlarged Posterior Bilaterally] : posterior cervical [No Palpable Adenopathy] : no palpable adenopathy [Supraclavicular Lymph Nodes Enlarged Bilaterally] : supraclavicular [Cervical Lymph Nodes Enlarged Anterior Bilaterally] : anterior cervical [FreeTextEntry1] : Has minor essential tremor. - DVT: Patient on eliqiuis 5mg BID  - Diet: DASH diet  - Dispo: PT recommends DAVI, family and patient amenable

## 2021-11-28 NOTE — PROGRESS NOTE ADULT - PROBLEM SELECTOR PROBLEM 7
**CHARTING IN PROGRESS  SUBJECTIVE:    No acute events overnight, afebrile, hds.    VITAL SIGNS:    Vital Signs Last 24 Hrs  T(C): 36 (28 Nov 2021 08:00), Max: 36.9 (27 Nov 2021 17:46)  T(F): 96.8 (28 Nov 2021 08:00), Max: 98.4 (27 Nov 2021 17:46)  HR: 66 (28 Nov 2021 11:06) (60 - 105)  BP: 98/54 (28 Nov 2021 11:06) (74/47 - 116/81)  BP(mean): 69 (28 Nov 2021 11:06) (60 - 83)  RR: 15 (28 Nov 2021 11:06) (12 - 34)  SpO2: 100% (28 Nov 2021 11:06) (98% - 100%)    PHYSICAL EXAM:     GENERAL: no acute distress  HEENT: NC/AT, EOMI, neck supple, MMM  RESPIRATORY: LCTAB/L, no rhonchi, rales, or wheezing  CARDIOVASCULAR: RRR, no murmurs, gallops, rubs  ABDOMINAL: soft, non-tender, non-distended, positive bowel sounds   EXTREMITIES: no clubbing, cyanosis, or edema  NEUROLOGICAL: alert and oriented x 3, non-focal  SKIN: no rashes or lesions   MUSCULOSKELETAL: no gross joint deformity                          9.9    7.57  )-----------( 170      ( 28 Nov 2021 06:53 )             28.4     11-28    129<L>  |  102  |  84<H>  ----------------------------<  77  4.4   |  13<L>  |  4.00<H>    Ca    9.0      28 Nov 2021 06:53  Phos  7.9     11-28  Mg     2.5     11-28    TPro  6.1  /  Alb  2.0<L>  /  TBili  1.4<H>  /  DBili  x   /  AST  86<H>  /  ALT  43  /  AlkPhos  122<H>  11-28      CAPILLARY BLOOD GLUCOSE      POCT Blood Glucose.: 85 mg/dL (27 Nov 2021 18:13)      MEDICATIONS  (STANDING):  albumin human 25% IVPB 50 milliLiter(s) IV Intermittent every 6 hours  cefTRIAXone   IVPB 1000 milliGRAM(s) IV Intermittent every 24 hours  chlorhexidine 2% Cloths 1 Application(s) Topical <User Schedule>  dexMEDEtomidine Infusion 0.006 MICROgram(s)/kG/Hr (0.1 mL/Hr) IV Continuous <Continuous>  heparin   Injectable 5000 Unit(s) SubCutaneous every 8 hours  lactulose Syrup 30 Gram(s) Oral every 8 hours  melatonin 5 milliGRAM(s) Oral at bedtime  octreotide  Injectable 100 MICROGram(s) SubCutaneous every 6 hours  rifAXIMin 550 milliGRAM(s) Oral two times a day       History of ITP SUBJECTIVE:    No acute events overnight, afebrile, hds.    VITAL SIGNS:    Vital Signs Last 24 Hrs  T(C): 36 (28 Nov 2021 08:00), Max: 36.9 (27 Nov 2021 17:46)  T(F): 96.8 (28 Nov 2021 08:00), Max: 98.4 (27 Nov 2021 17:46)  HR: 66 (28 Nov 2021 11:06) (60 - 105)  BP: 98/54 (28 Nov 2021 11:06) (74/47 - 116/81)  BP(mean): 69 (28 Nov 2021 11:06) (60 - 83)  RR: 15 (28 Nov 2021 11:06) (12 - 34)  SpO2: 100% (28 Nov 2021 11:06) (98% - 100%)    PHYSICAL EXAM:     GENERAL: no acute distress  HEENT: neck supple, MMM  RESPIRATORY: decreased bs R > L  CARDIOVASCULAR: RRR, no murmurs, gallops, rubs  ABDOMINAL: soft, non-tender, non-distended, positive bowel sounds   EXTREMITIES: no clubbing, cyanosis, or edema  NEUROLOGICAL: alert, non-focal  SKIN: no new rashes or lesions   MUSCULOSKELETAL: no gross joint deformity                          9.9    7.57  )-----------( 170      ( 28 Nov 2021 06:53 )             28.4     11-28    129<L>  |  102  |  84<H>  ----------------------------<  77  4.4   |  13<L>  |  4.00<H>    Ca    9.0      28 Nov 2021 06:53  Phos  7.9     11-28  Mg     2.5     11-28    TPro  6.1  /  Alb  2.0<L>  /  TBili  1.4<H>  /  DBili  x   /  AST  86<H>  /  ALT  43  /  AlkPhos  122<H>  11-28      CAPILLARY BLOOD GLUCOSE      POCT Blood Glucose.: 85 mg/dL (27 Nov 2021 18:13)      MEDICATIONS  (STANDING):  albumin human 25% IVPB 50 milliLiter(s) IV Intermittent every 6 hours  cefTRIAXone   IVPB 1000 milliGRAM(s) IV Intermittent every 24 hours  chlorhexidine 2% Cloths 1 Application(s) Topical <User Schedule>  dexMEDEtomidine Infusion 0.006 MICROgram(s)/kG/Hr (0.1 mL/Hr) IV Continuous <Continuous>  heparin   Injectable 5000 Unit(s) SubCutaneous every 8 hours  lactulose Syrup 30 Gram(s) Oral every 8 hours  melatonin 5 milliGRAM(s) Oral at bedtime  octreotide  Injectable 100 MICROGram(s) SubCutaneous every 6 hours  rifAXIMin 550 milliGRAM(s) Oral two times a day       SUBJECTIVE:    No acute events overnight, afebrile, hds.    VITAL SIGNS:    Vital Signs Last 24 Hrs  T(C): 36 (28 Nov 2021 08:00), Max: 36.9 (27 Nov 2021 17:46)  T(F): 96.8 (28 Nov 2021 08:00), Max: 98.4 (27 Nov 2021 17:46)  HR: 66 (28 Nov 2021 11:06) (60 - 105)  BP: 98/54 (28 Nov 2021 11:06) (74/47 - 116/81)  BP(mean): 69 (28 Nov 2021 11:06) (60 - 83)  RR: 15 (28 Nov 2021 11:06) (12 - 34)  SpO2: 100% (28 Nov 2021 11:06) (98% - 100%)    PHYSICAL EXAM:     GENERAL: no acute distress  HEENT: neck supple, MMM  RESPIRATORY: decreased bs R > L  CARDIOVASCULAR: RRR, no murmurs, gallops, rubs  ABDOMINAL: soft, non-tender, distended, positive bowel sounds   EXTREMITIES: no clubbing, cyanosis, or edema  NEUROLOGICAL: alert, non-focal  SKIN: no new rashes or lesions   MUSCULOSKELETAL: no gross joint deformity                          9.9    7.57  )-----------( 170      ( 28 Nov 2021 06:53 )             28.4     11-28    129<L>  |  102  |  84<H>  ----------------------------<  77  4.4   |  13<L>  |  4.00<H>    Ca    9.0      28 Nov 2021 06:53  Phos  7.9     11-28  Mg     2.5     11-28    TPro  6.1  /  Alb  2.0<L>  /  TBili  1.4<H>  /  DBili  x   /  AST  86<H>  /  ALT  43  /  AlkPhos  122<H>  11-28      CAPILLARY BLOOD GLUCOSE      POCT Blood Glucose.: 85 mg/dL (27 Nov 2021 18:13)      MEDICATIONS  (STANDING):  albumin human 25% IVPB 50 milliLiter(s) IV Intermittent every 6 hours  cefTRIAXone   IVPB 1000 milliGRAM(s) IV Intermittent every 24 hours  chlorhexidine 2% Cloths 1 Application(s) Topical <User Schedule>  dexMEDEtomidine Infusion 0.006 MICROgram(s)/kG/Hr (0.1 mL/Hr) IV Continuous <Continuous>  heparin   Injectable 5000 Unit(s) SubCutaneous every 8 hours  lactulose Syrup 30 Gram(s) Oral every 8 hours  melatonin 5 milliGRAM(s) Oral at bedtime  octreotide  Injectable 100 MICROGram(s) SubCutaneous every 6 hours  rifAXIMin 550 milliGRAM(s) Oral two times a day

## 2021-11-29 NOTE — ED PROVIDER NOTE - OBJECTIVE STATEMENT
Mohs Case Number: [id number] hx htn anxiety ibs labrythitis presents to the ED with palpitations weakness and jitteriness. Symptoms all started about 6 months ago after a significant life stressor. Pt has been having these feelings of palpitations for some time now but worse over the past few days. Pt has had workup over the past few weeks. negative TSH, urine metanephrines in lab, workup for dysautonomia. Scheduled by cardiology to have nuc stress on Tuesday. Over the past week has been having chest pressure mild comes and goes non-radiating. + nausea. Last episode of pain was yesterday. Pt concerned she cant get her diastolic bp less than 100.

## 2021-12-09 NOTE — ED ADULT NURSE NOTE - NS ED NURSE RECORD ANOTHER VITAL SIGN
This is a new problem since August.  Patient is getting anxiety attacks over her possible vertigo happens particularly when she is driving.  She has been put on the sertraline it seems to be better but not completely resolved.   Yes

## 2021-12-13 ENCOUNTER — NON-APPOINTMENT (OUTPATIENT)
Age: 46
End: 2021-12-13

## 2021-12-20 NOTE — ED ADULT TRIAGE NOTE - SPO2 (%)
Emergency Department Patient Sign-out       Brief HPI:  This is a 36 year old male signed out to me by Dr. Santiago .  See initial ED Provider note for details of the presentation.            Significant Events prior to my assuming care:   36 year old male with cognitive disorder, pituitary dwarfism, presenting the emergency department with ankle/leg pain.  Found to have spiral fracture of the tibia.    Planned ORIF surgery in the morning.  No hospital beds available currently.  Boarding overnight in the emergency department.  Remained in splint overnight.  No further issues.  Baseline labs ordered for morning.      Exam:   Patient Vitals for the past 24 hrs:   BP Temp Temp src Pulse Resp SpO2 Weight   12/19/21 2115 131/79 -- -- 85 -- -- --   12/19/21 1457 (!) 143/86 98  F (36.7  C) Temporal 84 10 99 % 83.5 kg (184 lb)           ED RESULTS:   Results for orders placed or performed during the hospital encounter of 12/19/21 (from the past 24 hour(s))   Ankle XR, G/E 3 views, right     Status: None    Collection Time: 12/19/21  4:19 PM    Narrative    EXAM: XR ANKLE RIGHT G/E 3 VIEWS, XR TIBIA and FIBULA RT 2 VW  LOCATION: [facility]  DATE/TIME: 12/19/2021 3:56 PM    INDICATION: Right lower extremity pain.  COMPARISON: None.      Impression    IMPRESSION:  1.  Acute spiral fracture of the right tibia distal diaphysis with 13 mm of posterolateral displacement. Minimally displaced oblique fracture of the fibula proximal metaphysis.  2.  Normal joint spacing and alignment.  3.  Soft tissue swelling in the calf.   XR Tibia & Fibula Right 2 Views     Status: None    Collection Time: 12/19/21  4:21 PM    Narrative    EXAM: XR ANKLE RIGHT G/E 3 VIEWS, XR TIBIA and FIBULA RT 2 VW  LOCATION: [facility]  DATE/TIME: 12/19/2021 3:56 PM    INDICATION: Right lower extremity pain.  COMPARISON: None.      Impression    IMPRESSION:  1.  Acute spiral fracture of the right tibia  distal diaphysis with 13 mm of posterolateral displacement. Minimally displaced oblique fracture of the fibula proximal metaphysis.  2.  Normal joint spacing and alignment.  3.  Soft tissue swelling in the calf.   Asymptomatic COVID-19 Virus (Coronavirus) by PCR Nasopharyngeal     Status: Normal    Collection Time: 12/19/21  5:50 PM    Specimen: Nasopharyngeal; Swab   Result Value Ref Range    SARS CoV2 PCR Negative Negative    Narrative    Testing was performed using the tono  SARS-CoV-2 & Influenza A/B Assay on the tono  Rita  System.  This test should be ordered for the detection of SARS-COV-2 in individuals who meet SARS-CoV-2 clinical and/or epidemiological criteria. Test performance is unknown in asymptomatic patients.  This test is for in vitro diagnostic use under the FDA EUA for laboratories certified under CLIA to perform moderate and/or high complexity testing. This test has not been FDA cleared or approved.  A negative test does not rule out the presence of PCR inhibitors in the specimen or target RNA in concentration below the limit of detection for the assay. The possibility of a false negative should be considered if the patient's recent exposure or clinical presentation suggests COVID-19.  [facility] are certified under the Clinical Laboratory Improvement Amendments of 1988 (CLIA-88) as qualified to perform moderate and/or high complexity laboratory testing.   Pelvis XR w/ unilateral hip right     Status: None    Collection Time: 12/19/21  6:15 PM    Narrative    EXAM: XR PELVIS AND HIP RIGHT 1 VIEW  LOCATION: [facility]  DATE/TIME: 12/19/2021 6:02 PM    INDICATION: Pelvic and right hip pain after a fall.  COMPARISON: None.      Impression    IMPRESSION: Normal joint spaces and alignment. No fracture.   Foot  XR, G/E 3 views, right     Status: None    Collection Time: 12/19/21  6:15 PM    Narrative    EXAM: XR FOOT RIGHT G/E 3 VIEWS  LOCATION:   [facility]  DATE/TIME: 12/19/2021 6:01 PM    INDICATION: Right foot pain after a fall.  COMPARISON: None.      Impression    IMPRESSION:  1.  Partially visualized spiral fracture of the right tibia distal diaphysis.  2.  No additional fractures are identified.  3.  Normal foot joint spacing and alignment.   Splint Application     Status: None    Collection Time: 12/19/21  8:33 PM    [facility]    Layton Cordova PA-C     12/19/2021  8:34 PM  [facility]    Splint Application    Date/Time: 12/19/2021 8:33 PM  Performed by: Layton Cordova PA-C  Authorized by: Layton Cordova PA-C     Risks, benefits and alternatives discussed.      PRE-PROCEDURE DETAILS     Sensation:  Normal    Skin color:  Normal    PROCEDURE DETAILS     Laterality:  Right    Location:  Leg    Leg:  R lower leg    Strapping: no      Splint type:  Long leg    Supplies:  Ortho-Glass    POST PROCEDURE DETAILS     Pain:  Improved    Sensation:  Normal    Skin color:  Normal      PROCEDURE    Patient Tolerance:  Patient tolerated the procedure well with no immediate   complications       ED MEDICATIONS:   Medications   ibuprofen (ADVIL/MOTRIN) tablet 600 mg (600 mg Oral Given 12/19/21 1748)   QUEtiapine (SEROquel) tablet 25 mg (25 mg Oral Given 12/19/21 2232)         Impression:    ICD-10-CM    1. Closed fracture of distal end of right tibia, unspecified fracture morphology, initial encounter  S82.301A    2. Closed fracture of proximal end of right fibula, unspecified fracture morphology, initial encounter  S82.831A    3. Fall, initial encounter  W19.XXXA            MD Jeronimo Coronel David James, MD  12/20/21 8316     98

## 2021-12-21 ENCOUNTER — OUTPATIENT (OUTPATIENT)
Dept: OUTPATIENT SERVICES | Facility: HOSPITAL | Age: 46
LOS: 1 days | Discharge: HOME | End: 2021-12-21

## 2021-12-21 DIAGNOSIS — D47.3 ESSENTIAL (HEMORRHAGIC) THROMBOCYTHEMIA: ICD-10-CM

## 2021-12-21 DIAGNOSIS — Z90.49 ACQUIRED ABSENCE OF OTHER SPECIFIED PARTS OF DIGESTIVE TRACT: Chronic | ICD-10-CM

## 2021-12-22 ENCOUNTER — APPOINTMENT (OUTPATIENT)
Dept: HEMATOLOGY ONCOLOGY | Facility: CLINIC | Age: 46
End: 2021-12-22
Payer: MEDICARE

## 2021-12-22 ENCOUNTER — RESULT REVIEW (OUTPATIENT)
Age: 46
End: 2021-12-22

## 2021-12-22 LAB
APTT BLD: 26.5 SEC
BASOPHILS # BLD AUTO: 0.02 K/UL — SIGNIFICANT CHANGE UP (ref 0–0.2)
BASOPHILS NFR BLD AUTO: 0.3 % — SIGNIFICANT CHANGE UP (ref 0–2)
EOSINOPHIL # BLD AUTO: 0.05 K/UL — SIGNIFICANT CHANGE UP (ref 0–0.5)
EOSINOPHIL NFR BLD AUTO: 0.8 % — SIGNIFICANT CHANGE UP (ref 0–6)
HCT VFR BLD CALC: 38 % — SIGNIFICANT CHANGE UP (ref 34.5–45)
HGB BLD-MCNC: 13 G/DL — SIGNIFICANT CHANGE UP (ref 11.5–15.5)
IMM GRANULOCYTES NFR BLD AUTO: 0.3 % — SIGNIFICANT CHANGE UP (ref 0–1.5)
INR PPP: 0.99 RATIO
LYMPHOCYTES # BLD AUTO: 1.08 K/UL — SIGNIFICANT CHANGE UP (ref 1–3.3)
LYMPHOCYTES # BLD AUTO: 18.1 % — SIGNIFICANT CHANGE UP (ref 13–44)
MCHC RBC-ENTMCNC: 30.9 PG — SIGNIFICANT CHANGE UP (ref 27–34)
MCHC RBC-ENTMCNC: 34.2 G/DL — SIGNIFICANT CHANGE UP (ref 32–36)
MCV RBC AUTO: 90.3 FL — SIGNIFICANT CHANGE UP (ref 80–100)
MONOCYTES # BLD AUTO: 0.46 K/UL — SIGNIFICANT CHANGE UP (ref 0–0.9)
MONOCYTES NFR BLD AUTO: 7.7 % — SIGNIFICANT CHANGE UP (ref 2–14)
NEUTROPHILS # BLD AUTO: 4.33 K/UL — SIGNIFICANT CHANGE UP (ref 1.8–7.4)
NEUTROPHILS NFR BLD AUTO: 72.8 % — SIGNIFICANT CHANGE UP (ref 43–77)
NRBC # BLD: 0 /100 WBCS — SIGNIFICANT CHANGE UP (ref 0–0)
PLATELET # BLD AUTO: 82 K/UL — LOW (ref 150–400)
PT BLD: 11.7 SEC
RBC # BLD: 4.21 M/UL — SIGNIFICANT CHANGE UP (ref 3.8–5.2)
RBC # FLD: 12.8 % — SIGNIFICANT CHANGE UP (ref 10.3–14.5)
WBC # BLD: 5.96 K/UL — SIGNIFICANT CHANGE UP (ref 3.8–10.5)
WBC # FLD AUTO: 5.96 K/UL — SIGNIFICANT CHANGE UP (ref 3.8–10.5)

## 2021-12-22 PROCEDURE — 99215 OFFICE O/P EST HI 40 MIN: CPT

## 2021-12-22 RX ORDER — NITROFURANTOIN (MONOHYDRATE/MACROCRYSTALS) 25; 75 MG/1; MG/1
100 CAPSULE ORAL
Qty: 5 | Refills: 0 | Status: DISCONTINUED | COMMUNITY
Start: 2021-10-22 | End: 2021-12-22

## 2021-12-22 RX ORDER — PNV NO.95/FERROUS FUM/FOLIC AC 28MG-0.8MG
100 TABLET ORAL
Refills: 0 | Status: ACTIVE | COMMUNITY
Start: 2021-12-22

## 2021-12-22 NOTE — PHYSICAL EXAM
[Fully active, able to carry on all pre-disease performance without restriction] : Status 0 - Fully active, able to carry on all pre-disease performance without restriction [Thin] : thin [Normal] : grossly intact [de-identified] : Tattoos. Scattered bruises, fading. [de-identified] : Agitated

## 2021-12-22 NOTE — RESULTS/DATA
[FreeTextEntry1] : 8/5/21\par CMP CO2 21\par \par Troponin I <0.01\par PT 10.8, INR 0.90\par APTT 25.8\par : 17\par Prolactin 14.7\par TSH 1.70\par D-dimer <150\par Protein S free antigen 59%\par \par 10/1/20\par Echo: no left atrial dilatation. Left ventricle has normal end-diastolic diameter. Contractility of all LV segments is normal. LV ejection fraction (72%) is increased. Left ventricular filling pattern is normal. No right atrial dilatation. The right ventricle is normal in size. The right ventricle has normal wall motion. There is no aortic, mitral, trace tricuspid, nor pulmonic regurgitation. No pericardial effusion. \par EKG normal \par \par 7/21/20\par RF <10\par MARY BETH negative

## 2021-12-22 NOTE — ASSESSMENT
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 Continues to bruise easily. Discussed the small risk of thrombosis with Mirena IUD. Explained in detail that she does not have a hypercoagulable state and that her prior thrombosis was provoked. Reassured her that the likelihood of her developing a clot is very small. Discussed symptoms and signs of thrombosis. Addressed all concerns and answered all questions. \par \par Suggest :\par Reassure\par Avoid trauma\par CBC, CMP, Fe/TIBC, ferritin, B12, folate\par PT/INR, APTT, Protein S antigen\par COVID booster vaccine \par Flu vaccine \par Monitor CBC periodically\par RTC as needed \par

## 2021-12-22 NOTE — REVIEW OF SYSTEMS
[Dysmenorrhea/Abn Vaginal Bleeding] : dysmenorrhea/abnormal vaginal bleeding [Easy Bruising] : a tendency for easy bruising [Recent Change In Weight] : ~T recent weight change [Abdominal Pain] : abdominal pain [Diarrhea] : diarrhea [Negative] : Musculoskeletal [Anxiety] : anxiety [FreeTextEntry2] : lost 7 lbs [FreeTextEntry7] : nausea [de-identified] : HA

## 2021-12-22 NOTE — ADDENDUM
[FreeTextEntry1] : I, Arreece Pablo, acted solely as a scribe for Dr. Attila Zamora on 12/22/2021. All medical entries made by the Scribe were at my, Dr. Attila Zamora's, direction and personally dictated by me on 12/22/2021. I have reviewed the chart and agree that the record accurately reflects my personal performance of the history, physical exam, assessment and plan. I have also personally directed, reviewed, and agreed with the chart.

## 2021-12-22 NOTE — HISTORY OF PRESENT ILLNESS
[de-identified] : Idiopathic thrombocytopenic purpura\par 11/18 Left IJ thrombosis\par \par \par  [de-identified] : She feels fair. She was recently diagnosed with complex endometrial hyperplasia. Is getting Mirena IUD next week. Continues to bruise easily. Heavy menstrual cycles persist. Reports frequent HAs at the top of her head with associated blurry vision at times. Has frequent episodes of sharp abdominal pain. Gets nauseous easily. She had one episode of sharp abdominal pain with simultaneous emesis and defecation. She eats a very limited diet as processed foods result in diarrhea. Has lost 7 lbs since last visit. Saw GI.  Recent CT scan reportedly found gallbladder sludge. She stopped taking iron supplementation for some time due to gastric issues. She has resumed it since two days ago. She notes no other visual problems, limb pain/swelling, fevers, swollen glands, night sweats. Had COVID vaccine x 2. \par \par

## 2021-12-23 LAB
ALBUMIN SERPL ELPH-MCNC: 4.8 G/DL
ALP BLD-CCNC: 91 U/L
ALT SERPL-CCNC: 12 U/L
ANION GAP SERPL CALC-SCNC: 11 MMOL/L
AST SERPL-CCNC: 19 U/L
BILIRUB SERPL-MCNC: 0.4 MG/DL
BUN SERPL-MCNC: 5 MG/DL
CALCIUM SERPL-MCNC: 9 MG/DL
CHLORIDE SERPL-SCNC: 105 MMOL/L
CO2 SERPL-SCNC: 23 MMOL/L
CREAT SERPL-MCNC: 0.76 MG/DL
FERRITIN SERPL-MCNC: 32 NG/ML
FOLATE SERPL-MCNC: 4.9 NG/ML
GLUCOSE SERPL-MCNC: 85 MG/DL
IRON SATN MFR SERPL: 19 %
IRON SATN MFR SERPL: 19 %
IRON SERPL-MCNC: 74 UG/DL
IRON SERPL-MCNC: 74 UG/DL
POTASSIUM SERPL-SCNC: 4.5 MMOL/L
PROT S AG ACT/NOR PPP IA: 59 %
PROT SERPL-MCNC: 6.9 G/DL
SODIUM SERPL-SCNC: 139 MMOL/L
TIBC SERPL-MCNC: 381 UG/DL
TIBC SERPL-MCNC: 397 UG/DL
UIBC SERPL-MCNC: 307 UG/DL
UIBC SERPL-MCNC: 323 UG/DL
VIT B12 SERPL-MCNC: 249 PG/ML

## 2021-12-25 NOTE — ED ADULT NURSE NOTE - NSSISCREENINGQ3_ED_A_ED
[address] [facility]          ATTENDING PHYSICIAN NOTE       Date of evaluation: 12/25/2021    Chief Complaint     Abdominal Pain (RLQ)      History of Present Illness     Hannah White is a 29 y.o. male who presents chief complaint of right lower quadrant and right flank pain. Patient has a history of kidney stones however states this pain feels different. Pain is sharp in nature, radiates brain. No history of an appendectomy. Denies any fevers or chills, denies any hematuria or dysuria. He is unsure makes his symptoms better or worse. Started this morning however significantly worsened 1 hour ago    Review of Systems     REVIEW OF SYSTEMS  GENERAL: Negative for any fevers, chills, or weight loss. HEENT: Negative for any head trauma, neck trauma, neck stiffness, photophobia, phonophobia, sinusitis, rhinitis. CARDIAC: Negative for any chest pain, palpitations  PULMONARY: Negative for any shortness of breath, cough, wheezing  GASTROINTESTINAL: Per HPI  GENITOURINARY: Negative for any dysuria, hematuria, incontinence. SKIN: Negative for any rashes, wounds  MSK: Negative for any joint pains, back pain  HEMATOLOGIC: Negative for any abnormal bruising, frequent infections or bleeding. NEUROLOGIC: Negative for any headache, dizziness, focal weakness  PSYCH: Negative for any agitation, confusion      Past Medical, Surgical, Family, and Social History     He has a past medical history of Kidney stone. He has no past surgical history on file. His family history is not on file. He reports that he has been smoking cigarettes. He has a 10.00 pack-year smoking history. He has never used smokeless tobacco. He reports current alcohol use. He reports that he does not use drugs.     Medications     Previous Medications    IBUPROFEN (ADVIL;MOTRIN) 800 MG TABLET    Take 800 mg by mouth every 6 hours as needed for Pain    LIDOCAINE 4 % EXTERNAL PATCH    Place 1 patch onto the skin daily Allergies     He has No Known Allergies. Physical Exam     INITIAL VITALS: BP: 135/87, Temp: 98.7 °F (37.1 °C), Pulse: 63, Resp: 26, SpO2: 100 %   Physical Exam  Constitutional:       Appearance: Normal appearance. HENT:      Head: Normocephalic and atraumatic. Eyes:      Conjunctiva/sclera: Conjunctivae normal.      Pupils: Pupils are equal, round, and reactive to light. Cardiovascular:      Rate and Rhythm: Normal rate and regular rhythm. Pulses: Normal pulses. Pulmonary:      Effort: Pulmonary effort is normal.      Breath sounds: Normal breath sounds. Abdominal:      General: Abdomen is flat. Palpations: Abdomen is soft. Comments: Tenderness to palpation in right lower quadrant, right flank without guarding or rebound. Musculoskeletal:         General: No swelling. Normal range of motion. Cervical back: Normal range of motion and neck supple. Skin:     General: Skin is warm and dry. Neurological:      General: No focal deficit present. Mental Status: He is alert. Mental status is at baseline. Psychiatric:         Mood and Affect: Mood normal.         Behavior: Behavior normal.         Diagnostic Results     EKG       RADIOLOGY:  CT ABDOMEN PELVIS W IV CONTRAST Additional Contrast? None   Final Result      1.  3 mm obstructing calculus in the mid right ureter resulting in mild right hydronephrosis and hydroureter. 2.  Two additional nonobstructing 3 mm right renal calculi.              LABS:   Results for orders placed or performed during the hospital encounter of 12/25/21   CBC Auto Differential   Result Value Ref Range    WBC 8.9 4.0 - 11.0 K/uL    RBC 5.12 4.20 - 5.90 M/uL    Hemoglobin 15.3 13.5 - 17.5 g/dL    Hematocrit 45.7 40.5 - 52.5 %    MCV 89.2 80.0 - 100.0 fL    MCH 29.8 26.0 - 34.0 pg    MCHC 33.4 31.0 - 36.0 g/dL    RDW 14.1 12.4 - 15.4 %    Platelets 245 423 - 066 K/uL    MPV 8.3 5.0 - 10.5 fL    Neutrophils % 79.3 %    Lymphocytes % 15.9 % Monocytes % 3.9 %    Eosinophils % 0.6 %    Basophils % 0.3 %    Neutrophils Absolute 7.1 1.7 - 7.7 K/uL    Lymphocytes Absolute 1.4 1.0 - 5.1 K/uL    Monocytes Absolute 0.3 0.0 - 1.3 K/uL    Eosinophils Absolute 0.1 0.0 - 0.6 K/uL    Basophils Absolute 0.0 0.0 - 0.2 K/uL   Comprehensive Metabolic Panel w/ Reflex to MG   Result Value Ref Range    Sodium 140 136 - 145 mmol/L    Potassium reflex Magnesium 3.9 3.5 - 5.1 mmol/L    Chloride 105 99 - 110 mmol/L    CO2 19 (L) 21 - 32 mmol/L    Anion Gap 16 3 - 16    Glucose 145 (H) 70 - 99 mg/dL    BUN 13 7 - 20 mg/dL    CREATININE 0.9 0.9 - 1.3 mg/dL    GFR Non-African American >60 >60    GFR African American >60 >60    Calcium 9.3 8.3 - 10.6 mg/dL    Total Protein 7.6 6.4 - 8.2 g/dL    Albumin 4.6 3.4 - 5.0 g/dL    Albumin/Globulin Ratio 1.5 1.1 - 2.2    Total Bilirubin 0.3 0.0 - 1.0 mg/dL    Alkaline Phosphatase 98 40 - 129 U/L    ALT 10 10 - 40 U/L    AST 17 15 - 37 U/L   Lipase   Result Value Ref Range    Lipase 20.0 13.0 - 60.0 U/L   Urinalysis Reflex to Culture    Specimen: Urine, clean catch   Result Value Ref Range    Color, UA Yellow Straw/Yellow    Clarity, UA Clear Clear    Glucose, Ur Negative Negative mg/dL    Bilirubin Urine Negative Negative    Ketones, Urine Negative Negative mg/dL    Specific Gravity, UA 1.015 1.005 - 1.030    Blood, Urine LARGE (A) Negative    pH, UA 7.5 5.0 - 8.0    Protein, UA Negative Negative mg/dL    Urobilinogen, Urine 0.2 <2.0 E.U./dL    Nitrite, Urine Negative Negative    Leukocyte Esterase, Urine Negative Negative    Microscopic Examination YES     Urine Type NotGiven     Urine Reflex to Culture Not Indicated    Microscopic Urinalysis   Result Value Ref Range    WBC, UA 0-2 0 - 5 /HPF    RBC, UA 5-10 (A) 0 - 4 /HPF    Crystals, UA Few Ca.  Oxalate (A) None Seen /HPF       ED BEDSIDE ULTRASOUND:      RECENT VITALS:  BP: 135/87,Temp: 98.7 °F (37.1 °C), Pulse: 63, Resp: 26, SpO2: 100 %     Procedures         ED Course     Nursing Notes, Past Medical Hx, Past Surgical Hx, Social Hx,Allergies, and Family Hx were reviewed. patient was given the following medications:  Orders Placed This Encounter   Medications    lactated ringers bolus    HYDROmorphone (DILAUDID) injection 0.5 mg       CONSULTS:  None    MEDICAL DECISIONMAKING / ASSESSMENT / Jennifer Crawford is a 29 y.o. male emergency complaint abdominal pain. CT demonstrating 3 mm obstructive stone, creatinine within normal limits. Pain controlled and tolerating p.o. Will discharge with prescription for narcotics for acute pain, Zofran, Flomax, urology follow-up. UA without evidence of UTI. Strict return precautions given, all questions answered properly. Patient agrees with plan moving forward    Clinical Impression     1. Nephrolithiasis        Disposition     PATIENT REFERRED TO:  [facility]  [address]  [facility] [address] [facility]    In 2 days      Feliberto Garcia MD  [address] [address]  [phone]    In 2 days        DISCHARGE MEDICATIONS:  Discharge Medication List as of 12/25/2021  6:40 PM      START taking these medications    Details   tamsulosin (FLOMAX) 0.4 MG capsule Take 1 capsule by mouth daily, Disp-30 capsule, R-0Print      oxyCODONE-acetaminophen (PERCOCET) 5-325 MG per tablet Take 1 tablet by mouth every 6 hours as needed for Pain for up to 3 days. Intended supply: 3 days.  Take lowest dose possible to manage pain, Disp-12 tablet, R-0Print      ondansetron (ZOFRAN) 4 MG tablet Take 1 tablet by mouth every 8 hours as needed for Nausea, Disp-20 tablet, R-0Print             DISPOSITION    Discharged     Merlin Johann, MD  12/25/21 8998 No

## 2022-01-01 NOTE — ED ADULT NURSE NOTE - NS ED NURSE LEVEL OF CONSCIOUSNESS ORIENTATION
2022       Birth Weight: 1580 g ( 3lb 7.7 oz)     Weight: 2130 g (4 lb 11.1 oz) increased 40 grams   Date: 9/25/22: Head Circumference: 32 cm   Height: 45.5 cm   Gestational Age: 31w2d   CGA  34w 5d  DOL  24    Physical Exam   General: active and reactive for age, non-dysmorphic, in isolette and room air  Head: normocephalic, anterior fontanel is open, soft and flat   Eyes: lids open, eyes clear without drainage   Nose: nares patent, NG secure without irritation  Oropharynx: palate: intact and moist mucus membranes   Chest: Breath Sounds: clear and equal with comfortable effort  Heart: precordium: quiet, rate and rhythm: regular, S1 and S2: normal,  Murmur: none, capillary refill: <3 seconds  Abdomen: soft, non-tender, non-distended, bowel sounds: active, Umbilical cord granuloma  Genitourinary: normal male genitalia for gestation  Musculoskeletal/Extremities: moves all extremities, no deformities    Neurologic: active and responsive, tone and reflexes appropriate for gestational age   Skin: Condition: smooth and warm   Color: centrally pink   Anus: patent centrally placed, small sacral dimple, non-communicating     Social:  Mom kept updated in status and plan.    Rounds with Dr. Hardin. Infant examined. Plan discussed and implemented.    FEN: SSC 24 HP 42 ml every 3 hours gavage over 1 hour due to emesis. Projected -160 ml/kg/day. Nippled FV x 2    Intake: 155 ml/kg/day - 124 jelena/kg/day     Output: Void x 7; Stool x 1, emesis x 1    Plan:  SSC 24 HP, 42 ml every 3 hours gavage over 1 hour. Attempt to nipple once per shift with cues. Monitor for emesis. Monitor intake and output.  ml/kg/day.    Vital Signs (Most Recent):  Temp: 98.2 °F (36.8 °C) (09/30/22 0900)  Pulse: 159 (09/30/22 0900)  Resp: 64 (09/30/22 0900)  BP: (!) 65/34 (09/30/22 0900)  SpO2: (!) 99 % (09/30/22 0900)   Vital Signs (24h Range):  Temp:  [98.2 °F (36.8 °C)-99 °F (37.2 °C)] 98.2 °F (36.8 °C)  Pulse:  [152-175] 159  Resp:   [47-78] 64  SpO2:  [97 %-100 %] 99 %  BP: (65-76)/(34-52) 65/34     Scheduled Meds:   FERROUS SULFATE  2 mg/kg/day of Fe Per NG tube Daily      Oriented - self; Oriented - place; Oriented - time

## 2022-02-24 ENCOUNTER — EMERGENCY (EMERGENCY)
Facility: HOSPITAL | Age: 47
LOS: 1 days | Discharge: HOME | End: 2022-02-24
Attending: STUDENT IN AN ORGANIZED HEALTH CARE EDUCATION/TRAINING PROGRAM
Payer: COMMERCIAL

## 2022-02-24 VITALS
DIASTOLIC BLOOD PRESSURE: 86 MMHG | HEART RATE: 94 BPM | HEIGHT: 62 IN | TEMPERATURE: 98 F | RESPIRATION RATE: 18 BRPM | SYSTOLIC BLOOD PRESSURE: 132 MMHG | WEIGHT: 100.09 LBS

## 2022-02-24 VITALS
HEART RATE: 74 BPM | RESPIRATION RATE: 20 BRPM | DIASTOLIC BLOOD PRESSURE: 81 MMHG | TEMPERATURE: 98 F | OXYGEN SATURATION: 100 % | SYSTOLIC BLOOD PRESSURE: 145 MMHG

## 2022-02-24 DIAGNOSIS — Z90.49 ACQUIRED ABSENCE OF OTHER SPECIFIED PARTS OF DIGESTIVE TRACT: Chronic | ICD-10-CM

## 2022-02-24 LAB
ALBUMIN SERPL ELPH-MCNC: 4.9 G/DL — SIGNIFICANT CHANGE UP (ref 3.3–5)
ALP SERPL-CCNC: 102 U/L — SIGNIFICANT CHANGE UP (ref 40–120)
ALT FLD-CCNC: 26 U/L — SIGNIFICANT CHANGE UP (ref 10–45)
ANION GAP SERPL CALC-SCNC: 16 MMOL/L — SIGNIFICANT CHANGE UP (ref 5–17)
APTT BLD: 25.2 SEC — LOW (ref 27.5–35.5)
AST SERPL-CCNC: 33 U/L — SIGNIFICANT CHANGE UP (ref 10–40)
BASOPHILS # BLD AUTO: 0.01 K/UL — SIGNIFICANT CHANGE UP (ref 0–0.2)
BASOPHILS NFR BLD AUTO: 0.1 % — SIGNIFICANT CHANGE UP (ref 0–2)
BILIRUB SERPL-MCNC: 0.7 MG/DL — SIGNIFICANT CHANGE UP (ref 0.2–1.2)
BUN SERPL-MCNC: <4 MG/DL — LOW (ref 7–23)
CALCIUM SERPL-MCNC: 9.6 MG/DL — SIGNIFICANT CHANGE UP (ref 8.4–10.5)
CHLORIDE SERPL-SCNC: 104 MMOL/L — SIGNIFICANT CHANGE UP (ref 96–108)
CO2 SERPL-SCNC: 20 MMOL/L — LOW (ref 22–31)
CREAT SERPL-MCNC: 0.7 MG/DL — SIGNIFICANT CHANGE UP (ref 0.5–1.3)
EOSINOPHIL # BLD AUTO: 0.01 K/UL — SIGNIFICANT CHANGE UP (ref 0–0.5)
EOSINOPHIL NFR BLD AUTO: 0.1 % — SIGNIFICANT CHANGE UP (ref 0–6)
GLUCOSE SERPL-MCNC: 89 MG/DL — SIGNIFICANT CHANGE UP (ref 70–99)
HCG SERPL-ACNC: <2 MIU/ML — SIGNIFICANT CHANGE UP
HCT VFR BLD CALC: 38.5 % — SIGNIFICANT CHANGE UP (ref 34.5–45)
HGB BLD-MCNC: 13.2 G/DL — SIGNIFICANT CHANGE UP (ref 11.5–15.5)
IMM GRANULOCYTES NFR BLD AUTO: 0.2 % — SIGNIFICANT CHANGE UP (ref 0–1.5)
INR BLD: 1.03 RATIO — SIGNIFICANT CHANGE UP (ref 0.88–1.16)
LYMPHOCYTES # BLD AUTO: 1.56 K/UL — SIGNIFICANT CHANGE UP (ref 1–3.3)
LYMPHOCYTES # BLD AUTO: 18.8 % — SIGNIFICANT CHANGE UP (ref 13–44)
MCHC RBC-ENTMCNC: 30.5 PG — SIGNIFICANT CHANGE UP (ref 27–34)
MCHC RBC-ENTMCNC: 34.3 GM/DL — SIGNIFICANT CHANGE UP (ref 32–36)
MCV RBC AUTO: 88.9 FL — SIGNIFICANT CHANGE UP (ref 80–100)
MONOCYTES # BLD AUTO: 0.54 K/UL — SIGNIFICANT CHANGE UP (ref 0–0.9)
MONOCYTES NFR BLD AUTO: 6.5 % — SIGNIFICANT CHANGE UP (ref 2–14)
NEUTROPHILS # BLD AUTO: 6.18 K/UL — SIGNIFICANT CHANGE UP (ref 1.8–7.4)
NEUTROPHILS NFR BLD AUTO: 74.3 % — SIGNIFICANT CHANGE UP (ref 43–77)
NRBC # BLD: 0 /100 WBCS — SIGNIFICANT CHANGE UP (ref 0–0)
PLATELET # BLD AUTO: 88 K/UL — LOW (ref 150–400)
POTASSIUM SERPL-MCNC: 3.2 MMOL/L — LOW (ref 3.5–5.3)
POTASSIUM SERPL-SCNC: 3.2 MMOL/L — LOW (ref 3.5–5.3)
PROT SERPL-MCNC: 7.2 G/DL — SIGNIFICANT CHANGE UP (ref 6–8.3)
PROTHROM AB SERPL-ACNC: 12.4 SEC — SIGNIFICANT CHANGE UP (ref 10.5–13.4)
RBC # BLD: 4.33 M/UL — SIGNIFICANT CHANGE UP (ref 3.8–5.2)
RBC # FLD: 13 % — SIGNIFICANT CHANGE UP (ref 10.3–14.5)
SODIUM SERPL-SCNC: 140 MMOL/L — SIGNIFICANT CHANGE UP (ref 135–145)
WBC # BLD: 8.32 K/UL — SIGNIFICANT CHANGE UP (ref 3.8–10.5)
WBC # FLD AUTO: 8.32 K/UL — SIGNIFICANT CHANGE UP (ref 3.8–10.5)

## 2022-02-24 PROCEDURE — 85730 THROMBOPLASTIN TIME PARTIAL: CPT

## 2022-02-24 PROCEDURE — 93970 EXTREMITY STUDY: CPT

## 2022-02-24 PROCEDURE — 85610 PROTHROMBIN TIME: CPT

## 2022-02-24 PROCEDURE — 99284 EMERGENCY DEPT VISIT MOD MDM: CPT | Mod: 25

## 2022-02-24 PROCEDURE — 99285 EMERGENCY DEPT VISIT HI MDM: CPT

## 2022-02-24 PROCEDURE — 84702 CHORIONIC GONADOTROPIN TEST: CPT

## 2022-02-24 PROCEDURE — 93010 ELECTROCARDIOGRAM REPORT: CPT

## 2022-02-24 PROCEDURE — 80053 COMPREHEN METABOLIC PANEL: CPT

## 2022-02-24 PROCEDURE — 93971 EXTREMITY STUDY: CPT | Mod: 26,59,LT

## 2022-02-24 PROCEDURE — 93970 EXTREMITY STUDY: CPT | Mod: 26

## 2022-02-24 PROCEDURE — 93005 ELECTROCARDIOGRAM TRACING: CPT

## 2022-02-24 PROCEDURE — 85025 COMPLETE CBC W/AUTO DIFF WBC: CPT

## 2022-02-24 RX ORDER — POTASSIUM CHLORIDE 20 MEQ
40 PACKET (EA) ORAL ONCE
Refills: 0 | Status: COMPLETED | OUTPATIENT
Start: 2022-02-24 | End: 2022-02-24

## 2022-02-24 RX ADMIN — Medication 40 MILLIEQUIVALENT(S): at 21:44

## 2022-02-24 NOTE — ED PROVIDER NOTE - ATTENDING CONTRIBUTION TO CARE
46 F w/ PMH of ITP, cervical hyperplasia s/p IUD placement, L IJ thrombosis, ovarian cysts here w/ palpitations pt states that for two days she has had intermittent palpitations and chest pain, inially is on the L side of the chest comes and goes and then on the R side of the chest. Pt w/ no fevers, no chills, no nausea no vomiting, she reports that he had a IUD placed on for hyperplaceia, pt states that she reports on Wednesday " all hell broke loose" she states that she has pain on the R side of her chest now. she reports that she is very concerned that she has pain in the calves. Pt w/ sx allergy to contrast reports hypotension and a rapid response was called on the patient when she received it. She has now R sided chest pain. On exam, pt is awake and alert, has normal symmetric face, she has uvula midline, she has EOMI, pt w/ 5/5 upper and lower extremity strength she has 2+ radial and DP pulse, no leg edema. Plan for labs imaging and reassessment.  Pt was counselled repeatedly that she likely doesn't have VTE however pt w/ intermittent crying stating "I need the sonogram" pt reports she had an outpt appointment that she cancelled to come to the ER. 46 F w/ PMH of ITP, cervical hyperplasia s/p IUD placement, L IJ thrombosis, ovarian cysts here w/ palpitations pt states that for two days she has had intermittent palpitations and chest pain, inially is on the L side of the chest comes and goes and then on the R side of the chest. Pt w/ no fevers, no chills, no nausea no vomiting, she reports that he had a IUD placed on for hyperplaceia, pt states that she reports on Wednesday " all hell broke loose" she states that she has pain on the R side of her chest now. she reports that she is very concerned that she has pain in the calves. Pt w/ sx allergy to contrast reports hypotension and a rapid response was called on the patient when she received it. She has now R sided chest pain. On exam, pt is awake and alert, is wearing 10 face masks has normal symmetric face, she has uvula midline, she has EOMI, pt w/ 5/5 upper and lower extremity strength she has 2+ radial and DP pulse, no leg edema. Plan for labs imaging and reassessment.  Pt was counselled repeatedly that she likely doesn't have VTE however pt w/ intermittent crying stating "I need the sonogram" pt reports she had an outpt appointment that she cancelled to come to the ER.

## 2022-02-24 NOTE — ED ADULT NURSE NOTE - OBJECTIVE STATEMENT
47 y/o female pmh ITP, cervical hyperplasia, DVT in the passed- not currently on any blood thinners, ovarian cysts presenting to ED by EMS c/o palpitations x this am s/p IUD placement 6 days ago. denies any sob, diff breathing, dizziness, weakness, vaginal bleeding or vaginal discharge. no recent fevers, abd pain, or urinary s/s. labs obtained via butterfly. VS stable. pending ultrasound, safety maintained, call bell within reach.

## 2022-02-24 NOTE — ED PROVIDER NOTE - NSICDXFAMILYHX_GEN_ALL_CORE_FT
FAMILY HISTORY:  Family history of atrial fibrillation  Family history of prostate cancer in father    Grandparent  Still living? Unknown  Family history of hypertension, Age at diagnosis: Age Unknown

## 2022-02-24 NOTE — ED PROVIDER NOTE - NSFOLLOWUPINSTRUCTIONS_ED_ALL_ED_FT
We did a sonogram of your neck and we don't see any new clot. You must follow up with your vascular surgeon Dr. Wong for the abnormality seen in the neck on the left side over the next 1-2 weeks.     Dr. Wong  [facility] - [address], [address], [place] [address]    You came to the ER today because you had a palpitations.  We did an EKG and didn’t see any large abnormality that necessitated that you stay in the hospital for further evaluation however, it is very important that you follow up with your primary care doctor within the next 5 days.    Additionally, if you pass out or feel like you will pass out, if you develop chest pain, abdominal pain, nausea, vomiting, dark stools, bloody stools, shortness of breath, palpitations please make sure to seek IMMEDIATE MEDICAL ATTENTION as this could be a sign of something more serious, like a heart attack, valve disease, intraabdominal bleeding, gastro intestinal bleeding, or stroke.     We also have given you information to follow up with cardiology and they may recommend an echo or a loop recorder to be placed to monitor your heart. Please make sure to follow up with them in the next 5-7 days as well.

## 2022-02-24 NOTE — ED ADULT TRIAGE NOTE - CHIEF COMPLAINT QUOTE
pt states onset of pain 2 days ago to chest and upper back pt had IUD placed 6 days ago pt was taking tylenol post insertion

## 2022-02-24 NOTE — ED PROVIDER NOTE - OBJECTIVE STATEMENT
45 y/o female, hx of ITP, cervical hyperplasia, s/p IUD placement, DVT, not on blood thinners, ovarian cysts, BIBEMS for palpitations. states had IUD placed 6 days ago. states two days ago she developed chest pain followed by palpitations this morning. states that she has a hx of blood clot in her left internal jugular, as well as DVTs. states she was supposed to have DVT studies today but due to the palpitations she came to the ER. denies f/n/v/d, SOB, LOC, numbness, tingling, dizziness, HA, abdominal pain, urinary symptoms.

## 2022-02-24 NOTE — ED PROVIDER NOTE - NSICDXPASTMEDICALHX_GEN_ALL_CORE_FT
PAST MEDICAL HISTORY:  Anxiety     Autonomic dysreflexia     Depression     DVT (deep venous thrombosis) left internal jugular and subclavian veins    Dysautonomia     Dystonia     Gastroparesis     Headache     History of ITP     HTN (hypertension)     IBS (irritable bowel syndrome)     Idiopathic thrombocytopenia purpura     Idiopathic thrombocytopenia purpura     Idiopathic thrombocytopenic purpura     Labyrinthitis     Ovarian cyst     POTS (postural orthostatic tachycardia syndrome)     Protein S deficiency

## 2022-02-24 NOTE — ED PROVIDER NOTE - PATIENT PORTAL LINK FT
You can access the FollowMyHealth Patient Portal offered by [facility] by registering at the following website: [email or web address]. By joining [employer]’s FollowMyHealth portal, you will also be able to view your health information using other applications (apps) compatible with our system.

## 2022-02-24 NOTE — ED PROVIDER NOTE - PROGRESS NOTE DETAILS
spoke w/ gyn onc who placed prog IUD, and vasc sx at [facility]- pt back from sono awaiting results, pt reports she is shaking and is upset, I offered the patient ativan or other medications pt refused states the only thing that will make this better is a ct scan of the chest. I informed the patient that in the setting of normal vital signs and likely negative duplex of the arms and legs that it is unlikely to have a blood clot. ap- spoke w/ Dr. Presley regarding findings possible carotid body tumor; pt also informed of results and importance to follow up w/ vascular surgeon

## 2022-02-25 NOTE — ED POST DISCHARGE NOTE - RESULT SUMMARY
Incidental/recommended values report f/u: UE duplex noted possible carotid body tumor. Chart reviewed. Pt made aware of results and recs for f/u. No further ED contact warranted at this time as pt appropriately counseled. - Cristhian Ojeda PA-C

## 2022-03-17 NOTE — CONSULT NOTE ADULT - CONSULT REQUESTED DATE/TIME
05-Jan-2018 18:36 [FreeTextEntry6] : COUGHING X 1-2 DAYS SOUNDS CONGESTION\par FEVER TMAX 100.3\par POOR SLEEP OVERNIGHT \par SIBLING WITH SIMILAR SYMPTOMS\par \par KNOWN ASTHMA, ON BUDESONIDE DAILY, INTERESTED IN HFA WITH [employer]

## 2022-03-18 NOTE — ED ADULT NURSE NOTE - FINAL NURSING ELECTRONIC SIGNATURE
For information on Fall & Injury Prevention, visit: [email or web address] OR  [email or web address] OR  https://www.cdc.gov/steadi/patient.html 24-Oct-2017 16:00

## 2022-04-04 NOTE — ED ADULT NURSE NOTE - PAIN RATING/NUMBER SCALE (0-10): ACTIVITY
BMI: BMI (kg/m2): 24.9 (03-10-22 @ 15:37)  HbA1c: A1C with Estimated Average Glucose Result: 4.8 % (03-05-22 @ 10:01)    Glucose: POCT Blood Glucose.: 113 mg/dL (03-02-22 @ 19:36)    BP: 118/78 (04-04-22 @ 09:20) (100/70 - 146/79)  Lipid Panel: Date/Time: 03-05-22 @ 10:39  Cholesterol, Serum: 148  Direct LDL: --  HDL Cholesterol, Serum: 45  Total Cholesterol/HDL Ration Measurement: --  Triglycerides, Serum: 98   7

## 2022-04-10 NOTE — ED PROVIDER NOTE - PMH
Patient became agitated and aggressive and attempted to rip swanson out. Balloon deflated and swanson removed to prevent further trauma. Scheduled 4mg IV Lorazepam administered. Patient calmed down and was assisted back to bed via Bella younger. Vital signs stable.    Anxiety    Headache    History of ITP    HTN (hypertension)    IBS (irritable bowel syndrome)    Labyrinthitis    Pott disease

## 2022-05-15 NOTE — ED ADULT NURSE NOTE - BED ASSIGNMENT RECEIVED
Luanne Leal was seen and treated in our emergency department on 5/15/2022. He may return to school on 05/18/2022. If you have any questions or concerns, please don't hesitate to call.       Heather Pritchett PA-C 21:47

## 2022-06-02 NOTE — ED PROVIDER NOTE - NS ED SCRIBE STATEMENT
Attending Glycopyrrolate Counseling:  I discussed with the patient the risks of glycopyrrolate including but not limited to skin rash, drowsiness, dry mouth, difficulty urinating, and blurred vision.

## 2022-06-13 NOTE — BEHAVIORAL HEALTH ASSESSMENT NOTE - AFFECT RANGE
[Follow-Up: _____] : a [unfilled] follow-up visit [Consultation] : a consultation visit [FreeTextEntry1] : LLE chronic wounds Labile

## 2022-06-15 NOTE — ED PROVIDER NOTE - NS_ATTENDINGSCRIBE_ED_ALL_ED
(4) Deterrents most likely did not stop you I personally performed the service described in the documentation recorded by the scribe in my presence, and it accurately and completely records my words and actions.

## 2022-06-27 NOTE — ED ADULT NURSE NOTE - NSSISCREENINGQ1_ED_A_ED
"ED Positive Culture Follow-up/Notification Note:    Date: 6/27/2022     Patient seen in the ED on 6/26/2022 for syncope. Patient received Augmentin 500 mg PO x1 and azithromycin 500 mg IV x1 in the ED.  1. Syncope, unspecified syncope type    2. Pneumonia due to infectious organism, unspecified laterality, unspecified part of lung    3. Abrasion of scalp, initial encounter    4. Hyperkalemia    5. Elevated troponin       Discharge Medication List as of 6/26/2022 11:36 PM      START taking these medications    Details   amoxicillin-clavulanate (AUGMENTIN) 500-125 MG Tab Take 1 Tablet by mouth every day for 5 days., Disp-5 Tablet, R-0, Normal      azithromycin (ZITHROMAX) 250 MG Tab Take two tabs by mouth on day one, then one tab by mouth daily on days 2-5., Disp-6 Tablet, R-0, Normal             Allergies: Patient has no known allergies.     Vitals:    06/26/22 2004 06/26/22 2202 06/26/22 2230 06/26/22 2240   BP: (!) 172/72 (!) 166/77  (!) 156/78   Pulse: 71 69 72 75   Resp: 18 16  17   Temp: 36.4 °C (97.5 °F)   36.2 °C (97.2 °F)   TempSrc: Temporal   Temporal   SpO2: 94% 93% 90% 93%   Weight:       Height:           Final cultures:   Results     Procedure Component Value Units Date/Time    Blood Culture [[id number]]  (Abnormal) Collected: 06/26/22 2203    Order Status: Completed Specimen: Blood from Peripheral Updated: 06/27/22 1421     Significant Indicator POS     Source BLD     Site PERIPHERAL     Culture Result Growth detected by Bactec instrument. 06/27/2022  14:20  Gram Stain: Gram positive rods.      Narrative:      2 of 2 blood culture x2  Sites order. Per Hospital Policy:  Only change Specimen Src: to \"Line\" if specified by physician  order.  No site indicated    Blood Culture [[id number]] Collected: 06/26/22 2219    Order Status: Sent Specimen: Blood from Peripheral Updated: 06/26/22 2303    Narrative:      1 of 2 for Blood Culture x 2 sites order. Per Hospital  Policy: Only change Specimen Src: to \"Line\" if " specified by  physician order.          Plan:   Patient with GPR in blood culture, confirmed with micro it is only in 1/4 blood culture bottles. Suspect this is a contaminant. Patient is currently on Augmentin/azithromycin for suspected pneumonia. Will wait for final blood culture result to guide further recommendations.    Sandi Stanford, Tara  PGY2 Infectious Diseases Pharmacy Resident    Addendum 6/28/2022  Micro report updated - patient with both CoNS and Bacillus species in blood culture. Suspect both are contaminants. No further recommendations at this time.    Sandi Stanford PharmD  PGY2 Infectious Diseases Pharmacy Resident   No

## 2022-06-28 NOTE — ED ADULT NURSE NOTE - INTEGUMENTARY WDL
What Type Of Note Output Would You Prefer (Optional)?: Standard Output
Hpi Title: Evaluation of Skin Lesions
How Severe Are Your Spot(S)?: moderate
Have Your Spot(S) Been Treated In The Past?: has not been treated
Color consistent with ethnicity/race, warm, dry intact, resilient.

## 2022-07-07 NOTE — ED BEHAVIORAL HEALTH ASSESSMENT NOTE - VETERAN
Home Instructions for Your Child after Sedation  Today your child received (medicine):  Propofol, lidocaine  Please keep this form with your health records  Your child may be more sleepy and irritable today than normal. Wake your child up every 1 to 11/2 hours during the day. (This way, both you and your child will sleep through the night.) Also, an adult should stay with your child for the rest of the day. The medicine may make the child dizzy. Avoid activities that require balance (bike riding, skating, climbing stairs, walking).  Remember:  For young infants: Do not allow the car seat or infant seat to bend the child's head forward and down. If it does, your child may not be able to breathe.  When your child wants to eat again, start with liquids (juice, soda pop, Popsicles). If your child feels well enough, you may try a regular diet. It is best to offer light meals for the first 24 hours.  If your child has nausea (feels sick to the stomach) or vomiting (throws up), give small amounts of clear liquids (7-Up, Sprite, apple juice or broth). Fluids are more important than food until your child is feeling better.  Wait 24 hours before giving medicine that contains alcohol. This includes liquid cold, cough and allergy medicines (Robitussin, Vicks Formula 44 for children, Benadryl, Chlor-Trimeton).  If you will leave your child with a , give the sitter a copy of these instructions.  Call your doctor if:  You have questions about the test results.  Your child vomits (throws up) more than two times.  Your child is very fussy or irritable.  You have trouble waking your child.   If your child has trouble breathing, call [phone].  If you have any questions or concerns, please call:  Pediatric Sedation Unit [phone]  Pediatric clinic  [phone]  [facility]  [phone] (ask for the doctor on call)  Emergency department [phone]  [facility] toll-free number [phone] (Monday--Friday, 8  a.m. to 4:30 p.m.)  I understand these instructions. I have all of my personal belongings.    No

## 2022-07-17 NOTE — ED ADULT NURSE NOTE - IN THE PAST 12 MONTHS HAVE YOU USED DRUGS OTHER THAN THOSE REQUIRED FOR MEDICAL REASON?
No You can access the FollowMyHealth Patient Portal offered by [facility] by registering at the following website: [email or web address]. By joining [employer]’s FollowMyHealth portal, you will also be able to view your health information using other applications (apps) compatible with our system.

## 2022-07-17 NOTE — ED ADULT TRIAGE NOTE - BP NONINVASIVE DIASTOLIC (MM HG)
Admission Reconciliation is Not Complete  Discharge Reconciliation is Not Complete 100 Admission Reconciliation is Completed  Discharge Reconciliation is Not Complete Admission Reconciliation is Not Complete  Discharge Reconciliation is Completed

## 2022-07-21 NOTE — RESULTS/DATA
Detail Level: Detailed
Size Of Lesion In Cm (Optional): 0
[FreeTextEntry1] : WBC 7600 Hgb 13 Hct 36.6 Platelets 69,000 Diff normal

## 2022-08-10 NOTE — ED ADULT TRIAGE NOTE - MODE OF ARRIVAL
-- DO NOT REPLY / DO NOT REPLY ALL --  -- Message is from Engagement Center Operations (ECO) --    General Patient Message  [facility] was calling about a drug interaction with the medication colchicine;probenecib is interacting with the patient atorvastatin     Caller Information       Type Contact Phone/Fax    08/09/2022 04:10 PM CDT Phone (Incoming) [id number] - [address] (Pharmacy) [phone]        Alternative phone number: n/a    Can a detailed message be left? No    Message Turnaround:     Did the caller agree that this message can wait until the office reopens in the morning? YES - The Message Can Wait - Send a message to the provider's clinical support pool     [place]:    Please give this turnaround time to the caller:   \"This message will be sent to [state Provider's name]. The clinical team will fulfill your request as soon as they review your message.\"                
Spoke with Sabino from [facility] to advise Dr. Arnold states he is aware of the interaction and discussed this with the patient and the patient verbally expressed understanding in this matter.   
uber

## 2022-08-15 NOTE — ED PROVIDER NOTE - CONDITION AT DISCHARGE:
Lunch relief provided by PENNIE Garrett RN - 2mg of versed and 50mcg of fentanyl passed on Satisfactory

## 2022-08-24 ENCOUNTER — EMERGENCY (EMERGENCY)
Facility: HOSPITAL | Age: 47
LOS: 1 days | Discharge: HOME | End: 2022-08-24
Attending: EMERGENCY MEDICINE
Payer: COMMERCIAL

## 2022-08-24 VITALS
HEART RATE: 85 BPM | DIASTOLIC BLOOD PRESSURE: 97 MMHG | OXYGEN SATURATION: 98 % | RESPIRATION RATE: 18 BRPM | SYSTOLIC BLOOD PRESSURE: 141 MMHG

## 2022-08-24 VITALS
SYSTOLIC BLOOD PRESSURE: 128 MMHG | WEIGHT: 95.9 LBS | HEART RATE: 92 BPM | DIASTOLIC BLOOD PRESSURE: 93 MMHG | RESPIRATION RATE: 18 BRPM | OXYGEN SATURATION: 95 % | HEIGHT: 61 IN

## 2022-08-24 DIAGNOSIS — Z90.49 ACQUIRED ABSENCE OF OTHER SPECIFIED PARTS OF DIGESTIVE TRACT: Chronic | ICD-10-CM

## 2022-08-24 PROCEDURE — 70450 CT HEAD/BRAIN W/O DYE: CPT | Mod: MA

## 2022-08-24 PROCEDURE — 90715 TDAP VACCINE 7 YRS/> IM: CPT

## 2022-08-24 PROCEDURE — 99284 EMERGENCY DEPT VISIT MOD MDM: CPT | Mod: 25

## 2022-08-24 PROCEDURE — 12011 RPR F/E/E/N/L/M 2.5 CM/<: CPT

## 2022-08-24 PROCEDURE — 90471 IMMUNIZATION ADMIN: CPT

## 2022-08-24 PROCEDURE — 70450 CT HEAD/BRAIN W/O DYE: CPT | Mod: 26,MA

## 2022-08-24 RX ORDER — TETANUS TOXOID, REDUCED DIPHTHERIA TOXOID AND ACELLULAR PERTUSSIS VACCINE, ADSORBED 5; 2.5; 8; 8; 2.5 [IU]/.5ML; [IU]/.5ML; UG/.5ML; UG/.5ML; UG/.5ML
0.5 SUSPENSION INTRAMUSCULAR ONCE
Refills: 0 | Status: COMPLETED | OUTPATIENT
Start: 2022-08-24 | End: 2022-08-24

## 2022-08-24 RX ADMIN — TETANUS TOXOID, REDUCED DIPHTHERIA TOXOID AND ACELLULAR PERTUSSIS VACCINE, ADSORBED 0.5 MILLILITER(S): 5; 2.5; 8; 8; 2.5 SUSPENSION INTRAMUSCULAR at 15:35

## 2022-08-24 NOTE — ED PROVIDER NOTE - NSFOLLOWUPCLINICS_GEN_ALL_ED_FT
[facility] General Internal Medicine  General Internal Medicine  [address] [place] [address]  Phone: [phone]  Fax:

## 2022-08-24 NOTE — ED PROVIDER NOTE - PROGRESS NOTE DETAILS
Pt does not want to have TDap.  Had prolonged conversation with pt regarding risk of tetanus where wer discussed complication related to the infection including the possibility of infection and its complication related to permanent disability and possible death.  Encourage by father that is a physician here as well as Dr Henderson she was initially amenable, however is now declining.  Pt is displaying proper insight and is acting rationally.  Max

## 2022-08-24 NOTE — ED PROCEDURE NOTE - ATTENDING APP SHARED VISIT CONTRIBUTION OF CARE
I, EM Attending, Randolph Henderson was present for and supervised the critical portions of the procedure performed by the Resident Physician or TERESITA.

## 2022-08-24 NOTE — ED ADULT NURSE NOTE - OBJECTIVE STATEMENT
45 y/o AxOx4 F, arrived from home complaining of r head laceration. Pt appears anxious. Pt reports hitting her forehead on a piece of wood in her garage. R forehead laceration appears neat and clean. Pt otherwise well appearing.

## 2022-08-24 NOTE — ED PROVIDER NOTE - NSFOLLOWUPINSTRUCTIONS_ED_ALL_ED_FT
1- Tylenol as needed for pain  2- Keep area dry for 24 hours, after you can wash with gentle soap and water  3- Any redness, pus, fevers, chills, worsening pain, headaches, visual changes, vomiting, or any new or worsening complaints come back to the ER immediately

## 2022-08-24 NOTE — ED PROVIDER NOTE - CARE PLAN
Principal Discharge DX:	Forehead laceration   1 Principal Discharge DX:	Forehead laceration  Secondary Diagnosis:	Head injury

## 2022-08-24 NOTE — ED ADULT TRIAGE NOTE - CHIEF COMPLAINT QUOTE
hit head on wooden object in garage, small lac on r side of forehead  no LOC/no trauma - refusing to have temp taken in triage - "afraid of covid"

## 2022-08-24 NOTE — ED PROVIDER NOTE - OBJECTIVE STATEMENT
46 y.o. female coming in with laceration to her right forehead after striking it on a table in her garage about 2 hours ago.  Did not pass out, not on any blood thinner unsure of last tetanus.   No headache, visual changes, nausea or vomiting.  No other injury.

## 2022-08-24 NOTE — ED PROVIDER NOTE - ATTENDING APP SHARED VISIT CONTRIBUTION OF CARE
Randolph Henderson MD:   I personally saw the patient and performed a substantive portion of the visit including all aspects of the medical decision making.    MDM: 46-year-old female with medical history as seen above, not currently on anticoagulation who presents with right forehead laceration after she stood up and hit her top right forehead on the corner of the garage.  No LOC, reports mild dizziness/vertigo.  On examination patient is neuro intact, there is a small laceration to the right forehead, no significant gapping.  No underlying step-off or deformity.  Will provide patient with Dermabond for wound repair, obtain CT to rule out ICH given patient with history of ITP.  Tdap.  Patient reassessed and has improved symptoms. Repeat neuro exam is benign without any neuro deficits. Ambulatory in the ED without ataxia or assistance. Stable for discharge with close follow-up and strict return precautions. The patient has been informed of all concerning signs and symptoms to return to Emergency Department, the necessity to follow up with PMD within 3-5 days was explained, and the patient reports understanding of above with capacity and insight. Randolph Henderson MD:   I personally saw the patient and performed a substantive portion of the visit including all aspects of the medical decision making.    MDM: 46-year-old female with medical history as seen above, not currently on anticoagulation who presents with right forehead laceration after she stood up and hit her top right forehead on the corner of the garage.  No LOC, reports mild dizziness/vertigo.  On examination patient is neuro intact, there is a small laceration to the right forehead, no significant gapping.  No underlying step-off or deformity.  Will provide patient with Dermabond for wound repair, obtain CT to rule out ICH given patient with history of ITP.  Will give Tdap.  Patient reassessed and has improved symptoms. Repeat neuro exam is benign without any neuro deficits. Ambulatory in the ED without ataxia or assistance. Stable for discharge with close follow-up and strict return precautions. The patient has been informed of all concerning signs and symptoms to return to Emergency Department, the necessity to follow up with PMD within 3-5 days was explained, and the patient reports understanding of above with capacity and insight.

## 2022-08-24 NOTE — ED PROVIDER NOTE - PATIENT PORTAL LINK FT
You can access the FollowMyHealth Patient Portal offered by [facility] by registering at the following website: [email or web address]. By joining [employer]s FollowMyHealth portal, you will also be able to view your health information using other applications (apps) compatible with our system.

## 2022-08-24 NOTE — ED PROCEDURE NOTE - NS ED ATTENDING STATEMENT MOD
This was a shared visit with the TERESITA. I reviewed and verified the documentation and independently performed the documented:

## 2022-08-24 NOTE — ED ADULT NURSE NOTE - HISTORY OF COVID-19 VACCINATION
20 y/o M pt with PMHx of asthma? and anxiety presents to ED c/o anxiety and depression. Pt is on Lorazepam x1 week. He was brought in by mother for psych eval as per psych doctor's instructions. FHx of psych disorders on father's side. He was seen at [facility] a few days ago and tx with Ativan. Pt denies cough, SOB, CP, nausea, and vomiting. No further complaints at this time. NKDA.
No

## 2022-09-01 ENCOUNTER — OUTPATIENT (OUTPATIENT)
Dept: OUTPATIENT SERVICES | Facility: HOSPITAL | Age: 47
LOS: 1 days | End: 2022-09-01
Payer: COMMERCIAL

## 2022-09-01 ENCOUNTER — APPOINTMENT (OUTPATIENT)
Dept: ULTRASOUND IMAGING | Facility: IMAGING CENTER | Age: 47
End: 2022-09-01
Payer: MEDICARE

## 2022-09-01 DIAGNOSIS — Z90.49 ACQUIRED ABSENCE OF OTHER SPECIFIED PARTS OF DIGESTIVE TRACT: Chronic | ICD-10-CM

## 2022-09-01 DIAGNOSIS — Z00.8 ENCOUNTER FOR OTHER GENERAL EXAMINATION: ICD-10-CM

## 2022-09-01 PROCEDURE — 76700 US EXAM ABDOM COMPLETE: CPT | Mod: 26

## 2022-09-01 PROCEDURE — 76830 TRANSVAGINAL US NON-OB: CPT

## 2022-09-01 PROCEDURE — 76830 TRANSVAGINAL US NON-OB: CPT | Mod: 26

## 2022-09-01 PROCEDURE — 76856 US EXAM PELVIC COMPLETE: CPT | Mod: 26,59

## 2022-09-01 PROCEDURE — 76700 US EXAM ABDOM COMPLETE: CPT

## 2022-09-01 PROCEDURE — 76856 US EXAM PELVIC COMPLETE: CPT

## 2022-09-20 ENCOUNTER — INPATIENT (INPATIENT)
Facility: HOSPITAL | Age: 47
LOS: 4 days | Discharge: HOME | DRG: 391 | End: 2022-09-25
Attending: INTERNAL MEDICINE | Admitting: INTERNAL MEDICINE
Payer: COMMERCIAL

## 2022-09-20 VITALS
RESPIRATION RATE: 18 BRPM | HEIGHT: 61 IN | TEMPERATURE: 99 F | OXYGEN SATURATION: 96 % | HEART RATE: 99 BPM | DIASTOLIC BLOOD PRESSURE: 83 MMHG | WEIGHT: 91.05 LBS | SYSTOLIC BLOOD PRESSURE: 118 MMHG

## 2022-09-20 DIAGNOSIS — R62.7 ADULT FAILURE TO THRIVE: ICD-10-CM

## 2022-09-20 DIAGNOSIS — Z90.49 ACQUIRED ABSENCE OF OTHER SPECIFIED PARTS OF DIGESTIVE TRACT: Chronic | ICD-10-CM

## 2022-09-20 LAB
ALBUMIN SERPL ELPH-MCNC: 4.8 G/DL — SIGNIFICANT CHANGE UP (ref 3.3–5)
ALP SERPL-CCNC: 96 U/L — SIGNIFICANT CHANGE UP (ref 40–120)
ALT FLD-CCNC: 13 U/L — SIGNIFICANT CHANGE UP (ref 10–45)
ANION GAP SERPL CALC-SCNC: 16 MMOL/L — SIGNIFICANT CHANGE UP (ref 5–17)
APPEARANCE UR: CLEAR — SIGNIFICANT CHANGE UP
AST SERPL-CCNC: 24 U/L — SIGNIFICANT CHANGE UP (ref 10–40)
BACTERIA # UR AUTO: NEGATIVE — SIGNIFICANT CHANGE UP
BASE EXCESS BLDV CALC-SCNC: 0.2 MMOL/L — SIGNIFICANT CHANGE UP (ref -2–3)
BASOPHILS # BLD AUTO: 0.01 K/UL — SIGNIFICANT CHANGE UP (ref 0–0.2)
BASOPHILS NFR BLD AUTO: 0.1 % — SIGNIFICANT CHANGE UP (ref 0–2)
BILIRUB SERPL-MCNC: 0.6 MG/DL — SIGNIFICANT CHANGE UP (ref 0.2–1.2)
BILIRUB UR-MCNC: NEGATIVE — SIGNIFICANT CHANGE UP
BUN SERPL-MCNC: 4 MG/DL — LOW (ref 7–23)
CA-I SERPL-SCNC: 1.18 MMOL/L — SIGNIFICANT CHANGE UP (ref 1.15–1.33)
CALCIUM SERPL-MCNC: 9.8 MG/DL — SIGNIFICANT CHANGE UP (ref 8.4–10.5)
CHLORIDE BLDV-SCNC: 100 MMOL/L — SIGNIFICANT CHANGE UP (ref 96–108)
CHLORIDE SERPL-SCNC: 101 MMOL/L — SIGNIFICANT CHANGE UP (ref 96–108)
CO2 BLDV-SCNC: 28 MMOL/L — HIGH (ref 22–26)
CO2 SERPL-SCNC: 23 MMOL/L — SIGNIFICANT CHANGE UP (ref 22–31)
COLOR SPEC: COLORLESS — SIGNIFICANT CHANGE UP
CREAT SERPL-MCNC: 0.84 MG/DL — SIGNIFICANT CHANGE UP (ref 0.5–1.3)
DIFF PNL FLD: NEGATIVE — SIGNIFICANT CHANGE UP
EGFR: 87 ML/MIN/1.73M2 — SIGNIFICANT CHANGE UP
EOSINOPHIL # BLD AUTO: 0 K/UL — SIGNIFICANT CHANGE UP (ref 0–0.5)
EOSINOPHIL NFR BLD AUTO: 0 % — SIGNIFICANT CHANGE UP (ref 0–6)
EPI CELLS # UR: 2 /HPF — SIGNIFICANT CHANGE UP
GAS PNL BLDV: 136 MMOL/L — SIGNIFICANT CHANGE UP (ref 136–145)
GAS PNL BLDV: SIGNIFICANT CHANGE UP
GAS PNL BLDV: SIGNIFICANT CHANGE UP
GLUCOSE BLDV-MCNC: 89 MG/DL — SIGNIFICANT CHANGE UP (ref 70–99)
GLUCOSE SERPL-MCNC: 94 MG/DL — SIGNIFICANT CHANGE UP (ref 70–99)
GLUCOSE UR QL: NEGATIVE — SIGNIFICANT CHANGE UP
HCG SERPL-ACNC: <2 MIU/ML — SIGNIFICANT CHANGE UP
HCO3 BLDV-SCNC: 26 MMOL/L — SIGNIFICANT CHANGE UP (ref 22–29)
HCT VFR BLD CALC: 43.8 % — SIGNIFICANT CHANGE UP (ref 34.5–45)
HCT VFR BLDA CALC: 45 % — SIGNIFICANT CHANGE UP (ref 34.5–46.5)
HGB BLD CALC-MCNC: 15 G/DL — SIGNIFICANT CHANGE UP (ref 11.7–16.1)
HGB BLD-MCNC: 15 G/DL — SIGNIFICANT CHANGE UP (ref 11.5–15.5)
HYALINE CASTS # UR AUTO: 0 /LPF — SIGNIFICANT CHANGE UP (ref 0–2)
IMM GRANULOCYTES NFR BLD AUTO: 0.4 % — SIGNIFICANT CHANGE UP (ref 0–0.9)
KETONES UR-MCNC: NEGATIVE — SIGNIFICANT CHANGE UP
LACTATE BLDV-MCNC: 1.3 MMOL/L — SIGNIFICANT CHANGE UP (ref 0.5–2)
LEUKOCYTE ESTERASE UR-ACNC: NEGATIVE — SIGNIFICANT CHANGE UP
LIDOCAIN IGE QN: 24 U/L — SIGNIFICANT CHANGE UP (ref 7–60)
LYMPHOCYTES # BLD AUTO: 0.97 K/UL — LOW (ref 1–3.3)
LYMPHOCYTES # BLD AUTO: 12 % — LOW (ref 13–44)
MAGNESIUM SERPL-MCNC: 2 MG/DL — SIGNIFICANT CHANGE UP (ref 1.6–2.6)
MCHC RBC-ENTMCNC: 30.6 PG — SIGNIFICANT CHANGE UP (ref 27–34)
MCHC RBC-ENTMCNC: 34.2 GM/DL — SIGNIFICANT CHANGE UP (ref 32–36)
MCV RBC AUTO: 89.4 FL — SIGNIFICANT CHANGE UP (ref 80–100)
MONOCYTES # BLD AUTO: 0.44 K/UL — SIGNIFICANT CHANGE UP (ref 0–0.9)
MONOCYTES NFR BLD AUTO: 5.4 % — SIGNIFICANT CHANGE UP (ref 2–14)
NEUTROPHILS # BLD AUTO: 6.64 K/UL — SIGNIFICANT CHANGE UP (ref 1.8–7.4)
NEUTROPHILS NFR BLD AUTO: 82.1 % — HIGH (ref 43–77)
NITRITE UR-MCNC: NEGATIVE — SIGNIFICANT CHANGE UP
NRBC # BLD: 0 /100 WBCS — SIGNIFICANT CHANGE UP (ref 0–0)
PCO2 BLDV: 48 MMHG — HIGH (ref 39–42)
PH BLDV: 7.35 — SIGNIFICANT CHANGE UP (ref 7.32–7.43)
PH UR: 6.5 — SIGNIFICANT CHANGE UP (ref 5–8)
PHOSPHATE SERPL-MCNC: 4.2 MG/DL — SIGNIFICANT CHANGE UP (ref 2.5–4.5)
PLATELET # BLD AUTO: 66 K/UL — LOW (ref 150–400)
PO2 BLDV: 48 MMHG — HIGH (ref 25–45)
POTASSIUM BLDV-SCNC: 3.8 MMOL/L — SIGNIFICANT CHANGE UP (ref 3.5–5.1)
POTASSIUM SERPL-MCNC: 3.9 MMOL/L — SIGNIFICANT CHANGE UP (ref 3.5–5.3)
POTASSIUM SERPL-SCNC: 3.9 MMOL/L — SIGNIFICANT CHANGE UP (ref 3.5–5.3)
PROT SERPL-MCNC: 7.8 G/DL — SIGNIFICANT CHANGE UP (ref 6–8.3)
PROT UR-MCNC: NEGATIVE — SIGNIFICANT CHANGE UP
RAPID RVP RESULT: SIGNIFICANT CHANGE UP
RBC # BLD: 4.9 M/UL — SIGNIFICANT CHANGE UP (ref 3.8–5.2)
RBC # FLD: 12 % — SIGNIFICANT CHANGE UP (ref 10.3–14.5)
RBC CASTS # UR COMP ASSIST: 1 /HPF — SIGNIFICANT CHANGE UP (ref 0–4)
SAO2 % BLDV: 75.6 % — SIGNIFICANT CHANGE UP (ref 67–88)
SARS-COV-2 RNA SPEC QL NAA+PROBE: SIGNIFICANT CHANGE UP
SODIUM SERPL-SCNC: 140 MMOL/L — SIGNIFICANT CHANGE UP (ref 135–145)
SP GR SPEC: 1 — LOW (ref 1.01–1.02)
T3 SERPL-MCNC: 163 NG/DL — SIGNIFICANT CHANGE UP (ref 80–200)
T4 AB SER-ACNC: 10 UG/DL — SIGNIFICANT CHANGE UP (ref 4.6–12)
TSH SERPL-MCNC: 1.94 UIU/ML — SIGNIFICANT CHANGE UP (ref 0.27–4.2)
UROBILINOGEN FLD QL: NEGATIVE — SIGNIFICANT CHANGE UP
WBC # BLD: 8.09 K/UL — SIGNIFICANT CHANGE UP (ref 3.8–10.5)
WBC # FLD AUTO: 8.09 K/UL — SIGNIFICANT CHANGE UP (ref 3.8–10.5)
WBC UR QL: 2 /HPF — SIGNIFICANT CHANGE UP (ref 0–5)

## 2022-09-20 PROCEDURE — 99285 EMERGENCY DEPT VISIT HI MDM: CPT

## 2022-09-20 PROCEDURE — 76705 ECHO EXAM OF ABDOMEN: CPT | Mod: 26

## 2022-09-20 PROCEDURE — 93010 ELECTROCARDIOGRAM REPORT: CPT

## 2022-09-20 PROCEDURE — 71045 X-RAY EXAM CHEST 1 VIEW: CPT | Mod: 26

## 2022-09-20 PROCEDURE — 74176 CT ABD & PELVIS W/O CONTRAST: CPT | Mod: 26,MA

## 2022-09-20 RX ORDER — HYOSCYAMINE SULFATE 0.13 MG
0.12 TABLET ORAL
Refills: 0 | Status: DISCONTINUED | OUTPATIENT
Start: 2022-09-20 | End: 2022-09-25

## 2022-09-20 RX ORDER — SODIUM CHLORIDE 9 MG/ML
1000 INJECTION, SOLUTION INTRAVENOUS
Refills: 0 | Status: DISCONTINUED | OUTPATIENT
Start: 2022-09-20 | End: 2022-09-20

## 2022-09-20 RX ORDER — SODIUM CHLORIDE 9 MG/ML
1000 INJECTION, SOLUTION INTRAVENOUS
Refills: 0 | Status: DISCONTINUED | OUTPATIENT
Start: 2022-09-20 | End: 2022-09-21

## 2022-09-20 RX ORDER — MONTELUKAST 4 MG/1
10 TABLET, CHEWABLE ORAL AT BEDTIME
Refills: 0 | Status: DISCONTINUED | OUTPATIENT
Start: 2022-09-20 | End: 2022-09-21

## 2022-09-20 RX ORDER — RIVAROXABAN 15 MG-20MG
1 KIT ORAL
Qty: 0 | Refills: 0 | DISCHARGE

## 2022-09-20 RX ORDER — DILTIAZEM HCL 120 MG
180 CAPSULE, EXT RELEASE 24 HR ORAL DAILY
Refills: 0 | Status: DISCONTINUED | OUTPATIENT
Start: 2022-09-20 | End: 2022-09-21

## 2022-09-20 RX ORDER — SODIUM CHLORIDE 9 MG/ML
1000 INJECTION, SOLUTION INTRAVENOUS ONCE
Refills: 0 | Status: COMPLETED | OUTPATIENT
Start: 2022-09-20 | End: 2022-09-20

## 2022-09-20 RX ADMIN — SODIUM CHLORIDE 1000 MILLILITER(S): 9 INJECTION, SOLUTION INTRAVENOUS at 10:35

## 2022-09-20 RX ADMIN — SODIUM CHLORIDE 125 MILLILITER(S): 9 INJECTION, SOLUTION INTRAVENOUS at 20:03

## 2022-09-20 NOTE — CONSULT NOTE ADULT - SUBJECTIVE AND OBJECTIVE BOX
CARDIOLOGY CONSULT - Dr. Teran  Date of Service: 9/20/2022      HPI:   Patient is a 46 year-old-female with history of POTS syndrome, Protein S insufficiency, DVT in [facility] presents with failure to thrive. Patient states that she started feeling unwell about 2 months ago, with bradycardia, +diarrhea. Developed abdominal pain about few days ago. Noted to have about 10lb weight loss in the last month. Unable to tolerate PO at home (20 Sep 2022 12:13)      PAST MEDICAL & SURGICAL HISTORY:  IBS (irritable bowel syndrome)      Anxiety      HTN (hypertension)      Headache      Labyrinthitis      History of ITP      POTS (postural orthostatic tachycardia syndrome)      Idiopathic thrombocytopenia purpura      Dysautonomia      Idiopathic thrombocytopenic purpura      Depression      Dystonia      Gastroparesis      Autonomic dysreflexia      DVT (deep venous thrombosis)  left internal jugular and subclavian veins      Protein S deficiency      Ovarian cyst      Idiopathic thrombocytopenia purpura      S/P appendectomy              PREVIOUS DIAGNOSTIC TESTING:    [ ] Echocardiogram:  [ ]  Catheterization:  [ ] Stress Test:  	    MEDICATIONS:  MEDICATIONS  (STANDING):      FAMILY HISTORY:  Family history of prostate cancer in father    Family history of atrial fibrillation    Family history of hypertension (Grandparent)        SOCIAL HISTORY:    [ ] Non-smoker  [ ] Smoker  [ ] Alcohol    Allergies    [This allergen will not trigger allergy alert] gadolinium (Hypotension)  barium sulfate (Other)  beta blockers (Unknown)  Ceftin (Unknown)  IV Contrast (Anaphylaxis)  Reglan (Unknown)  Zofran (Unknown)    Intolerances    beta blockers (Other)  metoprolol (Headache)  	    REVIEW OF SYSTEMS:  CONSTITUTIONAL: No fever, weight loss, or fatigue  EYES: No eye pain, visual disturbances, or discharge  ENMT:  No difficulty hearing, tinnitus, vertigo; No sinus or throat pain  NECK: No pain or stiffness  RESPIRATORY: No cough, wheezing, chills or hemoptysis; No Shortness of Breath  CARDIOVASCULAR: No chest pain, palpitations, passing out, dizziness, or leg swelling  GASTROINTESTINAL: No abdominal or epigastric pain. No nausea, vomiting, or hematemesis; No diarrhea or constipation. No melena or hematochezia.  GENITOURINARY: No dysuria, frequency, hematuria, or incontinence  NEUROLOGICAL: No headaches, memory loss, loss of strength, numbness, or tremors  SKIN: No itching, burning, rashes, or lesions   	    [ ] All others negative	  [ ] Unable to obtain    PHYSICAL EXAM:  T(C): 37.2 (09-20-22 @ 08:15), Max: 37.2 (09-20-22 @ 08:15)  HR: 99 (09-20-22 @ 08:15) (99 - 99)  BP: 118/83 (09-20-22 @ 08:15) (118/83 - 118/83)  RR: 18 (09-20-22 @ 08:15) (18 - 18)  SpO2: 96% (09-20-22 @ 08:15) (96% - 96%)  Wt(kg): --  I&O's Summary      Appearance: Normal	  Psychiatry: A & O x 3, Mood & affect appropriate  HEENT:   Normal oral mucosa, PERRL, EOMI	  Lymphatic: No lymphadenopathy  Cardiovascular: Normal S1 S2,RRR, No JVD, No murmurs  Respiratory: Lungs clear to auscultation	  Gastrointestinal:  Soft, Non-tender, + BS	  Skin: No rashes, No ecchymoses, No cyanosis	  Neurologic: Non-focal  Extremities: Normal range of motion, No clubbing, cyanosis or edema  Vascular: Peripheral pulses palpable 2+ bilaterally    TELEMETRY: 	    ECG:  	  RADIOLOGY:  OTHER: 	  	  LABS:	 	    CARDIAC MARKERS:                                  15.0   8.09  )-----------( 66       ( 20 Sep 2022 10:29 )             43.8     09-20    140  |  101  |  4<L>  ----------------------------<  94  3.9   |  23  |  0.84    Ca    9.8      20 Sep 2022 10:29  Phos  4.2     09-20  Mg     2.0     09-20    TPro  7.8  /  Alb  4.8  /  TBili  0.6  /  DBili  x   /  AST  24  /  ALT  13  /  AlkPhos  96  09-20      proBNP:   Lipid Profile:   HgA1c:   TSH:     ASSESSMENT/PLAN: 	              70 minutes spent on total encounter; more than 50% of the visit was spent counseling and/or coordinating care by the attending physician.

## 2022-09-20 NOTE — ED PROVIDER NOTE - ATTENDING CONTRIBUTION TO CARE
I, Shorty Wilburn, performed a history and physical exam of the patient and discussed their management with the resident and/or advanced care provider. I reviewed the resident and/or advanced care provider's note and agree with the documented findings and plan of care. I was present and available for all procedures.    46-year-old female history of ITP cervical hyperplasia status post IUD placement DVT in left IJ not on current blood thinners, ovarian cysts, POTS with chronic tachycardia now bradycardia protein S deficiency, presents with father for worsening abdominal pain and diarrhea over the last 2 months.  Previously diagnosed with C. difficile completed treatment.  Diarrhea has slowed down.  Last 5 days having more abdominal cramping in the upper portions of her abdomen and now diffuse.  Reports having explosive diarrhea episodes and nausea but denies vomiting.  Has food adversity because of the pain that occurs when she eats.  Has seen GI who recommended colonoscopy and EGD but unfortunately has not been cleared by cardiology due to new bradycardia.  Reports usually she is tachycardic but has changed in the last few months.  Also endorses chronic head-bobbing tremor which is getting worse with the symptoms denies numbness and tingling in extremities denies new leg swelling or arm swelling.  Does endorse frequent urination but denies dysuria or hematuria.  Reports having an extreme sensitivity to IV contrast and requesting a noncontrasted study.  Reports severe dehydration and weight loss.  No recent travel sick contacts night sweats or fevers    Well appearing and in NAD, head normal appearing atraumatic, trachea midline, no respiratory distress, lungs cta bilaterally, rrr no murmurs, soft Diffuse upper abdominal tenderness to palpation negative Nielson's negative McBurney's no distention bilateral CVA tenderness to palpation, no visible extremity deformities, Alert and oriented, non focal neuro exam, skin warm and dry, normal affect and mood, No CTL spine tenderness palpation midline no leg swelling or JVD, Head yousif present improvement when speaking    Concerning for dehydration possible metabolic versus infectious etiology of now chronic diarrhea will evaluate with stool PCR C. difficile otherwise possible H. pylori a possible new intra-abdominal process will evaluate with screening labs IV hydration CT scan abdomen pelvis as well as right upper quadrant ultrasound to evaluate for biliary colic.  Needs admission for further management of chronic abdominal pain with optimization for possible further colonoscopy or endoscopy.  Patient agreeable with plan I, Shorty Wilburn, performed a history and physical exam of the patient and discussed their management with the resident and/or advanced care provider. I reviewed the resident and/or advanced care provider's note and agree with the documented findings and plan of care. I was present and available for all procedures.    46-year-old female history of ITP cervical hyperplasia status post IUD placement DVT in left IJ not on current blood thinners, ovarian cysts, POTS with chronic tachycardia now bradycardia protein S deficiency, presents with father for worsening abdominal pain and diarrhea over the last 2 months.  Previously diagnosed with C. difficile completed treatment.  Diarrhea has slowed down.  Last 5 days having more abdominal cramping in the upper portions of her abdomen and now diffuse.  Reports having explosive diarrhea episodes and nausea but denies vomiting.  Has food adversity because of the pain that occurs when she eats.  Has seen GI who recommended colonoscopy and EGD but unfortunately has not been cleared by cardiology due to new bradycardia.  Reports usually she is tachycardic but has changed in the last few months.  Also endorses chronic head-bobbing tremor which is getting worse with the symptoms denies numbness and tingling in extremities denies new leg swelling or arm swelling.  Does endorse frequent urination but denies dysuria or hematuria.  Reports having an extreme sensitivity to IV contrast and requesting a noncontrasted study.  Reports severe dehydration and weight loss.  No recent travel sick contacts night sweats or fevers    Well appearing and in NAD, head normal appearing atraumatic, trachea midline, no respiratory distress, lungs cta bilaterally, rrr no murmurs, soft Diffuse upper abdominal tenderness to palpation negative Nielson's negative McBurney's no distention bilateral CVA tenderness to palpation, no visible extremity deformities, Alert and oriented, non focal neuro exam, skin warm and dry, normal affect and mood, No CTL spine tenderness palpation midline no leg swelling or JVD, Head yousif present improvement when speaking    Concerning for dehydration possible metabolic versus infectious etiology of now chronic diarrhea will evaluate with stool PCR C. difficile otherwise possible H. pylori a possible new intra-abdominal process will evaluate with screening labs IV hydration CT scan abdomen pelvis as well as right upper quadrant ultrasound to evaluate for biliary colic. Unlikely pelvic in etiology without severe lower abdominal tenderness palpation unlikely ovarian torsion. Needs admission for further management of chronic abdominal pain with optimization for possible further colonoscopy or endoscopy.  Patient agreeable with plan

## 2022-09-20 NOTE — ED PROVIDER NOTE - PHYSICAL EXAMINATION
General: anorexic appearing   HEENT: atraumatic, normocephalic; pupils are equal, round and react to light, extraocular movements intact bilaterally without deficits, no conjunctival pallor, mucous membranes moist, head tremors   Neck: no jugular venous distension, full range of motion  Chest/Lung: clear to auscultation bilaterally, no wheezes/rhonchi/rales  Heart: mildly tachycardic   Abdomen: normal bowel sounds, soft, non-tender, non-distended  Extremities: no lower extremity edema, +2 radial pulses bilaterally, +2 dorsalis pedis pulses bilaterally  Musculoskeletal: full range of motion of all 4 extremities  Nervous System: alert and oriented, no motor deficits or sensory deficits; CNII-XII grossly intact; no focal neurologic deficits  Skin: no rashes/lacerations noted

## 2022-09-20 NOTE — CONSULT NOTE ADULT - ASSESSMENT
Patient is a 46 year-old-female with history of POTS syndrome, Protein S insufficiency, DVT in LIJ presents with failure to thrive.    #POTS  -reported history of dysautonomia with self reported episodes of tachycardia/bradycardia  -no monitoring was done  -pt monitor with a pulse ox  -during interview HR stable in the 80's(pt supine)  -plan to monitor on tele  -monitor electrolytes closely  -IVF  -recent ECHO @ Dr. Parker office reportedly normal  -cont outpt [facility]    #Diarrhea/FTT/  -etiology unclear  -h/o IBS  -GI eval    #Protien S/h/o DVT  -cont Xarelto    #Tremors  -neuro eval    discussed w pt and father Sakina Fortune (OBGYN) at the bedside     70 minutes spent on total encounter; more than 50% of the visit was spent counseling and/or coordinating care by the attending physician.

## 2022-09-20 NOTE — ED ADULT NURSE NOTE - NSIMPLEMENTINTERV_GEN_ALL_ED
Implemented All Universal Safety Interventions:  [place] to call system. Call bell, personal items and telephone within reach. Instruct patient to call for assistance. Room bathroom lighting operational. Non-slip footwear when patient is off stretcher. Physically safe environment: no spills, clutter or unnecessary equipment. Stretcher in lowest position, wheels locked, appropriate side rails in place.

## 2022-09-20 NOTE — H&P ADULT - ASSESSMENT
Patient is a 46 year-old-female with history of POTS syndrome, Protein S insufficiency, positive MARY BETH, IBS,  h/o  DVT, has been of AC, hasn't had a follow up dopplers presents with failure to thrive. Patient states that she started feeling unwell about 2 months ago, with bradycardia, +diarrhea. Developed abdominal pain about few days ago. Noted to have about 10lb weight loss in the last month. Unable to tolerate PO at home  A/P  1.diarrhea, abdominal pain, unable to tolerate po. C.DIff was ruled out on ouptn basis. ptn doesnt want to follow with her present GI doctors, Dr. Boles and is requesting a [facility] GI team to see her. keep NPO exc meds and place on IVF. ptn states she is hungry just doesnt tolerate ANY po intake  2. ptn is requesting a rheumatology consult , states she was told she had Sjogrens but hasnt seen rheum in a long time. rheum consult placed  3. ptn appears anxious, states she takes Ativan when she has tachycardia. will cont Ativan. would benefit from psych consult while inptn  4. check LE dopplers to R/O DVT. recommend ambulation to prevent DVT

## 2022-09-20 NOTE — ED PROVIDER NOTE - CLINICAL SUMMARY MEDICAL DECISION MAKING FREE TEXT BOX
Patient is a 46 year-old-female with history of POTS syndrome, Protein S insufficiency, DVT in LIJ presents with failure to thrive. Will check for infectious, hematologic, metabolic or thyroid causes. Will also obtain UA/UC to r/o UTI. CTAP to eval for possible intra-abdominal processes. RUQ US to evaluate for biliary etiologies. Anticipate admission for failure to thrive in adult. PMD Dr. Cristhian Parker

## 2022-09-20 NOTE — ED PROVIDER NOTE - CARE PLAN
Principal Discharge DX:	Adult failure to thrive  Secondary Diagnosis:	Abdominal pain  Secondary Diagnosis:	Diarrhea  Secondary Diagnosis:	Weight loss   1

## 2022-09-20 NOTE — ED PROVIDER NOTE - PROGRESS NOTE DETAILS
[facility] PGY2   9/13/22 c-diff negative on HIE. Discontinued c. diff test and isolation. [facility] PGY2   9/13/22 C diff negative on HIE. Discontinued c. diff test and isolation.  CTAP negative for acute intra-abdominal pathologies. [facility] PGY2  Bloodwork unremarkable with normal electrolytes. UA negative.   Admitted to Dr. Nguyen for further management of failure to thrive in adult.

## 2022-09-20 NOTE — ED PROVIDER NOTE - OBJECTIVE STATEMENT
Patient is a 46 year-old-female with history of POTS syndrome, Protein S insufficiency, DVT in LIJ presents with failure to thrive. Patient states that she started feeling unwell about 2 months ago, with bradycardia, +diarrhea. Developed abdominal pain about few days ago. Noted to have about 10lb weight loss in the last month. Unable to tolerate PO at home.

## 2022-09-20 NOTE — H&P ADULT - HISTORY OF PRESENT ILLNESS
Patient is a 46 year-old-female with history of POTS syndrome, Protein S insufficiency, DVT in LIJ presents with failure to thrive. Patient states that she started feeling unwell about 2 months ago, with bradycardia, +diarrhea. Developed abdominal pain about few days ago. Noted to have about 10lb weight loss in the last month. Unable to tolerate PO at home  Patient is a 46 year-old-female with history of POTS syndrome, Protein S insufficiency, positive MARY BETH, IBS,  h/o  DVT, has been of AC, hasn't had a follow up dopplers presents with failure to thrive. Patient states that she started feeling unwell about 2 months ago, with bradycardia, +diarrhea. Developed abdominal pain about few days ago. Noted to have about 10lb weight loss in the last month. Unable to tolerate PO at home

## 2022-09-21 PROCEDURE — 93970 EXTREMITY STUDY: CPT | Mod: 26

## 2022-09-21 PROCEDURE — 99223 1ST HOSP IP/OBS HIGH 75: CPT | Mod: GC

## 2022-09-21 RX ORDER — MONTELUKAST 4 MG/1
10 TABLET, CHEWABLE ORAL AT BEDTIME
Refills: 0 | Status: DISCONTINUED | OUTPATIENT
Start: 2022-09-21 | End: 2022-09-25

## 2022-09-21 RX ORDER — SODIUM CHLORIDE 9 MG/ML
1000 INJECTION, SOLUTION INTRAVENOUS
Refills: 0 | Status: DISCONTINUED | OUTPATIENT
Start: 2022-09-21 | End: 2022-09-24

## 2022-09-21 RX ORDER — DILTIAZEM HCL 120 MG
180 CAPSULE, EXT RELEASE 24 HR ORAL DAILY
Refills: 0 | Status: DISCONTINUED | OUTPATIENT
Start: 2022-09-21 | End: 2022-09-25

## 2022-09-21 RX ORDER — SOD SULF/SODIUM/NAHCO3/KCL/PEG
4000 SOLUTION, RECONSTITUTED, ORAL ORAL ONCE
Refills: 0 | Status: COMPLETED | OUTPATIENT
Start: 2022-09-21 | End: 2022-09-21

## 2022-09-21 RX ADMIN — Medication 180 MILLIGRAM(S): at 11:40

## 2022-09-21 RX ADMIN — Medication 0.5 MILLIGRAM(S): at 16:50

## 2022-09-21 RX ADMIN — Medication 0.5 MILLIGRAM(S): at 11:40

## 2022-09-21 NOTE — PATIENT PROFILE ADULT - FALL HARM RISK - HARM RISK INTERVENTIONS

## 2022-09-21 NOTE — CONSULT NOTE ADULT - SUBJECTIVE AND OBJECTIVE BOX
HPI:    Ms. Presley is a 46 yrs old female with history of anxiety/depression, POTS syndrome?, protein S insufficiency w/ DVTs (not AC), with multiple hospitalizations in the past who presented with chronic diarrhea and abd pain. Patient followed with GI at [facility] and had extensive work up completed for chronic diarrhea. She was last seen in 2019, had lab work negative for celiac, and other stool studies were negative too.       Allergies:  anesthesia: palpitations, tachycardia (Other)  barium sulfate (Other; Rash; Diarrhea; Nausea)  beta blockers (Other)  Ceftin (Rash; Diarrhea; Nausea)  gadolinium (Hypotension)  IV Contrast (Anaphylaxis)  metoprolol (Headache)  oral contrast containing iodine: nausea, vomiting &amp; diarrhea lasting ~5 days (Vomiting; Nausea; Diarrhea (Severe))  Reglan (Hives)  Zofran (Hives)      Home Medications:    Hospital Medications:  diltiazem    milliGRAM(s) Oral daily  hyoscyamine SL 0.125 milliGRAM(s) SubLingual four times a day  lactated ringers. 1000 milliLiter(s) IV Continuous <Continuous>  LORazepam     Tablet 0.5 milliGRAM(s) Oral every 4 hours PRN  montelukast 10 milliGRAM(s) Oral at bedtime      PMHX/PSHX:  IBS (irritable bowel syndrome)    Anxiety    HTN (hypertension)    Headache    Asthma    Labyrinthitis    History of ITP    Pott disease    POTS (postural orthostatic tachycardia syndrome)    Anxiety    Idiopathic thrombocytopenia purpura    Dysautonomia    Idiopathic thrombocytopenic purpura    Depression    Dystonia    Hypertension    Gastroparesis    Autonomic dysreflexia    DVT (deep venous thrombosis)    DVT (deep venous thrombosis)    Protein S deficiency    Ovarian cyst    Idiopathic thrombocytopenia purpura    No significant past surgical history    No significant past surgical history    No significant past surgical history    No significant past surgical history    S/P appendectomy        Family history:  Family history of prostate cancer in father    Family history of atrial fibrillation    Family history of hypertension (Grandparent)    No pertinent family history in first degree relatives        Denies family history of colon cancer/polyps, stomach cancer/polyps, pancreatic cancer/masses, liver cancer/disease, ovarian cancer and endometrial cancer.    Social History:   Tob: Denies  EtOH: Denies  Illicit Drugs: Denies    ROS:     General:  No wt loss, fevers, chills, night sweats, fatigue  Eyes:  Good vision, no reported pain  ENT:  No sore throat, pain, runny nose, dysphagia  CV:  No pain, palpitations, hypo/hypertension  Pulm:  No dyspnea, cough, tachypnea, wheezing  GI:  see HPI  :  No pain, bleeding, incontinence, nocturia  Muscle:  No pain, weakness  Neuro:  No weakness, tingling, memory problems  Psych:  No fatigue, insomnia, mood problems, depression  Endocrine:  No polyuria, polydipsia, cold/heat intolerance  Heme:  No petechiae, ecchymosis, easy bruisability  Skin:  No rash, tattoos, scars, edema    PHYSICAL EXAM:     GENERAL:  No acute distress  HEENT:  NCAT, no scleral icterus   CHEST:  no respiratory distress  HEART:  Regular rate and rhythm  ABDOMEN:  Soft, non-tender, non-distended, normoactive bowel sounds,  no masses  EXTREMITIES: No edema  SKIN:  No rash/erythema/ecchymoses/petechiae/wounds/abscess/warm/dry  NEURO:  Alert and oriented x 3, no asterixis    Vital Signs:  Vital Signs Last 24 Hrs  T(C): 36.4 (21 Sep 2022 05:21), Max: 36.7 (20 Sep 2022 14:00)  T(F): 97.6 (21 Sep 2022 05:21), Max: 98 (20 Sep 2022 14:00)  HR: 62 (21 Sep 2022 05:21) (62 - 76)  BP: 110/69 (21 Sep 2022 05:21) (110/69 - 132/89)  BP(mean): --  RR: 18 (21 Sep 2022 05:21) (18 - 18)  SpO2: 98% (21 Sep 2022 05:21) (96% - 99%)    Parameters below as of 21 Sep 2022 05:21  Patient On (Oxygen Delivery Method): room air      Daily     Daily Weight in k.5 (21 Sep 2022 08:28)    LABS:                        15.0   8.09  )-----------( 66       ( 20 Sep 2022 10:29 )             43.8       09-20    140  |  101  |  4<L>  ----------------------------<  94  3.9   |  23  |  0.84    Ca    9.8      20 Sep 2022 10:29  Phos  4.2     09-20  Mg     2.0     09-20    TPro  7.8  /  Alb  4.8  /  TBili  0.6  /  DBili  x   /  AST  24  /  ALT  13  /  AlkPhos  96  -    LIVER FUNCTIONS - ( 20 Sep 2022 10:29 )  Alb: 4.8 g/dL / Pro: 7.8 g/dL / ALK PHOS: 96 U/L / ALT: 13 U/L / AST: 24 U/L / GGT: x             Urinalysis Basic - ( 20 Sep 2022 10:26 )    Color: Colorless / Appearance: Clear / S.005 / pH: x  Gluc: x / Ketone: Negative  / Bili: Negative / Urobili: Negative   Blood: x / Protein: Negative / Nitrite: Negative   Leuk Esterase: Negative / RBC: 1 /hpf / WBC 2 /HPF   Sq Epi: x / Non Sq Epi: 2 /hpf / Bacteria: Negative                              15.0   8.09  )-----------( 66       ( 20 Sep 2022 10:29 )             43.8       Imaging:             HPI:    Ms. Presley is a 46 yrs old female with history of anxiety/depression, POTS syndrome?, protein S insufficiency w/ DVTs (not AC), with multiple hospitalizations in the past who presented with chronic diarrhea and abd pain. Patient followed with GI at [facility] and had extensive work up completed for chronic diarrhea. She was last seen in 2019, had lab work negative for celiac, and other stool studies were negative too. She states she has now "Explosive" diarrhea for the last 2 months that's different from previous diarrhea. Reports new RUQ abdominal pain. Reports increased pain with eating and diarrhea. Reports bloating with abdominal pain. Reports 9 lb weight loss in the last few months. She has previously refused EGD/colonoscopy. She reports history of C diff, s/p Dificid weeks ago. Had recent C diff testing again on 22 in [facility] which is NEGATIVE. She reports while she is not eating, she has not had any further diarrhea in the last few days.      Allergies:  anesthesia: palpitations, tachycardia (Other)  barium sulfate (Other; Rash; Diarrhea; Nausea)  beta blockers (Other)  Ceftin (Rash; Diarrhea; Nausea)  gadolinium (Hypotension)  IV Contrast (Anaphylaxis)  metoprolol (Headache)  oral contrast containing iodine: nausea, vomiting &amp; diarrhea lasting ~5 days (Vomiting; Nausea; Diarrhea (Severe))  Reglan (Hives)  Zofran (Hives)      Home Medications:    Hospital Medications:  diltiazem    milliGRAM(s) Oral daily  hyoscyamine SL 0.125 milliGRAM(s) SubLingual four times a day  lactated ringers. 1000 milliLiter(s) IV Continuous <Continuous>  LORazepam     Tablet 0.5 milliGRAM(s) Oral every 4 hours PRN  montelukast 10 milliGRAM(s) Oral at bedtime      PMHX/PSHX:  IBS (irritable bowel syndrome)    Anxiety    HTN (hypertension)    Headache    Asthma    Labyrinthitis    History of ITP    Pott disease    POTS (postural orthostatic tachycardia syndrome)    Anxiety    Idiopathic thrombocytopenia purpura    Dysautonomia    Idiopathic thrombocytopenic purpura    Depression    Dystonia    Hypertension    Gastroparesis    Autonomic dysreflexia    DVT (deep venous thrombosis)    DVT (deep venous thrombosis)    Protein S deficiency    Ovarian cyst    Idiopathic thrombocytopenia purpura    No significant past surgical history    No significant past surgical history    No significant past surgical history    No significant past surgical history    S/P appendectomy        Family history:  Family history of prostate cancer in father    Family history of atrial fibrillation    Family history of hypertension (Grandparent)    No pertinent family history in first degree relatives        Denies family history of colon cancer/polyps, stomach cancer/polyps, pancreatic cancer/masses, liver cancer/disease, ovarian cancer and endometrial cancer.    Social History:   Tob: Denies  EtOH: Denies  Illicit Drugs: Denies    ROS:     General:  No wt loss, fevers, chills, night sweats, fatigue  Eyes:  Good vision, no reported pain  ENT:  No sore throat, pain, runny nose, dysphagia  CV:  No pain, palpitations, hypo/hypertension  Pulm:  No dyspnea, cough, tachypnea, wheezing  GI:  see HPI  :  No pain, bleeding, incontinence, nocturia  Muscle:  No pain, weakness  Neuro:  No weakness, tingling, memory problems  Psych:  No fatigue, insomnia, mood problems, depression  Endocrine:  No polyuria, polydipsia, cold/heat intolerance  Heme:  No petechiae, ecchymosis, easy bruisability  Skin:  No rash, tattoos, scars, edema    PHYSICAL EXAM:     GENERAL:  No acute distress  HEENT:  NCAT, no scleral icterus   CHEST:  no respiratory distress  HEART:  Regular rate and rhythm  ABDOMEN:  Soft, mild tenderness diffusely more on right, non-distended, normoactive bowel sounds,  no masses  EXTREMITIES: No edema  SKIN:  No rash/erythema  NEURO:  Alert and oriented x 3, no asterixis  PSYCH: Anxious    Vital Signs:  Vital Signs Last 24 Hrs  T(C): 36.4 (21 Sep 2022 05:21), Max: 36.7 (20 Sep 2022 14:00)  T(F): 97.6 (21 Sep 2022 05:21), Max: 98 (20 Sep 2022 14:00)  HR: 62 (21 Sep 2022 05:21) (62 - 76)  BP: 110/69 (21 Sep 2022 05:21) (110/69 - 132/89)  BP(mean): --  RR: 18 (21 Sep 2022 05:21) (18 - 18)  SpO2: 98% (21 Sep 2022 05:21) (96% - 99%)    Parameters below as of 21 Sep 2022 05:21  Patient On (Oxygen Delivery Method): room air      Daily     Daily Weight in k.5 (21 Sep 2022 08:28)    LABS:                        15.0   8.09  )-----------( 66       ( 20 Sep 2022 10:29 )             43.8       09-20    140  |  101  |  4<L>  ----------------------------<  94  3.9   |  23  |  0.84    Ca    9.8      20 Sep 2022 10:29  Phos  4.2       Mg     2.0         TPro  7.8  /  Alb  4.8  /  TBili  0.6  /  DBili  x   /  AST  24  /  ALT  13  /  AlkPhos  96  09-20    LIVER FUNCTIONS - ( 20 Sep 2022 10:29 )  Alb: 4.8 g/dL / Pro: 7.8 g/dL / ALK PHOS: 96 U/L / ALT: 13 U/L / AST: 24 U/L / GGT: x             Urinalysis Basic - ( 20 Sep 2022 10:26 )    Color: Colorless / Appearance: Clear / S.005 / pH: x  Gluc: x / Ketone: Negative  / Bili: Negative / Urobili: Negative   Blood: x / Protein: Negative / Nitrite: Negative   Leuk Esterase: Negative / RBC: 1 /hpf / WBC 2 /HPF   Sq Epi: x / Non Sq Epi: 2 /hpf / Bacteria: Negative                              15.0   8.09  )-----------( 66       ( 20 Sep 2022 10:29 )             43.8       Imaging:    < from: CT Abdomen and Pelvis No Cont (22 @ 09:32) >  FINDINGS:  LOWER CHEST: Within normal limits.    LIVER: Within normal limits.  BILE DUCTS: Normal caliber.  GALLBLADDER: Within normal limits.  SPLEEN: Within normal limits.  PANCREAS: Within normal limits.  ADRENALS: Within normal limits.  KIDNEYS/URETERS: No hydronephrosis.    BLADDER: Within normal limits.  REPRODUCTIVE ORGANS: 2.4 cm left ovarian cyst.. Right adnexa and uterus   within normal limits.    BOWEL: No bowel obstruction. Appendectomy.  PERITONEUM: No ascites.  VESSELS: Within normal limits.  RETROPERITONEUM/LYMPH NODES: No lymphadenopathy.  ABDOMINAL WALL: Within normal limits.  BONES: Within normal limits.    IMPRESSION:    No acute pathology in the abdomen or pelvis.    < end of copied text >

## 2022-09-21 NOTE — PATIENT PROFILE ADULT - BRAND OF COVID-19 VACCINATION
[[email or web address]] : 51 yo Female with no pertinent PMHx presents today with non-radiating lower back pain.   Patient mentions the pain started 3 days ago.  Patient was worried it could be the start of a UTI so she prefered to get checked.  Patient denies any urinary incontinence, itchiness, burning, urgency or frequency.  Patient works as a  and she feels the pain when she is standing and sometimes at night when she goes to bed. At work sometimes she needs to  heavy boxes.  Patient denies abdominal pain, nausea, vomiting, diarrhea, constipation, chest pain, headache, shortness of breath, dizziness or lightheadedness.  Moderna dose 1 and 2

## 2022-09-21 NOTE — PROGRESS NOTE ADULT - ASSESSMENT
1. Chronic abdominal pain/diarrhea - seen by GI. suspect IBS-D/functional pain; no C diff on recent testing; ddx includes less likely infectious diarrhea; unlikely rare causes of abdominal pain; unlikely secretory given cessation of diarrhea while NPO  2. Thrombocytopenia - chronic, history of ITP per previous heme notes  3. Dysautonomia- spoke with Dr. Parker, her cardiologist he gave clearance to have panendoscopy under twilight sedation    -C diff from 9/9/22 in [facility] is negative  - check GI PCR  -Check ESR, CRP  -Check stool elastase, stool fat, fecal calprotectin  -check serum lead level, serum tryptase, urine porphobilinogen  -as perGI: discussed extensively with patient that if the above is negative, she still needs a colonoscopy/EGD for further evaluation as conditions such as microscopic colitis CANNOT be seen on labs or imaging and require pathologic diagnosis. she initially refused the testing but now is agreeing  -Dicyclomine PRN, but she is refusing  -cardiology eval reviewed

## 2022-09-21 NOTE — PROGRESS NOTE ADULT - ASSESSMENT
Patient is a 46 year-old-female with history of POTS syndrome, Protein S insufficiency, DVT in [facility] presents with failure to thrive.    #POTS  -reported history of dysautonomia with self reported episodes of tachycardia/bradycardia  -no evidence of this on tele  -pt continues to monitor with a pulse ox  -during interview HR stable in the 80's(pt supine)  -plan tocont  monitor on tele  -recommend psych eval, pt displaying features of OCD  -recent ECHO @ Dr. Parker office reportedly normal  -cont outpt [facility]    #Diarrhea/FTT/  -etiology unclear  -h/o IBS  -GI eval noted  -if endoscopy planned can proceed from CV perspective    #Protien S/h/o DVT  -cont Xarelto    #Tremors  -neuro eval    discussed w pt and mother at the bedside     35 minutes spent on total encounter; more than 50% of the visit was spent counseling and/or coordinating care by the attending physician.

## 2022-09-21 NOTE — CONSULT NOTE ADULT - SUBJECTIVE AND OBJECTIVE BOX
Neurology - Consult Note    -  Spectra: [phone] ([facility]), [phone] ([facility])  -    HPI: Patient DORIS MCBRIDE is a 46y (1975) woman with PMH of POTS syndrome, protein S insufficiency, DVT, IBS, dysautonomia, p/w for abdominal pain in setting of chronic diarrhea at home. Pt has been followed by GI at [facility], has had extensive w/u for chronic diarrhea. So far pt has been negative for celiac, without significant stool studies. Now reporting with worsening diarrhea for the past 2 months, with new RUQ abdominal pain, that is worse with PO. Has not PO'ed well in the past week due to worsening abdominal pain. Pt also reported episode of bradycardia/tachycardia at home due to dysautonomia, takes ativan at home. Pt also has reported hx of tremors that started at first 6 year ago, but has been under control until 1 year ago when it resurfaced. Tremors are typically head tremors, bl UE and LE without LOC, can last anywhere from few minutes to hours. Pt reports sometimes ativan helps but sometimes it does not. Denies LOC, tongue biting, incontinence with episodes    Neurology consulted for acute on chronic tremors in pt with hx of chronic diarrhea.    Review of Systems:    CONSTITUTIONAL: No fevers or chills  EYES AND ENT: No visual changes or no throat pain   NECK: No pain or stiffness  RESPIRATORY: No hemoptysis or shortness of breath  CARDIOVASCULAR: No chest pain or palpitations  GASTROINTESTINAL: per HPI  GENITOURINARY: No dysuria or hematuria  NEUROLOGICAL: +As stated in HPI above  SKIN: No itching, burning, rashes, or lesions   All other review of systems is negative unless indicated above.    Allergies:  anesthesia: palpitations, tachycardia (Other)  barium sulfate (Other; Rash; Diarrhea; Nausea)  beta blockers (Other)  Ceftin (Rash; Diarrhea; Nausea)  gadolinium (Hypotension)  IV Contrast (Anaphylaxis)  metoprolol (Headache)  oral contrast containing iodine: nausea, vomiting &amp; diarrhea lasting ~5 days (Vomiting; Nausea; Diarrhea (Severe))  Reglan (Hives)  Zofran (Hives)      PMHx/PSHx/Family Hx: As above, otherwise see below   IBS (irritable bowel syndrome)    Anxiety    HTN (hypertension)    Headache    Asthma    Labyrinthitis    History of ITP    Pott disease    POTS (postural orthostatic tachycardia syndrome)    Anxiety    Idiopathic thrombocytopenia purpura    Dysautonomia    Idiopathic thrombocytopenic purpura    Depression    Dystonia    Hypertension    Gastroparesis    Autonomic dysreflexia    DVT (deep venous thrombosis)    DVT (deep venous thrombosis)    Protein S deficiency    Ovarian cyst    Idiopathic thrombocytopenia purpura        Social Hx:  No current use of tobacco, alcohol, or illicit drugs reported  Lives with parents, can do ADL    Medications:  MEDICATIONS  (STANDING):  diltiazem    milliGRAM(s) Oral daily  hyoscyamine SL 0.125 milliGRAM(s) SubLingual four times a day  lactated ringers. 1000 milliLiter(s) (75 mL/Hr) IV Continuous <Continuous>  montelukast 10 milliGRAM(s) Oral at bedtime  polyethylene glycol/electrolyte Solution. 4000 milliLiter(s) Oral once    MEDICATIONS  (PRN):  LORazepam     Tablet 0.5 milliGRAM(s) Oral every 4 hours PRN Anxiety      Vitals:  T(C): 36.3 (09-21-22 @ 14:37), Max: 36.6 (09-20-22 @ 18:25)  HR: 76 (09-21-22 @ 14:37) (62 - 76)  BP: 113/85 (09-21-22 @ 14:37) (110/69 - 132/89)  RR: 18 (09-21-22 @ 14:37) (18 - 18)  SpO2: 99% (09-21-22 @ 14:37) (96% - 99%)    Physical Examination:   General - NAD, pleasant, cooperative, conversant without outbursts  Cardiovascular - Peripheral pulses palpable, no edema  Neurologic Exam:  Mental status - Awake, Alert, Oriented to person, place, and time. Speech fluent, repetition and naming intact. Follows simple and complex commands. Attention intact, fund of knowledge, memory intact    Cranial nerves:  CN II: Visual fields are full to confrontation. Pupils are 4 mm and briskly reactive to light. Visual acuity intact  CN III, IV, VI: EOMI, no nystagmus, no ptosis  CN V: Facial sensation is intact to pinprick in all 3 divisions bilaterally.  CN VII: Face is symmetric with normal eye closure and smile.  CN VII: Hearing is normal to rubbing fingers  CN IX, X: Palate elevates symmetrically. Phonation is normal.  CN XI: Head turning and shoulder shrug are intact  CN XII: Tongue is midline with normal movements and no atrophy.    Motor - Normal bulk and tone throughout. R arm was tremulous for about 20 seconds during exam, no LOC and was self resolved  Strength testing            Deltoid      Biceps      Triceps     Wrist Extension    Wrist Flexion     Interossei         R            5                 5               5                     5                              5                        5                 5  L             5                 5               5                     5                              5                        5                 5              Hip Flexion    Hip Extension    Knee Flexion    Knee Extension    Dorsiflexion    Plantar Flexion  R              5                           5                       5                           5                            5                          5  L              5                           5                        5                           5                            5                          5    Sensation - Light touch/temperature OR pain/vibration intact in fingers and toes     DTR's -             Biceps      Triceps     Brachioradialis      Patellar    Ankle    Toes/plantar response  R             2+             2+                  2+                       2+            2+                 Down  L              2+             2+                 2+                        2+           2+                 Down    Coordination - negative romberg, no dysmetria    Gait and station - truncal tone intact, gait assessment deferred    Labs:                        15.0   8.09  )-----------( 66       ( 20 Sep 2022 10:29 )             43.8     09-20    140  |  101  |  4<L>  ----------------------------<  94  3.9   |  23  |  0.84    Ca    9.8      20 Sep 2022 10:29  Phos  4.2     09-20  Mg     2.0     09-20    TPro  7.8  /  Alb  4.8  /  TBili  0.6  /  DBili  x   /  AST  24  /  ALT  13  /  AlkPhos  96  09-20    CAPILLARY BLOOD GLUCOSE        LIVER FUNCTIONS - ( 20 Sep 2022 10:29 )  Alb: 4.8 g/dL / Pro: 7.8 g/dL / ALK PHOS: 96 U/L / ALT: 13 U/L / AST: 24 U/L / GGT: x                  Neurology - Consult Note    -  Spectra: [phone] ([facility]), [phone] ([facility])  -    HPI: Patient DORIS MCBRIDE is a 46y (1975) woman with PMH of POTS syndrome, protein S insufficiency, DVT, IBS, dysautonomia, p/w for abdominal pain in setting of chronic diarrhea at home. Pt has been followed by GI at [facility], has had extensive w/u for chronic diarrhea. So far pt has been negative for celiac, without significant stool studies. Now reporting with worsening diarrhea for the past 2 months, with new RUQ abdominal pain, that is worse with PO. Has not PO'ed well in the past week due to worsening abdominal pain. Pt also reported episode of bradycardia/tachycardia at home due to dysautonomia, takes ativan at home. Pt also has reported hx of tremors that started at first 6 year ago, but has been under control until 1 year ago when it resurfaced. Tremors are typically head tremors, bl UE and LE without LOC, can last anywhere from few minutes to hours. Pt reports sometimes ativan helps but sometimes it does not. Denies LOC, tongue biting, incontinence with episodes    Neurology consulted for acute on chronic tremors in pt with hx of chronic diarrhea.    Review of Systems:    CONSTITUTIONAL: No fevers or chills  EYES AND ENT: No visual changes or no throat pain   NECK: No pain or stiffness  RESPIRATORY: No hemoptysis or shortness of breath  CARDIOVASCULAR: No chest pain or palpitations  GASTROINTESTINAL: per HPI  GENITOURINARY: No dysuria or hematuria  NEUROLOGICAL: +As stated in HPI above  SKIN: No itching, burning, rashes, or lesions   All other review of systems is negative unless indicated above.    Allergies:  anesthesia: palpitations, tachycardia (Other)  barium sulfate (Other; Rash; Diarrhea; Nausea)  beta blockers (Other)  Ceftin (Rash; Diarrhea; Nausea)  gadolinium (Hypotension)  IV Contrast (Anaphylaxis)  metoprolol (Headache)  oral contrast containing iodine: nausea, vomiting &amp; diarrhea lasting ~5 days (Vomiting; Nausea; Diarrhea (Severe))  Reglan (Hives)  Zofran (Hives)      PMHx/PSHx/Family Hx: As above, otherwise see below   IBS (irritable bowel syndrome)    Anxiety    HTN (hypertension)    Headache    Asthma    Labyrinthitis    History of ITP    Pott disease    POTS (postural orthostatic tachycardia syndrome)    Anxiety    Idiopathic thrombocytopenia purpura    Dysautonomia    Idiopathic thrombocytopenic purpura    Depression    Dystonia    Hypertension    Gastroparesis    Autonomic dysreflexia    DVT (deep venous thrombosis)    DVT (deep venous thrombosis)    Protein S deficiency    Ovarian cyst    Idiopathic thrombocytopenia purpura        Social Hx:  No current use of tobacco, alcohol, or illicit drugs reported  Lives with parents, can do ADL    Medications:  MEDICATIONS  (STANDING):  diltiazem    milliGRAM(s) Oral daily  hyoscyamine SL 0.125 milliGRAM(s) SubLingual four times a day  lactated ringers. 1000 milliLiter(s) (75 mL/Hr) IV Continuous <Continuous>  montelukast 10 milliGRAM(s) Oral at bedtime  polyethylene glycol/electrolyte Solution. 4000 milliLiter(s) Oral once    MEDICATIONS  (PRN):  LORazepam     Tablet 0.5 milliGRAM(s) Oral every 4 hours PRN Anxiety      Vitals:  T(C): 36.3 (09-21-22 @ 14:37), Max: 36.6 (09-20-22 @ 18:25)  HR: 76 (09-21-22 @ 14:37) (62 - 76)  BP: 113/85 (09-21-22 @ 14:37) (110/69 - 132/89)  RR: 18 (09-21-22 @ 14:37) (18 - 18)  SpO2: 99% (09-21-22 @ 14:37) (96% - 99%)    Physical Examination:   General - NAD, pleasant, cooperative, conversant without outbursts  Cardiovascular - Peripheral pulses palpable, no edema  Eyes - Fundoscopy not well visualized    Neurologic Exam:  Mental status - Awake, Alert, Oriented to person, place, and time. Speech fluent, repetition and naming intact. Follows simple and complex commands. Attention intact, fund of knowledge, recent and remote memory intact    Cranial nerves:  CN II: Visual fields are full to confrontation. Pupils are 4 mm and briskly reactive to light. Visual acuity intact  CN III, IV, VI: EOMI, no nystagmus, no ptosis  CN V: Facial sensation is intact to pinprick in all 3 divisions bilaterally.  CN VII: Face is symmetric with normal eye closure and smile.  CN VII: Hearing is normal to rubbing fingers  CN IX, X: Palate elevates symmetrically. Phonation is normal.  CN XI: Head turning and shoulder shrug are intact  CN XII: Tongue is midline with normal movements and no atrophy.    Motor - Normal bulk and tone throughout. R arm was tremulous for about 20 seconds during exam, no LOC and was self resolved  Strength testing            Deltoid      Biceps      Triceps     Wrist Extension    Wrist Flexion     Interossei         R            5                 5               5                     5                              5                        5                 5  L             5                 5               5                     5                              5                        5                 5              Hip Flexion    Hip Extension    Knee Flexion    Knee Extension    Dorsiflexion    Plantar Flexion  R              5                           5                       5                           5                            5                          5  L              5                           5                        5                           5                            5                          5    Sensation - Light touch/temperature intact in fingers and toes     DTR's -             Biceps      Triceps     Brachioradialis      Patellar    Ankle    Toes/plantar response  R             2+             2+                  2+                       2+            2+                 Down  L              2+             2+                 2+                        2+           2+                 Down    Coordination - negative romberg, no dysmetria    Gait and station - truncal tone intact, formal gait assessment deferred due to fall risk but she can walk with stooped posture at times    Labs:                        15.0   8.09  )-----------( 66       ( 20 Sep 2022 10:29 )             43.8     09-20    140  |  101  |  4<L>  ----------------------------<  94  3.9   |  23  |  0.84    Ca    9.8      20 Sep 2022 10:29  Phos  4.2     09-20  Mg     2.0     09-20    TPro  7.8  /  Alb  4.8  /  TBili  0.6  /  DBili  x   /  AST  24  /  ALT  13  /  AlkPhos  96  09-20    CAPILLARY BLOOD GLUCOSE        LIVER FUNCTIONS - ( 20 Sep 2022 10:29 )  Alb: 4.8 g/dL / Pro: 7.8 g/dL / ALK PHOS: 96 U/L / ALT: 13 U/L / AST: 24 U/L / GGT: x

## 2022-09-21 NOTE — CONSULT NOTE ADULT - ATTENDING COMMENTS
HPI as per resident note, personally verified by me. Patient with chronic head and arm tremors for the past 6 years and began when she was diagnosed with POTS. Usually responsive to Ativan but can talk 60-90 minutes to work. Over the past year, and more so in the past month, has had worsening tremors with less response to Ativan. Has also been having worsened diarrhea now, decreased appetite, and weight loss. Denies any other focal neurologic deficits.    Neurologic exam as per resident note with additions as below:  AAO x3, speech fluent  CN's II-XII intact  Strength 5/5 all  FtN intact b/l  Downgoing b/l plantar response  Mild side to side intermittent head tremor    TSH/free T4 WNL    A/P:  Tremor  POTS and dysautonomia  Anxiety  Diarrhea    - Etiology for worsened tremors is unclear and they could be secondary to toxic/metabolic factors, dysautonomia, other movement disorder, or functional. Unrevealing neurologic exam and head imaging. Tremors are non-disabling so would recommend to treat acute medical issues first, as these could definitely be worsening her clinical picture  - Check labs for additional causes with B12, folate, Vit D 25 OH, B1, B6, copper, A1C, Vit E  - Patient can follow up with movement disorder specialist neurology at [address] [place] [address] 1-2 weeks after discharge. Please instruct the patient to call [phone] to schedule this appointment.  - Continue to address above medical problems, as you are doing  - Will sign off, please call with additional questions or concerns

## 2022-09-21 NOTE — CONSULT NOTE ADULT - REASON FOR ADMISSION
vomiting diarrhea, weight loss, failure to thrive

## 2022-09-21 NOTE — CONSULT NOTE ADULT - ATTENDING COMMENTS
Agree with above. 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 She reports new diarrhea, which appears to stop while NPO. Suspect IBS-D, C diff I has been negative as outpatient on 9/9/22. Would check stools and labs as above, for rare causes of abdominal pain. If negative, we have discussed with her and her mother at bedside we would recommend EGD/colonoscopy for which she does not want to proceed with given her history of ?POTS. We will await stool and serum workup as above, and revisit endoscopic evaluation with her after the workup above is complete.

## 2022-09-21 NOTE — CONSULT NOTE ADULT - ASSESSMENT
Patient DORIS MCBRIDE is a 46y female with PMH of POTS syndrome, protein S insufficiency, DVT, IBS, dysautonomia, p/w for abdominal pain. Neurology consulted for concern for hx chronic tremors. Per pt tremors occur daily, unclear how many episodes but no LOC or post ictal period. Neurological exam is unremarkable, examiner observed R. UE tremors that pt was noting, which was non rhythmic, not tonic/clonic movements, that self resolved.      Patient DORIS MCBRIDE is a 46y female with PMH of POTS syndrome, protein S insufficiency, DVT, IBS, dysautonomia, p/w for abdominal pain. Neurology consulted for concern for hx chronic tremors. Per pt tremors occur daily, unclear how many episodes but no LOC or post ictal period. Neurological exam is unremarkable, examiner observed R. UE tremors that pt was noting, which was non rhythmic, not tonic/clonic movements, that self resolved. routine labs are unremarkable thus far.     Impression; 45yo female with hx of chronic diarrhea with extremity and head tremors without LOC. Based on symptomology less concerning for seizures at this time.    Recommendations;  [ ] Medical optimization per primary team  [ ] Events semiology does not seem epileptic in nature  [ ] Please call Neurology for any questions or concerns.

## 2022-09-21 NOTE — CONSULT NOTE ADULT - ASSESSMENT
Impression:  1. Chronic abdominal pain/diarrhea - suspect IBS-D/functional pain; no C diff on recent testing; ddx includes less likely infectious diarrhea; unlikely rare causes of abdominal pain; unlikely secretory given cessation of diarrhea while NPO  2. Thrombocytopenia - chronic, history of ?ITP per previous heme notes    Plan:  -C diff from 9/9/22 in [facility] is negative  -Would check GI PCR  -Check ESR, CRP  -Check stool elastase, stool fat, fecal calprotectin  -check serum lead level, serum tryptase, urine porphobilinogen  -Discussed extensively with patient that if the above is negative, she still needs a colonoscopy/EGD for further evaluation as conditions such as microscopic colitis CANNOT be seen on labs or imaging and require pathologic diagnosis. She states she was told by her cardiologist to never have anesthesia  -Dicyclomine PRN  -Would obtain cards evaluation  -Will await workup as above; if negative, we will still recommend EGD/colonoscopy     GI elvin: [phone]  GI e-mail: [email or web address]

## 2022-09-22 LAB
ANION GAP SERPL CALC-SCNC: 12 MMOL/L — SIGNIFICANT CHANGE UP (ref 5–17)
BUN SERPL-MCNC: <4 MG/DL — LOW (ref 7–23)
CALCIUM SERPL-MCNC: 9.3 MG/DL — SIGNIFICANT CHANGE UP (ref 8.4–10.5)
CHLORIDE SERPL-SCNC: 104 MMOL/L — SIGNIFICANT CHANGE UP (ref 96–108)
CO2 SERPL-SCNC: 25 MMOL/L — SIGNIFICANT CHANGE UP (ref 22–31)
CREAT SERPL-MCNC: 0.77 MG/DL — SIGNIFICANT CHANGE UP (ref 0.5–1.3)
CRP SERPL-MCNC: <3 MG/L — SIGNIFICANT CHANGE UP (ref 0–4)
EGFR: 97 ML/MIN/1.73M2 — SIGNIFICANT CHANGE UP
ERYTHROCYTE [SEDIMENTATION RATE] IN BLOOD: < 2 MM/HR — SIGNIFICANT CHANGE UP (ref 0–15)
GLUCOSE SERPL-MCNC: 89 MG/DL — SIGNIFICANT CHANGE UP (ref 70–99)
HCT VFR BLD CALC: 36.3 % — SIGNIFICANT CHANGE UP (ref 34.5–45)
HGB BLD-MCNC: 12.8 G/DL — SIGNIFICANT CHANGE UP (ref 11.5–15.5)
IGA FLD-MCNC: 63 MG/DL — LOW (ref 84–499)
IGG FLD-MCNC: 740 MG/DL — SIGNIFICANT CHANGE UP (ref 610–1660)
IGM SERPL-MCNC: 126 MG/DL — SIGNIFICANT CHANGE UP (ref 35–242)
KAPPA LC SER QL IFE: 1.52 MG/DL — SIGNIFICANT CHANGE UP (ref 0.33–1.94)
KAPPA/LAMBDA FREE LIGHT CHAIN RATIO, SERUM: 1.25 RATIO — SIGNIFICANT CHANGE UP (ref 0.26–1.65)
LAMBDA LC SER QL IFE: 1.22 MG/DL — SIGNIFICANT CHANGE UP (ref 0.57–2.63)
MCHC RBC-ENTMCNC: 31.2 PG — SIGNIFICANT CHANGE UP (ref 27–34)
MCHC RBC-ENTMCNC: 35.3 GM/DL — SIGNIFICANT CHANGE UP (ref 32–36)
MCV RBC AUTO: 88.5 FL — SIGNIFICANT CHANGE UP (ref 80–100)
NRBC # BLD: 0 /100 WBCS — SIGNIFICANT CHANGE UP (ref 0–0)
PLATELET # BLD AUTO: 58 K/UL — LOW (ref 150–400)
POTASSIUM SERPL-MCNC: 3.7 MMOL/L — SIGNIFICANT CHANGE UP (ref 3.5–5.3)
POTASSIUM SERPL-SCNC: 3.7 MMOL/L — SIGNIFICANT CHANGE UP (ref 3.5–5.3)
RBC # BLD: 4.1 M/UL — SIGNIFICANT CHANGE UP (ref 3.8–5.2)
RBC # FLD: 12 % — SIGNIFICANT CHANGE UP (ref 10.3–14.5)
SODIUM SERPL-SCNC: 141 MMOL/L — SIGNIFICANT CHANGE UP (ref 135–145)
WBC # BLD: 4.66 K/UL — SIGNIFICANT CHANGE UP (ref 3.8–10.5)
WBC # FLD AUTO: 4.66 K/UL — SIGNIFICANT CHANGE UP (ref 3.8–10.5)

## 2022-09-22 PROCEDURE — 99223 1ST HOSP IP/OBS HIGH 75: CPT | Mod: GC

## 2022-09-22 PROCEDURE — 99232 SBSQ HOSP IP/OBS MODERATE 35: CPT | Mod: GC

## 2022-09-22 RX ORDER — SOD SULF/SODIUM/NAHCO3/KCL/PEG
4000 SOLUTION, RECONSTITUTED, ORAL ORAL ONCE
Refills: 0 | Status: COMPLETED | OUTPATIENT
Start: 2022-09-22 | End: 2022-09-22

## 2022-09-22 RX ORDER — SODIUM CHLORIDE 9 MG/ML
1000 INJECTION, SOLUTION INTRAVENOUS
Refills: 0 | Status: DISCONTINUED | OUTPATIENT
Start: 2022-09-22 | End: 2022-09-23

## 2022-09-22 RX ADMIN — Medication 4000 MILLILITER(S): at 18:05

## 2022-09-22 RX ADMIN — Medication 0.5 MILLIGRAM(S): at 08:00

## 2022-09-22 RX ADMIN — MONTELUKAST 10 MILLIGRAM(S): 4 TABLET, CHEWABLE ORAL at 22:32

## 2022-09-22 RX ADMIN — MONTELUKAST 10 MILLIGRAM(S): 4 TABLET, CHEWABLE ORAL at 00:01

## 2022-09-22 RX ADMIN — Medication 180 MILLIGRAM(S): at 12:51

## 2022-09-22 NOTE — PROVIDER CONTACT NOTE (OTHER) - ACTION/TREATMENT ORDERED:
Educated patient on importance of only taking medications during scheduled time. Will continue to monitor.

## 2022-09-22 NOTE — PROGRESS NOTE ADULT - ASSESSMENT
1. Chronic abdominal pain/diarrhea - seen by GI. suspect IBS-D/functional pain; no C diff on recent testing; ddx includes less likely infectious diarrhea; unlikely rare causes of abdominal pain; unlikely secretory given cessation of diarrhea while NPO  2. Thrombocytopenia - chronic, history of ITP per previous heme notes  3. Dysautonomia- spoke with Dr. Parker, her cardiologist, he gave clearance to have panendoscopy under twilight sedation    -C diff from 9/9/22 in [facility] is negative  - check GI PCR  -Check ESR, CRP  -Check stool elastase, stool fat, fecal calprotectin  -check serum lead level, serum tryptase, urine porphobilinogen  -refused Go Lytely yesterday and had apple juice after midnight. plan to proceed w endoscopy tomorrow and bowel prep today  -Dicyclomine PRN, but she is refusing  -cardiology eval reviewed  - seen by neuro, will F/U with Movement disorder specialist 2/2 tremors.

## 2022-09-22 NOTE — PROGRESS NOTE ADULT - ASSESSMENT
Impression:  1. Chronic abdominal pain/diarrhea - suspect IBS-D/functional pain; no C diff on recent testing; ddx includes less likely infectious diarrhea; unlikely rare causes of abdominal pain; unlikely secretory given cessation of diarrhea while NPO  -C diff from 9/9/22 in [facility] is negative  - GI PCR, ESR, CRP, stool elastase, stool fat, fecal calprotectin, check serum lead level, serum tryptase, urine porphobilinogen pending.   Patient agreed to colonoscopy and EGD for tomorrow. Will order prep tonight. Cards cleared for the procedure.     2. Thrombocytopenia - chronic, history of ?ITP per previous heme notes    Plan:  - proceed with EGD and colonoscopy tomorrow.   - CLD for now.   - will make her NPO after midnight  - Ordered Golytely for this evening.   - Dicyclomine PRN    GI will continue to follow.     All recommendations are tentative until note is attested by an attending.     Danna Deshpande, PGY-4  Gastroenterology/Hepatology Fellow  Available on [employer] Teams  [phone] (Short Range Pager)  [phone] (Long Range Pager)    After 5pm, please contact the on-call GI fellow. [phone] Impression:  1. Chronic abdominal pain/diarrhea - suspect IBS-D/functional pain; no C diff on recent testing; ddx includes less likely infectious diarrhea; unlikely rare causes of abdominal pain; unlikely secretory given cessation of diarrhea while NPO  -C diff from 9/9/22 in [facility] is negative  - GI PCR, ESR, CRP, stool elastase, stool fat, fecal calprotectin, check serum lead level, serum tryptase, urine porphobilinogen pending.   Patient agreed to colonoscopy and EGD for tomorrow. Will order prep tonight. Cards cleared for the procedure.     2. Thrombocytopenia - chronic, history of ?ITP per previous heme notes    Plan:  - proceed with EGD and colonoscopy tomorrow if patient is still willing  - CLD for now.   - will make her NPO after midnight  - Ordered Golytely for this evening.   - F/u stool and serologic workup as above  - Dicyclomine PRN    GI will continue to follow.     All recommendations are tentative until note is attested by an attending.     Danna Deshpande, PGY-4  Gastroenterology/Hepatology Fellow  Available on [employer] Teams  [phone] (Short Range Pager)  [phone] (Long Range Pager)    After 5pm, please contact the on-call GI fellow. [phone]

## 2022-09-22 NOTE — PROVIDER CONTACT NOTE (OTHER) - ASSESSMENT
Pt A&O x 4 and able to make her own decisions. Patient refusing to store own medications in locked medication cabinet.

## 2022-09-22 NOTE — PROVIDER CONTACT NOTE (OTHER) - ASSESSMENT
Pt A&O x 4. Pt able to make own decisions. Refusing to take golytely. Pt states "I want to give the stool and urine specimen first.  No one told me about a possible colonoscopy or endoscopy."

## 2022-09-22 NOTE — PROGRESS NOTE ADULT - ATTENDING COMMENTS
Agree with above. EGD/colonoscopy as patient is now amendable. F/u serologic and stool testing as above.

## 2022-09-23 ENCOUNTER — RESULT REVIEW (OUTPATIENT)
Age: 47
End: 2022-09-23

## 2022-09-23 LAB
ANION GAP SERPL CALC-SCNC: 12 MMOL/L — SIGNIFICANT CHANGE UP (ref 5–17)
BUN SERPL-MCNC: <4 MG/DL — LOW (ref 7–23)
CALCIUM SERPL-MCNC: 9.5 MG/DL — SIGNIFICANT CHANGE UP (ref 8.4–10.5)
CHLORIDE SERPL-SCNC: 102 MMOL/L — SIGNIFICANT CHANGE UP (ref 96–108)
CO2 SERPL-SCNC: 26 MMOL/L — SIGNIFICANT CHANGE UP (ref 22–31)
CREAT SERPL-MCNC: 0.65 MG/DL — SIGNIFICANT CHANGE UP (ref 0.5–1.3)
EGFR: 111 ML/MIN/1.73M2 — SIGNIFICANT CHANGE UP
GLUCOSE SERPL-MCNC: 88 MG/DL — SIGNIFICANT CHANGE UP (ref 70–99)
HCT VFR BLD CALC: 36.2 % — SIGNIFICANT CHANGE UP (ref 34.5–45)
HGB BLD-MCNC: 12.2 G/DL — SIGNIFICANT CHANGE UP (ref 11.5–15.5)
LEAD BLD-MCNC: <1 UG/DL — SIGNIFICANT CHANGE UP (ref 0–4)
MCHC RBC-ENTMCNC: 30.3 PG — SIGNIFICANT CHANGE UP (ref 27–34)
MCHC RBC-ENTMCNC: 33.7 GM/DL — SIGNIFICANT CHANGE UP (ref 32–36)
MCV RBC AUTO: 89.8 FL — SIGNIFICANT CHANGE UP (ref 80–100)
NRBC # BLD: 0 /100 WBCS — SIGNIFICANT CHANGE UP (ref 0–0)
PLATELET # BLD AUTO: 55 K/UL — LOW (ref 150–400)
POTASSIUM SERPL-MCNC: 3.3 MMOL/L — LOW (ref 3.5–5.3)
POTASSIUM SERPL-SCNC: 3.3 MMOL/L — LOW (ref 3.5–5.3)
RBC # BLD: 4.03 M/UL — SIGNIFICANT CHANGE UP (ref 3.8–5.2)
RBC # FLD: 11.9 % — SIGNIFICANT CHANGE UP (ref 10.3–14.5)
SODIUM SERPL-SCNC: 140 MMOL/L — SIGNIFICANT CHANGE UP (ref 135–145)
TROPONIN T, HIGH SENSITIVITY RESULT: <6 NG/L — SIGNIFICANT CHANGE UP (ref 0–51)
TTG IGA SER-ACNC: <1.2 U/ML — SIGNIFICANT CHANGE UP
TTG IGA SER-ACNC: NEGATIVE — SIGNIFICANT CHANGE UP
TTG IGG SER IA-ACNC: NEGATIVE — SIGNIFICANT CHANGE UP
TTG IGG SER-ACNC: <1.2 U/ML — SIGNIFICANT CHANGE UP
WBC # BLD: 5.56 K/UL — SIGNIFICANT CHANGE UP (ref 3.8–10.5)
WBC # FLD AUTO: 5.56 K/UL — SIGNIFICANT CHANGE UP (ref 3.8–10.5)

## 2022-09-23 PROCEDURE — 88305 TISSUE EXAM BY PATHOLOGIST: CPT | Mod: 26

## 2022-09-23 PROCEDURE — 93010 ELECTROCARDIOGRAM REPORT: CPT | Mod: 77

## 2022-09-23 PROCEDURE — 43239 EGD BIOPSY SINGLE/MULTIPLE: CPT | Mod: GC

## 2022-09-23 PROCEDURE — 45380 COLONOSCOPY AND BIOPSY: CPT | Mod: GC

## 2022-09-23 PROCEDURE — 93010 ELECTROCARDIOGRAM REPORT: CPT

## 2022-09-23 DEVICE — NET RETRV ROT ROTH 2.5MMX230CM: Type: IMPLANTABLE DEVICE | Status: FUNCTIONAL

## 2022-09-23 RX ORDER — DEXTROSE MONOHYDRATE, SODIUM CHLORIDE, AND POTASSIUM CHLORIDE 50; .745; 4.5 G/1000ML; G/1000ML; G/1000ML
1000 INJECTION, SOLUTION INTRAVENOUS
Refills: 0 | Status: DISCONTINUED | OUTPATIENT
Start: 2022-09-23 | End: 2022-09-25

## 2022-09-23 RX ORDER — SODIUM CHLORIDE 9 MG/ML
1000 INJECTION, SOLUTION INTRAVENOUS
Refills: 0 | Status: DISCONTINUED | OUTPATIENT
Start: 2022-09-23 | End: 2022-09-23

## 2022-09-23 RX ADMIN — Medication 0.5 MILLIGRAM(S): at 06:13

## 2022-09-23 RX ADMIN — Medication 0.5 MILLIGRAM(S): at 02:03

## 2022-09-23 RX ADMIN — Medication 180 MILLIGRAM(S): at 10:24

## 2022-09-23 NOTE — PRE PROCEDURE NOTE - PRE PROCEDURE EVALUATION
Attending Physician:    Dr. Cohen                    Procedure: EGD and colonoscopy    Indication for Procedure: Chronic diarrhea and abdominal pain  ________________________________________________________  PAST MEDICAL & SURGICAL HISTORY:  IBS (irritable bowel syndrome)      Anxiety      HTN (hypertension)      Headache      Labyrinthitis      History of ITP      POTS (postural orthostatic tachycardia syndrome)      Idiopathic thrombocytopenia purpura      Dysautonomia      Idiopathic thrombocytopenic purpura      Depression      Dystonia      Gastroparesis      Autonomic dysreflexia      DVT (deep venous thrombosis)  left internal jugular and subclavian veins      Protein S deficiency      Ovarian cyst      Idiopathic thrombocytopenia purpura      S/P appendectomy        ALLERGIES:  anesthesia: palpitations, tachycardia (Other)  barium sulfate (Other; Rash; Diarrhea; Nausea)  beta blockers (Other)  Ceftin (Rash; Diarrhea; Nausea)  gadolinium (Hypotension)  IV Contrast (Anaphylaxis)  metoprolol (Headache)  oral contrast containing iodine: nausea, vomiting &amp; diarrhea lasting ~5 days (Vomiting; Nausea; Diarrhea (Severe))  Reglan (Hives)  Zofran (Hives)    HOME MEDICATIONS:  Cardizem  mg/24 hours oral capsule, extended release: 1 cap(s) orally once a day at 12:00 PM    NOTE:  Patient is very sensitive to alternate brands of medication, and wants to use her own medication while admitted.  LORazepam 0.5 mg oral tablet: 1 tab(s) orally every 3 hours, As Needed (may approximate dose as it is cut in quarters)    NOTE:  Patient is very sensitive to alternate brands of medication, and wants to use her own medication while admitted.    Patient uses 2mg tab(s) at home cut into quarters and takes as follows:  2:30 AM 0.5mg - 1.0mg  6:30 AM 0.5mg - 1.0mg  8:30 AM 0.5mg - 1.0mg  10:30 AM 0.5mg - 1.0mg  1:30 PM 0.5mg - 1.0mg  4:45 PM 0.5mg - 1.0mg  7:45 PM 0.5mg - 1.0mg  10:45 PM 0.5mg - 1.0mg    ***patient may skip a dose OR may take an additional 0.5mg if needed***  LORazepam 1 mg oral tablet: 1 tab(s) orally every 3 hours, As Needed (may approximate dose as it is cut in quarters)    NOTE:  Patient is very sensitive to alternate brands of medication, and wants to use her own medication while admitted.    Patient uses 2mg tab(s) at home cut into quarters and takes as follows:  2:30 AM 0.5mg - 1.0mg  6:30 AM 0.5mg - 1.0mg  8:30 AM 0.5mg - 1.0mg  10:30 AM 0.5mg - 1.0mg  1:30 PM 0.5mg - 1.0mg  4:45 PM 0.5mg - 1.0mg  7:45 PM 0.5mg - 1.0mg  10:45 PM 0.5mg - 1.0mg    ***patient may skip a dose OR may take an additional 0.5mg if needed***  montelukast 10 mg oral tablet: 1 tab(s) orally once a day at 9:45 PM    NOTE:  Patient is very sensitive to alternate brands of medication, and wants to use her own medication while admitted.  Slow Fe (as elemental iron) 45 mg oral tablet, extended release: 1 tab(s) orally once a day with food    NOTE:  Patient is very sensitive to alternate brands of medication, and wants to use her own medication while admitted.    AICD/PPM: [ ] yes   [ ] no    PERTINENT LAB DATA:                        12.2   5.56  )-----------( 55       ( 23 Sep 2022 07:57 )             36.2     09-23    140  |  102  |  <4<L>  ----------------------------<  88  3.3<L>   |  26  |  0.65    Ca    9.5      23 Sep 2022 07:57                  PHYSICAL EXAMINATION:    T(C): 36.9  HR: 76  BP: 145/91  RR: 16  SpO2: 99%    Constitutional: NAD    Neck:  No JVD  Respiratory: CTAB/L  Cardiovascular: S1 and S2  Gastrointestinal: BS+, soft, NT/ND  Extremities: No peripheral edema  Neurological: A/O x 3, no focal deficits        COMMENTS:    The patient is a suitable candidate for the planned procedure unless box checked [ ]  No, explain:

## 2022-09-23 NOTE — DIETITIAN INITIAL EVALUATION ADULT - ORAL INTAKE PTA/DIET HISTORY
Pt endorses decreased PO intake PTA (good appeite) related to decreased tolerance to food, with diarrhea and nausea.   Reports following vegan diet, cannot point out tolerances to foods "seem to not tolerate many food, trying to figure out"   Confirmed no known food allergies

## 2022-09-23 NOTE — DIETITIAN INITIAL EVALUATION ADULT - CONTINUE CURRENT NUTRITION CARE PLAN
- allow pt to choose foods from menu per tolerance; consider low fiber if appropriate/needed; at this time not clear what causes pt's intolerance /diarrhea and pt states she orders "plain foods" from the menu .  -  Monitor tolerance, PO intake, and adjust as needed./yes

## 2022-09-23 NOTE — DIETITIAN INITIAL EVALUATION ADULT - PERTINENT LABORATORY DATA
09-23    140  |  102  |  <4<L>  ----------------------------<  88  3.3<L>   |  26  |  0.65    Ca    9.5      23 Sep 2022 07:57    09-23 @ 07:57: Na 140, BUN <4<L>, Cr 0.65, BG 88, K+ 3.3<L>, Phos --, Mg --, Alk Phos --, ALT/SGPT --, AST/SGOT --, HbA1c --

## 2022-09-23 NOTE — DIETITIAN INITIAL EVALUATION ADULT - PERSON TAUGHT/METHOD
- discussed plain diet/ low fiber as needed; elimination diet (per pt's presences/ tolerance) from the menu   - Encouraged adequate PO intake for meeting nutritional needs, improving nutritional status and for preventing wt loss/verbal instruction/patient instructed/mother instructed

## 2022-09-23 NOTE — CHART NOTE - NSCHARTNOTEFT_GEN_A_CORE
Medicine NP note    CC: Chest pain  Notified by RN that patient c/o CP.        Vital Signs Last 24 Hrs  T(C): 36.2 (23 Sep 2022 05:12), Max: 36.3 (22 Sep 2022 10:06)  T(F): 97.1 (23 Sep 2022 05:12), Max: 97.4 (22 Sep 2022 22:05)  HR: 65 (23 Sep 2022 05:12) (65 - 73)  BP: 129/79 (23 Sep 2022 05:12) (117/68 - 131/85)  BP(mean): --  RR: 18 (23 Sep 2022 05:12) (17 - 18)  SpO2: 99% (23 Sep 2022 05:12) (94% - 99%)    Parameters below as of 22 Sep 2022 22:05  Patient On (Oxygen Delivery Method): room air      A/P    Patient is a 46 year-old-female with history of POTS syndrome, Protein S insufficiency, DVT in [facility] presents with failure to thrive.  For colonoscopy today  Patient was seen overnight for c/o yellow stool , on Golytely prep  Pt very anxious, OCD symptoms. endorsed extreme anxiety r/t today's planned procedure  Now with CP  12 lead EKG with NO acute ST/T changes  VSS  F/U cardiology am  C/W Ativan PRN  Trop pending  Will continue to monitor  Will sign out to day team    Krystin snow P [place]  [id number] Medicine NP note    CC: Chest pain  Notified by RN that patient c/o CP.  Evaluated the pt at bedside, pt sitting in bed, in no distress  No c/o CP now, s/p ativan PRN dose at 6 am        Vital Signs Last 24 Hrs  T(C): 36.2 (23 Sep 2022 05:12), Max: 36.3 (22 Sep 2022 10:06)  T(F): 97.1 (23 Sep 2022 05:12), Max: 97.4 (22 Sep 2022 22:05)  HR: 65 (23 Sep 2022 05:12) (65 - 73)  BP: 129/79 (23 Sep 2022 05:12) (117/68 - 131/85)  BP(mean): --  RR: 18 (23 Sep 2022 05:12) (17 - 18)  SpO2: 99% (23 Sep 2022 05:12) (94% - 99%)    Parameters below as of 22 Sep 2022 22:05  Patient On (Oxygen Delivery Method): room air      A/P    Patient is a 46 year-old-female with history of POTS syndrome, Protein S insufficiency, DVT in [facility] presents with failure to thrive.  For colonoscopy today  Patient was seen overnight for c/o yellow stool , on Golytely prep  Pt very anxious, OCD symptoms. endorsed extreme anxiety r/t today's planned procedure  Now with CP  12 lead EKG with SR, HR 60 BPM  Tele reviewed, one episode of bardy with Hr in mid 40's Per RN, pt marco sleeping at that time, HE in 60's whiel she awake, asymptomatic NO acute ST/T changes  VSS  F/U cardiology am  C/W Ativan PRN  Trop pending  Will continue to monitor  Will sign out to day team    Krystin snow [facility] [place]  [id number]

## 2022-09-23 NOTE — DIETITIAN INITIAL EVALUATION ADULT - ADD RECOMMEND
- Will continue to monitor PO intake, weight, labs, skin, GI status, diet.  - Malnutrition sticker placed, RD to follow-up as per protocol  - Nutrition care plan to remain consistent with pt goals of care  - noted low k+;pt may be at risk for REFEEDING syndrome, continue to monitor K+, Phos, Mg & replete PRN.   - Nutrition care plan to remain consistent with pt goals of care  - RD remains available to review diet education and adjust diet recommendations as needed.

## 2022-09-23 NOTE — DIETITIAN INITIAL EVALUATION ADULT - ORAL NUTRITION SUPPLEMENTS
- pt deferred oral nutrition supplement at this time per diarrhea/ naesuea; may offer vegan oral nutrition supplement a able/ if pt acceptive

## 2022-09-23 NOTE — PROVIDER CONTACT NOTE (OTHER) - ACTION/TREATMENT ORDERED:
Continue to monitor. CRESENCIO Mcclelland came to bedside to assess patient, no further interventions at this time. Will continue to monitor.

## 2022-09-23 NOTE — DIETITIAN INITIAL EVALUATION ADULT - NSFNSGIASSESSMENTFT_GEN_A_CORE
- Pt reports nausea, diarrhea (since PTA "few months"); denies vomiting  - Last BM: 9.23 ; not currently ordered for bowel regimen

## 2022-09-23 NOTE — DIETITIAN INITIAL EVALUATION ADULT - REASON INDICATOR FOR ASSESSMENT
Consult received for unintentional wt loss PTA  Pt meeting BMI <19 kg/m2 criteria   Information obtained from pt, pt's mother at bedside, EMR.

## 2022-09-23 NOTE — PROVIDER CONTACT NOTE (OTHER) - ASSESSMENT
Pt drank most of the golytely but did not fully finish the bottle.  Pt states that "my stool was clear at first and now it's bright yellow." Pt drank most of the golytely but did not fully finish the bottle.  Pt states that "my stool was clear at first and now it's bright yellow." Patient expresses concern over stool color.

## 2022-09-23 NOTE — DIETITIAN INITIAL EVALUATION ADULT - PERTINENT MEDS FT
MEDICATIONS  (STANDING):  diltiazem    milliGRAM(s) Oral daily  hyoscyamine SL 0.125 milliGRAM(s) SubLingual four times a day  lactated ringers. 1000 milliLiter(s) (75 mL/Hr) IV Continuous <Continuous>  montelukast 10 milliGRAM(s) Oral at bedtime  sodium chloride 0.45% with potassium chloride 20 mEq/L 1000 milliLiter(s) (75 mL/Hr) IV Continuous <Continuous>    MEDICATIONS  (PRN):  LORazepam     Tablet 0.5 milliGRAM(s) Oral every 4 hours PRN Anxiety

## 2022-09-23 NOTE — DIETITIAN INITIAL EVALUATION ADULT - NSFNSPHYEXAMSKINFT_GEN_A_CORE
Pt states UBW ~100-101 pounds  87% IBW ( pounds)  Skin: no noted pressure injuries as per flowsheets   Performed nutrition focused physical exam; noted mod signs of muscle/fat loss

## 2022-09-23 NOTE — DIETITIAN INITIAL EVALUATION ADULT - OTHER CALCULATIONS
Dosing wt 91 pounds used for calorie an fluid needs calculation; IBW (BMI <19) 105 pounds for protein needs calculations. Defer fluids for team

## 2022-09-23 NOTE — PROGRESS NOTE ADULT - ASSESSMENT
1. Chronic abdominal pain/diarrhea - seen by GI. suspect IBS-D/functional pain; no C diff on recent testing; ddx includes less likely infectious diarrhea; unlikely rare causes of abdominal pain; unlikely secretory given cessation of diarrhea while NPO  2. Thrombocytopenia - chronic, history of ITP per previous heme notes  3. Dysautonomia- spoke with Dr. Parker, her cardiologist, he gave clearance to have panendoscopy under twilight sedation    -C diff from 9/9/22 in [facility] is negative  - check GI PCR  -Check ESR, CRP  -Check stool elastase, stool fat, fecal calprotectin  -neg serum lead level, serum tryptase, urine porphobilinogen  -getting panendoscopy today, dc planning depending on findings  -Dicyclomine PRN, but she is refusing  -cardiology eval reviewed  - seen by neuro, will F/U with Movement disorder specialist 2/2 tremors.

## 2022-09-24 ENCOUNTER — TRANSCRIPTION ENCOUNTER (OUTPATIENT)
Age: 47
End: 2022-09-24

## 2022-09-24 LAB — TRYPTASE SERPL-MCNC: 5.4 UG/L — SIGNIFICANT CHANGE UP

## 2022-09-24 RX ORDER — SODIUM CHLORIDE 9 MG/ML
1000 INJECTION, SOLUTION INTRAVENOUS
Refills: 0 | Status: DISCONTINUED | OUTPATIENT
Start: 2022-09-24 | End: 2022-09-25

## 2022-09-24 RX ORDER — HYOSCYAMINE SULFATE 0.13 MG
1 TABLET ORAL
Qty: 40 | Refills: 0
Start: 2022-09-24 | End: 2022-10-03

## 2022-09-24 RX ADMIN — Medication 0.5 MILLIGRAM(S): at 11:36

## 2022-09-24 RX ADMIN — MONTELUKAST 10 MILLIGRAM(S): 4 TABLET, CHEWABLE ORAL at 22:47

## 2022-09-24 RX ADMIN — MONTELUKAST 10 MILLIGRAM(S): 4 TABLET, CHEWABLE ORAL at 00:21

## 2022-09-24 RX ADMIN — Medication 0.5 MILLIGRAM(S): at 00:09

## 2022-09-24 RX ADMIN — Medication 0.5 MILLIGRAM(S): at 22:43

## 2022-09-24 RX ADMIN — Medication 180 MILLIGRAM(S): at 11:37

## 2022-09-24 NOTE — DISCHARGE NOTE PROVIDER - NSDCCPCAREPLAN_GEN_ALL_CORE_FT
PRINCIPAL DISCHARGE DIAGNOSIS  Diagnosis: Adult failure to thrive  Assessment and Plan of Treatment:       SECONDARY DISCHARGE DIAGNOSES  Diagnosis: Abdominal pain  Assessment and Plan of Treatment:     Diagnosis: Diarrhea  Assessment and Plan of Treatment: Causes of diarrhea are viruses, bacteria, parasites, some medicines, problems digesting certain types of food, and diseases that harm the digestive system. Drink a lot of liquids that have water, salt, and sugar. Good choices are water mixed with juice, flavored soda, and soup broth. If you are drinking enough fluids, your urine will be light yellow or almost clear.  Try to eat a little food. Good choices are potatoes, noodles, rice, oatmeal, crackers, bananas, soup, and boiled vegetables. Salty foods help the most.  Some people can also take a medicine called loperamide (brand name: Imodium). You should not take this medicine if you have a fever, bloody bowel movements, or severe belly pain. Do not take more than the label tells you to. Taking too much loperamide has led to serious heart problems in some people.  Seek medical help if your symptoms still aren't better after 48 hours, you have had more than 6 runny bowel movements in 24 hours, bowel movements with blood or mucus, or is black or bloody, fever of 38C or more, chills, or abdominal pain.  You will become dehydrated if you have too many episodes of diarrhea that is very watery.   Symptoms of dehydration: Feeling very tired, thirst, dry mouth or tongue, muscle cramps, dizziness, confusion, urine that is very yellow, or not needing to urinate for more than 5 hours  Treatment include antibiotics, medicine that ease diarrhea, stopping some of your medicines, changing the foods you eat ,washing your hands after a bowel movement, cooking, eating, taking out the trash, touching animals, and blowing your nose.  Tips to food safety include not drinking unpasteurized milk or foods made with it, washing fruits and vegetables well before eating them, keeping the refrigerator colder than 40ºF and the freezer below 0ºF,  cooking meat and seafood until well done, cooking eggs until the yolk is firm  Washing hands, knives, and cutting boards after they touch raw food      Diagnosis: Weight loss  Assessment and Plan of Treatment:      PRINCIPAL DISCHARGE DIAGNOSIS  Diagnosis: Adult failure to thrive  Assessment and Plan of Treatment: Follow up with Primary Care and Endocrine in 1 week.      SECONDARY DISCHARGE DIAGNOSES  Diagnosis: Abdominal pain  Assessment and Plan of Treatment: Follow up with GI doctor in 1 week.    Diagnosis: Diarrhea  Assessment and Plan of Treatment: Causes of diarrhea are viruses, bacteria, parasites, some medicines, problems digesting certain types of food, and diseases that harm the digestive system. Drink a lot of liquids that have water, salt, and sugar. Good choices are water mixed with juice, flavored soda, and soup broth. If you are drinking enough fluids, your urine will be light yellow or almost clear.  Try to eat a little food. Good choices are potatoes, noodles, rice, oatmeal, crackers, bananas, soup, and boiled vegetables. Salty foods help the most.  Some people can also take a medicine called loperamide (brand name: Imodium). You should not take this medicine if you have a fever, bloody bowel movements, or severe belly pain. Do not take more than the label tells you to. Taking too much loperamide has led to serious heart problems in some people.  Seek medical help if your symptoms still aren't better after 48 hours, you have had more than 6 runny bowel movements in 24 hours, bowel movements with blood or mucus, or is black or bloody, fever of 38C or more, chills, or abdominal pain.  You will become dehydrated if you have too many episodes of diarrhea that is very watery.   Symptoms of dehydration: Feeling very tired, thirst, dry mouth or tongue, muscle cramps, dizziness, confusion, urine that is very yellow, or not needing to urinate for more than 5 hours  Treatment include antibiotics, medicine that ease diarrhea, stopping some of your medicines, changing the foods you eat ,washing your hands after a bowel movement, cooking, eating, taking out the trash, touching animals, and blowing your nose.  Tips to food safety include not drinking unpasteurized milk or foods made with it, washing fruits and vegetables well before eating them, keeping the refrigerator colder than 40ºF and the freezer below 0ºF,  cooking meat and seafood until well done, cooking eggs until the yolk is firm  Washing hands, knives, and cutting boards after they touch raw food      Diagnosis: Weight loss  Assessment and Plan of Treatment: Follow up with PCP in 1 week.     PRINCIPAL DISCHARGE DIAGNOSIS  Diagnosis: Adult failure to thrive  Assessment and Plan of Treatment: Follow up with Primary Care and Endocrine in 1 week.      SECONDARY DISCHARGE DIAGNOSES  Diagnosis: Abdominal pain  Assessment and Plan of Treatment: Follow up with Gastroenterologist in 1 week.  If your abdominal pain worsen or is not relieved by any intervention at home, you should call your doctor or return to the ED for further evaluation    Diagnosis: Diarrhea  Assessment and Plan of Treatment: Causes of diarrhea are viruses, bacteria, parasites, some medicines, problems digesting certain types of food, and diseases that harm the digestive system. Drink a lot of liquids that have water, salt, and sugar. Good choices are water mixed with juice, flavored soda, and soup broth. If you are drinking enough fluids, your urine will be light yellow or almost clear.  Try to eat a little food. Good choices are potatoes, noodles, rice, oatmeal, crackers, bananas, soup, and boiled vegetables. Salty foods help the most.  Some people can also take a medicine called loperamide (brand name: Imodium). You should not take this medicine if you have a fever, bloody bowel movements, or severe belly pain. Do not take more than the label tells you to. Taking too much loperamide has led to serious heart problems in some people.  Seek medical help if your symptoms still aren't better after 48 hours, you have had more than 6 runny bowel movements in 24 hours, bowel movements with blood or mucus, or is black or bloody, fever of 38C or more, chills, or abdominal pain.  You will become dehydrated if you have too many episodes of diarrhea that is very watery.   Symptoms of dehydration: Feeling very tired, thirst, dry mouth or tongue, muscle cramps, dizziness, confusion, urine that is very yellow, or not needing to urinate for more than 5 hours  Treatment include antibiotics, medicine that ease diarrhea, stopping some of your medicines, changing the foods you eat ,washing your hands after a bowel movement, cooking, eating, taking out the trash, touching animals, and blowing your nose.  Tips to food safety include not drinking unpasteurized milk or foods made with it, washing fruits and vegetables well before eating them, keeping the refrigerator colder than 40ºF and the freezer below 0ºF,  cooking meat and seafood until well done, cooking eggs until the yolk is firm  Washing hands, knives, and cutting boards after they touch raw food      Diagnosis: Weight loss  Assessment and Plan of Treatment: Follow up with PCP in 1 week.

## 2022-09-24 NOTE — PROGRESS NOTE ADULT - ASSESSMENT
1. Chronic abdominal pain/diarrhea - seen by GI. suspect IBS-D/functional pain; no C diff on recent testing; ddx includes less likely infectious diarrhea; unlikely rare causes of abdominal pain; unlikely secretory given cessation of diarrhea while NPO  2. Thrombocytopenia - chronic, history of ITP per previous heme notes  3. Dysautonomia- spoke with Dr. Parker, her cardiologist, he gave clearance to have panendoscopy under twilight sedation    -C diff from 9/9/22 in [facility] is negative  - no BM since panendoscopy on 9/23, no gross pathology seen, i attempted to DC ptn, she wants all the outptn testing to be done here, i explained that's not possible, her father at bedside, we agreed she will go home tomorrow  -Dicyclomine PRN, but she is refusing  -cardiology eval reviewed  - seen by neuro, will F/U with Movement disorder specialist 2/2 tremors.

## 2022-09-24 NOTE — DISCHARGE NOTE PROVIDER - CARE PROVIDER_API CALL
ZULMA COKER  Gastroenterology  Phone: [phone]  Fax: ()-  Follow Up Time: 1 week    Román Teran)  Cardiology  [address] [facility], [facility] [address]  [place] [address]  Phone: [phone]  Fax: [phone]  Follow Up Time: 1 week    Teri Gan)  Internal Medicine  [address] [place] [address], [place] [address]  Phone: [phone]  Fax: [phone]  Follow Up Time: 1 week   Cristhian Parker  Cardiovascular Diseases  [address] [facility], [address]  [place] [address]  Phone: [phone]  Fax: [phone]  Follow Up Time: 1 week    Leandro Perez)  Gastroenterology; Internal Medicine  [address] [place] [id number]  Phone: [phone]  Fax: [phone]  Follow Up Time: 2 weeks

## 2022-09-24 NOTE — DISCHARGE NOTE PROVIDER - NSDCMRMEDTOKEN_GEN_ALL_CORE_FT
Cardizem  mg/24 hours oral capsule, extended release: 1 cap(s) orally once a day at 12:00 PM    NOTE:  Patient is very sensitive to alternate brands of medication, and wants to use her own medication while admitted.  LORazepam 0.5 mg oral tablet: 1 tab(s) orally every 3 hours, As Needed (may approximate dose as it is cut in quarters)    NOTE:  Patient is very sensitive to alternate brands of medication, and wants to use her own medication while admitted.    Patient uses 2mg tab(s) at home cut into quarters and takes as follows:  2:30 AM 0.5mg - 1.0mg  6:30 AM 0.5mg - 1.0mg  8:30 AM 0.5mg - 1.0mg  10:30 AM 0.5mg - 1.0mg  1:30 PM 0.5mg - 1.0mg  4:45 PM 0.5mg - 1.0mg  7:45 PM 0.5mg - 1.0mg  10:45 PM 0.5mg - 1.0mg    ***patient may skip a dose OR may take an additional 0.5mg if needed***  LORazepam 1 mg oral tablet: 1 tab(s) orally every 3 hours, As Needed (may approximate dose as it is cut in quarters)    NOTE:  Patient is very sensitive to alternate brands of medication, and wants to use her own medication while admitted.    Patient uses 2mg tab(s) at home cut into quarters and takes as follows:  2:30 AM 0.5mg - 1.0mg  6:30 AM 0.5mg - 1.0mg  8:30 AM 0.5mg - 1.0mg  10:30 AM 0.5mg - 1.0mg  1:30 PM 0.5mg - 1.0mg  4:45 PM 0.5mg - 1.0mg  7:45 PM 0.5mg - 1.0mg  10:45 PM 0.5mg - 1.0mg    ***patient may skip a dose OR may take an additional 0.5mg if needed***  montelukast 10 mg oral tablet: 1 tab(s) orally once a day at 9:45 PM    NOTE:  Patient is very sensitive to alternate brands of medication, and wants to use her own medication while admitted.  Slow Fe (as elemental iron) 45 mg oral tablet, extended release: 1 tab(s) orally once a day with food    NOTE:  Patient is very sensitive to alternate brands of medication, and wants to use her own medication while admitted.   Cardizem  mg/24 hours oral capsule, extended release: 1 cap(s) orally once a day at 12:00 PM    NOTE:  Patient is very sensitive to alternate brands of medication, and wants to use her own medication while admitted.  hyoscyamine 0.125 mg sublingual tablet: 1 tab(s) sublingual 4 times a day   LORazepam 0.5 mg oral tablet: 1 tab(s) orally every 3 hours, As Needed (may approximate dose as it is cut in quarters)    NOTE:  Patient is very sensitive to alternate brands of medication, and wants to use her own medication while admitted.    Patient uses 2mg tab(s) at home cut into quarters and takes as follows:  2:30 AM 0.5mg - 1.0mg  6:30 AM 0.5mg - 1.0mg  8:30 AM 0.5mg - 1.0mg  10:30 AM 0.5mg - 1.0mg  1:30 PM 0.5mg - 1.0mg  4:45 PM 0.5mg - 1.0mg  7:45 PM 0.5mg - 1.0mg  10:45 PM 0.5mg - 1.0mg    ***patient may skip a dose OR may take an additional 0.5mg if needed***  montelukast 10 mg oral tablet: 1 tab(s) orally once a day at 9:45 PM    NOTE:  Patient is very sensitive to alternate brands of medication, and wants to use her own medication while admitted.  Slow Fe (as elemental iron) 45 mg oral tablet, extended release: 1 tab(s) orally once a day with food    NOTE:  Patient is very sensitive to alternate brands of medication, and wants to use her own medication while admitted.   Cardizem  mg/24 hours oral capsule, extended release: 1 cap(s) orally once a day at 12:00 PM    NOTE:  Patient is very sensitive to alternate brands of medication, and wants to use her own medication while admitted.  LORazepam 0.5 mg oral tablet: 1 tab(s) orally every 3 hours, As Needed (may approximate dose as it is cut in quarters)    NOTE:  Patient is very sensitive to alternate brands of medication, and wants to use her own medication while admitted.    Patient uses 2mg tab(s) at home cut into quarters and takes as follows:  2:30 AM 0.5mg - 1.0mg  6:30 AM 0.5mg - 1.0mg  8:30 AM 0.5mg - 1.0mg  10:30 AM 0.5mg - 1.0mg  1:30 PM 0.5mg - 1.0mg  4:45 PM 0.5mg - 1.0mg  7:45 PM 0.5mg - 1.0mg  10:45 PM 0.5mg - 1.0mg    ***patient may skip a dose OR may take an additional 0.5mg if needed***  montelukast 10 mg oral tablet: 1 tab(s) orally once a day at 9:45 PM    NOTE:  Patient is very sensitive to alternate brands of medication, and wants to use her own medication while admitted.  Slow Fe (as elemental iron) 45 mg oral tablet, extended release: 1 tab(s) orally once a day with food    NOTE:  Patient is very sensitive to alternate brands of medication, and wants to use her own medication while admitted.

## 2022-09-24 NOTE — DISCHARGE NOTE PROVIDER - PROVIDER TOKENS
PROVIDER:[TOKEN:[[id number]],FOLLOWUP:[1 week]],PROVIDER:[TOKEN:[[id number]],FOLLOWUP:[1 week]],PROVIDER:[TOKEN:[[id number]],FOLLOWUP:[1 week]] PROVIDER:[TOKEN:[[id number]],FOLLOWUP:[1 week]],PROVIDER:[TOKEN:[[id number]],FOLLOWUP:[2 weeks]]

## 2022-09-24 NOTE — DISCHARGE NOTE PROVIDER - CARE PROVIDERS DIRECT ADDRESSES
,DirectAddress_Unknown,DirectAddress_Unknown,DirectAddress_Unknown ,DirectAddress_Unknown,[email or web address]

## 2022-09-24 NOTE — DISCHARGE NOTE PROVIDER - NSFOLLOWUPCLINICS_GEN_ALL_ED_FT
[facility]  Endocrinology  [address] [place] [address]  Phone: [phone]  Fax:     [facility]  Neurology  [address] [place] [id number]  Phone: [phone]  Fax:

## 2022-09-24 NOTE — DISCHARGE NOTE PROVIDER - HOSPITAL COURSE
Patient is a 46 year-old-female with history of POTS syndrome, Protein S insufficiency, positive MARY BETH, IBS,  h/o  DVT, has been of AC, hasn't had a follow up dopplers presents with failure to thrive. Patient states that she started feeling unwell about 2 months ago, with bradycardia, +diarrhea. Developed abdominal pain about few days ago. Noted to have about 10lb weight loss in the last month. Unable to tolerate PO at home.  Patient states that she has chronic abdominal pain/diarrhea - seen by GI.  Suspect IBS-D/functional pain; no C diff on recent testing; ddx includes less likely infectious diarrhea; unlikely rare causes of abdominal pain; unlikely secretory given cessation of diarrhea while NPO.  Patient also has a history of ITP that is chronic and noted to have  2. Thrombocytopenia - chronic, history of ITP per previous heme notes  3. Dysautonomia- spoke with Dr. Parker, her cardiologist, he gave clearance to have panendoscopy under twilight sedation  Patient is a 46 year-old-female with history of POTS syndrome, Protein S insufficiency, positive MARY BETH, IBS,  h/o  DVT, has been of AC, hasn't had a follow up dopplers presents with failure to thrive. Patient states that she started feeling unwell about 2 months ago, with bradycardia, +diarrhea. Developed abdominal pain about few days ago. Noted to have about 10lb weight loss in the last month. Unable to tolerate PO at home.  Patient states that she has chronic abdominal pain/diarrhea - seen by GI.  Suspect IBS-D/functional pain; no C diff on recent testing; ddx includes less likely infectious diarrhea; unlikely rare causes of abdominal pain; unlikely secretory given cessation of diarrhea while NPO.  Endoscopy was normal and biopsies were done.  Colonoscopy was normal and was biopsied to rule out colitis.   Patient also has a history of ITP that is chronic and noted to have thrombocytopenia.  Patient is stable for discharge.      Patient is a 46 year-old-female with history of POTS syndrome, Protein S insufficiency, positive MARY BETH, IBS,  h/o  DVT, has been of AC, hasn't had a follow up dopplers presents with failure to thrive. Patient states that she started feeling unwell about 2 months ago, with bradycardia, +diarrhea. Developed abdominal pain about few days ago. Noted to have about 10lb weight loss in the last month. Unable to tolerate PO at home.  Patient states that she has chronic abdominal pain/diarrhea - seen by GI.  Suspect IBS-D/functional pain; no C diff on recent testing; ddx includes less likely infectious diarrhea; unlikely rare causes of abdominal pain; unlikely secretory given cessation of diarrhea while NPO.  Endoscopy was normal and biopsies were done, results pending - patient will follow up out patient for results with Dr Perez.  Colonoscopy was normal and was biopsied to rule out colitis.   Patient also has a history of ITP that is chronic and noted to have thrombocytopenia.  Patient is stable for discharge.    DCP and medicaiton reconciliation discussed with Dr Nguyen and in agreement. Patient is hemodynamically stable and cleared for discharge. Patient declined Levisn and has not taken while in patient. Patient will follow up with PMD/cards, GI and requested contact information for Endo and Neuro clinic - both provided

## 2022-09-25 ENCOUNTER — TRANSCRIPTION ENCOUNTER (OUTPATIENT)
Age: 47
End: 2022-09-25

## 2022-09-25 VITALS
OXYGEN SATURATION: 100 % | DIASTOLIC BLOOD PRESSURE: 87 MMHG | HEART RATE: 84 BPM | TEMPERATURE: 98 F | RESPIRATION RATE: 18 BRPM | SYSTOLIC BLOOD PRESSURE: 132 MMHG

## 2022-09-25 LAB
ACTH SER-ACNC: 9.6 PG/ML — SIGNIFICANT CHANGE UP (ref 7.2–63.3)
ANION GAP SERPL CALC-SCNC: 12 MMOL/L — SIGNIFICANT CHANGE UP (ref 5–17)
BUN SERPL-MCNC: 4 MG/DL — LOW (ref 7–23)
CALCIUM SERPL-MCNC: 9.3 MG/DL — SIGNIFICANT CHANGE UP (ref 8.4–10.5)
CHLORIDE SERPL-SCNC: 101 MMOL/L — SIGNIFICANT CHANGE UP (ref 96–108)
CO2 SERPL-SCNC: 24 MMOL/L — SIGNIFICANT CHANGE UP (ref 22–31)
CORTIS AM PEAK SERPL-MCNC: 15.9 UG/DL — SIGNIFICANT CHANGE UP (ref 6–18.4)
CREAT SERPL-MCNC: 0.63 MG/DL — SIGNIFICANT CHANGE UP (ref 0.5–1.3)
EGFR: 111 ML/MIN/1.73M2 — SIGNIFICANT CHANGE UP
GI PCR PANEL: SIGNIFICANT CHANGE UP
GLUCOSE SERPL-MCNC: 165 MG/DL — HIGH (ref 70–99)
POTASSIUM SERPL-MCNC: 3.7 MMOL/L — SIGNIFICANT CHANGE UP (ref 3.5–5.3)
POTASSIUM SERPL-SCNC: 3.7 MMOL/L — SIGNIFICANT CHANGE UP (ref 3.5–5.3)
SODIUM SERPL-SCNC: 137 MMOL/L — SIGNIFICANT CHANGE UP (ref 135–145)
VIT B12 SERPL-MCNC: 281 PG/ML — SIGNIFICANT CHANGE UP (ref 232–1245)

## 2022-09-25 PROCEDURE — 85014 HEMATOCRIT: CPT

## 2022-09-25 PROCEDURE — 76705 ECHO EXAM OF ABDOMEN: CPT

## 2022-09-25 PROCEDURE — 83690 ASSAY OF LIPASE: CPT

## 2022-09-25 PROCEDURE — 84436 ASSAY OF TOTAL THYROXINE: CPT

## 2022-09-25 PROCEDURE — 82784 ASSAY IGA/IGD/IGG/IGM EACH: CPT

## 2022-09-25 PROCEDURE — 82705 FATS/LIPIDS FECES QUAL: CPT

## 2022-09-25 PROCEDURE — 85025 COMPLETE CBC W/AUTO DIFF WBC: CPT

## 2022-09-25 PROCEDURE — 83520 IMMUNOASSAY QUANT NOS NONAB: CPT

## 2022-09-25 PROCEDURE — 93970 EXTREMITY STUDY: CPT

## 2022-09-25 PROCEDURE — 83655 ASSAY OF LEAD: CPT

## 2022-09-25 PROCEDURE — 82330 ASSAY OF CALCIUM: CPT

## 2022-09-25 PROCEDURE — 80053 COMPREHEN METABOLIC PANEL: CPT

## 2022-09-25 PROCEDURE — 84480 ASSAY TRIIODOTHYRONINE (T3): CPT

## 2022-09-25 PROCEDURE — 86364 TISS TRNSGLTMNASE EA IG CLAS: CPT

## 2022-09-25 PROCEDURE — 80048 BASIC METABOLIC PNL TOTAL CA: CPT

## 2022-09-25 PROCEDURE — 82653 EL-1 FECAL QUANTITATIVE: CPT

## 2022-09-25 PROCEDURE — 84443 ASSAY THYROID STIM HORMONE: CPT

## 2022-09-25 PROCEDURE — 82024 ASSAY OF ACTH: CPT

## 2022-09-25 PROCEDURE — 83605 ASSAY OF LACTIC ACID: CPT

## 2022-09-25 PROCEDURE — 81001 URINALYSIS AUTO W/SCOPE: CPT

## 2022-09-25 PROCEDURE — 86140 C-REACTIVE PROTEIN: CPT

## 2022-09-25 PROCEDURE — 99285 EMERGENCY DEPT VISIT HI MDM: CPT

## 2022-09-25 PROCEDURE — 74176 CT ABD & PELVIS W/O CONTRAST: CPT | Mod: MA

## 2022-09-25 PROCEDURE — 87507 IADNA-DNA/RNA PROBE TQ 12-25: CPT

## 2022-09-25 PROCEDURE — 84484 ASSAY OF TROPONIN QUANT: CPT

## 2022-09-25 PROCEDURE — 84110 ASSAY OF PORPHOBILINOGEN: CPT

## 2022-09-25 PROCEDURE — 85027 COMPLETE CBC AUTOMATED: CPT

## 2022-09-25 PROCEDURE — 85018 HEMOGLOBIN: CPT

## 2022-09-25 PROCEDURE — 93005 ELECTROCARDIOGRAM TRACING: CPT

## 2022-09-25 PROCEDURE — 82565 ASSAY OF CREATININE: CPT

## 2022-09-25 PROCEDURE — 0225U NFCT DS DNA&RNA 21 SARSCOV2: CPT

## 2022-09-25 PROCEDURE — 82947 ASSAY GLUCOSE BLOOD QUANT: CPT

## 2022-09-25 PROCEDURE — 84702 CHORIONIC GONADOTROPIN TEST: CPT

## 2022-09-25 PROCEDURE — 85652 RBC SED RATE AUTOMATED: CPT

## 2022-09-25 PROCEDURE — 82533 TOTAL CORTISOL: CPT

## 2022-09-25 PROCEDURE — 84295 ASSAY OF SERUM SODIUM: CPT

## 2022-09-25 PROCEDURE — 84132 ASSAY OF SERUM POTASSIUM: CPT

## 2022-09-25 PROCEDURE — 83993 ASSAY FOR CALPROTECTIN FECAL: CPT

## 2022-09-25 PROCEDURE — 82435 ASSAY OF BLOOD CHLORIDE: CPT

## 2022-09-25 PROCEDURE — 88305 TISSUE EXAM BY PATHOLOGIST: CPT

## 2022-09-25 PROCEDURE — 82803 BLOOD GASES ANY COMBINATION: CPT

## 2022-09-25 PROCEDURE — 82607 VITAMIN B-12: CPT

## 2022-09-25 PROCEDURE — 71045 X-RAY EXAM CHEST 1 VIEW: CPT

## 2022-09-25 PROCEDURE — 84100 ASSAY OF PHOSPHORUS: CPT

## 2022-09-25 PROCEDURE — 83735 ASSAY OF MAGNESIUM: CPT

## 2022-09-25 RX ADMIN — Medication 0.5 MILLIGRAM(S): at 09:42

## 2022-09-25 RX ADMIN — SODIUM CHLORIDE 60 MILLILITER(S): 9 INJECTION, SOLUTION INTRAVENOUS at 03:52

## 2022-09-25 NOTE — PROVIDER CONTACT NOTE (OTHER) - REASON
Blood draw refusal
Patient requesting to take own meds and meds to be kept at bedside
Pt with yellow stool
pt refusing vital signs and blood work
Patient refusing to take golytely

## 2022-09-25 NOTE — PROVIDER CONTACT NOTE (OTHER) - RECOMMENDATIONS
Provider to speak with patient.
Provider Krystin notified and aware
Notify provider
Notify provider.

## 2022-09-25 NOTE — PROVIDER CONTACT NOTE (OTHER) - BACKGROUND
Pt admitted for failure to thrive. PMH DVT LUE, dystonia, autonomic reflexia, HTN, anxiety.
Pt admitted for failure to thrive. PMH DVT, autonomic dysreflexia, depression, Protein S deficiency.
Pt admitted for failure to thrive. PMH DVT, dystonia, autonomic dysreflexia, HTN, anxiety.
Pt was admitted on 9/20/22 with failure to thrive
Pt with a history of POTS, protein s insufficeiency, ibs, LUE DVT, and javier-tachy syndrome presents to [facility] for diarrhea and inability to tolerate PO intake.

## 2022-09-25 NOTE — PROGRESS NOTE ADULT - REASON FOR ADMISSION
vomiting diarrhea, weight loss, failure to thrive

## 2022-09-25 NOTE — CHART NOTE - NSCHARTNOTEFT_GEN_A_CORE
(X) attempt(s) were made to reach patient, which have been unsuccessful. Unable to leave voicemail on 9/25/22

## 2022-09-25 NOTE — DISCHARGE NOTE NURSING/CASE MANAGEMENT/SOCIAL WORK - NSDCFUADDAPPT_GEN_ALL_CORE_FT
Follow up with PCP and have potassium levels checked    APPTS ARE READY TO BE MADE: [x ] YES    Best Family or Patient Contact (if needed):    Additional Information about above appointments (if needed):    1: Follow up with Dr Perez to discuss results and continued management  2:   3:     Other comments or requests:

## 2022-09-25 NOTE — PROVIDER CONTACT NOTE (OTHER) - SITUATION
Pt with yellow stool
Patient refusing to take golytely
Blood draw refusal
Patient requesting to take own meds and meds to be kept at bedside
pt refusing vital signs and blood work

## 2022-09-25 NOTE — DISCHARGE NOTE NURSING/CASE MANAGEMENT/SOCIAL WORK - NSDCPEFALRISK_GEN_ALL_CORE
For information on Fall & Injury Prevention, visit: [email or web address] OR  [email or web address] OR  https://www.cdc.gov/steadi/patient.html

## 2022-09-25 NOTE — PROGRESS NOTE ADULT - PROVIDER SPECIALTY LIST ADULT
Cardiology
Internal Medicine
Internal Medicine
Cardiology
Internal Medicine
Internal Medicine
Gastroenterology
Internal Medicine

## 2022-09-25 NOTE — DISCHARGE NOTE NURSING/CASE MANAGEMENT/SOCIAL WORK - PATIENT PORTAL LINK FT
You can access the FollowMyHealth Patient Portal offered by [facility] by registering at the following website: [email or web address]. By joining [facility]’s FollowMyHealth portal, you will also be able to view your health information using other applications (apps) compatible with our system.

## 2022-09-25 NOTE — PROVIDER CONTACT NOTE (OTHER) - ASSESSMENT
Pt is refusing evening vital signs and blood work. Pt educated on the importance of vital signs and blood work and continues to refuse Pt is refusing evening vital signs and blood work. Pt educated on the importance of vital signs and blood work. Pt later agreed for staff to obtain evening vital signs

## 2022-09-25 NOTE — PROGRESS NOTE ADULT - SUBJECTIVE AND OBJECTIVE BOX
CARDIOLOGY FOLLOW UP - Dr. Teran  Date of Service: 9/22/2022  CC: no new complaints    Review of Systems:  Constitutional: No fever, weight loss, or fatigue  Respiratory: No cough, wheezing, or hemoptysis, no shortness of breath  Cardiovascular: No chest pain, palpitations, passing out, dizziness, or leg swelling  Gastrointestinal: No abd or epigastric pain. No nausea, vomiting, or hematemesis; no diarrhea or consiptaiton, no melena or hematochezia  Vascular: No edema     TELEMETRY:    PHYSICAL EXAM:  T(C): 36.3 (09-22-22 @ 10:06), Max: 36.6 (09-22-22 @ 05:30)  HR: 73 (09-22-22 @ 10:06) (64 - 76)  BP: 117/68 (09-22-22 @ 10:06) (95/60 - 123/83)  RR: 18 (09-22-22 @ 10:06) (18 - 18)  SpO2: 94% (09-22-22 @ 10:06) (94% - 99%)  Wt(kg): --  I&O's Summary    21 Sep 2022 07:01  -  22 Sep 2022 07:00  --------------------------------------------------------  IN: 900 mL / OUT: 1450 mL / NET: -550 mL    22 Sep 2022 07:01  -  22 Sep 2022 11:35  --------------------------------------------------------  IN: 160 mL / OUT: 0 mL / NET: 160 mL        Appearance: Normal	  Cardiovascular: Normal S1 S2,RRR, No JVD, No murmurs  Respiratory: Lungs clear to auscultation	  Gastrointestinal:  Soft, Non-tender, + BS	  Extremities: Normal range of motion, No clubbing, cyanosis or edema  Vascular: Peripheral pulses palpable 2+ bilaterally       Home Medications:  Cardizem  mg/24 hours oral capsule, extended release: 1 cap(s) orally once a day at 12:00 PM    NOTE:  Patient is very sensitive to alternate brands of medication, and wants to use her own medication while admitted. (20 Sep 2022 18:55)  LORazepam 0.5 mg oral tablet: 1 tab(s) orally every 3 hours, As Needed (may approximate dose as it is cut in quarters)    NOTE:  Patient is very sensitive to alternate brands of medication, and wants to use her own medication while admitted.    Patient uses 2mg tab(s) at home cut into quarters and takes as follows:  2:30 AM 0.5mg - 1.0mg  6:30 AM 0.5mg - 1.0mg  8:30 AM 0.5mg - 1.0mg  10:30 AM 0.5mg - 1.0mg  1:30 PM 0.5mg - 1.0mg  4:45 PM 0.5mg - 1.0mg  7:45 PM 0.5mg - 1.0mg  10:45 PM 0.5mg - 1.0mg    ***patient may skip a dose OR may take an additional 0.5mg if needed*** (20 Sep 2022 18:55)  LORazepam 1 mg oral tablet: 1 tab(s) orally every 3 hours, As Needed (may approximate dose as it is cut in quarters)    NOTE:  Patient is very sensitive to alternate brands of medication, and wants to use her own medication while admitted.    Patient uses 2mg tab(s) at home cut into quarters and takes as follows:  2:30 AM 0.5mg - 1.0mg  6:30 AM 0.5mg - 1.0mg  8:30 AM 0.5mg - 1.0mg  10:30 AM 0.5mg - 1.0mg  1:30 PM 0.5mg - 1.0mg  4:45 PM 0.5mg - 1.0mg  7:45 PM 0.5mg - 1.0mg  10:45 PM 0.5mg - 1.0mg    ***patient may skip a dose OR may take an additional 0.5mg if needed*** (20 Sep 2022 18:55)  montelukast 10 mg oral tablet: 1 tab(s) orally once a day at 9:45 PM    NOTE:  Patient is very sensitive to alternate brands of medication, and wants to use her own medication while admitted. (20 Sep 2022 18:55)  Slow Fe (as elemental iron) 45 mg oral tablet, extended release: 1 tab(s) orally once a day with food    NOTE:  Patient is very sensitive to alternate brands of medication, and wants to use her own medication while admitted. (20 Sep 2022 18:55)        MEDICATIONS  (STANDING):  diltiazem    milliGRAM(s) Oral daily  hyoscyamine SL 0.125 milliGRAM(s) SubLingual four times a day  lactated ringers. 1000 milliLiter(s) (60 mL/Hr) IV Continuous <Continuous>  lactated ringers. 1000 milliLiter(s) (75 mL/Hr) IV Continuous <Continuous>  montelukast 10 milliGRAM(s) Oral at bedtime        EKG:  RADIOLOGY:  DIAGNOSTIC TESTING:  [ ] Echocardiogram:  [ ] Catherterization:  [ ] Stress Test:  OTHER:     LABS:	 	                          12.8   4.66  )-----------( 58       ( 22 Sep 2022 09:12 )             36.3     09-22    141  |  104  |  <4<L>  ----------------------------<  89  3.7   |  25  |  0.77    Ca    9.3      22 Sep 2022 09:12            CARDIAC MARKERS:                  
Gastroenterology/Hepatology Progress Note    Interval Events:   No acute events overnight. Patient continues to have diarrhea, described it as watery stools. Unable to tolerate PO diet as it causes abdominal pain and nausea, no vomiting. No melena or hematochezia. Patient was supposed to get endoscopy today but did not receive the prep and ate after midnight.       Allergies:  anesthesia: palpitations, tachycardia (Other)  barium sulfate (Other; Rash; Diarrhea; Nausea)  beta blockers (Other)  Ceftin (Rash; Diarrhea; Nausea)  gadolinium (Hypotension)  IV Contrast (Anaphylaxis)  metoprolol (Headache)  oral contrast containing iodine: nausea, vomiting &amp; diarrhea lasting ~5 days (Vomiting; Nausea; Diarrhea (Severe))  Reglan (Hives)  Zofran (Hives)      Hospital Medications:  diltiazem    milliGRAM(s) Oral daily  hyoscyamine SL 0.125 milliGRAM(s) SubLingual four times a day  lactated ringers. 1000 milliLiter(s) IV Continuous <Continuous>  lactated ringers. 1000 milliLiter(s) IV Continuous <Continuous>  LORazepam     Tablet 0.5 milliGRAM(s) Oral every 4 hours PRN  montelukast 10 milliGRAM(s) Oral at bedtime  polyethylene glycol/electrolyte Solution. 4000 milliLiter(s) Oral once      ROS: 14 point ROS negative unless otherwise state in subjective    PHYSICAL EXAM:   Vital Signs:  Vital Signs Last 24 Hrs  T(C): 36.3 (22 Sep 2022 10:06), Max: 36.6 (22 Sep 2022 05:30)  T(F): 97.3 (22 Sep 2022 10:06), Max: 97.8 (22 Sep 2022 05:30)  HR: 73 (22 Sep 2022 10:06) (64 - 73)  BP: 117/68 (22 Sep 2022 10:06) (95/60 - 123/83)  BP(mean): --  RR: 18 (22 Sep 2022 10:06) (18 - 18)  SpO2: 94% (22 Sep 2022 10:06) (94% - 99%)    Parameters below as of 22 Sep 2022 10:06  Patient On (Oxygen Delivery Method): room air      Daily     Daily     PHYSICAL EXAM:     GENERAL:  No acute distress  HEENT:  NCAT, no scleral icterus   CHEST:  no respiratory distress  ABDOMEN:  Soft, mild tenderness diffusely more on right, non-distended, normoactive bowel sounds,  no masses  EXTREMITIES: No edema  NEURO:  Alert and oriented x 3    LABS:                        12.8   4.66  )-----------( 58       ( 22 Sep 2022 09:12 )             36.3     Mean Cell Volume: 88.5 fl (09-22-22 @ 09:12)    09-22    141  |  104  |  <4<L>  ----------------------------<  89  3.7   |  25  |  0.77    Ca    9.3      22 Sep 2022 09:12    Imaging:      < from: CT Abdomen and Pelvis No Cont (09.20.22 @ 09:32) >  FINDINGS:  LOWER CHEST: Within normal limits.    LIVER: Within normal limits.  BILE DUCTS: Normal caliber.  GALLBLADDER: Within normal limits.  SPLEEN: Within normal limits.  PANCREAS: Within normal limits.  ADRENALS: Within normal limits.  KIDNEYS/URETERS: No hydronephrosis.    BLADDER: Within normal limits.  REPRODUCTIVE ORGANS: 2.4 cm left ovarian cyst.. Right adnexa and uterus   within normal limits.    BOWEL: No bowel obstruction. Appendectomy.  PERITONEUM: No ascites.  VESSELS: Within normal limits.  RETROPERITONEUM/LYMPH NODES: No lymphadenopathy.  ABDOMINAL WALL: Within normal limits.  BONES: Within normal limits.    IMPRESSION:    No acute pathology in the abdomen or pelvis.    RUQ US     FINDINGS:  Liver: Normal echogenicity and echotexture. The liver measures 12.7 cm.  Bile ducts: Normal caliber. Common bile duct measures 4 mm.  Gallbladder: No cholelithiasis, gallbladder wall thickening or   pericholecystic fluid.  Pancreas: Visualized portions are within normal limits.  Right kidney: 9.9 cm. No hydronephrosis.  Ascites: None.  IVC: Visualized portions are within normal limits.    IMPRESSION:  Normal right upper quadrant abdominal ultrasound.      
Patient is a 46y old  Female who presents with a chief complaint of vomiting diarrhea, weight loss, failure to thrive (22 Sep 2022 16:21)      SUBJECTIVE / OVERNIGHT EVENTS: ptn is in endoscopy suite    MEDICATIONS  (STANDING):  diltiazem    milliGRAM(s) Oral daily  hyoscyamine SL 0.125 milliGRAM(s) SubLingual four times a day  lactated ringers. 1000 milliLiter(s) (75 mL/Hr) IV Continuous <Continuous>  montelukast 10 milliGRAM(s) Oral at bedtime  sodium chloride 0.45% with potassium chloride 20 mEq/L 1000 milliLiter(s) (75 mL/Hr) IV Continuous <Continuous>    MEDICATIONS  (PRN):  LORazepam     Tablet 0.5 milliGRAM(s) Oral every 4 hours PRN Anxiety      Vital Signs Last 24 Hrs  T(F): 98.1 (09-23-22 @ 15:36), Max: 98.5 (09-23-22 @ 09:29)  HR: 65 (09-23-22 @ 15:36) (55 - 85)  BP: 123/77 (09-23-22 @ 15:36) (112/74 - 148/90)  RR: 18 (09-23-22 @ 15:36) (14 - 20)  SpO2: 96% (09-23-22 @ 15:36) (96% - 99%)  Telemetry:   CAPILLARY BLOOD GLUCOSE        I&O's Summary    22 Sep 2022 07:01  -  23 Sep 2022 07:00  --------------------------------------------------------  IN: 1380 mL / OUT: 0 mL / NET: 1380 mL    23 Sep 2022 07:01  -  23 Sep 2022 16:50  --------------------------------------------------------  IN: 270 mL / OUT: 0 mL / NET: 270 mL        PHYSICAL EXAM:  GENERAL: NAD, well-developed  HEAD:  Atraumatic, Normocephalic  EYES: EOMI, PERRLA, conjunctiva and sclera clear  NECK: Supple, No JVD  CHEST/LUNG: Clear to auscultation bilaterally; No wheeze  HEART: Regular rate and rhythm; No murmurs, rubs, or gallops  ABDOMEN: Soft, Nontender, Nondistended; Bowel sounds present  EXTREMITIES:  2+ Peripheral Pulses, No clubbing, cyanosis, or edema  PSYCH: AAOx3  NEUROLOGY: non-focal  SKIN: No rashes or lesions    LABS:                        12.2   5.56  )-----------( 55       ( 23 Sep 2022 07:57 )             36.2     09-23    140  |  102  |  <4<L>  ----------------------------<  88  3.3<L>   |  26  |  0.65    Ca    9.5      23 Sep 2022 07:57                RADIOLOGY & ADDITIONAL TESTS:    Imaging Personally Reviewed:    Consultant(s) Notes Reviewed:      Care Discussed with Consultants/Other Providers:  
Patient is a 46y old  Female who presents with a chief complaint of vomiting diarrhea, weight loss, failure to thrive (24 Sep 2022 15:54)      SUBJECTIVE / OVERNIGHT EVENTS: no new events, stable for DC home    MEDICATIONS  (STANDING):  diltiazem    milliGRAM(s) Oral daily  hyoscyamine SL 0.125 milliGRAM(s) SubLingual four times a day  lactated ringers. 1000 milliLiter(s) (60 mL/Hr) IV Continuous <Continuous>  montelukast 10 milliGRAM(s) Oral at bedtime  sodium chloride 0.45% with potassium chloride 20 mEq/L 1000 milliLiter(s) (75 mL/Hr) IV Continuous <Continuous>    MEDICATIONS  (PRN):  LORazepam     Tablet 0.5 milliGRAM(s) Oral every 4 hours PRN Anxiety      Vital Signs Last 24 Hrs  T(F): 98 (09-25-22 @ 09:17), Max: 98 (09-25-22 @ 09:17)  HR: 84 (09-25-22 @ 09:17) (71 - 84)  BP: 132/87 (09-25-22 @ 09:17) (101/69 - 132/87)  RR: 18 (09-25-22 @ 09:17) (18 - 18)  SpO2: 100% (09-25-22 @ 09:17) (96% - 100%)  Telemetry:   CAPILLARY BLOOD GLUCOSE        I&O's Summary    24 Sep 2022 07:01  -  25 Sep 2022 07:00  --------------------------------------------------------  IN: 800 mL / OUT: 1850 mL / NET: -1050 mL        PHYSICAL EXAM:  GENERAL: NAD, well-developed  HEAD:  Atraumatic, Normocephalic  EYES: EOMI, PERRLA, conjunctiva and sclera clear  NECK: Supple, No JVD  CHEST/LUNG: Clear to auscultation bilaterally; No wheeze  HEART: Regular rate and rhythm; No murmurs, rubs, or gallops  ABDOMEN: Soft, Nontender, Nondistended; Bowel sounds present  EXTREMITIES:  2+ Peripheral Pulses, No clubbing, cyanosis, or edema  PSYCH: AAOx3  NEUROLOGY: non-focal  SKIN: No rashes or lesions    LABS:    09-25    137  |  101  |  4<L>  ----------------------------<  165<H>  3.7   |  24  |  0.63    Ca    9.3      25 Sep 2022 08:49                RADIOLOGY & ADDITIONAL TESTS:    Imaging Personally Reviewed:    Consultant(s) Notes Reviewed:      Care Discussed with Consultants/Other Providers:  
CARDIOLOGY FOLLOW UP - Dr. Teran  Date of Service: 9/21/22  CC: NSR on tele, transient sinus bradycardia overnight, no diarrhea, concerned over risk of endsocopy    Review of Systems:  Constitutional: No fever, weight loss, or fatigue  Respiratory: No cough, wheezing, or hemoptysis, no shortness of breath  Cardiovascular: No chest pain, palpitations, passing out, dizziness, or leg swelling  Gastrointestinal: No abd or epigastric pain. No nausea, vomiting, or hematemesis; no diarrhea or consiptaiton, no melena or hematochezia  Vascular: No edema     TELEMETRY:    PHYSICAL EXAM:  T(C): 36.4 (09-21-22 @ 05:21), Max: 36.7 (09-20-22 @ 14:00)  HR: 62 (09-21-22 @ 05:21) (62 - 76)  BP: 110/69 (09-21-22 @ 05:21) (110/69 - 132/89)  RR: 18 (09-21-22 @ 05:21) (18 - 18)  SpO2: 98% (09-21-22 @ 05:21) (96% - 99%)  Wt(kg): --  I&O's Summary    20 Sep 2022 07:01  -  21 Sep 2022 07:00  --------------------------------------------------------  IN: 980 mL / OUT: 400 mL / NET: 580 mL    21 Sep 2022 07:01  -  21 Sep 2022 12:43  --------------------------------------------------------  IN: 0 mL / OUT: 0 mL / NET: 0 mL        Appearance: Normal	  Cardiovascular: Normal S1 S2,RRR, No JVD, No murmurs  Respiratory: Lungs clear to auscultation	  Gastrointestinal:  Soft, Non-tender, + BS	  Extremities: Normal range of motion, No clubbing, cyanosis or edema  Vascular: Peripheral pulses palpable 2+ bilaterally       Home Medications:  Cardizem  mg/24 hours oral capsule, extended release: 1 cap(s) orally once a day at 12:00 PM    NOTE:  Patient is very sensitive to alternate brands of medication, and wants to use her own medication while admitted. (20 Sep 2022 18:55)  LORazepam 0.5 mg oral tablet: 1 tab(s) orally every 3 hours, As Needed (may approximate dose as it is cut in quarters)    NOTE:  Patient is very sensitive to alternate brands of medication, and wants to use her own medication while admitted.    Patient uses 2mg tab(s) at home cut into quarters and takes as follows:  2:30 AM 0.5mg - 1.0mg  6:30 AM 0.5mg - 1.0mg  8:30 AM 0.5mg - 1.0mg  10:30 AM 0.5mg - 1.0mg  1:30 PM 0.5mg - 1.0mg  4:45 PM 0.5mg - 1.0mg  7:45 PM 0.5mg - 1.0mg  10:45 PM 0.5mg - 1.0mg    ***patient may skip a dose OR may take an additional 0.5mg if needed*** (20 Sep 2022 18:55)  LORazepam 1 mg oral tablet: 1 tab(s) orally every 3 hours, As Needed (may approximate dose as it is cut in quarters)    NOTE:  Patient is very sensitive to alternate brands of medication, and wants to use her own medication while admitted.    Patient uses 2mg tab(s) at home cut into quarters and takes as follows:  2:30 AM 0.5mg - 1.0mg  6:30 AM 0.5mg - 1.0mg  8:30 AM 0.5mg - 1.0mg  10:30 AM 0.5mg - 1.0mg  1:30 PM 0.5mg - 1.0mg  4:45 PM 0.5mg - 1.0mg  7:45 PM 0.5mg - 1.0mg  10:45 PM 0.5mg - 1.0mg    ***patient may skip a dose OR may take an additional 0.5mg if needed*** (20 Sep 2022 18:55)  montelukast 10 mg oral tablet: 1 tab(s) orally once a day at 9:45 PM    NOTE:  Patient is very sensitive to alternate brands of medication, and wants to use her own medication while admitted. (20 Sep 2022 18:55)  Slow Fe (as elemental iron) 45 mg oral tablet, extended release: 1 tab(s) orally once a day with food    NOTE:  Patient is very sensitive to alternate brands of medication, and wants to use her own medication while admitted. (20 Sep 2022 18:55)        MEDICATIONS  (STANDING):  diltiazem    milliGRAM(s) Oral daily  hyoscyamine SL 0.125 milliGRAM(s) SubLingual four times a day  lactated ringers. 1000 milliLiter(s) (75 mL/Hr) IV Continuous <Continuous>  montelukast 10 milliGRAM(s) Oral at bedtime        EKG:  RADIOLOGY:  DIAGNOSTIC TESTING:  [ ] Echocardiogram:  [ ] Catherterization:  [ ] Stress Test:  OTHER:     LABS:	 	                          15.0   8.09  )-----------( 66       ( 20 Sep 2022 10:29 )             43.8     09-20    140  |  101  |  4<L>  ----------------------------<  94  3.9   |  23  |  0.84    Ca    9.8      20 Sep 2022 10:29  Phos  4.2     09-20  Mg     2.0     09-20    TPro  7.8  /  Alb  4.8  /  TBili  0.6  /  DBili  x   /  AST  24  /  ALT  13  /  AlkPhos  96  09-20          CARDIAC MARKERS:                  
Patient is a 46y old  Female who presents with a chief complaint of vomiting diarrhea, weight loss, failure to thrive (21 Sep 2022 12:43)      SUBJECTIVE / OVERNIGHT EVENTS: ptn and her parents are upset psych was called for competency bc in light of severe acute GI symptoms she refused panendoscopy. ptn is agitated and takes ativan regularly according to her own schedule. she is requesting to see rheumatology based on possible h/o sjogrens, she is requesting to see neuro based on symptoms of tremors    MEDICATIONS  (STANDING):  diltiazem    milliGRAM(s) Oral daily  hyoscyamine SL 0.125 milliGRAM(s) SubLingual four times a day  lactated ringers. 1000 milliLiter(s) (75 mL/Hr) IV Continuous <Continuous>  montelukast 10 milliGRAM(s) Oral at bedtime    MEDICATIONS  (PRN):  LORazepam     Tablet 0.5 milliGRAM(s) Oral every 4 hours PRN Anxiety      Vital Signs Last 24 Hrs  T(F): 97.3 (22 @ 14:37), Max: 97.9 (22 @ 18:25)  HR: 76 (22 @ 14:37) (62 - 76)  BP: 113/85 (22 @ 14:37) (110/69 - 132/89)  RR: 18 (22 @ 14:37) (18 - 18)  SpO2: 99% (22 @ 14:37) (96% - 99%)  Telemetry:   CAPILLARY BLOOD GLUCOSE        I&O's Summary    20 Sep 2022 07:  -  21 Sep 2022 07:00  --------------------------------------------------------  IN: 980 mL / OUT: 400 mL / NET: 580 mL    21 Sep 2022 07:  -  21 Sep 2022 17:15  --------------------------------------------------------  IN: 0 mL / OUT: 950 mL / NET: -950 mL        PHYSICAL EXAM:  GENERAL: NAD, well-developed  HEAD:  Atraumatic, Normocephalic  EYES: EOMI, PERRLA, conjunctiva and sclera clear  NECK: Supple, No JVD  CHEST/LUNG: Clear to auscultation bilaterally; No wheeze  HEART: Regular rate and rhythm; No murmurs, rubs, or gallops  ABDOMEN: Soft, Nontender, Nondistended; Bowel sounds present  EXTREMITIES:  2+ Peripheral Pulses, No clubbing, cyanosis, or edema  PSYCH: AAOx3  NEUROLOGY: non-focal  SKIN: No rashes or lesions    LABS:                        15.0   8.09  )-----------( 66       ( 20 Sep 2022 10:29 )             43.8         140  |  101  |  4<L>  ----------------------------<  94  3.9   |  23  |  0.84    Ca    9.8      20 Sep 2022 10:29  Phos  4.2       Mg     2.0         TPro  7.8  /  Alb  4.8  /  TBili  0.6  /  DBili  x   /  AST  24  /  ALT  13  /  AlkPhos  96            Urinalysis Basic - ( 20 Sep 2022 10:26 )    Color: Colorless / Appearance: Clear / S.005 / pH: x  Gluc: x / Ketone: Negative  / Bili: Negative / Urobili: Negative   Blood: x / Protein: Negative / Nitrite: Negative   Leuk Esterase: Negative / RBC: 1 /hpf / WBC 2 /HPF   Sq Epi: x / Non Sq Epi: 2 /hpf / Bacteria: Negative        RADIOLOGY & ADDITIONAL TESTS:    Imaging Personally Reviewed:    Consultant(s) Notes Reviewed:      Care Discussed with Consultants/Other Providers:  
Patient is a 46y old  Female who presents with a chief complaint of vomiting diarrhea, weight loss, failure to thrive (22 Sep 2022 15:49)      SUBJECTIVE / OVERNIGHT EVENTS: no new events, has post prandial diarrhea, refusing Bentyl, refused Go-Lytely yesterday at 5 pm, hence panendoscopy not done this am. stool studies ordered. seen by neuro, will F/U with Movement disorder specialist 2/2 tremors.     MEDICATIONS  (STANDING):  diltiazem    milliGRAM(s) Oral daily  hyoscyamine SL 0.125 milliGRAM(s) SubLingual four times a day  lactated ringers. 1000 milliLiter(s) (60 mL/Hr) IV Continuous <Continuous>  lactated ringers. 1000 milliLiter(s) (75 mL/Hr) IV Continuous <Continuous>  montelukast 10 milliGRAM(s) Oral at bedtime  polyethylene glycol/electrolyte Solution. 4000 milliLiter(s) Oral once    MEDICATIONS  (PRN):  LORazepam     Tablet 0.5 milliGRAM(s) Oral every 4 hours PRN Anxiety      Vital Signs Last 24 Hrs  T(F): 97.3 (09-22-22 @ 10:06), Max: 97.8 (09-22-22 @ 05:30)  HR: 73 (09-22-22 @ 10:06) (64 - 73)  BP: 117/68 (09-22-22 @ 10:06) (95/60 - 123/83)  RR: 18 (09-22-22 @ 10:06) (18 - 18)  SpO2: 94% (09-22-22 @ 10:06) (94% - 99%)  Telemetry:   CAPILLARY BLOOD GLUCOSE        I&O's Summary    21 Sep 2022 07:01  -  22 Sep 2022 07:00  --------------------------------------------------------  IN: 900 mL / OUT: 1450 mL / NET: -550 mL    22 Sep 2022 07:01  -  22 Sep 2022 16:22  --------------------------------------------------------  IN: 160 mL / OUT: 0 mL / NET: 160 mL        PHYSICAL EXAM:  GENERAL: NAD, well-developed  HEAD:  Atraumatic, Normocephalic  EYES: EOMI, PERRLA, conjunctiva and sclera clear  NECK: Supple, No JVD  CHEST/LUNG: Clear to auscultation bilaterally; No wheeze  HEART: Regular rate and rhythm; No murmurs, rubs, or gallops  ABDOMEN: Soft, Nontender, Nondistended; Bowel sounds present  EXTREMITIES:  2+ Peripheral Pulses, No clubbing, cyanosis, or edema  PSYCH: AAOx3  NEUROLOGY: non-focal  SKIN: No rashes or lesions    LABS:                        12.8   4.66  )-----------( 58       ( 22 Sep 2022 09:12 )             36.3     09-22    141  |  104  |  <4<L>  ----------------------------<  89  3.7   |  25  |  0.77    Ca    9.3      22 Sep 2022 09:12                RADIOLOGY & ADDITIONAL TESTS:    Imaging Personally Reviewed:    Consultant(s) Notes Reviewed:      Care Discussed with Consultants/Other Providers:  
Patient is a 46y old  Female who presents with a chief complaint of vomiting diarrhea, weight loss, failure to thrive (24 Sep 2022 14:10)      SUBJECTIVE / OVERNIGHT EVENTS: no BM since panendoscopy on 9/23, no gross pathology seen, i attempted to DC ptn, she wants all the outptn testing madhav done here, i explained thats not possible, her father at bedside, we agrees she will go home tomorrow    MEDICATIONS  (STANDING):  diltiazem    milliGRAM(s) Oral daily  hyoscyamine SL 0.125 milliGRAM(s) SubLingual four times a day  lactated ringers. 1000 milliLiter(s) (75 mL/Hr) IV Continuous <Continuous>  montelukast 10 milliGRAM(s) Oral at bedtime  sodium chloride 0.45% with potassium chloride 20 mEq/L 1000 milliLiter(s) (75 mL/Hr) IV Continuous <Continuous>    MEDICATIONS  (PRN):  LORazepam     Tablet 0.5 milliGRAM(s) Oral every 4 hours PRN Anxiety      Vital Signs Last 24 Hrs  T(F): 98.4 (09-24-22 @ 11:37), Max: 98.4 (09-24-22 @ 11:37)  HR: 83 (09-24-22 @ 11:37) (83 - 90)  BP: 114/78 (09-24-22 @ 11:37) (114/78 - 128/80)  RR: 18 (09-24-22 @ 11:37) (16 - 18)  SpO2: 99% (09-24-22 @ 11:37) (97% - 99%)  Telemetry:   CAPILLARY BLOOD GLUCOSE        I&O's Summary    23 Sep 2022 07:01  -  24 Sep 2022 07:00  --------------------------------------------------------  IN: 1560 mL / OUT: 0 mL / NET: 1560 mL    24 Sep 2022 07:01  -  24 Sep 2022 15:54  --------------------------------------------------------  IN: 480 mL / OUT: 800 mL / NET: -320 mL        PHYSICAL EXAM:  GENERAL: NAD, well-developed  HEAD:  Atraumatic, Normocephalic  EYES: EOMI, PERRLA, conjunctiva and sclera clear  NECK: Supple, No JVD  CHEST/LUNG: Clear to auscultation bilaterally; No wheeze  HEART: Regular rate and rhythm; No murmurs, rubs, or gallops  ABDOMEN: Soft, Nontender, Nondistended; Bowel sounds present  EXTREMITIES:  2+ Peripheral Pulses, No clubbing, cyanosis, or edema  PSYCH: AAOx3  NEUROLOGY: non-focal  SKIN: No rashes or lesions    LABS:                        12.2   5.56  )-----------( 55       ( 23 Sep 2022 07:57 )             36.2     09-23    140  |  102  |  <4<L>  ----------------------------<  88  3.3<L>   |  26  |  0.65    Ca    9.5      23 Sep 2022 07:57                RADIOLOGY & ADDITIONAL TESTS:    Imaging Personally Reviewed:    Consultant(s) Notes Reviewed:      Care Discussed with Consultants/Other Providers:

## 2022-09-25 NOTE — PROGRESS NOTE ADULT - NUTRITIONAL ASSESSMENT
This patient has been assessed with a concern for Malnutrition and has been determined to have a diagnosis/diagnoses of Severe protein-calorie malnutrition and Underweight (BMI < 19).    This patient is being managed with:   Diet Regular-  Vegan {Accepts Vegetable Products Only}  Entered: Sep 23 2022  4:33PM    
This patient has been assessed with a concern for Malnutrition and has been determined to have a diagnosis/diagnoses of Severe protein-calorie malnutrition and Underweight (BMI < 19).    This patient is being managed with:   Diet Regular-  Vegan {Accepts Vegetable Products Only}  Entered: Sep 23 2022  4:33PM

## 2022-09-25 NOTE — PROVIDER CONTACT NOTE (OTHER) - ACTION/TREATMENT ORDERED:
no further interventions ordered at this time no further interventions ordered at this time. Pt agreeable to having vital signs obtained

## 2022-09-25 NOTE — DISCHARGE NOTE NURSING/CASE MANAGEMENT/SOCIAL WORK - NSPROMEDSDISPOSITION_GEN_A_NUR
sent to pharmacy

## 2022-09-26 LAB
FAT STL QN: NORMAL — SIGNIFICANT CHANGE UP
FAT STL QN: NORMAL — SIGNIFICANT CHANGE UP

## 2022-09-26 NOTE — CHART NOTE - NSCHARTNOTEFT_GEN_A_CORE
(2) attempt(s) were made to reach patient, which have been unsuccessful. Unable to leave voicemail on (9/26).

## 2022-09-27 LAB — ELASTASE PANC STL-MCNT: >500 — SIGNIFICANT CHANGE UP

## 2022-09-28 ENCOUNTER — NON-APPOINTMENT (OUTPATIENT)
Age: 47
End: 2022-09-28

## 2022-09-28 LAB
PBG UR-MCNC: 0 MG/L — SIGNIFICANT CHANGE UP (ref 0–2)
PORPHYRINS UR-MCNC: 0 MG/24 HR — SIGNIFICANT CHANGE UP (ref 0–1.5)

## 2022-09-29 LAB — CALPROTECTIN STL-MCNT: <16 UG/G — SIGNIFICANT CHANGE UP (ref 0–120)

## 2022-09-29 NOTE — ED BEHAVIORAL HEALTH ASSESSMENT NOTE - AXIS III
Patient states breastfeeding went well with her first child but she is having trouble latching this infant on the right breast. Breasts are WNL but damage is noted on the right nipple that has a slight scab. Patient educated on positioning and hand placement for obtaining a deep latch in cross cradle. Patient assisted in latching infant and patient states it feels less painful but is still sore. Patient encouraged to use nipple cream and states she brought her own she would like to use. Patient encouraged to continue putting infant to breast on demand and to call with any questions or concerns. Patient verbalizes understanding. RN notified. POTS, ITP, asthma "digestive issues", ITP, Dysautonomia, POTS, Asthma, recent dx of Anemia

## 2022-09-30 ENCOUNTER — INPATIENT (INPATIENT)
Facility: HOSPITAL | Age: 47
LOS: 3 days | Discharge: HOME | DRG: 178 | End: 2022-10-04
Attending: INTERNAL MEDICINE | Admitting: INTERNAL MEDICINE
Payer: COMMERCIAL

## 2022-09-30 VITALS
DIASTOLIC BLOOD PRESSURE: 99 MMHG | HEIGHT: 72 IN | RESPIRATION RATE: 23 BRPM | TEMPERATURE: 100 F | WEIGHT: 87.08 LBS | SYSTOLIC BLOOD PRESSURE: 137 MMHG | OXYGEN SATURATION: 100 % | HEART RATE: 125 BPM

## 2022-09-30 DIAGNOSIS — Z90.49 ACQUIRED ABSENCE OF OTHER SPECIFIED PARTS OF DIGESTIVE TRACT: Chronic | ICD-10-CM

## 2022-09-30 DIAGNOSIS — E87.1 HYPO-OSMOLALITY AND HYPONATREMIA: ICD-10-CM

## 2022-09-30 LAB
ALBUMIN SERPL ELPH-MCNC: 4.7 G/DL — SIGNIFICANT CHANGE UP (ref 3.3–5)
ALBUMIN SERPL ELPH-MCNC: 4.8 G/DL — SIGNIFICANT CHANGE UP (ref 3.3–5)
ALP SERPL-CCNC: 75 U/L — SIGNIFICANT CHANGE UP (ref 40–120)
ALP SERPL-CCNC: 82 U/L — SIGNIFICANT CHANGE UP (ref 40–120)
ALT FLD-CCNC: 10 U/L — SIGNIFICANT CHANGE UP (ref 10–45)
ALT FLD-CCNC: 12 U/L — SIGNIFICANT CHANGE UP (ref 10–45)
ANION GAP SERPL CALC-SCNC: 14 MMOL/L — SIGNIFICANT CHANGE UP (ref 5–17)
ANION GAP SERPL CALC-SCNC: 15 MMOL/L — SIGNIFICANT CHANGE UP (ref 5–17)
ANION GAP SERPL CALC-SCNC: 15 MMOL/L — SIGNIFICANT CHANGE UP (ref 5–17)
AST SERPL-CCNC: 22 U/L — SIGNIFICANT CHANGE UP (ref 10–40)
AST SERPL-CCNC: 27 U/L — SIGNIFICANT CHANGE UP (ref 10–40)
BASOPHILS # BLD AUTO: 0.02 K/UL — SIGNIFICANT CHANGE UP (ref 0–0.2)
BASOPHILS NFR BLD AUTO: 0.3 % — SIGNIFICANT CHANGE UP (ref 0–2)
BILIRUB SERPL-MCNC: 0.5 MG/DL — SIGNIFICANT CHANGE UP (ref 0.2–1.2)
BILIRUB SERPL-MCNC: 0.6 MG/DL — SIGNIFICANT CHANGE UP (ref 0.2–1.2)
BUN SERPL-MCNC: <4 MG/DL — LOW (ref 7–23)
CALCIUM SERPL-MCNC: 8.7 MG/DL — SIGNIFICANT CHANGE UP (ref 8.4–10.5)
CALCIUM SERPL-MCNC: 9 MG/DL — SIGNIFICANT CHANGE UP (ref 8.4–10.5)
CALCIUM SERPL-MCNC: 9.1 MG/DL — SIGNIFICANT CHANGE UP (ref 8.4–10.5)
CHLORIDE SERPL-SCNC: 101 MMOL/L — SIGNIFICANT CHANGE UP (ref 96–108)
CHLORIDE SERPL-SCNC: 86 MMOL/L — LOW (ref 96–108)
CHLORIDE SERPL-SCNC: 94 MMOL/L — LOW (ref 96–108)
CO2 SERPL-SCNC: 18 MMOL/L — LOW (ref 22–31)
CO2 SERPL-SCNC: 19 MMOL/L — LOW (ref 22–31)
CO2 SERPL-SCNC: 19 MMOL/L — LOW (ref 22–31)
CREAT ?TM UR-MCNC: 11 MG/DL — SIGNIFICANT CHANGE UP
CREAT SERPL-MCNC: 0.6 MG/DL — SIGNIFICANT CHANGE UP (ref 0.5–1.3)
CREAT SERPL-MCNC: 0.6 MG/DL — SIGNIFICANT CHANGE UP (ref 0.5–1.3)
CREAT SERPL-MCNC: 0.63 MG/DL — SIGNIFICANT CHANGE UP (ref 0.5–1.3)
EGFR: 111 ML/MIN/1.73M2 — SIGNIFICANT CHANGE UP
EGFR: 113 ML/MIN/1.73M2 — SIGNIFICANT CHANGE UP
EGFR: 113 ML/MIN/1.73M2 — SIGNIFICANT CHANGE UP
EOSINOPHIL # BLD AUTO: 0 K/UL — SIGNIFICANT CHANGE UP (ref 0–0.5)
EOSINOPHIL NFR BLD AUTO: 0 % — SIGNIFICANT CHANGE UP (ref 0–6)
GLUCOSE SERPL-MCNC: 113 MG/DL — HIGH (ref 70–99)
GLUCOSE SERPL-MCNC: 115 MG/DL — HIGH (ref 70–99)
GLUCOSE SERPL-MCNC: 95 MG/DL — SIGNIFICANT CHANGE UP (ref 70–99)
HCT VFR BLD CALC: 33.3 % — LOW (ref 34.5–45)
HGB BLD-MCNC: 12.1 G/DL — SIGNIFICANT CHANGE UP (ref 11.5–15.5)
IMM GRANULOCYTES NFR BLD AUTO: 2.4 % — HIGH (ref 0–0.9)
LYMPHOCYTES # BLD AUTO: 0.36 K/UL — LOW (ref 1–3.3)
LYMPHOCYTES # BLD AUTO: 4.9 % — LOW (ref 13–44)
MAGNESIUM SERPL-MCNC: 1.2 MG/DL — LOW (ref 1.6–2.6)
MAGNESIUM SERPL-MCNC: 2.7 MG/DL — HIGH (ref 1.6–2.6)
MCHC RBC-ENTMCNC: 30.3 PG — SIGNIFICANT CHANGE UP (ref 27–34)
MCHC RBC-ENTMCNC: 36.3 GM/DL — HIGH (ref 32–36)
MCV RBC AUTO: 83.5 FL — SIGNIFICANT CHANGE UP (ref 80–100)
MONOCYTES # BLD AUTO: 0.88 K/UL — SIGNIFICANT CHANGE UP (ref 0–0.9)
MONOCYTES NFR BLD AUTO: 11.9 % — SIGNIFICANT CHANGE UP (ref 2–14)
NEUTROPHILS # BLD AUTO: 5.96 K/UL — SIGNIFICANT CHANGE UP (ref 1.8–7.4)
NEUTROPHILS NFR BLD AUTO: 80.5 % — HIGH (ref 43–77)
NRBC # BLD: 0 /100 WBCS — SIGNIFICANT CHANGE UP (ref 0–0)
OSMOLALITY UR: 73 MOS/KG — LOW (ref 300–900)
PHOSPHATE SERPL-MCNC: 1.1 MG/DL — LOW (ref 2.5–4.5)
PHOSPHATE SERPL-MCNC: 4.5 MG/DL — SIGNIFICANT CHANGE UP (ref 2.5–4.5)
PLATELET # BLD AUTO: 74 K/UL — LOW (ref 150–400)
POTASSIUM SERPL-MCNC: 3.4 MMOL/L — LOW (ref 3.5–5.3)
POTASSIUM SERPL-MCNC: 3.6 MMOL/L — SIGNIFICANT CHANGE UP (ref 3.5–5.3)
POTASSIUM SERPL-MCNC: 3.9 MMOL/L — SIGNIFICANT CHANGE UP (ref 3.5–5.3)
POTASSIUM SERPL-SCNC: 3.4 MMOL/L — LOW (ref 3.5–5.3)
POTASSIUM SERPL-SCNC: 3.6 MMOL/L — SIGNIFICANT CHANGE UP (ref 3.5–5.3)
POTASSIUM SERPL-SCNC: 3.9 MMOL/L — SIGNIFICANT CHANGE UP (ref 3.5–5.3)
POTASSIUM UR-SCNC: 6 MMOL/L — SIGNIFICANT CHANGE UP
PROT ?TM UR-MCNC: <7 MG/DL — SIGNIFICANT CHANGE UP (ref 0–12)
PROT SERPL-MCNC: 7 G/DL — SIGNIFICANT CHANGE UP (ref 6–8.3)
PROT SERPL-MCNC: 7.4 G/DL — SIGNIFICANT CHANGE UP (ref 6–8.3)
PROT/CREAT UR-RTO: SIGNIFICANT CHANGE UP (ref 0–0.2)
RAPID RVP RESULT: DETECTED
RBC # BLD: 3.99 M/UL — SIGNIFICANT CHANGE UP (ref 3.8–5.2)
RBC # FLD: 11.7 % — SIGNIFICANT CHANGE UP (ref 10.3–14.5)
SARS-COV-2 RNA SPEC QL NAA+PROBE: DETECTED
SODIUM SERPL-SCNC: 120 MMOL/L — CRITICAL LOW (ref 135–145)
SODIUM SERPL-SCNC: 126 MMOL/L — LOW (ref 135–145)
SODIUM SERPL-SCNC: 135 MMOL/L — SIGNIFICANT CHANGE UP (ref 135–145)
SODIUM UR-SCNC: 21 MMOL/L — SIGNIFICANT CHANGE UP
UUN UR-MCNC: 38 MG/DL — SIGNIFICANT CHANGE UP
WBC # BLD: 7.4 K/UL — SIGNIFICANT CHANGE UP (ref 3.8–10.5)
WBC # FLD AUTO: 7.4 K/UL — SIGNIFICANT CHANGE UP (ref 3.8–10.5)

## 2022-09-30 PROCEDURE — 99232 SBSQ HOSP IP/OBS MODERATE 35: CPT | Mod: GC

## 2022-09-30 PROCEDURE — 99291 CRITICAL CARE FIRST HOUR: CPT

## 2022-09-30 PROCEDURE — 70450 CT HEAD/BRAIN W/O DYE: CPT | Mod: 26

## 2022-09-30 RX ORDER — SODIUM CHLORIDE 9 MG/ML
1000 INJECTION, SOLUTION INTRAVENOUS
Refills: 0 | Status: DISCONTINUED | OUTPATIENT
Start: 2022-09-30 | End: 2022-09-30

## 2022-09-30 RX ORDER — FAMOTIDINE 10 MG/ML
20 INJECTION INTRAVENOUS ONCE
Refills: 0 | Status: COMPLETED | OUTPATIENT
Start: 2022-09-30 | End: 2022-09-30

## 2022-09-30 RX ORDER — MAGNESIUM SULFATE 500 MG/ML
2 VIAL (ML) INJECTION ONCE
Refills: 0 | Status: COMPLETED | OUTPATIENT
Start: 2022-09-30 | End: 2022-09-30

## 2022-09-30 RX ORDER — SODIUM CHLORIDE 9 MG/ML
1200 INJECTION INTRAMUSCULAR; INTRAVENOUS; SUBCUTANEOUS ONCE
Refills: 0 | Status: COMPLETED | OUTPATIENT
Start: 2022-09-30 | End: 2022-09-30

## 2022-09-30 RX ORDER — REMDESIVIR 5 MG/ML
100 INJECTION INTRAVENOUS EVERY 24 HOURS
Refills: 0 | Status: DISCONTINUED | OUTPATIENT
Start: 2022-10-01 | End: 2022-10-01

## 2022-09-30 RX ORDER — SODIUM,POTASSIUM PHOSPHATES 278-250MG
1 POWDER IN PACKET (EA) ORAL ONCE
Refills: 0 | Status: COMPLETED | OUTPATIENT
Start: 2022-09-30 | End: 2022-09-30

## 2022-09-30 RX ORDER — FERROUS SULFATE 325(65) MG
325 TABLET ORAL DAILY
Refills: 0 | Status: DISCONTINUED | OUTPATIENT
Start: 2022-09-30 | End: 2022-10-04

## 2022-09-30 RX ORDER — REMDESIVIR 5 MG/ML
200 INJECTION INTRAVENOUS EVERY 24 HOURS
Refills: 0 | Status: DISCONTINUED | OUTPATIENT
Start: 2022-09-30 | End: 2022-10-01

## 2022-09-30 RX ORDER — DESMOPRESSIN ACETATE 0.1 MG/1
0.1 TABLET ORAL ONCE
Refills: 0 | Status: COMPLETED | OUTPATIENT
Start: 2022-09-30 | End: 2022-09-30

## 2022-09-30 RX ORDER — SODIUM CHLORIDE 9 MG/ML
500 INJECTION INTRAMUSCULAR; INTRAVENOUS; SUBCUTANEOUS ONCE
Refills: 0 | Status: DISCONTINUED | OUTPATIENT
Start: 2022-09-30 | End: 2022-09-30

## 2022-09-30 RX ORDER — MONTELUKAST 4 MG/1
10 TABLET, CHEWABLE ORAL AT BEDTIME
Refills: 0 | Status: DISCONTINUED | OUTPATIENT
Start: 2022-09-30 | End: 2022-10-04

## 2022-09-30 RX ORDER — MAGNESIUM SULFATE 500 MG/ML
2 VIAL (ML) INJECTION ONCE
Refills: 0 | Status: DISCONTINUED | OUTPATIENT
Start: 2022-09-30 | End: 2022-09-30

## 2022-09-30 RX ORDER — REMDESIVIR 5 MG/ML
INJECTION INTRAVENOUS
Refills: 0 | Status: DISCONTINUED | OUTPATIENT
Start: 2022-09-30 | End: 2022-10-01

## 2022-09-30 RX ORDER — SODIUM CHLORIDE 9 MG/ML
1000 INJECTION, SOLUTION INTRAVENOUS
Refills: 0 | Status: DISCONTINUED | OUTPATIENT
Start: 2022-09-30 | End: 2022-10-03

## 2022-09-30 RX ORDER — DILTIAZEM HCL 120 MG
180 CAPSULE, EXT RELEASE 24 HR ORAL DAILY
Refills: 0 | Status: DISCONTINUED | OUTPATIENT
Start: 2022-09-30 | End: 2022-10-04

## 2022-09-30 RX ORDER — CHLORHEXIDINE GLUCONATE 213 G/1000ML
1 SOLUTION TOPICAL DAILY
Refills: 0 | Status: DISCONTINUED | OUTPATIENT
Start: 2022-09-30 | End: 2022-10-04

## 2022-09-30 RX ADMIN — Medication 180 MILLIGRAM(S): at 13:57

## 2022-09-30 RX ADMIN — Medication 1 PACKET(S): at 07:15

## 2022-09-30 RX ADMIN — SODIUM CHLORIDE 1200 MILLILITER(S): 9 INJECTION INTRAMUSCULAR; INTRAVENOUS; SUBCUTANEOUS at 05:23

## 2022-09-30 RX ADMIN — FAMOTIDINE 20 MILLIGRAM(S): 10 INJECTION INTRAVENOUS at 05:25

## 2022-09-30 RX ADMIN — Medication 1 MILLIGRAM(S): at 07:15

## 2022-09-30 RX ADMIN — Medication 0.5 MILLIGRAM(S): at 14:00

## 2022-09-30 RX ADMIN — Medication 25 GRAM(S): at 07:16

## 2022-09-30 RX ADMIN — SODIUM CHLORIDE 1200 MILLILITER(S): 9 INJECTION INTRAMUSCULAR; INTRAVENOUS; SUBCUTANEOUS at 06:50

## 2022-09-30 RX ADMIN — MONTELUKAST 10 MILLIGRAM(S): 4 TABLET, CHEWABLE ORAL at 21:21

## 2022-09-30 RX ADMIN — SODIUM CHLORIDE 50 MILLILITER(S): 9 INJECTION, SOLUTION INTRAVENOUS at 23:04

## 2022-09-30 NOTE — H&P ADULT - HISTORY OF PRESENT ILLNESS
47 yo woman w hx dysautonomia, chronic tremors, anxiety/depression, POTS syndrome, protein S insufficiency w DVTs , not on AC, gastroparesis, ITP, HTN, IBS recent hospitalization for chronic diarrhea and abd pain seen by GI with negative work up (EGD/ Colonoscopy unremarkable biopsy normal, normal Celiac serology and infectious work up)  Overnight presented w  covid+ 2d ago s/p 1 dose of paxlovid c/o profuse diarrhea and intolerance to po with full body tremulousness and sob. mild nonproductive cough without fever or cp/pleurisy. no bloody emesis or BMs

## 2022-09-30 NOTE — ED PROVIDER NOTE - OBJECTIVE STATEMENT
hx dysautonomia depression gastroparesis ITP HTN IBS presents covid+ 2d ago s/p 1 dose of paxlovid c/o profuse diarrhea and intolerance to po with full body tremulousness and sob. mild nonproductive cough without fever or cp/pleurisy. no bloody emesis or BMs.

## 2022-09-30 NOTE — ED ADULT TRIAGE NOTE - ESI TRIAGE ACUITY LEVEL, MLM
I could have sent a new prescription for fluoxetine 10mg cap, but if today is the first day she switched to taking fluoxetine 20mg in the AM, best recommendation is continue taking it in the AM from now on.     She can try magnesium 400mg at night for sleep over the counter.     She will see Carlota on Monday.    3

## 2022-09-30 NOTE — ED ADULT NURSE NOTE - OBJECTIVE STATEMENT
Pt is a 46YOF who is here for gastric irritability with diarrhea and vomiting, pt has full body tremors with some shortness of breath, pts father states pt is more confused than normal and was just diagnosed with COVID 2 days ago, pt took 1 dose of paxlovid but vomited the 2nd dose, pt is c/o many bouts diarrhea and several episodes of diarrhea, pt has some coughing but has no shortness of breath, no chest pain. Pt states a high fever of 101 and chills.

## 2022-09-30 NOTE — H&P ADULT - ASSESSMENT
47 y/o F PMhx dysautonomia, anxiety/depression, POTS recently hospitalized for chronic diarrhea and abd pain s/p EGD/ Colonoscopy w/ unremarkable biopsy results who presented w/ worsening diarrhea and sore throat. SARS-COV-2 PCR positive s/p 2 doses of Paxlovid after which she reports worsening tremors, n/v, poor PO intake and diarrhea.     COVID infection, hyponatremia, diarrhea  SARS-CoV-2 PCR positive  afebrile, symptoms- sore throat, nonproductive cough  trend inflammatory markers  monitor SpO2  no indication for steroids as patient not hypoxic  patient without high risk factors other than depression,   remdesivir x 3 days  c/w supportive care  IVF for hyponatremia

## 2022-09-30 NOTE — ED ADULT NURSE NOTE - NSIMPLEMENTINTERV_GEN_ALL_ED
Implemented All Fall Risk Interventions:  [place] to call system. Call bell, personal items and telephone within reach. Instruct patient to call for assistance. Room bathroom lighting operational. Non-slip footwear when patient is off stretcher. Physically safe environment: no spills, clutter or unnecessary equipment. Stretcher in lowest position, wheels locked, appropriate side rails in place. Provide visual cue, wrist band, yellow gown, etc. Monitor gait and stability. Monitor for mental status changes and reorient to person, place, and time. Review medications for side effects contributing to fall risk. Reinforce activity limits and safety measures with patient and family.

## 2022-09-30 NOTE — CONSULT NOTE ADULT - ASSESSMENT
#Chronic abdominal pain/diarrhea -   #N/V  -suspect IBS-D/functional pain with worsening sxs in setting of COVID infection and possible SE from Paxlovid. Recent admission with comprehensive work up that was unremarkable (GI PCR, ESR, CRP, stool elastase, stool fat, fecal calprotectin, check serum lead level, serum tryptase, urine porphobilinogen, C diff from 9/9/22 in [facility] is negative, and TSH) EGD/Colonoscopy unremarkable with normal pathology (bx of esophagus, stomach, and Colon)    #Severe Hyponatremia    2. Thrombocytopenia - chronic, history of ?ITP per previous heme notes    Plan:  -Supportive care per primary team (IVF, anti-emetics, analgesic)  -No further work up from our perspective.     Recommendations preliminary until signed by attending.     Bakari Morrow MD  Gastroenterology/Hepatology Fellow  1st option: [phone] (text or call), ONLY available from 7:00 am to 5:00 pm.   **Contact on-call GI fellow via answering service ([phone]) from 5pm-7am AND on weekends/holidays**  2nd option: Available via [employer] Teams  3rd option: Pager: [phone]         #Chronic abdominal pain/diarrhea -   #N/V  -suspect IBS-D/functional pain with worsening sxs in setting of COVID infection and possible SE from Paxlovid. Recent admission with comprehensive work up that was unremarkable (GI PCR, ESR, CRP, stool elastase, stool fat, fecal calprotectin, check serum lead level, serum tryptase, urine porphobilinogen, C diff from 9/9/22 in [facility] is negative, and TSH) EGD/Colonoscopy unremarkable with normal pathology (bx of esophagus, stomach, and Colon)    #Severe Hyponatremia    2. Thrombocytopenia - chronic, history of ?ITP per previous heme notes    Plan:  -Supportive care per primary team (IVF, anti-emetics, analgesic)  -No further work up from our perspective.   - outpatient follow up     Recommendations preliminary until signed by attending.     Bakari Morrow MD  Gastroenterology/Hepatology Fellow  1st option: [phone] (text or call), ONLY available from 7:00 am to 5:00 pm.   **Contact on-call GI fellow via answering service ([phone]) from 5pm-7am AND on weekends/holidays**  2nd option: Available via [employer] Teams  3rd option: Pager: [phone]

## 2022-09-30 NOTE — CONSULT NOTE ADULT - ASSESSMENT
47 y/o F PMhx dysautonomia, anxiety/depression, POTS recently hospitalized for chronic diarrhea and abd pain s/p EGD/ Colonoscopy w/ unremarkable biopsy results who presented w/ worsening diarrhea and sore throat. SARS-COV-2 PCR positive s/p 2 doses of Paxlovid after which she reports worsening tremors, n/v, poor PO intake and diarrhea.     COVID infection, hyponatremia, diarrhea  SARS-CoV-2 PCR positive  afebrile, symptoms- sore throat, nonproductive cough  trend inflammatory markers  monitor SpO2  no indication for steroids as patient not hypoxic  patient without high risk factors other than depression, can consider remdesivir  c/w supportive care  isolation per infection control policy   management of hyponatremia per nephrology      Dr. Hallie Bedoya will be covering 10/1-10/2    Beni Marie M.D.  [facility], Division of Infectious Diseases  [phone]  After 5pm on weekdays and all day on weekends - please call [phone]

## 2022-09-30 NOTE — CONSULT NOTE ADULT - ATTENDING COMMENTS
46F w recent admission for diarrhea and abd pain w negative w/u thought to be IBS-D, now admitted w/ worsening diarrhea in setting of covid infection and paxlovid administration. Labs notable for significant electrolyte abnormalities.   s/p EGD/Colonoscopy earlier this month.     Recent worsening in symptoms likely due to covid infection vs paxlovid side effect.   Recommend supportive care.   Diet as toelrated  appreciate renal c/s.   outpatient GI follow up.   No further inpatient GI w/u planned in this acute setting.     GI to sign off, please call with questions.

## 2022-09-30 NOTE — ED PROVIDER NOTE - PHYSICAL EXAMINATION
General: tremulous, anxious  HEENT: NCAT, PERRL no conjunctival pallor  Cardiac: tachycardic no murmurs, 2+ peripheral pulses  Resp: CTAB  Abdomen: soft, non-distended, bowel sounds present, +epigastric ttp, no rebound or guarding. no organomegaly  Extremities: no peripheral edema, calf tenderness, or leg size discrepancies  Skin: no rashes  Neuro: AAOx4, 5+motor, sensation grossly intact  Psych: mood and affect appropriate

## 2022-09-30 NOTE — CONSULT NOTE ADULT - SUBJECTIVE AND OBJECTIVE BOX
HPI:  46 yrs old female with history of anxiety/depression, POTS syndrome?, protein S insufficiency w/ DVTs (not AC), recent hospitalization for chronic diarrhea and abd pain seen by GI with negative work up (EGD/ Colonoscopy unremarkable biopsy normal, normal Celiac serology and infectious work up)  presenting with COVID+ with worsening diarrhea and N/V. GI called for further assistance After discharge, patient reported feeling overall okay until COVID diagnosis and she took Paxlovid which made her sxs even worst.     Allergies:  anesthesia: palpitations, tachycardia (Other)  barium sulfate (Other; Rash; Diarrhea; Nausea)  beta blockers (Other)  Ceftin (Rash; Diarrhea; Nausea)  gadolinium (Hypotension)  IV Contrast (Anaphylaxis)  metoprolol (Headache)  oral contrast containing iodine: nausea, vomiting &amp; diarrhea lasting ~5 days (Vomiting; Nausea; Diarrhea (Severe))  Reglan (Hives)  Zofran (Hives)        Hospital Medications:      PMHX/PSHX:  IBS (irritable bowel syndrome)    Anxiety    HTN (hypertension)    Headache    Asthma    Labyrinthitis    History of ITP    Pott disease    POTS (postural orthostatic tachycardia syndrome)    Anxiety    Idiopathic thrombocytopenia purpura    Dysautonomia    Idiopathic thrombocytopenic purpura    Depression    Dystonia    Hypertension    Gastroparesis    Autonomic dysreflexia    DVT (deep venous thrombosis)    DVT (deep venous thrombosis)    Protein S deficiency    Ovarian cyst    Idiopathic thrombocytopenia purpura    No significant past surgical history    No significant past surgical history    No significant past surgical history    No significant past surgical history    S/P appendectomy        Family history:  Family history of prostate cancer in father    Family history of atrial fibrillation    Family history of hypertension (Grandparent)    No pertinent family history in first degree relatives        Social History: no smoking    ROS:   All limited except as mentioned above.     PHYSICAL EXAM:   GENERAL:  NAD, anxious appearing   HEENT:  NC/AT,  conjunctivae clear and pink, sclera -anicteric  CHEST:  CTA B/L, Normal effort  HEART:  RRR S1/S2,  ABDOMEN:  Soft, mildly tender  EXTREMITIES:  No cyanosis or Edema  SKIN:  Warm & Dry. No rash or erythema  NEURO:  Alert, oriented    Vital Signs:  Vital Signs Last 24 Hrs  T(C): 37.2 (30 Sep 2022 07:10), Max: 37.6 (30 Sep 2022 03:32)  T(F): 99 (30 Sep 2022 07:10), Max: 99.6 (30 Sep 2022 03:32)  HR: 115 (30 Sep 2022 07:10) (115 - 125)  BP: 130/90 (30 Sep 2022 07:10) (130/90 - 137/99)  BP(mean): --  RR: 24 (30 Sep 2022 07:10) (23 - 24)  SpO2: 100% (30 Sep 2022 07:10) (100% - 100%)    Parameters below as of 30 Sep 2022 07:10  Patient On (Oxygen Delivery Method): room air      Daily Height in cm: 307.34 (30 Sep 2022 03:32)    Daily     LABS:                        12.1   7.40  )-----------( 74       ( 30 Sep 2022 05:45 )             33.3     Mean Cell Volume: 83.5 fl (09-30-22 @ 05:45)    09-30    120<LL>  |  86<L>  |  <4<L>  ----------------------------<  113<H>  3.4<L>   |  19<L>  |  0.63    Ca    9.0      30 Sep 2022 05:45  Phos  1.1     09-30  Mg     1.2     09-30    TPro  7.0  /  Alb  4.7  /  TBili  0.6  /  DBili  x   /  AST  22  /  ALT  10  /  AlkPhos  75  09-30    LIVER FUNCTIONS - ( 30 Sep 2022 05:45 )  Alb: 4.7 g/dL / Pro: 7.0 g/dL / ALK PHOS: 75 U/L / ALT: 10 U/L / AST: 22 U/L / GGT: x                                       12.1   7.40  )-----------( 74       ( 30 Sep 2022 05:45 )             33.3     Imaging:  CT pending

## 2022-09-30 NOTE — ED PROVIDER NOTE - ATTENDING CONTRIBUTION TO CARE
Patient is a 46-year-old female with history of POTS protein S insufficiency irritable bowel disease and recent admission for diarrhea.  Patient now presents with continued diarrhea abdominal pain and some confusion.  Her family states that she is having some difficulty with full sentences and finding the right words to speak.  Patient reports diarrhea and symptoms come for the diarrhea but no other real focal abdominal pain is reported.  Patient has some headache.  Patient is concerned and focused that her medication Paxlovid may have caused her symptoms that she is currently exhibiting, although the diarrhea has never gone away.  dry mm, some short worded sentences and periods of word searching   pale skin for ethnicity   trachea midline  CN 2-12 grossly intact, normal coordination  tachycardic   tachypneic   no rash/vesicles/petechiae  no edema  cooperative, distracted and tangential  will get iv, cbc, cmp, analgesia and antiemetic prn, fluids prn, ct head, and reassess. Patient is a 46-year-old female with history of POTS protein S insufficiency irritable bowel disease and recent admission for diarrhea.  Patient now presents with continued diarrhea abdominal pain and some confusion.  Her family states that she is having some difficulty with full sentences and finding the right words to speak.  Patient reports diarrhea and symptoms come for the diarrhea but no other real focal abdominal pain is reported.  Patient has some headache.  Patient is concerned and focused that her medication Paxlovid may have caused her symptoms that she is currently exhibiting, although the diarrhea has never gone away.  dry mm, some short worded sentences and periods of word searching   pale skin for ethnicity   trachea midline  CN 2-12 grossly intact, normal coordination  tachycardic   tachypneic   no rash/vesicles/petechiae  no edema  cooperative, distracted and tangential  will get iv, cbc, cmp, analgesia and antiemetic prn, fluids prn, ct head, and reassess.  Will follow up on labs, analgesia, imaging, reassess and disposition as clinically indicated.  *The above represents an initial assessment/impression. Please refer to my progress notes below for potential changes in patient clinical course*   noted hyponatremia and will obtain urine electrolytes  will recheck bmp status post iv fluids

## 2022-09-30 NOTE — ED ADULT NURSE REASSESSMENT NOTE - NS ED NURSE REASSESS COMMENT FT1
Pt concerned that she is not admitted to tele. LASHONDA Cheng contacted and made aware of pt concerns, states that pt doesn't need tele at this time and is okay to transport to floor. Pt transferred to floor.

## 2022-09-30 NOTE — ED PROVIDER NOTE - CLINICAL SUMMARY MEDICAL DECISION MAKING FREE TEXT BOX
young female with tachycardia tremulousness and dry mucus membranes likely severe dehydration with electrolyte derangements. benign abdominal exam. resuscitate and reassess.

## 2022-09-30 NOTE — CONSULT NOTE ADULT - SUBJECTIVE AND OBJECTIVE BOX
HPI: Ms. Presley is a 46 year-old woman with history of multiple medical issues including hypertension, POTS syndrome, protein S deficiency/DVTs, gastroparesis, and IBS. She is s/p recent hospitalization for management of chronic diarrhea and abdominal pain; lab/imaging workup was grossly unremarkable. She returned to the [facility] ER overnight after testing positive for COVID 2 days ago and after developing profuse diarrhea/anorexia/tremors/dyspnea after taking Paxlovid. She was noted on admission to be hyponatremic with a serum sodium of 120meq/L, hypomagnesemic with Mg 1.2mg/dL, and hypophosphatemic with PO4 1.1mg/dL. In light of these findings, a renal consultation was requested.    In the ER, Ms. Presley received 1.7L of NS in boluses, as well as 2gm of IV MgSO4, and oral K-phos.      PAST MEDICAL & SURGICAL HISTORY:  HTN  POTS  IBS   Gastroparesis  Anxiety  Depression  Protein S deficiency/DVTs  Labyrinthitis  ITP  Appendectomy      Allergies  barium sulfate (Other; Rash; Diarrhea; Nausea)  Ceftin (Rash; Diarrhea; Nausea)  gadolinium (Hypotension)  IV Contrast (Anaphylaxis)  Reglan (Hives)  Zofran (Hives)  anesthesia: palpitations, tachycardia (Other)  beta blockers (Other)    SOCIAL HISTORY:  Denies ETOh,Smoking,     FAMILY HISTORY:  Family history of prostate cancer in father  Family history of atrial fibrillation  Family history of hypertension (Grandparent)    REVIEW OF SYSTEMS:  CONSTITUTIONAL: (+)weakness, (+)anorexia  EYES/ENT: No visual changes;  No vertigo or throat pain   NECK: No pain or stiffness  RESPIRATORY: No cough, wheezing, hemoptysis; (+) shortness of breath  CARDIOVASCULAR: No chest pain or palpitations  GASTROINTESTINAL: (+)abdominal pain, (+)diarrhea  GENITOURINARY: No dysuria, frequency or hematuria  NEUROLOGICAL: (+)tremor  SKIN: No itching, burning, rashes, or lesions   All other review of systems is negative unless indicated above.    VITAL:  T(C): , Max: 37.6 (09-30-22 @ 03:32)  T(F): , Max: 99.6 (09-30-22 @ 03:32)  HR: 90 (09-30-22 @ 08:56)  BP: 122/81 (09-30-22 @ 08:56)  RR: 19 (09-30-22 @ 08:56)  SpO2: 100% (09-30-22 @ 08:56)  Wt(kg): --    PHYSICAL EXAM:  Constitutional: NAD, Alert  HEENT: NCAT, MMM  Neck: Supple, No JVD  Respiratory: CTA-b/l  Cardiovascular: RRR s1s2, no m/r/g  Gastrointestinal: BS+, soft, NT/ND  Extremities: No peripheral edema b/l  Neurological: no focal deficits; strength grossly intact  Back: no CVAT b/l  Skin: No rashes, no nevi    LABS:                        12.1   7.40  )-----------( 74       ( 30 Sep 2022 05:45 )             33.3     Na(126)/K(3.9)/Cl(94)/HCO3(18)/BUN(<4)/Cr(0.60)Glu(115)/Ca(8.7)/Mg(--)/PO4(--)    09-30 @ 07:36  Na(120)/K(3.4)/Cl(86)/HCO3(19)/BUN(<4)/Cr(0.63)Glu(113)/Ca(9.0)/Mg(1.2)/PO4(1.1)    09-30 @ 05:45    (9/25/22) - Na 137    IMAGING:  < from: CT Head No Cont (09.30.22 @ 08:27) >  IMPRESSION: Unremarkable noncontrastCT of the brain. No change from   8/24/2022.  < from: US Abdomen Upper Quadrant Right (09.20.22 @ 16:49) >  Normal right upper quadrant abdominal ultrasound.      ASSESSMENT:  (1)Hyponatremia - due to hypovolemia/poor osmotic intake relative to fluid intake - improving rapidly, s/p boluses of isotonic IVF. At mild but existent risk for osmotic demyelination syndrome, to point where I would hold off with further isotonic crystalloids for the next several hours    (2)Hypomagnesemia - due to poor intake/GI losses - s/p repletion    (3)Hypophosphatemia - due to poor intake/GI losses     RECOMMEND:  (1)No further IVF until 4pm  (2)BMP+Mg+PO4 at 4pm:      (a)if Mg is 1.5 or lower, can readminister IV MgSO4, 2gm x 1 at that time      (b)if PO4 is 1.5 or lower, can at that point administer potassium phosphate 15mmol x 1 (or sodium phosphate 15mmol instead, if K is >4.5)      (c)if Na+ at that point is 125 or lower, can administer LR, 50cc/h      (d)if Na+ at that point is 126-130, would forgo LR      (e)if Na+ at that point is 131 or higher, would give DDAVP 0.1mg po x 1 and would give D5W 50cc/h x 5h    Thank you for involving [facility] in this patient's care.    With warm regards,    Patrick Ding MD   [facility]  Office: [phone]  Cell: [phone]             HPI: Ms. Presley is a 46 year-old woman with history of multiple medical issues including hypertension, POTS syndrome, protein S deficiency/DVTs, gastroparesis, and IBS. She is s/p recent hospitalization for management of chronic diarrhea and abdominal pain; lab/imaging workup was grossly unremarkable. She returned to the [facility] ER overnight after testing positive for COVID 2 days ago and after developing profuse diarrhea/anorexia/tremors/dyspnea after taking Paxlovid. She was noted on admission to be hyponatremic with a serum sodium of 120meq/L, hypomagnesemic with Mg 1.2mg/dL, and hypophosphatemic with PO4 1.1mg/dL. In light of these findings, a renal consultation was requested.    In the ER, Ms. Presley received 1.7L of NS in boluses, as well as 2gm of IV MgSO4, and oral K-phos.    Ms. Presley complains that she still feels poorly. She shares that her heart rate (90s) is way too high for her and is a sign that she is dehydrated. She states that whereas she needs IVF, she cannot handle more than 60cc/h as it causes her to develop an uncomfortable sensation in her chest.     PAST MEDICAL & SURGICAL HISTORY:  HTN  POTS  IBS   Gastroparesis  Anxiety  Depression  Protein S deficiency/DVTs  Labyrinthitis  ITP  Appendectomy      Allergies  barium sulfate (Other; Rash; Diarrhea; Nausea)  Ceftin (Rash; Diarrhea; Nausea)  gadolinium (Hypotension)  IV Contrast (Anaphylaxis)  Reglan (Hives)  Zofran (Hives)  anesthesia: palpitations, tachycardia (Other)  beta blockers (Other)    SOCIAL HISTORY:  Denies ETOh,Smoking,     FAMILY HISTORY:  Family history of prostate cancer in father  Family history of atrial fibrillation  Family history of hypertension (Grandparent)    REVIEW OF SYSTEMS:  CONSTITUTIONAL: (+)weakness, (+)anorexia  EYES/ENT: No visual changes;  No vertigo or throat pain   NECK: No pain or stiffness  RESPIRATORY: No cough, wheezing, hemoptysis; (+) shortness of breath  CARDIOVASCULAR: No chest pain or palpitations  GASTROINTESTINAL: (+)abdominal pain, (+)diarrhea, (+)nausea, (+)vomiting  GENITOURINARY: No dysuria, frequency or hematuria  NEUROLOGICAL: (+)tremor  SKIN: No itching, burning, rashes, or lesions   All other review of systems is negative unless indicated above.    VITAL:  T(C): , Max: 37.6 (09-30-22 @ 03:32)  T(F): , Max: 99.6 (09-30-22 @ 03:32)  HR: 90 (09-30-22 @ 08:56)  BP: 122/81 (09-30-22 @ 08:56)  RR: 19 (09-30-22 @ 08:56)  SpO2: 100% (09-30-22 @ 08:56)  Wt(kg): --    PHYSICAL EXAM:  Constitutional: cachectic/wasted, anxious  HEENT: NCAT, DMM  Neck: Supple, No JVD  Respiratory: CTA-b/l  Cardiovascular: RRR s1s2, no m/r/g  Gastrointestinal: BS+, soft, NT/ND  Extremities: No peripheral edema b/l  Neurological: no focal deficits; strength grossly intact  Back: no CVAT b/l  Skin: No rashes, no nevi    LABS:                        12.1   7.40  )-----------( 74       ( 30 Sep 2022 05:45 )             33.3     Na(126)/K(3.9)/Cl(94)/HCO3(18)/BUN(<4)/Cr(0.60)Glu(115)/Ca(8.7)/Mg(--)/PO4(--)    09-30 @ 07:36  Na(120)/K(3.4)/Cl(86)/HCO3(19)/BUN(<4)/Cr(0.63)Glu(113)/Ca(9.0)/Mg(1.2)/PO4(1.1)    09-30 @ 05:45    (9/25/22) - Na 137    IMAGING:  < from: CT Head No Cont (09.30.22 @ 08:27) >  IMPRESSION: Unremarkable noncontrastCT of the brain. No change from   8/24/2022.  < from: US Abdomen Upper Quadrant Right (09.20.22 @ 16:49) >  Normal right upper quadrant abdominal ultrasound.      ASSESSMENT:  (1)Hyponatremia - due to hypovolemia/poor osmotic intake relative to fluid intake - improving rapidly, s/p boluses of isotonic IVF. At mild but existent risk for osmotic demyelination syndrome, to point where I would hold off with further isotonic crystalloids for the next several hours    (2)Hypomagnesemia - due to poor intake/GI losses - s/p repletion    (3)Hypophosphatemia - due to poor intake/GI losses     RECOMMEND:  (1)No further IVF until 4pm  (2)BMP+Mg+PO4 at 4pm:      (a)if Mg is 1.5 or lower, can readminister IV MgSO4, 2gm x 1 at that time      (b)if PO4 is 1.5 or lower, can at that point administer potassium phosphate 15mmol x 1 (or sodium phosphate 15mmol instead, if K is >4.5)      (c)if Na+ at that point is 125 or lower, can administer LR, 50cc/h      (d)if Na+ at that point is 126-130, can give D5_1/2NS @50cc/h      (e)if Na+ at that point is 131 or higher, would give DDAVP 0.1mg po x 1 and would give D5W 50cc/h x 5h    Thank you for involving [facility] in this patient's care.    With warm regards,    Patrick Ding MD   [facility]  Office: [phone]  Cell: [phone]

## 2022-09-30 NOTE — CONSULT NOTE ADULT - SUBJECTIVE AND OBJECTIVE BOX
[facility], Division of Infectious Diseases  CHARAN Briceno S. Shah, Y. Patel, G. Frank  [phone]  ([phone] - weekdays after 5pm and weekends)    DORIS MCBRIDE  46y, Female  [id number]    HPI--  HPI:  45 y/o F PMhx dysautonomia, anxiety/depression, POTS, not on AC, HTN recently hospitalized for chronic diarrhea and abd pain s/p EGD/ Colonoscopy w/ unremarkable biopsy results who presented w/ worsening diarrhea and sore throat. She reports going to urgent care 1-2 days ago and testing positive. Was prescribed paxlovid. States she had a bout of emesis after taking the second dose and felt ill w/ worsening tremors, n/v, poor PO intake and diarrhea.   Denies fever, rhinorrhea. Endorses some chest discomfort and SOB.    ROS: 10 point review of systems completed, pertinent positives and negatives as per HPI.    Allergies: barium sulfate (Other; Rash; Diarrhea; Nausea)  Ceftin (Rash; Diarrhea; Nausea)  gadolinium (Hypotension)  IV Contrast (Anaphylaxis)  Reglan (Hives)  Zofran (Hives)    PMH -- IBS (irritable bowel syndrome)    Anxiety    HTN (hypertension)    Headache    Asthma    Labyrinthitis    History of ITP    Pott disease    POTS (postural orthostatic tachycardia syndrome)    Anxiety    Idiopathic thrombocytopenia purpura    Dysautonomia    Idiopathic thrombocytopenic purpura    Depression    Dystonia    Hypertension    Gastroparesis    Autonomic dysreflexia    DVT (deep venous thrombosis)    DVT (deep venous thrombosis)    Protein S deficiency    Ovarian cyst    Idiopathic thrombocytopenia purpura      PSH -- No significant past surgical history    No significant past surgical history    No significant past surgical history    No significant past surgical history    S/P appendectomy      FH -- Family history of prostate cancer in father    Family history of atrial fibrillation    Family history of hypertension (Grandparent)    No pertinent family history in first degree relatives      Social History -- denies tobacco, alcohol or illicit drug use    Physical Exam--  Vital Signs Last 24 Hrs  T(F): 98 (30 Sep 2022 10:25), Max: 99.6 (30 Sep 2022 03:32)  HR: 105 (30 Sep 2022 10:25) (90 - 125)  BP: 106/64 (30 Sep 2022 10:25) (106/64 - 137/99)  RR: 22 (30 Sep 2022 10:25) (19 - 24)  SpO2: 100% (30 Sep 2022 10:25) (100% - 100%)  General: nontoxic-appearing, no acute distress  HEENT: NC/AT, anicteric, oropharynx clear, dentition fair, neck supple  Neck: Not rigid. No LAD  Lungs: Clear bilaterally without rales, wheezing or rhonchi  Heart: S1, S2, normal rate. No murmur, rub or gallop.  Abdomen: Soft. Nondistended. Nontender.   Neuro: AAOx3, no obvious focal deficits   Back: No costovertebral angle tenderness.  Extremities: No edema.   Skin: Warm. Dry. No rash.  Psychiatric: Appropriate affect and mood for situation.   Lines:    Laboratory & Imaging Data--  CBC:                       12.1   7.40  )-----------( 74       ( 30 Sep 2022 05:45 )             33.3     WBC Count: 7.40 K/uL (09-30-22 @ 05:45)    CMP: 09-30    126<L>  |  94<L>  |  <4<L>  ----------------------------<  115<H>  3.9   |  18<L>  |  0.60    Ca    8.7      30 Sep 2022 07:36  Phos  1.1     09-30  Mg     1.2     09-30    TPro  7.0  /  Alb  4.7  /  TBili  0.6  /  DBili  x   /  AST  22  /  ALT  10  /  AlkPhos  75  09-30    LIVER FUNCTIONS - ( 30 Sep 2022 05:45 )  Alb: 4.7 g/dL / Pro: 7.0 g/dL / ALK PHOS: 75 U/L / ALT: 10 U/L / AST: 22 U/L / GGT: x             Microbiology:     Radiology--  ***  Active Medications--  diltiazem    milliGRAM(s) Oral daily  ferrous    sulfate 325 milliGRAM(s) Oral daily  LORazepam     Tablet 0.5 milliGRAM(s) Oral every 3 hours PRN  montelukast 10 milliGRAM(s) Oral at bedtime    Antimicrobials:     Immunologic:

## 2022-09-30 NOTE — ED PROVIDER NOTE - NS ED ROS FT
Constitutional: no fevers, +chills  HEENT: no HA, vision changes, rhinorrhea, sore throat  Cardiac: no chest pain, palpitations  Respiratory: +SOB, cough no hemoptysis  GI: +n/v/d no, abd pain, bloody or dark stools  : no dysuria, frequency, or hematuria  MSK: no joint pain, neck pain or back pain  Skin: no rashes, jaundice, pruritis  Neuro: +tingling B hands, +gen weakness, no unsteady gait  ROS otherwise neg except per hpi

## 2022-09-30 NOTE — ED ADULT NURSE REASSESSMENT NOTE - NS ED NURSE REASSESS COMMENT FT1
Report received from SUDHIR Castillo. Pt remains in the ED. Resting comfortably in bed. A&Ox3, anxious. Breathing spontaneously and unlabored. NAD. On cardiac monitor. Pt safety maintained. Will continue to monitor. Awaiting bed.

## 2022-09-30 NOTE — H&P ADULT - NSHPPHYSICALEXAM_GEN_ALL_CORE
T(C): 37.2 (09-30-22 @ 07:10), Max: 37.6 (09-30-22 @ 03:32)  HR: 90 (09-30-22 @ 08:56) (90 - 125)  BP: 122/81 (09-30-22 @ 08:56) (122/81 - 137/99)  RR: 19 (09-30-22 @ 08:56) (19 - 24)  SpO2: 100% (09-30-22 @ 08:56) (100% - 100%)    PHYSICAL EXAM:  GENERAL: NAD, well-developed  HEAD:  Atraumatic, Normocephalic  EYES: EOMI, PERRLA, conjunctiva and sclera clear  NECK: Supple, No JVD  CHEST/LUNG: Clear to auscultation bilaterally; No wheeze  HEART: Regular rate and rhythm; No murmurs, rubs, or gallops  ABDOMEN: Soft, Nontender, Nondistended; Bowel sounds present  EXTREMITIES:  2+ Peripheral Pulses, No clubbing, cyanosis, or edema  PSYCH: AAOx3  NEUROLOGY: non-focal  SKIN: No rashes or lesions

## 2022-10-01 LAB
ALBUMIN SERPL ELPH-MCNC: 3.6 G/DL — SIGNIFICANT CHANGE UP (ref 3.3–5)
ALP SERPL-CCNC: 66 U/L — SIGNIFICANT CHANGE UP (ref 40–120)
ALT FLD-CCNC: 18 U/L — SIGNIFICANT CHANGE UP (ref 10–45)
ANION GAP SERPL CALC-SCNC: 14 MMOL/L — SIGNIFICANT CHANGE UP (ref 5–17)
AST SERPL-CCNC: 31 U/L — SIGNIFICANT CHANGE UP (ref 10–40)
BILIRUB DIRECT SERPL-MCNC: <0.1 MG/DL — SIGNIFICANT CHANGE UP (ref 0–0.3)
BILIRUB INDIRECT FLD-MCNC: >0.1 MG/DL — LOW (ref 0.2–1)
BILIRUB SERPL-MCNC: 0.2 MG/DL — SIGNIFICANT CHANGE UP (ref 0.2–1.2)
BUN SERPL-MCNC: 4 MG/DL — LOW (ref 7–23)
CALCIUM SERPL-MCNC: 8.4 MG/DL — SIGNIFICANT CHANGE UP (ref 8.4–10.5)
CHLORIDE SERPL-SCNC: 103 MMOL/L — SIGNIFICANT CHANGE UP (ref 96–108)
CO2 SERPL-SCNC: 18 MMOL/L — LOW (ref 22–31)
CREAT SERPL-MCNC: 0.66 MG/DL — SIGNIFICANT CHANGE UP (ref 0.5–1.3)
CREAT SERPL-MCNC: 0.81 MG/DL — SIGNIFICANT CHANGE UP (ref 0.5–1.3)
CRP SERPL-MCNC: <3 MG/L — SIGNIFICANT CHANGE UP (ref 0–4)
D DIMER BLD IA.RAPID-MCNC: 185 NG/ML DDU — SIGNIFICANT CHANGE UP
EGFR: 110 ML/MIN/1.73M2 — SIGNIFICANT CHANGE UP
EGFR: 91 ML/MIN/1.73M2 — SIGNIFICANT CHANGE UP
GLUCOSE SERPL-MCNC: 92 MG/DL — SIGNIFICANT CHANGE UP (ref 70–99)
HCT VFR BLD CALC: 37.1 % — SIGNIFICANT CHANGE UP (ref 34.5–45)
HGB BLD-MCNC: 12.7 G/DL — SIGNIFICANT CHANGE UP (ref 11.5–15.5)
INR BLD: 1.03 RATIO — SIGNIFICANT CHANGE UP (ref 0.88–1.16)
MAGNESIUM SERPL-MCNC: 2.2 MG/DL — SIGNIFICANT CHANGE UP (ref 1.6–2.6)
MCHC RBC-ENTMCNC: 29.8 PG — SIGNIFICANT CHANGE UP (ref 27–34)
MCHC RBC-ENTMCNC: 34.2 GM/DL — SIGNIFICANT CHANGE UP (ref 32–36)
MCV RBC AUTO: 87.1 FL — SIGNIFICANT CHANGE UP (ref 80–100)
NRBC # BLD: 0 /100 WBCS — SIGNIFICANT CHANGE UP (ref 0–0)
PHOSPHATE SERPL-MCNC: 3.4 MG/DL — SIGNIFICANT CHANGE UP (ref 2.5–4.5)
PLATELET # BLD AUTO: 82 K/UL — LOW (ref 150–400)
POTASSIUM SERPL-MCNC: 3.6 MMOL/L — SIGNIFICANT CHANGE UP (ref 3.5–5.3)
POTASSIUM SERPL-SCNC: 3.6 MMOL/L — SIGNIFICANT CHANGE UP (ref 3.5–5.3)
PROT SERPL-MCNC: 5.7 G/DL — LOW (ref 6–8.3)
PROTHROM AB SERPL-ACNC: 12 SEC — SIGNIFICANT CHANGE UP (ref 10.5–13.4)
RBC # BLD: 4.26 M/UL — SIGNIFICANT CHANGE UP (ref 3.8–5.2)
RBC # FLD: 12.4 % — SIGNIFICANT CHANGE UP (ref 10.3–14.5)
SODIUM SERPL-SCNC: 135 MMOL/L — SIGNIFICANT CHANGE UP (ref 135–145)
WBC # BLD: 4.84 K/UL — SIGNIFICANT CHANGE UP (ref 3.8–10.5)
WBC # FLD AUTO: 4.84 K/UL — SIGNIFICANT CHANGE UP (ref 3.8–10.5)

## 2022-10-01 RX ORDER — SODIUM CHLORIDE 9 MG/ML
1000 INJECTION, SOLUTION INTRAVENOUS
Refills: 0 | Status: DISCONTINUED | OUTPATIENT
Start: 2022-10-01 | End: 2022-10-04

## 2022-10-01 RX ORDER — REMDESIVIR 5 MG/ML
INJECTION INTRAVENOUS
Refills: 0 | Status: COMPLETED | OUTPATIENT
Start: 2022-10-01 | End: 2022-10-03

## 2022-10-01 RX ORDER — REMDESIVIR 5 MG/ML
100 INJECTION INTRAVENOUS EVERY 24 HOURS
Refills: 0 | Status: COMPLETED | OUTPATIENT
Start: 2022-10-02 | End: 2022-10-03

## 2022-10-01 RX ORDER — REMDESIVIR 5 MG/ML
200 INJECTION INTRAVENOUS EVERY 24 HOURS
Refills: 0 | Status: COMPLETED | OUTPATIENT
Start: 2022-10-01 | End: 2022-10-01

## 2022-10-01 RX ADMIN — REMDESIVIR 500 MILLIGRAM(S): 5 INJECTION INTRAVENOUS at 18:04

## 2022-10-01 RX ADMIN — SODIUM CHLORIDE 60 MILLILITER(S): 9 INJECTION, SOLUTION INTRAVENOUS at 22:02

## 2022-10-01 RX ADMIN — MONTELUKAST 10 MILLIGRAM(S): 4 TABLET, CHEWABLE ORAL at 21:58

## 2022-10-01 RX ADMIN — Medication 180 MILLIGRAM(S): at 12:00

## 2022-10-01 RX ADMIN — SODIUM CHLORIDE 100 MILLILITER(S): 9 INJECTION, SOLUTION INTRAVENOUS at 11:58

## 2022-10-01 RX ADMIN — Medication 0.5 MILLIGRAM(S): at 06:48

## 2022-10-01 NOTE — ED PROVIDER NOTE - CROS ED [facility] ALL NEG
1511 pt chaperoned with attending and Resident for Pelvic suctioned for clots and pt provided underwear Pending further orders MpulwRN negative...

## 2022-10-01 NOTE — PROGRESS NOTE ADULT - SUBJECTIVE AND OBJECTIVE BOX
Patient is a 46y old  Female who presents with a chief complaint of diarrhea, poor PO intake (30 Sep 2022 12:38)      SUBJECTIVE / OVERNIGHT EVENTS:    MEDICATIONS  (STANDING):  chlorhexidine 2% Cloths 1 Application(s) Topical daily  dextrose 5%. 1000 milliLiter(s) (50 mL/Hr) IV Continuous <Continuous>  dicyclomine 10 milliGRAM(s) Oral three times a day before meals  diltiazem    milliGRAM(s) Oral daily  ferrous    sulfate 325 milliGRAM(s) Oral daily  lactated ringers. 1000 milliLiter(s) (60 mL/Hr) IV Continuous <Continuous>  montelukast 10 milliGRAM(s) Oral at bedtime  remdesivir  IVPB   IV Intermittent     MEDICATIONS  (PRN):  LORazepam     Tablet 0.5 milliGRAM(s) Oral every 3 hours PRN Anxiety      Vital Signs Last 24 Hrs  T(F): 98.5 (10-01-22 @ 21:43), Max: 100.3 (10-01-22 @ 01:18)  HR: 84 (10-01-22 @ 21:43) (84 - 104)  BP: 99/64 (10-01-22 @ 21:43) (95/66 - 123/75)  RR: 18 (10-01-22 @ 21:43) (18 - 19)  SpO2: 99% (10-01-22 @ 21:43) (97% - 99%)  Telemetry:   CAPILLARY BLOOD GLUCOSE        I&O's Summary    30 Sep 2022 07:01  -  01 Oct 2022 07:00  --------------------------------------------------------  IN: 0 mL / OUT: 900 mL / NET: -900 mL    01 Oct 2022 07:01  -  01 Oct 2022 22:47  --------------------------------------------------------  IN: 670 mL / OUT: 600 mL / NET: 70 mL        PHYSICAL EXAM:  GENERAL: NAD, well-developed  HEAD:  Atraumatic, Normocephalic  EYES: EOMI, PERRLA, conjunctiva and sclera clear  NECK: Supple, No JVD  CHEST/LUNG: Clear to auscultation bilaterally; No wheeze  HEART: Regular rate and rhythm; No murmurs, rubs, or gallops  ABDOMEN: Soft, Nontender, Nondistended; Bowel sounds present  EXTREMITIES:  2+ Peripheral Pulses, No clubbing, cyanosis, or edema  PSYCH: AAOx3  NEUROLOGY: non-focal  SKIN: No rashes or lesions    LABS:                        12.7   4.84  )-----------( 82       ( 01 Oct 2022 07:30 )             37.1     10-01    x   |  x   |  x   ----------------------------<  x   x    |  x   |  0.66    Ca    8.4      01 Oct 2022 07:26  Phos  3.4     10-01  Mg     2.2     10-01    TPro  5.7<L>  /  Alb  3.6  /  TBili  0.2  /  DBili  <0.1  /  AST  31  /  ALT  18  /  AlkPhos  66  10-01    PT/INR - ( 01 Oct 2022 19:53 )   PT: 12.0 sec;   INR: 1.03 ratio                   RADIOLOGY & ADDITIONAL TESTS:    Imaging Personally Reviewed:    Consultant(s) Notes Reviewed:      Care Discussed with Consultants/Other Providers:   Patient is a 46y old  Female who presents with a chief complaint of diarrhea, poor PO intake (30 Sep 2022 12:38)      SUBJECTIVE / OVERNIGHT EVENTS: ongoing anxiety     MEDICATIONS  (STANDING):  chlorhexidine 2% Cloths 1 Application(s) Topical daily  dextrose 5%. 1000 milliLiter(s) (50 mL/Hr) IV Continuous <Continuous>  dicyclomine 10 milliGRAM(s) Oral three times a day before meals  diltiazem    milliGRAM(s) Oral daily  ferrous    sulfate 325 milliGRAM(s) Oral daily  lactated ringers. 1000 milliLiter(s) (60 mL/Hr) IV Continuous <Continuous>  montelukast 10 milliGRAM(s) Oral at bedtime  remdesivir  IVPB   IV Intermittent     MEDICATIONS  (PRN):  LORazepam     Tablet 0.5 milliGRAM(s) Oral every 3 hours PRN Anxiety      Vital Signs Last 24 Hrs  T(F): 98.5 (10-01-22 @ 21:43), Max: 100.3 (10-01-22 @ 01:18)  HR: 84 (10-01-22 @ 21:43) (84 - 104)  BP: 99/64 (10-01-22 @ 21:43) (95/66 - 123/75)  RR: 18 (10-01-22 @ 21:43) (18 - 19)  SpO2: 99% (10-01-22 @ 21:43) (97% - 99%)  Telemetry:   CAPILLARY BLOOD GLUCOSE        I&O's Summary    30 Sep 2022 07:01  -  01 Oct 2022 07:00  --------------------------------------------------------  IN: 0 mL / OUT: 900 mL / NET: -900 mL    01 Oct 2022 07:01  -  01 Oct 2022 22:47  --------------------------------------------------------  IN: 670 mL / OUT: 600 mL / NET: 70 mL        PHYSICAL EXAM:  GENERAL: NAD, well-developed  HEAD:  Atraumatic, Normocephalic  EYES: EOMI, PERRLA, conjunctiva and sclera clear  NECK: Supple, No JVD  CHEST/LUNG: Clear to auscultation bilaterally; No wheeze  HEART: Regular rate and rhythm; No murmurs, rubs, or gallops  ABDOMEN: Soft, Nontender, Nondistended; Bowel sounds present  EXTREMITIES:  2+ Peripheral Pulses, No clubbing, cyanosis, or edema  PSYCH: AAOx3  NEUROLOGY: non-focal  SKIN: No rashes or lesions    LABS:                        12.7   4.84  )-----------( 82       ( 01 Oct 2022 07:30 )             37.1     10-01    x   |  x   |  x   ----------------------------<  x   x    |  x   |  0.66    Ca    8.4      01 Oct 2022 07:26  Phos  3.4     10-01  Mg     2.2     10-01    TPro  5.7<L>  /  Alb  3.6  /  TBili  0.2  /  DBili  <0.1  /  AST  31  /  ALT  18  /  AlkPhos  66  10-01    PT/INR - ( 01 Oct 2022 19:53 )   PT: 12.0 sec;   INR: 1.03 ratio                   RADIOLOGY & ADDITIONAL TESTS:    Imaging Personally Reviewed:    Consultant(s) Notes Reviewed:      Care Discussed with Consultants/Other Providers:

## 2022-10-02 LAB
ALBUMIN SERPL ELPH-MCNC: 3.7 G/DL — SIGNIFICANT CHANGE UP (ref 3.3–5)
ALBUMIN SERPL ELPH-MCNC: 3.7 G/DL — SIGNIFICANT CHANGE UP (ref 3.3–5)
ALP SERPL-CCNC: 63 U/L — SIGNIFICANT CHANGE UP (ref 40–120)
ALP SERPL-CCNC: 63 U/L — SIGNIFICANT CHANGE UP (ref 40–120)
ALT FLD-CCNC: 16 U/L — SIGNIFICANT CHANGE UP (ref 10–45)
ALT FLD-CCNC: 17 U/L — SIGNIFICANT CHANGE UP (ref 10–45)
APPEARANCE UR: ABNORMAL
AST SERPL-CCNC: 29 U/L — SIGNIFICANT CHANGE UP (ref 10–40)
AST SERPL-CCNC: 31 U/L — SIGNIFICANT CHANGE UP (ref 10–40)
BACTERIA # UR AUTO: NEGATIVE — SIGNIFICANT CHANGE UP
BILIRUB DIRECT SERPL-MCNC: <0.1 MG/DL — SIGNIFICANT CHANGE UP (ref 0–0.3)
BILIRUB DIRECT SERPL-MCNC: <0.1 MG/DL — SIGNIFICANT CHANGE UP (ref 0–0.3)
BILIRUB INDIRECT FLD-MCNC: SIGNIFICANT CHANGE UP MG/DL (ref 0.2–1)
BILIRUB INDIRECT FLD-MCNC: SIGNIFICANT CHANGE UP MG/DL (ref 0.2–1)
BILIRUB SERPL-MCNC: 0.2 MG/DL — SIGNIFICANT CHANGE UP (ref 0.2–1.2)
BILIRUB SERPL-MCNC: 0.2 MG/DL — SIGNIFICANT CHANGE UP (ref 0.2–1.2)
BILIRUB UR-MCNC: NEGATIVE — SIGNIFICANT CHANGE UP
COLOR SPEC: SIGNIFICANT CHANGE UP
CREAT SERPL-MCNC: 0.6 MG/DL — SIGNIFICANT CHANGE UP (ref 0.5–1.3)
CREAT SERPL-MCNC: 0.61 MG/DL — SIGNIFICANT CHANGE UP (ref 0.5–1.3)
CRP SERPL-MCNC: <3 MG/L — SIGNIFICANT CHANGE UP (ref 0–4)
DIFF PNL FLD: ABNORMAL
EGFR: 112 ML/MIN/1.73M2 — SIGNIFICANT CHANGE UP
EGFR: 113 ML/MIN/1.73M2 — SIGNIFICANT CHANGE UP
EPI CELLS # UR: 0 /HPF — SIGNIFICANT CHANGE UP
GI PCR PANEL: SIGNIFICANT CHANGE UP
GLUCOSE UR QL: NEGATIVE — SIGNIFICANT CHANGE UP
HYALINE CASTS # UR AUTO: 1 /LPF — SIGNIFICANT CHANGE UP (ref 0–2)
KETONES UR-MCNC: NEGATIVE — SIGNIFICANT CHANGE UP
LEUKOCYTE ESTERASE UR-ACNC: NEGATIVE — SIGNIFICANT CHANGE UP
NITRITE UR-MCNC: NEGATIVE — SIGNIFICANT CHANGE UP
PH UR: 8 — SIGNIFICANT CHANGE UP (ref 5–8)
PROT SERPL-MCNC: 5.8 G/DL — LOW (ref 6–8.3)
PROT SERPL-MCNC: 5.8 G/DL — LOW (ref 6–8.3)
PROT UR-MCNC: SIGNIFICANT CHANGE UP
RBC CASTS # UR COMP ASSIST: 1091 /HPF — HIGH (ref 0–4)
SP GR SPEC: 1.01 — LOW (ref 1.01–1.02)
UROBILINOGEN FLD QL: NEGATIVE — SIGNIFICANT CHANGE UP
WBC UR QL: 3 /HPF — SIGNIFICANT CHANGE UP (ref 0–5)

## 2022-10-02 RX ORDER — CALAMINE AND ZINC OXIDE AND PHENOL 160; 10 MG/ML; MG/ML
1 LOTION TOPICAL THREE TIMES A DAY
Refills: 0 | Status: DISCONTINUED | OUTPATIENT
Start: 2022-10-02 | End: 2022-10-04

## 2022-10-02 RX ORDER — HYDROCORTISONE 1 %
1 OINTMENT (GRAM) TOPICAL
Refills: 0 | Status: DISCONTINUED | OUTPATIENT
Start: 2022-10-02 | End: 2022-10-04

## 2022-10-02 RX ADMIN — MONTELUKAST 10 MILLIGRAM(S): 4 TABLET, CHEWABLE ORAL at 21:49

## 2022-10-02 RX ADMIN — REMDESIVIR 500 MILLIGRAM(S): 5 INJECTION INTRAVENOUS at 17:57

## 2022-10-02 RX ADMIN — CHLORHEXIDINE GLUCONATE 1 APPLICATION(S): 213 SOLUTION TOPICAL at 12:17

## 2022-10-02 RX ADMIN — Medication 180 MILLIGRAM(S): at 12:16

## 2022-10-02 NOTE — PROGRESS NOTE ADULT - SUBJECTIVE AND OBJECTIVE BOX
Patient is a 46y old  Female who presents with a chief complaint of diarrhea, poor PO intake (30 Sep 2022 12:38)      SUBJECTIVE / OVERNIGHT EVENTS: ptn is very anxious, refusing anti spasmodics pre-occupied on getting an US    MEDICATIONS  (STANDING):  chlorhexidine 2% Cloths 1 Application(s) Topical daily  dextrose 5%. 1000 milliLiter(s) (50 mL/Hr) IV Continuous <Continuous>  dicyclomine 10 milliGRAM(s) Oral three times a day before meals  diltiazem    milliGRAM(s) Oral daily  ferrous    sulfate 325 milliGRAM(s) Oral daily  hydrocortisone 1% Cream 1 Application(s) Topical two times a day  lactated ringers. 1000 milliLiter(s) (60 mL/Hr) IV Continuous <Continuous>  montelukast 10 milliGRAM(s) Oral at bedtime  remdesivir  IVPB   IV Intermittent   remdesivir  IVPB 100 milliGRAM(s) IV Intermittent every 24 hours    MEDICATIONS  (PRN):  calamine/zinc oxide Lotion 1 Application(s) Topical three times a day PRN Rash and/or Itching  LORazepam     Tablet 0.5 milliGRAM(s) Oral every 3 hours PRN Anxiety      Vital Signs Last 24 Hrs  T(F): 97.5 (10-02-22 @ 23:02), Max: 98.2 (10-02-22 @ 06:43)  HR: 86 (10-02-22 @ 23:02) (76 - 86)  BP: 117/83 (10-02-22 @ 23:02) (106/70 - 117/83)  RR: 18 (10-02-22 @ 23:02) (18 - 18)  SpO2: 99% (10-02-22 @ 23:02) (98% - 100%)  Telemetry:   CAPILLARY BLOOD GLUCOSE        I&O's Summary    01 Oct 2022 07:  -  02 Oct 2022 07:00  --------------------------------------------------------  IN: 1870 mL / OUT: 600 mL / NET: 1270 mL    02 Oct 2022 07:  -  02 Oct 2022 23:16  --------------------------------------------------------  IN: 1370 mL / OUT: 0 mL / NET: 1370 mL        PHYSICAL EXAM:  GENERAL: NAD, well-developed  HEAD:  Atraumatic, Normocephalic  EYES: EOMI, PERRLA, conjunctiva and sclera clear  NECK: Supple, No JVD  CHEST/LUNG: Clear to auscultation bilaterally; No wheeze  HEART: Regular rate and rhythm; No murmurs, rubs, or gallops  ABDOMEN: Soft, Nontender, Nondistended; Bowel sounds present  EXTREMITIES:  2+ Peripheral Pulses, No clubbing, cyanosis, or edema  PSYCH: AAOx3  NEUROLOGY: non-focal  SKIN: No rashes or lesions    LABS:                        12.7   4.84  )-----------( 82       ( 01 Oct 2022 07:30 )             37.1     10-02    x   |  x   |  x   ----------------------------<  x   x    |  x   |  0.61    Ca    8.4      01 Oct 2022 07:26  Phos  3.4     10-01  Mg     2.2     10-01    TPro  5.8<L>  /  Alb  3.7  /  TBili  0.2  /  DBili  <0.1  /  AST  31  /  ALT  16  /  AlkPhos  63  10-02    PT/INR - ( 01 Oct 2022 19:53 )   PT: 12.0 sec;   INR: 1.03 ratio               Urinalysis Basic - ( 02 Oct 2022 11:20 )    Color: LIGHT BROWN / Appearance: Slightly Turbid / S.006 / pH: x  Gluc: x / Ketone: Negative  / Bili: Negative / Urobili: Negative   Blood: x / Protein: Trace / Nitrite: Negative   Leuk Esterase: Negative / RBC: 1091 /hpf / WBC 3 /HPF   Sq Epi: x / Non Sq Epi: 0 /hpf / Bacteria: Negative        RADIOLOGY & ADDITIONAL TESTS:    Imaging Personally Reviewed:    Consultant(s) Notes Reviewed:      Care Discussed with Consultants/Other Providers:

## 2022-10-03 ENCOUNTER — TRANSCRIPTION ENCOUNTER (OUTPATIENT)
Age: 47
End: 2022-10-03

## 2022-10-03 LAB
ALBUMIN SERPL ELPH-MCNC: 4.1 G/DL — SIGNIFICANT CHANGE UP (ref 3.3–5)
ALP SERPL-CCNC: 70 U/L — SIGNIFICANT CHANGE UP (ref 40–120)
ALT FLD-CCNC: 20 U/L — SIGNIFICANT CHANGE UP (ref 10–45)
ANION GAP SERPL CALC-SCNC: 11 MMOL/L — SIGNIFICANT CHANGE UP (ref 5–17)
AST SERPL-CCNC: 30 U/L — SIGNIFICANT CHANGE UP (ref 10–40)
BILIRUB DIRECT SERPL-MCNC: <0.1 MG/DL — SIGNIFICANT CHANGE UP (ref 0–0.3)
BILIRUB INDIRECT FLD-MCNC: >0.1 MG/DL — LOW (ref 0.2–1)
BILIRUB SERPL-MCNC: 0.2 MG/DL — SIGNIFICANT CHANGE UP (ref 0.2–1.2)
BUN SERPL-MCNC: 7 MG/DL — SIGNIFICANT CHANGE UP (ref 7–23)
CALCIUM SERPL-MCNC: 8.7 MG/DL — SIGNIFICANT CHANGE UP (ref 8.4–10.5)
CHLORIDE SERPL-SCNC: 102 MMOL/L — SIGNIFICANT CHANGE UP (ref 96–108)
CO2 SERPL-SCNC: 24 MMOL/L — SIGNIFICANT CHANGE UP (ref 22–31)
CREAT SERPL-MCNC: 0.59 MG/DL — SIGNIFICANT CHANGE UP (ref 0.5–1.3)
CREAT SERPL-MCNC: 0.6 MG/DL — SIGNIFICANT CHANGE UP (ref 0.5–1.3)
CRP SERPL-MCNC: <3 MG/L — SIGNIFICANT CHANGE UP (ref 0–4)
EGFR: 113 ML/MIN/1.73M2 — SIGNIFICANT CHANGE UP
EGFR: 113 ML/MIN/1.73M2 — SIGNIFICANT CHANGE UP
GLUCOSE SERPL-MCNC: 92 MG/DL — SIGNIFICANT CHANGE UP (ref 70–99)
INR BLD: 1.09 RATIO — SIGNIFICANT CHANGE UP (ref 0.88–1.16)
MAGNESIUM SERPL-MCNC: 1.9 MG/DL — SIGNIFICANT CHANGE UP (ref 1.6–2.6)
PHOSPHATE SERPL-MCNC: 3.8 MG/DL — SIGNIFICANT CHANGE UP (ref 2.5–4.5)
POTASSIUM SERPL-MCNC: 3.8 MMOL/L — SIGNIFICANT CHANGE UP (ref 3.5–5.3)
POTASSIUM SERPL-SCNC: 3.8 MMOL/L — SIGNIFICANT CHANGE UP (ref 3.5–5.3)
PROT SERPL-MCNC: 6.3 G/DL — SIGNIFICANT CHANGE UP (ref 6–8.3)
PROTHROM AB SERPL-ACNC: 12.6 SEC — SIGNIFICANT CHANGE UP (ref 10.5–13.4)
SODIUM SERPL-SCNC: 137 MMOL/L — SIGNIFICANT CHANGE UP (ref 135–145)

## 2022-10-03 PROCEDURE — 93010 ELECTROCARDIOGRAM REPORT: CPT

## 2022-10-03 RX ORDER — MUPIROCIN 20 MG/G
1 OINTMENT TOPICAL
Refills: 0 | Status: DISCONTINUED | OUTPATIENT
Start: 2022-10-03 | End: 2022-10-04

## 2022-10-03 RX ADMIN — CALAMINE AND ZINC OXIDE AND PHENOL 1 APPLICATION(S): 160; 10 LOTION TOPICAL at 21:55

## 2022-10-03 RX ADMIN — REMDESIVIR 500 MILLIGRAM(S): 5 INJECTION INTRAVENOUS at 14:48

## 2022-10-03 RX ADMIN — Medication 1 APPLICATION(S): at 18:00

## 2022-10-03 RX ADMIN — Medication 325 MILLIGRAM(S): at 11:24

## 2022-10-03 RX ADMIN — MONTELUKAST 10 MILLIGRAM(S): 4 TABLET, CHEWABLE ORAL at 21:50

## 2022-10-03 RX ADMIN — Medication 1 APPLICATION(S): at 06:43

## 2022-10-03 RX ADMIN — Medication 1 APPLICATION(S): at 00:01

## 2022-10-03 RX ADMIN — Medication 180 MILLIGRAM(S): at 11:23

## 2022-10-03 RX ADMIN — SODIUM CHLORIDE 60 MILLILITER(S): 9 INJECTION, SOLUTION INTRAVENOUS at 21:54

## 2022-10-03 RX ADMIN — MUPIROCIN 1 APPLICATION(S): 20 OINTMENT TOPICAL at 17:57

## 2022-10-03 RX ADMIN — Medication 0.5 MILLIGRAM(S): at 18:48

## 2022-10-03 NOTE — DISCHARGE NOTE PROVIDER - NSDCMRMEDTOKEN_GEN_ALL_CORE_FT
Cardizem  mg/24 hours oral capsule, extended release: 1 cap(s) orally once a day at 12:00 PM    NOTE:  Patient is very sensitive to alternate brands of medication, and wants to use her own medication while admitted.  LORazepam 0.5 mg oral tablet: 1 tab(s) orally every 3 hours, As Needed (may approximate dose as it is cut in quarters)    NOTE:  Patient is very sensitive to alternate brands of medication, and wants to use her own medication while admitted.    Patient uses 2mg tab(s) at home cut into quarters and takes as follows:  2:30 AM 0.5mg - 1.0mg  6:30 AM 0.5mg - 1.0mg  8:30 AM 0.5mg - 1.0mg  10:30 AM 0.5mg - 1.0mg  1:30 PM 0.5mg - 1.0mg  4:45 PM 0.5mg - 1.0mg  7:45 PM 0.5mg - 1.0mg  10:45 PM 0.5mg - 1.0mg    ***patient may skip a dose OR may take an additional 0.5mg if needed***  montelukast 10 mg oral tablet: 1 tab(s) orally once a day at 9:45 PM    NOTE:  Patient is very sensitive to alternate brands of medication, and wants to use her own medication while admitted.  Slow Fe (as elemental iron) 45 mg oral tablet, extended release: 1 tab(s) orally once a day with food    NOTE:  Patient is very sensitive to alternate brands of medication, and wants to use her own medication while admitted.   Cardizem  mg/24 hours oral capsule, extended release: 1 cap(s) orally once a day at 12:00 PM    NOTE:  Patient is very sensitive to alternate brands of medication, and wants to use her own medication while admitted.  LORazepam 0.5 mg oral tablet: 1 tab(s) orally every 3 hours, As Needed (may approximate dose as it is cut in quarters)    NOTE:  Patient is very sensitive to alternate brands of medication, and wants to use her own medication while admitted.    Patient uses 2mg tab(s) at home cut into quarters and takes as follows:  2:30 AM 0.5mg - 1.0mg  6:30 AM 0.5mg - 1.0mg  8:30 AM 0.5mg - 1.0mg  10:30 AM 0.5mg - 1.0mg  1:30 PM 0.5mg - 1.0mg  4:45 PM 0.5mg - 1.0mg  7:45 PM 0.5mg - 1.0mg  10:45 PM 0.5mg - 1.0mg    ***patient may skip a dose OR may take an additional 0.5mg if needed***  montelukast 10 mg oral tablet: 1 tab(s) orally once a day at 9:45 PM    NOTE:  Patient is very sensitive to alternate brands of medication, and wants to use her own medication while admitted.  mupirocin 2% topical ointment: 1 application topically 2 times a day  Slow Fe (as elemental iron) 45 mg oral tablet, extended release: 1 tab(s) orally once a day with food    NOTE:  Patient is very sensitive to alternate brands of medication, and wants to use her own medication while admitted.

## 2022-10-03 NOTE — PROGRESS NOTE ADULT - SUBJECTIVE AND OBJECTIVE BOX
Overnight events noted      VITAL:  T(C): , Max: 36.8 (10-02-22 @ 09:27)  T(F): , Max: 98.2 (10-02-22 @ 09:27)  HR: 89 (10-03-22 @ 06:51)  BP: 117/80 (10-03-22 @ 06:51)  BP(mean): --  RR: 18 (10-03-22 @ 06:51)  SpO2: 100% (10-03-22 @ 06:51)      PHYSICAL EXAM:  Constitutional: cachectic/wasted, anxious  HEENT: NCAT, DMM  Neck: Supple, No JVD  Respiratory: CTA-b/l  Cardiovascular: RRR s1s2, no m/r/g  Gastrointestinal: BS+, soft, NT/ND  Extremities: No peripheral edema b/l  Neurological: no focal deficits; strength grossly intact  Back: no CVAT b/l  Skin: No rashes, no nevi    LABS:    Na(--)/K(--)/Cl(--)/HCO3(--)/BUN(--)/Cr(0.61)Glu(--)/Ca(--)/Mg(--)/PO4(--)    10-02 @ 08:09  Na(--)/K(--)/Cl(--)/HCO3(--)/BUN(--)/Cr(0.66)Glu(--)/Ca(--)/Mg(--)/PO4(--)    10-01 @ 19:53  Na(135)/K(3.6)/Cl(103)/HCO3(18)/BUN(4)/Cr(0.81)Glu(92)/Ca(8.4)/Mg(2.2)/PO4(3.4)    10-01 @ 07:26  Na(135)/K(3.6)/Cl(101)/HCO3(19)/BUN(<4)/Cr(0.60)Glu(95)/Ca(9.1)/Mg(2.7)/PO4(4.5)     @ 15:57    Urinalysis Basic - ( 02 Oct 2022 11:20 )  Color: LIGHT BROWN / Appearance: Slightly Turbid / S.006 / pH: x  Gluc: x / Ketone: Negative  / Bili: Negative / Urobili: Negative   Blood: x / Protein: Trace / Nitrite: Negative   Leuk Esterase: Negative / RBC: 1091 /hpf / WBC 3 /HPF   Sq Epi: x / Non Sq Epi: 0 /hpf / Bacteria: Negative      IMPRESSION: 46F w/ HTN, POTS syndrome, protein S deficiency/DVTs, gastroparesis, and IBS, 22 p/w acute diarrhea s/p taking Paxlovid for COVID, and hyponatremia    (1)Hyponatremia - due to poor osmotic intake/hypovolemia - much improved as of 10/1/22    (2)Hypomagnesemia - due to poor intake/GI losses - much improved as of 10/1/22, s/p repletion    (3)Hypophosphatemia - due to poor intake/GI losses - much improved as of 10/1/22, s/p repletion    (4)COVID (+) - on Remdesivir    RECOMMEND:  (1)Reconsult as needed      Patrick Ding MD  [facility]  Office: [phone]  Cell: [phone]       complains of 5 bouts of diarrhea over past 24hours      VITAL:  T(C): , Max: 36.8 (10-02-22 @ 09:27)  T(F): , Max: 98.2 (10-02-22 @ 09:27)  HR: 89 (10-03-22 @ 06:51)  BP: 117/80 (10-03-22 @ 06:51)  BP(mean): --  RR: 18 (10-03-22 @ 06:51)  SpO2: 100% (10-03-22 @ 06:51)      PHYSICAL EXAM:  Constitutional: cachectic/wasted, anxious  HEENT: NCAT, DMM  Neck: Supple, No JVD  Respiratory: CTA-b/l  Cardiovascular: RRR s1s2, no m/r/g  Gastrointestinal: BS+, soft, NT/ND  Extremities: No peripheral edema b/l  Neurological: no focal deficits; strength grossly intact  Back: no CVAT b/l  Skin: No rashes, no nevi    LABS:    Na(--)/K(--)/Cl(--)/HCO3(--)/BUN(--)/Cr(0.61)Glu(--)/Ca(--)/Mg(--)/PO4(--)    10-02 @ 08:09  Na(--)/K(--)/Cl(--)/HCO3(--)/BUN(--)/Cr(0.66)Glu(--)/Ca(--)/Mg(--)/PO4(--)    10-01 @ 19:53  Na(135)/K(3.6)/Cl(103)/HCO3(18)/BUN(4)/Cr(0.81)Glu(92)/Ca(8.4)/Mg(2.2)/PO4(3.4)    10-01 @ 07:26  Na(135)/K(3.6)/Cl(101)/HCO3(19)/BUN(<4)/Cr(0.60)Glu(95)/Ca(9.1)/Mg(2.7)/PO4(4.5)     @ 15:57    Urinalysis Basic - ( 02 Oct 2022 11:20 )  Color: LIGHT BROWN / Appearance: Slightly Turbid / S.006 / pH: x  Gluc: x / Ketone: Negative  / Bili: Negative / Urobili: Negative   Blood: x / Protein: Trace / Nitrite: Negative   Leuk Esterase: Negative / RBC: 1091 /hpf / WBC 3 /HPF   Sq Epi: x / Non Sq Epi: 0 /hpf / Bacteria: Negative      IMPRESSION: 46F w/ HTN, POTS syndrome, protein S deficiency/DVTs, gastroparesis, and IBS, 22 p/w acute diarrhea s/p taking Paxlovid for COVID, and hyponatremia    (1)Hyponatremia - due to poor osmotic intake/hypovolemia - much improved as of 10/1/22    (2)Hypomagnesemia - due to poor intake/GI losses - much improved as of 10/1/22, s/p repletion    (3)Hypophosphatemia - due to poor intake/GI losses - much improved as of 10/1/22, s/p repletion    (4)COVID (+) - on Remdesivir    RECOMMEND:  (1)Reconsult as needed      Patrick Ding MD  [facility]  Office: [phone]  Cell: [phone]

## 2022-10-03 NOTE — PROGRESS NOTE ADULT - SUBJECTIVE AND OBJECTIVE BOX
Patient is a 46y old  Female who presents with a chief complaint of diarrhea, poor PO intake (30 Sep 2022 12:38)      SUBJECTIVE / OVERNIGHT EVENTS: ptn is requesting both UE dopplers to be done despite: no edema no erythema. she is concerned she has DVT. ptn is refusing to get remdesivir early, wants it at 6 pm only. agreed to go home in am.     MEDICATIONS  (STANDING):  chlorhexidine 2% Cloths 1 Application(s) Topical daily  dicyclomine 10 milliGRAM(s) Oral three times a day before meals  diltiazem    milliGRAM(s) Oral daily  ferrous    sulfate 325 milliGRAM(s) Oral daily  hydrocortisone 1% Cream 1 Application(s) Topical two times a day  lactated ringers. 1000 milliLiter(s) (60 mL/Hr) IV Continuous <Continuous>  montelukast 10 milliGRAM(s) Oral at bedtime  mupirocin 2% Ointment 1 Application(s) Topical two times a day    MEDICATIONS  (PRN):  calamine/zinc oxide Lotion 1 Application(s) Topical three times a day PRN Rash and/or Itching  LORazepam     Tablet 0.5 milliGRAM(s) Oral every 3 hours PRN Anxiety      Vital Signs Last 24 Hrs  T(F): 98.1 (10-03-22 @ 16:52), Max: 98.1 (10-03-22 @ 13:56)  HR: 83 (10-03-22 @ 16:52) (72 - 92)  BP: 134/89 (10-03-22 @ 16:52) (117/80 - 134/89)  RR: 18 (10-03-22 @ 16:52) (18 - 18)  SpO2: 100% (10-03-22 @ 16:52) (99% - 100%)  Telemetry:   CAPILLARY BLOOD GLUCOSE        I&O's Summary    02 Oct 2022 07:  -  03 Oct 2022 07:00  --------------------------------------------------------  IN: 2090 mL / OUT: 4 mL / NET: 2086 mL    03 Oct 2022 07:01  -  03 Oct 2022 18:23  --------------------------------------------------------  IN: 830 mL / OUT: 0 mL / NET: 830 mL        PHYSICAL EXAM:  GENERAL: NAD, well-developed  HEAD:  Atraumatic, Normocephalic  EYES: EOMI, PERRLA, conjunctiva and sclera clear  NECK: Supple, No JVD  CHEST/LUNG: Clear to auscultation bilaterally; No wheeze  HEART: Regular rate and rhythm; No murmurs, rubs, or gallops  ABDOMEN: Soft, Nontender, Nondistended; Bowel sounds present  EXTREMITIES:  2+ Peripheral Pulses, No clubbing, cyanosis, or edema  PSYCH: AAOx3  NEUROLOGY: non-focal  SKIN: No rashes or lesions    LABS:    10-03    137  |  102  |  7   ----------------------------<  92  3.8   |  24  |  0.60    Ca    8.7      03 Oct 2022 08:34  Phos  3.8     10-03  Mg     1.9     10-    TPro  6.3  /  Alb  4.1  /  TBili  0.2  /  DBili  <0.1  /  AST  30  /  ALT  20  /  AlkPhos  70  10-03    PT/INR - ( 03 Oct 2022 08:34 )   PT: 12.6 sec;   INR: 1.09 ratio               Urinalysis Basic - ( 02 Oct 2022 11:20 )    Color: LIGHT BROWN / Appearance: Slightly Turbid / S.006 / pH: x  Gluc: x / Ketone: Negative  / Bili: Negative / Urobili: Negative   Blood: x / Protein: Trace / Nitrite: Negative   Leuk Esterase: Negative / RBC: 1091 /hpf / WBC 3 /HPF   Sq Epi: x / Non Sq Epi: 0 /hpf / Bacteria: Negative        RADIOLOGY & ADDITIONAL TESTS:    Imaging Personally Reviewed:    Consultant(s) Notes Reviewed:      Care Discussed with Consultants/Other Providers:

## 2022-10-03 NOTE — PROGRESS NOTE ADULT - SUBJECTIVE AND OBJECTIVE BOX
[facility] DIV[facility] OF INFECTIOUS DISEASE  Adrien Mustafa MD PhD, Naye Acevedo MD, Hallie Bedoya MD, Carter Brown MD, Beni Marie MD  and providing coverage with Jennifer Urias MD and Jovany Dey MD  Providing Infectious Disease Consultations at [facility]      Office# [phone] to schedule follow up appointments  Answering Service for urgent calls or New Consults [phone]    Infectious Diseases Progress Note:    DORIS MCBRIDE is a 46y year old Female patient    COVID-19 Patient  States she's had 3 BMs since am.   Feels nauseous, feels it is related to remdesivir and that fluids help  States she wants to "push through" with last dose of remdesivir   Notes reviewed    Allergies: barium sulfate (Other; Rash; Diarrhea; Nausea)  Ceftin (Rash; Diarrhea; Nausea)  gadolinium (Hypotension)  IV Contrast (Anaphylaxis)  Reglan (Hives)  Zofran (Hives)    ANTIBIOTICS/RELEVANT:  Antimicrobials remdesivir  IVPB   IV Intermittent   remdesivir  IVPB 100 milliGRAM(s) IV Intermittent every 24 hours    Immunologic:   Other Meds: calamine/zinc oxide Lotion 1 Application(s) Topical three times a day PRN  chlorhexidine 2% Cloths 1 Application(s) Topical daily  dicyclomine 10 milliGRAM(s) Oral three times a day before meals  diltiazem    milliGRAM(s) Oral daily  ferrous    sulfate 325 milliGRAM(s) Oral daily  hydrocortisone 1% Cream 1 Application(s) Topical two times a day  lactated ringers. 1000 milliLiter(s) IV Continuous <Continuous>  LORazepam     Tablet 0.5 milliGRAM(s) Oral every 3 hours PRN  montelukast 10 milliGRAM(s) Oral at bedtime    Objective:  Vital Signs Last 24 Hrs  T(F): 97.5 (03 Oct 2022 09:26), Max: 98.1 (02 Oct 2022 17:27)  HR: 81 (03 Oct 2022 09:26) (72 - 89)  BP: 120/85 (03 Oct 2022 09:26) (110/80 - 123/85)  RR: 18 (03 Oct 2022 09:26) (18 - 18)  SpO2: 100% (03 Oct 2022 09:26) (99% - 100%)  PHYSICAL EXAM:  HEENT: NC/AT, anicteric, supple  Respiratory: no acc muscle use, breathing comfortably  Cardiovascular: S1 S2 present, normal rate  Gastrointestinal: normal appearing, nondistended  Extremities: no edema, no cyanosis    LABS:    WBC trend:  4.84 10-01 @ 07:30  7.40  @ 05:45    10-03    137  |  102  |  7   ----------------------------<  92  3.8   |  24  |  0.60    Ca    8.7      03 Oct 2022 08:34  Phos  3.8     10-03  Mg     1.9     10-03    TPro  6.3  /  Alb  4.1  /  TBili  0.2  /  DBili  <0.1  /  AST  30  /  ALT  20  /  AlkPhos  70  10-03    Creatinine, Serum: 0.60 mg/dL (10-03-22 @ 08:34)  Creatinine, Serum: 0.59 mg/dL (10-03-22 @ 08:34)  Creatinine, Serum: 0.61 mg/dL (10-02-22 @ 08:09)  Creatinine, Serum: 0.60 mg/dL (10-02-22 @ 08:09)  Creatinine, Serum: 0.66 mg/dL (10-01-22 @ 19:53)  Creatinine, Serum: 0.81 mg/dL (10-01-22 @ 07:26)  Creatinine, Serum: 0.60 mg/dL (22 @ 15:57)  Creatinine, Serum: 0.60 mg/dL (22 @ 07:36)  Creatinine, Serum: 0.63 mg/dL (22 @ 05:45)    PT/INR - ( 03 Oct 2022 08:34 )   PT: 12.6 sec;   INR: 1.09 ratio      Urinalysis Basic - ( 02 Oct 2022 11:20 )  Color: LIGHT BROWN / Appearance: Slightly Turbid / S.006 / pH: x  Gluc: x / Ketone: Negative  / Bili: Negative / Urobili: Negative   Blood: x / Protein: Trace / Nitrite: Negative   Leuk Esterase: Negative / RBC: 1091 /hpf / WBC 3 /HPF   Sq Epi: x / Non Sq Epi: 0 /hpf / Bacteria: Negative    Auto Neutrophil #: 5.96 K/uL (22 @ 05:45)  Auto Lymphocyte #: 0.36 K/uL (22 @ 05:45)    D-Dimer Assay, Quantitative: 185 ng/mL DDU (10-01-22 @ 19:53)    Prothrombin Time, Plasma: 12.6 sec (10-03-22 @ 08:34)  Prothrombin Time, Plasma: 12.0 sec (10-01-22 @ :53)    C-Reactive Protein, Serum: <3 mg/L (10-03-22 @ 08:34)  C-Reactive Protein, Serum: <3 mg/L (10-02-22 @ 08:09)  C-Reactive Protein, Serum: <3 mg/L (10-01-22 @ 19:53)    MICROBIOLOGY: reviewed  Culture - Blood (collected 22 @ 05:58)  Source: .Blood Blood-Peripheral  Preliminary Report (10-01-22 @ 14:01):    No growth to date.    Culture - Blood (collected 22 @ 05:12)  Source: .Blood Blood-Peripheral  Preliminary Report (10-01-22 @ 14:01):    No growth to date.    RADIOLOGY & ADDITIONAL STUDIES: reviewed

## 2022-10-03 NOTE — DISCHARGE NOTE PROVIDER - NSDCFUADDAPPT_GEN_ALL_CORE_FT
APPTS ARE READY TO BE MADE: [ x] YES    Best Family or Patient Contact (if needed):    Additional Information about above appointments (if needed):    1:   2:   3:     Other comments or requests:    APPTS ARE READY TO BE MADE: [ x] YES    Best Family or Patient Contact (if needed):    Additional Information about above appointments (if needed):    1: Please follow up with pcp  with in a week   2:   3:     Other comments or requests:    APPTS ARE READY TO BE MADE: [ x] YES    Best Family or Patient Contact (if needed):    Additional Information about above appointments (if needed):    1: Please follow up with pcp  with in a week   2:   3:     Other comments or requests:     Three attempts were made to reach patient which have been unsuccessful. Will await a call back from patient to confirm the patient made the appointments and no further assistance is needed

## 2022-10-03 NOTE — DISCHARGE NOTE PROVIDER - NSDCCPCAREPLAN_GEN_ALL_CORE_FT
PRINCIPAL DISCHARGE DIAGNOSIS  Diagnosis: Acute hyponatremia  Assessment and Plan of Treatment: resolved follow up with pcp in a few days      SECONDARY DISCHARGE DIAGNOSES  Diagnosis: COVID-19  Assessment and Plan of Treatment: you completed remdesivir  follow up with pcp within 1 week     PRINCIPAL DISCHARGE DIAGNOSIS  Diagnosis: Acute hyponatremia  Assessment and Plan of Treatment: resolved   follow up with pcp in 3-4 days- at this appointment, you should have your electrolytes monitored      SECONDARY DISCHARGE DIAGNOSES  Diagnosis: COVID-19  Assessment and Plan of Treatment: you completed remdesivir  follow up with pcp within 3-4 days fo discharge    Diagnosis: Diarrhea  Assessment and Plan of Treatment: Improved     PRINCIPAL DISCHARGE DIAGNOSIS  Diagnosis: Acute hyponatremia  Assessment and Plan of Treatment: resolved   follow up with pcp in 3-4 days- at this appointment, you should have your electrolytes monitored      SECONDARY DISCHARGE DIAGNOSES  Diagnosis: COVID-19  Assessment and Plan of Treatment: you completed remdesivir  follow up with pcp within 3-4 days fo discharge    Diagnosis: Diarrhea  Assessment and Plan of Treatment: Improved    Diagnosis: Thrombosis of superficial vessel  Assessment and Plan of Treatment: Left brachial superficial thrombus seen on doppler - no further testing needed at this time.  Monitor for signs of redness/swelling/pain  Follow up with your primary medical doctor within one week

## 2022-10-03 NOTE — DISCHARGE NOTE PROVIDER - CARE PROVIDER_API CALL
[facility]    Need for Home Care Service was communicated by Linh DOOLEY on 5/20/22.  Choice was offered and patient chose [facility].    Spoke with Aracelis via PC regarding [facility] services.   The patient will benefit from home care services based on:  Acute respiratory failure with hypoxia (CMS/[facility]) [J96.01]   Acute exacerbation of chronic obstructive pulmonary disease (COPD) (CMS/[facility]) [J44.1]   Sinus tachycardia [R00.0]   Hypomagnesemia [E83.42]   Neutrophilia [D72.9]    Risk of readmission is: 51% (based on Longitudinal Plan of Care score)    Homebound criteria was discussed in detail and  verbalizes understanding.   Patient meets homebound criteria because she requires one person assist to leave the home, use of assistive device and risk for infection.    Verbal and written information  provided to patient along with [facility] contact information. Teach back demonstrated by patient.     Address, phone number, email, and insurance verified with patient as per [facility] records.       Patient's support system is son and family.    Active with a skilled home health agency prior to admission No .   PCP verified with patient as JENSEN MEYERS  Pt aware and agreeable that face coverings are to be worn by self and all household members during home care visits. yes    Liaison will continue to follow until discharge.   Anticipated dc date is today.     Josephine Zeng RN   Home Care Liaison  [facility]  [phone]      Cristhian Parker  Cardiovascular Diseases  [address], [address]  [place] [address]  Phone: [phone]  Fax: [phone]  Established Patient  Follow Up Time: 1-3 days

## 2022-10-03 NOTE — DISCHARGE NOTE PROVIDER - HOSPITAL COURSE
45 y/o F PMhx dysautonomia, anxiety/depression, POTS recently hospitalized for chronic diarrhea and abd pain s/p EGD/ Colonoscopy w/ unremarkable biopsy results who presented w/ worsening diarrhea and sore throat. SARS-COV-2 PCR positive s/p 2 doses of Paxlovid after which she reports worsening tremors, n/v, poor PO intake and diarrhea.   ID consulted  COVID infection, hyponatremia, diarrhea  SARS-CoV-2 PCR positive  afebrile, symptoms- sore throat, nonproductive cough  comfortable and saturating well on RA  Remdesivir completed    cleared for dc home to follow up with PCP in a few days 45 y/o F PMhx dysautonomia, anxiety/depression, POTS recently hospitalized for chronic diarrhea and abd pain s/p EGD/ Colonoscopy w/ unremarkable biopsy results who presented w/ worsening diarrhea and sore throat. SARS-COV-2 PCR positive s/p 2 doses of Paxlovid after which she reports worsening tremors, n/v, poor PO intake and diarrhea.   ID consulted  COVID infection, hyponatremia, diarrhea  SARS-CoV-2 PCR positive  afebrile, symptoms- sore throat, nonproductive cough  comfortable and saturating well on RA  Remdesivir completed  Patient re    cleared for dc home to follow up with PCP in a few days 47 y/o F PMhx dysautonomia, anxiety/depression, POTS recently hospitalized for chronic diarrhea and abd pain s/p EGD/ Colonoscopy w/ unremarkable biopsy results who presented w/ worsening diarrhea and sore throat. SARS-COV-2 PCR positive s/p 2 doses of Paxlovid after which she reports worsening tremors, n/v, poor PO intake and diarrhea.   ID consulted  COVID infection, hyponatremia, diarrhea  SARS-CoV-2 PCR positive  afebrile, symptoms- sore throat, nonproductive cough  comfortable and saturating well on RA  Remdesivir completed      cleared for dc home to follow up with PCP in a few days 47 y/o F PMhx dysautonomia, anxiety/depression, POTS recently hospitalized for chronic diarrhea and abd pain s/p EGD/ Colonoscopy w/ unremarkable biopsy results who presented w/ worsening diarrhea and sore throat. SARS-COV-2 PCR positive s/p 2 doses of Paxlovid after which she reports worsening tremors, n/v, poor PO intake and diarrhea.   ID consulted  COVID infection, hyponatremia, diarrhea  SARS-CoV-2 PCR positive  afebrile, symptoms- sore throat, nonproductive cough  comfortable and saturating well on RA  Remdesivir completed    B/L upper extremity doppler shows - no evidence of deep venous thrombosis in either upper extremity; there is a thrombosed superficial tributary of the left basilic vein at   the antecubital fossa.      cleared for dc home to follow up with PCP in a few days

## 2022-10-04 ENCOUNTER — TRANSCRIPTION ENCOUNTER (OUTPATIENT)
Age: 47
End: 2022-10-04

## 2022-10-04 VITALS
RESPIRATION RATE: 18 BRPM | TEMPERATURE: 97 F | HEART RATE: 96 BPM | DIASTOLIC BLOOD PRESSURE: 90 MMHG | OXYGEN SATURATION: 99 % | SYSTOLIC BLOOD PRESSURE: 143 MMHG

## 2022-10-04 LAB
ANION GAP SERPL CALC-SCNC: 11 MMOL/L — SIGNIFICANT CHANGE UP (ref 5–17)
BUN SERPL-MCNC: 6 MG/DL — LOW (ref 7–23)
CALCIUM SERPL-MCNC: 9 MG/DL — SIGNIFICANT CHANGE UP (ref 8.4–10.5)
CHLORIDE SERPL-SCNC: 103 MMOL/L — SIGNIFICANT CHANGE UP (ref 96–108)
CO2 SERPL-SCNC: 23 MMOL/L — SIGNIFICANT CHANGE UP (ref 22–31)
CREAT SERPL-MCNC: 0.53 MG/DL — SIGNIFICANT CHANGE UP (ref 0.5–1.3)
EGFR: 116 ML/MIN/1.73M2 — SIGNIFICANT CHANGE UP
GLUCOSE SERPL-MCNC: 91 MG/DL — SIGNIFICANT CHANGE UP (ref 70–99)
MAGNESIUM SERPL-MCNC: 2.1 MG/DL — SIGNIFICANT CHANGE UP (ref 1.6–2.6)
PHOSPHATE SERPL-MCNC: 3.6 MG/DL — SIGNIFICANT CHANGE UP (ref 2.5–4.5)
POTASSIUM SERPL-MCNC: 3.7 MMOL/L — SIGNIFICANT CHANGE UP (ref 3.5–5.3)
POTASSIUM SERPL-SCNC: 3.7 MMOL/L — SIGNIFICANT CHANGE UP (ref 3.5–5.3)
SODIUM SERPL-SCNC: 137 MMOL/L — SIGNIFICANT CHANGE UP (ref 135–145)

## 2022-10-04 PROCEDURE — 93970 EXTREMITY STUDY: CPT | Mod: 26

## 2022-10-04 RX ORDER — MUPIROCIN 20 MG/G
1 OINTMENT TOPICAL
Qty: 1 | Refills: 0
Start: 2022-10-04 | End: 2022-10-08

## 2022-10-04 RX ADMIN — Medication 1 APPLICATION(S): at 05:07

## 2022-10-04 RX ADMIN — Medication 180 MILLIGRAM(S): at 11:10

## 2022-10-04 RX ADMIN — MUPIROCIN 1 APPLICATION(S): 20 OINTMENT TOPICAL at 05:07

## 2022-10-04 NOTE — PROVIDER CONTACT NOTE (OTHER) - BACKGROUND
COVID +
+ Covid 19
COVID +
Patient came in with diarrhea and vomiting
COVID +
Pt came in with diarrhea and vomiting

## 2022-10-04 NOTE — CHART NOTE - NSCHARTNOTEFT_GEN_A_CORE
Request from Dr. Nguyen to facilitate patient discharge.  B/L upper extremity dopplers show no evidence of deep venous thrombosis in either upper extremity; there is a thrombosed superficial tributary of the left basilic vein at the antecubital fossa; the complex vascular structure at the bifurcation of the left carotid artery is not identified on this examination - discussed these results with Dr. Nguyen - no intervention needed.  Medication reconciliation reviewed, revised, and resolved with Dr. Nguyen, who has medically cleared patient for discharge with follow up as advised.  Please refer to discharge note for detailed hospital course.
MEDICINE PA Note    DORIS MCBRIDE    Notified by RN, patient states she is having an arrythmia. Pt seen and examined at bedside, alert and oriented, in no acute distress. Pt states she feels like her heart is skipping beats, which she experiences frequently. Pt states when this occurs her electrolytes are "off". Pt denies dizziness, SOB, and chest pain. EKG performed, which shows normal sinus rhythm. Upon physical exam, RRR auscultated. BMP. Mg, and Phos sent STAT, all WNL. Will continue to monitor patient. Will endorse to AM team.     ICU Vital Signs Last 24 Hrs  T(C): 36.5 (04 Oct 2022 01:43), Max: 36.9 (03 Oct 2022 19:40)  T(F): 97.7 (04 Oct 2022 01:43), Max: 98.5 (03 Oct 2022 19:40)  HR: 69 (04 Oct 2022 01:43) (69 - 92)  BP: 130/90 (04 Oct 2022 01:43) (117/80 - 134/89)  BP(mean): --  ABP: --  ABP(mean): --  RR: 18 (04 Oct 2022 01:43) (18 - 19)  SpO2: 100% (04 Oct 2022 01:43) (99% - 100%)    O2 Parameters below as of 04 Oct 2022 01:43  Patient On (Oxygen Delivery Method): room air      10-04    137  |  103  |  6<L>  ----------------------------<  91  3.7   |  23  |  0.53    Ca    9.0      04 Oct 2022 00:17  Phos  3.6     10-04  Mg     2.1     10-04    TPro  6.3  /  Alb  4.1  /  TBili  0.2  /  DBili  <0.1  /  AST  30  /  ALT  20  /  AlkPhos  70  10-03    Shwetha Lockhart PA-C  Department of Medicine   Spectra #[id number]
Medicine Progress Note    Patient is a 46y old  Female who is admitted for covid treatment, completed Remdesivir c/o numbness of cheeks which has since resolved. She is c/o left groin pain and pain at prior iv site of right lower arm.  Pt reports feeling anxious which has improved since given medication for ZAK.        MEDICATIONS  (STANDING):  chlorhexidine 2% Cloths 1 Application(s) Topical daily  dicyclomine 10 milliGRAM(s) Oral three times a day before meals  diltiazem    milliGRAM(s) Oral daily  ferrous    sulfate 325 milliGRAM(s) Oral daily  hydrocortisone 1% Cream 1 Application(s) Topical two times a day  lactated ringers. 1000 milliLiter(s) (60 mL/Hr) IV Continuous <Continuous>  montelukast 10 milliGRAM(s) Oral at bedtime  mupirocin 2% Ointment 1 Application(s) Topical two times a day    MEDICATIONS  (PRN):  calamine/zinc oxide Lotion 1 Application(s) Topical three times a day PRN Rash and/or Itching  LORazepam     Tablet 0.5 milliGRAM(s) Oral every 3 hours PRN Anxiety    CAPILLARY BLOOD GLUCOSE  I&O's Summary    02 Oct 2022 07:01  -  03 Oct 2022 07:00  --------------------------------------------------------  IN: 2090 mL / OUT: 4 mL / NET: 2086 mL    03 Oct 2022 07:01  -  03 Oct 2022 20:27  --------------------------------------------------------  IN: 1310 mL / OUT: 0 mL / NET: 1310 mL      PHYSICAL EXAM:  Vital Signs Last 24 Hrs  T(C): 36.9 (03 Oct 2022 19:40), Max: 36.9 (03 Oct 2022 19:40)  T(F): 98.5 (03 Oct 2022 19:40), Max: 98.5 (03 Oct 2022 19:40)  HR: 74 (03 Oct 2022 19:40) (72 - 92)  BP: 130/94 (03 Oct 2022 19:40) (117/80 - 134/89)  BP(mean): --  RR: 19 (03 Oct 2022 19:40) (18 - 19)  SpO2: 99% (03 Oct 2022 19:40) (99% - 100%)    Parameters below as of 03 Oct 2022 19:40  Patient On (Oxygen Delivery Method): room air      CONSTITUTIONAL: NAD, in NAD  ENMT: Moist oral mucosa, no pharyngeal injection or exudates; normal dentition  ABDOMEN: Nontender to palpation,  no rebound/guarding; bilateral groin no mass, no erythema, nontender  PSYCH: mildly anxious   NEUROLOGY: CN 2-12 are intact and symmetric; no gross sensory deficits   SKIN: No rashes; no palpable lesions, bilateral arms, no swelling, no redness, FROM    LABS:    10-03    137  |  102  |  7   ----------------------------<  92  3.8   |  24  |  0.60    Ca    8.7      03 Oct 2022 08:34  Phos  3.8     10-03  Mg     1.9     10-03    TPro  6.3  /  Alb  4.1  /  TBili  0.2  /  DBili  <0.1  /  AST  30  /  ALT  20  /  AlkPhos  70  10-03    PT/INR - ( 03 Oct 2022 08:34 )   PT: 12.6 sec;   INR: 1.09 ratio               Urinalysis Basic - ( 02 Oct 2022 11:20 )    Color: LIGHT BROWN / Appearance: Slightly Turbid / S.006 / pH: x  Gluc: x / Ketone: Negative  / Bili: Negative / Urobili: Negative   Blood: x / Protein: Trace / Nitrite: Negative   Leuk Esterase: Negative / RBC: 1091 /hpf / WBC 3 /HPF   Sq Epi: x / Non Sq Epi: 0 /hpf / Bacteria: Negative        SARS-CoV-2: Detected (30 Sep 2022 07:57)  SARS-CoV-2: NotDetec (20 Sep 2022 10:30)      RADIOLOGY & ADDITIONAL TESTS: pt reports she did not refuse BUE doppler- I will reorder    A/P a 46y old  Female who is admitted for covid treatment, completed Remdesivir c/o numbness of cheeks which has since resolved. She is c/o left groin pain and pain at prior iv site of right lower arm.  Pt reports feeling anxious which has improved since given medication for ZAK. VSS, PE non focal. No electrolyte abnormality noted. will reoder BUE dopplers. No further testing warranted at this time.           COORDINATION OF CARE:  Care Discussed with Consultants/Other Providers: Dr Nguyen
vascular called that patient refused BUE doppler- test cancelled

## 2022-10-04 NOTE — PATIENT PROFILE ADULT. - ALCOHOL USE HISTORY SINGLE SELECT
- Patient came from [facility] home  - Disposition is likely back to [facility] pending clinical course yes...

## 2022-10-04 NOTE — PROGRESS NOTE ADULT - PROVIDER SPECIALTY LIST ADULT
Infectious Disease
Internal Medicine
Infectious Disease
Internal Medicine
Internal Medicine
Nephrology
Nephrology
Internal Medicine

## 2022-10-04 NOTE — PROVIDER CONTACT NOTE (OTHER) - ACTION/TREATMENT ORDERED:
No action was needed at this time
EKG, Labs, VS
PA came to bedside; hydrocortisone and calamine was ordered
b/l UE duplex ordered & will continue to monitor vs. LR @ 60
BUE reordered for AM. 24hr discharge notice still in place. Will continue to monitor.
Pending

## 2022-10-04 NOTE — PROVIDER CONTACT NOTE (OTHER) - SITUATION
pt c/o arrythmia and feeling palpitations
pt had multiple requests and concerns about her condition. displaying attention seeking behavior
Patient has a rash on the right side of breast
Pt complain of pain in RLQ abd
pt c/o new onset numbness & tingling in b/l UE after completion of remdesivir
Patient complaining of numbness to the face and dizziness

## 2022-10-04 NOTE — DISCHARGE NOTE NURSING/CASE MANAGEMENT/SOCIAL WORK - NSDCVIVACCINE_GEN_ALL_CORE_FT
Tdap; 24-Aug-2022 15:35; Erna Cortés (RN); [employer]; [id number] (Exp. Date: 18-Jan-2024); IntraMuscular; Deltoid Right.; 0.5 milliLiter(s); VIS (VIS Published: 09-May-2013, VIS Presented: 24-Aug-2022);

## 2022-10-04 NOTE — DISCHARGE NOTE NURSING/CASE MANAGEMENT/SOCIAL WORK - NSPROMEDSRETURNEDYESNO_GEN_A_NUR
montelukast(1)Dificid(1)DiltiazemHydrochloride(1)SlowFe(1.5 boxes)[id number](1)PeptoBismo(1)Tylenol(1)Paxlovid(1)colace(empty bottle);ativan picked up from pharmacy

## 2022-10-04 NOTE — DISCHARGE NOTE NURSING/CASE MANAGEMENT/SOCIAL WORK - NSDCPEFALRISK_GEN_ALL_CORE
For information on Fall & Injury Prevention, visit: [email or web address] OR  [email or web address] OR  https://www.cdc.gov/steadi/patient.html

## 2022-10-04 NOTE — PROVIDER CONTACT NOTE (OTHER) - RECOMMENDATIONS
MD assess at bedside. Pt requesting b/l UE duplex to r/o DVT as she has a hx. Pt also request LR rate be changed to 60ml/hr
PA made aware
NP made aware
Pending
Provider assess at bedside & discuss with pt at length & reviewed EKG with pt. EKG NSR. CMP ordered & WNL. VSS. LR @ 60 continues. Tolerating regular diet. Will continue to monitor.
Provider at bedside & discussed plan of care with pt at length.

## 2022-10-04 NOTE — PROVIDER CONTACT NOTE (OTHER) - ASSESSMENT
VS stable
pt states she has hx of arrythmia but couldn't recall which one she was diagnosed with, very anxious and upset. Pt started complaining of new onset "arrythmias and palpitations" VSS as follows 98.5 HR 75 pulse regular /89 RR 20 O2 99% on room air.  Pt has concerns her electrolyte levels are off or she is having a side effect to the remdesivir
pt c/o new onset numbness & tingling in b/l UE after completion of remdesivir, pulses +, strong , no drift against gravity. RUE + edema & swelling VSS, except BP soft (99/66), c/o a little dizziness
No s/s of distress; VSS
No s/s of distress; VSS
pt states she has "neurological symptoms and unable to move her legs" Pt neuro check WNL and able to stand up with 1 assist and walker to perform orthostatics and use bathroom. VS WNL except BP elevated. Pt severely anxious and with repeated requests. Pt also wants to know why BUE dopplers were cancelled as she believes she has DVTs from her IVs. Pt also c.o L leg pain and diarrhea. Pt educated on COVID symptoms and management but believes her symptoms are caused by something else that is "severely wrong" despite labs and vitals and assessment being WNL

## 2022-10-04 NOTE — PROGRESS NOTE ADULT - SUBJECTIVE AND OBJECTIVE BOX
Patient is a 46y old  Female who presents with a chief complaint of diarrhea, poor PO intake (30 Sep 2022 12:38)      SUBJECTIVE / OVERNIGHT EVENTS: b/l UE dopplers: NO DVT. ptn was given dc notice 10/3. will go home today    MEDICATIONS  (STANDING):  chlorhexidine 2% Cloths 1 Application(s) Topical daily  dicyclomine 10 milliGRAM(s) Oral three times a day before meals  diltiazem    milliGRAM(s) Oral daily  ferrous    sulfate 325 milliGRAM(s) Oral daily  hydrocortisone 1% Cream 1 Application(s) Topical two times a day  lactated ringers. 1000 milliLiter(s) (60 mL/Hr) IV Continuous <Continuous>  montelukast 10 milliGRAM(s) Oral at bedtime  mupirocin 2% Ointment 1 Application(s) Topical two times a day    MEDICATIONS  (PRN):  calamine/zinc oxide Lotion 1 Application(s) Topical three times a day PRN Rash and/or Itching  LORazepam     Tablet 0.5 milliGRAM(s) Oral every 3 hours PRN Anxiety      Vital Signs Last 24 Hrs  T(F): 97.4 (10-04-22 @ 13:18), Max: 98.5 (10-03-22 @ 19:40)  HR: 96 (10-04-22 @ 13:18) (69 - 96)  BP: 143/90 (10-04-22 @ 13:18) (123/83 - 143/90)  RR: 18 (10-04-22 @ 13:18) (18 - 20)  SpO2: 99% (10-04-22 @ 13:18) (98% - 100%)  Telemetry:   CAPILLARY BLOOD GLUCOSE        I&O's Summary    03 Oct 2022 07:01  -  04 Oct 2022 07:00  --------------------------------------------------------  IN: 2630 mL / OUT: 600 mL / NET: 2030 mL    04 Oct 2022 07:01  -  04 Oct 2022 19:12  --------------------------------------------------------  IN: 720 mL / OUT: 0 mL / NET: 720 mL        PHYSICAL EXAM:  GENERAL: NAD, well-developed  HEAD:  Atraumatic, Normocephalic  EYES: EOMI, PERRLA, conjunctiva and sclera clear  NECK: Supple, No JVD  CHEST/LUNG: Clear to auscultation bilaterally; No wheeze  HEART: Regular rate and rhythm; No murmurs, rubs, or gallops  ABDOMEN: Soft, Nontender, Nondistended; Bowel sounds present  EXTREMITIES:  2+ Peripheral Pulses, No clubbing, cyanosis, or edema  PSYCH: AAOx3  NEUROLOGY: non-focal  SKIN: No rashes or lesions    LABS:    10-04    137  |  103  |  6<L>  ----------------------------<  91  3.7   |  23  |  0.53    Ca    9.0      04 Oct 2022 00:17  Phos  3.6     10-04  Mg     2.1     10-04    TPro  6.3  /  Alb  4.1  /  TBili  0.2  /  DBili  <0.1  /  AST  30  /  ALT  20  /  AlkPhos  70  10-03    PT/INR - ( 03 Oct 2022 08:34 )   PT: 12.6 sec;   INR: 1.09 ratio                   RADIOLOGY & ADDITIONAL TESTS:    Imaging Personally Reviewed:    Consultant(s) Notes Reviewed:      Care Discussed with Consultants/Other Providers:

## 2022-10-04 NOTE — DISCHARGE NOTE NURSING/CASE MANAGEMENT/SOCIAL WORK - PATIENT PORTAL LINK FT
You can access the FollowMyHealth Patient Portal offered by [facility] by registering at the following website: [email or web address]. By joining [employer]’s FollowMyHealth portal, you will also be able to view your health information using other applications (apps) compatible with our system.

## 2022-10-05 LAB
CULTURE RESULTS: SIGNIFICANT CHANGE UP
CULTURE RESULTS: SIGNIFICANT CHANGE UP
SPECIMEN SOURCE: SIGNIFICANT CHANGE UP
SPECIMEN SOURCE: SIGNIFICANT CHANGE UP

## 2022-10-05 NOTE — DISCHARGE NOTE ADULT - CONDITION (STATED IN TERMS THAT PERMIT A SPECIFIC MEASURABLE COMPARISON WITH CONDITION ON ADMISSION):
Pt stable for discharge. [facility] Pregnancy And Lactation Text: There is insufficient data to evaluate drug-associated risk for major birth defects, miscarriage, or other adverse maternal or fetal outcomes.  There is a pregnancy registry that monitors pregnancy outcomes in pregnant persons exposed to the medication during pregnancy.  It is unknown if this medication is excreted in breast milk.  Do not breastfeed during treatment and for about 4 weeks after the last dose.

## 2022-10-07 NOTE — ED ADULT NURSE NOTE - PAIN: PRESENCE, MLM
complains of pain/discomfort
Date Of Surgery - Today Or Tomorrow?: today
Initial Decision For Surgery?: Yes
Risk Assessment Explanation (Does Not Render In The Note): Clinical determination of the probability and/or consequences of an event, such as surgery. Clinical assessment of the level of risk is affected by the nature of the event under consideration for the patient. Modifier 57 is used to indicate an Evaluation and Management (E/M) service resulted in the initial decision to perform surgery either the day before a major surgery (90 day global) or the day of a major surgery.
Discussion: The nature of the lesion(s) was discussed.  Questions were answered.  I recommend surgery as appropriate treatment due to the site, size and histologic type of the tumor.  Surgery will mitigate risk of poor outcomes due to recurrence.  The procedure, morbidity (bruising, swelling, wound care, bandaging), activity restrictions, time off work and complications (bleeding, infection, scarring) were reviewed.  The potential appearance of the scar was discussed.  Questions were answered.  Their procedure was performed today, see dictation

## 2022-10-12 NOTE — PATIENT PROFILE ADULT. - NSSUBSTANCEUSE_GEN_ALL_CORE_SD
Anesthesia Evaluation     NPO Solid Status: > 8 hours             Airway   Mallampati: II  Dental      Pulmonary    (-) sleep apnea, not a smoker    ROS comment: Negative patient screen for JASMYNE    Cardiovascular     (+) hypertension, CAD, hyperlipidemia,       Neuro/Psych  GI/Hepatic/Renal/Endo    (+)  GERD,  liver disease, thyroid problem     Musculoskeletal     Abdominal    Substance History      OB/GYN          Other   arthritis,                      Anesthesia Plan    ASA 3     general     (Block placed for postoperative pain control per surgeon request.  )    Anesthetic plan, risks, benefits, and alternatives have been provided, discussed and informed consent has been obtained with: patient.        CODE STATUS:        never used

## 2022-10-13 NOTE — ED PROVIDER NOTE - RECEIVING PHYSICIAN
10/13/22    second & Final attempt to contact patient  no answer  Couldn't leave message because PT contact # did not give the option to leave message, it just stated "not receiving any call / try your call later"       If Pt contacts office, please assist pt with scheduling a NP Appt/      Note: letter has been sent  to contact Office and Schedule a NP Appt  DR Ding

## 2022-10-20 PROCEDURE — 87040 BLOOD CULTURE FOR BACTERIA: CPT

## 2022-10-20 PROCEDURE — 70450 CT HEAD/BRAIN W/O DYE: CPT | Mod: MA

## 2022-10-20 PROCEDURE — 80048 BASIC METABOLIC PNL TOTAL CA: CPT

## 2022-10-20 PROCEDURE — 84156 ASSAY OF PROTEIN URINE: CPT

## 2022-10-20 PROCEDURE — 80076 HEPATIC FUNCTION PANEL: CPT

## 2022-10-20 PROCEDURE — 84100 ASSAY OF PHOSPHORUS: CPT

## 2022-10-20 PROCEDURE — 83935 ASSAY OF URINE OSMOLALITY: CPT

## 2022-10-20 PROCEDURE — 84540 ASSAY OF URINE/UREA-N: CPT

## 2022-10-20 PROCEDURE — 85610 PROTHROMBIN TIME: CPT

## 2022-10-20 PROCEDURE — 96361 HYDRATE IV INFUSION ADD-ON: CPT

## 2022-10-20 PROCEDURE — 82565 ASSAY OF CREATININE: CPT

## 2022-10-20 PROCEDURE — 0225U NFCT DS DNA&RNA 21 SARSCOV2: CPT

## 2022-10-20 PROCEDURE — 83735 ASSAY OF MAGNESIUM: CPT

## 2022-10-20 PROCEDURE — 85379 FIBRIN DEGRADATION QUANT: CPT

## 2022-10-20 PROCEDURE — 93970 EXTREMITY STUDY: CPT

## 2022-10-20 PROCEDURE — 82570 ASSAY OF URINE CREATININE: CPT

## 2022-10-20 PROCEDURE — 84133 ASSAY OF URINE POTASSIUM: CPT

## 2022-10-20 PROCEDURE — 85025 COMPLETE CBC W/AUTO DIFF WBC: CPT

## 2022-10-20 PROCEDURE — 81001 URINALYSIS AUTO W/SCOPE: CPT

## 2022-10-20 PROCEDURE — 85027 COMPLETE CBC AUTOMATED: CPT

## 2022-10-20 PROCEDURE — 86140 C-REACTIVE PROTEIN: CPT

## 2022-10-20 PROCEDURE — 80053 COMPREHEN METABOLIC PANEL: CPT

## 2022-10-20 PROCEDURE — 99285 EMERGENCY DEPT VISIT HI MDM: CPT

## 2022-10-20 PROCEDURE — 36415 COLL VENOUS BLD VENIPUNCTURE: CPT

## 2022-10-20 PROCEDURE — 96374 THER/PROPH/DIAG INJ IV PUSH: CPT

## 2022-10-20 PROCEDURE — 87507 IADNA-DNA/RNA PROBE TQ 12-25: CPT

## 2022-10-20 PROCEDURE — 93005 ELECTROCARDIOGRAM TRACING: CPT

## 2022-10-20 PROCEDURE — 96375 TX/PRO/DX INJ NEW DRUG ADDON: CPT

## 2022-10-20 PROCEDURE — 84300 ASSAY OF URINE SODIUM: CPT

## 2022-10-26 ENCOUNTER — EMERGENCY (EMERGENCY)
Facility: HOSPITAL | Age: 47
LOS: 1 days | Discharge: HOME | End: 2022-10-26
Attending: EMERGENCY MEDICINE
Payer: COMMERCIAL

## 2022-10-26 VITALS
DIASTOLIC BLOOD PRESSURE: 80 MMHG | TEMPERATURE: 100 F | WEIGHT: 87.96 LBS | OXYGEN SATURATION: 96 % | HEART RATE: 99 BPM | SYSTOLIC BLOOD PRESSURE: 111 MMHG | RESPIRATION RATE: 18 BRPM | HEIGHT: 72 IN

## 2022-10-26 VITALS
DIASTOLIC BLOOD PRESSURE: 90 MMHG | HEART RATE: 85 BPM | OXYGEN SATURATION: 100 % | SYSTOLIC BLOOD PRESSURE: 129 MMHG | RESPIRATION RATE: 18 BRPM

## 2022-10-26 DIAGNOSIS — Z90.49 ACQUIRED ABSENCE OF OTHER SPECIFIED PARTS OF DIGESTIVE TRACT: Chronic | ICD-10-CM

## 2022-10-26 LAB
ALBUMIN SERPL ELPH-MCNC: 4.2 G/DL — SIGNIFICANT CHANGE UP (ref 3.3–5)
ALP SERPL-CCNC: 93 U/L — SIGNIFICANT CHANGE UP (ref 40–120)
ALT FLD-CCNC: 11 U/L — SIGNIFICANT CHANGE UP (ref 10–45)
ANION GAP SERPL CALC-SCNC: 11 MMOL/L — SIGNIFICANT CHANGE UP (ref 5–17)
AST SERPL-CCNC: 19 U/L — SIGNIFICANT CHANGE UP (ref 10–40)
BASE EXCESS BLDV CALC-SCNC: -0.2 MMOL/L — SIGNIFICANT CHANGE UP (ref -2–3)
BASOPHILS # BLD AUTO: 0.01 K/UL — SIGNIFICANT CHANGE UP (ref 0–0.2)
BASOPHILS NFR BLD AUTO: 0.2 % — SIGNIFICANT CHANGE UP (ref 0–2)
BILIRUB SERPL-MCNC: 0.4 MG/DL — SIGNIFICANT CHANGE UP (ref 0.2–1.2)
BUN SERPL-MCNC: 5 MG/DL — LOW (ref 7–23)
CA-I SERPL-SCNC: 1.17 MMOL/L — SIGNIFICANT CHANGE UP (ref 1.15–1.33)
CALCIUM SERPL-MCNC: 9.1 MG/DL — SIGNIFICANT CHANGE UP (ref 8.4–10.5)
CHLORIDE BLDV-SCNC: 104 MMOL/L — SIGNIFICANT CHANGE UP (ref 96–108)
CHLORIDE SERPL-SCNC: 105 MMOL/L — SIGNIFICANT CHANGE UP (ref 96–108)
CO2 BLDV-SCNC: 26 MMOL/L — SIGNIFICANT CHANGE UP (ref 22–26)
CO2 SERPL-SCNC: 22 MMOL/L — SIGNIFICANT CHANGE UP (ref 22–31)
CREAT SERPL-MCNC: 0.76 MG/DL — SIGNIFICANT CHANGE UP (ref 0.5–1.3)
EGFR: 98 ML/MIN/1.73M2 — SIGNIFICANT CHANGE UP
EOSINOPHIL # BLD AUTO: 0.01 K/UL — SIGNIFICANT CHANGE UP (ref 0–0.5)
EOSINOPHIL NFR BLD AUTO: 0.2 % — SIGNIFICANT CHANGE UP (ref 0–6)
FLUAV AG NPH QL: SIGNIFICANT CHANGE UP
FLUBV AG NPH QL: SIGNIFICANT CHANGE UP
GAS PNL BLDV: 136 MMOL/L — SIGNIFICANT CHANGE UP (ref 136–145)
GAS PNL BLDV: SIGNIFICANT CHANGE UP
GAS PNL BLDV: SIGNIFICANT CHANGE UP
GLUCOSE BLDV-MCNC: 86 MG/DL — SIGNIFICANT CHANGE UP (ref 70–99)
GLUCOSE SERPL-MCNC: 95 MG/DL — SIGNIFICANT CHANGE UP (ref 70–99)
HCG SERPL-ACNC: <2 MIU/ML — SIGNIFICANT CHANGE UP
HCO3 BLDV-SCNC: 25 MMOL/L — SIGNIFICANT CHANGE UP (ref 22–29)
HCT VFR BLD CALC: 36.7 % — SIGNIFICANT CHANGE UP (ref 34.5–45)
HCT VFR BLDA CALC: 38 % — SIGNIFICANT CHANGE UP (ref 34.5–46.5)
HGB BLD CALC-MCNC: 12.8 G/DL — SIGNIFICANT CHANGE UP (ref 11.7–16.1)
HGB BLD-MCNC: 12.2 G/DL — SIGNIFICANT CHANGE UP (ref 11.5–15.5)
IMM GRANULOCYTES NFR BLD AUTO: 0.4 % — SIGNIFICANT CHANGE UP (ref 0–0.9)
LACTATE BLDV-MCNC: 0.6 MMOL/L — SIGNIFICANT CHANGE UP (ref 0.5–2)
LYMPHOCYTES # BLD AUTO: 1 K/UL — SIGNIFICANT CHANGE UP (ref 1–3.3)
LYMPHOCYTES # BLD AUTO: 17.8 % — SIGNIFICANT CHANGE UP (ref 13–44)
MAGNESIUM SERPL-MCNC: 2.1 MG/DL — SIGNIFICANT CHANGE UP (ref 1.6–2.6)
MCHC RBC-ENTMCNC: 30 PG — SIGNIFICANT CHANGE UP (ref 27–34)
MCHC RBC-ENTMCNC: 33.2 GM/DL — SIGNIFICANT CHANGE UP (ref 32–36)
MCV RBC AUTO: 90.4 FL — SIGNIFICANT CHANGE UP (ref 80–100)
MONOCYTES # BLD AUTO: 0.67 K/UL — SIGNIFICANT CHANGE UP (ref 0–0.9)
MONOCYTES NFR BLD AUTO: 11.9 % — SIGNIFICANT CHANGE UP (ref 2–14)
NEUTROPHILS # BLD AUTO: 3.91 K/UL — SIGNIFICANT CHANGE UP (ref 1.8–7.4)
NEUTROPHILS NFR BLD AUTO: 69.5 % — SIGNIFICANT CHANGE UP (ref 43–77)
NRBC # BLD: 0 /100 WBCS — SIGNIFICANT CHANGE UP (ref 0–0)
NT-PROBNP SERPL-SCNC: 40 PG/ML — SIGNIFICANT CHANGE UP (ref 0–300)
PCO2 BLDV: 41 MMHG — SIGNIFICANT CHANGE UP (ref 39–42)
PH BLDV: 7.39 — SIGNIFICANT CHANGE UP (ref 7.32–7.43)
PLATELET # BLD AUTO: 71 K/UL — LOW (ref 150–400)
PO2 BLDV: 65 MMHG — HIGH (ref 25–45)
POTASSIUM BLDV-SCNC: 3.8 MMOL/L — SIGNIFICANT CHANGE UP (ref 3.5–5.1)
POTASSIUM SERPL-MCNC: 4 MMOL/L — SIGNIFICANT CHANGE UP (ref 3.5–5.3)
POTASSIUM SERPL-SCNC: 4 MMOL/L — SIGNIFICANT CHANGE UP (ref 3.5–5.3)
PROT SERPL-MCNC: 6.9 G/DL — SIGNIFICANT CHANGE UP (ref 6–8.3)
RBC # BLD: 4.06 M/UL — SIGNIFICANT CHANGE UP (ref 3.8–5.2)
RBC # FLD: 13.7 % — SIGNIFICANT CHANGE UP (ref 10.3–14.5)
RSV RNA NPH QL NAA+NON-PROBE: SIGNIFICANT CHANGE UP
SAO2 % BLDV: 92.8 % — HIGH (ref 67–88)
SARS-COV-2 RNA SPEC QL NAA+PROBE: DETECTED
SODIUM SERPL-SCNC: 138 MMOL/L — SIGNIFICANT CHANGE UP (ref 135–145)
TROPONIN T, HIGH SENSITIVITY RESULT: <6 NG/L — SIGNIFICANT CHANGE UP (ref 0–51)
WBC # BLD: 5.62 K/UL — SIGNIFICANT CHANGE UP (ref 3.8–10.5)
WBC # FLD AUTO: 5.62 K/UL — SIGNIFICANT CHANGE UP (ref 3.8–10.5)

## 2022-10-26 PROCEDURE — 93010 ELECTROCARDIOGRAM REPORT: CPT

## 2022-10-26 PROCEDURE — 85025 COMPLETE CBC W/AUTO DIFF WBC: CPT

## 2022-10-26 PROCEDURE — 82435 ASSAY OF BLOOD CHLORIDE: CPT

## 2022-10-26 PROCEDURE — 71045 X-RAY EXAM CHEST 1 VIEW: CPT | Mod: 26

## 2022-10-26 PROCEDURE — 82330 ASSAY OF CALCIUM: CPT

## 2022-10-26 PROCEDURE — 99285 EMERGENCY DEPT VISIT HI MDM: CPT

## 2022-10-26 PROCEDURE — 84132 ASSAY OF SERUM POTASSIUM: CPT

## 2022-10-26 PROCEDURE — 78580 LUNG PERFUSION IMAGING: CPT | Mod: 26,MA

## 2022-10-26 PROCEDURE — 84702 CHORIONIC GONADOTROPIN TEST: CPT

## 2022-10-26 PROCEDURE — 93005 ELECTROCARDIOGRAM TRACING: CPT

## 2022-10-26 PROCEDURE — 85018 HEMOGLOBIN: CPT

## 2022-10-26 PROCEDURE — 82565 ASSAY OF CREATININE: CPT

## 2022-10-26 PROCEDURE — 93970 EXTREMITY STUDY: CPT | Mod: 26

## 2022-10-26 PROCEDURE — 78580 LUNG PERFUSION IMAGING: CPT | Mod: MA

## 2022-10-26 PROCEDURE — 80053 COMPREHEN METABOLIC PANEL: CPT

## 2022-10-26 PROCEDURE — 99285 EMERGENCY DEPT VISIT HI MDM: CPT | Mod: 25

## 2022-10-26 PROCEDURE — 84484 ASSAY OF TROPONIN QUANT: CPT

## 2022-10-26 PROCEDURE — 83880 ASSAY OF NATRIURETIC PEPTIDE: CPT

## 2022-10-26 PROCEDURE — 82947 ASSAY GLUCOSE BLOOD QUANT: CPT

## 2022-10-26 PROCEDURE — 87637 SARSCOV2&INF A&B&RSV AMP PRB: CPT

## 2022-10-26 PROCEDURE — 84295 ASSAY OF SERUM SODIUM: CPT

## 2022-10-26 PROCEDURE — 82803 BLOOD GASES ANY COMBINATION: CPT

## 2022-10-26 PROCEDURE — 36415 COLL VENOUS BLD VENIPUNCTURE: CPT

## 2022-10-26 PROCEDURE — 71045 X-RAY EXAM CHEST 1 VIEW: CPT

## 2022-10-26 PROCEDURE — 83605 ASSAY OF LACTIC ACID: CPT

## 2022-10-26 PROCEDURE — 85014 HEMATOCRIT: CPT

## 2022-10-26 PROCEDURE — 83735 ASSAY OF MAGNESIUM: CPT

## 2022-10-26 PROCEDURE — 93970 EXTREMITY STUDY: CPT

## 2022-10-26 PROCEDURE — A9540: CPT

## 2022-10-26 NOTE — ED PROVIDER NOTE - PROGRESS NOTE DETAILS
Attending MD Graff: Call placed to Dr. Wong [phone], reports was recently seen in office, no DVTs at that time, reports has had episodes of superficial phlebitis in past, no DVTs in UEs.  Reports there had been some question of something in jugular vein but no thrombus, nothing occlusive, possibly ?fibrous stranding. Attending MD Graff: Labs, US and VQ reviewed, nonactionable.  US showing R cephalic vein, a superficial vein, thrombosed in distal upper arm.  Patient stable for discharge. Follow up instructions given, importance of follow up emphasized, return to ED parameters reviewed and patient verbalized understanding.  All questions answered, all concerns addressed.

## 2022-10-26 NOTE — ED PROVIDER NOTE - PHYSICAL EXAMINATION
Gen: AAO x 3, NAD, super anxious wearing multiple masks.   Skin: mulitple areas of bruising to upper R ext and L lower ext   HEENT: NC/AT, PERRLA, EOMI, MMM  Resp: unlabored CTAB  Cardiac: rrr s1s2, no murmurs, rubs or gallops  GI: ND, +BS, Soft, NT  Ext: no pedal edema, FROM in all extremities  Neuro: no focal deficits

## 2022-10-26 NOTE — ED PROVIDER NOTE - PATIENT PORTAL LINK FT
You can access the FollowMyHealth Patient Portal offered by [facility] by registering at the following website: [email or web address]. By joining [employer]’s FollowMyHealth portal, you will also be able to view your health information using other applications (apps) compatible with our system.

## 2022-10-26 NOTE — ED PROVIDER NOTE - NS ED ROS FT
Constitutional: No fever or chills  Eyes: No visual changes, eye pain or redness  HEENT: No throat pain, ear pain, nasal pain. No nose bleeding.  CV: +chest pain -lower extremity edema  Resp: +SOB +cough  GI: No abd pain. No nausea or vomiting. No diarrhea. No constipation.   : No dysuria, hematuria.   MSK: +musculoskeletal pain  Skin: +rash  Neuro: No headache. No numbness or tingling. No weakness.

## 2022-10-26 NOTE — ED PROVIDER NOTE - OBJECTIVE STATEMENT
46F with PMhx ITP, POTS, dysautonomia, protein S insuffiency, +MARY BETH, chronic diarrhea, chronic head and UE tremors here with multiple complaints. pt reports 2 weeks of "little bumps" in her veins and seen by PMD and concern for vasculitis and last week with random bruising to upper and lower ext. Reports last night with acute chest pain with sob and coughing fit. pt concerned about PE. Reports all her conditions worse since dx with covid last month.

## 2022-10-26 NOTE — ED ADULT NURSE NOTE - OBJECTIVE STATEMENT
pt is here for chest pain.  pt stated that chest pain, h/x DVT, c/o shortness of breath, observed pt wearing 6-7 masks and c/o shortness of breath, oxygen 100% RA, observed restless and anxious, didn't mention any chest pain or pain radiate to anywhere,

## 2022-10-26 NOTE — ED PROVIDER NOTE - ATTENDING APP SHARED VISIT CONTRIBUTION OF CARE
Attending MD Graff:   I personally have seen and examined this patient.  Physician assistant note reviewed and agree on plan of care and except where noted.  See below for details.     Seen in [place] 62  Cards Dr. Parker  Neuro  GI    46F with PMH/PSH including ITP, DVT in L IJ, POTS, dysautonomia, protein S insuffiency, +MARY BETH, ovarian cysts, cervical hyperplasia s/p IUD, IBS with chronic diarrhea, chronic head and UE tremors, recent admissions (failure to thrive 9/20/22-9/25/22, metabolic derangements in setting of Covid on Paxlovid 9/30/22-10/4/22) presents to the ED with     TO BE COMPLETED Attending MD Graff:   I personally have seen and examined this patient.  Physician assistant note reviewed and agree on plan of care and except where noted.  See below for details.     Seen in [place] 62  Cards Dr. Parker  Vascular: Carrington Wong MD   GI Dr. Boles (reports scheduled to see new GI 11/9/22)    46F with PMH/PSH including ITP, DVT in IJ, POTS, dysautonomia, protein S insuffiency, +MARY BETH, ovarian cysts, cervical hyperplasia s/p IUD, IBS with chronic diarrhea, chronic head and UE tremors, recent admissions (failure to thrive 9/20/22-9/25/22, metabolic derangements in setting of Covid on Paxlovid 9/30/22-10/4/22) presents to the ED with multiple complaints including bruising on extremities, "bumps" on bilateral UEs, chest pain, coughing.  Patient reports that about two weeks ago developed "bumps" on UEs, reports was told possible vasculitis by Dr. Wong.  Reports after that evaluation developed scattered bruises on extremities and neck pain.  Reports was told she had a new clot in her RIGHT IJ.  Reports last night developed L sided chest pain and coughing.  Reports had been coughing post COVID but that had improved until last night.  Reports developed severe L sided chest pain and cough which is persistent.  Reports had "severe" reaction to IV contrast with CT in past, reports hypotension, difficulty breathing.  Reports cannot take more than 12.5mg of Benadryl.  Reports that she is not currently on AC because of menorrhagia with ruptured cysts causing hemoperitoneum.  Reports chronic abdominal pain, chronic diarrhea.  Reports took home medications to control her tachycardia prior to coming to Emergency Department.  Reports FHx of cardiac disease and PEs.    Exam:   General: NAD, HR 80s-90s, Sat high 90s on RA with numerous surgical masks on top of cloth masks, RR 9-15 while speaking  HENT: head NCAT, airway patent  Eyes: anicteric, no conjunctival injection   Lungs: lungs CTAB with good inspiratory effort, no wheezing, no rhonchi, no rales, no increased work of breathing, freely speaking in full sentences  Cardiac: +S1S2, no obvious m/r/g, +2 radials and DPs  GI: abdomen soft with +BS, NT, ND  : no CVAT  MSK: FROM at neck, no tenderness to midline palpation, FROM at bilateral UEs and LEs  Neuro: moving all extremities spontaneously, sensory grossly intact, no gross neuro deficits  Psych: very anxious  Skin: scattered small bruising to UEs and LEs in different stages, no petechiae noted, no erythema overlying forearm vessels where patient is indicating, no induration, palpation where patient is reporting "bumps" - no areas of induration or fluctuance palpable by this MD    A/P: 46F with chest pain, shortness of breath, history of DVT, EKG nonischemic, history and clinical picture not consistent with ACS but will check trop, will obtain VQ to eval for PE given reported ?anaphylaxis to IV contrast and nontolerance of meds in premedication protocol.  Low suspicion for DVTs in UEs but given reported outpatient findings will check doppler of UEs and neck.  Will check CXR.  Will await.  If nonactionable, can likely continue outpatient follow up.

## 2022-10-26 NOTE — ED ADULT TRIAGE NOTE - MODE OF ARRIVAL
Occupational Therapy   Date: 6/2/2022  Patient did not attend nor call to cancel  today's (6/2/2022) scheduled appointment.  This is the patient's first no show/late cancel.      Voice message was left on the patient's (home, cell) number indicating missed appointment and date of next scheduled appointment.  Attendance guidelines were reviewed.   Private Auto Walk in

## 2022-11-02 NOTE — ED ADULT NURSE NOTE - NEURO WDL
Alert and oriented to person, place and time, memory intact, behavior appropriate to situation, PERRL.
0

## 2022-11-03 NOTE — ED ADULT NURSE NOTE - NSFALLRSKUNASSIST_ED_ALL_ED
Patient comes to clinic for follow up anticoagulation visit.  Last INR on 10/18.22 was 2.2.  Dose maintained.   Today's INR is 1.8 and is below goal range.  Reports having large salad last night. Discussed being consistent with vit K intake and eliminating spinach. Current warfarin total weekly dose of 27 mg verified.  Informed the INR result is below therapeutic range and instructed to stewart extra 1/2 dose(1.5mg) tonight only then resume current dosing, per protocol. Discussed dose and return date of 11/15/22 for next INR. See Anticoagulation flowsheet.    Desi Cheung NP is in the office today supervising the treatment.    Call your physician immediately if you notice any of the following symptoms of a blood clot:   · Sudden weakness in any limb  · Numbness or tingling anywhere  · Visual changes or loss of sight in either eye  · Sudden onset of slurred speech or inability to speak  · Dizziness or faintness  · New pain, swelling, redness or heat in any extremity  · New SOB or chest pain  Symptoms associated with blood clotting/low INR reviewed and verbalizes understanding.    Instructed to contact the clinic with any unusual bleeding or bruising, any changes in medications, diet, health status, lifestyle, or any other changes, questions or concerns. Verbalized understanding of all discussed.      no

## 2022-11-27 NOTE — BEHAVIORAL HEALTH ASSESSMENT NOTE - NSBHCONSULTMEDANXIETY_PSY_A_CORE FT
No qualified resident was available to assist in this case. I have personally first assisted the Cardiothoracic Surgeon listed on this brief op note throughout the entirety of this case.    Total Bypass Time: 130 mins; Cross Clamp Time 100 mins Ativan 1mg po/iv q6hr PRN

## 2022-11-28 NOTE — ED PROVIDER NOTE - DR. NAME
Pt attended all groups  Pt was able to self express  Pt noted she was here for a medication adjustment  Pt also mentioned she uses a journal ft focus on her feeling and thoughts  Journal provided  11/28/22 1088   Activity/Group Checklist   Group Other (Comment)  (community support and resources)   Attendance Attended   Attendance Duration (min) 31-45   Interactions Interacted appropriately   Affect/Mood Blunted/flat   Goals Achieved Identified feelings; Able to listen to others; Able to engage in interactions; Able to self-disclose;Discussed coping strategies; Increased hopefulness Yomi

## 2022-12-07 NOTE — ED PROVIDER NOTE - CONDUCTED A DETAILED DISCUSSION WITH PATIENT AND/OR GUARDIAN REGARDING, MDM
radiology results/return to ED if symptoms worsen, persist or questions arise/lab results/need for outpatient follow-up no

## 2022-12-15 NOTE — ED ADULT NURSE NOTE - GENITOURINARY ASSESSMENT
PREOP INSTRUCTIONS:  No food,milk or milk products for 8 hours before surgery.  Clear liquids like water,gatorade,apple juice are allowed up until 2 hours before surgery.  Shower instructions as well as directions to the Surgery Center were given.  Encouraged to wear loose fitting,comfortable clothing.  Medication instructions for pm prior to and am of procedure reviewed.  Instructed to avoid taking vitamins,supplements,aspirin and ibuprofen the morning of surgery.    Patient denies any side effects or issues with anesthesia or sedation.     Patient does not know arrival time.Explained that this information comes from the surgeon's office and if they haven't heard from them by 2 or 3 pm to call the office.Patient stated an understanding.    
WDL

## 2022-12-15 NOTE — ED ADULT NURSE NOTE - PYSCHOSOCIAL ASSESSMENT
Spoke to Mahsa,  confirmed if she was instructed to stop the Eliquis following MRI/following head trauma,  she should stay off until cleared by the Neurosurgeon she is scheduled to see.  Does not appear to have documented AF  since her summer ablation    WDL

## 2022-12-19 ENCOUNTER — OUTPATIENT (OUTPATIENT)
Dept: OUTPATIENT SERVICES | Facility: HOSPITAL | Age: 47
LOS: 1 days | Discharge: HOME | End: 2022-12-19

## 2022-12-19 DIAGNOSIS — Z90.49 ACQUIRED ABSENCE OF OTHER SPECIFIED PARTS OF DIGESTIVE TRACT: Chronic | ICD-10-CM

## 2022-12-19 DIAGNOSIS — D47.3 ESSENTIAL (HEMORRHAGIC) THROMBOCYTHEMIA: ICD-10-CM

## 2022-12-19 NOTE — ED PROVIDER NOTE - PROGRESS NOTE DETAILS
Home
spoke to dr josselyn GUEVARA who advised he never spoke to pt or advised pt to come to ed for eval. he saw pt friday, did not advised ct scan. he did labs and stool cultures showing JODY and vitamin D deficiency. pt was advised she needs endoscopy from josselyn which she denied. advised pt to follow up in office. consulted dr elena and carter phillips who advised they also did not send pt in to ed for eval. advised pt to go friday to vascular, which she did and had neg bl le US. pt was advised on consults and recommendations. pt advised to follow up in office. all imaging reviewed from friday. pt advised she must follow up with her doctors out pt.  pt advised to follow up with pmd regarding abnormal results. All questions answered and concerns addressed. pt verbalized understanding and agreement with plan and dx. pt advised on next step and when/where to follow up. pt advised on all take home and otc medications. pt advised to follow up with PMD. pt advised to return to ed for worsenng symptoms including fever, cp, sob. will dc.

## 2022-12-20 ENCOUNTER — RESULT REVIEW (OUTPATIENT)
Age: 47
End: 2022-12-20

## 2022-12-20 ENCOUNTER — LABORATORY RESULT (OUTPATIENT)
Age: 47
End: 2022-12-20

## 2022-12-20 ENCOUNTER — APPOINTMENT (OUTPATIENT)
Dept: HEMATOLOGY ONCOLOGY | Facility: CLINIC | Age: 47
End: 2022-12-20

## 2022-12-20 VITALS
WEIGHT: 87.99 LBS | SYSTOLIC BLOOD PRESSURE: 128 MMHG | TEMPERATURE: 97.4 F | HEART RATE: 87 BPM | BODY MASS INDEX: 16.4 KG/M2 | HEIGHT: 61.5 IN | DIASTOLIC BLOOD PRESSURE: 86 MMHG | OXYGEN SATURATION: 98 %

## 2022-12-20 DIAGNOSIS — D69.3 IMMUNE THROMBOCYTOPENIC PURPURA: ICD-10-CM

## 2022-12-20 DIAGNOSIS — D50.9 IRON DEFICIENCY ANEMIA, UNSPECIFIED: ICD-10-CM

## 2022-12-20 DIAGNOSIS — U07.1 COVID-19: ICD-10-CM

## 2022-12-20 DIAGNOSIS — I82.890 ACUTE EMBOLISM AND THROMBOSIS OF OTHER SPECIFIED VEINS: ICD-10-CM

## 2022-12-20 DIAGNOSIS — B02.9 ZOSTER W/OUT COMPLICATIONS: ICD-10-CM

## 2022-12-20 DIAGNOSIS — B33.8 OTHER SPECIFIED VIRAL DISEASES: ICD-10-CM

## 2022-12-20 LAB
BASOPHILS # BLD AUTO: 0.02 K/UL — SIGNIFICANT CHANGE UP (ref 0–0.2)
BASOPHILS NFR BLD AUTO: 0.3 % — SIGNIFICANT CHANGE UP (ref 0–2)
EOSINOPHIL # BLD AUTO: 0.01 K/UL — SIGNIFICANT CHANGE UP (ref 0–0.5)
EOSINOPHIL NFR BLD AUTO: 0.2 % — SIGNIFICANT CHANGE UP (ref 0–6)
HCT VFR BLD CALC: 36.3 % — SIGNIFICANT CHANGE UP (ref 34.5–45)
HGB BLD-MCNC: 12.9 G/DL — SIGNIFICANT CHANGE UP (ref 11.5–15.5)
IMM GRANULOCYTES NFR BLD AUTO: 0.8 % — SIGNIFICANT CHANGE UP (ref 0–0.9)
LYMPHOCYTES # BLD AUTO: 1.43 K/UL — SIGNIFICANT CHANGE UP (ref 1–3.3)
LYMPHOCYTES # BLD AUTO: 22.2 % — SIGNIFICANT CHANGE UP (ref 13–44)
MCHC RBC-ENTMCNC: 30.6 PG — SIGNIFICANT CHANGE UP (ref 27–34)
MCHC RBC-ENTMCNC: 35.5 G/DL — SIGNIFICANT CHANGE UP (ref 32–36)
MCV RBC AUTO: 86 FL — SIGNIFICANT CHANGE UP (ref 80–100)
MONOCYTES # BLD AUTO: 0.56 K/UL — SIGNIFICANT CHANGE UP (ref 0–0.9)
MONOCYTES NFR BLD AUTO: 8.7 % — SIGNIFICANT CHANGE UP (ref 2–14)
NEUTROPHILS # BLD AUTO: 4.36 K/UL — SIGNIFICANT CHANGE UP (ref 1.8–7.4)
NEUTROPHILS NFR BLD AUTO: 67.8 % — SIGNIFICANT CHANGE UP (ref 43–77)
NRBC # BLD: 0 /100 WBCS — SIGNIFICANT CHANGE UP (ref 0–0)
PLATELET # BLD AUTO: 72 K/UL — LOW (ref 150–400)
RBC # BLD: 4.22 M/UL — SIGNIFICANT CHANGE UP (ref 3.8–5.2)
RBC # FLD: 12.6 % — SIGNIFICANT CHANGE UP (ref 10.3–14.5)
WBC # BLD: 6.43 K/UL — SIGNIFICANT CHANGE UP (ref 3.8–10.5)
WBC # FLD AUTO: 6.43 K/UL — SIGNIFICANT CHANGE UP (ref 3.8–10.5)

## 2022-12-20 PROCEDURE — 99215 OFFICE O/P EST HI 40 MIN: CPT

## 2022-12-20 RX ORDER — FAMOTIDINE 10 MG/1
10 TABLET, FILM COATED ORAL
Refills: 0 | Status: ACTIVE | COMMUNITY
Start: 2022-12-20

## 2022-12-20 NOTE — REVIEW OF SYSTEMS
[Recent Change In Weight] : ~T recent weight change [Abdominal Pain] : abdominal pain [Diarrhea] : diarrhea [Dysmenorrhea/Abn Vaginal Bleeding] : dysmenorrhea/abnormal vaginal bleeding [Anxiety] : anxiety [Easy Bruising] : a tendency for easy bruising [Negative] : Allergic/Immunologic [FreeTextEntry2] : lost 14 lbs [de-identified] : HA

## 2022-12-20 NOTE — PHYSICAL EXAM
[Restricted in physically strenuous activity but ambulatory and able to carry out work of a light or sedentary nature] : Status 1- Restricted in physically strenuous activity but ambulatory and able to carry out work of a light or sedentary nature, e.g., light house work, office work [Thin] : thin [Normal] : grossly intact [de-identified] : Tattoos. Scattered bruises, fading, on limbs. [de-identified] : Agitated

## 2022-12-20 NOTE — RESULTS/DATA
[FreeTextEntry1] : WBC 6430, Hgb 12.9, Hct 36.3, Platelets 72,000, Diff normal\par \par 12/22/21\par CMP BUN 5\par APTT 26.5\par PT 11.7, INR 0.99\par Protein S free 59%\par Ferritin 32\par Fe/TIBC/Sat 74/381/19%\par B12 249, Folate 4.9\par \par \par

## 2022-12-20 NOTE — ADDENDUM
[FreeTextEntry1] : I, Narciso Oneal, acted solely as a scribe for Dr. Attila Zamora on 12/20/2022. All medical entries made by the Scribe were at my, Dr. Attila Zamora's, direction and personally dictated by me on 12/20/2022. I have reviewed the chart and agree that the record accurately reflects my personal performance of the history, physical exam, assessment and plan. I have also personally directed, reviewed, and agreed with the chart.

## 2022-12-20 NOTE — ASSESSMENT
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 Continues to bruise easily. \par \par Suggest :\par Reassure\par Avoid trauma\par  CMP, Fe/TIBC, ferritin, B12, folate, TFT's\par PT/INR, APTT, DTT\par Rheumatology f/u\par Monitor CBC periodically\par Call me in 1 week\par

## 2022-12-20 NOTE — CONSULT LETTER
[Dear  ___] : Dear ~CYN, [Courtesy Letter:] : I had the pleasure of seeing your patient, [unfilled], in my office today. [Please see my note below.] : Please see my note below. [Sincerely,] : Sincerely, [DrAntwan  ___] : Dr. CORTES [FreeTextEntry2] : Cristhian Parker MD [FreeTextEntry3] : Nakul\par Attila Zamora M.D., FACP\par Professor of Medicine\par [employer]\par Associate Chief, Division of Hematology\par [facility]\par [facility]\par [address]\[place] [address]\par [phone]\par \par \par \par

## 2022-12-20 NOTE — HISTORY OF PRESENT ILLNESS
[de-identified] : Idiopathic thrombocytopenic purpura\par 11/18 Left IJ thrombosis\par \par \par  [de-identified] : She feels fair. She had COVID at the end of September and RSV subsequently. She has been bruising. She is losing hair. She has GERD. She has difficulty swallowing including pills. She has been having rashes. Mirena IUD was placed, but then had an allergic reaction and had to get it removed. She developed a red spot on the back of her neck from 3 weeks ago and another one on her left calf since last month. Heavy menstrual cycles persist and passes clots. She reports severe hair loss. Reports frequent HAs at the top of her head with associated blurry vision at times. Has frequent episodes of abdominal pain with her GERD. She eats a very limited diet as processed foods result in diarrhea. Has lost 14 lbs since last visit. She notes no other visual problems, limb pain/swelling, fevers, swollen glands, night sweats, BRBPR, melena, abdominal pain. \par \par

## 2022-12-22 LAB
ALBUMIN SERPL ELPH-MCNC: 4.9 G/DL
ALP BLD-CCNC: 78 U/L
ALT SERPL-CCNC: 14 U/L
ANION GAP SERPL CALC-SCNC: 11 MMOL/L
APTT BLD: 24.8 SEC
AST SERPL-CCNC: 24 U/L
BILIRUB SERPL-MCNC: 0.5 MG/DL
BUN SERPL-MCNC: 5 MG/DL
CALCIUM SERPL-MCNC: 9.7 MG/DL
CHLORIDE SERPL-SCNC: 100 MMOL/L
CO2 SERPL-SCNC: 25 MMOL/L
CREAT SERPL-MCNC: 0.73 MG/DL
EGFR: 103 ML/MIN/1.73M2
FERRITIN SERPL-MCNC: 26 NG/ML
FOLATE SERPL-MCNC: 10.4 NG/ML
GLUCOSE SERPL-MCNC: 90 MG/DL
INR PPP: 0.98 RATIO
IRON SATN MFR SERPL: 27 %
IRON SERPL-MCNC: 135 UG/DL
POTASSIUM SERPL-SCNC: 3.7 MMOL/L
PROT SERPL-MCNC: 7 G/DL
PT BLD: 11.6 SEC
SODIUM SERPL-SCNC: 137 MMOL/L
T3RU NFR SERPL: 1.1 TBI
T4 SERPL-MCNC: 8.2 UG/DL
TIBC SERPL-MCNC: 506 UG/DL
TSH SERPL-ACNC: 2.77 UIU/ML
TT CONT PPP: 25.5 SEC
UIBC SERPL-MCNC: 372 UG/DL
VIT B12 SERPL-MCNC: 530 PG/ML

## 2022-12-31 NOTE — H&P ADULT - DOES THIS PATIENT HAVE A HISTORY OF OR HAS BEEN DX WITH HEART FAILURE?
Chief Complaint   Patient presents with   • Cough     Patient woke up this morning with a barky cough and some congestion. Has had croup in the past. No fevers.        HISTORY OF PRESENT ILLNESS:  Samson Gomez is a 26 month old who presents with mild to moderate barky cough, nasal congestion, and mild rhinorrhea for 1 day(s).  No known fever. Symptoms are worsening, especially at night.  No medication taken today. No known sick contact.  Denies ear pain, sore throat, rash, nausea, vomit, diarrhea, shortness of breath, wheezing, or difficulty swallowing.  Has had a history of croup in the past.    PAST MEDICAL HISTORY, PAST SURGICAL HISTORY, SOCIAL HISTORY, MEDICATIONS, AND ALLERGIES:  Reviewed with patient.     No past medical history on file.  No past surgical history on file.  Social History     Tobacco Use   • Smoking status: Never   • Smokeless tobacco: Never     Current Outpatient Medications   Medication Sig Dispense Refill   • Spacer/Aero-Holding Chambers (AeroChamber MV) Misc To be used with albuterol inhaler. 1 each 0   • amoxicillin (AMOXIL) 400 MG/5ML suspension Take 5 ML by mouth every 12 hours for 10 days. 110 mL 0   • albuterol 108 (90 Base) MCG/ACT inhaler Inhale 2 puffs into the lungs every 4 hours as needed for Shortness of Breath, Wheezing or Other (cough). 1 each 0   • prednisoLONE sod-phos (ORAPRED) 15 MG/5ML oral solution Take 4.7667 mLs by mouth daily. 25 mL 0   • hydroCORTisone (CORTIZONE) 2.5 % cream Mix 1:1 with Eucerin lotion, apply 2 times daily to affected areas for up to 2 weeks 100 g 1     No current facility-administered medications for this visit.     ALLERGIES:  No Known Allergies  No family history on file.    REVIEW OF SYSTEMS:  Constitutional:  Denies fever. Denies fatigue. Denies weakness.  HEENT: Denies eye drainage. Denies eye redness. Denies ear pain/ear tugging. Positive nasal congestion. Positive rhinorrhea.  Denies sore throat.   Respiratory: Positive cough. Denies  wheezing. Denies shortness of breath.  Cardiovascular:  Denies palpitations.   Gastrointestinal:  Denies abdominal pain. Denies nausea. Denies vomiting.  Denies diarrhea.  Musculoskeletal:  Denies joint pain. Denies joint swelling.  Neurologic:  Denies motor changes. Denies sensory changes.   Skin:  Denies rash. Denies itching.     PHYSICAL EXAM:  Vitals:    Vitals:    12/31/22 1530   Pulse: 125   Resp: 34   Temp: (!) 100.7 °F (38.2 °C)     Constitutional:  No acute distress. Non-toxic appearance. Patient is interactive. Patient appears well-hydrated.  Skin:  Warm. Normal skin tone. Non-diaphoretic. Normal skin turgor. No rash.  HENT:  Normocephalic. Atraumatic. Bilateral external ears normal. Oropharynx moist. The throat appears non-erythematous and without swelling or exudates. The nostrils/nasal passages with mild clear rhinorrhea. Mucous membranes are without swollen turbinates. Right tympanic membrane appears clear and without erythema. Left tympanic membrane appears bulging and erythematous. No sinus region swelling noted.  Eyes:  Pupils are equal, round and reactive to light and accommodation. Extraocular muscles are intact. Conjunctivae normal. No discharge.  Neck:  Normal range of motion. No tenderness. Supple.   Cardiovascular:  Normal heart rate. Regular rhythm. No murmurs.    Pulmonary/Chest:  No coarse breath sounds to auscultate bilaterally. Symmetrical chest expansion. No wheezing. No rhonchi. No crackles. No diminished breath sounds.  Barky cough.  Abdomen:  Bowel sounds normal. Soft and non-distended. No tenderness.   Lymphatics:  No lymphadenopathy.  Neurologic Exam:  Alert and active age-appropriate. No focal deficits.     LABS/RADIOLOGY:  Orders Placed This Encounter   • COVID/Flu/RSV panel   • Spacer/Aero-Holding Chambers (AeroChamber MV) Misc   • amoxicillin (AMOXIL) 400 MG/5ML suspension   • albuterol 108 (90 Base) MCG/ACT inhaler   • prednisoLONE sod-phos (ORAPRED) 15 MG/5ML oral solution        ASSESSMENT:   Encounter Diagnoses   Name Primary?   • Suspected COVID-19 virus infection Yes   • Croup    • Left acute otitis media        PLAN:  PCR RSV, COVID, and influenza pending. M.D. suggested amoxicillin, Orapred and albuterol inhaler with spacer.  Tylenol as needed. Increase hydration and rest. Humidifier at night.  Patient is stable and capable discharge home.  Follow-up with primary care physician in 2-3 days.    Follow up with primary if no improvement of symptoms or if symptoms worsen, please be evaluated at the Emergency Room.     Patient's parent acknowledged understanding of the instructions.           no

## 2023-01-01 NOTE — ED PROVIDER NOTE - PMH
Anxiety    Autonomic dysreflexia    Depression    DVT (deep venous thrombosis)  left internal jugular and subclavian veins  Dysautonomia    Dystonia    Gastroparesis    Headache    History of ITP    HTN (hypertension)    IBS (irritable bowel syndrome)    Idiopathic thrombocytopenia purpura    Idiopathic thrombocytopenic purpura    Labyrinthitis    POTS (postural orthostatic tachycardia syndrome)    Protein S deficiency
[email or web address]

## 2023-01-09 NOTE — ED ADULT NURSE NOTE - NS PRO AD BILL OF RIGHTS
Essential hypertension Essential hypertension Essential hypertension Essential hypertension Essential hypertension Essential hypertension Essential hypertension Essential hypertension General Yes

## 2023-01-19 NOTE — ED ADULT NURSE NOTE - NS_HUMANTRAFFICKQ1_ED_ALL_ED
[facility] Medicine  History & Physical    Patient Name: Ramirez Cagle  MRN: [id number]  Patient Class: OP- Observation  Admission Date: 1/18/2023  Attending Physician: Juan C Gallego III, MD  Primary Care Provider: Primary Doctor No         Patient information was obtained from patient, past medical records and ER records.     Subjective:     Principal Problem:Malignant hypertension    Chief Complaint:   Chief Complaint   Patient presents with    Fatigue     EMS states pt was outside with thick clothing around 10:30 to 11 LKW, family states when he returned into the home he was stating he felt weak, overheated, and was moaning. Pt presents to the ED oriented x4 but lethargic. No deficits.         HPI: ED HPI:  Ramirez Cagle is a 77 y.o. male with PMHX of hypertension, diabetes who presents to the emergency department C/O altered mental status.     Patient arrives by EMS after family called because patient was feeling weak.  They state that patient typically goes out in the morning and drinks coffee all day.  He was last known well around 10:30 a.m..  Patient came in around 1:00 p.m. and told family members he was feeling weak.  He reportedly was wearing heavy clothes and felt hot and overheated.  EMS was called and patient arrives here at 1:50 p.m..     In the ED patient is awake but with diminished mentation.  Follows commands.  Does not have focal neurological deficit       Field cbg normal.    IM HPI:  Patient states he has been feeling well in his normal state of health until yesterday he began having lightheadedness felt hot and became confused.  The patient was brought into the emergency department and after a stroke workup was found to have a urinary tract infection.  The patient was hypertensive started initially on IV medications for hypertensive urgency and after improvement he was moved to the floor.  The patient is receiving Rocephin patient states he is back to his baseline  today but he has a full plate of food at his bedside and states that he is not hungry.  Patient states he did not sleep well last night is requesting something for sleep aid and anxiety.  Patient does have history of coronary artery disease chart has been reviewed and updated.  Patient is a poor historian, I do not see a troponin in the chart, no reports of CP.  Recent echocardiogram, ECG noted.      Past Medical History:   Diagnosis Date    Diabetes mellitus type I     Hypertension     Stroke        History reviewed. No pertinent surgical history.    Review of patient's allergies indicates:  No Known Allergies    No current facility-administered medications on file prior to encounter.     Current Outpatient Medications on File Prior to Encounter   Medication Sig    aspirin 81 MG Chew Take 81 mg by mouth once daily.    atorvastatin (LIPITOR) 40 MG tablet Take 40 mg by mouth once daily.    b complex vitamins tablet Take 1 tablet by mouth once daily.    insulin detemir U-100 (LEVEMIR) 100 unit/mL injection Inject into the skin every evening.    irbesartan (AVAPRO) 300 MG tablet Take 300 mg by mouth every evening.    metFORMIN (GLUCOPHAGE) 500 MG tablet Take 500 mg by mouth 2 (two) times daily with meals.    metoprolol tartrate (LOPRESSOR) 25 MG tablet Take 25 mg by mouth 2 (two) times daily.    naproxen (NAPROSYN) 500 MG tablet Take 1 tablet (500 mg total) by mouth 2 (two) times daily with meals.    pseudoephedrine-DM-guaiFENesin (POLY-VENT DM) 60- mg Tab Take 1 tablet by mouth every 6 (six) hours as needed.    timoloL 0.25 % ophthalmic solution 1-2 drops 2 (two) times daily.     Family History    None       Tobacco Use    Smoking status: Never    Smokeless tobacco: Never   Substance and Sexual Activity    Alcohol use: Not Currently    Drug use: Never    Sexual activity: Not Currently     Review of Systems   Constitutional:  Positive for activity change, appetite change, diaphoresis and  fatigue. Negative for chills and fever.   HENT:  Negative for ear pain, mouth sores, nosebleeds and sore throat.    Eyes:  Negative for visual disturbance.   Respiratory:  Negative for cough, shortness of breath and wheezing.    Cardiovascular:  Negative for chest pain, palpitations and leg swelling.   Gastrointestinal:  Negative for abdominal distention, abdominal pain, blood in stool, constipation, diarrhea, nausea and vomiting.   Endocrine: Negative for polyphagia.   Genitourinary:  Negative for difficulty urinating, dysuria, flank pain and frequency.   Musculoskeletal:  Negative for arthralgias, back pain and myalgias.   Skin:  Negative for rash.   Neurological:  Negative for dizziness, tremors, seizures, syncope, facial asymmetry, speech difficulty and headaches.   Hematological:  Negative for adenopathy.   Psychiatric/Behavioral:  Positive for confusion. Negative for agitation and hallucinations. The patient is not nervous/anxious.    Objective:     Vital Signs (Most Recent):  Temp: 98.6 °F (37 °C) (01/19/23 0444)  Pulse: 65 (01/19/23 0701)  Resp: 18 (01/19/23 0444)  BP: (!) 174/88 (01/19/23 0619)  SpO2: 97 % (01/19/23 0444)   Vital Signs (24h Range):  Temp:  [95.4 °F (35.2 °C)-98.6 °F (37 °C)] 98.6 °F (37 °C)  Pulse:  [44-90] 65  Resp:  [15-21] 18  SpO2:  [91 %-97 %] 97 %  BP: (132-230)/() 174/88     Weight: 90.3 kg (199 lb)  Body mass index is 28.55 kg/m².    Physical Exam  Constitutional:       General: He is awake. He is not in acute distress.     Appearance: Normal appearance. He is not ill-appearing, toxic-appearing or diaphoretic.   HENT:      Head: Normocephalic and atraumatic.      Nose: Nose normal. No congestion or rhinorrhea.      Mouth/Throat:      Mouth: Mucous membranes are moist.      Pharynx: Oropharynx is clear. No oropharyngeal exudate or posterior oropharyngeal erythema.   Eyes:      General: No scleral icterus.        Right eye: No discharge.         Left eye: No discharge.       Extraocular Movements: Extraocular movements intact.      Pupils: Pupils are equal, round, and reactive to light.   Neck:      Thyroid: No thyroid mass or thyromegaly.      Vascular: No carotid bruit.      Meningeal: Brudzinski's sign and Kernig's sign absent.   Cardiovascular:      Rate and Rhythm: Normal rate and regular rhythm.      Chest Wall: PMI is not displaced. No thrill.      Pulses: Normal pulses.      Heart sounds: Normal heart sounds. No murmur heard.    No friction rub. No gallop.   Pulmonary:      Effort: Pulmonary effort is normal. No tachypnea, accessory muscle usage, prolonged expiration or respiratory distress.      Breath sounds: Normal breath sounds. No stridor or decreased air movement. No wheezing, rhonchi or rales.   Chest:      Chest wall: No tenderness.   Abdominal:      General: Bowel sounds are normal. There is no distension.      Palpations: Abdomen is soft. There is no hepatomegaly, splenomegaly or mass.      Tenderness: There is no abdominal tenderness. There is no right CVA tenderness, left CVA tenderness, guarding or rebound.      Hernia: No hernia is present.   Musculoskeletal:         General: No swelling, tenderness, deformity or signs of injury.      Cervical back: Neck supple. No rigidity. No muscular tenderness.      Right lower leg: No edema.      Left lower leg: No edema.   Lymphadenopathy:      Cervical: No cervical adenopathy.   Skin:     General: Skin is warm.      Capillary Refill: Capillary refill takes less than 2 seconds.      Coloration: Skin is not cyanotic, jaundiced or pale.      Findings: No bruising, erythema, lesion, petechiae or rash.   Neurological:      General: No focal deficit present.      Mental Status: He is alert and oriented to person, place, and time. Mental status is at baseline.      Cranial Nerves: No cranial nerve deficit, dysarthria or facial asymmetry.      Motor: No weakness or tremor.   Psychiatric:         Mood and Affect: Mood normal. Mood is  not anxious or depressed. Affect is not labile or flat.         Speech: Speech is not rapid and pressured or slurred.         Behavior: Behavior normal. Behavior is not agitated, aggressive or combative.         Thought Content: Thought content normal. Thought content is not paranoid or delusional.         Cognition and Memory: Cognition is not impaired. Memory is not impaired.         Judgment: Judgment normal. Judgment is not impulsive or inappropriate.       CRANIAL NERVES     CN III, IV, VI   Pupils are equal, round, and reactive to light.     Significant Labs: CBC:   Recent Labs   Lab 01/18/23  1404 01/19/23  0555   WBC 7.01 6.33   HGB 11.4* 11.9*   HCT 34.9* 35.8*   * 124*     CMP:   Recent Labs   Lab 01/18/23  1404 01/19/23  0555    142   K 4.0 3.4*    110   CO2 29 27   * 133*   BUN 19 13   CREATININE 1.6* 1.3   CALCIUM 7.9* 8.3*   PROT 7.5  --    ALBUMIN 3.3*  --    BILITOT 0.7  --    ALKPHOS 61  --    AST 32  --    ALT 30  --    ANIONGAP 4* 5*     TSH:   Recent Labs   Lab 01/18/23  1404   TSH 3.390     Urine Studies:   Recent Labs   Lab 01/18/23  1529   COLORU Yellow   APPEARANCEUA Clear   PHUR 7.0   SPECGRAV 1.015   PROTEINUA 1+*   GLUCUA 2+*   KETONESU Negative   BILIRUBINUA Negative   OCCULTUA 1+*   NITRITE Negative   UROBILINOGEN Negative   LEUKOCYTESUR Negative   RBCUA 5*   WBCUA 9*   BACTERIA Many*   SQUAMEPITHEL 1   HYALINECASTS 0.8*       Significant Imaging: I have reviewed all pertinent imaging results/findings within the past 24 hours.    Assessment/Plan:     * Malignant hypertension    Add agents PRN and titrate.    Acute cystitis without hematuria  Abx, close monitoring      Type 2 diabetes mellitus without complication, with long-term current use of insulin  Patient's FSGs are controlled on current medication regimen.  Last A1c reviewed-   Lab Results   Component Value Date    HGBA1C 6.7 (H) 01/18/2023     Most recent fingerstick glucose reviewed-   Recent Labs   Lab  01/18/23  1444 01/18/23  1718 01/18/23  2126   POCTGLUCOSE 169* 195* 152*     Current correctional scale  High  Maintain anti-hyperglycemic dose as follows-   Antihyperglycemics (From admission, onward)    Start     Stop Route Frequency Ordered    01/18/23 1807  insulin aspart U-100 pen 0-5 Units         -- SubQ Before meals & nightly PRN 01/18/23 1707        Hold Oral hypoglycemics while patient is in the hospital.    Coronary artery disease involving native coronary artery of native heart without angina pectoris    Check trop    Acute encephalopathy  ? UTI vs. BP, iglesias noted, imaging reviewed, recent echo with [facility].        VTE Risk Mitigation (From admission, onward)         Ordered     IP VTE HIGH RISK PATIENT  Once         01/18/23 1928     Place sequential compression device  Until discontinued         01/18/23 1928                   Juan C Gallego III, MD  Department of Hospital Medicine   [facility]   No

## 2023-02-12 NOTE — ED PROVIDER NOTE - OBJECTIVE STATEMENT
Randolph Henderson MD (resident): 42 F who p/w 2-3 days of soft stool / diarrhea that is NB, no dark tarry stools, associated w/ nausea but no vomiting. Pt also reports intermittent bilateral blurry vision, feeling off balance, present for weeks, but worse last several days, and had work-up with MRI and MRA at [place] this week, that was reported normal. Seen by Dr. Quinn who referred pt to the ED and neuro residents. Pt reports lightheadedness w/ standing, and chills for 2 days.
Breath sounds clear and equal bilaterally.
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
## 2023-03-01 NOTE — ED ADULT TRIAGE NOTE - HEART RATE (BEATS/MIN)
-return on Friday to have packing removed.  -until that time do not get area wet, do not remove dressing.  
84

## 2023-03-20 NOTE — ED PROVIDER NOTE - CROS ED NEURO ALL NEG
Patient with isolated right upper quadrant abdominal pain.  Screening labs including LFTs within normal limits ultrasound within normal limits.  Patient stable for discharge.
negative...

## 2023-04-04 NOTE — ED ADULT NURSE NOTE - FINAL NURSING ELECTRONIC SIGNATURE
MEDICATIONS  (STANDING):  cefTRIAXone Injectable. 1000 milliGRAM(s) IV Push every 24 hours  chlorhexidine 4% Liquid 1 Application(s) Topical <User Schedule>  levothyroxine Injectable 112 MICROGram(s) IV Push at bedtime  midodrine 5 milliGRAM(s) Oral every 8 hours  pantoprazole  Injectable 40 milliGRAM(s) IV Push every 12 hours  phenylephrine    Infusion 0.2 MICROgram(s)/kG/Min (4.28 mL/Hr) IV Continuous <Continuous>    MEDICATIONS  (PRN):   MEDICATIONS  (STANDING):  cefTRIAXone Injectable. 1000 milliGRAM(s) IV Push every 24 hours  chlorhexidine 4% Liquid 1 Application(s) Topical <User Schedule>  levothyroxine Injectable 112 MICROGram(s) IV Push at bedtime  midodrine 5 milliGRAM(s) Oral every 8 hours  pantoprazole  Injectable 40 milliGRAM(s) IV Push every 12 hours  phenylephrine    Infusion 0.2 MICROgram(s)/kG/Min (4.28 mL/Hr) IV Continuous <Continuous>    *Not on bowel regimen   14-Dec-2017 02:33

## 2023-04-18 NOTE — ED ADULT TRIAGE NOTE - PRO INTERPRETER NEED 2
Sweating Severity Scale: 4- The sweating is intolerable and always interferes with daily activities How Severe Is It?: mild Is This A New Presentation, Or A Follow-Up?: Sweating English

## 2023-05-04 NOTE — ED BEHAVIORAL HEALTH ASSESSMENT NOTE - PAST PSYCHOTROPIC MEDICATION
Lexapro 5 mg po qdaily Skyrizi Counseling: I discussed with the patient the risks of risankizumab-rzaa including but not limited to immunosuppression, and serious infections.  The patient understands that monitoring is required including a PPD at baseline and must alert us or the primary physician if symptoms of infection or other concerning signs are noted.

## 2023-05-09 NOTE — CONSULT LETTER
[Dear  ___] : Dear ~CYN, [Courtesy Letter:] : I had the pleasure of seeing your patient, [unfilled], in my office today. [Please see my note below.] : Please see my note below. [Sincerely,] : Sincerely, [DrAntwan  ___] : Dr. CORTES [FreeTextEntry2] : Cristhian Parker MD [FreeTextEntry3] : Nakul\par Attila Zamora M.D., FACP\par Professor of Medicine\par [employer]\par Associate Chief, Division of Hematology\par [facility]\par [facility]\par [address]\[place] [address]\par [phone]\par \par \par \par   home

## 2023-05-17 NOTE — ED ADULT NURSE NOTE - PMH
Anxiety    Autonomic dysreflexia    Depression    DVT (deep venous thrombosis)  left internal jugular and subclavian veins  Dysautonomia    Dystonia    Gastroparesis    Headache    History of ITP    HTN (hypertension)    IBS (irritable bowel syndrome)    Idiopathic thrombocytopenia purpura    Idiopathic thrombocytopenic purpura    Labyrinthitis    POTS (postural orthostatic tachycardia syndrome)    Protein S deficiency Azelaic Acid Pregnancy And Lactation Text: This medication is considered safe during pregnancy and breast feeding.

## 2023-05-17 NOTE — ED PROVIDER NOTE - PRINCIPAL DIAGNOSIS
Palpitations Sski Pregnancy And Lactation Text: This medication is Pregnancy Category D and isn't considered safe during pregnancy. It is excreted in breast milk.

## 2023-05-18 NOTE — ED ADULT NURSE NOTE - DOES PATIENT HAVE ADVANCE DIRECTIVE
Last Pap smear: s/p hysterectomy, hx of cervical cancer   Lung Cancer Screening: as above  Hepatitis C Screening: negative 11/22/22  DEXA Scan : not due   Colonoscopy: not due, however asked patient if know family member with early colon cancer, consider sooner if patient have relative with early colon cancer - can consider at age 46 , was told to have possible early gene for colon cancer however unclear which one   Vaccination : will defer for now , not due for shingrix, can consider PCV20, consider COVID vaccination, out of season for flu    No

## 2023-05-23 NOTE — ED BEHAVIORAL HEALTH ASSESSMENT NOTE - NS ED [facility] MED ROS CARDIOVASCULAR
05-23    141  |  108  |  7   ----------------------------<  86  3.4<L>   |  25  |  0.71    Ca    8.4<L>      23 May 2023 06:25    TPro  7.8  /  Alb  3.4  /  TBili  0.2  /  DBili  x   /  AST  10<L>  /  ALT  16  /  AlkPhos  70  05-22  
Yes

## 2023-05-30 ENCOUNTER — EMERGENCY (EMERGENCY)
Facility: HOSPITAL | Age: 48
LOS: 1 days | Discharge: HOME | End: 2023-05-30
Attending: EMERGENCY MEDICINE
Payer: COMMERCIAL

## 2023-05-30 VITALS — HEART RATE: 87 BPM | RESPIRATION RATE: 18 BRPM

## 2023-05-30 VITALS
DIASTOLIC BLOOD PRESSURE: 83 MMHG | SYSTOLIC BLOOD PRESSURE: 114 MMHG | WEIGHT: 95.02 LBS | HEART RATE: 95 BPM | OXYGEN SATURATION: 98 % | RESPIRATION RATE: 16 BRPM | HEIGHT: 61 IN

## 2023-05-30 DIAGNOSIS — Z90.49 ACQUIRED ABSENCE OF OTHER SPECIFIED PARTS OF DIGESTIVE TRACT: Chronic | ICD-10-CM

## 2023-05-30 LAB
ALBUMIN SERPL ELPH-MCNC: 4.8 G/DL — SIGNIFICANT CHANGE UP (ref 3.3–5)
ALP SERPL-CCNC: 78 U/L — SIGNIFICANT CHANGE UP (ref 40–120)
ALT FLD-CCNC: 13 U/L — SIGNIFICANT CHANGE UP (ref 10–45)
ANION GAP SERPL CALC-SCNC: 12 MMOL/L — SIGNIFICANT CHANGE UP (ref 5–17)
APPEARANCE UR: CLEAR — SIGNIFICANT CHANGE UP
AST SERPL-CCNC: 21 U/L — SIGNIFICANT CHANGE UP (ref 10–40)
BACTERIA # UR AUTO: NEGATIVE — SIGNIFICANT CHANGE UP
BASE EXCESS BLDV CALC-SCNC: -0.5 MMOL/L — SIGNIFICANT CHANGE UP (ref -2–3)
BASOPHILS # BLD AUTO: 0.01 K/UL — SIGNIFICANT CHANGE UP (ref 0–0.2)
BASOPHILS NFR BLD AUTO: 0.2 % — SIGNIFICANT CHANGE UP (ref 0–2)
BILIRUB SERPL-MCNC: 0.5 MG/DL — SIGNIFICANT CHANGE UP (ref 0.2–1.2)
BILIRUB UR-MCNC: NEGATIVE — SIGNIFICANT CHANGE UP
BUN SERPL-MCNC: 4 MG/DL — LOW (ref 7–23)
CA-I SERPL-SCNC: 1.2 MMOL/L — SIGNIFICANT CHANGE UP (ref 1.15–1.33)
CALCIUM SERPL-MCNC: 9.3 MG/DL — SIGNIFICANT CHANGE UP (ref 8.4–10.5)
CHLORIDE BLDV-SCNC: 104 MMOL/L — SIGNIFICANT CHANGE UP (ref 96–108)
CHLORIDE SERPL-SCNC: 104 MMOL/L — SIGNIFICANT CHANGE UP (ref 96–108)
CO2 BLDV-SCNC: 25 MMOL/L — SIGNIFICANT CHANGE UP (ref 22–26)
CO2 SERPL-SCNC: 22 MMOL/L — SIGNIFICANT CHANGE UP (ref 22–31)
COLOR SPEC: COLORLESS — SIGNIFICANT CHANGE UP
CREAT SERPL-MCNC: 0.78 MG/DL — SIGNIFICANT CHANGE UP (ref 0.5–1.3)
D DIMER BLD IA.RAPID-MCNC: <150 NG/ML DDU — SIGNIFICANT CHANGE UP
DIFF PNL FLD: ABNORMAL
EGFR: 95 ML/MIN/1.73M2 — SIGNIFICANT CHANGE UP
EOSINOPHIL # BLD AUTO: 0.01 K/UL — SIGNIFICANT CHANGE UP (ref 0–0.5)
EOSINOPHIL NFR BLD AUTO: 0.2 % — SIGNIFICANT CHANGE UP (ref 0–6)
EPI CELLS # UR: 1 /HPF — SIGNIFICANT CHANGE UP
GAS PNL BLDV: 137 MMOL/L — SIGNIFICANT CHANGE UP (ref 136–145)
GAS PNL BLDV: SIGNIFICANT CHANGE UP
GAS PNL BLDV: SIGNIFICANT CHANGE UP
GLUCOSE BLDV-MCNC: 95 MG/DL — SIGNIFICANT CHANGE UP (ref 70–99)
GLUCOSE SERPL-MCNC: 100 MG/DL — HIGH (ref 70–99)
GLUCOSE UR QL: NEGATIVE — SIGNIFICANT CHANGE UP
HCG SERPL-ACNC: <2 MIU/ML — SIGNIFICANT CHANGE UP
HCO3 BLDV-SCNC: 24 MMOL/L — SIGNIFICANT CHANGE UP (ref 22–29)
HCT VFR BLD CALC: 40.7 % — SIGNIFICANT CHANGE UP (ref 34.5–45)
HCT VFR BLDA CALC: 43 % — SIGNIFICANT CHANGE UP (ref 34.5–46.5)
HGB BLD CALC-MCNC: 14.2 G/DL — SIGNIFICANT CHANGE UP (ref 11.7–16.1)
HGB BLD-MCNC: 14.1 G/DL — SIGNIFICANT CHANGE UP (ref 11.5–15.5)
HYALINE CASTS # UR AUTO: 0 /LPF — SIGNIFICANT CHANGE UP (ref 0–2)
IMM GRANULOCYTES NFR BLD AUTO: 0.3 % — SIGNIFICANT CHANGE UP (ref 0–0.9)
KETONES UR-MCNC: ABNORMAL
LACTATE BLDV-MCNC: 0.9 MMOL/L — SIGNIFICANT CHANGE UP (ref 0.5–2)
LEUKOCYTE ESTERASE UR-ACNC: NEGATIVE — SIGNIFICANT CHANGE UP
LIDOCAIN IGE QN: 24 U/L — SIGNIFICANT CHANGE UP (ref 7–60)
LYMPHOCYTES # BLD AUTO: 1.37 K/UL — SIGNIFICANT CHANGE UP (ref 1–3.3)
LYMPHOCYTES # BLD AUTO: 20.9 % — SIGNIFICANT CHANGE UP (ref 13–44)
MCHC RBC-ENTMCNC: 30.1 PG — SIGNIFICANT CHANGE UP (ref 27–34)
MCHC RBC-ENTMCNC: 34.6 GM/DL — SIGNIFICANT CHANGE UP (ref 32–36)
MCV RBC AUTO: 86.8 FL — SIGNIFICANT CHANGE UP (ref 80–100)
MONOCYTES # BLD AUTO: 0.43 K/UL — SIGNIFICANT CHANGE UP (ref 0–0.9)
MONOCYTES NFR BLD AUTO: 6.6 % — SIGNIFICANT CHANGE UP (ref 2–14)
NEUTROPHILS # BLD AUTO: 4.71 K/UL — SIGNIFICANT CHANGE UP (ref 1.8–7.4)
NEUTROPHILS NFR BLD AUTO: 71.8 % — SIGNIFICANT CHANGE UP (ref 43–77)
NITRITE UR-MCNC: NEGATIVE — SIGNIFICANT CHANGE UP
NRBC # BLD: 0 /100 WBCS — SIGNIFICANT CHANGE UP (ref 0–0)
NT-PROBNP SERPL-SCNC: 41 PG/ML — SIGNIFICANT CHANGE UP (ref 0–300)
PCO2 BLDV: 39 MMHG — SIGNIFICANT CHANGE UP (ref 39–42)
PH BLDV: 7.4 — SIGNIFICANT CHANGE UP (ref 7.32–7.43)
PH UR: 7 — SIGNIFICANT CHANGE UP (ref 5–8)
PLATELET # BLD AUTO: 88 K/UL — LOW (ref 150–400)
PO2 BLDV: 72 MMHG — HIGH (ref 25–45)
POTASSIUM BLDV-SCNC: 3.7 MMOL/L — SIGNIFICANT CHANGE UP (ref 3.5–5.1)
POTASSIUM SERPL-MCNC: 3.7 MMOL/L — SIGNIFICANT CHANGE UP (ref 3.5–5.3)
POTASSIUM SERPL-SCNC: 3.7 MMOL/L — SIGNIFICANT CHANGE UP (ref 3.5–5.3)
PROT SERPL-MCNC: 7.2 G/DL — SIGNIFICANT CHANGE UP (ref 6–8.3)
PROT UR-MCNC: NEGATIVE — SIGNIFICANT CHANGE UP
RAPID RVP RESULT: SIGNIFICANT CHANGE UP
RBC # BLD: 4.69 M/UL — SIGNIFICANT CHANGE UP (ref 3.8–5.2)
RBC # FLD: 12.5 % — SIGNIFICANT CHANGE UP (ref 10.3–14.5)
RBC CASTS # UR COMP ASSIST: 1 /HPF — SIGNIFICANT CHANGE UP (ref 0–4)
SAO2 % BLDV: 94.3 % — HIGH (ref 67–88)
SARS-COV-2 RNA SPEC QL NAA+PROBE: SIGNIFICANT CHANGE UP
SODIUM SERPL-SCNC: 138 MMOL/L — SIGNIFICANT CHANGE UP (ref 135–145)
SP GR SPEC: 1.01 — LOW (ref 1.01–1.02)
TROPONIN T, HIGH SENSITIVITY RESULT: <6 NG/L — SIGNIFICANT CHANGE UP (ref 0–51)
UROBILINOGEN FLD QL: NEGATIVE — SIGNIFICANT CHANGE UP
WBC # BLD: 6.55 K/UL — SIGNIFICANT CHANGE UP (ref 3.8–10.5)
WBC # FLD AUTO: 6.55 K/UL — SIGNIFICANT CHANGE UP (ref 3.8–10.5)
WBC UR QL: 1 /HPF — SIGNIFICANT CHANGE UP (ref 0–5)

## 2023-05-30 PROCEDURE — 84132 ASSAY OF SERUM POTASSIUM: CPT

## 2023-05-30 PROCEDURE — 71045 X-RAY EXAM CHEST 1 VIEW: CPT

## 2023-05-30 PROCEDURE — 71045 X-RAY EXAM CHEST 1 VIEW: CPT | Mod: 26

## 2023-05-30 PROCEDURE — 85014 HEMATOCRIT: CPT

## 2023-05-30 PROCEDURE — 84100 ASSAY OF PHOSPHORUS: CPT

## 2023-05-30 PROCEDURE — 93005 ELECTROCARDIOGRAM TRACING: CPT

## 2023-05-30 PROCEDURE — 85018 HEMOGLOBIN: CPT

## 2023-05-30 PROCEDURE — 83690 ASSAY OF LIPASE: CPT

## 2023-05-30 PROCEDURE — 82435 ASSAY OF BLOOD CHLORIDE: CPT

## 2023-05-30 PROCEDURE — 83880 ASSAY OF NATRIURETIC PEPTIDE: CPT

## 2023-05-30 PROCEDURE — 99285 EMERGENCY DEPT VISIT HI MDM: CPT | Mod: 25

## 2023-05-30 PROCEDURE — 74176 CT ABD & PELVIS W/O CONTRAST: CPT | Mod: 26,MA

## 2023-05-30 PROCEDURE — 99285 EMERGENCY DEPT VISIT HI MDM: CPT

## 2023-05-30 PROCEDURE — 84484 ASSAY OF TROPONIN QUANT: CPT

## 2023-05-30 PROCEDURE — 84295 ASSAY OF SERUM SODIUM: CPT

## 2023-05-30 PROCEDURE — 74176 CT ABD & PELVIS W/O CONTRAST: CPT | Mod: MA

## 2023-05-30 PROCEDURE — 0225U NFCT DS DNA&RNA 21 SARSCOV2: CPT

## 2023-05-30 PROCEDURE — 84702 CHORIONIC GONADOTROPIN TEST: CPT

## 2023-05-30 PROCEDURE — 81001 URINALYSIS AUTO W/SCOPE: CPT

## 2023-05-30 PROCEDURE — 80053 COMPREHEN METABOLIC PANEL: CPT

## 2023-05-30 PROCEDURE — 82330 ASSAY OF CALCIUM: CPT

## 2023-05-30 PROCEDURE — 96374 THER/PROPH/DIAG INJ IV PUSH: CPT

## 2023-05-30 PROCEDURE — 83605 ASSAY OF LACTIC ACID: CPT

## 2023-05-30 PROCEDURE — 83735 ASSAY OF MAGNESIUM: CPT

## 2023-05-30 PROCEDURE — 87086 URINE CULTURE/COLONY COUNT: CPT

## 2023-05-30 PROCEDURE — 85379 FIBRIN DEGRADATION QUANT: CPT

## 2023-05-30 PROCEDURE — 96361 HYDRATE IV INFUSION ADD-ON: CPT

## 2023-05-30 PROCEDURE — 82803 BLOOD GASES ANY COMBINATION: CPT

## 2023-05-30 PROCEDURE — 82947 ASSAY GLUCOSE BLOOD QUANT: CPT

## 2023-05-30 PROCEDURE — 85025 COMPLETE CBC W/AUTO DIFF WBC: CPT

## 2023-05-30 PROCEDURE — 96375 TX/PRO/DX INJ NEW DRUG ADDON: CPT

## 2023-05-30 RX ORDER — FAMOTIDINE 10 MG/ML
10 INJECTION INTRAVENOUS ONCE
Refills: 0 | Status: COMPLETED | OUTPATIENT
Start: 2023-05-30 | End: 2023-05-30

## 2023-05-30 RX ORDER — SODIUM CHLORIDE 9 MG/ML
1000 INJECTION INTRAMUSCULAR; INTRAVENOUS; SUBCUTANEOUS ONCE
Refills: 0 | Status: COMPLETED | OUTPATIENT
Start: 2023-05-30 | End: 2023-05-30

## 2023-05-30 RX ADMIN — FAMOTIDINE 10 MILLIGRAM(S): 10 INJECTION INTRAVENOUS at 14:23

## 2023-05-30 RX ADMIN — SODIUM CHLORIDE 1000 MILLILITER(S): 9 INJECTION INTRAMUSCULAR; INTRAVENOUS; SUBCUTANEOUS at 14:22

## 2023-05-30 RX ADMIN — SODIUM CHLORIDE 1000 MILLILITER(S): 9 INJECTION INTRAMUSCULAR; INTRAVENOUS; SUBCUTANEOUS at 16:18

## 2023-05-30 RX ADMIN — Medication 0.5 MILLIGRAM(S): at 14:23

## 2023-05-30 NOTE — ED PROVIDER NOTE - PHYSICAL EXAMINATION
GENERAL: Awake, alert, NAD  LUNGS: CTAB, no wheezes or crackles   CARDIAC: RRR, no m/r/g  ABDOMEN: Tenderness all quadrants of abdomen no distention No guarding  EXT: No edema, no calf tenderness, 2+ DP pulses bilaterally, no deformities.  NEURO: A&Ox3. Moving all extremities.  SKIN: Warm and dry. No rash.  PSYCH: Normal affect.

## 2023-05-30 NOTE — ED PROVIDER NOTE - OBJECTIVE STATEMENT
47-year-old female PMhx ITP, POTS, dysautonomia, protein S insuffiencey, +MARY BETH, chronic diarrhea, chronic head and UE tremors chief complaint of chest pain shortness of breath nausea vomiting and decreased p.o. intake.  Symptoms started approximately 6 to 7 days ago patient notes she is also having watery diarrhea as well.  Patient notes she tried not to come in but she would "ride it out".  Patient states that the symptoms were not getting any better which is why she is coming to the hospital today.  Patient has had prior DVTs and blood clots before and is not currently on any blood thinning medication.

## 2023-05-30 NOTE — ED PROVIDER NOTE - CPE EDP EYES NORM
"Patient: Katya Mejia  : 1928    Encounter Date: 2023  Name: Katya Mejia  YOB: 1928    Chief complaint: Impaired mobility. Dependent on nursing staff for ADL's.    HPI: This is a 89 year old  female who has a medical history remarkable for venous insufficiency, gerd, overactive bladder, diabetes, stage 3 CKD, atrial fibrillation, long term use of anticoagulation, hypertension, restless leg syndrome, AGUSTO. She is a long term resident at [facility]. Patient recently treated for UTI. She is able to stand with assistance but is not able to walk. Review of chart shows good blood sugar control on current regimen. Seen and examined in her room. Patient offers no new complaint.s    Extremity Weakness  The current episode started more than 1 year ago. The problem occurs daily. The problem has been unchanged. Associated symptoms include fatigue. Pertinent negatives include no chest pain, chills, fever or joint swelling. The symptoms are aggravated by standing and exertion. She has tried rest, position changes and acetaminophen for the symptoms. The treatment provided mild relief.     Review of systems:   ROS negative except were noted in HPI.    Code Status: DNR-CCA    /55   Pulse 79   Temp 36.3 °C (97.3 °F)   Resp 18   Ht 1.549 m (5' 1\")   Wt 131 kg (288 lb)   SpO2 95%   BMI 54.42 kg/m²      Physical Exam  Constitutional:       General: She is not in acute distress.     Appearance: She is obese. She is not toxic-appearing.   HENT:      Head: Normocephalic.      Nose: Nose normal. No congestion.      Mouth/Throat:      Pharynx: Oropharynx is clear.   Eyes:      Extraocular Movements: Extraocular movements intact.      Pupils: Pupils are equal, round, and reactive to light.   Cardiovascular:      Rate and Rhythm: Normal rate. Rhythm irregular.      Pulses: Normal pulses.   Pulmonary:      Effort: Pulmonary effort is normal.      Breath sounds: Normal " breath sounds. No wheezing or rales.   Abdominal:      General: There is no distension.      Palpations: Abdomen is soft.      Tenderness: There is no abdominal tenderness. There is no guarding.   Genitourinary:     Comments: Incontinent of urine    Musculoskeletal:      Cervical back: Normal range of motion.      Right lower leg: Edema present.      Left lower leg: Edema present.      Comments: Not walking.   Lymphadenopathy:      Cervical: No cervical adenopathy.   Skin:     General: Skin is warm and dry.      Capillary Refill: Capillary refill takes less than 2 seconds.   Neurological:      General: No focal deficit present.      Mental Status: She is alert. Mental status is at baseline.   Psychiatric:         Mood and Affect: Mood normal.         Behavior: Behavior normal.        Medications reviewed during visit at facility.  Omeprazole 20 mg po daily  Ammonium lactate 12% cream to legs bid  Myrbetriq 25 mg po daily\  Atorvastatin 20 mg po daily  Colace 100 mg po bid  Metoprolol tartrate 25 mg po bid  Calcium/Vitamin D 600/200 po daily  Basaglar insulin 15 units subcutaneous daily  Nystatin powder 100,000 to abdominal folds q 4 hours prn  Ropinirole 2 mg po q hs.    Loratadine 10 mg po daily  Magnesium oxide 400 mg po daily  Lasix 40 mg po daily  Vitamin C 500 mg po daily  Novolog insulin sliding scale.  Tradjenta 5 mg po daily  Eliquis 2.5 po bid    Labs reviewed at facility:    Laboratory: 5/26/2023 15:09 BASIC MET PNL INCL GFR (BMP) / CBC W/DIFF  Latest Version (more available)  Reviewed by lara on 5/30/2023 08:46  Resident Information  Resident: Katya Mejia ([id number])  Admit Date: 12/3/2020  Admitting Provider:   Attending Provider:   Copy to List:   Report Information  Collection Date: 5/26/2023 07:30  Received Date: 5/26/2023 07:57  Reported Date: 5/26/2023 15:09  Ord. Provider: Luz Garrett  Source Key: [id number]  Lab Information  Status: Completed  Flag:    Reporting Lab:   [facility]  Order  #:   [id number]  Vendor Order #:   [id number]  Category:   Chemistry, Hematology, Unknown Category  Order Notes  Result for:  LEANDRO STUBBS  ( 1928, F)         Results   Show All Details Result Units Ref. Range Flag Status   BASIC MET PNL INCL GFR (BMP)       GLUCOSE  119 mg/dL  H Final      GLUCOSE, FASTING         65-99  mg/dL                               GLUCOSE, NON-FASTING      mg/dL       SODIUM  144 mEq/L 136-145  Final           POTASSIUM  4.1 mEq/L 3.5-5.3  Final           CHLORIDE  109 mEq/L   Final           CARBON DIOXIDE (CO2)  20 mEq/L 21-33 L Final           BUN (UREA NITROGEN)  16 mg/dL 7-25  Final           CREATININE  1.4 mg/dL 0.6-1.2 H Final           BUN/CREATININE RATIO  11  6-22  Final           GFR-  42 mL/min/1.73 m2 >60 L Final           GFR-NON-AFRICAN AMERICAN  35 mL/min/1.73 m2 >60 L Final           Stage of CKD     eGFR (mL/min/1.73 square meters)          Stage 1        >/= 90        or > 90          Stage 2        60 - 89          Stage 3        30 - 59          Stage 4        15 - 29          Stage 5        </= 14        or < 15  ** GFR is reliable for adults 17 to 69 years with stable kidney      function.        CALCIUM  8.3 mg/dL 8.4-10.2 L Final       CBC W/DIFF       WBC  7.6 K/cmm 4.5-10.8  Final           RBC  3.63 M/cmm 3.90-5.40 L Final           HEMOGLOBIN  10.1 g/dL 12.0-16.0 L Final           HEMATOCRIT  31.8 % 36.0-48.0 L Final           MCV  87.6 fL 80.0-100.0  Final           MCH  27.8 pg 26.0-35.0  Final           MCHC  31.7 g/dL 31.0-36.5  Final           RDW  17.3 % 11.0-16.0 H Final           PLATELET  222 K/cmm 150-450  Final           MPV  8.0 fL 6.5-12.0  Final           ANISOCYTOSIS  1+  NEGATIVE A Final           NEUTROPHILS  71.3 % 40.0-80.0  Final           LYMPHS  16.4 % 13.0-48.0  Final           MONOCYTES  5.7 % 2.0-12.0  Final           EOS  6.1 % 0.0-8.0  Final           BASO  0.5 % 0.0-2.0  Final            NEUTS (ABSOLUTE)  5.40 K/uL 1.50-7.60  Final           LYMPHS (ABSOLUTE)  1.30 K/uL 0.90-5.50  Final           MONOCYTES (ABSOLUTE)  0.40 K/uL 0.15-1.10  Final           EOS (ABSOLUTE)  0.50 K/uL 0.20-0.80  Final           NUCLEATED RBC  0.1 NRBC/100 WBC <1.0  Final        Assessment/Plan   Problem List Items Addressed This Visit       Atrial fibrillation (CMS/HCC)     Controlled rate on beta blocker. Continue Metoprolol 25 mg po bid.         Overactive bladder     Myrbetriq 25 mg po daily.         Edema of both lower legs     Review labs. Continue with Lasix 40 mg po daily.         Impaired functional mobility, balance, gait, and endurance - Primary     PT and OT to assess and treat. Discuss progress with physical therapy.         Current use of anticoagulant therapy     Eliquis 2.5 po bid            Time:  I spent 45 minutes or greater with the patient. Greater than 50% of this time was spent in counseling and or coordination of care. The time includes prep time of reviewing vital signs, report from direct nursing staff and or therapists, hospital documentation, reviewing labs, radiographs, diagnostic tests and or consultations, time directly spent with the patient interviewing, examining, and education regarding diagnosis, treatments, and medications, as well as documentation in the electronic medical record, and reviewing the plan of care and any new orders with the patient, nursing staff and other staff directly related to the patients care.      Luiz Galarza PA-C       Electronically Signed By: Luiz Galarza PA-C   6/2/23  9:48 AM   normal...

## 2023-05-30 NOTE — ED ADULT NURSE NOTE - OBJECTIVE STATEMENT
pt has multiple medical issues  she has had cdiff multiple times unrelated to antibiotics, history of covid six months ago,, dystonia, multiple allergies.    she has developed gastric upset that has "affected" her heart  here for palpitaions and tachycardia.

## 2023-05-30 NOTE — ED PROVIDER NOTE - PROGRESS NOTE DETAILS
Maximo PGY 2 Patient refused contrast explained that we need contrast for the PE rule out patient also refused 20-gauge needle with the need for ruling out PE patient only wants very small needle/25-gauge.  Will obtain dry scan abdomen pelvis Maximo PGY 2 pt aware of negative d dimer asked for v/q scan provider stated not requried at this time given exam findings Nayeli Hanson DO (PGY2):   Patient signed out to me by day team.  Patient's CT without acute pathology.  Labs without emergent findings.  UA negative for infection.  Patient ready for discharge. Pt made aware of lab and imaging results, including incidental findings. Questions regarding their symptoms were addressed. Advised to follow up with pcp. Given strict return precautions. Pt verbalized understanding.

## 2023-05-30 NOTE — ED PROVIDER NOTE - PATIENT PORTAL LINK FT
You can access the FollowMyHealth Patient Portal offered by [facility] by registering at the following website: [email or web address]. By joining [facility]’s FollowMyHealth portal, you will also be able to view your health information using other applications (apps) compatible with our system.

## 2023-05-30 NOTE — ED PROVIDER NOTE - ATTENDING CONTRIBUTION TO CARE
45 y/o F PMhx dysautonomia, anxiety/depression, POTS recently hospitalized for chronic diarrhea and abd pain s/p EGD/ Colonoscopy w/ unremarkable biopsy results who presented w/ worsening diarrhea this past week now, here for c diff rule out. pt also with cp and sob with h/o prior pe, prot s def, borderline tachy but likely due to anxiety, dehydration, ivf, check labs, iv contrast allergy, low suspicion of pe, dimer sent, normal vq scan last time. no new swelling to her arms or legs as well. abd soft, nt.

## 2023-05-30 NOTE — ED PROVIDER NOTE - NSFOLLOWUPINSTRUCTIONS_ED_ALL_ED_FT
Please follow-up with your primary care doctor within 1 week.  Your results are attached to this document, you may bring them to your appointment.  Please return to emergency department for any worsening symptoms such as those listed below.    Chest Pain    Chest pain can be caused by many different conditions which may or may not be dangerous. Causes include heartburn, lung infections, heart attack, blood clot in lungs, skin infections, strain or damage to muscle, cartilage, or bones, etc. In addition to a history and physical examination, an electrocardiogram (ECG) or other lab tests may have been performed to determine the cause of your chest pain. Follow up with your primary care provider or with a cardiologist as instructed.     SEEK IMMEDIATE MEDICAL CARE IF YOU HAVE ANY OF THE FOLLOWING SYMPTOMS: worsening chest pain, coughing up blood, unexplained back/neck/jaw pain, severe abdominal pain, dizziness or lightheadedness, fainting, shortness of breath, sweaty or clammy skin, vomiting, or racing heart beat. These symptoms may represent a serious problem that is an emergency. Do not wait to see if the symptoms will go away. Get medical help right away. Call 911 and do not drive yourself to the hospital. Please follow-up with your primary care doctor within 1 week.  Your results are attached to this document, you may bring them to your appointment.  Please return to emergency department for any worsening symptoms such as those listed below.    The hospital will call you for neuro, gastro, cardiology appointment.     Chest Pain    Chest pain can be caused by many different conditions which may or may not be dangerous. Causes include heartburn, lung infections, heart attack, blood clot in lungs, skin infections, strain or damage to muscle, cartilage, or bones, etc. In addition to a history and physical examination, an electrocardiogram (ECG) or other lab tests may have been performed to determine the cause of your chest pain. Follow up with your primary care provider or with a cardiologist as instructed.     SEEK IMMEDIATE MEDICAL CARE IF YOU HAVE ANY OF THE FOLLOWING SYMPTOMS: worsening chest pain, coughing up blood, unexplained back/neck/jaw pain, severe abdominal pain, dizziness or lightheadedness, fainting, shortness of breath, sweaty or clammy skin, vomiting, or racing heart beat. These symptoms may represent a serious problem that is an emergency. Do not wait to see if the symptoms will go away. Get medical help right away. Call 911 and do not drive yourself to the hospital.

## 2023-05-30 NOTE — ED ADULT NURSE NOTE - NSFALLUNIVINTERV_ED_ALL_ED
Assistance OOB with selected safe patient handling equipment if applicable/Bed/Stretcher in lowest position, wheels locked, appropriate side rails in place/Call bell, personal items and telephone in reach/Instruct patient to call for assistance before getting out of bed/chair/stretcher/Non-slip footwear applied when patient is off stretcher/[place] to call system/Physically safe environment - no spills, clutter or unnecessary equipment/Purposeful proactive rounding/Room/bathroom lighting operational, light cord in reach

## 2023-05-31 LAB
CULTURE RESULTS: SIGNIFICANT CHANGE UP
SPECIMEN SOURCE: SIGNIFICANT CHANGE UP

## 2023-05-31 NOTE — ED ADULT TRIAGE NOTE - OTHER COMPLAINTS
[FreeTextEntry3] : I have seen and examined this patient with our NP Liset Ryan. The note contains my exam findings, impression and plan for this patient.\par \par 
c/o of palpitations accompanied with dizziness and shortness of breath since yesterdaty

## 2023-06-29 NOTE — ED ADULT TRIAGE NOTE - PAIN: PRESENCE, MLM
Impression: Long term (current) use of oral antidiabetic drugs: Z79.84.  Plan: See other DM plan complains of pain/discomfort

## 2023-07-24 NOTE — ED PROVIDER NOTE - CROS ED CARDIOVAS ALL NEG
Physical Therapy Daily Treatment Note  [facility]   [address], [address]  [place] [address]  P: [phone]       F: [phone]    Patient: Rosalina Ferrell   : 1947  Diagnosis/ICD-10 Code:  Right shoulder pain, unspecified chronicity [M25.511]  Referring practitioner: Colton Hou MD  Date of Initial Visit: Type: THERAPY  Noted: 2023  Today's Date: 2023  Patient seen for 10 sessions       Rosalina Ferrell reports: R shoulder isn't bothering me, feels good.     Subjective     Objective   See Exercise, Manual, and Modality Logs for complete treatment.       Assessment/Plan  Subjectively, pt reports no increase of pain or discomfort with interventions performed today. Performed well with continued R shoulder mobility and strengthening interventions. Continues to demonstrate ability to elevate R UE to functional height for daily tasks. Progressed well with therapy. App[facility] today.  Continues to benefit from verbal/tactile cues to ensure proper form and technique for exercise performance.     Progress per Plan of Care           Manual Therapy:         mins  07611;  Therapeutic Exercise:    20     mins  45248;     Neuromuscular Aleksey:        mins  79101;    Therapeutic Activity:     10     mins  31058;     Gait Training:           mins  79576;     Ultrasound:          mins  21980;    Electrical Stimulation:         mins  15241;  Traction          mins 03974    Timed Treatment:   30   mins   Total Treatment:     30   mins    Sho Johnson PTA  Physical Therapist Assistant [id number]  
negative...

## 2023-08-05 NOTE — ED PROVIDER NOTE - NS ED ROS FT
No fever, no chills, no change in vision, no throat pain, no abdominal pain, no joint pain, no rashes, no focal neurologic complaints, all ROS otherwise as per HPI or negative.
Yes

## 2023-08-12 ENCOUNTER — NON-APPOINTMENT (OUTPATIENT)
Age: 48
End: 2023-08-12

## 2023-08-13 ENCOUNTER — EMERGENCY (EMERGENCY)
Facility: HOSPITAL | Age: 48
LOS: 1 days | Discharge: HOME | End: 2023-08-13
Attending: EMERGENCY MEDICINE
Payer: COMMERCIAL

## 2023-08-13 VITALS
HEART RATE: 94 BPM | RESPIRATION RATE: 20 BRPM | OXYGEN SATURATION: 96 % | SYSTOLIC BLOOD PRESSURE: 151 MMHG | DIASTOLIC BLOOD PRESSURE: 100 MMHG

## 2023-08-13 VITALS
WEIGHT: 93.92 LBS | TEMPERATURE: 99 F | HEART RATE: 99 BPM | OXYGEN SATURATION: 97 % | RESPIRATION RATE: 16 BRPM | HEIGHT: 61 IN | DIASTOLIC BLOOD PRESSURE: 86 MMHG | SYSTOLIC BLOOD PRESSURE: 123 MMHG

## 2023-08-13 DIAGNOSIS — Z90.49 ACQUIRED ABSENCE OF OTHER SPECIFIED PARTS OF DIGESTIVE TRACT: Chronic | ICD-10-CM

## 2023-08-13 LAB
ALBUMIN SERPL ELPH-MCNC: 4.6 G/DL — SIGNIFICANT CHANGE UP (ref 3.3–5)
ALP SERPL-CCNC: 84 U/L — SIGNIFICANT CHANGE UP (ref 40–120)
ALT FLD-CCNC: 14 U/L — SIGNIFICANT CHANGE UP (ref 10–45)
ANION GAP SERPL CALC-SCNC: 14 MMOL/L — SIGNIFICANT CHANGE UP (ref 5–17)
AST SERPL-CCNC: 25 U/L — SIGNIFICANT CHANGE UP (ref 10–40)
BASOPHILS # BLD AUTO: 0.02 K/UL — SIGNIFICANT CHANGE UP (ref 0–0.2)
BASOPHILS NFR BLD AUTO: 0.3 % — SIGNIFICANT CHANGE UP (ref 0–2)
BILIRUB SERPL-MCNC: 0.4 MG/DL — SIGNIFICANT CHANGE UP (ref 0.2–1.2)
BUN SERPL-MCNC: 6 MG/DL — LOW (ref 7–23)
CALCIUM SERPL-MCNC: 9.3 MG/DL — SIGNIFICANT CHANGE UP (ref 8.4–10.5)
CHLORIDE SERPL-SCNC: 106 MMOL/L — SIGNIFICANT CHANGE UP (ref 96–108)
CO2 SERPL-SCNC: 19 MMOL/L — LOW (ref 22–31)
CREAT SERPL-MCNC: 0.77 MG/DL — SIGNIFICANT CHANGE UP (ref 0.5–1.3)
D DIMER BLD IA.RAPID-MCNC: <150 NG/ML DDU — SIGNIFICANT CHANGE UP
EGFR: 96 ML/MIN/1.73M2 — SIGNIFICANT CHANGE UP
EOSINOPHIL # BLD AUTO: 0.02 K/UL — SIGNIFICANT CHANGE UP (ref 0–0.5)
EOSINOPHIL NFR BLD AUTO: 0.3 % — SIGNIFICANT CHANGE UP (ref 0–6)
GLUCOSE SERPL-MCNC: 104 MG/DL — HIGH (ref 70–99)
HCT VFR BLD CALC: 39.9 % — SIGNIFICANT CHANGE UP (ref 34.5–45)
HGB BLD-MCNC: 13.3 G/DL — SIGNIFICANT CHANGE UP (ref 11.5–15.5)
IMM GRANULOCYTES NFR BLD AUTO: 0.5 % — SIGNIFICANT CHANGE UP (ref 0–0.9)
LYMPHOCYTES # BLD AUTO: 1.14 K/UL — SIGNIFICANT CHANGE UP (ref 1–3.3)
LYMPHOCYTES # BLD AUTO: 14.4 % — SIGNIFICANT CHANGE UP (ref 13–44)
MAGNESIUM SERPL-MCNC: 2.1 MG/DL — SIGNIFICANT CHANGE UP (ref 1.6–2.6)
MCHC RBC-ENTMCNC: 29.8 PG — SIGNIFICANT CHANGE UP (ref 27–34)
MCHC RBC-ENTMCNC: 33.3 GM/DL — SIGNIFICANT CHANGE UP (ref 32–36)
MCV RBC AUTO: 89.3 FL — SIGNIFICANT CHANGE UP (ref 80–100)
MONOCYTES # BLD AUTO: 0.49 K/UL — SIGNIFICANT CHANGE UP (ref 0–0.9)
MONOCYTES NFR BLD AUTO: 6.2 % — SIGNIFICANT CHANGE UP (ref 2–14)
NEUTROPHILS # BLD AUTO: 6.22 K/UL — SIGNIFICANT CHANGE UP (ref 1.8–7.4)
NEUTROPHILS NFR BLD AUTO: 78.3 % — HIGH (ref 43–77)
NRBC # BLD: 0 /100 WBCS — SIGNIFICANT CHANGE UP (ref 0–0)
NT-PROBNP SERPL-SCNC: 58 PG/ML — SIGNIFICANT CHANGE UP (ref 0–300)
PLATELET # BLD AUTO: 84 K/UL — LOW (ref 150–400)
POTASSIUM SERPL-MCNC: 4.5 MMOL/L — SIGNIFICANT CHANGE UP (ref 3.5–5.3)
POTASSIUM SERPL-SCNC: 4.5 MMOL/L — SIGNIFICANT CHANGE UP (ref 3.5–5.3)
PROCALCITONIN SERPL-MCNC: 0.04 NG/ML — SIGNIFICANT CHANGE UP (ref 0.02–0.1)
PROT SERPL-MCNC: 7.2 G/DL — SIGNIFICANT CHANGE UP (ref 6–8.3)
RAPID RVP RESULT: SIGNIFICANT CHANGE UP
RBC # BLD: 4.47 M/UL — SIGNIFICANT CHANGE UP (ref 3.8–5.2)
RBC # BLD: 4.47 M/UL — SIGNIFICANT CHANGE UP (ref 3.8–5.2)
RBC # FLD: 13 % — SIGNIFICANT CHANGE UP (ref 10.3–14.5)
RETICS #: 49.6 K/UL — SIGNIFICANT CHANGE UP (ref 25–125)
RETICS/RBC NFR: 1.1 % — SIGNIFICANT CHANGE UP (ref 0.5–2.5)
SARS-COV-2 RNA SPEC QL NAA+PROBE: SIGNIFICANT CHANGE UP
SODIUM SERPL-SCNC: 139 MMOL/L — SIGNIFICANT CHANGE UP (ref 135–145)
TROPONIN T, HIGH SENSITIVITY RESULT: <6 NG/L — SIGNIFICANT CHANGE UP (ref 0–51)
TROPONIN T, HIGH SENSITIVITY RESULT: <6 NG/L — SIGNIFICANT CHANGE UP (ref 0–51)
TSH SERPL-MCNC: 2.07 UIU/ML — SIGNIFICANT CHANGE UP (ref 0.27–4.2)
WBC # BLD: 7.93 K/UL — SIGNIFICANT CHANGE UP (ref 3.8–10.5)
WBC # FLD AUTO: 7.93 K/UL — SIGNIFICANT CHANGE UP (ref 3.8–10.5)

## 2023-08-13 PROCEDURE — 80053 COMPREHEN METABOLIC PANEL: CPT

## 2023-08-13 PROCEDURE — 99285 EMERGENCY DEPT VISIT HI MDM: CPT | Mod: 25

## 2023-08-13 PROCEDURE — 94640 AIRWAY INHALATION TREATMENT: CPT

## 2023-08-13 PROCEDURE — 71275 CT ANGIOGRAPHY CHEST: CPT | Mod: 26,MA

## 2023-08-13 PROCEDURE — 85045 AUTOMATED RETICULOCYTE COUNT: CPT

## 2023-08-13 PROCEDURE — 71046 X-RAY EXAM CHEST 2 VIEWS: CPT

## 2023-08-13 PROCEDURE — 0225U NFCT DS DNA&RNA 21 SARSCOV2: CPT

## 2023-08-13 PROCEDURE — 84145 PROCALCITONIN (PCT): CPT

## 2023-08-13 PROCEDURE — 93005 ELECTROCARDIOGRAM TRACING: CPT

## 2023-08-13 PROCEDURE — 99285 EMERGENCY DEPT VISIT HI MDM: CPT

## 2023-08-13 PROCEDURE — 96360 HYDRATION IV INFUSION INIT: CPT | Mod: XU

## 2023-08-13 PROCEDURE — 96361 HYDRATE IV INFUSION ADD-ON: CPT

## 2023-08-13 PROCEDURE — 83880 ASSAY OF NATRIURETIC PEPTIDE: CPT

## 2023-08-13 PROCEDURE — 85379 FIBRIN DEGRADATION QUANT: CPT

## 2023-08-13 PROCEDURE — 36415 COLL VENOUS BLD VENIPUNCTURE: CPT

## 2023-08-13 PROCEDURE — 84443 ASSAY THYROID STIM HORMONE: CPT

## 2023-08-13 PROCEDURE — 84484 ASSAY OF TROPONIN QUANT: CPT

## 2023-08-13 PROCEDURE — 83735 ASSAY OF MAGNESIUM: CPT

## 2023-08-13 PROCEDURE — 85025 COMPLETE CBC W/AUTO DIFF WBC: CPT

## 2023-08-13 PROCEDURE — 71046 X-RAY EXAM CHEST 2 VIEWS: CPT | Mod: 26

## 2023-08-13 PROCEDURE — 71275 CT ANGIOGRAPHY CHEST: CPT | Mod: MA

## 2023-08-13 RX ORDER — LIDOCAINE 4 G/100G
1 CREAM TOPICAL ONCE
Refills: 0 | Status: COMPLETED | OUTPATIENT
Start: 2023-08-13 | End: 2023-08-13

## 2023-08-13 RX ORDER — IPRATROPIUM/ALBUTEROL SULFATE 18-103MCG
3 AEROSOL WITH ADAPTER (GRAM) INHALATION ONCE
Refills: 0 | Status: COMPLETED | OUTPATIENT
Start: 2023-08-13 | End: 2023-08-13

## 2023-08-13 RX ORDER — SODIUM CHLORIDE 9 MG/ML
500 INJECTION INTRAMUSCULAR; INTRAVENOUS; SUBCUTANEOUS ONCE
Refills: 0 | Status: COMPLETED | OUTPATIENT
Start: 2023-08-13 | End: 2023-08-13

## 2023-08-13 RX ORDER — SODIUM CHLORIDE 9 MG/ML
1000 INJECTION, SOLUTION INTRAVENOUS ONCE
Refills: 0 | Status: COMPLETED | OUTPATIENT
Start: 2023-08-13 | End: 2023-08-13

## 2023-08-13 RX ORDER — ACETAMINOPHEN 500 MG
975 TABLET ORAL ONCE
Refills: 0 | Status: COMPLETED | OUTPATIENT
Start: 2023-08-13 | End: 2023-08-13

## 2023-08-13 RX ADMIN — SODIUM CHLORIDE 1000 MILLILITER(S): 9 INJECTION, SOLUTION INTRAVENOUS at 17:16

## 2023-08-13 RX ADMIN — SODIUM CHLORIDE 2000 MILLILITER(S): 9 INJECTION, SOLUTION INTRAVENOUS at 11:53

## 2023-08-13 RX ADMIN — Medication 3 MILLILITER(S): at 12:26

## 2023-08-13 RX ADMIN — SODIUM CHLORIDE 500 MILLILITER(S): 9 INJECTION INTRAMUSCULAR; INTRAVENOUS; SUBCUTANEOUS at 20:00

## 2023-08-13 NOTE — ED PROVIDER NOTE - OBJECTIVE STATEMENT
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  She is not currently on anticoagulation for her nonocclusive DVT because she has ovarian cysts which were rupturing and hemorrhaging in the past.  She is never had chest pain like this before, has never had a pulmonary embolism, has not had any fever chills cough or other infectious symptoms during the last few days, denies abdominal pain nausea vomiting diarrhea.  Pain is somewhat pleuritic, she did not injure this area and does not have new workout regimen.

## 2023-08-13 NOTE — ED ADULT NURSE REASSESSMENT NOTE - NS ED NURSE REASSESS COMMENT FT1
Was made aware by ED staff that patient took her own medications that were due. Resident MARU Mead made aware.

## 2023-08-13 NOTE — ED PROVIDER NOTE - NSFOLLOWUPINSTRUCTIONS_ED_ALL_ED_FT
Chest Pain    Please follow with your Primary Care Doctor within 1 week.     For pain you can take Tylenol (Acetaminophen) up to 650mg every 6 hours.    Chest pain can be caused by many different conditions which may or may not be dangerous. Causes include heartburn, lung infections, heart attack, blood clot in lungs, skin infections, strain or damage to muscle, cartilage, or bones, etc. In addition to a history and physical examination, an electrocardiogram (ECG) or other lab tests may have been performed to determine the cause of your chest pain. Follow up with your primary care provider or with a cardiologist as instructed.     SEEK IMMEDIATE MEDICAL CARE IF YOU HAVE ANY OF THE FOLLOWING SYMPTOMS: worsening chest pain, coughing up blood, unexplained back/neck/jaw pain, severe abdominal pain, dizziness or lightheadedness, fainting, shortness of breath, sweaty or clammy skin, vomiting, or racing heart beat. These symptoms may represent a serious problem that is an emergency. Do not wait to see if the symptoms will go away. Get medical help right away. Call 911 and do not drive yourself to the hospital. Chest Pain    Please follow with your Primary Care Doctor within 1 week.     your CTA was negative for a pulmonary embolism.     For pain you can take Tylenol (Acetaminophen) up to 650mg every 6 hours.    Chest pain can be caused by many different conditions which may or may not be dangerous. Causes include heartburn, lung infections, heart attack, blood clot in lungs, skin infections, strain or damage to muscle, cartilage, or bones, etc. In addition to a history and physical examination, an electrocardiogram (ECG) or other lab tests may have been performed to determine the cause of your chest pain. Follow up with your primary care provider or with a cardiologist as instructed.     SEEK IMMEDIATE MEDICAL CARE IF YOU HAVE ANY OF THE FOLLOWING SYMPTOMS: worsening chest pain, coughing up blood, unexplained back/neck/jaw pain, severe abdominal pain, dizziness or lightheadedness, fainting, shortness of breath, sweaty or clammy skin, vomiting, or racing heart beat. These symptoms may represent a serious problem that is an emergency. Do not wait to see if the symptoms will go away. Get medical help right away. Call 911 and do not drive yourself to the hospital.

## 2023-08-13 NOTE — ED PROVIDER NOTE - NS ED ROS FT
CONSTITUTIONAL - No fever, No weight change, No lightheadedness  SKIN - No rash  HEMATOLOGIC - No abnormal bleeding or bruising  EYES - No eye pain, No blurred vision  ENT - No change in hearing, No sore throat, No neck pain, No rhinorrhea, No ear pain  RESPIRATORY -  + shortness of breath, No cough  CARDIAC -  + chest pain, No palpitations  GI - No abdominal pain, No nausea, No vomiting, No diarrhea, No constipation  - No dysuria, no frequency, no hematuria.   MUSCULOSKELETAL - No joint pain, No swelling, No back pain  NEUROLOGIC - No numbness, No focal weakness, No headache, No dizziness

## 2023-08-13 NOTE — ED PROVIDER NOTE - PHYSICAL EXAMINATION
GENERAL: Pt appears anxious in bed, pale, wearing 6 masks over her nose and mouth, pt is very tearful when speaking about her symptoms  NEURO: Alert and Oriented to person, place, date and situation. Pupils symmetric, No ptosis. No facial asymmetry or dysarthria, no tremor noted.  HEENT: No conjunctival injection or scleral icterus.   CARD: Normal rate and regular rhythm, no murmurs and no gallops appreciated.  RESP: Clear to auscultation bilaterally, No wheezes, rales or rhonchi. Good respiratory effort.  ABD: Nondistended, Soft and nontender to palpation in all quadrants, no guarding, no rigidity. No masses appreciated.  EXT: No pedal edema. 2+DP pulses bilaterally.  SKIN: No rashes, bruising or acute skin injuries on face, limbs, abdomen, chest or back

## 2023-08-13 NOTE — ED ADULT NURSE NOTE - NSFALLUNIVINTERV_ED_ALL_ED
Assistance OOB with selected safe patient handling equipment if applicable/Bed/Stretcher in lowest position, wheels locked, appropriate side rails in place/Call bell, personal items and telephone in reach/Instruct patient to call for assistance before getting out of bed/chair/stretcher/Non-slip footwear applied when patient is off stretcher/[place] to call system/Physically safe environment - no spills, clutter or unnecessary equipment/Purposeful proactive rounding/Room/bathroom lighting operational, light cord in reach

## 2023-08-13 NOTE — ED ADULT NURSE NOTE - OBJECTIVE STATEMENT
Pt is a 47y F, AxO3, PMH DVT, idiopathic thrombocytopenia purpura, autonomic dysreflexia, presents to ED for dyspnea x5 days. Pt states she is immunocompromised but tried to socialize more last week. 5 days ago she developed a persistent dry cough and SOB. States she feels as though she cannot take a deep breathe. Went to  for eval Thursday and was referred to ER if symptoms persisted. Endorsing multiple episodes of diarrhea this morning. On assessment, breathing is spontaneous, mildly labored and shallow, abd is soft and nontender, skin is warm dry and intact. Denies  symptoms, fevers, chills, syncope, vomiting. Pt is well appearing, speaking full sentences, resting comfortably in bed, VSS, no acute distress at this time.

## 2023-08-13 NOTE — ED PROVIDER NOTE - PATIENT PORTAL LINK FT
You can access the FollowMyHealth Patient Portal offered by [facility] by registering at the following website: [email or web address]. By joining [facility]’s FollowMyHealth portal, you will also be able to view your health information using other applications (apps) compatible with our system.

## 2023-08-13 NOTE — ED ADULT NURSE NOTE - ISOLATION AIRBORNE CONTACT OTHER
History & Physical Exam - Critical Care   Arpita Vuong 51 y o  female MRN: [id number]  Unit/Bed#: ED 17 Encounter: [id number]      Assessment/Plan:  1  Cardiac arrest secondary to drug overdose  · Respiration of rhythm  · Would not proceed with hypothermia protocol given the severe neurologic abnormality at presentation, prolonged and unknown length of down time, and initial presenting rhythm of asystole  2  Acute toxic metabolic encephalopathy secondary to drug overdose and anoxia  · We will follow mental status  · Return to normothermia and avoid hyperthermia  We will not actively rewarm  · Initial head CT was unremarkable  3  Metabolic acidosis, likely secondary to lactate  · Check lactic acid level  · Control blood sugars, not known to be diabetic at baseline  4  Acute respiratory failure with hypoxia  · This is secondary to the cardiac arrest  · Continue ventilator support  5  Benign essential hypertension  · Monitor blood pressure  Initially hypotensive  · Has been on antihypertensives in the past and may need to reinstitute if blood pressure remains elevated  6  Continuous opioid dependence  · History of heroin abuse  · Likely heroin overdose on this presentation  7  Hepatitis C infection  · Chronic infection by history  8  Tobacco abuse  · Known to be a smoker up until recent hospitalization in May  9  Bipolar I disorder  · Identified by history      Given the prolonged downtime, anticipate a significant anoxic brain injury  Prognosis therefore is likely poor  Critical Care Time:  46 minutes  Documented critical care time excludes any procedures documented elsewhere  It also excludes any family updates    _____________________________________________________________________      HPI:    Arpita Vuong is a 51 y o  female who presents with out of hospital cardiac arrest secondary to heroin overdose    She was anticipated to be down prior to any institution of therapy for up to 10 minutes or more   She was given Narcan on seen by police who responded first but without return of circulation  She ultimately had CPR and initiation of epinephrine and other code blue resuscitation medications without return of spontaneous circulation  She was subsequently able to have improvement and restoration of rhythm with emergency department interventions  Further history was not obtainable  It is unclear how long the down time was but the initial rhythm was asystole  Pre CPR down time was in excess of 10 minutes  Restoration of rhythm since the initiation of CPR was believed to be well over 20 minutes  She initially came in on identified    Eventually family was able to be identified and medical history was obtained from a previous chart record    Review of Systems:  Not obtainable      Historical Information   Past Medical History:   Diagnosis Date    Benign essential HTN     [facility] [address] [facility]     [facility]     Dysmenorrhea     ETOH abuse     LISA (generalized anxiety disorder)     Hallux valgus     Hepatitis C infection     IBS (irritable bowel syndrome)     Rosacea      Past Surgical History:   Procedure Laterality Date     SECTION      HAND SURGERY       Social History   History   Alcohol Use    Yes     History   Drug Use    Types: Heroin     History   Smoking Status    Current Every Day Smoker    Packs/day: 1 00    Years: 15 00    Types: Cigarettes   Smokeless Tobacco    Never Used       Family History:   Family History   Problem Relation Age of Onset    Family history unknown: Yes       Medications:  Pertinent medications were reviewed    Current Facility-Administered Medications:  lactated ringers 1,000 mL Intravenous Once Souleymane Che DO Last Rate: 1,000 mL (18 1103)   norepinephrine 1-30 mcg/min Intravenous Titrated Souleymane Che DO Last Rate: Stopped (18 1009)         No Known Allergies      Vitals:   BP (!) 197/112 Pulse 86   Temp (!) 94 8 °F (34 9 °C) Comment (Src): temp aguirre  Resp (!) 137   Wt 60 4 kg (133 lb 2 5 oz)   SpO2 100%   There is no height or weight on file to calculate BMI  SpO2: 100 %,    ,        No intake or output data in the 24 hours ending 11/25/18 1123  Invasive Devices     Central Venous Catheter Line            CVC Central Lines 11/25/18 Triple Right Subclavian less than 1 day          Peripheral Intravenous Line            Peripheral IV 11/25/18 Right Hand less than 1 day          Drain            NG/OG/Enteral Tube Orogastric 18 Fr Left mouth less than 1 day    Urethral Catheter Other (Comment) less than 1 day                Physical Exam:  Gen:  Comatose  Has gasping attempts at respiration on the ventilator  HEENT:  Pupils are fixed and dilated  There is no corneal reflex  Oropharynx is moist with endotracheal tube in place  Neck:  No JVD or adenopathy  Prominent external jugular veins  Chest:  Clear to auscultation bilaterally  No wheeze  Cor:  Regular, tachycardic, no appreciable murmur  Abd:  Soft, benign  Ext:  No clubbing or cyanosis  Mild right calf edema  Neuro:  Comatose  Nonresponsive  No corneal or gag reflexes  Skin:  Multiple areas of injection sites on the lower extremities  There is also a right tibial injection site from the I/O resuscitation access point which has since been removed      Diagnostic Data:  Lab: I have personally reviewed pertinent lab results  ,   CBC:    Results from last 7 days  Lab Units 11/25/18  0931   WBC Thousand/uL 9 45   HEMOGLOBIN g/dL 10 8*   HEMATOCRIT % 37 2   PLATELETS Thousands/uL 175      CMP: Lab Results   Component Value Date    K 4 1 11/25/2018     11/25/2018    CO2 22 11/25/2018    CO2 18 (L) 11/25/2018    BUN 12 11/25/2018    CREATININE 1 48 (H) 11/25/2018    GLUCOSE 167 (H) 11/25/2018    CALCIUM 8 7 11/25/2018    AST 41 11/25/2018    ALT 43 11/25/2018    ALKPHOS 81 11/25/2018    EGFR 20 11/25/2018    EGFR 37 11/25/2018   , PT/INR: No results found for: PT, INR,   Magnesium: No components found for: MAG,  Phosphorous: No results found for: PHOS    ABG: Lab Results   Component Value Date    PHART 7 167 (LL) 11/25/2018    AXL7PGB 44 5 (H) 11/25/2018    PO2ART 275 7 (H) 11/25/2018    XHL9WFL 15 8 (L) 11/25/2018    BEART -12 3 11/25/2018    SOURCE Radial, Right 11/25/2018   ,     Imaging: I have personally reviewed the pertinent imaging studies on the PACS system  CT of the head shows preservation of gray-white junction  No acute intracranial abnormality    Chest x-ray shows clear lung fields bilaterally    Cardiac/EKG/telemetry/Echo:     Results from last 7 days  Lab Units 11/25/18  0931   TROPONIN I ng/mL <0 02       VTE Prophylaxis: Heparin    Code Status:  Full code    Shira Wesley MD    Portions of the record may have been created with voice recognition software  Occasional wrong word or "sound a like" substitutions may have occurred due to the inherent limitations of voice recognition software  Read the chart carefully and recognize, using context, where substitutions have occurred  r/o flu covid

## 2023-08-13 NOTE — ED PROVIDER NOTE - PROGRESS NOTE DETAILS
Alyce ARRIOLA, EM/IM PGY-3: Patient is due for her Ativan, she prefers to take her home dose rather than the pill given by nurse as she says it contains additives that do not sit well with her. Alyce ARRIOLA, EM/IM PGY-3: Patient reported to nurse that her left-sided chest pain has increased 3-5 fold since she came in.  At this time patient declined a Tylenol or lidocaine patch as she says he is very sensitive to medications and does not want to take anything she may have a reaction to, oxygen still greater than 95% on room air, heart rate normal.  Spoke with patient about normal CBC, CMP, D-dimer, BNP, troponin.  She is adamant that last time she had a clot that her D-dimer was negative, still extremely fearful about pulmonary embolism, reports that she will talk to her hematologist about resuming anticoagulation if she was found to have a clot and it was deemed to be the cause of her symptoms, patient very adamant about requiring a CT angiogram, says her family member  of a PE.  We will get a repeat troponin and CTA.  Chest x-ray clear, RVP negative. Rosalee Reynolds MD (PGY-2 EM): CTA negative. patient feels improved and would like to go home. patient will fu with pcp The patient was serially evaluated throughout emergency department course by the team. There was no acute deterioration up to this time in the emergency department. The patient has demonstrated clinical improvement and/or stability, feels better at this time according to emergency department team. Agree with goals/plan of emergency department care as described in this physician's electronic medical record, including diagnostics, therapeutics and consultation recommendation as clinically warranted. Will discharge home with close outpatient follow up with primary care physician/provider and specialist if necessary. Patient's family educated on concerning signs and features to return to the emergency department, in layman terms, including but not limited to: nausea, vomiting, fever, chills, persistent/worsening symptoms or any concerns at all. There are no acute or immediate life threatening issues present on history, clinical exam, or any diagnostic evaluation. The patient and/or family/guardian were given the opportunity to ask questions and have them answered in full. The family/guardian is with capacity and insight into the situation, treatment, risks, benefits, alternative therapies, and understand that they can ask any further questions if needed. Patient is a safe disposition home, family/guardian has capacity and insight into their condition, no further questions in the emergency department and will follow up with their doctor(s) this week. The family and/or guardian understands anticipatory guidance and was given strict return and follow up precautions.  The family and/or guardian has been informed of all concerning signs and symptoms to return to Emergency Department, the necessity to follow up with PMD/Clinic/follow up provided within 2 days was explained, and the family and/or guardian reports understanding of above with capacity and insight. The patient and/or family/guardian were informed of any results of their tests and are were encouraged to follow up on the findings with their doctor as well as the need to inform their doctor of any results. The patient and/or family/guardian are aware of the need to follow up with repeat testing as applicable and report understanding of the above with capacity and insight. The patient and/or family/guardian was made aware of any pending test results at the time of discharge and of the need to call back for the final results as well as the need to inform their doctor of the results.

## 2023-08-13 NOTE — ED PROVIDER NOTE - CLINICAL SUMMARY MEDICAL DECISION MAKING FREE TEXT BOX
see MD Note 47-year-old female extensive past medical history with clotting disorder not on anticoagulation presenting with shortness of breath and left-sided chest pain.  Vital signs on arrival within normal limits, in normal sinus rhythm, afebrile,  >95% on room air.  Patient has a significant distress of her symptoms is very fearful that she may have a pulmonary embolism, patient is at risk for pulmonary embolism given her prior medical history however given her stable vital signs we will start with blood work including CBC, CMP, troponin, BNP, D-dimer chest x-ray, RVP.

## 2023-08-13 NOTE — ED PROVIDER NOTE - ATTENDING CONTRIBUTION TO CARE
------------ATTENDING NOTE------------   pt c/o several days of nasal congestion, unproductive cough, wheezing, constant mild chest pain worse in L chest, complicated by h/o asthma, pt describes being around COVID patients, complicated as pt has high baseline anxiety and having panic attack in ED, decreased PO and feels dehydrated, awaiting labs/imaging and close reassessments -->   - Rolando Willson MD   ---------------------------------------------- ------------ATTENDING NOTE------------   pt c/o several days of nasal congestion, unproductive cough, wheezing, constant mild chest pain worse in L chest, complicated by h/o asthma, pt describes being around COVID patients, complicated as pt has high baseline anxiety and having panic attack in ED, decreased PO and feels dehydrated, awaiting labs/imaging and close reassessments --> labs overall wnl as expected, pt extremely anxious for CTA Chest to r/o PE (low suspicion), ED sign out 1500 pending full labs/imaging and close reassessments for tx / dispo decisions.  - Rolando Willson MD   ----------------------------------------------

## 2023-08-13 NOTE — ED PROVIDER NOTE - CARE PLAN
1 Principal Discharge DX:	Acute bronchitis with asthma  Secondary Diagnosis:	Moderate dehydration  Secondary Diagnosis:	Anxiety about health

## 2023-08-21 ENCOUNTER — APPOINTMENT (OUTPATIENT)
Dept: PULMONOLOGY | Facility: CLINIC | Age: 48
End: 2023-08-21
Payer: MEDICARE

## 2023-08-21 DIAGNOSIS — Z86.2 PERSONAL HISTORY OF DISEASES OF THE BLOOD AND BLOOD-FORMING ORGANS AND CERTAIN DISORDERS INVOLVING THE IMMUNE MECHANISM: ICD-10-CM

## 2023-08-21 DIAGNOSIS — Z86.19 PERSONAL HISTORY OF OTHER INFECTIOUS AND PARASITIC DISEASES: ICD-10-CM

## 2023-08-21 DIAGNOSIS — F41.9 ANXIETY DISORDER, UNSPECIFIED: ICD-10-CM

## 2023-08-21 DIAGNOSIS — J30.9 ALLERGIC RHINITIS, UNSPECIFIED: ICD-10-CM

## 2023-08-21 DIAGNOSIS — J45.909 UNSPECIFIED ASTHMA, UNCOMPLICATED: ICD-10-CM

## 2023-08-21 DIAGNOSIS — Z87.898 PERSONAL HISTORY OF OTHER SPECIFIED CONDITIONS: ICD-10-CM

## 2023-08-21 DIAGNOSIS — R93.89 ABNORMAL FINDINGS ON DIAGNOSTIC IMAGING OF OTHER SPECIFIED BODY STRUCTURES: ICD-10-CM

## 2023-08-21 DIAGNOSIS — M79.671 PAIN IN RIGHT FOOT: ICD-10-CM

## 2023-08-21 DIAGNOSIS — R06.02 SHORTNESS OF BREATH: ICD-10-CM

## 2023-08-21 DIAGNOSIS — Z86.718 PERSONAL HISTORY OF OTHER VENOUS THROMBOSIS AND EMBOLISM: ICD-10-CM

## 2023-08-21 PROCEDURE — 94618 PULMONARY STRESS TESTING: CPT

## 2023-08-21 PROCEDURE — 99204 OFFICE O/P NEW MOD 45 MIN: CPT | Mod: 25

## 2023-08-21 PROCEDURE — 95012 NITRIC OXIDE EXP GAS DETER: CPT

## 2023-08-21 PROCEDURE — 94729 DIFFUSING CAPACITY: CPT

## 2023-08-21 PROCEDURE — 94010 BREATHING CAPACITY TEST: CPT

## 2023-08-21 PROCEDURE — 71046 X-RAY EXAM CHEST 2 VIEWS: CPT

## 2023-08-21 PROCEDURE — 94727 GAS DIL/WSHOT DETER LNG VOL: CPT

## 2023-08-21 RX ORDER — BACLOFEN 5 MG/1
5 TABLET ORAL
Qty: 120 | Refills: 1 | Status: ACTIVE | COMMUNITY
Start: 2023-08-21 | End: 1900-01-01

## 2023-08-21 NOTE — ADDENDUM
[FreeTextEntry1] : Documented by Jimbo Hamilton acting as a scribe for Dr. Christian Sunshine on 08/21/2023.   All medical record entries made by the Scribe were at my, Dr. Christian Sunshine's, direction and personally dictated by me on 08/21/2023. I have reviewed the chart and agree that the record accurately reflects my personal performance of the history, physical exam, assessment and plan. I have also personally directed, reviewed, and agree with the discharge instructions.

## 2023-08-21 NOTE — PROCEDURE
[FreeTextEntry1] : CT Chest (8.19.2021) revealed No abnormality in the chest, abdomen and pelvis.  CT Chest (8.2023) revealed a 2 mm left apical nodule  CXR 08/21/2023 reveals a normal sized heart; no evidence of infiltrate or effusion -- bit of scoliosis in the LS spine area   Full PFT reveals normal flows; FEV1 was 3.11 L which is 127% of predicted; normal lung volumes; normal diffusion at 19.7, which is 126% of predicted; normal flow volume loop. PFTs were performed to evaluate for SOB  6 minute walk test reveals a low saturation of 95% with moderate evidence of dyspnea or fatigue; walked 423.8 meters stopping before the 6 minutes due to the patient feeling fatigued and very short of breath   FENO was 17; a normal value being less than 25 Fractional exhaled nitric oxide (FENO) is regarded as a simple, noninvasive method for assessing eosinophilic airway inflammation. Produced by a variety of cells within the lung, nitric oxide (NO) concentrations are generally low in healthy individuals. However, high concentrations of NO appear to be involved in nonspecific host defense mechanisms and chronic inflammatory diseases such as asthma. The American Thoracic Society (ATS) therefore has recommended using FENO to aid in the diagnosis and monitoring of eosinophilic airway inflammation and asthma, and for identifying steroid responsive individuals whose chronic respiratory symptoms may be caused by airway inflammation.

## 2023-08-21 NOTE — REASON FOR VISIT
[Initial] : an initial visit [Follow-Up] : a follow-up visit [FreeTextEntry1] : allergic rhinitis, asthma, atypical chest pain, palpitations, SOB, vitamin D deficiency

## 2023-08-21 NOTE — HISTORY OF PRESENT ILLNESS
[TextBox_4] : Ms. MCBRIDE  is a 47 year old female with a history of presenting to the office today for an initial pulmonary evaluation. Her chief complaint is     -she denies any headaches, nausea, emesis, fever, chills, sweats, chest pain, chest pressure, coughing, wheezing, palpitations, constipation, diarrhea, vertigo, dysphagia, heartburn, reflux, itchy eyes, itchy ears, leg swelling, leg pain, arthralgias, myalgias, or sour taste in the mouth. [FreeTextEntry1] : Ms. Presley is a 46 y/o female who presents to the office for a follow up visit for allergic rhinitis, asthma, atypical chest pain, palpitations, SOB, vitamin D deficiency. She is s/p hospitalization due to tachycardia Her chief complaint is her cough  - she notes having 2 past DVTs (2018, within last couple of years) - she notes she had COVID (10/2022) and RSV (10/2022) - she notes developing GERD  - she notes having early Sjogrens - she ntoes having Protein S deficiency - she notes after COVID she has noticed bruising easily - she notes 1/2023 she fractured her toe that hasnt healed - she notes when trying to stretch she banged her right foot which already had a broken toe and ankle  - she notes she has trouble swallowing pills - she notes having episodes of syncope - she notes having low vitamin D and B - she notes she is not taking Xarelto since her ovarian cysts were rupturing - she notes taking Pepcid for GERD - she notes not eating well - she notes a bout of coughing 2 weeks ago after eating olive oil which she knows is a trigger  - she notes every time she eats recently she gets chest pain and coughs  - she notes a recent ECHO was stable - she notes SOB after eating and when walking around - she notes not using any inhalers - she notes taking Montelukast and Ventolin - she notes not sleeping well  - she notes her sinuses are congested   -she denies any headaches, nausea, emesis, fever, chills, sweats, chest pain, chest pressure, wheezing, palpitations, constipation, diarrhea, vertigo, dysphagia, heartburn, reflux, itchy eyes, itchy ears, leg swelling, leg pain, arthralgias, myalgias, or sour taste in the mouth.

## 2023-08-21 NOTE — ASSESSMENT
[FreeTextEntry1] : Ms. Presley is a 47 year old female with protein S deficiency, ITP, DVT (x2), POTS, C. diff colitis, autonomic dysfunction, B12 deficiency, vitamin D deficiency, mild asthma, allergies, GERD, COVID 19 (10/2022 - long). RSV (10/2022) - Sjogren's D2, gastroparesis now w/ flair of asthma / increased GERD sx  Her chest pain appears to be multifactorial due to: -part musculoskeletal -? pleurisy - diaphragm dysfunction - anemia -pulmonary embolism (no evidence) -cardiac -? asthma  problem 1: asthma (Active) -continue to use Singulair 10 mg QHS -continue to use Ventolin PRN -set up MCT when able; refused other inhalers  -Asthma is  believed to be caused by inherited (genetic) and environmental factor, but its exact cause is unknown. Asthma may be triggered by allergens, lung infections, or irritants in the air. Asthma triggers are different for each person  problem 2: diaphragm dysfunction  -complete fluoroscopy of diaphragm 2019  problem 3: ITP/ hypercoagulable state (DVT x2) - s/p Xarelto - off Rx - continue to follow up with Dr. Nakul Zamora et al.   problem 4: allergic rhinitis -continue to use nasal saline -continue to use OTC antihistamine, PRN -blood work to include: food IgE level, asthma profile, IgE level, vitamin D,eosinophil level  problem 5: GERD / Gastroparesis  -add Pepcid 10 mg BID -recommended Reflux Gourmet OTC -consider Baclofen 5 mg premeal, QHS -Rule of 2s: avoid eating too much, eating too late, eating too spicy, eating two hours before bed -Things to avoid including overeating, spicy foods, tight clothing, eating within three hours of bed, this list is not all inclusive.  -For treatment of reflux, possible options discussed including diet control, H2 blockers, PPIs, as well as coating motility agents discussed as treatment options. Timing of meals and proximity of last meal to sleep were discussed. If symptoms persist, a formal gastrointestinal evaluation is needed.  problem 6: r/o JUAN -she is being set up for an at home sleep study based on her fatigue, EDS, snoring, and elevated mallampati class  -Sleep apnea is associated with adverse clinical consequences which an affect most organ systems.  Cardiovascular disease risk includes arrhythmias, atrial fibrillation, hypertension, coronary artery disease, and stroke. Metabolic disorders include diabetes type 2, non-alcoholic fatty liver disease. Mood disorder especially depression; and cognitive decline especially in the elderly. Associations with  chronic reflux/Bearden's esophagus some but not all inclusive.  -Reasons to assess this include arousal consistent with hypopnea; respiratory events most prominent in REM sleep or supine position; therefore sleep staging and body position are important for accurate diagnosis and estimation of AHI.  problem 7: autonomic dysfunction -recommended [facility] and was referred to Dr. Sheryl Leventhal (past)  problem 8: r/o PAH (no evidence) -continue to follow up with Cristhian Parker  problem 9: abnormal chest CT (most likely inflammatory) prior - last 2021  -follow up chest CT now ( no-contrast) 8/2023  problem 10: health maintenance  -s/p COVID 19 10/2022 - resolved  -s/p RSV 10/2022 - resolved  - Sjogren's - Sachin Blackmon  -recommended yearly flu shot after October 15 -recommended strep pneumonia vaccines: Prevnar-13 vaccine, followed by Pneumo vaccine 23 one year following -recommended early intervention for URIs -recommended regular osteoporosis evaluations -recommended early dermatological evaluations -recommended after the age of 50 to the age of 70, colonoscopy every 5 years   F/U in 6-8 weeks with full PFTs She is encouraged to call with any changes, concerns, or questions

## 2023-08-21 NOTE — PHYSICAL EXAM
[No Acute Distress] : no acute distress [No Neck Mass] : no neck mass [Normal Rate/Rhythm] : normal rate/rhythm [Normal S1, S2] : normal s1, s2 [No Murmurs] : no murmurs [No Resp Distress] : no resp distress [Clear to Auscultation Bilaterally] : clear to auscultation bilaterally [No Abnormalities] : no abnormalities [Benign] : benign [Normal Gait] : normal gait [No Clubbing] : no clubbing [No Cyanosis] : no cyanosis [No Edema] : no edema [FROM] : FROM [Normal Color/ Pigmentation] : normal color/ pigmentation [Oriented x3] : oriented x3 [Normal Affect] : normal affect [General Appearance - Well Developed] : well developed [Normal Appearance] : normal appearance [General Appearance - Well Nourished] : well nourished [Well Groomed] : well groomed [No Deformities] : no deformities [General Appearance - In No Acute Distress] : no acute distress [Normal Conjunctiva] : the conjunctiva exhibited no abnormalities [Eyelids - No Xanthelasma] : the eyelids demonstrated no xanthelasmas [Normal Oropharynx] : normal oropharynx [II] : II [Neck Appearance] : the appearance of the neck was normal [Neck Cervical Mass (___cm)] : no neck mass was observed [Jugular Venous Distention Increased] : there was no jugular-venous distention [Thyroid Diffuse Enlargement] : the thyroid was not enlarged [Thyroid Nodule] : there were no palpable thyroid nodules [Heart Rate And Rhythm] : heart rate and rhythm were normal [Heart Sounds] : normal S1 and S2 [Murmurs] : no murmurs present [Respiration, Rhythm And Depth] : normal respiratory rhythm and effort [Exaggerated Use Of Accessory Muscles For Inspiration] : no accessory muscle use [Auscultation Breath Sounds / Voice Sounds] : lungs were clear to auscultation bilaterally [Abdomen Soft] : soft [Abdomen Tenderness] : non-tender [Abdomen Mass (___ Cm)] : no abdominal mass palpated [Abnormal Walk] : normal gait [Gait - Sufficient For Exercise Testing] : the gait was sufficient for exercise testing [Nail Clubbing] : no clubbing of the fingernails [Cyanosis, Localized] : no localized cyanosis [Petechial Hemorrhages (___cm)] : no petechial hemorrhages [Skin Color & Pigmentation] : normal skin color and pigmentation [] : no rash [No Venous Stasis] : no venous stasis [Skin Lesions] : no skin lesions [No Skin Ulcers] : no skin ulcer [No Xanthoma] : no  xanthoma was observed [Deep Tendon Reflexes (DTR)] : deep tendon reflexes were 2+ and symmetric [Sensation] : the sensory exam was normal to light touch and pinprick [No Focal Deficits] : no focal deficits [Oriented To Time, Place, And Person] : oriented to person, place, and time [Impaired Insight] : insight and judgment were intact [Affect] : the affect was normal [TextBox_68] : I:E ratio 1:3; clear  [FreeTextEntry1] : I:E 1:3, clear

## 2023-08-21 NOTE — REVIEW OF SYSTEMS
[Cough] : cough [Pleuritic Pain] : pleuritic pain [Wheezing] : wheezing [Palpitations] : palpitations [As Noted in HPI] : as noted in HPI [Heartburn] : heartburn [Reflux] : reflux [Dysphagia] : dysphagia [Negative] : Sleep Disorder

## 2023-08-30 NOTE — ED PROVIDER NOTE - DISPOSITION TYPE
DISCHARGE Peng Advancement Flap Text: The defect edges were debeveled with a #15 scalpel blade.  Given the location of the defect, shape of the defect and the proximity to free margins a Peng advancement flap was deemed most appropriate.  Using a sterile surgical marker, an appropriate advancement flap was drawn incorporating the defect and placing the expected incisions within the relaxed skin tension lines where possible. The area thus outlined was incised deep to adipose tissue with a #15 scalpel blade.  The skin margins were undermined to an appropriate distance in all directions utilizing iris scissors.

## 2023-09-08 NOTE — ED PROVIDER NOTE - WR ORDER DATE AND TIME 1
-- DO NOT REPLY / DO NOT REPLY ALL --  -- Message is from Engagement Center Operations ([facility]O) --    General Patient Message: Patient was in today for an appointment and looking for orders to be sent to Dr Qamar Raza urologist office. Please reach out if needed.     Office number is [phone] ([facility])            
Office note from today faxed.   
26-Oct-2022 09:46

## 2023-09-11 NOTE — ED PROVIDER NOTE - MUSCULOSKELETAL NEGATIVE STATEMENT, MLM
no back pain, no gout, no musculoskeletal pain, no neck pain, and no weakness. pt reports sudden onset pain and swelling on lower back; noted swelling on right upper buttock; pt denies fever, nausea; pt alert & oriented, ambulates independently

## 2023-09-11 NOTE — PROGRESS NOTE BEHAVIORAL HEALTH - SECONDARY DX1
Expiration Date For Kenalog (Optional):  Expiration Date (Optional):  MDD (major depressive disorder)

## 2023-09-28 NOTE — ED ADULT TRIAGE NOTE - BP NONINVASIVE SYSTOLIC (MM HG)
118 Hide Include Location In Plan Question?: No Detail Level: Zone Include Location In Plan?: Yes Detail Level: Detailed

## 2023-10-06 ENCOUNTER — INPATIENT (INPATIENT)
Facility: HOSPITAL | Age: 48
LOS: 2 days | Discharge: HOME | DRG: 640 | End: 2023-10-09
Attending: INTERNAL MEDICINE | Admitting: INTERNAL MEDICINE
Payer: COMMERCIAL

## 2023-10-06 VITALS
OXYGEN SATURATION: 97 % | SYSTOLIC BLOOD PRESSURE: 140 MMHG | DIASTOLIC BLOOD PRESSURE: 90 MMHG | RESPIRATION RATE: 20 BRPM | HEIGHT: 61 IN | WEIGHT: 89.95 LBS | HEART RATE: 105 BPM | TEMPERATURE: 98 F

## 2023-10-06 DIAGNOSIS — Z90.49 ACQUIRED ABSENCE OF OTHER SPECIFIED PARTS OF DIGESTIVE TRACT: Chronic | ICD-10-CM

## 2023-10-06 DIAGNOSIS — R62.7 ADULT FAILURE TO THRIVE: ICD-10-CM

## 2023-10-06 LAB
ALBUMIN SERPL ELPH-MCNC: 4.4 G/DL — SIGNIFICANT CHANGE UP (ref 3.3–5)
ALP SERPL-CCNC: 72 U/L — SIGNIFICANT CHANGE UP (ref 40–120)
ALT FLD-CCNC: 19 U/L — SIGNIFICANT CHANGE UP (ref 10–45)
ANION GAP SERPL CALC-SCNC: 12 MMOL/L — SIGNIFICANT CHANGE UP (ref 5–17)
AST SERPL-CCNC: 21 U/L — SIGNIFICANT CHANGE UP (ref 10–40)
BASOPHILS # BLD AUTO: 0.01 K/UL — SIGNIFICANT CHANGE UP (ref 0–0.2)
BASOPHILS NFR BLD AUTO: 0.1 % — SIGNIFICANT CHANGE UP (ref 0–2)
BILIRUB SERPL-MCNC: 0.4 MG/DL — SIGNIFICANT CHANGE UP (ref 0.2–1.2)
BUN SERPL-MCNC: 6 MG/DL — LOW (ref 7–23)
CALCIUM SERPL-MCNC: 9.2 MG/DL — SIGNIFICANT CHANGE UP (ref 8.4–10.5)
CHLORIDE SERPL-SCNC: 106 MMOL/L — SIGNIFICANT CHANGE UP (ref 96–108)
CO2 SERPL-SCNC: 22 MMOL/L — SIGNIFICANT CHANGE UP (ref 22–31)
CREAT SERPL-MCNC: 0.82 MG/DL — SIGNIFICANT CHANGE UP (ref 0.5–1.3)
EGFR: 89 ML/MIN/1.73M2 — SIGNIFICANT CHANGE UP
EOSINOPHIL # BLD AUTO: 0 K/UL — SIGNIFICANT CHANGE UP (ref 0–0.5)
EOSINOPHIL NFR BLD AUTO: 0 % — SIGNIFICANT CHANGE UP (ref 0–6)
GLUCOSE SERPL-MCNC: 104 MG/DL — HIGH (ref 70–99)
HCT VFR BLD CALC: 38.3 % — SIGNIFICANT CHANGE UP (ref 34.5–45)
HGB BLD-MCNC: 13.1 G/DL — SIGNIFICANT CHANGE UP (ref 11.5–15.5)
IMM GRANULOCYTES NFR BLD AUTO: 0.4 % — SIGNIFICANT CHANGE UP (ref 0–0.9)
LYMPHOCYTES # BLD AUTO: 0.88 K/UL — LOW (ref 1–3.3)
LYMPHOCYTES # BLD AUTO: 11.4 % — LOW (ref 13–44)
MCHC RBC-ENTMCNC: 29.7 PG — SIGNIFICANT CHANGE UP (ref 27–34)
MCHC RBC-ENTMCNC: 34.2 GM/DL — SIGNIFICANT CHANGE UP (ref 32–36)
MCV RBC AUTO: 86.8 FL — SIGNIFICANT CHANGE UP (ref 80–100)
MONOCYTES # BLD AUTO: 0.59 K/UL — SIGNIFICANT CHANGE UP (ref 0–0.9)
MONOCYTES NFR BLD AUTO: 7.6 % — SIGNIFICANT CHANGE UP (ref 2–14)
NEUTROPHILS # BLD AUTO: 6.24 K/UL — SIGNIFICANT CHANGE UP (ref 1.8–7.4)
NEUTROPHILS NFR BLD AUTO: 80.5 % — HIGH (ref 43–77)
NRBC # BLD: 0 /100 WBCS — SIGNIFICANT CHANGE UP (ref 0–0)
PLATELET # BLD AUTO: 84 K/UL — LOW (ref 150–400)
POTASSIUM SERPL-MCNC: 4.2 MMOL/L — SIGNIFICANT CHANGE UP (ref 3.5–5.3)
POTASSIUM SERPL-SCNC: 4.2 MMOL/L — SIGNIFICANT CHANGE UP (ref 3.5–5.3)
PROT SERPL-MCNC: 6.8 G/DL — SIGNIFICANT CHANGE UP (ref 6–8.3)
RBC # BLD: 4.41 M/UL — SIGNIFICANT CHANGE UP (ref 3.8–5.2)
RBC # FLD: 12.7 % — SIGNIFICANT CHANGE UP (ref 10.3–14.5)
SODIUM SERPL-SCNC: 140 MMOL/L — SIGNIFICANT CHANGE UP (ref 135–145)
WBC # BLD: 7.75 K/UL — SIGNIFICANT CHANGE UP (ref 3.8–10.5)
WBC # FLD AUTO: 7.75 K/UL — SIGNIFICANT CHANGE UP (ref 3.8–10.5)

## 2023-10-06 PROCEDURE — 74176 CT ABD & PELVIS W/O CONTRAST: CPT | Mod: 26

## 2023-10-06 PROCEDURE — 99285 EMERGENCY DEPT VISIT HI MDM: CPT

## 2023-10-06 RX ORDER — FAMOTIDINE 10 MG/ML
10 INJECTION INTRAVENOUS ONCE
Refills: 0 | Status: COMPLETED | OUTPATIENT
Start: 2023-10-06 | End: 2023-10-06

## 2023-10-06 RX ORDER — SODIUM CHLORIDE 9 MG/ML
1000 INJECTION, SOLUTION INTRAVENOUS
Refills: 0 | Status: DISCONTINUED | OUTPATIENT
Start: 2023-10-06 | End: 2023-10-06

## 2023-10-06 RX ORDER — FAMOTIDINE 10 MG/ML
10 INJECTION INTRAVENOUS
Refills: 0 | Status: DISCONTINUED | OUTPATIENT
Start: 2023-10-06 | End: 2023-10-07

## 2023-10-06 RX ORDER — DILTIAZEM HCL 120 MG
180 CAPSULE, EXT RELEASE 24 HR ORAL DAILY
Refills: 0 | Status: DISCONTINUED | OUTPATIENT
Start: 2023-10-06 | End: 2023-10-09

## 2023-10-06 RX ORDER — SODIUM CHLORIDE 9 MG/ML
1000 INJECTION, SOLUTION INTRAVENOUS
Refills: 0 | Status: DISCONTINUED | OUTPATIENT
Start: 2023-10-06 | End: 2023-10-07

## 2023-10-06 RX ORDER — THIAMINE MONONITRATE (VIT B1) 100 MG
100 TABLET ORAL DAILY
Refills: 0 | Status: DISCONTINUED | OUTPATIENT
Start: 2023-10-06 | End: 2023-10-06

## 2023-10-06 RX ORDER — DILTIAZEM HCL 120 MG
1 CAPSULE, EXT RELEASE 24 HR ORAL
Refills: 0 | DISCHARGE

## 2023-10-06 RX ORDER — ACETAMINOPHEN 500 MG
650 TABLET ORAL EVERY 6 HOURS
Refills: 0 | Status: DISCONTINUED | OUTPATIENT
Start: 2023-10-06 | End: 2023-10-09

## 2023-10-06 RX ORDER — MONTELUKAST 4 MG/1
10 TABLET, CHEWABLE ORAL AT BEDTIME
Refills: 0 | Status: DISCONTINUED | OUTPATIENT
Start: 2023-10-06 | End: 2023-10-07

## 2023-10-06 RX ORDER — CETIRIZINE HYDROCHLORIDE 10 MG/1
1 TABLET ORAL
Refills: 0 | DISCHARGE

## 2023-10-06 RX ORDER — FERROUS SULFATE 325(65) MG
1 TABLET ORAL
Qty: 0 | Refills: 0 | DISCHARGE

## 2023-10-06 RX ORDER — FAMOTIDINE 10 MG/ML
1 INJECTION INTRAVENOUS
Refills: 0 | DISCHARGE

## 2023-10-06 RX ORDER — DIPHENHYDRAMINE HCL 50 MG
12.5 CAPSULE ORAL EVERY 6 HOURS
Refills: 0 | Status: DISCONTINUED | OUTPATIENT
Start: 2023-10-06 | End: 2023-10-07

## 2023-10-06 RX ORDER — MONTELUKAST 4 MG/1
0 TABLET, CHEWABLE ORAL
Refills: 0 | DISCHARGE

## 2023-10-06 RX ADMIN — SODIUM CHLORIDE 100 MILLILITER(S): 9 INJECTION, SOLUTION INTRAVENOUS at 17:21

## 2023-10-06 RX ADMIN — Medication 12.5 MILLIGRAM(S): at 20:28

## 2023-10-06 RX ADMIN — SODIUM CHLORIDE 50 MILLILITER(S): 9 INJECTION, SOLUTION INTRAVENOUS at 13:44

## 2023-10-06 NOTE — ED PROVIDER NOTE - OBJECTIVE STATEMENT
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 Reports having MRI on 9/23 for knee but felt a tug where her appendectomy staples were, subsequently having allergic reaction (chest/back redness, sensation of swelling, trouble breathing), when eating foods she previously tolerated before, taking benadryl for it, progressively became more intolerant of foods until she could not eat/drink anymore, only drinking/eating very little. Worried about inability to eat. 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 Reports having MRI on 9/23 for knee but felt a tug where her appendectomy staples were, subsequently having allergic reaction (chest/back redness, sensation of swelling, trouble breathing), when eating foods she previously tolerated before, taking benadryl for it, progressively became more intolerant of foods until she could not eat/drink anymore, only drinking/eating very little. Worried about inability to eat. Also reporting mild abdominal pain in RLQ, denies any dysuria or urinary frequency. 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 Reports having MRI on 9/23 for knee but felt a tug where her appendectomy staples were, subsequently having "allergic reaction" (chest/back redness, sensation of swelling, trouble breathing), when eating all foods she previously tolerated before, taking benadryl for it, progressively became more intolerant of foods until she could not eat/drink much anymore, only drinking/eating very little. Worried about inability to eat. Also reporting mild abdominal pain in RLQ, denies any dysuria or urinary frequency.

## 2023-10-06 NOTE — ED PROVIDER NOTE - PHYSICAL EXAMINATION
VITALS:   T(C): 36.9 (10-06-23 @ 09:50), Max: 36.9 (10-06-23 @ 09:50)  HR: 105 (10-06-23 @ 09:50) (105 - 105)  BP: 140/90 (10-06-23 @ 09:50) (140/90 - 140/90)  RR: 20 (10-06-23 @ 09:50) (20 - 20)  SpO2: 97% (10-06-23 @ 09:50) (97% - 97%)    GENERAL: NAD, lying in bed comfortably  HEAD:  Atraumatic, Normocephalic  EYES: EOMI, PERRLA, conjunctiva and sclera clear  ENT: Moist mucous membranes  NECK: Supple, No JVD  CHEST/LUNG: CTABL; No rales, rhonchi, wheezing, or rubs. Unlabored respirations  HEART: RRR. No M/R/G  ABDOMEN: Soft, nontender, non-distended, normoactive BS. No hepatomegaly  EXTREMITIES:  2+ Peripheral Pulses, brisk capillary refill. No clubbing, cyanosis, or edema  NERVOUS SYSTEM:  Alert & Oriented X3, speech clear. No deficits, CN II-XII intact. Normal sensation   MSK: FROM all 4 extremities, full and equal strength  PSYCH: Normal affect, normal speech, normal behavior  SKIN: No rashes or lesions VITALS:   T(C): 36.9 (10-06-23 @ 09:50), Max: 36.9 (10-06-23 @ 09:50)  HR: 105 (10-06-23 @ 09:50) (105 - 105)  BP: 140/90 (10-06-23 @ 09:50) (140/90 - 140/90)  RR: 20 (10-06-23 @ 09:50) (20 - 20)  SpO2: 97% (10-06-23 @ 09:50) (97% - 97%)    GENERAL: NAD, lying in bed comfortably  HEAD:  Atraumatic, Normocephalic  EYES: EOMI, PERRLA, conjunctiva and sclera clear  ENT: Moist mucous membranes  NECK: Supple, No JVD  CHEST/LUNG: CTABL; No rales, rhonchi, wheezing, or rubs. Unlabored respirations  HEART: RRR. No M/R/G  ABDOMEN: Soft, nontender, non-distended, normoactive BS. No hepatomegaly  EXTREMITIES:  2+ Peripheral Pulses, brisk capillary refill. No clubbing, cyanosis, or edema  NERVOUS SYSTEM:  Alert & Oriented X3, speech clear. No deficits, CN II-XII intact. Normal sensation   MSK: FROM all 4 extremities, full and equal strength  PSYCH: Very anxious with +psychomotor agitation  SKIN: No rashes or lesions

## 2023-10-06 NOTE — ED PROVIDER NOTE - PLAN OF CARE
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

## 2023-10-06 NOTE — H&P ADULT - NSHPLABSRESULTS_GEN_ALL_CORE
LABS:                        13.1   7.75  )-----------( 84       ( 06 Oct 2023 11:07 )             38.3     10-06    140  |  106  |  6<L>  ----------------------------<  104<H>  4.2   |  22  |  0.82    Ca    9.2      06 Oct 2023 11:07    TPro  6.8  /  Alb  4.4  /  TBili  0.4  /  DBili  x   /  AST  21  /  ALT  19  /  AlkPhos  72  10-06      Urinalysis Basic - ( 06 Oct 2023 11:07 )    Color: x / Appearance: x / SG: x / pH: x  Gluc: 104 mg/dL / Ketone: x  / Bili: x / Urobili: x   Blood: x / Protein: x / Nitrite: x   Leuk Esterase: x / RBC: x / WBC x   Sq Epi: x / Non Sq Epi: x / Bacteria: x            RADIOLOGY & ADDITIONAL TESTS:

## 2023-10-06 NOTE — ED ADULT NURSE NOTE - NSFALLUNIVINTERV_ED_ALL_ED
Bed/Stretcher in lowest position, wheels locked, appropriate side rails in place/Call bell, personal items and telephone in reach/Instruct patient to call for assistance before getting out of bed/chair/stretcher/Non-slip footwear applied when patient is off stretcher/Hope Hull to call system/Physically safe environment - no spills, clutter or unnecessary equipment/Purposeful proactive rounding/Room/bathroom lighting operational, light cord in reach

## 2023-10-06 NOTE — ED ADULT NURSE NOTE - OBJECTIVE STATEMENT
Female 47 years old alert and orientedx4 came in for multiple medical complaints, allergic reaction and inability to eat. Pt states shea\ has allergic reaction when eating food. States' the only food I eat is potato chips". States she has chest and back redness, trouble breathing and right lower quadrant abdominal pain. Denies cough, fever, chills or urinary symptoms. Will monitor.

## 2023-10-06 NOTE — ED ADULT NURSE REASSESSMENT NOTE - NS ED NURSE REASSESS COMMENT FT1
Pt states she is having redness around her neck after the IV multivitamin was started. Multivitamin paused and ACP Felix Ha contacted. As per ACP, Pt to be given PRN benadryl and to restart IV Multivitamin in 15 minutes. Pt states she has her home medications with her and she has taken them earlier today. Pt educated as to the dangers of taking medication without advising team as that there may be a chance of overdosing or interactions. Additionally pt states she cannot take any PO medications and would like her Pepcid to be ordered and her PO ativan to be changed to IV.

## 2023-10-06 NOTE — ED PROVIDER NOTE - NSICDXFAMILYHX_GEN_ALL_CORE_FT
DISCHARGE SUMMARY  Hospital Medicine    Team: [facility] HOSP MED H    Patient Name: Franny Arnold  YOB: 1923    Admit Date: 1/1/2020    Discharge Date: 01/08/2020    Discharge Attending Physician: Ann Arriaga MD     Principal Diagnoses:  Active Hospital Problems    Diagnosis  POA    *Closed displaced intertrochanteric fracture of left femur [S72.142A]  Yes    Acute cystitis [N30.00]  No    Confusion [R41.0]  Yes    Closed displaced fracture of surgical neck of left humerus [S42.212A]  Yes    Fracture of proximal end of left humerus [S42.202A]  Yes    Leukocytosis [D72.829]  Yes      Resolved Hospital Problems   No resolved problems to display.       Discharged Condition: good     Interval history: reports having a bad night from feeling down from fx standpoint. Long discussion with her and her friend at bedside about this is normal to feel shell shocked after a fx as usually not expected and changes in lifetsyle due to not being at baseline from this from ADLS as she was active before hand. She perked up greatly after we talked for awhile about her stories from workign in the [employer] as an  and she recalled great details about this. Wbc has started to improve now, up secondary to baseline higher even at admit and then from UTI in addition. Urine CX pending still plan to continue for a week for likely catheter association with recent short catheter for surgery. Pain controlled and hg stable. BM overnight. To SNF for restoration of ADLs.     Temp:  [96 °F (35.6 °C)-96.1 °F (35.6 °C)]   Pulse:  [72-82]   Resp:  [16-20]   BP: (117-134)/(63-74)   SpO2:  [98 %-100 %]        Physical Exam   Constitutional:   Thin elderly female lying comfortably. Less confusion but a little depressed from fx, perks up when talking about her work history, able to recall extensive details at length  HENT:   Head: Normocephalic and atraumatic.   Cardiovascular: Normal rate and regular  rhythm.   Pulmonary/Chest: Effort normal. No respiratory distress.   Abdominal: Soft. Bowel sounds are normal. She exhibits no distension.   Musculoskeletal: She exhibits edema (dressing and mild edema of LLE).   LUE in sling. Bandages to LLE in place, clean.   Neurological: She is alert.   Oriented to person, family at bedside. At baseline.  Vitals reviewed.      HOSPITAL COURSE:      Initial Presentation:    89 yo F with no significant PMHx was in her usual state of health until early this morning around 8am when she reports that she fell while attempting to get out of bed and landed on her L side. The patient reports that the fall was mechanical and denies any lightheadedness, palpitations, chest pains, or SOB leading up to the fall. The patient says that she was on the ground for maybe an hour before her nice's  found her. The patient's nice states that she believes the patient may be underestimating the amount of time she was on the ground before being found, however.      The patient reports that she is on no medications and has no prior history of heart or lung disease. She has never smoked. She lives on her own and completes all of her ADLs. She is able to ambulate without any assistance. She states that she can walk up a flight of stairs without any chest pain or SOB. The patient's niece does report that she has been a little more unsteady on her feet in recent years, but she has not had any significant falls prior to today.    Course of Principle Problem for Admission:    Closed displaced intertrochanteric fracture of left femur  Sustained while trying to get out of bed with acute intertrochanteric fx of Left femur.   -s/p CMN with ortho on 1/2. WBAT to LLE, lovenox for 28 days until January 31st post op for DVT ppx.  -pain controlled well post op, change robaxin to BID as possibly oversedating her and did well with change with less sedation overall, off opiates at time of discharge  -SNF for  restoration of ADLS. BM reported on 1/7  -Hg stable post op at 10  -ortho f/u in 2 weeks for bandage removal    Other Medical Problems Addressed in the Hospital:    Acute cystitis  -see leukocytosis.         Confusion  Intermittent confusion but stable easily redirectable. Some underlying cognitive deficits likely at baseline but some from in hospital sundowning plus some likely from UTi which is resolving, better on 1/8 today.  Niece noted to me that she does have some dementia / memory impairments. I suspect some is chronic as she can tell me a lot of stories on exam today about her history but then also waxes/wanes quickly  -cont to treat UTI, deescalated robaxin to BID also as nursing thinks may be makming her more sleepy. Avoided excess pain meds unless signicant           Leukocytosis  Unclear etiology of initial leukocytosis which resolved on admit, probably that was from stress response and rhabdo present on admit.  Initial UA and CXR negative on admit.  New leukocytosis noted 1/6 and UA consistent with UTI. Urine dark, cloudy in purewick. No hx of UTIs per family  -WBC 20 and some encephalopathy, continue to trend. On rocehpin now, plan to likely change to cipro on dc renal dose given age to complete 1 week therapy considering recent catheter use.  -CX pending on discharge but GNR >100k  Afebrile, wbc starting to downtrend now from 20 to 19 slowly and looks better with less confusion on exam, no signs of sepsis on dischage.        Fracture of proximal end of left humerus  X-ray showed: Acute proximal left humeral head/neck fracture  Sustained in fall on admit.  In sling and swath, keep in place until ortho f/u at all times. dont let her pull on it, redirect.  NWB to KATELYN       Consults: ortho    Last CBC/BMP:    CBC/Anemia Labs: Coags:    Recent Labs   Lab 01/06/20  1025 01/07/20  0325 01/08/20  0342   WBC 16.98* 20.76* 19.77*   HGB 10.6* 10.6* 9.9*   HCT 34.7* 33.1* 33.1*    339 339   MCV 89 89 93    RDW 15.7* 16.1* 16.3*    No results for input(s): PT, INR, APTT in the last 168 hours.     Chemistries:   Recent Labs   Lab 01/06/20  0510 01/07/20  0325 01/08/20  0342    139 140   K 4.0 4.1 4.2    106 112*   CO2 22* 22* 19*   BUN 17 26 22   CREATININE 0.8 1.1 0.8   CALCIUM 9.1 9.9 9.1   MG 2.0 2.2 2.0   PHOS 3.0 4.1 3.0              Special Treatments/Procedures:   Procedure(s) (LRB):  INSERTION, INTRAMEDULLARY ZAYNAB (Left)     Disposition: Skilled Nursing Facility      Future Scheduled Appointments:  Future Appointments   Date Time Provider Department Center   1/21/2020  1:30 PM [facility] [id number] 485 LB LIMIT [facility]   1/21/2020  1:45 PM [id number] 485 LB LIMIT [facility]   1/21/2020  2:00 PM Adithya Addison MD [facility]O [facility]           Discharge Medication List:       Franny Arnold   Home Medication Instructions VANESSA:[id number]    Printed on:01/08/20 1721   Medication Information                      acetaminophen (TYLENOL) 325 MG tablet  Take 2 tablets (650 mg total) by mouth every 6 (six) hours as needed for Pain.             ciprofloxacin HCl (CIPRO) 500 MG tablet  Take 0.5 tablets (250 mg total) by mouth every 12 (twelve) hours. for 7 days             enoxaparin (LOVENOX) 40 mg/0.4 mL Syrg  Inject 0.4 mLs (40 mg total) into the skin once daily.             methocarbamol (ROBAXIN) 500 MG Tab  Take 1 tablet (500 mg total) by mouth 4 (four) times daily. for 10 days             polyethylene glycol (GLYCOLAX) 17 gram PwPk  Take 17 g by mouth 2 (two) times daily as needed (if no BM x 2 days).             vitamin D (VITAMIN D3) 2,000 unit Tab  Take 1 tablet (2,000 Units total) by mouth once daily.                 Patient Instructions:  Discharge Procedure Orders   Ambulatory referral to ORT Fracture Care   Standing Status: Future   Referral Priority: Routine Referral Type: Consultation   Requested Specialty: Orthopedic Surgery   Number of Visits Requested: 1      Notify your health care provider if you experience any of the following:  redness, tenderness, or signs of infection (pain, swelling, redness, odor or green/yellow discharge around incision site)     Notify your health care provider if you experience any of the following:  severe uncontrolled pain       At the time of discharge patient was told to take all medications as prescribed, to keep all followup appointments, and to call their primary care physician or return to the emergency room if they have any worsening or concerning symptoms.    Signing Physician:  Ann Arriaga MD   FAMILY HISTORY:  Family history of atrial fibrillation  Family history of prostate cancer in father    Grandparent  Still living? Unknown  Family history of hypertension, Age at diagnosis: Age Unknown

## 2023-10-06 NOTE — PATIENT PROFILE ADULT - IS PATIENT POST-MENOPAUSAL?
no

## 2023-10-06 NOTE — PATIENT PROFILE ADULT - FALL HARM RISK - HARM RISK INTERVENTIONS

## 2023-10-06 NOTE — ED PROVIDER NOTE - PROGRESS NOTE DETAILS
Jordyn Euceda MD - Attending Physician: Labs unremarkable and nonactionable. Not significantly dehydrated by exam either. Discussed with patient, who is extremely anxious that she has an allergic reaction to all foods and pain. Offered admission for IVF and potential subspecialist eval. Pt unsure if she wants to stay and requests with speak with her father first. Dr Sakina castro. Jordyn Euceda MD - Attending Physician: Pt now requesting to leave. Aware have not yet spoken with her father. Copy of labs given to patient. F/u outpatient. Discussed return precautions Jordyn Euceda MD - Attending Physician: Pt now agreeable for admission. Will admit for IV hydration and continued managemen

## 2023-10-06 NOTE — H&P ADULT - ASSESSMENT
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 Reports having MRI on 9/23 for knee but felt a tug where her appendectomy staples were, subsequently having "allergic reaction" (chest/back redness, sensation of swelling, trouble breathing), when eating all foods she previously tolerated before, taking benadryl for it, progressively became more intolerant of foods until she could not eat/drink much anymore, only drinking/eating very little. Worried about inability to eat. Also reporting mild abdominal pain in RLQ, denies any dysuria or urinary frequency.    abd pain  - CT scan of abd and pelvis without contrast  - pain meds tylenol prn  - vegan diet    anxiety  - c/w ativan     HTN  - c/w cardizem    multiple allergic reactions  - cetrizine not available  - pt may take her own  - benadryl prn

## 2023-10-06 NOTE — ED PROVIDER NOTE - NS ED ROS FT
REVIEW OF SYSTEMS:    CONSTITUTIONAL: No weakness, fevers or chills  EYES: No vision changes  ENT: No vertigo or throat pain  NECK: No pain or stiffness  RESPIRATORY: No cough, wheezing, hemoptysis; No shortness of breath  CARDIOVASCULAR: No chest pain or palpitations  GASTROINTESTINAL: +nausea, vomiting, abdominal pain  GENITOURINARY: No dysuria, frequency or hematuria  NEUROLOGICAL: No numbness or weakness  PSYCH: No anxiety, depression, or behavior changes  All other review of systems is negative unless indicated above.

## 2023-10-06 NOTE — ED PROVIDER NOTE - PATIENT PORTAL LINK FT
You can access the FollowMyHealth Patient Portal offered by [facility] by registering at the following website: [email or web address]. By joining [facility]’s FollowMyHealth portal, you will also be able to view your health information using other applications (apps) compatible with our system.

## 2023-10-06 NOTE — H&P ADULT - HISTORY OF PRESENT ILLNESS
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 Reports having MRI on 9/23 for knee but felt a tug where her appendectomy staples were, subsequently having "allergic reaction" (chest/back redness, sensation of swelling, trouble breathing), when eating all foods she previously tolerated before, taking benadryl for it, progressively became more intolerant of foods until she could not eat/drink much anymore, only drinking/eating very little. Worried about inability to eat. Also reporting mild abdominal pain in RLQ, denies any dysuria or urinary frequency.

## 2023-10-06 NOTE — ED PROVIDER NOTE - CLINICAL SUMMARY MEDICAL DECISION MAKING FREE TEXT BOX
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 Plan for CBC/CMP for basic lab work-up and reassurance. Likely DC if normal. Sx likely in s/o anxiety. Plan for CBC/CMP for basic lab work-up and reassurance. Likely DC if normal. Sx likely in s/o anxiety. Plan for CBC/CMP for basic lab work-up and reassurance. Likely DC if normal.    Jordyn Euceda MD - Attending Physician: Pt here with report of weeks of progressive intolerance to all foods. She is concerned for allergy triggered by MRI. Unlikely MRI triggered reaction, but unclear what causing her distress with foods. Not c/w anaphylaxis. Very anxious appearing, chronically cachectic, but nontoxic. Labs, IVF, and reeval

## 2023-10-06 NOTE — PATIENT PROFILE ADULT - FUNCTIONAL ASSESSMENT - BASIC MOBILITY 6.
4-calculated by average/Not able to assess (calculate score using [facility] averaging method)

## 2023-10-06 NOTE — ED PROVIDER NOTE - CARE PLAN
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  Secondary Diagnosis:	Adult failure to thrive

## 2023-10-07 LAB
ANION GAP SERPL CALC-SCNC: 14 MMOL/L — SIGNIFICANT CHANGE UP (ref 5–17)
BUN SERPL-MCNC: 6 MG/DL — LOW (ref 7–23)
CALCIUM SERPL-MCNC: 9.1 MG/DL — SIGNIFICANT CHANGE UP (ref 8.4–10.5)
CHLORIDE SERPL-SCNC: 105 MMOL/L — SIGNIFICANT CHANGE UP (ref 96–108)
CO2 SERPL-SCNC: 21 MMOL/L — LOW (ref 22–31)
CREAT SERPL-MCNC: 0.71 MG/DL — SIGNIFICANT CHANGE UP (ref 0.5–1.3)
EGFR: 106 ML/MIN/1.73M2 — SIGNIFICANT CHANGE UP
FOLATE SERPL-MCNC: 13.6 NG/ML — SIGNIFICANT CHANGE UP
GLUCOSE SERPL-MCNC: 80 MG/DL — SIGNIFICANT CHANGE UP (ref 70–99)
HCT VFR BLD CALC: 37.5 % — SIGNIFICANT CHANGE UP (ref 34.5–45)
HGB BLD-MCNC: 12.8 G/DL — SIGNIFICANT CHANGE UP (ref 11.5–15.5)
MAGNESIUM SERPL-MCNC: 1.9 MG/DL — SIGNIFICANT CHANGE UP (ref 1.6–2.6)
MCHC RBC-ENTMCNC: 30 PG — SIGNIFICANT CHANGE UP (ref 27–34)
MCHC RBC-ENTMCNC: 34.1 GM/DL — SIGNIFICANT CHANGE UP (ref 32–36)
MCV RBC AUTO: 88 FL — SIGNIFICANT CHANGE UP (ref 80–100)
NRBC # BLD: 0 /100 WBCS — SIGNIFICANT CHANGE UP (ref 0–0)
PHOSPHATE SERPL-MCNC: 3.8 MG/DL — SIGNIFICANT CHANGE UP (ref 2.5–4.5)
PLATELET # BLD AUTO: 76 K/UL — LOW (ref 150–400)
POTASSIUM SERPL-MCNC: 3.7 MMOL/L — SIGNIFICANT CHANGE UP (ref 3.5–5.3)
POTASSIUM SERPL-SCNC: 3.7 MMOL/L — SIGNIFICANT CHANGE UP (ref 3.5–5.3)
RBC # BLD: 4.26 M/UL — SIGNIFICANT CHANGE UP (ref 3.8–5.2)
RBC # FLD: 12.6 % — SIGNIFICANT CHANGE UP (ref 10.3–14.5)
SODIUM SERPL-SCNC: 140 MMOL/L — SIGNIFICANT CHANGE UP (ref 135–145)
TSH SERPL-MCNC: 1.68 UIU/ML — SIGNIFICANT CHANGE UP (ref 0.27–4.2)
VIT B12 SERPL-MCNC: 352 PG/ML — SIGNIFICANT CHANGE UP (ref 232–1245)
WBC # BLD: 4.94 K/UL — SIGNIFICANT CHANGE UP (ref 3.8–10.5)
WBC # FLD AUTO: 4.94 K/UL — SIGNIFICANT CHANGE UP (ref 3.8–10.5)

## 2023-10-07 PROCEDURE — 93010 ELECTROCARDIOGRAM REPORT: CPT

## 2023-10-07 RX ORDER — SODIUM CHLORIDE 9 MG/ML
1000 INJECTION, SOLUTION INTRAVENOUS
Refills: 0 | Status: DISCONTINUED | OUTPATIENT
Start: 2023-10-07 | End: 2023-10-09

## 2023-10-07 RX ORDER — FAMOTIDINE 10 MG/ML
10 INJECTION INTRAVENOUS
Refills: 0 | Status: DISCONTINUED | OUTPATIENT
Start: 2023-10-07 | End: 2023-10-07

## 2023-10-07 RX ORDER — CETIRIZINE HYDROCHLORIDE 10 MG/1
5 TABLET ORAL DAILY
Refills: 0 | Status: DISCONTINUED | OUTPATIENT
Start: 2023-10-07 | End: 2023-10-09

## 2023-10-07 RX ORDER — FAMOTIDINE 10 MG/ML
10 INJECTION INTRAVENOUS
Refills: 0 | Status: DISCONTINUED | OUTPATIENT
Start: 2023-10-07 | End: 2023-10-09

## 2023-10-07 RX ORDER — MONTELUKAST 4 MG/1
10 TABLET, CHEWABLE ORAL
Refills: 0 | Status: DISCONTINUED | OUTPATIENT
Start: 2023-10-07 | End: 2023-10-09

## 2023-10-07 RX ORDER — DIPHENHYDRAMINE HCL 50 MG
12.5 CAPSULE ORAL EVERY 6 HOURS
Refills: 0 | Status: DISCONTINUED | OUTPATIENT
Start: 2023-10-07 | End: 2023-10-09

## 2023-10-07 RX ADMIN — Medication 180 MILLIGRAM(S): at 10:49

## 2023-10-07 RX ADMIN — SODIUM CHLORIDE 50 MILLILITER(S): 9 INJECTION, SOLUTION INTRAVENOUS at 01:55

## 2023-10-07 RX ADMIN — Medication 1 MILLIGRAM(S): at 18:35

## 2023-10-07 RX ADMIN — FAMOTIDINE 10 MILLIGRAM(S): 10 INJECTION INTRAVENOUS at 07:51

## 2023-10-07 RX ADMIN — CETIRIZINE HYDROCHLORIDE 5 MILLIGRAM(S): 10 TABLET ORAL at 13:57

## 2023-10-07 RX ADMIN — Medication 1 MILLIGRAM(S): at 16:00

## 2023-10-07 RX ADMIN — SODIUM CHLORIDE 65 MILLILITER(S): 9 INJECTION, SOLUTION INTRAVENOUS at 10:21

## 2023-10-07 RX ADMIN — Medication 1 MILLIGRAM(S): at 12:30

## 2023-10-07 RX ADMIN — FAMOTIDINE 10 MILLIGRAM(S): 10 INJECTION INTRAVENOUS at 20:07

## 2023-10-07 RX ADMIN — MONTELUKAST 10 MILLIGRAM(S): 4 TABLET, CHEWABLE ORAL at 16:51

## 2023-10-07 RX ADMIN — Medication 1 MILLIGRAM(S): at 22:40

## 2023-10-07 NOTE — DIETITIAN NUTRITION RISK NOTIFICATION - TREATMENT: THE FOLLOWING DIET HAS BEEN RECOMMENDED
Diet, Regular:   Soy Restricted  Vegan {Accepts Vegetable Products Only}  No Dairy  No Lactose  Supplement Feeding Modality:  Oral  Ensure Plant-Based Cans or Servings Per Day:  3       Frequency:  Daily (10-07-23 @ 02:22) [Active]

## 2023-10-07 NOTE — DIETITIAN INITIAL EVALUATION ADULT - ORAL NUTRITION SUPPLEMENTS
- Remove Ensure Plant Based as this product is on  backorder, Consider Secure Outcomes Standard 1.0 BID as well as Orgain daily for pt to trial

## 2023-10-07 NOTE — DIETITIAN INITIAL EVALUATION ADULT - ORAL INTAKE PTA/DIET HISTORY
Pt reports extremely poor intake x past 1 month or so secondary to getting an MRI. Confirms food allergies to all nuts (swelling), does not tolerate eggs, dairy. Follows vegan diet. Noted no soy in diet but pt reports she has been able to tolerate soy in the past and asking for this restriction to be removed (RD spoke with provider). Reports if she eats foods she has intolerances to her main symptoms are GI distress (abdominal pain, diarrhea and skin rash). Does not know what foods she can tolerate anymore besides for potato chips and hummus. Is very tearful during RD interview.

## 2023-10-07 NOTE — DIETITIAN INITIAL EVALUATION ADULT - PHYSCIAL ASSESSMENT
Dosing wt: 89 pounds. UBW ~100-101 pounds per RD note from 9/23/22. Unclear time frame of weight loss at this time.    IBW: 105 pounds

## 2023-10-07 NOTE — DIETITIAN INITIAL EVALUATION ADULT - COLLABORATION WITH OTHER PROVIDERS
- RD spoke with RN re: pt request for a provider to sit with her while she consumes foods to see if she has an allergic reaction. Also spoke with covering Provider re: pt asking for Immunologist/ Allergist to visit her inpatient

## 2023-10-07 NOTE — DIETITIAN INITIAL EVALUATION ADULT - REASON FOR ADMISSION
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 Reports having MRI on 9/23 for knee but felt a tug where her appendectomy staples were, subsequently having "allergic reaction" (chest/back redness, sensation of swelling, trouble breathing), when eating all foods she previously tolerated before, taking benadryl for it, progressively became more intolerant of foods until she could not eat/drink much anymore, only drinking/eating very little. Worried about inability to eat. Also reporting mild abdominal pain in RLQ."

## 2023-10-07 NOTE — PROGRESS NOTE ADULT - ASSESSMENT
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 Reports having MRI on 9/23 for knee but felt a tug where her appendectomy staples were, subsequently having "allergic reaction" (chest/back redness, sensation of swelling, trouble breathing), when eating all foods she previously tolerated before, taking benadryl for it, progressively became more intolerant of foods until she could not eat/drink much anymore, only drinking/eating very little. Worried about inability to eat. Also reporting mild abdominal pain in RLQ, denies any dysuria or urinary frequency.    abd pain  - CT scan of abd and pelvis without contrast  - pain meds tylenol prn  - vegan diet  - pt is not allergic to soy    anxiety  - c/w ativan v1 mg five times daily    HTN  - c/w cardizem    multiple allergic reactions  - cetrizine not available  - pt may take her own  - pepcid iv  - benadryl prn    d/c planning  - follow up out pt with allergist and immunologist

## 2023-10-07 NOTE — DIETITIAN INITIAL EVALUATION ADULT - ENERGY INTAKE
Poor (<50%) - Poor PO intake; scared to try foods as she is afraid of having an allergic reaction; RD provided pt with medicine cups to trial vegan supplements, RD made RN aware.

## 2023-10-07 NOTE — DIETITIAN INITIAL EVALUATION ADULT - ADD RECOMMEND
- Honor pt food preferences to promote adequate oral intake while in-house  - As pt may be at risk for REFEEDING SYNDROME, please check and replete electrolytes q24 hours and replete PRN (K, Phos, Mg)  - Would recommend multivitamin and thiamine as pt can tolerates (noted previous intolerance to IV multivitamin)  - Malnutrition notification placed in chart

## 2023-10-07 NOTE — DIETITIAN INITIAL EVALUATION ADULT - PERTINENT MEDS FT
MEDICATIONS  (STANDING):  cetirizine 5 milliGRAM(s) Oral daily  diltiazem    milliGRAM(s) Oral daily  famotidine Injectable 10 milliGRAM(s) IV Push <User Schedule>  lactated ringers. 1000 milliLiter(s) (65 mL/Hr) IV Continuous <Continuous>  LORazepam     Tablet 1 milliGRAM(s) Oral <User Schedule>  montelukast 10 milliGRAM(s) Oral <User Schedule>    MEDICATIONS  (PRN):  acetaminophen     Tablet .. 650 milliGRAM(s) Oral every 6 hours PRN Mild Pain (1 - 3)  diphenhydrAMINE Elixir 12.5 milliGRAM(s) Oral every 6 hours PRN Rash and/or Itching

## 2023-10-07 NOTE — DIETITIAN INITIAL EVALUATION ADULT - OTHER INFO
- status post IV multivitamin with reaction which was held  - IVF: Lactated ringers at 65 ml/hr ordered  - Benadryl ordered  - Noted K, Phos, Mg from 10/7 all within normal limits

## 2023-10-07 NOTE — DIETITIAN INITIAL EVALUATION ADULT - PERTINENT LABORATORY DATA
10-07    140  |  105  |  6<L>  ----------------------------<  80  3.7   |  21<L>  |  0.71    Ca    9.1      07 Oct 2023 09:07  Phos  3.8     10-07  Mg     1.9     10-07    TPro  6.8  /  Alb  4.4  /  TBili  0.4  /  DBili  x   /  AST  21  /  ALT  19  /  AlkPhos  72  10-06

## 2023-10-07 NOTE — DIETITIAN INITIAL EVALUATION ADULT - DIET TYPE
- Recommend remove Soy Restriction from Current Vegan diet as pt reports she is able to tolerate soy in the past and willing to try soy again

## 2023-10-07 NOTE — DIETITIAN INITIAL EVALUATION ADULT - PERSON TAUGHT/METHOD
- RD discussed different plant based protein options for pt to trial while in-house; pt agreeable to some options but is very adamant that most foods besides potato chips and hummus cause her to have allergic skin reaction. Pt agreeable to trial Organ shake as well as [employer] (small sips) to see how she tolerates these products. Pt also agreeable to trial fruit like melons without seeds/skins./verbal instruction/patient instructed

## 2023-10-07 NOTE — PROVIDER CONTACT NOTE (OTHER) - ASSESSMENT
Pt denies SOB, dizziness, lightheadedness, palpitations, headache. All VSS. Pt complains of chest pain.

## 2023-10-07 NOTE — DIETITIAN INITIAL EVALUATION ADULT - NS FNS DIET ORDER
Diet, Regular:   Soy Restricted  Vegan {Accepts Vegetable Products Only}  No Dairy  No Lactose  Supplement Feeding Modality:  Oral  Ensure Plant-Based Cans or Servings Per Day:  3       Frequency:  Daily (10-07-23 @ 02:22)

## 2023-10-07 NOTE — DIETITIAN INITIAL EVALUATION ADULT - LAB (SPECIFY)
Trend K, Phos, Mg as pt may be at risk for refeeding syndrome, BG levels, renal indices, LFT's, electrolytes and triglycerides

## 2023-10-08 RX ADMIN — Medication 1 MILLIGRAM(S): at 15:37

## 2023-10-08 RX ADMIN — Medication 1 MILLIGRAM(S): at 09:04

## 2023-10-08 RX ADMIN — Medication 1 MILLIGRAM(S): at 22:33

## 2023-10-08 RX ADMIN — Medication 1 MILLIGRAM(S): at 12:14

## 2023-10-08 RX ADMIN — FAMOTIDINE 10 MILLIGRAM(S): 10 INJECTION INTRAVENOUS at 19:03

## 2023-10-08 RX ADMIN — MONTELUKAST 10 MILLIGRAM(S): 4 TABLET, CHEWABLE ORAL at 19:15

## 2023-10-08 RX ADMIN — Medication 1 MILLIGRAM(S): at 18:00

## 2023-10-08 RX ADMIN — Medication 180 MILLIGRAM(S): at 10:44

## 2023-10-08 RX ADMIN — FAMOTIDINE 10 MILLIGRAM(S): 10 INJECTION INTRAVENOUS at 06:02

## 2023-10-08 RX ADMIN — CETIRIZINE HYDROCHLORIDE 5 MILLIGRAM(S): 10 TABLET ORAL at 12:40

## 2023-10-08 NOTE — PROGRESS NOTE ADULT - NUTRITIONAL ASSESSMENT
This patient has been assessed with a concern for Malnutrition and has been determined to have a diagnosis/diagnoses of Severe protein-calorie malnutrition and Underweight (BMI < 19).    This patient is being managed with:   Diet Regular-  Vegan {Accepts Vegetable Products Only}  No Dairy  No Lactose  Supplement Feeding Modality:  Oral  Ensure Plant-Based Cans or Servings Per Day:  3       Frequency:  Daily  Entered: Oct  7 2023  1:38PM

## 2023-10-08 NOTE — PROGRESS NOTE ADULT - ASSESSMENT
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 Reports having MRI on 9/23 for knee but felt a tug where her appendectomy staples were, subsequently having "allergic reaction" (chest/back redness, sensation of swelling, trouble breathing), when eating all foods she previously tolerated before, taking benadryl for it, progressively became more intolerant of foods until she could not eat/drink much anymore, only drinking/eating very little. Worried about inability to eat. Also reporting mild abdominal pain in RLQ, denies any dysuria or urinary frequency.    abd pain  - CT scan of abd and pelvis without contrast done  - pain meds tylenol prn  - vegan diet    anxiety  - c/w ativan v1 mg five times daily    HTN  - c/w cardizem    multiple allergic reactions  - cetrizine not available  - pt may take her own  - pepcid iv  - benadryl prn    d/c planning  - follow up out pt with allergist and immunologist

## 2023-10-09 ENCOUNTER — TRANSCRIPTION ENCOUNTER (OUTPATIENT)
Age: 48
End: 2023-10-09

## 2023-10-09 VITALS
OXYGEN SATURATION: 100 % | SYSTOLIC BLOOD PRESSURE: 135 MMHG | RESPIRATION RATE: 18 BRPM | TEMPERATURE: 98 F | HEART RATE: 88 BPM | DIASTOLIC BLOOD PRESSURE: 83 MMHG

## 2023-10-09 PROCEDURE — 80053 COMPREHEN METABOLIC PANEL: CPT

## 2023-10-09 PROCEDURE — 83735 ASSAY OF MAGNESIUM: CPT

## 2023-10-09 PROCEDURE — 80048 BASIC METABOLIC PNL TOTAL CA: CPT

## 2023-10-09 PROCEDURE — 93005 ELECTROCARDIOGRAM TRACING: CPT

## 2023-10-09 PROCEDURE — 82607 VITAMIN B-12: CPT

## 2023-10-09 PROCEDURE — 83088 ASSAY OF HISTAMINE: CPT

## 2023-10-09 PROCEDURE — 84100 ASSAY OF PHOSPHORUS: CPT

## 2023-10-09 PROCEDURE — 85025 COMPLETE CBC W/AUTO DIFF WBC: CPT

## 2023-10-09 PROCEDURE — 99285 EMERGENCY DEPT VISIT HI MDM: CPT | Mod: 25

## 2023-10-09 PROCEDURE — 84443 ASSAY THYROID STIM HORMONE: CPT

## 2023-10-09 PROCEDURE — 74176 CT ABD & PELVIS W/O CONTRAST: CPT

## 2023-10-09 PROCEDURE — 82746 ASSAY OF FOLIC ACID SERUM: CPT

## 2023-10-09 PROCEDURE — 85027 COMPLETE CBC AUTOMATED: CPT

## 2023-10-09 PROCEDURE — 36415 COLL VENOUS BLD VENIPUNCTURE: CPT

## 2023-10-09 RX ORDER — FAMOTIDINE 10 MG/ML
10 INJECTION INTRAVENOUS
Refills: 0 | Status: DISCONTINUED | OUTPATIENT
Start: 2023-10-10 | End: 2023-10-09

## 2023-10-09 RX ADMIN — MONTELUKAST 10 MILLIGRAM(S): 4 TABLET, CHEWABLE ORAL at 16:30

## 2023-10-09 RX ADMIN — CETIRIZINE HYDROCHLORIDE 5 MILLIGRAM(S): 10 TABLET ORAL at 12:05

## 2023-10-09 RX ADMIN — Medication 1 MILLIGRAM(S): at 15:29

## 2023-10-09 RX ADMIN — Medication 1 MILLIGRAM(S): at 12:05

## 2023-10-09 RX ADMIN — Medication 180 MILLIGRAM(S): at 11:46

## 2023-10-09 RX ADMIN — Medication 1 MILLIGRAM(S): at 09:05

## 2023-10-09 RX ADMIN — FAMOTIDINE 10 MILLIGRAM(S): 10 INJECTION INTRAVENOUS at 06:32

## 2023-10-09 NOTE — DISCHARGE NOTE NURSING/CASE MANAGEMENT/SOCIAL WORK - NSDCPEFALRISK_GEN_ALL_CORE
For information on Fall & Injury Prevention, visit: [email or web address] OR  [email or web address] OR  https://www.cdc.gov/steadi/patient.html

## 2023-10-09 NOTE — DISCHARGE NOTE PROVIDER - NSDCCPCAREPLAN_GEN_ALL_CORE_FT
PRINCIPAL DISCHARGE DIAGNOSIS  Diagnosis: Adult failure to thrive  Assessment and Plan of Treatment: c/o ab pain, ct done with negative results. received iv fluids with  multivitamin      SECONDARY DISCHARGE DIAGNOSES  Diagnosis: Adult failure to thrive  Assessment and Plan of Treatment:      PRINCIPAL DISCHARGE DIAGNOSIS  Diagnosis: Adult failure to thrive  Assessment and Plan of Treatment: c/o ab pain, CT done with negative results. received iv fluids with  multivitamin. You are tolerating a regular diet.   Please follow up outpatient with allergy and immunology for workup.      SECONDARY DISCHARGE DIAGNOSES  Diagnosis: Anxiety  Assessment and Plan of Treatment: Continue with your home lorazepam.   Please follow up with your PCP within 1-2 weeks from discharge.

## 2023-10-09 NOTE — DISCHARGE NOTE PROVIDER - NSDCMRMEDTOKEN_GEN_ALL_CORE_FT
Cardizem  mg/24 hours oral capsule, extended release: 1 cap(s) orally once a day at 10:45AM    NOTE:  Patient is very sensitive to alternate brands of medication, and wants to use her own medication while admitted.  cetirizine 5 mg oral tablet: 1 tab(s) orally once a day NOTE: Patient is very sensitive to alternate brands of medication, and wants to use her own medication while admitted.  LORazepam 2 mg oral tablet: 1 tab(s) orally 5 times a day NOTE: Patient is very sensitive to alternate brands of medication, and wants to use her own medication while admitted.  montelukast 10 mg oral tablet: 1 tab(s) orally once a day  NOTE: Patient can only have this medication crushed. Patient is very sensitive to alternate brands of medication, and wants to use her own medication while admitted.  Pepcid AC 10 mg oral tablet: 1 tab(s) orally 2 times a day NOTE: Patient is very sensitive to alternate brands of medication, and wants to use her own medication while admitted. Patient can only tolerate this medication crushed.  Slow Fe (as elemental iron) 45 mg oral tablet, extended release: 1 tab(s) orally once a day with food    NOTE:  Patient is very sensitive to alternate brands of medication, and wants to use her own medication while admitted.

## 2023-10-09 NOTE — DISCHARGE NOTE NURSING/CASE MANAGEMENT/SOCIAL WORK - NSDCFUADDAPPT_GEN_ALL_CORE_FT
APPTS ARE READY TO BE MADE: [x] YES    Best Family or Patient Contact (if needed):    Additional Information about above appointments (if needed):    1: Allergy and Immunology   2:   3:     Other comments or requests:

## 2023-10-09 NOTE — DISCHARGE NOTE PROVIDER - NSDCFUSCHEDAPPT_GEN_ALL_CORE_FT
Christian Sunshine  [facility]  [facility] [address] [place] [address]  Scheduled Appointment: 11/20/2023

## 2023-10-09 NOTE — DISCHARGE NOTE PROVIDER - NSDCFUADDAPPT_GEN_ALL_CORE_FT
APPTS ARE READY TO BE MADE: [x] YES    Best Family or Patient Contact (if needed):    Additional Information about above appointments (if needed):    1: Allergy and Immunology   2:   3:     Other comments or requests:    APPTS ARE READY TO BE MADE: [x] YES    Best Family or Patient Contact (if needed):    Additional Information about above appointments (if needed):    1: Allergy and Immunology   2:   3:     Other comments or requests:   Declined services.

## 2023-10-09 NOTE — DISCHARGE NOTE NURSING/CASE MANAGEMENT/SOCIAL WORK - PATIENT PORTAL LINK FT
You can access the FollowMyHealth Patient Portal offered by [facility] by registering at the following website: [email or web address]. By joining [facility]’s FollowMyHealth portal, you will also be able to view your health information using other applications (apps) compatible with our system.

## 2023-10-09 NOTE — PROGRESS NOTE ADULT - SUBJECTIVE AND OBJECTIVE BOX
DATE OF SERVICE: 10-07-23 @ 09:58    Patient is a 47y old  Female who presents with a chief complaint of abdominal pain (06 Oct 2023 14:45)      SUBJECTIVE / OVERNIGHT EVENTS:  did not tolerate MVI with iv fluids.  no rash was documented overnight    MEDICATIONS  (STANDING):  cetirizine 5 milliGRAM(s) Oral daily  diltiazem    milliGRAM(s) Oral daily  famotidine Injectable 10 milliGRAM(s) IV Push <User Schedule>  lactated ringers. 1000 milliLiter(s) (65 mL/Hr) IV Continuous <Continuous>  LORazepam     Tablet 1 milliGRAM(s) Oral <User Schedule>  montelukast 10 milliGRAM(s) Oral <User Schedule>    MEDICATIONS  (PRN):  acetaminophen     Tablet .. 650 milliGRAM(s) Oral every 6 hours PRN Mild Pain (1 - 3)  diphenhydrAMINE Elixir 12.5 milliGRAM(s) Oral every 6 hours PRN Rash and/or Itching      Vital Signs Last 24 Hrs  T(C): 36.7 (07 Oct 2023 09:50), Max: 36.7 (07 Oct 2023 09:50)  T(F): 98 (07 Oct 2023 09:50), Max: 98 (07 Oct 2023 09:50)  HR: 93 (07 Oct 2023 09:50) (76 - 98)  BP: 124/84 (07 Oct 2023 09:50) (120/76 - 142/109)  BP(mean): --  RR: 18 (07 Oct 2023 09:50) (18 - 20)  SpO2: 97% (07 Oct 2023 09:50) (96% - 98%)    Parameters below as of 07 Oct 2023 09:50  Patient On (Oxygen Delivery Method): room air      CAPILLARY BLOOD GLUCOSE        I&O's Summary    06 Oct 2023 07:01  -  07 Oct 2023 07:00  --------------------------------------------------------  IN: 0 mL / OUT: 0 mL / NET: 0 mL        PHYSICAL EXAM:  GENERAL: NAD, well-developed  HEAD:  Atraumatic, Normocephalic  EYES: EOMI, PERRLA, conjunctiva and sclera clear  NECK: Supple, No JVD  CHEST/LUNG: Clear to auscultation bilaterally; No wheeze  HEART: Regular rate and rhythm; No murmurs, rubs, or gallops  ABDOMEN: Soft, Nontender, Nondistended; Bowel sounds present  EXTREMITIES:  2+ Peripheral Pulses, No clubbing, cyanosis, or edema  PSYCH: AAOx3  NEUROLOGY: non-focal  SKIN: No rashes or lesions    LABS:                        12.8   4.94  )-----------( 76       ( 07 Oct 2023 09:07 )             37.5     10-07    140  |  105  |  6<L>  ----------------------------<  80  3.7   |  21<L>  |  0.71    Ca    9.1      07 Oct 2023 09:07  Phos  3.8     10-07  Mg     1.9     10-07    TPro  6.8  /  Alb  4.4  /  TBili  0.4  /  DBili  x   /  AST  21  /  ALT  19  /  AlkPhos  72  10-06          Urinalysis Basic - ( 07 Oct 2023 09:07 )    Color: x / Appearance: x / SG: x / pH: x  Gluc: 80 mg/dL / Ketone: x  / Bili: x / Urobili: x   Blood: x / Protein: x / Nitrite: x   Leuk Esterase: x / RBC: x / WBC x   Sq Epi: x / Non Sq Epi: x / Bacteria: x    < from: CT Abdomen and Pelvis No Cont (10.06.23 @ 20:57) >  FINDINGS:  LOWER CHEST: Left lower lobe linear atelectasis.    LIVER: Within normal limits.  BILE DUCTS: Normal caliber.  GALLBLADDER: Within normal limits.  SPLEEN: Within normal limits.  PANCREAS: Within normal limits.  ADRENALS: Within normal limits.  KIDNEYS/URETERS: Within normal limits.    BLADDER: Minimally distended.  REPRODUCTIVE ORGANS: Uterine fibroids.    BOWEL: No bowel obstruction. Status post appendectomy.  PERITONEUM: No ascites.  VESSELS: Within normal limits.  RETROPERITONEUM/LYMPH NODES: No lymphadenopathy.  ABDOMINAL WALL: Within normal limits.  BONES: Within normal limits.    IMPRESSION:  No acute intra-abdominal findings.      < end of copied text >      RADIOLOGY & ADDITIONAL TESTS:    Imaging Personally Reviewed:    Consultant(s) Notes Reviewed:      Care Discussed with Consultants/Other Providers:  
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 Reports having MRI on 9/23 for knee but felt a tug where her appendectomy staples were, subsequently having "allergic reaction" (chest/back redness, sensation of swelling, trouble breathing), when eating all foods she previously tolerated before, taking benadryl for it, progressively became more intolerant of foods until she could not eat/drink much anymore, only drinking/eating very little. Worried about inability to eat. Also reporting mild abdominal pain in RLQ."     (07 Oct 2023 11:37)       SUBJECTIVE / OVERNIGHT EVENTS:  has been able to tolerate some foods    MEDICATIONS  (STANDING):  cetirizine 5 milliGRAM(s) Oral daily  diltiazem    milliGRAM(s) Oral daily  famotidine Injectable 10 milliGRAM(s) IV Push <User Schedule>  lactated ringers. 1000 milliLiter(s) (65 mL/Hr) IV Continuous <Continuous>  LORazepam     Tablet 1 milliGRAM(s) Oral <User Schedule>  montelukast 10 milliGRAM(s) Oral <User Schedule>    MEDICATIONS  (PRN):  acetaminophen     Tablet .. 650 milliGRAM(s) Oral every 6 hours PRN Mild Pain (1 - 3)  diphenhydrAMINE Elixir 12.5 milliGRAM(s) Oral every 6 hours PRN Rash and/or Itching      Vital Signs Last 24 Hrs  T(C): 36.3 (08 Oct 2023 13:53), Max: 36.6 (07 Oct 2023 20:17)  T(F): 97.4 (08 Oct 2023 13:53), Max: 97.9 (08 Oct 2023 05:43)  HR: 85 (08 Oct 2023 13:53) (73 - 85)  BP: 130/91 (08 Oct 2023 13:53) (111/77 - 130/91)  BP(mean): --  RR: 18 (08 Oct 2023 13:53) (18 - 18)  SpO2: 98% (08 Oct 2023 13:53) (98% - 99%)    Parameters below as of 08 Oct 2023 13:53  Patient On (Oxygen Delivery Method): room air      CAPILLARY BLOOD GLUCOSE        I&O's Summary    07 Oct 2023 07:01  -  08 Oct 2023 07:00  --------------------------------------------------------  IN: 2600 mL / OUT: 300 mL / NET: 2300 mL    08 Oct 2023 07:01  -  08 Oct 2023 16:30  --------------------------------------------------------  IN: 440 mL / OUT: 0 mL / NET: 440 mL        PHYSICAL EXAM:  GENERAL: NAD, well-developed  HEAD:  Atraumatic, Normocephalic  EYES: EOMI, PERRLA, conjunctiva and sclera clear  NECK: Supple, No JVD  CHEST/LUNG: Clear to auscultation bilaterally; No wheeze  HEART: Regular rate and rhythm; No murmurs, rubs, or gallops  ABDOMEN: Soft, Nontender, Nondistended; Bowel sounds present  EXTREMITIES:  2+ Peripheral Pulses, No clubbing, cyanosis, or edema  PSYCH: AAOx3  NEUROLOGY: non-focal  SKIN: No rashes or lesions    LABS:                        12.8   4.94  )-----------( 76       ( 07 Oct 2023 09:07 )             37.5     10-07    140  |  105  |  6<L>  ----------------------------<  80  3.7   |  21<L>  |  0.71    Ca    9.1      07 Oct 2023 09:07  Phos  3.8     10-07  Mg     1.9     10-07            Urinalysis Basic - ( 07 Oct 2023 09:07 )    Color: x / Appearance: x / SG: x / pH: x  Gluc: 80 mg/dL / Ketone: x  / Bili: x / Urobili: x   Blood: x / Protein: x / Nitrite: x   Leuk Esterase: x / RBC: x / WBC x   Sq Epi: x / Non Sq Epi: x / Bacteria: x    < from: CT Abdomen and Pelvis No Cont (10.06.23 @ 20:57) >  FINDINGS:  LOWER CHEST: Left lower lobe linear atelectasis.    LIVER: Within normal limits.  BILE DUCTS: Normal caliber.  GALLBLADDER: Within normal limits.  SPLEEN: Within normal limits.  PANCREAS: Within normal limits.  ADRENALS: Within normal limits.  KIDNEYS/URETERS: Within normal limits.    BLADDER: Minimally distended.  REPRODUCTIVE ORGANS: Uterine fibroids.    BOWEL: No bowel obstruction. Status post appendectomy.  PERITONEUM: No ascites.  VESSELS: Within normal limits.  RETROPERITONEUM/LYMPH NODES: No lymphadenopathy.  ABDOMINAL WALL: Within normal limits.  BONES: Within normal limits.    IMPRESSION:  No acute intra-abdominal findings.      < end of copied text >  < from: CT Abdomen and Pelvis No Cont (10.06.23 @ 20:57) >  FINDINGS:  LOWER CHEST: Left lower lobe linear atelectasis.    LIVER: Within normal limits.  BILE DUCTS: Normal caliber.  GALLBLADDER: Within normal limits.  SPLEEN: Within normal limits.  PANCREAS: Within normal limits.  ADRENALS: Within normal limits.  KIDNEYS/URETERS: Within normal limits.    BLADDER: Minimally distended.  REPRODUCTIVE ORGANS: Uterine fibroids.    BOWEL: No bowel obstruction. Status post appendectomy.  PERITONEUM: No ascites.  VESSELS: Within normal limits.  RETROPERITONEUM/LYMPH NODES: No lymphadenopathy.  ABDOMINAL WALL: Within normal limits.  BONES: Within normal limits.    IMPRESSION:  No acute intra-abdominal findings.      < end of copied text >      RADIOLOGY & ADDITIONAL TESTS:    Imaging Personally Reviewed:    Consultant(s) Notes Reviewed:      Care Discussed with Consultants/Other Providers:  
DATE OF SERVICE: 10-09-23 @ 10:06    Patient is a 47y old  Female who presents with a chief complaint of abdominal pain (08 Oct 2023 16:30)      SUBJECTIVE / OVERNIGHT EVENTS:  No chest pain. No shortness of breath. No complaints. No events overnight.     MEDICATIONS  (STANDING):  cetirizine 5 milliGRAM(s) Oral daily  diltiazem    milliGRAM(s) Oral daily  famotidine Injectable 10 milliGRAM(s) IV Push <User Schedule>  lactated ringers. 1000 milliLiter(s) (65 mL/Hr) IV Continuous <Continuous>  LORazepam     Tablet 1 milliGRAM(s) Oral <User Schedule>  montelukast 10 milliGRAM(s) Oral <User Schedule>    MEDICATIONS  (PRN):  acetaminophen     Tablet .. 650 milliGRAM(s) Oral every 6 hours PRN Mild Pain (1 - 3)  diphenhydrAMINE Elixir 12.5 milliGRAM(s) Oral every 6 hours PRN Rash and/or Itching      Vital Signs Last 24 Hrs  T(C): 36.5 (09 Oct 2023 06:00), Max: 36.7 (08 Oct 2023 23:51)  T(F): 97.7 (09 Oct 2023 06:00), Max: 98.1 (08 Oct 2023 23:51)  HR: 70 (09 Oct 2023 06:00) (69 - 85)  BP: 107/67 (09 Oct 2023 06:00) (107/67 - 130/91)  BP(mean): --  RR: 19 (09 Oct 2023 06:00) (18 - 19)  SpO2: 98% (09 Oct 2023 06:00) (98% - 100%)    Parameters below as of 09 Oct 2023 06:00  Patient On (Oxygen Delivery Method): room air      CAPILLARY BLOOD GLUCOSE        I&O's Summary    08 Oct 2023 07:01  -  09 Oct 2023 07:00  --------------------------------------------------------  IN: 440 mL / OUT: 0 mL / NET: 440 mL        PHYSICAL EXAM:  GENERAL: NAD, well-developed  HEAD:  Atraumatic, Normocephalic  EYES: EOMI, PERRLA, conjunctiva and sclera clear  NECK: Supple, No JVD  CHEST/LUNG: Clear to auscultation bilaterally; No wheeze  HEART: Regular rate and rhythm; No murmurs, rubs, or gallops  ABDOMEN: Soft, Nontender, Nondistended; Bowel sounds present  EXTREMITIES:  2+ Peripheral Pulses, No clubbing, cyanosis, or edema  PSYCH: AAOx3  NEUROLOGY: non-focal  SKIN: No rashes or lesions    LABS:                    RADIOLOGY & ADDITIONAL TESTS:    Imaging Personally Reviewed:    Consultant(s) Notes Reviewed:      Care Discussed with Consultants/Other Providers:

## 2023-10-09 NOTE — DISCHARGE NOTE PROVIDER - CARE PROVIDER_API CALL
Cristhian Parker  Cardiovascular Disease  [facility], [address] [address]  Phone: [phone]  Fax: [phone]  Follow Up Time:

## 2023-10-09 NOTE — DISCHARGE NOTE PROVIDER - DETAILS OF MALNUTRITION DIAGNOSIS/DIAGNOSES
This patient has been assessed with a concern for Malnutrition and was treated during this hospitalization for the following Nutrition diagnosis/diagnoses:     -  10/07/2023: Severe protein-calorie malnutrition   -  10/07/2023: Underweight (BMI < 19)

## 2023-10-09 NOTE — DISCHARGE NOTE PROVIDER - NSFOLLOWUPCLINICS_GEN_ALL_ED_FT
[facility]  Allergy  [address] [place] [address]  Phone: [phone]  Fax:   Follow Up Time: 2 weeks

## 2023-10-09 NOTE — DISCHARGE NOTE PROVIDER - HOSPITAL COURSE
HPI:   47-year-old female past medical history POTS, anxiety, dysautonomia on diltiazem and Ativan 1 mg 5 times per day, protein S insufficiency with chronic nonocclusive DVT to jugular, who presents with inability to eat. Reports having MRI on 9/23 for knee but felt a tug where her appendectomy staples were, subsequently having "allergic reaction" (chest/back redness, sensation of swelling, trouble breathing), when eating all foods she previously tolerated before, taking benadryl for it, progressively became more intolerant of foods until she could not eat/drink much anymore, only drinking/eating very little. Worried about inability to eat. Also reporting mild abdominal pain in RLQ, denies any dysuria or urinary frequency. (06 Oct 2023 14:45)    Hospital Course:  CT ab pelvis done with no acute abdominal findings. pt on iv fluids with multivitamins. cleared for dc with followup with immunologist and allergist    Important Medication Changes and Reason:    Active or Pending Issues Requiring Follow-up:    Advanced Directives:   [ x] Full code  [ ] DNR  [ ] Hospice    Discharge Diagnoses:    ab paind     HPI:   47-year-old female past medical history POTS, anxiety, dysautonomia on diltiazem and Ativan 1 mg 5 times per day, protein S insufficiency with chronic nonocclusive DVT to jugular, who presents with inability to eat. Reports having MRI on 9/23 for knee but felt a tug where her appendectomy staples were, subsequently having "allergic reaction" (chest/back redness, sensation of swelling, trouble breathing), when eating all foods she previously tolerated before, taking benadryl for it, progressively became more intolerant of foods until she could not eat/drink much anymore, only drinking/eating very little. Worried about inability to eat. Also reporting mild abdominal pain in RLQ, denies any dysuria or urinary frequency. (06 Oct 2023 14:45)    Hospital Course:  CT ab pelvis done with no acute abdominal findings. pt on iv fluids with multivitamins. cleared for dc with followup with immunologist and allergist    Important Medication Changes and Reason:    Active or Pending Issues Requiring Follow-up:  followup with immunologist and allergist    Advanced Directives:   [ x] Full code  [ ] DNR  [ ] Hospice    Discharge Diagnoses: Abdominal pain

## 2023-10-10 LAB — HISTAMINE BLD-MCNC: 0.17 NG/ML — SIGNIFICANT CHANGE UP

## 2023-11-20 ENCOUNTER — APPOINTMENT (OUTPATIENT)
Dept: PULMONOLOGY | Facility: CLINIC | Age: 48
End: 2023-11-20

## 2023-12-20 NOTE — ED ADULT TRIAGE NOTE - SOURCE OF INFORMATION
----- Message from Quita Boogie MD sent at 12/19/2023 10:30 PM CST -----  Final urine culture confirms bacterial urinary tract infection, susceptible to Macrobid, Rx Macrobid sent, take until course is completed.   
Informed Sowmya with [facility] that Macrobid was called into the pharmacy for pts UTI  
Patient

## 2023-12-24 NOTE — ED ADULT TRIAGE NOTE - HEIGHT IN CM
Emergency Department Provider Note       PCP: Brandi Chacko MD   Age: 49 y.o. Sex: female     Mark Clemons Admitted 12/24/2023 11:39:13 AM       ICD-10-CM    1. TIA (transient ischemic attack)  G45.9 Echo (TTE) complete (PRN contrast/bubble/strain/3D)     Echo (TTE) complete (PRN contrast/bubble/strain/3D)          Medical Decision Making     Complexity of Problems Addressed:  1 or more acute illnesses that pose a threat to life or bodily function. Data Reviewed and Analyzed:   I independently ordered and reviewed each unique test.     The patients assessment required an independent historian:  at bedside. The reason they were needed is important historical information not provided by the patient. I independently ordered and interpreted the ED EKG in the absence of a Cardiologist.    Rate: 74  EKG Interpretation: EKG Interpretation: sinus rhythm, no evidence of arrhythmia, no acute changes, and non-specific EKG  ST Segments: Normal ST segments - NO STEMI      I independently interpreted the cardiac monitor rhythm strip normal sinus rhythm without ectopy. I interpreted the CT Scan CT head, and CTA head and neck negative for hemorrhage CVA, or LVO. Discussion of management or test interpretation. Patient with left arm numbness and hypertension, CTA head and neck, CT head are negative for CVA, consider possible radiculopathy, neurology recommends admission for MRI echo etc.  Will load with Plavix as directed admit to hospitalist service    At time of neurology consult her left arm hypoesthesia was apparently resolved    The patient was admitted and I have discussed patient management with the admitting provider. The management of this patient was discussed with an external consultant. Discussed with neurology, Admitted to hospitalist    Risk of Complications and/or Morbidity of Patient Management:  Prescription drug management performed.   Shared medical decision making was utilized in 157.48

## 2023-12-27 NOTE — PATIENT PROFILE ADULT. - MEDICATION ADMINISTRATION INFO, PROFILE
Physical Therapy Visit    Visit Type: Daily Treatment Note  Visit: Visit count could not be calculated. Make sure you are using a visit which is associated with an episode.  Referring Provider: JAMAICA Rivas  Medical Diagnosis (from order): [unfilled]     SUBJECTIVE                                                                                                               7 minutes late  Patient reports ***    she has been feeling ok.  She is trying to work on deep breathing & engaging pelvic floor.  She does this mostly at night & sometimes throughout the day.  She is trying to work on her posture.  She is not sure if she's engaging the right muscles.  Pain has been ok.  Had an ultrasound, which showed everything was good.        OBJECTIVE                                                                                                                                    Treatment     Neuromuscular Re-Education  Seated breathing   Diaphragmatic breathing x 1 minute   Lateral breathing x 2-3 minutes  Seated > child's pose   Posterior breathing x 2 minutes  Seated   360° breathing x 2 minutes  Hooklying-  Diaphragmatic breathing with emphasis on noticing pelvic floor elongation on inhalation  Pelvic floor muscle activation with cueing to isolate correct muscles   Initial cues & discussion to elevator ramping on exhalation with option for adductor towel squeeze x 2 minutes   Mid-low transversus abdominis activation on exhalation with posterior pelvic tilt x 2 minutes- cues for correct muscle isolation    Skilled input: verbal instruction/cues and tactile instruction/cues  instruction/cues for: initial home exercise program    Writer verbally educated and received verbal consent for hand placement, positioning of patient, and techniques to be performed today from patient for clothing adjustments for techniques, therapist position for techniques and hand placement and palpation for techniques as described above  and how they are pertinent to the patient's plan of care.  Home Exercise Program  Access Code: [email or web address]  URL: [email or web address]  Date: 11/08/2023  Prepared by: Chloe Armando    Exercises  - Supine Diaphragmatic Breathing  - 7 x weekly - 3 sets - 10 reps  - Supine Transversus Abdominis Bracing with Pelvic Floor Contraction  - 7 x weekly - 3 sets - 10 reps  - Seated Pelvic Floor Elevators  - 7 x weekly - 3 sets - 10 reps  12/19/23:  - Seated Diaphragmatic Breathing  - 1 x daily - 7 x weekly - 3 sets - 10 reps  - Child's Pose Stretch  - 1 x daily - 7 x weekly - 3 sets - 10 reps        ASSESSMENT                                                                                                            April ***  Patient will continue to benefit from skilled physical therapy to address objective deficits and return to safe daily and functional activities.       Education:   - Results of above outlined education: Verbalizes understanding and Needs reinforcement    PLAN                                                                                                                           Suggestions for next session as indicated: Progress per plan of care       Therapy procedure time and total treatment time can be found documented on the Time Entry flowsheet     no concerns

## 2024-01-09 NOTE — CONSULT NOTE ADULT - PROBLEM SELECTOR PROBLEM 6
Kick Counts in Pregnancy   WHAT YOU NEED TO KNOW:   Kick counts measure how much your baby is moving in your womb. A kick from your baby can be felt as a twist, turn, swish, roll, or jab. It is common to feel your baby kicking at 26 to 28 weeks of pregnancy. You may feel your baby kick as early as 20 weeks of pregnancy. You may want to start counting at 28 weeks.   DISCHARGE INSTRUCTIONS:   Contact your doctor immediately if:   You feel a change in the number of kicks or movements of your baby.     You feel fewer than 10 kicks within 2 hours.     You have questions or concerns about your baby's movements.    Why measure kick counts:  Your baby's movement may provide information about your baby's health. He or she may move less, or not at all, if there are problems. Your baby may move less if he or she is not getting enough oxygen or nutrition from the placenta. Do not smoke while you are pregnant. Smoking decreases the amount of oxygen that gets to your baby. Talk to your healthcare provider if you need help to quit smoking. Tell your healthcare provider as soon as you feel a change in your baby's movements.  When to measure kick counts:   Measure kick counts at the same time every day.      Measure kick counts when your baby is awake and most active. Your baby may be most active in the evening.    How to measure kick counts:  Check that your baby is awake before you measure kick counts. You can wake up your baby by lightly pushing on your belly, walking, or drinking something cold. Your healthcare provider may tell you different ways to measure kick counts. You may be told to do the following:  Use a chart or clock to keep track of the time you start and finish counting.     Sit in a chair or lie on your left side.     Place your hands on the largest part of your belly.     Count until you reach 10 kicks. Write down how much time it takes to count 10 kicks.     It may take 30 minutes to 2 hours to count 10 kicks.  It should not take more than 2 hours to count 10 kicks.    Follow up with your doctor as directed:  Write down your questions so you remember to ask them during your visits.  © Copyright [employer] 2023 Information is for End User's use only and may not be sold, redistributed or otherwise used for commercial purposes.  The above information is an  only. It is not intended as medical advice for individual conditions or treatments. Talk to your doctor, nurse or pharmacist before following any medical regimen to see if it is safe and effective for you.     Gastroparesis

## 2024-01-22 NOTE — ED PROVIDER NOTE - OBJECTIVE STATEMENT
40 y/o F w/ PMHx POTS disease, HTN, ITP, IBS, anxiety presents to the ED c/o an exacerbation of her POTS condition. Pt states during an episode she feels tremors, dizziness, fatigue and numbness. Pt took an ativan PTA approximately 1 hour ago, states it helps with her symptoms. Pt states when she comes to the ER (usually [place]) for these symptoms slow rate IVF and oxygen usually help... states that her electrolytes could be off and causing her symptoms. Pt states she has been feeling off last 2 days, has had lower abd pain for the past couple of days, vomited today and took Zofran today for nausea. Pt states she had a BM today, which was normal. Pt states she has been seeing a chiropractor to help with her condition. Pt denies fever, diarrhea or any other complaints at this time. LNMP Oct. 5th.     Cardiologist: Dr. Hussein ([place]) 42 y/o F w/ PMHx POTS disease, HTN, ITP, IBS, anxiety presents to the ED c/o an exacerbation of her POTS condition. Pt states during an episode she feels tremors, dizziness, fatigue and numbness. Pt took an ativan PTA approximately 1 hour ago, states it helps with her symptoms. Pt states when she comes to the ER (usually [place]) for these symptoms slow rate IVF and oxygen usually help... states that her electrolytes could be off and causing her symptoms. Pt states she has been feeling off last 2 days, has had lower abd pain, vomited today and took Zofran today for nausea. Pt states she had a BM today, which was normal. Had an abdominal MRI for work up of this abd pain (more chronic) and no clear etiology. Pt states she has been seeing a chiropractor to help with her condition. Pt denies fever, diarrhea or any other complaints at this time. LNMP Oct. 5th.     Cardiologist: Dr. Hussein ([place]) No

## 2024-02-02 NOTE — ED PROVIDER NOTE - PROGRESS NOTE DETAILS
Pt left prior to being seen by a physician. Pt had c/o chest pain and an EKG was performed which was reviewed by me.
No
abdominal pain

## 2024-02-05 NOTE — ED ADULT NURSE NOTE - NS TRANSFER PATIENT BELONGINGS
Pt still drowsy but arousable post procedure. Mom at bedside. Waiting for MD to speak with patient and family.    Clothing

## 2024-02-07 ENCOUNTER — EMERGENCY (EMERGENCY)
Facility: HOSPITAL | Age: 49
LOS: 1 days | Discharge: HOME | End: 2024-02-07
Attending: EMERGENCY MEDICINE
Payer: COMMERCIAL

## 2024-02-07 VITALS
SYSTOLIC BLOOD PRESSURE: 125 MMHG | HEIGHT: 61 IN | WEIGHT: 95.02 LBS | RESPIRATION RATE: 20 BRPM | DIASTOLIC BLOOD PRESSURE: 90 MMHG | OXYGEN SATURATION: 97 % | TEMPERATURE: 97 F | HEART RATE: 97 BPM

## 2024-02-07 VITALS
RESPIRATION RATE: 20 BRPM | TEMPERATURE: 98 F | OXYGEN SATURATION: 98 % | HEART RATE: 84 BPM | SYSTOLIC BLOOD PRESSURE: 137 MMHG | DIASTOLIC BLOOD PRESSURE: 62 MMHG

## 2024-02-07 DIAGNOSIS — Z90.49 ACQUIRED ABSENCE OF OTHER SPECIFIED PARTS OF DIGESTIVE TRACT: Chronic | ICD-10-CM

## 2024-02-07 LAB
ALBUMIN SERPL ELPH-MCNC: 4.6 G/DL — SIGNIFICANT CHANGE UP (ref 3.3–5)
ALP SERPL-CCNC: 58 U/L — SIGNIFICANT CHANGE UP (ref 40–120)
ALT FLD-CCNC: 12 U/L — SIGNIFICANT CHANGE UP (ref 10–45)
ANION GAP SERPL CALC-SCNC: 12 MMOL/L — SIGNIFICANT CHANGE UP (ref 5–17)
ANISOCYTOSIS BLD QL: SLIGHT — SIGNIFICANT CHANGE UP
APTT BLD: 26.5 SEC — SIGNIFICANT CHANGE UP (ref 24.5–35.6)
AST SERPL-CCNC: 19 U/L — SIGNIFICANT CHANGE UP (ref 10–40)
BASOPHILS # BLD AUTO: 0 K/UL — SIGNIFICANT CHANGE UP (ref 0–0.2)
BASOPHILS NFR BLD AUTO: 0 % — SIGNIFICANT CHANGE UP (ref 0–2)
BILIRUB SERPL-MCNC: 0.6 MG/DL — SIGNIFICANT CHANGE UP (ref 0.2–1.2)
BUN SERPL-MCNC: 4 MG/DL — LOW (ref 7–23)
CALCIUM SERPL-MCNC: 9 MG/DL — SIGNIFICANT CHANGE UP (ref 8.4–10.5)
CHLORIDE SERPL-SCNC: 105 MMOL/L — SIGNIFICANT CHANGE UP (ref 96–108)
CO2 SERPL-SCNC: 22 MMOL/L — SIGNIFICANT CHANGE UP (ref 22–31)
CREAT SERPL-MCNC: 0.77 MG/DL — SIGNIFICANT CHANGE UP (ref 0.5–1.3)
EGFR: 96 ML/MIN/1.73M2 — SIGNIFICANT CHANGE UP
ELLIPTOCYTES BLD QL SMEAR: SLIGHT — SIGNIFICANT CHANGE UP
EOSINOPHIL # BLD AUTO: 0 K/UL — SIGNIFICANT CHANGE UP (ref 0–0.5)
EOSINOPHIL NFR BLD AUTO: 0 % — SIGNIFICANT CHANGE UP (ref 0–6)
GLUCOSE SERPL-MCNC: 79 MG/DL — SIGNIFICANT CHANGE UP (ref 70–99)
HCG SERPL-ACNC: <2 MIU/ML — SIGNIFICANT CHANGE UP
HCT VFR BLD CALC: 39.2 % — SIGNIFICANT CHANGE UP (ref 34.5–45)
HGB BLD-MCNC: 13.3 G/DL — SIGNIFICANT CHANGE UP (ref 11.5–15.5)
INR BLD: 0.99 RATIO — SIGNIFICANT CHANGE UP (ref 0.85–1.18)
LYMPHOCYTES # BLD AUTO: 1.46 K/UL — SIGNIFICANT CHANGE UP (ref 1–3.3)
LYMPHOCYTES # BLD AUTO: 19.8 % — SIGNIFICANT CHANGE UP (ref 13–44)
MAGNESIUM SERPL-MCNC: 2 MG/DL — SIGNIFICANT CHANGE UP (ref 1.6–2.6)
MANUAL SMEAR VERIFICATION: SIGNIFICANT CHANGE UP
MCHC RBC-ENTMCNC: 30 PG — SIGNIFICANT CHANGE UP (ref 27–34)
MCHC RBC-ENTMCNC: 33.9 GM/DL — SIGNIFICANT CHANGE UP (ref 32–36)
MCV RBC AUTO: 88.5 FL — SIGNIFICANT CHANGE UP (ref 80–100)
MICROCYTES BLD QL: SIGNIFICANT CHANGE UP
MONOCYTES # BLD AUTO: 0.69 K/UL — SIGNIFICANT CHANGE UP (ref 0–0.9)
MONOCYTES NFR BLD AUTO: 9.4 % — SIGNIFICANT CHANGE UP (ref 2–14)
NEUTROPHILS # BLD AUTO: 4.88 K/UL — SIGNIFICANT CHANGE UP (ref 1.8–7.4)
NEUTROPHILS NFR BLD AUTO: 64.2 % — SIGNIFICANT CHANGE UP (ref 43–77)
NEUTS BAND # BLD: 1.9 % — SIGNIFICANT CHANGE UP (ref 0–8)
OVALOCYTES BLD QL SMEAR: SLIGHT — SIGNIFICANT CHANGE UP
PLAT MORPH BLD: NORMAL — SIGNIFICANT CHANGE UP
PLATELET # BLD AUTO: 86 K/UL — LOW (ref 150–400)
POIKILOCYTOSIS BLD QL AUTO: SLIGHT — SIGNIFICANT CHANGE UP
POTASSIUM SERPL-MCNC: 3.3 MMOL/L — LOW (ref 3.5–5.3)
POTASSIUM SERPL-SCNC: 3.3 MMOL/L — LOW (ref 3.5–5.3)
PROT SERPL-MCNC: 6.9 G/DL — SIGNIFICANT CHANGE UP (ref 6–8.3)
PROTHROM AB SERPL-ACNC: 10.9 SEC — SIGNIFICANT CHANGE UP (ref 9.5–13)
RBC # BLD: 4.43 M/UL — SIGNIFICANT CHANGE UP (ref 3.8–5.2)
RBC # FLD: 12.9 % — SIGNIFICANT CHANGE UP (ref 10.3–14.5)
RBC BLD AUTO: ABNORMAL
SODIUM SERPL-SCNC: 139 MMOL/L — SIGNIFICANT CHANGE UP (ref 135–145)
TROPONIN T, HIGH SENSITIVITY RESULT: <6 NG/L — SIGNIFICANT CHANGE UP (ref 0–51)
VARIANT LYMPHS # BLD: 4.7 % — SIGNIFICANT CHANGE UP (ref 0–6)
WBC # BLD: 7.38 K/UL — SIGNIFICANT CHANGE UP (ref 3.8–10.5)
WBC # FLD AUTO: 7.38 K/UL — SIGNIFICANT CHANGE UP (ref 3.8–10.5)

## 2024-02-07 PROCEDURE — 80053 COMPREHEN METABOLIC PANEL: CPT

## 2024-02-07 PROCEDURE — 71046 X-RAY EXAM CHEST 2 VIEWS: CPT

## 2024-02-07 PROCEDURE — 93005 ELECTROCARDIOGRAM TRACING: CPT

## 2024-02-07 PROCEDURE — 84443 ASSAY THYROID STIM HORMONE: CPT

## 2024-02-07 PROCEDURE — 85730 THROMBOPLASTIN TIME PARTIAL: CPT

## 2024-02-07 PROCEDURE — 85610 PROTHROMBIN TIME: CPT

## 2024-02-07 PROCEDURE — 84702 CHORIONIC GONADOTROPIN TEST: CPT

## 2024-02-07 PROCEDURE — 84484 ASSAY OF TROPONIN QUANT: CPT

## 2024-02-07 PROCEDURE — 96375 TX/PRO/DX INJ NEW DRUG ADDON: CPT

## 2024-02-07 PROCEDURE — 96374 THER/PROPH/DIAG INJ IV PUSH: CPT

## 2024-02-07 PROCEDURE — 71046 X-RAY EXAM CHEST 2 VIEWS: CPT | Mod: 26

## 2024-02-07 PROCEDURE — 83735 ASSAY OF MAGNESIUM: CPT

## 2024-02-07 PROCEDURE — 99285 EMERGENCY DEPT VISIT HI MDM: CPT | Mod: 25

## 2024-02-07 PROCEDURE — 99285 EMERGENCY DEPT VISIT HI MDM: CPT

## 2024-02-07 PROCEDURE — 85025 COMPLETE CBC W/AUTO DIFF WBC: CPT

## 2024-02-07 PROCEDURE — 85379 FIBRIN DEGRADATION QUANT: CPT

## 2024-02-07 RX ORDER — FAMOTIDINE 10 MG/ML
20 INJECTION INTRAVENOUS ONCE
Refills: 0 | Status: DISCONTINUED | OUTPATIENT
Start: 2024-02-07 | End: 2024-02-07

## 2024-02-07 RX ORDER — POTASSIUM CHLORIDE 20 MEQ
10 PACKET (EA) ORAL ONCE
Refills: 0 | Status: COMPLETED | OUTPATIENT
Start: 2024-02-07 | End: 2024-02-07

## 2024-02-07 RX ORDER — SODIUM CHLORIDE 9 MG/ML
1000 INJECTION INTRAMUSCULAR; INTRAVENOUS; SUBCUTANEOUS
Refills: 0 | Status: DISCONTINUED | OUTPATIENT
Start: 2024-02-07 | End: 2024-02-11

## 2024-02-07 RX ORDER — POTASSIUM CHLORIDE 20 MEQ
40 PACKET (EA) ORAL ONCE
Refills: 0 | Status: DISCONTINUED | OUTPATIENT
Start: 2024-02-07 | End: 2024-02-07

## 2024-02-07 RX ORDER — FAMOTIDINE 10 MG/ML
10 INJECTION INTRAVENOUS ONCE
Refills: 0 | Status: COMPLETED | OUTPATIENT
Start: 2024-02-07 | End: 2024-02-07

## 2024-02-07 RX ADMIN — Medication 100 MILLIEQUIVALENT(S): at 21:15

## 2024-02-07 RX ADMIN — FAMOTIDINE 10 MILLIGRAM(S): 10 INJECTION INTRAVENOUS at 18:49

## 2024-02-07 RX ADMIN — SODIUM CHLORIDE 100 MILLILITER(S): 9 INJECTION INTRAMUSCULAR; INTRAVENOUS; SUBCUTANEOUS at 18:29

## 2024-02-07 NOTE — ED ADULT NURSE REASSESSMENT NOTE - NS ED NURSE REASSESS COMMENT FT1
RN attempted to administer po home dose ativan 1mg as per MD Thurston orders. PT states " I cant take anything by mouth, because of my heart" MD Thurston made aware. MD Alba spoke with pt explaining, team can not administer IV ativan due to shortage. Pt verbalized that she will take her own dose at home .

## 2024-02-07 NOTE — ED PROVIDER NOTE - RAPID ASSESSMENT
47 yo female PMHx tachycardia, HTN, dysautonomia, ITP, DVT, POTS presents to ED c/o L side chest pain radiating up L arm since this AM. Saw doctor today who did US of LUE (due to clot concern?). Additionally c/o tachycardia worsened the last 2 days, says HR when standing up goes to "140 bpm". Feels short of breath. No fever/chills.    **Patient was rapidly assessed by me, Lynette Morrow PA-C. A limited history was obtained. The patient will be seen and further examined and worked up in the main ED and their care will be completed by the main ED team. Receiving team will follow up on labs, analgesia, any clinical imaging, and perform reassessment and disposition of the patient as clinically indicated. All decisions regarding the progression of care will be made at their discretion.

## 2024-02-07 NOTE — ED PROVIDER NOTE - PROVIDER TOKENS
PROVIDER:[TOKEN:[[id number]],SCHEDULEDAPPT:[02/08/2024],SCHEDULEDAPPTTIME:[03:00 PM],ESTABLISHEDPATIENT:[T]]

## 2024-02-07 NOTE — ED ADULT TRIAGE NOTE - SOURCE OF INFORMATION
Patient Specific Counseling (Will Not Stick From Patient To Patient): Recommended evaluation at ED for IV antibiotics and labs.
Detail Level: Simple
Detail Level: Detailed
Patient

## 2024-02-07 NOTE — ED PROVIDER NOTE - CLINICAL SUMMARY MEDICAL DECISION MAKING FREE TEXT BOX
48 year old female with PMHx of tachycardia, dysautonomnia, DVT, presenting to the ED for evaluation of left sided chest pain x2 days. EKG shows no acute ischemic changes. On arrival to the ED, the patient is normotensive, not tachycardic, afebrile, satting 97% on room air. Plan to obtain labs, imaging, EKG, and re-evaluate. 48 year old female with PMHx of tachycardia, dysautonomnia, DVT, presenting to the ED for evaluation of left sided chest pain x2 days. EKG shows no acute ischemic changes. On arrival to the ED, the patient is normotensive, not tachycardic, afebrile, satting 97% on room air. Plan to obtain labs, imaging, EKG, and re-evaluate.    see attending attestation authored by me, Horace Thurston MD, for further MDM details.

## 2024-02-07 NOTE — ED PROVIDER NOTE - PATIENT PORTAL LINK FT
You can access the FollowMyHealth Patient Portal offered by [facility] by registering at the following website: [email or web address]. By joining [employer]’s FollowMyHealth portal, you will also be able to view your health information using other applications (apps) compatible with our system.

## 2024-02-07 NOTE — ED ADULT NURSE REASSESSMENT NOTE - NS ED NURSE REASSESS COMMENT FT1
Pt's discharge delayed because she refused PO potassium will get 10 Meq of IV potassium as per pt request

## 2024-02-07 NOTE — ED PROVIDER NOTE - CARE PROVIDER_API CALL
Cristhian Parker  Cardiovascular Disease  [address] [facility] [address] [facility] [address]  [place] [address]  Phone: [phone]  Fax: [phone]  Established Patient  Scheduled Appointment: 02/08/2024 03:00 PM

## 2024-02-07 NOTE — ED PROVIDER NOTE - INTERPRETATION
EKG independently reviewed for rate, rhythm, axis, intervals and segments, including QRS morphology, P wave appearance T wave appearance, CO interval, and QT interval.  I find the EKG to be notable as follows: sinus arrhythmia

## 2024-02-07 NOTE — ED PROVIDER NOTE - NSFOLLOWUPINSTRUCTIONS_ED_ALL_ED_FT
You were seen in the emergency department for chest pain.    1) Follow-up with Dr. Cristhian Parker at your scheduled appointment tomorrow at 3pm .     2) Continue to take all medications as prescribed.    3) Rest and stay hydrated. Pain can be managed with Acetaminophen (aka Tylenol)  over the counter as directed.    4) Return to the ER for any new or worsening symptoms. Some symptoms to look out for include worsening chest pain, shortness of breath, breaking out into sweats, passing out, vomiting, or any other worsening or concerning symptoms.       Please read all attached patient information.

## 2024-02-07 NOTE — ED PROVIDER NOTE - PHYSICAL EXAMINATION
Constitutional: Well-appearing, well-nourished, comfortable appearing.   Head: Normocephalic, atraumatic.   Eyes: PERRL. EOMI. No conjunctival pallor.   ENT: Moist mucous membranes. Uvula midline. No pharyngeal erythema or exudates.  Neck: No LAD. Supple.  CVS: Regular rate, regular rhythm. Normal S1, S2. No murmurs, rubs, or gallops. No peripheral edema noted.   Respiratory: No respiratory distress. Clear to auscultation bilaterally. No wheezing, rales, or rhonchi.   Abdomen: Abd is soft and non-distended. No tenderness. No rebound, guarding, or rigidity.   MSK: No CVA tenderness bilaterally.   Neuro: Alert and oriented to person, place, and time. Follows commands. Moves all extremities.   Skin: Warm and dry. No rashes.   Psych: Anxious affect, cooperative.

## 2024-02-07 NOTE — ED PROVIDER NOTE - PROGRESS NOTE DETAILS
Joelle Rendon, PGY-2: Pt reports improvement of symptoms.   Went over results in detail with patient.   Patient declining PO potassium. Prefers one K rider, then to be dc'ed

## 2024-02-07 NOTE — ED STATDOCS - CPE ED [facility] NORM
-BPs WNL (90s/60s) recommend serial blood pressures while admitted.   -HELLP labs WNL. No further HELLP labs indicated  -Seizure precautions   -Low suspicion for preeclampsia/eclampsia at this time.    -Neurology following s/p negative EEG and MRI. Ativan 1 prn ordered for seizure like activity. normal...

## 2024-02-07 NOTE — ED PROVIDER NOTE - OBJECTIVE STATEMENT
48 year old female with PMHx of tachycardia, dysautonomia, DVT, presenting to the ED for evaluation of left sided chest pain x2 days. It is exacerbated with inspiration, radiates down her LUE, constant. She reports associated shortness of breath. As per patient, she also states that when she moves her "heart races." She denies changes in vision, changes in speech, numbness, weakness, nausea, vomiting, diarrhea, abdominal pain, dysuria, hematuria, flank pain, and any additional complaints at this time.

## 2024-02-07 NOTE — ED PROVIDER NOTE - ATTENDING CONTRIBUTION TO CARE
47 yo female complex PMH as noted p/w CP.  EKG independently reviewed by me at the bedside, no evidence of STEMI.  chest x-ray independently reviewed by myself, no evidence of pneumothorax.  trop negative, dimer negative.  I (SHAHANA) personally discussed with patient's cardiologist (RAYMOND), he will see her in office tomorrow.  stable for d/c.

## 2024-02-07 NOTE — ED ADULT NURSE NOTE - OBJECTIVE STATEMENT
Pt is a 48Y Female brought in to [facility] via walk in triage for complaints of chest pain. Pt states that "she was tachy today up to 150's" denies palpations. Pt describes chest pain as "radiating to the left arm with a burning feeling" PT tried at home remedies to alleviate chest pain before coming to hospital, Pt "meditated for 3 hours" and the chest pain did not go away. PT is AOx4 breathing evenly and spontaneously on room air and able to speak in full sentences. PT was undressed and placed in stretcher with comfort and safety measures provided

## 2024-02-08 LAB — TSH SERPL-MCNC: 3.15 UIU/ML — SIGNIFICANT CHANGE UP (ref 0.27–4.2)

## 2024-02-13 NOTE — ED ADULT TRIAGE NOTE - TEMPERATURE IN CELSIUS (DEGREES C)
[FreeTextEntry1] : Karen is a 26 yo transwoman here for follow up 1. Gender affirming therapy  -despite being on hormones for the past two years, he has not noticed any significant differences in physical appearance. Does not provide much details regarding changes. States there is some breast growth but would like more although has not been adherent.  -Karen is currently on estradiol 0.4ml (8 mg) IM every week (was on 20mg every 2 weeks previously), recommended split the dose into a weekly injection dose, has helped a bit. Check levels with next lab work.  -continue with spironolactone 300. Check CMP  -continue with trial of progesterone. Breast cancer, mood swings discussed as possible side effects. If no significant improvement after three month trial can consider stopping   -advised mental health follow-up for depression.    Prep time with review of labs and interval progress notes and consultations  Discussion with patient regarding hormone regimen, adherence, management plan, risks and benefits, treatment options and goals of care answering all patients questions and addressing all concerns  Post-visit completion charting and review  Total Time 30 min.  
37.1

## 2024-02-13 NOTE — PROVIDER CONTACT NOTE (OTHER) - DATE AND TIME:
[facility]  [place] [address]  Dept: [phone]  Dept Fax: [phone]      Dami Franklin is a 78 y.o. male who presents today for hismedical conditions/complaints as noted below.  Dami Franklin is c/o of Hypertension (F/u), Diabetes (F/u), and COPD (F/u)        Assessment/Plan:     1. Diet-controlled diabetes mellitus (HCC)  -     POCT glycosylated hemoglobin (Hb A1C)  2. Essential hypertension  -     dilTIAZem (CARDIZEM CD) 360 MG extended release capsule; Take 1 capsule by mouth daily, Disp-90 capsule, R-1Normal  -     candesartan (ATACAND) 32 MG tablet; Take 1 tablet by mouth daily, Disp-90 tablet, R-1Normal  -     Comprehensive Metabolic Panel; Future  3. Atrial fibrillation, chronic (HCC)  Comments:  Needs to call cardiology and schedule follow up appointment.   Orders:  -     dilTIAZem (CARDIZEM CD) 360 MG extended release capsule; Take 1 capsule by mouth daily, Disp-90 capsule, R-1Normal  4. Constipation, unspecified constipation type  -     linaclotide (LINZESS) 290 MCG CAPS capsule; Take 1 capsule by mouth every morning (before breakfast), Disp-90 capsule, R-1Normal  5. Chest pain, unspecified type  -     nitroGLYCERIN (NITROSTAT) 0.4 MG SL tablet; place 1 tablet under the tongue if needed every 5 minutes for chest pain for 3 doses IF NO RELIEF AFTER THIRD DOSE CALL 911., Disp-25 tablet, R-1Normal  6. Benign prostatic hyperplasia with nocturia  -     tamsulosin (FLOMAX) 0.4 MG capsule; Take 1 capsule by mouth daily, Disp-90 capsule, R-1Normal  7. Need for pneumococcal vaccination  -     Pneumococcal, PCV20, PREVNAR 20, (age 6w+), IM, PF  8. Encounter for screening for malignant neoplasm of prostate  -     PSA Screening; Future  9. Dyslipidemia associated with type 2 diabetes mellitus (HCC)  -     Lipid, Fasting; Future  -     Comprehensive Metabolic Panel; Future  10. Chronic obstructive pulmonary disease, unspecified COPD type (HCC)  11.  08-Sep-2017 03:20

## 2024-02-21 NOTE — ED ADULT NURSE NOTE - ISOLATION TYPE:
[FreeTextEntry1] : SHEYLA SEVERINO is a 26 year old man with below --  # bilateral hand pain with R hand carpal tunnel and tenosynovitis of multiple digits on MR Serology: negative: JOHN/dsDNA/Sjogrens/RF/CCP/ESR/CRP/lyme/HLA B27 MR R wrist: 8/2023 Small first through 5th MCP joint effusion. mild tenosynovitis of second through 5th digit flexor tendons. ulnar/dorsal sided tear of TFCC. tendinopathy and tenosynovitis of extrensor carpii ulnaris tendon -R hand with clear traumatic trigger, L hand developed only 6-8 months ago a length of time after R hand but now progressive - ddx remains broad including ortho process as suggested in MRI vs complex regional pain syndrome vs less likely seronegative inflammatory arthritis or hypothenar hammer syndrome  -no evidence of skin lesions, no ocular symptoms, no GI/ symptoms, no prior infection, no axial symptoms so no obvious physical exam or historical findings to suggest a particular form of seronegative spondyloarthropathy -xray of hands/wrists/L spine negative for clear signs of inflammatory arthritis/spondyloarthropathy   PLAN - advised pt to call 2 weeks after carpal tunnel injection to update on effect - if improved after injection will encourage injection of contralateral hand if ortho in agreement, but if not improved will send patient for triple phase bone scan and duplex to evaluate for CRPS and hypothenar hammer syndrome - c/w ibuprofen 1000mg qD as tolerated for now, ensure to take with food and stop immediately if any GI issues   All questions and concerns addressed to my best possible ability, patient verbalizes understanding and is agreeable. RTC to be determined based on above   Seen with Eloy Valdez MD, PGY-5, Rheumatology Fellow None

## 2024-02-27 NOTE — CONSULT NOTE ADULT - CONSULT REQUESTED BY NAME
.
[facility]
Dr Holden
Dr. Goodrich
Dr. Silverman
Dr. Torres/MD
lola
dr olivares
[id number]

## 2024-03-07 NOTE — ED STATDOCS - CHIEF COMPLAINT
Pt resting in room , pt medicated per MAR, call light in reach , family at bedside   The patient is a 44y year old Female complaining of multiple medical complaints.

## 2024-04-02 NOTE — ED ADULT NURSE NOTE - NS ED NOTE ABUSE SUSPICION NEGLECT YN
2024    From the office of:   Issa Wang, OD   [facility]  [address]  [place] [address]  Phone: [phone]  Fax: [phone]    In regards to Tara Lopes, :  1948    CHIEF COMPLAINT  Patient presents with  HPI       Eye Exam    In both eyes.  Characterized as blurred for near and blurred for distance.  Vision Correction glasses.  Last Eye Exam was 1 year ago.             Diabetes     Additional comments: Last Lab A1C:  Hemoglobin A1C (%)       Date                     Value                 2024               11.4 (H)         ----------  FBS around 200 range more recently              Glaucoma    In both eyes.  Compliance with Treatment: Taking Azopt-prefers a year supply for insurance-will need it sent to [facility]. Additional comments: Suspect-Last VF/OCT was              Cataracts    In both eyes.                    HPI: agree with tech note.        HTN (hypertension)                                            Hyperlipidemia                                                RAD (reactive airway disease)                                 Allergy                                                       Cataract                                                      Asthma                                                        Atrial fibrillation (CMD)                                     SVT (supraventricular tachycardia)                            Colon polyps                                    2016    Diverticulosis of colon                         2016    Family history of colon cancer                  2016    Anxiety                                                       Diabetes mellitus (CMD)                                       Allergy                                                       Sleep apnea                                                     Comment: CPAP    IBS (irritable colon syndrome)                                 Urinary tract infection                                       Arthritis                                                       Comment: generalized joints    Chronic pain                                                    Comment: arthritic joints    AF (atrial fibrillation) (CMD)                              Past Surgical History:   Procedure Laterality Date    Bunionectomy      Colonoscopy  01/22/2007    Colonoscopy  05/11/2016    3 year recall    Colonoscopy  01/30/2020    Screening colonoscopy in 4-5 years for polyp follow-up and family history of colon cancer.    Colonoscopy diagnostic  03/08/2024    20 polyps. Repeat in 3-5 years given fhx of CRC and numerous polyps.    Colonoscopy w/ polypectomy  04/26/2013     josé manuel  ok X 3    Dexa bone density axial skeleton  11/18/2009    Hernia repair      LIH    Hysterectomy      Tubal ligation       Family History   Problem Relation Age of Onset    Cancer Mother         breast x2 and bone    Heart disease Mother     Hypertension Mother     Hyperlipidemia Mother     Miscarriages / Stillbirths Mother     Cancer, Breast Mother     Blindness Mother         blindness right eye r/t chemo    Glaucoma Father     Hearing Loss Father     Heart disease Father     Hypertension Father     Hyperlipidemia Father     Cancer Father         prostate and skin    Cancer, Breast Sister     Cancer Sister         thyroid, colon     Cancer Sister         breast & Skin cancer    Cancer, Colon Paternal Uncle     Diabetes Paternal Grandmother      Social History     Socioeconomic History    Marital status: /Civil Union     Spouse name: Not on file    Number of children: Not on file    Years of education: Not on file    Highest education level: Not on file   Occupational History    Not on file   Tobacco Use    Smoking status: Former     Current packs/day: 0.00     Average packs/day: 1 pack/day for 13.0 years (13.0 ttl pk-yrs)     Types: Cigarettes     Start date: 1988     Quit  date:      Years since quittin.2    Smokeless tobacco: Never   Vaping Use    Vaping Use: never used   Substance and Sexual Activity    Alcohol use: Yes     Alcohol/week: 0.0 standard drinks of alcohol     Comment: very rare     Drug use: No    Sexual activity: Yes     Partners: Male   Other Topics Concern     Service Not Asked    Blood Transfusions Not Asked    Caffeine Concern Not Asked    Occupational Exposure Not Asked    Hobby Hazards Not Asked    Sleep Concern Not Asked    Stress Concern Not Asked    Weight Concern Not Asked    Special Diet Not Asked    Back Care Not Asked    Exercise Not Asked    Bike Helmet Not Asked    Seat Belt Yes    Self-Exams Not Asked   Social History Narrative    Not on file     Social Determinants of Health     Financial Resource Strain: Not on file   Food Insecurity: Not on file   Transportation Needs: Not on file   Physical Activity: Inactive (9/10/2020)    Exercise Vital Sign     Days of Exercise per Week: 0 days     Minutes of Exercise per Session: 0 min   Stress: Low Risk  (3/29/2021)    Stress     How Stressed: Not on file   Social Connections: Not on file   Interpersonal Safety: Not At Risk (3/29/2021)    Interpersonal Safety     Social Determinants: Intimate Partner Violence Past Fear: Not on file     Social Determinants: Intimate Partner Violence Current Fear: Not on file       Rooming documentation of social history includes tobacco screening only.      REVIEW OF SYSTEMS:  Eye Problem(s):visual blurring, glasses, glaucoma suspect, and cataracts  ENT Problem(s):negative  Cardiovascular problem(s):negative  Respiratory problem(s):negative  Gastro-intestinal problem(s):negative GI  Genito-urinary problem(s):negative  Musculoskeletal problem(s):negative  Integumentary problem(s):negative  Neurological problem(s):negative  Psychiatric problem(s):negative  Endocrine problem(s):diabetes  Hematologic and/or Lymphatic problem(s):negative    ASSESSMENT   1. Myopia of  both eyes with astigmatism and presbyopia    2. Diabetic cataract of both eyes (CMD)    3. Type 2 diabetes mellitus without retinopathy (CMD)    4. Primary open angle glaucoma (POAG) of right eye, mild stage    5. Primary open angle glaucoma (POAG) of left eye, moderate stage      PLAN  Release spectacle Rx. Postpone filling due to cataracts (See Item #2)  Cataract of each eye with significant reduction of best corrected visual acuity. Little to no improvement with today's refraction. Refer to ophthalmologist for cataract surgical consult  No diabetic retinopathy observed. Send eye report to PCP. Expect diabetic eye exam in 1 year  Relatively stable optic nerve appearance. Mild increase in intraocular pressure. Suspect progressing cataract contributing to IOP change. Recommend consult for minimally invasive glaucoma surgery (MIGS) to be combined with cataract surgery. Refer to [facility] for consult          No

## 2024-05-03 NOTE — ED ADULT TRIAGE NOTE - SOURCE OF INFORMATION
[facility]  Outpatient Pediatric Speech Therapy Daily Note     Date: 5/3/2024   Time In: 10:30 AM  Time Out: 11:00 AM    Name: Raul Conway   MRN: [id number]   Medical Diagnosis: Expressive language disorder  Referring Physician: Avi Gandhi MD  Age: 10 y.o. 3 m.o.     Date of Initial Evaluation: 10-  Date of Re-Evaluation: n/a  Precautions: Standard     UNTIMED  Procedure Min.   Speech- Language- Voice Therapy    30 minutes     Total Minutes: 30 minutes  Total Untimed Units: 1  Charges Billed/# of units: 1      Subjective:   Raul transitioned to speech therapy with the therapist.  He required minimal prompts to remain on task during his 30 minute appointment.  Pain: Patient did not verbalize or display any signs or symptoms of pain this session. Child is too young to understand and rate pain levels.      Objective:   Long-Term Goals:   Raul will demonstrate age-appropriate articulation skills in order to improve speech intelligibility. - Initial     Short-Term Objectives:  Raul and his caregivers will participate in home-based activities designed to encourage carryover of skills in the home environment. - Initial  Raul will reduce his rate of speech at the level of conversation in 3 of 5 opportunities when cued given moderate support. - Initial  Raul will correctly identify varying rates of speech in 3 of 5 opportunities given minimal support.  - Initial  Raul will utilize full labial excursion within three-word phrases in 3 of 5 opportunities given minimal support. - Initial  Raul will produce /l/ in all positions within three-word phrases with 90% accuracy given minimal support. - Initial  Raul will produce final consonants within three-word phrases with 90% accuracy given minimal support. - Initial  Raul will produce /th/ in all positions of single words with 90% accuracy given minimal support. - Initial  Raul will improve jaw strength and stability by resisting an  isometric hold of bite blocks 2-7 for 15 seconds on each side, bilaterally, and horizontally without compensations. - Initial  Raul will move his tongue in all planes of motion without associated jaw movement for 10 repetitions given minimal support. - Initial       Patient Education/Response:   Therapist discussed patient's goals with his mother after the session. Family verbalized understanding of Home Exercise Program, Speech and Language Strategies, and SLP treatment plan. Strategies were introduced to work on expanding speech and language skills. Mother verbalized understanding of all discussed.        Assessment:   Raul, a 10 year old male, was referred to speech and language therapy with a diagnosis of Expressive language disorder. He attends treatment twice a week for thirty minute sessions. Raul had a good day. Raul's arousal was higher throughout the session today compared to previous sessions. He was notably more impulsive and required re-direction on more occasions. When engaging in lingual exercises, Raul moved his tongue laterally, vertically and horizontally for 10 repetitions. He demonstrated effortful coordination and had challenges disassociating his tongue from his jaw. He required maximal cueing to use a controlled pace throughout these exercises. Within self-generated sentences, he produced labial rounding with 89% accuracy and final consonants with 85% accuracy. He was congested throughout today's session. While he produced with high accuracies, he was unable to demonstrate nasal breathing which impacted his intelligibility overall.    Current goals remain appropriate. Pt prognosis is good. Pt will continue to benefit from skilled outpatient speech and language therapy to address the deficits listed in the problem list on initial evaluation. Will continue to provide family with education to maximize pt's level of independence in the home and community environment.   Barriers to Therapy: No  barriers to learning evident. Spiritual/cultural beliefs not needed to be incorporated into treatment sessions. Family agreeable to plan of care and goals.      Plan:   Updated Certification Period: 01- to 04-     Continue speech and language therapy twice week for 30 minutes as planned. Continue implementation of a home program to facilitate carryover of targeted speech and langauge skills.        Tiffany Hadley, [facility]-SLP        Patient

## 2024-05-19 NOTE — ED ADULT NURSE NOTE - TEMPLATE
[FreeTextEntry1] :  Fever, flulike Tylenol prn increase po fluids  Facial rash We'll check MEGAN / autoimmune panel  Hx of psychosis/autism spectrum cont Benztropine 1 mg  cont Aripiprazole 20 mg  follows with psychiatrist monthly  Hx of anxiety cont Sertraline 50 mg  follows with psychiatrist monthly  blood work ordered follow up in one week for lab results
General

## 2024-05-21 NOTE — ED ADULT NURSE NOTE - CINV DISCH MEDS REVIEWED YN
Avoid: coffee, tea, soda, energy drinks, and alcohol    Drink 80 ounces of water every day      SURVEY:    You may be receiving a survey from [employer] regarding your visit today.    Please complete the survey to enable us to provide the highest quality of care to you and your family.    If you cannot score us a very good on any question, please call the office to discuss how we could have made your experience a very good one.    Thank you.    Yes

## 2024-05-21 NOTE — ED ADULT TRIAGE NOTE - NS ED NOTE AC HIGH RISK COUNTRIES
53 year old woman with PMH of renal stones (urology Dr. Hernandez) SLE  lupus with lupus anticoagulant, antiphospholipid syndrome,  rheumatoid arthritis, cervical cancer sp hysterectomy,  laryngeal cancer sp CT, RT, HFpEF (06/21/19 EF 63%) s/p AICD+PPM, DVT/PE on coumadin,  NSTEMI in 2015, subclavian stenosis s/p stent placement, HTN, HLD, PPM/AICD, , and recent admission from 09/22-10/09 for proximal occlusion of left subclavian vein status post in-stent restenosis presenting to ER for evaluation of hematuria and bruising    ·	Hematuria possibly related to elevated INR  ·	LE and UE hematoma  ·	Coagulopathy due to coumadin  ·	Antiphospholipid syndrome  ·	Prediabetes  ·	H/o  DVT/PE  ·	SLE/RA      ·	Sepsis ruled out on admission   ·	Monitor off antibiotics , asymptomatic   ·	INR today 3.8(  hematology recommends range of 3 to 4). Will resume coumadin at 4 mg on discharge. Reduce dose to 2 mg if >4  ·	LE and UE hematoma-conservative management  ·	h/o long-QT syndrome and VT s/p AICD/HFpEF (06/21/19 EF 63%) s/p ppm / NSTEMI in 2015./Subclavian stenosis s/p stent placement  ·	C/w home meds (Aspirin 81, Hydralazine 50 TID, Mg 500 QD, Toprol  QD)   ·	Prediabetes- HbA1c 5.9. Diabetic diet  ·	Handoff: Stable for discharge today if repeat hemoglobin is stable  No

## 2024-05-22 ENCOUNTER — NON-APPOINTMENT (OUTPATIENT)
Age: 49
End: 2024-05-22

## 2024-05-29 ENCOUNTER — NON-APPOINTMENT (OUTPATIENT)
Age: 49
End: 2024-05-29

## 2024-05-29 ENCOUNTER — APPOINTMENT (OUTPATIENT)
Dept: ORTHOPEDIC SURGERY | Facility: CLINIC | Age: 49
End: 2024-05-29
Payer: MEDICARE

## 2024-05-29 VITALS
BODY MASS INDEX: 16.62 KG/M2 | DIASTOLIC BLOOD PRESSURE: 91 MMHG | WEIGHT: 88 LBS | SYSTOLIC BLOOD PRESSURE: 137 MMHG | HEART RATE: 98 BPM | HEIGHT: 61 IN

## 2024-05-29 DIAGNOSIS — S33.5XXA SPRAIN OF LIGAMENTS OF LUMBAR SPINE, INITIAL ENCOUNTER: ICD-10-CM

## 2024-05-29 DIAGNOSIS — Z87.39 PERSONAL HISTORY OF OTHER DISEASES OF THE MUSCULOSKELETAL SYSTEM AND CONNECTIVE TISSUE: ICD-10-CM

## 2024-05-29 PROCEDURE — 72110 X-RAY EXAM L-2 SPINE 4/>VWS: CPT

## 2024-05-29 PROCEDURE — 99204 OFFICE O/P NEW MOD 45 MIN: CPT

## 2024-05-30 PROBLEM — S33.5XXA LUMBAR SPRAIN, INITIAL ENCOUNTER: Status: ACTIVE | Noted: 2024-05-30

## 2024-05-30 NOTE — DISCUSSION/SUMMARY
[PRN] : PRN [de-identified] : Patient is recommended to start with consulting a physiatrist for her bilateral knee pain and lower back pain secondary to how deconditioned she is on exam today. Back brace and the rolling walking will only accelerate her lumbar mild degenerative findings on X-rays done today. Provided the patient a list of physiatrists to consult with [facility]. Patient is currently not a surgical candidate at this time.  I, Dr. Zan Santoyo, personally performed the evaluation and management (E/M) services for this new patient.  That E/M includes conducting the initial examination, assessing all conditions, and establishing a plan of care.  Today, my ACP, Sabi Rodriguez, was here to observe my evaluation and management services for this patient to be followed going forward.

## 2024-05-30 NOTE — PHYSICAL EXAM
[Shuffling] : shuffling [Walker] : ambulates with walker [Knee] : patellar 2+ and symmetric bilaterally [Ankle] : ankle 2+ and symmetric bilaterally [Normal RLE] : Right Lower Extremity: No scars, rashes, lesions, ulcers, skin intact [Normal LLE] : Left Lower Extremity: No scars, rashes, lesions, ulcers, skin intact [Normal DTR Reflexes] : DTR reflexes normal [Antalgic] : antalgic [Wide-Based] : wide-based [Stooped] : stooped [LE] : Sensory: Intact in bilateral lower extremities [Normal] : no peripheral adenopathy appreciated [de-identified] : bilateral neoprene braces on both knees [de-identified] : Heel and toe walk is intact. Tension signs of bilateral LEs are negative. [de-identified] : emaciated [de-identified] : good skin integrity of bilateral legs Good skin integrity of the back with no lesions [de-identified] : slight curvature is noted on exam of the back No ecchymosis No erythema Noted tatooes of the lower back [de-identified] : x-rays of the lumbar spine with a left sided pelvic tilt of the AP lumbar spine view and a mild scoliosis. Lateral view of the lumbar spine with noted mild facet multilevel degenerative changes and no instability seen with flexion and extension views. No obvious fractures nor bony tumors.  Patient had xrays 2 views of the chest shows a mild scoliosis of the thoracic spine reviewed today in the office ordered by her pulmonologist in 2/2024.

## 2024-05-30 NOTE — HISTORY OF PRESENT ILLNESS
[Pain Location] : pain [T-Spine] : T-spine region [Lumbar] : lumbar region [Worsening] : worsening [___ mths] : [unfilled] month(s) ago [10] : a maximum pain level of 10/10 [Sitting] : sitting [Standing] : standing [Constant] : ~He/She~ states the symptoms seem to be constant [Bending] : worsened by bending [Lifting] : worsened by lifting [Prolonged Sitting] : worsened by prolonged sitting [Prolonged Standing] : worsened by prolonged standing [Walking] : worsened by walking [None] : No relieving factors are noted [de-identified] : A 48-year-old female presents an initial visit for lower back pain with no radiculopathy into the lower extremities.  Patient has presented to the office with complaints of lower back pain that started upon obtaining an MRI of the knees and felt a sudden onset of discomfort in the abdomen, which she states there is metal hardware in her abdomen, with subsequent work up negative. The patient then reports pulling out her back ascending stairs and felt pain in the lower back. Patient also states that she is basically bed bound because of other medical comorbidity health issues and also utilizes a back brace and a rolling walker. Patient also states a history of scoliosis. She is severely deconditioned.

## 2024-06-12 ENCOUNTER — APPOINTMENT (OUTPATIENT)
Dept: ORTHOPEDIC SURGERY | Facility: CLINIC | Age: 49
End: 2024-06-12
Payer: MEDICARE

## 2024-06-12 VITALS
OXYGEN SATURATION: 98 % | WEIGHT: 88 LBS | RESPIRATION RATE: 16 BRPM | HEIGHT: 61 IN | BODY MASS INDEX: 16.62 KG/M2 | DIASTOLIC BLOOD PRESSURE: 85 MMHG | HEART RATE: 87 BPM | SYSTOLIC BLOOD PRESSURE: 125 MMHG

## 2024-06-12 DIAGNOSIS — M54.50 LOW BACK PAIN, UNSPECIFIED: ICD-10-CM

## 2024-06-12 DIAGNOSIS — G89.29 PAIN IN THORACIC SPINE: ICD-10-CM

## 2024-06-12 DIAGNOSIS — M54.6 PAIN IN THORACIC SPINE: ICD-10-CM

## 2024-06-12 PROCEDURE — 72070 X-RAY EXAM THORAC SPINE 2VWS: CPT

## 2024-06-12 PROCEDURE — 71100 X-RAY EXAM RIBS UNI 2 VIEWS: CPT

## 2024-06-12 PROCEDURE — 99203 OFFICE O/P NEW LOW 30 MIN: CPT

## 2024-06-14 ENCOUNTER — EMERGENCY (EMERGENCY)
Facility: HOSPITAL | Age: 49
LOS: 1 days | Discharge: HOME | End: 2024-06-14
Attending: EMERGENCY MEDICINE
Payer: COMMERCIAL

## 2024-06-14 VITALS
WEIGHT: 87.96 LBS | DIASTOLIC BLOOD PRESSURE: 85 MMHG | SYSTOLIC BLOOD PRESSURE: 124 MMHG | HEART RATE: 90 BPM | HEIGHT: 61 IN | RESPIRATION RATE: 16 BRPM | OXYGEN SATURATION: 97 % | TEMPERATURE: 98 F

## 2024-06-14 VITALS
OXYGEN SATURATION: 100 % | SYSTOLIC BLOOD PRESSURE: 134 MMHG | RESPIRATION RATE: 17 BRPM | DIASTOLIC BLOOD PRESSURE: 94 MMHG | HEART RATE: 85 BPM

## 2024-06-14 DIAGNOSIS — Z90.49 ACQUIRED ABSENCE OF OTHER SPECIFIED PARTS OF DIGESTIVE TRACT: Chronic | ICD-10-CM

## 2024-06-14 LAB
ALBUMIN SERPL ELPH-MCNC: 4.4 G/DL — SIGNIFICANT CHANGE UP (ref 3.3–5)
ALP SERPL-CCNC: 58 U/L — SIGNIFICANT CHANGE UP (ref 40–120)
ALT FLD-CCNC: 18 U/L — SIGNIFICANT CHANGE UP (ref 10–45)
ANION GAP SERPL CALC-SCNC: 14 MMOL/L — SIGNIFICANT CHANGE UP (ref 5–17)
AST SERPL-CCNC: 21 U/L — SIGNIFICANT CHANGE UP (ref 10–40)
BASOPHILS # BLD AUTO: 0.01 K/UL — SIGNIFICANT CHANGE UP (ref 0–0.2)
BASOPHILS NFR BLD AUTO: 0.2 % — SIGNIFICANT CHANGE UP (ref 0–2)
BILIRUB SERPL-MCNC: 0.7 MG/DL — SIGNIFICANT CHANGE UP (ref 0.2–1.2)
BUN SERPL-MCNC: <4 MG/DL — LOW (ref 7–23)
CALCIUM SERPL-MCNC: 9.2 MG/DL — SIGNIFICANT CHANGE UP (ref 8.4–10.5)
CHLORIDE SERPL-SCNC: 104 MMOL/L — SIGNIFICANT CHANGE UP (ref 96–108)
CO2 SERPL-SCNC: 22 MMOL/L — SIGNIFICANT CHANGE UP (ref 22–31)
CREAT SERPL-MCNC: 0.74 MG/DL — SIGNIFICANT CHANGE UP (ref 0.5–1.3)
D DIMER BLD IA.RAPID-MCNC: <150 NG/ML DDU — SIGNIFICANT CHANGE UP
EGFR: 100 ML/MIN/1.73M2 — SIGNIFICANT CHANGE UP
EOSINOPHIL # BLD AUTO: 0 K/UL — SIGNIFICANT CHANGE UP (ref 0–0.5)
EOSINOPHIL NFR BLD AUTO: 0 % — SIGNIFICANT CHANGE UP (ref 0–6)
GLUCOSE SERPL-MCNC: 90 MG/DL — SIGNIFICANT CHANGE UP (ref 70–99)
HCG SERPL-ACNC: <2 MIU/ML — SIGNIFICANT CHANGE UP
HCT VFR BLD CALC: 36 % — SIGNIFICANT CHANGE UP (ref 34.5–45)
HGB BLD-MCNC: 12.2 G/DL — SIGNIFICANT CHANGE UP (ref 11.5–15.5)
IMM GRANULOCYTES NFR BLD AUTO: 0.4 % — SIGNIFICANT CHANGE UP (ref 0–0.9)
LYMPHOCYTES # BLD AUTO: 1.37 K/UL — SIGNIFICANT CHANGE UP (ref 1–3.3)
LYMPHOCYTES # BLD AUTO: 25.6 % — SIGNIFICANT CHANGE UP (ref 13–44)
MCHC RBC-ENTMCNC: 30 PG — SIGNIFICANT CHANGE UP (ref 27–34)
MCHC RBC-ENTMCNC: 33.9 GM/DL — SIGNIFICANT CHANGE UP (ref 32–36)
MCV RBC AUTO: 88.5 FL — SIGNIFICANT CHANGE UP (ref 80–100)
MONOCYTES # BLD AUTO: 0.43 K/UL — SIGNIFICANT CHANGE UP (ref 0–0.9)
MONOCYTES NFR BLD AUTO: 8 % — SIGNIFICANT CHANGE UP (ref 2–14)
NEUTROPHILS # BLD AUTO: 3.52 K/UL — SIGNIFICANT CHANGE UP (ref 1.8–7.4)
NEUTROPHILS NFR BLD AUTO: 65.8 % — SIGNIFICANT CHANGE UP (ref 43–77)
NRBC # BLD: 0 /100 WBCS — SIGNIFICANT CHANGE UP (ref 0–0)
PLATELET # BLD AUTO: 87 K/UL — LOW (ref 150–400)
POTASSIUM SERPL-MCNC: 3.8 MMOL/L — SIGNIFICANT CHANGE UP (ref 3.5–5.3)
POTASSIUM SERPL-SCNC: 3.8 MMOL/L — SIGNIFICANT CHANGE UP (ref 3.5–5.3)
PROT SERPL-MCNC: 6.8 G/DL — SIGNIFICANT CHANGE UP (ref 6–8.3)
RBC # BLD: 4.07 M/UL — SIGNIFICANT CHANGE UP (ref 3.8–5.2)
RBC # FLD: 13.2 % — SIGNIFICANT CHANGE UP (ref 10.3–14.5)
SODIUM SERPL-SCNC: 140 MMOL/L — SIGNIFICANT CHANGE UP (ref 135–145)
TROPONIN T, HIGH SENSITIVITY RESULT: 6 NG/L — SIGNIFICANT CHANGE UP (ref 0–51)
WBC # BLD: 5.35 K/UL — SIGNIFICANT CHANGE UP (ref 3.8–10.5)
WBC # FLD AUTO: 5.35 K/UL — SIGNIFICANT CHANGE UP (ref 3.8–10.5)

## 2024-06-14 PROCEDURE — 83880 ASSAY OF NATRIURETIC PEPTIDE: CPT

## 2024-06-14 PROCEDURE — 85379 FIBRIN DEGRADATION QUANT: CPT

## 2024-06-14 PROCEDURE — 93005 ELECTROCARDIOGRAM TRACING: CPT

## 2024-06-14 PROCEDURE — 80053 COMPREHEN METABOLIC PANEL: CPT

## 2024-06-14 PROCEDURE — 84702 CHORIONIC GONADOTROPIN TEST: CPT

## 2024-06-14 PROCEDURE — 71275 CT ANGIOGRAPHY CHEST: CPT | Mod: 26,MC

## 2024-06-14 PROCEDURE — 36415 COLL VENOUS BLD VENIPUNCTURE: CPT

## 2024-06-14 PROCEDURE — 93971 EXTREMITY STUDY: CPT

## 2024-06-14 PROCEDURE — 71275 CT ANGIOGRAPHY CHEST: CPT | Mod: MC

## 2024-06-14 PROCEDURE — 99285 EMERGENCY DEPT VISIT HI MDM: CPT

## 2024-06-14 PROCEDURE — 85025 COMPLETE CBC W/AUTO DIFF WBC: CPT

## 2024-06-14 PROCEDURE — 84484 ASSAY OF TROPONIN QUANT: CPT

## 2024-06-14 PROCEDURE — 93971 EXTREMITY STUDY: CPT | Mod: 26,LT

## 2024-06-14 PROCEDURE — 99285 EMERGENCY DEPT VISIT HI MDM: CPT | Mod: 25

## 2024-06-14 RX ORDER — SODIUM CHLORIDE 9 MG/ML
1000 INJECTION INTRAMUSCULAR; INTRAVENOUS; SUBCUTANEOUS ONCE
Refills: 0 | Status: COMPLETED | OUTPATIENT
Start: 2024-06-14 | End: 2024-06-14

## 2024-06-14 RX ADMIN — SODIUM CHLORIDE 1000 MILLILITER(S): 9 INJECTION INTRAMUSCULAR; INTRAVENOUS; SUBCUTANEOUS at 11:50

## 2024-06-14 NOTE — ED PROVIDER NOTE - OBJECTIVE STATEMENT
48-year-old female history of DVT POTS disease dysautonomia chief complaint of left upper extremity arm pain in the setting of recent IV this past Wednesday.  Patient worried that from the venipuncture patient has a thrombosis of the left upper extremity.  Patient coming with chest pain as well.  Patient has multiple prior presentations for similar complaints.

## 2024-06-14 NOTE — ED PROVIDER NOTE - NSFOLLOWUPINSTRUCTIONS_ED_ALL_ED_FT
Today you were seen in the ED for chest pain.     It was found that you have no signs of a medical condition warranting further evaluation in the emergency department.     Please follow up with your cardiologist, if you do not have one you can reach one at    [phone] [place] [address] 4th floor   [place], [place]   [phone]  General Cardiology       Please return to the ED if you have more severe chest pain or the pain that brought you into the Emergency Dept. returns, an increase in shortness of breath, nausea and / or vomiting associated with your chest pain. Today you were seen in the ED for chest pain.     It was found that you have no signs of a medical condition warranting further evaluation in the emergency department.     Please follow up with your cardiologist, if you do not have one you can reach one at information below.     Please return to the ED if you have more severe chest pain or the pain that brought you into the Emergency Dept. returns, an increase in shortness of breath, nausea and / or vomiting associated with your chest pain.

## 2024-06-14 NOTE — ED ADULT NURSE NOTE - OBJECTIVE STATEMENT
47 y/o F PMHx anxiety, POTS and DVT presenting to ED with c/o LUE pain after blood drawn this past week. Pt states she is prone to DVT and since blood drawn has pain to left arm radiating up to her neck back and chest discomfort. Upon assessment pt is  A&Ox4  speaking coherently in full sentences. Pt is breathing unlabored and equal BL in no apparent distress, radial pulses are 2+ BL. Denies HA, dizziness, blurry vision. Denies SOB, dyspnea, cough, CP, palpitations. EKG done. On CM, NSR. Denies abd pain, N/V/D/C. Abd soft NT/ND. Denies urinary symptoms. Denies fever, chills. No sick contacts. Skin intact, warm, dry, normal for race.     48-year-old female history of DVT POTS disease dysautonomia chief complaint of left upper extremity arm pain in the setting of recent IV this past Wednesday.  Patient worried that from the venipuncture patient has a thrombosis of the left upper extremity.  Patient coming with chest pain as well.  Patient has multiple prior presentations for similar complaints.

## 2024-06-14 NOTE — ED PROVIDER NOTE - ATTENDING CONTRIBUTION TO CARE
Attending MD Herrera:  I have seen and examined this patient and fully participated in the care of this patient as the teaching attending. I personally made/approved the management plan and take responsibility for the patient management.      48-year-old woman with history of remote DVT (not on anticoagulation due to bleeding risk per patient), POTS, dysautonomia is presenting for evaluation of left upper extremity pain and left back pain after having a blood draw for several days prior.  She states she has never had back pain like this before, she is concerned that she has a blood clot in her lungs and is requesting CT angiogram of the chest.  She is also short of breath.    Patient on exam with normal vital signs.  She is breathing comfortably on room air.  She is thin but not cachectic.  She is quite anxious at times.  Clear lungs posteriorly regular heart sounds.  The left arm is not swollen, there is no hematoma in the area of previous venipuncture.  Distal pulses palpable.  Extremities warm and well-perfused.    Patient presenting for evaluation of left arm pain at the site of previous venipuncture, no evidence of hematoma or thrombophlebitis, will obtain ultrasound to exclude DVT.  Patient also concerned that she has pulmonary embolism, will obtain D-dimer to exclude PE, highly unlikely that patient has pulmonary embolism however.      *The above represents an initial assessment/impression. Please refer to progress notes for potential changes in patient clinical course*

## 2024-06-14 NOTE — ED PROVIDER NOTE - PROGRESS NOTE DETAILS
[facility] PGY 3 Discussed with patient at length in terms of clinical decision that based off of the physical exam her vital signs EKG we do not believe that she has a pulmonary embolism.  Patient consult in terms of going through /systematically for each rationale we do not believe so she is not hypoxic she is not tachypneic EKG is benign Attending MD Herrera: Discussed with patient current recommended clinical workup.  Patient states that her D-dimer as proposed by us is not reasonable as she has reportedly had a deep venous thrombosis even with a normal D-dimer in the past.  Explained to her I would still like to proceed with D-dimer testing and forego CT imaging until D-dimer has arrived, she states that she would like a CT.  She is disappointed in her care from this emergency department, I apologized that she feels that she is not being heard today but we have discussed at length why I would like to avoid CT imaging.  The patient has received many different CTs at this hospital, I explained to her the long-term risk of malignancy with such radiation exposure, she states that she firmly accepts this risk.  I also explained to her that she has a documented IV contrast allergy, she states this is incorrect, she states she is allergic to gadolinium.  She states that she most recently in October of this past year has received a contrast CT at an outside hospital and that she had no reaction.  I explained to her that even if there was any possible concern that she is allergic to iodinated contrast, she should receive a pretreatment protocol to mitigate her risk.  She states she is not interested in a pretreatment protocol and I explained to her without this she runs the risk of possible anaphylaxis which could lead to permanent disability and death.  She accepts this risk as well.  Patient displays decisional capacity and we will proceed with CT angiogram of the chest to exclude PE. [facility]imo PGY 3 pt made aware of results will dc

## 2024-06-14 NOTE — ED PROVIDER NOTE - OTHER FINDINGS
ECG recorded at 9:25 AM independently interpreted by me , Dr Leandro Herrera,  at 9:34 AM shows normal sinus rhythm normal axis normal intervals no acute diagnostic ischemic ST-T changes

## 2024-06-14 NOTE — ED PROVIDER NOTE - CLINICAL SUMMARY MEDICAL DECISION MAKING FREE TEXT BOX
Given history and physical exam clinical concern low for DVT given left upper extremity is not swollen not erythematous pulses normal will obtain x-ray EKG no concern at this time for ACS

## 2024-06-14 NOTE — ED PROVIDER NOTE - NSFOLLOWUPCLINICS_GEN_ALL_ED_FT
Cardiology at [facility]  Cardiology  [address] [place] [address]  Phone: [phone]  Fax:   Follow Up Time: 4-6 Days

## 2024-06-14 NOTE — ED PROVIDER NOTE - PHYSICAL EXAMINATION
GENERAL: Awake, alert, NAD  LUNGS: CTAB, no wheezes or crackles   CARDIAC: RRR, no m/r/g  EXT: Left upper extremity soft no tenderness mild ecchymosis overlying left AC pulses 2+ in the radial with warm extremities  NEURO: A&Ox3. Moving all extremities.  SKIN: Warm and dry. No rash.  PSYCH: anxious

## 2024-06-14 NOTE — ED PROVIDER NOTE - PATIENT PORTAL LINK FT
You can access the FollowMyHealth Patient Portal offered by [facility] by registering at the following website: [email or web address]. By joining [facility]’s FollowMyHealth portal, you will also be able to view your health information using other applications (apps) compatible with our system.

## 2024-06-19 NOTE — ED ADULT NURSE NOTE - NSFALLRSKINDICATORS_ED_ALL_ED
[FreeTextEntry1] : [place] Accession Number : [id number] Patient:     PING GORE   Accession:                             [id number]  Collected Date/Time:                   2/26/2024 11:35 EST Received Date/Time:                    2/26/2024 11:45 EST  Surgical Pathology Report - Auth (Verified)  Specimen(s) Submitted Abdominal pannus  Final Diagnosis  Abdominal pannus, excision: - Skin and underlying fibroadipose tissue with no significant pathologic findings  Verified by: Pravin Benavides MD (Electronic Signature) Reported on: 03/04/24 15:56 EST, [facility], [facility], [address] [place] [address] Histology technical processing performed at [facility], [address] [place] [address] Phone: [phone]   Fax: [phone] _________________________________________________________________   Clinical Information Panniculectomy  Perioperative Diagnosis Weight loss  Postoperative Diagnosis Weight loss  Gross Description The specimen received fresh is labeled "abdominal pannus". The specimen  consists of a butterfly-shaped excision, measuring 40 x 30 x 3 cm of skin and subcutaneous tissue and 2 separate fragments, measuring 25 x 15 x 1.5 cm aggregate of skin and subcutaneous tissue.  The specimen weighs 4310 g in aggregate. Sectioning reveals no obvious lesions. Representative sections are submitted. (3 blocks)  02/28/2024 08:52:42 EST    OC no

## 2024-07-03 ENCOUNTER — OUTPATIENT (OUTPATIENT)
Dept: OUTPATIENT SERVICES | Facility: HOSPITAL | Age: 49
LOS: 1 days | Discharge: HOME | End: 2024-07-03

## 2024-07-03 DIAGNOSIS — D47.3 ESSENTIAL (HEMORRHAGIC) THROMBOCYTHEMIA: ICD-10-CM

## 2024-07-03 DIAGNOSIS — Z90.49 ACQUIRED ABSENCE OF OTHER SPECIFIED PARTS OF DIGESTIVE TRACT: Chronic | ICD-10-CM

## 2024-07-05 NOTE — ED ADULT TRIAGE NOTE - BMI (KG/M2)
Follow up phone call made from visit on 7/1/24.  Spoke with patient who states she is feeling better. She did not take Paxlovid.   Advised patient to continue to take mediations as prescribed. Advised patient to feel free to follow up with PCP, return to the clinic or go to [facility]/ED if symptoms do not improve or worsen. Provided patient with [phone] phone number and advised to call with any questions or concerns. All questions answered.    17.9

## 2024-07-25 ENCOUNTER — RESULT REVIEW (OUTPATIENT)
Age: 49
End: 2024-07-25

## 2024-07-25 ENCOUNTER — APPOINTMENT (OUTPATIENT)
Dept: HEMATOLOGY ONCOLOGY | Facility: CLINIC | Age: 49
End: 2024-07-25
Payer: MEDICARE

## 2024-07-25 DIAGNOSIS — D69.3 IMMUNE THROMBOCYTOPENIC PURPURA: ICD-10-CM

## 2024-07-25 LAB
BASOPHILS # BLD AUTO: 0.01 K/UL — SIGNIFICANT CHANGE UP (ref 0–0.2)
BASOPHILS NFR BLD AUTO: 0.1 % — SIGNIFICANT CHANGE UP (ref 0–2)
EOSINOPHIL # BLD AUTO: 0 K/UL — SIGNIFICANT CHANGE UP (ref 0–0.5)
EOSINOPHIL NFR BLD AUTO: 0 % — SIGNIFICANT CHANGE UP (ref 0–6)
HCT VFR BLD CALC: 36.9 % — SIGNIFICANT CHANGE UP (ref 34.5–45)
HGB BLD-MCNC: 12.6 G/DL — SIGNIFICANT CHANGE UP (ref 11.5–15.5)
IMM GRANULOCYTES NFR BLD AUTO: 0.1 % — SIGNIFICANT CHANGE UP (ref 0–0.9)
LYMPHOCYTES # BLD AUTO: 1.26 K/UL — SIGNIFICANT CHANGE UP (ref 1–3.3)
LYMPHOCYTES # BLD AUTO: 18.3 % — SIGNIFICANT CHANGE UP (ref 13–44)
MCHC RBC-ENTMCNC: 30.3 PG — SIGNIFICANT CHANGE UP (ref 27–34)
MCHC RBC-ENTMCNC: 34.1 G/DL — SIGNIFICANT CHANGE UP (ref 32–36)
MCV RBC AUTO: 88.7 FL — SIGNIFICANT CHANGE UP (ref 80–100)
MONOCYTES # BLD AUTO: 0.5 K/UL — SIGNIFICANT CHANGE UP (ref 0–0.9)
MONOCYTES NFR BLD AUTO: 7.3 % — SIGNIFICANT CHANGE UP (ref 2–14)
NEUTROPHILS # BLD AUTO: 5.1 K/UL — SIGNIFICANT CHANGE UP (ref 1.8–7.4)
NEUTROPHILS NFR BLD AUTO: 74.2 % — SIGNIFICANT CHANGE UP (ref 43–77)
NRBC # BLD: 0 /100 WBCS — SIGNIFICANT CHANGE UP (ref 0–0)
PLATELET # BLD AUTO: 101 K/UL — LOW (ref 150–400)
RBC # BLD: 4.16 M/UL — SIGNIFICANT CHANGE UP (ref 3.8–5.2)
RBC # FLD: 13.2 % — SIGNIFICANT CHANGE UP (ref 10.3–14.5)
WBC # BLD: 6.88 K/UL — SIGNIFICANT CHANGE UP (ref 3.8–10.5)
WBC # FLD AUTO: 6.88 K/UL — SIGNIFICANT CHANGE UP (ref 3.8–10.5)

## 2024-07-25 PROCEDURE — 99215 OFFICE O/P EST HI 40 MIN: CPT

## 2024-07-26 LAB
ALBUMIN SERPL ELPH-MCNC: 4.7 G/DL
ALP BLD-CCNC: 74 U/L
ALT SERPL-CCNC: 19 U/L
ANION GAP SERPL CALC-SCNC: 15 MMOL/L
APTT BLD: 26.4 SEC
AST SERPL-CCNC: 29 U/L
BILIRUB SERPL-MCNC: 0.6 MG/DL
BUN SERPL-MCNC: 5 MG/DL
CALCIUM SERPL-MCNC: 9.3 MG/DL
CHLORIDE SERPL-SCNC: 103 MMOL/L
CO2 SERPL-SCNC: 20 MMOL/L
CREAT SERPL-MCNC: 0.9 MG/DL
EGFR: 79 ML/MIN/1.73M2
FERRITIN SERPL-MCNC: 33 NG/ML
FOLATE SERPL-MCNC: 2.8 NG/ML
GLUCOSE SERPL-MCNC: 90 MG/DL
INR PPP: 0.91 RATIO
IRON SATN MFR SERPL: 18 %
IRON SERPL-MCNC: 89 UG/DL
POTASSIUM SERPL-SCNC: 4.5 MMOL/L
PROT SERPL-MCNC: 7 G/DL
PT BLD: 10.3 SEC
SODIUM SERPL-SCNC: 139 MMOL/L
TIBC SERPL-MCNC: 500 UG/DL
TRANSFERRIN SERPL-MCNC: 432 MG/DL
UIBC SERPL-MCNC: 410 UG/DL
VIT B12 SERPL-MCNC: 517 PG/ML

## 2024-07-27 LAB — PROT S AG ACT/NOR PPP IA: 84 %

## 2024-07-27 NOTE — PHYSICAL EXAM
[Fully active, able to carry on all pre-disease performance without restriction] : Status 0 - Fully active, able to carry on all pre-disease performance without restriction [Restricted in physically strenuous activity but ambulatory and able to carry out work of a light or sedentary nature] : Status 1- Restricted in physically strenuous activity but ambulatory and able to carry out work of a light or sedentary nature, e.g., light house work, office work [Thin] : thin [Normal] : normal spine exam without palpable tenderness, no kyphosis or scoliosis [de-identified] : anxiety disorder [de-identified] : Multiple small size ecchymosis mainly on lower extremity, bilateral.

## 2024-07-27 NOTE — CONSULT LETTER
[Dear  ___] : Dear  [unfilled], [Consult Letter:] : I had the pleasure of evaluating your patient, [unfilled]. [Please see my note below.] : Please see my note below. [Consult Closing:] : Thank you very much for allowing me to participate in the care of this patient.  If you have any questions, please do not hesitate to contact me. [Sincerely,] : Sincerely, [FreeTextEntry2] : Cristhian Parker MD [FreeTextEntry3] : DIOMEDES HARRELL M.D. Hematology/ Oncology [place] [address] Telephone: [phone] Fax: [phone]

## 2024-07-27 NOTE — HISTORY OF PRESENT ILLNESS
[de-identified] : Interval History: She feels fair. She had COVID at the end of September and RSV subsequently. She has been bruising. She is losing hair. She has GERD. She has difficulty swallowing including pills. She has been having rashes. Mirena IUD was placed, but then had an allergic reaction and had to get it removed. She developed a red spot on the back of her neck from 3 weeks ago and another one on her left calf since last month. Heavy menstrual cycles persist and passes clots. She reports severe hair loss. Reports frequent HAs at the top of her head with associated blurry vision at times. Has frequent episodes of abdominal pain with her GERD. She eats a very limited diet as processed foods result in diarrhea. Has lost 14 lbs since last visit. She notes no other visual problems, limb pain/swelling, fevers, swollen glands, night sweats, BRBPR, melena, abdominal pain.   [de-identified] : 48F, never smoker, PMHx HTN, mild protein S deficiency, menorrhagia, generalized anxiety disorder, autonomic nervous system disorder, fibrocystic breast disease, fibromyalgia, right hydronephrosis, hyperlipidemia, history of jugular vein thrombosis (November 2018), referred for hematologic follow-up of chronic thrombocytopenia.  Ms. MCBRIDE is known to this practice for a long time, previously a patient of Randall Rosas and Nakul Zamora, lost to follow-up for the past 2 years.  History of LIJ DVT post appendectomy when she had an IV in [facility] in October 2018; treated with Xarelto, subsequently discontinued anticoagulant.  Protein S intermittently minimally decreased (55-64%). Evaluated by Dr. Cindy Crisostomo (hematology) on 6/12/2024.  Has multiple complaints, despite minimal findings on physical examination. 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 Reports longstanding menorrhagia, but no iron deficiency as she is compliant with oral iron TID. Negative EGD/colonoscopy in 2022. She has not recently received IV iron. Gives history of ovarian cysts ruptured with pelvic bleeding, but reports no new GYN complication, Family history significant for colon cancer (paternal grandmother), prostate cancer (father), lung cancer (maternal grandfather), glioblastoma (maternal uncle). Medication list includes Cardizem, famotidine, lorazepam, montelukast, Slow Fe.  CBC today shows a hemoglobin of 12.6 g/dL, normal RBC indices, and the platelet count at 101,000, increased from 72,000 in December 2022.  Protein S free antigen in normal range at 84%.          [FreeTextEntry1] : expectant surveillance

## 2024-07-27 NOTE — ASSESSMENT
[FreeTextEntry1] : Ms. MCBRIDE 's questions were answered to her satisfaction.  She  expressed her  understanding and willingness to comply with the above recommendations.

## 2024-07-27 NOTE — PHYSICAL EXAM
[Fully active, able to carry on all pre-disease performance without restriction] : Status 0 - Fully active, able to carry on all pre-disease performance without restriction [Restricted in physically strenuous activity but ambulatory and able to carry out work of a light or sedentary nature] : Status 1- Restricted in physically strenuous activity but ambulatory and able to carry out work of a light or sedentary nature, e.g., light house work, office work [Thin] : thin [Normal] : normal spine exam without palpable tenderness, no kyphosis or scoliosis [de-identified] : Multiple small size ecchymosis mainly on lower extremity, bilateral. [de-identified] : anxiety disorder

## 2024-07-27 NOTE — PHYSICAL EXAM
[Fully active, able to carry on all pre-disease performance without restriction] : Status 0 - Fully active, able to carry on all pre-disease performance without restriction [Restricted in physically strenuous activity but ambulatory and able to carry out work of a light or sedentary nature] : Status 1- Restricted in physically strenuous activity but ambulatory and able to carry out work of a light or sedentary nature, e.g., light house work, office work [Thin] : thin [Normal] : normal spine exam without palpable tenderness, no kyphosis or scoliosis [de-identified] : Multiple small size ecchymosis mainly on lower extremity, bilateral. [de-identified] : anxiety disorder

## 2024-07-27 NOTE — HISTORY OF PRESENT ILLNESS
[de-identified] : Interval History: She feels fair. She had COVID at the end of September and RSV subsequently. She has been bruising. She is losing hair. She has GERD. She has difficulty swallowing including pills. She has been having rashes. Mirena IUD was placed, but then had an allergic reaction and had to get it removed. She developed a red spot on the back of her neck from 3 weeks ago and another one on her left calf since last month. Heavy menstrual cycles persist and passes clots. She reports severe hair loss. Reports frequent HAs at the top of her head with associated blurry vision at times. Has frequent episodes of abdominal pain with her GERD. She eats a very limited diet as processed foods result in diarrhea. Has lost 14 lbs since last visit. She notes no other visual problems, limb pain/swelling, fevers, swollen glands, night sweats, BRBPR, melena, abdominal pain.   [de-identified] : 48F, never smoker, PMHx HTN, mild protein S deficiency, menorrhagia, generalized anxiety disorder, autonomic nervous system disorder, fibrocystic breast disease, fibromyalgia, right hydronephrosis, hyperlipidemia, history of jugular vein thrombosis (November 2018), referred for hematologic follow-up of chronic thrombocytopenia.  Ms. MCBRIDE is known to this practice for a long time, previously a patient of Randall Rosas and Nakul Zamora, lost to follow-up for the past 2 years.  History of LIJ DVT post appendectomy when she had an IV in [facility] in October 2018; treated with Xarelto, subsequently discontinued anticoagulant.  Protein S intermittently minimally decreased (55-64%). Evaluated by Dr. Cindy Crisostomo (hematology) on 6/12/2024.  Has multiple complaints, despite minimal findings on physical examination. 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 Reports longstanding menorrhagia, but no iron deficiency as she is compliant with oral iron TID. Negative EGD/colonoscopy in 2022. She has not recently received IV iron. Gives history of ovarian cysts ruptured with pelvic bleeding, but reports no new GYN complication, Family history significant for colon cancer (paternal grandmother), prostate cancer (father), lung cancer (maternal grandfather), glioblastoma (maternal uncle). Medication list includes Cardizem, famotidine, lorazepam, montelukast, Slow Fe.  CBC today shows a hemoglobin of 12.6 g/dL, normal RBC indices, and the platelet count at 101,000, increased from 72,000 in December 2022.  Protein S free antigen in normal range at 84%.          [FreeTextEntry1] : expectant surveillance

## 2024-07-27 NOTE — CONSULT LETTER
[Dear  ___] : Dear  [unfilled], [Consult Letter:] : I had the pleasure of evaluating your patient, [unfilled]. [Please see my note below.] : Please see my note below. [Consult Closing:] : Thank you very much for allowing me to participate in the care of this patient.  If you have any questions, please do not hesitate to contact me. [Sincerely,] : Sincerely, [FreeTextEntry2] : Cristhian Parker MD [FreeTextEntry3] : DIOMEDES HARRELL M.D. Hematology/ Oncology [place] [address] Telephone: [phone] Fax: [phone]

## 2024-07-27 NOTE — CONSULT LETTER
[Dear  ___] : Dear  [unfilled], [Consult Letter:] : I had the pleasure of evaluating your patient, [unfilled]. [Please see my note below.] : Please see my note below. [Consult Closing:] : Thank you very much for allowing me to participate in the care of this patient.  If you have any questions, please do not hesitate to contact me. [Sincerely,] : Sincerely, [FreeTextEntry2] : Cristhian Parker MD [FreeTextEntry3] : DIOMEDES HARRELL M.D. Hematology/ Oncology [place] [address] Telephone: [phone] Fax: [phone]

## 2024-07-27 NOTE — HISTORY OF PRESENT ILLNESS
[de-identified] : Interval History: She feels fair. She had COVID at the end of September and RSV subsequently. She has been bruising. She is losing hair. She has GERD. She has difficulty swallowing including pills. She has been having rashes. Mirena IUD was placed, but then had an allergic reaction and had to get it removed. She developed a red spot on the back of her neck from 3 weeks ago and another one on her left calf since last month. Heavy menstrual cycles persist and passes clots. She reports severe hair loss. Reports frequent HAs at the top of her head with associated blurry vision at times. Has frequent episodes of abdominal pain with her GERD. She eats a very limited diet as processed foods result in diarrhea. Has lost 14 lbs since last visit. She notes no other visual problems, limb pain/swelling, fevers, swollen glands, night sweats, BRBPR, melena, abdominal pain.   [de-identified] : 48F, never smoker, PMHx HTN, mild protein S deficiency, menorrhagia, generalized anxiety disorder, autonomic nervous system disorder, fibrocystic breast disease, fibromyalgia, right hydronephrosis, hyperlipidemia, history of jugular vein thrombosis (November 2018), referred for hematologic follow-up of chronic thrombocytopenia.  Ms. MCBRIDE is known to this practice for a long time, previously a patient of Randall Rosas and Nakul Zamora, lost to follow-up for the past 2 years.  History of LIJ DVT post appendectomy when she had an IV in [facility] in October 2018; treated with Xarelto, subsequently discontinued anticoagulant.  Protein S intermittently minimally decreased (55-64%). Evaluated by Dr. Cindy Crisostomo (hematology) on 6/12/2024.  Has multiple complaints, despite minimal findings on physical examination. 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 Reports longstanding menorrhagia, but no iron deficiency as she is compliant with oral iron TID. Negative EGD/colonoscopy in 2022. She has not recently received IV iron. Gives history of ovarian cysts ruptured with pelvic bleeding, but reports no new GYN complication, Family history significant for colon cancer (paternal grandmother), prostate cancer (father), lung cancer (maternal grandfather), glioblastoma (maternal uncle). Medication list includes Cardizem, famotidine, lorazepam, montelukast, Slow Fe.  CBC today shows a hemoglobin of 12.6 g/dL, normal RBC indices, and the platelet count at 101,000, increased from 72,000 in December 2022.  Protein S free antigen in normal range at 84%.          [FreeTextEntry1] : expectant surveillance

## 2024-07-29 LAB — PROT S FREE PPP-ACNC: 65 %

## 2024-07-29 RX ORDER — FOLIC ACID 1 MG/1
1 TABLET ORAL DAILY
Qty: 30 | Refills: 5 | Status: ACTIVE | COMMUNITY
Start: 2024-07-29 | End: 1900-01-01

## 2024-07-31 LAB — METHYLMALONATE SERPL-SCNC: 190 NMOL/L

## 2024-08-06 NOTE — ED PROVIDER NOTE - CONDITION AT DISCHARGE:
This writer teed up a referral to audiology. Mom would like Tapan to see audiology due to the following: Mitul is struggling with his hearing. He really cannot hear very well. The tv is always up full blast and he can't hear me when I call his name from the other room. I would like to be referred to an audiologist if possible. Please advise.   Satisfactory

## 2024-08-13 NOTE — DIETITIAN NUTRITION RISK NOTIFICATION - MUSCLE MASS (LOSS OF MUSCLE)
"Reason for Disposition  • HIGH RISK for severe COVID complications (e.g., weak immune system, age > 64 years, obesity with BMI > 25, pregnant, chronic lung disease or other chronic medical condition) (Exception: Already seen by PCP and no new or worsening symptoms.)    Answer Assessment - Initial Assessment Questions  1. COVID-19 DIAGNOSIS: \"Who made your COVID-19 diagnosis?\" \"Was it confirmed by a positive lab test or self-test?\" If not diagnosed by a doctor (or NP/PA), ask \"Are there lots of cases (community spread) where you live?\" Note: See public health department website, if unsure.      Rapid home test + today  2. COVID-19 EXPOSURE: \"Was there any known exposure to COVID before the symptoms began?\" CDC Definition of close contact: within 6 feet (2 meters) for a total of 15 minutes or more over a 24-hour period.       Unspecified  3. ONSET: \"When did the COVID-19 symptoms start?\"       Yesterday  4. WORST SYMPTOM: \"What is your worst symptom?\" (e.g., cough, fever, shortness of breath, muscle aches)      Headache, muscle aches, cough  5. COUGH: \"Do you have a cough?\" If Yes, ask: \"How bad is the cough?\"        Yes- non-productive   6. FEVER: \"Do you have a fever?\" If Yes, ask: \"What is your temperature, how was it measured, and when did it start?\"      Yes 101.2  7. RESPIRATORY STATUS: \"Describe your breathing?\" (e.g., shortness of breath, wheezing, unable to speak)       Breathing is at baseline  8. BETTER-SAME-WORSE: \"Are you getting better, staying the same or getting worse compared to yesterday?\"  If getting worse, ask, \"In what way?\"      Worse  9. HIGH RISK DISEASE: \"Do you have any chronic medical problems?\" (e.g., asthma, heart or lung disease, weak immune system, obesity, etc.)      Asthma  10. VACCINE: \"Have you had the COVID-19 vaccine?\" If Yes, ask: \"Which one, how many shots, when did you get it?\"        Yes  11. BOOSTER: \"Have you received your COVID-19 booster?\" If Yes, ask: \"Which one and when " "did you get it?\"        Yes  12. PREGNANCY: \"Is there any chance you are pregnant?\" \"When was your last menstrual period?\"        NA  13. OTHER SYMPTOMS: \"Do you have any other symptoms?\"  (e.g., chills, fatigue, headache, loss of smell or taste, muscle pain, sore throat)        Fever, chills, body aches  14. O2 SATURATION MONITOR:  \"Do you use an oxygen saturation monitor (pulse oximeter) at home?\" If Yes, ask \"What is your reading (oxygen level) today?\" \"What is your usual oxygen saturation reading?\" (e.g., 95%)        O2 has been around 97%    Protocols used: Coronavirus (COVID-19) Diagnosed or Suspected-ADULT-[place]    " Temples.../Clavicles.../Shoulders.../Scapula.../Dorsal hand...

## 2024-08-28 NOTE — PATIENT PROFILE ADULT - HAVE YOU EXPERIENCED VIOLENCE OR A TRAUMATIC EVENT?
[facility]   Anesthesia Pre-Operative Evaluation        Patient Name: Dg Le  YOB: 2002  MRN: [id number]    SUBJECTIVE:     Pre-operative Evaluation for Procedure(s) (LRB):  MASTECTOMY, FOR GYNECOMASTIA (Bilateral)     08/28/2024    Dg Le is a 22 y.o. male with no significant PMHx who presents for the above procedure(s)     Previous Airway: None documented    Current Outpatient Medications   Medication Instructions    ibuprofen (ADVIL,MOTRIN) 800 mg, Oral       Review of patient's allergies indicates:  No Known Allergies    No past surgical history on file.    Social History     Substance and Sexual Activity   Drug Use Yes    Frequency: 7.0 times per week    Types: Marijuana     Alcohol Use: Not At Risk (6/7/2024)    AUDIT-C     Frequency of Alcohol Consumption: Never     Average Number of Drinks: Patient does not drink     Frequency of Binge Drinking: Never     Tobacco Use: Medium Risk (8/9/2024)    Patient History     Smoking Tobacco Use: Former     Smokeless Tobacco Use: Never     Passive Exposure: Not on file       OBJECTIVE:     Vital Signs Range (Last 24H):         Significant Labs  Lab Results   Component Value Date    WBC 4.73 08/09/2024    HGB 14.6 08/09/2024    HCT 44.1 08/09/2024     08/09/2024    ALT 6 (L) 06/27/2024    AST 15 06/27/2024     08/09/2024    K 4.1 08/09/2024     08/09/2024    CREATININE 0.9 08/09/2024    BUN 7 08/09/2024    CO2 23 08/09/2024    TSH 1.050 06/27/2024       Cardiac Studies:    EKG:   Results for orders placed or performed in visit on 08/09/24   IN OFFICE EKG 12-LEAD (to [place])    Collection Time: 08/09/24  1:02 PM   Result Value Ref Range    QRS Duration 74 ms    [facility] QTC Calculation 403 ms    Narrative    Test Reason : Z01.818,    Vent. Rate : 073 BPM     Atrial Rate : 073 BPM     P-R Int : 168 ms          QRS Dur : 074 ms      QT Int : 366 ms       P-R-T Axes : 068 082 064 degrees     QTc Int : 403  ms    Normal sinus rhythm  Poor R wave progression, non-specific.  Abnormal ECG  No previous ECGs available  Confirmed by Dilan Jackson MD ([id number]) on 8/9/2024 2:26:28 PM    Referred By:             Confirmed By:Dilan Jackson MD       Transthoracic Echo  No results found for this or any previous visit.      Transesophageal Echo  No results found for this or any previous visit.      ASSESSMENT/PLAN AND ANESTHESIA ROS/EXAM:     Dg Le is a 22 y.o. male is presenting for Procedure(s) (LRB):  MASTECTOMY, FOR GYNECOMASTIA (Bilateral)      Pre-op Assessment    I have reviewed the Patient Summary Reports.     I have reviewed the Nursing Notes. I have reviewed the NPO Status.   I have reviewed the Medications.     Review of Systems  Anesthesia Hx:  No previous Anesthesia   Neg history of prior surgery.          Denies Family Hx of Anesthesia complications.    Denies Personal Hx of Anesthesia complications. (no h/o anesthesia)                    Social:  Recreational Drugs, Social Alcohol Use Smokes marijuana daily, advised to abstain for 24 hours prior to surgery      Hematology/Oncology:  Hematology Normal   Oncology Normal                                   EENT/Dental:  EENT/Dental Normal           Cardiovascular:  Cardiovascular Normal Exercise tolerance: good   Denies Pacemaker.  Denies Hypertension.   Denies MI.        Denies Angina.                                  Pulmonary:  Pulmonary Normal    Denies Asthma.   Denies Shortness of breath.   Denies Sleep Apnea.                Renal/:  Renal/ Normal  Denies Chronic Renal Disease.                Hepatic/GI:  Hepatic/GI Normal     Denies GERD. Denies Liver Disease.            Musculoskeletal:  Musculoskeletal Normal                Neurological:  Neurology Normal Denies TIA.  Denies CVA.    Denies Seizures.                                Endocrine:  Endocrine Normal Denies Diabetes.           Dermatological:  Skin Normal    Psych:  Psychiatric Normal                     Physical Exam  General: Well nourished, Cooperative, Alert and Oriented    Airway:  Mallampati: I   Mouth Opening: Normal  Tongue: Normal  Neck ROM: Normal ROM    Dental:  Intact    Chest/Lungs:  Normal Respiratory Rate    Heart:  Rate: Normal        Anesthesia Plan  Type of Anesthesia, risks & benefits discussed:    Anesthesia Type: Gen ETT  Intra-op Monitoring Plan: Standard ASA Monitors  Post Op Pain Control Plan: multimodal analgesia and IV/PO Opioids PRN  Induction:  IV  Airway Plan: Video and Direct, Post-Induction  Informed Consent: Informed consent signed with the Patient and all parties understand the risks and agree with anesthesia plan.  All questions answered.   ASA Score: 1  Day of Surgery Review of History & Physical: H&P Update referred to the surgeon/provider.    Ready For Surgery From Anesthesia Perspective.     .       no

## 2024-08-30 NOTE — BEHAVIORAL HEALTH ASSESSMENT NOTE - EYE CONTACT
Perform after heating    Elbow Extension (Passive)      Make a fist with your left hand, place it on the edge of a corner. Place your right hand above the left elbow and lean forward. Hold 10 seconds. Repeat 10 times. Perform 3 times a day        Strengthening: Wall Push-Up        With arms slightly wider apart than shoulder width, and feet 18-24 inches from wall, gently lean body toward wall.  Repeat 10 times per set. Do 3 sets per session. Do 1-2 sessions per day.    Extension (Resistive Band)        With band looped around hand and wrist, use elbow movements only. Using other arm as anchor, straighten elbow, pushing down. Repeat 10 times. Perform 3 sets per session. Do 1-2 sessions per day.                      Good

## 2024-09-25 ENCOUNTER — TRANSCRIPTION ENCOUNTER (OUTPATIENT)
Age: 49
End: 2024-09-25

## 2024-09-30 NOTE — ED PROVIDER NOTE - CPE EDP ENMT NORM
[place],   Your stress test shows a small area of scar which can suggest a longstanding blockage in one of the arteries or it can be artifact from the test. The echo will help us determine this. Given these findings in light of you passing out I think it is safest to stay off the flecainide. You'll meet one of our EP doctors to discuss other options including ablation to control the afib.   Cathy 
normal...

## 2024-10-11 NOTE — ED ADULT TRIAGE NOTE - NSWEIGHTCALCTOOLDRUG_GEN_A_CORE
Patient stated that orders have not been received yet at [facility] imaging would like these to be sent as soon as possible so she can set appointment , is also waiting for call back on vitamin recommendation     used
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
## 2024-10-17 ENCOUNTER — NON-APPOINTMENT (OUTPATIENT)
Age: 49
End: 2024-10-17

## 2024-10-21 ENCOUNTER — NON-APPOINTMENT (OUTPATIENT)
Age: 49
End: 2024-10-21

## 2024-10-24 ENCOUNTER — NON-APPOINTMENT (OUTPATIENT)
Age: 49
End: 2024-10-24

## 2024-11-12 NOTE — ED ADULT NURSE NOTE - PRIMARY CARE PROVIDER
General Surgery Week 3 Survey      Flowsheet Row Responses   [facility] patient discharged from? [place]   Does the patient have one of the following disease processes/diagnoses(primary or secondary)? General Surgery   Week 3 attempt successful? Yes   Call start time 1308   Call end time 1312   Is patient permission given to speak with other caregiver? Yes   Person spoke with today (if not patient) and relationship Meeta-daughter.   Meds reviewed with patient/caregiver? Yes   Is the patient having any side effects they believe may be caused by any medication additions or changes? No   Does the patient have all medications related to this admission filled (includes all antibiotics, pain medications, etc.) Yes   Is the patient taking all medications as directed (includes completed medication regime)? Yes   Does the patient have a follow up appointment scheduled with their surgeon? Yes   Has the patient kept scheduled appointments due by today? Yes   Has home health visited the patient within 72 hours of discharge? Yes   Has all DME been delivered? Yes   DME comments Wound vac.   Psychosocial issues? No   Did the patient receive a copy of their discharge instructions? Yes   Nursing interventions Reviewed instructions with patient   What is the patient's perception of their health status since discharge? Improving   Nursing interventions Nurse provided patient education   Is the patient/caregiver able to teach back signs and symptoms of incisional infection? Increased redness, swelling or pain at the incisonal site, Increased drainage or bleeding, Incisional warmth, Pus or odor from incision, Fever   Is the patient/caregiver able to teach back steps to recovery at home? Rest and rebuild strength, gradually increase activity, Set small, achievable goals for return to baseline health, Practice good oral hygiene, Eat a well-balance diet   If the patient is a current smoker, are they able to teach back resources for  cessation? Not a smoker   Is the patient/caregiver able to teach back the hierarchy of who to call/visit for symptoms/problems? PCP, Specialist, Home health nurse, Urgent Care, ED, 911 Yes   Week 3 call completed? Yes   Graduated Yes   Is the patient interested in additional calls from an ambulatory ? No   Would this patient benefit from a Referral to [facility] Social Work? No   Wrap up additional comments Brief call with daughter, Meeta, who states patient is stable. [facility] wound is healing well with wound vac in place. HH continues to visit. Denies any needs or concerns today. [facility] has all medications needed, and keeping f/u appts.   Call end time 1312            Lili GARCÍA - Registered Nurse   N/A

## 2024-11-27 NOTE — ED ADULT NURSE NOTE - BREATHING, MLM
Medication Therapy Management (MTM) Encounter    ASSESSMENT:                            Medication Adherence/Access: No issues identified.    HFrEF (</=40%):  Patient would benefit from further titration of Entresto. Can consider increase metoprolol next. Due for Basic Metabolic Panel with new increase      PLAN:                            Schedule a lab appointment  in 1-2 weeks to get Basic Metabolic Panel drawn  Increase Entresto to 49-51 mg twice a day     Follow-up: Return in about 5 weeks (around 12/31/2024) for Follow up, with me, using a phone visit.    SUBJECTIVE/OBJECTIVE:                          Modesto Lehman is a 68 year old male seen for a follow-up visit.       Reason for visit: HF follow-up.    Allergies/ADRs: Reviewed in chart  Past Medical History: Reviewed in chart  Tobacco: He reports that he has quit smoking. His smoking use included cigars and cigarettes. He started smoking about 15 years ago. He has a 3.8 pack-year smoking history. He has been exposed to tobacco smoke. He has never used smokeless tobacco.  Alcohol: 1-3 beverages / week  THC/CBD  Medication Adherence/Access: no issues reported.    Heart Failure   HFrEF (</=40%)   Beta Blocker: metoprolol succinate 50 mg daily  ACEi/ARB/ARNI: Entresto 24-26 mg 1 tablet in the morning and 2 tablets at night  SGLT2i: Farxiga 10 mg daily   MRA: holding off on this as sodium is low  Patient reports no current medication side effects.     Patient reports these HF symptoms: some shortness of breath  Patient self-monitors blood pressure.  Reports it was 138/80 mmHg  and took it after his exercises on Monday. Reports he has been checking it and it has been in the 120's/70-80's mmHg.   Patient is following a low sodium diet and is not avoiding alcohol.             Today's Vitals: There were no vitals taken for this visit.  ----------------      I spent 6 minutes with this patient today. All changes were made via collaborative practice agreement with  Brittany NEELY . A copy of the visit note was provided to the patient's provider(s).    A summary of these recommendations was sent via [employer].    Trevor Wood, Pharm. D., [facility]  Medication Therapy Management Pharmacist      Telemedicine Visit Details  The patient's medications can be safely assessed via a telemedicine encounter.  Type of service:  Telephone visit  Originating Location (pt. Location): Home    Distant Location (provider location):  Off-site  Start Time:  8:33 am  End Time:  8:39 am     Medication Therapy Recommendations  CHF (congestive heart failure) (H)   1 Current Medication: sacubitril-valsartan (ENTRESTO) 24-26 MG per tablet (Discontinued)   Current Medication Sig: Take 1 tablet by mouth 2 times daily.   Rationale: Dose too low - Dosage too low - Effectiveness   Recommendation: Increase Dose   Status: Accepted per CPA   Identified Date: 11/27/2024 Completed Date: 11/27/2024             Spontaneous, unlabored and symmetrical

## 2024-12-02 NOTE — ED PROVIDER NOTE - NS ED MD DISPO DISCHARGE CCDA
Detail Level: Generalized
Detail Level: Zone
Detail Level: Detailed
Patient Specific Counseling (Will Not Stick From Patient To Patient): Area is flat and no further treatment is indicated.
Patient/Caregiver provided printed discharge information.

## 2024-12-13 NOTE — ED STATDOCS - RESPIRATORY, MLM
Schedule with [facility] as planned.  Ultrasound and labs ordered.        1. Schedule colonoscopy with [facility] Endoscopist  Diagnosis: colon cancer screening  Sedation: Declines anesthesia   Prep: split dose golytely    2.  bowel prep from pharmacy   You can pick the bowel prep up now and store in a cool, dry place in your home until your scheduled bowel prep start date.    3. Continue all medications as normal for your procedure. DO NOT TAKE: Any form of alcohol, recreational drugs and any forms of erectile dysfunction medications 24 hours prior to procedure.    4. Read all bowel prep instructions carefully. Bowel prep instructions can also be found online at:  www.[facility].org/giprep     5. AVOID seeds, nuts, popcorn, raw fruits and vegetables for 5 days before procedure    6. If you start any NEW medication after your visit today, please notify us. Certain medications (like iron or weight loss medications) will need to be held before the procedure, or the procedure cannot be performed safely.          
breath sounds clear and equal bilaterally.

## 2024-12-17 NOTE — ED ADULT NURSE NOTE - CAS TRG GEN SKIN COLOR
Normal for race

## 2024-12-31 NOTE — ED PROVIDER NOTE - CONSTITUTIONAL, MLM
Care Transitions Program Week 2 Follow-up Call    Current Issues/Problems reviewed on call: I spoke with Robles's , Fareed, this morning who states that she is doing well. She had a telephonic visit with the pulmonology clinic last week and received orders from Dr. Cancino for Augmentin x 7 days and Prednisone 5mg daily x 30 days. She also was having BLE edema for which he prescribed Furosemide 40mg daily x 5 days. Fareed reports that the swelling has improved greatly but there remains a mild amount of swelling with 2 more days left in the prescription. They do wrap her BLE with a compression wrap. Fareed has also set her up with a seated elliptical device to help with exercise and circulation. She continues to receive HH visits through [facility].    Details of Interventions/Education completed on call: We reviewed her plan of care, upcoming appointments and next CTN follow-up call. We reviewed the Palliative Care at Home program with the initial visit scheduled for 1/28/25.    Review of Systems:   Care Transition System Evaluation: completed w/ spouse  Fever: is not present   Pain:  4  Pain Location: left shoulder pain  General Symptoms: WDL (absence of symptoms)  Neurologic/Neuromuscular Symptoms: Confusion  ENT Symptoms: WDL (absence of symptoms)  Respiratory Symptoms: Shortness of breath; Cough  Shortness of Breath: SOB with exercise  Cardiovascular Symptoms: Edema (mild swelling)  GI Symptoms: WDL (absence of symptoms)   Symptoms: WDL (absence of symptoms)  Skin Symptoms: WDL (absence of symptoms)  Sleeping Behaviors:WDL (absence of symptoms); Reports no problem  Current Lines/Drains:No    The patient is taking all medications as prescribed. The patient did not have questions or concerns regarding the medications prescribed.    Transitional Care Management (TCM) appointment was completed.  TCM appointment plan of care and upcoming appointments were reviewed with patient.  Transportation to upcoming  appointments to be provided by  and daughter.    Next Care Transitions follow-up call will be within 1 week.    Plan for next follow-up call: Review of systems.   normal... thin, pale,non toxic appearing. awake, alert, oriented to person, place, time/situation and in no apparent distress.

## 2025-01-02 NOTE — PROVIDER CONTACT NOTE (OTHER) - REASON
BP right arm =162/100, BP left arm = 168/114, requesting repeat EKG, anxiety, HR upto 123
HR 45 on tele
HR javier down to 44, back to 50s, pt. sleeping, woke pt. up, HR in 70s
Initial SW/CM Assessment/Plan of Care Note     Baseline Assessment    59 year old admitted 12/31/2024 as Observation with a diagnosis of  Chronic ETOH abuse /detox   Prior to admission patient was living with Significant other and residing at Apartment    .  Patient’s Primary Care Provider is Radha Calderon MD.     Progress Note    Chart review and care round was done.     CDR eval done - pt has been here multiple times .Pt not looking for JAKE treatment.     CM assessment completed with the patient .  Patient lives home with significant other in [facility] apartment. Independent w/ all ADLs, no services, no DME. No CM needs anticipated. CM confirmed with pt , will need Medivan home to [address] [place] [address] which is where she lives but has a different mailing address which is listed in [employer]- Referral loaded.     ADOD: tomorrow.           Plan  Patient/Family Discharge Goal: Home        SW/CM - Recommendations for Discharge: Home     Barriers to Discharge  Identified Barriers to Discharge/Transition Planning: Medical necessity for acute care        Refer to SW/CM Flowsheet for objective data.     Medical History  Past Medical History:   Diagnosis Date    Alcoholism  (CMD)     last in hosp  2 weeks ago approx  1/07/2024    Anemia, iron deficiency     Anxiety     Depression     Essential (primary) hypertension     GERD (gastroesophageal reflux disease)     High cholesterol     History of seizure 04/09/2021    Lung cancer  (CMD) 02/2021    right, radiation therap completed 2021    Marijuana smoker     Nausea and vomiting in adult 09/23/2021    Pancreatitis, acute (CMD) 03/23/2023    Port-A-Cath in place 2022    last chemo dec 2022    RAD (reactive airway disease) (CMD)     Seizure disorder  (CMD)     last known seizure was 8-9 months ago    Seizures  (CMD)     Stroke  (CMD) 2014    Substance abuse  (CMD)     Suicide attempt 03/15/2023    Vitamin D deficiency        Prior to Admission Status  Functional Status  Ambulation: 
Pt c/o CP, tremors, HA
R sided Chest Pain
c/o right sided chest pain
pt bp remains elevated
pt developed rash
pt elevated bp
refuses Acebutolol
Independent/Self  Bathing: Independent/Self  Dressing: Independent/Self  Toileting: Independent/Self  Meal Preparation: Independent/Self  Shopping: Independent/Self  Medication Preparation: Independent/Self  Medication Administration: Independent/Self  Housekeeping: Independent/Self    Agency/Support  Type of Services Prior to Hospitalization: None               Support Systems: Significant other   Home Devices/Equipment: None           Mobility Assist Devices: None  Sensory Support Devices: None      Insurance  Primary: [facility]  Secondary: N/A    Disposition Recommendations:  HEENA/ROXANNA recommendation for discharge: Home           
Pt c/o HA

## 2025-01-10 NOTE — PATIENT PROFILE ADULT. - AS SC BRADEN FRICTION
This visit is being performed virtually via Non-integrated Video Visit. Consent to treat includes permission to submit charges to the patient's insurance. It was shared that without being seen and evaluated in person, there is a risk that the information and/or assessment may be incomplete or inaccurate. This video visit may be discontinued by patient or clinician, if it is felt that the videoconferencing connections are not adequate for his situation.   Clinical Location: [facility]  Miguel's location [facility] and is physically present in   the [facility] at the time of this visit.     HISTORY OF PRESENT ILLNESS:  Miguel Livingston is a 27 year old male who comes in today complaining of Video Visit, Office Visit, Sore Throat, and Derm Problem  Patient states he was sick last week with sore throat and fever but those symptoms have resolved. Started with rash to his bilateral hands, forearms and knuckles 2-3 days ago. States his girlfriend tested positive for strep throat today so wants to rule that out. Girlfriend also has similar rash. Patient denies any sore throat, fever, runny nose, congestion currently. States the rash is occasionally itchy but denies heat or anything making it worse. Does tend to develop similar rash to his anterior hands around this time of year and uses betamethasone which isn't currently helping. Has appointment with dermatology in 1 1/2 weeks. Has not switched or tried any new products or tried any new foods.    Current Outpatient Medications on File Prior to Visit   Medication Sig Dispense Refill    tadalafil (CIALIS) 10 MG tablet Take 10-20 mg by mouth 4 times daily as needed.      MULTIPLE VITAMIN PO        No current facility-administered medications on file prior to visit.     ALLERGIES:   Allergen Reactions    Vancomycin HIVES and Other (See Comments)     redness      reports that he has never smoked. He  has never been exposed to tobacco smoke. He has never used smokeless tobacco.       REVIEW OF SYSTEMS:  A three point review of systems was performed.  All pertinent positives and negatives were noted in the History of Present Illness.        Information acquired with patient assistance, demonstration, and feedback due to two-way video method of visit. Portions of assessment may be difficult to visualize precisely given nature of technology and limitations of assessment with remote platform.     PHYSICAL EXAMINATION:  Visit Vitals  /74 (BP Location: LUE - Left upper extremity, Patient Position: Sitting, Cuff Size: Regular)   Pulse 99   Temp 98.9 °F (37.2 °C) (Oral)   Resp 18   Ht 5' 8\" (1.727 m)   Wt 81.6 kg (180 lb)   SpO2 96%   BMI 27.37 kg/m²     General:  alert, in no distress  Lymphatic:  No lymphadenopathy, per palpation by patient  Eyes:  Conjunctivae/sclerae normal. No erythema, edema or exudate.  Nose:  nares congested  Mouth:  Lips, mucosa, and tongue normal. Teeth and gums normal. Oropharynx mildly erythematous  Throat:  1+ tonsils bilaterally  Ears:  External ears normal. Canals clear. Tympanic membranes are clear with all landmarks noted.   Sinuses:  nontender, per palpation by patient  Heart:  regular rate and rhythm  Lungs:  lungs are clear to auscultation    Skin: Erythematous papules to bilateral forearms and elbows. Erythematous, dry patches to bilateral MPJ's. No open lesions, drainage, or signs of infection.    Diagnostics:  Results for orders placed or performed in visit on 01/10/25   POCT Rapid strep A   Result Value    GRP A STREP Negative    Internal Procedural Controls Acceptable Yes    TEST LOT NUMBER [id number]    TEST LOT EXPIRATION DATE 02/22/2027       ASSESSMENT:  1. Viral rash    2. Sore throat    3. Fever and chills          PLAN:  -Hydrocortisone as prescribed for the rash.  -Negative for strep.  -If symptoms persist or worsen, please follow up with dermatology or your  PCP.    The patient indicates understanding of these issues and agrees with the plan. Questions were answered.    BRENT Joshi   (3) no apparent problem

## 2025-01-14 NOTE — ED ADULT NURSE NOTE - PAIN RATING/NUMBER SCALE (0-10): REST
Patient with otorrhea from left ear canal.  Patient has PE tubes.  Parents requesting ear drops.  Rx for Ocuflox sent to pharmacy.    4

## 2025-02-05 NOTE — PROGRESS NOTE ADULT - ASSESSMENT
[employer] MESSAGE SENT 02/05/25 AB   43 y/o woman with ITP/IBS/Anxiety/Hypertensive Urgency/reported psychiatric issues/?POTS Syndrome (exhaustive workup presumed autonomic dysfunction) currently sent from office with recurrent palpitations/tremors.   --Possible dehydration/nutrition issues--on IV hydration and patient seems symptomatically improved.  --Continue with Cardizem.  --Behavioral Health evaluation.  --Encourage PO intake and nutrition.

## 2025-02-06 NOTE — ED PROVIDER NOTE - NS HIV RISK FACTOR YES
----- Message from Sergio Hdz DO sent at 2/6/2025  1:55 PM CST -----  Per patient's physician, ok to hold semaglutide for 1 week prior to doing EGD and colonoscopy. Please notify the patient. Thank you.  ----- Message -----  From: Clotilde Brown MD  Sent: 2/5/2025   3:48 PM CST  To: Lseley Stout, RN; Miladys Bustillos DO; #    Okay to hold Ozempic for the procedure  ----- Message -----  From: Miladys Bustillos DO  Sent: 2/4/2025  12:48 PM CST  To: Clotilde Brown MD; Lesley Stout, RN; #    Hi there, thank you for seeing her. As for the semaglutide and DM recs, she has been following closely with endocrinology and they have been making management decisions for the past several months.  I would further discuss with her endo team so as to get best recs for her. I will CC them on this message.     Thanks to all,    Miladys Bustillos DO  Family Medicine  ----- Message -----  From: Sergio Hdz DO  Sent: 2/4/2025  12:02 PM CST  To: DO Dr. Tressa Caballero, we are taking care of a mutual patient. She presented with abdominal distension, abdominal discomfort, and family history of colon cancer. For this reason, endoscopic procedures were recommended. Per hospital policy, semaglutide needs to be held for 7 days prior to this procedure. I am reaching out to you to see if you have any recommendations for the patient to manage blood sugar levels while semaglutide is held. Thank you so much for your time and advise.   Declined

## 2025-02-10 NOTE — ED ADULT NURSE NOTE - NS ED NURSE DC INFO COMPLEXITY
Problem: Adult Inpatient Plan of Care  Goal: Plan of Care Review  Outcome: Progressing  Goal: Patient-Specific Goal (Individualized)  Outcome: Progressing  Goal: Absence of Hospital-Acquired Illness or Injury  Outcome: Progressing  Goal: Optimal Comfort and Wellbeing  Outcome: Progressing  Goal: Readiness for Transition of Care  Outcome: Progressing     Problem: Diabetes Comorbidity  Goal: Blood Glucose Level Within Targeted Range  Outcome: Progressing     Problem: Fall Injury Risk  Goal: Absence of Fall and Fall-Related Injury  Outcome: Progressing     Problem: Cardiac Rhythm Management Device  Goal: Optimal Adjustment to Device  Outcome: Progressing  Goal: Absence of Bleeding  Outcome: Progressing  Goal: Effective Device Function  Outcome: Progressing  Goal: Absence of Infection Signs and Symptoms  Outcome: Progressing  Goal: Acceptable Pain Level  Outcome: Progressing  Goal: Effective Oxygenation and Ventilation  Outcome: Progressing      Simple: Patient demonstrates quick and easy understanding/Verbalized Understanding/Patient asked questions

## 2025-03-08 NOTE — PROGRESS NOTE ADULT - ATTENDING COMMENTS
PT INTERVIEWED AND EXAMINED GENERAL: Intubated and sedated  HEAD: NC/AT, moist mucous membranes  EYES: PERRL, sclera anicteric  LUNGS: intubated, wheezing in bilateral lung fields   CARDIAC: RRR, no m/r/g  ABDOMEN: Soft, non tender, non distended, no rebound, no guarding  EXT: No edema, no deformities.  NEURO: A&Ox0. intubated, not responsive to stimuli  SKIN: Warm and dry. No rash.  PSYCH: unable to assess

## 2025-03-20 NOTE — ED PROVIDER NOTE - INCIDENT LOCATION
Post-Care Instructions: I reviewed with the patient in detail post-care instructions. Patient is to wear sunprotection, and avoid picking at any of the treated lesions. Pt may apply Vaseline to crusted or scabbing areas. Consent: The patient's consent was obtained including but not limited to risks of crusting, scabbing, blistering, scarring, darker or lighter pigmentary change, recurrence, incomplete removal and infection. Show Aperture Variable?: Yes Detail Level: Detailed Duration Of Freeze Thaw-Cycle (Seconds): 1 Render Note In Bullet Format When Appropriate: No home

## 2025-04-04 NOTE — ED ADULT NURSE REASSESSMENT NOTE - NS ED NURSE REASSESS COMMENT FT1
Pt discharged, refused discharge instructions and states that she disagrees with the care that she received from ED.  MD Ding aware.  MD ding states that pt does not need repeat lab work at this time.  Safety and comfort maintained.  Pending  from medicaid at 06:30 to return to assisted living. 04-Apr-2025

## 2025-04-10 NOTE — PROGRESS NOTE ADULT - PROBLEM/PLAN-6
DISPLAY PLAN FREE TEXT [FreeTextEntry1] : establish care [de-identified] : 46F presents to establish care. R ear discomfort has been occurring for 1 month pt previously treated with augmentin then a course of ciprofloxacin with improvement in sxms. pt uses q-tips regularly

## 2025-05-15 NOTE — ED PROVIDER NOTE - TIMING
INITIAL OUTPATIENT NUTRITION CONSULTATION    Nutrition Assessment    Nutrition related diagnoses: Obesity and Prediabetes, sleep apnea    Client Age and Gender: 53 year old female    Pertinent social hx: Works as [employer]  with [employer].  Lives with boyfriend with hx bariatric surgery      Labs:   HgbA1C   Date Value Ref Range Status   11/04/2024 5.8 (H) <5.7 % Final     Comment:      Normal HbA1C:     <5.7%   Pre-Diabetic:     5.7 - 6.4%   Diabetic:         >6.4%   Diabetic Control: <7.0%         Triglycerides   Date Value Ref Range Status   02/23/2024 158 (H) 30 - 149 mg/dL Final     Comment:     Reference interval for fasting triglycerides  Desirable: <150 mg/dL  Borderline: 150-199 mg/dL  High: 200-499 mg/dL  Very High: >=500 mg/dL           LDL Cholesterol   Date Value Ref Range Status   02/23/2024 137 (H) <100 mg/dL Final     Comment:     Desirable <100 mg/dL   Borderline 100-129 mg/dL   High     >=130mg/dL         HDL Cholesterol   Date Value Ref Range Status   02/23/2024 49 40 - 59 mg/dL Final     Comment:     Interpretive Information:   An HDL cholesterol <40 mg/dL is low and constitutes a coronary heart disease risk factor. An HDL cholesterol >60 mg/dL is a negative risk factor for coronary heart disease.         AST   Date Value Ref Range Status   02/23/2024 18 15 - 37 U/L Final     ALT   Date Value Ref Range Status   02/23/2024 24 13 - 56 U/L Final         Height:  Ht Readings from Last 1 Encounters:   02/13/25 5' 4\" (1.626 m)       Weight:   Wt Readings from Last 2 Encounters:   05/15/25 258 lb (117 kg)   02/13/25 266 lb (120.7 kg)       BMI Readings from Last 1 Encounters:   05/15/25 44.29 kg/m²       Weight change: Decrease of 8 lbs in the past 3 months    Current weight loss medication: Zepbound, 10 mg    Current Diet: Frequent convenience and restaurant foods.  Tends toward high fat foods, now in smaller amounts.  Lacks adequate fruits and vegetables. Avoids Greek yogurt  due to preference    Food/Beverage Intake: oral recall  Breakfast: 2 home prepared egg bites and 2 sausage patties/Frozen breakfast sandwich on a bagel or McDonal[employer] breakfast sandwich , Coffee with flavored creamer  Lunch: Frozen Lean Cuisine pizza  Dinner: Picked up chicken fajitas  Snacks: None   Beverages: Diet soda, water    Meal pattern: 3 meals/d, 0-1 snacks/d    Number of meals/week eaten at restaurants: 3+        Alcohol Intake: social on weekends    Food Journal: no    Spent 60 minutes in consultation with the patient.      Nutrition Intervention/Education:  Comprehensive nutrition education and evaluation provided for weight loss. Pt seeking options for meals.  Reports eating similar foods or frequent eating out and convenience foods.  Eating out more frequently due to in the process of moving.  Current meals can contain excess saturated fat and carbohydrates due to cheese and pasta or bread.  Discussed negative impact of excess carbs on blood sugar and weight.  Nutrition fact label reading was demonstrated.  Pt was encouraged to read labels on all convenience foods and monitor total carbohydrates and protein content  The high caloric content of cheese due to fat content was discussed. Reducing fat as a strategy to reduce caloric intake and reduce risk of GERD was encouraged.  Balancing meals with a lean protein was recommended.  Keeping foods at work in the case of a schedule change requiring her to leave the office was suggested and food options to keep at work were developed. Suggestions for healthier fast food choices were discussed. Sample meal and snack ideas were provided.Patient agreed to goals below.    Goals:   Have back up foods at work for schedule changes  (to avoid picking up fast food)  Read nutrition fact labels on convenience foods-limit total carbohydrates to 35 gms/meal, >20 gms protein/meal  Chose healthier fast food options as discussed  Reduce cheese intake  Increase fruit and  vegetable intake    Monitoring/Evaluation:  [facility] Follow up as needed.          Yuri Disla MS, RD, LDN             gradual onset

## 2025-06-24 NOTE — ED ADULT NURSE REASSESSMENT NOTE - NS ED NURSE REASSESS COMMENT FT1
ED attending at bedside, father at bedside.
Pt assisted to bathroom.  Pt educated about clean catch.  Ambulatory with steady gait.  Pt given additional blankets.
patient received from previous RN. patient observed resting in bed. no indication of pain or discomfort. VS reassessed. VS are stable.   2nd liter of IV fluid infusing. plan of care reviewed with patient. patient is awaiting disposition.
no

## 2025-07-02 NOTE — ED ADULT TRIAGE NOTE - NS ED TRIAGE AVPU SCALE
Alert-The patient is alert, awake and responds to voice. The patient is oriented to time, place, and person. The triage nurse is able to obtain subjective information.
GENERAL: well appearing in no acute distress, non-toxic appearing  HEENT: +bleeding from right anterior nare, no left nare bleeding  CARDIAC: irregular heart rhythm   PULM: normal breath sounds, clear to ascultation bilaterally, no rales, rhonchi, wheezing  GI: abdomen nondistended, soft, nontender, no guarding, rebound tenderness  NEURO: alert and appropriately interactive  SKIN: well-perfused, extremities warm, no visible rashes  PSYCH: appropriate mood and affect

## 2025-07-03 NOTE — ED PROVIDER NOTE - CROS ED ROS STATEMENT
management of condition,  as well as promote optimal limb volume reduction required for proper fit of donned clothing in 6 weeks. MET  4.   Patient/Caregiver to verbalize 3/3 signs and symptoms of infection without external cueing, in order to promote optimal self-management of condition in 6 weeks. MET      Long Term Goals: To be accomplished in 24 treatments.  Patient/caregiver will demonstrate improved edema management as evidenced by performing donning/doffing of garments modified independent 3/3 times within session to aid in reducing risk for infection and promote transition to maintenance phase of CDT in 12 weeks.  2.   Patient will demonstrate decrease in self-perceived functional impairment as evidenced by improved score on Lymphedema Life Impact Scale outcome measure from 76 % impairment to 61 % impairment within 12 weeks.  3.   Patient will participate in a pump demo to assess effectiveness for home use to allow for stable limb volumes to maximize success in maintenance phase MET 5/27      PLAN  Yes  Continue plan of care  Re-Cert Due: 7/22/25  Next PN: 7/22/25  []  Upgrade activities as tolerated  []  Discharge due to:  [x]  Other: Continue MLD, MLB, skin care, fit for garments once they arrive, check volumes next visit       Rufina Soto, PT, DPT, CLT       7/3/2025       11:17 AM             
all other ROS negative except as per HPI

## 2025-07-04 NOTE — PROVIDER CONTACT NOTE (OTHER) - SITUATION
Patient refusing erythromycin. Per day nurse, day team is aware The patient is a 43y Female complaining of abdominal pain.

## 2025-07-05 NOTE — ED PROVIDER NOTE - PROGRESS NOTE DETAILS
TB: 3.3  Most likely due to her pancreatic cancer, polypharmacy and sepsis.  Continue to monitor.   [place]: Pt case d/w CDU. They cannot accept pt 2/2 1:1. Pt now demanding CT head and a 2nd liter NS run at no more than 125cc/hr. Will attempt to reach [facility] for dispo assistance.. PMD at [facility] paged. Awaiting callback. BRANDON: Case discussed with Dr. Anand, who states patient just needed one liter hydration, pt has been demanding while in ED for tele admission Ct head, pt has had no tremors while in ED, c/o L sided headache but she is neurologically intact, understanding with Dr. Anand, that patient should go back to [facility] [place]. [place]: pt calm, understands plan for dispo. Requests to stay in ED until 6am and agrees to go home at that point. Will have  pt to [facility] at 6am.

## (undated) DEVICE — ELCTR GROUNDING PAD ADULT COVIDIEN

## (undated) DEVICE — IRRIGATOR BIO SHIELD

## (undated) DEVICE — SUCTION YANKAUER NO CONTROL VENT

## (undated) DEVICE — SENSOR O2 FINGER ADULT

## (undated) DEVICE — BIOPSY FORCEP RADIAL JAW 4 STANDARD WITH NEEDLE

## (undated) DEVICE — FORCEP RADIAL JAW 4 JUMBO 2.8MM 3.2MM 240CM ORANGE DISP

## (undated) DEVICE — TUBING SUCTION 20FT

## (undated) DEVICE — BITE BLOCK ADULT 20 X 27MM (GREEN)

## (undated) DEVICE — CATH IV SAFE BC 22G X 1" (BLUE)

## (undated) DEVICE — BALLOON US ENDO

## (undated) DEVICE — POLY TRAP ETRAP

## (undated) DEVICE — BRUSH COLONOSCOPY CYTOLOGY

## (undated) DEVICE — TUBING SUCTION CONN 6FT STERILE

## (undated) DEVICE — CLAMP BX HOT RAD JAW 3

## (undated) DEVICE — TUBING IV SET GRAVITY 3Y 100" MACRO

## (undated) DEVICE — PACK IV START WITH CHG

## (undated) DEVICE — CATH IV SAFE BC 20G X 1.16" (PINK)

## (undated) DEVICE — SYR LUER LOK 50CC

## (undated) DEVICE — FOLEY HOLDER STATLOCK 2 WAY ADULT

## (undated) DEVICE — SYR ALLIANCE II INFLATION 60ML

## (undated) DEVICE — SOL INJ NS 0.9% 500ML 2 PORT